# Patient Record
Sex: FEMALE | Race: WHITE | NOT HISPANIC OR LATINO | Employment: OTHER | URBAN - METROPOLITAN AREA
[De-identification: names, ages, dates, MRNs, and addresses within clinical notes are randomized per-mention and may not be internally consistent; named-entity substitution may affect disease eponyms.]

---

## 2017-01-17 DIAGNOSIS — G40.909 SEIZURE DISORDER (H): Primary | ICD-10-CM

## 2017-01-17 NOTE — TELEPHONE ENCOUNTER
Tegretal 200mg     Last Written Prescription Date: 01/28/2016  Last Fill Quantity: 150, # refills: 11  Last Office Visit with G, P or  Health prescribing provider: 05/26/2016   Next 5 appointments (look out 90 days)     Mar 23, 2017  1:00 PM   Return Visit with FERNANDA Kraft CNP   [facility]    [address]  [facility] [id number] [phone]                   ALT       38   8/23/2016  WBC      7.6   4/22/2015  RBC     4.51   4/22/2015  HGB     13.5   4/22/2015  HCT     41.3   4/22/2015  No components found with this name: mct  MCV       92   4/22/2015  MCH     29.9   4/22/2015  MCHC     32.7   4/22/2015  RDW     13.2   4/22/2015  PLT      205   4/22/2015  TSH   Date Value Ref Range Status   08/23/2016 3.95 0.40 - 4.00 mU/L Final     CREATININE   Date Value Ref Range Status   08/23/2016 0.78 0.52 - 1.04 mg/dL Final       Drug Levels  Depakote: No results found for this or any previous visit.  Dilantin: No results found for this or any previous visit.  Gabitril: No results found for this or any previous visit.  Tegretol: No results found for this or any previous visit.  Zonegran: No results found for this or any previous visit.    Tamia Yost CPhT  [facility]  [phone]

## 2017-01-18 NOTE — TELEPHONE ENCOUNTER
Tegretol 200 mg  Routing refill request to provider for review/approval because:  Patient needs to be seen because:  Last office visit on 5/26/16 past protocol to refill    Thank You   Donato Rasmussen RPh [facility]

## 2017-01-19 NOTE — TELEPHONE ENCOUNTER
LAST ADDRESSED IN [facility] 12/2015- OVERDUE FOR APPT AND RELATED LABS FOR ANTISEIZURE MEDICATION  Please assist in scheduling.

## 2017-01-25 RX ORDER — CARBAMAZEPINE 200 MG/1
TABLET ORAL
Qty: 30 TABLET | Refills: 0 | Status: SHIPPED | OUTPATIENT
Start: 2017-01-25 | End: 2017-01-30

## 2017-01-25 NOTE — TELEPHONE ENCOUNTER
Pt calls.  She says she has an appt scheduled on 1/30/17.      She is going to be out of her tegretol tomorrow.      Will discuss with Dr. Marks.      Call on home phone, ok to ashlyn.

## 2017-01-25 NOTE — TELEPHONE ENCOUNTER
Verbal order from Dr. Marks to dispense 30 with zero refills.  Pt can get further refills at her upcoming appt.      Called the pt to advise.

## 2017-01-30 ENCOUNTER — OFFICE VISIT (OUTPATIENT)
Dept: FAMILY MEDICINE | Facility: CLINIC | Age: 52
End: 2017-01-30
Payer: MEDICARE

## 2017-01-30 VITALS
WEIGHT: 293 LBS | SYSTOLIC BLOOD PRESSURE: 130 MMHG | BODY MASS INDEX: 52.97 KG/M2 | RESPIRATION RATE: 16 BRPM | OXYGEN SATURATION: 96 % | DIASTOLIC BLOOD PRESSURE: 80 MMHG | TEMPERATURE: 98 F | HEART RATE: 101 BPM

## 2017-01-30 DIAGNOSIS — R09.82 POST-NASAL DISCHARGE: ICD-10-CM

## 2017-01-30 DIAGNOSIS — E78.5 HYPERLIPIDEMIA LDL GOAL <130: ICD-10-CM

## 2017-01-30 DIAGNOSIS — J30.1 SEASONAL ALLERGIC RHINITIS DUE TO POLLEN: ICD-10-CM

## 2017-01-30 DIAGNOSIS — E66.01 MORBID OBESITY DUE TO EXCESS CALORIES (H): ICD-10-CM

## 2017-01-30 DIAGNOSIS — G40.909 SEIZURE DISORDER (H): Primary | ICD-10-CM

## 2017-01-30 DIAGNOSIS — H69.93 ETD (EUSTACHIAN TUBE DYSFUNCTION), BILATERAL: ICD-10-CM

## 2017-01-30 DIAGNOSIS — I10 ESSENTIAL HYPERTENSION, BENIGN: ICD-10-CM

## 2017-01-30 DIAGNOSIS — J34.89 SINUS PRESSURE: ICD-10-CM

## 2017-01-30 DIAGNOSIS — F43.21 ADJUSTMENT DISORDER WITH DEPRESSED MOOD: ICD-10-CM

## 2017-01-30 DIAGNOSIS — K21.9 GASTROESOPHAGEAL REFLUX DISEASE WITHOUT ESOPHAGITIS: ICD-10-CM

## 2017-01-30 PROCEDURE — 99215 OFFICE O/P EST HI 40 MIN: CPT | Performed by: INTERNAL MEDICINE

## 2017-01-30 RX ORDER — SERTRALINE HYDROCHLORIDE 100 MG/1
100 TABLET, FILM COATED ORAL DAILY
Qty: 90 TABLET | Refills: 3 | Status: SHIPPED | OUTPATIENT
Start: 2017-01-30 | End: 2018-03-14

## 2017-01-30 RX ORDER — FLUTICASONE PROPIONATE 110 UG/1
1-2 AEROSOL, METERED RESPIRATORY (INHALATION) 2 TIMES DAILY
Qty: 1 INHALER | Status: SHIPPED | OUTPATIENT
Start: 2017-01-30 | End: 2018-06-12

## 2017-01-30 RX ORDER — CETIRIZINE HYDROCHLORIDE 10 MG/1
10 TABLET ORAL EVERY EVENING
Qty: 90 TABLET | Refills: 4 | Status: SHIPPED | OUTPATIENT
Start: 2017-01-30 | End: 2019-01-15

## 2017-01-30 RX ORDER — LOVASTATIN 40 MG
40 TABLET ORAL AT BEDTIME
Qty: 90 TABLET | Refills: 3 | Status: SHIPPED | OUTPATIENT
Start: 2017-01-30 | End: 2018-03-14

## 2017-01-30 RX ORDER — FLUTICASONE PROPIONATE 50 MCG
1-2 SPRAY, SUSPENSION (ML) NASAL DAILY
Qty: 48 G | Refills: 3 | Status: SHIPPED | OUTPATIENT
Start: 2017-01-30 | End: 2018-06-20

## 2017-01-30 RX ORDER — LISINOPRIL 20 MG/1
20 TABLET ORAL DAILY
Qty: 90 TABLET | Refills: 3 | Status: SHIPPED | OUTPATIENT
Start: 2017-01-30 | End: 2018-03-14

## 2017-01-30 RX ORDER — CARBAMAZEPINE 200 MG/1
TABLET ORAL
Qty: 150 TABLET | Refills: 11 | Status: SHIPPED | OUTPATIENT
Start: 2017-01-30 | End: 2018-01-28

## 2017-01-30 RX ORDER — NICOTINE POLACRILEX 4 MG/1
40 GUM, CHEWING ORAL DAILY
Qty: 180 TABLET | Refills: 2 | Status: SHIPPED | OUTPATIENT
Start: 2017-01-30 | End: 2018-02-04

## 2017-01-30 ASSESSMENT — ANXIETY QUESTIONNAIRES
6. BECOMING EASILY ANNOYED OR IRRITABLE: NOT AT ALL
2. NOT BEING ABLE TO STOP OR CONTROL WORRYING: NOT AT ALL
IF YOU CHECKED OFF ANY PROBLEMS ON THIS QUESTIONNAIRE, HOW DIFFICULT HAVE THESE PROBLEMS MADE IT FOR YOU TO DO YOUR WORK, TAKE CARE OF THINGS AT HOME, OR GET ALONG WITH OTHER PEOPLE: SOMEWHAT DIFFICULT
7. FEELING AFRAID AS IF SOMETHING AWFUL MIGHT HAPPEN: NOT AT ALL
GAD7 TOTAL SCORE: 0
5. BEING SO RESTLESS THAT IT IS HARD TO SIT STILL: NOT AT ALL
1. FEELING NERVOUS, ANXIOUS, OR ON EDGE: NOT AT ALL
3. WORRYING TOO MUCH ABOUT DIFFERENT THINGS: NOT AT ALL

## 2017-01-30 ASSESSMENT — PATIENT HEALTH QUESTIONNAIRE - PHQ9: 5. POOR APPETITE OR OVEREATING: NOT AT ALL

## 2017-01-30 NOTE — NURSING NOTE
"Chief Complaint   Patient presents with     Recheck Medication       Initial /80 mmHg  Pulse 101  Temp(Src) 98  F (36.7  C) (Oral)  Resp 16  Wt 338 lb 4.8 oz (153.452 kg)  SpO2 96%  LMP 08/18/2008 Estimated body mass index is 52.97 kg/(m^2) as calculated from the following:    Height as of 5/26/16: 5' 7\" (1.702 m).    Weight as of this encounter: 338 lb 4.8 oz (153.452 kg).  BP completed using cuff size: large    "

## 2017-01-30 NOTE — PROGRESS NOTES
SUBJECTIVE:                                                    Brissa Barlow is a 51 year old female who presents to clinic today for the following health issues:    HPI    Hypertension Follow-up    Outpatient blood pressures are not being checked.    Low Salt Diet: No added salt    BP Readings from Last 3 Encounters:   01/30/17 130/80   12/29/16 122/70   08/23/16 118/76         Hyperlipidemia Follow-Up      Rate your low fat/cholesterol diet?: poor    Taking statin?  Yes, muscle aches after starting statin    Other lipid medications/supplements?:  None     Depression Followup    Status since last visit: Stable     See PHQ-9 for current symptoms.  Other associated symptoms: None    Complicating factors:   Significant life event:  No   Current substance abuse:  None  Anxiety or Panic symptoms:  No    PHQ-9 SCORE 12/18/2015 5/26/2016 1/30/2017   Total Score - - -   Total Score 3 0 6     SUBHASH-7 SCORE 12/18/2015 5/26/2016 1/30/2017   Total Score - - -   Total Score 0 0 0     PHQ-9  English PHQ-9   Any Language          Amount of exercise or physical activity: 2-3 days/week for an average of 15-30 minutes    Problems taking medications regularly: No    Medication side effects: None    Diet: Regular (no restrictions)     Problem list and histories reviewed & adjusted, as indicated.  Additional history: as documented    Labs reviewed in EPIC  Problem list, Medication list, Allergies, and Medical/Social/Surgical histories reviewed in [facility] and updated as appropriate.      REVIEW OF SYSTEMS:  C: POSITIVE for intentional weight loss - 63 pounds using Phentermine; NEGATIVE for fever or chills  E: NEGATIVE for vision changes or irritation  E/M: NEGATIVE for ear, mouth and throat problems  R: NEGATIVE for significant cough or SOB  CV:  Hypertension - use of Lisinopril; NEGATIVE for chest pain, palpitations or peripheral edema  GI: chronic bowel issues- past colon surgery in 2001Morbid Obesity - attempting to lose weight with  use of Phentermine; NEGATIVE for nausea, abdominal pain, heartburn, or change in bowel habits  M: NEGATIVE for significant arthralgias or myalgia  N: Seizure Disorder - ongoing use of Tegretol without seizure activity; she does not drive as a result;NEGATIVE for weakness, dizziness or paresthesias  E: Hyperlipidemia - use of Lovastatin; Impaired Fasting Glucose and Insulin Resistance; morbid obesity- followed by [facility] and use of Phentermine  P: Hx of Depression - use of Sertraline; NEGATIVE for changes in mood or affect    This document serves as a record of the services and decisions personally performed and made by Cara Marks MD. It was created on her behalf by Nhi Kumar, a trained medical scribe. The creation of this document is based the provider's statements to the medical scribe.  Nhi Kumar, January 30, 2017 5:10 PM     OBJECTIVE:                                                    /80 mmHg  Pulse 101  Temp(Src) 98  F (36.7  C) (Oral)  Resp 16  Wt 338 lb 4.8 oz (153.452 kg)  SpO2 96%  LMP 08/18/2008  Body mass index is 52.97 kg/(m^2).    EXAM:  GENERAL: Patient appears healthy, alert and not distressed. Morbid obesity  EYES: Eyes appear grossly normal to inspection, with normal conjunctivae and sclerae  HENT: Ear canals and TM's appear normal, mouth is without ulcers or lesions, oropharynx is clear and oral mucous membranes are moist  NECK: No adenopathy present, no asymmetry, masses, or scars noted, thyroid is normal to palpation  RESP: Lungs are clear to auscultation - no rales, rhonchi or wheezes present  CV: Regular rate and rhythm, normal S1 S2 heart sounds, no ectopy, no peripheral edema present, peripheral pulses normal, no carotid bruit.  ABDOMEN: Soft, nontender, no hepatosplenomegaly, no masses, normal bowel sounds   (female): No CVA tenderness.  MS: No gross musculoskeletal defects noted, no edema, gait is age appropriate without ataxia  NEURO: Patient exhibits  normal strength and tone, normal speech and mentation  PSYCH: Mentation appears normal, affect is normal/bright; development delay.      Diagnostic Test Results:  No results found for this or any previous visit (from the past 24 hour(s)).      ASSESSMENT/PLAN:                                                    (G40.909) Seizure disorder (H)  (primary encounter diagnosis)  Comment: No episodes of seizure since age 18; Doing well with Tegretol; 2013 labs reviewed; Due to recheck Tegretol levels; Will follow-up for a lab-only appointment next Thursday.  Plan: carBAMazepine (TEGRETOL) 200 MG tablet,         Comprehensive metabolic panel, Carbamazepine total          (I10) BENIGN HYPERTENSION  Comment: Blood pressure is stable and optimally controlled with Lisinopril; Well-tolerated; No changes in medication or dosing; Will continue to monitor.  Plan: lisinopril (PRINIVIL/ZESTRIL) 20 MG tablet          (E78.5) Hyperlipidemia LDL goal <130  Comment: Due for fasting labs to recheck lipids; Lab appointment made for next Thursday at 8:30 am.  Plan: lovastatin (MEVACOR) 40 MG tablet,         Comprehensive metabolic panel, Lipid panel reflex to direct LDL          (F43.21) Adjustment disorder with depressed mood  Comment: Mood is stable; Sertraline is effective and well-tolerated; No changes in medication or dosing; Will continue to monitor mood.  Plan: sertraline (ZOLOFT) 100 MG tablet          (H69.83) ETD (eustachian tube dysfunction), bilateral  Comment: Symptoms controlled and improved with regular Flonase use; Continue using Flonase nasal spray without change.  Plan: fluticasone (FLONASE) 50 MCG/ACT spray          (J30.1) Seasonal allergic rhinitis due to pollen  Comment: Has tried Claritin, but not as effective as Flonase or Zyrtec; Both were refilled.  Plan: fluticasone (FLONASE) 50 MCG/ACT spray,         cetirizine (ZYRTEC) 10 MG tablet          (K21.9) Gastroesophageal reflux disease without esophagitis  Comment:  Symptoms improved with the use of Omeprazole 20 MG; Continue taking current dose without change.  Plan: omeprazole 20 MG tablet         (J34.89) Sinus pressure  (R09.82) Post-nasal discharge  Comment: Improved symptoms with the use of Zyrtec and Flovent; Continue using both without change.  Plan: cetirizine (ZYRTEC) 10 MG tablet, fluticasone (FLOVENT HFA) 110 MCG/ACT Inhaler                   The information in this document, created by a medical scribe for me, accurately reflects the services I personally performed and the decisions made by me. I have reviewed and approved this document for accuracy.  Dr. Cara Marks, 5:22 PM, January 30, 2017    Cara Marks MD  Internal Medicine  [facility]

## 2017-01-30 NOTE — MR AVS SNAPSHOT
After Visit Summary   1/30/2017    Brissa Barlow    MRN: [id number]           Patient Information     Date Of Birth          1965        Visit Information        Provider Department      1/30/2017 4:00 PM Cara Marks MD [facility]        Today's Diagnoses     Seizure disorder (H)    -  1     BENIGN HYPERTENSION         Hyperlipidemia LDL goal <130         Adjustment disorder with depressed mood         ETD (eustachian tube dysfunction), bilateral         Seasonal allergic rhinitis due to pollen         Gastroesophageal reflux disease without esophagitis         Seasonal allergic rhinitis         Sinus pressure         Post-nasal discharge            Follow-ups after your visit        Your next 10 appointments already scheduled     Mar 23, 2017  1:00 PM   Return Visit with FERNANDA Kraft CNP   [facility] ([facility]    [address]  [facility] [address] [phone]              Future tests that were ordered for you today     Open Future Orders        Priority Expected Expires Ordered    Comprehensive metabolic panel Routine  4/30/2017 1/30/2017    Lipid panel reflex to direct LDL Routine  4/30/2017 1/30/2017    Carbamazepine total Routine  4/30/2017 1/30/2017            Who to contact     If you have questions or need follow up information about today's clinic visit or your schedule please contact [facility] directly at [phone].  Normal or non-critical lab and imaging results will be communicated to you by MyChart, letter or phone within 4 business days after the clinic has received the results. If you do not hear from us within 7 days, please contact the clinic through MyChart or phone. If you have a critical or abnormal lab result, we will notify you by phone as soon as possible.  Submit refill requests through [employer] or call your pharmacy and they will forward the refill request to  us. Please allow 3 business days for your refill to be completed.          Additional Information About Your Visit        MyChart Information     [employer] gives you secure access to your electronic health record. If you see a primary care provider, you can also send messages to your care team and make appointments. If you have questions, please call your primary care clinic.  If you do not have a primary care provider, please call [phone] and they will assist you.        Care EveryWhere ID     This is your Care EveryWhere ID. This could be used by other organizations to access your [facility] medical records  [id number]        Your Vitals Were     Last Period                   08/18/2008            Blood Pressure from Last 3 Encounters:   12/29/16 122/70   08/23/16 118/76   05/26/16 118/70    Weight from Last 3 Encounters:   12/29/16 340 lb (154.223 kg)   08/23/16 348 lb (157.852 kg)   05/26/16 356 lb 3.2 oz (161.571 kg)                 Today's Medication Changes          These changes are accurate as of: 1/30/17  5:17 PM.  If you have any questions, ask your nurse or doctor.               These medicines have changed or have updated prescriptions.        Dose/Directions    carBAMazepine 200 MG tablet   Commonly known as:  TEGRETOL   This may have changed:  additional instructions   Used for:  Seizure disorder (H)        2 every AM, 1 noon, 2 every PM;   Quantity:  150 tablet   Refills:  11            Where to get your medicines      These medications were sent to [facility] [place] - [address]  [address] [address] [address]     Phone:  [phone]    - carBAMazepine 200 MG tablet  - cetirizine 10 MG tablet  - fluticasone 110 MCG/ACT Inhaler  - fluticasone 50 MCG/ACT spray  - lisinopril 20 MG tablet  - lovastatin 40 MG tablet  - omeprazole 20 MG tablet  - sertraline 100 MG tablet             Primary Care Provider Office Phone # Fax #    Cara Marks MD  [phone]       [facility] [address]  [facility] [address]        Thank you!     Thank you for choosing [facility]  for your care. Our goal is always to provide you with excellent care. Hearing back from our patients is one way we can continue to improve our services. Please take a few minutes to complete the written survey that you may receive in the mail after your visit with us. Thank you!             Your Updated Medication List - Protect others around you: Learn how to safely use, store and throw away your medicines at www.disposemymeds.org.          This list is accurate as of: 1/30/17  5:17 PM.  Always use your most recent med list.                   Brand Name Dispense Instructions for use    carBAMazepine 200 MG tablet    TEGRETOL    150 tablet    2 every AM, 1 noon, 2 every PM;       cetirizine 10 MG tablet    zyrTEC    90 tablet    Take 1 tablet (10 mg) by mouth every evening has been on Claritin and seems to not be as effective.       fluconazole 150 MG tablet    DIFLUCAN    4 tablet    1 tablet every 3 days for 4 doses; while on abx       fluticasone 110 MCG/ACT Inhaler    FLOVENT HFA    1 Inhaler    Inhale 1-2 puffs into the lungs 2 times daily       fluticasone 50 MCG/ACT spray    FLONASE    48 g    Spray 1-2 sprays into both nostrils daily       hydrocortisone 1 % cream    CORTAID    30 g    Apply sparingly to affected area three times daily for 14 days.       lisinopril 20 MG tablet    PRINIVIL/ZESTRIL    90 tablet    Take 1 tablet (20 mg) by mouth daily       lovastatin 40 MG tablet    MEVACOR    90 tablet    Take 1 tablet (40 mg) by mouth At Bedtime       nystatin cream    MYCOSTATIN    60 g    Apply topically 2 times daily       omeprazole 20 MG tablet     180 tablet    Take 2 tablets (40 mg) by mouth daily Take 30-60 minutes before a meal.       * order for DME     1 Device    Equipment being ordered:  Ankle brace       * order for DME     1  Device    Equipment being ordered: New balance shoes (1 pair)       * order for DME     1 Device    Equipment being ordered: Custom knee brace, left knee       phentermine 37.5 MG tablet    ADIPEX-P    135 tablet    Take 1.5 tablets (56.25 mg) by mouth every morning (before breakfast)       sertraline 100 MG tablet    ZOLOFT    90 tablet    Take 1 tablet (100 mg) by mouth daily       * Notice:  This list has 3 medication(s) that are the same as other medications prescribed for you. Read the directions carefully, and ask your doctor or other care provider to review them with you.

## 2017-01-31 ASSESSMENT — ANXIETY QUESTIONNAIRES: GAD7 TOTAL SCORE: 0

## 2017-01-31 ASSESSMENT — PATIENT HEALTH QUESTIONNAIRE - PHQ9: SUM OF ALL RESPONSES TO PHQ QUESTIONS 1-9: 6

## 2017-02-02 DIAGNOSIS — G40.909 SEIZURE DISORDER (H): ICD-10-CM

## 2017-02-02 DIAGNOSIS — E78.5 HYPERLIPIDEMIA LDL GOAL <130: ICD-10-CM

## 2017-02-02 LAB
ALBUMIN SERPL-MCNC: 3.6 G/DL (ref 3.4–5)
ALP SERPL-CCNC: 119 U/L (ref 40–150)
ALT SERPL W P-5'-P-CCNC: 35 U/L (ref 0–50)
ANION GAP SERPL CALCULATED.3IONS-SCNC: 9 MMOL/L (ref 3–14)
AST SERPL W P-5'-P-CCNC: 19 U/L (ref 0–45)
BILIRUB SERPL-MCNC: 0.3 MG/DL (ref 0.2–1.3)
BUN SERPL-MCNC: 15 MG/DL (ref 7–30)
CALCIUM SERPL-MCNC: 8.7 MG/DL (ref 8.5–10.1)
CARBAMAZEPINE SERPL-MCNC: 11.3 MG/L (ref 4–12)
CHLORIDE SERPL-SCNC: 106 MMOL/L (ref 94–109)
CHOLEST SERPL-MCNC: 194 MG/DL
CO2 SERPL-SCNC: 27 MMOL/L (ref 20–32)
CREAT SERPL-MCNC: 0.82 MG/DL (ref 0.52–1.04)
ERYTHROCYTE [DISTWIDTH] IN BLOOD BY AUTOMATED COUNT: 12 % (ref 10–15)
GFR SERPL CREATININE-BSD FRML MDRD: 74 ML/MIN/1.7M2
GLUCOSE SERPL-MCNC: 106 MG/DL (ref 70–99)
HCT VFR BLD AUTO: 41.1 % (ref 35–47)
HDLC SERPL-MCNC: 48 MG/DL
HGB BLD-MCNC: 13.5 G/DL (ref 11.7–15.7)
LDLC SERPL CALC-MCNC: 116 MG/DL
MCH RBC QN AUTO: 30.6 PG (ref 26.5–33)
MCHC RBC AUTO-ENTMCNC: 32.8 G/DL (ref 31.5–36.5)
MCV RBC AUTO: 93 FL (ref 78–100)
NONHDLC SERPL-MCNC: 146 MG/DL
PLATELET # BLD AUTO: 208 10E9/L (ref 150–450)
POTASSIUM SERPL-SCNC: 4.2 MMOL/L (ref 3.4–5.3)
PROT SERPL-MCNC: 6.8 G/DL (ref 6.8–8.8)
RBC # BLD AUTO: 4.41 10E12/L (ref 3.8–5.2)
SODIUM SERPL-SCNC: 142 MMOL/L (ref 133–144)
TRIGL SERPL-MCNC: 151 MG/DL
WBC # BLD AUTO: 5.6 10E9/L (ref 4–11)

## 2017-02-02 PROCEDURE — 80156 ASSAY CARBAMAZEPINE TOTAL: CPT | Performed by: INTERNAL MEDICINE

## 2017-02-02 PROCEDURE — 36415 COLL VENOUS BLD VENIPUNCTURE: CPT | Performed by: INTERNAL MEDICINE

## 2017-02-02 PROCEDURE — 80053 COMPREHEN METABOLIC PANEL: CPT | Performed by: INTERNAL MEDICINE

## 2017-02-02 PROCEDURE — 85027 COMPLETE CBC AUTOMATED: CPT | Performed by: INTERNAL MEDICINE

## 2017-02-02 PROCEDURE — 80061 LIPID PANEL: CPT | Performed by: INTERNAL MEDICINE

## 2017-02-03 NOTE — PROGRESS NOTES
Quick Note:      Seizure disorder: therapeutic Tegretol level; no change in medication/dose  WBC and liver tests are in normal range.  A1C 5.3 8/23/2016    Glucose slightly elevated; continue to keep active and work on weight loss;  Slightly elevated cholesterol but cardiovascular risk assessment in low.   If risk greater than 7.5%, then statin therapy should be considered.    The 10-year ASCVD risk score (Cornelio COVARRUBIAS Jr., et al., 2013) is: 1.8%   Values used to calculate the score:   Age: 51 years   Sex: Female   Is an : No   Diabetic: No   Tobacco smoker: No   Systolic Blood Pressure: 130 mmHg   Prescribed Antihypertensives: Yes   HDL Cholesterol: 48 mg/dL   Total Cholesterol: 194 mg/dL    Pt advised via My Chart.    ______

## 2017-03-23 ENCOUNTER — OFFICE VISIT (OUTPATIENT)
Dept: ENDOCRINOLOGY | Facility: CLINIC | Age: 52
End: 2017-03-23
Payer: MEDICARE

## 2017-03-23 VITALS
BODY MASS INDEX: 45.99 KG/M2 | HEART RATE: 105 BPM | HEIGHT: 67 IN | DIASTOLIC BLOOD PRESSURE: 64 MMHG | WEIGHT: 293 LBS | SYSTOLIC BLOOD PRESSURE: 118 MMHG

## 2017-03-23 DIAGNOSIS — R79.89 ELEVATED CORTISOL LEVEL: ICD-10-CM

## 2017-03-23 DIAGNOSIS — E88.819 INSULIN RESISTANCE: ICD-10-CM

## 2017-03-23 DIAGNOSIS — R73.01 IFG (IMPAIRED FASTING GLUCOSE): ICD-10-CM

## 2017-03-23 PROCEDURE — 99214 OFFICE O/P EST MOD 30 MIN: CPT | Performed by: CLINICAL NURSE SPECIALIST

## 2017-03-23 RX ORDER — PHENTERMINE HYDROCHLORIDE 37.5 MG/1
56.25 TABLET ORAL
Qty: 135 TABLET | Refills: 0 | Status: SHIPPED | OUTPATIENT
Start: 2017-03-23 | End: 2017-07-27

## 2017-03-23 NOTE — MR AVS SNAPSHOT
After Visit Summary   3/23/2017    Brissa Barlow    MRN: [id number]           Patient Information     Date Of Birth          1965        Visit Information        Provider Department      3/23/2017 1:00 PM Catarina Govea APRN CNP [facility]        Today's Diagnoses     Elevated cortisol level (H)        IFG (impaired fasting glucose)        Insulin resistance           Follow-ups after your visit        Additional Services     ENDOCRINOLOGY ADULT REFERRAL       Your provider has referred you to: [facility]: Endocrinology and Diabetes Clinic - [place] [phone]   [email or web address]      Please be aware that coverage of these services is subject to the terms and limitations of your health insurance plan.  Call member services at your health plan with any benefit or coverage questions.      Please bring the following to your appointment:    >>   Any x-rays, CTs or MRIs which have been performed.  Contact the facility where they were done to arrange for  prior to your scheduled appointment.    >>   List of current medications   >>   This referral request   >>   Any documents/labs given to you for this referral                  Follow-up notes from your care team     Return in about 3 months (around 6/23/2017).      Your next 10 appointments already scheduled     Jun 21, 2017 10:00 AM CDT   (Arrive by 9:45 AM)   NEW ENDOCRINE with Mira Mayberry MD   [facility] ([facility])    [address] [place] [address] [phone]            Jun 23, 2017  1:00 PM CDT   Return Visit with FERNANDA Kraft CNP   [facility] ([facility])    [address]  [facility] [address] [phone]              Who to contact     If you have questions or need follow up information about today's clinic visit or your  "schedule please contact [facility] directly at [phone].  Normal or non-critical lab and imaging results will be communicated to you by MyChart, letter or phone within 4 business days after the clinic has received the results. If you do not hear from us within 7 days, please contact the clinic through [employer] or phone. If you have a critical or abnormal lab result, we will notify you by phone as soon as possible.  Submit refill requests through [employer] or call your pharmacy and they will forward the refill request to us. Please allow 3 business days for your refill to be completed.          Additional Information About Your Visit        [employer] Information     [employer] gives you secure access to your electronic health record. If you see a primary care provider, you can also send messages to your care team and make appointments. If you have questions, please call your primary care clinic.  If you do not have a primary care provider, please call [phone] and they will assist you.        Care EveryWhere ID     This is your Care EveryWhere ID. This could be used by other organizations to access your [facility] medical records  [id number]        Your Vitals Were     Pulse Height Last Period BMI (Body Mass Index)          105 1.702 m (5' 7\") 08/18/2008 52.64 kg/m2         Blood Pressure from Last 3 Encounters:   03/23/17 118/64   01/30/17 130/80   12/29/16 122/70    Weight from Last 3 Encounters:   03/23/17 (!) 152.5 kg (336 lb 1.6 oz)   01/30/17 (!) 153.5 kg (338 lb 4.8 oz)   12/29/16 (!) 154.2 kg (340 lb)              We Performed the Following     ENDOCRINOLOGY ADULT REFERRAL          Where to get your medicines      Some of these will need a paper prescription and others can be bought over the counter.  Ask your nurse if you have questions.     Bring a paper prescription for each of these medications     phentermine 37.5 MG tablet          Primary Care Provider Office Phone # Fax #    Cara " Joshua Marks -[phone]       [facility] [address]  [facility] [address]        Thank you!     Thank you for choosing [facility]  for your care. Our goal is always to provide you with excellent care. Hearing back from our patients is one way we can continue to improve our services. Please take a few minutes to complete the written survey that you may receive in the mail after your visit with us. Thank you!             Your Updated Medication List - Protect others around you: Learn how to safely use, store and throw away your medicines at www.disposemymeds.org.          This list is accurate as of: 3/23/17  5:03 PM.  Always use your most recent med list.                   Brand Name Dispense Instructions for use    carBAMazepine 200 MG tablet    TEGRETOL    150 tablet    2 every AM, 1 noon, 2 every PM;       cetirizine 10 MG tablet    zyrTEC    90 tablet    Take 1 tablet (10 mg) by mouth every evening has been on Claritin and seems to not be as effective.       fluconazole 150 MG tablet    DIFLUCAN    4 tablet    1 tablet every 3 days for 4 doses; while on abx       fluticasone 110 MCG/ACT Inhaler    FLOVENT HFA    1 Inhaler    Inhale 1-2 puffs into the lungs 2 times daily       fluticasone 50 MCG/ACT spray    FLONASE    48 g    Spray 1-2 sprays into both nostrils daily       hydrocortisone 1 % cream    CORTAID    30 g    Apply sparingly to affected area three times daily for 14 days.       lisinopril 20 MG tablet    PRINIVIL/ZESTRIL    90 tablet    Take 1 tablet (20 mg) by mouth daily       lovastatin 40 MG tablet    MEVACOR    90 tablet    Take 1 tablet (40 mg) by mouth At Bedtime       nystatin cream    MYCOSTATIN    60 g    Apply topically 2 times daily       omeprazole 20 MG tablet     180 tablet    Take 2 tablets (40 mg) by mouth daily Take 30-60 minutes before a meal.       * order for DME     1 Device    Equipment being ordered:  Ankle brace       *  order for DME     1 Device    Equipment being ordered: New balance shoes (1 pair)       * order for DME     1 Device    Equipment being ordered: Custom knee brace, left knee       phentermine 37.5 MG tablet    ADIPEX-P    135 tablet    Take 1.5 tablets (56.25 mg) by mouth every morning (before breakfast)       sertraline 100 MG tablet    ZOLOFT    90 tablet    Take 1 tablet (100 mg) by mouth daily       * Notice:  This list has 3 medication(s) that are the same as other medications prescribed for you. Read the directions carefully, and ask your doctor or other care provider to review them with you.
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
## 2017-03-23 NOTE — NURSING NOTE
"Chief Complaint   Patient presents with     Follow Up For     Cushings Syndrome       Initial /64 (BP Location: Left arm, Cuff Size: Adult Regular)  Pulse 105  Ht 1.702 m (5' 7\")  Wt (!) 152.5 kg (336 lb 1.6 oz)  LMP 08/18/2008  BMI 52.64 kg/m2 Estimated body mass index is 52.64 kg/(m^2) as calculated from the following:    Height as of this encounter: 1.702 m (5' 7\").    Weight as of this encounter: 152.5 kg (336 lb 1.6 oz).  Medication Reconciliation: complete     Deborah Fagan, ALBIN    3/23/2017  1:00 PM      "

## 2017-03-31 ENCOUNTER — OFFICE VISIT (OUTPATIENT)
Dept: FAMILY MEDICINE | Facility: CLINIC | Age: 52
End: 2017-03-31
Payer: MEDICARE

## 2017-03-31 VITALS
HEART RATE: 88 BPM | BODY MASS INDEX: 45.99 KG/M2 | SYSTOLIC BLOOD PRESSURE: 120 MMHG | WEIGHT: 293 LBS | HEIGHT: 67 IN | TEMPERATURE: 98.2 F | DIASTOLIC BLOOD PRESSURE: 80 MMHG

## 2017-03-31 DIAGNOSIS — R82.90 NONSPECIFIC FINDING ON EXAMINATION OF URINE: ICD-10-CM

## 2017-03-31 DIAGNOSIS — R31.9 URINARY TRACT INFECTION WITH HEMATURIA, SITE UNSPECIFIED: Primary | ICD-10-CM

## 2017-03-31 DIAGNOSIS — N39.0 URINARY TRACT INFECTION WITH HEMATURIA, SITE UNSPECIFIED: Primary | ICD-10-CM

## 2017-03-31 LAB
ALBUMIN UR-MCNC: NEGATIVE MG/DL
APPEARANCE UR: ABNORMAL
BACTERIA #/AREA URNS HPF: ABNORMAL /HPF
BILIRUB UR QL STRIP: NEGATIVE
COLOR UR AUTO: YELLOW
GLUCOSE UR STRIP-MCNC: NEGATIVE MG/DL
HGB UR QL STRIP: ABNORMAL
KETONES UR STRIP-MCNC: NEGATIVE MG/DL
LEUKOCYTE ESTERASE UR QL STRIP: ABNORMAL
NITRATE UR QL: NEGATIVE
NON-SQ EPI CELLS #/AREA URNS LPF: ABNORMAL /LPF
PH UR STRIP: 7 PH (ref 5–7)
RBC #/AREA URNS AUTO: ABNORMAL /HPF (ref 0–2)
SP GR UR STRIP: 1.02 (ref 1–1.03)
URN SPEC COLLECT METH UR: ABNORMAL
UROBILINOGEN UR STRIP-ACNC: 0.2 EU/DL (ref 0.2–1)
WBC #/AREA URNS AUTO: ABNORMAL /HPF (ref 0–2)
WBC CLUMPS #/AREA URNS HPF: PRESENT /HPF

## 2017-03-31 PROCEDURE — 99213 OFFICE O/P EST LOW 20 MIN: CPT | Performed by: FAMILY MEDICINE

## 2017-03-31 PROCEDURE — 81001 URINALYSIS AUTO W/SCOPE: CPT | Performed by: FAMILY MEDICINE

## 2017-03-31 PROCEDURE — 87086 URINE CULTURE/COLONY COUNT: CPT | Performed by: FAMILY MEDICINE

## 2017-03-31 RX ORDER — SULFAMETHOXAZOLE/TRIMETHOPRIM 800-160 MG
1 TABLET ORAL 2 TIMES DAILY
Qty: 10 TABLET | Refills: 0 | Status: SHIPPED | OUTPATIENT
Start: 2017-03-31 | End: 2017-04-05

## 2017-03-31 NOTE — PATIENT INSTRUCTIONS
Urinary Tract Infections in Women  Urinary tract infections (UTIs) are most often caused by bacteria (germs). These bacteria enter the urinary tract. The bacteria may come from outside the body. Or they may travel from the skin outside the rectum or vagina into the urethra. Female anatomy makes it easier for bacteria from the bowel to enter a woman s urinary tract, which is the most common source of UTI. This means women develop UTIs more often than men. Pain in or around the urinary tract is a common UTI symptom. But the only way to know for sure if you have a UTI for the health care provider to test your urine. The two tests that may be done are the urinalysis and urine culture.  Types of UTIs    Cystitis: A bladder infection (cystitis) is the most common UTI in women. You may have urgent or frequent urination. You may also have pain, burning when you urinate, and bloody urine.    Urethritis: This is an inflamed urethra, which is the tube that carries urine from the bladder to outside the body. You may have lower stomach or back pain. You may also have urgent or frequent urination.    Pyelonephritis: This is a kidney infection. If not treated, it can be serious and damage your kidneys. In severe cases, you may be hospitalized. You may have a fever and lower back pain.  Medications to treat a UTI  Most UTIs are treated with antibiotics. These kill the bacteria. The length of time you need to take them depends on the type of infection. It may be as short as 3 days. If you have repeated UTIs, a low-dose antibiotic may be needed for several months. Take antibiotics exactly as directed. Don t stop taking them until all of the medication is gone. If you stop taking the antibiotic too soon, the infection may not go away, and you may develop a resistance to the antibiotic. This can make it much harder to treat.  Lifestyle changes to treat and prevent UTIs  The lifestyle changes below will help get rid of your UTI. They  may also help prevent future UTIs.    Drink plenty of fluids. This includes water, juice, or other caffeine-free drinks. Fluids help flush bacteria out of your body.    Empty your bladder. Always empty your bladder when you feel the urge to urinate. And always urinate before going to sleep. Urine that stays in your bladder can lead to infection. Try to urinate before and after sex as well.    Practice good personal hygiene. Wipe yourself from front to back after using the toilet. This helps keep bacteria from getting into the urethra.    Use condoms during sex. These help prevent UTIs caused by sexually transmitted bacteria. Also, avoid using spermicides during sex. These can increase the risk of UTIs. Choose other forms of birth control instead. For women who tend to get UTIs after sex, a low-dose of a preventive antibiotic may be used. Be sure to discuss this option with your health care provider.    Follow up with your health care provider as directed. He or she may test to make sure the infection has cleared. If necessary, additional treatment may be started.    0196-8547 [employer]. [address] [place] [address], [place] [address]. All rights reserved. This information is not intended as a substitute for professional medical care. Always follow your healthcare professional's instructions.

## 2017-04-01 LAB
BACTERIA SPEC CULT: NORMAL
MICRO REPORT STATUS: NORMAL
SPECIMEN SOURCE: NORMAL

## 2017-04-21 DIAGNOSIS — G40.909 SEIZURE DISORDER (H): ICD-10-CM

## 2017-06-21 ENCOUNTER — OFFICE VISIT (OUTPATIENT)
Dept: ENDOCRINOLOGY | Facility: CLINIC | Age: 52
End: 2017-06-21

## 2017-06-21 VITALS
HEART RATE: 92 BPM | WEIGHT: 293 LBS | HEIGHT: 67 IN | DIASTOLIC BLOOD PRESSURE: 82 MMHG | BODY MASS INDEX: 45.99 KG/M2 | SYSTOLIC BLOOD PRESSURE: 132 MMHG

## 2017-06-21 DIAGNOSIS — D49.7 ADRENAL TUMOR: ICD-10-CM

## 2017-06-21 DIAGNOSIS — E24.8 OTHER CUSHING'S SYNDROME (H): ICD-10-CM

## 2017-06-21 DIAGNOSIS — E24.8 OTHER CUSHING'S SYNDROME (H): Primary | ICD-10-CM

## 2017-06-21 DIAGNOSIS — E66.01 MORBID OBESITY, UNSPECIFIED OBESITY TYPE (H): ICD-10-CM

## 2017-06-21 DIAGNOSIS — E27.0 HYPERCORTISOLEMIA (H): ICD-10-CM

## 2017-06-21 LAB — CORTIS SERPL-MCNC: 15.9 UG/DL (ref 4–22)

## 2017-06-21 PROCEDURE — 82533 TOTAL CORTISOL: CPT | Performed by: INTERNAL MEDICINE

## 2017-06-21 PROCEDURE — 82024 ASSAY OF ACTH: CPT | Performed by: INTERNAL MEDICINE

## 2017-06-21 ASSESSMENT — PAIN SCALES - GENERAL: PAINLEVEL: NO PAIN (0)

## 2017-06-21 NOTE — NURSING NOTE
Chief Complaint   Patient presents with     Consult     NEW PATIENT-CUSHING'S SYDROME     Evonne Arteaga, [facility]  Endocrinology & Diabetes 3G

## 2017-06-21 NOTE — MR AVS SNAPSHOT
After Visit Summary   6/21/2017    Brissa Barlow    MRN: [id number]           Patient Information     Date Of Birth          1965        Visit Information        Provider Department      6/21/2017 10:00 AM Mira Mayberry MD [facility]        Today's Diagnoses     Other Cushing's syndrome (H)    -  1      Care Instructions    To expedite your medication refill(s), please contact your pharmacy and have them fax a refill request to: [phone].  *Please allow 3 business days for routine medication refills.  *Please allow 5 business days for controlled substance medication refills.  --------------------  For scheduling appointments (including lab work), please request an appointment through [employer], or call: [phone].    For questions for your provider or the endocrine nurse, please send a [employer] message.  For after-hours urgent issues, please dial [phone], and ask to speak with the Endocrinologist On-Call.  --------------------  Please Note: If you are active on [employer], all future test results will be sent by [employer] message only and will no longer be sent by mail. You may also receive communication directly from your physician.            Follow-ups after your visit        Your next 10 appointments already scheduled     Jun 21, 2017 11:45 AM CDT   LAB with [facility] ([facility])    [address] [place] [id number] [phone]           Patient must bring picture ID.  Patient should be prepared to give a urine specimen  Please do not eat 10-12 hours before your appointment if you are coming in fasting for labs on lipids, cholesterol, or glucose (sugar).  Pregnant women should follow their Care Team instructions. Water with medications is okay. Do not drink coffee or other fluids.   If you have concerns about taking  your medications, please ask at office or if scheduling via [employer], send a message by  clicking on Secure Messaging, Message Your Care Team.            Jun 21, 2017 12:00 PM CDT   (Arrive by 11:45 AM)   CT ABDOMEN W/O & W CONTRAST with UCCT2   [facility] CT ([facility])    [address]  [address] [facility] [address] [phone]           Please bring any scans or X-rays taken at other hospitals, if similar tests were done. Also bring a list of your medicines, including vitamins, minerals and over-the-counter drugs. It is safest to leave personal items at home.  Be sure to tell your doctor:   If you have any allergies.   If there s any chance you are pregnant.   If you are breastfeeding.   If you have any special needs.  You may have contrast for this exam. To prepare:   Do not eat or drink for 2 hours before your exam. If you need to take medicine, you may take it with small sips of water. (We may ask you to take liquid medicine as well.)   The day before your exam, drink extra fluids at least six 8-ounce glasses (unless your doctor tells you to restrict your fluids).  Patients over 70 or patients with diabetes or kidney problems:   If you haven t had a blood test (creatinine test) within the last 30 days, go to your clinic or Diagnostic Imaging Department for this test.  If you have diabetes:   If your kidney function is normal, continue taking your metformin (Avandamet, Glucophage, Glucovance, Metaglip) on the day of your exam.   If your kidney function is abnormal, wait 48 hours before restarting this medicine.  You will have oral contrast for this exam:   You will drink the contrast at home. Get this from your clinic or Diagnostic Imaging Department. Please follow the directions given.  Please wear loose clothing, such as a sweat suit or jogging clothes. Avoid snaps, zippers and other metal. We may ask you to undress and put on a hospital gown.  If you have any questions, please call the Imaging Department where you will have your exam.        "       Future tests that were ordered for you today     Open Future Orders        Priority Expected Expires Ordered    CT Abdomen w & w/o contrast Routine  1/17/2018 6/21/2017    Cortisol Saliva Routine  6/21/2018 6/21/2017    Cortisol Saliva Routine  6/21/2018 6/21/2017    Adrenal corticotropin Routine  6/21/2018 6/21/2017    Cortisol Routine  6/21/2018 6/21/2017            Who to contact     Please call your clinic at [phone] to:    Ask questions about your health    Make or cancel appointments    Discuss your medicines    Learn about your test results    Speak to your doctor   If you have compliments or concerns about an experience at your clinic, or if you wish to file a complaint, please contact [facility] Patient Relations at [phone] or email us at [email or web address]         Additional Information About Your Visit        Q2ebankingharTailored Fit Information     exoro systemt gives you secure access to your electronic health record. If you see a primary care provider, you can also send messages to your care team and make appointments. If you have questions, please call your primary care clinic.  If you do not have a primary care provider, please call [phone] and they will assist you.      EasyProve is an electronic gateway that provides easy, online access to your medical records. With EasyProve, you can request a clinic appointment, read your test results, renew a prescription or communicate with your care team.     To access your existing account, please contact your [facility] or call [phone] for assistance.        Care EveryWhere ID     This is your Care EveryWhere ID. This could be used by other organizations to access your [facility] medical records  [id number]        Your Vitals Were     Pulse Height Last Period BMI (Body Mass Index)          92 1.702 m (5' 7\") 08/18/2008 53.6 kg/m2         Blood Pressure from Last 3 Encounters:   06/21/17 " 132/82   03/31/17 120/80   03/23/17 118/64    Weight from Last 3 Encounters:   06/21/17 (!) 155.2 kg (342 lb 3.2 oz)   03/31/17 (!) 152.9 kg (337 lb)   03/23/17 (!) 152.5 kg (336 lb 1.6 oz)               Primary Care Provider Office Phone # Fax #    Cara Joshua Marks [phone]       [facility] [address] [facility] [address]        Equal Access to Services     [facility]: Hadii aad ku hadasho Soomaali, waaxda luqadaha, qaybta kaalmada adeegyada, mir padgett . So [facility] [phone].    ATENCIÓN: Si habla español, tiene a ye disposición servicios gratuitos de asistencia lingüística. [facility] [phone].    We comply with applicable federal civil rights laws and [place] laws. We do not discriminate on the basis of race, color, national origin, age, disability sex, sexual orientation or gender identity.            Thank you!     Thank you for choosing [facility]  for your care. Our goal is always to provide you with excellent care. Hearing back from our patients is one way we can continue to improve our services. Please take a few minutes to complete the written survey that you may receive in the mail after your visit with us. Thank you!             Your Updated Medication List - Protect others around you: Learn how to safely use, store and throw away your medicines at www.disposemymeds.org.          This list is accurate as of: 6/21/17 11:19 AM.  Always use your most recent med list.                   Brand Name Dispense Instructions for use Diagnosis    carBAMazepine 200 MG tablet    TEGRETOL    150 tablet    2 every AM, 1 noon, 2 every PM;    Seizure disorder (H)       cetirizine 10 MG tablet    zyrTEC    90 tablet    Take 1 tablet (10 mg) by mouth every evening has been on Claritin and seems to not be as effective.    Sinus pressure, Seasonal allergic rhinitis due to pollen       fluticasone 110 MCG/ACT Inhaler    FLOVENT HFA     1 Inhaler    Inhale 1-2 puffs into the lungs 2 times daily    Post-nasal discharge       fluticasone 50 MCG/ACT spray    FLONASE    48 g    Spray 1-2 sprays into both nostrils daily    ETD (eustachian tube dysfunction), bilateral, Seasonal allergic rhinitis due to pollen       hydrocortisone 1 % cream    CORTAID    30 g    Apply sparingly to affected area three times daily for 14 days.    Itching with irritation       lisinopril 20 MG tablet    PRINIVIL/ZESTRIL    90 tablet    Take 1 tablet (20 mg) by mouth daily    Essential hypertension, benign       lovastatin 40 MG tablet    MEVACOR    90 tablet    Take 1 tablet (40 mg) by mouth At Bedtime    Hyperlipidemia LDL goal <130       nystatin cream    MYCOSTATIN    60 g    Apply topically 2 times daily    Vaginal discharge       omeprazole 20 MG tablet     180 tablet    Take 2 tablets (40 mg) by mouth daily Take 30-60 minutes before a meal.    Gastroesophageal reflux disease without esophagitis       * order for DME     1 Device    Equipment being ordered:  Ankle brace    Cavus foot, acquired, Metatarsus adductus, Left foot pain       * order for DME     1 Device    Equipment being ordered: New balance shoes (1 pair)    Cavus foot, acquired, Metatarsus adductus, Left foot pain       * order for DME     1 Device    Equipment being ordered: Custom knee brace, left knee    Injury, other and unspecified, knee, leg, ankle, and foot       phentermine 37.5 MG tablet    ADIPEX-P    135 tablet    Take 1.5 tablets (56.25 mg) by mouth every morning (before breakfast)    Elevated cortisol level (H), IFG (impaired fasting glucose), Insulin resistance       sertraline 100 MG tablet    ZOLOFT    90 tablet    Take 1 tablet (100 mg) by mouth daily    Adjustment disorder with depressed mood       * Notice:  This list has 3 medication(s) that are the same as other medications prescribed for you. Read the directions carefully, and ask your doctor or other care provider to review them  with you.
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
## 2017-06-21 NOTE — PATIENT INSTRUCTIONS
To expedite your medication refill(s), please contact your pharmacy and have them fax a refill request to: [phone].  *Please allow 3 business days for routine medication refills.  *Please allow 5 business days for controlled substance medication refills.  --------------------  For scheduling appointments (including lab work), please request an appointment through Pose, or call: [phone].    For questions for your provider or the endocrine nurse, please send a Pose message.  For after-hours urgent issues, please dial [phone], and ask to speak with the Endocrinologist On-Call.  --------------------  Please Note: If you are active on Pose, all future test results will be sent by Pose message only and will no longer be sent by mail. You may also receive communication directly from your physician.
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
## 2017-06-22 LAB — ACTH PLAS-MCNC: 43 PG/ML

## 2017-06-22 PROCEDURE — 82530 CORTISOL FREE: CPT | Mod: 90 | Performed by: FAMILY MEDICINE

## 2017-06-22 PROCEDURE — 99000 SPECIMEN HANDLING OFFICE-LAB: CPT | Performed by: FAMILY MEDICINE

## 2017-06-23 DIAGNOSIS — E24.8 OTHER CUSHING'S SYNDROME (H): ICD-10-CM

## 2017-06-23 NOTE — LETTER
Patient:  Brissa Barlow  :   1965  MRN:     [id number]        Ms.Virginia REDD Barlow  [id number] [id number]        July 3, 2017    Dear ,    We are writing to inform you of your test results. Result was normal range. I do not think you have Cushing disease.    Please take care    Mira Mayberry MD      Resulted Orders   Cortisol Saliva   Result Value Ref Range    Saliva Collection Time 2330     Cortisol Saliva 0.107 ug/dL      Comment:      (Note)  INTERPRETIVE INFORMATION: Cortisol, Saliva  Effective 2006  For collection at 2300 hr. the normal cortisol  concentration is less than 0.112 ug/dL.  Patients with  Cushings Syndrome have concentrations of 0.112 ug/dL or  greater.                  a.m. ([id number])      p.m. (noon-1800)  Males   2.5-7 years   0.034-0.645 ug/dL    0.053-0.607 ug/dL    8-11 years   0.084-0.839 ug/dL    less than 0.215 ug/dL   12-18 years   0.021-0.883 ug/dL    less than 0.259 ug/dL   19-30 years   0.112-0.743 ug/dL    less than 0.308 ug/dL   31-50 years   0.122-1.551 ug/dL    less than 0.359 ug/dL   51 and older  0.112-0.812 ug/dL    less than 0.228 ug/dL  Females   2.5-7 years   0.034-0.645 ug/dL    0.053-0.607 ug/dL    8-11 years   0.084-0.839 ug/dL    less than 0.215 ug/dL   12-18 years   0.021-0.883 ug/dL    less than 0.259 ug/dL   19-30 years   0.272-1.348 ug/dL    less than 0.359 ug/dL   31-50 years   0.094-1.515 ug/dL    less than 0.181 ug/dL   51 and older  0.149-0.739 ug/dL    0.022-0  .254 ug/dL  Performed by [facility],  [address] [place] [id number] [phone]  [email or web address], Zach Howard MD, Lab. Director         [facility]

## 2017-06-24 ENCOUNTER — HEALTH MAINTENANCE LETTER (OUTPATIENT)
Age: 52
End: 2017-06-24

## 2017-06-26 LAB
COLLECT TME SPEC: 2330
CORTIS SAL-MCNC: 0.11 UG/DL

## 2017-07-03 NOTE — PROGRESS NOTES
Resulted Orders   Cortisol Saliva   Result Value Ref Range    Saliva Collection Time 2330     Cortisol Saliva 0.107 ug/dL      Comment:      (Note)  INTERPRETIVE INFORMATION: Cortisol, Saliva  Effective 2006  For collection at 2300 hr. the normal cortisol  concentration is less than 0.112 ug/dL.  Patients with  Cushings Syndrome have concentrations of 0.112 ug/dL or  greater.                  a.m. ([id number])      p.m. (noon-1800)  Males   2.5-7 years   0.034-0.645 ug/dL    0.053-0.607 ug/dL    8-11 years   0.084-0.839 ug/dL    less than 0.215 ug/dL   12-18 years   0.021-0.883 ug/dL    less than 0.259 ug/dL   19-30 years   0.112-0.743 ug/dL    less than 0.308 ug/dL   31-50 years   0.122-1.551 ug/dL    less than 0.359 ug/dL   51 and older  0.112-0.812 ug/dL    less than 0.228 ug/dL  Females   2.5-7 years   0.034-0.645 ug/dL    0.053-0.607 ug/dL    8-11 years   0.084-0.839 ug/dL    less than 0.215 ug/dL   12-18 years   0.021-0.883 ug/dL    less than 0.259 ug/dL   19-30 years   0.272-1.348 ug/dL    less than 0.359 ug/dL   31-50 years   0.094-1.515 ug/dL    less than 0.181 ug/dL   51 and older  0.149-0.739 ug/dL    0.022-0  .254 ug/dL  Performed by [facility],  [address] [place] [id number] [phone]  [email or web address], Zach Howard MD, Lab. Director       Patient:  Brissa Barlow  :   1965  MRN:     [id number]        Ms.Virginia REDD Barlow  [address] [address]        July 3, 2017    Dear ,    We are writing to inform you of your test results. Result was normal range. I do not this you have Cushing.    Take care    Mira Mayberry MD

## 2017-07-06 PROCEDURE — 99000 SPECIMEN HANDLING OFFICE-LAB: CPT | Performed by: FAMILY MEDICINE

## 2017-07-06 PROCEDURE — 82530 CORTISOL FREE: CPT | Mod: 90 | Performed by: FAMILY MEDICINE

## 2017-07-07 DIAGNOSIS — E24.8 OTHER CUSHING'S SYNDROME (H): ICD-10-CM

## 2017-07-10 LAB
COLLECT TME SPEC: NORMAL
CORTIS SAL-MCNC: 0.15 UG/DL

## 2017-07-17 DIAGNOSIS — N89.8 VAGINAL DISCHARGE: ICD-10-CM

## 2017-07-17 NOTE — TELEPHONE ENCOUNTER
nystatin (MYCOSTATIN) cream      Last Written Prescription Date: 11/28/16  Last Fill Quantity: 60,  # refills: 0   Last Office Visit with G, P or [facility] prescribing provider: 3/31/17

## 2017-07-18 RX ORDER — NYSTATIN 100000 U/G
CREAM TOPICAL
Qty: 60 G | Refills: 0 | Status: SHIPPED | OUTPATIENT
Start: 2017-07-18 | End: 2017-09-14

## 2017-07-18 NOTE — TELEPHONE ENCOUNTER
Medication is being filled for 1 time refill only due to:  Patient needs to be seen because due for OV 7/29/2017 per LOV notes 1/30/2017.     Jaky Limon RN

## 2017-07-27 ENCOUNTER — OFFICE VISIT (OUTPATIENT)
Dept: ENDOCRINOLOGY | Facility: CLINIC | Age: 52
End: 2017-07-27
Payer: MEDICARE

## 2017-07-27 VITALS
DIASTOLIC BLOOD PRESSURE: 70 MMHG | TEMPERATURE: 98.1 F | OXYGEN SATURATION: 95 % | WEIGHT: 293 LBS | HEART RATE: 124 BPM | RESPIRATION RATE: 20 BRPM | SYSTOLIC BLOOD PRESSURE: 112 MMHG | BODY MASS INDEX: 53.46 KG/M2

## 2017-07-27 DIAGNOSIS — R79.89 ELEVATED CORTISOL LEVEL: ICD-10-CM

## 2017-07-27 DIAGNOSIS — R73.01 IFG (IMPAIRED FASTING GLUCOSE): ICD-10-CM

## 2017-07-27 DIAGNOSIS — E88.819 INSULIN RESISTANCE: ICD-10-CM

## 2017-07-27 DIAGNOSIS — R63.5 ABNORMAL WEIGHT GAIN: ICD-10-CM

## 2017-07-27 DIAGNOSIS — E66.01 MORBID OBESITY, UNSPECIFIED OBESITY TYPE (H): Primary | ICD-10-CM

## 2017-07-27 LAB
HBA1C MFR BLD: 5.3 % (ref 4.3–6)
TSH SERPL DL<=0.005 MIU/L-ACNC: 3.04 MU/L (ref 0.4–4)

## 2017-07-27 PROCEDURE — 99214 OFFICE O/P EST MOD 30 MIN: CPT | Performed by: CLINICAL NURSE SPECIALIST

## 2017-07-27 PROCEDURE — 83036 HEMOGLOBIN GLYCOSYLATED A1C: CPT | Performed by: PATHOLOGY

## 2017-07-27 PROCEDURE — 36415 COLL VENOUS BLD VENIPUNCTURE: CPT | Performed by: CLINICAL NURSE SPECIALIST

## 2017-07-27 PROCEDURE — 84443 ASSAY THYROID STIM HORMONE: CPT | Performed by: PATHOLOGY

## 2017-07-27 RX ORDER — PHENTERMINE HYDROCHLORIDE 37.5 MG/1
56.25 TABLET ORAL
Qty: 135 TABLET | Refills: 0 | Status: SHIPPED | OUTPATIENT
Start: 2017-07-27 | End: 2017-11-02
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
## 2017-07-27 NOTE — PATIENT INSTRUCTIONS
"    Today's weight has increased slightly since March, 336-->342 lbs,  but is decreased in the past month 342-->341 lbs so it is going back in the right direction.    Keep working hard on watching your diet intake carefully.  Try to do a little exercise if you can, take a walk outside or use some light hand weights indoors.  We'll continue the phentermine, 1 tablet in the morning, and  1/2 tablet at noon.  I am checking your A1c (a blood sugar measure) and your thyroid.  Follow up in 3 months.  Here's a copy of all your weights since we started with your weight loss plan:      Vital Signs 8/12/2015 8/18/2015 9/11/2015 10/13/2015   Weight (LB) 400 lb 1.6 oz 398 lb 11.2 oz 399 lb 6.4 oz 388 lb 6.4 oz   Height    5' 7\"   BMI    60.96     Vital Signs 12/17/2015 2/11/2016 4/28/2016 5/24/2016 5/26/2016   Weight (LB) 378 lb 367 lb 358 lb 355 lb 356 lb 3.2 oz   Height 5' 7\" 5' 7\"  5' 7\" 5' 7\"   BMI 59.33 57.6  55.72 55.91     Vital Signs 8/23/2016 12/29/2016 1/30/2017 3/23/2017   Weight (LB) 348 lb 340 lb 338 lb 4.8 oz 336 lb 1.6 oz   Height    5' 7\"   BMI    52.75     Vital Signs 3/31/2017 6/21/2017 6/21/2017 7/27/2017   Weight (LB) 337 lb  342 lb 3.2 oz 341 lb 4.8 oz   Height 5' 7\"  5' 7\"    BMI (Calculated) 52.89  53.71      Catarina Govea NP  Endocrinology    "

## 2017-07-27 NOTE — NURSING NOTE
"Chief Complaint   Patient presents with     Consult     Follow up       Initial /70 (BP Location: Left arm, Patient Position: Chair, Cuff Size: Adult Large)  Pulse 124  Temp 98.1  F (36.7  C) (Oral)  Resp 20  Wt (!) 341 lb 4.8 oz (154.8 kg)  LMP 08/18/2008  SpO2 95%  Breastfeeding? No  BMI 53.46 kg/m2 Estimated body mass index is 53.46 kg/(m^2) as calculated from the following:    Height as of 6/21/17: 5' 7\" (1.702 m).    Weight as of this encounter: 341 lb 4.8 oz (154.8 kg).  Medication Reconciliation: complete  Rosalina Givens M.A.  "

## 2017-07-31 NOTE — PROGRESS NOTES
[place],  Your thyroid function is normal.  Your A1c (three month average blood sugar test) is great! The A1c is in the normal range.  Here's a copy of the results for your records.  Catarina Govea NP  Endocrinology

## 2017-09-14 ENCOUNTER — OFFICE VISIT (OUTPATIENT)
Dept: FAMILY MEDICINE | Facility: CLINIC | Age: 52
End: 2017-09-14
Payer: MEDICARE

## 2017-09-14 VITALS
SYSTOLIC BLOOD PRESSURE: 128 MMHG | OXYGEN SATURATION: 98 % | DIASTOLIC BLOOD PRESSURE: 64 MMHG | WEIGHT: 293 LBS | TEMPERATURE: 98.9 F | HEART RATE: 88 BPM | RESPIRATION RATE: 18 BRPM | BODY MASS INDEX: 54.94 KG/M2

## 2017-09-14 DIAGNOSIS — R62.50 DELAY IN DEVELOPMENT: ICD-10-CM

## 2017-09-14 DIAGNOSIS — I10 ESSENTIAL HYPERTENSION, BENIGN: ICD-10-CM

## 2017-09-14 DIAGNOSIS — B37.2 YEAST DERMATITIS: Primary | ICD-10-CM

## 2017-09-14 DIAGNOSIS — J30.89 CHRONIC NON-SEASONAL ALLERGIC RHINITIS, UNSPECIFIED TRIGGER: ICD-10-CM

## 2017-09-14 PROCEDURE — 99214 OFFICE O/P EST MOD 30 MIN: CPT | Performed by: INTERNAL MEDICINE

## 2017-09-14 RX ORDER — AZELASTINE 1 MG/ML
1-2 SPRAY, METERED NASAL 2 TIMES DAILY
Qty: 1 BOTTLE | Refills: 3 | Status: SHIPPED | OUTPATIENT
Start: 2017-09-14 | End: 2018-06-12

## 2017-09-14 RX ORDER — NYSTATIN 100000 U/G
CREAM TOPICAL
Qty: 60 G | Refills: 1 | Status: SHIPPED | OUTPATIENT
Start: 2017-09-14 | End: 2019-01-15

## 2017-09-14 ASSESSMENT — ANXIETY QUESTIONNAIRES
3. WORRYING TOO MUCH ABOUT DIFFERENT THINGS: MORE THAN HALF THE DAYS
7. FEELING AFRAID AS IF SOMETHING AWFUL MIGHT HAPPEN: MORE THAN HALF THE DAYS
GAD7 TOTAL SCORE: 8
2. NOT BEING ABLE TO STOP OR CONTROL WORRYING: MORE THAN HALF THE DAYS
5. BEING SO RESTLESS THAT IT IS HARD TO SIT STILL: NOT AT ALL
6. BECOMING EASILY ANNOYED OR IRRITABLE: NOT AT ALL
IF YOU CHECKED OFF ANY PROBLEMS ON THIS QUESTIONNAIRE, HOW DIFFICULT HAVE THESE PROBLEMS MADE IT FOR YOU TO DO YOUR WORK, TAKE CARE OF THINGS AT HOME, OR GET ALONG WITH OTHER PEOPLE: SOMEWHAT DIFFICULT
1. FEELING NERVOUS, ANXIOUS, OR ON EDGE: MORE THAN HALF THE DAYS

## 2017-09-14 ASSESSMENT — PATIENT HEALTH QUESTIONNAIRE - PHQ9
5. POOR APPETITE OR OVEREATING: NOT AT ALL
SUM OF ALL RESPONSES TO PHQ QUESTIONS 1-9: 0

## 2017-09-14 NOTE — NURSING NOTE
"Chief Complaint   Patient presents with     Consult     follow up yeast  states that her yeast has improved with the use of the cream.      Sinus Problem       Initial /64  Pulse 88  Temp 98.9  F (37.2  C) (Oral)  Resp 18  Wt (!) 350 lb 12.8 oz (159.1 kg)  LMP 08/18/2008  SpO2 98%  BMI 54.94 kg/m2 Estimated body mass index is 54.94 kg/(m^2) as calculated from the following:    Height as of 6/21/17: 5' 7\" (1.702 m).    Weight as of this encounter: 350 lb 12.8 oz (159.1 kg).  Medication Reconciliation: complete    "

## 2017-09-14 NOTE — LETTER
My Depression Action Plan  Name: Brissa Barlow   Date of Birth 1965  Date: 9/14/2017    My doctor: Cara Marks   My clinic: [facility]  [id number] [place] [address] [phone]          GREEN    ZONE   Good Control    What it looks like:     Things are going generally well. You have normal up s and down s. You may even feel depressed from time to time, but bad moods usually last less than a day.   What you need to do:  1. Continue to care for yourself (see self care plan)  2. Check your depression survival kit and update it as needed  3. Follow your physician s recommendations including any medication.  4. Do not stop taking medication unless you consult with your physician first.           YELLOW         ZONE Getting Worse    What it looks like:     Depression is starting to interfere with your life.     It may be hard to get out of bed; you may be starting to isolate yourself from others.    Symptoms of depression are starting to last most all day and this has happened for several days.     You may have suicidal thoughts but they are not constant.   What you need to do:     1. Call your care team, your response to treatment will improve if you keep your care team informed of your progress. Yellow periods are signs an adjustment may need to be made.     2. Continue your self-care, even if you have to fake it!    3. Talk to someone in your support network    4. Open up your depression survival kit           RED    ZONE Medical Alert - Get Help    What it looks like:     Depression is seriously interfering with your life.     You may experience these or other symptoms: You can t get out of bed most days, can t work or engage in other necessary activities, you have trouble taking care of basic hygiene, or basic responsibilities, thoughts of suicide or death that will not go away, self-injurious behavior.     What you need to do:  1. Call your care team  and request a same-day appointment. If they are not available (weekends or after hours) call your local crisis line, emergency room or 911.      Electronically signed by: Nataliia Evans, September 14, 2017    Depression Self Care Plan / Survival Kit    Self-Care for Depression  Here s the deal. Your body and mind are really not as separate as most people think.  What you do and think affects how you feel and how you feel influences what you do and think. This means if you do things that people who feel good do, it will help you feel better.  Sometimes this is all it takes.  There is also a place for medication and therapy depending on how severe your depression is, so be sure to consult with your medical provider and/ or Behavioral Health Consultant if your symptoms are worsening or not improving.     In order to better manage my stress, I will:    Exercise  Get some form of exercise, every day. This will help reduce pain and release endorphins, the  feel good  chemicals in your brain. This is almost as good as taking antidepressants!  This is not the same as joining a gym and then never going! (they count on that by the way ) It can be as simple as just going for a walk or doing some gardening, anything that will get you moving.      Hygiene   Maintain good hygiene (Get out of bed in the morning, Make your bed, Brush your teeth, Take a shower, and Get dressed like you were going to work, even if you are unemployed).  If your clothes don't fit try to get ones that do.    Diet  I will strive to eat foods that are good for me, drink plenty of water, and avoid excessive sugar, caffeine, alcohol, and other mood-altering substances.  Some foods that are helpful in depression are: complex carbohydrates, B vitamins, flaxseed, fish or fish oil, fresh fruits and vegetables.    Psychotherapy  I agree to participate in Individual Therapy (if recommended).    Medication  If prescribed medications, I agree to take them.  Missing  doses can result in serious side effects.  I understand that drinking alcohol, or other illicit drug use, may cause potential side effects.  I will not stop my medication abruptly without first discussing it with my provider.    Staying Connected With Others  I will stay in touch with my friends, family members, and my primary care provider/team.    Use your imagination  Be creative.  We all have a creative side; it doesn t matter if it s oil painting, sand castles, or mud pies! This will also kick up the endorphins.    Witness Beauty  (AKA stop and smell the roses) Take a look outside, even in mid-winter. Notice colors, textures. Watch the squirrels and birds.     Service to others  Be of service to others.  There is always someone else in need.  By helping others we can  get out of ourselves  and remember the really important things.  This also provides opportunities for practicing all the other parts of the program.    Humor  Laugh and be silly!  Adjust your TV habits for less news and crime-drama and more comedy.    Control your stress  Try breathing deep, massage therapy, biofeedback, and meditation. Find time to relax each day.     My support system    Clinic Contact:  Phone number:    Contact 1:  Phone number:    Contact 2:  Phone number:    Taoist/:  Phone number:    Therapist:  Phone number:    Local crisis center:    Phone number:    Other community support:  Phone number:
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
## 2017-09-15 ASSESSMENT — ANXIETY QUESTIONNAIRES: GAD7 TOTAL SCORE: 8

## 2017-11-02 ENCOUNTER — OFFICE VISIT (OUTPATIENT)
Dept: ENDOCRINOLOGY | Facility: CLINIC | Age: 52
End: 2017-11-02
Payer: MEDICARE

## 2017-11-02 VITALS
BODY MASS INDEX: 55.44 KG/M2 | DIASTOLIC BLOOD PRESSURE: 76 MMHG | TEMPERATURE: 98 F | RESPIRATION RATE: 20 BRPM | WEIGHT: 293 LBS | HEART RATE: 100 BPM | SYSTOLIC BLOOD PRESSURE: 122 MMHG

## 2017-11-02 DIAGNOSIS — E88.819 INSULIN RESISTANCE: ICD-10-CM

## 2017-11-02 DIAGNOSIS — R73.01 IFG (IMPAIRED FASTING GLUCOSE): ICD-10-CM

## 2017-11-02 DIAGNOSIS — R79.89 ELEVATED CORTISOL LEVEL: ICD-10-CM

## 2017-11-02 DIAGNOSIS — E66.01 MORBID OBESITY (H): Primary | ICD-10-CM

## 2017-11-02 PROCEDURE — 99214 OFFICE O/P EST MOD 30 MIN: CPT | Performed by: CLINICAL NURSE SPECIALIST

## 2017-11-02 RX ORDER — PHENTERMINE HYDROCHLORIDE 37.5 MG/1
56.25 TABLET ORAL
Qty: 45 TABLET | Refills: 0 | Status: SHIPPED | OUTPATIENT
Start: 2017-11-02 | End: 2018-01-04
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
## 2017-11-02 NOTE — PATIENT INSTRUCTIONS
Return to clinic with me in 6 months.    You would be due for follow up December 2017.      Mira Mayberry MD  Staff Physician  Endocrinology and Metabolism  License: [id number]
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
## 2017-11-10 ENCOUNTER — TELEPHONE (OUTPATIENT)
Dept: FAMILY MEDICINE | Facility: CLINIC | Age: 52
End: 2017-11-10

## 2017-11-10 NOTE — TELEPHONE ENCOUNTER
"11/10/2017      Patient Communication Preferences indicate  Do not contact  and/or communication by \"Phone\" is not preferred. Call not required per Outreach team.      Outreach ,  Amrit Mckeon        "

## 2017-11-15 ENCOUNTER — TELEPHONE (OUTPATIENT)
Dept: ENDOCRINOLOGY | Facility: CLINIC | Age: 52
End: 2017-11-15

## 2017-11-15 NOTE — TELEPHONE ENCOUNTER
Writer called father back (no consent to communicate with father noted on file). Father requesting a call from Catarina Govea NP to discuss information about medication coverage and concerns about medication side effects.    Thank you,  PAULA Gay

## 2017-11-15 NOTE — TELEPHONE ENCOUNTER
Father called to ask questions on the lorcaserin (BELVIQ) 10 MG tablet.  Please call to discuss with him.

## 2017-11-17 NOTE — TELEPHONE ENCOUNTER
Call returned to mother (consent to communicate with the mother is on file).  Found a program that [place] qualifies for that reduces the cost of Belviq to $25.  Concerned about possible side effects.  Discussed possible side effects and she now feels comfortable taking the medication.  Will start Belviq as soon as she receives the prescription through the discount program.  Catarina Govea NP  Endocrinology

## 2017-12-10 ENCOUNTER — HOSPITAL ENCOUNTER (EMERGENCY)
Facility: CLINIC | Age: 52
Discharge: HOME | End: 2017-12-11
Attending: EMERGENCY MEDICINE | Admitting: EMERGENCY MEDICINE
Payer: MEDICARE

## 2017-12-10 ENCOUNTER — HOSPITAL ENCOUNTER (EMERGENCY)
Facility: CLINIC | Age: 52
Discharge: STILL ADMITTED OR ADMINISTRATIVE | End: 2017-12-11
Payer: MEDICARE

## 2017-12-10 VITALS
TEMPERATURE: 98.7 F | RESPIRATION RATE: 16 BRPM | OXYGEN SATURATION: 96 % | DIASTOLIC BLOOD PRESSURE: 61 MMHG | SYSTOLIC BLOOD PRESSURE: 147 MMHG

## 2017-12-10 VITALS
SYSTOLIC BLOOD PRESSURE: 147 MMHG | HEART RATE: 95 BPM | DIASTOLIC BLOOD PRESSURE: 61 MMHG | TEMPERATURE: 98.7 F | OXYGEN SATURATION: 96 %

## 2017-12-10 DIAGNOSIS — S91.115A LACERATION OF LESSER TOE OF LEFT FOOT WITHOUT FOREIGN BODY PRESENT OR DAMAGE TO NAIL, INITIAL ENCOUNTER: ICD-10-CM

## 2017-12-10 PROCEDURE — 12042 INTMD RPR N-HF/GENIT2.6-7.5: CPT

## 2017-12-10 PROCEDURE — 99283 EMERGENCY DEPT VISIT LOW MDM: CPT

## 2017-12-10 RX ORDER — SULFAMETHOXAZOLE/TRIMETHOPRIM 800-160 MG
1 TABLET ORAL 2 TIMES DAILY
Qty: 10 TABLET | Refills: 0 | Status: SHIPPED | OUTPATIENT
Start: 2017-12-10 | End: 2017-12-15

## 2017-12-10 RX ORDER — LIDOCAINE HYDROCHLORIDE 20 MG/ML
INJECTION, SOLUTION INFILTRATION; PERINEURAL
Status: DISCONTINUED
Start: 2017-12-10 | End: 2017-12-11 | Stop reason: HOSPADM

## 2017-12-10 RX ORDER — GINSENG 100 MG
CAPSULE ORAL
Status: DISCONTINUED
Start: 2017-12-10 | End: 2017-12-11 | Stop reason: HOSPADM

## 2017-12-10 ASSESSMENT — ENCOUNTER SYMPTOMS: WOUND: 1

## 2017-12-10 NOTE — ED AVS SNAPSHOT
[facility] Emergency Department    [address] [place] [address]    Phone:  [phone]    Fax:  [phone]                                       Brissa Barlow   MRN: [id number]    Department:  [facility] Emergency Department   Date of Visit:  12/10/2017           Patient Information     Date Of Birth          1965        Your diagnoses for this visit were:     Laceration of lesser toe of left foot without foreign body present or damage to nail, initial encounter        You were seen by Rajiv Del Rosario MD.      Follow-up Information     Follow up with Cara Marks MD In 1 week.    Specialty:  Internal Medicine    Contact information:    [address] [place] [id number] [phone]          Discharge Instructions       Discharge Instructions  Laceration (Cut)    You were seen today for a laceration (cut).  Your provider examined your laceration for any problems such a buried foreign body (like glass, a splinter, or gravel), or injury to blood vessels, tendons, and nerves.  Your provider may have also rinsed and/or scrubbed your laceration to help prevent an infection. It may not be possible to find all problems with your laceration on the first visit; occasionally foreign bodies or a tendon injury can go undetected.    Your laceration may have been closed in one of several ways:    No closure: many wounds will heal just fine without closure.    Stitches: regular stitches that require removal.    Staples: skin staples are often used in the scalp/head.    Wound adhesive (glue): skin glue can be used for certain lacerations and doesn t require removal.    Wound strips (aka Butterfly bandages or steri-strips): these are bandages that help to close a wound.    Absorbable stitches:  dissolving  stitches that go away on their own and usually don t require removal.    A small percentage of wounds will develop an infection regardless of how well the  wound is cared for. Antibiotics are generally not indicated to prevent an infection so are only given for a small number of high-risk wounds. Some lacerations are too high risk to close, and are left open to heal because closure can increase the likelihood that an infection will develop.    Remember that all lacerations, no matter how expertly repaired, will cause scarring. We consider many factors, techniques, and materials, in our efforts to provide the best possible cosmetic outcome.    Generally, every Emergency Department visit should have a follow-up clinic visit with either a primary or a specialty clinic/provider. Please follow-up as instructed by your emergency provider today.     Return to the Emergency Department right away if:    You have more redness, swelling, pain, drainage (pus), a bad smell, or red streaking from your laceration as these symptoms could indicate an infection.    You have a fever of 100.4 F or more.    You have bleeding that you cannot stop at home. If your cut starts to bleed, hold pressure on the bleeding area with a clean cloth or put pressure over the bandage.  If the bleeding does not stop after using constant pressure for 30 minutes, you should return to the Emergency Department for further treatment.    An area past the laceration is cool, pale, or blue compared with the other side, or has a slower return of color when squeezed.    Your dressing seems too tight or starts to get uncomfortable or painful. For children, signs of a problem might be irritability or restlessness.    You have loss of normal function or use of an area, such as being unable to straighten or bend a finger normally.    You have a numb area past the laceration.    Return to the Emergency Department or see your regular provider if:    The laceration starts to come open.     You have something coming out of the cut or a feeling that there is something in the laceration.    Your wound will not heal, or keeps  breaking open. There can always be glass, wood, dirt or other things in any wound.  They will not always show up, even on x-rays.  If a wound does not heal, this may be why, and it is important to follow-up with your regular provider.    Home Care:    Take your dressing off in 12-24 hours, or as instructed by your provider, to check your laceration. Remove the dressing sooner if it seems too tight or painful, or if it is getting numb, tingly, or pale past the dressing.    Gently wash your laceration 1-2 times daily with clean water and mild soap. It is okay to shower or run clean water over the laceration, but do not let the laceration soak in water (no swimming).    If your laceration was closed with wound adhesive or strips: pat it dry and leave it open to the air. For all other repairs: after you wash your laceration, or at least 2 times a day, apply antibiotic ointment (such as Neosporin  or Bacitracin ) to the laceration, then cover it with a Band-Aid  or gauze.    Keep the laceration clean. Wear gloves or other protective clothing if you are around dirt.    Follow-up for removal:    If your wound was closed with staples or regular stitches, they need to be removed according to the instructions and timeline specified by your provider today.    If your wound was closed with absorbable ( dissolving ) sutures, they should fall out, dissolve, or not be visible in about one week. If they are still visible, then they should be removed according to the instructions and timeline specified by your provider today.    Scars:  To help minimize scarring:    Wear sunscreen over the healed laceration when out in the sun.    Massage the area regularly once healed.    You may apply Vitamin E to the healed wound.    Wait. Scars improve in appearance over months and years.    If you were given a prescription for medicine here today, be sure to read all of the information (including the package insert) that comes with your  prescription.  This will include important information about the medicine, its side effects, and any warnings that you need to know about.  The pharmacist who fills the prescription can provide more information and answer questions you may have about the medicine.  If you have questions or concerns that the pharmacist cannot address, please call or return to the Emergency Department.       Remember that you can always come back to the Emergency Department if you are not able to see your regular provider in the amount of time listed above, if you get any new symptoms, or if there is anything that worries you.      24 Hour Appointment Hotline       To make an appointment at any [facility], call [phone] ([phone]). If you don't have a family doctor or clinic, we will help you find one. [facility] clinics are conveniently located to serve the needs of you and your family.             Review of your medicines      START taking        Dose / Directions Last dose taken    sulfamethoxazole-trimethoprim 800-160 MG per tablet   Commonly known as:  BACTRIM DS   Dose:  1 tablet   Quantity:  10 tablet        Take 1 tablet by mouth 2 times daily for 5 days   Refills:  0          Our records show that you are taking the medicines listed below. If these are incorrect, please call your family doctor or clinic.        Dose / Directions Last dose taken    azelastine 0.1 % spray   Commonly known as:  ASTELIN   Dose:  1-2 spray   Quantity:  1 Bottle        Spray 1-2 sprays into both nostrils 2 times daily   Refills:  3        carBAMazepine 200 MG tablet   Commonly known as:  TEGRETOL   Quantity:  150 tablet        2 every AM, 1 noon, 2 every PM;   Refills:  11        cetirizine 10 MG tablet   Commonly known as:  zyrTEC   Dose:  10 mg   Quantity:  90 tablet        Take 1 tablet (10 mg) by mouth every evening has been on Claritin and seems to not be as effective.   Refills:  4        fluticasone 110 MCG/ACT Inhaler    Commonly known as:  FLOVENT HFA   Dose:  1-2 puff   Quantity:  1 Inhaler        Inhale 1-2 puffs into the lungs 2 times daily   Refills:  prn        fluticasone 50 MCG/ACT spray   Commonly known as:  FLONASE   Dose:  1-2 spray   Quantity:  48 g        Spray 1-2 sprays into both nostrils daily   Refills:  3        lisinopril 20 MG tablet   Commonly known as:  PRINIVIL/ZESTRIL   Dose:  20 mg   Quantity:  90 tablet        Take 1 tablet (20 mg) by mouth daily   Refills:  3        lorcaserin 10 MG tablet   Commonly known as:  BELVIQ   Dose:  10 mg   Quantity:  60 tablet        Take 1 tablet (10 mg) by mouth 2 times daily   Refills:  2        lovastatin 40 MG tablet   Commonly known as:  MEVACOR   Dose:  40 mg   Quantity:  90 tablet        Take 1 tablet (40 mg) by mouth At Bedtime   Refills:  3        nystatin cream   Commonly known as:  MYCOSTATIN   Quantity:  60 g        APPLY TOPICALLY TWO TIMES A DAY   Refills:  1        omeprazole 20 MG tablet   Dose:  40 mg   Quantity:  180 tablet        Take 2 tablets (40 mg) by mouth daily Take 30-60 minutes before a meal.   Refills:  2        phentermine 37.5 MG tablet   Commonly known as:  ADIPEX-P   Dose:  56.25 mg   Quantity:  45 tablet        Take 1.5 tablets (56.25 mg) by mouth every morning (before breakfast)   Refills:  0        sertraline 100 MG tablet   Commonly known as:  ZOLOFT   Dose:  100 mg   Quantity:  90 tablet        Take 1 tablet (100 mg) by mouth daily   Refills:  3                Prescriptions were sent or printed at these locations (1 Prescription)                   Other Prescriptions                Printed at Department/Unit printer (1 of 1)         sulfamethoxazole-trimethoprim (BACTRIM DS) 800-160 MG per tablet                Orders Needing Specimen Collection     None      Pending Results     No orders found from 12/8/2017 to 12/11/2017.            Pending Culture Results     No orders found from 12/8/2017 to 12/11/2017.            Pending Results  Instructions     If you had any lab results that were not finalized at the time of your Discharge, you can call the ED Lab Result RN at [phone]. You will be contacted by this team for any positive Lab results or changes in treatment. The nurses are available 7 days a week from 10A to 6:30P.  You can leave a message 24 hours per day and they will return your call.        Test Results From Your Hospital Stay               Clinical Quality Measure: Blood Pressure Screening     Your blood pressure was checked while you were in the emergency department today. The last reading we obtained was  BP: 147/61 . Please read the guidelines below about what these numbers mean and what you should do about them.  If your systolic blood pressure (the top number) is less than 120 and your diastolic blood pressure (the bottom number) is less than 80, then your blood pressure is normal. There is nothing more that you need to do about it.  If your systolic blood pressure (the top number) is 120-139 or your diastolic blood pressure (the bottom number) is 80-89, your blood pressure may be higher than it should be. You should have your blood pressure rechecked within a year by a primary care provider.  If your systolic blood pressure (the top number) is 140 or greater or your diastolic blood pressure (the bottom number) is 90 or greater, you may have high blood pressure. High blood pressure is treatable, but if left untreated over time it can put you at risk for heart attack, stroke, or kidney failure. You should have your blood pressure rechecked by a primary care provider within the next 4 weeks.  If your provider in the emergency department today gave you specific instructions to follow-up with your doctor or provider even sooner than that, you should follow that instruction and not wait for up to 4 weeks for your follow-up visit.        Thank you for choosing [facility]       Thank you for choosing [facility] for your care. Our goal  is always to provide you with excellent care. Hearing back from our patients is one way we can continue to improve our services. Please take a few minutes to complete the written survey that you may receive in the mail after you visit with us. Thank you!        [facility] Information     [facility] gives you secure access to your electronic health record. If you see a primary care provider, you can also send messages to your care team and make appointments. If you have questions, please call your primary care clinic.  If you do not have a primary care provider, please call [phone] and they will assist you.        Care EveryWhere ID     This is your Care EveryWhere ID. This could be used by other organizations to access your [facility] medical records  [id number]        Equal Access to Services     ACE ISABEL : Kiel Crawford, judith granger, jannette anne, mir hinds. So [facility] [phone].    ATENCIÓN: Si habla español, tiene a ye disposición servicios gratuitos de asistencia lingüística. Llame al [phone].    We comply with applicable federal civil rights laws and [place] laws. We do not discriminate on the basis of race, color, national origin, age, disability, sex, sexual orientation, or gender identity.            After Visit Summary       This is your record. Keep this with you and show to your community pharmacist(s) and doctor(s) at your next visit.

## 2017-12-10 NOTE — ED AVS SNAPSHOT
[facility] Emergency Department    [address]    [facility] [address]    Phone:  [phone]    Fax:  [phone]                                       Brissa Barlow   MRN: [id number]    Department:  [facility] Emergency Department   Date of Visit:  12/10/2017           After Visit Summary Signature Page     I have received my discharge instructions, and my questions have been answered. I have discussed any challenges I see with this plan with the nurse or doctor.    ..........................................................................................................................................  Patient/Patient Representative Signature      ..........................................................................................................................................  Patient Representative Print Name and Relationship to Patient    ..................................................               ................................................  Date                                            Time    ..........................................................................................................................................  Reviewed by Signature/Title    ...................................................              ..............................................  Date                                                            Time

## 2017-12-11 NOTE — ED NOTES
52-year-old female presents to the ER by EMS for complaints of stubbing her left pinky toe and obtaining a laceration. Bleeding controlled at this time and EMS placed a bandage prior to arrival.
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
## 2018-01-04 ENCOUNTER — OFFICE VISIT (OUTPATIENT)
Dept: FAMILY MEDICINE | Facility: CLINIC | Age: 53
End: 2018-01-04
Payer: MEDICARE

## 2018-01-04 VITALS
DIASTOLIC BLOOD PRESSURE: 72 MMHG | SYSTOLIC BLOOD PRESSURE: 136 MMHG | RESPIRATION RATE: 19 BRPM | TEMPERATURE: 98.2 F | BODY MASS INDEX: 45.99 KG/M2 | HEIGHT: 67 IN | OXYGEN SATURATION: 98 % | WEIGHT: 293 LBS | HEART RATE: 98 BPM

## 2018-01-04 DIAGNOSIS — S99.912S: ICD-10-CM

## 2018-01-04 DIAGNOSIS — S89.92XS: ICD-10-CM

## 2018-01-04 DIAGNOSIS — S99.922S: ICD-10-CM

## 2018-01-04 DIAGNOSIS — S91.312A: Primary | ICD-10-CM

## 2018-01-04 DIAGNOSIS — E66.01 MORBID OBESITY (H): ICD-10-CM

## 2018-01-04 PROCEDURE — 99213 OFFICE O/P EST LOW 20 MIN: CPT | Performed by: INTERNAL MEDICINE

## 2018-01-04 NOTE — NURSING NOTE
"Chief Complaint   Patient presents with     ER F/U       Initial /72  Pulse 98  Temp 98.2  F (36.8  C) (Oral)  Resp 19  Ht 5' 7\" (1.702 m)  Wt (!) 367 lb 6.4 oz (166.7 kg)  LMP 08/18/2008  SpO2 98%  BMI 57.54 kg/m2 Estimated body mass index is 57.54 kg/(m^2) as calculated from the following:    Height as of this encounter: 5' 7\" (1.702 m).    Weight as of this encounter: 367 lb 6.4 oz (166.7 kg).  Medication Reconciliation: complete    "

## 2018-01-04 NOTE — MR AVS SNAPSHOT
"              After Visit Summary   1/4/2018    Brissa Barlow    MRN: [id number]           Patient Information     Date Of Birth          1965        Visit Information        Provider Department      1/4/2018 3:30 PM Cara Marks MD [facility]        Today's Diagnoses     Laceration of left foot with complication, initial encounter    -  1    Morbid obesity (H)   BMI 57.54        Injury of left knee, leg ankle and foot, sequela           Follow-ups after your visit        Who to contact     If you have questions or need follow up information about today's clinic visit or your schedule please contact [facility] directly at [phone].  Normal or non-critical lab and imaging results will be communicated to you by MyChart, letter or phone within 4 business days after the clinic has received the results. If you do not hear from us within 7 days, please contact the clinic through [employer] or phone. If you have a critical or abnormal lab result, we will notify you by phone as soon as possible.  Submit refill requests through [employer] or call your pharmacy and they will forward the refill request to us. Please allow 3 business days for your refill to be completed.          Additional Information About Your Visit        MyChart Information     [employer] gives you secure access to your electronic health record. If you see a primary care provider, you can also send messages to your care team and make appointments. If you have questions, please call your primary care clinic.  If you do not have a primary care provider, please call [phone] and they will assist you.        Care EveryWhere ID     This is your Care EveryWhere ID. This could be used by other organizations to access your [facility] medical records  [id number]        Your Vitals Were     Pulse Temperature Respirations Height Last Period Pulse Oximetry    98 98.2  F (36.8  C) (Oral) 19 5' 7\" (1.702 m) 08/18/2008 98% "    BMI (Body Mass Index)                   57.54 kg/m2            Blood Pressure from Last 3 Encounters:   01/04/18 136/72   12/10/17 147/61   12/10/17 147/61    Weight from Last 3 Encounters:   01/04/18 (!) 367 lb 6.4 oz (166.7 kg)   11/02/17 (!) 354 lb (160.6 kg)   09/14/17 (!) 350 lb 12.8 oz (159.1 kg)              Today, you had the following     No orders found for display       Primary Care Provider Office Phone # Fax #    Cara Joshua Marks [phone]       [id number] [facility] [address]        Equal Access to Services     SONA ISABEL : Hadii campbell Crawford, waaxda luqadaha, qaybta kaalmada adeegyada, mir hinds. So [facility] [phone].    ATENCIÓN: Si habla español, tiene a ye disposición servicios gratuitos de asistencia lingüística. Llame al [phone].    We comply with applicable federal civil rights laws and [place] laws. We do not discriminate on the basis of race, color, national origin, age, disability, sex, sexual orientation, or gender identity.            Thank you!     Thank you for choosing [facility]  for your care. Our goal is always to provide you with excellent care. Hearing back from our patients is one way we can continue to improve our services. Please take a few minutes to complete the written survey that you may receive in the mail after your visit with us. Thank you!             Your Updated Medication List - Protect others around you: Learn how to safely use, store and throw away your medicines at www.disposemymeds.org.          This list is accurate as of: 1/4/18 11:59 PM.  Always use your most recent med list.                   Brand Name Dispense Instructions for use Diagnosis    azelastine 0.1 % spray    ASTELIN    1 Bottle    Spray 1-2 sprays into both nostrils 2 times daily    Chronic non-seasonal allergic rhinitis, unspecified trigger       carBAMazepine 200 MG tablet    TEGRETOL    150  tablet    2 every AM, 1 noon, 2 every PM;    Seizure disorder (H)       cetirizine 10 MG tablet    zyrTEC    90 tablet    Take 1 tablet (10 mg) by mouth every evening has been on Claritin and seems to not be as effective.    Sinus pressure, Seasonal allergic rhinitis due to pollen       fluticasone 110 MCG/ACT Inhaler    FLOVENT HFA    1 Inhaler    Inhale 1-2 puffs into the lungs 2 times daily    Post-nasal discharge       fluticasone 50 MCG/ACT spray    FLONASE    48 g    Spray 1-2 sprays into both nostrils daily    ETD (Eustachian tube dysfunction), bilateral, Seasonal allergic rhinitis due to pollen       lisinopril 20 MG tablet    PRINIVIL/ZESTRIL    90 tablet    Take 1 tablet (20 mg) by mouth daily    Essential hypertension, benign       lorcaserin 10 MG tablet    BELVIQ    60 tablet    Take 1 tablet (10 mg) by mouth 2 times daily    Morbid obesity (H), BMI 50.0-59.9, adult (H)       lovastatin 40 MG tablet    MEVACOR    90 tablet    Take 1 tablet (40 mg) by mouth At Bedtime    Hyperlipidemia LDL goal <130       nystatin cream    MYCOSTATIN    60 g    APPLY TOPICALLY TWO TIMES A DAY    Yeast dermatitis       omeprazole 20 MG tablet     180 tablet    Take 2 tablets (40 mg) by mouth daily Take 30-60 minutes before a meal.    Gastroesophageal reflux disease without esophagitis       sertraline 100 MG tablet    ZOLOFT    90 tablet    Take 1 tablet (100 mg) by mouth daily    Adjustment disorder with depressed mood

## 2018-01-19 ENCOUNTER — TELEPHONE (OUTPATIENT)
Dept: FAMILY MEDICINE | Facility: CLINIC | Age: 53
End: 2018-01-19

## 2018-01-28 DIAGNOSIS — G40.909 SEIZURE DISORDER (H): ICD-10-CM

## 2018-01-28 NOTE — TELEPHONE ENCOUNTER
"Requested Prescriptions   Pending Prescriptions Disp Refills     carBAMazepine (TEGRETOL) 200 MG tablet [Pharmacy Med Name: CARBAMAZEPINE 200MG TABS]  Last Written Prescription Date:  01/30/2017  Last Fill Quantity: 150 tablets,  # refills: 11   Last Office Visit with [facility] provider:  01/04/2018   Future Office Visit:      150 tablet 11     Sig: TAKE TWO TABLETS BY MOUTH EVERY MORNING, TAKE ONE TABLET BY MOUTH AT NOON, AND TAKE TWO TABLETS BY MOUTH EVERY EVENING    Anticonvulsants Protocol  Failed    1/28/2018  9:15 AM       Failed - Review Authorizing provider's last note.     Refer to last progress notes: confirm request is for original authorizing provider (cannot be through other providers).         Failed - Normal CBC on file in past 6 months    Recent Labs   Lab Test  02/02/17   0756   WBC  5.6   RBC  4.41   HGB  13.5   HCT  41.1   PLT  208            Failed - Normal TSH on file in past 6 months    Recent Labs   Lab Test  07/27/17   1435   TSH  3.04             Failed - Carbamazepine level within therapeutic range in last 6 months    No lab results found.    Carbamazepine level must be checked 2-4 weeks after dosage change.           Passed - No active pregnancy on record       Passed - No positive pregnancy test in last 12 months       Passed - Recent or future visit with authorizing provider    Patient had office visit in the last 6 months or has a visit in the next 30 days with authorizing provider.  See \"Patient Info\" tab in inbasket, or \"Choose Columns\" in Meds & Orders section of the refill encounter.              "

## 2018-01-29 RX ORDER — CARBAMAZEPINE 200 MG/1
TABLET ORAL
Qty: 150 TABLET | Refills: 2 | Status: SHIPPED | OUTPATIENT
Start: 2018-01-29 | End: 2018-04-27

## 2018-01-29 NOTE — TELEPHONE ENCOUNTER
Refilled for 3 months per protocol. Pt due for follow up in 3 months  Nisha Melvin, PharmD  [facility]

## 2018-02-04 DIAGNOSIS — K21.9 GASTROESOPHAGEAL REFLUX DISEASE WITHOUT ESOPHAGITIS: ICD-10-CM

## 2018-02-05 NOTE — TELEPHONE ENCOUNTER
"Requested Prescriptions   Pending Prescriptions Disp Refills     omeprazole (PRILOSEC) 20 MG CR capsule [Pharmacy Med Name: OMEPRAZOLE 20MG CPDR]  Last Written Prescription Date:  1/30/17  Last Fill Quantity: 180,  # refills: 2   Last Office Visit with [facility] provider:  1/4/2018     Future Office Visit:      180 capsule 2     Sig: TAKE TWO CAPSULES BY MOUTH EVERY DAY *TAKE 30 TO 60 MINUTES BEFORE A MEAL    PPI Protocol Passed    2/4/2018  7:52 AM       Passed - Not on Clopidogrel (unless Pantoprazole ordered)       Passed - No diagnosis of osteoporosis on record       Passed - Recent or future visit with authorizing provider's specialty    Patient had office visit in the last year or has a visit in the next 30 days with authorizing provider.  See \"Patient Info\" tab in inbasket, or \"Choose Columns\" in Meds & Orders section of the refill encounter.            Passed - Patient is age 18 or older       Passed - No active pregnacy on record       Passed - No positive pregnancy test in past 12 months          "

## 2018-02-06 NOTE — TELEPHONE ENCOUNTER
Prescription approved per [facility] Refill Protocol.  Joselin Cohen, RN  Triage Nurse

## 2018-02-26 ENCOUNTER — TELEPHONE (OUTPATIENT)
Dept: ENDOCRINOLOGY | Facility: CLINIC | Age: 53
End: 2018-02-26

## 2018-02-26 NOTE — TELEPHONE ENCOUNTER
Father calls, no CTC on file, talked to mother since CTC on file, worried about ongoing behavorial changes with pt, multiple questions, agrees to schedule appointment with  and [facility] endocrinologist, due for appointments, wants to see both    Assessment and Plan  52 year old female with rule out Cushing's syndrome vs. morbid obesity and adrenal legion.     - Still not convinced data for cushing, will repeat salilvary cortisol 11 pm twice  - random ACTH and cortisol to see ACTH dependent or independent if there is hypercortisolemia  - Repeat adrenal imaging with contrast  - RTC with me in 3 month    Edyta Farmer RN, BSN  Message handled by Nurse Triage.

## 2018-02-27 ENCOUNTER — OFFICE VISIT (OUTPATIENT)
Dept: ENDOCRINOLOGY | Facility: CLINIC | Age: 53
End: 2018-02-27
Payer: MEDICARE

## 2018-02-27 VITALS
BODY MASS INDEX: 58.11 KG/M2 | WEIGHT: 293 LBS | SYSTOLIC BLOOD PRESSURE: 134 MMHG | TEMPERATURE: 98.3 F | DIASTOLIC BLOOD PRESSURE: 73 MMHG | HEART RATE: 110 BPM | RESPIRATION RATE: 16 BRPM

## 2018-02-27 DIAGNOSIS — E66.01 MORBID OBESITY (H): Primary | ICD-10-CM

## 2018-02-27 DIAGNOSIS — R73.01 IFG (IMPAIRED FASTING GLUCOSE): ICD-10-CM

## 2018-02-27 DIAGNOSIS — E88.819 INSULIN RESISTANCE: ICD-10-CM

## 2018-02-27 DIAGNOSIS — R79.89 LOW VITAMIN D LEVEL: ICD-10-CM

## 2018-02-27 PROCEDURE — 99214 OFFICE O/P EST MOD 30 MIN: CPT | Performed by: CLINICAL NURSE SPECIALIST

## 2018-02-27 RX ORDER — PHENTERMINE HYDROCHLORIDE 37.5 MG/1
37.5 TABLET ORAL
Qty: 90 TABLET | Refills: 0 | Status: SHIPPED | OUTPATIENT
Start: 2018-02-27 | End: 2019-01-15
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
## 2018-02-27 NOTE — NURSING NOTE
"Chief Complaint   Patient presents with     Weight Loss       Initial /73 (BP Location: Left arm, Patient Position: Chair, Cuff Size: Adult Large)  Pulse 110  Temp 98.3  F (36.8  C) (Oral)  Resp 16  Wt (!) 371 lb (168.3 kg)  LMP 08/18/2008  BMI 58.11 kg/m2 Estimated body mass index is 58.11 kg/(m^2) as calculated from the following:    Height as of 1/4/18: 5' 7\" (1.702 m).    Weight as of this encounter: 371 lb (168.3 kg).  Medication Reconciliation: complete    "

## 2018-02-27 NOTE — PATIENT INSTRUCTIONS
[employer]:  Meal planning Mommies: Restaurant and Fast Food with Weight Watcher [employer] SmartPoints    [email or web address]

## 2018-02-27 NOTE — LETTER
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 Follow-up left adrenal mass seen on CT dated 5/12/2016.  Clinically suspect Cushing's syndrome.     Comparison: CT dated 5/12/2016, 6/2/2010     Technique: Volumetric helical acquisition of CT images from the lung  bases to the level of the kidneys was performed without contrast.     Findings:   7 mm left adrenal nodule has an average of 6 Hounsfield units on this  noncontrast examination. This adrenal nodule has not significantly  changed in size or morphology when compared with exam dated 5/12/2016,  but was not seen on CT dated 6/2/2010. The right adrenal gland is  unremarkable.     Noncontrast evaluation of the liver, spleen (with small splenule) and  pancreas is unremarkable. Hyperdense sludge within the gallbladder. No  gallbladder wall thickening or pericholecystic fluid. Partially  visualized IVC filter. Visualized bowel is nondilated. No pneumatosis  or portal venous gas. No adenopathy.     Lung bases: The heart is not enlarged. There is no pericardial  effusion. No suspicious pulmonary nodules.     Bones and soft tissues: No acute osseous abnormalities or suspicious  osseous lesions. Soft tissues are within normal limits.       Impression:   1.  7 mm lipid rich adrenal adenoma adenoma is not significant changed  in size or morphology when compared with exam dated 5/12/2016. This  adrenal adenoma was not appreciated on CT dated 6/2/2010.  2. Gallbladder sludge. No gallbladder wall thickening or  pericholecystic fluid.    All pertinent notes, labs, and images personally reviewed by me.     A/P  Ms.Virginia REDD Barlow is a 52 year old here for f/u for elevated cortisol, obesity:     1. Elevated glucose.  Prediabetes/insulin resistance.    Now off metformin due to intolerance. She states Metformin caused nausea.. A1c done 8/2016 improved to 5.3%.  Recheck A1c today.    2.  Morbid obesity.   She had lost 64 pounds with Phentermine, 400-->355-->340-->336 lbs.    Phentermine had lost effectiveness.  Currently treated with Belviq 10 mg bid.  Weight gain since last seen, 354-->371 lbs.  Her financial resources are limited.  Discontinue Belviq.  Restart Phentermine 37.5 mg qd.  Continue diet efforts - additional written material provided on meal planning, nutrition, and behavior modification.  Exercise is fairly limited, she was encouraged to continue/increase exercise as tolerated.     3. Elevated cortisol, possible cushings disease.  Currently followed by [facility].  Due for follow up 12/2017 - did not schedule yet  Recommend she schedule a f/u with Dr. Sandoval in [place]       Labs ordered today:   Orders Placed This Encounter   Procedures     Comprehensive metabolic panel     Hemoglobin A1c     Vitamin D Deficiency     Radiology/Consults ordered today: None    More than 50% of the time spent on counseling / coordinating care.  Total face-to-face time was greater than or equal to 25 minutes.      All questions were answered.  The patient indicates understanding of the above issues and agrees with the plan set forth.      Follow-up:  3 months with me for weight management      Catarina Govea NP  Endocrinology  [facility]  CC: Cara Marks              ____________________________________________________________            Again, thank you for allowing me to participate in the care of your patient.        Sincerely,        FERNANDA Kraft CNP

## 2018-02-27 NOTE — MR AVS SNAPSHOT
After Visit Summary   2/27/2018    Brissa Barlow    MRN: [id number]           Patient Information     Date Of Birth          1965        Visit Information        Provider Department      2/27/2018 2:30 PM Catarina Govea APRN CNP [facility]        Today's Diagnoses     Morbid obesity (H)    -  1    BMI 50.0-59.9, adult (H)        Insulin resistance        IFG (impaired fasting glucose)        Low vitamin D level          Care Instructions        Google:  Meal planning Mommies: Restaurant and Fast Food with Weight Watcher FreeAppniqueyle SmartPoints    [email or web address]-points-[email or web address]-sp-[email or web address]          Follow-ups after your visit        Who to contact     If you have questions or need follow up information about today's clinic visit or your schedule please contact [facility] directly at [phone].  Normal or non-critical lab and imaging results will be communicated to you by Jamplifyhart, letter or phone within 4 business days after the clinic has received the results. If you do not hear from us within 7 days, please contact the clinic through Via Novust or phone. If you have a critical or abnormal lab result, we will notify you by phone as soon as possible.  Submit refill requests through Magnasense or call your pharmacy and they will forward the refill request to us. Please allow 3 business days for your refill to be completed.          Additional Information About Your Visit        MyChart Information     Magnasense gives you secure access to your electronic health record. If you see a primary care provider, you can also send messages to your care team and make appointments. If you have questions, please call your primary care clinic.  If you do not have a primary care provider, please call [phone] and they will assist you.        Care EveryWhere ID     This is your Care EveryWhere ID. This could be used by  other organizations to access your [facility] medical records  [id number]        Your Vitals Were     Pulse Temperature Respirations Last Period BMI (Body Mass Index)       110 98.3  F (36.8  C) (Oral) 16 08/18/2008 58.11 kg/m2        Blood Pressure from Last 3 Encounters:   02/27/18 134/73   01/04/18 136/72   12/10/17 147/61    Weight from Last 3 Encounters:   02/27/18 (!) 371 lb (168.3 kg)   01/04/18 (!) 367 lb 6.4 oz (166.7 kg)   11/02/17 (!) 354 lb (160.6 kg)              We Performed the Following     Comprehensive metabolic panel     Hemoglobin A1c     Vitamin D Deficiency          Today's Medication Changes          These changes are accurate as of 2/27/18  2:58 PM.  If you have any questions, ask your nurse or doctor.               Start taking these medicines.        Dose/Directions    phentermine 37.5 MG tablet   Commonly known as:  ADIPEX-P   Used for:  Morbid obesity (H)   Started by:  Catarina Govea APRN CNP        Dose:  37.5 mg   Take 1 tablet (37.5 mg) by mouth every morning (before breakfast)   Quantity:  90 tablet   Refills:  0         Stop taking these medicines if you haven't already. Please contact your care team if you have questions.     lorcaserin 10 MG tablet   Commonly known as:  BELVIQ   Stopped by:  Catarina Govea APRN CNP                Where to get your medicines      Some of these will need a paper prescription and others can be bought over the counter.  Ask your nurse if you have questions.     Bring a paper prescription for each of these medications     phentermine 37.5 MG tablet                Primary Care Provider Office Phone # Fax #    Cara Marks [phone]       [address] [facility] [address]        Equal Access to Services     ACE ISABEL AH: Kiel Crawford, wabrady granger, qaybta kaalmanas anne, mir hinds. So [facility] [phone].    ATENCIÓN: Si habla español, tiene a ye disposición  servicios gratuitos de asistencia lingüística. Arina espinoza [phone].    We comply with applicable federal civil rights laws and [place] laws. We do not discriminate on the basis of race, color, national origin, age, disability, sex, sexual orientation, or gender identity.            Thank you!     Thank you for choosing [facility]  for your care. Our goal is always to provide you with excellent care. Hearing back from our patients is one way we can continue to improve our services. Please take a few minutes to complete the written survey that you may receive in the mail after your visit with us. Thank you!             Your Updated Medication List - Protect others around you: Learn how to safely use, store and throw away your medicines at www.disposemymeds.org.          This list is accurate as of 2/27/18  2:58 PM.  Always use your most recent med list.                   Brand Name Dispense Instructions for use Diagnosis    azelastine 0.1 % spray    ASTELIN    1 Bottle    Spray 1-2 sprays into both nostrils 2 times daily    Chronic non-seasonal allergic rhinitis, unspecified trigger       carBAMazepine 200 MG tablet    TEGretol    150 tablet    TAKE TWO TABLETS BY MOUTH EVERY MORNING, TAKE ONE TABLET BY MOUTH AT NOON, AND TAKE TWO TABLETS BY MOUTH EVERY EVENING    Seizure disorder (H)       cetirizine 10 MG tablet    zyrTEC    90 tablet    Take 1 tablet (10 mg) by mouth every evening has been on Claritin and seems to not be as effective.    Sinus pressure, Seasonal allergic rhinitis due to pollen       fluticasone 110 MCG/ACT Inhaler    FLOVENT HFA    1 Inhaler    Inhale 1-2 puffs into the lungs 2 times daily    Post-nasal discharge       fluticasone 50 MCG/ACT spray    FLONASE    48 g    Spray 1-2 sprays into both nostrils daily    ETD (Eustachian tube dysfunction), bilateral, Seasonal allergic rhinitis due to pollen       lisinopril 20 MG tablet    PRINIVIL/ZESTRIL    90 tablet    Take 1  tablet (20 mg) by mouth daily    Essential hypertension, benign       lovastatin 40 MG tablet    MEVACOR    90 tablet    Take 1 tablet (40 mg) by mouth At Bedtime    Hyperlipidemia LDL goal <130       nystatin cream    MYCOSTATIN    60 g    APPLY TOPICALLY TWO TIMES A DAY    Yeast dermatitis       omeprazole 20 MG CR capsule    priLOSEC    180 capsule    TAKE TWO CAPSULES BY MOUTH EVERY DAY *TAKE 30 TO 60 MINUTES BEFORE A MEAL    Gastroesophageal reflux disease without esophagitis       phentermine 37.5 MG tablet    ADIPEX-P    90 tablet    Take 1 tablet (37.5 mg) by mouth every morning (before breakfast)    Morbid obesity (H)       sertraline 100 MG tablet    ZOLOFT    90 tablet    Take 1 tablet (100 mg) by mouth daily    Adjustment disorder with depressed mood
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
## 2018-03-14 DIAGNOSIS — E78.5 HYPERLIPIDEMIA LDL GOAL <130: ICD-10-CM

## 2018-03-14 DIAGNOSIS — I10 ESSENTIAL HYPERTENSION, BENIGN: ICD-10-CM

## 2018-03-14 DIAGNOSIS — F43.21 ADJUSTMENT DISORDER WITH DEPRESSED MOOD: ICD-10-CM

## 2018-03-14 NOTE — TELEPHONE ENCOUNTER
"Requested Prescriptions   Pending Prescriptions Disp Refills     lisinopril (PRINIVIL/ZESTRIL) 20 MG tablet [Pharmacy Med Name: LISINOPRIL 20MG TABS]    Last Written Prescription Date:  1/30/2017  Last Fill Quantity: 90,  # refills: 3   Last office visit: 1/4/2018 with prescribing provider:  Cara Marks     Future Office Visit:   Next 5 appointments (look out 90 days)     May 23, 2018  2:30 PM CDT   Return Visit with FERNANDA Kraft CNP   [facility] ([facility]    [address] [facility] [address]   [phone]                  90 tablet 3     Sig: TAKE ONE TABLET BY MOUTH EVERY DAY    ACE Inhibitors (Including Combos) Protocol Failed    3/14/2018  7:56 AM       Failed - Normal serum creatinine on file in past 12 months    Recent Labs   Lab Test  02/02/17   0756   CR  0.82            Failed - Normal serum potassium on file in past 12 months    Recent Labs   Lab Test  02/02/17   0756   POTASSIUM  4.2            Passed - Blood pressure under 140/90 in past 12 months    BP Readings from Last 3 Encounters:   02/27/18 134/73   01/04/18 136/72   12/10/17 147/61                Passed - Recent (12 mo) or future (30 days) visit within the authorizing provider's specialty    Patient had office visit in the last 12 months or has a visit in the next 30 days with authorizing provider or within the authorizing provider's specialty.  See \"Patient Info\" tab in inbasket, or \"Choose Columns\" in Meds & Orders section of the refill encounter.           Passed - Patient is age 18 or older       Passed - No active pregnancy on record       Passed - No positive pregnancy test in past 12 months        lovastatin (MEVACOR) 40 MG tablet [Pharmacy Med Name: LOVASTATIN 40MG TABS]  Last Written Prescription Date:  1/30/2017  Last Fill Quantity: 90,  # refills: 3   Last office visit: 1/4/2018 with prescribing provider:  Cara Marks     Future Office Visit:   Next 5 " "appointments (look out 90 days)     May 23, 2018  2:30 PM CDT   Return Visit with FERNANDA Kraft CNP   [facility] ([facility])    [address]  [facility] [address]   [phone]                  90 tablet 3     Sig: TAKE ONE TABLET BY MOUTH AT BEDTIME    Statins Protocol Failed    3/14/2018  7:56 AM       Failed - LDL on file in past 12 months    Recent Labs   Lab Test  02/02/17   0756   LDL  116*            Passed - No abnormal creatine kinase in past 12 months    No lab results found.         Passed - Recent (12 mo) or future (30 days) visit within the authorizing provider's specialty    Patient had office visit in the last 12 months or has a visit in the next 30 days with authorizing provider or within the authorizing provider's specialty.  See \"Patient Info\" tab in inbasket, or \"Choose Columns\" in Meds & Orders section of the refill encounter.           Passed - Patient is age 18 or older       Passed - No active pregnancy on record       Passed - No positive pregnancy test in past 12 months        sertraline (ZOLOFT) 100 MG tablet [Pharmacy Med Name: SERTRALINE HCL 100MG TABS]    Last Written Prescription Date: 1/30/2017  Last Fill Quantity: 90,  # refills: 3   Last office visit: 1/4/2018 with prescribing provider:  Cara Marks     Future Office Visit:   Next 5 appointments (look out 90 days)     May 23, 2018  2:30 PM CDT   Return Visit with FERNANDA Kraft CNP   [facility] ([facility])    [address] [facility] [address]   [phone]                90 tablet 3     Sig: TAKE ONE TABLET BY MOUTH EVERY DAY    SSRIs Protocol Passed    3/14/2018  7:56 AM       Passed - Recent (12 mo) or future (30 days) visit within the authorizing provider's specialty    Patient had office visit in the last 12 months or has a visit in the next 30 days with authorizing provider or within the " "authorizing provider's specialty.  See \"Patient Info\" tab in inbasket, or \"Choose Columns\" in Meds & Orders section of the refill encounter.           Passed - Patient is age 18 or older       Passed - No active pregnancy on record       Passed - No positive pregnancy test in last 12 months          "

## 2018-03-19 RX ORDER — SERTRALINE HYDROCHLORIDE 100 MG/1
TABLET, FILM COATED ORAL
Qty: 90 TABLET | Refills: 0 | Status: SHIPPED | OUTPATIENT
Start: 2018-03-19 | End: 2018-06-12

## 2018-03-19 RX ORDER — LISINOPRIL 20 MG/1
TABLET ORAL
Qty: 90 TABLET | Refills: 0 | Status: SHIPPED | OUTPATIENT
Start: 2018-03-19 | End: 2018-06-12

## 2018-03-19 RX ORDER — LOVASTATIN 40 MG
TABLET ORAL
Qty: 90 TABLET | Refills: 0 | Status: SHIPPED | OUTPATIENT
Start: 2018-03-19 | End: 2018-06-12

## 2018-03-19 NOTE — TELEPHONE ENCOUNTER
Due for annual fasting cholesterol check seeing Endocrinology in May.  Refilled today, lab order is added to list. Due for 6 month depression visit as well.     Joselin Cohen, RN  Triage Nurse

## 2018-04-27 DIAGNOSIS — G40.909 SEIZURE DISORDER (H): ICD-10-CM

## 2018-04-27 NOTE — TELEPHONE ENCOUNTER
"Requested Prescriptions   Pending Prescriptions Disp Refills     carBAMazepine (TEGRETOL) 200 MG tablet [Pharmacy Med Name: CARBAMAZEPINE 200MG TABS]  Last Written Prescription Date:  1/29/18  Last Fill Quantity: 150,  # refills: 2   Last office visit: 1/4/2018 with prescribing provider:  1/4/2018     Future Office Visit:   Next 5 appointments (look out 90 days)     May 23, 2018  2:30 PM CDT   [facility]    [address] [facility] [address]   [phone]                  150 tablet 2     Sig: TAKE TWO TABLETS BY MOUTH EVERY MORNING, TAKE ONE TABLET BY MOUTH AT NOON, AND TAKE TWO TABLETS BY MOUTH EVERY EVENING    Anti-Seizure Meds Protocol  Failed    4/27/2018  9:03 AM       Failed - Review Authorizing provider's last note.     Refer to last progress notes: confirm request is for original authorizing provider (cannot be through other providers).         Passed - Recent (12 mo) or future (30 days) visit within the authorizing provider's specialty    Patient had office visit in the last 12 months or has a visit in the next 30 days with authorizing provider or within the authorizing provider's specialty.  See \"Patient Info\" tab in inbasket, or \"Choose Columns\" in Meds & Orders section of the refill encounter.           Passed - Normal CBC on file in past 26 months    Recent Labs   Lab Test  02/02/17   0756   WBC  5.6   RBC  4.41   HGB  13.5   HCT  41.1   PLT  208            Passed - Normal ALT or AST on file in past 26 months    Recent Labs   Lab Test  02/02/17   0756   ALT  35     Recent Labs   Lab Test  02/02/17   0756   AST  19            Passed - Normal platelet count on file in past 26 months    Recent Labs   Lab Test  02/02/17   0756   PLT  208              Passed - Carbamazepine level within therapeutic range in last 26 months    No lab results found.    Carbamazepine level must be checked 2-4 weeks " after dosage change.           Passed - No active pregnancy on record       Passed - No positive pregnancy test in last 12 months

## 2018-05-03 RX ORDER — CARBAMAZEPINE 200 MG/1
TABLET ORAL
Qty: 150 TABLET | Refills: 0 | Status: SHIPPED | OUTPATIENT
Start: 2018-05-03 | End: 2018-06-04

## 2018-05-09 ENCOUNTER — MYC MEDICAL ADVICE (OUTPATIENT)
Dept: OTHER | Age: 53
End: 2018-05-09

## 2018-06-04 ENCOUNTER — TELEPHONE (OUTPATIENT)
Dept: FAMILY MEDICINE | Facility: CLINIC | Age: 53
End: 2018-06-04

## 2018-06-04 DIAGNOSIS — G40.909 SEIZURE DISORDER (H): ICD-10-CM

## 2018-06-04 NOTE — TELEPHONE ENCOUNTER
"Requested Prescriptions   Pending Prescriptions Disp Refills     carBAMazepine (TEGRETOL) 200 MG tablet [Pharmacy Med Name: CARBAMAZEPINE 200MG TABS]  Last Written Prescription Date:  5/3/18  Last Fill Quantity: 150,  # refills: 0   Last office visit: 1/4/2018 with prescribing provider:  1/4/2018     Future Office Visit:   Next 5 appointments (look out 90 days)     Jun 12, 2018  3:30 PM CDT   Office Visit with Cara Marks MD   [facility] ([facility]    [address] [facility] [address] [phone]            Jun 14, 2018  5:00 PM CDT   Return Visit with FERNANDA Kraft CNP   [facility] ([facility])    [address] [facility] [address]   [phone]                  150 tablet 0     Sig: TAKE TWO TABLETS BY MOUTH EVERY MORNING , TAKE ONE TABLET BY MOUTH AT NOON, AND TAKE TWO TABLETS BY MOUTH EVERY EVENING    Anti-Seizure Meds Protocol  Failed    6/4/2018  1:30 PM       Failed - Review Authorizing provider's last note.     Refer to last progress notes: confirm request is for original authorizing provider (cannot be through other providers).         Passed - Recent (12 mo) or future (30 days) visit within the authorizing provider's specialty    Patient had office visit in the last 12 months or has a visit in the next 30 days with authorizing provider or within the authorizing provider's specialty.  See \"Patient Info\" tab in inbasket, or \"Choose Columns\" in Meds & Orders section of the refill encounter.           Passed - Normal CBC on file in past 26 months    Recent Labs   Lab Test  02/02/17   0756   WBC  5.6   RBC  4.41   HGB  13.5   HCT  41.1   PLT  208       For [facility] ONLY: BPPF071 = WBC, [id number] = RBC         Passed - Normal ALT or AST on file in past 26 months    Recent Labs   Lab Test  02/02/17   0756   ALT  35     Recent Labs   Lab Test  02/02/17   0756   AST  19            Passed - Normal " platelet count on file in past 26 months    Recent Labs   Lab Test  02/02/17   0756   PLT  208              Passed - Carbamazepine level within therapeutic range in last 26 months    No lab results found.    Carbamazepine level must be checked 2-4 weeks after dosage change.           Passed - No active pregnancy on record       Passed - No positive pregnancy test in last 12 months

## 2018-06-08 RX ORDER — CARBAMAZEPINE 200 MG/1
TABLET ORAL
Qty: 150 TABLET | Refills: 0 | Status: SHIPPED | OUTPATIENT
Start: 2018-06-08 | End: 2018-06-12

## 2018-06-08 NOTE — TELEPHONE ENCOUNTER
Prescription approved per [facility] Refill Protocol.  Pt due for six month f/u prior to next refill.  Routing to nurse pool.  Please help pt schedule an office visit.    Lyric Higgins RN

## 2018-06-12 ENCOUNTER — OFFICE VISIT (OUTPATIENT)
Dept: FAMILY MEDICINE | Facility: CLINIC | Age: 53
End: 2018-06-12
Payer: MEDICARE

## 2018-06-12 VITALS
BODY MASS INDEX: 58.97 KG/M2 | TEMPERATURE: 98.6 F | HEART RATE: 98 BPM | DIASTOLIC BLOOD PRESSURE: 78 MMHG | OXYGEN SATURATION: 95 % | SYSTOLIC BLOOD PRESSURE: 136 MMHG | WEIGHT: 293 LBS | RESPIRATION RATE: 18 BRPM

## 2018-06-12 DIAGNOSIS — F43.21 ADJUSTMENT DISORDER WITH DEPRESSED MOOD: ICD-10-CM

## 2018-06-12 DIAGNOSIS — L30.8 OTHER ECZEMA: ICD-10-CM

## 2018-06-12 DIAGNOSIS — J30.89 CHRONIC NON-SEASONAL ALLERGIC RHINITIS, UNSPECIFIED TRIGGER: ICD-10-CM

## 2018-06-12 DIAGNOSIS — I10 ESSENTIAL HYPERTENSION, BENIGN: Primary | ICD-10-CM

## 2018-06-12 DIAGNOSIS — R09.82 POST-NASAL DISCHARGE: ICD-10-CM

## 2018-06-12 DIAGNOSIS — J30.1 SEASONAL ALLERGIC RHINITIS DUE TO POLLEN, UNSPECIFIED CHRONICITY: ICD-10-CM

## 2018-06-12 DIAGNOSIS — E78.5 HYPERLIPIDEMIA LDL GOAL <130: ICD-10-CM

## 2018-06-12 DIAGNOSIS — E66.01 MORBID OBESITY (H): ICD-10-CM

## 2018-06-12 DIAGNOSIS — G40.909 SEIZURE DISORDER (H): ICD-10-CM

## 2018-06-12 LAB
ERYTHROCYTE [DISTWIDTH] IN BLOOD BY AUTOMATED COUNT: 12.8 % (ref 10–15)
HCT VFR BLD AUTO: 42.6 % (ref 35–47)
HGB BLD-MCNC: 13.7 G/DL (ref 11.7–15.7)
MCH RBC QN AUTO: 30 PG (ref 26.5–33)
MCHC RBC AUTO-ENTMCNC: 32.2 G/DL (ref 31.5–36.5)
MCV RBC AUTO: 93 FL (ref 78–100)
PLATELET # BLD AUTO: 236 10E9/L (ref 150–450)
RBC # BLD AUTO: 4.57 10E12/L (ref 3.8–5.2)
WBC # BLD AUTO: 8.8 10E9/L (ref 4–11)

## 2018-06-12 PROCEDURE — 85027 COMPLETE CBC AUTOMATED: CPT | Performed by: INTERNAL MEDICINE

## 2018-06-12 PROCEDURE — 80053 COMPREHEN METABOLIC PANEL: CPT | Performed by: INTERNAL MEDICINE

## 2018-06-12 PROCEDURE — 80156 ASSAY CARBAMAZEPINE TOTAL: CPT | Performed by: INTERNAL MEDICINE

## 2018-06-12 PROCEDURE — 99214 OFFICE O/P EST MOD 30 MIN: CPT | Performed by: INTERNAL MEDICINE

## 2018-06-12 PROCEDURE — 36415 COLL VENOUS BLD VENIPUNCTURE: CPT | Performed by: INTERNAL MEDICINE

## 2018-06-12 PROCEDURE — 80061 LIPID PANEL: CPT | Performed by: INTERNAL MEDICINE

## 2018-06-12 RX ORDER — HYDROCORTISONE 2.5 %
CREAM (GRAM) TOPICAL 2 TIMES DAILY
Qty: 30 G | Refills: 1 | Status: SHIPPED | OUTPATIENT
Start: 2018-06-12 | End: 2020-04-14

## 2018-06-12 RX ORDER — FLUTICASONE PROPIONATE 110 UG/1
1-2 AEROSOL, METERED RESPIRATORY (INHALATION) 2 TIMES DAILY
Qty: 1 INHALER | Refills: 11 | Status: SHIPPED | OUTPATIENT
Start: 2018-06-12 | End: 2019-01-15

## 2018-06-12 RX ORDER — CARBAMAZEPINE 200 MG/1
TABLET ORAL
Qty: 450 TABLET | Refills: 3 | Status: SHIPPED | OUTPATIENT
Start: 2018-06-12 | End: 2019-07-06

## 2018-06-12 RX ORDER — AZELASTINE 1 MG/ML
1-2 SPRAY, METERED NASAL 2 TIMES DAILY
Qty: 1 BOTTLE | Refills: 11 | Status: SHIPPED | OUTPATIENT
Start: 2018-06-12 | End: 2019-01-15

## 2018-06-12 RX ORDER — LOVASTATIN 40 MG
TABLET ORAL
Qty: 90 TABLET | Refills: 3 | Status: SHIPPED | OUTPATIENT
Start: 2018-06-12 | End: 2019-05-30

## 2018-06-12 RX ORDER — FLUTICASONE PROPIONATE 50 MCG
1-2 SPRAY, SUSPENSION (ML) NASAL DAILY
Qty: 48 G | Refills: 3 | Status: CANCELLED | OUTPATIENT
Start: 2018-06-12

## 2018-06-12 RX ORDER — HYDROCORTISONE VALERATE 2 MG/G
OINTMENT TOPICAL
Qty: 45 G | Refills: 0 | Status: SHIPPED | OUTPATIENT
Start: 2018-06-12 | End: 2018-06-12

## 2018-06-12 RX ORDER — LISINOPRIL 20 MG/1
20 TABLET ORAL DAILY
Qty: 90 TABLET | Refills: 3 | Status: SHIPPED | OUTPATIENT
Start: 2018-06-12 | End: 2019-01-31

## 2018-06-12 RX ORDER — SERTRALINE HYDROCHLORIDE 100 MG/1
100 TABLET, FILM COATED ORAL DAILY
Qty: 90 TABLET | Refills: 3 | Status: SHIPPED | OUTPATIENT
Start: 2018-06-12 | End: 2019-01-31

## 2018-06-12 ASSESSMENT — ANXIETY QUESTIONNAIRES
3. WORRYING TOO MUCH ABOUT DIFFERENT THINGS: MORE THAN HALF THE DAYS
7. FEELING AFRAID AS IF SOMETHING AWFUL MIGHT HAPPEN: MORE THAN HALF THE DAYS
6. BECOMING EASILY ANNOYED OR IRRITABLE: MORE THAN HALF THE DAYS
GAD7 TOTAL SCORE: 14
1. FEELING NERVOUS, ANXIOUS, OR ON EDGE: MORE THAN HALF THE DAYS
2. NOT BEING ABLE TO STOP OR CONTROL WORRYING: MORE THAN HALF THE DAYS
IF YOU CHECKED OFF ANY PROBLEMS ON THIS QUESTIONNAIRE, HOW DIFFICULT HAVE THESE PROBLEMS MADE IT FOR YOU TO DO YOUR WORK, TAKE CARE OF THINGS AT HOME, OR GET ALONG WITH OTHER PEOPLE: SOMEWHAT DIFFICULT
5. BEING SO RESTLESS THAT IT IS HARD TO SIT STILL: MORE THAN HALF THE DAYS

## 2018-06-12 ASSESSMENT — PATIENT HEALTH QUESTIONNAIRE - PHQ9: 5. POOR APPETITE OR OVEREATING: MORE THAN HALF THE DAYS

## 2018-06-12 NOTE — MR AVS SNAPSHOT
After Visit Summary   6/12/2018    Brissa Barlow    MRN: [id number]           Patient Information     Date Of Birth          1965        Visit Information        Provider Department      6/12/2018 3:30 PM Cara Marks MD [facility]        Today's Diagnoses     BENIGN HYPERTENSION    -  1    Seizure disorder (H)        Hyperlipidemia LDL goal <130        Adjustment disorder with depressed mood        Morbid obesity (H)        Chronic non-seasonal allergic rhinitis, unspecified trigger        Seasonal allergic rhinitis due to pollen        Post-nasal discharge        Other eczema        BMI 50.0-59.9, adult (H)           Follow-ups after your visit        Your next 10 appointments already scheduled     Jun 14, 2018  5:00 PM CDT   Return Visit with FERNANDA Kraft CNP   [facility]    [address]  [facility] [address] [phone]            Jun 25, 2018  2:00 PM CDT   New Visit with Beau Sandoval MD   [facility]    [address] [place] [address] [phone]              Who to contact     If you have questions or need follow up information about today's clinic visit or your schedule please contact [facility] directly at [phone].  Normal or non-critical lab and imaging results will be communicated to you by MyChart, letter or phone within 4 business days after the clinic has received the results. If you do not hear from us within 7 days, please contact the clinic through MyChart or phone. If you have a critical or abnormal lab result, we will notify you by phone as soon as possible.  Submit refill requests through [facility] or call your pharmacy and they will forward the refill request to us. Please allow 3 business days for your refill to be completed.          Additional Information About Your Visit         [facility] Information     [facility] gives you secure access to your electronic health record. If you see a primary care provider, you can also send messages to your care team and make appointments. If you have questions, please call your primary care clinic.  If you do not have a primary care provider, please call [phone] and they will assist you.        Care EveryWhere ID     This is your Care EveryWhere ID. This could be used by other organizations to access your [place] medical records  [id number]        Your Vitals Were     Pulse Temperature Respirations Last Period Pulse Oximetry BMI (Body Mass Index)    98 98.6  F (37  C) (Oral) 18 08/18/2008 95% 58.97 kg/m2       Blood Pressure from Last 3 Encounters:   06/12/18 136/78   02/27/18 134/73   01/04/18 136/72    Weight from Last 3 Encounters:   06/12/18 (!) 376 lb 8 oz (170.8 kg)   02/27/18 (!) 371 lb (168.3 kg)   01/04/18 (!) 367 lb 6.4 oz (166.7 kg)              We Performed the Following     Carbamazepine total     CBC with platelets     Comprehensive metabolic panel     Lipid panel reflex to direct LDL Non-fasting          Today's Medication Changes          These changes are accurate as of 6/12/18  4:41 PM.  If you have any questions, ask your nurse or doctor.               Start taking these medicines.        Dose/Directions    hydrocortisone valerate 0.2 % ointment   Commonly known as:  WEST-CHELSEA   Used for:  Other eczema   Started by:  Cara Marks MD        Apply sparingly to affected area three times daily as needed.   Quantity:  45 g   Refills:  0         These medicines have changed or have updated prescriptions.        Dose/Directions    lisinopril 20 MG tablet   Commonly known as:  PRINIVIL/ZESTRIL   This may have changed:  See the new instructions.   Used for:  Essential hypertension, benign   Changed by:  Cara Marks MD        Dose:  20 mg   Take 1 tablet (20 mg) by mouth daily   Quantity:  90 tablet   Refills:  3        sertraline 100 MG tablet   Commonly known as:  ZOLOFT   This may have changed:  See the new instructions.   Used for:  Adjustment disorder with depressed mood   Changed by:  Cara Marks MD        Dose:  100 mg   Take 1 tablet (100 mg) by mouth daily   Quantity:  90 tablet   Refills:  3            Where to get your medicines      These medications were sent to [facility] [place], [place] - [address]  [address] [facility] [address]     Phone:  [phone]     azelastine 0.1 % spray    carBAMazepine 200 MG tablet    fluticasone 110 MCG/ACT Inhaler    hydrocortisone valerate 0.2 % ointment    lisinopril 20 MG tablet    lovastatin 40 MG tablet    sertraline 100 MG tablet                Primary Care Provider Office Phone # Fax #    Cara Marks [phone] [address]  [facility] [address]        Equal Access to Services     [facility] AH: Hadii aad ku hadasho Soomaali, waaxda luqadaha, qaybta kaalmada adeegyada, waxay idiin hayaan ernie kharagino padgett . So [facility] [place] [phone].    ATENCIÓN: Si mushtaqla español, tiene a ye disposición servicios gratuitos de asistencia lingüística. Llame al [phone].    We comply with applicable federal civil rights laws and [place] laws. We do not discriminate on the basis of race, color, national origin, age, disability, sex, sexual orientation, or gender identity.            Thank you!     Thank you for choosing [facility]  for your care. Our goal is always to provide you with excellent care. Hearing back from our patients is one way we can continue to improve our services. Please take a few minutes to complete the written survey that you may receive in the mail after your visit with us. Thank you!             Your Updated Medication List - Protect others around you: Learn how to safely use, store and throw away your medicines at www.disposemymeds.org.          This list is accurate as of  6/12/18  4:41 PM.  Always use your most recent med list.                   Brand Name Dispense Instructions for use Diagnosis    azelastine 0.1 % spray    ASTELIN    1 Bottle    Spray 1-2 sprays into both nostrils 2 times daily    Chronic non-seasonal allergic rhinitis, unspecified trigger       carBAMazepine 200 MG tablet    TEGretol    450 tablet    TAKE TWO TABLETS BY MOUTH EVERY MORNING , TAKE ONE TABLET BY MOUTH AT NOON, AND TAKE TWO TABLETS BY MOUTH EVERY EVENING    Seizure disorder (H)       cetirizine 10 MG tablet    zyrTEC    90 tablet    Take 1 tablet (10 mg) by mouth every evening has been on Claritin and seems to not be as effective.    Sinus pressure, Seasonal allergic rhinitis due to pollen       fluticasone 110 MCG/ACT Inhaler    FLOVENT HFA    1 Inhaler    Inhale 1-2 puffs into the lungs 2 times daily    Post-nasal discharge       fluticasone 50 MCG/ACT spray    FLONASE    48 g    Spray 1-2 sprays into both nostrils daily    ETD (Eustachian tube dysfunction), bilateral, Seasonal allergic rhinitis due to pollen       hydrocortisone valerate 0.2 % ointment    WEST-CHELSEA    45 g    Apply sparingly to affected area three times daily as needed.    Other eczema       lisinopril 20 MG tablet    PRINIVIL/ZESTRIL    90 tablet    Take 1 tablet (20 mg) by mouth daily    Essential hypertension, benign       lovastatin 40 MG tablet    MEVACOR    90 tablet    TAKE ONE TABLET BY MOUTH AT BEDTIME    Hyperlipidemia LDL goal <130       nystatin cream    MYCOSTATIN    60 g    APPLY TOPICALLY TWO TIMES A DAY    Yeast dermatitis       omeprazole 20 MG CR capsule    priLOSEC    180 capsule    TAKE TWO CAPSULES BY MOUTH EVERY DAY *TAKE 30 TO 60 MINUTES BEFORE A MEAL    Gastroesophageal reflux disease without esophagitis       phentermine 37.5 MG tablet    ADIPEX-P    90 tablet    Take 1 tablet (37.5 mg) by mouth every morning (before breakfast)    Morbid obesity (H)       sertraline 100 MG tablet    ZOLOFT    90 tablet     Take 1 tablet (100 mg) by mouth daily    Adjustment disorder with depressed mood

## 2018-06-12 NOTE — PROGRESS NOTES
SUBJECTIVE:   Brissa Barlow is a 53 year old female who presents to clinic today for the following health issues:      Hyperlipidemia Follow-Up  No side effects with current medication.    Patient is currently taking lovastatin 40 mg   LDL Cholesterol Calculated   Date Value Ref Range Status   06/12/2018 89 <100 mg/dL Final     Comment:     Desirable:       <100 mg/dl   ]    Rate your low fat/cholesterol diet?: poor    Taking statin?  Yes, no muscle aches from statin    Other lipid medications/supplements?:  none      Hypertension Follow-up  No side effects with current medication   Patient is currently taking lisinopril 20 mg   BP Readings from Last 3 Encounters:   06/12/18 136/78   02/27/18 134/73   01/04/18 136/72       Outpatient blood pressures are not being checked.    Low Salt Diet: not monitoring salt        Seizure Disorder  No side effects with current medication.  Normally takes medication at 7:00am and 10:00pm.       Amount of exercise or physical activity: None    Problems taking medications regularly: No    Medication side effects: none    Diet: regular (no restrictions)        Problem list and histories reviewed & adjusted, as indicated.  Additional history: as documented    Labs reviewed in EPIC    Reviewed and updated as needed this visit by clinical staff  Tobacco  Allergies  Meds  Problems  Med Hx  Surg Hx  Fam Hx  Soc Hx        Reviewed and updated as needed this visit by Provider  Allergies  Meds  Problems         ROS:  CONSTITUTIONAL: Morbid obesity BMI 50.0 - 59.9 - use of phentermine, NEGATIVE for fever, chills  INTEGUMENTARY/SKIN: POSITIVE for rash and bumps behind left earlobe  RESP:NEGATIVE for significant cough or SOB  CV: HTN - use of lisinopril, NEGATIVE for chest pain, palpitations or peripheral edema  GI: GERD - use of omeprazole, NEGATIVE for nausea, abdominal pain, heartburn,  NEURO: Seizure disorder - use of carbamazepine, NEGATIVE for weakness, dizziness or  paresthesias  ENDOCRINE: HLD - use of lovastatin, NEGATIVE for temperature intolerance, skin/hair changes  PSYCHIATRIC: Anxiety - use of sertraline, NEGATIVE for changes in mood or affect    This document serves as a record of the services and decisions personally performed and made by Cara Marks MD. It was created on her behalf by Reilly Evans, a trained medical scribe. The creation of this document is based on the provider's statements to the medical scribe.   Reilly Evans, 4:19 PM, June 12, 2018    OBJECTIVE:     /78  Pulse 98  Temp 98.6  F (37  C) (Oral)  Resp 18  Wt (!) 376 lb 8 oz (170.8 kg)  LMP 08/18/2008  SpO2 95%  BMI 58.97 kg/m2  Body mass index is 58.97 kg/(m^2).  GENERAL: Presented with sig other, Sudhir today, alert   NECK: no adenopathy, no asymmetry, masses, or scars and thyroid normal to palpation  RESP: lungs clear to auscultation - no rales, rhonchi or wheezes  CV: regular rate and rhythm, normal S1 S2, no murmur, no peripheral edema and peripheral pulses strong  ABDOMEN: soft, nontender, no hepatosplenomegaly, no masses and bowel sounds normal  MS: Wearing brace on left knee, no gross musculoskeletal defects noted, no edema; ambulates well  SKIN: Atopic dermatitis behind bottom of left earlobe  PSYCH: mentation appears normal, affect normal/bright  Non fasting labs today.    ASSESSMENT/PLAN:   (I10) BENIGN HYPERTENSION  (primary encounter diagnosis)  Comment: Controlled. Refill  Plan: lisinopril (PRINIVIL/ZESTRIL) 20 MG tablet,         Comprehensive metabolic panel            (G40.909) Seizure disorder (H)  Comment: Has not had a seizure since age 18; doing well on Tegretol; 2013 labs reviewed;  Plan: carBAMazepine (TEGRETOL) 200 MG tablet,         Carbamazepine total, CBC with platelets,         Comprehensive metabolic panel            (E78.5) Hyperlipidemia LDL goal <130  Comment: yearly cholesterol; fasting lab are due; she is not fasting but willing to set up fasting lab  appt.   Plan: lovastatin (MEVACOR) 40 MG tablet,         Comprehensive metabolic panel, Lipid panel         reflex to direct LDL Non-fasting            (F43.21) Adjustment disorder with depressed mood  Comment: Well controlled with current medication, refill   Plan: sertraline (ZOLOFT) 100 MG tablet            (E66.01) Morbid obesity (H)  Comment: Due to environmental stressors patient has been having difficulty loosing weight.    Plan: Continue use of phentermine 37.5 mg     (J30.89) Chronic non-seasonal allergic rhinitis, unspecified trigger  Comment: States that Azelastine works much better for clearing out her sinuses than Flonase does.   Plan: azelastine (ASTELIN) 0.1 % spray            (J30.1) Seasonal allergic rhinitis due to pollen  Comment: Controlled with use of azelastine.  Plan: Continue to monitor     (R09.82) Post-nasal discharge  Comment: Controlled with use of fluticasone   Plan: fluticasone (FLOVENT HFA) 110 MCG/ACT Inhaler            (L30.8) Other eczema  Comment: On face directly behind left ear lobe.  Plan: hydrocortisone valerate (WEST-CHELSEA) 0.2 %         ointment            (Z68.43) BMI 50.0-59.9, adult (H)  Comment: morbid obesity  Plan: Continue to use phentermine per Endocrine      Patient is interested in receiving handicap status due to her left leg, knee, ankle, and foot injuries. She has a brace on her leg and gets around well; no falls; Obesity does not qualify.       MEDICATIONS:   Orders Placed This Encounter   Medications     carBAMazepine (TEGRETOL) 200 MG tablet     Sig: TAKE TWO TABLETS BY MOUTH EVERY MORNING , TAKE ONE TABLET BY MOUTH AT NOON, AND TAKE TWO TABLETS BY MOUTH EVERY EVENING     Dispense:  450 tablet     Refill:  3     lisinopril (PRINIVIL/ZESTRIL) 20 MG tablet     Sig: Take 1 tablet (20 mg) by mouth daily     Dispense:  90 tablet     Refill:  3     lovastatin (MEVACOR) 40 MG tablet     Sig: TAKE ONE TABLET BY MOUTH AT BEDTIME     Dispense:  90 tablet     Refill:  3      sertraline (ZOLOFT) 100 MG tablet     Sig: Take 1 tablet (100 mg) by mouth daily     Dispense:  90 tablet     Refill:  3     azelastine (ASTELIN) 0.1 % spray     Sig: Spray 1-2 sprays into both nostrils 2 times daily     Dispense:  1 Bottle     Refill:  11     fluticasone (FLOVENT HFA) 110 MCG/ACT Inhaler     Sig: Inhale 1-2 puffs into the lungs 2 times daily     Dispense:  1 Inhaler     Refill:  11     DISCONTD: hydrocortisone valerate (WEST-CHELSEA) 0.2 % ointment     Sig: Apply sparingly to affected area three times daily as needed.     Dispense:  45 g     Refill:  0     hydrocortisone 2.5 % cream     Sig: Apply topically 2 times daily     Dispense:  30 g     Refill:  1     Medications Discontinued During This Encounter   Medication Reason     carBAMazepine (TEGRETOL) 200 MG tablet Reorder     lisinopril (PRINIVIL/ZESTRIL) 20 MG tablet Reorder     lovastatin (MEVACOR) 40 MG tablet Reorder     sertraline (ZOLOFT) 100 MG tablet Reorder     azelastine (ASTELIN) 0.1 % spray Reorder     fluticasone (FLOVENT HFA) 110 MCG/ACT Inhaler Reorder     hydrocortisone valerate (WEST-CHELSEA) 0.2 % ointment Cost/Formulary change     FUTURE LABS:       - Non fasting blood draw today.  FUTURE APPOINTMENTS:       - Follow-up visit for annual physical.    Cara Marks MD  Internal Medicine  [facility] [place]    The information in this document, created by a medical scribe for me, accurately reflects the services I personally performed and the decisions made by me. I have reviewed and approved this document for accuracy.  Dr. Cara Marks, 4:23 PM, June 12, 2018

## 2018-06-13 LAB — CARBAMAZEPINE SERPL-MCNC: 10.8 MG/L (ref 4–12)

## 2018-06-13 ASSESSMENT — ANXIETY QUESTIONNAIRES: GAD7 TOTAL SCORE: 14

## 2018-06-13 ASSESSMENT — PATIENT HEALTH QUESTIONNAIRE - PHQ9: SUM OF ALL RESPONSES TO PHQ QUESTIONS 1-9: 12

## 2018-06-14 LAB
ALBUMIN SERPL-MCNC: 3.7 G/DL (ref 3.4–5)
ALP SERPL-CCNC: 112 U/L (ref 40–150)
ALT SERPL W P-5'-P-CCNC: 35 U/L (ref 0–50)
ANION GAP SERPL CALCULATED.3IONS-SCNC: 9 MMOL/L (ref 3–14)
AST SERPL W P-5'-P-CCNC: 20 U/L (ref 0–45)
BILIRUB SERPL-MCNC: 0.2 MG/DL (ref 0.2–1.3)
BUN SERPL-MCNC: 19 MG/DL (ref 7–30)
CALCIUM SERPL-MCNC: 8.7 MG/DL (ref 8.5–10.1)
CHLORIDE SERPL-SCNC: 108 MMOL/L (ref 94–109)
CHOLEST SERPL-MCNC: 205 MG/DL
CO2 SERPL-SCNC: 27 MMOL/L (ref 20–32)
CREAT SERPL-MCNC: 0.68 MG/DL (ref 0.52–1.04)
GFR SERPL CREATININE-BSD FRML MDRD: >90 ML/MIN/1.7M2
GLUCOSE SERPL-MCNC: 106 MG/DL (ref 70–99)
HDLC SERPL-MCNC: 48 MG/DL
LDLC SERPL CALC-MCNC: 89 MG/DL
NONHDLC SERPL-MCNC: 157 MG/DL
POTASSIUM SERPL-SCNC: 4.2 MMOL/L (ref 3.4–5.3)
PROT SERPL-MCNC: 7.3 G/DL (ref 6.8–8.8)
SODIUM SERPL-SCNC: 144 MMOL/L (ref 133–144)
TRIGL SERPL-MCNC: 338 MG/DL

## 2018-06-20 DIAGNOSIS — H69.93 ETD (EUSTACHIAN TUBE DYSFUNCTION), BILATERAL: ICD-10-CM

## 2018-06-20 DIAGNOSIS — J30.1 SEASONAL ALLERGIC RHINITIS DUE TO POLLEN: ICD-10-CM

## 2018-06-21 RX ORDER — FLUTICASONE PROPIONATE 50 MCG
SPRAY, SUSPENSION (ML) NASAL
Qty: 48 G | Refills: 3 | Status: ON HOLD | OUTPATIENT
Start: 2018-06-21 | End: 2021-03-11

## 2018-06-21 NOTE — TELEPHONE ENCOUNTER
Prescription approved per [facility] Refill Protocol.    Keri DORANTES RN, BSN, PHN  [place] [facility] RN

## 2018-06-21 NOTE — TELEPHONE ENCOUNTER
"Requested Prescriptions   Pending Prescriptions Disp Refills     fluticasone (FLONASE) 50 MCG/ACT spray [Pharmacy Med Name: FLUTICASONE 50MCG NASAL SPRAY]  Last Written Prescription Date:  1/30/2017  Last Fill Quantity: 48g,  # refills: 3   Last office visit: 6/12/2018 with prescribing provider:  Cara Marks   Future Office Visit:   Next 5 appointments (look out 90 days)     Jul 19, 2018  4:00 PM CDT   Return Visit with FERNANDA Kraft CNP   [facility] ([facility])    [address]  [facility] [address]   [phone]                  48 g 3     Sig: USE ONE TO TWO SPRAYS IN EACH NOSTRIL EVERY DAY    Inhaled Steroids Protocol Failed    6/20/2018  5:04 PM       Failed - Asthma control assessment score within normal limits in last 6 months    Please review ACT score.          Passed - Patient is age 12 or older       Passed - Recent (6 mo) or future (30 days) visit within the authorizing provider's specialty    Patient had office visit in the last 6 months or has a visit in the next 30 days with authorizing provider or within the authorizing provider's specialty.  See \"Patient Info\" tab in inbasket, or \"Choose Columns\" in Meds & Orders section of the refill encounter.              "

## 2018-07-06 ENCOUNTER — TELEPHONE (OUTPATIENT)
Dept: FAMILY MEDICINE | Facility: CLINIC | Age: 53
End: 2018-07-06

## 2018-07-06 NOTE — TELEPHONE ENCOUNTER
Reason for call:  Other   Patient called regarding (reason for call): returning call  Additional comments:  Patient called she would like Dr Marks to write her a permission slip to have a therapy dog.       Phone number to reach patient:  Home number on file [phone] (home)    Best Time:  Any     Can we leave a detailed message on this number?  YES

## 2018-07-06 NOTE — TELEPHONE ENCOUNTER
"Routing request to Dr. Marks.    Called patient to gather more info: They have trained her parent's dog (both parents in detention) into a therapy dog (so it will be appropriate to live in her apartment complex). The place where they are living requires the letter for the dog to live with them, then they won't have to pay the $500 for the training.  When asked what the patient will be using the dog for (seizures, assistance with physical limitations), the patient said, \"Oh no, it's just a family dog.\"      Yolette YE, Triage RN      "

## 2018-07-12 NOTE — TELEPHONE ENCOUNTER
In order to say the dog is a therapy dog, we the certified information.   She made NO mention of this or the training process at recent appointment.   Unable to say family dog is a therapy dog without documentation.

## 2018-07-12 NOTE — TELEPHONE ENCOUNTER
Called patient to notify of below. Patient stated understanding - will obtain paperwork the dog is a certified therapy dog from the police department (they did the training program) and drop it off for Dr. Marks to review.    Yolette YE, Triage RN

## 2018-08-20 ENCOUNTER — TELEPHONE (OUTPATIENT)
Dept: ENDOCRINOLOGY | Facility: CLINIC | Age: 53
End: 2018-08-20

## 2018-08-20 NOTE — TELEPHONE ENCOUNTER
Reason for Call:  Other Other     Detailed comments: Patient wants to inform Catarina Govea [place] But wants her to know she can still contact her on her cell phone if need be.     Phone Number Patient can be reached at: Cell number on file:    Telephone Information:   Mobile [phone]       Best Time: any     Can we leave a detailed message on this number? YES    Call taken on 8/20/2018 at 12:23 PM by Deborah Ortiz

## 2018-08-23 NOTE — TELEPHONE ENCOUNTER
Sounds good.  Was she just calling to let us know that her phone number isn't changing even though she is moving?  I assume patient plans to continue following up with me in clinic, or no?  Depending on where she is moving, there may be other endocrinologists closer but I'm always more than happy to continue seeing [place] for follow up.  Please call and clarify with the patient if there was anything she needed or was it just an FYI.  Thanks,  Catarina Govea NP  Endocrinology

## 2018-08-28 ENCOUNTER — HOSPITAL ENCOUNTER (EMERGENCY)
Facility: CLINIC | Age: 53
Discharge: HOME | End: 2018-08-28
Attending: EMERGENCY MEDICINE | Admitting: EMERGENCY MEDICINE
Payer: MEDICARE

## 2018-08-28 VITALS
DIASTOLIC BLOOD PRESSURE: 73 MMHG | OXYGEN SATURATION: 99 % | SYSTOLIC BLOOD PRESSURE: 147 MMHG | HEART RATE: 95 BPM | RESPIRATION RATE: 18 BRPM | BODY MASS INDEX: 36.02 KG/M2 | WEIGHT: 230 LBS | TEMPERATURE: 98.7 F

## 2018-08-28 DIAGNOSIS — Z65.8 DOMESTIC CONCERNS: ICD-10-CM

## 2018-08-28 DIAGNOSIS — F43.25 ADJUSTMENT DISORDER WITH MIXED DISTURBANCE OF EMOTIONS AND CONDUCT: ICD-10-CM

## 2018-08-28 LAB
ALBUMIN SERPL-MCNC: 3.5 G/DL (ref 3.4–5)
ALBUMIN UR-MCNC: 30 MG/DL
ALP SERPL-CCNC: 114 U/L (ref 40–150)
ALT SERPL W P-5'-P-CCNC: 28 U/L (ref 0–50)
AMPHETAMINES UR QL SCN: NEGATIVE
ANION GAP SERPL CALCULATED.3IONS-SCNC: 8 MMOL/L (ref 3–14)
APPEARANCE UR: ABNORMAL
AST SERPL W P-5'-P-CCNC: 16 U/L (ref 0–45)
BACTERIA #/AREA URNS HPF: ABNORMAL /HPF
BARBITURATES UR QL: NEGATIVE
BASOPHILS # BLD AUTO: 0.1 10E9/L (ref 0–0.2)
BASOPHILS NFR BLD AUTO: 0.6 %
BENZODIAZ UR QL: NEGATIVE
BILIRUB SERPL-MCNC: 0.2 MG/DL (ref 0.2–1.3)
BILIRUB UR QL STRIP: NEGATIVE
BUN SERPL-MCNC: 18 MG/DL (ref 7–30)
CALCIUM SERPL-MCNC: 8.2 MG/DL (ref 8.5–10.1)
CANNABINOIDS UR QL SCN: NEGATIVE
CARBAMAZEPINE SERPL-MCNC: 10.9 MG/L (ref 4–12)
CHLORIDE SERPL-SCNC: 108 MMOL/L (ref 94–109)
CO2 SERPL-SCNC: 26 MMOL/L (ref 20–32)
COCAINE UR QL: NEGATIVE
COLOR UR AUTO: YELLOW
CREAT SERPL-MCNC: 0.8 MG/DL (ref 0.52–1.04)
DIFFERENTIAL METHOD BLD: NORMAL
EOSINOPHIL # BLD AUTO: 0.1 10E9/L (ref 0–0.7)
EOSINOPHIL NFR BLD AUTO: 1.2 %
ERYTHROCYTE [DISTWIDTH] IN BLOOD BY AUTOMATED COUNT: 12.3 % (ref 10–15)
ETHANOL SERPL-MCNC: <0.01 G/DL
GFR SERPL CREATININE-BSD FRML MDRD: 76 ML/MIN/1.7M2
GLUCOSE SERPL-MCNC: 106 MG/DL (ref 70–99)
GLUCOSE UR STRIP-MCNC: NEGATIVE MG/DL
HCT VFR BLD AUTO: 41.6 % (ref 35–47)
HGB BLD-MCNC: 13.6 G/DL (ref 11.7–15.7)
HGB UR QL STRIP: NEGATIVE
IMM GRANULOCYTES # BLD: 0 10E9/L (ref 0–0.4)
IMM GRANULOCYTES NFR BLD: 0.2 %
KETONES UR STRIP-MCNC: NEGATIVE MG/DL
LEUKOCYTE ESTERASE UR QL STRIP: ABNORMAL
LYMPHOCYTES # BLD AUTO: 2 10E9/L (ref 0.8–5.3)
LYMPHOCYTES NFR BLD AUTO: 23.6 %
MCH RBC QN AUTO: 30.4 PG (ref 26.5–33)
MCHC RBC AUTO-ENTMCNC: 32.7 G/DL (ref 31.5–36.5)
MCV RBC AUTO: 93 FL (ref 78–100)
MONOCYTES # BLD AUTO: 0.6 10E9/L (ref 0–1.3)
MONOCYTES NFR BLD AUTO: 6.8 %
MUCOUS THREADS #/AREA URNS LPF: PRESENT /LPF
NEUTROPHILS # BLD AUTO: 5.8 10E9/L (ref 1.6–8.3)
NEUTROPHILS NFR BLD AUTO: 67.6 %
NITRATE UR QL: NEGATIVE
NRBC # BLD AUTO: 0 10*3/UL
NRBC BLD AUTO-RTO: 0 /100
OPIATES UR QL SCN: NEGATIVE
PCP UR QL SCN: NEGATIVE
PH UR STRIP: 5 PH (ref 5–7)
PLATELET # BLD AUTO: 198 10E9/L (ref 150–450)
POTASSIUM SERPL-SCNC: 3.7 MMOL/L (ref 3.4–5.3)
PROT SERPL-MCNC: 6.9 G/DL (ref 6.8–8.8)
RBC # BLD AUTO: 4.48 10E12/L (ref 3.8–5.2)
RBC #/AREA URNS AUTO: 2 /HPF (ref 0–2)
SODIUM SERPL-SCNC: 142 MMOL/L (ref 133–144)
SOURCE: ABNORMAL
SP GR UR STRIP: 1.02 (ref 1–1.03)
SQUAMOUS #/AREA URNS AUTO: 12 /HPF (ref 0–1)
TROPONIN I SERPL-MCNC: <0.015 UG/L (ref 0–0.04)
TSH SERPL DL<=0.005 MIU/L-ACNC: 2 MU/L (ref 0.4–4)
UROBILINOGEN UR STRIP-MCNC: 0 MG/DL (ref 0–2)
WBC # BLD AUTO: 8.6 10E9/L (ref 4–11)
WBC #/AREA URNS AUTO: 6 /HPF (ref 0–5)

## 2018-08-28 PROCEDURE — 84443 ASSAY THYROID STIM HORMONE: CPT | Performed by: EMERGENCY MEDICINE

## 2018-08-28 PROCEDURE — 81001 URINALYSIS AUTO W/SCOPE: CPT | Mod: XU | Performed by: EMERGENCY MEDICINE

## 2018-08-28 PROCEDURE — 80053 COMPREHEN METABOLIC PANEL: CPT | Performed by: EMERGENCY MEDICINE

## 2018-08-28 PROCEDURE — 25000128 H RX IP 250 OP 636: Performed by: EMERGENCY MEDICINE

## 2018-08-28 PROCEDURE — 80156 ASSAY CARBAMAZEPINE TOTAL: CPT | Performed by: EMERGENCY MEDICINE

## 2018-08-28 PROCEDURE — 99285 EMERGENCY DEPT VISIT HI MDM: CPT | Mod: 25

## 2018-08-28 PROCEDURE — 84484 ASSAY OF TROPONIN QUANT: CPT | Performed by: EMERGENCY MEDICINE

## 2018-08-28 PROCEDURE — 85025 COMPLETE CBC W/AUTO DIFF WBC: CPT | Performed by: EMERGENCY MEDICINE

## 2018-08-28 PROCEDURE — 80320 DRUG SCREEN QUANTALCOHOLS: CPT | Performed by: EMERGENCY MEDICINE

## 2018-08-28 PROCEDURE — 80307 DRUG TEST PRSMV CHEM ANLYZR: CPT | Mod: 59 | Performed by: EMERGENCY MEDICINE

## 2018-08-28 PROCEDURE — 87086 URINE CULTURE/COLONY COUNT: CPT | Performed by: EMERGENCY MEDICINE

## 2018-08-28 PROCEDURE — 90791 PSYCH DIAGNOSTIC EVALUATION: CPT

## 2018-08-28 PROCEDURE — 80307 DRUG TEST PRSMV CHEM ANLYZR: CPT | Performed by: EMERGENCY MEDICINE

## 2018-08-28 RX ADMIN — SODIUM CHLORIDE 1000 ML: 9 INJECTION, SOLUTION INTRAVENOUS at 17:39

## 2018-08-28 ASSESSMENT — ENCOUNTER SYMPTOMS: DYSURIA: 1

## 2018-08-28 NOTE — ED AVS SNAPSHOT
[facility]    [address]    [facility] [address]    Phone:  [phone]    Fax:  [phone]                                       Brissa Barlow   MRN: [id number]    Department:  [facility]   Date of Visit:  8/28/2018           After Visit Summary Signature Page     I have received my discharge instructions, and my questions have been answered. I have discussed any challenges I see with this plan with the nurse or doctor.    ..........................................................................................................................................  Patient/Patient Representative Signature      ..........................................................................................................................................  Patient Representative Print Name and Relationship to Patient    ..................................................               ................................................  Date                                            Time    ..........................................................................................................................................  Reviewed by Signature/Title    ...................................................              ..............................................  Date                                                            Time          22EPIC Rev 08/18

## 2018-08-28 NOTE — ED PROVIDER NOTES
"  History     Chief Complaint:  Altered Mental Status    HPI   Brissa Barlow is a 53 year old female with a history of developmental delay and depression who presents to the emergency department today via EMS for evaluation of altered mental status. Per EMS, the police were called today for a domestic disturbance at the patient's home. At that time she was placed on an [place] which states that she has been \"delusional and paranoid lately and threatened to kill a relative today for reasons that do not exist.\" The patient reports that today her brother, who she does not see often, called her and began to harass her. She states that she then threatened to kill her brother, at which time he called the police out of concern for her mental health and for her safety when around her , Sudhir. Here the patient denies any homicidal or suicidal ideation. She states that she is taking her medications as prescribed.  Her main concern is for a UTI, as she has been experiencing dysuria and has a history of frequent UTIs, with her last occurring in March of this year.     Per Antonieta from the [place], the patient threatened to kill a relative because they \"illegally took her children,\" though she does not have any. She also accused people of stealing things. Antonieta expressed concern for a UTI, as the patient complained of \"pain in her private area\" and has a history of UTIs, however she has never acted this way before.     Later in the ED course, more information was obtained.  The brother and the patient were fighting verbally (over the phone), and the brother called 911.  Patient denies SI or HI.  Patient is here in the ED with her , who collaborates her story.  Patient wants to go home to be with her guinea pigs and .    Allergies:  Excedrin back  Flu Virus Vaccine  Hydrochlorothiazide  Penicillins  Tetracycline  Azithromycin  Erythromycin  Zithromax     Medications:  " "  Astelin  Tegretol  Zyrtec  Flonase  Flovent  Lisinopril  Mevacor  Mycostatin  Prilosec  Adipex  Zoloft    Past Medical History:    Asthma  Blunt trauma  Cushing syndrome  Depression  Development delay  Hypertension  Mixed hyperlipidemia  Obesity  Convulsion    Past Surgical History:    Spleen trauma  Adenoidectomy  Colostomy  Elective   Open pelvis fixation  ORIF  Right arthroscopic wrist surgery  Tonsillectomy  Tubal ligation NOS    Family History:    Mother: hypertension, ulcers  Father: diabetes, hypertension, lipids  Maternal Grandfather: Alzheimer, aneurysm, heart disease  Maternal Grandmother: Aneurysm, MS  Paternal Grandmother: breast cancer  Paternal Grandfather: cerebrovascular disease, heart disease  Sister: hypertension, allergies, asthma  Brother: hypertension, allergies, asthma    Social History:  Smoking Status: Never Smoker  Smokeless Tobacco: Never Used  Alcohol Use: Negative  Marital Status:        Review of Systems   Genitourinary: Positive for dysuria.   Psychiatric/Behavioral: Negative for suicidal ideas (or homocidal).   All other systems reviewed and are negative.  Pt presents to ED via EMS after police being called for a domestic disturbance at her home.  Was placed on a health officer hold order which states she has a diagnosis of mild developmental disabilities.  States she has been \"delusional and paranoid lately.  She threatened to kill a relative today for reasons that don't exist.\"  Pt is concerned she has  UTI.  Denies any SI or HI upon arrival.    Physical Exam     Patient Vitals for the past 24 hrs:   BP Temp Temp src Pulse Heart Rate Resp SpO2 Weight   18 1837 - - - - - - 99 % -   18 1739 - - - 95 - - - -   18 1720 147/73 98.7  F (37.1  C) Oral - 105 18 96 % 104.3 kg (230 lb)     Physical Exam  GEN: patient smiling, no distress  HEAD: atraumatic, normocephalic  EYES: pupils reactive, conjunctivae normal  ENT: TMs flat and white bilaterally, " oropharynx normal with no erythema or exudate, mucus membranes dry  NECK: no cervical LAD  RESPIRATORY: no tachypnea, breath sounds clear to auscultation (no rales, wheezes, rhonchi), chest wall nontender, normal phonation  CVS: normal S1/S2, no murmurs/rubs/gallops  ABDOMEN: soft, nontender, no masses or organomegaly, no rebound, positive bowel sounds, obese  BACK: no costovertebral angle tenderness, no spinal tenderness  EXTREMITIES: intact pulses x 4, full range of motion at joints, no edema  MUSCULOSKELETAL: no deformities  SKIN: warm and dry, no cuts or slash marks.  No evidence of IVDU  NEURO: GCS 15, cranial nerves intact.  Motor- moves all 4 extremities,  5/5..  Sensation- intact.  Coordination-ambulatory.  Overall symmetrical exam  HEME: no bruising or petechiae/contusions  LYMPH: no lymphadenopathy    Emergency Department Course     ECG:  ECG taken at 1741  Normal sinus rhythm  Normal ECG  Rate 96 bpm. NM interval 196 ms. QRS duration 72 ms. QT/QTc 360/454 ms. P-R-T axes 40 8 55.    Laboratory:  Laboratory findings were communicated with the patient who voiced understanding of the findings.    Urine Culture: pending  UA with Microscopic: Protein Albumin 30 (A), Leukocyte Esterase moderate (A), WBC 6 (H), Bacteria moderate (A), Squamous Epithelial 12 (H), Mucous present (A), o/w WNL  Drug Abuse Screen 77 Urine: WNL  CBC: WBC 8.6, HGB 13.6,   CMP: Glucose 106 (H), Calcium 8.2 (L) o/w WNL (Creatinine 0.8)  Troponin (Collected: 1732): <0.015   TSH with Free T4 Reflex: 2.00  Alcohol Ethyl: <0.01  Carbamazepine: 10.9    Interventions:  1739 NS 1000 ml IV  [place]  Cardiac/Sp02 monitoring    Emergency Department Course:    1716 Nursing notes and vitals reviewed.    1724 I performed an exam of the patient as documented above.     1732 IV was inserted and blood was drawn for laboratory testing, results above.    1751 The patient provided a urine sample here in the emergency department. This was sent for  laboratory testing, findings above.    1824 The patient provided a urine sample here in the emergency department. This was sent for laboratory testing, findings above.    1942 I spoke with DEC prior to their assessment of the patient.    2146 Awaiting vDEC plan.    2219 I spoke with DEC regarding patient's presentation, findings, and plan of care. vDEC and I agree that patient can contract for safety. /73  Pulse 95  Temp 98.7  F (37.1  C) (Oral)  Resp 18  Wt 104.3 kg (230 lb)  LMP 08/18/2008  SpO2 99%  BMI 36.02 kg/m2    2253  at bedside. Patient wishes to go home.     2302 I personally reviewed the laboratory results with the patient and answered all related questions prior to discharge.     at the bedside, reviewed results.    vDEC finished consultation and patient contracts for safety.  /73  Pulse 95  Temp 98.7  F (37.1  C) (Oral)  Resp 18  Wt 104.3 kg (230 lb)  LMP 08/18/2008  SpO2 99%  BMI 36.02 kg/m2  Discussed results with patient.  Gave patient copies of all results (applicable labs, CT scans and/or ultrasounds).  Answered questions.  Asked patient to followup with PCP.  Circled any abnormal lab values and asked patient to followup with PCP.    Impression & Plan      Medical Decision Making:  Brissa Barlow is a 53 year old female whose family may have called 911 secondary to unruly behavior. EMS was called secondary to domestic disturbance at a home, and she was placed on a health officer hold. She did threaten to hurt a relative today, but none of them are here in the ER to corroborate this story. The patient does have developmental delay, but no evidence of psychiatric history. She does have a seizure disorder and is on tegretol. An IV was placed and labs were sent. Virtual DEC is visiting with the patient, as live DEC is unable to see the patient. Her tegretol level is therapeutic. Her alcohol level is negative. TSH does not show any evidence of hyper or  hypothyroidism. troponin does not show any ischemia. CBC and CMP are normal. Drugs of abuse is negative. UA UPT is negative with no sign of a UTI. We are waiting on virtual DEC to give us a consultation.     DEC has spoken with the patient and the . Apparently it was the brother who called 911. There is a known family disturbance/disagreement between the two. The patient does not meet criteria to be admitted and denies SI or HI. She does have some fixation on the brother and does have a  who is involved with her care. She would like to go home with her , who is here in the ER and will help to observe her. In addition, she does not meet admit criteria and denies any suicidally or current homicidally. There is no sign of a UTI. Outpatient follow up.     Diagnosis:    ICD-10-CM    1. Domestic concerns Z65.8    2. 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    Discharge Medications:  No discharge medications.    Scribe Disclosure:  I, Lizet Roman, am serving as a scribe at 5:16 PM on 8/28/2018 to document services personally performed by Dr. Kristina Rodriguez MD based on my observations and the provider's statements to me.    [facility] EMERGENCY DEPARTMENT       Kristina Rodriguez MD  08/31/18 [id number]
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
## 2018-08-28 NOTE — ED NOTES
Bed: ED32  Expected date: 8/28/18  Expected time: 4:58 PM  Means of arrival: Ambulance  Comments:  [id number]

## 2018-08-29 LAB
BACTERIA SPEC CULT: NORMAL
BACTERIA SPEC CULT: NORMAL
INTERPRETATION ECG - MUSE: NORMAL
SPECIMEN SOURCE: NORMAL
SPECIMEN SOURCE: NORMAL

## 2018-08-29 NOTE — ED NOTES
Pt provided with discharge paperwork and educated on recommended follow-up with PCP. Pt voiced understanding and denied any questions at discharge. Pt brought to lobby in wheelchair with .

## 2018-09-06 ENCOUNTER — TELEPHONE (OUTPATIENT)
Dept: FAMILY MEDICINE | Facility: CLINIC | Age: 53
End: 2018-09-06

## 2018-09-06 ENCOUNTER — PATIENT OUTREACH (OUTPATIENT)
Dept: CARE COORDINATION | Facility: CLINIC | Age: 53
End: 2018-09-06

## 2018-09-06 DIAGNOSIS — Z71.89 COUNSELING AND COORDINATION OF CARE: Primary | ICD-10-CM

## 2018-09-06 NOTE — PROGRESS NOTES
Clinic Care Coordination Contact  Care Team Conversations   Call from Cortney Evans, nurse with Dr. Marks.  They are trying to find out if patient has a guardian, and if she does, who is it. Dr. Marks has letter from an  regarding changing her guardianship.  Patient denies that she has a guardian.      CC called [facility] and patient does have a CADI , Rosy Lozada at [facility], [phone].  CC called  and left a message asking for a call back to discuss.      Plan- respond to call back from Rosy or call again in 2-3 business days.    Social Jai Brownlee  [facility]  [email or web address]  [phone]    Clinic Care Coordination Contact  Care Team Conversations  VM from  Rosy- patient does not currently have a guardian, but her parents have a court date in October to see if the  would place a guardianship.     CC called her back and left voice mail asking for a call back.      Plan- respond to call back from Rosy. Route to PCP..  Social Jai Brownlee  [facility]  [email or web address]  [phone]    Clinic Care Coordination Contact  [facility]/Voicemail    Referral Source: Care Team  Clinical Data: Care Coordinator Outreach  Outreach attempted x 2.  Left message on Rosy WOLFE voicemail with call back information and requested return call. Asked her to call CC if any other information would be helpful to PCP.     Call back from Rosy. The court date is 10-11. She is unsure if patient's parents want to be the guardian or if someone else will be assigned if a guardianship is placed.  Rosy will call CC after court date to inform PCP.  Rosy thinks a guardianship would be helpful.    Plan: Care Coordinator will route to PCP to see if she has any further questions at this time.  Will wait to hear from Rosy after court date.     Social aJi Brownlee  [facility]  [email or web address]  [phone]

## 2018-09-06 NOTE — TELEPHONE ENCOUNTER
Received letter from Law office and Dr. Marks would like to know who [place]'s guardian is if there is one.  Have contacted  Corina Chen and she is going to contact [facility] to assist in checking on information.

## 2018-09-06 NOTE — PROGRESS NOTES
Thank you for your assistance; await guidance as to how to proceed.     Cara Marks MD  Internal Medicine  electronically signed

## 2018-09-11 NOTE — PROGRESS NOTES
I am uncertain as to how to proceed.  I am unclear what [facility] has had and what the working diagnosis has been;  Can you off any suggestions? Are there resources we could guide pt towards to assess best route of care? Do I defer to the courts to provide guidance?   ?competency evaluation?    Thanks you for any suggestions and assistance you can provide.   Cara Marks MD  Internal Medicine  electronically signed    The court will make a decision about placing the guardianship. Were you asked to complete some document by an ?  The parents are working with an  who is gathering evidence to support the placement of the guardianship.  If no one has asked you to do anything, I don't think you need to do anything at this time.     Her [facility]  Rosy has the best insight into her needs and what services she currently has.  I can ask Rosy for more information or have her call you if talking with her directly would be the best route.  Or I can get a list of her services and what diagnosis she is using if you want.    I am happy to assist as you want.  Corina [phone]

## 2018-09-27 NOTE — PROGRESS NOTES
9/26/2018  Call placed to [place]y   Await call back.    I am uncertain if pt needs  A guardian.  Many family/social concerns. If a guardian is needed, then recommend a guardian ad lightem  ( jennifer appointed, not family/ related).    Cara Marks MD  Internal Medicine  electronically signed

## 2018-10-21 NOTE — PROGRESS NOTES
Labs reviewed. Continue current meds and healthy lifestyle choices; diet and exercise.  Cholesterol  ( Triglycerides) and glucose slightly elevated.  These are best managed by regular exercise and working toward continued weight loss.   Pt advised via My Chart.

## 2018-10-26 ENCOUNTER — PATIENT OUTREACH (OUTPATIENT)
Dept: CARE COORDINATION | Facility: CLINIC | Age: 53
End: 2018-10-26

## 2018-10-26 NOTE — PROGRESS NOTES
Clinic Care Coordination Contact    Situation: Patient chart reviewed by care coordinator.    Background: CC was asked to help PCP with questions about guardianship.    Assessment: No further action has been sought by PCP or CADI CM.    Plan/Recommendations: No further outreach by CC at this time.     Corina Chen,   [facility]  [email or web address]  [phone]

## 2018-12-19 ENCOUNTER — TELEPHONE (OUTPATIENT)
Dept: FAMILY MEDICINE | Facility: CLINIC | Age: 53
End: 2018-12-19

## 2018-12-19 NOTE — TELEPHONE ENCOUNTER
Call back to Rosy- . Adv that pt has not seen Dr. Marks or it looks like anyone w/in [facility] for approx 6 months. Dr. Marks is out until after the holiday. Would probably need some kind of visit or something on books before orders okayed. They could try calling whichever doctors office she originally went to for the valles.   Liz TURNER RN
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
## 2019-01-15 ENCOUNTER — OFFICE VISIT (OUTPATIENT)
Dept: FAMILY MEDICINE | Facility: CLINIC | Age: 54
End: 2019-01-15
Payer: MEDICARE

## 2019-01-15 VITALS
BODY MASS INDEX: 43.4 KG/M2 | HEIGHT: 69 IN | SYSTOLIC BLOOD PRESSURE: 118 MMHG | OXYGEN SATURATION: 94 % | HEART RATE: 96 BPM | RESPIRATION RATE: 18 BRPM | WEIGHT: 293 LBS | TEMPERATURE: 98.6 F | DIASTOLIC BLOOD PRESSURE: 62 MMHG

## 2019-01-15 DIAGNOSIS — E66.01 MORBID OBESITY (H): ICD-10-CM

## 2019-01-15 DIAGNOSIS — Z53.9 DIAGNOSIS NOT YET DEFINED: Primary | ICD-10-CM

## 2019-01-15 DIAGNOSIS — I10 ESSENTIAL HYPERTENSION, BENIGN: ICD-10-CM

## 2019-01-15 DIAGNOSIS — J34.89 SINUS PRESSURE: ICD-10-CM

## 2019-01-15 DIAGNOSIS — B37.2 YEAST DERMATITIS: ICD-10-CM

## 2019-01-15 DIAGNOSIS — J30.1 SEASONAL ALLERGIC RHINITIS DUE TO POLLEN: ICD-10-CM

## 2019-01-15 DIAGNOSIS — R09.82 POST-NASAL DISCHARGE: ICD-10-CM

## 2019-01-15 DIAGNOSIS — T30.0 BURN, FIRST DEGREE: Primary | ICD-10-CM

## 2019-01-15 PROCEDURE — 99207 C MD CERTIFICATION HHA PATIENT: CPT | Performed by: INTERNAL MEDICINE

## 2019-01-15 PROCEDURE — 99215 OFFICE O/P EST HI 40 MIN: CPT | Performed by: INTERNAL MEDICINE

## 2019-01-15 RX ORDER — NYSTATIN 100000 U/G
CREAM TOPICAL
Qty: 60 G | Refills: 1 | Status: SHIPPED | OUTPATIENT
Start: 2019-01-15 | End: 2021-12-13

## 2019-01-15 RX ORDER — FLUTICASONE PROPIONATE 110 UG/1
1-2 AEROSOL, METERED RESPIRATORY (INHALATION) 2 TIMES DAILY
Qty: 1 INHALER | Refills: 11 | Status: SHIPPED | OUTPATIENT
Start: 2019-01-15 | End: 2020-04-14

## 2019-01-15 RX ORDER — CETIRIZINE HYDROCHLORIDE 10 MG/1
10 TABLET ORAL EVERY EVENING
Qty: 90 TABLET | Refills: 4 | Status: SHIPPED | OUTPATIENT
Start: 2019-01-15 | End: 2020-04-14

## 2019-01-15 ASSESSMENT — MIFFLIN-ST. JEOR: SCORE: 2394.2

## 2019-01-15 ASSESSMENT — PATIENT HEALTH QUESTIONNAIRE - PHQ9
SUM OF ALL RESPONSES TO PHQ QUESTIONS 1-9: 0
5. POOR APPETITE OR OVEREATING: NOT AT ALL

## 2019-01-15 ASSESSMENT — ANXIETY QUESTIONNAIRES
GAD7 TOTAL SCORE: 0
3. WORRYING TOO MUCH ABOUT DIFFERENT THINGS: NOT AT ALL
2. NOT BEING ABLE TO STOP OR CONTROL WORRYING: NOT AT ALL
7. FEELING AFRAID AS IF SOMETHING AWFUL MIGHT HAPPEN: NOT AT ALL
1. FEELING NERVOUS, ANXIOUS, OR ON EDGE: NOT AT ALL
IF YOU CHECKED OFF ANY PROBLEMS ON THIS QUESTIONNAIRE, HOW DIFFICULT HAVE THESE PROBLEMS MADE IT FOR YOU TO DO YOUR WORK, TAKE CARE OF THINGS AT HOME, OR GET ALONG WITH OTHER PEOPLE: NOT DIFFICULT AT ALL
6. BECOMING EASILY ANNOYED OR IRRITABLE: NOT AT ALL
5. BEING SO RESTLESS THAT IT IS HARD TO SIT STILL: NOT AT ALL
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
## 2019-01-16 ASSESSMENT — ANXIETY QUESTIONNAIRES: GAD7 TOTAL SCORE: 0

## 2019-01-31 DIAGNOSIS — F43.21 ADJUSTMENT DISORDER WITH DEPRESSED MOOD: ICD-10-CM

## 2019-01-31 DIAGNOSIS — I10 ESSENTIAL HYPERTENSION, BENIGN: ICD-10-CM

## 2019-01-31 NOTE — TELEPHONE ENCOUNTER
"Requested Prescriptions   Pending Prescriptions Disp Refills     lisinopril (PRINIVIL/ZESTRIL) 20 MG tablet [Pharmacy Med Name: LISINOPRIL 20MG TABS]  Last Written Prescription Date:  6/12/18  Last Fill Quantity: 90,  # refills: 3   Last office visit: 1/15/2019 with prescribing provider:  Cara Marks MD   Future Office Visit:     90 tablet 3     Sig: TAKE ONE TABLET BY MOUTH EVERY DAY    ACE Inhibitors (Including Combos) Protocol Passed - 1/31/2019 10:20 AM       Passed - Blood pressure under 140/90 in past 12 months    BP Readings from Last 3 Encounters:   01/15/19 118/62   08/28/18 147/73   06/12/18 136/78                Passed - Recent (12 mo) or future (30 days) visit within the authorizing provider's specialty    Patient had office visit in the last 12 months or has a visit in the next 30 days with authorizing provider or within the authorizing provider's specialty.  See \"Patient Info\" tab in inbasket, or \"Choose Columns\" in Meds & Orders section of the refill encounter.             Passed - Medication is active on med list       Passed - Patient is age 18 or older       Passed - No active pregnancy on record       Passed - Normal serum creatinine on file in past 12 months    Recent Labs   Lab Test 08/28/18  1732   CR 0.80            Passed - Normal serum potassium on file in past 12 months    Recent Labs   Lab Test 08/28/18  1732   POTASSIUM 3.7            Passed - No positive pregnancy test within past 12 months        sertraline (ZOLOFT) 100 MG tablet [Pharmacy Med Name: SERTRALINE HCL 100MG TABS]  Last Written Prescription Date:  6/12/18  Last Fill Quantity: 90,  # refills: 3   Last office visit: 1/15/2019   with prescribing provider:  Cara Marks MD   Future Office Visit:     90 tablet 3     Sig: TAKE ONE TABLET BY MOUTH EVERY DAY    SSRIs Protocol Passed - 1/31/2019 10:20 AM       Passed - PHQ-9 score less than 5 in past 6 months    Please review last PHQ-9 score.   PHQ-9 SCORE " "9/14/2017 6/12/2018 1/15/2019   PHQ-9 Total Score - - -   PHQ-9 Total Score 0 12 0     SUBHASH-7 SCORE 9/14/2017 6/12/2018 1/15/2019   Total Score - - -   Total Score 8 14 0              Passed - Medication is active on med list       Passed - Patient is age 18 or older       Passed - No active pregnancy on record       Passed - No positive pregnancy test in last 12 months       Passed - Recent (6 mo) or future (30 days) visit within the authorizing provider's specialty    Patient had office visit in the last 6 months or has a visit in the next 30 days with authorizing provider or within the authorizing provider's specialty.  See \"Patient Info\" tab in inbasket, or \"Choose Columns\" in Meds & Orders section of the refill encounter.              "

## 2019-02-01 NOTE — TELEPHONE ENCOUNTER
Routing refill request to provider for review/approval because:  Last office visit 1/15/19.  Note incomplete.    Lyric Higgins RN

## 2019-02-05 RX ORDER — LISINOPRIL 20 MG/1
TABLET ORAL
Qty: 90 TABLET | Refills: 1 | Status: SHIPPED | OUTPATIENT
Start: 2019-02-05 | End: 2019-07-24

## 2019-02-05 RX ORDER — SERTRALINE HYDROCHLORIDE 100 MG/1
TABLET, FILM COATED ORAL
Qty: 90 TABLET | Refills: 1 | Status: SHIPPED | OUTPATIENT
Start: 2019-02-05 | End: 2019-07-24

## 2019-02-27 ENCOUNTER — HOSPITAL ENCOUNTER (EMERGENCY)
Facility: CLINIC | Age: 54
Discharge: HOME | End: 2019-02-27
Attending: EMERGENCY MEDICINE | Admitting: EMERGENCY MEDICINE
Payer: MEDICARE

## 2019-02-27 ENCOUNTER — APPOINTMENT (OUTPATIENT)
Dept: GENERAL RADIOLOGY | Facility: CLINIC | Age: 54
End: 2019-02-27
Attending: EMERGENCY MEDICINE
Payer: MEDICARE

## 2019-02-27 VITALS
RESPIRATION RATE: 16 BRPM | OXYGEN SATURATION: 94 % | HEIGHT: 67 IN | DIASTOLIC BLOOD PRESSURE: 88 MMHG | HEART RATE: 87 BPM | SYSTOLIC BLOOD PRESSURE: 142 MMHG | TEMPERATURE: 98.1 F | BODY MASS INDEX: 59.49 KG/M2
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
## 2019-02-27 DIAGNOSIS — M53.3 PAIN IN THE COCCYX: ICD-10-CM

## 2019-02-27 PROCEDURE — 72220 X-RAY EXAM SACRUM TAILBONE: CPT

## 2019-02-27 PROCEDURE — 99284 EMERGENCY DEPT VISIT MOD MDM: CPT

## 2019-02-27 PROCEDURE — 72170 X-RAY EXAM OF PELVIS: CPT

## 2019-02-27 ASSESSMENT — ENCOUNTER SYMPTOMS
BACK PAIN: 1
NECK PAIN: 0

## 2019-02-27 NOTE — ED NOTES
Bed: ED09  Expected date: 2/27/19  Expected time: 3:33 PM  Means of arrival: Ambulance  Comments:  BV2

## 2019-02-27 NOTE — ED TRIAGE NOTES
Pt A & O x 4. ABCs intact. Pt fell on her hip while moving. Reports tailbone pain of 3/10, which has reduced without pain medication. Denies hitting her head with fall.

## 2019-02-27 NOTE — ED AVS SNAPSHOT
[facility] Emergency Department  [address]  [facility] [address]  Phone:  [phone]  Fax:  [phone]                                    Brissa Barlow   MRN: [id number]    Department:  [facility] Emergency Department   Date of Visit:  2/27/2019           After Visit Summary Signature Page    I have received my discharge instructions, and my questions have been answered. I have discussed any challenges I see with this plan with the nurse or doctor.    ..........................................................................................................................................  Patient/Patient Representative Signature      ..........................................................................................................................................  Patient Representative Print Name and Relationship to Patient    ..................................................               ................................................  Date                                   Time    ..........................................................................................................................................  Reviewed by Signature/Title    ...................................................              ..............................................  Date                                               Time          22EPIC Rev 08/18
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
## 2019-02-28 ASSESSMENT — ENCOUNTER SYMPTOMS
WEAKNESS: 0
NUMBNESS: 0

## 2019-05-30 DIAGNOSIS — E78.5 HYPERLIPIDEMIA LDL GOAL <130: ICD-10-CM

## 2019-05-30 RX ORDER — LOVASTATIN 40 MG
TABLET ORAL
Qty: 90 TABLET | Refills: 0 | Status: SHIPPED | OUTPATIENT
Start: 2019-05-30 | End: 2019-07-24

## 2019-05-30 NOTE — TELEPHONE ENCOUNTER
"Requested Prescriptions   Pending Prescriptions Disp Refills     lovastatin (MEVACOR) 40 MG tablet [Pharmacy Med Name: LOVASTATIN 40MG TABS]    Last Written Prescription Date:  6/12/2018  Last Fill Quantity: 90,  # refills: 3   Last office visit: 1/15/2019 with prescribing provider:  Cara Marks     Future Office Visit:     90 tablet 3     Sig: TAKE ONE TABLET BY MOUTH AT BEDTIME       Statins Protocol Passed - 5/30/2019  9:06 AM        Passed - LDL on file in past 12 months     Recent Labs   Lab Test 06/12/18  1633   LDL 89             Passed - No abnormal creatine kinase in past 12 months     No lab results found.             Passed - Recent (12 mo) or future (30 days) visit within the authorizing provider's specialty     Patient had office visit in the last 12 months or has a visit in the next 30 days with authorizing provider or within the authorizing provider's specialty.  See \"Patient Info\" tab in inbasket, or \"Choose Columns\" in Meds & Orders section of the refill encounter.              Passed - Medication is active on med list        Passed - Patient is age 18 or older        Passed - No active pregnancy on record        Passed - No positive pregnancy test in past 12 months      Prescription approved per [facility] Refill Protocol.  Renée Hanley RN      "

## 2019-06-26 ENCOUNTER — TELEPHONE (OUTPATIENT)
Dept: FAMILY MEDICINE | Facility: CLINIC | Age: 54
End: 2019-06-26

## 2019-06-26 NOTE — TELEPHONE ENCOUNTER
Patient's mother, Lola, called to leave a message for Dr. Marks. Lola was calling to say that the patient's  said they need written orders to request a psych evaluation at [facility]. Lola said that she is now the legal guardian of the patient. Please call back to discuss, [phone].

## 2019-06-27 NOTE — TELEPHONE ENCOUNTER
Spoke with Lola.  She will discuss with  and call back. She does not have jackie that [place] will come to an office visit.

## 2019-07-05 NOTE — TELEPHONE ENCOUNTER
2nd attempt, called and talked to patient's mother.  Has been unable to reach patient at all, has been completely blocked.  Did offer outreach from Care Coordination if PCP OKs, mother thought about it but would like to just leave her be for now.    Notes that someone from a psych department is visiting patient from time to time at home, but thinks patient is getting worse.    Routing to PCP as FYI.  Jose Luis Buckner CMA ([facility])

## 2019-07-06 DIAGNOSIS — G40.909 SEIZURE DISORDER (H): ICD-10-CM

## 2019-07-07 NOTE — TELEPHONE ENCOUNTER
"Requested Prescriptions   Pending Prescriptions Disp Refills     carBAMazepine (TEGRETOL) 200 MG tablet [Pharmacy Med Name: CARBAMAZEPINE 200MG TABS] 450 tablet 3     Sig: TAKE TWO TABLETS BY MOUTH EVERY MORNING, ONE TABLET AT NOON, AND TWO TABLETS EVERY EVENING  Last Written Prescription Date:  06/12/2018  Last Fill Quantity: 450 tablet,  # refills: 3   Last office visit: 1/15/2019 with prescribing provider:  Cara Marks MD   Future Office Visit:           Anti-Seizure Meds Protocol  Failed - 7/6/2019  7:02 AM        Failed - Review Authorizing provider's last note.      Refer to last progress notes: confirm request is for original authorizing provider (cannot be through other providers).          Passed - Recent (12 mo) or future (30 days) visit within the authorizing provider's specialty     Patient had office visit in the last 12 months or has a visit in the next 30 days with authorizing provider or within the authorizing provider's specialty.  See \"Patient Info\" tab in inbasket, or \"Choose Columns\" in Meds & Orders section of the refill encounter.              Passed - Normal CBC on file in past 26 months     Recent Labs   Lab Test 08/28/18  1732   WBC 8.6   RBC 4.48   HGB 13.6   HCT 41.6                    Passed - Normal ALT or AST on file in past 26 months     Recent Labs   Lab Test 08/28/18  1732   ALT 28     Recent Labs   Lab Test 08/28/18  1732   AST 16             Passed - Normal platelet count on file in past 26 months     Recent Labs   Lab Test 08/28/18  1732                  Passed - Carbamazepine level within therapeutic range in last 26 months     No lab results found.    Carbamazepine level must be checked 2-4 weeks after dosage change.            Passed - Medication is active on med list        Passed - No active pregnancy on record        Passed - No positive pregnancy test in last 12 months          "

## 2019-07-08 NOTE — TELEPHONE ENCOUNTER
Routing refill request to provider for review/approval because:  Last office visit with pt 6/12/18, and office visit plan included pt to follow-up in 6 months. Pt did not follow-up and no future visit scheduled.  Also see 6/26/19 telephone encounter.    Ramya Dickinson, ROXYN, RN

## 2019-07-10 NOTE — TELEPHONE ENCOUNTER
Patient is calling pharmacy. She is out or almost out. Hoping to get this. Please let patient know if she can get this today.   Tamia Yost CPhT  [facility]  [address] [place] [place] [address] [phone]

## 2019-07-10 NOTE — TELEPHONE ENCOUNTER
Pt is calling and she said that the DrLior Needs to call the pharmacy before they will fill it.     [facility] [place]  [place], [place] - [address]

## 2019-07-11 NOTE — TELEPHONE ENCOUNTER
Terridled with Dr. Marks and aware of circumstances and again advised mother that pt should be seen in clinic Pt has an appointment for 7/24/19.    Will discuss concerns at office visit

## 2019-07-12 NOTE — TELEPHONE ENCOUNTER
[place] is out of the Carbamazepine 200mg, we gave her 20 tabs/four days supply. She had made an appointment, wondering if we could get a script to get her to her appointment.     Thank you.    Please let pharmacy know if you are denying this request.    Tamia Yots, Carrie  [facility]  [address] [place] [address] [phone]

## 2019-07-17 RX ORDER — CARBAMAZEPINE 200 MG/1
TABLET ORAL
Qty: 60 TABLET | Refills: 0 | Status: SHIPPED | OUTPATIENT
Start: 2019-07-17 | End: 2019-07-24

## 2019-07-17 NOTE — TELEPHONE ENCOUNTER
Huddled with Dr. Marks and ok to fill for quantity 60 to get pt thru.      Pharmacy notified and rx sent per Dr. Marks.

## 2019-07-23 NOTE — PROGRESS NOTES
"Subjective     Brissa Barlow is a 54 year old female who presents to clinic today for the following health issues:    HPI   Hyperlipidemia Follow-Up      Are you having any of the following symptoms? (Select all that apply)  { :936815}    Are you regularly taking any medication or supplement to lower your cholesterol?   { :512216}    Are you having muscle aches or other side effects that you think could be caused by your cholesterol lowering medication?  { :831021}      Hypertension Follow-up      Do you check your blood pressure regularly outside of the clinic? { :043142}     Are you following a low salt diet? { :744108}    Are your blood pressures ever more than 140 on the top number (systolic) OR more   than 90 on the bottom number (diastolic), for example 140/90? { :865947}    Amount of exercise or physical activity: {Exercise frequency days per week:245904}    Problems taking medications regularly: {Med Problems:586003::\"No\"}    Medication side effects: {CHRONIC MED SIDE EFFECTS:872533::\"none\"}    Diet: { :880059}      {additonal problems for provider to add (Optional):475451}    {HIST REVIEW/ LINKS 2 (Optional):896397}    {Additional problems for the provider to add (optional):994997}  Reviewed and updated as needed this visit by Provider         Review of Systems   {ROS COMP (Optional):070780}      Objective    LMP 08/18/2008   There is no height or weight on file to calculate BMI.  Physical Exam   {Exam List (Optional):188947}    {Diagnostic Test Results (Optional):476309::\"Diagnostic Test Results:\",\"Labs reviewed in Epic\"}        {PROVIDER CHARTING PREFERENCE:956189}      "
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
## 2019-07-24 ENCOUNTER — OFFICE VISIT (OUTPATIENT)
Dept: FAMILY MEDICINE | Facility: CLINIC | Age: 54
End: 2019-07-24
Payer: MEDICARE

## 2019-07-24 VITALS
DIASTOLIC BLOOD PRESSURE: 87 MMHG | HEART RATE: 102 BPM | BODY MASS INDEX: 45.99 KG/M2 | RESPIRATION RATE: 18 BRPM | WEIGHT: 293 LBS | OXYGEN SATURATION: 96 % | TEMPERATURE: 99.8 F | HEIGHT: 67 IN | SYSTOLIC BLOOD PRESSURE: 120 MMHG

## 2019-07-24 DIAGNOSIS — F43.21 ADJUSTMENT DISORDER WITH DEPRESSED MOOD: ICD-10-CM

## 2019-07-24 DIAGNOSIS — E78.5 HYPERLIPIDEMIA LDL GOAL <130: ICD-10-CM

## 2019-07-24 DIAGNOSIS — G40.909 SEIZURE DISORDER (H): ICD-10-CM

## 2019-07-24 DIAGNOSIS — I10 ESSENTIAL HYPERTENSION, BENIGN: ICD-10-CM

## 2019-07-24 LAB
ERYTHROCYTE [DISTWIDTH] IN BLOOD BY AUTOMATED COUNT: 12.6 % (ref 10–15)
HCT VFR BLD AUTO: 45.2 % (ref 35–47)
HGB BLD-MCNC: 14.7 G/DL (ref 11.7–15.7)
MCH RBC QN AUTO: 30.3 PG (ref 26.5–33)
MCHC RBC AUTO-ENTMCNC: 32.5 G/DL (ref 31.5–36.5)
MCV RBC AUTO: 93 FL (ref 78–100)
PLATELET # BLD AUTO: 229 10E9/L (ref 150–450)
RBC # BLD AUTO: 4.85 10E12/L (ref 3.8–5.2)
WBC # BLD AUTO: 8.9 10E9/L (ref 4–11)

## 2019-07-24 PROCEDURE — 36415 COLL VENOUS BLD VENIPUNCTURE: CPT | Performed by: INTERNAL MEDICINE

## 2019-07-24 PROCEDURE — 80061 LIPID PANEL: CPT | Performed by: INTERNAL MEDICINE

## 2019-07-24 PROCEDURE — 85027 COMPLETE CBC AUTOMATED: CPT | Performed by: INTERNAL MEDICINE

## 2019-07-24 PROCEDURE — 80156 ASSAY CARBAMAZEPINE TOTAL: CPT | Performed by: INTERNAL MEDICINE

## 2019-07-24 PROCEDURE — 80053 COMPREHEN METABOLIC PANEL: CPT | Performed by: INTERNAL MEDICINE

## 2019-07-24 PROCEDURE — 99214 OFFICE O/P EST MOD 30 MIN: CPT | Performed by: INTERNAL MEDICINE

## 2019-07-24 RX ORDER — LISINOPRIL 20 MG/1
20 TABLET ORAL DAILY
Qty: 90 TABLET | Refills: 1 | Status: SHIPPED | OUTPATIENT
Start: 2019-07-24 | End: 2020-02-26

## 2019-07-24 RX ORDER — LOVASTATIN 40 MG
40 TABLET ORAL AT BEDTIME
Qty: 90 TABLET | Refills: 1 | Status: SHIPPED | OUTPATIENT
Start: 2019-07-24 | End: 2020-03-23

## 2019-07-24 RX ORDER — SERTRALINE HYDROCHLORIDE 100 MG/1
100 TABLET, FILM COATED ORAL DAILY
Qty: 90 TABLET | Refills: 1 | Status: SHIPPED | OUTPATIENT
Start: 2019-07-24 | End: 2020-02-26

## 2019-07-24 RX ORDER — CARBAMAZEPINE 200 MG/1
TABLET ORAL
Qty: 150 TABLET | Refills: 11 | Status: SHIPPED | OUTPATIENT
Start: 2019-07-24 | End: 2020-07-22

## 2019-07-24 ASSESSMENT — PATIENT HEALTH QUESTIONNAIRE - PHQ9
5. POOR APPETITE OR OVEREATING: NOT AT ALL
SUM OF ALL RESPONSES TO PHQ QUESTIONS 1-9: 0

## 2019-07-24 ASSESSMENT — ANXIETY QUESTIONNAIRES
IF YOU CHECKED OFF ANY PROBLEMS ON THIS QUESTIONNAIRE, HOW DIFFICULT HAVE THESE PROBLEMS MADE IT FOR YOU TO DO YOUR WORK, TAKE CARE OF THINGS AT HOME, OR GET ALONG WITH OTHER PEOPLE: NOT DIFFICULT AT ALL
7. FEELING AFRAID AS IF SOMETHING AWFUL MIGHT HAPPEN: NOT AT ALL
GAD7 TOTAL SCORE: 0
3. WORRYING TOO MUCH ABOUT DIFFERENT THINGS: NOT AT ALL
1. FEELING NERVOUS, ANXIOUS, OR ON EDGE: NOT AT ALL
5. BEING SO RESTLESS THAT IT IS HARD TO SIT STILL: NOT AT ALL
6. BECOMING EASILY ANNOYED OR IRRITABLE: NOT AT ALL
2. NOT BEING ABLE TO STOP OR CONTROL WORRYING: NOT AT ALL

## 2019-07-24 ASSESSMENT — MIFFLIN-ST. JEOR: SCORE: 2287.37

## 2019-07-25 LAB
ALBUMIN SERPL-MCNC: 3.8 G/DL (ref 3.4–5)
ALP SERPL-CCNC: 121 U/L (ref 40–150)
ALT SERPL W P-5'-P-CCNC: 32 U/L (ref 0–50)
ANION GAP SERPL CALCULATED.3IONS-SCNC: 7 MMOL/L (ref 3–14)
AST SERPL W P-5'-P-CCNC: 17 U/L (ref 0–45)
BILIRUB SERPL-MCNC: 0.2 MG/DL (ref 0.2–1.3)
BUN SERPL-MCNC: 19 MG/DL (ref 7–30)
CALCIUM SERPL-MCNC: 8.6 MG/DL (ref 8.5–10.1)
CARBAMAZEPINE SERPL-MCNC: 8.5 MG/L (ref 4–12)
CHLORIDE SERPL-SCNC: 109 MMOL/L (ref 94–109)
CHOLEST SERPL-MCNC: 220 MG/DL
CO2 SERPL-SCNC: 26 MMOL/L (ref 20–32)
CREAT SERPL-MCNC: 0.74 MG/DL (ref 0.52–1.04)
GFR SERPL CREATININE-BSD FRML MDRD: >90 ML/MIN/{1.73_M2}
GLUCOSE SERPL-MCNC: 102 MG/DL (ref 70–99)
HDLC SERPL-MCNC: 45 MG/DL
LDLC SERPL CALC-MCNC: 131 MG/DL
NONHDLC SERPL-MCNC: 175 MG/DL
POTASSIUM SERPL-SCNC: 4.3 MMOL/L (ref 3.4–5.3)
PROT SERPL-MCNC: 7.3 G/DL (ref 6.8–8.8)
SODIUM SERPL-SCNC: 142 MMOL/L (ref 133–144)
TRIGL SERPL-MCNC: 220 MG/DL

## 2019-07-25 ASSESSMENT — ANXIETY QUESTIONNAIRES: GAD7 TOTAL SCORE: 0

## 2019-07-29 ENCOUNTER — DOCUMENTATION ONLY (OUTPATIENT)
Dept: OTHER | Facility: CLINIC | Age: 54
End: 2019-07-29

## 2019-09-09 DIAGNOSIS — K21.9 GASTROESOPHAGEAL REFLUX DISEASE WITHOUT ESOPHAGITIS: ICD-10-CM

## 2019-09-09 NOTE — TELEPHONE ENCOUNTER
"Requested Prescriptions   Pending Prescriptions Disp Refills     omeprazole (PRILOSEC) 20 MG DR capsule [Pharmacy Med Name: OMEPRAZOLE 20MG CPDR] 180 capsule 3     Sig: TAKE TWO CAPSULES BY MOUTH EVERY DAY *TAKE 30 TO 60 MINUTES BEFORE A MEAL   Last Written Prescription Date:  2/6/18  Last Fill Quantity: 180,  # refills: 3   Last office visit: 7/24/2019 with prescribing provider:  Cara Marks MD    Future Office Visit:        PPI Protocol Passed - 9/9/2019  7:42 AM        Passed - Not on Clopidogrel (unless Pantoprazole ordered)        Passed - No diagnosis of osteoporosis on record        Passed - Recent (12 mo) or future (30 days) visit within the authorizing provider's specialty     Patient had office visit in the last 12 months or has a visit in the next 30 days with authorizing provider or within the authorizing provider's specialty.  See \"Patient Info\" tab in inbasket, or \"Choose Columns\" in Meds & Orders section of the refill encounter.              Passed - Medication is active on med list        Passed - Patient is age 18 or older        Passed - No active pregnacy on record        Passed - No positive pregnancy test in past 12 months          "

## 2019-09-09 NOTE — TELEPHONE ENCOUNTER
Prescription approved per [facility] Refill Protocol.  Mary Ann Drake RN

## 2019-09-16 DIAGNOSIS — J30.1 SEASONAL ALLERGIC RHINITIS DUE TO POLLEN: Primary | ICD-10-CM

## 2019-09-16 RX ORDER — AZELASTINE 1 MG/ML
1 SPRAY, METERED NASAL 2 TIMES DAILY
Qty: 30 ML | Refills: 11 | Status: SHIPPED | OUTPATIENT
Start: 2019-09-16 | End: 2020-10-01

## 2019-09-16 NOTE — TELEPHONE ENCOUNTER
Disp Refills Start End GORDON   azelastine (ASTELIN) 0.1 % spray (Discontinued) 1 Bottle 11 6/12/2018 1/15/2019 No   Sig - Route: Spray 1-2 sprays into both nostrils 2 times daily - Both Nostrils     LOV 7/24/2019  aCra Marks MD

## 2019-09-26 ENCOUNTER — TELEPHONE (OUTPATIENT)
Dept: FAMILY MEDICINE | Facility: CLINIC | Age: 54
End: 2019-09-26

## 2019-09-26 NOTE — TELEPHONE ENCOUNTER
Reason for Call:  Other from    Detailed comments: mother is calling and needs to know if the proxy form has shown up. She is also the pt guardian. She is still trying to look at her daughters chart.  Please call mom back. Form was signed at another clinic.    Phone Number Patient can be reached at: [phone] moms number    Best Time: any    Can we leave a detailed message on this number? YES    Call taken on 9/26/2019 at 1:08 PM by Corina Ayon

## 2019-09-29 ENCOUNTER — HEALTH MAINTENANCE LETTER (OUTPATIENT)
Age: 54
End: 2019-09-29

## 2019-09-30 ENCOUNTER — NURSE TRIAGE (OUTPATIENT)
Dept: NURSING | Facility: CLINIC | Age: 54
End: 2019-09-30

## 2019-09-30 NOTE — TELEPHONE ENCOUNTER
Charisjake  and Lola Barlow are calling about her daughter who they believe has mental health issues/and needs to get in somewhere for care.  The [facility] health worker has recommended same/ they are not with her so triage was not done.  Instructed that [facility] would be the place to take her/ they are not aware of an immediate need but definitely feel that something needs to be done.  Diomedes Kennedy RN [facility] [phone]
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
## 2019-10-07 ENCOUNTER — APPOINTMENT (OUTPATIENT)
Dept: GENERAL RADIOLOGY | Facility: CLINIC | Age: 54
End: 2019-10-07
Attending: EMERGENCY MEDICINE
Payer: MEDICARE

## 2019-10-07 ENCOUNTER — HOSPITAL ENCOUNTER (EMERGENCY)
Facility: CLINIC | Age: 54
Discharge: HOME | End: 2019-10-07
Attending: EMERGENCY MEDICINE | Admitting: EMERGENCY MEDICINE
Payer: MEDICARE

## 2019-10-07 ENCOUNTER — APPOINTMENT (OUTPATIENT)
Dept: CT IMAGING | Facility: CLINIC | Age: 54
End: 2019-10-07
Attending: EMERGENCY MEDICINE
Payer: MEDICARE

## 2019-10-07 VITALS
SYSTOLIC BLOOD PRESSURE: 148 MMHG | HEIGHT: 67 IN | TEMPERATURE: 98.3 F | OXYGEN SATURATION: 97 % | BODY MASS INDEX: 56.79 KG/M2 | RESPIRATION RATE: 20 BRPM | DIASTOLIC BLOOD PRESSURE: 76 MMHG | HEART RATE: 91 BPM

## 2019-10-07 DIAGNOSIS — K52.9 COLITIS: ICD-10-CM

## 2019-10-07 LAB
ALBUMIN UR-MCNC: NEGATIVE MG/DL
AMPHETAMINES UR QL SCN: NEGATIVE
ANION GAP SERPL CALCULATED.3IONS-SCNC: 5 MMOL/L (ref 3–14)
APPEARANCE UR: CLEAR
BARBITURATES UR QL: NEGATIVE
BASOPHILS # BLD AUTO: 0.1 10E9/L (ref 0–0.2)
BASOPHILS NFR BLD AUTO: 0.7 %
BENZODIAZ UR QL: NEGATIVE
BILIRUB UR QL STRIP: NEGATIVE
BUN SERPL-MCNC: 19 MG/DL (ref 7–30)
CALCIUM SERPL-MCNC: 8.5 MG/DL (ref 8.5–10.1)
CANNABINOIDS UR QL SCN: NEGATIVE
CHLORIDE SERPL-SCNC: 110 MMOL/L (ref 94–109)
CO2 SERPL-SCNC: 30 MMOL/L (ref 20–32)
COCAINE UR QL: NEGATIVE
COLOR UR AUTO: NORMAL
CREAT SERPL-MCNC: 0.68 MG/DL (ref 0.52–1.04)
CRP SERPL-MCNC: 12.5 MG/L (ref 0–8)
DIFFERENTIAL METHOD BLD: NORMAL
EOSINOPHIL # BLD AUTO: 0.2 10E9/L (ref 0–0.7)
EOSINOPHIL NFR BLD AUTO: 2 %
ERYTHROCYTE [DISTWIDTH] IN BLOOD BY AUTOMATED COUNT: 12.4 % (ref 10–15)
GFR SERPL CREATININE-BSD FRML MDRD: >90 ML/MIN/{1.73_M2}
GLUCOSE SERPL-MCNC: 114 MG/DL (ref 70–99)
GLUCOSE UR STRIP-MCNC: NEGATIVE MG/DL
HCT VFR BLD AUTO: 44.8 % (ref 35–47)
HGB BLD-MCNC: 14.5 G/DL (ref 11.7–15.7)
HGB UR QL STRIP: NEGATIVE
IMM GRANULOCYTES # BLD: 0 10E9/L (ref 0–0.4)
IMM GRANULOCYTES NFR BLD: 0.5 %
KETONES UR STRIP-MCNC: NEGATIVE MG/DL
LEUKOCYTE ESTERASE UR QL STRIP: NEGATIVE
LYMPHOCYTES # BLD AUTO: 1.2 10E9/L (ref 0.8–5.3)
LYMPHOCYTES NFR BLD AUTO: 15.1 %
MCH RBC QN AUTO: 30.3 PG (ref 26.5–33)
MCHC RBC AUTO-ENTMCNC: 32.4 G/DL (ref 31.5–36.5)
MCV RBC AUTO: 94 FL (ref 78–100)
MONOCYTES # BLD AUTO: 0.4 10E9/L (ref 0–1.3)
MONOCYTES NFR BLD AUTO: 5.3 %
NEUTROPHILS # BLD AUTO: 5.9 10E9/L (ref 1.6–8.3)
NEUTROPHILS NFR BLD AUTO: 76.4 %
NITRATE UR QL: NEGATIVE
NRBC # BLD AUTO: 0 10*3/UL
NRBC BLD AUTO-RTO: 0 /100
OPIATES UR QL SCN: NEGATIVE
PCP UR QL SCN: NEGATIVE
PH UR STRIP: 6.5 PH (ref 5–7)
PLATELET # BLD AUTO: 214 10E9/L (ref 150–450)
POTASSIUM SERPL-SCNC: 3.6 MMOL/L (ref 3.4–5.3)
RBC # BLD AUTO: 4.79 10E12/L (ref 3.8–5.2)
RBC #/AREA URNS AUTO: 1 /HPF (ref 0–2)
SODIUM SERPL-SCNC: 145 MMOL/L (ref 133–144)
SOURCE: NORMAL
SP GR UR STRIP: 1 (ref 1–1.03)
SQUAMOUS #/AREA URNS AUTO: 1 /HPF (ref 0–1)
UROBILINOGEN UR STRIP-MCNC: NORMAL MG/DL (ref 0–2)
WBC # BLD AUTO: 7.7 10E9/L (ref 4–11)
WBC #/AREA URNS AUTO: 1 /HPF (ref 0–5)

## 2019-10-07 PROCEDURE — 85025 COMPLETE CBC W/AUTO DIFF WBC: CPT | Performed by: EMERGENCY MEDICINE

## 2019-10-07 PROCEDURE — 96360 HYDRATION IV INFUSION INIT: CPT

## 2019-10-07 PROCEDURE — 81001 URINALYSIS AUTO W/SCOPE: CPT | Mod: XU | Performed by: EMERGENCY MEDICINE

## 2019-10-07 PROCEDURE — 80048 BASIC METABOLIC PNL TOTAL CA: CPT | Performed by: EMERGENCY MEDICINE

## 2019-10-07 PROCEDURE — 74177 CT ABD & PELVIS W/CONTRAST: CPT

## 2019-10-07 PROCEDURE — 74019 RADEX ABDOMEN 2 VIEWS: CPT

## 2019-10-07 PROCEDURE — 25000128 H RX IP 250 OP 636: Performed by: EMERGENCY MEDICINE

## 2019-10-07 PROCEDURE — 25000125 ZZHC RX 250: Performed by: EMERGENCY MEDICINE

## 2019-10-07 PROCEDURE — 86140 C-REACTIVE PROTEIN: CPT | Performed by: EMERGENCY MEDICINE

## 2019-10-07 PROCEDURE — 90791 PSYCH DIAGNOSTIC EVALUATION: CPT

## 2019-10-07 PROCEDURE — 80307 DRUG TEST PRSMV CHEM ANLYZR: CPT | Performed by: EMERGENCY MEDICINE

## 2019-10-07 PROCEDURE — 99285 EMERGENCY DEPT VISIT HI MDM: CPT | Mod: 25

## 2019-10-07 RX ORDER — CIPROFLOXACIN 500 MG/1
500 TABLET, FILM COATED ORAL 2 TIMES DAILY
Qty: 14 TABLET | Refills: 0 | Status: SHIPPED | OUTPATIENT
Start: 2019-10-07 | End: 2019-10-14

## 2019-10-07 RX ORDER — SODIUM CHLORIDE 9 MG/ML
1000 INJECTION, SOLUTION INTRAVENOUS CONTINUOUS
Status: DISCONTINUED | OUTPATIENT
Start: 2019-10-07 | End: 2019-10-07 | Stop reason: HOSPADM

## 2019-10-07 RX ORDER — IOPAMIDOL 755 MG/ML
500 INJECTION, SOLUTION INTRAVASCULAR ONCE
Status: COMPLETED | OUTPATIENT
Start: 2019-10-07 | End: 2019-10-07

## 2019-10-07 RX ADMIN — IOPAMIDOL 100 ML: 755 INJECTION, SOLUTION INTRAVENOUS at 13:48

## 2019-10-07 RX ADMIN — SODIUM CHLORIDE 65 ML: 9 INJECTION, SOLUTION INTRAVENOUS at 13:48

## 2019-10-07 RX ADMIN — SODIUM CHLORIDE 1000 ML: 9 INJECTION, SOLUTION INTRAVENOUS at 13:32

## 2019-10-07 ASSESSMENT — ENCOUNTER SYMPTOMS
CONSTIPATION: 1
BACK PAIN: 0
ABDOMINAL PAIN: 1
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
## 2019-10-07 NOTE — ED AVS SNAPSHOT
[facility] Emergency Department  [address]  [facility] [address]  Phone:  [phone]  Fax:  [phone]                                    Brissa Barlow   MRN: [id number]    Department:  [facility] Emergency Department   Date of Visit:  10/7/2019           After Visit Summary Signature Page    I have received my discharge instructions, and my questions have been answered. I have discussed any challenges I see with this plan with the nurse or doctor.    ..........................................................................................................................................  Patient/Patient Representative Signature      ..........................................................................................................................................  Patient Representative Print Name and Relationship to Patient    ..................................................               ................................................  Date                                   Time    ..........................................................................................................................................  Reviewed by Signature/Title    ...................................................              ..............................................  Date                                               Time          22EPIC Rev 08/18

## 2019-10-07 NOTE — ED NOTES
Patient alert and oriented. Respirations even and unlabored. All discharge education given. All questions answered. All medications explained in detail. Patient denies further needs and states that they are ready to leave. Patient ambulated out of the ER with steady gait.

## 2019-10-07 NOTE — DISCHARGE INSTRUCTIONS
Please take cipro for 7 days. Eat yogurt or foods that have live bacteria in them. Please follow up with Dr. Marks to discuss need for referral to Gastroenterology. Returnto ER with fever, or bloody stool.

## 2019-10-07 NOTE — ED TRIAGE NOTES
"Arrives via EMS from home with complaints of left lower abd pain.  Last BM 3 days ago.  Has not taken any laxatives. Hx of \"collapsed lower intestine\" a couple years ago.  Patient reports this feels similar.  Also reports \"a lot of stress going on\".  Recent passing of father-in-law. Rating pain 6/10 at this time.  Denies nausea.  Patient states, \"I'm sure it's just I'm constipated and I just need to go.\" ABCD's intact; A/O x 4.   "

## 2019-10-23 ENCOUNTER — HOSPITAL ENCOUNTER (OUTPATIENT)
Dept: MAMMOGRAPHY | Facility: CLINIC | Age: 54
Discharge: HOME | End: 2019-10-23
Attending: INTERNAL MEDICINE | Admitting: INTERNAL MEDICINE
Payer: MEDICARE

## 2019-10-23 DIAGNOSIS — Z00.00 PREVENTATIVE HEALTH CARE: ICD-10-CM

## 2019-10-23 PROCEDURE — 77067 SCR MAMMO BI INCL CAD: CPT

## 2019-10-28 ENCOUNTER — HEALTH MAINTENANCE LETTER (OUTPATIENT)
Age: 54
End: 2019-10-28

## 2019-11-05 ENCOUNTER — DOCUMENTATION ONLY (OUTPATIENT)
Dept: OTHER | Facility: CLINIC | Age: 54
End: 2019-11-05

## 2019-11-05 ENCOUNTER — HOSPITAL ENCOUNTER (OUTPATIENT)
Dept: ULTRASOUND IMAGING | Facility: CLINIC | Age: 54
Discharge: HOME | End: 2019-11-05
Attending: INTERNAL MEDICINE | Admitting: INTERNAL MEDICINE
Payer: MEDICARE

## 2019-11-05 DIAGNOSIS — R92.8 ABNORMAL MAMMOGRAM: ICD-10-CM

## 2019-11-05 PROCEDURE — 76642 ULTRASOUND BREAST LIMITED: CPT | Mod: LT

## 2019-11-13 ENCOUNTER — HOSPITAL ENCOUNTER (OUTPATIENT)
Dept: MAMMOGRAPHY | Facility: CLINIC | Age: 54
End: 2019-11-13
Attending: INTERNAL MEDICINE
Payer: MEDICARE

## 2019-11-13 ENCOUNTER — HOSPITAL ENCOUNTER (OUTPATIENT)
Dept: ULTRASOUND IMAGING | Facility: CLINIC | Age: 54
Discharge: HOME | End: 2019-11-13
Attending: INTERNAL MEDICINE | Admitting: INTERNAL MEDICINE
Payer: MEDICARE

## 2019-11-13 DIAGNOSIS — R92.8 ABNORMAL MAMMOGRAM: ICD-10-CM

## 2019-11-13 PROCEDURE — 88185 FLOWCYTOMETRY/TC ADD-ON: CPT | Performed by: INTERNAL MEDICINE

## 2019-11-13 PROCEDURE — 88184 FLOWCYTOMETRY/ TC 1 MARKER: CPT | Performed by: INTERNAL MEDICINE

## 2019-11-13 PROCEDURE — 40001004 ZZHCL STATISTIC FLOW INT 9-15 ABY TC 88188: Performed by: INTERNAL MEDICINE

## 2019-11-13 PROCEDURE — 25000125 ZZHC RX 250: Performed by: RADIOLOGY

## 2019-11-13 PROCEDURE — 40000986 MA POST PROCEDURE LEFT

## 2019-11-13 PROCEDURE — 88305 TISSUE EXAM BY PATHOLOGIST: CPT | Mod: 26 | Performed by: INTERNAL MEDICINE

## 2019-11-13 PROCEDURE — 76942 ECHO GUIDE FOR BIOPSY: CPT

## 2019-11-13 PROCEDURE — 88305 TISSUE EXAM BY PATHOLOGIST: CPT | Performed by: INTERNAL MEDICINE

## 2019-11-13 RX ADMIN — LIDOCAINE HYDROCHLORIDE 5 ML: 10 INJECTION, SOLUTION EPIDURAL; INFILTRATION; INTRACAUDAL; PERINEURAL at 14:17

## 2019-11-13 NOTE — LETTER
November 20, 2019      Brissa Barlow  [address]   [place] [facility] [address]        Dear ,    We are writing to inform you of your test results.    Dr. Selina brannon like you to see a dermatology  for recurrent yeast infections.   She would like you to contact us at [phone] to schedule a lab only appointment ( you do not have to be fasting for this appointment) and then she would like you to take those results with to the Dermatologist.    I have enclosed referral information with a few dermatologists that you can contact and schedule with the one that best suites your schedule.         If you have any questions or concerns, please call the clinic at the number listed above.       Sincerely,        Cara Marks MD/manuel

## 2019-11-13 NOTE — DISCHARGE INSTRUCTIONS
Page 1 of 1  For informational purposes only. Not to replace the advice of your health care provider. Copyright   2010 [facility]. All rights reserved. [employer] [id number] - REV 02/16.  After Your Breast Biopsy   Bleeding or bruising  Slight bruising is normal. If you bleed through the bandage, put direct pressure on the breast for 10 minutes.   If the breast begins to swell, or you have a lot of bleeding after 10 minutes of pressure, call the doctor who ordered your exam. Or, go to the emergency room.   Bandages  Keep your bandage in place until tomorrow morning. Do not get it wet.   If you have small pieces of tape on the skin, leave them in place. They will fall off on their own, or you can remove them after 5 days.   Activity  You may shower the morning after the exam. No heavy activity (lifting, vacuuming) on the day of your exam. You may go back to normal activity the next day, unless you had a lot of bleeding or pain.  Discomfort  You may take Tylenol (acetaminophen) today for pain. Tomorrow, you may take an anti-inflammatory medicine (aspirin, ibuprofen, Motrin, Aleve, Advil), unless your doctor tells you not to.  Wear your bra overnight to support the breast. You may also use an ice pack: Place it over the area for 15-20 minutes several times a day.  Infection  Infection is rare. Symptoms include fever, redness, increasing pain and fluid draining from the biopsy site. If you have any of these symptoms, please call the doctor who ordered your exam.  Results  Results may take up to 5 business days. A nurse or doctor from the [facility] will call with your results. We will also send the results to the doctor who ordered your biopsy.  If you have not heard your results in 5 days, please call the Breast Center.   Other instructions  ______________________________________________________________________________________________________________________________  Call your doctor if:    You have  bleeding that lasts more than 10 minutes.    You have pain that cannot be controlled.   You have signs of infection (fever, redness, drainage or other signs).   You have not received your results within 5 days.    Please call the [facility] nurse navigator at [phone] if you have questions or concerns about your biopsy.

## 2019-11-14 LAB
COPATH REPORT: NORMAL
COPATH REPORT: NORMAL

## 2019-11-15 ENCOUNTER — TELEPHONE (OUTPATIENT)
Dept: MAMMOGRAPHY | Facility: CLINIC | Age: 54
End: 2019-11-15

## 2019-11-15 NOTE — TELEPHONE ENCOUNTER
Pathology report reviewed with breast radiologist Chuck. I phoned Lola(guardian) and informed patient of results showing FINAL DIAGNOSIS:   Lymph node (1.8 cm), left intramammary, at 3:00, 7 cm from nipple,   ultrasound-guided needle biopsy -   - Consistent with dermatopathic lymphadenitis (see comment).   - Negative for malignancy.   - Flow cytometry: Polytypic B-cells; no definitive aberrant   immunophenotype on T-cells (see separate report   for details; [id number]). . Recommended follow up is routine screening. Clinical management of yeast infection.  Patient states no problems with biopsy site. Questions were answered and my phone number given if she has further questions or concerns.

## 2019-11-20 ENCOUNTER — TELEPHONE (OUTPATIENT)
Dept: FAMILY MEDICINE | Facility: CLINIC | Age: 54
End: 2019-11-20

## 2019-11-20 DIAGNOSIS — R73.01 IMPAIRED FASTING GLUCOSE: ICD-10-CM

## 2019-11-20 DIAGNOSIS — B37.2 YEAST INFECTION OF THE SKIN: Primary | ICD-10-CM

## 2019-11-21 NOTE — TELEPHONE ENCOUNTER
Ross with Dr. Marks and she would like [place] to have labs drawn and then refer to dermatology for chronic yeast infections.    Will do HgA1c to rule out diabetes as a factor and do a metabolic panel.     Referral placed and letter mailed .

## 2019-11-25 DIAGNOSIS — R73.01 IMPAIRED FASTING GLUCOSE: ICD-10-CM

## 2019-11-25 DIAGNOSIS — B37.2 YEAST INFECTION OF THE SKIN: ICD-10-CM

## 2019-11-25 LAB — HBA1C MFR BLD: 5.3 % (ref 0–5.6)

## 2019-11-25 PROCEDURE — 80053 COMPREHEN METABOLIC PANEL: CPT | Performed by: INTERNAL MEDICINE

## 2019-11-25 PROCEDURE — 36415 COLL VENOUS BLD VENIPUNCTURE: CPT | Performed by: INTERNAL MEDICINE

## 2019-11-25 PROCEDURE — 83036 HEMOGLOBIN GLYCOSYLATED A1C: CPT | Performed by: INTERNAL MEDICINE

## 2019-11-26 LAB
ALBUMIN SERPL-MCNC: 3.4 G/DL (ref 3.4–5)
ALP SERPL-CCNC: 117 U/L (ref 40–150)
ALT SERPL W P-5'-P-CCNC: 31 U/L (ref 0–50)
ANION GAP SERPL CALCULATED.3IONS-SCNC: 4 MMOL/L (ref 3–14)
AST SERPL W P-5'-P-CCNC: 17 U/L (ref 0–45)
BILIRUB SERPL-MCNC: 0.1 MG/DL (ref 0.2–1.3)
BUN SERPL-MCNC: 20 MG/DL (ref 7–30)
CALCIUM SERPL-MCNC: 8.8 MG/DL (ref 8.5–10.1)
CHLORIDE SERPL-SCNC: 111 MMOL/L (ref 94–109)
CO2 SERPL-SCNC: 27 MMOL/L (ref 20–32)
CREAT SERPL-MCNC: 0.76 MG/DL (ref 0.52–1.04)
GFR SERPL CREATININE-BSD FRML MDRD: 90 ML/MIN/{1.73_M2}
GLUCOSE SERPL-MCNC: 99 MG/DL (ref 70–99)
POTASSIUM SERPL-SCNC: 4.4 MMOL/L (ref 3.4–5.3)
PROT SERPL-MCNC: 6.6 G/DL (ref 6.8–8.8)
SODIUM SERPL-SCNC: 143 MMOL/L (ref 133–144)

## 2019-11-27 ENCOUNTER — TRANSFERRED RECORDS (OUTPATIENT)
Dept: HEALTH INFORMATION MANAGEMENT | Facility: CLINIC | Age: 54
End: 2019-11-27

## 2020-01-16 NOTE — NURSING NOTE
"Chief Complaint   Patient presents with     Weight Problem     management       Initial /76 (BP Location: Left arm, Patient Position: Chair, Cuff Size: Adult Large)  Pulse 100  Temp 98  F (36.7  C) (Oral)  Resp 20  Wt (!) 354 lb (160.6 kg)  LMP 08/18/2008  Breastfeeding? No  BMI 55.44 kg/m2 Estimated body mass index is 55.44 kg/(m^2) as calculated from the following:    Height as of 6/21/17: 5' 7\" (1.702 m).    Weight as of this encounter: 354 lb (160.6 kg).  Medication Reconciliation: complete  Rosalina Givens M.A.  "
None

## 2020-02-24 DIAGNOSIS — F43.21 ADJUSTMENT DISORDER WITH DEPRESSED MOOD: ICD-10-CM

## 2020-02-24 DIAGNOSIS — I10 ESSENTIAL HYPERTENSION, BENIGN: ICD-10-CM

## 2020-02-24 NOTE — TELEPHONE ENCOUNTER
"Requested Prescriptions   Pending Prescriptions Disp Refills     lisinopril (ZESTRIL) 20 MG tablet [Pharmacy Med Name: LISINOPRIL 20MG TABS] 90 tablet 1     Sig: TAKE ONE TABLET BY MOUTH ONCE DAILY   Last Written Prescription Date:  7/24/19  Last Fill Quantity: 90,  # refills: 1   Last office visit: 7/24/2019 with prescribing provider:  Cara Marks MD   Future Office Visit:        ACE Inhibitors (Including Combos) Protocol Failed - 2/24/2020  8:54 AM        Failed - Blood pressure under 140/90 in past 12 months     BP Readings from Last 3 Encounters:   10/07/19 (!) 148/76   07/24/19 120/87   02/27/19 142/88                 Passed - Recent (12 mo) or future (30 days) visit within the authorizing provider's specialty     Patient has had an office visit with the authorizing provider or a provider within the authorizing providers department within the previous 12 mos or has a future within next 30 days. See \"Patient Info\" tab in inbasket, or \"Choose Columns\" in Meds & Orders section of the refill encounter.              Passed - Medication is active on med list        Passed - Patient is age 18 or older        Passed - No active pregnancy on record        Passed - Normal serum creatinine on file in past 12 months     Recent Labs   Lab Test 11/25/19  1327   CR 0.76             Passed - Normal serum potassium on file in past 12 months     Recent Labs   Lab Test 11/25/19  1327   POTASSIUM 4.4             Passed - No positive pregnancy test within past 12 months        sertraline (ZOLOFT) 100 MG tablet [Pharmacy Med Name: SERTRALINE HCL 100MG TABS] 90 tablet 1     Sig: TAKE ONE TABLET BY MOUTH ONCE DAILY   Last Written Prescription Date:  7/24/19  Last Fill Quantity: 90,  # refills: 1   Last office visit: 7/24/2019 with prescribing provider:  Cara Marks MD   Future Office Visit:        SSRIs Protocol Failed - 2/24/2020  8:54 AM        Failed - PHQ-9 score less than 5 in past 6 months     Please review " "last PHQ-9 score.   PHQ-9 SCORE 6/12/2018 1/15/2019 7/24/2019   PHQ-9 Total Score - - -   PHQ-9 Total Score 12 0 0     SUBHASH-7 SCORE 6/12/2018 1/15/2019 7/24/2019   Total Score - - -   Total Score 14 0 0               Failed - Recent (6 mo) or future (30 days) visit within the authorizing provider's specialty     Patient had office visit in the last 6 months or has a visit in the next 30 days with authorizing provider or within the authorizing provider's specialty.  See \"Patient Info\" tab in inbasket, or \"Choose Columns\" in Meds & Orders section of the refill encounter.            Passed - Medication is active on med list        Passed - Patient is age 18 or older        Passed - No active pregnancy on record        Passed - No positive pregnancy test in last 12 months         "

## 2020-02-26 RX ORDER — LISINOPRIL 20 MG/1
TABLET ORAL
Qty: 90 TABLET | Refills: 0 | Status: SHIPPED | OUTPATIENT
Start: 2020-02-26 | End: 2020-04-14

## 2020-02-26 RX ORDER — SERTRALINE HYDROCHLORIDE 100 MG/1
TABLET, FILM COATED ORAL
Qty: 90 TABLET | Refills: 0 | Status: SHIPPED | OUTPATIENT
Start: 2020-02-26 | End: 2020-04-14

## 2020-02-26 NOTE — TELEPHONE ENCOUNTER
Routing refill request to provider for review/approval because:  Blood pressure out of range   Protocols FAILED.     Sending to TC's to assist with scheduling.     Sending to station to do questionnaires.     It looks like pt does NOT look at .     Liz TURNER RN

## 2020-03-15 ENCOUNTER — HEALTH MAINTENANCE LETTER (OUTPATIENT)
Age: 55
End: 2020-03-15

## 2020-04-14 ENCOUNTER — VIRTUAL VISIT (OUTPATIENT)
Dept: FAMILY MEDICINE | Facility: CLINIC | Age: 55
End: 2020-04-14
Payer: MEDICARE

## 2020-04-14 DIAGNOSIS — L30.8 OTHER ECZEMA: ICD-10-CM

## 2020-04-14 DIAGNOSIS — K21.9 GASTROESOPHAGEAL REFLUX DISEASE WITHOUT ESOPHAGITIS: ICD-10-CM

## 2020-04-14 DIAGNOSIS — R09.82 POST-NASAL DISCHARGE: ICD-10-CM

## 2020-04-14 DIAGNOSIS — G40.909 SEIZURE DISORDER (H): Primary | ICD-10-CM

## 2020-04-14 DIAGNOSIS — F43.21 ADJUSTMENT DISORDER WITH DEPRESSED MOOD: ICD-10-CM

## 2020-04-14 DIAGNOSIS — E78.5 HYPERLIPIDEMIA LDL GOAL <130: ICD-10-CM

## 2020-04-14 DIAGNOSIS — I10 ESSENTIAL HYPERTENSION, BENIGN: ICD-10-CM

## 2020-04-14 PROCEDURE — 99442 ZZC PHYSICIAN TELEPHONE EVALUATION 11-20 MIN: CPT | Performed by: INTERNAL MEDICINE

## 2020-04-14 RX ORDER — HYDROCORTISONE 2.5 %
CREAM (GRAM) TOPICAL 2 TIMES DAILY
Qty: 30 G | Refills: 1 | Status: SHIPPED | OUTPATIENT
Start: 2020-04-14 | End: 2022-10-27

## 2020-04-14 RX ORDER — LOVASTATIN 40 MG
TABLET ORAL
Qty: 90 TABLET | Refills: 1 | Status: SHIPPED | OUTPATIENT
Start: 2020-04-14 | End: 2020-10-01

## 2020-04-14 RX ORDER — SERTRALINE HYDROCHLORIDE 100 MG/1
100 TABLET, FILM COATED ORAL DAILY
Qty: 90 TABLET | Refills: 1 | Status: SHIPPED | OUTPATIENT
Start: 2020-04-14 | End: 2020-05-19

## 2020-04-14 RX ORDER — LISINOPRIL 20 MG/1
20 TABLET ORAL DAILY
Qty: 90 TABLET | Refills: 1 | Status: SHIPPED | OUTPATIENT
Start: 2020-04-14 | End: 2020-10-01

## 2020-04-14 RX ORDER — DEXAMETHASONE 4 MG/1
1-2 TABLET ORAL 2 TIMES DAILY
Qty: 1 INHALER | Refills: 11 | Status: ON HOLD | OUTPATIENT
Start: 2020-04-14 | End: 2021-04-09

## 2020-04-14 ASSESSMENT — ANXIETY QUESTIONNAIRES
2. NOT BEING ABLE TO STOP OR CONTROL WORRYING: NOT AT ALL
7. FEELING AFRAID AS IF SOMETHING AWFUL MIGHT HAPPEN: NOT AT ALL
6. BECOMING EASILY ANNOYED OR IRRITABLE: NOT AT ALL
GAD7 TOTAL SCORE: 0
1. FEELING NERVOUS, ANXIOUS, OR ON EDGE: NOT AT ALL
3. WORRYING TOO MUCH ABOUT DIFFERENT THINGS: NOT AT ALL
5. BEING SO RESTLESS THAT IT IS HARD TO SIT STILL: NOT AT ALL
IF YOU CHECKED OFF ANY PROBLEMS ON THIS QUESTIONNAIRE, HOW DIFFICULT HAVE THESE PROBLEMS MADE IT FOR YOU TO DO YOUR WORK, TAKE CARE OF THINGS AT HOME, OR GET ALONG WITH OTHER PEOPLE: NOT DIFFICULT AT ALL

## 2020-04-14 ASSESSMENT — PATIENT HEALTH QUESTIONNAIRE - PHQ9
SUM OF ALL RESPONSES TO PHQ QUESTIONS 1-9: 0
5. POOR APPETITE OR OVEREATING: NOT AT ALL

## 2020-04-14 NOTE — PROGRESS NOTES
"Last seen in clinic by PCP 7/24/2019  Due for wellness visit    Brissa Barlow is a 54 year old female who is being evaluated via a billable telephone visit.      The patient has been notified of following:     \"This telephone visit will be conducted via a call between you and your physician/provider. We have found that certain health care needs can be provided without the need for a physical exam.  This service lets us provide the care you need with a short phone conversation.  If a prescription is necessary we can send it directly to your pharmacy.  If lab work is needed we can place an order for that and you can then stop by our lab to have the test done at a later time.    Telephone visits are billed at different rates depending on your insurance coverage. During this emergency period, for some insurers they may be billed the same as an in-person visit.  Please reach out to your insurance provider with any questions.    If during the course of the call the physician/provider feels a telephone visit is not appropriate, you will not be charged for this service.\"    Patient has given verbal consent for Telephone visit?  Yes    How would you like to obtain your AVS? [facility]   Home Phone [phone]   Mobile [phone]       Subjective     Brissa Barlow is a 54 year old female who presents to clinic today for the following health issues:    Hyperlipidemia Follow-Up      Are you regularly taking any medication or supplement to lower your cholesterol?   Yes- Lovastatin    Are you having muscle aches or other side effects that you think could be caused by your cholesterol lowering medication?  No   Recent Labs   Lab Test 07/24/19  1654 06/12/18  1633  01/26/15  1008 04/29/14  0819   CHOL 220* 205*   < > 209* 160   HDL 45* 48*   < > 55 42*   * 89   < > 118 90   TRIG 220* 338*   < > 179* 138   CHOLHDLRATIO  --   --   --  3.8 3.8    < > = values in this interval not displayed.             Hypertension " Follow-up      Do you check your blood pressure regularly outside of the clinic? No     Are you following a low salt diet? No    Are your blood pressures ever more than 140 on the top number (systolic) OR more   than 90 on the bottom number (diastolic), for example 140/90? Not  As optimally controlled; she does not check blood pressure    BP Readings from Last 3 Encounters:   10/07/19 (!) 148/76   07/24/19 120/87   02/27/19 142/88      Depression and Anxiety Follow-Up    How are you doing with your depression since your last visit? No change    How are you doing with your anxiety since your last visit?  No change    Are you having other symptoms that might be associated with depression or anxiety? No       Have you had a significant life event? No     Do you have any concerns with your use of alcohol or other drugs? No    Social History     Tobacco Use     Smoking status: Never Smoker     Smokeless tobacco: Never Used   Substance Use Topics     Alcohol use: No     Alcohol/week: 0.0 standard drinks     Drug use: No     PHQ 6/12/2018 1/15/2019 7/24/2019   PHQ-9 Total Score 12 0 0   Q9: Thoughts of better off dead/self-harm past 2 weeks Not at all Not at all Not at all     SUBHASH-7 SCORE 1/15/2019 7/24/2019 4/14/2020   Total Score - - -   Total Score 0 0 0         Last PHQ-9 4/14/2020   1.  Little interest or pleasure in doing things 0   2.  Feeling down, depressed, or hopeless 0   3.  Trouble falling or staying asleep, or sleeping too much 0   4.  Feeling tired or having little energy 0   5.  Poor appetite or overeating 0   6.  Feeling bad about yourself 0   7.  Trouble concentrating 0   8.  Moving slowly or restless 0   Q9: Thoughts of better off dead/self-harm past 2 weeks 0   PHQ-9 Total Score 0   Difficulty at work, home, or with people Not difficult at all     SUBHASH-7  4/14/2020   1. Feeling nervous, anxious, or on edge 0   2. Not being able to stop or control worrying 0   3. Worrying too much about different things 0    4. Trouble relaxing 0   5. Being so restless that it is hard to sit still 0   6. Becoming easily annoyed or irritable 0   7. Feeling afraid, as if something awful might happen 0   SUBHASH-7 Total Score 0   If you checked any problems, how difficult have they made it for you to do your work, take care of things at home, or get along with other people? Not difficult at all         How many servings of fruits and vegetables do you eat daily?  2-3    On average, how many sweetened beverages do you drink each day (Examples: soda, juice, sweet tea, etc.  Do NOT count diet or artificially sweetened beverages)?   1    How many days per week do you exercise enough to make your heart beat faster? 3 or less    How many minutes a day do you exercise enough to make your heart beat faster? 10 - 19    How many days per week do you miss taking your medication? 0         Patient Active Problem List   Diagnosis     Essential hypertension, benign     Delay in development     Injury, other and unspecified, knee, leg, ankle, and foot     Perforation of tympanic membrane     Hyperlipidemia LDL goal <130     Seizure disorder (H)     Other peripheral enthesopathies     IFG (impaired fasting glucose)     Insulin resistance     Elevated cortisol level (H)     Morbid obesity (H)     Low vitamin D level     BMI 50.0-59.9, adult (H)     Past Surgical History:   Procedure Laterality Date     Abd. Injury (spleen--trauma)       ADENOIDECTOMY      Adenoidectomy     Colostomy (since reversed)  Joaquin filter placed prophylactically       Elective   Age 31     Open Pelvis Fx with Plate       ORIF for foot crushing injury  2002     Right Arthroscopic wrist surgery secondary to injury  Teens     TONSILLECTOMY      Tonsillectomy     TUBAL LIGATION NOS  2001       Social History     Tobacco Use     Smoking status: Never Smoker     Smokeless tobacco: Never Used   Substance Use Topics     Alcohol use: No     Alcohol/week: 0.0 standard  drinks     Family History   Problem Relation Age of Onset     Hypertension Mother      Gastrointestinal Disease Mother         ULCERS     Diabetes Father         DIET     Hypertension Father      Lipids Father      Alzheimer Disease Maternal Grandfather      Cardiovascular Maternal Grandfather         Aneurysm     Heart Disease Maternal Grandfather      Cardiovascular Maternal Grandmother         Aneurysm     Musculoskeletal Disorder Maternal Grandmother         MS     Breast Cancer Paternal Grandmother      Cerebrovascular Disease Paternal Grandfather      Heart Disease Paternal Grandfather      Hypertension Sister      Hypertension Brother      Allergies Brother      Allergies Sister      Respiratory Brother         ASTHMA     Respiratory Sister         ASTHMA         Current Outpatient Medications   Medication Sig Dispense Refill     azelastine (ASTELIN) 0.1 % nasal spray Spray 1 spray into both nostrils 2 times daily 30 mL 11     carBAMazepine (TEGRETOL) 200 MG tablet TAKE TWO TABLETS BY MOUTH EVERY MORNING, ONE TABLET AT NOON, AND TWO TABLETS EVERY EVENING 150 tablet 11     cholecalciferol 25 MCG (1000 UT) TABS Take 1,000 Units by mouth daily       fluticasone (FLONASE) 50 MCG/ACT spray USE ONE TO TWO SPRAYS IN EACH NOSTRIL EVERY DAY 48 g 3     hydrocortisone 2.5 % cream Apply topically 2 times daily 30 g 1     lisinopril (ZESTRIL) 20 MG tablet TAKE ONE TABLET BY MOUTH ONCE DAILY 90 tablet 0     lovastatin (MEVACOR) 40 MG tablet TAKE ONE TABLET BY MOUTH AT BEDTIME 90 tablet 1     Multiple Vitamin (MULTI-VITAMIN PO) Take 1 tablet by mouth daily       nystatin (MYCOSTATIN) 892434 UNIT/GM external cream APPLY TOPICALLY TWO TIMES A DAY AS NEEDED. Not intended to be a chronic, daily medication. 60 g 1     omeprazole (PRILOSEC) 20 MG DR capsule TAKE TWO CAPSULES BY MOUTH EVERY DAY *TAKE 30 TO 60 MINUTES BEFORE A MEAL 180 capsule 1     sertraline (ZOLOFT) 100 MG tablet TAKE ONE TABLET BY MOUTH ONCE DAILY 90 tablet 0      Allergies   Allergen Reactions     Excedrin Back & [Acetaminophen-Aspirin Buffered]      Flu Virus Vaccine      Patient states she is allergic to the vaccine.  Got a rash all over body many years ago after receiving injection.     Hydrochlorothiazide      dehydration     Penicillins      Tetracycline      any cyclines     Azithromycin Rash     Erythromycin Rash     Zithromax [Azithromycin Dihydrate] Rash     Recent Labs   Lab Test 11/25/19  1327 10/07/19  1327 07/24/19  1654 08/28/18  1732 06/12/18  1633 07/27/17  1435 02/02/17  0756 08/23/16  1343   A1C 5.3  --   --   --   --  5.3  --  5.3   LDL  --   --  131*  --  89  --  116*  --    HDL  --   --  45*  --  48*  --  48*  --    TRIG  --   --  220*  --  338*  --  151*  --    ALT 31  --  32 28 35  --  35 38   CR 0.76 0.68 0.74 0.80 0.68  --  0.82 0.78   GFRESTIMATED 90 >90 >90 76 >90  --  74 79   GFRESTBLACK >90 >90 >90 >90 >90  --  90 >90   GFR Calc     POTASSIUM 4.4 3.6 4.3 3.7 4.2  --  4.2 4.2   TSH  --   --   --  2.00  --  3.04  --  3.95      BP Readings from Last 3 Encounters:   10/07/19 (!) 148/76   07/24/19 120/87   02/27/19 142/88    Wt Readings from Last 3 Encounters:   07/24/19 (!) 164.5 kg (362 lb 9.6 oz)   01/15/19 (!) 172.3 kg (379 lb 12.8 oz)   08/28/18 104.3 kg (230 lb)           Reviewed and updated as needed this visit by Provider  Tobacco  Allergies  Meds  Problems  Med Hx  Surg Hx  Fam Hx         Review of Systems   ROS COMP: CONSTITUTIONAL: NEGATIVE for fever, chills, change in weight  INTEGUMENTARY/SKIN: area behind ears are irritated.   ENT/MOUTH: NEGATIVE for ear, mouth and throat problems  RESP:NEGATIVE for significant cough or SOB  CV: hypertension, medications reviewed; denies chest pain   GI: heart burn; using PPI; monitoring dietary triggers.   MUSCULOSKELETAL: wearing knee brace for decades since accident many years ago.  NEURO: seizure history; taking antiseizure medications   ENDOCRINE: obesity, taking  cholesterol medications.   PSYCHIATRIC: antidepressant use reviewed; stable dose of Sertraline reviewed       Objective   Reported vitals:  LMP 08/18/2008    alert and no distress  PSYCH: Alert and oriented times 3; coherent speech, normal   rate and volume, able to articulate logical thoughts, able   to abstract reason, no tangential thoughts, no hallucinations   or delusions  Her affect is normal  RESP: No cough, no audible wheezing, able to talk in full sentences  Remainder of exam unable to be completed due to telephone visits    Diagnostic Test Results:  Labs reviewed in Epic        Assessment/Plan:  (G40.909) Seizure disorder (H)  (primary encounter diagnosis)  Comment: due for labs; no seizure activityl future lab orders placed.   Plan: Comprehensive metabolic panel, Carbamazepine         total, CBC with platelets and differential          (R09.82) Post-nasal discharge  Comment: environmental triggers; recurrent cough; reviewed use of inhaler; pt reports inhaler is helpful  Plan: fluticasone (FLOVENT HFA) 110 MCG/ACT inhaler          (L30.8) Other eczema  Comment: left ear lobe; not used in the ear;  Plan: hydrocortisone 2.5 % cream          (I10) BENIGN HYPERTENSION  Comment: BLOOD PRESSURE has been running a bit high in clinic;  meds well tolerated; lab work ordered.  Plan: lisinopril (ZESTRIL) 20 MG tablet, Albumin         Random Urine Quantitative with Creat Ratio,         Comprehensive metabolic panel          (E78.5) Hyperlipidemia LDL goal <130  Comment: yearly cholesterol; fasting lab work needed; lab orders placed.  Plan: lovastatin (MEVACOR) 40 MG tablet,         Comprehensive metabolic panel, Lipid panel         reflex to direct LDL Fasting        If ldl running high, then consider alternate statin     (K21.9) Gastroesophageal reflux disease without esophagitis  Comment: triggers reviewed  Plan: omeprazole (PRILOSEC) 20 MG DR capsule        Pt reminded of benefits of maximizing calcium and vit D to  help with bone health (due to being on PPI)     (F43.21) Adjustment disorder with depressed mood  Comment: she has been on stable dose of Sertraline; well tolerated and effective  PHQ 1/15/2019 7/24/2019 4/14/2020   PHQ-9 Total Score 0 0 0   Q9: Thoughts of better off dead/self-harm past 2 weeks Not at all Not at all Not at all     SUBHASH-7 SCORE 1/15/2019 7/24/2019 4/14/2020   Total Score - - -   Total Score 0 0 0     Plan: sertraline (ZOLOFT) 100 MG tablet               Return in about 3 months (around 7/1/2020) for Wellness visit.      Phone call duration:  16 minutes    Cara Marks MD  Internal Medicine  electronically signed

## 2020-04-15 ASSESSMENT — ANXIETY QUESTIONNAIRES: GAD7 TOTAL SCORE: 0

## 2020-07-22 DIAGNOSIS — G40.909 SEIZURE DISORDER (H): ICD-10-CM

## 2020-07-22 RX ORDER — CARBAMAZEPINE 200 MG/1
TABLET ORAL
Qty: 150 TABLET | Refills: 0 | Status: SHIPPED | OUTPATIENT
Start: 2020-07-22 | End: 2020-08-29

## 2020-07-22 NOTE — LETTER
July 27, 2020      Brissa Barlow  [address]   [facility], [place] [address]        Dear MsLior Barlow,    We are contacting you today to notify you that you are due for a Physical and fasting lab work medication follow up for further refills.       Please call [phone] to schedule an appointment.         Sincerely,     Cara Marks MD

## 2020-07-22 NOTE — TELEPHONE ENCOUNTER
JasonDB message sent to patient to inform of wellness visit and fasting labs needed.    Carbamazepine level done 7/24/2019 8.5.    Medication is being filled for 1 time refill only due to:  Patient needs to be seen because due for Wellness visit per last visit note and fasting labs..     Jaky Limon RN

## 2020-08-21 DIAGNOSIS — G40.909 SEIZURE DISORDER (H): ICD-10-CM

## 2020-08-21 NOTE — TELEPHONE ENCOUNTER
Routing refill request to provider for review/approval because:  Miranda given x1 and patient did not follow up, please advise.    Due for Wellness per last visit note. Labs not done as order per last visit.    Jaky Limon RN

## 2020-08-27 NOTE — TELEPHONE ENCOUNTER
Pt is requesting a scott refill to make it until lab appointment on 09/08.       Ramya Villeda on 8/27/2020 at 9:10 AM

## 2020-08-29 RX ORDER — CARBAMAZEPINE 200 MG/1
TABLET ORAL
Qty: 150 TABLET | Refills: 0 | Status: SHIPPED | OUTPATIENT
Start: 2020-08-29 | End: 2020-09-24

## 2020-09-14 DIAGNOSIS — E78.5 HYPERLIPIDEMIA LDL GOAL <130: ICD-10-CM

## 2020-09-14 DIAGNOSIS — G40.909 SEIZURE DISORDER (H): ICD-10-CM

## 2020-09-14 DIAGNOSIS — I10 ESSENTIAL HYPERTENSION, BENIGN: ICD-10-CM

## 2020-09-14 LAB
BASOPHILS # BLD AUTO: 0 10E9/L (ref 0–0.2)
BASOPHILS NFR BLD AUTO: 0.7 %
CARBAMAZEPINE SERPL-MCNC: 12.9 MG/L (ref 4–12)
DIFFERENTIAL METHOD BLD: ABNORMAL
EOSINOPHIL # BLD AUTO: 0.3 10E9/L (ref 0–0.7)
EOSINOPHIL NFR BLD AUTO: 5.1 %
ERYTHROCYTE [DISTWIDTH] IN BLOOD BY AUTOMATED COUNT: 12.6 % (ref 10–15)
HCT VFR BLD AUTO: 43.6 % (ref 35–47)
HGB BLD-MCNC: 13.7 G/DL (ref 11.7–15.7)
LYMPHOCYTES # BLD AUTO: 1.9 10E9/L (ref 0.8–5.3)
LYMPHOCYTES NFR BLD AUTO: 32.8 %
MCH RBC QN AUTO: 29.3 PG (ref 26.5–33)
MCHC RBC AUTO-ENTMCNC: 31.4 G/DL (ref 31.5–36.5)
MCV RBC AUTO: 93 FL (ref 78–100)
MONOCYTES # BLD AUTO: 0.3 10E9/L (ref 0–1.3)
MONOCYTES NFR BLD AUTO: 5.5 %
NEUTROPHILS # BLD AUTO: 3.2 10E9/L (ref 1.6–8.3)
NEUTROPHILS NFR BLD AUTO: 55.9 %
PLATELET # BLD AUTO: 216 10E9/L (ref 150–450)
RBC # BLD AUTO: 4.67 10E12/L (ref 3.8–5.2)
WBC # BLD AUTO: 5.6 10E9/L (ref 4–11)

## 2020-09-14 PROCEDURE — 80156 ASSAY CARBAMAZEPINE TOTAL: CPT | Performed by: INTERNAL MEDICINE

## 2020-09-14 PROCEDURE — 36415 COLL VENOUS BLD VENIPUNCTURE: CPT | Performed by: INTERNAL MEDICINE

## 2020-09-14 PROCEDURE — 80061 LIPID PANEL: CPT | Performed by: INTERNAL MEDICINE

## 2020-09-14 PROCEDURE — 85025 COMPLETE CBC W/AUTO DIFF WBC: CPT | Performed by: INTERNAL MEDICINE

## 2020-09-14 PROCEDURE — 80053 COMPREHEN METABOLIC PANEL: CPT | Performed by: INTERNAL MEDICINE

## 2020-09-14 PROCEDURE — 82043 UR ALBUMIN QUANTITATIVE: CPT | Performed by: INTERNAL MEDICINE

## 2020-09-15 LAB
ALBUMIN SERPL-MCNC: 3.7 G/DL (ref 3.4–5)
ALP SERPL-CCNC: 127 U/L (ref 40–150)
ALT SERPL W P-5'-P-CCNC: 23 U/L (ref 0–50)
ANION GAP SERPL CALCULATED.3IONS-SCNC: 3 MMOL/L (ref 3–14)
AST SERPL W P-5'-P-CCNC: 14 U/L (ref 0–45)
BILIRUB SERPL-MCNC: 0.3 MG/DL (ref 0.2–1.3)
BUN SERPL-MCNC: 19 MG/DL (ref 7–30)
CALCIUM SERPL-MCNC: 9 MG/DL (ref 8.5–10.1)
CHLORIDE SERPL-SCNC: 108 MMOL/L (ref 94–109)
CHOLEST SERPL-MCNC: 217 MG/DL
CO2 SERPL-SCNC: 29 MMOL/L (ref 20–32)
CREAT SERPL-MCNC: 0.79 MG/DL (ref 0.52–1.04)
CREAT UR-MCNC: 268 MG/DL
GFR SERPL CREATININE-BSD FRML MDRD: 86 ML/MIN/{1.73_M2}
GLUCOSE SERPL-MCNC: 107 MG/DL (ref 70–99)
HDLC SERPL-MCNC: 54 MG/DL
LDLC SERPL CALC-MCNC: 130 MG/DL
MICROALBUMIN UR-MCNC: 24 MG/L
MICROALBUMIN/CREAT UR: 8.88 MG/G CR (ref 0–25)
NONHDLC SERPL-MCNC: 163 MG/DL
POTASSIUM SERPL-SCNC: 4.3 MMOL/L (ref 3.4–5.3)
PROT SERPL-MCNC: 7.1 G/DL (ref 6.8–8.8)
SODIUM SERPL-SCNC: 140 MMOL/L (ref 133–144)
TRIGL SERPL-MCNC: 164 MG/DL

## 2020-09-22 DIAGNOSIS — E78.5 HYPERLIPIDEMIA LDL GOAL <130: ICD-10-CM

## 2020-09-22 RX ORDER — LOVASTATIN 40 MG
TABLET ORAL
Qty: 90 TABLET | Refills: 1 | OUTPATIENT
Start: 2020-09-22

## 2020-09-22 NOTE — TELEPHONE ENCOUNTER
Provider next week with provider. Denied Lovastatin can get at office visit in case there are any changes.       Liz TURNER RN

## 2020-09-23 DIAGNOSIS — G40.909 SEIZURE DISORDER (H): ICD-10-CM

## 2020-09-24 RX ORDER — CARBAMAZEPINE 200 MG/1
TABLET ORAL
Qty: 150 TABLET | Refills: 0 | Status: SHIPPED | OUTPATIENT
Start: 2020-09-24 | End: 2020-10-01

## 2020-09-24 NOTE — TELEPHONE ENCOUNTER
Pt called and asked for med to be filled as soon as possible, she states she is completely out and needs it today.  Advised her I would notify the clinic.  Please call [place] with any questions / concerns.

## 2020-09-24 NOTE — TELEPHONE ENCOUNTER
Has appointment next week with Dr. Marks.  Did refill at this time. Not sure if will need an adjustment in dose or recheck level....

## 2020-09-24 NOTE — TELEPHONE ENCOUNTER
Dr. Marks out of office. Patient has appt 10/1. Out of med.    Routing refill request to provider for review/approval because:  Labs out of range:  Carbamazepine level 12.9 done 9/14/2020.    Jaky Limon RN

## 2020-10-01 ENCOUNTER — OFFICE VISIT (OUTPATIENT)
Dept: FAMILY MEDICINE | Facility: CLINIC | Age: 55
End: 2020-10-01
Payer: MEDICARE

## 2020-10-01 VITALS
WEIGHT: 293 LBS | HEART RATE: 100 BPM | DIASTOLIC BLOOD PRESSURE: 84 MMHG | HEIGHT: 67 IN | BODY MASS INDEX: 45.99 KG/M2 | TEMPERATURE: 98.3 F | RESPIRATION RATE: 19 BRPM | SYSTOLIC BLOOD PRESSURE: 138 MMHG | OXYGEN SATURATION: 96 %

## 2020-10-01 DIAGNOSIS — G40.909 SEIZURE DISORDER (H): ICD-10-CM

## 2020-10-01 DIAGNOSIS — I10 ESSENTIAL HYPERTENSION, BENIGN: ICD-10-CM

## 2020-10-01 DIAGNOSIS — J30.1 SEASONAL ALLERGIC RHINITIS DUE TO POLLEN: ICD-10-CM

## 2020-10-01 DIAGNOSIS — E78.5 HYPERLIPIDEMIA LDL GOAL <130: ICD-10-CM

## 2020-10-01 DIAGNOSIS — Z12.4 ENCOUNTER FOR SCREENING FOR MALIGNANT NEOPLASM OF CERVIX: ICD-10-CM

## 2020-10-01 DIAGNOSIS — B37.2 YEAST INFECTION OF THE SKIN: ICD-10-CM

## 2020-10-01 DIAGNOSIS — K21.9 GASTROESOPHAGEAL REFLUX DISEASE WITHOUT ESOPHAGITIS: ICD-10-CM

## 2020-10-01 DIAGNOSIS — Z00.00 ENCOUNTER FOR MEDICARE ANNUAL WELLNESS EXAM: Primary | ICD-10-CM

## 2020-10-01 DIAGNOSIS — E66.01 MORBID OBESITY (H): ICD-10-CM

## 2020-10-01 DIAGNOSIS — B37.2 YEAST DERMATITIS: ICD-10-CM

## 2020-10-01 DIAGNOSIS — F43.21 ADJUSTMENT DISORDER WITH DEPRESSED MOOD: ICD-10-CM

## 2020-10-01 DIAGNOSIS — Z11.51 SCREENING FOR HUMAN PAPILLOMAVIRUS: ICD-10-CM

## 2020-10-01 PROCEDURE — G0438 PPPS, INITIAL VISIT: HCPCS | Performed by: INTERNAL MEDICINE

## 2020-10-01 PROCEDURE — G0145 SCR C/V CYTO,THINLAYER,RESCR: HCPCS | Performed by: INTERNAL MEDICINE

## 2020-10-01 PROCEDURE — 87624 HPV HI-RISK TYP POOLED RSLT: CPT | Performed by: INTERNAL MEDICINE

## 2020-10-01 PROCEDURE — 99214 OFFICE O/P EST MOD 30 MIN: CPT | Mod: 25 | Performed by: INTERNAL MEDICINE

## 2020-10-01 RX ORDER — NYSTATIN 100000 U/G
CREAM TOPICAL 2 TIMES DAILY
Qty: 60 G | Refills: 1 | Status: SHIPPED | OUTPATIENT
Start: 2020-10-01 | End: 2021-03-11

## 2020-10-01 RX ORDER — SERTRALINE HYDROCHLORIDE 100 MG/1
TABLET, FILM COATED ORAL
Qty: 90 TABLET | Refills: 1 | Status: ON HOLD | OUTPATIENT
Start: 2020-10-01 | End: 2021-03-11

## 2020-10-01 RX ORDER — AZELASTINE 1 MG/ML
1 SPRAY, METERED NASAL 2 TIMES DAILY
Qty: 30 ML | Refills: 11 | Status: SHIPPED | OUTPATIENT
Start: 2020-10-01 | End: 2021-11-11

## 2020-10-01 RX ORDER — FLUCONAZOLE 150 MG/1
150 TABLET ORAL
Qty: 4 TABLET | Refills: 1 | Status: SHIPPED | OUTPATIENT
Start: 2020-10-01 | End: 2020-10-23

## 2020-10-01 RX ORDER — LISINOPRIL 20 MG/1
20 TABLET ORAL DAILY
Qty: 90 TABLET | Refills: 1 | Status: ON HOLD | OUTPATIENT
Start: 2020-10-01 | End: 2021-05-11

## 2020-10-01 RX ORDER — CARBAMAZEPINE 200 MG/1
TABLET ORAL
Qty: 150 TABLET | Refills: 2 | Status: SHIPPED | OUTPATIENT
Start: 2020-10-01 | End: 2021-02-02

## 2020-10-01 RX ORDER — LOVASTATIN 40 MG
TABLET ORAL
Qty: 90 TABLET | Refills: 1 | Status: ON HOLD | OUTPATIENT
Start: 2020-10-01 | End: 2021-05-11

## 2020-10-01 ASSESSMENT — ENCOUNTER SYMPTOMS
NERVOUS/ANXIOUS: 0
CHILLS: 0
FEVER: 0
EYE PAIN: 0
SHORTNESS OF BREATH: 1
HEMATURIA: 0
BREAST MASS: 0
COUGH: 0
HEADACHES: 0
WEAKNESS: 0
HEARTBURN: 0
DIARRHEA: 0
ARTHRALGIAS: 1
JOINT SWELLING: 0
HEMATOCHEZIA: 0
SORE THROAT: 0
DYSURIA: 0
CONSTIPATION: 0
FREQUENCY: 0
NAUSEA: 0
ABDOMINAL PAIN: 0
MYALGIAS: 0
DIZZINESS: 0
PARESTHESIAS: 0
PALPITATIONS: 0

## 2020-10-01 ASSESSMENT — ACTIVITIES OF DAILY LIVING (ADL)
CURRENT_FUNCTION: SHOPPING REQUIRES ASSISTANCE
CURRENT_FUNCTION: TRANSPORTATION REQUIRES ASSISTANCE
CURRENT_FUNCTION: LAUNDRY REQUIRES ASSISTANCE
CURRENT_FUNCTION: MONEY MANAGEMENT REQUIRES ASSISTANCE
CURRENT_FUNCTION: HOUSEWORK REQUIRES ASSISTANCE
CURRENT_FUNCTION: PREPARING MEALS REQUIRES ASSISTANCE

## 2020-10-01 ASSESSMENT — MIFFLIN-ST. JEOR: SCORE: 2322.74

## 2020-10-01 NOTE — PATIENT INSTRUCTIONS
Patient Education   Personalized Prevention Plan  You are due for the preventive services outlined below.  Your care team is available to assist you in scheduling these services.  If you have already completed any of these items, please share that information with your care team to update in your medical record.  Health Maintenance Due   Topic Date Due     HIV Screening  05/26/1982     Colorectal Cancer Screening  07/20/2014     Annual Wellness Visit  04/24/2015     PAP Smear  04/24/2017     Zoster (Shingles) Vaccine (1 of 2) 05/26/2017     Diptheria Tetanus Pertussis (DTAP/TDAP/TD) Vaccine (3 - Td) 09/17/2019     Flu Vaccine (1) 09/01/2020

## 2020-10-01 NOTE — PROGRESS NOTES
"Chief Complaint   Patient presents with     Wellness Visit     Hypertension     Lipids     Depression         SUBJECTIVE:   Brissa Barlow is a 55 year old female who presents for Preventive Visit.      Patient has been advised of split billing requirements and indicates understanding: Yes   Are you in the first 12 months of your Medicare coverage?  No    Healthy Habits:     In general, how would you rate your overall health?  Excellent    Frequency of exercise:  None    Do you usually eat at least 4 servings of fruit and vegetables a day, include whole grains    & fiber and avoid regularly eating high fat or \"junk\" foods?  Yes    Taking medications regularly:  Yes    Medication side effects:  None    Ability to successfully perform activities of daily living:  Transportation requires assistance, shopping requires assistance, preparing meals requires assistance, housework requires assistance, laundry requires assistance and money management requires assistance    Home Safety:  No safety concerns identified    Hearing Impairment:  No hearing concerns    In the past 6 months, have you been bothered by leaking of urine?  No    In general, how would you rate your overall mental or emotional health?  Fair      PHQ-2 Total Score: 2    Additional concerns today:  Yes    Do you feel safe in your environment? Yes    Have you ever done Advance Care Planning? (For example, a Health Directive, POLST, or a discussion with a medical provider or your loved ones about your wishes): No, advance care planning information given to patient to review.  Patient declined advance care planning discussion at this time.      Fall risk  Fallen 2 or more times in the past year?: Yes  Any fall with injury in the past year?: Yes  Timed Up and Go Test (>13.5 is fall risk; contact physician) : 18    Cognitive Screening   1) Repeat 3 items (Leader, Season, Table)    2) Clock draw: ABNORMAL   3) 3 item recall: \Recalls 1 object   Results: ABNORMAL " clock, 1-2 items recalled: PROBABLE COGNITIVE IMPAIRMENT, **INFORM PROVIDER**    Mini-CogTM Copyright SORAYA Mccarty. Licensed by the author for use in [facility]; reprinted with permission ([email or web address]). All rights reserved.      Do you have sleep apnea, excessive snoring or daytime drowsiness?: yes    Reviewed and updated as needed this visit by clinical staff  Tobacco  Allergies  Meds   Med Hx  Surg Hx  Fam Hx  Soc Hx        Reviewed and updated as needed this visit by Provider                Social History     Tobacco Use     Smoking status: Never Smoker     Smokeless tobacco: Never Used   Substance Use Topics     Alcohol use: No     Alcohol/week: 0.0 standard drinks         Alcohol Use 10/1/2020   Prescreen: >3 drinks/day or >7 drinks/week? No   Prescreen: >3 drinks/day or >7 drinks/week? -           Hyperlipidemia Follow-Up      Are you regularly taking any medication or supplement to lower your cholesterol?   Yes- lovastatin    Are you having muscle aches or other side effects that you think could be caused by your cholesterol lowering medication?  No    Hypertension Follow-up      Do you check your blood pressure regularly outside of the clinic? No     Are you following a low salt diet? No    Are your blood pressures ever more than 140 on the top number (systolic) OR more   than 90 on the bottom number (diastolic), for example 140/90?     Depression and Anxiety Follow-Up    How are you doing with your depression since your last visit? No change    How are you doing with your anxiety since your last visit?  No change    Are you having other symptoms that might be associated with depression or anxiety? No    Have you had a significant life event? No     Do you have any concerns with your use of alcohol or other drugs? No    Social History     Tobacco Use     Smoking status: Never Smoker     Smokeless tobacco: Never Used   Substance Use Topics     Alcohol use: No     Alcohol/week: 0.0 standard  drinks     Drug use: No     PHQ 1/15/2019 7/24/2019 4/14/2020   PHQ-9 Total Score 0 0 0   Q9: Thoughts of better off dead/self-harm past 2 weeks Not at all Not at all Not at all     SUBHASH-7 SCORE 1/15/2019 7/24/2019 4/14/2020   Total Score - - -   Total Score 0 0 0     Last PHQ-9 4/14/2020   1.  Little interest or pleasure in doing things 0   2.  Feeling down, depressed, or hopeless 0   3.  Trouble falling or staying asleep, or sleeping too much 0   4.  Feeling tired or having little energy 0   5.  Poor appetite or overeating 0   6.  Feeling bad about yourself 0   7.  Trouble concentrating 0   8.  Moving slowly or restless 0   Q9: Thoughts of better off dead/self-harm past 2 weeks 0   PHQ-9 Total Score 0   Difficulty at work, home, or with people Not difficult at all     SUBHASH-7  4/14/2020   1. Feeling nervous, anxious, or on edge 0   2. Not being able to stop or control worrying 0   3. Worrying too much about different things 0   4. Trouble relaxing 0   5. Being so restless that it is hard to sit still 0   6. Becoming easily annoyed or irritable 0   7. Feeling afraid, as if something awful might happen 0   SUBHASH-7 Total Score 0   If you checked any problems, how difficult have they made it for you to do your work, take care of things at home, or get along with other people? Not difficult at all           Current providers sharing in care for this patient include:   Patient Care Team:  Cara Marks MD as PCP - General  Cara Marks MD as Assigned PCP  Catarina Govea APRN CNP (Clinical Nurse Specialist)  Mira Mayberry MD as MD (INTERNAL MEDICINE - ENDOCRINOLOGY, DIABETES & METABOLISM)  Rosy MEADOWS CM    The following health maintenance items are reviewed in Epic and correct as of today:  Health Maintenance   Topic Date Due     HIV SCREENING  05/26/1982     COLORECTAL CANCER SCREENING  07/20/2014     MEDICARE ANNUAL WELLNESS VISIT  04/24/2015     PAP  04/24/2017     ZOSTER IMMUNIZATION (1 of  2) 2017     DTAP/TDAP/TD IMMUNIZATION (3 - Td) 2019     INFLUENZA VACCINE (1) 2020     MAMMO SCREENING  10/23/2021     ADVANCE CARE PLANNING  2024     LIPID  2025     Pneumococcal Vaccine: 65+ Years (1 of 1 - PPSV23) 2032     PHQ-2  Completed     Pneumococcal Vaccine: Pediatrics (0 to 5 Years) and At-Risk Patients (6 to 64 Years)  Aged Out     IPV IMMUNIZATION  Aged Out     MENINGITIS IMMUNIZATION  Aged Out     HEPATITIS B IMMUNIZATION  Aged Out     Labs reviewed in EPIC  BP Readings from Last 3 Encounters:   10/01/20 138/84   10/07/19 (!) 148/76   19 120/87    Wt Readings from Last 3 Encounters:   10/01/20 (!) 168.5 kg (371 lb 8 oz)   19 (!) 164.5 kg (362 lb 9.6 oz)   01/15/19 (!) 172.3 kg (379 lb 12.8 oz)                  Patient Active Problem List   Diagnosis     Essential hypertension, benign     Delay in development     Injury, other and unspecified, knee, leg, ankle, and foot     Perforation of tympanic membrane     Hyperlipidemia LDL goal <130     Seizure disorder (H)     Other peripheral enthesopathies     IFG (impaired fasting glucose)     Insulin resistance     Elevated cortisol level     Morbid obesity (H)     Low vitamin D level     BMI 50.0-59.9, adult (H)     Past Surgical History:   Procedure Laterality Date     Abd. Injury (spleen--trauma)       ADENOIDECTOMY      Adenoidectomy     Colostomy (since reversed)  Yaphank filter placed prophylactically       Elective   Age 31     Open Pelvis Fx with Plate       ORIF for foot crushing injury  2002     Right Arthroscopic wrist surgery secondary to injury  Teens     TONSILLECTOMY      Tonsillectomy     TUBAL LIGATION NOS  2001       Social History     Tobacco Use     Smoking status: Never Smoker     Smokeless tobacco: Never Used   Substance Use Topics     Alcohol use: No     Alcohol/week: 0.0 standard drinks     Family History   Problem Relation Age of Onset     Hypertension Mother       Gastrointestinal Disease Mother         ULCERS     Diabetes Father         DIET     Hypertension Father      Lipids Father      Alzheimer Disease Maternal Grandfather      Cardiovascular Maternal Grandfather         Aneurysm     Heart Disease Maternal Grandfather      Cardiovascular Maternal Grandmother         Aneurysm     Musculoskeletal Disorder Maternal Grandmother         MS     Breast Cancer Paternal Grandmother      Cerebrovascular Disease Paternal Grandfather      Heart Disease Paternal Grandfather      Hypertension Sister      Hypertension Brother      Allergies Brother      Allergies Sister      Respiratory Brother         ASTHMA     Respiratory Sister         ASTHMA         Current Outpatient Medications   Medication Sig Dispense Refill     azelastine (ASTELIN) 0.1 % nasal spray Spray 1 spray into both nostrils 2 times daily 30 mL 11     carBAMazepine (TEGRETOL) 200 MG tablet TAKE TWO TABLETS BY MOUTH EVERY MORNING TAKE ONE TABLET BY MOUTH AT NOON AND TAKE TWO TABLETS BY MOUTH EVERY EVENING 150 tablet 0     cholecalciferol 25 MCG (1000 UT) TABS Take 1,000 Units by mouth daily       fluticasone (FLONASE) 50 MCG/ACT spray USE ONE TO TWO SPRAYS IN EACH NOSTRIL EVERY DAY 48 g 3     fluticasone (FLOVENT HFA) 110 MCG/ACT inhaler Inhale 1-2 puffs into the lungs 2 times daily 1 Inhaler 11     hydrocortisone 2.5 % cream Apply topically 2 times daily 30 g 1     lisinopril (ZESTRIL) 20 MG tablet Take 1 tablet (20 mg) by mouth daily 90 tablet 1     lovastatin (MEVACOR) 40 MG tablet TAKE ONE TABLET BY MOUTH AT BEDTIME 90 tablet 1     Multiple Vitamin (MULTI-VITAMIN PO) Take 1 tablet by mouth daily       nystatin (MYCOSTATIN) 742159 UNIT/GM external cream APPLY TOPICALLY TWO TIMES A DAY AS NEEDED. Not intended to be a chronic, daily medication. 60 g 1     omeprazole (PRILOSEC) 20 MG DR capsule TAKE TWO CAPSULES BY MOUTH EVERY DAY *TAKE 30 TO 60 MINUTES BEFORE A MEAL 180 capsule 1     sertraline (ZOLOFT) 100 MG tablet  TAKE ONE TABLET BY MOUTH ONCE DAILY (DUE FOR FOLLOW UP IN CLINIC) 90 tablet 1     Allergies   Allergen Reactions     Excedrin Back & [Acetaminophen-Aspirin Buffered]      Flu Virus Vaccine      Patient states she is allergic to the vaccine.  Got a rash all over body many years ago after receiving injection.     Hydrochlorothiazide      dehydration     Penicillins      Tetracycline      any cyclines     Azithromycin Rash     Erythromycin Rash     Zithromax [Azithromycin Dihydrate] Rash     Recent Labs   Lab Test 09/14/20  1318 11/25/19  1327 07/24/19  1654 07/24/19  1654 08/28/18  1732 06/12/18  1633 07/27/17  1435 08/23/16  1343 08/23/16  1343   A1C  --  5.3  --   --   --   --  5.3  --  5.3   *  --   --  131*  --  89  --    < >  --    HDL 54  --   --  45*  --  48*  --    < >  --    TRIG 164*  --   --  220*  --  338*  --    < >  --    ALT 23 31  --  32 28 35  --    < > 38   CR 0.79 0.76   < > 0.74 0.80 0.68  --    < > 0.78   GFRESTIMATED 86 90   < > >90 76 >90  --    < > 79   GFRESTBLACK >90 >90   < > >90 >90 >90  --    < > >90   GFR Calc     POTASSIUM 4.3 4.4   < > 4.3 3.7 4.2  --    < > 4.2   TSH  --   --   --   --  2.00  --  3.04  --  3.95    < > = values in this interval not displayed.      Mammogram Screening: Mammogram Screening: Patient over age 50, mutual decision to screen reflected in health maintenance.    Review of Systems   Constitutional: Negative for chills and fever.   HENT: Positive for hearing loss (thinks it may be wax). Negative for congestion, ear pain and sore throat.    Eyes: Negative for pain and visual disturbance.   Respiratory: Negative for cough.         Smoke from neighbors can periodically cause SOB; OK to walk without SOB   Cardiovascular: Negative for chest pain, palpitations and peripheral edema.   Gastrointestinal: Negative for abdominal pain, constipation, diarrhea, heartburn, hematochezia and nausea.        Using PPI   Breasts:  Negative for tenderness,  "breast mass and discharge.   Genitourinary: Negative for dysuria, frequency, genital sores, hematuria, pelvic pain, urgency, vaginal bleeding and vaginal discharge.   Musculoskeletal: Positive for arthralgias. Negative for joint swelling and myalgias.   Skin: Negative for rash.   Neurological: Negative for dizziness, weakness, headaches and paresthesias.        Seizure disorder; none sice age 18, taking medications regularly.    Psychiatric/Behavioral: Negative for mood changes. The patient is not nervous/anxious.         Developmental delay; guardian; receives government resources.          OBJECTIVE:   /84   Pulse 100   Temp 98.3  F (36.8  C) (Oral)   Resp 19   Ht 1.702 m (5' 7\")   Wt (!) 168.5 kg (371 lb 8 oz)   LMP 2008   SpO2 96%   BMI 58.19 kg/m   Estimated body mass index is 58.19 kg/m  as calculated from the following:    Height as of this encounter: 1.702 m (5' 7\").    Weight as of this encounter: 168.5 kg (371 lb 8 oz).  Physical Exam  {Exam (Optional) :728139}      Skin: erythema, yeast; skin folds under breasts, pannus, inguinal areas- see pictures.            Diagnostic Test Results:  Labs reviewed in Epic    ASSESSMENT / PLAN:   {[facility] Picklist:341974}    Patient has been advised of split billing requirements and indicates understanding: {YES / NO:794409::\"Yes\"}  COUNSELING:  {Medicare Counselin}    Estimated body mass index is 58.19 kg/m  as calculated from the following:    Height as of this encounter: 1.702 m (5' 7\").    Weight as of this encounter: 168.5 kg (371 lb 8 oz).    {Weight Management Plan (ACO) Complete if BMI is abnormal-  Ages 18-64  BMI >24.9.  Age 65+ with BMI <23 or >30 (Optional):790466}    She reports that she has never smoked. She has never used smokeless tobacco.      Appropriate preventive services were discussed with this patient, including applicable screening as appropriate for cardiovascular disease, diabetes, osteopenia/osteoporosis, and glaucoma. "  As appropriate for age/gender, discussed screening for colorectal cancer, prostate cancer, breast cancer, and cervical cancer. Checklist reviewing preventive services available has been given to the patient.    Reviewed patients plan of care and provided an AVS. The {CarePlan:489623} for [place] meets the Care Plan requirement. This Care Plan has been established and reviewed with the {PATIENT, FAMILY MEMBER, CAREGIVER:335186}.    Counseling Resources:  ATP IV Guidelines  Pooled Cohorts Equation Calculator  Breast Cancer Risk Calculator  Breast Cancer: Medication to Reduce Risk  FRAX Risk Assessment  ICSI Preventive Guidelines  Dietary Guidelines for Americans, 2010  [email or web address]'s MyPlate  ASA Prophylaxis  Lung CA Screening    Cara Marks MD  Internal Medicine   [facility] [place]    11:01 AM chart review and see patient  11:54 AM exam completed  Counseling, review of medications, labs and plan of care.   LPN returned to irrigate ears      Identified Health Risks:

## 2020-10-05 NOTE — PROGRESS NOTES
"Chief Complaint   Patient presents with     Wellness Visit     Hypertension     Lipids     Depression         SUBJECTIVE:   Brissa Barlow is a 55 year old female who presents for Preventive Visit.      Patient has been advised of split billing requirements and indicates understanding: Yes   Are you in the first 12 months of your Medicare coverage?  No    Healthy Habits:     In general, how would you rate your overall health?  Excellent    Frequency of exercise:  None    Do you usually eat at least 4 servings of fruit and vegetables a day, include whole grains    & fiber and avoid regularly eating high fat or \"junk\" foods?  Yes    Taking medications regularly:  Yes    Medication side effects:  None    Ability to successfully perform activities of daily living:  Transportation requires assistance, shopping requires assistance, preparing meals requires assistance, housework requires assistance, laundry requires assistance and money management requires assistance    Home Safety:  No safety concerns identified    Hearing Impairment:  No hearing concerns    In the past 6 months, have you been bothered by leaking of urine?  No    In general, how would you rate your overall mental or emotional health?  Fair      PHQ-2 Total Score: 2    Additional concerns today:  Yes    Do you feel safe in your environment? Yes    Have you ever done Advance Care Planning? (For example, a Health Directive, POLST, or a discussion with a medical provider or your loved ones about your wishes): No, advance care planning information given to patient to review.  Patient declined advance care planning discussion at this time.      Fall risk  Fallen 2 or more times in the past year?: Yes  Any fall with injury in the past year?: Yes  Timed Up and Go Test (>13.5 is fall risk; contact physician) : 18    Cognitive Screening   1) Repeat 3 items (Leader, Season, Table)    2) Clock draw: ABNORMAL   3) 3 item recall: \Recalls 1 object   Results: ABNORMAL " clock, 1-2 items recalled: PROBABLE COGNITIVE IMPAIRMENT, **INFORM PROVIDER**    Mini-CogTM Copyright SORAYA Mccarty. Licensed by the author for use in [facility]; reprinted with permission ([email or web address]). All rights reserved.      Do you have sleep apnea, excessive snoring or daytime drowsiness?: yes    Reviewed and updated as needed this visit by clinical staff  Tobacco  Allergies  Meds  Problems  Med Hx  Surg Hx  Fam Hx  Soc Hx          Reviewed and updated as needed this visit by Provider  Tobacco  Allergies  Meds  Problems  Med Hx  Surg Hx  Fam Hx         Social History     Tobacco Use     Smoking status: Never Smoker     Smokeless tobacco: Never Used   Substance Use Topics     Alcohol use: No     Alcohol/week: 0.0 standard drinks         Alcohol Use 10/1/2020   Prescreen: >3 drinks/day or >7 drinks/week? No   Prescreen: >3 drinks/day or >7 drinks/week? -           Hyperlipidemia Follow-Up      Are you regularly taking any medication or supplement to lower your cholesterol?   Yes- lovastatin    Are you having muscle aches or other side effects that you think could be caused by your cholesterol lowering medication?  No    Hypertension Follow-up      Do you check your blood pressure regularly outside of the clinic? No     Are you following a low salt diet? No    Are your blood pressures ever more than 140 on the top number (systolic) OR more   than 90 on the bottom number (diastolic), for example 140/90?     Depression and Anxiety Follow-Up    How are you doing with your depression since your last visit? No change    How are you doing with your anxiety since your last visit?  No change    Are you having other symptoms that might be associated with depression or anxiety? No    Have you had a significant life event? No     Do you have any concerns with your use of alcohol or other drugs? No    Social History     Tobacco Use     Smoking status: Never Smoker     Smokeless tobacco: Never Used    Substance Use Topics     Alcohol use: No     Alcohol/week: 0.0 standard drinks     Drug use: No     PHQ 1/15/2019 7/24/2019 4/14/2020   PHQ-9 Total Score 0 0 0   Q9: Thoughts of better off dead/self-harm past 2 weeks Not at all Not at all Not at all     SUBHASH-7 SCORE 1/15/2019 7/24/2019 4/14/2020   Total Score - - -   Total Score 0 0 0     Last PHQ-9 4/14/2020   1.  Little interest or pleasure in doing things 0   2.  Feeling down, depressed, or hopeless 0   3.  Trouble falling or staying asleep, or sleeping too much 0   4.  Feeling tired or having little energy 0   5.  Poor appetite or overeating 0   6.  Feeling bad about yourself 0   7.  Trouble concentrating 0   8.  Moving slowly or restless 0   Q9: Thoughts of better off dead/self-harm past 2 weeks 0   PHQ-9 Total Score 0   Difficulty at work, home, or with people Not difficult at all     SUBHASH-7  4/14/2020   1. Feeling nervous, anxious, or on edge 0   2. Not being able to stop or control worrying 0   3. Worrying too much about different things 0   4. Trouble relaxing 0   5. Being so restless that it is hard to sit still 0   6. Becoming easily annoyed or irritable 0   7. Feeling afraid, as if something awful might happen 0   SUBHASH-7 Total Score 0   If you checked any problems, how difficult have they made it for you to do your work, take care of things at home, or get along with other people? Not difficult at all           Current providers sharing in care for this patient include:   Patient Care Team:  Cara Marks MD as PCP - General  Cara Marks MD as Assigned PCP  Catarina Govea APRN CNP (Clinical Nurse Specialist)  Mira Mayberry MD as MD (INTERNAL MEDICINE - ENDOCRINOLOGY, DIABETES & METABOLISM)  Rosy MEADOWS CM    The following health maintenance items are reviewed in Epic and correct as of today:  Health Maintenance   Topic Date Due     HIV SCREENING  05/26/1982     COLORECTAL CANCER SCREENING  07/20/2014     ZOSTER  IMMUNIZATION (1 of 2) 2017     DTAP/TDAP/TD IMMUNIZATION (3 - Td) 2019     INFLUENZA VACCINE (1) 2020     MEDICARE ANNUAL WELLNESS VISIT  10/01/2021     MAMMO SCREENING  10/23/2021     HPV TEST  10/01/2023     PAP  10/01/2023     LIPID  2025     ADVANCE CARE PLANNING  10/01/2025     PHQ-2  Completed     Pneumococcal Vaccine: Pediatrics (0 to 5 Years) and At-Risk Patients (6 to 64 Years)  Aged Out     IPV IMMUNIZATION  Aged Out     MENINGITIS IMMUNIZATION  Aged Out     HEPATITIS B IMMUNIZATION  Aged Out     Labs reviewed in EPIC  BP Readings from Last 3 Encounters:   10/01/20 138/84   10/07/19 (!) 148/76   19 120/87    Wt Readings from Last 3 Encounters:   10/01/20 (!) 168.5 kg (371 lb 8 oz)   19 (!) 164.5 kg (362 lb 9.6 oz)   01/15/19 (!) 172.3 kg (379 lb 12.8 oz)                  Patient Active Problem List   Diagnosis     Essential hypertension, benign     Delay in development     Injury, other and unspecified, knee, leg, ankle, and foot     Perforation of tympanic membrane     Hyperlipidemia LDL goal <130     Seizure disorder (H)     Other peripheral enthesopathies     IFG (impaired fasting glucose)     Insulin resistance     Elevated cortisol level     Morbid obesity (H)     Low vitamin D level     BMI 50.0-59.9, adult (H)     Past Surgical History:   Procedure Laterality Date     Abd. Injury (spleen--trauma)       ADENOIDECTOMY      Adenoidectomy     Colostomy (since reversed)  Joaquin filter placed prophylactically       Elective   Age 31     Open Pelvis Fx with Plate       ORIF for foot crushing injury  2002     Right Arthroscopic wrist surgery secondary to injury  Teens     TONSILLECTOMY      Tonsillectomy     TUBAL LIGATION NOS  2001       Social History     Tobacco Use     Smoking status: Never Smoker     Smokeless tobacco: Never Used   Substance Use Topics     Alcohol use: No     Alcohol/week: 0.0 standard drinks     Family History   Problem  Relation Age of Onset     Hypertension Mother      Gastrointestinal Disease Mother         ULCERS     Diabetes Father         DIET     Hypertension Father      Lipids Father      Alzheimer Disease Maternal Grandfather      Cardiovascular Maternal Grandfather         Aneurysm     Heart Disease Maternal Grandfather      Cardiovascular Maternal Grandmother         Aneurysm     Musculoskeletal Disorder Maternal Grandmother         MS     Breast Cancer Paternal Grandmother      Cerebrovascular Disease Paternal Grandfather      Heart Disease Paternal Grandfather      Hypertension Sister      Hypertension Brother      Allergies Brother      Allergies Sister      Respiratory Brother         ASTHMA     Respiratory Sister         ASTHMA         Current Outpatient Medications   Medication Sig Dispense Refill     azelastine (ASTELIN) 0.1 % nasal spray Spray 1 spray into both nostrils 2 times daily 30 mL 11     carBAMazepine (TEGRETOL) 200 MG tablet TAKE TWO TABLETS BY MOUTH EVERY MORNING TAKE ONE TABLET BY MOUTH AT NOON AND TAKE TWO TABLETS BY MOUTH EVERY EVENING 150 tablet 2     cholecalciferol 25 MCG (1000 UT) TABS Take 1,000 Units by mouth daily       fluconazole (DIFLUCAN) 150 MG tablet Take 1 tablet (150 mg) by mouth every 7 days for 4 doses 4 tablet 1     fluticasone (FLONASE) 50 MCG/ACT spray USE ONE TO TWO SPRAYS IN EACH NOSTRIL EVERY DAY 48 g 3     fluticasone (FLOVENT HFA) 110 MCG/ACT inhaler Inhale 1-2 puffs into the lungs 2 times daily 1 Inhaler 11     hydrocortisone 2.5 % cream Apply topically 2 times daily 30 g 1     lisinopril (ZESTRIL) 20 MG tablet Take 1 tablet (20 mg) by mouth daily 90 tablet 1     lovastatin (MEVACOR) 40 MG tablet TAKE ONE TABLET BY MOUTH AT BEDTIME 90 tablet 1     Multiple Vitamin (MULTI-VITAMIN PO) Take 1 tablet by mouth daily       nystatin (MYCOSTATIN) 088781 UNIT/GM external cream Apply topically 2 times daily 60 g 1     nystatin (MYCOSTATIN) 528175 UNIT/GM external cream APPLY TOPICALLY  TWO TIMES A DAY AS NEEDED. Not intended to be a chronic, daily medication. 60 g 1     omeprazole (PRILOSEC) 20 MG DR capsule TAKE TWO CAPSULES BY MOUTH EVERY DAY *TAKE 30 TO 60 MINUTES BEFORE A MEAL 180 capsule 1     sertraline (ZOLOFT) 100 MG tablet TAKE ONE TABLET BY MOUTH ONCE DAILY (DUE FOR FOLLOW UP IN CLINIC) 90 tablet 1     Allergies   Allergen Reactions     Excedrin Back & [Acetaminophen-Aspirin Buffered]      Flu Virus Vaccine      Patient states she is allergic to the vaccine.  Got a rash all over body many years ago after receiving injection.     Hydrochlorothiazide      dehydration     Penicillins      Tetracycline      any cyclines     Azithromycin Rash     Erythromycin Rash     Zithromax [Azithromycin Dihydrate] Rash     Recent Labs   Lab Test 09/14/20  1318 11/25/19  1327 07/24/19  1654 07/24/19  1654 08/28/18  1732 06/12/18  1633 07/27/17  1435 08/23/16  1343 08/23/16  1343   A1C  --  5.3  --   --   --   --  5.3  --  5.3   *  --   --  131*  --  89  --    < >  --    HDL 54  --   --  45*  --  48*  --    < >  --    TRIG 164*  --   --  220*  --  338*  --    < >  --    ALT 23 31  --  32 28 35  --    < > 38   CR 0.79 0.76   < > 0.74 0.80 0.68  --    < > 0.78   GFRESTIMATED 86 90   < > >90 76 >90  --    < > 79   GFRESTBLACK >90 >90   < > >90 >90 >90  --    < > >90   GFR Calc     POTASSIUM 4.3 4.4   < > 4.3 3.7 4.2  --    < > 4.2   TSH  --   --   --   --  2.00  --  3.04  --  3.95    < > = values in this interval not displayed.      Mammogram Screening: Mammogram Screening: Patient over age 50, mutual decision to screen reflected in health maintenance.    Review of Systems   Constitutional: Negative for chills and fever.   HENT: Positive for hearing loss (thinks it may be wax). Negative for congestion, ear pain and sore throat.    Eyes: Negative for pain and visual disturbance.   Respiratory: Negative for cough.         Smoke from neighbors can periodically cause SOB; OK to walk without  "SOB   Cardiovascular: Negative for chest pain, palpitations and peripheral edema.   Gastrointestinal: Negative for abdominal pain, constipation, diarrhea, heartburn, hematochezia and nausea.        Using PPI   Breasts:  Negative for tenderness, breast mass and discharge.   Genitourinary: Negative for dysuria, frequency, genital sores, hematuria, pelvic pain, urgency, vaginal bleeding and vaginal discharge.   Musculoskeletal: Positive for arthralgias. Negative for joint swelling and myalgias.   Skin: Negative for rash.   Neurological: Negative for dizziness, weakness, headaches and paresthesias.        Seizure disorder; none sice age 18, taking medications regularly.    Psychiatric/Behavioral: Negative for mood changes. The patient is not nervous/anxious.         Developmental delay; guardian; receives government resources.          OBJECTIVE:   /84   Pulse 100   Temp 98.3  F (36.8  C) (Oral)   Resp 19   Ht 1.702 m (5' 7\")   Wt (!) 168.5 kg (371 lb 8 oz)   LMP 08/18/2008   SpO2 96%   BMI 58.19 kg/m   Estimated body mass index is 58.19 kg/m  as calculated from the following:    Height as of this encounter: 1.702 m (5' 7\").    Weight as of this encounter: 168.5 kg (371 lb 8 oz).  Physical Exam  GENERAL: healthy, alert and no distress  EYES: Eyes grossly normal to inspection  HENT: normal cephalic/atraumatic, both ears: canal irritated, dry blood noted bilaterally ( pt notes recent Q-Tip use), scattered cerumen noted, may be down next to TM; TM in tact, bilaterally; cerumen involvement  With failed, nose and mouth without ulcers or lesions, oropharynx clear and oral mucous membranes moist  NECK: no adenopathy, no asymmetry, masses, or scars and thyroid normal to palpation  RESP: lungs clear to auscultation - no rales, rhonchi or wheezes  CV: regular rates and rhythm, normal S1 S2, no S3 or S4, peripheral pulses strong and no peripheral edema  ABDOMEN: soft, nontender, bowel sounds normal    (female): " normal female external genitalia, normal urethral meatus , vaginal mucosa pink, moist, well rugated, normal cervix, adnexae, and uterus without masses. and  Pap technically difficult  MS: right knee immobilizer ( 15 + years)  no edema  SKIN: skin against skin involving under breast, pannus, bilateral inguinal skin; very red, almost burn like with ulceration in skin fold. No blisters  NEURO: Normal strength and tone, sensory exam grossly normal and cognitive delay  PSYCH: concentration poor and affect normal/bright      Skin: erythema, yeast; skin folds under breasts, pannus, inguinal areas- see pictures.            Diagnostic Test Results:  Labs reviewed in Epic    ASSESSMENT / PLAN:   (Z00.00) Encounter for Medicare annual wellness exam  (primary encounter diagnosis)  Comment: HEALTH CARE MAINTENANCE reviewed  Plan:     (G40.909) Seizure disorder (H)  Comment: No seizures since age 18; doing well on Tegretol; 2013 labs reviewed  Tegretol level discussed with pt and apparently does NOT reflect a true trough level. [place] reports taking medication on the AM of recent labs.  Plan: carBAMazepine (TEGRETOL) 200 MG tablet          (I10) BENIGN HYPERTENSION  Comment:   BP Readings from Last 3 Encounters:   10/01/20 138/84   10/07/19 (!) 148/76   07/24/19 120/87    improved control; renal function and electrolytes are well controlled.   Plan: lisinopril (ZESTRIL) 20 MG tablet         (E78.5) Hyperlipidemia LDL goal <130  Comment: yearly cholesterol; fasting lab work reveiwed  Lab Results   Component Value Date     09/14/2020     07/24/2019     Plan: lovastatin (MEVACOR) 40 MG tablet          (F43.21) Adjustment disorder with depressed mood  Comment:   PHQ 1/15/2019 7/24/2019 4/14/2020   PHQ-9 Total Score 0 0 0   Q9: Thoughts of better off dead/self-harm past 2 weeks Not at all Not at all Not at all     Plan: sertraline (ZOLOFT) 100 MG tablet          (J30.1) Seasonal allergic rhinitis due to  pollen  Comment: allergies reviewed;  Reviewed nasal spray; spray well tolerated   Plan: azelastine (ASTELIN) 0.1 % nasal spray          (K21.9) Gastroesophageal reflux disease without esophagitis  Comment: Pt reminded of benefits of maximizing calcium and vit D to help with bone health (due to being on PPI)   Use of PPI reviewed  Plan: omeprazole (PRILOSEC) 20 MG DR capsule          (Z11.51) Screening for human papillomavirus  Comment: pap  Plan: Pap imaged thin layer screen with HPV -         recommended age 30 - 65 years (select HPV order        below), HPV High Risk Types DNA Cervical          (Z12.4) Encounter for screening for malignant neoplasm of cervix   Comment: pap reviewed; technically difficult to obtain due to weight   Body mass index is 58.19 kg/m .   Plan: Pap imaged thin layer screen with HPV -         recommended age 30 - 65 years (select HPV order        below)          (B37.2) Yeast dermatitis  Comment: significant skin involvement due to weight, skin against skin involving under breast, pannus, bilateral inguinal skin;  See photos;   Due to severity of skin involvement, best treat with topical Nystatin cream ( often prefer powder to keep area dry) previously recommended consideration for [employer]- financial limitations;  Also recommended weekly Diflucan for 6 weeks to aid in helping clear up significant yeast involvement.   Plan: nystatin (MYCOSTATIN) 183488 UNIT/GM external         cream, fluconazole (DIFLUCAN) 150 MG tablet          (Z68.43) BMI 50.0-59.9, adult (H)  Comment: BM>58 significant morbid obesity  Plan:     (E66.01) Morbid obesity (H)  Comment: BMI >58  Body mass index is 58.19 kg/m .   Plan: keep active; monitor dietary intake.     Patient has been advised of split billing requirements and indicates understanding: Yes  COUNSELING:       Regular exercise       Healthy diet/nutrition    Estimated body mass index is 58.19 kg/m  as calculated from the following:    Height as of this  "encounter: 1.702 m (5' 7\").    Weight as of this encounter: 168.5 kg (371 lb 8 oz).    Weight management plan: Discussed healthy diet and exercise guidelines    She reports that she has never smoked. She has never used smokeless tobacco.      Appropriate preventive services were discussed with this patient, including applicable screening as appropriate for cardiovascular disease, diabetes, osteopenia/osteoporosis, and glaucoma.  As appropriate for age/gender, discussed screening for colorectal cancer, prostate cancer, breast cancer, and cervical cancer. Checklist reviewing preventive services available has been given to the patient.    Reviewed patients plan of care and provided an AVS. The Plan of Care for [place] meets the Care Plan requirement. This Care Plan has been established and reviewed with the Patient.    Counseling Resources:  ATP IV Guidelines  Pooled Cohorts Equation Calculator  Breast Cancer Risk Calculator  Breast Cancer: Medication to Reduce Risk  FRAX Risk Assessment  ICSI Preventive Guidelines  Dietary Guidelines for Americans, 2010  [employer]'s MyPlate  ASA Prophylaxis  Lung CA Screening    Cara Marks MD  Internal Medicine   [facility]    25 minutes in addition to HEALTH CARE MAINTENANCE are spent with patient, over 50% of that time spent providing counselling, discussing and reviewing medical conditions/concerns, meds and potential side effects.      11:01 AM chart review and see patient  11:54 AM exam completed  Counseling, review of medications, labs and plan of care.   LPN returned to irrigate ears      Identified Health Risks:  Answers for HPI/ROS submitted by the patient on 10/1/2020   Annual Exam:  In general, how would you rate your overall physical health?: excellent  Frequency of exercise:: None  Do you usually eat at least 4 servings of fruit and vegetables a day, include whole grains & fiber, and avoid regularly eating high fat or \"junk\" foods? : " Yes  Taking medications regularly:: Yes  Medication side effects:: None  Activities of Daily Living: transportation requires assistance, shopping requires assistance, preparing meals requires assistance, housework requires assistance, laundry requires assistance, money management requires assistance  Home safety: no safety concerns identified  Hearing Impairment:: no hearing concerns  In the past 6 months, have you been bothered by leaking of urine?: No  abdominal pain: No  Blood in stool: No  Blood in urine: No  chest pain: No  chills: No  congestion: No  constipation: No  cough: No  diarrhea: No  dizziness: No  ear pain: No  eye pain: No  nervous/anxious: No  fever: No  frequency: No  genital sores: No  headaches: No  hearing loss: Yes  heartburn: No  arthralgias: Yes  joint swelling: No  peripheral edema: No  mood changes: No  myalgias: No  nausea: No  dysuria: No  palpitations: No  Skin sensation changes: No  sore throat: No  urgency: No  rash: No  shortness of breath: Yes  visual disturbance: No  weakness: No  pelvic pain: No  vaginal bleeding: No  vaginal discharge: No  tenderness: No  breast mass: No  breast discharge: No  In general, how would you rate your overall mental or emotional health?: fair  Additional concerns today:: Yes

## 2020-10-07 LAB
COPATH REPORT: NORMAL
PAP: NORMAL

## 2020-10-08 LAB
FINAL DIAGNOSIS: NORMAL
HPV HR 12 DNA CVX QL NAA+PROBE: NEGATIVE
HPV16 DNA SPEC QL NAA+PROBE: NEGATIVE
HPV18 DNA SPEC QL NAA+PROBE: NEGATIVE
SPECIMEN DESCRIPTION: NORMAL
SPECIMEN SOURCE CVX/VAG CYTO: NORMAL

## 2021-01-14 ENCOUNTER — HEALTH MAINTENANCE LETTER (OUTPATIENT)
Age: 56
End: 2021-01-14

## 2021-02-01 DIAGNOSIS — G40.909 SEIZURE DISORDER (H): ICD-10-CM

## 2021-02-02 RX ORDER — CARBAMAZEPINE 200 MG/1
TABLET ORAL
Qty: 150 TABLET | Refills: 2 | Status: SHIPPED | OUTPATIENT
Start: 2021-02-02 | End: 2021-02-26

## 2021-02-02 NOTE — TELEPHONE ENCOUNTER
carBAMazepine (TEGRETOL) 200 MG tablet  Last Written Prescription Date:  10/1/20  Last Fill Quantity: 150,   # refills: 2  Last Office Visit: 10/1/20  Future Office visit:       Routing refill request to provider for review/approval because:  Carbamazepine level within therapeutic range in last 26 months    Shirley Arechiga RN on 2/2/2021 at 2:18 PM

## 2021-02-26 DIAGNOSIS — G40.909 SEIZURE DISORDER (H): ICD-10-CM

## 2021-02-26 RX ORDER — CARBAMAZEPINE 200 MG/1
TABLET ORAL
Qty: 450 TABLET | Refills: 0 | Status: ON HOLD | OUTPATIENT
Start: 2021-02-26 | End: 2021-05-11

## 2021-02-26 NOTE — TELEPHONE ENCOUNTER
Patient needs 3 month supply due to insurance. Prescription written 2/2/2021 was for 1 month and 2 refills. Patient has already picked up 1 month.    Please sign prescription if approved for 3 month supply.    Jaky Limon RN

## 2021-03-11 ENCOUNTER — APPOINTMENT (OUTPATIENT)
Dept: CT IMAGING | Facility: CLINIC | Age: 56
DRG: 389 | End: 2021-03-11
Attending: EMERGENCY MEDICINE
Payer: MEDICARE

## 2021-03-11 ENCOUNTER — HOSPITAL ENCOUNTER (INPATIENT)
Facility: CLINIC | Age: 56
LOS: 4 days | Discharge: HOME HEALTH | DRG: 389 | End: 2021-03-15
Attending: EMERGENCY MEDICINE | Admitting: INTERNAL MEDICINE
Payer: MEDICARE

## 2021-03-11 ENCOUNTER — APPOINTMENT (OUTPATIENT)
Dept: GENERAL RADIOLOGY | Facility: CLINIC | Age: 56
DRG: 389 | End: 2021-03-11
Attending: SURGERY
Payer: MEDICARE

## 2021-03-11 DIAGNOSIS — R11.2 NON-INTRACTABLE VOMITING WITH NAUSEA, UNSPECIFIED VOMITING TYPE: ICD-10-CM

## 2021-03-11 DIAGNOSIS — K56.609 SBO (SMALL BOWEL OBSTRUCTION) (H): ICD-10-CM

## 2021-03-11 DIAGNOSIS — R53.81 PHYSICAL DECONDITIONING: Primary | ICD-10-CM

## 2021-03-11 LAB
ALBUMIN SERPL-MCNC: 3.3 G/DL (ref 3.4–5)
ALP SERPL-CCNC: 113 U/L (ref 40–150)
ALT SERPL W P-5'-P-CCNC: 33 U/L (ref 0–50)
ANION GAP SERPL CALCULATED.3IONS-SCNC: 7 MMOL/L (ref 3–14)
AST SERPL W P-5'-P-CCNC: 33 U/L (ref 0–45)
BASOPHILS # BLD AUTO: 0.1 10E9/L (ref 0–0.2)
BASOPHILS NFR BLD AUTO: 0.5 %
BILIRUB SERPL-MCNC: 0.3 MG/DL (ref 0.2–1.3)
BUN SERPL-MCNC: 13 MG/DL (ref 7–30)
CALCIUM SERPL-MCNC: 8.4 MG/DL (ref 8.5–10.1)
CHLORIDE SERPL-SCNC: 103 MMOL/L (ref 94–109)
CO2 SERPL-SCNC: 26 MMOL/L (ref 20–32)
CREAT SERPL-MCNC: 0.72 MG/DL (ref 0.52–1.04)
DIFFERENTIAL METHOD BLD: ABNORMAL
EOSINOPHIL # BLD AUTO: 0.1 10E9/L (ref 0–0.7)
EOSINOPHIL NFR BLD AUTO: 0.9 %
ERYTHROCYTE [DISTWIDTH] IN BLOOD BY AUTOMATED COUNT: 12.7 % (ref 10–15)
GFR SERPL CREATININE-BSD FRML MDRD: >90 ML/MIN/{1.73_M2}
GLUCOSE SERPL-MCNC: 134 MG/DL (ref 70–99)
HCT VFR BLD AUTO: 46.4 % (ref 35–47)
HGB BLD-MCNC: 14.3 G/DL (ref 11.7–15.7)
IMM GRANULOCYTES # BLD: 0 10E9/L (ref 0–0.4)
IMM GRANULOCYTES NFR BLD: 0.4 %
LABORATORY COMMENT REPORT: NORMAL
LACTATE BLD-SCNC: 1.3 MMOL/L (ref 0.7–2)
LIPASE SERPL-CCNC: 63 U/L (ref 73–393)
LYMPHOCYTES # BLD AUTO: 1.5 10E9/L (ref 0.8–5.3)
LYMPHOCYTES NFR BLD AUTO: 13.4 %
MCH RBC QN AUTO: 29.2 PG (ref 26.5–33)
MCHC RBC AUTO-ENTMCNC: 30.8 G/DL (ref 31.5–36.5)
MCV RBC AUTO: 95 FL (ref 78–100)
MONOCYTES # BLD AUTO: 0.8 10E9/L (ref 0–1.3)
MONOCYTES NFR BLD AUTO: 6.9 %
NEUTROPHILS # BLD AUTO: 8.5 10E9/L (ref 1.6–8.3)
NEUTROPHILS NFR BLD AUTO: 77.9 %
NRBC # BLD AUTO: 0 10*3/UL
NRBC BLD AUTO-RTO: 0 /100
PLATELET # BLD AUTO: 248 10E9/L (ref 150–450)
POTASSIUM SERPL-SCNC: 4.5 MMOL/L (ref 3.4–5.3)
PROT SERPL-MCNC: 7.2 G/DL (ref 6.8–8.8)
RBC # BLD AUTO: 4.9 10E12/L (ref 3.8–5.2)
SARS-COV-2 RNA RESP QL NAA+PROBE: NEGATIVE
SODIUM SERPL-SCNC: 136 MMOL/L (ref 133–144)
SPECIMEN SOURCE: NORMAL
WBC # BLD AUTO: 10.9 10E9/L (ref 4–11)

## 2021-03-11 PROCEDURE — 96375 TX/PRO/DX INJ NEW DRUG ADDON: CPT

## 2021-03-11 PROCEDURE — 120N000001 HC R&B MED SURG/OB

## 2021-03-11 PROCEDURE — 99253 IP/OBS CNSLTJ NEW/EST LOW 45: CPT | Performed by: SURGERY

## 2021-03-11 PROCEDURE — 85025 COMPLETE CBC W/AUTO DIFF WBC: CPT | Performed by: EMERGENCY MEDICINE

## 2021-03-11 PROCEDURE — 74176 CT ABD & PELVIS W/O CONTRAST: CPT

## 2021-03-11 PROCEDURE — 250N000011 HC RX IP 250 OP 636: Performed by: INTERNAL MEDICINE

## 2021-03-11 PROCEDURE — 99285 EMERGENCY DEPT VISIT HI MDM: CPT | Mod: 25

## 2021-03-11 PROCEDURE — 258N000003 HC RX IP 258 OP 636: Performed by: INTERNAL MEDICINE

## 2021-03-11 PROCEDURE — 258N000003 HC RX IP 258 OP 636: Performed by: EMERGENCY MEDICINE

## 2021-03-11 PROCEDURE — 96374 THER/PROPH/DIAG INJ IV PUSH: CPT

## 2021-03-11 PROCEDURE — 74018 RADEX ABDOMEN 1 VIEW: CPT

## 2021-03-11 PROCEDURE — C9803 HOPD COVID-19 SPEC COLLECT: HCPCS

## 2021-03-11 PROCEDURE — 83690 ASSAY OF LIPASE: CPT | Performed by: EMERGENCY MEDICINE

## 2021-03-11 PROCEDURE — 87635 SARS-COV-2 COVID-19 AMP PRB: CPT | Performed by: EMERGENCY MEDICINE

## 2021-03-11 PROCEDURE — 99223 1ST HOSP IP/OBS HIGH 75: CPT | Mod: AI | Performed by: INTERNAL MEDICINE

## 2021-03-11 PROCEDURE — 80053 COMPREHEN METABOLIC PANEL: CPT | Performed by: EMERGENCY MEDICINE

## 2021-03-11 PROCEDURE — 250N000013 HC RX MED GY IP 250 OP 250 PS 637: Performed by: HOSPITALIST

## 2021-03-11 PROCEDURE — 250N000011 HC RX IP 250 OP 636: Performed by: EMERGENCY MEDICINE

## 2021-03-11 PROCEDURE — 83605 ASSAY OF LACTIC ACID: CPT | Performed by: EMERGENCY MEDICINE

## 2021-03-11 RX ORDER — CARBAMAZEPINE 200 MG/1
400 TABLET ORAL 2 TIMES DAILY
Status: DISCONTINUED | OUTPATIENT
Start: 2021-03-11 | End: 2021-03-15 | Stop reason: HOSPADM

## 2021-03-11 RX ORDER — PROCHLORPERAZINE MALEATE 5 MG
10 TABLET ORAL EVERY 6 HOURS PRN
Status: DISCONTINUED | OUTPATIENT
Start: 2021-03-11 | End: 2021-03-15 | Stop reason: HOSPADM

## 2021-03-11 RX ORDER — NALOXONE HYDROCHLORIDE 0.4 MG/ML
0.2 INJECTION, SOLUTION INTRAMUSCULAR; INTRAVENOUS; SUBCUTANEOUS
Status: DISCONTINUED | OUTPATIENT
Start: 2021-03-11 | End: 2021-03-15 | Stop reason: HOSPADM

## 2021-03-11 RX ORDER — ONDANSETRON 2 MG/ML
4 INJECTION INTRAMUSCULAR; INTRAVENOUS EVERY 6 HOURS PRN
Status: DISCONTINUED | OUTPATIENT
Start: 2021-03-11 | End: 2021-03-15 | Stop reason: HOSPADM

## 2021-03-11 RX ORDER — ONDANSETRON 2 MG/ML
8 INJECTION INTRAMUSCULAR; INTRAVENOUS
Status: COMPLETED | OUTPATIENT
Start: 2021-03-11 | End: 2021-03-11

## 2021-03-11 RX ORDER — LIDOCAINE 4 G/G
1 PATCH TOPICAL
Status: DISCONTINUED | OUTPATIENT
Start: 2021-03-11 | End: 2021-03-15 | Stop reason: HOSPADM

## 2021-03-11 RX ORDER — SODIUM CHLORIDE 9 MG/ML
INJECTION, SOLUTION INTRAVENOUS CONTINUOUS
Status: DISCONTINUED | OUTPATIENT
Start: 2021-03-11 | End: 2021-03-14

## 2021-03-11 RX ORDER — FLUTICASONE PROPIONATE 50 MCG
1 SPRAY, SUSPENSION (ML) NASAL DAILY PRN
COMMUNITY
End: 2022-10-24

## 2021-03-11 RX ORDER — ONDANSETRON 4 MG/1
4 TABLET, ORALLY DISINTEGRATING ORAL EVERY 6 HOURS PRN
Status: DISCONTINUED | OUTPATIENT
Start: 2021-03-11 | End: 2021-03-15 | Stop reason: HOSPADM

## 2021-03-11 RX ORDER — NALOXONE HYDROCHLORIDE 0.4 MG/ML
0.4 INJECTION, SOLUTION INTRAMUSCULAR; INTRAVENOUS; SUBCUTANEOUS
Status: DISCONTINUED | OUTPATIENT
Start: 2021-03-11 | End: 2021-03-15 | Stop reason: HOSPADM

## 2021-03-11 RX ORDER — FLUTICASONE PROPIONATE 50 MCG
2 SPRAY, SUSPENSION (ML) NASAL DAILY PRN
Status: DISCONTINUED | OUTPATIENT
Start: 2021-03-11 | End: 2021-03-15 | Stop reason: HOSPADM

## 2021-03-11 RX ORDER — SERTRALINE HYDROCHLORIDE 100 MG/1
100 TABLET, FILM COATED ORAL DAILY
Status: DISCONTINUED | OUTPATIENT
Start: 2021-03-11 | End: 2021-03-15 | Stop reason: HOSPADM

## 2021-03-11 RX ORDER — CARBAMAZEPINE 200 MG/1
200 TABLET ORAL DAILY
Status: DISCONTINUED | OUTPATIENT
Start: 2021-03-11 | End: 2021-03-15 | Stop reason: HOSPADM

## 2021-03-11 RX ORDER — AZELASTINE 1 MG/ML
1 SPRAY, METERED NASAL 2 TIMES DAILY
Status: DISCONTINUED | OUTPATIENT
Start: 2021-03-11 | End: 2021-03-15 | Stop reason: HOSPADM

## 2021-03-11 RX ORDER — MORPHINE SULFATE 4 MG/ML
4 INJECTION, SOLUTION INTRAMUSCULAR; INTRAVENOUS
Status: DISCONTINUED | OUTPATIENT
Start: 2021-03-11 | End: 2021-03-11

## 2021-03-11 RX ORDER — ATORVASTATIN CALCIUM 10 MG/1
10 TABLET, FILM COATED ORAL DAILY
Refills: 1 | Status: DISCONTINUED | OUTPATIENT
Start: 2021-03-12 | End: 2021-03-15 | Stop reason: HOSPADM

## 2021-03-11 RX ORDER — MICONAZOLE NITRATE 20 MG/G
CREAM TOPICAL 2 TIMES DAILY
Status: DISCONTINUED | OUTPATIENT
Start: 2021-03-11 | End: 2021-03-15 | Stop reason: HOSPADM

## 2021-03-11 RX ORDER — LIDOCAINE 40 MG/G
CREAM TOPICAL
Status: DISCONTINUED | OUTPATIENT
Start: 2021-03-11 | End: 2021-03-15 | Stop reason: HOSPADM

## 2021-03-11 RX ORDER — PROCHLORPERAZINE 25 MG
25 SUPPOSITORY, RECTAL RECTAL EVERY 12 HOURS PRN
Status: DISCONTINUED | OUTPATIENT
Start: 2021-03-11 | End: 2021-03-15 | Stop reason: HOSPADM

## 2021-03-11 RX ORDER — SERTRALINE HYDROCHLORIDE 100 MG/1
100 TABLET, FILM COATED ORAL DAILY
Status: ON HOLD | COMMUNITY
End: 2021-05-11

## 2021-03-11 RX ORDER — HYDROMORPHONE HCL IN WATER/PF 6 MG/30 ML
0.2 PATIENT CONTROLLED ANALGESIA SYRINGE INTRAVENOUS
Status: DISCONTINUED | OUTPATIENT
Start: 2021-03-11 | End: 2021-03-15 | Stop reason: HOSPADM

## 2021-03-11 RX ADMIN — SODIUM CHLORIDE: 9 INJECTION, SOLUTION INTRAVENOUS at 04:45

## 2021-03-11 RX ADMIN — SODIUM CHLORIDE 1000 ML: 9 INJECTION, SOLUTION INTRAVENOUS at 01:26

## 2021-03-11 RX ADMIN — HYDROMORPHONE HYDROCHLORIDE 0.2 MG: 0.2 INJECTION, SOLUTION INTRAMUSCULAR; INTRAVENOUS; SUBCUTANEOUS at 13:27

## 2021-03-11 RX ADMIN — CARBAMAZEPINE 400 MG: 200 TABLET ORAL at 10:32

## 2021-03-11 RX ADMIN — SODIUM CHLORIDE: 9 INJECTION, SOLUTION INTRAVENOUS at 13:34

## 2021-03-11 RX ADMIN — PROCHLORPERAZINE EDISYLATE 10 MG: 5 INJECTION INTRAMUSCULAR; INTRAVENOUS at 13:27

## 2021-03-11 RX ADMIN — HYDROMORPHONE HYDROCHLORIDE 0.2 MG: 0.2 INJECTION, SOLUTION INTRAMUSCULAR; INTRAVENOUS; SUBCUTANEOUS at 09:38

## 2021-03-11 RX ADMIN — ONDANSETRON 8 MG: 2 INJECTION INTRAMUSCULAR; INTRAVENOUS at 01:25

## 2021-03-11 RX ADMIN — HYDROMORPHONE HYDROCHLORIDE 0.2 MG: 0.2 INJECTION, SOLUTION INTRAMUSCULAR; INTRAVENOUS; SUBCUTANEOUS at 05:58

## 2021-03-11 RX ADMIN — MORPHINE SULFATE 4 MG: 4 INJECTION INTRAVENOUS at 01:26

## 2021-03-11 RX ADMIN — CARBAMAZEPINE 400 MG: 200 TABLET ORAL at 22:02

## 2021-03-11 RX ADMIN — ONDANSETRON 4 MG: 2 INJECTION INTRAMUSCULAR; INTRAVENOUS at 09:32

## 2021-03-11 RX ADMIN — DIATRIZOATE MEGLUMINE AND DIATRIZOATE SODIUM 120 ML: 660; 100 SOLUTION ORAL; RECTAL at 12:12

## 2021-03-11 RX ADMIN — PROCHLORPERAZINE EDISYLATE 10 MG: 5 INJECTION INTRAMUSCULAR; INTRAVENOUS at 17:58

## 2021-03-11 ASSESSMENT — ACTIVITIES OF DAILY LIVING (ADL)
DEPENDENT_IADLS:: INDEPENDENT
ADLS_ACUITY_SCORE: 15
ADLS_ACUITY_SCORE: 19

## 2021-03-11 ASSESSMENT — ENCOUNTER SYMPTOMS
NAUSEA: 1
VOMITING: 1
FEVER: 0
FREQUENCY: 1
FLANK PAIN: 1
ABDOMINAL PAIN: 1

## 2021-03-11 ASSESSMENT — MIFFLIN-ST. JEOR: SCORE: 2307.77

## 2021-03-11 NOTE — ED TRIAGE NOTES
Arrives via medic c/o L flank pain that has been worsening x3 days w/ polyuria w/ instance of vomiting. Adds hx L kidney cyst. AO, NAD, VSS, malodorous
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
## 2021-03-11 NOTE — PHARMACY-ADMISSION MEDICATION HISTORY
Admission medication history interview status for this patient is complete. See [facility] admission navigator for allergy information, prior to admission medications and immunization status.     Medication history interview done, indicate source(s): Patient and Patient's Caregiver (Lola Barlow)  Medication history resources (including written lists, pill bottles, clinic record): MyChart, SureScript, and Care Everywhere  Pharmacy: [facility]    Changes made to PTA medication list:  Added: None  Deleted: None  Changed: Flonase spray daily --> as needed    Actions taken by pharmacist (provider contacted, etc):None     Additional medication history information:None    Medication reconciliation/reorder completed by provider prior to medication history?  Yes   (Y/N)       Prior to Admission medications    Medication Sig Last Dose Taking? Auth Provider   azelastine (ASTELIN) 0.1 % nasal spray Spray 1 spray into both nostrils 2 times daily 3/10/2021 at Unknown time Yes Cara Marks MD   carBAMazepine (TEGRETOL) 200 MG tablet TAKE TWO TABLETS BY MOUTH EVERY MORNING , TAKE ONE TABLET BY MOUTH ONCE DAILY AT NOON, AND TAKE TWO TABLETS BY MOUTH EVERY EVENING 3/10/2021 at PM Yes Cara Marks MD   cholecalciferol 25 MCG (1000 UT) TABS Take 1,000 Units by mouth daily 3/10/2021 at PM Yes Reported, Patient   fluticasone (FLONASE) 50 MCG/ACT spray USE ONE TO TWO SPRAYS IN EACH NOSTRIL EVERY DAY  Patient taking differently: Spray 1-2 sprays into both nostrils daily as needed   at PRN Yes Cara Marks MD   fluticasone (FLOVENT HFA) 110 MCG/ACT inhaler Inhale 1-2 puffs into the lungs 2 times daily Past Week at Unknown time Yes Cara Marks MD   hydrocortisone 2.5 % cream Apply topically 2 times daily 3/10/2021 at Unknown time Yes Cara Marks MD   lisinopril (ZESTRIL) 20 MG tablet Take 1 tablet (20 mg) by mouth daily 3/10/2021 at PM Yes Cara Marks MD   lovastatin  (MEVACOR) 40 MG tablet TAKE ONE TABLET BY MOUTH AT BEDTIME 3/10/2021 at PM Yes Cara Marks MD   Multiple Vitamin (MULTI-VITAMIN PO) Take 1 tablet by mouth daily 3/10/2021 at PM Yes Reported, Patient   nystatin (MYCOSTATIN) 382937 UNIT/GM external cream APPLY TOPICALLY TWO TIMES A DAY AS NEEDED. Not intended to be a chronic, daily medication.  at PRN Yes Cara Marks MD   omeprazole (PRILOSEC) 20 MG DR capsule TAKE TWO CAPSULES BY MOUTH EVERY DAY *TAKE 30 TO 60 MINUTES BEFORE A MEAL 3/10/2021 at Unknown time Yes Cara Marks MD   sertraline (ZOLOFT) 100 MG tablet Take 100 mg by mouth daily 3/10/2021 at PM Yes Unknown, Entered By History

## 2021-03-11 NOTE — ED PROVIDER NOTES
History     Chief Complaint:  Abdominal Pain and Flank Pain     HPI   Brissa Barlow is a 55 year old female with a history of hypertension and hyperlipidemia who presents via EMS for evaluation of abdominal pain and flank pain. Approximately two days ago the patient started to develop urinary frequency. Since then she has developed left-sided flank and abdominal pain, nausea, and vomiting, and her pain has started to spread throughout her abdomen. Due to her persistent pain tonight EMS was called to bring her into the ED for evaluation. She has not had any measured fever.     Review of Systems   Constitutional: Negative for fever.   Gastrointestinal: Positive for abdominal pain, nausea and vomiting.   Genitourinary: Positive for flank pain and frequency.   All other systems reviewed and are negative.    Allergies:  Excedrin Back & [Acetaminophen-Aspirin Buffered]  Flu Virus Vaccine  Hydrochlorothiazide  Penicillins  Tetracycline  Azithromycin  Erythromycin    Medications:  Tegretol   Flonase  Flovent HFA   Lisinopril  Lovastatin   Nystatin   Omeprazole   Zoloft     Past Medical History:    Asthma   Hypertension   Cushing syndrome   Developmental delay, borderline   Hyperlipemia    Obesity     Seizures     Past Surgical History:    Abdominal injury, spleen, trauma  Adenoidectomy  Colostomy    Elective   Pelvis fracture surgery  ORIF foot   Right arthroscopic wrist surgery   Tonsillectomy  Tubal ligation      Family History:    Hypertension - Mother   Ulcers - Mother   Diabetes - Father   Hypertension - Father, sister, and brother   Allergies - Brother and sister   Asthma - Brother and sister   Lipids - Father      Social History:  The patient presents to the ED via EMS.   Marital status:      Physical Exam     Patient Vitals for the past 24 hrs:   BP Temp Temp src Pulse Resp SpO2   21 0040 113/80 98.7  F (37.1  C) Oral 93 21 95 %       Physical Exam  Nursing note and vitals  reviewed.  Constitutional: Cooperative.   HENT:   Mouth/Throat: Mucous membranes are normal.    Cardiovascular: Normal rate, regular rhythm and normal heart sounds.  No murmur.  Pulmonary/Chest: Effort normal and breath sounds normal. No respiratory distress. No wheezes.  Abdominal: Limited by increased BMI. Patient yells out wherever I push.   Neurological: Alert. Oriented x4  Skin: Skin is warm and dry.  Psychiatric: Normal mood and affect.      Emergency Department Course   Imaging:  CT Abdomen Pelvis without Contrast ([facility] Protocol):  IMPRESSION:   1.  Small bowel obstruction favored secondary to adhesion. Additional findings as above.   Per radiology.      Laboratory:   CBC: WBC 10.9, HGB 14.3,    CMP: Glucose 134 high, Calcium 8.4 low, Albumin 3.3 low, o/w WNL (Creatinine 0.72)    Lipase: 63 low     Lactic acid whole blood: pending    Asymptomatic COVID19 Virus PCR by nasopharyngeal swab: pending      Reviewed:  I reviewed nursing notes, vitals and care everywhere    Assessments:  0110: I obtained history and examined the patient as noted above.     0236:  I updated and reassessed the patient.     0239: I updated the patient's  over the phone.     0340:   NG tube placed    Consults:   0256: I spoke with Dr. Vizcaino of the hospitalist service regarding patient's presentation, findings, and plan of care.     Interventions:  0125 Zofran 8 mg IV   0126 NS 1,000 mL IV   0126 Morphine 4 mg IV     Disposition:  The patient was admitted to the hospital under the care of Dr. Vizcaino.      Impression & Plan     Medical Decision Making:  Brissa Barlow is a 55 year old female who presents for evaluation of vomiting.  Clinically this is consistent with bowel obstruction, CT confirms this.  Patient has been resuscitated with IVF and NG placed.  Will admit to medicine for further cares and bowel rest.  No indication at this point for emergency surgical consult.  There are no signs of surgical emergencies or  intraabdominal catastrophes such as volvulus, gut ischemia, perforation, etc.        Covid-19  Brissa Barlow was evaluated during a global COVID-19 pandemic, which necessitated consideration that the patient might be at risk for infection with the SARS-CoV-2 virus that causes COVID-19.   Applicable protocols for evaluation were followed during the patient's care.   COVID-19 was considered as part of the patient's evaluation. The plan for testing is:  a test was obtained during this visit.      Diagnosis:    ICD-10-CM    1. SBO (small bowel obstruction)   K56.609    2. Non-intractable vomiting with nausea R11.2       Scribe Disclosure:  I, Jeffry Duong, am serving as a scribe at 1:10 AM on 3/11/2021 to document services personally performed by Rajiv Del Rosario MD based on my observations and the provider's statements to me.           Rajiv Del Rosario MD  03/11/21 0338

## 2021-03-11 NOTE — PLAN OF CARE
Pt up with 1-2 assist, walker, and gait belt. Pain partially controlled with IV Dilaudid for back pain. LS clear but diminished in bases. BS hypo, passing flatus. LBM 3/7/21. Nausea at times with emesis X1. NG to LIS. CMS intact. Wound care provided to folds underneath abdomen and breasts. Voiding via purewick. NPO. Abdominal x-ray at 1130.

## 2021-03-11 NOTE — CONSULTS
SW was asked to see patient regarding some confusion around if this patient had a guardian or not. Patient is telling staff that she doesn't have one but our chart has paperwork declaring co-guardians. Patient's mother and father are the legal guardians on guardianship paperwork but patient is saying that parents are in senior care.    [facility] civil commitment court ([phone]) was called and asked who the official guardians are for this patient. Courts clarified that co-guardians are parents:    Lola Barlow  [phone] Co-guardian  Ray Barlow  [phone] Co-guardian    Co-guardians make all decisions for patient.    Met with patient to discuss Guardianship. I explained to patient that I did call [facility] courts to confirm who is guardian. I confirmed that patient's parents are co-guardians. I reported to her that will would be speaking with co-guardians for direction of her care. I explained to her that if she can provide legal paperwork that stated her parents were not guardians we would look at them and talk to courts. I also explained that her parents were on their way to see her and she said that she would see and speak with them.    SW will continue to follow.    Edyta Gavin, [facility] LUZ MARIA   Inpatient Care Coordination   Supervisor  [facility]  [phone]

## 2021-03-11 NOTE — ED NOTES
Pt called RN to room and had 2 episodes of bilious emesis as well as incontinent of urine. Pt and bedding changed, clean gown and linen provided. Pt reports feeling significantly better after vomiting
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
## 2021-03-11 NOTE — ED NOTES
[facility]  ED Nurse Handoff Report    Brissa Barlow is a 55 year old female   ED Chief complaint: Flank Pain  . ED Diagnosis:   Final diagnoses:   SBO (small bowel obstruction) (H)   Non-intractable vomiting with nausea, unspecified vomiting type     Allergies:   Allergies   Allergen Reactions     Excedrin Back & [Acetaminophen-Aspirin Buffered]      Flu Virus Vaccine      Patient states she is allergic to the vaccine.  Got a rash all over body many years ago after receiving injection.     Hydrochlorothiazide      dehydration     Penicillins      Tetracycline      any cyclines     Azithromycin Rash     Erythromycin Rash       Code Status: Full Code  Activity level - Baseline/Home:  Independent. Activity Level - Current:   Stand by Assist. Lift room needed: No. Bariatric: No   Needed: No   Isolation: No. Infection: Not Applicable.     Vital Signs:   Vitals:    03/11/21 0040 03/11/21 0115 03/11/21 0130   BP: 113/80 (!) 143/76 (!) 140/69   Pulse: 93  87   Resp: 21     Temp: 98.7  F (37.1  C)     TempSrc: Oral     SpO2: 95%  94%       Cardiac Rhythm:  ,      Pain level:    Patient confused: No. Patient Falls Risk: Yes.   Elimination Status: Has voided   Patient Report - Initial Complaint: flank pain. Focused Assessment:   03:30 Gastrointestinal AM       Details:   Gastrointestinal - Gastrointestinal WDL: .WDL except (n/v x3 days. LLQ pain)         00:58 Genitourinary NS      Details:   Genitourinary - Genitourinary WDL: urine  Genitourinary Comment: pt reports polyurea w/ L sided flank pain x 3 days, worsening. AO, NAD, VSS         Tests Performed: blood work, CT. Abnormal Results:   Labs Ordered and Resulted from Time of ED Arrival Up to the Time of Departure from the ED   CBC WITH PLATELETS DIFFERENTIAL - Abnormal; Notable for the following components:       Result Value    MCHC 30.8 (*)     Absolute Neutrophil 8.5 (*)     All other components within normal limits   COMPREHENSIVE METABOLIC  PANEL - Abnormal; Notable for the following components:    Glucose 134 (*)     Calcium 8.4 (*)     Albumin 3.3 (*)     All other components within normal limits   LIPASE - Abnormal; Notable for the following components:    Lipase 63 (*)     All other components within normal limits   SARS-COV-2 (COVID-19) VIRUS RT-PCR   LACTIC ACID WHOLE BLOOD   PULSE OXIMETRY NURSING   PERIPHERAL IV CATHETER   NASOGASTRIC TUBE DECOMPRESSION     CT Abdomen Pelvis without Contrast (stone protocol)   Final Result   IMPRESSION:    1.  Small bowel obstruction favored secondary to adhesion. Additional findings as above.           .   Treatments provided: fluids, NG  Family Comments: no family present  OBS brochure/video discussed/provided to patient:  N/A  ED Medications:   Medications   morphine (PF) injection 4 mg (4 mg Intravenous Given 3/11/21 0126)   0.9% sodium chloride BOLUS (1,000 mLs Intravenous New Bag 3/11/21 0126)   ondansetron (ZOFRAN) injection 8 mg (8 mg Intravenous Given 3/11/21 0125)     Drips infusing:  No  For the majority of the shift, the patient's behavior Green. Interventions performed were frequent rounding.    Sepsis treatment initiated: No     Patient tested for COVID 19 prior to admission: YES    ED Nurse Name/Phone Number: Ling Alejandre RN,   3:52 AM  RECEIVING UNIT ED HANDOFF REVIEW    Above ED Nurse Handoff Report was reviewed: Yes  Reviewed by: Edna Hillman RN on March 11, 2021 at 4:01 AM
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
## 2021-03-12 ENCOUNTER — APPOINTMENT (OUTPATIENT)
Dept: GENERAL RADIOLOGY | Facility: CLINIC | Age: 56
DRG: 389 | End: 2021-03-12
Attending: SURGERY
Payer: MEDICARE

## 2021-03-12 LAB
ANION GAP SERPL CALCULATED.3IONS-SCNC: 7 MMOL/L (ref 3–14)
BASOPHILS # BLD AUTO: 0 10E9/L (ref 0–0.2)
BASOPHILS NFR BLD AUTO: 0.3 %
BUN SERPL-MCNC: 15 MG/DL (ref 7–30)
CALCIUM SERPL-MCNC: 7.9 MG/DL (ref 8.5–10.1)
CHLORIDE SERPL-SCNC: 107 MMOL/L (ref 94–109)
CO2 SERPL-SCNC: 26 MMOL/L (ref 20–32)
CREAT SERPL-MCNC: 0.69 MG/DL (ref 0.52–1.04)
DIFFERENTIAL METHOD BLD: ABNORMAL
EOSINOPHIL # BLD AUTO: 0 10E9/L (ref 0–0.7)
EOSINOPHIL NFR BLD AUTO: 0.6 %
ERYTHROCYTE [DISTWIDTH] IN BLOOD BY AUTOMATED COUNT: 12.8 % (ref 10–15)
GFR SERPL CREATININE-BSD FRML MDRD: >90 ML/MIN/{1.73_M2}
GLUCOSE SERPL-MCNC: 103 MG/DL (ref 70–99)
HCT VFR BLD AUTO: 42.4 % (ref 35–47)
HGB BLD-MCNC: 13.3 G/DL (ref 11.7–15.7)
IMM GRANULOCYTES # BLD: 0 10E9/L (ref 0–0.4)
IMM GRANULOCYTES NFR BLD: 0.3 %
LYMPHOCYTES # BLD AUTO: 1.8 10E9/L (ref 0.8–5.3)
LYMPHOCYTES NFR BLD AUTO: 26 %
MCH RBC QN AUTO: 29.8 PG (ref 26.5–33)
MCHC RBC AUTO-ENTMCNC: 31.4 G/DL (ref 31.5–36.5)
MCV RBC AUTO: 95 FL (ref 78–100)
MONOCYTES # BLD AUTO: 0.7 10E9/L (ref 0–1.3)
MONOCYTES NFR BLD AUTO: 10.9 %
NEUTROPHILS # BLD AUTO: 4.2 10E9/L (ref 1.6–8.3)
NEUTROPHILS NFR BLD AUTO: 61.9 %
NRBC # BLD AUTO: 0 10*3/UL
NRBC BLD AUTO-RTO: 0 /100
PLATELET # BLD AUTO: 189 10E9/L (ref 150–450)
POTASSIUM SERPL-SCNC: 3.4 MMOL/L (ref 3.4–5.3)
RBC # BLD AUTO: 4.46 10E12/L (ref 3.8–5.2)
SODIUM SERPL-SCNC: 140 MMOL/L (ref 133–144)
WBC # BLD AUTO: 6.8 10E9/L (ref 4–11)

## 2021-03-12 PROCEDURE — 250N000013 HC RX MED GY IP 250 OP 250 PS 637: Performed by: HOSPITALIST

## 2021-03-12 PROCEDURE — 36415 COLL VENOUS BLD VENIPUNCTURE: CPT | Performed by: INTERNAL MEDICINE

## 2021-03-12 PROCEDURE — 250N000011 HC RX IP 250 OP 636: Performed by: INTERNAL MEDICINE

## 2021-03-12 PROCEDURE — 99231 SBSQ HOSP IP/OBS SF/LOW 25: CPT | Performed by: SURGERY

## 2021-03-12 PROCEDURE — 999N000065 XR ABDOMEN 1 VW

## 2021-03-12 PROCEDURE — 258N000003 HC RX IP 258 OP 636: Performed by: INTERNAL MEDICINE

## 2021-03-12 PROCEDURE — 80048 BASIC METABOLIC PNL TOTAL CA: CPT | Performed by: INTERNAL MEDICINE

## 2021-03-12 PROCEDURE — 99233 SBSQ HOSP IP/OBS HIGH 50: CPT | Performed by: HOSPITALIST

## 2021-03-12 PROCEDURE — 85025 COMPLETE CBC W/AUTO DIFF WBC: CPT | Performed by: INTERNAL MEDICINE

## 2021-03-12 PROCEDURE — 120N000001 HC R&B MED SURG/OB

## 2021-03-12 RX ORDER — HYDRALAZINE HYDROCHLORIDE 20 MG/ML
10 INJECTION INTRAMUSCULAR; INTRAVENOUS EVERY 4 HOURS PRN
Status: DISCONTINUED | OUTPATIENT
Start: 2021-03-12 | End: 2021-03-15 | Stop reason: HOSPADM

## 2021-03-12 RX ADMIN — SODIUM CHLORIDE: 9 INJECTION, SOLUTION INTRAVENOUS at 10:34

## 2021-03-12 RX ADMIN — MICONAZOLE NITRATE: 20 POWDER TOPICAL at 20:28

## 2021-03-12 RX ADMIN — AZELASTINE 1 SPRAY: 1 SPRAY, METERED NASAL at 09:10

## 2021-03-12 RX ADMIN — ATORVASTATIN CALCIUM 10 MG: 10 TABLET, FILM COATED ORAL at 09:07

## 2021-03-12 RX ADMIN — CARBAMAZEPINE 400 MG: 200 TABLET ORAL at 09:07

## 2021-03-12 RX ADMIN — ONDANSETRON 4 MG: 2 INJECTION INTRAMUSCULAR; INTRAVENOUS at 19:07

## 2021-03-12 RX ADMIN — MICONAZOLE NITRATE: 20 POWDER TOPICAL at 09:09

## 2021-03-12 RX ADMIN — HYDROMORPHONE HYDROCHLORIDE 0.2 MG: 0.2 INJECTION, SOLUTION INTRAMUSCULAR; INTRAVENOUS; SUBCUTANEOUS at 19:07

## 2021-03-12 RX ADMIN — CARBAMAZEPINE 200 MG: 200 TABLET ORAL at 13:00

## 2021-03-12 RX ADMIN — CARBAMAZEPINE 400 MG: 200 TABLET ORAL at 20:27

## 2021-03-12 RX ADMIN — SODIUM CHLORIDE: 9 INJECTION, SOLUTION INTRAVENOUS at 00:27

## 2021-03-12 RX ADMIN — SERTRALINE HYDROCHLORIDE 100 MG: 100 TABLET ORAL at 09:06

## 2021-03-12 RX ADMIN — AZELASTINE 1 SPRAY: 1 SPRAY, METERED NASAL at 20:28

## 2021-03-12 ASSESSMENT — ACTIVITIES OF DAILY LIVING (ADL)
ADLS_ACUITY_SCORE: 23
ADLS_ACUITY_SCORE: 15
ADLS_ACUITY_SCORE: 16
ADLS_ACUITY_SCORE: 14
ADLS_ACUITY_SCORE: 19
ADLS_ACUITY_SCORE: 14

## 2021-03-12 NOTE — PLAN OF CARE
VS-afebrile, stable, able to take meds around the NG, sleeping in between cares  Lung Sounds-clear, diminished bases, taking deep breathes,   O2-on room air,   GI-+Bs, pt states +flatus, unknown last bm, charting states 3/7. NPO, taking meds with sips of water. Started to clamp NG at 0915 for 4hours. After one hour pt requested to be hooked up for suction. Clamped again at 1115 am, pt sleeping in between cares. Pt requested NG be opened at 1230, clamped again at 1300 for medication. So far, pt is requesting NG to be open to suctioned after about an hour.   -purwick. Encouaraged to use BSC. Pt refused.   IVF-at 100.   Dressings-interdry placed under bilateral breasts, groin, and abd folds. All areas are red/pink. Nontender to touching. Pt did allow miconazole powder  to all folds.   CMS-denies numbness and tingling, +pp,    Drain-no drain. 25 out of NG this shift.   Activity-bedrest, went down to xray. Encouraged ambulation. Pt refusing at this time for walking. Encouraged one walk for this shift.   Pain-denies. Nontender abdomen.  C/o sore throat--ordered lozenges and spray for pt.   D/C Plan-waiting with surgery. Updated mom via phone, who is her legal guardian. Mom visited as well this shift     Encouraged walk. Walked to the end of the hallway and back to room. dc'd purwick. Encouraged walking to the bathroom instead. Wearing brief. Up with 1 sba and walker. Pt tolerated well.

## 2021-03-12 NOTE — PROGRESS NOTES
[facility]    General Surgery Progress Note          Assessment and Plan:   Assessment:    Brissa Barlow is a 55 year old female admitted with 3 day history of abdominal pain, nausea and vomiting.  Found to have SBO.  Felt better after NG placement and is still passing flatus. Complex surgical history of colostomy with reversal.  History of small bowel obstructions, last one about 10 years ago.    - feeling better   - Gastrografin challenge shows contrast in colon   - Continue conservative managment      Plan:   Pain mgmt: IV Dilaudid available, has not needed since yesterday afternoon  Bowel: passing gas, -BM  Diet: NPO except ice chips and meds  IVFs: NS @ 100 mL/hr  NG: continue - start clamping trial  Activity: encourage ambulation 3-4x daily  Dispo: several day depending on return of bowel function and diet tolerance           Interval History:   Pt resting in bed.  Had nausea with emesis yesterday afternoon during gastrografin challenge.  Today, Reports no abdominal pain, no nausea.  NG has been clamped since 0915 for meds with no nausea.  Passing gas. Burping some.          Physical Exam:   Temp: 97.7  F (36.5  C) Temp src: Temporal BP: 130/58 Pulse: 90   Resp: 16   SpO2: 92 % O2 Device: None (Room air)        I/O last 3 completed shifts:  In: 2382 [I.V.:2352; NG/GT:30]  Out: 2550 [Urine:300; Emesis/NG output:2250]    Constitutional: alert and no distress   Cardiovascular: negative  Respiratory: negative  Abdomen: Abdomen soft, obese, non-tender.            Data:   Data   Recent Labs   Lab 03/12/21  0804 03/11/21  0110   WBC 6.8 10.9   HGB 13.3 14.3   MCV 95 95    248    136   POTASSIUM 3.4 4.5   CHLORIDE 107 103   CO2 26 26   BUN 15 13   CR 0.69 0.72   ANIONGAP 7 7   CESAR 7.9* 8.4*   * 134*   ALBUMIN  --  3.3*   PROTTOTAL  --  7.2   BILITOTAL  --  0.3   ALKPHOS  --  113   ALT  --  33   AST  --  33        Quynh Murphy PA-C     Seen and agree,    Jaya  MARION Higgins MD  [facility]

## 2021-03-12 NOTE — PROGRESS NOTES
SW received a call from [facility]  with [employer]. Patient receives [employer] and [employer] services. Patients parents are legal guardians and should be consulted on discharge plans. If any.       JOSE A Cain, [facility]  [phone]  [address]   [facility]. [id number]  [facility]

## 2021-03-12 NOTE — PLAN OF CARE
A&Ox4, VSS ex tachycardic at times, refused OOB for the shift. Pt refused skin care. Endorses mild nausea, declined intervention. NG to low intermittent suction, BS hypoactive, pt denies abd pain.
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
## 2021-03-12 NOTE — PLAN OF CARE
Pt A&O, vitals monitored on RA.  Up with Ax1, gb to pivot onto cart for xray.  Emesis x 3 on nathaniel shift while ng clamped for xray.  When reapplied to low intermittent suction, had 600cc out almost immediately.  Refuses skin care, bed baths.  Interdry in place in folds in abd/groin/breasts.  IV infusing.  Will continue to monitor.

## 2021-03-13 LAB
ANION GAP SERPL CALCULATED.3IONS-SCNC: 7 MMOL/L (ref 3–14)
BUN SERPL-MCNC: 14 MG/DL (ref 7–30)
CALCIUM SERPL-MCNC: 7.7 MG/DL (ref 8.5–10.1)
CHLORIDE SERPL-SCNC: 111 MMOL/L (ref 94–109)
CO2 SERPL-SCNC: 26 MMOL/L (ref 20–32)
CREAT SERPL-MCNC: 0.71 MG/DL (ref 0.52–1.04)
ERYTHROCYTE [DISTWIDTH] IN BLOOD BY AUTOMATED COUNT: 12.7 % (ref 10–15)
GFR SERPL CREATININE-BSD FRML MDRD: >90 ML/MIN/{1.73_M2}
GLUCOSE SERPL-MCNC: 98 MG/DL (ref 70–99)
HCT VFR BLD AUTO: 42.2 % (ref 35–47)
HGB BLD-MCNC: 12.9 G/DL (ref 11.7–15.7)
MAGNESIUM SERPL-MCNC: 2.2 MG/DL (ref 1.6–2.3)
MCH RBC QN AUTO: 29.4 PG (ref 26.5–33)
MCHC RBC AUTO-ENTMCNC: 30.6 G/DL (ref 31.5–36.5)
MCV RBC AUTO: 96 FL (ref 78–100)
PLATELET # BLD AUTO: 161 10E9/L (ref 150–450)
POTASSIUM SERPL-SCNC: 3.5 MMOL/L (ref 3.4–5.3)
RBC # BLD AUTO: 4.39 10E12/L (ref 3.8–5.2)
SODIUM SERPL-SCNC: 144 MMOL/L (ref 133–144)
WBC # BLD AUTO: 7 10E9/L (ref 4–11)

## 2021-03-13 PROCEDURE — 85027 COMPLETE CBC AUTOMATED: CPT | Performed by: HOSPITALIST

## 2021-03-13 PROCEDURE — 99233 SBSQ HOSP IP/OBS HIGH 50: CPT | Performed by: INTERNAL MEDICINE

## 2021-03-13 PROCEDURE — 250N000013 HC RX MED GY IP 250 OP 250 PS 637: Performed by: HOSPITALIST

## 2021-03-13 PROCEDURE — 258N000003 HC RX IP 258 OP 636: Performed by: INTERNAL MEDICINE

## 2021-03-13 PROCEDURE — 80048 BASIC METABOLIC PNL TOTAL CA: CPT | Performed by: HOSPITALIST

## 2021-03-13 PROCEDURE — 120N000001 HC R&B MED SURG/OB

## 2021-03-13 PROCEDURE — 250N000011 HC RX IP 250 OP 636: Performed by: INTERNAL MEDICINE

## 2021-03-13 PROCEDURE — 83735 ASSAY OF MAGNESIUM: CPT | Performed by: HOSPITALIST

## 2021-03-13 PROCEDURE — 36415 COLL VENOUS BLD VENIPUNCTURE: CPT | Performed by: HOSPITALIST

## 2021-03-13 RX ADMIN — AZELASTINE 1 SPRAY: 1 SPRAY, METERED NASAL at 08:21

## 2021-03-13 RX ADMIN — SERTRALINE HYDROCHLORIDE 100 MG: 100 TABLET ORAL at 08:21

## 2021-03-13 RX ADMIN — SODIUM CHLORIDE: 9 INJECTION, SOLUTION INTRAVENOUS at 05:37

## 2021-03-13 RX ADMIN — HYDROMORPHONE HYDROCHLORIDE 0.2 MG: 0.2 INJECTION, SOLUTION INTRAMUSCULAR; INTRAVENOUS; SUBCUTANEOUS at 01:12

## 2021-03-13 RX ADMIN — ATORVASTATIN CALCIUM 10 MG: 10 TABLET, FILM COATED ORAL at 08:22

## 2021-03-13 RX ADMIN — LIDOCAINE 1 PATCH: 560 PATCH PERCUTANEOUS; TOPICAL; TRANSDERMAL at 08:25

## 2021-03-13 RX ADMIN — MICONAZOLE NITRATE: 20 CREAM TOPICAL at 21:58

## 2021-03-13 RX ADMIN — CARBAMAZEPINE 200 MG: 200 TABLET ORAL at 12:12

## 2021-03-13 RX ADMIN — SODIUM CHLORIDE: 9 INJECTION, SOLUTION INTRAVENOUS at 17:24

## 2021-03-13 RX ADMIN — CARBAMAZEPINE 400 MG: 200 TABLET ORAL at 21:54

## 2021-03-13 RX ADMIN — CARBAMAZEPINE 400 MG: 200 TABLET ORAL at 08:21

## 2021-03-13 RX ADMIN — MICONAZOLE NITRATE: 20 CREAM TOPICAL at 08:23

## 2021-03-13 ASSESSMENT — ACTIVITIES OF DAILY LIVING (ADL)
ADLS_ACUITY_SCORE: 19
ADLS_ACUITY_SCORE: 17
ADLS_ACUITY_SCORE: 19
ADLS_ACUITY_SCORE: 19

## 2021-03-13 NOTE — PLAN OF CARE
"NPO. NG to LIS. Tolerated only 2 hours of the clamping trail this shift. Up with Ax1,gbelt and walker. Voiding, urine incontinence. Absorbant pads in place. Pain managed with PRN IV Dilaudid. Redness underneath abdomen and breast, area cleansed, Miconazole applied. InterDry between folds.   /71 (BP Location: Left arm)   Pulse 92   Temp 96.7  F (35.9  C) (Temporal)   Resp 16   Ht 1.702 m (5' 7\")   Wt (!) 167 kg (368 lb 3.2 oz)   LMP 08/18/2008   SpO2 91%   BMI 57.67 kg/m      "
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
## 2021-03-13 NOTE — PROGRESS NOTES
[facility]    General Surgery Progress Note          Assessment and Plan:   Assessment:    Brissa Barlow is a 55 year old female admitted with 3 day history of abdominal pain, nausea and vomiting.  Found to have SBO.  Felt better after NG placement and is still passing flatus. Complex surgical history of colostomy with reversal.  History of small bowel obstructions, last one about 10 years ago.    - Gastrografin challenge started evening 3/11 shows contrast in colon morning of 3/12  - now passing gas and stool      Plan:   Pain mgmt: IV Dilaudid available,  Bowel: passing gas, +BM  Diet: remove NG and start clear liquids. Full liquids later today if tolerating. Regular diet tomorrow  IVFs: NS @ 100 mL/hr  Activity: encourage ambulation 3-4x daily  Dispo: several day depending on return of bowel function and diet tolerance           Interval History:   Pt resting in bed.   Feeling well. Denies pain. Gagging on NG and had nausea, thinks it was due to the NG tube. Minimal NG output, tolerated about 1.5-2hrs of clamping yesterday. Passing gas and had a BM         Physical Exam:   Temp: 98.9  F (37.2  C) Temp src: Temporal BP: (!) 146/73 Pulse: 93   Resp: 16   SpO2: 92 % O2 Device: None (Room air)        I/O last 3 completed shifts:  In: 2530 [P.O.:100; I.V.:2310; NG/GT:120]  Out: 325 [Urine:100; Emesis/NG output:225]    Constitutional: alert and no distress   Abdomen: Abdomen soft, obese, non-tender.            Data:   Data   Recent Labs   Lab 03/13/21  0641 03/12/21  0804 03/11/21  0110   WBC 7.0 6.8 10.9   HGB 12.9 13.3 14.3   MCV 96 95 95    189 248    140 136   POTASSIUM 3.5 3.4 4.5   CHLORIDE 111* 107 103   CO2 26 26 26   BUN 14 15 13   CR 0.71 0.69 0.72   ANIONGAP 7 7 7   CESAR 7.7* 7.9* 8.4*   GLC 98 103* 134*   ALBUMIN  --   --  3.3*   PROTTOTAL  --   --  7.2   BILITOTAL  --   --  0.3   ALKPHOS  --   --  113   ALT  --   --  33   AST  --   --  33        Roshni Cortez MD

## 2021-03-13 NOTE — PLAN OF CARE
VS-afebrile, stable, HR in the 90's  Lung Sounds-clear, diminished bases, no cough noted, encouaraged pt to use the IS, pt needs reminders to use.   O2-on room air,   GI-+Bs, +flatus, 2 stools this shift, tolerating broth, jello and apple juice for bkst, denies nausea. No vomiting. NG out at 0900.   -voiding, no difficultities,   IVF- iv replace, pt took out, restarted iv.   Dressings-none. Interdry under breast, abd folds and groin,   CMS-denies numbness and tingling, +pp, strong dorsi/planter flexion.   Drain-none.   Activity-up with sba, walker belt, walking into the bathroom and back.   Pain-c/o sore throat when NG was in place, denies pain afterwards. Slight back pain. Lidocaine patch applied this am.   D/C Plan-home when able.

## 2021-03-14 ENCOUNTER — HEALTH MAINTENANCE LETTER (OUTPATIENT)
Age: 56
End: 2021-03-14

## 2021-03-14 LAB
ANION GAP SERPL CALCULATED.3IONS-SCNC: 7 MMOL/L (ref 3–14)
BUN SERPL-MCNC: 12 MG/DL (ref 7–30)
CALCIUM SERPL-MCNC: 7.7 MG/DL (ref 8.5–10.1)
CHLORIDE SERPL-SCNC: 110 MMOL/L (ref 94–109)
CO2 SERPL-SCNC: 26 MMOL/L (ref 20–32)
CREAT SERPL-MCNC: 0.7 MG/DL (ref 0.52–1.04)
ERYTHROCYTE [DISTWIDTH] IN BLOOD BY AUTOMATED COUNT: 12.6 % (ref 10–15)
GFR SERPL CREATININE-BSD FRML MDRD: >90 ML/MIN/{1.73_M2}
GLUCOSE SERPL-MCNC: 93 MG/DL (ref 70–99)
HCT VFR BLD AUTO: 38.2 % (ref 35–47)
HGB BLD-MCNC: 11.7 G/DL (ref 11.7–15.7)
MCH RBC QN AUTO: 29.2 PG (ref 26.5–33)
MCHC RBC AUTO-ENTMCNC: 30.6 G/DL (ref 31.5–36.5)
MCV RBC AUTO: 95 FL (ref 78–100)
PLATELET # BLD AUTO: 168 10E9/L (ref 150–450)
POTASSIUM SERPL-SCNC: 3.2 MMOL/L (ref 3.4–5.3)
POTASSIUM SERPL-SCNC: 3.2 MMOL/L (ref 3.4–5.3)
POTASSIUM SERPL-SCNC: 3.4 MMOL/L (ref 3.4–5.3)
POTASSIUM SERPL-SCNC: NORMAL MMOL/L (ref 3.4–5.3)
RBC # BLD AUTO: 4.01 10E12/L (ref 3.8–5.2)
SODIUM SERPL-SCNC: 143 MMOL/L (ref 133–144)
WBC # BLD AUTO: 5.9 10E9/L (ref 4–11)

## 2021-03-14 PROCEDURE — 99232 SBSQ HOSP IP/OBS MODERATE 35: CPT | Performed by: INTERNAL MEDICINE

## 2021-03-14 PROCEDURE — 250N000013 HC RX MED GY IP 250 OP 250 PS 637: Performed by: INTERNAL MEDICINE

## 2021-03-14 PROCEDURE — 85027 COMPLETE CBC AUTOMATED: CPT | Performed by: INTERNAL MEDICINE

## 2021-03-14 PROCEDURE — 80048 BASIC METABOLIC PNL TOTAL CA: CPT | Performed by: INTERNAL MEDICINE

## 2021-03-14 PROCEDURE — 36415 COLL VENOUS BLD VENIPUNCTURE: CPT | Performed by: SURGERY

## 2021-03-14 PROCEDURE — 258N000003 HC RX IP 258 OP 636: Performed by: INTERNAL MEDICINE

## 2021-03-14 PROCEDURE — 120N000001 HC R&B MED SURG/OB

## 2021-03-14 PROCEDURE — 250N000013 HC RX MED GY IP 250 OP 250 PS 637: Performed by: SURGERY

## 2021-03-14 PROCEDURE — 36415 COLL VENOUS BLD VENIPUNCTURE: CPT | Performed by: INTERNAL MEDICINE

## 2021-03-14 PROCEDURE — 84132 ASSAY OF SERUM POTASSIUM: CPT | Performed by: INTERNAL MEDICINE

## 2021-03-14 PROCEDURE — 84132 ASSAY OF SERUM POTASSIUM: CPT | Performed by: SURGERY

## 2021-03-14 PROCEDURE — 250N000013 HC RX MED GY IP 250 OP 250 PS 637: Performed by: HOSPITALIST

## 2021-03-14 RX ORDER — POTASSIUM CHLORIDE 1500 MG/1
40 TABLET, EXTENDED RELEASE ORAL ONCE
Status: COMPLETED | OUTPATIENT
Start: 2021-03-14 | End: 2021-03-14

## 2021-03-14 RX ADMIN — CARBAMAZEPINE 200 MG: 200 TABLET ORAL at 11:27

## 2021-03-14 RX ADMIN — SERTRALINE HYDROCHLORIDE 100 MG: 100 TABLET ORAL at 08:00

## 2021-03-14 RX ADMIN — POTASSIUM CHLORIDE 40 MEQ: 1500 TABLET, EXTENDED RELEASE ORAL at 16:51

## 2021-03-14 RX ADMIN — CARBAMAZEPINE 400 MG: 200 TABLET ORAL at 20:09

## 2021-03-14 RX ADMIN — ATORVASTATIN CALCIUM 10 MG: 10 TABLET, FILM COATED ORAL at 08:00

## 2021-03-14 RX ADMIN — POTASSIUM CHLORIDE 40 MEQ: 1500 TABLET, EXTENDED RELEASE ORAL at 23:16

## 2021-03-14 RX ADMIN — MICONAZOLE NITRATE: 20 CREAM TOPICAL at 08:01

## 2021-03-14 RX ADMIN — AZELASTINE 1 SPRAY: 1 SPRAY, METERED NASAL at 20:10

## 2021-03-14 RX ADMIN — MICONAZOLE NITRATE: 20 CREAM TOPICAL at 21:31

## 2021-03-14 RX ADMIN — SODIUM CHLORIDE: 9 INJECTION, SOLUTION INTRAVENOUS at 06:34

## 2021-03-14 RX ADMIN — MICONAZOLE NITRATE: 20 POWDER TOPICAL at 10:36

## 2021-03-14 RX ADMIN — AZELASTINE 1 SPRAY: 1 SPRAY, METERED NASAL at 08:00

## 2021-03-14 RX ADMIN — CARBAMAZEPINE 400 MG: 200 TABLET ORAL at 08:00

## 2021-03-14 ASSESSMENT — ACTIVITIES OF DAILY LIVING (ADL)
ADLS_ACUITY_SCORE: 19
ADLS_ACUITY_SCORE: 22
ADLS_ACUITY_SCORE: 24
ADLS_ACUITY_SCORE: 19
ADLS_ACUITY_SCORE: 22
ADLS_ACUITY_SCORE: 22

## 2021-03-14 NOTE — PLAN OF CARE
"Sleeping comfortably throughout the night. Denies pain. No nausea and vomiting.Up with SBA/Gbelt and walker. Voiding good amounts,urine incontinesnce at times. Use of the call light and ambulating to bathroom encouraged. IVF infusing. Plans to discharge home when  medically stable.   Vitals: /61 (BP Location: Left arm)   Pulse 85   Temp 97.6  F (36.4  C) (Temporal)   Resp 16   Ht 1.702 m (5' 7\")   Wt (!) 167 kg (368 lb 3.2 oz)   LMP 08/18/2008   SpO2 94%   BMI 57.67 kg/m    BMI= Body mass index is 57.67 kg/m .  "
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
## 2021-03-14 NOTE — PLAN OF CARE
"VS-afebrile, stable, \" feels better after shower last evening\"  Lung Sounds-clear, no cough, using IS upto 500-1000. Taking deep breaths, denies SOB,   O2-on room air,   GI-+Bs, +flatus, lbm was during the night. Tolerating clear liq diet, denies nausea. Advance diet to fulls for lunch had tomato soup and yogurt, denies nausea.  and reg for supper.   -voiding, up to the bathroom.   IVF-at 100.   Sl iv this am.   Dressings-none. Has interdry in abd/groin folds bilaterally, miconazole cream under breasts, abd/groin folds.   CMS-denies numbness and tingling, +pp, dorsi/planter flexion.   Drain-none.   Activity-up in the chair for bkst, walked to the bathroom,   Pain-denies, no abd tenderness when palpation.   D/C Plan-home when able--need diet advanced. Possible tomorrow per surgery, need hospitalist to round.   K was 3.2 this am. Do you want protocol started? Paged MD at [phone]  "

## 2021-03-14 NOTE — PROGRESS NOTES
[facility]    General Surgery Progress Note          Assessment and Plan:   Assessment:    Birssa Barlow is a 55 year old female admitted with 3 day history of abdominal pain, nausea and vomiting.  Found to have SBO.  Felt better after NG placement and is still passing flatus. Complex surgical history of colostomy with reversal.  History of small bowel obstructions, last one about 10 years ago.    - Gastrografin challenge started evening 3/11 shows contrast in colon morning of 3/12  - passing gas and stool, NG out 3/13      Plan:   Pain mgmt: IV Dilaudid available  Bowel: passing gas, +BM  Diet: advance diet as tolerated today  IVFs: NS @ 100 mL/hr  Activity: encourage ambulation 3-4x daily  Dispo: possibly tomorrow           Interval History:   Pt resting in bed.   Feeling well. Denies pain. Tolerating clear liquids, no nausea. Having lots of gas and some stool         Physical Exam:   Temp: 97.3  F (36.3  C) Temp src: Temporal BP: 138/57 Pulse: 83   Resp: 16   SpO2: 95 % O2 Device: None (Room air)        I/O last 3 completed shifts:  In: 1110 [P.O.:1110]  Out: -     Constitutional: alert and no distress   Abdomen: Abdomen soft, obese, non-tender.            Data:   Data   Recent Labs   Lab 03/14/21  0639 03/13/21  0641 03/12/21  0804 03/11/21  0110   WBC 5.9 7.0 6.8 10.9   HGB 11.7 12.9 13.3 14.3   MCV 95 96 95 95    161 189 248    144 140 136   POTASSIUM 3.2* 3.5 3.4 4.5   CHLORIDE 110* 111* 107 103   CO2 26 26 26 26   BUN 12 14 15 13   CR 0.70 0.71 0.69 0.72   ANIONGAP 7 7 7 7   CESAR 7.7* 7.7* 7.9* 8.4*   GLC 93 98 103* 134*   ALBUMIN  --   --   --  3.3*   PROTTOTAL  --   --   --  7.2   BILITOTAL  --   --   --  0.3   ALKPHOS  --   --   --  113   ALT  --   --   --  33   AST  --   --   --  33        Roshni Cortez MD

## 2021-03-15 VITALS
WEIGHT: 293 LBS | TEMPERATURE: 97.1 F | SYSTOLIC BLOOD PRESSURE: 144 MMHG | DIASTOLIC BLOOD PRESSURE: 63 MMHG | RESPIRATION RATE: 16 BRPM | OXYGEN SATURATION: 94 % | HEART RATE: 81 BPM | HEIGHT: 67 IN | BODY MASS INDEX: 45.99 KG/M2

## 2021-03-15 LAB — POTASSIUM SERPL-SCNC: 3.8 MMOL/L (ref 3.4–5.3)

## 2021-03-15 PROCEDURE — 99232 SBSQ HOSP IP/OBS MODERATE 35: CPT | Performed by: PHYSICIAN ASSISTANT

## 2021-03-15 PROCEDURE — 250N000013 HC RX MED GY IP 250 OP 250 PS 637: Performed by: HOSPITALIST

## 2021-03-15 PROCEDURE — 99239 HOSP IP/OBS DSCHRG MGMT >30: CPT | Performed by: INTERNAL MEDICINE

## 2021-03-15 RX ADMIN — SERTRALINE HYDROCHLORIDE 100 MG: 100 TABLET ORAL at 07:54

## 2021-03-15 RX ADMIN — ATORVASTATIN CALCIUM 10 MG: 10 TABLET, FILM COATED ORAL at 07:54

## 2021-03-15 RX ADMIN — CARBAMAZEPINE 400 MG: 200 TABLET ORAL at 07:54

## 2021-03-15 RX ADMIN — CARBAMAZEPINE 200 MG: 200 TABLET ORAL at 12:47

## 2021-03-15 ASSESSMENT — ACTIVITIES OF DAILY LIVING (ADL)
ADLS_ACUITY_SCORE: 21

## 2021-03-15 NOTE — PROGRESS NOTES
[facility]    General Surgery Progress Note          Assessment and Plan:   Assessment:    Brissa Barlow is a 55 year old female admitted with 3 day history of abdominal pain, nausea and vomiting.  Found to have SBO.  Felt better after NG placement and is still passing flatus. Complex surgical history of colostomy with reversal.  History of small bowel obstructions, last one about 10 years ago.    - Gastrografin challenge started evening 3/11 shows contrast in colon morning of 3/12  - passing gas and stool, NG out 3/13  - SBO resolved      Plan:   Pain mgmt: has not required any for last 2 days  Bowel: passing gas, +BM  Diet: regular  Activity: encourage ambulation 3-4x daily  Dispo: home today from surgical perspective, no surgical follow up needed           Interval History:   Pt resting in bed. Feels good.  Denies abdominal pain. Tolerating regular diet with no nausea.  Passing lots of gas. Has had bowel movement         Physical Exam:   Temp: 97.1  F (36.2  C) Temp src: Temporal BP: (!) 144/63 Pulse: 81   Resp: 16   SpO2: 94 % O2 Device: None (Room air)        I/O last 3 completed shifts:  In: 3611 [P.O.:1840; I.V.:1771]  Out: -     Constitutional: alert and no distress   Abdomen: Abdomen soft, obese, non-tender.            Data:   Data   Recent Labs   Lab 03/14/21  2352 03/14/21  2235 03/14/21  2142 03/14/21  0639 03/14/21  0639 03/13/21  0641 03/12/21  0804 03/11/21  0110   WBC  --   --   --   --  5.9 7.0 6.8 10.9   HGB  --   --   --   --  11.7 12.9 13.3 14.3   MCV  --   --   --   --  95 96 95 95   PLT  --   --   --   --  168 161 189 248   NA  --   --   --   --  143 144 140 136   POTASSIUM 3.8 3.2* Canceled, Test credited   < > 3.2* 3.5 3.4 4.5   CHLORIDE  --   --   --   --  110* 111* 107 103   CO2  --   --   --   --  26 26 26 26   BUN  --   --   --   --  12 14 15 13   CR  --   --   --   --  0.70 0.71 0.69 0.72   ANIONGAP  --   --   --   --  7 7 7 7   CESAR  --   --   --   --  7.7* 7.7*  7.9* 8.4*   GLC  --   --   --   --  93 98 103* 134*   ALBUMIN  --   --   --   --   --   --   --  3.3*   PROTTOTAL  --   --   --   --   --   --   --  7.2   BILITOTAL  --   --   --   --   --   --   --  0.3   ALKPHOS  --   --   --   --   --   --   --  113   ALT  --   --   --   --   --   --   --  33   AST  --   --   --   --   --   --   --  33    < > = values in this interval not displayed.        Quynh Murphy PA-C

## 2021-03-15 NOTE — DISCHARGE INSTRUCTIONS
You have a new order for [facility] for PT & RN services. They will be contacting you within 24-48 hours to set up initial visit. If you have not heard from them after this time, please contact them at ([phone]).
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
## 2021-03-15 NOTE — PROGRESS NOTES
CM resource spoke with patient's guardian, Lola, re: IMM. Lola mentioned they (both guardians) would like home care to make sure patient is following discharge recommendations. Per surgery progress note, recommend ambulation 3-4 times/day.     CM contacted Ray (co-guardian) [phone]. Lola is in an appointment at this time. Discussed home care w/Ray. Updated him on recommendations from Surgery. He is in agreement with RN & PT. He would like [facility]. Provided him with CM contact information and stated Lola does not need to return call unless she disagrees with our plan.     Paged hospitalist to request home care orders for RN & PT. Sent referral via email to [facility]. Updated AVS.     CM will continue to follow patient until discharge for any additional needs.     Rocio Govea, RN, BSN, CPHN, CM  Inpatient Care Coordination  [facility]  [phone]

## 2021-03-15 NOTE — PLAN OF CARE
Pt A&Ov4. VSS afebrile RA. Saline lock discontinued. Pt up with SBA gait belt and walker. LS clear. +BS/flatus. Denies nausea. Tolerated regular diet. Denies any abdominal pain. Voiding Adequate amounts. Groin, abdominal folds and and under breast are red/pink. Pt declined intredry, barrier cream or micatin powerder. Mom here to help discharge. Pt  or significant other is actually the one that is driving her home. All belongings taken with patient. Discharge instructions discussed and questions answered. Form signed and on chart. No new discharge medications. Pt left floor at 1340

## 2021-03-15 NOTE — PLAN OF CARE
"A&Ox4, VSS: stable. Denies pain. Up with SBA/gbelt and walker to bathroom. Voiding large amounts. B&B incontinence at times, brief in place. Potassium replaced. Possible discharge to home today.  Blood pressure 131/76, pulse 91, temperature 97.7  F (36.5  C), temperature source Temporal, resp. rate 16, height 1.702 m (5' 7\"), weight (!) 167 kg (368 lb 3.2 oz), last menstrual period 08/18/2008, SpO2 93 %, not currently breastfeeding.    "

## 2021-03-15 NOTE — PLAN OF CARE
Pt A&O x4. VSS. A1 walker and gb. 2 BM's this shift. Voiding. Had a shower this evening. Tolerating a regular diet. Denies nausea. +bs/gas. Denies pain. Had a 1x dose of PO K+, recheck in progress. Planning to discharge home tomorrow.

## 2021-03-17 ENCOUNTER — TELEPHONE (OUTPATIENT)
Dept: CARE COORDINATION | Facility: CLINIC | Age: 56
End: 2021-03-17

## 2021-03-17 ENCOUNTER — TELEPHONE (OUTPATIENT)
Dept: FAMILY MEDICINE | Facility: CLINIC | Age: 56
End: 2021-03-17

## 2021-03-17 NOTE — TELEPHONE ENCOUNTER
"Pt has an appt scheduled for 3/22/2021.  Called and spoke to mom.    ED/Discharge Protocol    \"Hi, my name is Leslee Guillen RN, a registered nurse, and I am calling on behalf of Dr. Marks's office at [place].  I am calling to follow up and see how things are going for you after your recent visit.\"    \"I see that you were in the (ER/UC/IP) on 3/11/21.    How are you doing now that you are home?\" doing better, a little diarrhea    Is patient experiencing symptoms that may require a hospital visit?  no    Discharge Instructions    \"Let's review your discharge instructions.  What is/are the follow-up recommendations?  Pt. Response: Pt is to f/u with Dr. Marks    \"Were you instructed to make a follow-up appointment?\"  Pt. Response: Yes.  Has appointment been made?   Yes      \"When you see the provider, I would recommend that you bring your discharge instructions with you.    Medications    \"How many new medications are you on since your hospitalization/ED visit?\"    0-1  \"How many of your current medicines changed (dose, timing, name, etc.) while you were in the hospital/ED visit?\"   0-1  \"Do you have questions about your medications?\"   No  \"Were you newly diagnosed with heart failure, COPD, diabetes or did you have a heart attack?\"   No  For patients on insulin: \"Did you start on insulin in the hospital or did you have your insulin dose changed?\"   No  Post Discharge Medication Reconciliation Status: discharge medications reconciled, continue medications without change.    Was MTM referral placed (*Make sure to put transitions as reason for referral)?   No    Call Summary    \"Do you have any questions or concerns about your condition or care plan at the moment?\"    No  Triage nurse advice given: none    Patient was in ER once in the past year (assess appropriateness of ER visits.)      \"If you have questions or things don't continue to improve, we encourage you contact us through the main clinic number,  " "[phone].  Even if the clinic is not open, triage nurses are available 24/7 to help you.     We would like you to know that our clinic has extended hours (provide information).  We also have urgent care (provide details on closest location and hours/contact info)\"      \"Thank you for your time and take care!\"                "

## 2021-03-17 NOTE — TELEPHONE ENCOUNTER
Patient was recently in the hospital with a SBO. [facility] We were not able to reach the patient's caregiver until today and they are agreeable to our services. Since it's been more than 48 hours since discharge please review and advise if ok for this delay. Your positive response will be considered an order. We will see patient as soon as possible, likely Fri or Sat. If unable to see patient one of these days we will notify you.   Patient and caregiver have no concerns or acute needs. They have the [facility] 24 hour number and agree to call with any questions or concerns.

## 2021-03-17 NOTE — TELEPHONE ENCOUNTER
Please contact patient for In-patient follow up.  [phone] (home) [phone] (work)    Visit date: 3/15/2021  Diagnosis listed:Small Bowel Obstruction (H), Physical Deconditioning  Number of visits in past 12 months:0/1

## 2021-03-18 NOTE — TELEPHONE ENCOUNTER
Pt due for post hospital visit  Will need that visit to continue home care evaluation.  Please assist with hospital follow up.

## 2021-03-19 NOTE — TELEPHONE ENCOUNTER
Please review and advise if ok for SN visit this weekend for admission to home care since patient has hospital follow up scheduled 3/22.

## 2021-03-22 NOTE — TELEPHONE ENCOUNTER
Pt was scheduled for hospital follow up 3/22/2021 but cancelled later in the day. This is a time sensitive appt. Which was scheduled one week post discharge.   She has rescheduled ap but it is outside the window of time for hospital follow ups.  Will have team contact pt and assist with rescheduling.

## 2021-04-02 ENCOUNTER — TELEPHONE (OUTPATIENT)
Dept: CARE COORDINATION | Facility: CLINIC | Age: 56
End: 2021-04-02

## 2021-04-02 NOTE — TELEPHONE ENCOUNTER
Paris Marks,  We did received a homecare referral from [facility] at the time patient was discharged on 3\15.  We have had many issues trying to set up a visit in the home to assess the situation.  We spoke with her legal guardian (parents), and they have indicated that Ankur has resumed her relationship with significant other, and that ever since she has been reluctant to do anything.    We have not made any progress on this, and therefore have closed out this referral.  Just wanted you to be in the loop.  Thank you,  Leslie Rausch RN, BSN  [facility]  [phone]

## 2021-04-03 NOTE — TELEPHONE ENCOUNTER
Thanks for the update.  We have also had challenges getting pt in for hospital follow up. It was to be done within 1-2 weeks of discharge. appt was made at discharge but then cancelled within 24 hours of appt.

## 2021-04-08 ENCOUNTER — APPOINTMENT (OUTPATIENT)
Dept: GENERAL RADIOLOGY | Facility: CLINIC | Age: 56
DRG: 207 | End: 2021-04-08
Attending: EMERGENCY MEDICINE
Payer: MEDICARE

## 2021-04-08 ENCOUNTER — APPOINTMENT (OUTPATIENT)
Dept: CT IMAGING | Facility: CLINIC | Age: 56
DRG: 207 | End: 2021-04-08
Attending: EMERGENCY MEDICINE
Payer: MEDICARE

## 2021-04-08 ENCOUNTER — HOSPITAL ENCOUNTER (INPATIENT)
Facility: CLINIC | Age: 56
LOS: 7 days | Discharge: ANOTHER HOSPITAL | DRG: 207 | End: 2021-04-15
Attending: EMERGENCY MEDICINE | Admitting: INTERNAL MEDICINE
Payer: MEDICARE

## 2021-04-08 DIAGNOSIS — J12.82 PNEUMONIA DUE TO 2019 NOVEL CORONAVIRUS: ICD-10-CM

## 2021-04-08 DIAGNOSIS — J96.01 ACUTE RESPIRATORY FAILURE WITH HYPOXIA (H): ICD-10-CM

## 2021-04-08 DIAGNOSIS — U07.1 PNEUMONIA DUE TO 2019 NOVEL CORONAVIRUS: ICD-10-CM

## 2021-04-08 DIAGNOSIS — N39.0 ACUTE UTI: ICD-10-CM

## 2021-04-08 LAB
ALBUMIN UR-MCNC: 10 MG/DL
ANION GAP SERPL CALCULATED.3IONS-SCNC: 2 MMOL/L (ref 3–14)
APPEARANCE UR: CLEAR
BACTERIA #/AREA URNS HPF: ABNORMAL /HPF
BASOPHILS # BLD AUTO: 0 10E9/L (ref 0–0.2)
BASOPHILS NFR BLD AUTO: 0.3 %
BILIRUB UR QL STRIP: NEGATIVE
BUN SERPL-MCNC: 11 MG/DL (ref 7–30)
CALCIUM SERPL-MCNC: 8 MG/DL (ref 8.5–10.1)
CHLORIDE SERPL-SCNC: 103 MMOL/L (ref 94–109)
CO2 SERPL-SCNC: 31 MMOL/L (ref 20–32)
COLOR UR AUTO: YELLOW
CREAT SERPL-MCNC: 0.86 MG/DL (ref 0.52–1.04)
D DIMER PPP FEU-MCNC: 1.3 UG/ML FEU (ref 0–0.5)
DIFFERENTIAL METHOD BLD: ABNORMAL
EOSINOPHIL # BLD AUTO: 0 10E9/L (ref 0–0.7)
EOSINOPHIL NFR BLD AUTO: 0.3 %
ERYTHROCYTE [DISTWIDTH] IN BLOOD BY AUTOMATED COUNT: 13.7 % (ref 10–15)
FLUAV RNA RESP QL NAA+PROBE: NEGATIVE
FLUBV RNA RESP QL NAA+PROBE: NEGATIVE
GFR SERPL CREATININE-BSD FRML MDRD: 76 ML/MIN/{1.73_M2}
GLUCOSE SERPL-MCNC: 119 MG/DL (ref 70–99)
GLUCOSE UR STRIP-MCNC: NEGATIVE MG/DL
HCT VFR BLD AUTO: 41 % (ref 35–47)
HGB BLD-MCNC: 13.2 G/DL (ref 11.7–15.7)
HGB UR QL STRIP: NEGATIVE
IMM GRANULOCYTES # BLD: 0 10E9/L (ref 0–0.4)
IMM GRANULOCYTES NFR BLD: 0.3 %
KETONES UR STRIP-MCNC: NEGATIVE MG/DL
LABORATORY COMMENT REPORT: ABNORMAL
LACTATE BLD-SCNC: 0.9 MMOL/L (ref 0.7–2)
LEUKOCYTE ESTERASE UR QL STRIP: ABNORMAL
LYMPHOCYTES # BLD AUTO: 1 10E9/L (ref 0.8–5.3)
LYMPHOCYTES NFR BLD AUTO: 26.1 %
MCH RBC QN AUTO: 29 PG (ref 26.5–33)
MCHC RBC AUTO-ENTMCNC: 32.2 G/DL (ref 31.5–36.5)
MCV RBC AUTO: 90 FL (ref 78–100)
MONOCYTES # BLD AUTO: 0.3 10E9/L (ref 0–1.3)
MONOCYTES NFR BLD AUTO: 8.3 %
MUCOUS THREADS #/AREA URNS LPF: PRESENT /LPF
NEUTROPHILS # BLD AUTO: 2.6 10E9/L (ref 1.6–8.3)
NEUTROPHILS NFR BLD AUTO: 64.7 %
NITRATE UR QL: POSITIVE
NRBC # BLD AUTO: 0 10*3/UL
NRBC BLD AUTO-RTO: 0 /100
NT-PROBNP SERPL-MCNC: 73 PG/ML (ref 0–900)
PH UR STRIP: 7 PH (ref 5–7)
PLATELET # BLD AUTO: 115 10E9/L (ref 150–450)
POTASSIUM SERPL-SCNC: 4 MMOL/L (ref 3.4–5.3)
RBC # BLD AUTO: 4.55 10E12/L (ref 3.8–5.2)
RBC #/AREA URNS AUTO: <1 /HPF (ref 0–2)
RSV RNA SPEC QL NAA+PROBE: ABNORMAL
SARS-COV-2 RNA RESP QL NAA+PROBE: POSITIVE
SODIUM SERPL-SCNC: 136 MMOL/L (ref 133–144)
SOURCE: ABNORMAL
SP GR UR STRIP: 1.02 (ref 1–1.03)
SPECIMEN SOURCE: ABNORMAL
TROPONIN I SERPL-MCNC: <0.015 UG/L (ref 0–0.04)
UROBILINOGEN UR STRIP-MCNC: NORMAL MG/DL (ref 0–2)
WBC # BLD AUTO: 4 10E9/L (ref 4–11)
WBC #/AREA URNS AUTO: 6 /HPF (ref 0–5)

## 2021-04-08 PROCEDURE — 87088 URINE BACTERIA CULTURE: CPT | Performed by: EMERGENCY MEDICINE

## 2021-04-08 PROCEDURE — 120N000001 HC R&B MED SURG/OB

## 2021-04-08 PROCEDURE — 87636 SARSCOV2 & INF A&B AMP PRB: CPT | Performed by: EMERGENCY MEDICINE

## 2021-04-08 PROCEDURE — 250N000013 HC RX MED GY IP 250 OP 250 PS 637: Performed by: EMERGENCY MEDICINE

## 2021-04-08 PROCEDURE — 80048 BASIC METABOLIC PNL TOTAL CA: CPT | Performed by: EMERGENCY MEDICINE

## 2021-04-08 PROCEDURE — 96374 THER/PROPH/DIAG INJ IV PUSH: CPT | Mod: 59

## 2021-04-08 PROCEDURE — 85025 COMPLETE CBC W/AUTO DIFF WBC: CPT | Performed by: EMERGENCY MEDICINE

## 2021-04-08 PROCEDURE — 83880 ASSAY OF NATRIURETIC PEPTIDE: CPT | Performed by: EMERGENCY MEDICINE

## 2021-04-08 PROCEDURE — 87086 URINE CULTURE/COLONY COUNT: CPT | Performed by: EMERGENCY MEDICINE

## 2021-04-08 PROCEDURE — 81001 URINALYSIS AUTO W/SCOPE: CPT | Performed by: EMERGENCY MEDICINE

## 2021-04-08 PROCEDURE — 96375 TX/PRO/DX INJ NEW DRUG ADDON: CPT

## 2021-04-08 PROCEDURE — 96361 HYDRATE IV INFUSION ADD-ON: CPT

## 2021-04-08 PROCEDURE — 71275 CT ANGIOGRAPHY CHEST: CPT | Mod: ME

## 2021-04-08 PROCEDURE — 36415 COLL VENOUS BLD VENIPUNCTURE: CPT | Performed by: EMERGENCY MEDICINE

## 2021-04-08 PROCEDURE — 250N000011 HC RX IP 250 OP 636: Performed by: EMERGENCY MEDICINE

## 2021-04-08 PROCEDURE — 84484 ASSAY OF TROPONIN QUANT: CPT | Performed by: EMERGENCY MEDICINE

## 2021-04-08 PROCEDURE — 83605 ASSAY OF LACTIC ACID: CPT | Performed by: EMERGENCY MEDICINE

## 2021-04-08 PROCEDURE — 85379 FIBRIN DEGRADATION QUANT: CPT | Performed by: EMERGENCY MEDICINE

## 2021-04-08 PROCEDURE — 99285 EMERGENCY DEPT VISIT HI MDM: CPT | Mod: 25

## 2021-04-08 PROCEDURE — 258N000003 HC RX IP 258 OP 636: Performed by: EMERGENCY MEDICINE

## 2021-04-08 PROCEDURE — 71045 X-RAY EXAM CHEST 1 VIEW: CPT

## 2021-04-08 PROCEDURE — C9803 HOPD COVID-19 SPEC COLLECT: HCPCS

## 2021-04-08 PROCEDURE — 87186 SC STD MICRODIL/AGAR DIL: CPT | Performed by: EMERGENCY MEDICINE

## 2021-04-08 PROCEDURE — 87040 BLOOD CULTURE FOR BACTERIA: CPT | Performed by: EMERGENCY MEDICINE

## 2021-04-08 RX ORDER — DEXAMETHASONE SODIUM PHOSPHATE 10 MG/ML
6 INJECTION, SOLUTION INTRAMUSCULAR; INTRAVENOUS ONCE
Status: COMPLETED | OUTPATIENT
Start: 2021-04-08 | End: 2021-04-08

## 2021-04-08 RX ORDER — IOPAMIDOL 755 MG/ML
500 INJECTION, SOLUTION INTRAVASCULAR ONCE
Status: COMPLETED | OUTPATIENT
Start: 2021-04-08 | End: 2021-04-08

## 2021-04-08 RX ORDER — NITROFURANTOIN 25; 75 MG/1; MG/1
100 CAPSULE ORAL ONCE
Status: COMPLETED | OUTPATIENT
Start: 2021-04-08 | End: 2021-04-08

## 2021-04-08 RX ORDER — DIPHENHYDRAMINE HYDROCHLORIDE 50 MG/ML
25 INJECTION INTRAMUSCULAR; INTRAVENOUS ONCE
Status: COMPLETED | OUTPATIENT
Start: 2021-04-08 | End: 2021-04-08

## 2021-04-08 RX ORDER — SODIUM CHLORIDE 9 MG/ML
INJECTION, SOLUTION INTRAVENOUS CONTINUOUS
Status: DISCONTINUED | OUTPATIENT
Start: 2021-04-08 | End: 2021-04-10

## 2021-04-08 RX ORDER — ACETAMINOPHEN 500 MG
1000 TABLET ORAL ONCE
Status: COMPLETED | OUTPATIENT
Start: 2021-04-08 | End: 2021-04-08

## 2021-04-08 RX ADMIN — SODIUM CHLORIDE 1000 ML: 9 INJECTION, SOLUTION INTRAVENOUS at 20:37

## 2021-04-08 RX ADMIN — DEXAMETHASONE SODIUM PHOSPHATE 6 MG: 10 INJECTION, SOLUTION INTRAMUSCULAR; INTRAVENOUS at 22:30

## 2021-04-08 RX ADMIN — DIPHENHYDRAMINE HYDROCHLORIDE 25 MG: 50 INJECTION, SOLUTION INTRAMUSCULAR; INTRAVENOUS at 22:45

## 2021-04-08 RX ADMIN — SODIUM CHLORIDE: 9 INJECTION, SOLUTION INTRAVENOUS at 22:30

## 2021-04-08 RX ADMIN — IOPAMIDOL 77 ML: 755 INJECTION, SOLUTION INTRAVENOUS at 22:52

## 2021-04-08 RX ADMIN — SODIUM CHLORIDE 90 ML: 9 INJECTION, SOLUTION INTRAVENOUS at 22:52

## 2021-04-08 RX ADMIN — ACETAMINOPHEN 1000 MG: 500 TABLET, FILM COATED ORAL at 22:31

## 2021-04-08 RX ADMIN — NITROFURANTOIN (MONOHYDRATE/MACROCRYSTALS) 100 MG: 75; 25 CAPSULE ORAL at 23:13

## 2021-04-08 ASSESSMENT — ENCOUNTER SYMPTOMS
FEVER: 0
VOMITING: 1
COUGH: 1
NAUSEA: 0
ABDOMINAL PAIN: 0
FLANK PAIN: 0
CHILLS: 0
SHORTNESS OF BREATH: 0
DYSURIA: 1

## 2021-04-09 LAB — PROCALCITONIN SERPL-MCNC: <0.05 NG/ML

## 2021-04-09 PROCEDURE — 250N000009 HC RX 250: Performed by: INTERNAL MEDICINE

## 2021-04-09 PROCEDURE — 258N000003 HC RX IP 258 OP 636: Performed by: EMERGENCY MEDICINE

## 2021-04-09 PROCEDURE — 99233 SBSQ HOSP IP/OBS HIGH 50: CPT | Mod: AI | Performed by: HOSPITALIST

## 2021-04-09 PROCEDURE — 250N000013 HC RX MED GY IP 250 OP 250 PS 637: Performed by: HOSPITALIST

## 2021-04-09 PROCEDURE — 99223 1ST HOSP IP/OBS HIGH 75: CPT | Mod: AI | Performed by: INTERNAL MEDICINE

## 2021-04-09 PROCEDURE — 250N000013 HC RX MED GY IP 250 OP 250 PS 637: Performed by: INTERNAL MEDICINE

## 2021-04-09 PROCEDURE — 250N000012 HC RX MED GY IP 250 OP 636 PS 637: Performed by: INTERNAL MEDICINE

## 2021-04-09 PROCEDURE — 36415 COLL VENOUS BLD VENIPUNCTURE: CPT | Performed by: HOSPITALIST

## 2021-04-09 PROCEDURE — 84145 PROCALCITONIN (PCT): CPT | Performed by: HOSPITALIST

## 2021-04-09 PROCEDURE — 258N000003 HC RX IP 258 OP 636: Performed by: INTERNAL MEDICINE

## 2021-04-09 PROCEDURE — 120N000001 HC R&B MED SURG/OB

## 2021-04-09 PROCEDURE — 250N000011 HC RX IP 250 OP 636: Performed by: INTERNAL MEDICINE

## 2021-04-09 RX ORDER — LOVASTATIN 20 MG
40 TABLET ORAL AT BEDTIME
Status: DISCONTINUED | OUTPATIENT
Start: 2021-04-09 | End: 2021-04-11

## 2021-04-09 RX ORDER — FLUTICASONE PROPIONATE 50 MCG
1 SPRAY, SUSPENSION (ML) NASAL DAILY PRN
Status: DISCONTINUED | OUTPATIENT
Start: 2021-04-09 | End: 2021-04-15 | Stop reason: HOSPADM

## 2021-04-09 RX ORDER — GUAIFENESIN/DEXTROMETHORPHAN 100-10MG/5
10 SYRUP ORAL EVERY 4 HOURS PRN
Status: DISCONTINUED | OUTPATIENT
Start: 2021-04-09 | End: 2021-04-15 | Stop reason: HOSPADM

## 2021-04-09 RX ORDER — ONDANSETRON 2 MG/ML
4 INJECTION INTRAMUSCULAR; INTRAVENOUS EVERY 6 HOURS PRN
Status: DISCONTINUED | OUTPATIENT
Start: 2021-04-09 | End: 2021-04-15 | Stop reason: HOSPADM

## 2021-04-09 RX ORDER — SERTRALINE HYDROCHLORIDE 100 MG/1
100 TABLET, FILM COATED ORAL DAILY
Status: DISCONTINUED | OUTPATIENT
Start: 2021-04-09 | End: 2021-04-12

## 2021-04-09 RX ORDER — ALBUTEROL SULFATE 90 UG/1
2 AEROSOL, METERED RESPIRATORY (INHALATION) EVERY 4 HOURS PRN
Status: DISCONTINUED | OUTPATIENT
Start: 2021-04-09 | End: 2021-04-15 | Stop reason: HOSPADM

## 2021-04-09 RX ORDER — ONDANSETRON 4 MG/1
4 TABLET, ORALLY DISINTEGRATING ORAL EVERY 6 HOURS PRN
Status: DISCONTINUED | OUTPATIENT
Start: 2021-04-09 | End: 2021-04-15 | Stop reason: HOSPADM

## 2021-04-09 RX ORDER — CARBAMAZEPINE 200 MG/1
200 TABLET ORAL EVERY 24 HOURS
Status: DISCONTINUED | OUTPATIENT
Start: 2021-04-09 | End: 2021-04-12

## 2021-04-09 RX ORDER — IBUPROFEN 200 MG
800 TABLET ORAL EVERY 8 HOURS PRN
Status: ON HOLD | COMMUNITY
End: 2021-05-11

## 2021-04-09 RX ORDER — NITROFURANTOIN 25; 75 MG/1; MG/1
100 CAPSULE ORAL EVERY 12 HOURS SCHEDULED
Status: DISCONTINUED | OUTPATIENT
Start: 2021-04-09 | End: 2021-04-11

## 2021-04-09 RX ORDER — LISINOPRIL 20 MG/1
20 TABLET ORAL DAILY
Status: DISCONTINUED | OUTPATIENT
Start: 2021-04-09 | End: 2021-04-12

## 2021-04-09 RX ORDER — FLUTICASONE PROPIONATE 110 UG/1
1-2 AEROSOL, METERED RESPIRATORY (INHALATION) 2 TIMES DAILY
Status: DISCONTINUED | OUTPATIENT
Start: 2021-04-09 | End: 2021-04-09

## 2021-04-09 RX ORDER — LIDOCAINE 40 MG/G
CREAM TOPICAL
Status: DISCONTINUED | OUTPATIENT
Start: 2021-04-09 | End: 2021-04-12

## 2021-04-09 RX ORDER — ACETAMINOPHEN 325 MG/1
325 TABLET ORAL EVERY 4 HOURS PRN
Status: DISCONTINUED | OUTPATIENT
Start: 2021-04-09 | End: 2021-04-15 | Stop reason: HOSPADM

## 2021-04-09 RX ORDER — CARBAMAZEPINE 200 MG/1
400 TABLET ORAL 2 TIMES DAILY
Status: DISCONTINUED | OUTPATIENT
Start: 2021-04-09 | End: 2021-04-12

## 2021-04-09 RX ORDER — AZELASTINE 1 MG/ML
1 SPRAY, METERED NASAL 2 TIMES DAILY
Status: DISCONTINUED | OUTPATIENT
Start: 2021-04-09 | End: 2021-04-15 | Stop reason: HOSPADM

## 2021-04-09 RX ADMIN — ENOXAPARIN SODIUM 40 MG: 40 INJECTION SUBCUTANEOUS at 20:31

## 2021-04-09 RX ADMIN — CARBAMAZEPINE 200 MG: 200 TABLET ORAL at 13:55

## 2021-04-09 RX ADMIN — AZELASTINE HYDROCHLORIDE 1 SPRAY: 137 SPRAY, METERED NASAL at 20:33

## 2021-04-09 RX ADMIN — ACETAMINOPHEN 325 MG: 325 TABLET, FILM COATED ORAL at 09:32

## 2021-04-09 RX ADMIN — NITROFURANTOIN (MONOHYDRATE/MACROCRYSTALS) 100 MG: 75; 25 CAPSULE ORAL at 20:31

## 2021-04-09 RX ADMIN — GUAIFENESIN AND DEXTROMETHORPHAN 10 ML: 100; 10 SYRUP ORAL at 09:32

## 2021-04-09 RX ADMIN — DEXAMETHASONE 6 MG: 2 TABLET ORAL at 08:22

## 2021-04-09 RX ADMIN — AZELASTINE HYDROCHLORIDE 1 SPRAY: 137 SPRAY, METERED NASAL at 11:51

## 2021-04-09 RX ADMIN — ALBUTEROL SULFATE 2 PUFF: 90 AEROSOL, METERED RESPIRATORY (INHALATION) at 22:49

## 2021-04-09 RX ADMIN — LISINOPRIL 20 MG: 20 TABLET ORAL at 11:51

## 2021-04-09 RX ADMIN — SODIUM CHLORIDE: 9 INJECTION, SOLUTION INTRAVENOUS at 11:53

## 2021-04-09 RX ADMIN — NITROFURANTOIN (MONOHYDRATE/MACROCRYSTALS) 100 MG: 75; 25 CAPSULE ORAL at 08:21

## 2021-04-09 RX ADMIN — SODIUM CHLORIDE: 9 INJECTION, SOLUTION INTRAVENOUS at 20:29

## 2021-04-09 RX ADMIN — SODIUM CHLORIDE 50 ML: 9 INJECTION, SOLUTION INTRAVENOUS at 01:47

## 2021-04-09 RX ADMIN — ENOXAPARIN SODIUM 40 MG: 40 INJECTION SUBCUTANEOUS at 08:22

## 2021-04-09 RX ADMIN — LOVASTATIN 40 MG: 20 TABLET ORAL at 20:31

## 2021-04-09 RX ADMIN — SERTRALINE HYDROCHLORIDE 100 MG: 100 TABLET ORAL at 11:51

## 2021-04-09 RX ADMIN — ACETAMINOPHEN 325 MG: 325 TABLET, FILM COATED ORAL at 22:48

## 2021-04-09 RX ADMIN — SODIUM CHLORIDE: 9 INJECTION, SOLUTION INTRAVENOUS at 03:52

## 2021-04-09 RX ADMIN — OMEPRAZOLE 20 MG: 20 CAPSULE, DELAYED RELEASE ORAL at 11:51

## 2021-04-09 RX ADMIN — CARBAMAZEPINE 400 MG: 200 TABLET ORAL at 20:31

## 2021-04-09 RX ADMIN — CARBAMAZEPINE 400 MG: 200 TABLET ORAL at 11:52

## 2021-04-09 RX ADMIN — REMDESIVIR 200 MG: 100 INJECTION, POWDER, LYOPHILIZED, FOR SOLUTION INTRAVENOUS at 01:47

## 2021-04-09 RX ADMIN — ACETAMINOPHEN 325 MG: 325 TABLET, FILM COATED ORAL at 18:43

## 2021-04-09 RX ADMIN — GUAIFENESIN AND DEXTROMETHORPHAN 10 ML: 100; 10 SYRUP ORAL at 20:38

## 2021-04-09 ASSESSMENT — ACTIVITIES OF DAILY LIVING (ADL)
ADLS_ACUITY_SCORE: 24
ADLS_ACUITY_SCORE: 21

## 2021-04-09 ASSESSMENT — MIFFLIN-ST. JEOR: SCORE: 2271.03

## 2021-04-09 NOTE — ED NOTES
[facility]  ED Nurse Handoff Report    Brissa Barlow is a 55 year old female   ED Chief complaint: multiple complaints  . ED Diagnosis:   Final diagnoses:   Acute respiratory failure with hypoxia (H)   Pneumonia due to 2019 novel coronavirus   Acute UTI     Allergies:   Allergies   Allergen Reactions     Excedrin Back & [Acetaminophen-Aspirin Buffered]      Flu Virus Vaccine      Patient states she is allergic to the vaccine.  Got a rash all over body many years ago after receiving injection.     Hydrochlorothiazide      dehydration     Penicillins      Tetracycline      any cyclines     Azithromycin Rash     Contrast Dye Hives     Patient states that they get hives. Patient given isovue 370 on 4/8/21 and premedicated with benadryl 25mg.      Erythromycin Rash       Code Status: Full Code  Activity level - Baseline/Home:  Independent. Activity Level - Current:   Assist X 2. Lift room needed: No. Bariatric: No   Needed: No   Isolation:Covid +  Vital Signs:   Vitals:    04/08/21 2145 04/08/21 2148 04/08/21 2150 04/08/21 2200   BP: (!) 142/74  120/67 122/57   Pulse: 98  93 95   Resp:       Temp:  100.6  F (38.1  C)     TempSrc:       SpO2: 96%  95% 96%       Cardiac Rhythm:  ,   Cardiac  Cardiac Rhythm: Normal sinus rhythm  Pain level:    Patient confused: No. Patient Falls Risk: Yes.   Elimination Status: Has voided   Patient Report - Initial Complaint: shortness of breath, urinary frequency. Focused Assessment:    Patient presents to ED via EMS from home d/t multiple complaints. Reports burning sensation, foul urine and incontinence .     Per EMS report patient  COVID positive. Patient oxygen saturation 85 % RA. Denies SOB placed on 3 L      Cardiac Cardiac Monitoring - EKG Monitoring: Yes  Cardiac Regularity: Regular  Cardiac Rhythm: NSR  LW       20:00 Vitals Vital Signs - Oximeter Heart Rate: 96 bpm (Device Time: 20:00:09) SpO2: 94 % (Device Time: 20:00:11) HS     20:00  Respiratory Respiratory - Respiratory WDL:  (pt requiring 4L oxygen via nasal cannula to keep oxygen >92%)       22:58 Pt currently on 4L of oxygen.   Tests Performed:   XR Chest Port 1 View   Final Result   IMPRESSION: Shallow inspiration. There are mild interstitial infiltrates in the central lungs. No pleural effusions.      CT Chest Pulmonary Embolism w Contrast    (Results Pending)     Labs Ordered and Resulted from Time of ED Arrival Up to the Time of Departure from the ED   CBC WITH PLATELETS DIFFERENTIAL - Abnormal; Notable for the following components:       Result Value    Platelet Count 115 (*)     All other components within normal limits   BASIC METABOLIC PANEL - Abnormal; Notable for the following components:    Anion Gap 2 (*)     Glucose 119 (*)     Calcium 8.0 (*)     All other components within normal limits   ROUTINE UA WITH MICROSCOPIC - Abnormal; Notable for the following components:    Protein Albumin Urine 10 (*)     Nitrite Urine Positive (*)     Leukocyte Esterase Urine Trace (*)     WBC Urine 6 (*)     Bacteria Urine Few (*)     Mucous Urine Present (*)     All other components within normal limits   INFLUENZA A/B & SARS-COV2 PCR MULTIPLEX - Abnormal; Notable for the following components:    SARS-CoV-2 PCR Result POSITIVE (*)     All other components within normal limits   D DIMER QUANTITATIVE - Abnormal; Notable for the following components:    D Dimer 1.3 (*)     All other components within normal limits   LACTIC ACID WHOLE BLOOD   NT PROBNP INPATIENT   TROPONIN I   PERIPHERAL IV CATHETER   URINE CULTURE AEROBIC BACTERIAL   BLOOD CULTURE   BLOOD CULTURE     Treatments provided: fluids, abx, steroids, monitoring  Family Comments:  aware of admission  OBS brochure/video discussed/provided to patient:  Yes  ED Medications:   Medications   0.9% sodium chloride BOLUS (0 mLs Intravenous Stopped 4/8/21 8376)     Followed by   sodium chloride 0.9% infusion ( Intravenous New Bag 4/8/21  2230)   nitroFURantoin macrocrystal-monohydrate (MACROBID) capsule 100 mg (has no administration in time range)   0.9% sodium chloride BOLUS (has no administration in time range)   iopamidol (ISOVUE-370) solution 500 mL (has no administration in time range)   acetaminophen (TYLENOL) tablet 1,000 mg (1,000 mg Oral Given 4/8/21 2231)   dexamethasone PF (DECADRON) injection 6 mg (6 mg Intravenous Given 4/8/21 2230)   diphenhydrAMINE (BENADRYL) injection 25 mg (25 mg Intravenous Given 4/8/21 2245)     Drips infusing: Yes  For the majority of the shift, the patient's behavior Green. Interventions performed were rounding.    Sepsis treatment initiated: No     Patient tested for COVID 19 prior to admission: YES    ED Nurse Name/Phone Number: Dulce Vargas RN,   10:57 PM  RECEIVING UNIT ED HANDOFF REVIEW    Above ED Nurse Handoff Report was reviewed: yes  Reviewed by: Leilani Braxton RN on April 8, 2021 at 11:44 PM

## 2021-04-09 NOTE — ED TRIAGE NOTES
Patient presents to ED via EMS from home d/t multiple complaints. Reports burning sensation, foul urine and incontinence .    Per EMS report patient  COVID positive. Patient oxygen saturation 85 % RA. Denies SOB placed on 3 L

## 2021-04-09 NOTE — CONSULTS
Care Management Initial Consult    General Information  Assessment completed with: Family, Lola/Ray- parents also Court appt Guardians for place of abode and consents   Type of CM/SW Visit: Initial Assessment    Primary Care Provider verified and updated as needed:     Readmission within the last 30 days:        Reason for Consult: discharge planning  Advance Care Planning: Advance Care Planning Reviewed: present on chart          Communication Assessment  Patient's communication style: spoken language (English or Bilingual)    Hearing Difficulty or Deaf: no   Wear Glasses or Blind: yes    Cognitive  Cognitive/Neuro/Behavioral: WDL                      Living Environment:   People in home: significant other  [place]  Current living Arrangements: apartment      Able to return to prior arrangements: yes       Family/Social Support:  Care provided by: self  Provides care for: no one  Marital Status: Lives with Significant Other  Parent(s), Significant Other       [place]  Description of Support System: Involved         Current Resources:   Patient receiving home care services: No     Community Resources: [facility] Worker ABDIEL Roberts [phone]  Equipment currently used at home: walker, rolling, shower chair  Supplies currently used at home: None    Employment/Financial:  Employment Status: disabled        Financial Concerns: No concerns identified           Lifestyle & Psychosocial Needs:        Socioeconomic History     Marital status: Single     Spouse name: Not on file     Number of children: Not on file     Years of education: Not on file     Highest education level: Not on file     Tobacco Use     Smoking status: Never Smoker     Smokeless tobacco: Never Used   Substance and Sexual Activity     Alcohol use: No     Alcohol/week: 0.0 standard drinks     Drug use: No     Sexual activity: Yes     Partners: Male       Functional Status:  Prior to admission patient needed assistance:   Dependent ADLs:: Independent,  Ambulation-walker  Dependent IADLs:: Independent, Shopping, Meal Preparation       Mental Health Status:  Mental Health Status: No Current Concerns       Chemical Dependency Status:  Chemical Dependency Status: No Current Concerns             Values/Beliefs:  Spiritual, Cultural Beliefs, Quaker Practices, Values that affect care:                 Additional Information:  LUZ MARIA spoke with pt's parents Ray and Lola.. They would like to be kept updated by MD and Rn staff on pt and her progress.. They are concerned about pt./ After her last hospital stay. She has her phone # changed and family has not been able to reach her.. Pt missed her last MD appt therefore had orders for home care support but services were not started.  Pt live with SO who has been blocking the parents from being able to reach her or support pt.. Pt's Parents are suspicious that her exposure to COVID was through [employer] as he works outside the home setting. PT does not work. She does have a [employer] worker and received meals and life line support. Per conversation pt has walker for home use    PT's mother had a clinic appt set up for pt to see Dr. Marks for 4/14 and Lola was planning to take her to ensure she make it to her apt. Both parents have been vaccinated. They hope to be able to see pt either here or once if she needs to transition into TCU. LUZ MARIA provided ph # to pt room and let them know she can not have visitors here with her status    LUZ MARIA will follow will ask Team to order PT/OT when appropriate     Corinne C. White, LSW

## 2021-04-09 NOTE — PHARMACY-ADMISSION MEDICATION HISTORY
Admission medication history interview status for this patient is complete. See [facility] admission navigator for allergy information, prior to admission medications and immunization status.     Medication history interview source(s):Patient via phone due to COVID  Medication history resources (including written lists, pill bottles, clinic record):EPIC list, [facility]s  [facility]    Changes made to PTA medication list:  Added: as needed ibuprofen  Deleted: vitamin d 25 mcg daily, flovent 110 mcg 1-2 puffs bid  Changed: -----    Actions taken by pharmacist (provider contacted, etc):None     Additional medication history information:None    Medication reconciliation/reorder completed by provider prior to medication history? Yes, sticky note left for MD          Prior to Admission medications    Medication Sig Last Dose Taking? Auth Provider   azelastine (ASTELIN) 0.1 % nasal spray Spray 1 spray into both nostrils 2 times daily 4/8/2021 at Unknown time Yes Cara Marks MD   carBAMazepine (TEGRETOL) 200 MG tablet TAKE TWO TABLETS BY MOUTH EVERY MORNING , TAKE ONE TABLET BY MOUTH ONCE DAILY AT NOON, AND TAKE TWO TABLETS BY MOUTH EVERY EVENING 4/8/2021 at noon Yes Cara Marks MD   fluticasone (FLONASE) 50 MCG/ACT nasal spray Spray 1 spray into both nostrils daily as needed for rhinitis or allergies 4/8/2021 at Unknown time Yes Unknown, Entered By History   hydrocortisone 2.5 % cream Apply topically 2 times daily 4/8/2021 at Unknown time Yes Cara Marks MD   ibuprofen (ADVIL/MOTRIN) 200 MG tablet Take 800 mg by mouth every 8 hours as needed for mild pain Past Month at Unknown time Yes Unknown, Entered By History   lisinopril (ZESTRIL) 20 MG tablet Take 1 tablet (20 mg) by mouth daily 4/8/2021 at Unknown time Yes Cara Marks MD   lovastatin (MEVACOR) 40 MG tablet TAKE ONE TABLET BY MOUTH AT BEDTIME 4/7/2021 Yes Cara Marks MD   Multiple Vitamin  (MULTI-VITAMIN PO) Take 1 tablet by mouth daily 4/8/2021 at Unknown time Yes Reported, Patient   nystatin (MYCOSTATIN) 668736 UNIT/GM external cream APPLY TOPICALLY TWO TIMES A DAY AS NEEDED. Not intended to be a chronic, daily medication. 4/8/2021 at Unknown time Yes Cara Marks MD   omeprazole (PRILOSEC) 20 MG DR capsule TAKE TWO CAPSULES BY MOUTH EVERY DAY *TAKE 30 TO 60 MINUTES BEFORE A MEAL 4/8/2021 at Unknown time Yes Cara Marks MD   sertraline (ZOLOFT) 100 MG tablet Take 100 mg by mouth daily 4/8/2021 at Unknown time Yes Unknown, Entered By History

## 2021-04-09 NOTE — PLAN OF CARE
Pt in bed all shift. Cog delay. On 6L NC, sats low 90s, desats with activity. LAZAR. LS dim. Infreq cough. ENc IS. Incont of urine, purewick in place. On Macrobid for possible UTI. Incont BM. Raji reg diet, denies nausea. On Remdesivir, decadron, Lovenox for Covid.
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
## 2021-04-09 NOTE — PLAN OF CARE
Pertinent assessments: Pt admitted this shift, denies SOB at rest, sats low 90's on 5lpm NC, assist of 5 staff to slide from Er cart to bed, pt states walks at home with walker and help from , abdominal folds and area under breasts very red and excoriated, areas cleansed with wound cleanse and intradry applied. Lucy area red, barrier cream applied, pt incontinent of urine, purwik in place.   Major Shift Events admission  Treatment Plan: Remdesivir, decadron, lovenox and macrobid.   Bedside Nurse: Leilani Braxton RN

## 2021-04-09 NOTE — PROGRESS NOTES
[facility]    Hospitalist Progress Note    Date of Service (when I saw the patient): 04/09/2021  Provider:  Telly Baum MD   Text Page  7am - 6PM       Assessment & Plan   Brissa Barlow is a 55 year old female patient with past medical history of developmental delay, seizure disorder, Cushing syndrome, asthma, hypertension, hyperlipidemia, obesity, history of small bowel obstruction, came to emergency room for evaluation for dysuria and cough.  She has been having dry cough for the last 5 days.  She denies associated shortness of breath.  She also denies fever. She had nausea or vomiting and dysuria with foul-smelling urine. She states that her  was positive for Covid.  On arrival to emergency room, her vital signs showed temperature 100.1, pulse 95, blood pressure 120/70, oxygen saturation 85% on room air.  Laboratory work-up showed normal CBC and BMP. Troponin less than 0.015.  Lactic acid 0.9.  Urinalysis showed 6 WBCs/hpf.  COVID-19 PCR positive   She was started on oxygen supplement and dexamethasone. She was also given oral Macrobid.  She was admitted to the hospital for further management.     1.  Acute hypoxemic respiratory failure secondary to Covid pneumonia   CT of the chest showed evidence of bilateral multifocal infiltrates     Started on oxygen supplement, remdesivir and dexamethasone.        2.  Possible UTI.  Started on Macrobid in the ER.  We will continue Macrobid.  Will await urine culture result.     3.  History of seizure disorder:  Tegretol     4.  History of asthma: No evidence of asthma exacerbation at this time.  Inhalers as needed     5. Severe obesity with BMI 56, complicates care.    DVT Prophylaxis: Enoxaparin (Lovenox) SQ  Code Status: Full Code    Disposition: Expected discharge TBD.    Father and mother are the legal guardians and should be updated daily.      Interval History   Patient feels better, denies pain, nausea or vomiting. She is on 4LNC  to keep sat    -Data reviewed today: I reviewed all new labs and imaging results over the last 24 hours.     Physical Exam   Temp: 98.3  F (36.8  C) Temp src: Oral BP: 133/67 Pulse: 86   Resp: 28 SpO2: 91 % O2 Device: Nasal cannula Oxygen Delivery: 6 LPM  Vitals:    04/09/21 0033   Weight: (Abnormal) 163.3 kg (360 lb 1.6 oz)     Vital Signs with Ranges  Temp:  [98.3  F (36.8  C)-100.6  F (38.1  C)] 98.3  F (36.8  C)  Pulse:  [83-98] 86  Resp:  [18-28] 28  BP: (107-142)/(36-74) 133/67  SpO2:  [85 %-99 %] 91 %  No intake/output data recorded.    GEN:  Alert, cooperative, appears comfortable, NAD.  HEENT:  Normocephalic/atraumatic, no scleral icterus, no nasal discharge, mouth moist.  CV:  Regular rate and rhythm, no murmur or JVD.  S1 + S2 noted, no S3 or S4.  LUNGS:  Clear to auscultation bilaterally without rales/rhonchi/wheezing/retractions.  Symmetric chest rise on inhalation noted.  ABD:  Active bowel sounds, soft, non-tender/non-distended.  No rebound/guarding/rigidity.  EXT:  No edema or cyanosis.  No joint synovitis noted.  SKIN:  Dry to touch, no exanthems noted in the visualized areas.       Medications     - MEDICATION INSTRUCTIONS -       sodium chloride 125 mL/hr at 04/09/21 0352       [START ON 4/10/2021] remdesivir  100 mg Intravenous Q24H    And     [START ON 4/10/2021] sodium chloride 0.9%  50 mL Intravenous Q24H     azelastine  1 spray Both Nostrils BID     carBAMazepine  200 mg Oral Q24H     carBAMazepine  400 mg Oral BID     dexamethasone  6 mg Oral Daily     enoxaparin ANTICOAGULANT  40 mg Subcutaneous Q12H     lisinopril  20 mg Oral Daily     lovastatin  40 mg Oral At Bedtime     nitroFURantoin macrocrystal-monohydrate  100 mg Oral Q12H NIC     omeprazole  20 mg Oral QAM AC     sertraline  100 mg Oral Daily     sodium chloride (PF)  3 mL Intracatheter Q8H       Data   Recent Labs   Lab 04/08/21 2034   WBC 4.0   HGB 13.2   MCV 90   *      POTASSIUM 4.0   CHLORIDE 103   CO2 31   BUN  11   CR 0.86   ANIONGAP 2*   CESAR 8.0*   *   TROPI <0.015       Recent Results (from the past 24 hour(s))   XR Chest Port 1 View    Narrative    EXAM: XR CHEST PORT 1 VW  LOCATION: [facility]  DATE/TIME: 4/8/2021 8:25 PM    INDICATION: Hypoxia.  COMPARISON: None.      Impression    IMPRESSION: Shallow inspiration. There are mild interstitial infiltrates in the central lungs. No pleural effusions.   CT Chest Pulmonary Embolism w Contrast    Narrative    EXAM: CT CHEST PULMONARY EMBOLISM W CONTRAST  LOCATION: [facility]  DATE/TIME: 4/8/2021 10:49 PM    INDICATION: PE suspected, low/intermediate prob, positive d-dimer  COMPARISON: None.  TECHNIQUE: CT chest pulmonary angiogram during arterial phase injection of IV contrast. Multiplanar reformats and MIP reconstructions were performed. Dose reduction techniques were used.   CONTRAST: 77mL Isovue-370    FINDINGS:  ANGIOGRAM CHEST: Pulmonary arteries are normal caliber and negative for pulmonary emboli. Thoracic aorta is negative for dissection. The heart size is normal.    LUNGS AND PLEURA: There are bilateral multifocal groundglass infiltrates and consolidations. They are worst in the upper lobes. No pneumothorax or pleural effusion.    MEDIASTINUM/AXILLAE: Few borderline mildly enlarged hilar or axillary lymph nodes.    CORONARY ARTERY CALCIFICATION: None.    UPPER ABDOMEN: Mild splenomegaly.    MUSCULOSKELETAL: Mild degenerative disease in the spine. Old mild upper thoracic vertebral body compression fracture.      Impression    IMPRESSION:  1.  There is no pulmonary embolus, aortic aneurysm or dissection.  2.  Bilateral multifocal pulmonary infiltrates consistent with pneumonia. COVID-19 pneumonia could have this appearance.  3.  Mild splenomegaly.  4.  Few borderline and mildly enlarged hilar and axillary lymph nodes.         Disclaimer: This note consists of symbols derived from keyboarding, dictation and/or voice recognition  software. As a result, there may be errors in the script that have gone undetected. Please consider this when interpreting information found in this chart.

## 2021-04-10 LAB
ALBUMIN SERPL-MCNC: 2.6 G/DL (ref 3.4–5)
ALP SERPL-CCNC: 110 U/L (ref 40–150)
ALT SERPL W P-5'-P-CCNC: 98 U/L (ref 0–50)
ANION GAP SERPL CALCULATED.3IONS-SCNC: 4 MMOL/L (ref 3–14)
AST SERPL W P-5'-P-CCNC: 83 U/L (ref 0–45)
BACTERIA SPEC CULT: ABNORMAL
BILIRUB DIRECT SERPL-MCNC: 0.2 MG/DL (ref 0–0.2)
BILIRUB SERPL-MCNC: 0.3 MG/DL (ref 0.2–1.3)
BUN SERPL-MCNC: 6 MG/DL (ref 7–30)
CALCIUM SERPL-MCNC: 7.6 MG/DL (ref 8.5–10.1)
CHLORIDE SERPL-SCNC: 109 MMOL/L (ref 94–109)
CO2 SERPL-SCNC: 28 MMOL/L (ref 20–32)
CREAT SERPL-MCNC: 0.65 MG/DL (ref 0.52–1.04)
CRP SERPL-MCNC: 112 MG/L (ref 0–8)
D DIMER PPP FEU-MCNC: 1.4 UG/ML FEU (ref 0–0.5)
ERYTHROCYTE [DISTWIDTH] IN BLOOD BY AUTOMATED COUNT: 13.6 % (ref 10–15)
FIBRINOGEN PPP-MCNC: 479 MG/DL (ref 200–420)
GFR SERPL CREATININE-BSD FRML MDRD: >90 ML/MIN/{1.73_M2}
GLUCOSE SERPL-MCNC: 102 MG/DL (ref 70–99)
HCT VFR BLD AUTO: 41.1 % (ref 35–47)
HGB BLD-MCNC: 12.3 G/DL (ref 11.7–15.7)
Lab: ABNORMAL
MCH RBC QN AUTO: 28.8 PG (ref 26.5–33)
MCHC RBC AUTO-ENTMCNC: 29.9 G/DL (ref 31.5–36.5)
MCV RBC AUTO: 96 FL (ref 78–100)
PLATELET # BLD AUTO: 93 10E9/L (ref 150–450)
POTASSIUM SERPL-SCNC: 4 MMOL/L (ref 3.4–5.3)
PROT SERPL-MCNC: 6.1 G/DL (ref 6.8–8.8)
RBC # BLD AUTO: 4.27 10E12/L (ref 3.8–5.2)
SODIUM SERPL-SCNC: 141 MMOL/L (ref 133–144)
SPECIMEN SOURCE: ABNORMAL
WBC # BLD AUTO: 4.1 10E9/L (ref 4–11)

## 2021-04-10 PROCEDURE — 250N000012 HC RX MED GY IP 250 OP 636 PS 637: Performed by: INTERNAL MEDICINE

## 2021-04-10 PROCEDURE — 258N000003 HC RX IP 258 OP 636: Performed by: EMERGENCY MEDICINE

## 2021-04-10 PROCEDURE — 250N000009 HC RX 250: Performed by: INTERNAL MEDICINE

## 2021-04-10 PROCEDURE — XW043E5 INTRODUCTION OF REMDESIVIR ANTI-INFECTIVE INTO CENTRAL VEIN, PERCUTANEOUS APPROACH, NEW TECHNOLOGY GROUP 5: ICD-10-PCS | Performed by: INTERNAL MEDICINE

## 2021-04-10 PROCEDURE — 85027 COMPLETE CBC AUTOMATED: CPT | Performed by: INTERNAL MEDICINE

## 2021-04-10 PROCEDURE — 85384 FIBRINOGEN ACTIVITY: CPT | Performed by: INTERNAL MEDICINE

## 2021-04-10 PROCEDURE — 258N000003 HC RX IP 258 OP 636: Performed by: INTERNAL MEDICINE

## 2021-04-10 PROCEDURE — 250N000013 HC RX MED GY IP 250 OP 250 PS 637: Performed by: HOSPITALIST

## 2021-04-10 PROCEDURE — 272N000269 HC CIRCUIT VAPOTHERM

## 2021-04-10 PROCEDURE — 120N000001 HC R&B MED SURG/OB

## 2021-04-10 PROCEDURE — 36415 COLL VENOUS BLD VENIPUNCTURE: CPT | Performed by: INTERNAL MEDICINE

## 2021-04-10 PROCEDURE — 85379 FIBRIN DEGRADATION QUANT: CPT | Performed by: INTERNAL MEDICINE

## 2021-04-10 PROCEDURE — 999N000215 HC STATISTIC HFNC ADULT NON-CPAP

## 2021-04-10 PROCEDURE — 80076 HEPATIC FUNCTION PANEL: CPT | Performed by: INTERNAL MEDICINE

## 2021-04-10 PROCEDURE — 80048 BASIC METABOLIC PNL TOTAL CA: CPT | Performed by: INTERNAL MEDICINE

## 2021-04-10 PROCEDURE — 250N000013 HC RX MED GY IP 250 OP 250 PS 637: Performed by: INTERNAL MEDICINE

## 2021-04-10 PROCEDURE — 250N000011 HC RX IP 250 OP 636: Performed by: INTERNAL MEDICINE

## 2021-04-10 PROCEDURE — 99233 SBSQ HOSP IP/OBS HIGH 50: CPT | Performed by: INTERNAL MEDICINE

## 2021-04-10 PROCEDURE — 86140 C-REACTIVE PROTEIN: CPT | Performed by: INTERNAL MEDICINE

## 2021-04-10 PROCEDURE — 999N000157 HC STATISTIC RCP TIME EA 10 MIN

## 2021-04-10 RX ADMIN — ACETAMINOPHEN 325 MG: 325 TABLET, FILM COATED ORAL at 21:19

## 2021-04-10 RX ADMIN — DEXAMETHASONE 6 MG: 2 TABLET ORAL at 07:53

## 2021-04-10 RX ADMIN — CARBAMAZEPINE 400 MG: 200 TABLET ORAL at 21:19

## 2021-04-10 RX ADMIN — ENOXAPARIN SODIUM 40 MG: 40 INJECTION SUBCUTANEOUS at 10:40

## 2021-04-10 RX ADMIN — ACETAMINOPHEN 325 MG: 325 TABLET, FILM COATED ORAL at 17:01

## 2021-04-10 RX ADMIN — CARBAMAZEPINE 400 MG: 200 TABLET ORAL at 10:40

## 2021-04-10 RX ADMIN — GUAIFENESIN AND DEXTROMETHORPHAN 10 ML: 100; 10 SYRUP ORAL at 04:09

## 2021-04-10 RX ADMIN — SERTRALINE HYDROCHLORIDE 100 MG: 100 TABLET ORAL at 10:40

## 2021-04-10 RX ADMIN — LOVASTATIN 40 MG: 20 TABLET ORAL at 21:19

## 2021-04-10 RX ADMIN — ALBUTEROL SULFATE 2 PUFF: 90 AEROSOL, METERED RESPIRATORY (INHALATION) at 11:22

## 2021-04-10 RX ADMIN — ACETAMINOPHEN 325 MG: 325 TABLET, FILM COATED ORAL at 02:55

## 2021-04-10 RX ADMIN — LISINOPRIL 20 MG: 20 TABLET ORAL at 10:40

## 2021-04-10 RX ADMIN — NITROFURANTOIN (MONOHYDRATE/MACROCRYSTALS) 100 MG: 75; 25 CAPSULE ORAL at 07:53

## 2021-04-10 RX ADMIN — REMDESIVIR 100 MG: 100 INJECTION, POWDER, LYOPHILIZED, FOR SOLUTION INTRAVENOUS at 17:25

## 2021-04-10 RX ADMIN — MICONAZOLE NITRATE: 20 POWDER TOPICAL at 11:04

## 2021-04-10 RX ADMIN — GUAIFENESIN AND DEXTROMETHORPHAN 10 ML: 100; 10 SYRUP ORAL at 21:19

## 2021-04-10 RX ADMIN — ALBUTEROL SULFATE 2 PUFF: 90 AEROSOL, METERED RESPIRATORY (INHALATION) at 11:35

## 2021-04-10 RX ADMIN — SODIUM CHLORIDE: 9 INJECTION, SOLUTION INTRAVENOUS at 04:09

## 2021-04-10 RX ADMIN — OMEPRAZOLE 20 MG: 20 CAPSULE, DELAYED RELEASE ORAL at 06:47

## 2021-04-10 RX ADMIN — GUAIFENESIN AND DEXTROMETHORPHAN 10 ML: 100; 10 SYRUP ORAL at 10:41

## 2021-04-10 RX ADMIN — SODIUM CHLORIDE 50 ML: 9 INJECTION, SOLUTION INTRAVENOUS at 18:30

## 2021-04-10 RX ADMIN — NITROFURANTOIN (MONOHYDRATE/MACROCRYSTALS) 100 MG: 75; 25 CAPSULE ORAL at 21:19

## 2021-04-10 RX ADMIN — GUAIFENESIN AND DEXTROMETHORPHAN 10 ML: 100; 10 SYRUP ORAL at 17:02

## 2021-04-10 RX ADMIN — ACETAMINOPHEN 325 MG: 325 TABLET, FILM COATED ORAL at 07:53

## 2021-04-10 RX ADMIN — CARBAMAZEPINE 200 MG: 200 TABLET ORAL at 14:25

## 2021-04-10 ASSESSMENT — ACTIVITIES OF DAILY LIVING (ADL)
ADLS_ACUITY_SCORE: 24
ADLS_ACUITY_SCORE: 25
ADLS_ACUITY_SCORE: 25
ADLS_ACUITY_SCORE: 24
ADLS_ACUITY_SCORE: 25
ADLS_ACUITY_SCORE: 25

## 2021-04-10 NOTE — PROVIDER NOTIFICATION
Would you like PT/OT orders to work with her in the near future? FYI, platelet was 93 this am. Would you like to put parameters on her lovenox? Please advise, thanks!

## 2021-04-10 NOTE — PLAN OF CARE
[id number].  End of Shift Summary  For vital signs and complete assessments, please see documentation flowsheets.     Pertinent assessments: Afebrile. Conitnues to require 6 L oxygen in low 90s. LS diminished; pt denies feeling SOB; received Inhaler x1 with improvement. Intermittent cough; received Robitussin. PIV - NS @ 125 ml/hr. Ax2 with LIFT to move. Pt has poor skin hygiene as evident in panis and under breasts. A&O, cog delay; alarm on for safety. Slept well.   Major Shift Events. UC + e. Coli   Treatment Plan: Macrobid, Remdisivir, Lovenox, Decadron
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
## 2021-04-10 NOTE — PROGRESS NOTES
[facility]    Hospitalist Progress Note      Assessment & Plan   Brissa Barlow is a 55 year old female who was admitted on 4/8/2021.    Summary of Stay:   Brissa Barlow is a 55 year old female patient with past medical history of developmental delay, seizure disorder, Cushing syndrome, asthma, hypertension, hyperlipidemia, obesity, history of small bowel obstruction, came to emergency room for evaluation for dysuria and cough.  She has been having dry cough for the last 5 days.  She denies associated shortness of breath.  She also denies fever. She had nausea or vomiting and dysuria with foul-smelling urine. She stated that her  was positive for Covid.    On arrival to emergency room, her vital signs showed temperature 100.1, pulse 95, blood pressure 120/70, oxygen saturation 85% on room air.  Laboratory work-up showed normal CBC and BMP. Troponin less than 0.015.  Lactic acid 0.9.  Urinalysis showed 6 WBCs/hpf.  COVID-19 PCR positive   She was started on oxygen supplement and dexamethasone. She was also given oral Macrobid.  She was admitted to the hospital for further management.    Plan:    Acute hypoxemic respiratory failure.  Covid pneumonia  -Dexamethasone.  Remdesivir.  Lovenox for DVT prophylaxis, 40 mg twice daily.  -Continues to have significantly high oxygen requirement.  -This morning, noted to have hypoxic episode when she became hypoxic without any activity while turning in the bed .  Remained hypoxic despite increasing the oxygen flow to 15 L.  Started on high flow nasal cannula emergently.     Possible UTI  -On Macrobid.  Culture is growing E. coli, pansensitive including Macrobid     History of seizure disorder:  Tegretol     History of asthma:   -Inhalers as needed.  On exam does not appear to have any wheezing.    DVT Prophylaxis: Enoxaparin (Lovenox) subcutaneous, BID  Code Status: Full Code  Expected discharge: 3-4 days    Neno Ross MD  Text Page (7am -  6pm, M-F)    Interval History   Patient was evaluated with nursing staff. Overnight issues discussed.    Review of systems:  No nausea or vomiting.  No abdominal pain.  No diarrhea.  No chest pain/palpitations.  No headache/visual disturbance/new weakness.    Increasing oxygen requirement.  She did appear to be tachypneic and somewhat anxious.  But not in respiratory distress.  Not using accessory muscles.    -Data reviewed today: Labs and medications.    Physical Exam   Temp: 98.4  F (36.9  C) Temp src: Oral BP: 105/56 Pulse: 86   Resp: 20 SpO2: 93 % O2 Device: Oxymask Oxygen Delivery: 8 LPM  Vitals:    04/09/21 0033   Weight: (!) 163.3 kg (360 lb 1.6 oz)     Vital Signs with Ranges  Temp:  [97.6  F (36.4  C)-98.6  F (37  C)] 98.4  F (36.9  C)  Pulse:  [82-86] 86  Resp:  [20-28] 20  BP: (105-138)/(44-60) 105/56  SpO2:  [91 %-94 %] 93 %  I/O last 3 completed shifts:  In: 4592 [P.O.:360; I.V.:4232]  Out: 2400 [Urine:2400]    Constitutional: Awake, alert, cooperative, no apparent distress  HEENT: Trachea midline, sclera is clear   Respiratory: No crackles. No wheezing. Equal breath sounds bilaterally.  Cardiovascular: Regular rate and rhythm, normal S1 and S2, and no murmur noted  GI: Normal bowel sounds, soft, non-distended, non-tender    Medications     - MEDICATION INSTRUCTIONS -         remdesivir  100 mg Intravenous Q24H    And     sodium chloride 0.9%  50 mL Intravenous Q24H     azelastine  1 spray Both Nostrils BID     carBAMazepine  200 mg Oral Q24H     carBAMazepine  400 mg Oral BID     dexamethasone  6 mg Oral Daily     enoxaparin ANTICOAGULANT  40 mg Subcutaneous Q12H     lisinopril  20 mg Oral Daily     lovastatin  40 mg Oral At Bedtime     nitroFURantoin macrocrystal-monohydrate  100 mg Oral Q12H NIC     omeprazole  20 mg Oral QAM AC     sertraline  100 mg Oral Daily     sodium chloride (PF)  3 mL Intracatheter Q8H       Data   Recent Labs   Lab 04/10/21  0715 04/08/21 2034   WBC 4.1 4.0   HGB 12.3 13.2    MCV 96 90   PLT 93* 115*    136   POTASSIUM 4.0 4.0   CHLORIDE 109 103   CO2 28 31   BUN 6* 11   CR 0.65 0.86   ANIONGAP 4 2*   CESAR 7.6* 8.0*   * 119*   ALBUMIN 2.6*  --    PROTTOTAL 6.1*  --    BILITOTAL 0.3  --    ALKPHOS 110  --    ALT 98*  --    AST 83*  --    TROPI  --  <0.015       No results found for this or any previous visit (from the past 24 hour(s)).

## 2021-04-10 NOTE — PLAN OF CARE
To Do:  End of Shift Summary  For vital signs and complete assessments, please see documentation flowsheets.     Pertinent assessments: Afebrile. AxOx4. O2 dips when turning in bed from side to side. Purewick in place, voiding adequately. Passing flatus, no BM this shift and hyperactive BS. Frequent nonproductive cough, robitussin given with no relief.    Major Shift Events. Placed on High flow O2.    Treatment Plan: Macrobid, Remdisivir, Lovenox, Decadron     Bedside Nurse: Dimitris Gray RN

## 2021-04-11 ENCOUNTER — ANESTHESIA EVENT (OUTPATIENT)
Dept: INTENSIVE CARE | Facility: CLINIC | Age: 56
DRG: 207 | End: 2021-04-11
Payer: MEDICARE

## 2021-04-11 ENCOUNTER — APPOINTMENT (OUTPATIENT)
Dept: GENERAL RADIOLOGY | Facility: CLINIC | Age: 56
DRG: 207 | End: 2021-04-11
Attending: SURGERY
Payer: MEDICARE

## 2021-04-11 ENCOUNTER — ANESTHESIA (OUTPATIENT)
Dept: INTENSIVE CARE | Facility: CLINIC | Age: 56
DRG: 207 | End: 2021-04-11
Payer: MEDICARE

## 2021-04-11 LAB
ANION GAP SERPL CALCULATED.3IONS-SCNC: 2 MMOL/L (ref 3–14)
BASE DEFICIT BLDA-SCNC: 1.1 MMOL/L
BASE EXCESS BLDA CALC-SCNC: 1.7 MMOL/L
BASE EXCESS BLDA CALC-SCNC: 3.6 MMOL/L
BUN SERPL-MCNC: 5 MG/DL (ref 7–30)
CALCIUM SERPL-MCNC: 7.9 MG/DL (ref 8.5–10.1)
CARBAMAZEPINE SERPL-MCNC: 14.9 MG/L (ref 4–12)
CHLORIDE SERPL-SCNC: 106 MMOL/L (ref 94–109)
CO2 SERPL-SCNC: 30 MMOL/L (ref 20–32)
CREAT SERPL-MCNC: 0.6 MG/DL (ref 0.52–1.04)
CRP SERPL-MCNC: 137 MG/L (ref 0–8)
D DIMER PPP FEU-MCNC: 1.2 UG/ML FEU (ref 0–0.5)
ERYTHROCYTE [DISTWIDTH] IN BLOOD BY AUTOMATED COUNT: 13.5 % (ref 10–15)
GFR SERPL CREATININE-BSD FRML MDRD: >90 ML/MIN/{1.73_M2}
GLUCOSE BLDC GLUCOMTR-MCNC: 107 MG/DL (ref 70–99)
GLUCOSE BLDC GLUCOMTR-MCNC: 127 MG/DL (ref 70–99)
GLUCOSE SERPL-MCNC: 102 MG/DL (ref 70–99)
HCO3 BLD-SCNC: 25 MMOL/L (ref 21–28)
HCO3 BLD-SCNC: 28 MMOL/L (ref 21–28)
HCO3 BLD-SCNC: 29 MMOL/L (ref 21–28)
HCT VFR BLD AUTO: 39.3 % (ref 35–47)
HGB BLD-MCNC: 12 G/DL (ref 11.7–15.7)
MAGNESIUM SERPL-MCNC: 2.1 MG/DL (ref 1.6–2.3)
MCH RBC QN AUTO: 28.6 PG (ref 26.5–33)
MCHC RBC AUTO-ENTMCNC: 30.5 G/DL (ref 31.5–36.5)
MCV RBC AUTO: 94 FL (ref 78–100)
O2/TOTAL GAS SETTING VFR VENT: ABNORMAL %
OXYHGB MFR BLD: 58 % (ref 92–100)
OXYHGB MFR BLD: 91 % (ref 92–100)
OXYHGB MFR BLD: 97 % (ref 92–100)
PCO2 BLD: 47 MM HG (ref 35–45)
PCO2 BLD: 49 MM HG (ref 35–45)
PCO2 BLD: 53 MM HG (ref 35–45)
PH BLD: 7.33 PH (ref 7.35–7.45)
PH BLD: 7.34 PH (ref 7.35–7.45)
PH BLD: 7.4 PH (ref 7.35–7.45)
PLATELET # BLD AUTO: 108 10E9/L (ref 150–450)
PO2 BLD: 104 MM HG (ref 80–105)
PO2 BLD: 34 MM HG (ref 80–105)
PO2 BLD: 62 MM HG (ref 80–105)
POTASSIUM SERPL-SCNC: 3.3 MMOL/L (ref 3.4–5.3)
POTASSIUM SERPL-SCNC: 3.4 MMOL/L (ref 3.4–5.3)
RBC # BLD AUTO: 4.19 10E12/L (ref 3.8–5.2)
SODIUM SERPL-SCNC: 138 MMOL/L (ref 133–144)
WBC # BLD AUTO: 5 10E9/L (ref 4–11)

## 2021-04-11 PROCEDURE — 84132 ASSAY OF SERUM POTASSIUM: CPT | Performed by: INTERNAL MEDICINE

## 2021-04-11 PROCEDURE — 250N000013 HC RX MED GY IP 250 OP 250 PS 637: Performed by: HOSPITALIST

## 2021-04-11 PROCEDURE — 85379 FIBRIN DEGRADATION QUANT: CPT | Performed by: INTERNAL MEDICINE

## 2021-04-11 PROCEDURE — 36415 COLL VENOUS BLD VENIPUNCTURE: CPT | Performed by: INTERNAL MEDICINE

## 2021-04-11 PROCEDURE — 250N000013 HC RX MED GY IP 250 OP 250 PS 637: Performed by: INTERNAL MEDICINE

## 2021-04-11 PROCEDURE — 71045 X-RAY EXAM CHEST 1 VIEW: CPT

## 2021-04-11 PROCEDURE — 250N000012 HC RX MED GY IP 250 OP 636 PS 637: Performed by: INTERNAL MEDICINE

## 2021-04-11 PROCEDURE — 272N000026 HC KIT POWER PICC TRIPLE LUMEN

## 2021-04-11 PROCEDURE — 999N000105 HC STATISTIC NO DOCUMENTATION TO SUPPORT CHARGE

## 2021-04-11 PROCEDURE — 250N000011 HC RX IP 250 OP 636: Performed by: NURSE ANESTHETIST, CERTIFIED REGISTERED

## 2021-04-11 PROCEDURE — 250N000009 HC RX 250: Performed by: SURGERY

## 2021-04-11 PROCEDURE — 94002 VENT MGMT INPAT INIT DAY: CPT

## 2021-04-11 PROCEDURE — 258N000003 HC RX IP 258 OP 636: Performed by: INTERNAL MEDICINE

## 2021-04-11 PROCEDURE — 250N000011 HC RX IP 250 OP 636

## 2021-04-11 PROCEDURE — 250N000011 HC RX IP 250 OP 636: Performed by: INTERNAL MEDICINE

## 2021-04-11 PROCEDURE — 31500 INSERT EMERGENCY AIRWAY: CPT

## 2021-04-11 PROCEDURE — 370N000003 HC ANESTHESIA WARD SERVICE

## 2021-04-11 PROCEDURE — 82805 BLOOD GASES W/O2 SATURATION: CPT | Performed by: INTERNAL MEDICINE

## 2021-04-11 PROCEDURE — 36600 WITHDRAWAL OF ARTERIAL BLOOD: CPT

## 2021-04-11 PROCEDURE — 82805 BLOOD GASES W/O2 SATURATION: CPT | Performed by: SURGERY

## 2021-04-11 PROCEDURE — 5A09357 ASSISTANCE WITH RESPIRATORY VENTILATION, LESS THAN 24 CONSECUTIVE HOURS, CONTINUOUS POSITIVE AIRWAY PRESSURE: ICD-10-PCS | Performed by: INTERNAL MEDICINE

## 2021-04-11 PROCEDURE — 999N000215 HC STATISTIC HFNC ADULT NON-CPAP

## 2021-04-11 PROCEDURE — 200N000001 HC R&B ICU

## 2021-04-11 PROCEDURE — 999N000065 XR ABDOMEN PORT 1 VW

## 2021-04-11 PROCEDURE — 36569 INSJ PICC 5 YR+ W/O IMAGING: CPT

## 2021-04-11 PROCEDURE — 999N000011 HC STATISTIC ANESTHESIA CASE

## 2021-04-11 PROCEDURE — 5A1955Z RESPIRATORY VENTILATION, GREATER THAN 96 CONSECUTIVE HOURS: ICD-10-PCS | Performed by: INTERNAL MEDICINE

## 2021-04-11 PROCEDURE — 250N000009 HC RX 250: Performed by: ANESTHESIOLOGY

## 2021-04-11 PROCEDURE — 80048 BASIC METABOLIC PNL TOTAL CA: CPT | Performed by: INTERNAL MEDICINE

## 2021-04-11 PROCEDURE — 999N000065 XR CHEST PORT 1 VW

## 2021-04-11 PROCEDURE — 250N000011 HC RX IP 250 OP 636: Performed by: SURGERY

## 2021-04-11 PROCEDURE — 999N000157 HC STATISTIC RCP TIME EA 10 MIN

## 2021-04-11 PROCEDURE — 999N000185 HC STATISTIC TRANSPORT TIME EA 15 MIN

## 2021-04-11 PROCEDURE — 74018 RADEX ABDOMEN 1 VIEW: CPT | Mod: 26 | Performed by: NEUROLOGICAL SURGERY

## 2021-04-11 PROCEDURE — 83735 ASSAY OF MAGNESIUM: CPT | Performed by: INTERNAL MEDICINE

## 2021-04-11 PROCEDURE — 80156 ASSAY CARBAMAZEPINE TOTAL: CPT | Performed by: INTERNAL MEDICINE

## 2021-04-11 PROCEDURE — 94660 CPAP INITIATION&MGMT: CPT

## 2021-04-11 PROCEDURE — 86140 C-REACTIVE PROTEIN: CPT | Performed by: INTERNAL MEDICINE

## 2021-04-11 PROCEDURE — 85027 COMPLETE CBC AUTOMATED: CPT | Performed by: INTERNAL MEDICINE

## 2021-04-11 PROCEDURE — 250N000009 HC RX 250: Performed by: INTERNAL MEDICINE

## 2021-04-11 PROCEDURE — 99291 CRITICAL CARE FIRST HOUR: CPT | Performed by: INTERNAL MEDICINE

## 2021-04-11 PROCEDURE — 999N001017 HC STATISTIC GLUCOSE BY METER IP

## 2021-04-11 RX ORDER — NALOXONE HYDROCHLORIDE 0.4 MG/ML
0.4 INJECTION, SOLUTION INTRAMUSCULAR; INTRAVENOUS; SUBCUTANEOUS
Status: DISCONTINUED | OUTPATIENT
Start: 2021-04-11 | End: 2021-04-15 | Stop reason: HOSPADM

## 2021-04-11 RX ORDER — HYDROXYZINE HYDROCHLORIDE 25 MG/1
25 TABLET, FILM COATED ORAL EVERY 6 HOURS PRN
Status: DISCONTINUED | OUTPATIENT
Start: 2021-04-11 | End: 2021-04-15 | Stop reason: HOSPADM

## 2021-04-11 RX ORDER — POTASSIUM CHLORIDE 29.8 MG/ML
20 INJECTION INTRAVENOUS
Status: COMPLETED | OUTPATIENT
Start: 2021-04-11 | End: 2021-04-12

## 2021-04-11 RX ORDER — FENTANYL CITRATE 50 UG/ML
25-75 INJECTION, SOLUTION INTRAMUSCULAR; INTRAVENOUS
Status: DISCONTINUED | OUTPATIENT
Start: 2021-04-11 | End: 2021-04-15 | Stop reason: HOSPADM

## 2021-04-11 RX ORDER — CEFAZOLIN SODIUM 1 G/50ML
1 INJECTION, SOLUTION INTRAVENOUS EVERY 8 HOURS
Status: COMPLETED | OUTPATIENT
Start: 2021-04-11 | End: 2021-04-15

## 2021-04-11 RX ORDER — NALOXONE HYDROCHLORIDE 0.4 MG/ML
0.2 INJECTION, SOLUTION INTRAMUSCULAR; INTRAVENOUS; SUBCUTANEOUS
Status: DISCONTINUED | OUTPATIENT
Start: 2021-04-11 | End: 2021-04-15 | Stop reason: HOSPADM

## 2021-04-11 RX ORDER — LORAZEPAM 2 MG/ML
2 INJECTION INTRAMUSCULAR EVERY 4 HOURS PRN
Status: DISCONTINUED | OUTPATIENT
Start: 2021-04-11 | End: 2021-04-15 | Stop reason: HOSPADM

## 2021-04-11 RX ORDER — NOREPINEPHRINE BITARTRATE 0.06 MG/ML
0.03-0.4 INJECTION, SOLUTION INTRAVENOUS CONTINUOUS
Status: DISCONTINUED | OUTPATIENT
Start: 2021-04-11 | End: 2021-04-15 | Stop reason: HOSPADM

## 2021-04-11 RX ORDER — PROPOFOL 10 MG/ML
INJECTION, EMULSION INTRAVENOUS PRN
Status: DISCONTINUED | OUTPATIENT
Start: 2021-04-11 | End: 2021-04-11

## 2021-04-11 RX ORDER — DEXAMETHASONE SODIUM PHOSPHATE 4 MG/ML
6 INJECTION, SOLUTION INTRA-ARTICULAR; INTRALESIONAL; INTRAMUSCULAR; INTRAVENOUS; SOFT TISSUE DAILY
Status: DISCONTINUED | OUTPATIENT
Start: 2021-04-12 | End: 2021-04-15 | Stop reason: HOSPADM

## 2021-04-11 RX ORDER — VECURONIUM BROMIDE 1 MG/ML
10 INJECTION, POWDER, LYOPHILIZED, FOR SOLUTION INTRAVENOUS ONCE
Status: COMPLETED | OUTPATIENT
Start: 2021-04-11 | End: 2021-04-11

## 2021-04-11 RX ORDER — LIDOCAINE HYDROCHLORIDE 10 MG/ML
INJECTION, SOLUTION INFILTRATION; PERINEURAL
Status: COMPLETED | OUTPATIENT
Start: 2021-04-11 | End: 2021-04-11

## 2021-04-11 RX ORDER — PROPOFOL 10 MG/ML
5-75 INJECTION, EMULSION INTRAVENOUS CONTINUOUS
Status: DISCONTINUED | OUTPATIENT
Start: 2021-04-11 | End: 2021-04-15 | Stop reason: HOSPADM

## 2021-04-11 RX ORDER — DEXTROSE MONOHYDRATE 25 G/50ML
25-50 INJECTION, SOLUTION INTRAVENOUS
Status: DISCONTINUED | OUTPATIENT
Start: 2021-04-11 | End: 2021-04-15 | Stop reason: HOSPADM

## 2021-04-11 RX ORDER — NICOTINE POLACRILEX 4 MG
15-30 LOZENGE BUCCAL
Status: DISCONTINUED | OUTPATIENT
Start: 2021-04-11 | End: 2021-04-15 | Stop reason: HOSPADM

## 2021-04-11 RX ORDER — NOREPINEPHRINE BITARTRATE 0.02 MG/ML
.03-.125 INJECTION, SOLUTION INTRAVENOUS CONTINUOUS
Status: DISCONTINUED | OUTPATIENT
Start: 2021-04-11 | End: 2021-04-11

## 2021-04-11 RX ORDER — VECURONIUM BROMIDE 1 MG/ML
INJECTION, POWDER, LYOPHILIZED, FOR SOLUTION INTRAVENOUS
Status: DISPENSED
Start: 2021-04-11 | End: 2021-04-12

## 2021-04-11 RX ORDER — PROPOFOL 10 MG/ML
5-75 INJECTION, EMULSION INTRAVENOUS CONTINUOUS
Status: DISCONTINUED | OUTPATIENT
Start: 2021-04-11 | End: 2021-04-11

## 2021-04-11 RX ORDER — LIDOCAINE 40 MG/G
CREAM TOPICAL
Status: ACTIVE | OUTPATIENT
Start: 2021-04-11 | End: 2021-04-14

## 2021-04-11 RX ORDER — HEPARIN SODIUM,PORCINE 10 UNIT/ML
2-5 VIAL (ML) INTRAVENOUS
Status: ACTIVE | OUTPATIENT
Start: 2021-04-11 | End: 2021-04-14

## 2021-04-11 RX ADMIN — AZELASTINE HYDROCHLORIDE 1 SPRAY: 137 SPRAY, METERED NASAL at 09:28

## 2021-04-11 RX ADMIN — Medication 0.03 MCG/KG/MIN: at 21:41

## 2021-04-11 RX ADMIN — GUAIFENESIN AND DEXTROMETHORPHAN 10 ML: 100; 10 SYRUP ORAL at 05:34

## 2021-04-11 RX ADMIN — ACETAMINOPHEN 325 MG: 325 TABLET, FILM COATED ORAL at 01:12

## 2021-04-11 RX ADMIN — GUAIFENESIN AND DEXTROMETHORPHAN 10 ML: 100; 10 SYRUP ORAL at 09:24

## 2021-04-11 RX ADMIN — MIDAZOLAM 2 MG: 1 INJECTION INTRAMUSCULAR; INTRAVENOUS at 18:56

## 2021-04-11 RX ADMIN — MICONAZOLE NITRATE: 20 POWDER TOPICAL at 09:35

## 2021-04-11 RX ADMIN — PROPOFOL 5 MCG/KG/MIN: 10 INJECTION, EMULSION INTRAVENOUS at 18:50

## 2021-04-11 RX ADMIN — CARBAMAZEPINE 200 MG: 200 TABLET ORAL at 14:13

## 2021-04-11 RX ADMIN — PROPOFOL 75 MCG/KG/MIN: 10 INJECTION, EMULSION INTRAVENOUS at 22:26

## 2021-04-11 RX ADMIN — SERTRALINE HYDROCHLORIDE 100 MG: 100 TABLET ORAL at 09:21

## 2021-04-11 RX ADMIN — ENOXAPARIN SODIUM 40 MG: 40 INJECTION SUBCUTANEOUS at 23:20

## 2021-04-11 RX ADMIN — MIDAZOLAM 2 MG: 1 INJECTION INTRAMUSCULAR; INTRAVENOUS at 19:00

## 2021-04-11 RX ADMIN — ACETAMINOPHEN 325 MG: 325 TABLET, FILM COATED ORAL at 05:35

## 2021-04-11 RX ADMIN — Medication 100 MG: at 18:37

## 2021-04-11 RX ADMIN — OMEPRAZOLE 20 MG: 20 CAPSULE, DELAYED RELEASE ORAL at 05:35

## 2021-04-11 RX ADMIN — HYDROXYZINE HYDROCHLORIDE 25 MG: 25 TABLET, FILM COATED ORAL at 01:12

## 2021-04-11 RX ADMIN — POTASSIUM CHLORIDE 20 MEQ: 29.8 INJECTION, SOLUTION INTRAVENOUS at 23:00

## 2021-04-11 RX ADMIN — REMDESIVIR 100 MG: 100 INJECTION, POWDER, LYOPHILIZED, FOR SOLUTION INTRAVENOUS at 16:50

## 2021-04-11 RX ADMIN — LISINOPRIL 20 MG: 20 TABLET ORAL at 09:21

## 2021-04-11 RX ADMIN — PROPOFOL 75 MCG/KG/MIN: 10 INJECTION, EMULSION INTRAVENOUS at 21:12

## 2021-04-11 RX ADMIN — ALBUTEROL SULFATE 2 PUFF: 90 AEROSOL, METERED RESPIRATORY (INHALATION) at 10:33

## 2021-04-11 RX ADMIN — FENTANYL CITRATE 50 MCG: 50 INJECTION, SOLUTION INTRAMUSCULAR; INTRAVENOUS at 22:04

## 2021-04-11 RX ADMIN — LIDOCAINE HYDROCHLORIDE 1 ML: 10 INJECTION, SOLUTION EPIDURAL; INFILTRATION; INTRACAUDAL; PERINEURAL at 20:20

## 2021-04-11 RX ADMIN — PROPOFOL 150 MG: 10 INJECTION, EMULSION INTRAVENOUS at 18:37

## 2021-04-11 RX ADMIN — VECURONIUM BROMIDE 10 MG: 1 INJECTION, POWDER, LYOPHILIZED, FOR SOLUTION INTRAVENOUS at 19:07

## 2021-04-11 RX ADMIN — SODIUM CHLORIDE 50 ML: 9 INJECTION, SOLUTION INTRAVENOUS at 16:50

## 2021-04-11 RX ADMIN — NITROFURANTOIN (MONOHYDRATE/MACROCRYSTALS) 100 MG: 75; 25 CAPSULE ORAL at 09:21

## 2021-04-11 RX ADMIN — ENOXAPARIN SODIUM 40 MG: 40 INJECTION SUBCUTANEOUS at 09:21

## 2021-04-11 RX ADMIN — CARBAMAZEPINE 400 MG: 200 TABLET ORAL at 09:21

## 2021-04-11 RX ADMIN — Medication 0.03 MCG/KG/MIN: at 19:20

## 2021-04-11 RX ADMIN — GUAIFENESIN AND DEXTROMETHORPHAN 10 ML: 100; 10 SYRUP ORAL at 01:12

## 2021-04-11 RX ADMIN — ONDANSETRON HYDROCHLORIDE 4 MG: 2 INJECTION, SOLUTION INTRAMUSCULAR; INTRAVENOUS at 10:14

## 2021-04-11 RX ADMIN — HYDROXYZINE HYDROCHLORIDE 25 MG: 25 TABLET, FILM COATED ORAL at 09:21

## 2021-04-11 RX ADMIN — DEXAMETHASONE 6 MG: 2 TABLET ORAL at 09:21

## 2021-04-11 RX ADMIN — LIDOCAINE HYDROCHLORIDE 0.5 ML: 10 INJECTION, SOLUTION INFILTRATION; PERINEURAL at 22:04

## 2021-04-11 RX ADMIN — PROPOFOL 75 MCG/KG/MIN: 10 INJECTION, EMULSION INTRAVENOUS at 23:28

## 2021-04-11 ASSESSMENT — ACTIVITIES OF DAILY LIVING (ADL)
ADLS_ACUITY_SCORE: 21
ADLS_ACUITY_SCORE: 21
ADLS_ACUITY_SCORE: 25
ADLS_ACUITY_SCORE: 21
ADLS_ACUITY_SCORE: 19
ADLS_ACUITY_SCORE: 21

## 2021-04-11 NOTE — PROGRESS NOTES
"Patient remains on HFNC  @ 50 LPM and 95% for SOB and  oxygen/PEEP therapy. Skin integrity looks good and intact. Will continue to monitor and assess the pt's current respiratory status and needs.     /60   Pulse 81   Temp 98.2  F (36.8  C) (Oral)   Resp 24   Ht 1.702 m (5' 7\")   Wt (!) 163.3 kg (360 lb 1.6 oz)   LMP 08/18/2008   SpO2 95%   BMI 56.40 kg/m        Christian Perez, RT        "

## 2021-04-11 NOTE — PROGRESS NOTES
[facility]    Hospitalist Progress Note      Assessment & Plan   Brissa Barlow is a 55 year old female who was admitted on 4/8/2021.    Summary of Stay:   rBissa Barlow is a 55 year old female patient with past medical history of developmental delay, seizure disorder, Cushing syndrome, asthma, hypertension, hyperlipidemia, obesity, history of small bowel obstruction, came to emergency room for evaluation for dysuria and cough.  She has been having dry cough for the last 5 days.  She denies associated shortness of breath.  She also denies fever. She had nausea or vomiting and dysuria with foul-smelling urine. She stated that her  was positive for Covid.     On arrival to emergency room, her vital signs showed temperature 100.1, pulse 95, blood pressure 120/70, oxygen saturation 85% on room air.  Laboratory work-up showed normal CBC and BMP. Troponin less than 0.015.  Lactic acid 0.9.  Urinalysis showed 6 WBCs/hpf.  COVID-19 PCR positive   She was started on oxygen supplement and dexamethasone. She was also given oral Macrobid.  She was admitted to the hospital for further management.    Plan:    Acute hypoxemic respiratory failure.  Covid pneumonia  -Patient continues to worsen.  Today, oxygen requirement went up to 100% on high flow with maximum flow.  -On maximum high flow support.  -Decided to transfer the patient to intensive care unit.  Spoke to patient's mother and updated on the phone.  Spoke to the ICU team as well (Dr. Conner and Dr. Murray)  -Change dexamethasone to IV (start date 4/8/2021).  Continue IV remdesivir.  -Was on Lovenox 40 mg twice daily but it was changed to once daily due to drop in platelets.  If the platelet count continues to improve then potentially change it back to twice daily for DVT prophylaxis.     Possible UTI  -She has been getting oral Macrobid.  But I will change it to IV cefazolin as she may not be able to take oral meds once started on  BiPAP.     History of seizure disorder:    -On oral Tegretol at baseline which appears to work well for her.  Per PCP note, no seizures for a long time.  -Currently the biggest challenge is how to give her the oral Tegretol if she requires continuous BiPAP and there is a high likelihood of that.  -For that reason I will request a neurology consultation to give us guidance regarding alternative parenteral antiepileptic therapy, in case we are not able to give her oral Tegretol.  The nursing staff was able to give her the afternoon dose of Tegretol just before transferring her to ICU on BiPAP.     History of asthma:   -Inhalers as needed.  On exam does not appear to have any wheezing.    Updated patient's mother over the phone who is the guardian.    Critical care time spent 50 minutes.     DVT Prophylaxis: Enoxaparin (Lovenox) subcutaneous  Code Status: Full Code  Expected discharge: Uncertain.    Neno Ross MD  Text Page (7am - 6pm, M-F)    Interval History   Patient was evaluated with nursing staff. Overnight issues discussed.    Review of systems:   Patient continues to be critically ill.  Gets short of breath with any minimal activity.  Even moving in the bed causes her to have hypoxia.    -Data reviewed today: Labs and medications.    Physical Exam   Temp: 98.2  F (36.8  C) Temp src: Oral BP: 132/70 Pulse: 86   Resp: 30 SpO2: 91 % O2 Device: BiPAP/CPAP Oxygen Delivery: 65 LPM  Vitals:    04/09/21 0033   Weight: (!) 163.3 kg (360 lb 1.6 oz)     Vital Signs with Ranges  Temp:  [98.2  F (36.8  C)-98.6  F (37  C)] 98.2  F (36.8  C)  Pulse:  [79-90] 86  Resp:  [20-36] 30  BP: (121-143)/(53-73) 132/70  FiO2 (%):  [85 %-100 %] 100 %  SpO2:  [85 %-95 %] 91 %  I/O last 3 completed shifts:  In: 425 [P.O.:425]  Out: 2500 [Urine:2500]    Constitutional: Awake, alert, slightly anxious.  HEENT: Trachea midline, sclera is clear, nasal cannula in place for high flow.  Respiratory: No crackles. No wheezing. Equal breath sounds  bilaterally.  Distant breath sounds.  Cardiovascular: Regular rate and rhythm, normal S1 and S2, and no murmur noted  GI: Normal bowel sounds, soft, non-distended, non-tender  Skin/Integumen: No rashes, no cyanosis  Psych: appropriate affect, no agitation   Extremities: No pitting edema     Medications     - MEDICATION INSTRUCTIONS -         remdesivir  100 mg Intravenous Q24H    And     sodium chloride 0.9%  50 mL Intravenous Q24H     azelastine  1 spray Both Nostrils BID     carBAMazepine  200 mg Oral Q24H     carBAMazepine  400 mg Oral BID     ceFAZolin  1 g Intravenous Q8H     [START ON 4/12/2021] dexamethasone  6 mg Intravenous Daily     enoxaparin ANTICOAGULANT  40 mg Subcutaneous Daily     lisinopril  20 mg Oral Daily     [START ON 4/12/2021] pantoprazole (PROTONIX) IV  40 mg Intravenous Daily with breakfast     sertraline  100 mg Oral Daily     sodium chloride (PF)  3 mL Intracatheter Q8H       Data   Recent Labs   Lab 04/11/21  0721 04/10/21  0715 04/08/21 2034   WBC 5.0 4.1 4.0   HGB 12.0 12.3 13.2   MCV 94 96 90   * 93* 115*    141 136   POTASSIUM 3.4 4.0 4.0   CHLORIDE 106 109 103   CO2 30 28 31   BUN 5* 6* 11   CR 0.60 0.65 0.86   ANIONGAP 2* 4 2*   CESAR 7.9* 7.6* 8.0*   * 102* 119*   ALBUMIN  --  2.6*  --    PROTTOTAL  --  6.1*  --    BILITOTAL  --  0.3  --    ALKPHOS  --  110  --    ALT  --  98*  --    AST  --  83*  --    TROPI  --   --  <0.015       No results found for this or any previous visit (from the past 24 hour(s)).

## 2021-04-11 NOTE — PROGRESS NOTES
RT note: placed pt on bipap 20/10 100% for transport to ICU from 5th. Pt wanted to remain on bipap, tolerating well. HFNC on standby in room. BS diminished.    Tiffany Matthew, RRT

## 2021-04-11 NOTE — PROGRESS NOTES
End of Shift Summary  For vital signs and complete assessments, please see documentation flowsheets.     Pertinent assessments: Pt A&O, currently on high flow FiO2 @ 90% on 60L, continues to have nonproductive dry cough, Robitussin x 3 given. C/O back pan and HA Tylenol x 3 given. LS diminished. Pure wick in place, adequate output, incont x 1.     Major Shift Events: pt anxious about being on high flow, MD notified and PRN Atarax ordered and given x 1 this shift.   Treatment Plan: Macrobid, Remdisivir, Lovenox, oral Decadron, monitor O2 needs   Bedside Nurse: Sandra Montiel RN

## 2021-04-11 NOTE — CONSULTS
"Please note: Neurocritical Care consultation is only available at [facility] (this is noted in the order itself). Please consult the General Neurology service at [facility] for ICU neuro consultation. Dr. Miles NCC/Stroke fellow has also communicated this to the [facility] at [facility] ICU. I will discontinue the Neurocritical Care consultation order placed for our service in [facility]. Thank you.    Priscilla Fritz MD  Vascular Neurology  To page me or covering stroke neurology team member, click here: AMCOM   Choose \"On Call\" tab at top, then search dropdown box for \"Neurology Adult\", select location, press Enter, then look for stroke/neuro ICU/telestroke.    "

## 2021-04-11 NOTE — PROGRESS NOTES
Asked to assess patient for potential intubation    Admitted with COVID    On BIPAP 100% FIO2 with sats low 80s.  tachypneic with RR 30-50s    Plan:  Intubate  PICC line placement  intensivist to order vent settings

## 2021-04-11 NOTE — ANESTHESIA CARE TRANSFER NOTE
Patient: Brissa Barlow    * No procedures listed *    Diagnosis: * No pre-op diagnosis entered *  Diagnosis Additional Information: No value filed.    Anesthesia Type:   No value filed.     Note:    Oropharynx: endotracheal tube in place  Level of Consciousness: iatrogenic sedation  Oxygen Supplementation: blow-by O2    Independent Airway: airway patency not satisfactory and stable  Dentition: dentition unchanged  Vital Signs Stable: post-procedure vital signs reviewed and stable  Report to RN Given: handoff report given  Patient transferred to: ICU    ICU Handoff: Call for PAUSE to initiate/utilize ICU HANDOFF, Identified Patient, Identified Responsible Provider, Reviewed the Pertinent Medical History, Discussed Surgical Course, Reviewed Intra-OP Anesthesia Management and Issues during Anesthesia, Set Expectations for Post Procedure Period and Allowed Opportunity for Questions and Acknowledgement of Understanding      Vitals: (Last set prior to Anesthesia Care Transfer)    Electronically Signed By: FERNANDA Bautista CRNA  April 11, 2021  6:57 PM  
negative

## 2021-04-11 NOTE — PLAN OF CARE
1755 - MD Conner paged r/t decreased sats and increased work of breathing. Requests hospitalist to come assess pt.     1810 - MD Alvarado here to assess patient. Orders for intubation, sedaton initiated.     1815 - MD Conner paged to get vent settings per previous instruction. New orders received to intubate and initiate central line placement.       1837 - anesthesia  here to intubate. Meds given for sedation - see MAR     1838 - Intubated.     Meds given for initial sedation and to paralyzed patient. See MAR. additional IV placed.

## 2021-04-11 NOTE — PLAN OF CARE
To Do:  End of Shift Summary  For vital signs and complete assessments, please see documentation flowsheets.     Pertinent assessments: Pt A&O, LS diminished in lower lobes anteriorly. Had an episode of nausea and spit up at 1010, zofran administered with relief. Pt denies inhaling water. Desat to 85% on high flow at 90% FiO2 and 50 LPM. At 1035, High flow was increased to 100% FiO2 at 65 LPM and held steady.    Major Shift Events: On highflow, will be on BiPAP to transfer to ICU, 365.    Treatment Plan: Macrobid, Remdisivir, Lovenox, oral Decadron, monitor O2 needs     Bedside Nurse: Dimitris Gray RN

## 2021-04-11 NOTE — PROVIDER NOTIFICATION
Has Tegretol ordered for 1200, desats with minimal activity. Would you like me to administer? Please advise, thanks!

## 2021-04-11 NOTE — PROGRESS NOTES
VSS, pt on high flow oxygen keeping saturation above 92%. Did not get out of bed during shift. PIV SL, received first dose of remdesivir without issue. Received PRN tylenol and cough syrup which were both effective. Tolerating diet, fair appetite. Dischrage pending.

## 2021-04-12 ENCOUNTER — MYC MEDICAL ADVICE (OUTPATIENT)
Dept: FAMILY MEDICINE | Facility: CLINIC | Age: 56
End: 2021-04-12

## 2021-04-12 ENCOUNTER — APPOINTMENT (OUTPATIENT)
Dept: GENERAL RADIOLOGY | Facility: CLINIC | Age: 56
DRG: 207 | End: 2021-04-12
Attending: INTERNAL MEDICINE
Payer: MEDICARE

## 2021-04-12 LAB
ALT SERPL W P-5'-P-CCNC: 76 U/L (ref 0–50)
ANION GAP SERPL CALCULATED.3IONS-SCNC: 4 MMOL/L (ref 3–14)
AST SERPL W P-5'-P-CCNC: 61 U/L (ref 0–45)
BASE DEFICIT BLDA-SCNC: 0.9 MMOL/L
BASE DEFICIT BLDA-SCNC: 1.1 MMOL/L
BASE EXCESS BLDA CALC-SCNC: 0.1 MMOL/L
BASE EXCESS BLDA CALC-SCNC: 0.6 MMOL/L
BUN SERPL-MCNC: 6 MG/DL (ref 7–30)
CALCIUM SERPL-MCNC: 8.2 MG/DL (ref 8.5–10.1)
CHLORIDE SERPL-SCNC: 110 MMOL/L (ref 94–109)
CO2 SERPL-SCNC: 26 MMOL/L (ref 20–32)
CREAT SERPL-MCNC: 0.45 MG/DL (ref 0.52–1.04)
CRP SERPL-MCNC: 163 MG/L (ref 0–8)
D DIMER PPP FEU-MCNC: 1.3 UG/ML FEU (ref 0–0.5)
ERYTHROCYTE [DISTWIDTH] IN BLOOD BY AUTOMATED COUNT: 13.5 % (ref 10–15)
GFR SERPL CREATININE-BSD FRML MDRD: >90 ML/MIN/{1.73_M2}
GLUCOSE BLDC GLUCOMTR-MCNC: 104 MG/DL (ref 70–99)
GLUCOSE BLDC GLUCOMTR-MCNC: 108 MG/DL (ref 70–99)
GLUCOSE BLDC GLUCOMTR-MCNC: 113 MG/DL (ref 70–99)
GLUCOSE BLDC GLUCOMTR-MCNC: 117 MG/DL (ref 70–99)
GLUCOSE BLDC GLUCOMTR-MCNC: 132 MG/DL (ref 70–99)
GLUCOSE BLDC GLUCOMTR-MCNC: 93 MG/DL (ref 70–99)
GLUCOSE SERPL-MCNC: 112 MG/DL (ref 70–99)
HCO3 BLD-SCNC: 25 MMOL/L (ref 21–28)
HCO3 BLD-SCNC: 26 MMOL/L (ref 21–28)
HCT VFR BLD AUTO: 39.4 % (ref 35–47)
HGB BLD-MCNC: 12.8 G/DL (ref 11.7–15.7)
MCH RBC QN AUTO: 29.5 PG (ref 26.5–33)
MCHC RBC AUTO-ENTMCNC: 32.5 G/DL (ref 31.5–36.5)
MCV RBC AUTO: 91 FL (ref 78–100)
O2/TOTAL GAS SETTING VFR VENT: ABNORMAL %
OXYHGB MFR BLD: 92 % (ref 92–100)
OXYHGB MFR BLD: 93 % (ref 92–100)
OXYHGB MFR BLD: 98 % (ref 92–100)
OXYHGB MFR BLD: 98 % (ref 92–100)
PCO2 BLD: 39 MM HG (ref 35–45)
PCO2 BLD: 42 MM HG (ref 35–45)
PCO2 BLD: 46 MM HG (ref 35–45)
PCO2 BLD: 46 MM HG (ref 35–45)
PH BLD: 7.35 PH (ref 7.35–7.45)
PH BLD: 7.35 PH (ref 7.35–7.45)
PH BLD: 7.4 PH (ref 7.35–7.45)
PH BLD: 7.41 PH (ref 7.35–7.45)
PHOSPHATE SERPL-MCNC: 3 MG/DL (ref 2.5–4.5)
PLATELET # BLD AUTO: 133 10E9/L (ref 150–450)
PO2 BLD: 111 MM HG (ref 80–105)
PO2 BLD: 134 MM HG (ref 80–105)
PO2 BLD: 65 MM HG (ref 80–105)
PO2 BLD: 69 MM HG (ref 80–105)
POTASSIUM SERPL-SCNC: 3.3 MMOL/L (ref 3.4–5.3)
POTASSIUM SERPL-SCNC: 3.9 MMOL/L (ref 3.4–5.3)
POTASSIUM SERPL-SCNC: 4.2 MMOL/L (ref 3.4–5.3)
PROCALCITONIN SERPL-MCNC: 0.05 NG/ML
RBC # BLD AUTO: 4.34 10E12/L (ref 3.8–5.2)
SODIUM SERPL-SCNC: 140 MMOL/L (ref 133–144)
WBC # BLD AUTO: 5.4 10E9/L (ref 4–11)

## 2021-04-12 PROCEDURE — 84460 ALANINE AMINO (ALT) (SGPT): CPT | Performed by: INTERNAL MEDICINE

## 2021-04-12 PROCEDURE — G0463 HOSPITAL OUTPT CLINIC VISIT: HCPCS

## 2021-04-12 PROCEDURE — 99207 PR NO BILLABLE SERVICE THIS VISIT: CPT | Performed by: INTERNAL MEDICINE

## 2021-04-12 PROCEDURE — 250N000013 HC RX MED GY IP 250 OP 250 PS 637: Performed by: INTERNAL MEDICINE

## 2021-04-12 PROCEDURE — 94003 VENT MGMT INPAT SUBQ DAY: CPT

## 2021-04-12 PROCEDURE — 85027 COMPLETE CBC AUTOMATED: CPT | Performed by: INTERNAL MEDICINE

## 2021-04-12 PROCEDURE — 258N000003 HC RX IP 258 OP 636: Performed by: SURGERY

## 2021-04-12 PROCEDURE — 82805 BLOOD GASES W/O2 SATURATION: CPT | Performed by: INTERNAL MEDICINE

## 2021-04-12 PROCEDURE — 80048 BASIC METABOLIC PNL TOTAL CA: CPT | Performed by: INTERNAL MEDICINE

## 2021-04-12 PROCEDURE — 250N000011 HC RX IP 250 OP 636: Performed by: SURGERY

## 2021-04-12 PROCEDURE — 84132 ASSAY OF SERUM POTASSIUM: CPT | Performed by: INTERNAL MEDICINE

## 2021-04-12 PROCEDURE — 74018 RADEX ABDOMEN 1 VIEW: CPT

## 2021-04-12 PROCEDURE — 250N000009 HC RX 250: Performed by: INTERNAL MEDICINE

## 2021-04-12 PROCEDURE — 250N000011 HC RX IP 250 OP 636: Performed by: INTERNAL MEDICINE

## 2021-04-12 PROCEDURE — 99291 CRITICAL CARE FIRST HOUR: CPT | Performed by: INTERNAL MEDICINE

## 2021-04-12 PROCEDURE — 82805 BLOOD GASES W/O2 SATURATION: CPT | Performed by: SURGERY

## 2021-04-12 PROCEDURE — 84100 ASSAY OF PHOSPHORUS: CPT | Performed by: INTERNAL MEDICINE

## 2021-04-12 PROCEDURE — 999N000157 HC STATISTIC RCP TIME EA 10 MIN

## 2021-04-12 PROCEDURE — 258N000003 HC RX IP 258 OP 636: Performed by: INTERNAL MEDICINE

## 2021-04-12 PROCEDURE — 84145 PROCALCITONIN (PCT): CPT | Performed by: INTERNAL MEDICINE

## 2021-04-12 PROCEDURE — 94644 CONT INHLJ TX 1ST HOUR: CPT

## 2021-04-12 PROCEDURE — 84450 TRANSFERASE (AST) (SGOT): CPT | Performed by: INTERNAL MEDICINE

## 2021-04-12 PROCEDURE — 85379 FIBRIN DEGRADATION QUANT: CPT | Performed by: INTERNAL MEDICINE

## 2021-04-12 PROCEDURE — C9113 INJ PANTOPRAZOLE SODIUM, VIA: HCPCS | Performed by: SURGERY

## 2021-04-12 PROCEDURE — 86140 C-REACTIVE PROTEIN: CPT | Performed by: INTERNAL MEDICINE

## 2021-04-12 PROCEDURE — 94645 CONT INHLJ TX EACH ADDL HOUR: CPT

## 2021-04-12 PROCEDURE — 999N001017 HC STATISTIC GLUCOSE BY METER IP

## 2021-04-12 PROCEDURE — 200N000001 HC R&B ICU

## 2021-04-12 PROCEDURE — 250N000009 HC RX 250: Performed by: SURGERY

## 2021-04-12 RX ORDER — ASCORBIC ACID 500 MG
500 TABLET ORAL DAILY
Status: DISCONTINUED | OUTPATIENT
Start: 2021-04-13 | End: 2021-04-15 | Stop reason: HOSPADM

## 2021-04-12 RX ORDER — POTASSIUM CHLORIDE 29.8 MG/ML
20 INJECTION INTRAVENOUS
Status: COMPLETED | OUTPATIENT
Start: 2021-04-12 | End: 2021-04-12

## 2021-04-12 RX ORDER — DEXTROSE MONOHYDRATE 100 MG/ML
INJECTION, SOLUTION INTRAVENOUS CONTINUOUS PRN
Status: DISCONTINUED | OUTPATIENT
Start: 2021-04-12 | End: 2021-04-15 | Stop reason: HOSPADM

## 2021-04-12 RX ORDER — SODIUM CHLORIDE 9 MG/ML
INJECTION, SOLUTION INTRAVENOUS CONTINUOUS
Status: DISCONTINUED | OUTPATIENT
Start: 2021-04-12 | End: 2021-04-15 | Stop reason: HOSPADM

## 2021-04-12 RX ORDER — ASCORBIC ACID 500 MG
500 TABLET ORAL DAILY
Status: DISCONTINUED | OUTPATIENT
Start: 2021-04-12 | End: 2021-04-12

## 2021-04-12 RX ORDER — CARBAMAZEPINE 100 MG/5ML
400 SUSPENSION ORAL 2 TIMES DAILY
Status: DISCONTINUED | OUTPATIENT
Start: 2021-04-12 | End: 2021-04-15 | Stop reason: HOSPADM

## 2021-04-12 RX ORDER — SERTRALINE HYDROCHLORIDE 100 MG/1
100 TABLET, FILM COATED ORAL DAILY
Status: DISCONTINUED | OUTPATIENT
Start: 2021-04-13 | End: 2021-04-15 | Stop reason: HOSPADM

## 2021-04-12 RX ORDER — CARBAMAZEPINE 100 MG/5ML
200 SUSPENSION ORAL DAILY
Status: DISCONTINUED | OUTPATIENT
Start: 2021-04-12 | End: 2021-04-15 | Stop reason: HOSPADM

## 2021-04-12 RX ORDER — ACETAMINOPHEN 325 MG/10.15ML
325 LIQUID ORAL EVERY 4 HOURS PRN
Status: DISCONTINUED | OUTPATIENT
Start: 2021-04-12 | End: 2021-04-15 | Stop reason: HOSPADM

## 2021-04-12 RX ADMIN — CEFAZOLIN SODIUM 1 G: 1 INJECTION, SOLUTION INTRAVENOUS at 05:18

## 2021-04-12 RX ADMIN — PROPOFOL 35 MCG/KG/MIN: 10 INJECTION, EMULSION INTRAVENOUS at 06:28

## 2021-04-12 RX ADMIN — PROPOFOL 35 MCG/KG/MIN: 10 INJECTION, EMULSION INTRAVENOUS at 09:17

## 2021-04-12 RX ADMIN — AZELASTINE HYDROCHLORIDE 1 SPRAY: 137 SPRAY, METERED NASAL at 08:39

## 2021-04-12 RX ADMIN — PROPOFOL 35 MCG/KG/MIN: 10 INJECTION, EMULSION INTRAVENOUS at 22:12

## 2021-04-12 RX ADMIN — MICONAZOLE NITRATE: 20 POWDER TOPICAL at 16:02

## 2021-04-12 RX ADMIN — POTASSIUM CHLORIDE 20 MEQ: 29.8 INJECTION, SOLUTION INTRAVENOUS at 04:43

## 2021-04-12 RX ADMIN — PROPOFOL 35 MCG/KG/MIN: 10 INJECTION, EMULSION INTRAVENOUS at 16:47

## 2021-04-12 RX ADMIN — REMDESIVIR 100 MG: 100 INJECTION, POWDER, LYOPHILIZED, FOR SOLUTION INTRAVENOUS at 16:47

## 2021-04-12 RX ADMIN — MINERAL OIL, PETROLATUM: 425; 573 OINTMENT OPHTHALMIC at 01:00

## 2021-04-12 RX ADMIN — Medication 50 MCG/HR: at 00:00

## 2021-04-12 RX ADMIN — PROPOFOL 65 MCG/KG/MIN: 10 INJECTION, EMULSION INTRAVENOUS at 01:22

## 2021-04-12 RX ADMIN — PROPOFOL 35 MCG/KG/MIN: 10 INJECTION, EMULSION INTRAVENOUS at 13:52

## 2021-04-12 RX ADMIN — PROPOFOL 35 MCG/KG/MIN: 10 INJECTION, EMULSION INTRAVENOUS at 19:33

## 2021-04-12 RX ADMIN — CEFAZOLIN SODIUM 1 G: 1 INJECTION, SOLUTION INTRAVENOUS at 00:11

## 2021-04-12 RX ADMIN — CEFAZOLIN SODIUM 1 G: 1 INJECTION, SOLUTION INTRAVENOUS at 13:52

## 2021-04-12 RX ADMIN — CISATRACURIUM BESYLATE 3 MCG/KG/MIN: 10 INJECTION, SOLUTION INTRAVENOUS at 06:59

## 2021-04-12 RX ADMIN — EPOPROSTENOL 20 NG/KG/MIN: 1.5 INJECTION, POWDER, LYOPHILIZED, FOR SOLUTION INTRAVENOUS at 15:35

## 2021-04-12 RX ADMIN — AZELASTINE HYDROCHLORIDE 1 SPRAY: 137 SPRAY, METERED NASAL at 21:05

## 2021-04-12 RX ADMIN — CISATRACURIUM BESYLATE 3 MCG/KG/MIN: 10 INJECTION, SOLUTION INTRAVENOUS at 01:00

## 2021-04-12 RX ADMIN — ENOXAPARIN SODIUM 80 MG: 80 INJECTION SUBCUTANEOUS at 22:13

## 2021-04-12 RX ADMIN — POTASSIUM CHLORIDE 20 MEQ: 29.8 INJECTION, SOLUTION INTRAVENOUS at 00:00

## 2021-04-12 RX ADMIN — MINERAL OIL, PETROLATUM: 425; 573 OINTMENT OPHTHALMIC at 12:30

## 2021-04-12 RX ADMIN — EPOPROSTENOL 20 NG/KG/MIN: 1.5 INJECTION, POWDER, LYOPHILIZED, FOR SOLUTION INTRAVENOUS at 20:56

## 2021-04-12 RX ADMIN — ENOXAPARIN SODIUM 80 MG: 80 INJECTION SUBCUTANEOUS at 10:26

## 2021-04-12 RX ADMIN — OXYCODONE HYDROCHLORIDE AND ACETAMINOPHEN 500 MG: 500 TABLET ORAL at 12:26

## 2021-04-12 RX ADMIN — PROPOFOL 35 MCG/KG/MIN: 10 INJECTION, EMULSION INTRAVENOUS at 11:44

## 2021-04-12 RX ADMIN — DEXAMETHASONE SODIUM PHOSPHATE 6 MG: 4 INJECTION, SOLUTION INTRAMUSCULAR; INTRAVENOUS at 08:28

## 2021-04-12 RX ADMIN — POTASSIUM CHLORIDE 20 MEQ: 29.8 INJECTION, SOLUTION INTRAVENOUS at 03:34

## 2021-04-12 RX ADMIN — CARBAMAZEPINE 200 MG: 100 SUSPENSION ORAL at 12:22

## 2021-04-12 RX ADMIN — CISATRACURIUM BESYLATE 3 MCG/KG/MIN: 10 INJECTION, SOLUTION INTRAVENOUS at 13:13

## 2021-04-12 RX ADMIN — SODIUM CHLORIDE, POTASSIUM CHLORIDE, SODIUM LACTATE AND CALCIUM CHLORIDE 500 ML: 600; 310; 30; 20 INJECTION, SOLUTION INTRAVENOUS at 20:10

## 2021-04-12 RX ADMIN — SERTRALINE HYDROCHLORIDE 100 MG: 100 TABLET ORAL at 10:26

## 2021-04-12 RX ADMIN — CISATRACURIUM BESYLATE 3 MCG/KG/MIN: 10 INJECTION, SOLUTION INTRAVENOUS at 19:46

## 2021-04-12 RX ADMIN — CEFAZOLIN SODIUM 1 G: 1 INJECTION, SOLUTION INTRAVENOUS at 21:06

## 2021-04-12 RX ADMIN — SODIUM CHLORIDE 50 ML: 9 INJECTION, SOLUTION INTRAVENOUS at 17:23

## 2021-04-12 RX ADMIN — PROPOFOL 35 MCG/KG/MIN: 10 INJECTION, EMULSION INTRAVENOUS at 03:28

## 2021-04-12 RX ADMIN — PROPOFOL 75 MCG/KG/MIN: 10 INJECTION, EMULSION INTRAVENOUS at 00:39

## 2021-04-12 RX ADMIN — CARBAMAZEPINE 400 MG: 100 SUSPENSION ORAL at 12:21

## 2021-04-12 RX ADMIN — PANTOPRAZOLE SODIUM 40 MG: 40 INJECTION, POWDER, FOR SOLUTION INTRAVENOUS at 08:23

## 2021-04-12 RX ADMIN — CARBAMAZEPINE 400 MG: 100 SUSPENSION ORAL at 21:05

## 2021-04-12 ASSESSMENT — ACTIVITIES OF DAILY LIVING (ADL)
ADLS_ACUITY_SCORE: 19
ADLS_ACUITY_SCORE: 22

## 2021-04-12 NOTE — PROCEDURES
[facility]    Triple Lumen PICC Placement    Date/Time: 4/11/2021 8:26 PM  Performed by: Yolanda Raza RN  Authorized by: Juarez Conner MD   Indications: vascular access    UNIVERSAL PROTOCOL   Site Marked: Yes  Prior Images Obtained and Reviewed:  Yes  Required items: Required blood products, implants, devices and special equipment available    Patient identity confirmed:  Verbally with patient, hospital-assigned identification number and arm band  NA - No sedation, light sedation, or local anesthesia  Confirmation Checklist:  Patient's identity using two indicators, relevant allergies, procedure was appropriate and matched the consent or emergent situation and correct equipment/implants were available  Time out: Immediately prior to the procedure a time out was called    Universal Protocol: the Joint Commission Universal Protocol was followed    Preparation: Patient was prepped and draped in usual sterile fashion           ANESTHESIA    Local Anesthetic: Lidocaine 1% without epinephrine  Anesthetic Total (mL):  1      SEDATION    Patient Sedated: No        Skin prep agent: skin prep agent completely dried prior to procedure  Sterile barriers: maximum sterile barriers were used: cap, mask, sterile gown, sterile gloves, and large sterile sheet  Hand hygiene: hand hygiene performed prior to central venous catheter insertion  Type of line used: Power PICC  Catheter type: triple lumen  Lumen type: valved  Catheter size: 5 Fr  Brand: Bard  Lot number: [id number]  Placement method: venipuncture and ultrasound  Number of attempts: 1  Successful placement: yes  Orientation: right  Location: basilic vein  Arm circumference: adults 10 cm  Extremity circumference: 46  Visible catheter length: 4  Internal length: 51 cm  Total catheter length: 55  Dressing and securement: chlorhexidine patch applied, dressing applied, sterile dressing applied, statlock and securement device  Post  "procedure assessment: blood return through all ports and placement verified by x-ray (3CG)  PROCEDURE   Patient Tolerance:  Patient tolerated the procedure well with no immediate complications  Describe Procedure: NURSING NOTE  Pt was intubated. The writer was here to place a PICC line in room 369 and was asked by the charge RN to place a PICC first in 365 for Mrs. Barlow\"this is emergency situation\". The writer double checked with the pt nurse Arcelia if they got consent signed and Arcelia said no, but this is emergency case, that they will call pt guardian later. She stated \" we want PICC to be placed ASAP. Found 2 mm right Basilic vein and was successful on1 attempt w/good blood return. CXR confirmed tip of the catheter is in the right atrium, but did not say to pull any cm back. The writer checked back with pt nurse Arcelia after the writer was done placing a PICC in another room and found out she was already using the line, when asked she said someone told her that MD said the PICC is ok to use. The writer asked Arcelia lets double checked with the MD(Rona) since she does not remember who told her that the picc is ok to use. Arcelia did called and MD confirmed again that the PICC is ok on the right atrium.         "

## 2021-04-12 NOTE — PLAN OF CARE
Right wrist and Left wrist restraints discontinued at 0930 AM on 4/12/2021.    Restraint discontinue criteria met, patient is calm, cooperative and safe. Restraints removed.     Patient's Response: No evidence of learning  Family Notification: Other(family not present)  Attending Physician Notified: Yes,      Rosalia Covarrubias RN

## 2021-04-12 NOTE — PROGRESS NOTES
Nimbex IV infusing at minimum dose as ordered with RN not appreciating any twitches (0/4)  at 9 mA at 0800 this a.m. Dr. Gomez notified of order parameters on medication and lowest dose infusing. Plan to keep drip infusing per order at minimum titration regardless of TOF measurements and not titrate to off as pt has adequate ventilator synchrony. See flowsheet and MAR for additional assessment information.

## 2021-04-12 NOTE — PROGRESS NOTES
"CM\": spoke with Family . They are looking to the future and hope that pt can be in a facility near them [place] [address]    Called [facility] and spoke Admissions.. they are NOT taking any COVID or Cpap or Bi pap support    SW will keep following but given current medical needs anticipate lengthy stay   "

## 2021-04-12 NOTE — PROGRESS NOTES
Spoke with [facility] Sonya MITCHELL and Dr. Rea authorized use of the Right PICC after looking at the xray.

## 2021-04-12 NOTE — PROVIDER NOTIFICATION
DATE:  4/11/2021   TIME OF RECEIPT FROM LAB:2126  LAB TEST: ABG's  LAB VALUE:  Results for RONY TAN (MRN [id number]) as of 4/11/2021 21:24   Ref. Range 4/11/2021 21:08   pH Arterial Latest Ref Range: 7.35 - 7.45 pH 7.34 (L)   pCO2 Arterial Latest Ref Range: 35 - 45 mm Hg 53 (H)   PO2 Arterial Latest Ref Range: 80 - 105 mm Hg 34 (LL)   Bicarbonate Arterial Latest Ref Range: 21 - 28 mmol/L 28   Base Excess Art Latest Units: mmol/L 1.7   FIO2 Unknown 85%   Oxyhemoglobin Arterial Latest Ref Range: 92 - 100 % 58 (L)     RESULTS GIVEN WITH READ-BACK TO (PROVIDER):  Tele Zoie Hassan RN  TIME LAB VALUE REPORTED TO PROVIDER:  2127

## 2021-04-12 NOTE — PROGRESS NOTES
Right wrist, Left wrist and soft restraints restraints initiated on patient on 4/11/2021 at 2000           ,     Order received: Yes         Criteria explained to mother and father     Restraint care Plan initiated: Yes    Arcelia Wood RN

## 2021-04-12 NOTE — CONSULTS
[facility] Nurse Inpatient Wound Assessment   Reason for consultation: Evaluate and treat  Gluteal cleft wounds    Assessment  Gluteal cleft wounds due to Intertrigo  Status: initial assessment  Small agranular area    Lateral breasts with erythema and satellite lesions    Unable to assess any areas on front as patient is currently prone  Treatment Plan  Gluteal cleft/coccyx wounds: BID   1. Cleanse with Aubree Cleanse and Protect spray and soft dry wipe     Continue Miconazole to folds     Orders Written  Recommended provider order: None, at this time  WO Nurse follow-up plan:weekly  Nursing to notify the Provider(s) and re-consult the WO Nurse if wound(s) deteriorates or new skin concern.    Patient History  According to provider note(s):  Ms. Barlow is a 55 year old woman with a history of developmental delay, seizure disorder, cushing syndrome, Asthma, HTN, HLD, obesity, and prior small bowel obstruction who presented on 4/8 due to cough and dyspuria. She was found to be hypoxia, and COVID-19 PCR was positive. She was started on dexamethasone and oxygen and admitted. Unfortunately, she required transfer to the ICU on 4/11 due to worsening oxygenation, and ultimately required intubation that afternoon. Subsequently, she was started on paralytics and proned on 4/12 due to worsening hypoxia.     Objective Data  Containment of urine/stool: Incontinence Protocol and Indwelling catheter    Active Diet Order  Orders Placed This Encounter      NPO for Medical/Clinical Reasons Except for: No Exceptions      Output:   I/O last 3 completed shifts:  In: 1131.38 [I.V.:1131.38]  Out: 2425 [Urine:2200; Emesis/NG output:225]    Risk Assessment:   Sensory Perception: 1-->completely limited  Moisture: 3-->occasionally moist  Activity: 1-->bedfast  Mobility: 1-->completely immobile  Nutrition: 1-->very poor  Friction and Shear: 2-->potential problem  Randy Score: 9                          Labs:   Recent Labs   Lab 04/12/21  [id number]  04/10/21  0715 04/10/21  0715   ALBUMIN  --   --  2.6*   HGB 12.8   < > 12.3   WBC 5.4   < > 4.1   .0*   < > 112.0*    < > = values in this interval not displayed.       Physical Exam  Areas of skin assessed: focused Gluteal cleft    Wound Location:  Gluteal cleft  Date of last photo none  Wound History: Patient with morbid obesity    Wound Base: 100 % agranular     Palpation of the wound bed: normal      Drainage: none     Description of drainage: none     Measurements (length x width x depth, in cm) 1.2  x 0.2 cm      Tunneling N/A     Undermining N/A  Periwound skin: intact      Color: normal and consistent with surrounding tissue      Temperature: normal   Odor: none  Pain: no grimacing or signs of discomfort     Lateral breasts with erythema and satellite lesions, unable to fully assess as patient currently prone    Interventions  Visual inspection and assessment completed   Wound Care Rationale Protect periwound skin and Promote moist wound healing without tissue dehydration   Wound Care: done per plan of care  Supplies: at bedside  Current off-loading measures: Pillows under calves and Pillows  Current support surface: Standard  Low air loss mattress with pulsation   Education provided to: plan of care  Discussed plan of care with Patient and Nurse    Bharat Cooper RN CWOCN

## 2021-04-12 NOTE — PROGRESS NOTES
[facility] Critical Care Progress Note  04/12/2021    Admission day: 4/8/2021  Hospital Day# 4       Problem List:   Active Problems:    Acute UTI    Acute respiratory failure with hypoxia (H)    Pneumonia due to 2019 novel coronavirus           Summary/Hospital Course:   Ms. Barlow is a 55 year old woman with a history of developmental delay, seizure disorder, cushing syndrome, Asthma, HTN, HLD, obesity, and prior small bowel obstruction who presented on 4/8 due to cough and dyspuria. She was found to be hypoxia, and COVID-19 PCR was positive. She was started on dexamethasone and oxygen and admitted. Unfortunately, she required transfer to the ICU on 4/11 due to worsening oxygenation, and ultimately required intubation that afternoon. Subsequently, she was started on paralytics and proned on 4/12 due to worsening hypoxia.     Assessment and plan :     Brissa Barlow is a 55 year old female admitted on 4/8/2021 for acute hypoxic respiratory failure and ARDS secondary to COVID-19 pneumonia, in the setting of a history of developmental delay, seizure disorder, cushing syndrome, Asthma, HTN, HLD, obesity, and prior small bowel obstruction.     I have personally reviewed the daily labs, imaging studies, cultures and discussed the case with referring physician and consulting physicians.     My assessment and plan by system for this patient is as follows:    Neurology/Psychiatry:   1. History of developmental delay  2. Seizure disorder  3. ICU sedation   4. Paralysis for refractory hypoxemia   Plan  - Continue cisatracurium (started 4/12)   - Fentanyl and propofol for sedation  - if BIS out of goal range, could change propofol to versed drip (no need currently)   - at home, is on Tegretol 200 mg daily at noon, and 400 mg qAM and QPM, change to solution   - home sertraline 100 mg daily     Cardiovascular:   1. History of Hypertension  2. History of Hyperlipidemia   3. Shock state: mirna-intubation, currently off  levophed  Plan  - currently off pressors  - holding home lisinopril 20 mg daily     Pulmonary/Ventilator Management:   1. Acute hypoxic respiratory failure  2. ARDS secondary to COVID-19 pneumonia: P/F 72 this AM.   3. Asthma at baseline   Plan  - lung protective ventilation  - continue prone positioning, plan to supine around 1750 this evening, check ABG prior to supine, and 2-4 hours after supine. Plan for likely 16 hrs prone, 8 hrs supine.   - continue paralysis, likely for 48 hrs  - start veletri     GI/Nutrition :   1. Morbid obesity, BMI 56  2. Nutrition status  3. Stress ulcer PPx  4. Elevated LFTs, improving   Plan  - Try to place NJ when supine later  - One NJ in place, will start tube feeds, if unable to be placed, can start trickle feeds in stomach tomorrow  - Pantoprazole 40 mg daily      Renal/Fluids/Electrolytes:   1. Hyperchloremia   2. Hypokalemia: improved   Plan  - monitor function and electrolytes as needed with replacement per ICU protocols.  - generally avoid nephrotoxic agents such as NSAID, IV contrast unless specifically required  - adjust medications as needed for renal clearance  - follow I/O's as appropriate.    Infectious Disease:   1. COVID-19 pneumonia  2. Pan-sensitive E coli UTI: received nitrofurantoin from 4/8 to 4/11.   Plan  - remdesivir 100 mg daily, started 4/9, day 3  - dexamethasone 6 mg daily started 4/8, day 5.   - with worsening CRP, will dose Tocilizumab x1.   - cefazolin 1 g q8h from 4/11     Endocrine:   1. History of Cushing's syndrome   2. Stress induced hyperglycemia  Plan  - ICU insulin protocol, goal sugar <180  - monitor     Hematology/Oncology:   1. Thrombocytopenia, improving   2. Hypercoagulable state of COVID-19  Plan  - monitor CBC  - enoxaparin 0.5 mg/kg BID    MSK/Rheum:  1. No acute issues  Plan  - PT/OT when appropriate.     IV/Access:   1. Venous access - PICC  2. Arterial access - right radial arterial line  Plan  - central access required and  necessary    ICU Prophylaxis:   1. DVT: LMWH  2. VAP: HOB 30 degrees, chlorhexidine rinse  3. Stress Ulcer: PPI  4. Restraints: Nonviolent soft two point restraints required and necessary for patient safety and continued cares and good effect as patient continues to pull at necessary lines, tubes despite education and distraction. Will readdress daily.   5. Wound care: per unit routine   6. Feeding: NPO  7. Family Update: will be completed by hospitalist  8. Disposition: ICU    Key goals for next 24 hours:   1. Prone until 550 PM, then trail supine  2.  Follow ABGs  3.  Try to place NJ        Interim History/Overnight Events:   Intubated overnight. PICC placed. Arterial line placed. Proned at 150 AM this AM, paralyzed. Sedated and not responsive this AM.        Key Medications:       remdesivir  100 mg Intravenous Q24H    And     sodium chloride 0.9%  50 mL Intravenous Q24H     artificial tears   Both Eyes Q8H     azelastine  1 spray Both Nostrils BID     carBAMazepine  200 mg Oral Q24H     carBAMazepine  400 mg Oral BID     ceFAZolin  1 g Intravenous Q8H     dexamethasone  6 mg Intravenous Daily     enoxaparin ANTICOAGULANT  0.5 mg/kg Subcutaneous Q12H     lisinopril  20 mg Oral Daily     pantoprazole (PROTONIX) IV  40 mg Intravenous Daily with breakfast     sertraline  100 mg Oral Daily     sodium chloride (PF)  3 mL Intracatheter Q8H       cisatracurium (NIMBEX) infusion ADULT 3 mcg/kg/min (04/12/21 0659)     fentaNYL 50 mcg/hr (04/12/21 0600)     - MEDICATION INSTRUCTIONS -       norepinephrine Stopped (04/12/21 0237)     propofol (DIPRIVAN) infusion 35 mcg/kg/min (04/12/21 0628)               Physical Examination:   Temp:  [80.8  F (27.1  C)-99.3  F (37.4  C)] 97.3  F (36.3  C)  Pulse:  [] 72  Resp:  [14-50] 22  BP: ()/() 139/82  MAP:  [58 mmHg-275 mmHg] 237 mmHg  Arterial Line BP: ()/() 237/237  FiO2 (%):  [90 %-100 %] 100 %  SpO2:  [73 %-100 %] 100 %    Intake/Output Summary  (Last 24 hours) at 4/12/2021 0845  Last data filed at 4/12/2021 0800  Gross per 24 hour   Intake 1131.38 ml   Output 2575 ml   Net -1443.62 ml     Wt Readings from Last 4 Encounters:   04/09/21 (!) 163.3 kg (360 lb 1.6 oz)   03/11/21 (!) 167 kg (368 lb 3.2 oz)   10/01/20 (!) 168.5 kg (371 lb 8 oz)   07/24/19 (!) 164.5 kg (362 lb 9.6 oz)     Arterial Line BP: ()/() 237/237  MAP:  [58 mmHg-275 mmHg] 237 mmHg  BP - Mean:  [] 102  Ventilation Mode: CMV/AC  (Continuous Mandatory Ventilation/ Assist Control)  FiO2 (%): 100 %  Rate Set (breaths/minute): 22 breaths/min  Tidal Volume Set (mL): 400 mL  PEEP (cm H2O): 12 cmH2O  Oxygen Concentration (%): 90 %  Resp: 22    GEN: no acute distress, sedated.   HEENT: head ncat, sclera anicteric, trachea midline   PULM: unlabored synchronous with vent, crackles posteriorly   CV/COR: RRR, normal S1 and S2. No gallop, rub, nor murmur  ABD: soft, non-tender, non-distended. Hypoactive bowel sounds, no mass. Exam limited by prone positioning   MSK/EXT:  No LE edema. WWP.  NEURO: sedated, paralyzed.   SKIN: no obvious rash  LINES: clean, dry intact         Data:   All data and imaging reviewed in EMR     ROUTINE ICU LABS (Last four results)  CMP  Recent Labs   Lab 04/12/21  0435 04/12/21  0251 04/11/21  2120 04/11/21  0721 04/10/21  0715 04/08/21  2034     --   --  138 141 136   POTASSIUM 4.2 3.3* 3.3* 3.4 4.0 4.0   CHLORIDE 110*  --   --  106 109 103   CO2 26  --   --  30 28 31   ANIONGAP 4  --   --  2* 4 2*   *  --   --  102* 102* 119*   BUN 6*  --   --  5* 6* 11   CR 0.45*  --   --  0.60 0.65 0.86   GFRESTIMATED >90  --   --  >90 >90 76   GFRESTBLACK >90  --   --  >90 >90 88   CESAR 8.2*  --   --  7.9* 7.6* 8.0*   MAG  --   --  2.1  --   --   --    PROTTOTAL  --   --   --   --  6.1*  --    ALBUMIN  --   --   --   --  2.6*  --    BILITOTAL  --   --   --   --  0.3  --    ALKPHOS  --   --   --   --  110  --    AST 61*  --   --   --  83*  --    ALT 76*  --   --    --  98*  --      CBC  Recent Labs   Lab 04/12/21  0435 04/11/21  0721 04/10/21  0715 04/08/21 2034   WBC 5.4 5.0 4.1 4.0   RBC 4.34 4.19 4.27 4.55   HGB 12.8 12.0 12.3 13.2   HCT 39.4 39.3 41.1 41.0   MCV 91 94 96 90   MCH 29.5 28.6 28.8 29.0   MCHC 32.5 30.5* 29.9* 32.2   RDW 13.5 13.5 13.6 13.7   * 108* 93* 115*     INRNo lab results found in last 7 days.  Arterial Blood Gas  Recent Labs   Lab 04/12/21  0435 04/12/21  0120 04/11/21 2231 04/11/21 2108   PH 7.40 7.35 7.33* 7.34*   PCO2 42 46* 49* 53*   PO2 65* 134* 104 34*   HCO3 26 25 25 28   O2PER 90% 85% VENT 85% 85%       All cultures:  Recent Labs   Lab 04/08/21  2336 04/08/21  2331 04/08/21 2034   CULT No growth after 3 days No growth after 3 days >100,000 colonies/mL  Escherichia coli  *     Recent Results (from the past 24 hour(s))   XR Chest Port 1 View    Narrative    EXAM: XR CHEST PORT 1 VW  LOCATION: [facility]  DATE/TIME: 4/11/2021 7:59 PM    INDICATION: Tube placement.  COMPARISON: None.      Impression    IMPRESSION: Nasogastric tube makes a rather abrupt kink in the proximal body with the tube tip in the region of the gastroduodenal junction. Bilateral pulmonary infiltrates.    XR Abdomen Port 1 View    Narrative    EXAM: XR ABDOMEN PORT 1 VW  LOCATION: [facility]  DATE/TIME: 4/11/2021 7:58 PM    INDICATION: Check line placement.  COMPARISON: Abdomen film 03/12/2021, chest CTA 04/08/2020      Impression    IMPRESSION: Film includes the mid chest and upper abdomen.     Endotracheal tube is in 3 cm above the nina. There is a right upper extremity PICC line catheter with its tip in the right atrium. There is an NG tube in good position with its tip near the antrum.    There are moderate, scattered patchy airspace opacities consistent with a COVID pneumonia.    Visualized bowel gas pattern is unremarkable.   XR Chest Port 1 View    Narrative    EXAM: XR CHEST PORT 1 VW  LOCATION: [facility]  DATE/TIME: 4/11/2021 11:37 PM    INDICATION: Confirm endotracheal tube placement  COMPARISON: 04/11/2021      Impression    IMPRESSION: Endotracheal tube approximately 5 cm above the nina. Nasogastric tube in the stomach distally. Right upper PICC in the SVC. Normal extensive opacity throughout both lungs progressed compared to 04/08/2021. Normal heart size. No pleural   effusion or pneumothorax.                 Billing: This patient is critically ill: Yes. Total critical care time today 45 min, excluding procedures and teaching.      Carlos Gomez MD    Department of Medicine, Division of Pulmonary, Allergy, Critical Care, and Sleep Medicine

## 2021-04-12 NOTE — PROGRESS NOTES
Patient proned at 0150 on  04/12/21. Nimbex infusing at 3 mcg/kg, propofol at 55 mcg, Fentanyl at 50 mcg/hr and Levophed stopped.   BIS at 42. TOF baseline 4 of 4 twitches before paralytic. TOF after paralytic 2 twitches on 10.

## 2021-04-12 NOTE — PROGRESS NOTES
Pt UOP on low side approximately 35 cc over 2 hours. Pt urine is dark tea colored. Catheter assessed for patency without any resistance. Dr. Gomez notified of low UOP, no new orders received, see flowsheet and MAR for additional assessment information.

## 2021-04-12 NOTE — CONSULTS
TeleICU Note    I was asked to guide care on Brissa Barlow, a morbidly obese woman (BMI 56) with acute respiratory failure due to CoViD19.  [place] was just intubated.  A PICC was placed.  Vent settings are 400/22/12/100% with ABGs pending.  She has cis-atra ordered in case she needs to be paralyzed.  Epoprostenol not ordered yet.  Remdesivir/Dexamethasone are already ordered.  Lovenox increased to Q12 hours (CrCl > 30).  Considering Tocilizumab as her CRP is 137 and procalcitonin is < 0.05.  She is supine and has P:F of 122 to 72.  She should be proned based upon the PEEP>10 and P:F < 150.  Early proning has shown to be of benefit.  This was conveyed to the ICU nursing and RT staff by the TeleHub.  I did not reorder her carbamazepine as her level is supra-therapeutic.    Barrera Rea MD, FACS  [facility], Surgical Critical Care  [phone] pager

## 2021-04-12 NOTE — PROGRESS NOTES
[facility]    Hospitalist Progress Note  Name: Brissa Barlow    MRN: [id number]  Provider:  Michael Murray DO  Date of Service: 04/12/2021    Summary of Stay: Brissa Barlow is a 55 year old female patient with past medical history of developmental delay, seizure disorder, Cushing syndrome, asthma, hypertension, hyperlipidemia, obesity, history of small bowel obstruction, was admitted on 4/8/2021 for dysuria and cough.  On arrival to emergency room, her vital signs showed temperature 100.1, pulse 95, blood pressure 120/70, oxygen saturation 85% on room air.  Laboratory work-up showed normal CBC and BMP. Troponin less than 0.015.  Lactic acid 0.9.  Urinalysis showed 6 WBCs/hpf.  COVID-19 PCR positive   She was started on oxygen supplement and dexamethasone. She was also given oral Macrobid.  She was admitted to the hospital for further management.  The patient tested positive for Covid on 4/8/2021 with symptoms starting on 4/3/2021.  The patient did have a CT chest which showed bilateral multifocal infiltrates consistent with Covid pneumonia.  The patient was started on IV dexamethasone as well as remdesivir.  The patient had increasing oxygen requirements and on 4/11/2021 the patient was placed on BiPAP for hypoxia.  The patient remained hypoxic and so was transferred to the ICU on 4/11/2021 and intubated.    Problem List:   1.  Acute Ventilator Dependent Respiratory Failure - Hypoxia  - Likely secondary to covid pneumonia  - Wean O2 as able  - Ventilator per Intensivist    2.  Covid Pneumonia  - Continue IV Dexamethasone  - Continue IV Remdesivir - Day 4/5  - Added famotidine  - Continue Lovenox 0.5 mg/kg BID  - Continue proning per intensivist  - Combivent QID  - Trend inflammatory markers    3.  Urinary Tract Infection  - Pt on macrobid initially, then transitioned to cefazolin for 4 additional days  - Urine culture on 4/8/2021 positive for pan-sensitive E. Coli  - Blood culture negative  to date    4.  Seizure Disorder  - Pt with supratherapeutic carbamazepine level - will recheck tomorrow.  If drops below therapeutic range, could add versed drip, however currently on propofol and low concern for seizure at this time.  Carbamazepine that is crushable is not available  - Pt paralyzed so difficult to identify seizure  - Continue propofol     5.  Asthma  - Combivent QID    Chronic Medical Problems:  Developmental Delay  Morbid Obesity BMI 56.4  Hx of Cushings Syndrome  Hypertension  Hyperlipidemia    TODAY'S PLAN: Continue current Covid treatments including dexamethasone and remdesivir.  Ventilator management per intensivist.  Updated patient's guardian Lola via phone.  All questions answered.  She expressed concern for the long-term survival of the patient as the family recently had a loved one pass from Covid and so they are aware of the difficulty of treating this disease.  I have expressed that this is still early in her treatment and we are unable to predict how things will go.  I spent 40 minutes of critical care time in the evaluation and treatment of this patient on ventilator support.    DVT Prophylaxis: Enoxaparin (Lovenox) SQ  Code Status: Full Code  Diet: NPO for Medical/Clinical Reasons Except for: No Exceptions  Adult Formula Drip Feeding: Continuous Peptamen Intense VHP; Other - Specify in Comment; Goal Rate: 70; mL/hr; Medication - Feeding Tube Flush Frequency: At least 15-30 mL water before and after medication administration and with tube clogging; In...    Grajeda Catheter: in place, indication: Deep Sedation/Paralysis  Disposition: Expected discharge .   Family updated today: Yes      Interval History   Pt seen and examined.  Pt intubated, sedated, and paralyzed.  Patient was intubated overnight.    -Data reviewed today: I personally reviewed all new labs and imaging results over the last 24 hours.     Physical Exam   Temp: 98.1  F (36.7  C) Temp src: Bladder BP: 139/82 Pulse: 80    Resp: 21 SpO2: 98 % O2 Device: Mechanical Ventilator    Vitals:    04/09/21 0033   Weight: (!) 163.3 kg (360 lb 1.6 oz)     Vital Signs with Ranges  Temp:  [80.8  F (27.1  C)-99.3  F (37.4  C)] 98.1  F (36.7  C)  Pulse:  [] 80  Resp:  [14-50] 21  BP: ()/() 139/82  MAP:  [58 mmHg-275 mmHg] 97 mmHg  Arterial Line BP: ()/() 133/79  FiO2 (%):  [90 %-100 %] 90 %  SpO2:  [73 %-100 %] 98 %  I/O last 3 completed shifts:  In: 1131.38 [I.V.:1131.38]  Out: 2425 [Urine:2200; Emesis/NG output:225]    GENERAL: Intubated, sedated, paralyzed.  HEENT: Normocephalic, atraumatic.   CARDIOVASCULAR: Regular rate and rhythm without murmurs or rubs. No S3.  PULMONARY: Decreased BS bilaterally.  GASTROINTESTINAL: Soft, non-distended. Bowel sounds normoactive.   EXTREMITIES: No cyanosis or clubbing. No edema.  NEUROLOGICAL: Paralyzed.  DERMATOLOGICAL: No rash, ulcer, bruising, nor jaundice.    Medications     cisatracurium (NIMBEX) infusion ADULT 3 mcg/kg/min (04/12/21 1400)     dextrose       epoprostenol (VELETRI) 20 mcg/mL in sterile water inhalation solution       fentaNYL 50 mcg/hr (04/12/21 1400)     - MEDICATION INSTRUCTIONS -       norepinephrine Stopped (04/12/21 0237)     propofol (DIPRIVAN) infusion 35 mcg/kg/min (04/12/21 1400)     sodium chloride 10 mL/hr at 04/12/21 1058       remdesivir  100 mg Intravenous Q24H    And     sodium chloride 0.9%  50 mL Intravenous Q24H     artificial tears   Both Eyes Q8H     azelastine  1 spray Both Nostrils BID     carBAMazepine  200 mg Per Feeding Tube Daily     carBAMazepine  400 mg Per Feeding Tube BID     ceFAZolin  1 g Intravenous Q8H     dexamethasone  6 mg Intravenous Daily     enoxaparin ANTICOAGULANT  0.5 mg/kg Subcutaneous Q12H     pantoprazole (PROTONIX) IV  40 mg Intravenous Daily with breakfast     sertraline  100 mg Oral Daily     sodium chloride (PF)  3 mL Intracatheter Q8H     vitamin C  500 mg Oral Daily     Data     Laboratory:  Recent Labs   Lab  04/12/21  0435 04/11/21  0721 04/10/21  0715   WBC 5.4 5.0 4.1   HGB 12.8 12.0 12.3   HCT 39.4 39.3 41.1   MCV 91 94 96   * 108* 93*     Recent Labs   Lab 04/12/21  0800 04/12/21 0435 04/12/21  0251 04/11/21  0721 04/11/21  0721 04/10/21  0715   NA  --  140  --   --  138 141   POTASSIUM 3.9 4.2 3.3*   < > 3.4 4.0   CHLORIDE  --  110*  --   --  106 109   CO2  --  26  --   --  30 28   ANIONGAP  --  4  --   --  2* 4   GLC  --  112*  --   --  102* 102*   BUN  --  6*  --   --  5* 6*   CR  --  0.45*  --   --  0.60 0.65   GFRESTIMATED  --  >90  --   --  >90 >90   GFRESTBLACK  --  >90  --   --  >90 >90   CESAR  --  8.2*  --   --  7.9* 7.6*    < > = values in this interval not displayed.     Recent Labs   Lab 04/12/21  0435 04/11/21  0721 04/10/21  0715   .0* 137.0* 112.0*     Recent Labs   Lab 04/12/21  0435 04/10/21  0715   AST 61* 83*   ALT 76* 98*   ALKPHOS  --  110   BILITOTAL  --  0.3     Recent Labs   Lab 04/08/21  2336 04/08/21  2331 04/08/21 2034   CULT No growth after 3 days No growth after 3 days >100,000 colonies/mL  Escherichia coli  *       Imaging:  Recent Results (from the past 24 hour(s))   XR Chest Port 1 View    Narrative    EXAM: XR CHEST PORT 1 VW  LOCATION: [facility]  DATE/TIME: 4/11/2021 7:59 PM    INDICATION: Tube placement.  COMPARISON: None.      Impression    IMPRESSION: Nasogastric tube makes a rather abrupt kink in the proximal body with the tube tip in the region of the gastroduodenal junction. Bilateral pulmonary infiltrates.    XR Abdomen Port 1 View    Narrative    EXAM: XR ABDOMEN PORT 1 VW  LOCATION: [facility]  DATE/TIME: 4/11/2021 7:58 PM    INDICATION: Check line placement.  COMPARISON: Abdomen film 03/12/2021, chest CTA 04/08/2020      Impression    IMPRESSION: Film includes the mid chest and upper abdomen.     Endotracheal tube is in 3 cm above the nina. There is a right upper extremity PICC line catheter with its tip in the right atrium.  There is an NG tube in good position with its tip near the antrum.    There are moderate, scattered patchy airspace opacities consistent with a COVID pneumonia.    Visualized bowel gas pattern is unremarkable.   XR Chest Port 1 View    Narrative    EXAM: XR CHEST PORT 1 VW  LOCATION: [facility]  DATE/TIME: 4/11/2021 11:37 PM    INDICATION: Confirm endotracheal tube placement  COMPARISON: 04/11/2021      Impression    IMPRESSION: Endotracheal tube approximately 5 cm above the nina. Nasogastric tube in the stomach distally. Right upper PICC in the SVC. Normal extensive opacity throughout both lungs progressed compared to 04/08/2021. Normal heart size. No pleural   effusion or pneumothorax.       Michael Murray DO  [facility] Hospitalist  [address] [place] [address]  Pager: [phone]  04/12/2021

## 2021-04-12 NOTE — PROGRESS NOTES
RT Note:    Patient unable to maintain SpO2 on BiPAP 20/8, 100%. Increased WOB with RR in 30s-50s. Patient intubated by CRNA with 7.0 ETT. Placed on ventilator with settings of:    Ventilation Mode: CMV/AC  (Continuous Mandatory Ventilation/ Assist Control)  FiO2 (%): 100 %  Rate Set (breaths/minute): 20 breaths/min  Tidal Volume Set (mL): 400 mL  PEEP (cm H2O): 12 cmH2O  Oxygen Concentration (%): 100 %  Resp: 19

## 2021-04-12 NOTE — PLAN OF CARE
ICU End of Shift Summary.  For vital signs and complete assessments, please see documentation flowsheets.     Pertinent assessments: Patient is intubated/sedated/paralyzed. TOF baseline 4 of 4- on paralytic 2 of four twitches  To 0 of 0 on 100 amps. LS coarse and diminished. Vent settings FIO2 100%, RR 22, PEEP 12, . Patient proned @ 0150. Grajeda catheter in place with adequate UOP. NG/OG LIS with brown/green output. R triple Lumen PICC infusing Propofol at 35 mcg, Nimbex at 3 mcg, Fentanyl at 50 mcg and Levophed off. Skin ecchymotic and excoriated, WOC consult placed. Patient on Bariatric, pulsate mattress. Art line to right radial wrist. Soft restraints in place to bilateral wrists.Turn head q2hr on the even hours. Bedrest, NPO. Potassium replaced x2 for total of 80 mEq's r/c @ 0730.    Major Shift Events: Intubated, PICC placement, Art Line placement, proned, Titration of vasopressor and initiation of paralytic. Potassium replaced x 2.  Plan (Upcoming Events): TBD  Discharge/Transfer Needs: TBD    Bedside Shift Report Completed : Y  Bedside Safety Check Completed: Y

## 2021-04-12 NOTE — PROGRESS NOTES
[facility] ICU VENTILATOR RESPIRATORY NOTE  Date of Admission: 4/11/21  Date of Intubation (most recent): 4/11/21  Reason for Mechanical Ventilation: Respiratory failure  Number of Days on Mechanical Ventilation: 2  Met Criteria for Pressure Support Trial: No  Reason for No Pressure Support Trial: pt is proned and on high FiO2 and PEEP  Significant Events Today: pt was proned overnight and remains in prone position with Q2 head turns  ABG Results:   Recent Labs   Lab 04/12/21  0435 04/12/21  0120 04/11/21  2231 04/11/21  2108   PH 7.40 7.35 7.33* 7.34*   PCO2 42 46* 49* 53*   PO2 65* 134* 104 34*   HCO3 26 25 25 28   O2PER 90% 85% VENT 85% 85%     ETT appearance on chest x-ray: 5 cm above nina    Plan:  RT to continue to monitor and assess the pt's current respiratory status and needs.          Christian Perez, RT

## 2021-04-12 NOTE — PROGRESS NOTES
Called with a question regarding changing her PO Tegretol to IV Tegretol.  I am not her primary prescriber, but there is an IV formulation available if needed.    Please note that I did not see the patient and I am otherwise not involved with her care at all at this time.  I was only asked the question of whether it was possible for her PO Tegretol to be switched to IV, which it can be.  IV use should be limited to 7 days or less if possible.

## 2021-04-12 NOTE — PLAN OF CARE
ICU End of Shift Summary.  For vital signs and complete assessments, please see documentation flowsheets.      Pertinent assessments:  Pt A & O x 4 prior to intubation and sedation. Skin intact. Pure wick in place. Initially on bipap 100% - respiratory status decompensated and was intubated at 1838 per anesthesia. Intensivist and Hospitalist notified.    Major Shift Events:     Transferred to ICU.     Respiratory status decompensated at 1755 - eventually intubated at 1838.    Started on propofol, levophed    PICC ordered and placed      Plan (Upcoming Events):  Continue respiratory support, current treatments.   Discharge/Transfer Needs: TBD     Bedside Shift Report Completed : Yes  Bedside Safety Check Completed: Yes        Right wrist and Left wrist restraints initiated on patient on 4/11/2021 at 07:44 PM           ,     Order received: Yes         Criteria explained to Patient     Restraint care Plan initiated: Yes    Ally Baumann RN    .

## 2021-04-12 NOTE — TELEPHONE ENCOUNTER
Routing to PCP,    Please see [employer] message for update on patients health status    Raj Parra RN

## 2021-04-12 NOTE — CONSULTS
CLINICAL NUTRITION SERVICES  -  ASSESSMENT NOTE    Recommendations Ordered by Registered Dietitian (RD):     If FT tip is post pyloric (or gastric while supine only) Peptamen Intense VHP at 70 mL/hr --> start at 10 mL/hr and increase by 15 mL q8 hours    Fluid flush of 30 mL q4 hours    Certavite    Phos add on   Future/Additional Recommendations:    TF goal rate pending kcal from propofol, tolerance   Malnutrition:   % Weight Loss: None noted - full extent may be masked by edema  % Intake:</= 50% for >/= 5 days (severe malnutrition)  Subcutaneous Fat Loss: mild in one area, as outlined below  Muscle Loss: mild (or greater, difficult assessment with body habitus), as outlined below  Fluid Retention: trace to mild edema    Malnutrition Diagnosis: Non-Severe malnutrition  In Context of:  Acute illness or injury, with high risk for further decline     REASON FOR ASSESSMENT  Brissa Barlow is a 55 year old female seen by Registered Dietitian for Provider Order - Registered Dietitian to Assess and Order TF per Medical Nutrition Therapy Protocol     History of developmental delay, seizure disorder, Cushing syndrome, asthma, hypertension, hyperlipidemia, obesity, history of small bowel obstruction  Admitted for Acute tach toxic respiratory failure secondary to Covid pneumonia    NUTRITION HISTORY    Information obtained from chart review, intubated and sedated    Food allergies/intolerances: NKFA     CURRENT NUTRITION ORDERS    Diet: NPO x 1 day, intubated 4/11    Ordered a total of 3 meals since admit, overall meeting </=50% % of estimated needs since admit    NUTRITION FOCUSED PHYSICAL ASSESSMENT FOR DIAGNOSING MALNUTRITION + PHYSICAL FINDINGS  Completed and Observed:  Yes. Difficult assessment of baseline lean body mass stores due to body habitus. Moderate muscle loss in hands; mild (or greater) loss of muscle in scapular, clavicle (to palpitation), mild depletion in temporal region. Trace edema in lower extremities.  "Bilateral rash under breasts. At least mild fat loss in lower arms. Nails unkempt, toenails overgrown. Unable to assess dentition or fully assess orbital region while proned.  Obtained from Chart/Interdisciplinary Team    Randy nutrition score: 1; total score: 9    Edema: +1-2 dependent, generalized edema    Inflammation: yes, related to COVID19 infection    Last BM:     Skin: dry, flaky and rashes under bilateral breasts    OG to LIS    I/O last 3 completed shifts:    In: 1131.38 [I.V.:1131.38]    Out: 2425 [Urine:2200; Emesis/NG output:225]  Ventilation Mode: CMV/AC  (Continuous Mandatory Ventilation/ Assist Control)  FiO2 (%): 100 %  Rate Set (breaths/minute): 22 breaths/min  Tidal Volume Set (mL): 400 mL  PEEP (cm H2O): 12 cmH2O  Oxygen Concentration (%): 100 %  Resp: 22   Temp (24hrs), Av.6  F (35.9  C), Min:80.8  F (27.1  C), Max:99.3  F (37.4  C)    ANTHROPOMETRICS  Height: 5' 7\"  Weight: 163 kg   Body mass index is 56.4 kg/m .  Weight Status:  Obesity Grade III BMI >40  Ideal body weight: 61.4 kg +/- 10%  Weight History: difficult to assess accuracy of true weight trends with edema masking dry weight  Wt Readings from Last 10 Encounters:   21 (!) 163.3 kg (360 lb 1.6 oz)   21 (!) 167 kg (368 lb 3.2 oz)   10/01/20 (!) 168.5 kg (371 lb 8 oz)   19 (!) 164.5 kg (362 lb 9.6 oz)   01/15/19 (!) 172.3 kg (379 lb 12.8 oz)   18 104.3 kg (230 lb)   18 (!) 170.8 kg (376 lb 8 oz)   18 (!) 168.3 kg (371 lb)   18 (!) 166.7 kg (367 lb 6.4 oz)   17 (!) 160.6 kg (354 lb)       ASSESSED NUTRITION NEEDS (PER APPROVED PRACTICE GUIDELINES, Dosing weight: 61.4 kg IBW for energy and protein):  Estimated Energy Needs: 8092-9833 kcals (22-25 kcal/kg) vs 2484 (PSU 2003B using actual weight)   Justification: vented, obese  Estimated Protein Needs: >/=154 grams protein (>/= 2.5 g per kg)  Justification: hypercatabolism with critical illness   Estimated Fluid Needs: per " MD    LABS  Labs reviewed  Electrolytes  Potassium (mmol/L)   Date Value   04/12/2021 3.9   04/12/2021 4.2   04/12/2021 3.3 (L)     Phosphorus (mg/dL)   Date Value   04/12/2021 3.0    Blood Glucose  Glucose (mg/dL)   Date Value   04/12/2021 112 (H)   04/11/2021 102 (H)   04/10/2021 102 (H)   04/08/2021 119 (H)   03/14/2021 93     Hemoglobin A1C (%)   Date Value   11/25/2019 5.3   07/27/2017 5.3   08/23/2016 5.3    Inflammatory Markers  CRP Inflammation (mg/L)   Date Value   04/12/2021 163.0 (H)   04/11/2021 137.0 (H)     WBC (10e9/L)   Date Value   04/12/2021 5.4   04/11/2021 5.0     Albumin (g/dL)   Date Value   04/10/2021 2.6 (L)   03/11/2021 3.3 (L)      Magnesium (mg/dL)   Date Value   04/11/2021 2.1   03/13/2021 2.2     Sodium (mmol/L)   Date Value   04/12/2021 140   04/11/2021 138   04/10/2021 141    Renal  Urea Nitrogen (mg/dL)   Date Value   04/12/2021 6 (L)   04/11/2021 5 (L)   04/10/2021 6 (L)     Creatinine (mg/dL)   Date Value   04/12/2021 0.45 (L)   04/11/2021 0.60   04/10/2021 0.65     Additional  Triglycerides (mg/dL)   Date Value   09/14/2020 164 (H)   07/24/2019 220 (H)   06/12/2018 338 (H)     Ketones Urine (mg/dL)   Date Value   04/08/2021 Negative        MEDICATIONS    Medications reviewed    remdesivir  100 mg Intravenous Q24H    And     sodium chloride 0.9%  50 mL Intravenous Q24H     artificial tears   Both Eyes Q8H     azelastine  1 spray Both Nostrils BID     carBAMazepine  200 mg Oral Daily     carBAMazepine  400 mg Oral BID     ceFAZolin  1 g Intravenous Q8H     dexamethasone  6 mg Intravenous Daily     enoxaparin ANTICOAGULANT  0.5 mg/kg Subcutaneous Q12H     pantoprazole (PROTONIX) IV  40 mg Intravenous Daily with breakfast     sertraline  100 mg Oral Daily     sodium chloride (PF)  3 mL Intracatheter Q8H     tocilizumab  800 mg Intravenous Once     vitamin C  500 mg Oral Daily        cisatracurium (NIMBEX) infusion ADULT 3 mcg/kg/min (04/12/21 1000)     fentaNYL 50 mcg/hr (04/12/21  1000)     - MEDICATION INSTRUCTIONS -       norepinephrine Stopped (04/12/21 0237)     propofol (DIPRIVAN) infusion 35 mcg/kg/min (04/12/21 1144)     sodium chloride 10 mL/hr at 04/12/21 1058      Propofol gtt at 34.3* mL/hr to provide 906 kcal per day    PROCEDURES WITH NUTRITIONAL IMPLICATIONS  4/11 intubated, OG placed  4/12: nasoenteric FT placed    NUTRITION DIAGNOSIS:  Inadequate oral intake related to tenuous respiratory status with BiPAP reliance and now intubated as evidenced by meeting <50% of estimated needs for 5 days    INTERVENTIONS  Recommendations / Nutrition Prescription  Initiate TF if tube is confirmed post pyloric OR if tip terminates in gastric body and remains supine. Per MD request, no trophic feeds if tube tip is in gastric body while proned.     Type of Feeding Tube: pending    Enteral Frequency:  Continuous    Enteral Regimen: Peptamen Intense VHP at 70 mL/hr    Total Enteral Provisions: 1680 mL provides 1680 kcal, 156 g protein, 1411 ml free H2O, 131 g CHO and 7 g Fiber daily.    Meets > 100% of DRI's -- MVI until goal rate reached    Free Water Flush: 60 mL q4 hours    Daily electrolyte check, Phos add on    Start at 10 mL/hr, increase by 15 mL q8 hours until goal rate reached    Total goal rate pending tolerance, kcal from propofol     Implementation  Nutrition education: not applicable  Enteral access: RN to place post pyloric tube once supinated  EN Composition, EN Schedule, Multivitamin/Minerals and Feeding Tube Flush: Entered orders to reflect regimen outlined above  Biochemical data: phos add on  Collaboration and Referral of care: Discussed patient during interdisciplinary care rounds this morning    Goals  TF to reach goal rate within 48 hours  TF + kcal from propofol to meet >/=80% of estimated needs during first week of critical illness    MONITORING AND EVALUATION:  Progress towards goals will be monitored and evaluated per protocol and Practice Guidelines      Judie Figueredo,  MS, RDN-AP, LD, CNSC  Pager - 3rd floor/ICU: [phone]  Pager - All other floors: [phone]  Pager - Weekend/holiday: [phone]  Office: [phone]

## 2021-04-12 NOTE — PROGRESS NOTES
Jose Alejandro ABG from pt's left radial artery. Pt was on 85% on the vent.    Lisa Holt, RT on 4/11/2021 at 9:22 PM

## 2021-04-12 NOTE — PHARMACY
Pharmacy Tube Feeding Consult    Medication reviewed for administration by feeding tube and for potential food/drug interactions.    Recommendation: Recommend changing the following medications to a liquid dosage form: tylenol, protonix     Pharmacy will continue to follow as new medications are ordered.

## 2021-04-12 NOTE — ANESTHESIA PROCEDURE NOTES
Arterial Line Procedure Note  Pre-Procedure   Staff -        Anesthesiologist:  Juarez Ludwig MD       Performed By: anesthesiologist       Location: ICU       Procedure Start/Stop Times: 4/11/2021 10:04 PM and 4/11/2021 10:29 PM       Pre-Anesthestic Checklist: patient identified and at physician/surgeon's request  Timeout:       Correct Patient: Yes        Correct Procedure: Yes        Correct Site: Yes        Correct Position: Yes   Procedure   Procedure: arterial line, new line and emergent       Laterality: right       Insertion Site: radial.  Sterile Prep        Standard elements of sterile barrier followed       Skin prep: Chloraprep  Insertion/Injection        Technique: ultrasound guided and Seldinger Technique        - Artery evaluated via U/S for patency/adequacy of catheter insertion is adequate, and using realtime U/S imaging the artery was punctured, and needle was observed entering artery on U/S       Catheter Type/Size: 20 G, 1.75 in/4.5 cm quick cath (integral wire)  Narrative         Secured by: other       Tegaderm dressing used.       Complications: None apparent,        Arterial waveform: Yes        IBP within 10% of NIBP: Yes

## 2021-04-13 LAB
ANION GAP SERPL CALCULATED.3IONS-SCNC: 6 MMOL/L (ref 3–14)
BASE EXCESS BLDA CALC-SCNC: 0.5 MMOL/L
BASE EXCESS BLDA CALC-SCNC: 1.4 MMOL/L
BASE EXCESS BLDA CALC-SCNC: 3.4 MMOL/L
BUN SERPL-MCNC: 8 MG/DL (ref 7–30)
CALCIUM SERPL-MCNC: 8 MG/DL (ref 8.5–10.1)
CARBAMAZEPINE SERPL-MCNC: 10.1 MG/L (ref 4–12)
CHLORIDE SERPL-SCNC: 105 MMOL/L (ref 94–109)
CO2 SERPL-SCNC: 27 MMOL/L (ref 20–32)
CREAT SERPL-MCNC: 0.43 MG/DL (ref 0.52–1.04)
CRP SERPL-MCNC: 125 MG/L (ref 0–8)
D DIMER PPP FEU-MCNC: 0.9 UG/ML FEU (ref 0–0.5)
ERYTHROCYTE [DISTWIDTH] IN BLOOD BY AUTOMATED COUNT: 13.6 % (ref 10–15)
GFR SERPL CREATININE-BSD FRML MDRD: >90 ML/MIN/{1.73_M2}
GLUCOSE BLDC GLUCOMTR-MCNC: 106 MG/DL (ref 70–99)
GLUCOSE BLDC GLUCOMTR-MCNC: 115 MG/DL (ref 70–99)
GLUCOSE BLDC GLUCOMTR-MCNC: 119 MG/DL (ref 70–99)
GLUCOSE BLDC GLUCOMTR-MCNC: 142 MG/DL (ref 70–99)
GLUCOSE BLDC GLUCOMTR-MCNC: 147 MG/DL (ref 70–99)
GLUCOSE BLDC GLUCOMTR-MCNC: 179 MG/DL (ref 70–99)
GLUCOSE BLDC GLUCOMTR-MCNC: 94 MG/DL (ref 70–99)
GLUCOSE SERPL-MCNC: 122 MG/DL (ref 70–99)
HCO3 BLD-SCNC: 26 MMOL/L (ref 21–28)
HCO3 BLD-SCNC: 28 MMOL/L (ref 21–28)
HCO3 BLD-SCNC: 29 MMOL/L (ref 21–28)
HCT VFR BLD AUTO: 38.7 % (ref 35–47)
HGB BLD-MCNC: 12.4 G/DL (ref 11.7–15.7)
MCH RBC QN AUTO: 28.8 PG (ref 26.5–33)
MCHC RBC AUTO-ENTMCNC: 32 G/DL (ref 31.5–36.5)
MCV RBC AUTO: 90 FL (ref 78–100)
O2/TOTAL GAS SETTING VFR VENT: ABNORMAL %
OXYHGB MFR BLD: 94 % (ref 92–100)
OXYHGB MFR BLD: 95 % (ref 92–100)
OXYHGB MFR BLD: 98 % (ref 92–100)
PCO2 BLD: 43 MM HG (ref 35–45)
PCO2 BLD: 46 MM HG (ref 35–45)
PCO2 BLD: 51 MM HG (ref 35–45)
PH BLD: 7.35 PH (ref 7.35–7.45)
PH BLD: 7.38 PH (ref 7.35–7.45)
PH BLD: 7.41 PH (ref 7.35–7.45)
PHOSPHATE SERPL-MCNC: 2.5 MG/DL (ref 2.5–4.5)
PLATELET # BLD AUTO: 141 10E9/L (ref 150–450)
PO2 BLD: 122 MM HG (ref 80–105)
PO2 BLD: 71 MM HG (ref 80–105)
PO2 BLD: 81 MM HG (ref 80–105)
POTASSIUM SERPL-SCNC: 3.5 MMOL/L (ref 3.4–5.3)
RBC # BLD AUTO: 4.3 10E12/L (ref 3.8–5.2)
SODIUM SERPL-SCNC: 138 MMOL/L (ref 133–144)
WBC # BLD AUTO: 6.4 10E9/L (ref 4–11)

## 2021-04-13 PROCEDURE — 258N000003 HC RX IP 258 OP 636: Performed by: INTERNAL MEDICINE

## 2021-04-13 PROCEDURE — 85379 FIBRIN DEGRADATION QUANT: CPT | Performed by: INTERNAL MEDICINE

## 2021-04-13 PROCEDURE — 94645 CONT INHLJ TX EACH ADDL HOUR: CPT

## 2021-04-13 PROCEDURE — 250N000013 HC RX MED GY IP 250 OP 250 PS 637: Performed by: INTERNAL MEDICINE

## 2021-04-13 PROCEDURE — 250N000009 HC RX 250: Performed by: INTERNAL MEDICINE

## 2021-04-13 PROCEDURE — 250N000011 HC RX IP 250 OP 636: Performed by: INTERNAL MEDICINE

## 2021-04-13 PROCEDURE — 82805 BLOOD GASES W/O2 SATURATION: CPT | Performed by: INTERNAL MEDICINE

## 2021-04-13 PROCEDURE — 85027 COMPLETE CBC AUTOMATED: CPT | Performed by: INTERNAL MEDICINE

## 2021-04-13 PROCEDURE — 200N000001 HC R&B ICU

## 2021-04-13 PROCEDURE — 86140 C-REACTIVE PROTEIN: CPT | Performed by: INTERNAL MEDICINE

## 2021-04-13 PROCEDURE — 999N000157 HC STATISTIC RCP TIME EA 10 MIN

## 2021-04-13 PROCEDURE — 99291 CRITICAL CARE FIRST HOUR: CPT | Performed by: INTERNAL MEDICINE

## 2021-04-13 PROCEDURE — 80156 ASSAY CARBAMAZEPINE TOTAL: CPT | Performed by: INTERNAL MEDICINE

## 2021-04-13 PROCEDURE — 84100 ASSAY OF PHOSPHORUS: CPT | Performed by: INTERNAL MEDICINE

## 2021-04-13 PROCEDURE — 999N000105 HC STATISTIC NO DOCUMENTATION TO SUPPORT CHARGE

## 2021-04-13 PROCEDURE — 80048 BASIC METABOLIC PNL TOTAL CA: CPT | Performed by: INTERNAL MEDICINE

## 2021-04-13 PROCEDURE — 250N000011 HC RX IP 250 OP 636: Performed by: SURGERY

## 2021-04-13 PROCEDURE — 94003 VENT MGMT INPAT SUBQ DAY: CPT

## 2021-04-13 PROCEDURE — 999N001017 HC STATISTIC GLUCOSE BY METER IP

## 2021-04-13 RX ORDER — BISACODYL 10 MG
10 SUPPOSITORY, RECTAL RECTAL DAILY PRN
Status: DISCONTINUED | OUTPATIENT
Start: 2021-04-13 | End: 2021-04-15 | Stop reason: HOSPADM

## 2021-04-13 RX ORDER — POLYETHYLENE GLYCOL 3350 17 G/17G
17 POWDER, FOR SOLUTION ORAL DAILY
Status: DISCONTINUED | OUTPATIENT
Start: 2021-04-13 | End: 2021-04-15 | Stop reason: HOSPADM

## 2021-04-13 RX ORDER — AMOXICILLIN 250 MG
2 CAPSULE ORAL 2 TIMES DAILY
Status: DISCONTINUED | OUTPATIENT
Start: 2021-04-13 | End: 2021-04-13

## 2021-04-13 RX ADMIN — PROPOFOL 40 MCG/KG/MIN: 10 INJECTION, EMULSION INTRAVENOUS at 12:58

## 2021-04-13 RX ADMIN — CISATRACURIUM BESYLATE 3 MCG/KG/MIN: 10 INJECTION, SOLUTION INTRAVENOUS at 15:29

## 2021-04-13 RX ADMIN — PROPOFOL 40 MCG/KG/MIN: 10 INJECTION, EMULSION INTRAVENOUS at 15:29

## 2021-04-13 RX ADMIN — REMDESIVIR 100 MG: 100 INJECTION, POWDER, LYOPHILIZED, FOR SOLUTION INTRAVENOUS at 16:40

## 2021-04-13 RX ADMIN — Medication 75 MCG/HR: at 11:52

## 2021-04-13 RX ADMIN — PROPOFOL 40 MCG/KG/MIN: 10 INJECTION, EMULSION INTRAVENOUS at 06:26

## 2021-04-13 RX ADMIN — EPOPROSTENOL 20 NG/KG/MIN: 1.5 INJECTION, POWDER, LYOPHILIZED, FOR SOLUTION INTRAVENOUS at 10:22

## 2021-04-13 RX ADMIN — POLYETHYLENE GLYCOL 3350 17 G: 17 POWDER, FOR SOLUTION ORAL at 11:46

## 2021-04-13 RX ADMIN — CISATRACURIUM BESYLATE 3 MCG/KG/MIN: 10 INJECTION, SOLUTION INTRAVENOUS at 08:33

## 2021-04-13 RX ADMIN — MICONAZOLE NITRATE: 20 POWDER TOPICAL at 12:14

## 2021-04-13 RX ADMIN — PROPOFOL 35 MCG/KG/MIN: 10 INJECTION, EMULSION INTRAVENOUS at 03:26

## 2021-04-13 RX ADMIN — MICONAZOLE NITRATE: 20 POWDER TOPICAL at 17:18

## 2021-04-13 RX ADMIN — PROPOFOL 40 MCG/KG/MIN: 10 INJECTION, EMULSION INTRAVENOUS at 20:20

## 2021-04-13 RX ADMIN — PROPOFOL 35 MCG/KG/MIN: 10 INJECTION, EMULSION INTRAVENOUS at 00:52

## 2021-04-13 RX ADMIN — SERTRALINE HYDROCHLORIDE 100 MG: 100 TABLET ORAL at 08:39

## 2021-04-13 RX ADMIN — DEXAMETHASONE SODIUM PHOSPHATE 6 MG: 4 INJECTION, SOLUTION INTRAMUSCULAR; INTRAVENOUS at 08:36

## 2021-04-13 RX ADMIN — PANTOPRAZOLE SODIUM 40 MG: 40 TABLET, DELAYED RELEASE ORAL at 08:39

## 2021-04-13 RX ADMIN — PROPOFOL 40 MCG/KG/MIN: 10 INJECTION, EMULSION INTRAVENOUS at 10:46

## 2021-04-13 RX ADMIN — SODIUM CHLORIDE 50 ML: 9 INJECTION, SOLUTION INTRAVENOUS at 17:17

## 2021-04-13 RX ADMIN — CARBAMAZEPINE 400 MG: 100 SUSPENSION ORAL at 08:39

## 2021-04-13 RX ADMIN — EPOPROSTENOL 20 NG/KG/MIN: 1.5 INJECTION, POWDER, LYOPHILIZED, FOR SOLUTION INTRAVENOUS at 23:33

## 2021-04-13 RX ADMIN — AZELASTINE HYDROCHLORIDE 1 SPRAY: 137 SPRAY, METERED NASAL at 20:29

## 2021-04-13 RX ADMIN — SENNOSIDES 10 ML: 8.8 LIQUID ORAL at 14:28

## 2021-04-13 RX ADMIN — PROPOFOL 40 MCG/KG/MIN: 10 INJECTION, EMULSION INTRAVENOUS at 17:37

## 2021-04-13 RX ADMIN — PROPOFOL 40 MCG/KG/MIN: 10 INJECTION, EMULSION INTRAVENOUS at 22:22

## 2021-04-13 RX ADMIN — CARBAMAZEPINE 200 MG: 100 SUSPENSION ORAL at 11:46

## 2021-04-13 RX ADMIN — DOCUSATE SODIUM 100 MG: 50 LIQUID ORAL at 20:30

## 2021-04-13 RX ADMIN — CISATRACURIUM BESYLATE 3 MCG/KG/MIN: 10 INJECTION, SOLUTION INTRAVENOUS at 02:05

## 2021-04-13 RX ADMIN — CARBAMAZEPINE 400 MG: 100 SUSPENSION ORAL at 20:33

## 2021-04-13 RX ADMIN — ENOXAPARIN SODIUM 80 MG: 80 INJECTION SUBCUTANEOUS at 08:34

## 2021-04-13 RX ADMIN — CEFAZOLIN SODIUM 1 G: 1 INJECTION, SOLUTION INTRAVENOUS at 05:32

## 2021-04-13 RX ADMIN — CISATRACURIUM BESYLATE 3 MCG/KG/MIN: 10 INJECTION, SOLUTION INTRAVENOUS at 22:37

## 2021-04-13 RX ADMIN — EPOPROSTENOL 20 NG/KG/MIN: 1.5 INJECTION, POWDER, LYOPHILIZED, FOR SOLUTION INTRAVENOUS at 16:19

## 2021-04-13 RX ADMIN — DOCUSATE SODIUM 100 MG: 50 LIQUID ORAL at 14:28

## 2021-04-13 RX ADMIN — CEFAZOLIN SODIUM 1 G: 1 INJECTION, SOLUTION INTRAVENOUS at 14:29

## 2021-04-13 RX ADMIN — EPOPROSTENOL 20 NG/KG/MIN: 1.5 INJECTION, POWDER, LYOPHILIZED, FOR SOLUTION INTRAVENOUS at 03:19

## 2021-04-13 RX ADMIN — ENOXAPARIN SODIUM 80 MG: 80 INJECTION SUBCUTANEOUS at 20:33

## 2021-04-13 RX ADMIN — PROPOFOL 40 MCG/KG/MIN: 10 INJECTION, EMULSION INTRAVENOUS at 08:32

## 2021-04-13 RX ADMIN — OXYCODONE HYDROCHLORIDE AND ACETAMINOPHEN 500 MG: 500 TABLET ORAL at 08:39

## 2021-04-13 RX ADMIN — CEFAZOLIN SODIUM 1 G: 1 INJECTION, SOLUTION INTRAVENOUS at 22:09

## 2021-04-13 RX ADMIN — SENNOSIDES 10 ML: 8.8 LIQUID ORAL at 20:29

## 2021-04-13 ASSESSMENT — ACTIVITIES OF DAILY LIVING (ADL)
ADLS_ACUITY_SCORE: 22

## 2021-04-13 ASSESSMENT — MIFFLIN-ST. JEOR: SCORE: 2193.47

## 2021-04-13 NOTE — PROGRESS NOTES
[facility]    Hospitalist Progress Note  Name: Brissa Barlow    MRN: [id number]  Provider:  Michael Murray DO  Date of Service: 04/13/2021    Summary of Stay:  Brissa Barlow is a 55 year old female patient with past medical history of developmental delay, seizure disorder, Cushing syndrome, asthma, hypertension, hyperlipidemia, obesity, history of small bowel obstruction, was admitted on 4/8/2021 for dysuria and cough.  On arrival to emergency room, her vital signs showed temperature 100.1, pulse 95, blood pressure 120/70, oxygen saturation 85% on room air.  Laboratory work-up showed normal CBC and BMP. Troponin less than 0.015.  Lactic acid 0.9.  Urinalysis showed 6 WBCs/hpf.  COVID-19 PCR positive   She was started on oxygen supplement and dexamethasone. She was also given oral Macrobid.  She was admitted to the hospital for further management.  The patient tested positive for Covid on 4/8/2021 with symptoms starting on 4/3/2021.  The patient did have a CT chest which showed bilateral multifocal infiltrates consistent with Covid pneumonia.  The patient was started on IV dexamethasone as well as remdesivir.  The patient had increasing oxygen requirements and on 4/11/2021 the patient was placed on BiPAP for hypoxia.  The patient remained hypoxic and so was transferred to the ICU on 4/11/2021 and intubated.    Problem List:   1.  Acute Ventilator Dependent Respiratory Failure - Hypoxia  - Likely secondary to covid pneumonia  - Wean O2 as able  - Ventilator per Intensivist     2.  Covid Pneumonia  - Continue IV Dexamethasone - Day #5  - Continue IV Remdesivir - Day 5/5  - Added famotidine  - Continue Lovenox 0.5 mg/kg BID  - Continue proning per intensivist  - Combivent QID  - Trend inflammatory markers     3.  Urinary Tract Infection  - Pt on macrobid initially, then transitioned to cefazolin for 4 additional days  - Urine culture on 4/8/2021 positive for pan-sensitive E. Coli  - Blood  culture negative to date     4.  Seizure Disorder  - Pt with therapeutic level of carbamazepine today - propofol should cover seizure as carbamazepine is not available for PEG tube.  If concern for seizure, would favor adding versed drip  - Pt paralyzed so difficult to identify seizure  - Continue propofol      5.  Asthma  - Combivent QID     Chronic Medical Problems:  Developmental Delay  Morbid Obesity BMI 56.4  Hx of Cushings Syndrome  Hypertension  Hyperlipidemia    TODAY'S PLAN: Patient with slight decrease in oxygen requirements from FiO2 of 100% to 80% this morning.  PEEP remains at 12.  Appreciate intensivist management of ventilator.  Discussed with guardian and mother Lola via phone.  Discussed that we will watch [place]'s oxygen every day and try to decrease it as much as possible.  Continue proning the patient per intensivist recommendations.  I explained that [place] is facing a difficult time and that ultimately time will tell how [place] will do.  They expressed understanding.  All questions answered.  I spent 38 minutes of critical care time in the evaluation and treatment of this patient requiring ventilator support for hypoxia due to Covid pneumonia.    DVT Prophylaxis: Enoxaparin (Lovenox) SQ  Code Status: Full Code  Diet: NPO for Medical/Clinical Reasons Except for: No Exceptions  Adult Formula Drip Feeding: Continuous Peptamen Intense VHP; Other - Specify in Comment; Goal Rate: 70; mL/hr; Medication - Feeding Tube Flush Frequency: At least 15-30 mL water before and after medication administration and with tube clogging; In...    Grajeda Catheter: in place, indication: Deep Sedation/Paralysis  Disposition: Expected discharge pending clinical course.   Family updated today: Yes      Interval History   Patient seen and examined.  Patient intubated and sedated and paralyzed.  Patient with copious amounts of nasal discharge.    -Data reviewed today: I personally reviewed all new labs and imaging  results over the last 24 hours.     Physical Exam   Temp: 99.4  F (37.4  C) Temp src: Oral BP: (!) 147/74 Pulse: 80   Resp: 21 SpO2: 99 % O2 Device: Mechanical Ventilator    Vitals:    04/09/21 0033 04/13/21 0600   Weight: (!) 163.3 kg (360 lb 1.6 oz) (!) 155.6 kg (343 lb)     Vital Signs with Ranges  Temp:  [94.6  F (34.8  C)-99.4  F (37.4  C)] 99.4  F (37.4  C)  Pulse:  [61-92] 80  Resp:  [9-44] 21  BP: (147-165)/(74-85) 147/74  MAP:  [71 mmHg-141 mmHg] 99 mmHg  Arterial Line BP: ()/() 143/75  FiO2 (%):  [70 %-100 %] 70 %  SpO2:  [89 %-100 %] 99 %  I/O last 3 completed shifts:  In: 2475.38 [I.V.:2110.38; NG/GT:260]  Out: 1120 [Urine:770; Emesis/NG output:350]    GENERAL: No apparent distress. Awake, alert, and fully oriented.  HEENT: Normocephalic, atraumatic. Extraocular movements intact.  CARDIOVASCULAR: Regular rate and rhythm without murmurs or rubs. No S3.  PULMONARY: Coarse BS bilaterally.  GASTROINTESTINAL: Soft, non-tender, non-distended. Bowel sounds normoactive.   EXTREMITIES: No cyanosis or clubbing. No edema.  NEUROLOGICAL: CN 2-12 grossly intact, no focal neurological deficits.  DERMATOLOGICAL: No rash, ulcer, bruising, nor jaundice.    Medications     cisatracurium (NIMBEX) infusion ADULT 3 mcg/kg/min (04/13/21 1200)     dextrose       epoprostenol (VELETRI) 20 mcg/mL in sterile water inhalation solution 20 ng/kg/min (04/13/21 1022)     fentaNYL 75 mcg/hr (04/13/21 1200)     - MEDICATION INSTRUCTIONS -       norepinephrine Stopped (04/12/21 0237)     propofol (DIPRIVAN) infusion 40 mcg/kg/min (04/13/21 1200)     sodium chloride 10 mL/hr at 04/12/21 1058       remdesivir  100 mg Intravenous Q24H    And     sodium chloride 0.9%  50 mL Intravenous Q24H     artificial tears   Both Eyes Q8H     azelastine  1 spray Both Nostrils BID     carBAMazepine  200 mg Per Feeding Tube Daily     carBAMazepine  400 mg Per Feeding Tube BID     ceFAZolin  1 g Intravenous Q8H     dexamethasone  6 mg  Intravenous Daily     enoxaparin ANTICOAGULANT  0.5 mg/kg Subcutaneous Q12H     pantoprazole  40 mg Per Feeding Tube Daily     polyethylene glycol  17 g Oral Daily     senna-docusate  2 tablet Oral BID     sertraline  100 mg Per Feeding Tube Daily     sodium chloride (PF)  3 mL Intracatheter Q8H     vitamin C  500 mg Per Feeding Tube Daily     Data     Laboratory:  Recent Labs   Lab 04/13/21  0548 04/12/21 0435 04/11/21 0721   WBC 6.4 5.4 5.0   HGB 12.4 12.8 12.0   HCT 38.7 39.4 39.3   MCV 90 91 94   * 133* 108*     Recent Labs   Lab 04/13/21  0548 04/12/21  0800 04/12/21 0435 04/11/21 0721 04/11/21 0721     --  140  --  138   POTASSIUM 3.5 3.9 4.2   < > 3.4   CHLORIDE 105  --  110*  --  106   CO2 27  --  26  --  30   ANIONGAP 6  --  4  --  2*   *  --  112*  --  102*   BUN 8  --  6*  --  5*   CR 0.43*  --  0.45*  --  0.60   GFRESTIMATED >90  --  >90  --  >90   GFRESTBLACK >90  --  >90  --  >90   CESAR 8.0*  --  8.2*  --  7.9*    < > = values in this interval not displayed.     Recent Labs   Lab 04/13/21  0548 04/12/21 0435 04/11/21 0721   .0* 163.0* 137.0*     Recent Labs   Lab 04/12/21  0435 04/10/21  0715   AST 61* 83*   ALT 76* 98*   ALKPHOS  --  110   BILITOTAL  --  0.3     Recent Labs   Lab 04/08/21  2336 04/08/21  2331 04/08/21 2034   CULT No growth after 4 days No growth after 4 days >100,000 colonies/mL  Escherichia coli  *       Imaging:  Recent Results (from the past 24 hour(s))   XR Abdomen Port 1 View    Narrative    EXAM: XR ABDOMEN PORT 1 VW  LOCATION: [facility]  DATE/TIME: 4/12/2021 6:24 PM    INDICATION: Feeding tube placement  COMPARISON: 03/12/2021      Impression    IMPRESSION: Feeding tube with tip projecting over proximal jejunum. No free air. Nonobstructive bowel gas pattern.          Michael Murray DO  Formerly [facility] Hospitalist  [address] [place] [address]  Pager: [phone]  04/13/2021

## 2021-04-13 NOTE — PLAN OF CARE
ICU End of Shift Summary.  For vital signs and complete assessments, please see documentation flowsheets.     Pertinent assessments: Pt. Intubated/sedated and paralyzed. LS diminished- Mechanical carol- FIO2 80%, RR 22,  with PEEP 12. BS audible in all quadrants. TF @ 25 mL/hr with 30 mL FWF q4. Proned at 0145, head turn q2hr. Skin please see epic. Bariatric pulsate mattress for skin integrity. Grajeda patent and draining- borderline UOP per hr. Hypertensive when proned increased Propofol and fentanyl with results an head turn. Paralytic on board. TOF 0 of 4. BIS high 30's to 40's. Telemetry SR.    Major Shift Events: Proned, hypertension that resolved, decrease in O2 needs. Borderline UOP.   Plan (Upcoming Events): Wean oxygen and sedation continue to monitor ABG's.  Discharge/Transfer Needs: TBD    Bedside Shift Report Completed : Y  Bedside Safety Check Completed: Y

## 2021-04-13 NOTE — PLAN OF CARE
ICU End of Shift Summary.  For vital signs and complete assessments, please see documentation flowsheets.     Pertinent assessments: Sedated/paralyzed. Vent. Lung dim, Grajeda patent. No BM. PICC line/art line/vent. BIS 40s. Vitals stable.   Major Shift Events: Supine at 1630, Keofeed placed post-pyloric. Trending ABGs  Plan (Upcoming Events): prone around 0100 after ABG. Start tube feeding. Sedation, abx, steroids and COVID management.  Discharge/Transfer Needs: unknown.    Bedside Shift Report Completed   Bedside Safety Check Completed

## 2021-04-13 NOTE — PROVIDER NOTIFICATION
04/13/21 1452   Ventilator  - Settings    Ventilation Mode CMV/AC  (Continuous Mandatory Ventilation/ Assist Control)   Rate Set (breaths/minute) 22 breaths/min   Tidal Volume Set (mL) 400 mL   PEEP (cm H2O) 12 cmH2O   Oxygen Concentration (%) 60 %   Inspiratory Time (seconds) 0.9 sec   Ventilator - Patient    Inspiratory Pressure (cm H2O) 26 cmH2O   Mean Airway Pressure (cm H2O) 17 cmH2O   Expiratory Vt  mL 396   Minute Volume L/min 8.85 L/min   Patient in prone position with head turns q2 hours.  Keisha well, will continue to monitor and wean O2 as keisha.

## 2021-04-13 NOTE — PROGRESS NOTES
[facility] Critical Care Progress Note  04/13/2021    Admission day: 4/8/2021  Hospital Day# 5       Problem List:   Active Problems:    Acute UTI    Acute respiratory failure with hypoxia (H)    Pneumonia due to 2019 novel coronavirus           Summary/Hospital Course:   Ms. Barlow is a 55 year old woman with a history of developmental delay, seizure disorder, cushing syndrome, Asthma, HTN, HLD, obesity, and prior small bowel obstruction who presented on 4/8 due to cough and dyspuria. She was found to be hypoxia, and COVID-19 PCR was positive. She was started on dexamethasone and oxygen and admitted. Unfortunately, she required transfer to the ICU on 4/11 due to worsening oxygenation, and ultimately required intubation that afternoon. Subsequently, she was started on paralytics and proned on 4/12 due to worsening hypoxia.     Assessment and plan :     Brissa Barlow is a 55 year old female admitted on 4/8/2021 for acute hypoxic respiratory failure and ARDS secondary to COVID-19 pneumonia, in the setting of a history of developmental delay, seizure disorder, cushing syndrome, Asthma, HTN, HLD, obesity, and prior small bowel obstruction.     I have personally reviewed the daily labs, imaging studies, cultures and discussed the case with referring physician and consulting physicians.     My assessment and plan by system for this patient is as follows:    Neurology/Psychiatry:   1. History of developmental delay  2. Seizure disorder  3. ICU sedation   4. Paralysis for refractory hypoxemia   Plan  - Continue cisatracurium (started 4/12)   - Fentanyl and propofol for sedation  - if BIS out of goal range, could change propofol to versed drip (no need currently)   - Tegretol 200 mg daily at noon, and 400 mg qAM and QPM  - home sertraline 100 mg daily     Cardiovascular:   1. History of Hypertension  2. History of Hyperlipidemia   3. Shock state: mirna-intubation, currently off levophed  Plan  - currently off  pressors  - holding home lisinopril 20 mg daily     Pulmonary/Ventilator Management:   1. Acute hypoxic respiratory failure  2. ARDS secondary to COVID-19 pneumonia: P/F 152 this AM while prone, was 123 while supine last evening. Plat pressure 25.   3. Asthma at baseline   Plan  - lung protective ventilation  - continue prone positioning, plan to supine around 1745 this evening, check ABG prior to supine, and 2-4 hours after supine. Plan for likely 16 hrs prone, 8 hrs supine.   - continue paralysis, likely at least through tomorrow AM.   - Continue full strength veletri     GI/Nutrition :   1. Morbid obesity, BMI 56  2. Nutrition status  3. Stress ulcer PPx  4. Elevated LFTs, improving   5. Constipation   Plan  - tube feeds via NJ  - Pantoprazole 40 mg daily    - follow LFTs intermittently  - adding scheduled and PRN bowel meds.     Renal/Fluids/Electrolytes:   1. Hyperchloremia, resolved.    2. Hypokalemia: improved   3. Low urine output   Plan  - monitor function and electrolytes as needed with replacement per ICU protocols.  - monitor low UOP for now, if continues to be low, can consider additional bolus.   - generally avoid nephrotoxic agents such as NSAID, IV contrast unless specifically required  - adjust medications as needed for renal clearance  - follow I/O's as appropriate.    Infectious Disease:   1. COVID-19 pneumonia, CRP improving today   2. Pan-sensitive E coli UTI: received nitrofurantoin from 4/8 to 4/11.   Plan  - remdesivir 100 mg daily, started 4/9, day 4  - dexamethasone 6 mg daily started 4/8, day 6  - Tocilizumab x1 given on 4/12   - cefazolin 1 g q8h from 4/11   - trend CRP    Endocrine:   1. History of Cushing's syndrome   2. Stress induced hyperglycemia  Plan  - ICU insulin protocol, goal sugar <180  - monitor     Hematology/Oncology:   1. Thrombocytopenia, improving   2. Hypercoagulable state of COVID-19  Plan  - monitor CBC  - enoxaparin 0.5 mg/kg BID    MSK/Rheum:  1. No acute  issues  Plan  - PT/OT when appropriate.     IV/Access:   1. Venous access - PICC  2. Arterial access - right radial arterial line  Plan  - central access required and necessary    ICU Prophylaxis:   1. DVT: LMWH  2. VAP: HOB 30 degrees, chlorhexidine rinse  3. Stress Ulcer: PPI  4. Restraints: Nonviolent soft two point restraints required and necessary for patient safety and continued cares and good effect as patient continues to pull at necessary lines, tubes despite education and distraction. Will readdress daily.   5. Wound care: per unit routine   6. Feeding: NPO  7. Family Update: will be completed by hospitalist  8. Disposition: ICU    Key goals for next 24 hours:   1. Prone until 550 PM, then supine again  2.  Follow ABGs        Interim History/Overnight Events:   Supine last evening, then proned at 145 AM again. NJ placed, tube feeds started. Sedated on exam, not responsive.         Key Medications:       remdesivir  100 mg Intravenous Q24H    And     sodium chloride 0.9%  50 mL Intravenous Q24H     artificial tears   Both Eyes Q8H     azelastine  1 spray Both Nostrils BID     carBAMazepine  200 mg Per Feeding Tube Daily     carBAMazepine  400 mg Per Feeding Tube BID     ceFAZolin  1 g Intravenous Q8H     dexamethasone  6 mg Intravenous Daily     enoxaparin ANTICOAGULANT  0.5 mg/kg Subcutaneous Q12H     pantoprazole  40 mg Per Feeding Tube Daily     sertraline  100 mg Per Feeding Tube Daily     sodium chloride (PF)  3 mL Intracatheter Q8H     vitamin C  500 mg Per Feeding Tube Daily       cisatracurium (NIMBEX) infusion ADULT 3 mcg/kg/min (04/13/21 0833)     dextrose       epoprostenol (VELETRI) 20 mcg/mL in sterile water inhalation solution 20 ng/kg/min (04/13/21 0319)     fentaNYL 75 mcg/hr (04/13/21 0600)     - MEDICATION INSTRUCTIONS -       norepinephrine Stopped (04/12/21 0237)     propofol (DIPRIVAN) infusion 40 mcg/kg/min (04/13/21 0832)     sodium chloride 10 mL/hr at 04/12/21 1058                Physical Examination:   Temp:  [94.6  F (34.8  C)-98.2  F (36.8  C)] 98.2  F (36.8  C)  Pulse:  [61-92] 85  Resp:  [9-44] 15  BP: (165)/(85) 165/85  MAP:  [71 mmHg-141 mmHg] 105 mmHg  Arterial Line BP: ()/() 172/74  FiO2 (%):  [70 %-100 %] 80 %  SpO2:  [89 %-100 %] 100 %    Intake/Output Summary (Last 24 hours) at 4/12/2021 0845  Last data filed at 4/12/2021 0800  Gross per 24 hour   Intake 1131.38 ml   Output 2575 ml   Net -1443.62 ml     Wt Readings from Last 4 Encounters:   04/13/21 (!) 155.6 kg (343 lb)   03/11/21 (!) 167 kg (368 lb 3.2 oz)   10/01/20 (!) 168.5 kg (371 lb 8 oz)   07/24/19 (!) 164.5 kg (362 lb 9.6 oz)     Arterial Line BP: ()/() 172/74  MAP:  [71 mmHg-141 mmHg] 105 mmHg  BP - Mean:  [112] 112  Ventilation Mode: CMV/AC  (Continuous Mandatory Ventilation/ Assist Control)  FiO2 (%): 80 %  Rate Set (breaths/minute): 22 breaths/min  Tidal Volume Set (mL): 400 mL  PEEP (cm H2O): 12 cmH2O  Oxygen Concentration (%): 80 %  Resp: 15     GEN: no acute distress, sedated.   HEENT: head ncat, sclera anicteric. Yellow nasal discharge.   PULM: unlabored synchronous with vent, crackles posteriorly, somewhat decreased breath sounds throughout   CV/COR: RRR, normal S1 and S2. No gallop, rub, nor murmur  ABD: soft, non-tender, non-distended. Hypoactive bowel sounds, no mass. Exam limited by prone positioning   MSK/EXT:  No LE edema. WWP.  NEURO: sedated, paralyzed.   SKIN: no obvious rash  LINES: clean, dry intact         Data:   All data and imaging reviewed in EMR     ROUTINE ICU LABS (Last four results)  CMP  Recent Labs   Lab 04/13/21  0548 04/12/21  0800 04/12/21  0435 04/12/21  0251 04/11/21  2120 04/11/21  0721 04/10/21  0715     --  140  --   --  138 141   POTASSIUM 3.5 3.9 4.2 3.3* 3.3* 3.4 4.0   CHLORIDE 105  --  110*  --   --  106 109   CO2 27  --  26  --   --  30 28   ANIONGAP 6  --  4  --   --  2* 4   *  --  112*  --   --  102* 102*   BUN 8  --  6*  --   --  5* 6*    CR 0.43*  --  0.45*  --   --  0.60 0.65   GFRESTIMATED >90  --  >90  --   --  >90 >90   GFRESTBLACK >90  --  >90  --   --  >90 >90   CESAR 8.0*  --  8.2*  --   --  7.9* 7.6*   MAG  --   --   --   --  2.1  --   --    PHOS 2.5 3.0  --   --   --   --   --    PROTTOTAL  --   --   --   --   --   --  6.1*   ALBUMIN  --   --   --   --   --   --  2.6*   BILITOTAL  --   --   --   --   --   --  0.3   ALKPHOS  --   --   --   --   --   --  110   AST  --   --  61*  --   --   --  83*   ALT  --   --  76*  --   --   --  98*     CBC  Recent Labs   Lab 04/13/21  0548 04/12/21  0435 04/11/21  0721 04/10/21  0715   WBC 6.4 5.4 5.0 4.1   RBC 4.30 4.34 4.19 4.27   HGB 12.4 12.8 12.0 12.3   HCT 38.7 39.4 39.3 41.1   MCV 90 91 94 96   MCH 28.8 29.5 28.6 28.8   MCHC 32.0 32.5 30.5* 29.9*   RDW 13.6 13.5 13.5 13.6   * 133* 108* 93*     INRNo lab results found in last 7 days.  Arterial Blood Gas  Recent Labs   Lab 04/13/21  0355 04/12/21  2215 04/12/21  1550 04/12/21  0435   PH 7.38 7.41 7.35 7.40   PCO2 43 39 46* 42   PO2 122* 111* 69* 65*   HCO3 26 25 25 26   O2PER 80% 90% 90% 90%       All cultures:  Recent Labs   Lab 04/08/21  2336 04/08/21  2331 04/08/21 2034   CULT No growth after 4 days No growth after 4 days >100,000 colonies/mL  Escherichia coli  *     Recent Results (from the past 24 hour(s))   XR Abdomen Port 1 View    Narrative    EXAM: XR ABDOMEN PORT 1 VW  LOCATION: [facility]  DATE/TIME: 4/12/2021 6:24 PM    INDICATION: Feeding tube placement  COMPARISON: 03/12/2021      Impression    IMPRESSION: Feeding tube with tip projecting over proximal jejunum. No free air. Nonobstructive bowel gas pattern.                  Billing: This patient is critically ill: Yes. Total critical care time today 40 min, excluding procedures and teaching.      Carlos Gomez MD    Department of Medicine, Division of Pulmonary, Allergy, Critical Care, and Sleep Medicine
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
## 2021-04-13 NOTE — PLAN OF CARE
ICU End of Shift Summary.  For vital signs and complete assessments, please see documentation flowsheets.     Pertinent assessments: Sedated/paralyzed.  Vent veletri.  Lungs coarse, lots of secretions. SR. Obese, No BM, gary intact, dark brown/latisha urine. UOP slightly improved. CVC, ART, Keofeed increasing to goal. Vitals stable.   Major Shift Events: supine at 1645. Weaned FiO2 to 60%.   Plan (Upcoming Events): Covid management. Wean vent. Respiratory support. ABGs as ordered. May prone again at 0100 after supine for 8 hours. Ancef, decadron, remdesivir. Increase tube feeding to goal.  Discharge/Transfer Needs: unknown. Lives with significant other.    Bedside Shift Report Completed   Bedside Safety Check Completed

## 2021-04-14 ENCOUNTER — APPOINTMENT (OUTPATIENT)
Dept: GENERAL RADIOLOGY | Facility: CLINIC | Age: 56
DRG: 207 | End: 2021-04-14
Attending: INTERNAL MEDICINE
Payer: MEDICARE

## 2021-04-14 LAB
ALBUMIN SERPL-MCNC: 2.3 G/DL (ref 3.4–5)
ALP SERPL-CCNC: 117 U/L (ref 40–150)
ALT SERPL W P-5'-P-CCNC: 53 U/L (ref 0–50)
ANION GAP SERPL CALCULATED.3IONS-SCNC: <1 MMOL/L (ref 3–14)
AST SERPL W P-5'-P-CCNC: 50 U/L (ref 0–45)
BASE EXCESS BLDA CALC-SCNC: 1.8 MMOL/L
BASE EXCESS BLDA CALC-SCNC: 2.8 MMOL/L
BASE EXCESS BLDA CALC-SCNC: 3.9 MMOL/L
BASE EXCESS BLDA CALC-SCNC: 6.4 MMOL/L
BASOPHILS # BLD AUTO: 0 10E9/L (ref 0–0.2)
BASOPHILS NFR BLD AUTO: 0.3 %
BILIRUB DIRECT SERPL-MCNC: 0.4 MG/DL (ref 0–0.2)
BILIRUB SERPL-MCNC: 0.6 MG/DL (ref 0.2–1.3)
BUN SERPL-MCNC: 10 MG/DL (ref 7–30)
CALCIUM SERPL-MCNC: 8.2 MG/DL (ref 8.5–10.1)
CHLORIDE SERPL-SCNC: 104 MMOL/L (ref 94–109)
CK SERPL-CCNC: 124 U/L (ref 30–225)
CO2 SERPL-SCNC: 32 MMOL/L (ref 20–32)
CREAT SERPL-MCNC: 0.45 MG/DL (ref 0.52–1.04)
CRP SERPL-MCNC: 116 MG/L (ref 0–8)
DIFFERENTIAL METHOD BLD: NORMAL
EOSINOPHIL # BLD AUTO: 0.2 10E9/L (ref 0–0.7)
EOSINOPHIL NFR BLD AUTO: 1.7 %
ERYTHROCYTE [DISTWIDTH] IN BLOOD BY AUTOMATED COUNT: 13.5 % (ref 10–15)
GFR SERPL CREATININE-BSD FRML MDRD: >90 ML/MIN/{1.73_M2}
GLUCOSE BLDC GLUCOMTR-MCNC: 135 MG/DL (ref 70–99)
GLUCOSE BLDC GLUCOMTR-MCNC: 144 MG/DL (ref 70–99)
GLUCOSE BLDC GLUCOMTR-MCNC: 181 MG/DL (ref 70–99)
GLUCOSE BLDC GLUCOMTR-MCNC: 199 MG/DL (ref 70–99)
GLUCOSE BLDC GLUCOMTR-MCNC: 206 MG/DL (ref 70–99)
GLUCOSE SERPL-MCNC: 186 MG/DL (ref 70–99)
HCO3 BLD-SCNC: 29 MMOL/L (ref 21–28)
HCO3 BLD-SCNC: 30 MMOL/L (ref 21–28)
HCO3 BLD-SCNC: 31 MMOL/L (ref 21–28)
HCO3 BLD-SCNC: 33 MMOL/L (ref 21–28)
HCT VFR BLD AUTO: 36.8 % (ref 35–47)
HGB BLD-MCNC: 11.9 G/DL (ref 11.7–15.7)
IMM GRANULOCYTES # BLD: 0.2 10E9/L (ref 0–0.4)
IMM GRANULOCYTES NFR BLD: 2.7 %
LYMPHOCYTES # BLD AUTO: 1.1 10E9/L (ref 0.8–5.3)
LYMPHOCYTES NFR BLD AUTO: 12.9 %
MCH RBC QN AUTO: 29 PG (ref 26.5–33)
MCHC RBC AUTO-ENTMCNC: 32.3 G/DL (ref 31.5–36.5)
MCV RBC AUTO: 90 FL (ref 78–100)
MONOCYTES # BLD AUTO: 0.7 10E9/L (ref 0–1.3)
MONOCYTES NFR BLD AUTO: 7.5 %
NEUTROPHILS # BLD AUTO: 6.5 10E9/L (ref 1.6–8.3)
NEUTROPHILS NFR BLD AUTO: 74.9 %
NRBC # BLD AUTO: 0 10*3/UL
NRBC BLD AUTO-RTO: 0 /100
O2/TOTAL GAS SETTING VFR VENT: ABNORMAL %
OXYHGB MFR BLD: 90 % (ref 92–100)
OXYHGB MFR BLD: 91 % (ref 92–100)
OXYHGB MFR BLD: 92 % (ref 92–100)
OXYHGB MFR BLD: 96 % (ref 92–100)
PCO2 BLD: 53 MM HG (ref 35–45)
PCO2 BLD: 56 MM HG (ref 35–45)
PCO2 BLD: 58 MM HG (ref 35–45)
PCO2 BLD: 58 MM HG (ref 35–45)
PH BLD: 7.31 PH (ref 7.35–7.45)
PH BLD: 7.32 PH (ref 7.35–7.45)
PH BLD: 7.35 PH (ref 7.35–7.45)
PH BLD: 7.39 PH (ref 7.35–7.45)
PHOSPHATE SERPL-MCNC: 3.2 MG/DL (ref 2.5–4.5)
PLATELET # BLD AUTO: 183 10E9/L (ref 150–450)
PO2 BLD: 66 MM HG (ref 80–105)
PO2 BLD: 66 MM HG (ref 80–105)
PO2 BLD: 67 MM HG (ref 80–105)
PO2 BLD: 93 MM HG (ref 80–105)
POTASSIUM SERPL-SCNC: 3.9 MMOL/L (ref 3.4–5.3)
PROCALCITONIN SERPL-MCNC: 0.11 NG/ML
PROT SERPL-MCNC: 6.1 G/DL (ref 6.8–8.8)
RBC # BLD AUTO: 4.1 10E12/L (ref 3.8–5.2)
SODIUM SERPL-SCNC: 136 MMOL/L (ref 133–144)
TRIGL SERPL-MCNC: 236 MG/DL
WBC # BLD AUTO: 8.6 10E9/L (ref 4–11)

## 2021-04-14 PROCEDURE — 250N000011 HC RX IP 250 OP 636: Performed by: SURGERY

## 2021-04-14 PROCEDURE — 82550 ASSAY OF CK (CPK): CPT | Performed by: INTERNAL MEDICINE

## 2021-04-14 PROCEDURE — 86140 C-REACTIVE PROTEIN: CPT | Performed by: INTERNAL MEDICINE

## 2021-04-14 PROCEDURE — 999N000105 HC STATISTIC NO DOCUMENTATION TO SUPPORT CHARGE

## 2021-04-14 PROCEDURE — 200N000001 HC R&B ICU

## 2021-04-14 PROCEDURE — 250N000011 HC RX IP 250 OP 636: Performed by: INTERNAL MEDICINE

## 2021-04-14 PROCEDURE — 84478 ASSAY OF TRIGLYCERIDES: CPT | Performed by: INTERNAL MEDICINE

## 2021-04-14 PROCEDURE — 80076 HEPATIC FUNCTION PANEL: CPT | Performed by: INTERNAL MEDICINE

## 2021-04-14 PROCEDURE — 999N000040 HC STATISTIC CONSULT NO CHARGE VASC ACCESS

## 2021-04-14 PROCEDURE — 99233 SBSQ HOSP IP/OBS HIGH 50: CPT | Performed by: INTERNAL MEDICINE

## 2021-04-14 PROCEDURE — 80048 BASIC METABOLIC PNL TOTAL CA: CPT | Performed by: INTERNAL MEDICINE

## 2021-04-14 PROCEDURE — 250N000013 HC RX MED GY IP 250 OP 250 PS 637: Performed by: INTERNAL MEDICINE

## 2021-04-14 PROCEDURE — 999N001017 HC STATISTIC GLUCOSE BY METER IP

## 2021-04-14 PROCEDURE — 250N000009 HC RX 250: Performed by: INTERNAL MEDICINE

## 2021-04-14 PROCEDURE — 94003 VENT MGMT INPAT SUBQ DAY: CPT

## 2021-04-14 PROCEDURE — 999N000157 HC STATISTIC RCP TIME EA 10 MIN

## 2021-04-14 PROCEDURE — 94645 CONT INHLJ TX EACH ADDL HOUR: CPT

## 2021-04-14 PROCEDURE — 272N000083 HC NUTRITION PRODUCT SEMIELEM INTERMED LITER

## 2021-04-14 PROCEDURE — 85025 COMPLETE CBC W/AUTO DIFF WBC: CPT | Performed by: INTERNAL MEDICINE

## 2021-04-14 PROCEDURE — 99291 CRITICAL CARE FIRST HOUR: CPT | Performed by: INTERNAL MEDICINE

## 2021-04-14 PROCEDURE — 82805 BLOOD GASES W/O2 SATURATION: CPT | Performed by: INTERNAL MEDICINE

## 2021-04-14 PROCEDURE — 94644 CONT INHLJ TX 1ST HOUR: CPT

## 2021-04-14 PROCEDURE — 84100 ASSAY OF PHOSPHORUS: CPT | Performed by: INTERNAL MEDICINE

## 2021-04-14 PROCEDURE — 84145 PROCALCITONIN (PCT): CPT | Performed by: INTERNAL MEDICINE

## 2021-04-14 PROCEDURE — 71045 X-RAY EXAM CHEST 1 VIEW: CPT

## 2021-04-14 PROCEDURE — 258N000003 HC RX IP 258 OP 636: Performed by: INTERNAL MEDICINE

## 2021-04-14 RX ADMIN — DOCUSATE SODIUM 100 MG: 50 LIQUID ORAL at 08:56

## 2021-04-14 RX ADMIN — CISATRACURIUM BESYLATE 3 MCG/KG/MIN: 10 INJECTION, SOLUTION INTRAVENOUS at 20:40

## 2021-04-14 RX ADMIN — PROPOFOL 35 MCG/KG/MIN: 10 INJECTION, EMULSION INTRAVENOUS at 18:29

## 2021-04-14 RX ADMIN — EPOPROSTENOL 20 NG/KG/MIN: 1.5 INJECTION, POWDER, LYOPHILIZED, FOR SOLUTION INTRAVENOUS at 20:12

## 2021-04-14 RX ADMIN — ENOXAPARIN SODIUM 80 MG: 80 INJECTION SUBCUTANEOUS at 08:57

## 2021-04-14 RX ADMIN — CEFAZOLIN SODIUM 1 G: 1 INJECTION, SOLUTION INTRAVENOUS at 21:25

## 2021-04-14 RX ADMIN — PROPOFOL 35 MCG/KG/MIN: 10 INJECTION, EMULSION INTRAVENOUS at 23:48

## 2021-04-14 RX ADMIN — DEXAMETHASONE SODIUM PHOSPHATE 6 MG: 4 INJECTION, SOLUTION INTRAMUSCULAR; INTRAVENOUS at 08:57

## 2021-04-14 RX ADMIN — EPOPROSTENOL 20 NG/KG/MIN: 1.5 INJECTION, POWDER, LYOPHILIZED, FOR SOLUTION INTRAVENOUS at 13:24

## 2021-04-14 RX ADMIN — CEFAZOLIN SODIUM 1 G: 1 INJECTION, SOLUTION INTRAVENOUS at 05:34

## 2021-04-14 RX ADMIN — PROPOFOL 40 MCG/KG/MIN: 10 INJECTION, EMULSION INTRAVENOUS at 11:00

## 2021-04-14 RX ADMIN — ENOXAPARIN SODIUM 80 MG: 80 INJECTION SUBCUTANEOUS at 20:43

## 2021-04-14 RX ADMIN — SENNOSIDES 10 ML: 8.8 LIQUID ORAL at 08:56

## 2021-04-14 RX ADMIN — CARBAMAZEPINE 200 MG: 100 SUSPENSION ORAL at 11:41

## 2021-04-14 RX ADMIN — AZELASTINE HYDROCHLORIDE 1 SPRAY: 137 SPRAY, METERED NASAL at 20:06

## 2021-04-14 RX ADMIN — POLYETHYLENE GLYCOL 3350 17 G: 17 POWDER, FOR SOLUTION ORAL at 08:57

## 2021-04-14 RX ADMIN — OXYCODONE HYDROCHLORIDE AND ACETAMINOPHEN 500 MG: 500 TABLET ORAL at 08:57

## 2021-04-14 RX ADMIN — PROPOFOL 40 MCG/KG/MIN: 10 INJECTION, EMULSION INTRAVENOUS at 03:26

## 2021-04-14 RX ADMIN — PROPOFOL 40 MCG/KG/MIN: 10 INJECTION, EMULSION INTRAVENOUS at 05:57

## 2021-04-14 RX ADMIN — PROPOFOL 40 MCG/KG/MIN: 10 INJECTION, EMULSION INTRAVENOUS at 08:36

## 2021-04-14 RX ADMIN — CISATRACURIUM BESYLATE 3 MCG/KG/MIN: 10 INJECTION, SOLUTION INTRAVENOUS at 13:36

## 2021-04-14 RX ADMIN — Medication 75 MCG/HR: at 15:02

## 2021-04-14 RX ADMIN — AZELASTINE HYDROCHLORIDE 1 SPRAY: 137 SPRAY, METERED NASAL at 08:56

## 2021-04-14 RX ADMIN — CISATRACURIUM BESYLATE 3 MCG/KG/MIN: 10 INJECTION, SOLUTION INTRAVENOUS at 05:57

## 2021-04-14 RX ADMIN — PROPOFOL 30 MCG/KG/MIN: 10 INJECTION, EMULSION INTRAVENOUS at 12:49

## 2021-04-14 RX ADMIN — SERTRALINE HYDROCHLORIDE 100 MG: 100 TABLET ORAL at 08:57

## 2021-04-14 RX ADMIN — PROPOFOL 40 MCG/KG/MIN: 10 INJECTION, EMULSION INTRAVENOUS at 01:00

## 2021-04-14 RX ADMIN — PROPOFOL 35 MCG/KG/MIN: 10 INJECTION, EMULSION INTRAVENOUS at 21:25

## 2021-04-14 RX ADMIN — PANTOPRAZOLE SODIUM 40 MG: 40 TABLET, DELAYED RELEASE ORAL at 08:56

## 2021-04-14 RX ADMIN — CARBAMAZEPINE 400 MG: 100 SUSPENSION ORAL at 08:56

## 2021-04-14 RX ADMIN — EPOPROSTENOL 20 NG/KG/MIN: 1.5 INJECTION, POWDER, LYOPHILIZED, FOR SOLUTION INTRAVENOUS at 06:26

## 2021-04-14 RX ADMIN — CARBAMAZEPINE 400 MG: 100 SUSPENSION ORAL at 20:06

## 2021-04-14 RX ADMIN — CEFAZOLIN SODIUM 1 G: 1 INJECTION, SOLUTION INTRAVENOUS at 13:37

## 2021-04-14 ASSESSMENT — ACTIVITIES OF DAILY LIVING (ADL)
ADLS_ACUITY_SCORE: 22

## 2021-04-14 NOTE — PROGRESS NOTES
Respiratory Therapy Note    [facility] ICU VENTILATOR RESPIRATORY NOTE  Date of Admission: 04/08/21  Date of Intubation (most recent): 04/11/21  Reason for Mechanical Ventilation: Respiratory failure  Number of Days on Mechanical Ventilation: 4  Reason for No Pressure Support Trial: Pt currently on PEEP +12 cmH20 and FiO2 80%  Significant Events Today: Pt supined at 1130  ABG Results: 7.35/56/93/31    Plan:  Will continue to monitor respiratory status.    Ventilation Mode: CMV/AC  (Continuous Mandatory Ventilation/ Assist Control)  FiO2 (%): 80 %  Rate Set (breaths/minute): 22 breaths/min  Tidal Volume Set (mL): 400 mL  PEEP (cm H2O): 12 cmH2O  Oxygen Concentration (%): 80 %  Resp: 22      RT Airam  5:26 PM April 14, 2021

## 2021-04-14 NOTE — PROGRESS NOTES
[facility] Critical Care Progress Note  04/14/2021    Admission day: 4/8/2021  Hospital Day# 6       Problem List:   Active Problems:    Acute UTI    Acute respiratory failure with hypoxia (H)    Pneumonia due to 2019 novel coronavirus           Summary/Hospital Course:   Ms. Barlow is a 55 year old woman with a history of developmental delay, seizure disorder, cushing syndrome, Asthma, HTN, HLD, obesity, and prior small bowel obstruction who presented on 4/8 due to cough and dyspuria. She was found to be hypoxia, and COVID-19 PCR was positive. She was started on dexamethasone and oxygen and admitted. Unfortunately, she required transfer to the ICU on 4/11 due to worsening oxygenation, and ultimately required intubation that afternoon. Subsequently, she was started on paralytics and proned on 4/12 due to worsening hypoxia.     Assessment and plan :     Brissa Barlow is a 55 year old female admitted on 4/8/2021 for acute hypoxic respiratory failure and ARDS secondary to COVID-19 pneumonia, in the setting of a history of developmental delay, seizure disorder, cushing syndrome, Asthma, HTN, HLD, obesity, and prior small bowel obstruction.     I have personally reviewed the daily labs, imaging studies, cultures and discussed the case with referring physician and consulting physicians.     My assessment and plan by system for this patient is as follows:    Neurology/Psychiatry:   1. History of developmental delay  2. Seizure disorder  3. ICU sedation   4. Paralysis for refractory hypoxemia   Plan  - Continue cisatracurium (started 4/12) given worsening P/F  - Fentanyl and propofol for sedation  - Tegretol 200 mg daily at noon, and 400 mg qAM and QPM  - home sertraline 100 mg daily     Cardiovascular:   1. History of Hypertension  2. History of Hyperlipidemia   3. Shock state: sepsis vs sedation effect  Plan  - currently off pressors  - holding home lisinopril 20 mg daily     Pulmonary/Ventilator Management:   1.  Acute hypoxic respiratory failure  2. ARDS secondary to COVID-19 pneumonia: P/F only 66 this AM while prone, was 118 while supine last evening. Plat pressure 28 .   3. Asthma at baseline   Plan  - lung protective ventilation  - change back to supine position this AM, and recheck gas after 1-2 hours, given worse this AM while prone. May be able to keep supine. Will schedule ABG for 6 PM as well.   - continue paralysis for now given worsening P/F ratio.   - Continue full strength veletri     GI/Nutrition :   1. Morbid obesity, BMI 56  2. Nutrition status  3. Stress ulcer PPx  4. Elevated LFTs, improving   5. Constipation   Plan  - tube feeds via NJ  - Pantoprazole 40 mg daily    - follow LFTs intermittently  - Scheduled and PRN bowel meds.     Renal/Fluids/Electrolytes:   1. Hyperchloremia, resolved.    2. Hypokalemia: improved   3. Low urine output   Plan  - monitor function and electrolytes as needed with replacement per ICU protocols.  - monitor low UOP for now, if continues to be low, can consider additional bolus.   - generally avoid nephrotoxic agents such as NSAID, IV contrast unless specifically required  - adjust medications as needed for renal clearance  - follow I/O's as appropriate.    Infectious Disease:   1. COVID-19 pneumonia, CRP improving today   2. Pan-sensitive E coli UTI: received nitrofurantoin from 4/8 to 4/11.   Plan  - remdesivir 100 mg daily, completed course on 4/13  - dexamethasone 6 mg daily started 4/8, day 7  - Tocilizumab x1 given on 4/12   - cefazolin 1 g q8h from 4/11 to 4/15.   - trend CRP    Endocrine:   1. History of Cushing's syndrome   2. Stress induced hyperglycemia  Plan  - ICU insulin protocol, goal sugar <180  - monitor     Hematology/Oncology:   1. Thrombocytopenia, resolved  2. Hypercoagulable state of COVID-19  Plan  - monitor CBC  - enoxaparin 0.5 mg/kg BID    MSK/Rheum:  1. No acute issues  Plan  - PT/OT when appropriate.     IV/Access:   1. Venous access - PICC  2.  Arterial access - right radial arterial line  Plan  - central access required and necessary    ICU Prophylaxis:   1. DVT: LMWH  2. VAP: HOB 30 degrees, chlorhexidine rinse  3. Stress Ulcer: PPI  4. Restraints: Nonviolent soft two point restraints required and necessary for patient safety and continued cares and good effect as patient continues to pull at necessary lines, tubes despite education and distraction. Will readdress daily.   5. Wound care: per unit routine   6. Feeding: NPO  7. Family Update: spoke with father and mother (coguardians) on the phone for updates. They are okay with transfer to [facility] if needed.   8. Disposition: ICU, will discuss if there are beds available at [facility] as well.     Key goals for next 24 hours:   1. Prone until 550 PM, then supine again  2.  Follow ABGs        Interim History/Overnight Events:   Continues to cycle prone and supine positioning. Sedated this morning, not following commands for me.        Key Medications:       artificial tears   Both Eyes Q8H     azelastine  1 spray Both Nostrils BID     carBAMazepine  200 mg Per Feeding Tube Daily     carBAMazepine  400 mg Per Feeding Tube BID     ceFAZolin  1 g Intravenous Q8H     dexamethasone  6 mg Intravenous Daily     sennosides  10 mL Per Feeding Tube BID    And     docusate  100 mg Per Feeding Tube BID     enoxaparin ANTICOAGULANT  0.5 mg/kg Subcutaneous Q12H     pantoprazole  40 mg Per Feeding Tube Daily     polyethylene glycol  17 g Oral Daily     sertraline  100 mg Per Feeding Tube Daily     sodium chloride (PF)  3 mL Intracatheter Q8H     vitamin C  500 mg Per Feeding Tube Daily       cisatracurium (NIMBEX) infusion ADULT 3 mcg/kg/min (04/14/21 0600)     dextrose       epoprostenol (VELETRI) 20 mcg/mL in sterile water inhalation solution 20 ng/kg/min (04/14/21 0626)     fentaNYL 75 mcg/hr (04/14/21 0600)     - MEDICATION INSTRUCTIONS -       norepinephrine Stopped (04/12/21 0237)     propofol (DIPRIVAN) infusion 40  mcg/kg/min (04/14/21 0600)     sodium chloride 10 mL/hr at 04/14/21 0600               Physical Examination:   Temp:  [97.9  F (36.6  C)-99.4  F (37.4  C)] 97.9  F (36.6  C)  Pulse:  [] 94  Resp:  [14-26] 22  BP: (135)/(67) 135/67  MAP:  [65 mmHg-114 mmHg] 83 mmHg  Arterial Line BP: (102-185)/(50-78) 127/63  FiO2 (%):  [60 %-100 %] 85 %  SpO2:  [78 %-100 %] 94 %    Intake/Output Summary (Last 24 hours) at 4/12/2021 0845  Last data filed at 4/12/2021 0800  Gross per 24 hour   Intake 1131.38 ml   Output 2575 ml   Net -1443.62 ml     Wt Readings from Last 4 Encounters:   04/13/21 (!) 155.6 kg (343 lb)   03/11/21 (!) 167 kg (368 lb 3.2 oz)   10/01/20 (!) 168.5 kg (371 lb 8 oz)   07/24/19 (!) 164.5 kg (362 lb 9.6 oz)     Arterial Line BP: (102-185)/(50-78) 127/63  MAP:  [65 mmHg-114 mmHg] 83 mmHg  BP - Mean:  [88] 88  Ventilation Mode: CMV/AC  (Continuous Mandatory Ventilation/ Assist Control)  FiO2 (%): 85 %  Rate Set (breaths/minute): 22 breaths/min  Tidal Volume Set (mL): 400 mL  PEEP (cm H2O): 12 cmH2O  Oxygen Concentration (%): 100 %  Resp: 22     GEN: no acute distress, sedated.   HEENT: head ncat, sclera anicteric. Yellow nasal discharge.   PULM: unlabored synchronous with vent, crackles posteriorly, somewhat decreased breath sounds throughout, stable from yesterday   CV/COR: RRR, normal S1 and S2. No gallop, rub, nor murmur  ABD: soft, non-tender, non-distended. Hypoactive bowel sounds, no mass. Exam limited by prone positioning   MSK/EXT:  No LE edema. WWP.  NEURO: sedated, paralyzed.   SKIN: no obvious rash  LINES: clean, dry intact         Data:   All data and imaging reviewed in EMR     ROUTINE ICU LABS (Last four results)  CMP  Recent Labs   Lab 04/14/21  0543 04/13/21  0548 04/12/21  0800 04/12/21  0435 04/11/21 2120 04/11/21 2120 04/11/21  0721 04/10/21  0715    138  --  140  --   --  138 141   POTASSIUM 3.9 3.5 3.9 4.2   < > 3.3* 3.4 4.0   CHLORIDE 104 105  --  110*  --   --  106 109   CO2  32 27  --  26  --   --  30 28   ANIONGAP <1* 6  --  4  --   --  2* 4   * 122*  --  112*  --   --  102* 102*   BUN 10 8  --  6*  --   --  5* 6*   CR 0.45* 0.43*  --  0.45*  --   --  0.60 0.65   GFRESTIMATED >90 >90  --  >90  --   --  >90 >90   GFRESTBLACK >90 >90  --  >90  --   --  >90 >90   CESAR 8.2* 8.0*  --  8.2*  --   --  7.9* 7.6*   MAG  --   --   --   --   --  2.1  --   --    PHOS 3.2 2.5 3.0  --   --   --   --   --    PROTTOTAL 6.1*  --   --   --   --   --   --  6.1*   ALBUMIN 2.3*  --   --   --   --   --   --  2.6*   BILITOTAL 0.6  --   --   --   --   --   --  0.3   ALKPHOS 117  --   --   --   --   --   --  110   AST 50*  --   --  61*  --   --   --  83*   ALT 53*  --   --  76*  --   --   --  98*    < > = values in this interval not displayed.     CBC  Recent Labs   Lab 04/14/21  0543 04/13/21  0548 04/12/21  0435 04/11/21  0721   WBC 8.6 6.4 5.4 5.0   RBC 4.10 4.30 4.34 4.19   HGB 11.9 12.4 12.8 12.0   HCT 36.8 38.7 39.4 39.3   MCV 90 90 91 94   MCH 29.0 28.8 29.5 28.6   MCHC 32.3 32.0 32.5 30.5*   RDW 13.5 13.6 13.5 13.5    141* 133* 108*     INRNo lab results found in last 7 days.  Arterial Blood Gas  Recent Labs   Lab 04/14/21  0543 04/13/21 2026 04/13/21  1640 04/13/21  0355   PH 7.31* 7.41 7.35 7.38   PCO2 58* 46* 51* 43   PO2 66* 71* 81 122*   HCO3 29* 29* 28 26   O2PER 90%VENT 60% 60% 80%       All cultures:  Recent Labs   Lab 04/08/21  2336 04/08/21 2331 04/08/21 2034   CULT No growth after 5 days No growth after 5 days >100,000 colonies/mL  Escherichia coli  *     No results found for this or any previous visit (from the past 24 hour(s)).              Billing: This patient is critically ill: Yes. Total critical care time today 40 min, excluding procedures and teaching.      Carlos Gomez MD    Department of Medicine, Division of Pulmonary, Allergy, Critical Care, and Sleep Medicine

## 2021-04-14 NOTE — PROGRESS NOTES
[facility]  Hospitalist Progress Note    Name: Brissa Barlow    MRN: [id number]  Provider: Jerrod Smyth MD  Date of Service: 04/14/2021    Summary of Stay:  Brissa Barlow is a 55 year old female patient with past medical history of developmental delay, seizure disorder, Cushing syndrome, asthma, hypertension, hyperlipidemia, obesity, history of small bowel obstruction, was admitted on 4/8/2021 for dysuria and cough.  On arrival to emergency room, her vital signs showed temperature 100.1, pulse 95, blood pressure 120/70, oxygen saturation 85% on room air.  Laboratory work-up showed normal CBC and BMP. Troponin less than 0.015.  Lactic acid 0.9.  Urinalysis showed 6 WBCs/hpf.  COVID-19 PCR positive   She was started on oxygen supplement and dexamethasone. She was also given oral Macrobid.  She was admitted to the hospital for further management.  The patient tested positive for Covid on 4/8/2021 with symptoms starting on 4/3/2021.  The patient did have a CT chest which showed bilateral multifocal infiltrates consistent with Covid pneumonia.  The patient was started on IV dexamethasone as well as remdesivir.  The patient had increasing oxygen requirements and on 4/11/2021 the patient was placed on BiPAP for hypoxia.  The patient remained hypoxic and so was transferred to the ICU on 4/11/2021 and intubated.    Problem List:   1.  Acute Ventilator Dependent Respiratory Failure - Hypoxia  - Secondary to covid pneumonia  - Ventilator per Intensivist     2.  Covid-19 Pneumonia  - Continue IV Dexamethasone - Day #6  - Completed course of IV Remdesivir   - Added famotidine  - Continue Lovenox 0.5 mg/kg BID  - Continue proning per intensivist  - Combivent QID  - Trend inflammatory markers     3.  Urinary Tract Infection. Was on macrobid and then switched to cefazolin. Urine culture on 4/8/2021 positive for pan-sensitive E. Coli     4.  Seizure Disorder  - On carbamazepine     5.  Asthma  -  Combivent QID     Chronic Medical Problems:  Developmental Delay  Morbid Obesity BMI 56.4  Hx of Cushings Syndrome  Hypertension  Hyperlipidemia    DVT Prophylaxis: Enoxaparin (Lovenox) SQ  Code Status: Full Code  Diet: NPO for Medical/Clinical Reasons Except for: No Exceptions  Adult Formula Drip Feeding: Continuous Vital High Protein; Nasojejunal; Goal Rate: 70; mL/hr; Medication - Feeding Tube Flush Frequency: At least 15-30 mL water before and after medication administration and with tube clogging; Continue at goal ra...    Grajeda Catheter: in place, indication: Strict 1-2 Hour I&O;Deep Sedation/Paralysis  Disposition: Expected discharge pending clinical course.   Family updated today: Per intensivist      Interval History   Patient seen and examined.  Patient intubated and sedated      -Data reviewed today: I personally reviewed all new labs and imaging results over the last 24 hours.     Physical Exam   Temp: 98.3  F (36.8  C) Temp src: Axillary BP: 135/67 Pulse: 78   Resp: 22 SpO2: 96 % O2 Device: Mechanical Ventilator    Vitals:    04/09/21 0033 04/13/21 0600   Weight: (!) 163.3 kg (360 lb 1.6 oz) (!) 155.6 kg (343 lb)     Vital Signs with Ranges  Temp:  [97.9  F (36.6  C)-98.3  F (36.8  C)] 98.3  F (36.8  C)  Pulse:  [] 78  Resp:  [14-26] 22  BP: (135)/(67) 135/67  MAP:  [65 mmHg-114 mmHg] 77 mmHg  Arterial Line BP: (102-185)/(50-83) 121/57  FiO2 (%):  [60 %-100 %] 80 %  SpO2:  [78 %-100 %] 96 %  I/O last 3 completed shifts:  In: 3136.4 [I.V.:1456.4; NG/GT:400]  Out: 1795 [Urine:1795]    GENERAL: Intubated and sedated   CARDIOVASCULAR: Regular rate and rhythm without loud murmurs   PULMONARY: Coarse sounds bilaterally   GASTROINTESTINAL: Soft, non-tender, non-distended. Bowel sounds normoactive.   EXTREMITIES: No cyanosis or clubbing. No pitting edema.  NEUROLOGICAL: sedated   DERMATOLOGICAL: No obvious rashes or cyanosis     Medications     cisatracurium (NIMBEX) infusion ADULT 3 mcg/kg/min (04/14/21  5466)     dextrose       epoprostenol (VELETRI) 20 mcg/mL in sterile water inhalation solution 20 ng/kg/min (04/14/21 1324)     fentaNYL 75 mcg/hr (04/14/21 1502)     - MEDICATION INSTRUCTIONS -       norepinephrine Stopped (04/12/21 0237)     propofol (DIPRIVAN) infusion 30 mcg/kg/min (04/14/21 1249)     sodium chloride 10 mL/hr at 04/14/21 0600       artificial tears   Both Eyes Q8H     azelastine  1 spray Both Nostrils BID     carBAMazepine  200 mg Per Feeding Tube Daily     carBAMazepine  400 mg Per Feeding Tube BID     ceFAZolin  1 g Intravenous Q8H     dexamethasone  6 mg Intravenous Daily     sennosides  10 mL Per Feeding Tube BID    And     docusate  100 mg Per Feeding Tube BID     enoxaparin ANTICOAGULANT  0.5 mg/kg Subcutaneous Q12H     pantoprazole  40 mg Per Feeding Tube Daily     polyethylene glycol  17 g Oral Daily     sertraline  100 mg Per Feeding Tube Daily     sodium chloride (PF)  3 mL Intracatheter Q8H     vitamin C  500 mg Per Feeding Tube Daily     Data     Laboratory:  Recent Labs   Lab 04/14/21 0543 04/13/21 0548 04/12/21  0435   WBC 8.6 6.4 5.4   HGB 11.9 12.4 12.8   HCT 36.8 38.7 39.4   MCV 90 90 91    141* 133*     Recent Labs   Lab 04/14/21 0543 04/13/21 0548 04/12/21  0800 04/12/21  0435    138  --  140   POTASSIUM 3.9 3.5 3.9 4.2   CHLORIDE 104 105  --  110*   CO2 32 27  --  26   ANIONGAP <1* 6  --  4   * 122*  --  112*   BUN 10 8  --  6*   CR 0.45* 0.43*  --  0.45*   GFRESTIMATED >90 >90  --  >90   GFRESTBLACK >90 >90  --  >90   CESAR 8.2* 8.0*  --  8.2*     Recent Labs   Lab 04/14/21 0543 04/13/21 0548 04/12/21  0435   .0* 125.0* 163.0*     Recent Labs   Lab 04/14/21 0543 04/12/21 0435 04/10/21  0715   AST 50* 61* 83*   ALT 53* 76* 98*   ALKPHOS 117  --  110   BILITOTAL 0.6  --  0.3     Recent Labs   Lab 04/08/21  2336 04/08/21  2331 04/08/21 2034   CULT No growth after 5 days No growth after 5 days >100,000 colonies/mL  Escherichia coli  *        Imaging:  Recent Results (from the past 24 hour(s))   XR Chest Port 1 View    Narrative    CHEST ONE VIEW PORTABLE  April 14, 2021 12:43 PM     HISTORY: Worsening hypoxia.    COMPARISON: 4/11/2021.      Impression    IMPRESSION: Tip of the endotracheal tube is 3.6 cm above the nina.  Feeding tube extends below the left hemidiaphragm. There are worsening  airspace opacities in both upper lobes with relative sparing of the  lower lobes concerning for progressive infectious process. No  significant pleural effusion or pneumothorax.    MECHE HALEY MD         [facility] Carlito Smyth MD  [facility] Hospitalist  [address] [facility] [place] [address]  04/14/2021

## 2021-04-14 NOTE — PROGRESS NOTES
[facility] ICU VENTILATOR RESPIRATORY NOTE  Date of Admission: 4/11/21  Date of Intubation (most recent): 4/11/21  Reason for Mechanical Ventilation: Respiratory failure  Number of Days on Mechanical Ventilation: 4  Met Criteria for Pressure Support Trial: No  Reason for No Pressure Support Trial: pt is proned and on high FiO2 and PEEP  Significant Events Today: pt was proned overnight and remains in prone position with Q2 head turns  ABG Results:          Recent Labs   Lab 04/12/21  0435 04/12/21  0120 04/11/21  2231 04/11/21  2108   PH 7.40 7.35 7.33* 7.34*   PCO2 42 46* 49* 53*   PO2 65* 134* 104 34*   HCO3 26 25 25 28   O2PER 90% 85% VENT 85% 85%      ETT appearance on chest x-ray: 5 cm above nina     Plan:  RT to continue to monitor and assess the pt's current respiratory status and needs.      Barbara Oneill, RT

## 2021-04-14 NOTE — PLAN OF CARE
ICU End of Shift Summary.  For vital signs and complete assessments, please see documentation flowsheets.     Pertinent assessments: Patient remains intubated, sedated, and paralyzed. Veletri per vent. Tele SR. Afebrile. BP WNL. LS dim on FiO2 60%% initially, now 85%. Good UOP with Grajeda in place; brown/green in color; see flowsheet. PICC, arterial line. Keofeed in place; rate increased per orders; tolerated well.    Major Shift Events: Turned to prone position at 0145; head turns Q2H.    Plan (Upcoming Events): Wean O2 as able.   Discharge/Transfer Needs: TBD    Bedside Shift Report Completed :   Bedside Safety Check Completed:

## 2021-04-15 ENCOUNTER — TRANSFERRED RECORDS (OUTPATIENT)
Dept: HEALTH INFORMATION MANAGEMENT | Facility: CLINIC | Age: 56
End: 2021-04-15

## 2021-04-15 VITALS
SYSTOLIC BLOOD PRESSURE: 135 MMHG | WEIGHT: 293 LBS | RESPIRATION RATE: 21 BRPM | BODY MASS INDEX: 45.99 KG/M2 | OXYGEN SATURATION: 95 % | TEMPERATURE: 98.6 F | HEART RATE: 87 BPM | HEIGHT: 67 IN | DIASTOLIC BLOOD PRESSURE: 67 MMHG

## 2021-04-15 LAB
ALBUMIN SERPL-MCNC: 2.1 G/DL (ref 3.4–5)
ALP SERPL-CCNC: 123 U/L (ref 40–150)
ALT SERPL W P-5'-P-CCNC: 65 U/L (ref 0–50)
ANION GAP SERPL CALCULATED.3IONS-SCNC: 1 MMOL/L (ref 3–14)
AST SERPL W P-5'-P-CCNC: 89 U/L (ref 0–45)
BACTERIA SPEC CULT: NO GROWTH
BACTERIA SPEC CULT: NO GROWTH
BASE EXCESS BLDA CALC-SCNC: 7.7 MMOL/L
BASE EXCESS BLDA CALC-SCNC: 8.5 MMOL/L
BASE EXCESS BLDV CALC-SCNC: 8.6 MMOL/L
BILIRUB SERPL-MCNC: 0.6 MG/DL (ref 0.2–1.3)
BUN SERPL-MCNC: 11 MG/DL (ref 7–30)
CALCIUM SERPL-MCNC: 7.8 MG/DL (ref 8.5–10.1)
CHLORIDE SERPL-SCNC: 104 MMOL/L (ref 94–109)
CO2 SERPL-SCNC: 34 MMOL/L (ref 20–32)
CREAT SERPL-MCNC: 0.41 MG/DL (ref 0.52–1.04)
CRP SERPL-MCNC: 111 MG/L (ref 0–8)
ERYTHROCYTE [DISTWIDTH] IN BLOOD BY AUTOMATED COUNT: 13.7 % (ref 10–15)
GFR SERPL CREATININE-BSD FRML MDRD: >90 ML/MIN/{1.73_M2}
GLUCOSE BLDC GLUCOMTR-MCNC: 149 MG/DL (ref 70–99)
GLUCOSE BLDC GLUCOMTR-MCNC: 161 MG/DL (ref 70–99)
GLUCOSE BLDC GLUCOMTR-MCNC: 185 MG/DL (ref 70–99)
GLUCOSE BLDC GLUCOMTR-MCNC: 210 MG/DL (ref 70–99)
GLUCOSE SERPL-MCNC: 158 MG/DL (ref 70–99)
HCO3 BLD-SCNC: 34 MMOL/L (ref 21–28)
HCO3 BLD-SCNC: 35 MMOL/L (ref 21–28)
HCO3 BLDV-SCNC: 34 MMOL/L (ref 21–28)
HCT VFR BLD AUTO: 32.3 % (ref 35–47)
HGB BLD-MCNC: 10.6 G/DL (ref 11.7–15.7)
INR PPP: 1.02 (ref 0.9–1.1)
MCH RBC QN AUTO: 29.4 PG (ref 26.5–33)
MCHC RBC AUTO-ENTMCNC: 32.8 G/DL (ref 31.5–36.5)
MCV RBC AUTO: 90 FL (ref 78–100)
O2/TOTAL GAS SETTING VFR VENT: 60 %
O2/TOTAL GAS SETTING VFR VENT: ABNORMAL %
O2/TOTAL GAS SETTING VFR VENT: ABNORMAL %
OXYHGB MFR BLD: 91 % (ref 92–100)
OXYHGB MFR BLD: 92 % (ref 92–100)
OXYHGB MFR BLDV: 95 %
PCO2 BLD: 55 MM HG (ref 35–45)
PCO2 BLD: 56 MM HG (ref 35–45)
PCO2 BLDV: 51 MM HG (ref 40–50)
PH BLD: 7.4 PH (ref 7.35–7.45)
PH BLD: 7.41 PH (ref 7.35–7.45)
PH BLDV: 7.44 PH (ref 7.32–7.43)
PLATELET # BLD AUTO: 173 10E9/L (ref 150–450)
PO2 BLD: 63 MM HG (ref 80–105)
PO2 BLD: 65 MM HG (ref 80–105)
PO2 BLDV: 76 MM HG (ref 25–47)
POTASSIUM SERPL-SCNC: 3.4 MMOL/L (ref 3.4–5.3)
PROT SERPL-MCNC: 5.7 G/DL (ref 6.8–8.8)
RBC # BLD AUTO: 3.61 10E12/L (ref 3.8–5.2)
SODIUM SERPL-SCNC: 139 MMOL/L (ref 133–144)
SPECIMEN SOURCE: NORMAL
SPECIMEN SOURCE: NORMAL
WBC # BLD AUTO: 7.3 10E9/L (ref 4–11)

## 2021-04-15 PROCEDURE — 250N000013 HC RX MED GY IP 250 OP 250 PS 637: Performed by: INTERNAL MEDICINE

## 2021-04-15 PROCEDURE — 94645 CONT INHLJ TX EACH ADDL HOUR: CPT

## 2021-04-15 PROCEDURE — 250N000011 HC RX IP 250 OP 636: Performed by: INTERNAL MEDICINE

## 2021-04-15 PROCEDURE — 82805 BLOOD GASES W/O2 SATURATION: CPT | Performed by: INTERNAL MEDICINE

## 2021-04-15 PROCEDURE — 250N000011 HC RX IP 250 OP 636: Performed by: SURGERY

## 2021-04-15 PROCEDURE — 272N000083 HC NUTRITION PRODUCT SEMIELEM INTERMED LITER

## 2021-04-15 PROCEDURE — 94644 CONT INHLJ TX 1ST HOUR: CPT

## 2021-04-15 PROCEDURE — 99291 CRITICAL CARE FIRST HOUR: CPT | Performed by: INTERNAL MEDICINE

## 2021-04-15 PROCEDURE — 99239 HOSP IP/OBS DSCHRG MGMT >30: CPT | Performed by: INTERNAL MEDICINE

## 2021-04-15 PROCEDURE — 999N001017 HC STATISTIC GLUCOSE BY METER IP

## 2021-04-15 PROCEDURE — 86140 C-REACTIVE PROTEIN: CPT | Performed by: INTERNAL MEDICINE

## 2021-04-15 PROCEDURE — 94003 VENT MGMT INPAT SUBQ DAY: CPT

## 2021-04-15 PROCEDURE — 258N000003 HC RX IP 258 OP 636: Performed by: INTERNAL MEDICINE

## 2021-04-15 PROCEDURE — 999N000157 HC STATISTIC RCP TIME EA 10 MIN

## 2021-04-15 PROCEDURE — 250N000012 HC RX MED GY IP 250 OP 636 PS 637: Performed by: INTERNAL MEDICINE

## 2021-04-15 PROCEDURE — 80053 COMPREHEN METABOLIC PANEL: CPT | Performed by: INTERNAL MEDICINE

## 2021-04-15 PROCEDURE — 250N000009 HC RX 250: Performed by: INTERNAL MEDICINE

## 2021-04-15 PROCEDURE — 85027 COMPLETE CBC AUTOMATED: CPT | Performed by: INTERNAL MEDICINE

## 2021-04-15 RX ORDER — AMINO ACIDS/PROTEIN HYDROLYS 11G-40/45
2 LIQUID IN PACKET (ML) ORAL 3 TIMES DAILY
Status: DISCONTINUED | OUTPATIENT
Start: 2021-04-15 | End: 2021-04-15 | Stop reason: HOSPADM

## 2021-04-15 RX ADMIN — MICONAZOLE NITRATE: 20 POWDER TOPICAL at 08:26

## 2021-04-15 RX ADMIN — PROPOFOL 40 MCG/KG/MIN: 10 INJECTION, EMULSION INTRAVENOUS at 10:52

## 2021-04-15 RX ADMIN — SODIUM CHLORIDE: 9 INJECTION, SOLUTION INTRAVENOUS at 04:33

## 2021-04-15 RX ADMIN — CARBAMAZEPINE 400 MG: 100 SUSPENSION ORAL at 08:33

## 2021-04-15 RX ADMIN — PROPOFOL 45 MCG/KG/MIN: 10 INJECTION, EMULSION INTRAVENOUS at 13:18

## 2021-04-15 RX ADMIN — OXYCODONE HYDROCHLORIDE AND ACETAMINOPHEN 500 MG: 500 TABLET ORAL at 08:19

## 2021-04-15 RX ADMIN — EPOPROSTENOL 20 NG/KG/MIN: 1.5 INJECTION, POWDER, LYOPHILIZED, FOR SOLUTION INTRAVENOUS at 17:10

## 2021-04-15 RX ADMIN — INSULIN GLARGINE 5 UNITS: 100 INJECTION, SOLUTION SUBCUTANEOUS at 14:11

## 2021-04-15 RX ADMIN — POLYETHYLENE GLYCOL 3350 17 G: 17 POWDER, FOR SOLUTION ORAL at 08:19

## 2021-04-15 RX ADMIN — EPOPROSTENOL 20 NG/KG/MIN: 1.5 INJECTION, POWDER, LYOPHILIZED, FOR SOLUTION INTRAVENOUS at 10:42

## 2021-04-15 RX ADMIN — CISATRACURIUM BESYLATE 3 MCG/KG/MIN: 10 INJECTION, SOLUTION INTRAVENOUS at 04:33

## 2021-04-15 RX ADMIN — Medication 100 MCG/HR: at 17:42

## 2021-04-15 RX ADMIN — SERTRALINE HYDROCHLORIDE 100 MG: 100 TABLET ORAL at 08:19

## 2021-04-15 RX ADMIN — PROPOFOL 55 MCG/KG/MIN: 10 INJECTION, EMULSION INTRAVENOUS at 18:50

## 2021-04-15 RX ADMIN — Medication 2 PACKET: at 17:29

## 2021-04-15 RX ADMIN — CEFAZOLIN SODIUM 1 G: 1 INJECTION, SOLUTION INTRAVENOUS at 06:44

## 2021-04-15 RX ADMIN — EPOPROSTENOL 20 NG/KG/MIN: 1.5 INJECTION, POWDER, LYOPHILIZED, FOR SOLUTION INTRAVENOUS at 03:29

## 2021-04-15 RX ADMIN — PROPOFOL 35 MCG/KG/MIN: 10 INJECTION, EMULSION INTRAVENOUS at 08:06

## 2021-04-15 RX ADMIN — DOCUSATE SODIUM 100 MG: 50 LIQUID ORAL at 08:35

## 2021-04-15 RX ADMIN — CARBAMAZEPINE 200 MG: 100 SUSPENSION ORAL at 12:04

## 2021-04-15 RX ADMIN — SENNOSIDES 10 ML: 8.8 LIQUID ORAL at 08:19

## 2021-04-15 RX ADMIN — PROPOFOL 55 MCG/KG/MIN: 10 INJECTION, EMULSION INTRAVENOUS at 17:22

## 2021-04-15 RX ADMIN — PROPOFOL 35 MCG/KG/MIN: 10 INJECTION, EMULSION INTRAVENOUS at 02:27

## 2021-04-15 RX ADMIN — CEFAZOLIN SODIUM 1 G: 1 INJECTION, SOLUTION INTRAVENOUS at 14:11

## 2021-04-15 RX ADMIN — PROPOFOL 55 MCG/KG/MIN: 10 INJECTION, EMULSION INTRAVENOUS at 14:47

## 2021-04-15 RX ADMIN — PANTOPRAZOLE SODIUM 40 MG: 40 TABLET, DELAYED RELEASE ORAL at 08:33

## 2021-04-15 RX ADMIN — ENOXAPARIN SODIUM 80 MG: 80 INJECTION SUBCUTANEOUS at 08:19

## 2021-04-15 RX ADMIN — AZELASTINE HYDROCHLORIDE 1 SPRAY: 137 SPRAY, METERED NASAL at 08:27

## 2021-04-15 RX ADMIN — DEXAMETHASONE SODIUM PHOSPHATE 6 MG: 4 INJECTION, SOLUTION INTRAMUSCULAR; INTRAVENOUS at 08:18

## 2021-04-15 RX ADMIN — PROPOFOL 35 MCG/KG/MIN: 10 INJECTION, EMULSION INTRAVENOUS at 05:18

## 2021-04-15 ASSESSMENT — ACTIVITIES OF DAILY LIVING (ADL)
ADLS_ACUITY_SCORE: 22

## 2021-04-15 NOTE — DISCHARGE SUMMARY
[facility]  Hospitalist Discharge Summary       Date of Admission:  4/8/2021  Date of Discharge:  4/15/2021     Discharging Provider: Jerrod Smyth MD    Discharge Diagnoses   Acute hypoxic Respiratory Failure and ARDS 2/2 COVID-19 Pneumonia   E.Coli UTI   Seizure Disorder  Asthma  Morbid Obesity. Body mass index is 53.72 kg/m .  Hx of cushing syndrome  Developmental delay   Non-Severe malnutrition     Patient is being transferred to [facility] ICU for ongoing cares    Hospital Course     Brissa Barlow is a 55 year old female patient with past medical history of developmental delay, seizure disorder, Cushing syndrome, asthma, hypertension, hyperlipidemia, obesity, history of small bowel obstruction, was admitted on 4/8/2021 for dysuria and cough. On arrival to emergency room, her vital signs showed temperature 100.1, pulse 95, blood pressure 120/70, oxygen saturation 85% on room air.  Laboratory work-up showed normal CBC and BMP. Troponin less than 0.015.  Lactic acid 0.9.  Urinalysis showed 6 WBCs/hpf.  COVID-19 PCR positive. She was started on oxygen supplement and dexamethasone. She was also given oral Macrobid.  She was admitted to the hospital for further management.  The patient tested positive for Covid on 4/8/2021 with symptoms starting on 4/3/2021.  The patient did have a CT chest which showed bilateral multifocal infiltrates consistent with Covid pneumonia.      Patient was admitted to the hospital. She received IV dexamethasone as well as remdesivir.  The patient had increasing oxygen requirements and on 4/11/2021 the patient was placed on BiPAP for hypoxia.  The patient remained hypoxic and so was transferred to the ICU on 4/11/2021 and intubated. At this time, patient has completed the course of Remdesivir and continues on Decadron. She received Lovenox for VTE prophylaxis. She also received course of cefazolin for the E.Coli treatment.  She appeared to have signs of  ARDS and required paralytics and proning, although currently back to supine position and paralytics have been stopped.  She will be transferred to [facility], which [facility], for ongoing cares of her COVID-19 infection.  This has been discussed with the patient's family including her parents and sister who are in agreement with the transfer.  Case was signed out to Dr. Trevino at [facility] who accepted the patient in transfer.    Consultations This Hospital Stay   CARE MANAGEMENT / SOCIAL WORK IP CONSULT  NEUROLOGY IP CONSULT  NEUROLOGY IP CONSULT  VASCULAR ACCESS ADULT IP CONSULT  WOUND OSTOMY CONTINENCE NURSE  IP CONSULT  VASCULAR ACCESS ADULT IP CONSULT  NUTRITION SERVICES ADULT IP CONSULT  PHARMACY IP CONSULT  NUTRITION SERVICES ADULT IP CONSULT  SPIRITUAL HEALTH SERVICES IP CONSULT    Code Status   Full Code    Time Spent on this Encounter   I, Jerrod Smyth MD, personally saw the patient today and spent greater than 30 minutes discharging this patient.       Jerrod Smyth MD  [facility]    Malnutrition:  - Level of malnutrition: Non-Severe   - Based on: reduced intake, mild (or greater) subcutaneous fat loss    ______________________________________________________________________    Physical Exam   Vital Signs: Temp: 98.6  F (37  C) Temp src: Axillary   Pulse: 99   Resp: 22 SpO2: 92 % O2 Device: Mechanical Ventilator    Weight: 343 lbs 0 oz  Patient is intubated and sedated     Primary Care Physician   Cara Marks    Discharge Disposition   Transferred to [facility] ICU   Condition at discharge: Stable    Discharge Orders   No discharge procedures on file.  Discharge Medications     Allergies   Allergies   Allergen Reactions     Excedrin Back & [Acetaminophen-Aspirin Buffered]      Flu Virus Vaccine      Patient states she is allergic to the vaccine.  Got a rash all over body many years ago after receiving  injection.     Hydrochlorothiazide      dehydration     Penicillins      Tetracycline      any cyclines     Azithromycin Rash     Contrast Dye Hives     Patient states that they get hives. Patient given isovue 370 on 4/8/21 and premedicated with benadryl 25mg and tolerated it.      Erythromycin Rash       Significant Results and Procedures   Results for orders placed or performed during the hospital encounter of 04/08/21   XR Chest Port 1 View    Narrative    EXAM: XR CHEST PORT 1 VW  LOCATION: [facility]  DATE/TIME: 4/8/2021 8:25 PM    INDICATION: Hypoxia.  COMPARISON: None.      Impression    IMPRESSION: Shallow inspiration. There are mild interstitial infiltrates in the central lungs. No pleural effusions.   CT Chest Pulmonary Embolism w Contrast    Narrative    EXAM: CT CHEST PULMONARY EMBOLISM W CONTRAST  LOCATION: [facility]  DATE/TIME: 4/8/2021 10:49 PM    INDICATION: PE suspected, low/intermediate prob, positive d-dimer  COMPARISON: None.  TECHNIQUE: CT chest pulmonary angiogram during arterial phase injection of IV contrast. Multiplanar reformats and MIP reconstructions were performed. Dose reduction techniques were used.   CONTRAST: 77mL Isovue-370    FINDINGS:  ANGIOGRAM CHEST: Pulmonary arteries are normal caliber and negative for pulmonary emboli. Thoracic aorta is negative for dissection. The heart size is normal.    LUNGS AND PLEURA: There are bilateral multifocal groundglass infiltrates and consolidations. They are worst in the upper lobes. No pneumothorax or pleural effusion.    MEDIASTINUM/AXILLAE: Few borderline mildly enlarged hilar or axillary lymph nodes.    CORONARY ARTERY CALCIFICATION: None.    UPPER ABDOMEN: Mild splenomegaly.    MUSCULOSKELETAL: Mild degenerative disease in the spine. Old mild upper thoracic vertebral body compression fracture.      Impression    IMPRESSION:  1.  There is no pulmonary embolus, aortic aneurysm or dissection.  2.  Bilateral  multifocal pulmonary infiltrates consistent with pneumonia. COVID-19 pneumonia could have this appearance.  3.  Mild splenomegaly.  4.  Few borderline and mildly enlarged hilar and axillary lymph nodes.   XR Chest Port 1 View    Narrative    EXAM: XR CHEST PORT 1 VW  LOCATION: [facility]  DATE/TIME: 4/11/2021 7:59 PM    INDICATION: Tube placement.  COMPARISON: None.      Impression    IMPRESSION: Nasogastric tube makes a rather abrupt kink in the proximal body with the tube tip in the region of the gastroduodenal junction. Bilateral pulmonary infiltrates.    XR Abdomen Port 1 View    Narrative    EXAM: XR ABDOMEN PORT 1 VW  LOCATION: [facility]  DATE/TIME: 4/11/2021 7:58 PM    INDICATION: Check line placement.  COMPARISON: Abdomen film 03/12/2021, chest CTA 04/08/2020      Impression    IMPRESSION: Film includes the mid chest and upper abdomen.     Endotracheal tube is in 3 cm above the nina. There is a right upper extremity PICC line catheter with its tip in the right atrium. There is an NG tube in good position with its tip near the antrum.    There are moderate, scattered patchy airspace opacities consistent with a COVID pneumonia.    Visualized bowel gas pattern is unremarkable.   XR Chest Port 1 View    Narrative    EXAM: XR CHEST PORT 1 VW  LOCATION: [facility]  DATE/TIME: 4/11/2021 11:37 PM    INDICATION: Confirm endotracheal tube placement  COMPARISON: 04/11/2021      Impression    IMPRESSION: Endotracheal tube approximately 5 cm above the nina. Nasogastric tube in the stomach distally. Right upper PICC in the SVC. Normal extensive opacity throughout both lungs progressed compared to 04/08/2021. Normal heart size. No pleural   effusion or pneumothorax.   XR Abdomen Port 1 View    Narrative    EXAM: XR ABDOMEN PORT 1 VW  LOCATION: [facility]  DATE/TIME: 4/12/2021 6:24 PM    INDICATION: Feeding tube placement  COMPARISON: 03/12/2021      Impression     IMPRESSION: Feeding tube with tip projecting over proximal jejunum. No free air. Nonobstructive bowel gas pattern.    XR Chest Port 1 View    Narrative    CHEST ONE VIEW PORTABLE  April 14, 2021 12:43 PM     HISTORY: Worsening hypoxia.    COMPARISON: 4/11/2021.      Impression    IMPRESSION: Tip of the endotracheal tube is 3.6 cm above the nina.  Feeding tube extends below the left hemidiaphragm. There are worsening  airspace opacities in both upper lobes with relative sparing of the  lower lobes concerning for progressive infectious process. No  significant pleural effusion or pneumothorax.    MECHE HALEY MD

## 2021-04-15 NOTE — PROGRESS NOTES
[facility]  Hospitalist Progress Note    Name: Brissa Barlow    MRN: [id number]  Provider: Jerrod Smyth MD  Date of Service: 04/15/2021    Summary of Stay:  Brissa Barlow is a 55 year old female patient with past medical history of developmental delay, seizure disorder, Cushing syndrome, asthma, hypertension, hyperlipidemia, obesity, history of small bowel obstruction, was admitted on 4/8/2021 for dysuria and cough.  On arrival to emergency room, her vital signs showed temperature 100.1, pulse 95, blood pressure 120/70, oxygen saturation 85% on room air.  Laboratory work-up showed normal CBC and BMP. Troponin less than 0.015.  Lactic acid 0.9.  Urinalysis showed 6 WBCs/hpf.  COVID-19 PCR positive   She was started on oxygen supplement and dexamethasone. She was also given oral Macrobid.  She was admitted to the hospital for further management.  The patient tested positive for Covid on 4/8/2021 with symptoms starting on 4/3/2021.  The patient did have a CT chest which showed bilateral multifocal infiltrates consistent with Covid pneumonia.  The patient was started on IV dexamethasone as well as remdesivir.  The patient had increasing oxygen requirements and on 4/11/2021 the patient was placed on BiPAP for hypoxia.  The patient remained hypoxic and so was transferred to the ICU on 4/11/2021 and intubated.    Problem List:   1.  Acute hypoxic Respiratory Failure and ARDS 2/2 COVID-19 Pneumonia   - Ventilator management per Intensivist     2.  Covid-19 Pneumonia  - Continue IV Dexamethasone - Day #7  - Completed course of IV Remdesivir   - Added famotidine  - Continue Lovenox 0.5 mg/kg BID  - Combivent QID  - Trend inflammatory markers     3.  Urinary Tract Infection. Was on macrobid and then switched to cefazolin. Urine culture on 4/8/2021 positive for pan-sensitive E. Coli     4.  Seizure Disorder  - On carbamazepine     5.  Asthma  - Combivent QID     Chronic Medical  Problems:  Developmental Delay  Morbid Obesity BMI 56.4  Hx of Cushings Syndrome  Hypertension  Hyperlipidemia    DVT Prophylaxis: Enoxaparin (Lovenox) SQ  Code Status: Full Code  Diet: NPO for Medical/Clinical Reasons Except for: No Exceptions  Adult Formula Drip Feeding: Continuous Vital High Protein; Nasojejunal; Goal Rate: 30; mL/hr; Medication - Feeding Tube Flush Frequency: At least 15-30 mL water before and after medication administration and with tube clogging; Continue at goal ra...    Grajeda Catheter: in place, indication: Strict 1-2 Hour I&O  Disposition: Expected discharge pending clinical course.   Family updated today: Per intensivist      Interval History   Patient seen and examined.  Patient intubated and sedated      -Data reviewed today: I personally reviewed all new labs and imaging results over the last 24 hours.     Physical Exam   Temp: 98.2  F (36.8  C) Temp src: Axillary   Pulse: 79   Resp: 21 SpO2: 95 % O2 Device: Mechanical Ventilator    Vitals:    04/09/21 0033 04/13/21 0600   Weight: (!) 163.3 kg (360 lb 1.6 oz) (!) 155.6 kg (343 lb)     Vital Signs with Ranges  Temp:  [87.1  F (30.6  C)-98.7  F (37.1  C)] 98.2  F (36.8  C)  Pulse:  [75-98] 79  Resp:  [21-35] 21  MAP:  [61 mmHg-116 mmHg] 66 mmHg  Arterial Line BP: ()/(42-81) 91/53  FiO2 (%):  [60 %-90 %] 80 %  SpO2:  [90 %-99 %] 95 %  I/O last 3 completed shifts:  In: 3497.64 [I.V.:1507.64; NG/GT:310]  Out: 3225 [Urine:3225]    GENERAL: Intubated and sedated   CARDIOVASCULAR: Regular rate and rhythm without loud murmurs   PULMONARY: Coarse sounds bilaterally   GASTROINTESTINAL: Soft, non-tender, non-distended. Bowel sounds normoactive.   EXTREMITIES: No cyanosis or clubbing. No pitting edema.  NEUROLOGICAL: sedated   DERMATOLOGICAL: No obvious rashes or cyanosis     Medications     cisatracurium (NIMBEX) infusion ADULT 3 mcg/kg/min (04/15/21 1318)     dextrose       epoprostenol (VELETRI) 20 mcg/mL in sterile water inhalation  solution 20 ng/kg/min (04/15/21 1042)     fentaNYL 75 mcg/hr (04/15/21 0924)     - MEDICATION INSTRUCTIONS -       norepinephrine Stopped (04/12/21 0237)     propofol (DIPRIVAN) infusion 45 mcg/kg/min (04/15/21 1318)     sodium chloride 10 mL/hr at 04/15/21 0800       artificial tears   Both Eyes Q8H     azelastine  1 spray Both Nostrils BID     carBAMazepine  200 mg Per Feeding Tube Daily     carBAMazepine  400 mg Per Feeding Tube BID     ceFAZolin  1 g Intravenous Q8H     dexamethasone  6 mg Intravenous Daily     sennosides  10 mL Per Feeding Tube BID    And     docusate  100 mg Per Feeding Tube BID     enoxaparin ANTICOAGULANT  0.5 mg/kg Subcutaneous Q12H     insulin glargine  5 Units Subcutaneous Q24H     pantoprazole  40 mg Per Feeding Tube Daily     polyethylene glycol  17 g Oral Daily     protein modular  2 packet Per Feeding Tube TID     sertraline  100 mg Per Feeding Tube Daily     sodium chloride (PF)  3 mL Intracatheter Q8H     vitamin C  500 mg Per Feeding Tube Daily     Data     Laboratory:  Recent Labs   Lab 04/15/21  0521 04/14/21  0543 04/13/21  0548   WBC 7.3 8.6 6.4   HGB 10.6* 11.9 12.4   HCT 32.3* 36.8 38.7   MCV 90 90 90    183 141*     Recent Labs   Lab 04/15/21  0521 04/14/21  0543 04/13/21  0548    136 138   POTASSIUM 3.4 3.9 3.5   CHLORIDE 104 104 105   CO2 34* 32 27   ANIONGAP 1* <1* 6   * 186* 122*   BUN 11 10 8   CR 0.41* 0.45* 0.43*   GFRESTIMATED >90 >90 >90   GFRESTBLACK >90 >90 >90   CESAR 7.8* 8.2* 8.0*     Recent Labs   Lab 04/15/21  0521 04/14/21  0543 04/13/21  0548   .0* 116.0* 125.0*     Recent Labs   Lab 04/15/21  0521 04/14/21  0543 04/12/21  0435 04/10/21  0715   AST 89* 50* 61* 83*   ALT 65* 53* 76* 98*   ALKPHOS 123 117  --  110   BILITOTAL 0.6 0.6  --  0.3     Recent Labs   Lab 04/08/21  2336 04/08/21  2331 04/08/21 2034   CULT No growth after 5 days No growth after 5 days >100,000 colonies/mL  Escherichia coli  *       Imaging:  No results found  for this or any previous visit (from the past 24 hour(s)).      Jerrod Smyth MD  [facility] Hospitalist  [address] [place] [address]  04/15/2021

## 2021-04-15 NOTE — PLAN OF CARE
ICU End of Shift Summary.  For vital signs and complete assessments, please see documentation flowsheets.      Pertinent assessments:  VSS. Tolerating vent and sedation. Vent settings unchanged.  LS course. TF running at 30 ml/hr with 30 ml q 4 FWF.  Grajeda catheter in place draining dark tea colored urine.  Redness and excoriation to abd and breast folds.     Major Shift Events:   Prone @ 1315 , supine at 1530. Blood gas drawn x 2 - see labs.   Nimbex shut off - pt tolerating  well.   TF decreased to 30 ml/hr per dietician.   Transferred to [facility].   Plan (Upcoming Events):  Transfer to [facility]. Continue POC.   Discharge/Transfer Needs:   TBD.

## 2021-04-15 NOTE — PROGRESS NOTES
[facility] Critical Care Progress Note  04/15/2021    Admission day: 4/8/2021  Hospital Day# 7       Problem List:   Active Problems:    Acute UTI    Acute respiratory failure with hypoxia (H)    Pneumonia due to 2019 novel coronavirus           Summary/Hospital Course:   Ms. Barlow is a 55 year old woman with a history of developmental delay, seizure disorder, cushing syndrome, Asthma, HTN, HLD, obesity, and prior small bowel obstruction who presented on 4/8 due to cough and dyspuria. She was found to be hypoxia, and COVID-19 PCR was positive. She was started on dexamethasone and oxygen and admitted. Unfortunately, she required transfer to the ICU on 4/11 due to worsening oxygenation, and ultimately required intubation that afternoon. Subsequently, she was started on paralytics and proned on 4/12 due to worsening hypoxia.     Assessment and plan :     Brissa Barlow is a 55 year old female admitted on 4/8/2021 for acute hypoxic respiratory failure and ARDS secondary to COVID-19 pneumonia, in the setting of a history of developmental delay, seizure disorder, cushing syndrome, Asthma, HTN, HLD, obesity, and prior small bowel obstruction.     I have personally reviewed the daily labs, imaging studies, cultures and discussed the case with referring physician and consulting physicians.     My assessment and plan by system for this patient is as follows:    Neurology/Psychiatry:   1. History of developmental delay  2. Seizure disorder  3. ICU sedation   4. Paralysis for refractory hypoxemia   Plan  - Will trial stopping cisatracurium (started 4/12) today, and follow ABG. P/F is stable  - Fentanyl and propofol for sedation  - Tegretol 200 mg daily at noon, and 400 mg qAM and QPM  - home sertraline 100 mg daily     Cardiovascular:   1. History of Hypertension  2. History of Hyperlipidemia   3. Shock state: sepsis vs sedation effect  Plan  - currently off pressors  - holding home lisinopril 20 mg daily      Pulmonary/Ventilator Management:   1. Acute hypoxic respiratory failure  2. ARDS secondary to COVID-19 pneumonia: P/F 92 this AM while supine, improved from yesterday (73). Plat pressure 27, peaks 30.   3. Asthma at baseline   Plan  - lung protective ventilation  - Stop paralysis today (stopped at 930) and follow ABG at 1130. If not worsening, will not restart.   - If follow up P/F still <150, will re-prone later today, however.   - Continue full strength veletri     GI/Nutrition :   1. Morbid obesity, BMI 56  2. Nutrition status  3. Stress ulcer PPx  4. Elevated LFTs, improving   5. Constipation   Plan  - tube feeds via NJ  - Pantoprazole 40 mg daily    - follow LFTs intermittently  - Scheduled and PRN bowel meds.     Renal/Fluids/Electrolytes:   1. Hyperchloremia, resolved.    2. Hypokalemia: resolved  Plan  - monitor function and electrolytes as needed with replacement per ICU protocols.  - monitor low UOP for now, if continues to be low, can consider additional bolus.   - generally avoid nephrotoxic agents such as NSAID, IV contrast unless specifically required  - adjust medications as needed for renal clearance  - follow I/O's as appropriate.    Infectious Disease:   1. COVID-19 pneumonia, CRP stable today   2. Pan-sensitive E coli UTI: received nitrofurantoin from 4/8 to 4/11.   Plan  - remdesivir 100 mg daily, completed course on 4/13  - dexamethasone 6 mg daily started 4/8, day 8  - Tocilizumab x1 given on 4/12   - cefazolin 1 g q8h from 4/11 to 4/15.   - trend CRP    Endocrine:   1. History of Cushing's syndrome   2. Stress induced hyperglycemia  Plan  - ICU insulin protocol, goal sugar <180  - monitor     Hematology/Oncology:   1. Thrombocytopenia, resolved  2. Hypercoagulable state of COVID-19  3. Normocytic anemia, no signs / symptoms of active blood loss  Plan  - monitor CBC  - enoxaparin 0.5 mg/kg BID    MSK/Rheum:  1. No acute issues  Plan  - PT/OT when appropriate.     IV/Access:   1. Venous  access - PICC  2. Arterial access - right radial arterial line  Plan  - central access required and necessary    ICU Prophylaxis:   1. DVT: LMWH  2. VAP: HOB 30 degrees, chlorhexidine rinse  3. Stress Ulcer: PPI  4. Restraints: Nonviolent soft two point restraints required and necessary for patient safety and continued cares and good effect as patient continues to pull at necessary lines, tubes despite education and distraction. Will readdress daily.   5. Wound care: per unit routine   6. Feeding: NPO  7. Family Update: spoke with father and mother (coguardians) on the phone for updates, as well as sister (RN). They remain okay with transfer to [facility] if possible.   8. Disposition: ICU    Key goals for next 24 hours:   1. Stop paralytic, follow ABG  2. May need to be re-proned        Interim History/Overnight Events:   No acute events. Supine overnight, paralyzed. FiO2 weaned to 70%. This AM, sedated and paralyzed, not responding to commands.        Key Medications:       artificial tears   Both Eyes Q8H     azelastine  1 spray Both Nostrils BID     carBAMazepine  200 mg Per Feeding Tube Daily     carBAMazepine  400 mg Per Feeding Tube BID     ceFAZolin  1 g Intravenous Q8H     dexamethasone  6 mg Intravenous Daily     sennosides  10 mL Per Feeding Tube BID    And     docusate  100 mg Per Feeding Tube BID     enoxaparin ANTICOAGULANT  0.5 mg/kg Subcutaneous Q12H     pantoprazole  40 mg Per Feeding Tube Daily     polyethylene glycol  17 g Oral Daily     sertraline  100 mg Per Feeding Tube Daily     sodium chloride (PF)  3 mL Intracatheter Q8H     vitamin C  500 mg Per Feeding Tube Daily       cisatracurium (NIMBEX) infusion ADULT 3 mcg/kg/min (04/15/21 0600)     dextrose       epoprostenol (VELETRI) 20 mcg/mL in sterile water inhalation solution 20 ng/kg/min (04/15/21 0329)     fentaNYL 75 mcg/hr (04/15/21 0600)     - MEDICATION INSTRUCTIONS -       norepinephrine Stopped (04/12/21 0237)     propofol (DIPRIVAN)  infusion 35 mcg/kg/min (04/15/21 0806)     sodium chloride 10 mL/hr at 04/15/21 0600               Physical Examination:   Temp:  [98.3  F (36.8  C)-98.7  F (37.1  C)] 98.3  F (36.8  C)  Pulse:  [] 90  Resp:  [17-22] 22  MAP:  [67 mmHg-116 mmHg] 69 mmHg  Arterial Line BP: (104-183)/(44-83) 109/53  FiO2 (%):  [70 %-100 %] 70 %  SpO2:  [90 %-100 %] 98 %    Intake/Output Summary (Last 24 hours) at 4/12/2021 0845  Last data filed at 4/12/2021 0800  Gross per 24 hour   Intake 1131.38 ml   Output 2575 ml   Net -1443.62 ml     Wt Readings from Last 4 Encounters:   04/13/21 (!) 155.6 kg (343 lb)   03/11/21 (!) 167 kg (368 lb 3.2 oz)   10/01/20 (!) 168.5 kg (371 lb 8 oz)   07/24/19 (!) 164.5 kg (362 lb 9.6 oz)     Arterial Line BP: (104-183)/(44-83) 109/53  MAP:  [67 mmHg-116 mmHg] 69 mmHg  Ventilation Mode: CMV/AC  (Continuous Mandatory Ventilation/ Assist Control)  FiO2 (%): 70 %  Rate Set (breaths/minute): 22 breaths/min  Tidal Volume Set (mL): 400 mL  PEEP (cm H2O): 12 cmH2O  Oxygen Concentration (%): 70 %  Resp: 22     GEN: no acute distress, sedated.   HEENT: head ncat, sclera anicteric.   PULM: unlabored synchronous with vent, crackles bilaterally with some inspiratory squeak   CV/COR: RRR, normal S1 and S2. No gallop, rub, nor murmur  ABD: soft, non-tender, non-distended. Obese. Hypoactive bowel sounds, no mass.   MSK/EXT:  No LE edema. WWP.  NEURO: sedated, paralyzed.   SKIN: no obvious rash  LINES: clean, dry intact         Data:   All data and imaging reviewed in EMR     ROUTINE ICU LABS (Last four results)  CMP  Recent Labs   Lab 04/15/21  0521 04/14/21  0543 04/13/21  0548 04/12/21  0800 04/12/21  0435 04/11/21  2120 04/11/21  2120 04/10/21  0715 04/10/21  0715    136 138  --  140  --   --    < > 141   POTASSIUM 3.4 3.9 3.5 3.9 4.2   < > 3.3*   < > 4.0   CHLORIDE 104 104 105  --  110*  --   --    < > 109   CO2 34* 32 27  --  26  --   --    < > 28   ANIONGAP 1* <1* 6  --  4  --   --    < > 4   GLC  158* 186* 122*  --  112*  --   --    < > 102*   BUN 11 10 8  --  6*  --   --    < > 6*   CR 0.41* 0.45* 0.43*  --  0.45*  --   --    < > 0.65   GFRESTIMATED >90 >90 >90  --  >90  --   --    < > >90   GFRESTBLACK >90 >90 >90  --  >90  --   --    < > >90   CESAR 7.8* 8.2* 8.0*  --  8.2*  --   --    < > 7.6*   MAG  --   --   --   --   --   --  2.1  --   --    PHOS  --  3.2 2.5 3.0  --   --   --   --   --    PROTTOTAL 5.7* 6.1*  --   --   --   --   --   --  6.1*   ALBUMIN 2.1* 2.3*  --   --   --   --   --   --  2.6*   BILITOTAL 0.6 0.6  --   --   --   --   --   --  0.3   ALKPHOS 123 117  --   --   --   --   --   --  110   AST 89* 50*  --   --  61*  --   --   --  83*   ALT 65* 53*  --   --  76*  --   --   --  98*    < > = values in this interval not displayed.     CBC  Recent Labs   Lab 04/15/21  0521 04/14/21  0543 04/13/21  0548 04/12/21  0435   WBC 7.3 8.6 6.4 5.4   RBC 3.61* 4.10 4.30 4.34   HGB 10.6* 11.9 12.4 12.8   HCT 32.3* 36.8 38.7 39.4   MCV 90 90 90 91   MCH 29.4 29.0 28.8 29.5   MCHC 32.8 32.3 32.0 32.5   RDW 13.7 13.5 13.6 13.5    183 141* 133*     INRNo lab results found in last 7 days.  Arterial Blood Gas  Recent Labs   Lab 04/15/21  0521 04/14/21  1732 04/14/21  1208 04/14/21  0908   PH 7.40 7.39 7.35 7.32*   PCO2 56* 53* 56* 58*   PO2 65* 67* 93 66*   HCO3 34* 33* 31* 30*   O2PER 70% 80% 100% VENT 100%       All cultures:  Recent Labs   Lab 04/08/21 2336 04/08/21 2331 04/08/21 2034   CULT No growth after 5 days No growth after 5 days >100,000 colonies/mL  Escherichia coli  *     Recent Results (from the past 24 hour(s))   XR Chest Port 1 View    Narrative    CHEST ONE VIEW PORTABLE  April 14, 2021 12:43 PM     HISTORY: Worsening hypoxia.    COMPARISON: 4/11/2021.      Impression    IMPRESSION: Tip of the endotracheal tube is 3.6 cm above the nina.  Feeding tube extends below the left hemidiaphragm. There are worsening  airspace opacities in both upper lobes with relative sparing of the  lower  lobes concerning for progressive infectious process. No  significant pleural effusion or pneumothorax.    MECHE HALEY MD                 Billing: This patient is critically ill: Yes. Total critical care time today 44 min, excluding procedures and teaching.      Carlos Gomez MD    Department of Medicine, Division of Pulmonary, Allergy, Critical Care, and Sleep Medicine

## 2021-04-15 NOTE — PLAN OF CARE
Right wrist and Left wrist restraints initiated on patient on 4/15/2021 at 11:13 AM    Clinical Justification: Pulling lines, pulling tubes, and pulling equipment  Less Restrictive Alternative: Repositioning, Re-evaluate equipment, Pain management  Attending Physician Notified: MD ordered restraint(Dr. Rea),     Order received: Yes     Family Notification: Parent(Guardian)   Criteria explained to Patient  Patient's Response: No evidence of learning  Restraint care Plan initiated: Yes    Ally Baumann RN

## 2021-04-15 NOTE — PROGRESS NOTES
CLINICAL NUTRITION SERVICES - REASSESSMENT NOTE    RECOMMENDATIONS FOR MD/PROVIDER TO ORDER: recommend decrease/ discontinue propofol as able d/t TG   Recommendations Ordered by Registered Dietitian (RD):   Decreased  TF regimen as ordered to adjust for current propofol   Future/Additional Recommendations:         Malnutrition:previously noted:  % Weight Loss: None noted - full extent may be masked by edema  % Intake:</= 50% for >/= 5 days (severe malnutrition)  Subcutaneous Fat Loss: mild in one area, as outlined below  Muscle Loss: mild (or greater, difficult assessment with body habitus), as outlined below  Fluid Retention: trace to mild edema     Malnutrition Diagnosis: Non-Severe malnutrition  In Context of:  Acute illness or injury, with high risk for further decline     EVALUATION OF PROGRESS TOWARD GOALS/NEW FINDINGS   Progress towards goals will be monitored and evaluated per protocol and Practice Guidelines    Diet order: Orders Placed This Encounter      NPO for Medical/Clinical Reasons Except for: No Exceptions       PROCEDURES WITH NUTRITIONAL IMPLICATIONS  4/11 intubated, OG placed  4/12: nasoenteric FT placed  CONTINUOUS, Starting Wed 4/14/21 at 1600, Until Specified  Adult Formula: Vital High Protein  Route: Nasojejunal  Goal Rate: 70  Goal Units: mL/hr  Medication - Feeding Tube Flush Frequency: At least 15-30 mL water before and after medication administration and with tube clogging  Advancement comment: Continue at goal rate. updated formula to reflect [facility] formulary transition from Pept VHP to Vital HP    Patient remains NPO and on TF with good tolerance, goal rate reached      Tolerance/GRV:no issues noted  Total EN volume received:1380 ml p 24 h  Total Enteral Provisions at goal: 1680 mL provides 1680 kcal, 156 g protein, 1411 ml free H2O, 131 g CHO and 7 g Fiber daily.  Propofol providing 1035 kcal from fat per current rate 39.2 ml last noted @1200  Pt exceeding kcal  needs      BM:4/14/21    Fluid: 1+ edema generalized  per nursing 4/15/21    Weight: 155 kg     I/O last 3 completed shifts:    In: 3497.64 [I.V.:1507.64; NG/GT:310]    Out: 3225 [Urine:3225]  04/09/21 (!) 163.3 kg (360 lb 1.6 oz)   03/11/21 (!) 167 kg (368 lb 3.2 oz)         ASSESSED NUTRITION NEEDS (PER APPROVED PRACTICE GUIDELINES, Dosing weight: 61.4 kg IBW for energy and protein):  Estimated Energy Needs: 1106-3479 kcals (22-25 kcal/kg) vs 2484 (PSU 2003B using actual weight)   Justification: vented, obese  Estimated Protein Needs: >/=154 grams protein (>/= 2.5 g per kg)  Justification: hypercatabolism with critical illness   Estimated Fluid Needs: per MD    Labs:    Electrolytes  Potassium (mmol/L)   Date Value   04/15/2021 3.4   04/14/2021 3.9   04/13/2021 3.5     Phosphorus (mg/dL)   Date Value   04/14/2021 3.2   04/13/2021 2.5   04/12/2021 3.0        Blood Glucose  Glucose (mg/dL)   Date Value   04/15/2021 158 (H)   04/14/2021 186 (H)   04/13/2021 122 (H)   04/12/2021 112 (H)   04/11/2021 102 (H)     Hemoglobin A1C (%)   Date Value   11/25/2019 5.3   07/27/2017 5.3   08/23/2016 5.3   01/29/2016 6.0   07/27/2015 5.8        Inflammatory Markers  CRP Inflammation (mg/L)   Date Value   04/15/2021 111.0 (H)   04/14/2021 116.0 (H)   04/13/2021 125.0 (H)     WBC (10e9/L)   Date Value   04/15/2021 7.3   04/14/2021 8.6   04/13/2021 6.4     Albumin (g/dL)   Date Value   04/15/2021 2.1 (L)   04/14/2021 2.3 (L)   04/10/2021 2.6 (L)        Magnesium (mg/dL)   Date Value   04/11/2021 2.1   03/13/2021 2.2     Sodium (mmol/L)   Date Value   04/15/2021 139   04/14/2021 136   04/13/2021 138        Renal  Urea Nitrogen (mg/dL)   Date Value   04/15/2021 11   04/14/2021 10   04/13/2021 8     Creatinine (mg/dL)   Date Value   04/15/2021 0.41 (L)   04/14/2021 0.45 (L)   04/13/2021 0.43 (L)         Additional  Triglycerides (mg/dL)   Date Value   04/14/2021 236 (H)   09/14/2020 164 (H)   07/24/2019 220 (H)     Ketones Urine  (mg/dL)   Date Value   04/08/2021 Negative        Meds:    artificial tears   Both Eyes Q8H     azelastine  1 spray Both Nostrils BID     carBAMazepine  200 mg Per Feeding Tube Daily     carBAMazepine  400 mg Per Feeding Tube BID     ceFAZolin  1 g Intravenous Q8H     dexamethasone  6 mg Intravenous Daily     sennosides  10 mL Per Feeding Tube BID    And     docusate  100 mg Per Feeding Tube BID     enoxaparin ANTICOAGULANT  0.5 mg/kg Subcutaneous Q12H     insulin glargine  5 Units Subcutaneous Q24H     pantoprazole  40 mg Per Feeding Tube Daily     polyethylene glycol  17 g Oral Daily     sertraline  100 mg Per Feeding Tube Daily     sodium chloride (PF)  3 mL Intracatheter Q8H     vitamin C  500 mg Per Feeding Tube Daily        cisatracurium (NIMBEX) infusion ADULT Stopped (04/15/21 0924)     dextrose       epoprostenol (VELETRI) 20 mcg/mL in sterile water inhalation solution 20 ng/kg/min (04/15/21 1042)     fentaNYL 75 mcg/hr (04/15/21 0924)     - MEDICATION INSTRUCTIONS -       norepinephrine Stopped (04/12/21 0237)     propofol (DIPRIVAN) infusion 40 mcg/kg/min (04/15/21 1200)     sodium chloride 10 mL/hr at 04/15/21 0800      acetaminophen, acetaminophen, albuterol, bisacodyl, dextrose, glucose **OR** dextrose **OR** glucagon, fentaNYL, fluticasone, guaiFENesin-dextromethorphan, hydrOXYzine, lidocaine (buffered or not buffered), LORazepam, - MEDICATION INSTRUCTIONS -, miconazole, naloxone **OR** naloxone **OR** naloxone **OR** naloxone, ondansetron **OR** ondansetron, sodium chloride (PF), sodium chloride (PF)         NUTRITION DIAGNOSIS:  Inadequate oral intake related to tenuous respiratory status with BiPAP reliance and now intubated as evidenced by meeting <50% of estimated needs for 5 days     INTERVENTIONS  Recommendations / Nutrition Prescription    IEnteral Frequency:  Continuous    Enteral Regimen: Vital HP, decreased rate for current propofol to 30 mL/hr    Ordered NC prosource 2 pkts 3 x daily      Total Enteral Provisions: 720 mL formula + modulars provides 1080  Kcal (2114 kcal w/ propfol, 153 g protein, 602 ml free H2O, 80 g CHO and 0 g Fiber daily.    Meets > 100% of DRI's -- MVI until goal rate reached    Free Water Flush: 60 mL q4 hours    Total goal rate pending tolerance, kcal from propofol      Implementation  EN Composition, EN Schedule, Multivitamin/Minerals and Feeding Tube Flush: Entered orders to reflect regimen outlined above  Collaboration and Referral of care: Discussed patient during interdisciplinary care rounds this morning     Goals  TF to reach goal rate within 48 hours- Met  TF + kcal from propofol to meet >/=80% of estimated needs during first week of critical illness- kamar Will RDN, [facility],   Pager - 3rd floor/ICU: [phone]  Pager - All other floors: [phone]  Pager - Weekend/holiday: [phone]  Office: [phone]

## 2021-04-15 NOTE — PLAN OF CARE
ICU End of Shift Summary.  For vital signs and complete assessments, please see documentation flowsheets.     Pertinent assessments: Intubated, sedated, and paralyzed; supine overnight. Tele SR. Afebrile. BP stable; hypertensive with repositioning and cares. LS coarse/dim on 70% BiPAP. Good UOP with Grajeda in place. No BM overnight. Propofol, Nimbex, and Fentanyl gtts infusing. RASS -4. Veletri per vent. PICC, arterial lines. Keofeed in place; tolerating TF at goal rate of 70mL/hr.    Major Shift Events: Supine overnight.   Plan (Upcoming Events): Wean O2 as able.   Discharge/Transfer Needs: TB    Bedside Shift Report Completed :   Bedside Safety Check Completed:

## 2021-04-15 NOTE — PROGRESS NOTES
[facility] ICU VENTILATOR RESPIRATORY NOTE  Date of Admission: 4/8/2021  Date of Intubation (most recent): 4/11/2021  Reason for Mechanical Ventilation: Respiratory failure  Number of Days on Mechanical Ventilation: 5  Met Criteria for Pressure Support Trial: no  Reason for No Pressure Support Trial: PEEP 12, FiO2 60%  Significant Events Today: Prone at 13:00.  She continues on Veletri at 20 ng/kg/min.  Breath sounds are coarse bilaterally.  Copious oral secretions, suctiong thick cloudy white secretions from ETT.

## 2021-04-15 NOTE — PROVIDER NOTIFICATION
[facility] ICU VENTILATOR RESPIRATORY NOTE  Date of Admission: 04/08/21  Date of Intubation (most recent): 04/11/21  Reason for Mechanical Ventilation: Respiratory failure  Number of Days on Mechanical Ventilation: 5  Met Criteria for Pressure Support Trial: No  Reason for No Pressure Support Trial: Pt is currently on higher PEEP of +12 and FiO2 70%  Significant Events Today: FiO2 weaned down to 70%  ABG Results:   Recent Labs   Lab 04/15/21  0521 04/14/21  1732 04/14/21  1208 04/14/21  0908   PH 7.40 7.39 7.35 7.32*   PCO2 56* 53* 56* 58*   PO2 65* 67* 93 66*   HCO3 34* 33* 31* 30*   O2PER 70% 80% 100% VENT 100%     ETT appearance on chest x-ray: 3.6 above nina     Plan:  RT to continue to monitor and assess the pt's current respiratory status and needs.    Ventilation Mode: CMV/AC  (Continuous Mandatory Ventilation/ Assist Control)  FiO2 (%): (S) 70 %  Rate Set (breaths/minute): 22 breaths/min  Tidal Volume Set (mL): 400 mL  PEEP (cm H2O): 12 cmH2O  Oxygen Concentration (%): (S) 70 %  Resp: 21       04/15/21 0210   Ventilator - Patient    Patient Resp Rate  22 breaths/min   Inspiratory Pressure (cm H2O) 35 cmH2O   Mean Airway Pressure (cm H2O) 19 cmH2O   Expiratory Vt  mL 428   Minute Volume L/min 9.83 L/min   Additional Vent Settings   Plateau Pressure (cm H2O) 27 cmH2O   PEEPi (cm H2O) 0.8 cm         Christian Perez, RT

## 2021-04-15 NOTE — PROGRESS NOTES
Brief ICU update:    Noon ABG (listed incorrectly as VBG) returned with P/F ratio of 126, actually improved after paralytic was stopped. Patient is supine.     Will not plan to resume paralytic at this point.     Although P/F improved, still is <150, so will plan to re-prone, and follow ABG (2-4 hours after proning) to look for improvement. If no improvement, will stop proning as well.     Carlos Gomez MD     Addendum:    Follow up gas after proning (and still off paralytic) has P/F ratio of 105. This is worse than earlier while supine and off paralytic.     Will supine the patient again, does not seem to be having ongoing benefit from prone positioning nor paralytic at this point.     Carlos Gomez MD

## 2021-04-16 DIAGNOSIS — Z71.89 OTHER SPECIFIED COUNSELING: ICD-10-CM

## 2021-04-16 LAB — HBA1C MFR BLD: 5.7 % (ref 0–5.7)

## 2021-04-17 LAB
ALT SERPL-CCNC: 62 U/L (ref 0–45)
AST SERPL-CCNC: 63 U/L (ref 0–40)
TRIGL SERPL-MCNC: 234 MG/DL

## 2021-04-19 ENCOUNTER — PATIENT OUTREACH (OUTPATIENT)
Dept: CARE COORDINATION | Facility: CLINIC | Age: 56
End: 2021-04-19

## 2021-04-19 NOTE — PROGRESS NOTES
"Clinic Care Coordination Contact    Situation: Patient chart reviewed by care coordinator.    Background:   Past medical history of developmental delay, seizure disorder, Cushing syndrome, asthma, hypertension, hyperlipidemia, obesity, history of small bowel obstruction, was admitted on 4/8/2021 for dysuria and cough.    Problem List:   1.  Acute hypoxic Respiratory Failure and ARDS 2/2 COVID-19 Pneumonia   - Ventilator management per Intensivist     2.  Covid-19 Pneumonia  - Continue IV Dexamethasone - Day #7  - Completed course of IV Remdesivir   - Added famotidine  - Continue Lovenox 0.5 mg/kg BID  - Combivent QID  - Trend inflammatory markers     3.  Urinary Tract Infection. Was on macrobid and then switched to cefazolin. Urine culture on 4/8/2021 positive for pan-sensitive E. Coli     4.  Seizure Disorder  - On carbamazepine      5.  Asthma  - Combivent QID      Assessment: Per chart review Discharged from [facility] 4/15/21 and  \"transferred to an alternate institution\"    Plan/Recommendations:  CC will monitor for when [place] is discharged from hospital.      JOSE A Warren Care Coordination Team  [phone]        "

## 2021-04-25 ENCOUNTER — TRANSFERRED RECORDS (OUTPATIENT)
Dept: HEALTH INFORMATION MANAGEMENT | Facility: CLINIC | Age: 56
End: 2021-04-25

## 2021-04-26 ENCOUNTER — TRANSFERRED RECORDS (OUTPATIENT)
Dept: HEALTH INFORMATION MANAGEMENT | Facility: CLINIC | Age: 56
End: 2021-04-26

## 2021-04-27 ENCOUNTER — HOSPITAL ENCOUNTER (INPATIENT)
Facility: CLINIC | Age: 56
LOS: 15 days | Discharge: HOME HEALTH | DRG: 092 | End: 2021-05-12
Attending: PHYSICAL MEDICINE & REHABILITATION | Admitting: PHYSICAL MEDICINE & REHABILITATION
Payer: MEDICARE

## 2021-04-27 DIAGNOSIS — E78.5 HYPERLIPIDEMIA LDL GOAL <130: ICD-10-CM

## 2021-04-27 DIAGNOSIS — K21.9 GASTROESOPHAGEAL REFLUX DISEASE, UNSPECIFIED WHETHER ESOPHAGITIS PRESENT: ICD-10-CM

## 2021-04-27 DIAGNOSIS — J12.82 PNEUMONIA DUE TO 2019 NOVEL CORONAVIRUS: ICD-10-CM

## 2021-04-27 DIAGNOSIS — F32.A DEPRESSION, UNSPECIFIED DEPRESSION TYPE: Primary | ICD-10-CM

## 2021-04-27 DIAGNOSIS — G72.81 CRITICAL ILLNESS MYOPATHY: ICD-10-CM

## 2021-04-27 DIAGNOSIS — U07.1 PNEUMONIA DUE TO 2019 NOVEL CORONAVIRUS: ICD-10-CM

## 2021-04-27 DIAGNOSIS — G40.909 SEIZURE DISORDER (H): ICD-10-CM

## 2021-04-27 DIAGNOSIS — I10 ESSENTIAL HYPERTENSION, BENIGN: ICD-10-CM

## 2021-04-27 LAB
CREAT SERPL-MCNC: 0.63 MG/DL (ref 0.6–1.1)
CREAT SERPL-MCNC: 0.63 MG/DL (ref 0.6–1.1)
GFR SERPL CREATININE-BSD FRML MDRD: >60 ML/MIN/1.73M2
GFR SERPL CREATININE-BSD FRML MDRD: >60 ML/MIN/1.73M2
GLUCOSE BLDC GLUCOMTR-MCNC: 102 MG/DL (ref 70–99)
GLUCOSE BLDC GLUCOMTR-MCNC: 137 MG/DL (ref 70–99)
GLUCOSE SERPL-MCNC: 108 MG/DL (ref 70–125)
GLUCOSE SERPL-MCNC: 108 MG/DL (ref 70–125)
POTASSIUM SERPL-SCNC: 3.6 MMOL/L (ref 3.5–5)
POTASSIUM SERPL-SCNC: 3.6 MMOL/L (ref 3.5–5)

## 2021-04-27 PROCEDURE — 250N000011 HC RX IP 250 OP 636

## 2021-04-27 PROCEDURE — 250N000013 HC RX MED GY IP 250 OP 250 PS 637: Performed by: PHYSICAL MEDICINE & REHABILITATION

## 2021-04-27 PROCEDURE — 99223 1ST HOSP IP/OBS HIGH 75: CPT | Mod: GC | Performed by: PHYSICAL MEDICINE & REHABILITATION

## 2021-04-27 PROCEDURE — 999N001017 HC STATISTIC GLUCOSE BY METER IP

## 2021-04-27 PROCEDURE — 128N000003 HC R&B REHAB

## 2021-04-27 PROCEDURE — 250N000013 HC RX MED GY IP 250 OP 250 PS 637

## 2021-04-27 RX ORDER — LISINOPRIL 20 MG/1
20 TABLET ORAL DAILY
Status: DISCONTINUED | OUTPATIENT
Start: 2021-04-28 | End: 2021-04-30

## 2021-04-27 RX ORDER — FUROSEMIDE 40 MG
40 TABLET ORAL
Status: DISCONTINUED | OUTPATIENT
Start: 2021-04-27 | End: 2021-04-28

## 2021-04-27 RX ORDER — CARBAMAZEPINE 200 MG/1
400 TABLET ORAL 2 TIMES DAILY
Status: DISCONTINUED | OUTPATIENT
Start: 2021-04-27 | End: 2021-05-12 | Stop reason: HOSPADM

## 2021-04-27 RX ORDER — CARBAMAZEPINE 200 MG/1
200 TABLET ORAL
Status: DISCONTINUED | OUTPATIENT
Start: 2021-04-28 | End: 2021-05-12 | Stop reason: HOSPADM

## 2021-04-27 RX ORDER — AMOXICILLIN 250 MG
1-4 CAPSULE ORAL 2 TIMES DAILY PRN
Status: DISCONTINUED | OUTPATIENT
Start: 2021-04-27 | End: 2021-05-02

## 2021-04-27 RX ORDER — ACETAMINOPHEN 325 MG/1
325 TABLET ORAL EVERY 4 HOURS PRN
Status: DISCONTINUED | OUTPATIENT
Start: 2021-04-27 | End: 2021-04-27

## 2021-04-27 RX ORDER — SERTRALINE HYDROCHLORIDE 100 MG/1
100 TABLET, FILM COATED ORAL DAILY
Status: DISCONTINUED | OUTPATIENT
Start: 2021-04-28 | End: 2021-05-12 | Stop reason: HOSPADM

## 2021-04-27 RX ORDER — ALBUTEROL SULFATE 0.83 MG/ML
2.5 SOLUTION RESPIRATORY (INHALATION) EVERY 4 HOURS PRN
Status: DISCONTINUED | OUTPATIENT
Start: 2021-04-27 | End: 2021-05-12 | Stop reason: HOSPADM

## 2021-04-27 RX ORDER — ACETAMINOPHEN 325 MG/1
650 TABLET ORAL EVERY 8 HOURS PRN
Status: DISCONTINUED | OUTPATIENT
Start: 2021-04-27 | End: 2021-05-12 | Stop reason: HOSPADM

## 2021-04-27 RX ADMIN — FUROSEMIDE 40 MG: 40 TABLET ORAL at 16:41

## 2021-04-27 RX ADMIN — ACETAMINOPHEN 325 MG: 325 TABLET, FILM COATED ORAL at 18:50

## 2021-04-27 RX ADMIN — ACETAMINOPHEN 325 MG: 325 TABLET, FILM COATED ORAL at 21:40

## 2021-04-27 RX ADMIN — CARBAMAZEPINE 400 MG: 200 TABLET ORAL at 21:40

## 2021-04-27 RX ADMIN — ENOXAPARIN SODIUM 40 MG: 40 INJECTION SUBCUTANEOUS at 21:39

## 2021-04-27 ASSESSMENT — MIFFLIN-ST. JEOR: SCORE: 2222.95

## 2021-04-27 NOTE — LETTER
Health Information Management Services               Recipient: [employer]ake           Sender: Rand PH:   Discharge today. Thanks!           Date: May 12, 2021  Patient Name:  Brissa Barlow  Routing Message:            The documents accompanying this notice contain confidential information belonging to the sender.  This information is intended only for the use of the individual or entity named above.  The authorized recipient of this information is prohibited from disclosing this information to any other party and is required to destroy the information after its stated need has been fulfilled, unless otherwise required by state law.      If you are not the intended recipient, you are hereby notified that any disclosure, copy, distribution or action taken in reliance on the contents of these documents is strictly prohibited.  If you have received this document in error, please return it by fax to [phone] with a note on the cover sheet explaining why you are returning it (e.g. not your patient, not your provider, etc.).  If you need further assistance, please call [facility] Centralized Transcription at [phone].  Documents may also be returned by mail to [employer], , [address], [place], [place] [address].

## 2021-04-27 NOTE — PLAN OF CARE
Admitted in the unit at 1400, assisted from the [facility] to bed by EMT's and staff. Alert and oriented x 4, following directions appropriately. Room orientation completed .. Will continue poc

## 2021-04-27 NOTE — LETTER
Health Information Management Services               Recipient: ROSSY  INTAKE          Sender: Loan REYEZ LG (PH: [phone])          Date: May 7, 2021  Patient Name:  Brissa Barlow  Routing Message:  Brissa Barlow Facesheet          The documents accompanying this notice contain confidential information belonging to the sender.  This information is intended only for the use of the individual or entity named above.  The authorized recipient of this information is prohibited from disclosing this information to any other party and is required to destroy the information after its stated need has been fulfilled, unless otherwise required by state law.      If you are not the intended recipient, you are hereby notified that any disclosure, copy, distribution or action taken in reliance on the contents of these documents is strictly prohibited.  If you have received this document in error, please return it by fax to [phone] with a note on the cover sheet explaining why you are returning it (e.g. not your patient, not your provider, etc.).  If you need further assistance, please call [facility] Centralized Transcription at [phone].  Documents may also be returned by mail to [employer], , [address], [place], [place] [address].
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
## 2021-04-28 ENCOUNTER — APPOINTMENT (OUTPATIENT)
Dept: PHYSICAL THERAPY | Facility: CLINIC | Age: 56
End: 2021-04-28
Payer: MEDICARE

## 2021-04-28 ENCOUNTER — APPOINTMENT (OUTPATIENT)
Dept: OCCUPATIONAL THERAPY | Facility: CLINIC | Age: 56
End: 2021-04-28
Payer: MEDICARE

## 2021-04-28 ENCOUNTER — APPOINTMENT (OUTPATIENT)
Dept: EDUCATION SERVICES | Facility: CLINIC | Age: 56
End: 2021-04-28
Attending: PHYSICAL MEDICINE & REHABILITATION
Payer: MEDICARE

## 2021-04-28 LAB
ANION GAP SERPL CALCULATED.3IONS-SCNC: 9 MMOL/L (ref 3–14)
BASOPHILS # BLD AUTO: 0.1 10E9/L (ref 0–0.2)
BASOPHILS NFR BLD AUTO: 0.5 %
BUN SERPL-MCNC: 27 MG/DL (ref 7–30)
CALCIUM SERPL-MCNC: 8.5 MG/DL (ref 8.5–10.1)
CHLORIDE SERPL-SCNC: 100 MMOL/L (ref 94–109)
CO2 SERPL-SCNC: 26 MMOL/L (ref 20–32)
CREAT SERPL-MCNC: 0.66 MG/DL (ref 0.52–1.04)
DIFFERENTIAL METHOD BLD: ABNORMAL
EOSINOPHIL # BLD AUTO: 0.3 10E9/L (ref 0–0.7)
EOSINOPHIL NFR BLD AUTO: 3.3 %
ERYTHROCYTE [DISTWIDTH] IN BLOOD BY AUTOMATED COUNT: 21.4 % (ref 10–15)
FERRITIN SERPL-MCNC: 532 NG/ML (ref 8–252)
GFR SERPL CREATININE-BSD FRML MDRD: >90 ML/MIN/{1.73_M2}
GLUCOSE BLDC GLUCOMTR-MCNC: 101 MG/DL (ref 70–99)
GLUCOSE BLDC GLUCOMTR-MCNC: 116 MG/DL (ref 70–99)
GLUCOSE BLDC GLUCOMTR-MCNC: 99 MG/DL (ref 70–99)
GLUCOSE SERPL-MCNC: 99 MG/DL (ref 70–99)
HCT VFR BLD AUTO: 25.7 % (ref 35–47)
HGB BLD-MCNC: 8.1 G/DL (ref 11.7–15.7)
IMM GRANULOCYTES # BLD: 0.2 10E9/L (ref 0–0.4)
IMM GRANULOCYTES NFR BLD: 2.4 %
IRON SATN MFR SERPL: 24 % (ref 15–46)
IRON SERPL-MCNC: 82 UG/DL (ref 35–180)
LYMPHOCYTES # BLD AUTO: 2.5 10E9/L (ref 0.8–5.3)
LYMPHOCYTES NFR BLD AUTO: 24.1 %
MCH RBC QN AUTO: 30.5 PG (ref 26.5–33)
MCHC RBC AUTO-ENTMCNC: 31.5 G/DL (ref 31.5–36.5)
MCV RBC AUTO: 97 FL (ref 78–100)
MONOCYTES # BLD AUTO: 0.6 10E9/L (ref 0–1.3)
MONOCYTES NFR BLD AUTO: 5.4 %
NEUTROPHILS # BLD AUTO: 6.6 10E9/L (ref 1.6–8.3)
NEUTROPHILS NFR BLD AUTO: 64.3 %
NRBC # BLD AUTO: 0.2 10*3/UL
NRBC BLD AUTO-RTO: 2 /100
PLATELET # BLD AUTO: 264 10E9/L (ref 150–450)
POTASSIUM SERPL-SCNC: 3.8 MMOL/L (ref 3.4–5.3)
RBC # BLD AUTO: 2.66 10E12/L (ref 3.8–5.2)
RETICS # AUTO: 359.1 10E9/L (ref 25–95)
RETICS/RBC NFR AUTO: 13.6 % (ref 0.5–2)
SODIUM SERPL-SCNC: 135 MMOL/L (ref 133–144)
TIBC SERPL-MCNC: 338 UG/DL (ref 240–430)
WBC # BLD AUTO: 10.2 10E9/L (ref 4–11)

## 2021-04-28 PROCEDURE — 999N001017 HC STATISTIC GLUCOSE BY METER IP

## 2021-04-28 PROCEDURE — 99232 SBSQ HOSP IP/OBS MODERATE 35: CPT | Mod: GC | Performed by: PHYSICAL MEDICINE & REHABILITATION

## 2021-04-28 PROCEDURE — 83550 IRON BINDING TEST: CPT | Performed by: PHYSICAL MEDICINE & REHABILITATION

## 2021-04-28 PROCEDURE — 97162 PT EVAL MOD COMPLEX 30 MIN: CPT | Mod: GP | Performed by: PHYSICAL THERAPIST

## 2021-04-28 PROCEDURE — 82728 ASSAY OF FERRITIN: CPT | Performed by: PHYSICAL MEDICINE & REHABILITATION

## 2021-04-28 PROCEDURE — 128N000003 HC R&B REHAB

## 2021-04-28 PROCEDURE — 83540 ASSAY OF IRON: CPT | Performed by: PHYSICAL MEDICINE & REHABILITATION

## 2021-04-28 PROCEDURE — 83540 ASSAY OF IRON: CPT

## 2021-04-28 PROCEDURE — 85025 COMPLETE CBC W/AUTO DIFF WBC: CPT | Performed by: PHYSICAL MEDICINE & REHABILITATION

## 2021-04-28 PROCEDURE — 36415 COLL VENOUS BLD VENIPUNCTURE: CPT | Performed by: PHYSICAL MEDICINE & REHABILITATION

## 2021-04-28 PROCEDURE — 250N000013 HC RX MED GY IP 250 OP 250 PS 637

## 2021-04-28 PROCEDURE — 97110 THERAPEUTIC EXERCISES: CPT | Mod: GO | Performed by: OCCUPATIONAL THERAPIST

## 2021-04-28 PROCEDURE — 97110 THERAPEUTIC EXERCISES: CPT | Mod: GP | Performed by: PHYSICAL THERAPIST

## 2021-04-28 PROCEDURE — 97167 OT EVAL HIGH COMPLEX 60 MIN: CPT | Mod: GO | Performed by: OCCUPATIONAL THERAPIST

## 2021-04-28 PROCEDURE — 80048 BASIC METABOLIC PNL TOTAL CA: CPT | Performed by: PHYSICAL MEDICINE & REHABILITATION

## 2021-04-28 PROCEDURE — 85045 AUTOMATED RETICULOCYTE COUNT: CPT | Performed by: PHYSICAL MEDICINE & REHABILITATION

## 2021-04-28 RX ORDER — FUROSEMIDE 20 MG
20 TABLET ORAL
Status: DISCONTINUED | OUTPATIENT
Start: 2021-04-28 | End: 2021-04-29

## 2021-04-28 RX ADMIN — MICONAZOLE NITRATE: 20 POWDER TOPICAL at 07:51

## 2021-04-28 RX ADMIN — APIXABAN 2.5 MG: 2.5 TABLET, FILM COATED ORAL at 07:50

## 2021-04-28 RX ADMIN — CARBAMAZEPINE 400 MG: 200 TABLET ORAL at 20:08

## 2021-04-28 RX ADMIN — CARBAMAZEPINE 400 MG: 200 TABLET ORAL at 07:49

## 2021-04-28 RX ADMIN — MICONAZOLE NITRATE: 20 POWDER TOPICAL at 20:10

## 2021-04-28 RX ADMIN — SERTRALINE HYDROCHLORIDE 100 MG: 100 TABLET ORAL at 07:49

## 2021-04-28 RX ADMIN — OMEPRAZOLE 20 MG: 20 CAPSULE, DELAYED RELEASE ORAL at 06:32

## 2021-04-28 RX ADMIN — APIXABAN 2.5 MG: 2.5 TABLET, FILM COATED ORAL at 20:08

## 2021-04-28 RX ADMIN — CARBAMAZEPINE 200 MG: 200 TABLET ORAL at 12:30

## 2021-04-28 RX ADMIN — FUROSEMIDE 40 MG: 40 TABLET ORAL at 07:51

## 2021-04-28 RX ADMIN — FUROSEMIDE 20 MG: 20 TABLET ORAL at 16:29

## 2021-04-28 ASSESSMENT — ACTIVITIES OF DAILY LIVING (ADL): PREVIOUS_RESPONSIBILITIES: MEAL PREP;HOUSEKEEPING

## 2021-04-28 NOTE — PLAN OF CARE
FOCUS/GOAL  Bowel management, Bladder management, Pain management, Medical management, Skin integrity, and Safety management    ASSESSMENT, INTERVENTIONS AND CONTINUING PLAN FOR GOAL:  Patient is alert and oriented x 4, denies pain, dizziness or shortness of breathing. A-2 with liko lift for transfer.On regular/thin diet, patient ordered only carrot and nutritional drink for dinner.  before supper and 116 @ HS. VSS, on 2L oxygen O2 sats 95 %. Patient uses purewick for bladder management had incontinent episode due to leakage, required bed linen change x 2 incontinent pad provided and patient is OK,cooperative, and accepted. Incontinent of bowel, had medium loose BM x 2 in this shift. Will continue to monitor and continue with the POC.
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
## 2021-04-28 NOTE — CONSULTS
Completed Eliquis education with patient's mother Lola earlier in the day and with Virginia later. Both were able to teach back why she is taking Eliquis, how it works, when to take the medication, problems that may occur, drugs interactions, and notifying all provider that she is on this medication.       Literature given: Guide to the Newer Oral Anticoagulants: Medicines to Treat and Prevent Blood Clots

## 2021-04-28 NOTE — PROGRESS NOTES
"    [facility]   Acute Rehabilitation Unit  Inpatient Progress Note    CHIEF COMPLAINT   Critical illness myopathy    SUBJECTIVE  - Patient was seen at bedside. No acute events overnight.   - She started working with physical therapy performing bedside exercise, no episodes of dyspnea, she remains comfotably on 1 L O2   - Using PureWick for urination and event once did stand -> pivot transfer to [facility].   - No recorded bowel movement since admission, LBM day before admission per patient. Discussed using PRN bowel meds tomorrow if no BM tonight.     10 point review of systems was otherwise negative other    OBJECTIVE    Vital Signs   BP (!) 108/36 (BP Location: Right arm)   Pulse 89   Temp 98.1  F (36.7  C) (Oral)   Resp 16   Ht 1.702 m (5' 7\")   Wt (!) 158.5 kg (349 lb 8 oz)   LMP 08/18/2008   SpO2 98%   BMI 54.74 kg/m    BMI:  Estimated body mass index is 54.74 kg/m  as calculated from the following:    Height as of this encounter: 1.702 m (5' 7\").    Weight as of this encounter: 158.5 kg (349 lb 8 oz).     Physical Examination  General: Awake, alert, NAD  Cardiovascular: RRR, no extra heart sounds, no murmur  Pulmonary: Non-labored breathing, CTAB, no wheeze, no rhonci  Abdominal: obese, soft, non-tender, non-distended  Extremities: warm, well perfused  NEURO: Moving all extremities against gravity, no changes to deficits.    LABS  Recent Results (from the past 24 hour(s))   Glucose by meter    Collection Time: 04/27/21  6:24 PM   Result Value Ref Range    Glucose 137 (H) 70 - 99 mg/dL   Glucose by meter    Collection Time: 04/27/21  9:48 PM   Result Value Ref Range    Glucose 102 (H) 70 - 99 mg/dL   Glucose by meter    Collection Time: 04/28/21  7:42 AM   Result Value Ref Range    Glucose 99 70 - 99 mg/dL   Basic metabolic panel    Collection Time: 04/28/21 11:48 AM   Result Value Ref Range    Sodium 135 133 - 144 mmol/L    Potassium 3.8 3.4 - 5.3 mmol/L    Chloride " 100 94 - 109 mmol/L    Carbon Dioxide 26 20 - 32 mmol/L    Anion Gap 9 3 - 14 mmol/L    Glucose 99 70 - 99 mg/dL    Urea Nitrogen 27 7 - 30 mg/dL    Creatinine 0.66 0.52 - 1.04 mg/dL    GFR Estimate >90 >60 mL/min/[1.73_m2]    GFR Estimate If Black >90 >60 mL/min/[1.73_m2]    Calcium 8.5 8.5 - 10.1 mg/dL   CBC with platelets differential    Collection Time: 04/28/21 11:48 AM   Result Value Ref Range    WBC 10.2 4.0 - 11.0 10e9/L    RBC Count 2.66 (L) 3.8 - 5.2 10e12/L    Hemoglobin 8.1 (L) 11.7 - 15.7 g/dL    Hematocrit 25.7 (L) 35.0 - 47.0 %    MCV 97 78 - 100 fl    MCH 30.5 26.5 - 33.0 pg    MCHC 31.5 31.5 - 36.5 g/dL    RDW 21.4 (H) 10.0 - 15.0 %    Platelet Count 264 150 - 450 10e9/L    Diff Method Automated Method     % Neutrophils 64.3 %    % Lymphocytes 24.1 %    % Monocytes 5.4 %    % Eosinophils 3.3 %    % Basophils 0.5 %    % Immature Granulocytes 2.4 %    Nucleated RBCs 2 (H) 0 /100    Absolute Neutrophil 6.6 1.6 - 8.3 10e9/L    Absolute Lymphocytes 2.5 0.8 - 5.3 10e9/L    Absolute Monocytes 0.6 0.0 - 1.3 10e9/L    Absolute Eosinophils 0.3 0.0 - 0.7 10e9/L    Absolute Basophils 0.1 0.0 - 0.2 10e9/L    Abs Immature Granulocytes 0.2 0 - 0.4 10e9/L    Absolute Nucleated RBC 0.2      IMAGING  No recent images to review.     ASSESSMENT/PLAN:  Brissa Barlow is a 55 year old female, with developmental delay, HTN, asthma and morbid obesity who was admitted to [facility] on 04/08/2021 with acute hypoxic respiratory failure secondary to COVID19 pneumonia. She required intubation 04/11 that was complicated by prolonged paralysis and circulatory shock. She was weaned from the ventilator on 04/21 and deemed appropriate for acute rehabilitation for critical illness myopathy on 04/27/2021. Current deficits include impaired strength and mobility.     Admission to acute inpatient rehab.    Impairment group code: Critical illness myopathy    1. Impairment of ADL's: OT daily to work on ADL re-training such  as grooming, self cares and bathing.    2. Impairment of mobility: PT daily to work on neuromuscular re-education focusing on strength, balance, coordination, and endurance.    3. Rehab RN for med administration, bowel regimen, glucose monitoring.    4. Medical Conditions:  # Acute Hypoxic Respiratory Failure secondary to COVID-19 pneumonia  Initial chest CT with contrast showed multifocal airspace disease. S/P remdesivir and 10 day course steroids. Required intubation 04/11-04/21.  - Vitals QSHIFT, continue supplement O2, currently on 1 LPM, maintain saturation > 90%  - Encourage breathing exercises and incentive spirometery  - Albuterol neb Q4H PRN     - DVT prophylaxis (see below), start apixaban tomorrow for 30 days    # Anemia of undetermined etiology  Hgb 8.2 on 04/28, this is increasing from 7.6 on 04/27 and 7.9 on 04/26. Overall upward trend, will repeat labs on Friday morning to prevent excessive blood draws.  - CBC FRI AM  - Iron panel    # Seizure disorder  - Carbamazepine 400 mg PO QAM, 200 mg QNOON, 400 mg QHS    # Transaminitis  - Stable, unclear significance at this point. Repeat LFTs on Monday.  - LFT MON    # Delay in development  - Parents concerned for underlying psychiatric disorder during prior admission. Patient was seen by psychiatry who did not make any further recommendations  - Zoloft 100 mg DAILY    # Benign essential hypertension  - Blood pressure have been running soft. Decreased Lasix to 20 mg BID  - continue PTA lisinopril    Chronic medical conditions:  # Mild intermittent asthma - nebs as needed.  # Gastroesophageal reflux disease without esophagitis - continue omeprazole  # Hyperlipidemia LDL goal <130 - restart statin in a few days.  # H/o splenectomy     5. Adjustment to disability:  Clinical psychology to eval and treat  6. FEN: Regular Diet, Thin Liquids  7. Bowel: PRN senna/docusate. Last bowel movement on 04/26 per patient  8. Bladder: Had indwelling gary during previous  admission. This was discontinued on day of ARU admission. Attempt purwick with straight cath protocol  9. Pain management: Tylenol PRN  10. DVT Prophylaxis: Apixaban 2.5 mg BID   11. Code: Full code per previous admssion  12. Disposition: Plan to discharge to home pending further evaluation  13. ELOS:  2-3 weeks.  14. Rehab prognosis:  Good  15. Follow up Appointments on Discharge:   1. Physical medicine and rehabilitation  2. Pulmonary for obstructive sleep apnea  * Patient was seen and discussed with my attending Dr. Siddiqui who agrees with the above assessment and plan.   Fred Farr MD  Physical Medicine and Rehabilitation PGY-2  Pager: [phone]

## 2021-04-28 NOTE — PLAN OF CARE
FOCUS/GOAL  Bowel management, Bladder management, Nutrition/Feeding/Swallowing precautions, Mobility, and Safety management    ASSESSMENT, INTERVENTIONS AND CONTINUING PLAN FOR GOAL:  Pt is alert and oriented x4. Denies nausea, SOB at rest, or numbness/tingling. Is incont of bladder this shift. Also able to void very small amount on BSC. PVR was 97. Pt now set up with "[employer] states she was using this prior. Urine noted to be very dark/tea colored and odorous. Pt assisted to transfer with assist of 2 SPT using walker and gait belt. Transfer deemed unsafe, golvo lift then utilized thereafter. Regular bed swapped out for bariatric bed. Pt able to turn ind in bed from side to side. Smear BM incont in bed. Given tylenol for pain in left shoulder, pt states not new pain. Ate 75% of dinner.

## 2021-04-28 NOTE — PLAN OF CARE
Denies pain, lisinopril held, did not meet BP  parameters, BP was 100/38, denies any symptoms at the time. Incontinent medium loose BM in bed, need assist with mirna care. Currently in bed and using the pure wick to manage bladder incontinence. Patient encouraged to consider using an incontinence brief and get out of bed and sit in her chair rather than staying in bed with no brief on. Patient stated that she will consider  that tomorrow. Writer will order bariatric briefs.Patient had good appetite today, will continue POC

## 2021-04-28 NOTE — PHARMACY-MEDICATION REGIMEN REVIEW
Pharmacy Medication Regimen Review  Brissa Barlow is a 55 year old female who is currently in the Acute Rehab Unit.    Assessment: All medications have an appropriate indications, durations and no unnecessary use was found    Plan:   Continue current medications regimen.  1. PTAM lovastatin (MEVACOR) 40 MG tablet not ordered-restart as able.     Attending provider will be sent this note for review.  If there are any emergent issues noted above, pharmacist will contact provider directly by phone.      Pharmacy will periodically review the resident's medication regimen for any PRN medications not administered in > 72 hours and discontinue them. The pharmacist will discuss gradual dose reductions of psychopharmacologic medications with interdisciplinary team on a regular basis.    Please contact pharmacy if the above does not answer specific medication questions/concerns.    Background:  A pharmacist has reviewed all medications and pertinent medical history today.  Medications were reviewed for appropriate use and any irregularities found are listed with recommendations.      Current Facility-Administered Medications:      acetaminophen (TYLENOL) tablet 650 mg, 650 mg, Oral, Q8H PRN, Zuri Junior MD, 325 mg at 04/27/21 2140     albuterol (PROVENTIL) neb solution 2.5 mg, 2.5 mg, Nebulization, Q4H PRN, Fred Farr MD     apixaban ANTICOAGULANT (ELIQUIS) tablet 2.5 mg, 2.5 mg, Oral, BID, Fred Farr MD, 2.5 mg at 04/28/21 0750     carBAMazepine (TEGretol) tablet 200 mg, 200 mg, Oral, Daily with lunch, Fred Farr MD     carBAMazepine (TEGretol) tablet 400 mg, 400 mg, Oral, BID, Fred Farr MD, 400 mg at 04/28/21 0749     furosemide (LASIX) tablet 40 mg, 40 mg, Oral, BID, Fred Farr MD, 40 mg at 04/28/21 0751     lisinopril (ZESTRIL) tablet 20 mg, 20 mg, Oral, Daily, David Siddiqui DO     miconazole (MICATIN) 2 % powder, , Topical, BID, Fred Farr MD,  Given at 04/28/21 0751     omeprazole (priLOSEC) CR capsule 20 mg, 20 mg, Oral, QAM AC, Fred Farr MD, 20 mg at 04/28/21 0632     senna-docusate (SENOKOT-S/PERICOLACE) 8.6-50 MG per tablet 1-4 tablet, 1-4 tablet, Oral, BID PRN, Fred Farr MD     sertraline (ZOLOFT) tablet 100 mg, 100 mg, Oral, Daily, Fred Farr MD, 100 mg at 04/28/21 0749  No current outpatient prescriptions on file.   Kandi Rhodes, Pharm.D

## 2021-04-28 NOTE — PROGRESS NOTES
"CLINICAL NUTRITION SERVICES - ASSESSMENT NOTE     Nutrition Prescription    RECOMMENDATIONS FOR MDs/PROVIDERS TO ORDER:  None at this time    Malnutrition Status:    Unable to determine due to lack of assessing all parameters for diagnosis.    Recommendations already ordered by Registered Dietitian (RD):  Ensure Enlive Vanilla @ Dinner     Future/Additional Recommendations:  1. Continue to monitor PO intake, tolerance to supplements, and weight trends      REASON FOR ASSESSMENT  Brissa Barlow is a/an 55 year old female assessed by the dietitian for Admission Nutrition Risk Screen for positive    NUTRITION HISTORY  Pt with history of developmental delay, HTN, asthma and morbid obesity. Presented to [facility] ED on 04/08/2021 for COVID 19 related symptoms later transferred to [facility] on 4/15. Intubated 4/11-4/21. Pt had ND feeding tube placed 4/12. Removed 4/25. Per pt recall, she states her eating was continuing to improve and was at or close to baseline before coming to ARU.      CURRENT NUTRITION ORDERS  Diet: Regular  Intake/Tolerance: Per phone conversation with patient, she states her appetite is good and back to normal. Pt ordering 2-3 meals per day, consuming %. . Pt had no concerns with eating or appetite at this time.     LABS  Labs reviewed    MEDICATIONS  Lasix     ANTHROPOMETRICS  Height: 170.2 cm (5' 7\")  Most Recent Weight: (!) 158.5 kg (349 lb 8 oz)    IBW: 61.4 kg  BMI: Obesity Grade III BMI >40  Weight History: 11 lb weight loss in ~3 weeks resulting in a 2.9% weight change. Weight fluctuations may be related to fluid shifts, however, suspect some true weight loss.    04/27/21 (!) 158.5 kg (349 lb 8 oz)  04/16/21 163.2 kg (357 lb 11.2 oz)  04/13/21 (!) 155.6 kg (343 lb)  04/09/21  163.3 kg (360 lb 1.6 oz)   03/11/21 (!) 167 kg (368 lb 3.2 oz)  10/01/20 (!) 168.5 kg (371 lb 8 oz)  07/24/19 (!) 164.5 kg (362 lb 9.6 oz)  01/15/19 (!) 172.3 kg (379 lb 12.8 oz)  08/28/18 104.3 kg " (230 lb)  06/12/18 (!) 170.8 kg (376 lb 8 oz)  02/27/18 (!) 168.3 kg (371 lb)  01/04/18 (!) 166.7 kg (367 lb 6.4 oz)    Dosing Weight: 91.6 kg (adjsuted)    ASSESSED NUTRITION NEEDS  Estimated Energy Needs: 1117-9222 kcals/day (20 - 25 kcals/kg)  Justification: Obese  Estimated Protein Needs:  grams protein/day (1 - 1.2 grams of pro/kg)  Justification: Maintenance  Estimated Fluid Needs: 0644-1714 mL/day (1 mL/kcal)   Justification: Per provider pending fluid status    PHYSICAL FINDINGS  Currently requiring 1.5 L O2    See malnutrition section below.    MALNUTRITION  % Intake: Decreased intake does not meet criteria  % Weight Loss: Up to 5% in 1 month (non-severe)  Subcutaneous Fat Loss: Unable to assess due to COVID precautions   Muscle Loss: Unable to assess due to COVID precautions   Fluid Accumulation/Edema: None noted  Malnutrition Diagnosis: Unable to determine due to lack of assessing all parameters for diagnosis.     NUTRITION DIAGNOSIS  Predicted inadequate nutrient intake (protein/energy) related to possible changes in appetite, menu fatigue as evidenced by reliance on oral intake to meet 100% of needs       INTERVENTIONS  Implementation  Nutrition Education: Encouraged pt to continue good oral intake. Offered protein supplements to help ensure nutrition needs are met, specifically protein to improve healing and strength.   Medical food supplement therapy - Ensure Enlive Vanilla @ Dinner     Goals  Patient to consume % of nutritionally adequate meal trays TID, or the equivalent with supplements/snacks.     Monitoring/Evaluation  Progress toward goals will be monitored and evaluated per protocol.    Mahesh Bowden   Dietetic Intern

## 2021-04-28 NOTE — PLAN OF CARE
FOCUS/GOAL  Bowel management, Bladder management, Pain management and Cognition/Memory/Judgment/Problem solving    ASSESSMENT, INTERVENTIONS AND CONTINUING PLAN FOR GOAL:  Pt is alert and oriented x4, denies fever, chills, CP, SOB, N/V, abdominal pain, or new weakness/numbness/tingling, reciving O2 at 1 LPM via nasal canula, using purwick external catheter overnight which at one point failed resulted in urinary incontinence, urine is dark and highly odorus, continent of bowel, transferring with assist of 2 and lift, sleeping well throughout the night, vs stable, no further care concerns at this time continue with POC.

## 2021-04-28 NOTE — PLAN OF CARE
Discharge Planner Post-Acute Rehab OT:     Discharge Plan: Home with HC    Precautions: COVID-19 airborne precautions, 1-2L O2 NC, falls    Current Status:  ADLs: Refused  IADLs: To be assessed  Vision/Cognition: Borderline developmental delay. Wears glasses, reports these were lost at the hospital.    Assessment: Initial evaluation started but not completed. Pt declining to participate in ADLs, bed mobility, or functional transfers due to stomach not feeling well. Therapist offered several intervention options throughout session. Pt was only agreeable to light exercises in supine. Plan to complete evaluation tomorrow as appropriate. -30 min. Pt will benefit from a set schedule and consistent staff.     Other Barriers to Discharge (DME, Family Training, etc):   DME needs and caregiver training needs to be determined.

## 2021-04-29 ENCOUNTER — APPOINTMENT (OUTPATIENT)
Dept: PHYSICAL THERAPY | Facility: CLINIC | Age: 56
End: 2021-04-29
Payer: MEDICARE

## 2021-04-29 ENCOUNTER — APPOINTMENT (OUTPATIENT)
Dept: OCCUPATIONAL THERAPY | Facility: CLINIC | Age: 56
End: 2021-04-29
Payer: MEDICARE

## 2021-04-29 ENCOUNTER — PATIENT OUTREACH (OUTPATIENT)
Dept: CARE COORDINATION | Facility: CLINIC | Age: 56
End: 2021-04-29

## 2021-04-29 PROCEDURE — 250N000013 HC RX MED GY IP 250 OP 250 PS 637: Performed by: PHYSICAL MEDICINE & REHABILITATION

## 2021-04-29 PROCEDURE — 250N000013 HC RX MED GY IP 250 OP 250 PS 637

## 2021-04-29 PROCEDURE — 97535 SELF CARE MNGMENT TRAINING: CPT | Mod: GO | Performed by: OCCUPATIONAL THERAPIST

## 2021-04-29 PROCEDURE — 97530 THERAPEUTIC ACTIVITIES: CPT | Mod: GO | Performed by: OCCUPATIONAL THERAPIST

## 2021-04-29 PROCEDURE — 97530 THERAPEUTIC ACTIVITIES: CPT | Mod: GP | Performed by: PHYSICAL THERAPIST

## 2021-04-29 PROCEDURE — 999N000125 HC STATISTIC PATIENT MED CONFERENCE < 30 MIN: Performed by: OCCUPATIONAL THERAPIST

## 2021-04-29 PROCEDURE — 128N000003 HC R&B REHAB

## 2021-04-29 PROCEDURE — 999N000150 HC STATISTIC PT MED CONFERENCE < 30 MIN: Performed by: PHYSICAL THERAPIST

## 2021-04-29 PROCEDURE — 99233 SBSQ HOSP IP/OBS HIGH 50: CPT | Mod: GC | Performed by: PHYSICAL MEDICINE & REHABILITATION

## 2021-04-29 PROCEDURE — 97110 THERAPEUTIC EXERCISES: CPT | Mod: GP | Performed by: PHYSICAL THERAPIST

## 2021-04-29 RX ORDER — LOVASTATIN 20 MG
40 TABLET ORAL AT BEDTIME
Status: DISCONTINUED | OUTPATIENT
Start: 2021-04-29 | End: 2021-04-29

## 2021-04-29 RX ADMIN — APIXABAN 2.5 MG: 2.5 TABLET, FILM COATED ORAL at 20:24

## 2021-04-29 RX ADMIN — CARBAMAZEPINE 400 MG: 200 TABLET ORAL at 20:24

## 2021-04-29 RX ADMIN — SERTRALINE HYDROCHLORIDE 100 MG: 100 TABLET ORAL at 08:32

## 2021-04-29 RX ADMIN — MICONAZOLE NITRATE: 20 POWDER TOPICAL at 08:33

## 2021-04-29 RX ADMIN — MICONAZOLE NITRATE: 20 POWDER TOPICAL at 20:25

## 2021-04-29 RX ADMIN — LISINOPRIL 20 MG: 20 TABLET ORAL at 08:32

## 2021-04-29 RX ADMIN — ACETAMINOPHEN 650 MG: 325 TABLET, FILM COATED ORAL at 15:51

## 2021-04-29 RX ADMIN — OMEPRAZOLE 20 MG: 20 CAPSULE, DELAYED RELEASE ORAL at 06:41

## 2021-04-29 RX ADMIN — FUROSEMIDE 20 MG: 20 TABLET ORAL at 08:32

## 2021-04-29 RX ADMIN — CARBAMAZEPINE 200 MG: 200 TABLET ORAL at 13:20

## 2021-04-29 RX ADMIN — FUROSEMIDE 20 MG: 20 TABLET ORAL at 15:48

## 2021-04-29 RX ADMIN — CARBAMAZEPINE 400 MG: 200 TABLET ORAL at 08:33

## 2021-04-29 RX ADMIN — APIXABAN 2.5 MG: 2.5 TABLET, FILM COATED ORAL at 08:32

## 2021-04-29 NOTE — PROGRESS NOTES
"    [facility]   Acute Rehabilitation Unit  Inpatient Progress Note  Chief Complaint  Critical Illness Myopathy    Subjective     24 Hours Summary  - Patient was seen at bedside this morning. No acute events overnight.   - Reports sleeping well last night, no issues.  - Physical therapy going well, currently working on bed mobility and starting transfers. No issues with therapy, no pain.  - Low blood pressures recorded yesterday, patient denies and dizziness or light headedness.  - Using PureWick for urination, no issues.   - Last bowel movement on 04/28 x 3.     Review of Systems  10 point review of systems was otherwise negative other     Objective     Vital Signs   /48 (BP Location: Right arm)   Pulse 85   Temp 97.9  F (36.6  C) (Axillary)   Resp 20   Ht 1.702 m (5' 7\")   Wt (!) 158.5 kg (349 lb 8 oz)   LMP 08/18/2008   SpO2 96%   BMI 54.74 kg/m    BMI:  Estimated body mass index is 54.74 kg/m  as calculated from the following:    Height as of this encounter: 1.702 m (5' 7\").    Weight as of this encounter: 158.5 kg (349 lb 8 oz).     Physical Examination  General: Awake, alert, NAD  Cardiovascular: RRR, no extra heart sounds, no murmur  Pulmonary: Non-labored breathing, CTAB, no wheeze, no rhonci  Abdominal: obese, soft, non-tender, non-distended  Extremities: warm, well perfused, mild edema b/l  NEURO: Moving all extremities against gravity, no changes to deficits.    LABS  No recent labs to review.     IMAGING  No recent images to review.      Assessment     Brissa Barlow is a 55 year old female, with developmental delay, HTN, asthma and morbid obesity who was admitted to [facility] on 04/08/2021 with acute hypoxic respiratory failure secondary to COVID19 pneumonia. She required intubation on 04/11 that was complicated by prolonged paralysis and circulatory shock. She was weaned from the ventilator on 04/21 and deemed appropriate for acute " rehabilitation due to critical illness myopathy on 04/27/2021. Current deficits include impaired strength and mobility, baseline the patient has deficits of neuro cognition.      Plan     Impairment group code: Critical illness myopathy     Rehabilitation     1. Impairment of ADL's: OT daily to work on ADL re-training such as grooming, self cares and bathing.    2. Impairment of mobility: PT daily to work on neuromuscular re-education focusing on strength, balance, coordination, and endurance.    3. Rehab RN for med administration, bowel regimen.  4. Bladder management: Continent but unable to transfer independently, PureWick available.  5. Bowel management: Continent. Last bowel movement on 04/28, PRNs available  6. Pain management: PRN tylenol  7. Adjustment to disability:  Clinical psychology to eval and treat  8. FEN: Regular Diet, Thin Liquids  9. DVT Prophylaxis: Apixaban 2.5 mg BID      Medical Management     # Acute Hypoxic Respiratory Failure secondary to COVID-19 pneumonia  Chest CT (04/08) showing multifocal airspace disease. S/P remdesivir and 10 day course of steroids. Required intubation 04/11-04/21, now on 1 LPM O2.  - Vitals + pO2 QSHIFT  - Continue supplement O2 to maintain saturation > 90%, wean as tolerated.  - Encourage breathing exercises and incentive spirometery  - Albuterol neb Q4H PRN     - DVT prophylaxis with apixaban for 30 days (04/28-05/28)    # Anemia of chronic disease  Hgb 8.2 on 04/28, stable and showing slow but gradual uptrend. Iron labs consistent with anemia of chronic disease. No concerns for blood loss at this time.  If anemia does not continue to improve, will consider work up for other etiologies such as peripheral smear and hemolysis labs.   - CBC MON    # Seizure disorder  - No seizure during recent or current admission. Will continue PTA medications.  - Carbamazepine 400 mg PO QAM, 200 mg QNOON, 400 mg QHS    # Transaminitis  - Stable, unclear significance at this point.  Repeat LFTs on Monday.  - LFT MON    # Delay in development  - Parents concerned for underlying psychiatric disorder during prior admission. Patient was seen by psychiatry who did not make any further recommendations. No further intervention at this time.  - Zoloft 100 mg DAILY    # Benign essential hypertension  - Blood pressure have been running soft. Decreased Lasix.  - Lisinopril 20 mg PO Daily  - Furosemide 20 mg PO BID    Chronic medical conditions:  # Mild intermittent asthma - nebs as needed.  # Gastroesophageal reflux disease without esophagitis - continue omeprazole  # Hyperlipidemia LDL goal <130: Lovastatin 40 mg QHS  # H/o splenectomy     CODE STATUS: Full code per previous admssion  DISPOSITION: Plan to discharge to home pending further evaluation  ELOS:  2-3 weeks  REHAB PROGNOSIS:  Good  FOLLOW UP:   1. Physical medicine and rehabilitation  2. Pulmonary for obstructive sleep apnea       ** Patient was seen and discussed with my attending Dr. Siddiqui who agrees with the above assessment and plan.   Fred Farr MD  Physical Medicine and Rehabilitation PGY-2  Pager: [phone]

## 2021-04-29 NOTE — PLAN OF CARE
Discharge Planner Post-Acute Rehab PT:     Discharge Plan: Pt lives with her spouse in an apartment.  Pt can use an elevator to get to 4th floor apartment.  Pt owns a 4ww.  Pt states her  pushes her while she sits on the 4ww from the elevator to their apartment.       Precautions: Special covid precautions, on O2, falls.  Per chart, pt with developmental delay.     Current Status:  Bed Mobility: rolling with a rail, sba.  Scooting in bed, head flat, with sba.   Transfer: Pt declined, she had stomach ache on 4/28.   Gait: NT   Stairs: NT   Balance: not assessed yet.     Assessment:  On 4/28 pt missed about 30 minutes of her PT session due to declining mobility due to a stomach ache.  Her nurse was notified.  Pt did participate with bed exercise and pt reeducated in the use of the incentive spirometer.  Pt will be seen 4/29 for PT.     Other Barriers to Discharge (DME, Family Training, etc): Pt may need family training with her spouse. Pt owns a 4ww and pt stated her mom may bring it to the ARU.

## 2021-04-29 NOTE — PLAN OF CARE
Post Rounds Family Phone Call  Length of call: 10 min   Family involved: Pt father and mother, Ray and Diomedes.   Projected discharge date: 5/14/21  Projected discharge location: Home with homecare therapies  Equipment needs: 4ww, shower bench, bedrailing, toilet grab bars.   Other questions and misc: Provided update on initial therapy evaluations, goals, and discharge disposition. Discussed plans for homecare therapy follow up. Family supportive and in agreement with plan.

## 2021-04-29 NOTE — PROGRESS NOTES
Team rounds this morning. Anticipate discharge ~2 weeks pending pt progress. Jennifer called and left VM with CADI workerAlejandra PH: [phone]. Need to verify CADI services. Anticipate with pt being in the hospital for 30+ days that she will need a re-assessment. SW will assist with coordinating.     ADDENDUM: Received call from Alejandra. May 8th will be pt 30 days out of the home and will require a re-assessment. Alejandra will contact [facility] to schedule in the case pt is here past that time. Per Alejandra, pt service agreement for her CADI waiver includes: emergency response, home delivery meals, hx PCA but fired them. Per Alejandra, pt may not be able to avoid PCA as she may require more help. Alejandra will speak with pt, pt s/o Sudhir and pt parents about this as well. Per Alejandra, pt also has a [employer] CCL[employer] PH: [phone] ext [phone]. Jennifer called Ling to touch base and provide SW direct contact information. Will stay in contact with Bandar to coordinate pt needs.     Rand Schmidt, LICLUZ MARIA, [facility]  [place] [facility]   Phone: [phone]  I   Pager: [phone]

## 2021-04-29 NOTE — PLAN OF CARE
FOCUS/GOAL  Bladder management, Pain management, Medical management, and Safety management    ASSESSMENT, INTERVENTIONS AND CONTINUING PLAN FOR GOAL:  Patient is alert and oriented x 4, complains of left shoulder pain, tylenol given @ 1550. VS checked B/P was 87/30 @ 1607 fluid given and encouraged. Patient asymptomatic. Patient drink about 500 ml of water and ate only small bag of goldfish and one cheese stick states that she is full. B/P rechecked and was 92/28 HR 91. Provider was notified regarding low B/P, Lasix discontinued. Seizure precaution in place seizure pad placed in bed. BP continues to be low rechecked BP was 89/37 HR 90 on call provider was paged around 8:30 pm, no call from provider yet. No incontinence episode in this shift, no out put noted, bladder scanned and was 122. Purewick placed early this evening start draining after 9pm  with the out put of 150 ml and continued draining. Patient encouraged to drink more. BP was re checked after an hour was 97/39 HR 87. Patient drink 237 ml of ensure. Last BP was 95/39 currently patient denies dizziness, lightheadedness or nausea and sleeping. Will continue to monitor. Provider just responded phone call answered by charge nurse states to continue to monitor.

## 2021-04-29 NOTE — PROGRESS NOTES
04/28/21 1100   Quick Adds   Type of Visit Initial Occupational Therapy Evaluation       Present no   Language English   Living Environment   People in home spouse   Current Living Arrangements apartment   Home Accessibility no concerns   Transportation Anticipated family or friend will provide   Living Environment Comments OT: Pt states 10 stairs in fron to apt building.  But can use elevator in back , no stairs that way. Bathroom: tubshower with chair and grab bar, higher toilet with grab bars. Bedroom: Gautam size bed, no rails.    Self-Care   Usual Activity Tolerance good   Current Activity Tolerance poor   Regular Exercise No   Equipment Currently Used at Home walker, rolling;grab bar, toilet;grab bar, tub/shower;shower chair   Activity/Exercise/Self-Care Comment OT: pt 's mom is bringing a rollator for pt;   pt has a lift chair at home.    Pt states she likes to color on her cell phone and play with her guinea pigs ( has 7 guinea pigs ).    Pt states she would walk in back of apt building, take elevator to 4th floor, then hiusband would push her to their room with pt sitting on 4ww.  Pt 's spouse helps with cooking, cleaning.  Pt has been  7 and a half years.    Disability/Function   Hearing Difficulty or Deaf no   Wear Glasses or Blind yes   Vision Management Wears glasses   Concentrating, Remembering or Making Decisions Difficulty yes   Concentration Management Borderline developmental delay   Difficulty Communicating no   Difficulty Eating/Swallowing no   Walking or Climbing Stairs Difficulty yes   Walking or Climbing Stairs stair climbing difficulty, requires equipment   Mobility Management Used a 4ww at home   Dressing/Bathing Difficulty no   Toileting issues no   Doing Errands Independently Difficulty (such as shopping) yes   Errands Management Spouse assisting with community transportation, meal preparation, and household management.   Fall history within last six months no    Change in Functional Status Since Onset of Current Illness/Injury yes   General Information   Onset of Illness/Injury or Date of Surgery 04/08/21   Referring Physician David Siddiqui, DO   Patient/Family Therapy Goal Statement (OT) Pt goal to return home in 2-3 weeks   Additional Occupational Profile Info/Pertinent History of Current Problem Brissa Barlow is a 55 year old female, with developmental delay, HTN, asthma and morbid obesity who was admitted to [facility] 04/08/2021 with acute hypoxic respiratory failure secondary to COVID19 pneumonia. She required intubation 04/11 that was complicated by prolonged paralysis and circulatory shock requiring vasopressors. She eventually improved and weaned from the ventilator on 04/21 for a total of 10 day intubation. She was admitted to ARU on 04/27/2021 for ongoing rehabilitation ongoing therapies in the acute inpatient rehabilitation setting.    Performance Patterns (Routines, Roles, Habits) Pt lives with spouse in an apartment. Pt Mod IND ADLs and short distance mobility. Pt participate in IADLs but spouse assisting in all areas. Pt completes limited community mobility. Pt is a pet owner and is close with family members (mom, dad, and brother).   Existing Precautions/Restrictions fall;oxygen therapy device and L/min;other (see comments)  (COVID special precautions)   Limitations/Impairments safety/cognitive   Left Upper Extremity (Weight-bearing Status) full weight-bearing (FWB)   Right Upper Extremity (Weight-bearing Status) full weight-bearing (FWB)   Left Lower Extremity (Weight-bearing Status) full weight-bearing (FWB)   Right Lower Extremity (Weight-bearing Status) full weight-bearing (FWB)   Heart Disease Risk Factors Medical history;Overweight   Cognitive Status Examination   Orientation Status orientation to person, place and time   Follows Commands WNL   Executive Function Deficit  planning/decision-making;problem-solving/reasoning;minimal deficit;information processing;organization/sequencing   Visual Perception   Impact of Vision Impairment on Function (Vision) Wears glasses, reports they went missing at the hospital   Sensory   Sensory Quick Adds No deficits were identified   Pain Assessment   Patient Currently in Pain Yes, see Vital Sign flowsheet   Integumentary/Edema   Integumentary/Edema Comments Bruising along L shoulder   Posture   Posture forward head position;protracted shoulders   Range of Motion Comprehensive   Comment, General Range of Motion Slightly limited proximal BUE ROM due to prolonged hospital stay.    Strength Comprehensive (MMT)   Comment, General Manual Muscle Testing (MMT) Assessment Proximal sh strength flex/abd 4/5, remaining UE strength 4+/5. Pt fatiguing quickly with exercise   Muscle Tone Assessment   Muscle Tone Quick Adds No deficits were identified   Coordination   Upper Extremity Coordination No deficits were identified   ARC Assessment Only   Acute Rehab Functional Assessment See IP Rehab Daily Documentation Flowsheet for Functional Mobility/ADL Assessment   Bed Mobility   Comment (Bed Mobility) Min A supine <> EOB with railing.   Transfers   Transfer Comments Min A STS from EOB with walker, A x 2 pivot transfer.   Balance   Balance Assessment sitting balance: static;sitting balance: dynamic;sit to stand balance;standing balance: static;standing balance: dynamic   Sitting Balance: Static WNL   Sitting Balance: Dynamic WNL   Sit-to-Stand Balance fair balance   Standing Balance: Static fair balance   Standing Balance: Dynamic unable to balance   Instrumental Activities of Daily Living (IADL)   Previous Responsibilities meal prep;housekeeping   IADL Comments SO provided assistance with IADLs. Parents are legal guardians.    Clinical Impression   Criteria for Skilled Therapeutic Interventions Met (OT) yes   OT Diagnosis Decreased IND ADLs/IADLs   OT Problem  List-Impairments impacting ADL problems related to;activity tolerance impaired;balance;mobility;fear & anxiety;range of motion (ROM);strength   Assessment of Occupational Performance 5 or more Performance Deficits   Identified Performance Deficits Performance deficits include mobility, transfer, dressing, grooming, toileting, bathing, meal prep, household management, social skills.    Planned Therapy Interventions (OT) ADL retraining;IADL retraining;balance training;bed mobility training;cognition;fine motor coordination training;ROM;motor coordination training;stretching;strengthening;transfer training;home program guidelines;progressive activity/exercise   Clinical Decision Making Complexity (OT) high complexity   Therapy Frequency (OT) Daily   Predicted Duration of Therapy 2-3 weeks   Anticipated Equipment Needs Upon Discharge (OT) shower chair;walker, rolling   Risk & Benefits of therapy have been explained evaluation/treatment results reviewed;care plan/treatment goals reviewed;risks/benefits reviewed;current/potential barriers reviewed;participants voiced agreement with care plan;participants included;patient   Comment-Clinical Impression The pt is a 55 yr old female admitted to IP ARU following hospital stay for severe COVID 19 infection. Pt performance limited by deconditioning, weakness, impaired balance, O2 needs, and cognitive impairments. The pt will benefit from skilled OT intervention to address goals in POC and maximize functional IND and safety in discharge environment.    Total Evaluation Time (Minutes)   Total Evaluation Time (Minutes) 25

## 2021-04-29 NOTE — PROGRESS NOTES
Individualized Overall Plan Of Care (IOPOC)      Rehab diagnosis/Impairment Group Code: 16.0 debility 2/2 prolonged hospitalization from covid-19; ards  Critical illness myopathy       Expected functional outcome: to reach a level of mod I     Clinical Impression Comments: patient debilitated post prolonged medical course from Covid    Mobility: PT: Pt is s/p covid, respiratory failure , pneumonia , decondioning , decreased activity tolerance and needing supplemental O2.  Pt hopes to get back to walking with 4ww Reece in her apartment.     ADL: The pt is a 55 yr old female admitted to IP ARU following hospital stay for severe COVID 19 infection. Pt performance limited by deconditioning, weakness, impaired balance, O2 needs, and cognitive impairments. The pt will benefit from skilled OT intervention to address goals in POC and maximize functional IND and safety in discharge environment.     Communication/Cognition/Swallow:   cognitive impairment, premorbid     Intensity of therapy:   PT 90 minutes, Daily, for 17 days   OT 90 minutes, Daily, for 2-3 weeks        Education anticoagulation  Neuropsychology Testing: No      Medical Prognosis: good       Physician summary statement: patient debilitated post prolonged medical course from Covid, goal is to reach a level of mod I     Discharge destination: prior home  Discharge rehabilitation needs: home care, RN, PT and OT      Estimated length of stay: 18 days       Rehabilitation Physician David Siddiqui DO

## 2021-04-29 NOTE — PROGRESS NOTES
SPIRITUAL HEALTH SERVICES  SPIRITUAL ASSESSMENT Progress Note  [facility] ([facility] [place], [place]) ARU R546 4/29     REFERRAL SOURCE: Initial Visit from [employer]    Pt desired prayer for a friend who is experiencing challenges. Pt desired prayer for her healing and comfort. Pt received prayer.    PLAN: Will continue to follow up with pt while in unit.    Alyssa Monzon  Associate    Pager: [phone]

## 2021-04-29 NOTE — CARE CONFERENCE
Acute Rehab Care Conference/Team Rounds      Type: Team Rounds    Present: David Siddiqui MD, Leoncio Fisher PT DPT, Latonia Hankins OT, Rand Schmidt SW, Radha Vazquez RD, Sara Gonzalez RN, Patient Brissa Barlow      Discharge Barriers/Treatment/Education    Rehab Diagnosis: debility post Covid    Active Medical Co-morbidities/Prognosis:     Patient Active Problem List   Diagnosis Code     Essential hypertension, benign I10     Delay in development R62.50     Injury, other and unspecified, knee, leg, ankle, and foot S89.90XA, S99.929A, S99.919A     Perforation of tympanic membrane H72.90     Hyperlipidemia LDL goal <130 E78.5     Seizure disorder (H) G40.909     Other peripheral enthesopathies M77.8     IFG (impaired fasting glucose) R73.01     Insulin resistance E88.81     Elevated cortisol level R79.89     Morbid obesity (H) E66.01     Low vitamin D level R79.89     BMI 50.0-59.9, adult (H) Z68.43     SBO (small bowel obstruction) (H) K56.609     Non-intractable vomiting with nausea, unspecified vomiting type R11.2     Acute UTI N39.0     Acute respiratory failure with hypoxia (H) J96.01     Pneumonia due to 2019 novel coronavirus U07.1, J12.82     Critical illness myopathy G72.81         Safety:  Patient does not display impulsivity, is alert and oriented and able to make her needs known. She currently transfers with assist of 2 staff and a Liko lift.     Pain:  Patient denies any difficulty with pain or discomfort.    Medications, Skin, Tubes/Lines:  Patient does not have any pressure injury concerns. Staff monitoring erythema to abdominal folds and treating with Miconazole powder. She is able to reposition herself in bed without difficulty. On 2L of 02 via nasal cannula. Patient to participate in MAP prior to discharge if applicable.      Swallowing/Nutrition: reg with thins     Bowel/Bladder:  Patient is incontinent of bowel and bladder. Last bowel movement on 4/29/21. Purewick in place to prevent skin  breakdown. Patient should be encouraged to participate in timed toileting. Unsure if this incontinence is new since hospitalization or if she will need to plan to manage this after discharge.     Psychosocial: Parent's are legal guardians. Pt lives in an apt with her s/o Sudhir (pt stated that they are  but are not). Pt has a CADI waiver. Reports depression, mood good with medication. No substance use or concerns. No financial concerns. Good support. On disability.     ADLs/IADLs: Initial evaluation limited by stomach pain. Able to continue mobility evaluation this am, plan to complete more ADLs in pm.  Pt completing Min A bed mobility and Min A STS with 4ww. Pt on 1L O2 NC with activity but O2 sats at 98%. Pt requiring total A toileting and dressing, plan to advance to consistently using BSC as mobility improves. Performance limited by deconditioning, weakness, O2 needs, and impaired balance. Goals to advance to Mod IND functional mobility and ADLs. Anticipate pt will require increased assistance for IADLs at time of discharge. Pt parents (appointed legal guardians) are very supportive and report they would like to collaborate with pt  to increase in home services following discharge. ELOS 2 weeks with follow up HC OT/HHA.      Mobility: Evaluation limited by stomach pain. Will plan to further assess mobility today as pain allows.    Cognition/Language: Baseline developmental delay, did receive assistance from significant other for IADLs prior to hospital admission. Parents are legal guardians. Plan to assess from functional standpoint and provide discharge recommendations as needed. Will complete SLP referral as necessary.    Community Re-Entry: Anticipate transition to home care post discharge.    Transportation: Anticipate  to provide. Will need to assess/practice car transfer prior to discharge.    Decision maker: self    Plan of Care and goals reviewed and updated.    Discharge  Plan/Recommendations    Fall Precautions: continue    Patient/Family input to goals: yes     Estimated length of stay: 14 days     Overall plan for the patient: reach a level of mod I       Utilization Review and Continued Stay Justification    Medical Necessity Criteria:    For any criteria that is not met, please document reason and plan for discharge, transfer, or modification of plan of care to address.    Requires intensive rehabilitation program to treat functional deficits?: Yes    Requires 3x per week or greater involvement of rehabilitation physician to oversee rehabilitation program?: Yes    Requires rehabilitation nursing interventions?: Yes    Patient is making functional progress?: Yes    There is a potential for additional functional progress? Yes    Patient is participating in therapy 3 hours per day a minimum of 5 days per week or 15 hours per week in 7 day period?:Yes    Has discharge needs that require coordinated discharge planning approach?:Yes      Barriers/Concerns related to meeting medical necessity criteria:  none    Team Plan to Address Concern:  As needed       Final Physician Sign off    Statement of Approval:  David Siddiqui, DO      Patient Goals  Social Work Goals: Confirm discharge recommendations with therapy, coordinate safe discharge plan and remain available to support and assist as needed.    OT Frequency: 60-90 min daily  OT goal: hygiene/grooming: independent  OT goal: upper body dressing: Independent  OT goal: lower body dressing: Modified independent  OT goal: upper body bathing: Supervision/stand-by assist  OT goal: lower body bathing: Supervision/stand-by assist  OT goal: bed mobility: Modified independent  OT Goal: transfer: Modified independent   OT goal: toilet transfer/toileting: Modified independent  OT goal: meal preparation: Supervision/stand-by assist, with simple meal preparation  OT goal: perform aerobic activity with stable cardiovascular response: 10  minutes, continuous activity  OT goal 1: Pt will demo SBA with shower transfer using DME/AE prn  OT goal 2: Pt will demo IND with BUE HEP with focus on strengthening and endurance    PT Frequency: Daily PT x 16 days, 60 min per day.  PT goal: bed mobility: Independent, Supine to/from sit, Rolling, Bridging  PT goal: transfers: Modified independent, Sit to/from stand, Bed to/from chair, Assistive device(4ww)  PT goal: gait: Modified independent, Rolling walker, 150 feet  PT goal: perform aerobic activity with stable cardiovascular response: intermittent activity, 5 minutes, ambulation  PT goal 1: Pt able to perform a car transfer with 4ww and sba  PT Goal 2: Pt and family I with HEP for improved LE and core  strength, endurance for improved mobility.                         Nursing Goals  Bowel and Bladder care  Fall prevention   Medication Education  Skin Care protection

## 2021-04-29 NOTE — PROGRESS NOTES
Clinic Care Coordination Contact    Situation: Patient chart reviewed by care coordinator.    Background: Past medical history of developmental delay, HTN, asthma and morbid obesity who presented to [facility] ED on 04/08/2021 with cough and shortness of breath. She was admitted for COVID-19 pneumonia and acute hypoxic respiratory failure.    Assessment: She was admitted to ARU on 04/27/2021 for ongoing rehabilitation ongoing therapies in the acute inpatient rehabilitation setting.     Plan/Recommendations: [facility] will monitor for discharge and will outreach at that time.    JOSE A Warren Care Coordination Team  [phone]

## 2021-04-29 NOTE — PLAN OF CARE
FOCUS/GOAL  Bowel management, Bladder management and Medical management    ASSESSMENT, INTERVENTIONS AND CONTINUING PLAN FOR GOAL:  Patient is alert and oriented and able to make her needs known. She did not transfer out of bed this shift and is able to reposition in bed independently. Patient has Purewick in place overnight; exchanged x2. She was incontinent of a small amount of stool and was dependent for mirna-cares in bed. Skin intact. Barrier cream applied to bilateral buttocks. Patient denied difficulty with pain or discomfort. She remains on 2L of 02 via nasal cannula. Decreased to 1.5L this morning. She denied difficulty with shortness of breath or cough. Lung sounds diminished in bilateral bases upon auscultation. Special precautions maintained this shift. Staff to continue with plan of care.

## 2021-04-29 NOTE — PROGRESS NOTES
04/28/21 0757   Quick Adds   Type of Visit Initial PT Evaluation   Living Environment   People in home spouse   Current Living Arrangements apartment   Home Accessibility no concerns   Transportation Anticipated family or friend will provide   Living Environment Comments PT: Pt states 10 stairs in fron to apt building.  But can use elevator in back , no stairs that way.    Self-Care   Usual Activity Tolerance good   Current Activity Tolerance poor   Regular Exercise No   Equipment Currently Used at Home grab bar, toilet;grab bar, tub/shower;shower chair;walker, rolling   Activity/Exercise/Self-Care Comment PT: pt 's mom is bringing a rollator for pt;   pt has a lift chair at home.    Pt states she likes to color on her cell phone and play with her guinea pigs ( has 7 guinea pigs ).    Pt states she would walk in back of apt building, take elevator to 4th floor, then hiusband would push her to their room with pt sitting on 4ww.  Pt 's spouse helps with cooking, cleaning.  Pt has been  7 and a half years.    Disability/Function   Hearing Difficulty or Deaf no   Wear Glasses or Blind yes   Vision Management wears glasses   Concentrating, Remembering or Making Decisions Difficulty no   Concentration Management borderline developmental delay.    Difficulty Communicating no   Difficulty Eating/Swallowing no   Walking or Climbing Stairs Difficulty yes   Walking or Climbing Stairs ambulation difficulty, requires equipment;stair climbing difficulty, requires equipment   Mobility Management used 4ww at home    Dressing/Bathing Difficulty no   Toileting issues no   Doing Errands Independently Difficulty (such as shopping) yes   Errands Management spouse assists.    Fall history within last six months no   Change in Functional Status Since Onset of Current Illness/Injury yes   General Information   Onset of Illness/Injury or Date of Surgery 04/08/21   Referring Physician Dr. Siddiqui,  Dr Farr    Patient/Family  Therapy Goals Statement (PT) PT: Pt wants to get home.  Pt states her  misses her.  Pt states she feels weak.   Pt with stomach ache since eating breakfast this morning.   Pt declined mobility training, inclusing sitting eob.    Pertinent History of Current Problem (include personal factors and/or comorbidities that impact the POC) Brissa Barlow is a 55 year old female, with developmental delay, HTN, asthma and morbid obesity who was admitted to [facility] 04/08/2021 with acute hypoxic respiratory failure secondary to COVID19 pneumonia. She required intubation 04/11 that was complicated by prolonged paralysis and circulatory shock requiring vasopressors. She eventually improved and weaned from the ventilator on 04/21 for a total of 10 day intubation.    Existing Precautions/Restrictions fall;oxygen therapy device and L/min   Heart Disease Risk Factors Overweight   General Observations PT: pt on special precautions.  On 2 L O2/min.    Cognition   Orientation Status (Cognition) other (see comments)   Cognitive Status Comments PT: pt states rehab center, can state APril 2021, but not date.    Pain Assessment   Patient Currently in Pain Yes, see Vital Sign flowsheet  (L shoulder phone.  )   Integumentary/Edema   Integumentary/Edema no deficits were identifed   Posture    Posture Forward head position   Range of Motion (ROM)   ROM Comment B WFL B Les.    Strength   Strength Comments PT: hip flex 3 +/5, knee ext 4/5, flex 4/5 B LEs, ankle DF/ PF 4/5.  hip abd about 3/5 B LEs.    ARC Assessment Only   Acute Rehab Functional Assessment See IP Rehab Daily Documentation Flowsheet for Functional Mobility/ADL Assessment   Balance   Balance other (describe)   Balance Comments PT: pt able to sit EOB without UE support.  Pt needs B UEs on 4ww for support in standing, limited standing tolerance due to fatigue, Lightheaded.     Sensory Examination   Sensory Perception patient reports no sensory changes    Coordination   Coordination no deficits were identified   Muscle Tone   Muscle Tone no deficits were identified   Clinical Impression   Criteria for Skilled Therapeutic Intervention yes, treatment indicated   PT Diagnosis (PT) PT: Impaired mobility, ADLs due to debility , repisratory failure.    Influenced by the following impairments PT: Pt with decreased pulmonary status, needing supplemental O2, UE, LE and core weakness, decreased activity tolerance.   Pt with stomach upset 4/28, and some lightheadedness and lower BP sitting on 4/29.    Functional limitations due to impairments PT: Pt with difficulty with bed mobility, transfers and unable to ambulate at this time.  Pt with stomach issues on 4/28 and some lightheadedness in sitting on 4/29.  Pt not able to amb in the community at this time.    Clinical Presentation Evolving/Changing   Clinical Presentation Rationale Pt unable to ambulate, was Reece.    Clinical Decision Making (Complexity) moderate complexity   Therapy Frequency (PT) Daily   Predicted Duration of Therapy Intervention (days/wks) 17 days    Planned Therapy Interventions (PT) balance training;bed mobility training;gait training;home exercise program;neuromuscular re-education;patient/family education;strengthening;stretching;transfer training   Risk & Benefits of therapy have been explained evaluation/treatment results reviewed;care plan/treatment goals reviewed;risks/benefits reviewed;current/potential barriers reviewed;participants voiced agreement with care plan;participants included;patient   Clinical Impression Comments PT: Pt is s/p covid, respiratory failure , pneumonia , decondioning , decreased activity tolerance and needing supplemental O2.  Pt hopes to get back to walking with 4ww Reece in her apartment.    Total Evaluation Time   Total Evaluation Time (Minutes) 15   Skin WDL   Skin WDL   (Nose is intact )   Skin Integrity/Characteristics bruised

## 2021-04-29 NOTE — PLAN OF CARE
Discharge Planner Post-Acute Rehab PT:     Discharge Plan: Pt lives with her spouse in an apartment.  Pt can use an elevator to get to 4th floor apartment.  Pt owns a 4ww.  Pt states her  pushes her while she sits on the 4ww from the elevator to their apartment.       Precautions: falls.  Per chart, pt with developmental delay.     Current Status:  Bed Mobility: rolling with a rail, sba.  Scooting in bed, head flat, with sba.   Transfer: sup to sit, rail and modA, needs A at trunk.  STS with 4ww David, limited standing tolerance due to lightheaded, fatigue.    Gait: NT   Stairs: NT   Balance: able to sit EOB sba.  Stand, needs B UE support on 4ww.     Assessment:  Pt able to participate more with mobility training , but missed minutes due to fatigue, lightheaded in sitting/standing.  Pt given encouragement to fully participate with therapy sessions.  Added spandigrips to B LEs to help with compression and encouraged to drink more water to help with BP. Continue toward goals. Pt tolerated activity without O2 , sitting EOB.   Other Barriers to Discharge (DME, Family Training, etc): Pt may need family training with her spouse. Pt owns a 4ww and pt stated her mom may bring it to the ARU.

## 2021-04-29 NOTE — PLAN OF CARE
Patient is alert and oriented x4, denies pain this shift. Is golvo lift for transfers, WC based. Ax1-2 for cares as patient has good bed mobility. Patient is incontinent of bladder. Discussed with patient timed toileting to promote independence during the day, with Katharine for NOC. Patient agreeable at this time. Was able to go to the [facility] with therapy and was continent of BB. Patient refused to order breakfast or lunch. When discussed with patient at a later time, patient then reported that she would order lunch. Fluids encouraged throughout shift. Patient is tolerating room air with sats remaining above 90%, continue to spot check. Tubigrips in place on BLE to prevent orthostatic BP. Bed alarms on for safety, call light is within reach. Continue with POC.

## 2021-04-30 ENCOUNTER — APPOINTMENT (OUTPATIENT)
Dept: OCCUPATIONAL THERAPY | Facility: CLINIC | Age: 56
End: 2021-04-30
Payer: MEDICARE

## 2021-04-30 ENCOUNTER — APPOINTMENT (OUTPATIENT)
Dept: PHYSICAL THERAPY | Facility: CLINIC | Age: 56
End: 2021-04-30
Payer: MEDICARE

## 2021-04-30 PROCEDURE — 99232 SBSQ HOSP IP/OBS MODERATE 35: CPT | Performed by: PHYSICAL MEDICINE & REHABILITATION

## 2021-04-30 PROCEDURE — 250N000013 HC RX MED GY IP 250 OP 250 PS 637: Performed by: PHYSICAL MEDICINE & REHABILITATION

## 2021-04-30 PROCEDURE — 97110 THERAPEUTIC EXERCISES: CPT | Mod: GO | Performed by: OCCUPATIONAL THERAPIST

## 2021-04-30 PROCEDURE — 97530 THERAPEUTIC ACTIVITIES: CPT | Mod: GP | Performed by: PHYSICAL THERAPIST

## 2021-04-30 PROCEDURE — 97110 THERAPEUTIC EXERCISES: CPT | Mod: GP | Performed by: PHYSICAL THERAPIST

## 2021-04-30 PROCEDURE — 128N000003 HC R&B REHAB

## 2021-04-30 PROCEDURE — 97535 SELF CARE MNGMENT TRAINING: CPT | Mod: GO | Performed by: OCCUPATIONAL THERAPIST

## 2021-04-30 PROCEDURE — 250N000013 HC RX MED GY IP 250 OP 250 PS 637

## 2021-04-30 RX ORDER — POLYETHYLENE GLYCOL 3350 17 G/17G
17 POWDER, FOR SOLUTION ORAL DAILY PRN
Status: DISCONTINUED | OUTPATIENT
Start: 2021-04-30 | End: 2021-05-12 | Stop reason: HOSPADM

## 2021-04-30 RX ORDER — LISINOPRIL 10 MG/1
10 TABLET ORAL DAILY
Status: DISCONTINUED | OUTPATIENT
Start: 2021-05-01 | End: 2021-05-12 | Stop reason: HOSPADM

## 2021-04-30 RX ADMIN — SERTRALINE HYDROCHLORIDE 100 MG: 100 TABLET ORAL at 08:10

## 2021-04-30 RX ADMIN — CARBAMAZEPINE 400 MG: 200 TABLET ORAL at 08:10

## 2021-04-30 RX ADMIN — APIXABAN 2.5 MG: 2.5 TABLET, FILM COATED ORAL at 21:48

## 2021-04-30 RX ADMIN — APIXABAN 2.5 MG: 2.5 TABLET, FILM COATED ORAL at 08:10

## 2021-04-30 RX ADMIN — OMEPRAZOLE 20 MG: 20 CAPSULE, DELAYED RELEASE ORAL at 06:23

## 2021-04-30 RX ADMIN — CARBAMAZEPINE 200 MG: 200 TABLET ORAL at 12:57

## 2021-04-30 RX ADMIN — MICONAZOLE NITRATE: 20 POWDER TOPICAL at 08:11

## 2021-04-30 RX ADMIN — CARBAMAZEPINE 400 MG: 200 TABLET ORAL at 21:48

## 2021-04-30 RX ADMIN — ACETAMINOPHEN 650 MG: 325 TABLET, FILM COATED ORAL at 13:14

## 2021-04-30 RX ADMIN — MICONAZOLE NITRATE: 20 POWDER TOPICAL at 21:53

## 2021-04-30 NOTE — PLAN OF CARE
FOCUS/GOAL  Medical management, Cognition/Memory/Judgment/Problem solving, and Safety management    ASSESSMENT, INTERVENTIONS AND CONTINUING PLAN FOR GOAL:    Pt is alert and oriented. Some noticeable developmental delays. Uses the call light appropriately. With oxygen at 1LPM via nasal cannula. Sats at 96%. Denied pain,sob, new weakness, numbness or tingling. Some mild edema noted on rubio ankles. Bp checked at the start of the shift at 89/42, Asyptomatic. Higher than earlier Bp's. PMR was informed in the evenings. Fluids encouraged. Is on a purewick overnights for trial voids. With adequate output of clear concentrated yellow urine. No Bm this shift. Bed alarm on. Padded side rails for seizure precautions. BP early in the morning at 140/44. Will continue POC.

## 2021-04-30 NOTE — PROGRESS NOTES
Patient A&O and able to make her needs known. Denied SOB/LAZAR and pain. Denied CP, lightheadedness, dizziness, numbness or tingling. Pt is drinking well and voiding spontaneously without difficulties. Uses bed side commode with assist of 2. Pt repositioned and turned in bed, heels elevated off bed, demonstrates the ability to use call light appropriately. Will continue with POC

## 2021-04-30 NOTE — PLAN OF CARE
Discharge Planner Post-Acute Rehab OT:     Discharge Plan: Home with HC    Precautions: Falls    Current Status:  ADLs: Min A SPT with 4ww, w/c based mobility. Min A UB dressing, Total A LB dressing, Total A toileting, and Min A grooming. ELOS 2 weeks with follow up HC OT/HHA.   IADLs: To be assessed  Vision/Cognition: Borderline developmental delay. Wears glasses, reports these were lost at the hospital.    Assessment: Pt progressing with ADLs and functional mobility, improved participation. Performance limited by deconditioning, weakness, and impaired balance. Goals to advance to Mod IND functional mobility and ADLs. Recommend ongoing IP OT.     Other Barriers to Discharge (DME, Family Training, etc):   DME needs and caregiver training needs to be determined pending progress.

## 2021-04-30 NOTE — PLAN OF CARE
Discharge Planner Post-Acute Rehab PT:      Discharge Plan: Pt lives with her spouse in an apartment.  Pt can use an elevator to get to 4th floor apartment.  Pt owns a 4ww.  Pt states her  pushes her while she sits on the 4ww from the elevator to their apartment.      Precautions: falls     Current Status:  Bed Mobility: rolling with a rail, sba.  Scooting in bed, head flat, with sba.   Transfer: Variable CGA<>min A pending fatigue with 4WW  Gait: up to 5ft with 4WW, CGA EOB<>recliner chair, but declines further assessment d/t fatigue  Stairs: NT   Balance: able to sit EOB sba.  Stand, needs B UE support on 4ww.      Assessment: Pt continues to require encouragement for active participation t/o PT sessions. Able to progress to more consistent stand pivot transfer with use of her personal 4WW, variable CGA<>min Ax2. VSS with activity, with SpO2 92% on RA, HR 92 bpm.    Other Barriers to Discharge (DME, Family Training, etc): Pt may need family training with her spouse. Pt has her personal 4WW here on the unit.

## 2021-04-30 NOTE — PROGRESS NOTES
"    [facility]   Acute Rehabilitation Unit  Inpatient Progress Note    CHIEF COMPLAINT   Critical illness myopathy    SUBJECTIVE  - Patient was seen at bedside. No acute events overnight.   - Denies chest pain, shortness of breath, no fever or chills.  - Goal for BM today.  - Weaned down BP medications given soft BP, encourage patient up out of bed.    10 point review of systems was otherwise negative other    OBJECTIVE    Vital Signs   BP (!) 91/38 (BP Location: Left arm)   Pulse 87   Temp 96.7  F (35.9  C) (Oral)   Resp 18   Ht 1.702 m (5' 7\")   Wt (!) 158.5 kg (349 lb 8 oz)   LMP 08/18/2008   SpO2 90%   BMI 54.74 kg/m    BMI:  Estimated body mass index is 54.74 kg/m  as calculated from the following:    Height as of this encounter: 1.702 m (5' 7\").    Weight as of this encounter: 158.5 kg (349 lb 8 oz).     Physical Examination  General: Awake, alert, NAD  Cardiovascular: RRR, no extra heart sounds, no murmur  Pulmonary: Non-labored breathing, CTAB, no wheeze, no rhonci  Abdominal: obese, soft, non-tender, non-distended  Extremities: warm, well perfused  NEURO: Moving all extremities against gravity, no changes to deficits.    LABS    Lab Results   Component Value Date    WBC 10.2 04/28/2021     Lab Results   Component Value Date    RBC 2.66 04/28/2021     Lab Results   Component Value Date    HGB 8.1 04/28/2021     Lab Results   Component Value Date    HCT 25.7 04/28/2021     Lab Results   Component Value Date    MCV 97 04/28/2021     Lab Results   Component Value Date    MCH 30.5 04/28/2021     Lab Results   Component Value Date    MCHC 31.5 04/28/2021     Lab Results   Component Value Date    RDW 21.4 04/28/2021     Lab Results   Component Value Date     04/28/2021         IMAGING  No recent images to review.     ASSESSMENT/PLAN:  Brissa Barlow is a 55 year old female, with developmental delay, HTN, asthma and morbid obesity who was admitted to [place] [facility] on 04/08/2021 with acute hypoxic respiratory failure secondary to COVID19 pneumonia. She required intubation 04/11 that was complicated by prolonged paralysis and circulatory shock. She was weaned from the ventilator on 04/21 and deemed appropriate for acute rehabilitation for critical illness myopathy on 04/27/2021. Current deficits include impaired strength and mobility.     Admission to acute inpatient rehab.    Impairment group code: Critical illness myopathy    1. Impairment of ADL's: OT daily to work on ADL re-training such as grooming, self cares and bathing.    2. Impairment of mobility: PT daily to work on neuromuscular re-education focusing on strength, balance, coordination, and endurance.    3. Rehab RN for med administration, bowel regimen, glucose monitoring.    4. Medical Conditions:  # Acute Hypoxic Respiratory Failure secondary to COVID-19 pneumonia  Initial chest CT with contrast showed multifocal airspace disease. S/P remdesivir and 10 day course steroids. Required intubation 04/11-04/21.  - Vitals QSHIFT, continue supplement O2, currently on 1 LPM, maintain saturation > 90%  - Encourage breathing exercises and incentive spirometery  - Albuterol neb Q4H PRN     - DVT prophylaxis (see below), start apixaban tomorrow for 30 days    # Anemia of undetermined etiology  Hgb 8.2 on 04/28, this is increasing from 7.6 on 04/27 and 7.9 on 04/26. Overall upward trend, will repeat labs on Friday morning to prevent excessive blood draws.  - CBC FRI AM  - Iron panel    # Seizure disorder  - Carbamazepine 400 mg PO QAM, 200 mg QNOON, 400 mg QHS    # Transaminitis  - Stable, unclear significance at this point. Repeat LFTs on Monday.  - LFT MON    # Delay in development  - Parents concerned for underlying psychiatric disorder during prior admission. Patient was seen by psychiatry who did not make any further recommendations  - Zoloft 100 mg DAILY    # Benign essential hypertension  - Blood pressure  have been running soft. Decreased Lasix to 20 mg BID --- no discontinued  - continue PTA lisinopril, dose adjusted to 10 mg     Chronic medical conditions:  # Mild intermittent asthma - nebs as needed.  # Gastroesophageal reflux disease without esophagitis - continue omeprazole  # Hyperlipidemia LDL goal <130 - restart statin in a few days.  # H/o splenectomy     5. Adjustment to disability:  Clinical psychology to eval and treat  6. FEN: Regular Diet, Thin Liquids  7. Bowel: PRN senna/docusate. Last bowel movement on 04/26 per patient  8. Bladder: Had indwelling gary during previous admission. This was discontinued on day of ARU admission. Attempt purwick with straight cath protocol  9. Pain management: Tylenol PRN  10. DVT Prophylaxis: Apixaban 2.5 mg BID   11. Code: Full code per previous admssion  12. Disposition: Plan to discharge to home pending further evaluation  13. ELOS:  2-3 weeks.  14. Rehab prognosis:  Good  15. Follow up Appointments on Discharge:   1. Physical medicine and rehabilitation  2. Pulmonary for obstructive sleep apnea          David Siddiqui DO          I spent a total of 25 minutes face to face and coordinating care of Brissa Barlow. Over 50% of my time on the unit was spent counseling the patient and /or coordinating care regarding debility post Covid.

## 2021-04-30 NOTE — PLAN OF CARE
Patient is alert and oriented x4, complains of pain in tailbone this shift. PRN Tylenol given x1 with some relief per patient report. Is Ax2 SPT for transfers, WC based. Ax1-2 for cares as patient has good bed mobility. Patient has been continent of bladder using BSC, no BM this shift. Patient is tolerating diet well but has poor appetite. Needs encouragement to order meals and eat. Fluids encouraged throughout shift. Patient is tolerating room air with sats remaining above 90%, continue to spot check. Bps remain soft, MD aware. Tubigrips in place on BLE to prevent orthostatic BP. Bed alarms on for safety, call light is within reach. Continue with POC.

## 2021-05-01 ENCOUNTER — APPOINTMENT (OUTPATIENT)
Dept: OCCUPATIONAL THERAPY | Facility: CLINIC | Age: 56
End: 2021-05-01
Payer: MEDICARE

## 2021-05-01 ENCOUNTER — APPOINTMENT (OUTPATIENT)
Dept: PHYSICAL THERAPY | Facility: CLINIC | Age: 56
End: 2021-05-01
Payer: MEDICARE

## 2021-05-01 PROCEDURE — 97530 THERAPEUTIC ACTIVITIES: CPT | Mod: GP | Performed by: PHYSICAL THERAPIST

## 2021-05-01 PROCEDURE — 250N000013 HC RX MED GY IP 250 OP 250 PS 637

## 2021-05-01 PROCEDURE — 97110 THERAPEUTIC EXERCISES: CPT | Mod: GO | Performed by: OCCUPATIONAL THERAPIST

## 2021-05-01 PROCEDURE — 128N000003 HC R&B REHAB

## 2021-05-01 PROCEDURE — 99233 SBSQ HOSP IP/OBS HIGH 50: CPT | Performed by: PHYSICAL MEDICINE & REHABILITATION

## 2021-05-01 PROCEDURE — 97110 THERAPEUTIC EXERCISES: CPT | Mod: GP | Performed by: PHYSICAL THERAPIST

## 2021-05-01 PROCEDURE — 250N000013 HC RX MED GY IP 250 OP 250 PS 637: Performed by: PHYSICAL MEDICINE & REHABILITATION

## 2021-05-01 PROCEDURE — 97535 SELF CARE MNGMENT TRAINING: CPT | Mod: GO | Performed by: OCCUPATIONAL THERAPIST

## 2021-05-01 RX ADMIN — SERTRALINE HYDROCHLORIDE 100 MG: 100 TABLET ORAL at 08:25

## 2021-05-01 RX ADMIN — APIXABAN 2.5 MG: 2.5 TABLET, FILM COATED ORAL at 21:06

## 2021-05-01 RX ADMIN — MICONAZOLE NITRATE: 20 POWDER TOPICAL at 08:29

## 2021-05-01 RX ADMIN — LISINOPRIL 10 MG: 10 TABLET ORAL at 08:25

## 2021-05-01 RX ADMIN — CARBAMAZEPINE 400 MG: 200 TABLET ORAL at 21:06

## 2021-05-01 RX ADMIN — MICONAZOLE NITRATE: 20 POWDER TOPICAL at 21:06

## 2021-05-01 RX ADMIN — APIXABAN 2.5 MG: 2.5 TABLET, FILM COATED ORAL at 08:25

## 2021-05-01 RX ADMIN — OMEPRAZOLE 20 MG: 20 CAPSULE, DELAYED RELEASE ORAL at 08:25

## 2021-05-01 RX ADMIN — CARBAMAZEPINE 400 MG: 200 TABLET ORAL at 08:25

## 2021-05-01 RX ADMIN — CARBAMAZEPINE 200 MG: 200 TABLET ORAL at 12:17

## 2021-05-01 NOTE — PLAN OF CARE
Pt A+O, voices her needs appropriately, incontinent of bladder, purewick at night and no BM this shift.  Pt is independent with positioning. Pt's BP @ 02:00 114/54, measurement taken with blue adult cuff to lower R forearm, supine position. Pt had no c/o SOB.  Pt slept well this shift.

## 2021-05-01 NOTE — PLAN OF CARE
Discharge Planner Post-Acute Rehab PT:      Discharge Plan: Pt lives with her spouse in an apartment.  Pt can use an elevator to get to 4th floor apartment.  Pt owns a 4ww.  Pt states her  pushes her while she sits on the 4ww from the elevator to their apartment.      Precautions: falls     Current Status:  Bed Mobility: SBA with use of rail  Transfer: Variable CGA<>min A pending fatigue with 4WW  Gait: up to 5ft with 4WW, CGA EOB<>recliner chair, but declines further assessment d/t fatigue  Stairs: NT   Balance: Seated SBA EOB. CGA/SBA once standing with UE support through 4WW     Assessment: Pt continues to require encouragement for active participation t/o PT sessions. Able to progress to more consistent stand pivot transfer with use of her personal 4WW and CGA today. Attempted further progression of ambulation tolerance, considering her performance with LE ex and floor bike, but pt declines d/t fatigue. VSS with activity, with SpO2 >=92% on RA, HR <=98 bpm.     Other Barriers to Discharge (DME, Family Training, etc): Pt may need family training with her spouse. Pt has her personal 4WW here on the unit.  Handout provided for bedrail, floor bike via [employer], which mom can purchase.

## 2021-05-01 NOTE — PROGRESS NOTES
PM&R PROGRESS NOTE     Patient Active Problem List   Diagnosis     Essential hypertension, benign     Delay in development     Injury, other and unspecified, knee, leg, ankle, and foot     Perforation of tympanic membrane     Hyperlipidemia LDL goal <130     Seizure disorder (H)     Other peripheral enthesopathies     IFG (impaired fasting glucose)     Insulin resistance     Elevated cortisol level     Morbid obesity (H)     Low vitamin D level     BMI 50.0-59.9, adult (H)     SBO (small bowel obstruction) (H)     Non-intractable vomiting with nausea, unspecified vomiting type     Acute UTI     Acute respiratory failure with hypoxia (H)     Pneumonia due to 2019 novel coronavirus     Critical illness myopathy       HPI   Brissa Barolw is a 55 year old female admitted to the ARU on 4/27/2021    She has a history of   developmental delay, HTN, asthma and morbid obesity who was admitted to [facility] on 04/08/2021 with acute hypoxic respiratory failure secondary to COVID19 pneumonia. She required intubation 04/11 that was complicated by prolonged paralysis and circulatory shock. She was weaned from the ventilator on 04/21 and deemed appropriate for acute rehabilitation for critical illness myopathy on 04/27/2021. Current deficits include impaired strength and mobility.        From the functional perspective, she is participating in PT/OT and is motivated. She was seen in therapies, she is  able to amb 4-5ft with 4ww but mainly w/c based during ADLs. Min A UB dressing, Total A LB dressing, Total A toileting, and Min A grooming.   Her performance is noted to be variable.     Medically,   Acute Hypoxic Respiratory Failure secondary to COVID-19 pneumonia  Initial chest CT with contrast showed multifocal airspace disease. S/P remdesivir and 10 day course steroids. Required intubation 04/11-04/21.  - Vitals QSHIFT, continue supplement O2, currently on 1 LPM, maintain saturation > 90%  - Encourage breathing  "exercises and incentive spirometery  - Albuterol neb Q4H PRN     - DVT prophylaxis, start apixaban tomorrow for 30 days     # Anemia of undetermined etiology  Hgb 8.2 on 04/28, this is increasing from 7.6 on 04/27 and 7.9 on 04/26. Overall upward trend, will repeat labs on Friday morning to prevent excessive blood draws.  - CBC FRI AM  - Iron panel    # Seizure disorder  - Carbamazepine 400 mg PO QAM, 200 mg QNOON, 400 mg QHS  - continue sz precautions      BS are wnl.     Orders Placed This Encounter      Regular Diet Adult        Review Of Systems  Total of ten systems reviewed, pertinent positives and negatives as follows  Denies chest pain fever chills rigors  Denies any shortness of breath   Denies any nausea or vomiting   Denies any lightheadedness or dizziness   Reports right/left sided weakness   Orders Placed This Encounter      Regular Diet Adult  Remainder of the review of the systems was negative        /71 (BP Location: Right arm)   Pulse 87   Temp 95.9  F (35.5  C) (Oral)   Resp 20   Ht 1.702 m (5' 7\")   Wt (!) 158.5 kg (349 lb 8 oz)   LMP 08/18/2008   SpO2 95%   BMI 54.74 kg/m    Physical exam    Patient is sitting in bed comfortable in no acute distress   HEENT NC AT PRTL EOM good   Neck supple  Heart S1S2  Lungs CTA  Abdomen  benign BS positive NT NR   LE no edema         Current Facility-Administered Medications   Medication     acetaminophen (TYLENOL) tablet 650 mg     albuterol (PROVENTIL) neb solution 2.5 mg     apixaban ANTICOAGULANT (ELIQUIS) tablet 2.5 mg     carBAMazepine (TEGretol) tablet 200 mg     carBAMazepine (TEGretol) tablet 400 mg     lisinopril (ZESTRIL) tablet 10 mg     miconazole (MICATIN) 2 % powder     omeprazole (priLOSEC) CR capsule 20 mg     polyethylene glycol (MIRALAX) Packet 17 g     senna-docusate (SENOKOT-S/PERICOLACE) 8.6-50 MG per tablet 1-4 tablet     sertraline (ZOLOFT) tablet 100 mg        Labs:  Lab Results   Component Value Date    WBC 10.2 " 04/28/2021    HGB 8.1 (L) 04/28/2021    HCT 25.7 (L) 04/28/2021     04/28/2021     04/28/2021    POTASSIUM 3.8 04/28/2021    CHLORIDE 100 04/28/2021    CO2 26 04/28/2021    BUN 27 04/28/2021    CR 0.66 04/28/2021    GLC 99 04/28/2021    SED 9 04/22/2015    DD 0.9 (H) 04/13/2021    NTBNPI 73 04/08/2021    TROPI <0.015 04/08/2021    AST 89 (H) 04/15/2021    ALT 65 (H) 04/15/2021    ALKPHOS 123 04/15/2021    BILITOTAL 0.6 04/15/2021       Attestation:  This patient has been seen and evaluated by me, Zuri Junior MD.    Total time: 35 Minutes more than 50% in Care coordination on the floor/ counseling the patient face to face   Zuri Junior MD, [facility] [place]   Department of Rehabilitation

## 2021-05-01 NOTE — PLAN OF CARE
Pt is alert and oriented x 4, she used the call light appropriately for assistance. Abdominal fold redness is clearing, she has antifungal powder to treat that. Pt had shower with OT this morning. We will continue to monitor for safety while assisting with activity of daily livings.

## 2021-05-01 NOTE — PLAN OF CARE
Discharge Planner Post-Acute Rehab OT:     Discharge Plan: Home with HC    Precautions: Falls    Current Status:  ADLs: CGA SPT with 4ww, able to amb 4-5ft with 4ww but mainly w/c based during ADLs. Min A UB dressing, Total A LB dressing, Total A toileting, and Min A grooming.   IADLs: To be assessed  Vision/Cognition: Borderline developmental delay. Wears glasses. No acute changes.    Assessment: Requiring encouragement to progress goals in therapy, self limiting. Pt gradually building consistency with functional transfers and improving upright activity tolerance. Pt continues to require physical assistance for ADLs. Encouraged pt to wean off purewick overnight as she is able to safely pivot transfer to the commode with the 4ww. Pt will benefit from ongoing OT to progress ADLs/IADLs.     Other Barriers to Discharge (DME, Family Training, etc):   DME needs and caregiver training needs to be determined pending progress.

## 2021-05-02 ENCOUNTER — APPOINTMENT (OUTPATIENT)
Dept: OCCUPATIONAL THERAPY | Facility: CLINIC | Age: 56
End: 2021-05-02
Payer: MEDICARE

## 2021-05-02 ENCOUNTER — APPOINTMENT (OUTPATIENT)
Dept: PHYSICAL THERAPY | Facility: CLINIC | Age: 56
End: 2021-05-02
Payer: MEDICARE

## 2021-05-02 PROCEDURE — 97530 THERAPEUTIC ACTIVITIES: CPT | Mod: GP | Performed by: PHYSICAL THERAPIST

## 2021-05-02 PROCEDURE — 97110 THERAPEUTIC EXERCISES: CPT | Mod: GO | Performed by: OCCUPATIONAL THERAPIST

## 2021-05-02 PROCEDURE — 97535 SELF CARE MNGMENT TRAINING: CPT | Mod: GO | Performed by: OCCUPATIONAL THERAPIST

## 2021-05-02 PROCEDURE — 250N000013 HC RX MED GY IP 250 OP 250 PS 637

## 2021-05-02 PROCEDURE — 250N000013 HC RX MED GY IP 250 OP 250 PS 637: Performed by: STUDENT IN AN ORGANIZED HEALTH CARE EDUCATION/TRAINING PROGRAM

## 2021-05-02 PROCEDURE — 97530 THERAPEUTIC ACTIVITIES: CPT | Mod: GO | Performed by: OCCUPATIONAL THERAPIST

## 2021-05-02 PROCEDURE — 97110 THERAPEUTIC EXERCISES: CPT | Mod: GP | Performed by: PHYSICAL THERAPIST

## 2021-05-02 PROCEDURE — 128N000003 HC R&B REHAB

## 2021-05-02 PROCEDURE — 250N000013 HC RX MED GY IP 250 OP 250 PS 637: Performed by: PHYSICAL MEDICINE & REHABILITATION

## 2021-05-02 RX ORDER — AMOXICILLIN 250 MG
1-4 CAPSULE ORAL 2 TIMES DAILY
Status: DISCONTINUED | OUTPATIENT
Start: 2021-05-02 | End: 2021-05-06

## 2021-05-02 RX ADMIN — OMEPRAZOLE 20 MG: 20 CAPSULE, DELAYED RELEASE ORAL at 07:24

## 2021-05-02 RX ADMIN — ACETAMINOPHEN 650 MG: 325 TABLET, FILM COATED ORAL at 20:00

## 2021-05-02 RX ADMIN — ACETAMINOPHEN 650 MG: 325 TABLET, FILM COATED ORAL at 09:13

## 2021-05-02 RX ADMIN — DOCUSATE SODIUM AND SENNOSIDES 2 TABLET: 8.6; 5 TABLET ORAL at 09:13

## 2021-05-02 RX ADMIN — CARBAMAZEPINE 400 MG: 200 TABLET ORAL at 20:00

## 2021-05-02 RX ADMIN — CARBAMAZEPINE 400 MG: 200 TABLET ORAL at 07:25

## 2021-05-02 RX ADMIN — CARBAMAZEPINE 200 MG: 200 TABLET ORAL at 11:58

## 2021-05-02 RX ADMIN — APIXABAN 2.5 MG: 2.5 TABLET, FILM COATED ORAL at 20:00

## 2021-05-02 RX ADMIN — MICONAZOLE NITRATE: 20 POWDER TOPICAL at 07:25

## 2021-05-02 RX ADMIN — SERTRALINE HYDROCHLORIDE 100 MG: 100 TABLET ORAL at 07:25

## 2021-05-02 RX ADMIN — MICONAZOLE NITRATE: 20 POWDER TOPICAL at 20:05

## 2021-05-02 RX ADMIN — APIXABAN 2.5 MG: 2.5 TABLET, FILM COATED ORAL at 07:24

## 2021-05-02 RX ADMIN — DOCUSATE SODIUM AND SENNOSIDES 2 TABLET: 8.6; 5 TABLET ORAL at 20:00

## 2021-05-02 NOTE — PLAN OF CARE
Discharge Planner Post-Acute Rehab OT:     Discharge Plan: Home with HC    Precautions: Falls    Current Status:  ADLs: CGA SPT with 4ww. Min A UB dressing, Mod A LB dressing, Total A toileting, and set up grooming.   IADLs: To be assessed  Vision/Cognition: Borderline developmental delay. Wears glasses. No acute changes.    Assessment: Requiring encouragement to progress goals in therapy, self limiting. Pt gradually building consistency with functional transfers and improving upright activity tolerance. Hope to progress mobility with ADLs this week to wean away from purewick and BSC. Pt will benefit from ongoing OT to progress ADLs/IADLs.     Other Barriers to Discharge (DME, Family Training, etc):   DME needs and caregiver training needs to be determined pending progress.

## 2021-05-02 NOTE — PLAN OF CARE
FOCUS/GOAL  Bowel management, Bladder management, Pain management, Medical management, Mobility, and Cognition/Memory/Judgment/Problem solving    ASSESSMENT, INTERVENTIONS AND CONTINUING PLAN FOR GOAL:    A&Ox4, Ax2 SPT. LBM 4/29, incontinent of bladder, pure wick on NOC. Powder applied to abdominal pannus. Denies, pain, numbness and tingling, SOB. 2LPM O2 via nasal cannula at bedtime. Will continue with POC    NOC   Appeared sleeping on rounds. 2LPM o2 via nasal cannula. Purewick in place, 350cc yellow clear urine output.

## 2021-05-02 NOTE — PLAN OF CARE
Pt is alert and oriented x 4, she used the call light appropriately for assistance. Abdominal fold redness is clearing, antifungal powder was applied per order. We will continue to monitor for safety while assisting with activity of daily livings.

## 2021-05-02 NOTE — PLAN OF CARE
Discharge Planner Post-Acute Rehab PT:      Discharge Plan: Pt lives with her spouse in an apartment.  Pt can use an elevator to get to 4th floor apartment.  Pt owns a 4ww.  Pt states her  pushes her while she sits on the 4ww from the elevator to their apartment.      Precautions: falls     Current Status:  Bed Mobility: mod I with use of rail  Transfer: Variable CGA<>min A pending fatigue with 4WW  Gait: up to 50 ft with 4WW, CGA/SBA  Stairs: NT   Balance: Seated SBA EOB. CGA/SBA once standing with UE support through 4WW     Assessment: Pt continues to require encouragement for active participation t/o PT sessions. Able to progress to more consistent stand pivot transfer with use of her personal 4WW and CGA/SBA, and able to progress ambulation up to 50 ft with 4WW and CBA/SBA. VSS with activity: SpO2 >= 94% on RA and HR <= 99 bpm.     Other Barriers to Discharge (DME, Family Training, etc): Pt may need family training with her spouse. Pt has her personal 4WW here on the unit.  Handout provided for bedrail, floor bike via [employer], which mom can purchase.

## 2021-05-03 ENCOUNTER — APPOINTMENT (OUTPATIENT)
Dept: PHYSICAL THERAPY | Facility: CLINIC | Age: 56
End: 2021-05-03
Payer: MEDICARE

## 2021-05-03 ENCOUNTER — APPOINTMENT (OUTPATIENT)
Dept: OCCUPATIONAL THERAPY | Facility: CLINIC | Age: 56
End: 2021-05-03
Payer: MEDICARE

## 2021-05-03 LAB
ALBUMIN SERPL-MCNC: 3.3 G/DL (ref 3.4–5)
ALP SERPL-CCNC: 111 U/L (ref 40–150)
ALT SERPL W P-5'-P-CCNC: 48 U/L (ref 0–50)
ANION GAP SERPL CALCULATED.3IONS-SCNC: 9 MMOL/L (ref 3–14)
AST SERPL W P-5'-P-CCNC: 35 U/L (ref 0–45)
BASOPHILS # BLD AUTO: 0 10E9/L (ref 0–0.2)
BASOPHILS NFR BLD AUTO: 0.6 %
BILIRUB DIRECT SERPL-MCNC: 0.3 MG/DL (ref 0–0.2)
BILIRUB SERPL-MCNC: 0.7 MG/DL (ref 0.2–1.3)
BUN SERPL-MCNC: 17 MG/DL (ref 7–30)
CALCIUM SERPL-MCNC: 8.7 MG/DL (ref 8.5–10.1)
CHLORIDE SERPL-SCNC: 107 MMOL/L (ref 94–109)
CO2 SERPL-SCNC: 23 MMOL/L (ref 20–32)
CREAT SERPL-MCNC: 0.56 MG/DL (ref 0.52–1.04)
DIFFERENTIAL METHOD BLD: ABNORMAL
EOSINOPHIL # BLD AUTO: 0.2 10E9/L (ref 0–0.7)
EOSINOPHIL NFR BLD AUTO: 3.2 %
ERYTHROCYTE [DISTWIDTH] IN BLOOD BY AUTOMATED COUNT: 22 % (ref 10–15)
GFR SERPL CREATININE-BSD FRML MDRD: >90 ML/MIN/{1.73_M2}
GLUCOSE SERPL-MCNC: 108 MG/DL (ref 70–99)
HCT VFR BLD AUTO: 30.3 % (ref 35–47)
HGB BLD-MCNC: 9.1 G/DL (ref 11.7–15.7)
IMM GRANULOCYTES # BLD: 0 10E9/L (ref 0–0.4)
IMM GRANULOCYTES NFR BLD: 0.8 %
LYMPHOCYTES # BLD AUTO: 1.8 10E9/L (ref 0.8–5.3)
LYMPHOCYTES NFR BLD AUTO: 33.8 %
MCH RBC QN AUTO: 31.4 PG (ref 26.5–33)
MCHC RBC AUTO-ENTMCNC: 30 G/DL (ref 31.5–36.5)
MCV RBC AUTO: 105 FL (ref 78–100)
MONOCYTES # BLD AUTO: 0.4 10E9/L (ref 0–1.3)
MONOCYTES NFR BLD AUTO: 6.8 %
NEUTROPHILS # BLD AUTO: 2.9 10E9/L (ref 1.6–8.3)
NEUTROPHILS NFR BLD AUTO: 54.8 %
NRBC # BLD AUTO: 0 10*3/UL
NRBC BLD AUTO-RTO: 1 /100
PLATELET # BLD AUTO: 223 10E9/L (ref 150–450)
POTASSIUM SERPL-SCNC: 3.7 MMOL/L (ref 3.4–5.3)
PROT SERPL-MCNC: 6.8 G/DL (ref 6.8–8.8)
RBC # BLD AUTO: 2.9 10E12/L (ref 3.8–5.2)
SODIUM SERPL-SCNC: 139 MMOL/L (ref 133–144)
WBC # BLD AUTO: 5.3 10E9/L (ref 4–11)

## 2021-05-03 PROCEDURE — 250N000013 HC RX MED GY IP 250 OP 250 PS 637: Performed by: PHYSICAL MEDICINE & REHABILITATION

## 2021-05-03 PROCEDURE — 97530 THERAPEUTIC ACTIVITIES: CPT | Mod: GO

## 2021-05-03 PROCEDURE — 85025 COMPLETE CBC W/AUTO DIFF WBC: CPT

## 2021-05-03 PROCEDURE — 97110 THERAPEUTIC EXERCISES: CPT | Mod: GO

## 2021-05-03 PROCEDURE — 128N000003 HC R&B REHAB

## 2021-05-03 PROCEDURE — 97110 THERAPEUTIC EXERCISES: CPT | Mod: GP | Performed by: PHYSICAL THERAPIST

## 2021-05-03 PROCEDURE — 36415 COLL VENOUS BLD VENIPUNCTURE: CPT

## 2021-05-03 PROCEDURE — 97530 THERAPEUTIC ACTIVITIES: CPT | Mod: GP | Performed by: PHYSICAL THERAPIST

## 2021-05-03 PROCEDURE — 250N000013 HC RX MED GY IP 250 OP 250 PS 637

## 2021-05-03 PROCEDURE — 80076 HEPATIC FUNCTION PANEL: CPT

## 2021-05-03 PROCEDURE — 80048 BASIC METABOLIC PNL TOTAL CA: CPT

## 2021-05-03 PROCEDURE — 99232 SBSQ HOSP IP/OBS MODERATE 35: CPT | Mod: GC | Performed by: PHYSICAL MEDICINE & REHABILITATION

## 2021-05-03 PROCEDURE — 250N000013 HC RX MED GY IP 250 OP 250 PS 637: Performed by: STUDENT IN AN ORGANIZED HEALTH CARE EDUCATION/TRAINING PROGRAM

## 2021-05-03 RX ORDER — LOVASTATIN 20 MG
40 TABLET ORAL AT BEDTIME
Status: DISCONTINUED | OUTPATIENT
Start: 2021-05-03 | End: 2021-05-12 | Stop reason: HOSPADM

## 2021-05-03 RX ADMIN — MICONAZOLE NITRATE: 20 POWDER TOPICAL at 09:11

## 2021-05-03 RX ADMIN — CARBAMAZEPINE 400 MG: 200 TABLET ORAL at 20:09

## 2021-05-03 RX ADMIN — CARBAMAZEPINE 400 MG: 200 TABLET ORAL at 09:09

## 2021-05-03 RX ADMIN — CARBAMAZEPINE 200 MG: 200 TABLET ORAL at 13:19

## 2021-05-03 RX ADMIN — ACETAMINOPHEN 650 MG: 325 TABLET, FILM COATED ORAL at 09:14

## 2021-05-03 RX ADMIN — DOCUSATE SODIUM AND SENNOSIDES 1 TABLET: 8.6; 5 TABLET ORAL at 20:09

## 2021-05-03 RX ADMIN — LOVASTATIN 40 MG: 20 TABLET ORAL at 22:51

## 2021-05-03 RX ADMIN — LISINOPRIL 10 MG: 10 TABLET ORAL at 09:11

## 2021-05-03 RX ADMIN — MICONAZOLE NITRATE: 20 POWDER TOPICAL at 20:11

## 2021-05-03 RX ADMIN — SERTRALINE HYDROCHLORIDE 100 MG: 100 TABLET ORAL at 09:10

## 2021-05-03 RX ADMIN — APIXABAN 2.5 MG: 2.5 TABLET, FILM COATED ORAL at 20:09

## 2021-05-03 RX ADMIN — OMEPRAZOLE 20 MG: 20 CAPSULE, DELAYED RELEASE ORAL at 09:14

## 2021-05-03 RX ADMIN — ACETAMINOPHEN 650 MG: 325 TABLET, FILM COATED ORAL at 19:25

## 2021-05-03 RX ADMIN — APIXABAN 2.5 MG: 2.5 TABLET, FILM COATED ORAL at 09:10

## 2021-05-03 NOTE — PROGRESS NOTES
Pt A+O x4, no c/o pain, continent of bowel and purewick used overnight.  Pt had no complaint of pain. Pt slept all night.

## 2021-05-03 NOTE — PROGRESS NOTES
"    [facility]   Acute Rehabilitation Unit  Inpatient Progress Note  Chief Complaint  Critical Illness Myopathy     Subjective     24 Hours Summary  - No acute events overnight, patient reports sleeping well.   - Patient continues to require consistent encouragement with therapies but improving over the weekend with increase transfers and some ambulation.   - No complaints of pain.  - Continues to use PureWick over the weekend for urination.   - Last bowel movement recorded on 04/29. Yesterday per patient.     Review of Systems  10 point review of systems was otherwise negative other     Objective     Vital Signs  /55 (BP Location: Right arm)   Pulse 89   Temp 98.2  F (36.8  C) (Oral)   Resp 18   Ht 1.702 m (5' 7\")   Wt (!) 158.5 kg (349 lb 8 oz)   LMP 08/18/2008   SpO2 96%   BMI 54.74 kg/m      Physical Examination  General: Awake, alert, NAD  Cardiovascular: RRR, no extra heart sounds, no murmur  Pulmonary: Non-labored breathing, CTAB, no wheeze, no rhonci. No crackles.  Abdominal: obese, soft, non-tender, non-distended  Extremities: warm, well perfused, mild edema b/l  NEURO: Moving all extremities against gravity, no changes to deficits.    LABS  Recent Results (from the past 24 hour(s))   CBC with platelets differential    Collection Time: 05/03/21  7:44 AM   Result Value Ref Range    WBC 5.3 4.0 - 11.0 10e9/L    RBC Count 2.90 (L) 3.8 - 5.2 10e12/L    Hemoglobin 9.1 (L) 11.7 - 15.7 g/dL    Hematocrit 30.3 (L) 35.0 - 47.0 %     (H) 78 - 100 fl    MCH 31.4 26.5 - 33.0 pg    MCHC 30.0 (L) 31.5 - 36.5 g/dL    RDW 22.0 (H) 10.0 - 15.0 %    Platelet Count 223 150 - 450 10e9/L    Diff Method Automated Method     % Neutrophils 54.8 %    % Lymphocytes 33.8 %    % Monocytes 6.8 %    % Eosinophils 3.2 %    % Basophils 0.6 %    % Immature Granulocytes 0.8 %    Nucleated RBCs 1 (H) 0 /100    Absolute Neutrophil 2.9 1.6 - 8.3 10e9/L    Absolute Lymphocytes 1.8 0.8 - " 5.3 10e9/L    Absolute Monocytes 0.4 0.0 - 1.3 10e9/L    Absolute Eosinophils 0.2 0.0 - 0.7 10e9/L    Absolute Basophils 0.0 0.0 - 0.2 10e9/L    Abs Immature Granulocytes 0.0 0 - 0.4 10e9/L    Absolute Nucleated RBC 0.0    Basic metabolic panel    Collection Time: 05/03/21  7:44 AM   Result Value Ref Range    Sodium 139 133 - 144 mmol/L    Potassium 3.7 3.4 - 5.3 mmol/L    Chloride 107 94 - 109 mmol/L    Carbon Dioxide 23 20 - 32 mmol/L    Anion Gap 9 3 - 14 mmol/L    Glucose 108 (H) 70 - 99 mg/dL    Urea Nitrogen 17 7 - 30 mg/dL    Creatinine 0.56 0.52 - 1.04 mg/dL    GFR Estimate >90 >60 mL/min/[1.73_m2]    GFR Estimate If Black >90 >60 mL/min/[1.73_m2]    Calcium 8.7 8.5 - 10.1 mg/dL   Hepatic panel    Collection Time: 05/03/21  7:44 AM   Result Value Ref Range    Bilirubin Direct 0.3 (H) 0.0 - 0.2 mg/dL    Bilirubin Total 0.7 0.2 - 1.3 mg/dL    Albumin 3.3 (L) 3.4 - 5.0 g/dL    Protein Total 6.8 6.8 - 8.8 g/dL    Alkaline Phosphatase 111 40 - 150 U/L    ALT 48 0 - 50 U/L    AST 35 0 - 45 U/L       IMAGING  No recent images to review.      Assessment     Brissa Barlow is a 55 year old female, with developmental delay, HTN, asthma and morbid obesity who was admitted to [facility] on 04/08/2021 with acute hypoxic respiratory failure secondary to COVID19 pneumonia. She required intubation 04/11 that was complicated by prolonged paralysis and circulatory shock. She was weaned from the ventilator on 04/21 and deemed appropriate for acute rehabilitation for critical illness myopathy on 04/27/2021. Current deficits include impaired strength and mobility.      Plan     Impairment group code: Critical Illness Myopathy     Rehabilitation     Admission to acute inpatient rehab.    Impairment group code: Critical illness myopathy     1. Impairment of ADL's: OT daily to work on ADL re-training such as grooming, self cares and bathing.    2. Impairment of mobility: PT daily to work on neuromuscular  re-education focusing on strength, balance, coordination, and endurance.    3. Rehab RN for med administration, bowel regimen.  4. Bladder management: Continent. Using PureWick for decrease mobility  5. Bowel management: Continent. Last bowel movement on 05/02  6. Pain management: Tylenol, PRN  7. Adjustment to disability:  Clinical psychology to eval and treat  8. FEN: Regular Diet, Thin Liquids  9. DVT Prophylaxis: Apixaban 2.5 mg BID     Medical Management     # Acute Hypoxic Respiratory Failure secondary to COVID-19 pneumonia  S/P remdesivir and 10 day course steroids. Required intubation 04/11-04/21.  - Vitals QSHIFT, continue supplement O2, maintain saturation > 90%  - Encourage breathing exercises and incentive spirometery  - Albuterol neb Q4H PRN     - DVT prophylaxis (see above)     # Anemia of undetermined etiology  Iron labs consistent with anemia of chronic disease. Hgb 9.1 (05/03), continues to improve. No concerns for blood loss. Will continue to monitor with weekly labs.  - CBC MON    # Seizure disorder  - Carbamazepine 400 mg PO QAM, 200 mg QNOON, 400 mg at bedtime  - Continue seizure precautions    # Transaminitis  - Continue to improve. WNL 05/03. Will continue PTA statin.  - CMP QMON     # Delay in development  - Parents concerned for underlying psychiatric disorder during prior admission. Patient was seen by psychiatry who did not make any further recommendations  - Zoloft 100 mg DAILY     # Benign essential hypertension  Lasix discontinued on 04/29, BP wnl.  - Vitals QSHIFT  - Lisinopril 10 mg DAILY     Chronic medical conditions:  # Mild intermittent asthma - nebs as needed.  # Gastroesophageal reflux disease without esophagitis - continue omeprazole  # Hyperlipidemia LDL goal <130: Restart PTA statin  # H/o splenectomy      Code: Full code per previous admission  Disposition: Plan to discharge to home pending further evaluation  ELOS:  2-3 weeks.  Rehab prognosis:  Good  Follow up Appointments  on Discharge:   1. Physical medicine and rehabilitation  2. Primary care physician  3. Pulmonary for obstructive sleep apnea         ** Patient was seen and discussed with my attending Dr. Siddiqui who agrees with the above assessment and plan.     Fred Farr MD  Physical Medicine and Rehabilitation PGY-2  Pager: [phone]

## 2021-05-03 NOTE — PLAN OF CARE
"Patient has participated in therapies, but also voices that it is \"too far\"  to walk from her doorway (after returning from therapy via wheelchair) to her bed.  Pivot transfer return to bed from wheelchair.  Demonstrates fair to good appetite at meals. She has been incontinent of urine (uses a PureWick device during the night).          Mother is here currently.  LUZ MARIA has been working with patient and Mother.  See LUZ MARIA note.    "

## 2021-05-03 NOTE — PLAN OF CARE
Discharge Planner Post-Acute Rehab OT:      Discharge Plan: Home with HC     Precautions: Falls     Current Status:  ADLs: CGA SPT with 4ww. Min A UB dressing, Mod A LB dressing, Total A toileting, and set up grooming.   IADLs: To be assessed  Vision/Cognition: Borderline developmental delay. Wears glasses. No acute changes.     Assessment: Pt completed exercises to increase core and UB strength to increase participation in ADLs and IADLs, pt continues to be self limiting and requires encouragement to maximize participation. Working on using 4WW more consistently in room to decrease use of wc     Other Barriers to Discharge (DME, Family Training, etc):   DME needs and caregiver training needs to be determined pending progress.

## 2021-05-03 NOTE — PROGRESS NOTES
ABDIEL Roberts called this morning to get update on discharge plans. Per Epic, target date 05/14. Pt CADI waiver will end May 8th due to long hospital stay. Alejandra has contacted [facility] already and requested re-assessment. Alejandra will give [facility] SW information to schedule re-assessment prior to discharge home. Re-assessment pending at this time. Will plan to update Alejandra if/as discharge needs change. Will plan to update Ulises Dubon (contact info below) in addition. IMM needed at discharge.     Pt open to [facility] recently. SW emailed [facility] HC Intake and liaison to inquire if pt was open PTA of if HC needs a new referral. Per Riya  Liaison, a referral was sent 03/15/21 but pt did not meet criteria and HC did not open pt for services. [facility] HC may not be able to accept based on pt non-compliance.  will confirm discharge recommendations, assist with setting up and will work with [facility] or another HC agency if needed.      PAIGEI Alejandra chapman PH: [phone].   [facility] PTA: emergency response, home delivery meals, hx PCA but fired them.  Ling Duncan PH: [phone] ext [phone].     Rand Schmidt, JERMAINE, CAD-[place]  [facility] [facility]   Phone: [phone]  I   Pager: [phone]

## 2021-05-03 NOTE — PLAN OF CARE
Discharge Planner Post-Acute Rehab PT:      Discharge Plan: Pt lives with her spouse in an apartment.  Pt can use an elevator to get to 4th floor apartment.  Pt owns a 4ww.  Pt states her  pushes her while she sits on the 4ww from the elevator to their apartment.      Precautions: falls     Current Status:  Bed Mobility: mod I with use of rail  Transfer: CGA/SBA with 4WW  Gait: up to 50 ft with 4WW, CGA/SBA  Stairs: NT   Balance: Seated SBA EOB. CGA/SBA once standing with UE support through 4WW     Assessment: Pt continues to be self-limiting, requiring encouragement for active participation t/o PT sessions. Able to progress to more consistent stand pivot transfer with use of her personal 4WW and CGA/SBA, and able to progress ambulation up to 50 ft with 4WW and CBA/SBA. VSS with activity: SpO2 >= 92%.     Other Barriers to Discharge (DME, Family Training, etc): Pt may need family training with her spouse. Pt has her personal 4WW here on the unit.  Handout provided for bedrail, floor bike via [employer], which mom can purchase.

## 2021-05-03 NOTE — PROGRESS NOTES
See SW note from earlier today. Pt mom at bedside while pt at therapy. Requested to speak with SW. SW met with mom, dad on speaker and in the parking lot. Pt mom and dad expressed concerns about pt apt not being handicap and not having a lot of room for pt. Concerns about discharge. Pt mom stated that pt is on the list for section 8 housing and Alejandra (ABDIEL WOLFE) notified of this concern this morning and assisting the family. Pt mom aware that moving would most likely take additional time and coordinating. Pt mom also brought up that pt dad when to pt's apt to check on things and meet with pt s/o Sudhir. Per pt mom, the hallway is lined with items, empty tv box from 1+ year ago in the middle of the floor and concerns about clutter. Pt dad expressed that it's not as much a hoarding situation but just not enough room and clutter. SW discussed and emailed pt mom the following resources to look into: [place] [employer] [employer] [place], [place] [employer], [facility]  and a volunteer [facility] home safety eval pamphlet. Pt mom expressed appreciation and will look into the information.     Pt mom hoping to get more information about where discharge is at during team rounds Thursday. Will continue to follow and remain available. Will update team as well.     JERMAINE Meza, CAD-[place]  [place]   Phone: [phone]  I   Pager: [phone]

## 2021-05-03 NOTE — PLAN OF CARE
Pt is alert and oriented x4. Able to make needs known. Denies cp or SOB. Pain managed w/ PRN tylenol. Incontinent of bladder this shift. Pure wick placed at bedtime. VSS. Sats range between 91-93 RA.Supplemental O2 2L applied at hs via NC, sats at 96. Miconazole powder applied to abdominal pannus. Call light in reach, continue w/ POC.

## 2021-05-04 ENCOUNTER — APPOINTMENT (OUTPATIENT)
Dept: OCCUPATIONAL THERAPY | Facility: CLINIC | Age: 56
End: 2021-05-04
Payer: MEDICARE

## 2021-05-04 ENCOUNTER — APPOINTMENT (OUTPATIENT)
Dept: PHYSICAL THERAPY | Facility: CLINIC | Age: 56
End: 2021-05-04
Payer: MEDICARE

## 2021-05-04 PROCEDURE — 250N000013 HC RX MED GY IP 250 OP 250 PS 637: Performed by: PHYSICAL MEDICINE & REHABILITATION

## 2021-05-04 PROCEDURE — 90791 PSYCH DIAGNOSTIC EVALUATION: CPT | Performed by: PSYCHOLOGIST

## 2021-05-04 PROCEDURE — 97530 THERAPEUTIC ACTIVITIES: CPT | Mod: GP

## 2021-05-04 PROCEDURE — 128N000003 HC R&B REHAB

## 2021-05-04 PROCEDURE — 97530 THERAPEUTIC ACTIVITIES: CPT | Mod: GO | Performed by: OCCUPATIONAL THERAPIST

## 2021-05-04 PROCEDURE — 97110 THERAPEUTIC EXERCISES: CPT | Mod: GP

## 2021-05-04 PROCEDURE — 99232 SBSQ HOSP IP/OBS MODERATE 35: CPT | Mod: GC | Performed by: PHYSICAL MEDICINE & REHABILITATION

## 2021-05-04 PROCEDURE — 250N000013 HC RX MED GY IP 250 OP 250 PS 637: Performed by: STUDENT IN AN ORGANIZED HEALTH CARE EDUCATION/TRAINING PROGRAM

## 2021-05-04 PROCEDURE — 97110 THERAPEUTIC EXERCISES: CPT | Mod: GO | Performed by: OCCUPATIONAL THERAPIST

## 2021-05-04 PROCEDURE — 97116 GAIT TRAINING THERAPY: CPT | Mod: GP

## 2021-05-04 PROCEDURE — 97535 SELF CARE MNGMENT TRAINING: CPT | Mod: GO | Performed by: OCCUPATIONAL THERAPIST

## 2021-05-04 PROCEDURE — 250N000013 HC RX MED GY IP 250 OP 250 PS 637

## 2021-05-04 RX ADMIN — CARBAMAZEPINE 400 MG: 200 TABLET ORAL at 08:26

## 2021-05-04 RX ADMIN — CARBAMAZEPINE 400 MG: 200 TABLET ORAL at 20:45

## 2021-05-04 RX ADMIN — APIXABAN 2.5 MG: 2.5 TABLET, FILM COATED ORAL at 08:26

## 2021-05-04 RX ADMIN — DOCUSATE SODIUM AND SENNOSIDES 1 TABLET: 8.6; 5 TABLET ORAL at 20:45

## 2021-05-04 RX ADMIN — APIXABAN 2.5 MG: 2.5 TABLET, FILM COATED ORAL at 20:45

## 2021-05-04 RX ADMIN — MICONAZOLE NITRATE: 20 POWDER TOPICAL at 20:47

## 2021-05-04 RX ADMIN — LISINOPRIL 10 MG: 10 TABLET ORAL at 08:26

## 2021-05-04 RX ADMIN — SERTRALINE HYDROCHLORIDE 100 MG: 100 TABLET ORAL at 08:26

## 2021-05-04 RX ADMIN — LOVASTATIN 40 MG: 20 TABLET ORAL at 22:16

## 2021-05-04 RX ADMIN — CARBAMAZEPINE 200 MG: 200 TABLET ORAL at 13:29

## 2021-05-04 RX ADMIN — OMEPRAZOLE 20 MG: 20 CAPSULE, DELAYED RELEASE ORAL at 06:32

## 2021-05-04 RX ADMIN — DOCUSATE SODIUM AND SENNOSIDES 1 TABLET: 8.6; 5 TABLET ORAL at 08:26

## 2021-05-04 RX ADMIN — MICONAZOLE NITRATE: 20 POWDER TOPICAL at 08:13

## 2021-05-04 ASSESSMENT — MIFFLIN-ST. JEOR: SCORE: 2194.83

## 2021-05-04 NOTE — CONSULTS
Health Psychology                  Clinic    Department of Medicine  Rocio Moreno, Ph.D., L.P. [phone]                          [facility]  [facility] Bryant Pearson, Ph.D.,  L.P. [phone]                 3rd Floor  [place] Mail [address]   Edyta James, Ph.D., L.P. [phone]    [address] Rocio Hoang, Ph.D., L.P. [phone]            [place] [address]          Carlos Feliciano, Ph.D., A.B.P.P., L.P. [phone]      Lola Yanez, Ph.D., L.P. [phone]      Inpatient Health Psychology Consultation*    DOS: 5/4/2021    Met with Ms Barlow to complete requested psychology consultation.  Documentation indicates a history of developmental delay, depression and psychosis.  Her parents are listed as her legal guardians.  Mental health assessment from 2019 indicates report from mother that Ms Barlow has a history of paranoia and delusional thinking, giving example that she believed that the FBI were in her apartment to protect her.   Parents at that time expressed concerns that Ms Barlow did not follow through on recommendations for mental health and other outpatient services, and were concerned that her  may be encouraging her to not follow through.    Today Ms Barlow was very welcoming of this contact.  She reports long history of depression that she states is very well controlled, feels that her mood currently is very positive, reporting no current symptoms of depression.     Speech appears to contain some unclear elements, contradictions within her own reports currently as compare to the past. Tells me initially that developmental delays were found only when she was in her 20s, and then tells me that she had special education services throughout her school years, with IEP. Became confused with dates and first told me that her  was born in 1980 (he was apparently born in 1968). 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    Affect variable. Primarily euthymic. However, when some topics arose, including parents as guardians and other comments about her parents, her affect changed and she exhibited some distress and a negative affect. She stated adamantly that her parents are guardians on a temporary basis.    Most concerning, she reported beliefs that appear consistent with reports from mother in 2019 regarding feelings of paranoia and some delusional thinking. She talked about a neighbor whom she believes her [facility]  or  found to live nearby \"to protect us\" Talked about being in danger because of neighbors who climb on the outside of the building and look into apartments shining flashlights and then have threatened to kill her guinea pigs. Unable to answer more concrete questions about that report.    Given that [facility] Bar[facility] and [facility] , this information will be available to them. Unclear if additional resources would be helpful currently.    Reports odd dreams while on ICU but these are not recurring. No distress expressed about ICU stay or experiences during that time.    Reporting that she sees progress in her rehab therapies and function. Sleeping well. Reports that appetite is coming back. No concerns about her care.     Given Ms Barlow's vulnerabilities and some concern about her psychosocial situation and mental health, it may be prudent to have Ms Barlow scheduled in the COVID Follow up clinic prior to discharge.    Diagnostic impression:    Developmental delay  Depression, NOS  History of paranoia  Rule out schizoaffective disorder      Rocio Moreno, PhD, LP        *In accordance with the Rules of the [place] Board of [employer], it is noted that " psychological descriptions and scientific procedures underlying psychological evaluations have limitations.  Absolute predictions cannot be made based on information in this report.

## 2021-05-04 NOTE — PLAN OF CARE
Discharge Planner Post-Acute Rehab PT:      Discharge Plan: Pt lives with her spouse in an apartment.  Pt can use an elevator to get to 4th floor apartment.  Pt owns a 4ww.  Pt states her  pushes her while she sits on the 4ww from the elevator to their apartment.      Precautions: falls     Current Status:  Bed Mobility: mod I with use of rail  Transfer: CGA/SBA with 4WW  Gait: up to 50 ft with 4WW, CGA/SBA. 5/4 multiple shorter (20-25 ft) bouts  Stairs: NT   Balance: Seated SBA EOB. CGA/SBA once standing with UE support through 4WW     Assessment: Pt very pleasant, participated well today. Does fatigue easily. Multiple ambulation bouts of 20-25 ft with 4WW. Floor bike for activity tolerance, does require breaks. PM session focused on LE and core strength.     Other Barriers to Discharge (DME, Family Training, etc): Pt may need family training with her spouse. Pt has her personal 4WW here on the unit.  Handout provided for bedrail, floor bike via [employer], which mom can purchase.

## 2021-05-04 NOTE — PLAN OF CARE
Problem: Goal/Outcome  Goal: Goal Outcome Summary  Outcome: Improving   FOCUS/GOAL  Bowel management, Bladder management, Medical management, and Mobility    ASSESSMENT, INTERVENTIONS AND CONTINUING PLAN FOR GOAL:  Pt's vitals stable. Denied any pain. Using call light for assist. Able to transfer with assist of 1 and walker. Used pure wick at night but she had been continent of bladder this shift so far and had used the BSC for voiding. Also had a bm using the BSC. Antifungal powder to rash under pannus and on rubio groin after cleaning and drying areas. Appetite fair.

## 2021-05-04 NOTE — PLAN OF CARE
Discharge Planner Post-Acute Rehab OT:      Discharge Plan: Home with HC     Precautions: Falls     Current Status:  ADLs: SBA SPT with 4ww, amb bedside <> bathroom SBA/CGA. Min A UB dressing, Mod A LB dressing, Max A toileting, and set up grooming.   IADLs: To be assessed  Vision/Cognition: Borderline developmental delay. Wears glasses. No acute changes.     Assessment: Progressing functional transfers and mobility, now consistently ambulating to bathroom with 4ww. Pt requiring physical assistance with LB dressing/bathing and toilet hygiene, will need to progress these areas of ADLs in order to safely return home. Pt also practiced kitchen mobility with 4ww today, self limiting at times, needing significant rest breaks between short bouts of mobility. Pt will benefit from ongoing skilled OT intervention.      Other Barriers to Discharge (DME, Family Training, etc):   DME needs: tub bench, 4ww, bedrail, shower grab bar, toilet grab bar, recliner.   Caregiver training: Recommend caregiver training with parents and significant other prior to discharge.  Home set up: Per  notes, pt apartment is small and cluttered. There are concerns for handicap accessibility.

## 2021-05-04 NOTE — PLAN OF CARE
FOCUS/GOAL  Medical management    ASSESSMENT, INTERVENTIONS AND CONTINUING PLAN FOR GOAL:    Pt is alert and oriented. With some developmental delays noted. Denied pain, sob, N/V, or any new weakness. Said that headache is gone after tylenol was given in the evening. On oxygen at night at 2LPM via NC. O2 Sat at 94%. Continent of bowel with LBM 5/2/21. Has a purewick at night, suctioning well to wall, 220 ml output of dark yellow urine. Encouraged fluids. Appears sleeping during rounds.     Addendum: Prilosec fell on the floor. This writer had to take another dose out from pyxis.

## 2021-05-04 NOTE — PROGRESS NOTES
"    [facility]   Acute Rehabilitation Unit  Inpatient Progress Note  Chief Complaint  Critical Illness Myopathy     Subjective     24 Hours Summary  - No acute events overnight. Patient reports sleeping well. She continues to wear her O2 nasal cannula at night because she states it helps her sleep.  - No episode of dyspnea during physical therapy. She is happy to start walking  - No complaints of pain.   - Last bowel movement recorded on 05/02    Review of Systems  10 point review of systems was otherwise negative other     Objective     Vital Signs  /52 (BP Location: Right arm)   Pulse 91   Temp 96.9  F (36.1  C) (Oral)   Resp 18   Ht 1.702 m (5' 7\")   Wt (!) 155.7 kg (343 lb 4.8 oz)   LMP 08/18/2008   SpO2 94%   BMI 53.77 kg/m      Physical Examination  General: Awake, alert, NAD  Cardiovascular: RRR, no extra heart sounds, no murmur  Pulmonary: Non-labored breathing, CTAB, no wheeze, no rhonci. No crackles.  Abdominal: obese, soft, non-tender, non-distended  Extremities: warm, well perfused, mild edema b/l  NEURO: Moving all extremities against gravity, no changes to deficits.    LABS  No recent labs to review.    IMAGING  No recent images to review.      Assessment     Brissa Barlow is a 55 year old female, with developmental delay, HTN, asthma and morbid obesity who was admitted to [facility] on 04/08/2021 with acute hypoxic respiratory failure secondary to COVID19 pneumonia. She required intubation 04/11 that was complicated by prolonged paralysis and circulatory shock. She was weaned from the ventilator on 04/21 and deemed appropriate for acute rehabilitation for critical illness myopathy on 04/27/2021. Current deficits include impaired strength and mobility.      Plan     Impairment group code: Critical Illness Myopathy     Rehabilitation      1. Impairment of ADL's: OT 90 minutes daily to work on ADL re-training such as grooming, self cares " and bathing.    2. Impairment of mobility: PT 90 minutes daily to work on neuromuscular re-education focusing on strength, balance, coordination, and endurance.    3. Rehab RN for med administration, bowel regimen.  4. Bladder management: Continent. Using PureWick for decrease mobility  5. Bowel management: Continent. Last recorded bowel movement 05/02  6. Pain management: Tylenol PRN  7. Adjustment to disability:  Clinical psychology following  8. FEN: Regular Diet, Thin Liquids  9. DVT Prophylaxis: Apixaban 2.5 mg BID for 30 days (04/28 - 05/27)     Medical Management     # Hypoxic Respiratory Failure secondary to COVID-19 pneumonia  Presented to [facility] ED on 04/08 with cough and SOB and COVID19 postivite. Admitted for hypoxic respiratory failure. S/P 10 day course steroids, remdesivir and intubation 04/11-04/21. Requiring 1.5 LPM O2 on admission to ARU.  - Vitals QSHIFT  - Encourage breathing exercises and incentive spirometery  - Supplement O2 per nasal cannula, maintain saturation > 90%  - Albuterol neb Q4H PRN     - DVT/PE prophylaxis (see above)  - Will perform overnight oximetry study without O2     # Anemia of chronic disease  Hgb 8.1 on ARU admission. Iron labs consistent with anemia of chronic disease. Hgb 9.1 (05/03), continues to improve. No concerns for blood loss. Will continue to monitor with weekly labs.  - CBC MON    # Seizure disorder  Last seizure during teenage years. No need for seizure precautions at this time.   - Carbamazepine 400 mg PO QAM, 200 mg QNOON, 400 mg at bedtime    # Transaminitis  Enzymes elevated on admission. Now wnl (05/03) and resumed PTA statin. Will continue to monitor with weekly labs.  - LFT QMON     # Delay in development  Parents concerned for underlying psychiatric disorder during prior admission. Patient was seen by psychiatry who did not make any further recommendations  - Zoloft 100 mg DAILY     # Hypertension, essential  Lasix discontinued on 04/29, 24 BPs  wnl (05/04).  - Vitals QSHIFT  - Lisinopril 10 mg DAILY     Chronic medical conditions:  # Mild intermittent asthma - nebs as needed.  # GERD: continue PTA omeprazole  # Hyperlipidemia: Continue PTA statin  # H/o splenectomy     Code: Full code  Capacity: Parents are legal guardians  Disposition: Previously living independently in apartment with roommate / partner. Parents (gaurdians) are looking into section 8 housing options.   ELOS:  14 days (05/18)  Rehab prognosis:  Good  Follow up Appointments on Discharge:   1. Primary care physician  2. Physical medicine and rehabilitation  3. Pulmonary for obstructive sleep apnea         ** Patient was seen and discussed with my attending Dr. Siddiqui who agrees with the above assessment and plan.     Fred Farr MD  Physical Medicine and Rehabilitation PGY-2  Pager: [phone]

## 2021-05-04 NOTE — PLAN OF CARE
FOCUS/GOAL  Bowel management, Bladder management, Pain management, Medical management, Mobility, and Safety management    ASSESSMENT, INTERVENTIONS AND CONTINUING PLAN FOR GOAL:  Patient is alert and oriented, denies pain, dizziness or SOB. Around 1930 patient complains of mild headache and tylenol was given and was effective. VSS. Patient had good appetite for dinner ate 75% fluid was given and encourage with good acceptance. Seizure precaution was discontinued this afternoon, pad reoved from bed. Incontinent of bladder uses purewick at night. Last BM was 05/02. No complaint or concern at this time.We will continue with the POC

## 2021-05-05 ENCOUNTER — PATIENT OUTREACH (OUTPATIENT)
Dept: NURSING | Facility: CLINIC | Age: 56
End: 2021-05-05
Payer: MEDICARE

## 2021-05-05 PROCEDURE — 97530 THERAPEUTIC ACTIVITIES: CPT | Mod: GO | Performed by: OCCUPATIONAL THERAPIST

## 2021-05-05 PROCEDURE — 97116 GAIT TRAINING THERAPY: CPT | Mod: GP

## 2021-05-05 PROCEDURE — 250N000013 HC RX MED GY IP 250 OP 250 PS 637: Performed by: PHYSICAL MEDICINE & REHABILITATION

## 2021-05-05 PROCEDURE — 250N000013 HC RX MED GY IP 250 OP 250 PS 637: Performed by: STUDENT IN AN ORGANIZED HEALTH CARE EDUCATION/TRAINING PROGRAM

## 2021-05-05 PROCEDURE — 128N000003 HC R&B REHAB

## 2021-05-05 PROCEDURE — 999N000157 HC STATISTIC RCP TIME EA 10 MIN

## 2021-05-05 PROCEDURE — 250N000013 HC RX MED GY IP 250 OP 250 PS 637

## 2021-05-05 PROCEDURE — 99232 SBSQ HOSP IP/OBS MODERATE 35: CPT | Mod: GC | Performed by: PHYSICAL MEDICINE & REHABILITATION

## 2021-05-05 PROCEDURE — 97110 THERAPEUTIC EXERCISES: CPT | Mod: GP

## 2021-05-05 PROCEDURE — 97530 THERAPEUTIC ACTIVITIES: CPT | Mod: GP

## 2021-05-05 PROCEDURE — 94762 N-INVAS EAR/PLS OXIMTRY CONT: CPT

## 2021-05-05 PROCEDURE — 97110 THERAPEUTIC EXERCISES: CPT | Mod: GO | Performed by: OCCUPATIONAL THERAPIST

## 2021-05-05 RX ADMIN — MICONAZOLE NITRATE: 20 POWDER TOPICAL at 08:01

## 2021-05-05 RX ADMIN — LISINOPRIL 10 MG: 10 TABLET ORAL at 08:01

## 2021-05-05 RX ADMIN — MICONAZOLE NITRATE: 20 POWDER TOPICAL at 20:20

## 2021-05-05 RX ADMIN — APIXABAN 2.5 MG: 2.5 TABLET, FILM COATED ORAL at 08:01

## 2021-05-05 RX ADMIN — CARBAMAZEPINE 400 MG: 200 TABLET ORAL at 20:17

## 2021-05-05 RX ADMIN — ACETAMINOPHEN 650 MG: 325 TABLET, FILM COATED ORAL at 16:18

## 2021-05-05 RX ADMIN — DOCUSATE SODIUM AND SENNOSIDES 2 TABLET: 8.6; 5 TABLET ORAL at 20:17

## 2021-05-05 RX ADMIN — DOCUSATE SODIUM AND SENNOSIDES 1 TABLET: 8.6; 5 TABLET ORAL at 08:01

## 2021-05-05 RX ADMIN — CARBAMAZEPINE 200 MG: 200 TABLET ORAL at 12:40

## 2021-05-05 RX ADMIN — CARBAMAZEPINE 400 MG: 200 TABLET ORAL at 08:01

## 2021-05-05 RX ADMIN — LOVASTATIN 40 MG: 20 TABLET ORAL at 21:14

## 2021-05-05 RX ADMIN — SERTRALINE HYDROCHLORIDE 100 MG: 100 TABLET ORAL at 08:01

## 2021-05-05 RX ADMIN — OMEPRAZOLE 20 MG: 20 CAPSULE, DELAYED RELEASE ORAL at 06:25

## 2021-05-05 RX ADMIN — APIXABAN 2.5 MG: 2.5 TABLET, FILM COATED ORAL at 20:17

## 2021-05-05 NOTE — PLAN OF CARE
Discharge Planner Post-Acute Rehab PT:      Discharge Plan: Pt lives with her spouse in an apartment.  Pt can use an elevator to get to 4th floor apartment.  Pt owns a 4ww.  Pt states her  pushes her while she sits on the 4ww from the elevator to their apartment.      Precautions: falls     Current Status:  Bed Mobility: mod I with use of rail  Transfer: SBA with 4WW  Gait: up to 50 ft with 4WW, SBA. Several shorter bouts (20-40 ft) in addition to this. Self-limits. Writer follows with wheelchair.  Stairs: NT   Balance: Seated SBA EOB. CGA/SBA once standing with UE support through 4WW     Assessment: Pt highly motivated, participated well. Still tends to self-limit, citing fatigue and tailbone pain (history of traumatic injury requiring placement of screw per pt). Sessions focused on gait bouts, activity tolerance, standing tolerance, and LE/core strength. Pt tolerated well but requires very frequent rest breaks. O2 sats 95%+ on RA.     Other Barriers to Discharge (DME, Family Training, etc): Pt may need family training with her spouse. Pt has her personal 4WW here on the unit.  Handout provided for bedrail, floor bike via [employer], which mom can purchase.

## 2021-05-05 NOTE — PLAN OF CARE
FOCUS/GOAL  Medical management    ASSESSMENT, INTERVENTIONS AND CONTINUING PLAN FOR GOAL:    Pt is sleeping good this shift. Oxygen on at night at 2LPM via nasal cannula. O2 Sat at 95%. Denies sob, pain, numbness and tingling. On a pure wick at night due to incontinence. Suction to wall with adequate output of dark yellow urine. Fluids encouraged. No complaints overnight.

## 2021-05-05 NOTE — PLAN OF CARE
"FOCUS/GOAL  Bladder management, Medical management, Mobility, Skin integrity, and Safety management    ASSESSMENT, INTERVENTIONS AND CONTINUING PLAN FOR GOAL:  Patient is alert and oriented, denies pain, dizziness or SOB. VSS. Patient had good appetite for dinner ate 75% fluid was given and encourage with good acceptance.Incontinent of bladder in this shift Purewick was placed for overnight and draining well. O2 sats 97 % on room air early this evening.Patient refused oxygen at HS states \" I do not need it\" O2 sats checked @ 1050 and was @ 93 %RA, no SOB or difficulty of breathing noted. Plan to place overnight pulse OX monitoring per MD note. Patient refused shower but received bed bath instead. Last BM was 05/04. No complaint or concern at this time.We will continue with the POC         "

## 2021-05-05 NOTE — PLAN OF CARE
Problem: Goal/Outcome  Goal: Goal Outcome Summary  Outcome: No Change   FOCUS/GOAL  Bladder management, Medical management, and Mobility    ASSESSMENT, INTERVENTIONS AND CONTINUING PLAN FOR GOAL:  Pt's vitals stable. She is able to use call light for assist. Purewick from overnight removed this morning and pericares done. She was incontinent of bladder this shift. No bm today. LBM yesterday. Pt independent with rolling/turing to sides in bed and needs CGA with transfers using a walker. Denied any pain.

## 2021-05-05 NOTE — PROGRESS NOTES
"    [facility]   Acute Rehabilitation Unit  Inpatient Progress Note  Chief Complaint  Critical Illness Myopathy     Subjective     24 Hours Summary  - No acute events overnight. Patient reports sleeping well. She continues to wear her O2 nasal cannula at night because she states it helps her sleep.  - No episode of dyspnea during physical therapy. She is now walking in parallel bars  - No complaints of pain.   - Last bowel movement recorded on 05/04    Review of Systems  10 point review of systems was otherwise negative other     Objective     Vital Signs  /58 (BP Location: Right arm)   Pulse 90   Temp 98.1  F (36.7  C) (Oral)   Resp 16   Ht 1.702 m (5' 7\")   Wt (!) 155.7 kg (343 lb 4.8 oz)   LMP 08/18/2008   SpO2 95%   BMI 53.77 kg/m      Physical Examination  General: Awake, alert, NAD  Cardiovascular: RRR, no extra heart sounds, no murmur  Pulmonary: Non-labored breathing, CTAB, no wheeze, no rhonci. No crackles.  Abdominal: obese, soft, non-tender, non-distended  Extremities: warm, well perfused, mild edema b/l  NEURO: Moving all extremities against gravity, no changes to deficits.    LABS  No recent labs to review.    IMAGING  No recent images to review.      Assessment     Brissa Barlow is a 55 year old female, with developmental delay, HTN, asthma and morbid obesity who was admitted to [facility] on 04/08/2021 with acute hypoxic respiratory failure secondary to COVID19 pneumonia. She required intubation 04/11 that was complicated by prolonged paralysis and circulatory shock. She was weaned from the ventilator on 04/21 and deemed appropriate for acute rehabilitation for critical illness myopathy on 04/27/2021. Current deficits include impaired strength and mobility.      Plan     Impairment group code: Critical Illness Myopathy     Rehabilitation      1. Impairment of ADL's: OT 90 minutes daily to work on ADL re-training such as grooming, self " cares and bathing.    2. Impairment of mobility: PT 90 minutes daily to work on neuromuscular re-education focusing on strength, balance, coordination, and endurance.    3. Rehab RN for med administration, bowel regimen.  4. Bladder management: Continent. Using PureWick for decrease mobility  5. Bowel management: Continent. Last recorded bowel movement 05/02  6. Pain management: Tylenol PRN  7. Adjustment to disability:  Clinical psychology following  8. FEN: Regular Diet, Thin Liquids  9. DVT Prophylaxis: Apixaban 2.5 mg BID for 30 days (04/28 - 05/27)     Medical Management     ** CHANGES TODAY **  - Overnight oximetry study  - ABG AM    # Hypoxic Respiratory Failure secondary to COVID-19 pneumonia  Presented to [facility] ED on 04/08 with cough and SOB and COVID19 postivite. Admitted for hypoxic respiratory failure. S/P 10 day course steroids, remdesivir and intubation 04/11-04/21. Requiring 1.5 LPM O2 on admission to ARU.  - Vitals QSHIFT  - Encourage breathing exercises and incentive spirometery  - Supplement O2 per nasal cannula, maintain saturation > 90%  - Albuterol neb Q4H PRN     - DVT/PE prophylaxis (see above)  - Will perform overnight oximetry study without O2     # Anemia of chronic disease  Hgb 8.1 on ARU admission. Iron labs consistent with anemia of chronic disease. Hgb 9.1 (05/03), continues to improve. No concerns for blood loss. Will continue to monitor with weekly labs.  - CBC MON    # Seizure disorder  Last seizure during teenage years. No need for seizure precautions at this time.   - Carbamazepine 400 mg PO QAM, 200 mg QNOON, 400 mg at bedtime    # Transaminitis  Enzymes elevated on admission. Now wnl (05/03) and resumed PTA statin. Will continue to monitor with weekly labs.  - LFT QMON     # Delay in development  Parents concerned for underlying psychiatric disorder during prior admission. Patient was seen by psychiatry who did not make any further recommendations  - Zoloft 100 mg  DAILY     # Hypertension, essential  Lasix discontinued on 04/29, 24 BPs wnl (05/04).  - Vitals QSHIFT  - Lisinopril 10 mg DAILY     Chronic medical conditions:  # Mild intermittent asthma - nebs as needed.  # GERD: continue PTA omeprazole  # Hyperlipidemia: Continue PTA statin  # H/o splenectomy     Code: Full code  Capacity: Parents are legal guardians  Disposition: Previously living independently in apartment with roommate / partner. Parents (gaurdians) are looking into section 8 housing options.   ELOS:  14 days (05/18)  Rehab prognosis:  Good  Follow up Appointments on Discharge:   1. Primary care physician  2. Physical medicine and rehabilitation  3. Pulmonary for obstructive sleep apnea         ** Patient was seen and discussed with my attending Dr. Siddiqui who agrees with the above assessment and plan.     Fred Farr MD  Physical Medicine and Rehabilitation PGY-2  Pager: [phone]

## 2021-05-05 NOTE — PLAN OF CARE
FOCUS/GOAL  Bladder management, Pain management, Medical management, Skin integrity, and Safety management    ASSESSMENT, INTERVENTIONS AND CONTINUING PLAN FOR GOAL:  Patient is alert and oriented x 4 complained of lower back pain. Tylenol was given and was effective. VSS, denied dizziness, or shortness of breathing at this time. Patient is in room air O2 sats stable. RT set up over night oxymetry monitor, O2 sats @ 95-94 %. Incontinent of bladder this shift, Pure wick in place for NOC use draining well noted blanchable redness on bilateral upper thigh from tight incontinent pad. Writer called [employer] to order XXXL incontinent pad and they states they do not have it but nursing staff to call in the morning to place special order. Incontinent pad changed and placed under the patient untied. We will continue to monitor.

## 2021-05-05 NOTE — PROGRESS NOTES
Clinic Care Coordination Contact  Care Team Conversations    LUZ MARIA KUO received voicemail from pt's father Adam. He is wondering about the community paramedic program to evaluate for pt's home safety.     LUZ MARIA CC returned call. Pt's father (legal guardian) noted the hospital LUZ MARIA gave him two phone #s to call the community paramedic, last called this morning around 11 am. No answer. Noted they are busy with home visits all day but also advised that hospital staff enter community paramedic order. He will ask.     Noted that pt has a wheelchair that cannot get into her apartment. She does have a walker. Noted her apartment is very messy, they offered to help and pt/roommate declined (Sudhir).    Gave contact information for future use - [facility] LOERA Zainab [phone].     [facility] a [facility]. Parents are guardians.     Plan to return home in about two weeks. Discussed that they 'almost lost her but she is doing so well now.'    Katrina Rogers, PERCY   Social Work Clinic Care Coordinator   [facility]  [phone]  [email or web address]

## 2021-05-06 ENCOUNTER — APPOINTMENT (OUTPATIENT)
Dept: OCCUPATIONAL THERAPY | Facility: CLINIC | Age: 56
End: 2021-05-06
Payer: MEDICARE

## 2021-05-06 ENCOUNTER — APPOINTMENT (OUTPATIENT)
Dept: PHYSICAL THERAPY | Facility: CLINIC | Age: 56
End: 2021-05-06
Payer: MEDICARE

## 2021-05-06 LAB
BASE EXCESS BLDV CALC-SCNC: 0.4 MMOL/L
HCO3 BLDV-SCNC: 25 MMOL/L (ref 21–28)
O2/TOTAL GAS SETTING VFR VENT: ABNORMAL %
PCO2 BLDV: 39 MM HG (ref 40–50)
PH BLDV: 7.41 PH (ref 7.32–7.43)
PO2 BLDV: 59 MM HG (ref 25–47)

## 2021-05-06 PROCEDURE — 97535 SELF CARE MNGMENT TRAINING: CPT | Mod: GO | Performed by: OCCUPATIONAL THERAPIST

## 2021-05-06 PROCEDURE — 999N000157 HC STATISTIC RCP TIME EA 10 MIN

## 2021-05-06 PROCEDURE — 97110 THERAPEUTIC EXERCISES: CPT | Mod: GP | Performed by: PHYSICAL THERAPIST

## 2021-05-06 PROCEDURE — 250N000013 HC RX MED GY IP 250 OP 250 PS 637: Performed by: STUDENT IN AN ORGANIZED HEALTH CARE EDUCATION/TRAINING PROGRAM

## 2021-05-06 PROCEDURE — 250N000013 HC RX MED GY IP 250 OP 250 PS 637: Performed by: PHYSICAL MEDICINE & REHABILITATION

## 2021-05-06 PROCEDURE — 36415 COLL VENOUS BLD VENIPUNCTURE: CPT | Performed by: STUDENT IN AN ORGANIZED HEALTH CARE EDUCATION/TRAINING PROGRAM

## 2021-05-06 PROCEDURE — 82803 BLOOD GASES ANY COMBINATION: CPT | Performed by: STUDENT IN AN ORGANIZED HEALTH CARE EDUCATION/TRAINING PROGRAM

## 2021-05-06 PROCEDURE — 97535 SELF CARE MNGMENT TRAINING: CPT | Mod: GO

## 2021-05-06 PROCEDURE — 97530 THERAPEUTIC ACTIVITIES: CPT | Mod: GP | Performed by: REHABILITATION PRACTITIONER

## 2021-05-06 PROCEDURE — 999N000125 HC STATISTIC PATIENT MED CONFERENCE < 30 MIN: Performed by: OCCUPATIONAL THERAPIST

## 2021-05-06 PROCEDURE — 999N000150 HC STATISTIC PT MED CONFERENCE < 30 MIN: Performed by: PHYSICAL THERAPIST

## 2021-05-06 PROCEDURE — 128N000003 HC R&B REHAB

## 2021-05-06 PROCEDURE — 250N000013 HC RX MED GY IP 250 OP 250 PS 637

## 2021-05-06 PROCEDURE — 99233 SBSQ HOSP IP/OBS HIGH 50: CPT | Mod: GC | Performed by: PHYSICAL MEDICINE & REHABILITATION

## 2021-05-06 PROCEDURE — 97116 GAIT TRAINING THERAPY: CPT | Mod: GP | Performed by: REHABILITATION PRACTITIONER

## 2021-05-06 RX ORDER — AMOXICILLIN 250 MG
1-4 CAPSULE ORAL 2 TIMES DAILY PRN
Status: DISCONTINUED | OUTPATIENT
Start: 2021-05-06 | End: 2021-05-12 | Stop reason: HOSPADM

## 2021-05-06 RX ADMIN — LISINOPRIL 10 MG: 10 TABLET ORAL at 08:00

## 2021-05-06 RX ADMIN — LOVASTATIN 40 MG: 20 TABLET ORAL at 19:57

## 2021-05-06 RX ADMIN — APIXABAN 2.5 MG: 2.5 TABLET, FILM COATED ORAL at 08:00

## 2021-05-06 RX ADMIN — CARBAMAZEPINE 400 MG: 200 TABLET ORAL at 19:56

## 2021-05-06 RX ADMIN — CARBAMAZEPINE 200 MG: 200 TABLET ORAL at 12:52

## 2021-05-06 RX ADMIN — APIXABAN 2.5 MG: 2.5 TABLET, FILM COATED ORAL at 19:56

## 2021-05-06 RX ADMIN — ACETAMINOPHEN 650 MG: 325 TABLET, FILM COATED ORAL at 07:02

## 2021-05-06 RX ADMIN — DOCUSATE SODIUM AND SENNOSIDES 2 TABLET: 8.6; 5 TABLET ORAL at 08:00

## 2021-05-06 RX ADMIN — SERTRALINE HYDROCHLORIDE 100 MG: 100 TABLET ORAL at 08:00

## 2021-05-06 RX ADMIN — MICONAZOLE NITRATE: 20 POWDER TOPICAL at 19:58

## 2021-05-06 RX ADMIN — OMEPRAZOLE 20 MG: 20 CAPSULE, DELAYED RELEASE ORAL at 07:00

## 2021-05-06 RX ADMIN — CARBAMAZEPINE 400 MG: 200 TABLET ORAL at 08:00

## 2021-05-06 NOTE — PLAN OF CARE
FOCUS/GOAL  Bowel management, Bladder management, Medication management, and Medical management    ASSESSMENT, INTERVENTIONS AND CONTINUING PLAN FOR GOAL:  Pt has been continent and incontinent of bowel and bladder with toileting offered q2-3h. LBM was this morning. Pt had Rounds this morning. Per Rounds-pt should be encouraged to demonstrate as much independence with managing her bowel and bladder as possible. She was made Mod I by therapy this afternoon. Pt should not be using PureWick overnight, but she would like her BSC by the bed over NOC. Alerted on-coming RN for follow-up. Pt will also be starting on MAP. Educated pt on the program and placed order. Box set up and a medication list are still needed. Alerted on-coming RN and CN. Pt denied pain throughout shift. Will continue with POC.     Pt noted to have bloody streaks in her BM this morning. Pt reports history of hemorrhoids. Notified Resident MD who stated no intervention at this time. Will continue to monitor.

## 2021-05-06 NOTE — PLAN OF CARE
Discharge Planner Post-Acute Rehab OT:      Discharge Plan: Home with HC     Precautions: Falls     Current Status:  ADLs: SBA SPT with 4ww, amb bedside <> bathroom SBA. Min A UB dressing, Min-Mod A LB dressing, Mod A toileting, and set up grooming.   IADLs: To be assessed  Vision/Cognition: Borderline developmental delay. Wears glasses. No acute changes.     Assessment: Continues to progress functional transfers/mobility. Pt transfers consistently SBA, able to ambulate short distances with 4ww. Pt tolerating 30 sec bouts of standing activity, heavy reliance on back support for seated rest breaks. Pt working on LB dressing IND, improving ability to bend forwards to reach feet to don socks and shoes IND. Pt will benefit from ongoing skilled OT.     Other Barriers to Discharge (DME, Family Training, etc):   DME needs: tub bench, 4ww, bedrail, shower grab bar, toilet grab bar, recliner.   Caregiver training: Recommend caregiver training with parents and significant other prior to discharge.  Home set up: Per  notes, pt apartment is small and cluttered. There are concerns for handicap accessibility.

## 2021-05-06 NOTE — CARE CONFERENCE
Acute Rehab Care Conference/Team Rounds      Type: Team Rounds    Present:Dr David Siddiqui, Fred Farr Resident, Norah Evans RN, Leoncio Fisher PT, Latonia Hankins OT, Loan Burdick SW, Sydnie Fagan Mgr, Radha Vazquez Dietician, Aretha Garcia , Patient Brissa Barlow      Discharge Barriers/Treatment/Education    Rehab Diagnosis: debility post Covid     Active Medical Co-morbidities/Prognosis:     Patient Active Problem List   Diagnosis Code     Essential hypertension, benign I10     Delay in development R62.50     Injury, other and unspecified, knee, leg, ankle, and foot S89.90XA, S99.929A, S99.919A     Perforation of tympanic membrane H72.90     Hyperlipidemia LDL goal <130 E78.5     Seizure disorder (H) G40.909     Other peripheral enthesopathies M77.8     IFG (impaired fasting glucose) R73.01     Insulin resistance E88.81     Elevated cortisol level R79.89     Morbid obesity (H) E66.01     Low vitamin D level R79.89     BMI 50.0-59.9, adult (H) Z68.43     SBO (small bowel obstruction) (H) K56.609     Non-intractable vomiting with nausea, unspecified vomiting type R11.2     Acute UTI N39.0     Acute respiratory failure with hypoxia (H) J96.01     Pneumonia due to 2019 novel coronavirus U07.1, J12.82     Critical illness myopathy G72.81         Safety:Alert and oriented.  Able to make needs known. Call light in each. Bed alarm on for safety    Pain: C/O iIntermittent back and headache pain, PRN Tylenol utilized with some relief    Medications, Skin, Tubes/Lines: Takes pills whole with water, no tubes or lines present. Skin WNLs    Swallowing/Nutrition: reg with thins     Bowel/Bladder: Continent and incontinent of bowel and bladder. Pure wick in place overnight. LBM 5/6    Psychosocial: Parents are pt's legal guardians. CADI waiver. Concerns about living situation from parents. Lives w/ S/O.     ADLs/IADLs: Pt progressing with ADLs/IADLs. Pt SBA transfers/mobility with 4ww. Pt demo set  up UB dressing, SBA LB dressing, Min A toileting, and Min A bathing. Pt has been frequently incontinent bowel and bladder, requiring assistance to complete hygiene/clothing management unless cued to initiate IND. Pt tolerates short duration standing activity, completes very short distance amb with simple IADLs. Performance limited by deconditioning, weakness, impaired balance, incontinence. Pt goals to advance to Mod IND ADLs and functional mobility. Pt spouse and parents very supportive and able to provide assistance for IADLs. ELOS 5/12 pending progress with toileting. Recommend follow up HC OT/HHA. DME/AE: toilet riser with handles, 4ww, shower bench, sock aid, reacher.     Mobility:   Bed Mobility: mod I with use of rail  Transfer: SBA with 4WW  Gait: up to 50 ft with 4WW, SBA  Stairs: NT   Balance: Seated SBA EOB. CGA/SBA once standing with UE support through 4WW  Despite self-limiting behavior, pt does continue to be progressing in terms of LE strength, CV endurance, and standing balance. Pt does have her personal 4WW available on the unit. No other anticipated equipment needs from PT perspective.    Cognition/Language:Borderline developmental delay, mild deficits with executive functioning. Recommend oversight with IADLs. Pt parents are legal guardians.     Community Re-Entry: Not a barrier at this time. Plan to transition to home care PT post ARU.    Transportation: Anticipate family to provide.    Decision maker: self    Plan of Care and goals reviewed and updated.    Discharge Plan/Recommendations    Fall Precautions: continue    Patient/Family input to goals: yes     Estimated length of stay: 14 days     Overall plan for the patient: reach a level of mod I       Utilization Review and Continued Stay Justification    Medical Necessity Criteria:    For any criteria that is not met, please document reason and plan for discharge, transfer, or modification of plan of care to address.    Requires intensive  rehabilitation program to treat functional deficits?: Yes    Requires 3x per week or greater involvement of rehabilitation physician to oversee rehabilitation program?: Yes    Requires rehabilitation nursing interventions?: Yes    Patient is making functional progress?: Yes    There is a potential for additional functional progress? Yes    Patient is participating in therapy 3 hours per day a minimum of 5 days per week or 15 hours per week in 7 day period?:Yes    Has discharge needs that require coordinated discharge planning approach?:Yes      Barriers/Concerns related to meeting medical necessity criteria:  none    Team Plan to Address Concern:  As needed       Final Physician Sign off    Statement of Approval:  David Siddiqui, DO      Patient Goals  SW: Confirm discharge recommendations with therapy, coordinate safe discharge plan and remain available to support and assist as needed.    OT Frequency: 60-90 min daily  OT goal: hygiene/grooming: independent  OT goal: upper body dressing: Independent  OT goal: lower body dressing: Modified independent  OT goal: upper body bathing: Supervision/stand-by assist  OT goal: lower body bathing: Supervision/stand-by assist  OT goal: bed mobility: Modified independent  OT Goal: transfer: Modified independent  OT goal: toilet transfer/toileting: Modified independent  OT goal: meal preparation: Supervision/stand-by assist, with simple meal preparation  OT goal: perform aerobic activity with stable cardiovascular response: 10 minutes, continuous activity  OT goal 1: Pt will demo SBA with shower transfer using DME/AE prn  OT goal 2: Pt will demo IND with BUE HEP with focus on strengthening and endurance    PT Frequency: Daily PT x 16 days, 60 min per day.  PT goal: bed mobility: Independent, Supine to/from sit, Rolling, Bridging  PT goal: transfers: Modified independent, Sit to/from stand, Bed to/from chair, Assistive device(4ww)  PT goal: gait: Modified independent,  Rolling walker, 150 feet  PT goal: perform aerobic activity with stable cardiovascular response: intermittent activity, 5 minutes, ambulation  PT goal 1: Pt able to perform a car transfer with 4ww and sba  PT Goal 2: Pt and family I with HEP for improved LE and core  strength, endurance for improved mobility.        Nursing Goal: Bowel: Pt will advocate for timed toileting to regain bowel continence by 5/13/2021.  Nursing Goal: Bladder: Pt will advocate for timed toileting to regain bladder continence by 5/13/2021.  Nursing Goal: Medication Management: Pt and caregiver will verbalize understanding of medications regimen including indications of use and side effects by 5/13/2021.  Nursing Goal: Skin Integrity: Pt and caregiver will verbalize 3 methods of pressure ulcer prevention and remain free of skin breakdown by 5/13/2021.

## 2021-05-06 NOTE — PROGRESS NOTES
SW spoke w/ pt's mother, Lola, via ph. Lola shared that her  called the [facility] for a home safety eval and they requested a SW make the referral. LUZ MARIA contacted one of the paramedics, Shannan (PH: [phone]), today who stated that pt's primary care doctor has to put in a community paramedic order for them to do a home safety eval (order #: 9050.712). LUZ MARIA asked Shannan if a  ARU physician can order this. According to Shannan they cannot, it has to be pt's PCP. LUZ MARIA will reach out to pt's primary care clinic tomorrow to see if they can can do this.     LUZ MARIA sent referral to [facility] for SN/PT/OT/SLP needs, but they declined. LUZ MARIA will discuss other options w/ pt's mom tomorrow. New anticipated discharge date 5/12. Lola will provide transportation home for discharge.     Loan Burdick MSW, LGSW  [facility]

## 2021-05-06 NOTE — PLAN OF CARE
Alert and oriented X 4. Able to make needs known. Call light in reach. C/O headache this morning,  received Tylenol.  Denies SOB. On O2 study during night. Maintaining O2 sats in mid 90s on room air. Incontinent bowel and bladder. Last BM on 5/4/ Pure wick in place.  Appears to be sleeping during rounds. Bed alarm on for safety. Continue with plan of care.

## 2021-05-06 NOTE — PROGRESS NOTES
"    [facility]   Acute Rehabilitation Unit  Inpatient Progress Note  Chief Complaint  Critical Illness Myopathy     Subjective     24 Hours Summary  - No acute events overnight, patient reports sleeping well.   - Oximetry study performed last night. She slept well, tolerated 0 LPM O2 all night.   - Physical therapy going well, no complaints.  - Last bowel movement on 05/06    Review of Systems  10 point review of systems was otherwise negative other     Objective     Vital Signs  /65 (BP Location: Right arm)   Pulse 86   Temp 96.9  F (36.1  C) (Oral)   Resp 18   Ht 1.702 m (5' 7\")   Wt (!) 155.7 kg (343 lb 4.8 oz)   LMP 08/18/2008   SpO2 96%   BMI 53.77 kg/m      Physical Examination  General: Awake, alert, NAD  Cardiovascular: RRR, no extra heart sounds, no murmur  Pulmonary: Non-labored breathing, CTAB, no wheeze, no rhonci. No crackles.  Abdominal: obese, soft, non-tender, non-distended  Extremities: warm, well perfused, mild edema b/l  NEURO: Moving all extremities against gravity, no changes to deficits.    LABS  Recent Results (from the past 24 hour(s))   Blood gas venous    Collection Time: 05/06/21  6:34 AM   Result Value Ref Range    Ph Venous 7.41 7.32 - 7.43 pH    PCO2 Venous 39 (L) 40 - 50 mm Hg    PO2 Venous 59 (H) 25 - 47 mm Hg    Bicarbonate Venous 25 21 - 28 mmol/L    Base Excess Venous 0.4 mmol/L    FIO2 RA      IMAGING  No recent images to review.      Assessment     Brissa Barlow is a 55 year old female, with developmental delay, HTN, asthma and morbid obesity who was admitted to [facility] on 04/08/2021 with acute hypoxic respiratory failure secondary to COVID19 pneumonia. She required intubation 04/11 that was complicated by prolonged paralysis and circulatory shock. She was weaned from the ventilator on 04/21 and deemed appropriate for acute rehabilitation for critical illness myopathy on 04/27/2021. Current deficits include " impaired strength and mobility.      Plan     Impairment group code: Critical Illness Myopathy     Rehabilitation      1. Impairment of ADL's: OT 90 minutes daily to work on ADL re-training such as grooming, self cares and bathing.    2. Impairment of mobility: PT 90 minutes daily to work on neuromuscular re-education focusing on strength, balance, coordination, and endurance.    3. Rehab RN for med administration, bowel regimen.  4. Bladder management: Continent. Using PureWick for decrease mobility  5. Bowel management: Continent. Last recorded bowel movement 05/02  6. Pain management: Tylenol PRN  7. Adjustment to disability:  Clinical psychology following  8. FEN: Regular Diet, Thin Liquids  9. DVT Prophylaxis: Apixaban 2.5 mg BID for 30 days (04/28 - 05/27)     Medical Management     ** CHANGES TODAY **  - Discontinued O2    # Hypoxic Respiratory Failure secondary to COVID-19 pneumonia  Presented to [facility] ED on 04/08 with cough and SOB and COVID19 postivite. Admitted for hypoxic respiratory failure. S/P 10 day course steroids, remdesivir and intubation 04/11-04/21. Requiring 1.5 LPM O2 on admission to ARU.  - Vitals QSHIFT  - Encourage breathing exercises and incentive spirometery  - Supplement O2 per nasal cannula, maintain saturation > 90%  - Albuterol neb Q4H PRN     - DVT/PE prophylaxis (see above)     # Anemia of chronic disease  Hgb 8.1 on ARU admission. Iron labs consistent with anemia of chronic disease. Hgb 9.1 (05/03), continues to improve. No concerns for blood loss. Will continue to monitor with weekly labs.  - CBC MON    # Seizure disorder  Last seizure during teenage years. No need for seizure precautions at this time.   - Carbamazepine 400 mg PO QAM, 200 mg QNOON, 400 mg at bedtime    # Transaminitis  Enzymes elevated on admission. Now wnl (05/03) and resumed PTA statin. Will continue to monitor with weekly labs.  - LFT QMON     # Delay in development  Parents concerned for underlying  psychiatric disorder during prior admission. Patient was seen by psychiatry who did not make any further recommendations  - Zoloft 100 mg DAILY     # Hypertension, essential  Lasix discontinued on 04/29, 24 BPs wnl (05/04).  - Vitals QSHIFT  - Lisinopril 10 mg DAILY     Chronic medical conditions:  # Mild intermittent asthma - nebs as needed.  # GERD: continue PTA omeprazole  # Hyperlipidemia: Continue PTA statin  # H/o splenectomy     Code: Full code  Capacity: Parents are legal guardians  Disposition: Previously living independently in apartment with roommate / partner. Parents (sharron) are looking into section 8 housing options.   ELOS:  14 days (05/18)  Rehab prognosis:  Good  Follow up Appointments on Discharge:   1. Primary care physician  2. Physical medicine and rehabilitation  3. Pulmonary for obstructive sleep apnea         ** Patient was seen and discussed with my attending Dr. Siddiqui who agrees with the above assessment and plan.     Fred Farr MD  Physical Medicine and Rehabilitation PGY-2  Pager: [phone]

## 2021-05-06 NOTE — PLAN OF CARE
Discharge Planner Post-Acute Rehab OT:      Discharge Plan: Home with HC     Precautions: Falls     Current Status:  ADLs: Mod IND transfers/mobility with 4ww. Pt set up UB dressing, Min A LB dressing, Mod IND toileting when cued to initiate and not fatigued - needing support at times for incontinence, Min A bathing.  IADLs: SBA with simple dynamic mobility, fatigues quickly, tolerating very short duration standing activity. Recommend assist complex/dynamic IADLs.  Vision/Cognition: Borderline developmental delay. Wears glasses. No acute changes.     Assessment:  Progressing with ADLs/functional mobility. Continues to be limited by self initiation, deconditioning, weakness, and fatigue. Advanced to Mod IND in room with 4ww. Discussed initially using bedside commode overnight due to urgency. Completed caregiver training with parents today reviewing functional progress, goals, and discharge planning. Pt on track to discharge home 5/12 with support, possibly sooner pending progress.    Other Barriers to Discharge (DME, Family Training, etc):   DME needs: bariatric tub bench, 4ww, bedrail, shower grab bar, toilet grab bar, recliner. Working on issuing equipment through CADI waiver.  Caregiver training: Completed with mother and father 5/6. Spouse and parents to provide assistance for IADLs  Home set up: Per  notes, pt apartment is small and cluttered. There are concerns for handicap accessibility.

## 2021-05-06 NOTE — PLAN OF CARE
Discharge Planner Post-Acute Rehab PT:     Discharge Plan: Pt lives with her spouse in an apartment.  Pt can use an elevator to get to 4th floor apartment.  Pt owns a 4ww.  Pt states her  pushes her while she sits on the 4ww from the elevator to their apartment.      Precautions: falls    Current Status:  Bed Mobility: IND   Transfer: mod I with 4WW   Gait: SBA with 4WW up to 60'x 2 limited by SOB and fatigue.   Stairs: NT   Balance: IDN sitting, SBA standing with 4WW     Assessment:  pt able to demo functional mob, with use  of 4WW for all OOB father present for PT session today for family training. Ed on on walking program for home and equipment, which pt stating mother has paperwork     Other Barriers to Discharge (DME, Family Training, etc): Pt may need family training with her spouse. Pt has her personal 4WW here on the unit.  Handout provided for bedrail, floor bike via [email or web address], which mom can purchase

## 2021-05-06 NOTE — PROGRESS NOTES
CLINICAL NUTRITION SERVICES - REASSESSMENT NOTE     Nutrition Prescription    RECOMMENDATIONS FOR MDs/PROVIDERS TO ORDER:  None today     Malnutrition Status:    Patient does not meet two of the established criteria necessary for diagnosing malnutrition    Recommendations already ordered by Registered Dietitian (RD):  Continue ensure q day  Diet education    Future/Additional Recommendations:  Monitor intakes and wt  Consider increasing supplement vs adding snacks if wt loss continues      EVALUATION OF THE PROGRESS TOWARD GOALS   Diet: Regular  Supplements: ensure with dinner  Intake: % of most meals per flowsheet        NEW FINDINGS   Pt states her appetite has been good. Intakes vary based on what is served. She disliked the entree she ordered last night so she did not eat it but did eat everything else on her tray. Has also been drinking ensure q day which she enjoys. Denies N/V/C/D or difficulty chewing/swallowing. LBM this morning per pt report.   Pt reports significant wt los from her reported UBW of ~500# Per  Documented wt history UBW appears to be closer to 360-380#. Has lost 17# (6.5%) in 1 month, 6# (2.4%) wt loss in 1 week  05/04/21    155.7 kg (343 lb 4.8 oz)  04/27/21            (!) 158.5 kg (349 lb 8 oz)-admit wt  04/16/21            163.2 kg (357 lb 11.2 oz)  04/13/21            (!) 155.6 kg (343 lb)  04/09/21            163.3 kg (360 lb 1.6 oz)    03/11/21            (!) 167 kg (368 lb 3.2 oz)  10/01/20            (!) 168.5 kg (371 lb 8 oz)  07/24/19            (!) 164.5 kg (362 lb 9.6 oz)  01/15/19            (!) 172.3 kg (379 lb 12.8 oz)  08/28/18            104.3 kg (230 lb)  06/12/18            (!) 170.8 kg (376 lb 8 oz)  02/27/18            (!) 168.3 kg (371 lb)  01/04/18            (!) 166.7 kg (367 lb 6.4 oz)     MALNUTRITION  % Intake: Decreased intake does not meet criteria  % Weight Loss: > 5% in 1 month (severe)  Subcutaneous Fat Loss: None observed  Muscle Loss: None  observed  Fluid Accumulation/Edema: 1+ ankles  Malnutrition Diagnosis: Patient does not meet two of the established criteria necessary for diagnosing malnutrition    Previous Goals   Patient to consume % of nutritionally adequate meal trays TID, or the equivalent with supplements/snacks.  Evaluation: Not met    Previous Nutrition Diagnosis  Predicted inadequate nutrient intake (protein/energy) related to possible changes in appetite, menu fatigue as evidenced by reliance on oral intake to meet 100% of needs     Evaluation: No change    CURRENT NUTRITION DIAGNOSIS  Predicted inadequate nutrient intake (protein/energy) related to possible changes in appetite, menu fatigue as evidenced by reliance on oral intake to meet 100% of needs      INTERVENTIONS  Implementation  Diet education: Encouraged good oral intakes. Discussed the importance of adequate protein   Continue ensure q day    Goals  Patient to consume % of nutritionally adequate meal trays TID, or the equivalent with supplements/snacks.    Monitoring/Evaluation  Progress toward goals will be monitored and evaluated per protocol.    Rocio Shell MS, RD, LDN  Unit Pager [phone]

## 2021-05-07 ENCOUNTER — APPOINTMENT (OUTPATIENT)
Dept: PHYSICAL THERAPY | Facility: CLINIC | Age: 56
End: 2021-05-07
Payer: MEDICARE

## 2021-05-07 ENCOUNTER — APPOINTMENT (OUTPATIENT)
Dept: OCCUPATIONAL THERAPY | Facility: CLINIC | Age: 56
End: 2021-05-07
Payer: MEDICARE

## 2021-05-07 PROCEDURE — 97110 THERAPEUTIC EXERCISES: CPT | Mod: GO | Performed by: OCCUPATIONAL THERAPIST

## 2021-05-07 PROCEDURE — 250N000013 HC RX MED GY IP 250 OP 250 PS 637

## 2021-05-07 PROCEDURE — 97535 SELF CARE MNGMENT TRAINING: CPT | Mod: GO | Performed by: OCCUPATIONAL THERAPIST

## 2021-05-07 PROCEDURE — 97110 THERAPEUTIC EXERCISES: CPT | Mod: GP | Performed by: PHYSICAL THERAPIST

## 2021-05-07 PROCEDURE — 250N000013 HC RX MED GY IP 250 OP 250 PS 637: Performed by: PHYSICAL MEDICINE & REHABILITATION

## 2021-05-07 PROCEDURE — 128N000003 HC R&B REHAB

## 2021-05-07 PROCEDURE — 97535 SELF CARE MNGMENT TRAINING: CPT | Mod: GO

## 2021-05-07 PROCEDURE — 99232 SBSQ HOSP IP/OBS MODERATE 35: CPT | Mod: GC | Performed by: PHYSICAL MEDICINE & REHABILITATION

## 2021-05-07 RX ADMIN — MICONAZOLE NITRATE: 20 POWDER TOPICAL at 20:00

## 2021-05-07 RX ADMIN — OMEPRAZOLE 20 MG: 20 CAPSULE, DELAYED RELEASE ORAL at 06:15

## 2021-05-07 RX ADMIN — SERTRALINE HYDROCHLORIDE 100 MG: 100 TABLET ORAL at 08:50

## 2021-05-07 RX ADMIN — CARBAMAZEPINE 200 MG: 200 TABLET ORAL at 13:00

## 2021-05-07 RX ADMIN — CARBAMAZEPINE 400 MG: 200 TABLET ORAL at 08:50

## 2021-05-07 RX ADMIN — APIXABAN 2.5 MG: 2.5 TABLET, FILM COATED ORAL at 08:50

## 2021-05-07 RX ADMIN — ACETAMINOPHEN 650 MG: 325 TABLET, FILM COATED ORAL at 08:49

## 2021-05-07 RX ADMIN — LOVASTATIN 40 MG: 20 TABLET ORAL at 22:04

## 2021-05-07 RX ADMIN — CARBAMAZEPINE 400 MG: 200 TABLET ORAL at 18:54

## 2021-05-07 RX ADMIN — APIXABAN 2.5 MG: 2.5 TABLET, FILM COATED ORAL at 18:54

## 2021-05-07 RX ADMIN — LISINOPRIL 10 MG: 10 TABLET ORAL at 08:55

## 2021-05-07 NOTE — PLAN OF CARE
Patient was advanced to mod I in the room and bathroom with her walker during the day and BSC at night.  Goal is for patient to manage bowel and bladder as much as safely possible.  She did need assist to wipe after voiding on the bedside commode.  She managed changing her slightly wet underwear after set up.    Patient is to start MAP tomorrow am.  She will need to review the process tomorrow am.

## 2021-05-07 NOTE — PLAN OF CARE
FOCUS/GOAL  Medical management    ASSESSMENT, INTERVENTIONS AND CONTINUING PLAN FOR GOAL:  Pt is alert and oriented,w/ dev delayed. Seen sleeping during safety round checks. EL in room. Staff wake her up d/t noticeable incontinent urine. Pt said that she has been sleeping too hard that she voided in bed. Bed change done and incontinent pad is on. Pt said that she will try to use the  Bedside commode. Pt is independent w/ bed mobility. No complain of pain and no sob noted. Wake her up later on the shift to use the commode. Assisted by DANNY. Berta w/ POC.

## 2021-05-07 NOTE — PROGRESS NOTES
SW discussed HC w/ pt and mom, Lola, this morning and was given permission to send a referral to [facility] for SN/PT/OT needs. Pt accepted and SOC is the afternoon of 5/12. SW updated pt and Lola, who will be home w/ the pt for the SOC assessment. Information copied in AVS.     Pt has a CADI waiver assessment scheduled Monday 5/10 at 10 AM via ph w/ [facility] assessor, Norberto Daigle (PH: [phone]; F: [phone]). Lola will come to the unit on Monday to be part of the assessment. Norberto requested SW fax pt's facesheet, H&P and medication list. Documents faxed.     Anticipated discharge date 5/12/2021.     [facility]   PH: [phone]  F: [phone]    Loan REYEZ, LUZ MARIA  [facility]

## 2021-05-07 NOTE — PROGRESS NOTES
PT Mod I with walker in room. BSC at bedside at . Urine continence and incontinence noted this shift; pt requesting max assist with changing brief this shift x2. LBM 5/7. Pt on MAP; called for meds and able to open bottles. Patient shakes bottles to verify what med to take. Education to compare order sheet to med, dose, time prior to admin. Pt gave writer morning dose of med versus evening dose. Uses call light approp. Denies pain, chest pain, SOB, n/v, numbness/tingling, call light within reach. Continue with current POC.

## 2021-05-07 NOTE — PLAN OF CARE
FOCUS/GOAL  Bowel management, Bladder management, Medication management, Pain management, and Medical management    ASSESSMENT, INTERVENTIONS AND CONTINUING PLAN FOR GOAL:  Pt reports continence of bowel and bladder. LBM was this morning per pt report. Per CNA, pt did have an episode of bladder incontinence x1 this morning which she requested assistance to clean up. Continuing to encourage pt to demonstrate independence with managing B&B. Today was pt's first day on MAP. She did not call for medications, and forgot two medications while following her medication list to pull out correct meds. She also struggled physically with opening the medication bottles. Per pt, she was pulling her medications out of the prescription bottles at home. Will continue to assess pt's ability to manage medication regimen. Pt c/o headache pain and was given PRN acetaminophen x1 with some relief. Will continue with POC.

## 2021-05-07 NOTE — PLAN OF CARE
Discharge Planner Post-Acute Rehab PT:      Discharge Plan: Pt lives with her spouse in an apartment.  Pt can use an elevator to get to 4th floor apartment.  Pt owns a 4ww.  Pt states her  pushes her while she sits on the 4ww from the elevator to their apartment.      Precautions: falls     Current Status:  Bed Mobility: IND   Transfer: mod I with 4WW   Gait: Mod I with 4WW in room. Supervision for increased distances in hallway, up to 80 ft  Stairs: NT   Balance: Fair to good dynamic standing balance with UE support on 4WW     Assessment:  Pt continues to be progressing well, mod I with 4WW in room. Able to further progress ambulation tolerance, 3x80 ft today with 4WW. Pending ability to manage bowel and bladder, especially at nightime, pt on track for discharge next week, possibly as soon as Monday, 5/10.     Other Barriers to Discharge (DME, Family Training, etc):   Family training completed. HEP provided for LE strength and CV endurance.  Pt has her personal 4WW here on the unit.  Handout provided for bedrail, floor bike via [employer], which mom has purchased.

## 2021-05-07 NOTE — PLAN OF CARE
Discharge Planner Post-Acute Rehab OT:      Discharge Plan: Home with HC  ELOS 5/12 but could discharge home sooner pending progress with toileting and HC set up     Precautions: Falls     Current Status:  ADLs: Mod IND transfers/mobility with 4ww. Pt IND UB dressing, Mod IND LB dressing (AE and increased time), Mod IND toileting when cued to initiate and not fatigued - needing support at times for incontinence, Min A bathing.  IADLs: SBA with simple dynamic mobility, fatigues quickly, tolerating very short duration standing activity. Recommend assist complex/dynamic IADLs.  Vision/Cognition: Borderline developmental delay. Wears glasses. No acute changes.     Assessment:  Progressing with ADLs/functional mobility. Pt now Mod IND in room but continuing to work on self initiation of toileting throughout the day. Contacted pt caregivers regarding DME/AE. Caregivers requesting bariatric tub bench, sock aid, and reacher through [facility] medical. Order completed this am.     Other Barriers to Discharge (DME, Family Training, etc):   DME: bariatric tub bench, 4ww, bedrail, shower grab bar, toilet grab bar, recliner.  Caregiver training: Completed with mother and father 5/6. Spouse and parents to provide assistance for IADLs  Home set up: Per  notes, pt apartment is small and cluttered. There are concerns for handicap accessibility.  assisting to set up home safety evaluation through a community program.

## 2021-05-07 NOTE — PROGRESS NOTES
"    [facility]   Acute Rehabilitation Unit  Inpatient Progress Note  Chief Complaint  Critical Illness Myopathy     Subjective     24 Hours Summary  - No acute events overnight, patient reports sleeping well.   - Urinary incontinence last night. This was further discussed and at the patients baseline, she was having frequent episodes of incontinence at home.   - Physical therapy going well, no complaints, she is now Dayron in room  - Last bowel movement on 05/06    Review of Systems  10 point review of systems was otherwise negative other     Objective     Vital Signs  /41 (BP Location: Right arm)   Pulse 93   Temp 96.1  F (35.6  C) (Oral)   Resp 16   Ht 1.702 m (5' 7\")   Wt (!) 155.7 kg (343 lb 4.8 oz)   LMP 08/18/2008   SpO2 97%   BMI 53.77 kg/m      Physical Examination  General: Awake, alert, NAD  Cardiovascular: RRR, no extra heart sounds, no murmur  Pulmonary: Non-labored breathing, CTAB, no wheeze, no rhonci. No crackles.  Abdominal: obese, soft, non-tender, non-distended  Extremities: warm, well perfused, mild edema b/l  NEURO: Moving all extremities against gravity, no changes to deficits.    LABS  No recent labs to review.    IMAGING  No recent images to review.      Assessment     Brissa Barlow is a 55 year old female, with developmental delay, HTN, asthma and morbid obesity who was admitted to [facility] on 04/08/2021 with acute hypoxic respiratory failure secondary to COVID19 pneumonia. She required intubation 04/11 that was complicated by prolonged paralysis and circulatory shock. She was weaned from the ventilator on 04/21 and deemed appropriate for acute rehabilitation for critical illness myopathy on 04/27/2021. Current deficits include impaired strength and mobility.      Plan     Impairment group code: Critical Illness Myopathy     Rehabilitation      1. Impairment of ADL's: OT 90 minutes daily to work on ADL re-training such as " grooming, self cares and bathing.    2. Impairment of mobility: PT 90 minutes daily to work on neuromuscular re-education focusing on strength, balance, coordination, and endurance.    3. Rehab RN for med administration, bowel regimen.  4. Bladder management: Continent. Using PureWick for decrease mobility  5. Bowel management: Continent. Last recorded bowel movement 05/02  6. Pain management: Tylenol PRN  7. Adjustment to disability:  Clinical psychology following  8. FEN: Regular Diet, Thin Liquids  9. DVT Prophylaxis: Apixaban 2.5 mg BID for 30 days (04/28 - 05/27)     Medical Management     ** CHANGES TODAY **  - No changes today, continue rehab care.    # Hypoxic Respiratory Failure secondary to COVID-19 pneumonia  Presented to [facility] ED on 04/08 with cough and SOB and COVID19 postivite. Admitted for hypoxic respiratory failure. S/P 10 day course steroids, remdesivir and intubation 04/11-04/21. Requiring 1.5 LPM O2 on admission to ARU.  - Vitals QSHIFT  - Encourage breathing exercises and incentive spirometery  - Supplement O2 per nasal cannula, maintain saturation > 90%  - Albuterol neb Q4H PRN     - DVT/PE prophylaxis (see above)     # Anemia of chronic disease  Hgb 8.1 on ARU admission. Iron labs consistent with anemia of chronic disease. Hgb 9.1 (05/03), continues to improve. No concerns for blood loss. Will continue to monitor with weekly labs.  - CBC MON    # Seizure disorder  Last seizure during teenage years. No need for seizure precautions at this time.   - Carbamazepine 400 mg PO QAM, 200 mg QNOON, 400 mg at bedtime    # Transaminitis  Enzymes elevated on admission. Now wnl (05/03) and resumed PTA statin. Will continue to monitor with weekly labs.  - LFT QMON     # Delay in development  Parents concerned for underlying psychiatric disorder during prior admission. Patient was seen by psychiatry who did not make any further recommendations  - Zoloft 100 mg DAILY     # Hypertension,  essential  Lasix discontinued on 04/29, 24 BPs wnl (05/04).  - Vitals QSHIFT  - Lisinopril 10 mg DAILY     Chronic medical conditions:  # Mild intermittent asthma - nebs as needed.  # GERD: continue PTA omeprazole  # Hyperlipidemia: Continue PTA statin  # H/o splenectomy     Code: Full code  Capacity: Parents are legal guardians  Disposition: Previously living independently in apartment with roommate / partner. Parents (gaurdians) are looking into section 8 housing options.   ELOS:  14 days (05/18), likely sooner  Rehab prognosis:  Good  Follow up Appointments on Discharge:   1. Primary care physician  2. Physical medicine and rehabilitation  3. Pulmonary for obstructive sleep apnea         ** Patient was seen and discussed with my attending Dr. Siddiqui who agrees with the above assessment and plan.     Fred Farr MD  Physical Medicine and Rehabilitation PGY-2  Pager: [phone]

## 2021-05-07 NOTE — PROGRESS NOTES
SPIRITUAL HEALTH SERVICES  SPIRITUAL ASSESSMENT Progress Note  [facility] ARU R546 5/7     REFERRAL SOURCE: Follow Up    Pt mentioned she was feeling strong and will  Be discharging soon. Pt received a prayer for life at home again.    PLAN: Will follow up with pt if she returns to unit.    Alyssa Monzon  Associate    Pager: [phone]

## 2021-05-08 ENCOUNTER — APPOINTMENT (OUTPATIENT)
Dept: OCCUPATIONAL THERAPY | Facility: CLINIC | Age: 56
End: 2021-05-08
Payer: MEDICARE

## 2021-05-08 ENCOUNTER — APPOINTMENT (OUTPATIENT)
Dept: PHYSICAL THERAPY | Facility: CLINIC | Age: 56
End: 2021-05-08
Payer: MEDICARE

## 2021-05-08 PROCEDURE — 97110 THERAPEUTIC EXERCISES: CPT | Mod: GP

## 2021-05-08 PROCEDURE — 97110 THERAPEUTIC EXERCISES: CPT | Mod: GO

## 2021-05-08 PROCEDURE — 97530 THERAPEUTIC ACTIVITIES: CPT | Mod: GO

## 2021-05-08 PROCEDURE — 250N000013 HC RX MED GY IP 250 OP 250 PS 637

## 2021-05-08 PROCEDURE — 128N000003 HC R&B REHAB

## 2021-05-08 PROCEDURE — 250N000013 HC RX MED GY IP 250 OP 250 PS 637: Performed by: PHYSICAL MEDICINE & REHABILITATION

## 2021-05-08 PROCEDURE — 99231 SBSQ HOSP IP/OBS SF/LOW 25: CPT | Mod: GC | Performed by: PHYSICAL MEDICINE & REHABILITATION

## 2021-05-08 RX ADMIN — LOVASTATIN 40 MG: 20 TABLET ORAL at 20:54

## 2021-05-08 RX ADMIN — ACETAMINOPHEN 650 MG: 325 TABLET, FILM COATED ORAL at 19:49

## 2021-05-08 RX ADMIN — CARBAMAZEPINE 200 MG: 200 TABLET ORAL at 12:56

## 2021-05-08 RX ADMIN — LISINOPRIL 10 MG: 10 TABLET ORAL at 08:43

## 2021-05-08 RX ADMIN — SERTRALINE HYDROCHLORIDE 100 MG: 100 TABLET ORAL at 08:42

## 2021-05-08 RX ADMIN — CARBAMAZEPINE 400 MG: 200 TABLET ORAL at 20:53

## 2021-05-08 RX ADMIN — ACETAMINOPHEN 650 MG: 325 TABLET, FILM COATED ORAL at 06:33

## 2021-05-08 RX ADMIN — APIXABAN 2.5 MG: 2.5 TABLET, FILM COATED ORAL at 20:53

## 2021-05-08 RX ADMIN — APIXABAN 2.5 MG: 2.5 TABLET, FILM COATED ORAL at 08:42

## 2021-05-08 RX ADMIN — OMEPRAZOLE 20 MG: 20 CAPSULE, DELAYED RELEASE ORAL at 06:31

## 2021-05-08 RX ADMIN — CARBAMAZEPINE 400 MG: 200 TABLET ORAL at 08:47

## 2021-05-08 NOTE — PROGRESS NOTES
"    [facility]   Acute Rehabilitation Unit  Weekend / [place] Cover Note  Chief Complaint  Critical Illness Myopathy     Subjective     24 Hours Summary  - No acute events overnight, patient reports sleeping well.   - No urinary incontinence last night, she moving well from the bed to the bathroom with her rolator.  - Physical therapy going well, no complaints, she is now Dayron in room  - Last bowel movement on 05/08    Review of Systems  10 point review of systems was otherwise negative other     Objective     Vital Signs  /64 (BP Location: Right arm)   Pulse 98   Temp 96.6  F (35.9  C) (Oral)   Resp 16   Ht 1.702 m (5' 7\")   Wt (!) 155.7 kg (343 lb 4.8 oz)   LMP 08/18/2008   SpO2 95%   BMI 53.77 kg/m      Physical Examination  General: Awake, alert, NAD  Cardiovascular: RRR, no extra heart sounds, no murmur  Pulmonary: Non-labored breathing, CTAB, no wheeze, no rhonci. No crackles.  Abdominal: obese, soft, non-tender, non-distended  Extremities: warm, well perfused, mild edema b/l  NEURO: Moving all extremities against gravity, no changes to deficits.    LABS  No results found for this or any previous visit (from the past 24 hour(s)).    IMAGING  No recent images to review.      Assessment / Plan     Brissa Barlow is a 55 year old female, with developmental delay, HTN, asthma and morbid obesity who was admitted to [facility] on 04/08/2021 with acute hypoxic respiratory failure secondary to COVID19 pneumonia. She required intubation 04/11 that was complicated by prolonged paralysis and circulatory shock. She was weaned from the ventilator on 04/21 and deemed appropriate for acute rehabilitation for critical illness myopathy on 04/27/2021. Current deficits include impaired strength and mobility.     ** CHANGES TODAY **  - No changes today, continue rehab cares.         ** Patient was seen and discussed with my attending Dr. Bass who agrees " with the above assessment and plan.     Fred Farr MD  Physical Medicine and Rehabilitation PGY-2  Pager: [phone]

## 2021-05-08 NOTE — PLAN OF CARE
FOCUS/GOAL  Bowel management, Bladder management, Pain management and Medical management    ASSESSMENT, INTERVENTIONS AND CONTINUING PLAN FOR GOAL:  Pt has been continent and incontinent of bladder this shift. She need some encouragement to change her brief independently. She was also continent of BM x1 today. Pt was found by another RN voiding in BR with room door open to the [place]ornton. Educated pt on importance of privacy and closing the door when using the BR. Pt verbalized understanding. She c/o L shoulder pain this afternoon but declined intervention. Upon reassessment, pt reported shoulder discomfort had resolved. This is patient's second day on MAP. She called appropriately this morning and was able to pick out the correct medications. She did not remember to call for noon med pass. Reinforced education on use of cell phone alarms to help manage schedule. Will continue with POC.

## 2021-05-08 NOTE — PLAN OF CARE
Discharge Planner Post-Acute Rehab OT:      Discharge Plan: Home with HC  ELOS 5/12 but could discharge home sooner pending progress with toileting and HC set up     Precautions: Falls     Current Status:  ADLs: Mod IND transfers/mobility with 4ww. Pt IND UB dressing, Mod IND LB dressing (AE and increased time), Mod IND toileting when cued to initiate and not fatigued - needing support at times for incontinence, Min A bathing.  IADLs: SBA with simple dynamic mobility, fatigues after 1-2 mins of dynamic standing tasks then requires sitting break on 4WW. Recommend assist complex/dynamic IADLs. Reports her  will A with IADLs at home.   Vision/Cognition: Borderline developmental delay. Wears glasses. No acute changes.     Assessment:  Progressing with ADLs/functional mobility. continues to need frequent sitting break during IADLs. Pt now Mod IND in room but continuing to work on self initiation of toileting throughout the day. Contacted pt caregivers regarding DME/AE. Caregivers requesting bariatric tub bench, sock aid, and reacher through [facility] medical. Order completed .      Other Barriers to Discharge (DME, Family Training, etc):   DME: bariatric tub bench, 4ww, bedrail, shower grab bar, toilet grab bar, recliner.  Caregiver training: Completed with mother and father 5/6. Spouse and parents to provide assistance for IADLs  Home set up: Per  notes, pt apartment is small and cluttered. There are concerns for handicap accessibility.  assisting to set up home safety evaluation through a community program

## 2021-05-08 NOTE — PLAN OF CARE
Discharge Planner Post-Acute Rehab PT:      Discharge Plan: Pt lives with her spouse in an apartment.  Pt can use an elevator to get to 4th floor apartment.  Pt owns a 4ww.  Pt states her  pushes her while she sits on the 4ww from the elevator to their apartment.      Precautions: falls     Current Status:  Bed Mobility: IND   Transfer: mod I with 4WW   Gait: Mod I with 4WW in room. Supervision for increased distances in hallway, up to 80 ft  Stairs: NT   Balance: Fair to good dynamic standing balance with UE support on 4WW     Assessment:  Pt continues to be progressing well, mod I with 4WW in room. Pending ability to manage bowel and bladder, especially at nightime, pt on track for discharge next week, possibly as soon as Monday, 5/10.     Other Barriers to Discharge (DME, Family Training, etc):   Family training completed. HEP provided for LE strength and CV endurance.  Pt has her personal 4WW here on the unit.  Handout provided for bedrail, floor bike via [email or web address], which mom has purchased.

## 2021-05-08 NOTE — PLAN OF CARE
FOCUS/GOAL  Medical management    ASSESSMENT, INTERVENTIONS AND CONTINUING PLAN FOR GOAL:    Pt has been sleeping well this shift. No complaints of pain, sob, N/V or any new weakness. Complained of a headache early in the morning. givne PRN tylenol at 0633. Incontinent of bladder, contained on the pad and some on the chucks. Needing assistance in changing pad in the bed. Ind with bed mobility. Encouraged pt to toilet regularly. Agreed for staff to wake patient up at around 4am next time to offer bsc. No other concerns.

## 2021-05-09 ENCOUNTER — APPOINTMENT (OUTPATIENT)
Dept: OCCUPATIONAL THERAPY | Facility: CLINIC | Age: 56
End: 2021-05-09
Payer: MEDICARE

## 2021-05-09 ENCOUNTER — APPOINTMENT (OUTPATIENT)
Dept: PHYSICAL THERAPY | Facility: CLINIC | Age: 56
End: 2021-05-09
Payer: MEDICARE

## 2021-05-09 PROCEDURE — 97110 THERAPEUTIC EXERCISES: CPT | Mod: GP

## 2021-05-09 PROCEDURE — 250N000013 HC RX MED GY IP 250 OP 250 PS 637: Performed by: PHYSICAL MEDICINE & REHABILITATION

## 2021-05-09 PROCEDURE — 128N000003 HC R&B REHAB

## 2021-05-09 PROCEDURE — 250N000013 HC RX MED GY IP 250 OP 250 PS 637

## 2021-05-09 PROCEDURE — 97530 THERAPEUTIC ACTIVITIES: CPT | Mod: GO

## 2021-05-09 PROCEDURE — 97535 SELF CARE MNGMENT TRAINING: CPT | Mod: GO

## 2021-05-09 RX ADMIN — CARBAMAZEPINE 400 MG: 200 TABLET ORAL at 20:45

## 2021-05-09 RX ADMIN — APIXABAN 2.5 MG: 2.5 TABLET, FILM COATED ORAL at 20:46

## 2021-05-09 RX ADMIN — ACETAMINOPHEN 650 MG: 325 TABLET, FILM COATED ORAL at 08:36

## 2021-05-09 RX ADMIN — CARBAMAZEPINE 200 MG: 200 TABLET ORAL at 12:35

## 2021-05-09 RX ADMIN — CARBAMAZEPINE 400 MG: 200 TABLET ORAL at 08:36

## 2021-05-09 RX ADMIN — LOVASTATIN 40 MG: 20 TABLET ORAL at 20:45

## 2021-05-09 RX ADMIN — OMEPRAZOLE 20 MG: 20 CAPSULE, DELAYED RELEASE ORAL at 06:49

## 2021-05-09 RX ADMIN — APIXABAN 2.5 MG: 2.5 TABLET, FILM COATED ORAL at 08:37

## 2021-05-09 RX ADMIN — LISINOPRIL 10 MG: 10 TABLET ORAL at 08:37

## 2021-05-09 RX ADMIN — SERTRALINE HYDROCHLORIDE 100 MG: 100 TABLET ORAL at 08:36

## 2021-05-09 NOTE — PLAN OF CARE
Discharge Planner Post-Acute Rehab PT:      Discharge Plan: Pt lives with her spouse in an apartment.  Pt can use an elevator to get to 4th floor apartment.  Pt owns a 4ww.  Pt states her  pushes her while she sits on the 4ww from the elevator to their apartment.      Precautions: falls     Current Status:  Bed Mobility: IND   Transfer: mod I with 4WW   Gait: Mod I with 4WW in room. Supervision for increased distances in hallway, up to 80 ft  Stairs: NT   Balance: Fair to good dynamic standing balance with UE support on 4WW     Assessment:  Pt continues to be progressing well, mod I with 4WW in room. Pending ability to manage bowel and bladder, especially at nightime, pt on track for discharge next week, possibly as soon as Monday, 5/10. Continues to be limited by fatigue and able to participate in short bouts of CV activity before taking extended rest break.      Other Barriers to Discharge (DME, Family Training, etc):   Family training completed. HEP provided for LE strength and CV endurance.  Pt has her personal 4WW here on the unit.  Handout provided for bedrail, floor bike via [employer], which mom has purchased.

## 2021-05-09 NOTE — PLAN OF CARE
"FOCUS/GOAL  Bowel management, Bladder management, Medication management, Pain management, and Medical management    ASSESSMENT, INTERVENTIONS AND CONTINUING PLAN FOR GOAL:  Pt has been continent and incontinent of bladder this shift with some staff encouragement to manage her bowel and bladder cares. This morning, pt refused to dress herself or change her brief after an incontinent episode. Education provided on infection prevention, skin protection, and independence plan for discharge. Pt requested some time to \"cool down.\" Upon re-assessment, pt had changed her clothing and brief. Will continue to encourage independence with cares. LBM 5/8 per pt report. Pt is on her third day of MAP. She did not remember to call for medications in the morning, but she did call at lunchtime. She demonstrated some trouble with following her medication list and forgot to pull out two meds in the morning. Pt c/o L shoulder pain and was given PRN acetaminophen x1 with some relief. Will continue with POC.     "

## 2021-05-09 NOTE — PLAN OF CARE
Patient is A&Ox4, calm, and cooperative. Mod-I in her room with walker. Can be incontinent of bowel and bladder at times, continent during shift, last BM today.  Regular/thin diet, takes pills whole. Patient on MAP called correctly for bedtime medications, but reminded to call, patient stated medication names wrong, did not have full understanding of reason for medications, did not give correct doses. Complained of some shoulder pain during shift, Tylenol given, provided relief. VS stable. Continue POC.

## 2021-05-09 NOTE — PLAN OF CARE
Discharge Planner Post-Acute Rehab OT:      Discharge Plan: Home with HC  ELOS 5/12 but could discharge home sooner pending progress with toileting and HC set up     Precautions: Falls     Current Status:  ADLs: Mod IND transfers/mobility with 4ww. Pt IND UB dressing, Mod IND LB dressing (AE and increased time), Mod IND toileting when cued to initiate and not fatigued - needing support at times for incontinence,   Min A bathing. SBA tub transfer using tub bench    IADLs: SBA with simple dynamic mobility, fatigues after 1-2 mins of dynamic standing tasks then requires sitting break on 4WW. Recommend assist complex/dynamic IADLs. Reports her  will A with IADLs at home.   Vision/Cognition: Borderline developmental delay. Wears glasses. No acute changes.     Assessment:  Progressing with ADLs/functional mobility. continues to need frequent sitting break during IADLs. Pt now Mod IND in room but continuing to work on self initiation of toileting throughout the day. Contacted pt caregivers regarding DME/AE. Caregivers requesting bariatric tub bench, sock aid, and reacher through [facility] medical. Order completed .      Other Barriers to Discharge (DME, Family Training, etc):   DME: bariatric tub bench, 4ww, bedrail, shower grab bar, toilet grab bar, recliner.  Caregiver training: Completed with mother and father 5/6. Spouse and parents to provide assistance for IADLs  Home set up: Per  notes, pt apartment is small and cluttered. There are concerns for handicap accessibility.  assisting to set up home safety evaluation through a community program

## 2021-05-09 NOTE — PLAN OF CARE
FOCUS/GOAL  Medical management    ASSESSMENT, INTERVENTIONS AND CONTINUING PLAN FOR GOAL:    Pt has been sleeping well this shift. No complaints of pain, headache, sob, N/V or any new weakness. MOD I in the room with the walker. BSC at bedside. Woke pt up at 4am to offer toileting but was already incontinent. Needing assistance with changing the pad. Pt said she was sleeping so good that she just couldn't wake to void. Encouraged to toilet before bed. No further concerns tonight.

## 2021-05-10 ENCOUNTER — APPOINTMENT (OUTPATIENT)
Dept: PHYSICAL THERAPY | Facility: CLINIC | Age: 56
End: 2021-05-10
Payer: MEDICARE

## 2021-05-10 ENCOUNTER — APPOINTMENT (OUTPATIENT)
Dept: OCCUPATIONAL THERAPY | Facility: CLINIC | Age: 56
End: 2021-05-10
Payer: MEDICARE

## 2021-05-10 LAB
ANION GAP SERPL CALCULATED.3IONS-SCNC: 5 MMOL/L (ref 3–14)
BASOPHILS # BLD AUTO: 0 10E9/L (ref 0–0.2)
BASOPHILS NFR BLD AUTO: 0.7 %
BUN SERPL-MCNC: 13 MG/DL (ref 7–30)
CALCIUM SERPL-MCNC: 8.4 MG/DL (ref 8.5–10.1)
CHLORIDE SERPL-SCNC: 108 MMOL/L (ref 94–109)
CO2 SERPL-SCNC: 28 MMOL/L (ref 20–32)
CREAT SERPL-MCNC: 0.55 MG/DL (ref 0.52–1.04)
DIFFERENTIAL METHOD BLD: ABNORMAL
EOSINOPHIL # BLD AUTO: 0.1 10E9/L (ref 0–0.7)
EOSINOPHIL NFR BLD AUTO: 3.2 %
ERYTHROCYTE [DISTWIDTH] IN BLOOD BY AUTOMATED COUNT: 20 % (ref 10–15)
GFR SERPL CREATININE-BSD FRML MDRD: >90 ML/MIN/{1.73_M2}
GLUCOSE SERPL-MCNC: 104 MG/DL (ref 70–99)
HCT VFR BLD AUTO: 30.8 % (ref 35–47)
HGB BLD-MCNC: 9.4 G/DL (ref 11.7–15.7)
IMM GRANULOCYTES # BLD: 0 10E9/L (ref 0–0.4)
IMM GRANULOCYTES NFR BLD: 0.2 %
LYMPHOCYTES # BLD AUTO: 1.4 10E9/L (ref 0.8–5.3)
LYMPHOCYTES NFR BLD AUTO: 33.6 %
MCH RBC QN AUTO: 31.5 PG (ref 26.5–33)
MCHC RBC AUTO-ENTMCNC: 30.5 G/DL (ref 31.5–36.5)
MCV RBC AUTO: 103 FL (ref 78–100)
MONOCYTES # BLD AUTO: 0.4 10E9/L (ref 0–1.3)
MONOCYTES NFR BLD AUTO: 8.8 %
NEUTROPHILS # BLD AUTO: 2.2 10E9/L (ref 1.6–8.3)
NEUTROPHILS NFR BLD AUTO: 53.5 %
NRBC # BLD AUTO: 0 10*3/UL
NRBC BLD AUTO-RTO: 0 /100
PLATELET # BLD AUTO: 250 10E9/L (ref 150–450)
POTASSIUM SERPL-SCNC: 3.6 MMOL/L (ref 3.4–5.3)
RBC # BLD AUTO: 2.98 10E12/L (ref 3.8–5.2)
SODIUM SERPL-SCNC: 141 MMOL/L (ref 133–144)
WBC # BLD AUTO: 4.1 10E9/L (ref 4–11)

## 2021-05-10 PROCEDURE — 36415 COLL VENOUS BLD VENIPUNCTURE: CPT

## 2021-05-10 PROCEDURE — 97110 THERAPEUTIC EXERCISES: CPT | Mod: GO | Performed by: STUDENT IN AN ORGANIZED HEALTH CARE EDUCATION/TRAINING PROGRAM

## 2021-05-10 PROCEDURE — 250N000013 HC RX MED GY IP 250 OP 250 PS 637: Performed by: PHYSICAL MEDICINE & REHABILITATION

## 2021-05-10 PROCEDURE — 80048 BASIC METABOLIC PNL TOTAL CA: CPT

## 2021-05-10 PROCEDURE — 85025 COMPLETE CBC W/AUTO DIFF WBC: CPT

## 2021-05-10 PROCEDURE — 128N000003 HC R&B REHAB

## 2021-05-10 PROCEDURE — 97530 THERAPEUTIC ACTIVITIES: CPT | Mod: GO | Performed by: STUDENT IN AN ORGANIZED HEALTH CARE EDUCATION/TRAINING PROGRAM

## 2021-05-10 PROCEDURE — 97530 THERAPEUTIC ACTIVITIES: CPT | Mod: GO

## 2021-05-10 PROCEDURE — 99232 SBSQ HOSP IP/OBS MODERATE 35: CPT | Mod: GC | Performed by: PHYSICAL MEDICINE & REHABILITATION

## 2021-05-10 PROCEDURE — 250N000013 HC RX MED GY IP 250 OP 250 PS 637

## 2021-05-10 PROCEDURE — 97110 THERAPEUTIC EXERCISES: CPT | Mod: GP | Performed by: PHYSICAL THERAPIST

## 2021-05-10 RX ADMIN — CARBAMAZEPINE 200 MG: 200 TABLET ORAL at 12:35

## 2021-05-10 RX ADMIN — APIXABAN 2.5 MG: 2.5 TABLET, FILM COATED ORAL at 20:42

## 2021-05-10 RX ADMIN — CARBAMAZEPINE 400 MG: 200 TABLET ORAL at 08:46

## 2021-05-10 RX ADMIN — APIXABAN 2.5 MG: 2.5 TABLET, FILM COATED ORAL at 08:46

## 2021-05-10 RX ADMIN — LISINOPRIL 10 MG: 10 TABLET ORAL at 08:48

## 2021-05-10 RX ADMIN — LOVASTATIN 40 MG: 20 TABLET ORAL at 20:42

## 2021-05-10 RX ADMIN — ACETAMINOPHEN 650 MG: 325 TABLET, FILM COATED ORAL at 17:42

## 2021-05-10 RX ADMIN — OMEPRAZOLE 20 MG: 20 CAPSULE, DELAYED RELEASE ORAL at 06:02

## 2021-05-10 RX ADMIN — SERTRALINE HYDROCHLORIDE 100 MG: 100 TABLET ORAL at 08:46

## 2021-05-10 RX ADMIN — CARBAMAZEPINE 400 MG: 200 TABLET ORAL at 20:42

## 2021-05-10 RX ADMIN — ACETAMINOPHEN 650 MG: 325 TABLET, FILM COATED ORAL at 06:04

## 2021-05-10 NOTE — PLAN OF CARE
Discharge Planner Post-Acute Rehab OT:      Discharge Plan: Home with HC  ELOS 5/12 but could discharge home sooner pending progress with toileting and HC set up     Precautions: Falls     Current Status:  ADLs: Mod IND transfers/mobility with 4ww. Pt IND UB dressing, Mod IND LB dressing (AE and increased time), Mod IND toileting when cued to initiate and not fatigued - needing support at times for incontinence,   Min A bathing. SBA tub transfer using tub bench     IADLs: SBA with simple dynamic mobility, fatigues after 1-2 mins of dynamic standing tasks then requires sitting break on 4WW. Recommend assist complex/dynamic IADLs. Reports her  will A with IADLs at home.   Vision/Cognition: Borderline developmental delay. Wears glasses. No acute changes.     Assessment:  Focusing on standing tolerance. Pt does require many extended seated rest breaks with activity. Seems to be on track for discharge     Other Barriers to Discharge (DME, Family Training, etc):   DME: bariatric tub bench, 4ww, bedrail, shower grab bar, toilet grab bar, recliner.  Caregiver training: Completed with mother and father 5/6. Spouse and parents to provide assistance for IADLs  Home set up: Per  notes, pt apartment is small and cluttered. There are concerns for handicap accessibility.  assisting to set up home safety evaluation through a community program

## 2021-05-10 NOTE — PROGRESS NOTES
"    [facility]   Acute Rehabilitation Unit  Inpatient Progress Note  Chief Complaint  Critical Illness Myopathy     Subjective     24 Hours Summary  - No acute events overnight, patient reports sleeping well.   - No urinary incontinence last night.  - Physical therapy going well, no complaints, she is now Dayron in room  - Last bowel movement on 05/09    Review of Systems  10 point review of systems was otherwise negative other     Objective     Vital Signs  BP (!) 147/50 (BP Location: Right arm)   Pulse 89   Temp 96.8  F (36  C) (Oral)   Resp 16   Ht 1.702 m (5' 7\")   Wt (!) 155.7 kg (343 lb 4.8 oz)   LMP 08/18/2008   SpO2 95%   BMI 53.77 kg/m      Physical Examination  General: Awake, alert, NAD  Cardiovascular: RRR, no extra heart sounds, no murmur  Pulmonary: Non-labored breathing, CTAB, no wheeze, no rhonci. No crackles.  Abdominal: obese, soft, non-tender, non-distended  Extremities: warm, well perfused, mild edema b/l  NEURO: Moving all extremities against gravity, no changes to deficits.    LABS  Recent Results (from the past 24 hour(s))   CBC with platelets differential    Collection Time: 05/10/21  6:45 AM   Result Value Ref Range    WBC 4.1 4.0 - 11.0 10e9/L    RBC Count 2.98 (L) 3.8 - 5.2 10e12/L    Hemoglobin 9.4 (L) 11.7 - 15.7 g/dL    Hematocrit 30.8 (L) 35.0 - 47.0 %     (H) 78 - 100 fl    MCH 31.5 26.5 - 33.0 pg    MCHC 30.5 (L) 31.5 - 36.5 g/dL    RDW 20.0 (H) 10.0 - 15.0 %    Platelet Count 250 150 - 450 10e9/L    Diff Method Automated Method     % Neutrophils 53.5 %    % Lymphocytes 33.6 %    % Monocytes 8.8 %    % Eosinophils 3.2 %    % Basophils 0.7 %    % Immature Granulocytes 0.2 %    Nucleated RBCs 0 0 /100    Absolute Neutrophil 2.2 1.6 - 8.3 10e9/L    Absolute Lymphocytes 1.4 0.8 - 5.3 10e9/L    Absolute Monocytes 0.4 0.0 - 1.3 10e9/L    Absolute Eosinophils 0.1 0.0 - 0.7 10e9/L    Absolute Basophils 0.0 0.0 - 0.2 10e9/L    Abs Immature " Granulocytes 0.0 0 - 0.4 10e9/L    Absolute Nucleated RBC 0.0    Basic metabolic panel    Collection Time: 05/10/21  6:45 AM   Result Value Ref Range    Sodium 141 133 - 144 mmol/L    Potassium 3.6 3.4 - 5.3 mmol/L    Chloride 108 94 - 109 mmol/L    Carbon Dioxide 28 20 - 32 mmol/L    Anion Gap 5 3 - 14 mmol/L    Glucose 104 (H) 70 - 99 mg/dL    Urea Nitrogen 13 7 - 30 mg/dL    Creatinine 0.55 0.52 - 1.04 mg/dL    GFR Estimate >90 >60 mL/min/[1.73_m2]    GFR Estimate If Black >90 >60 mL/min/[1.73_m2]    Calcium 8.4 (L) 8.5 - 10.1 mg/dL     IMAGING  No recent images to review.      Assessment     Brissa Barlow is a 55 year old female, with developmental delay, HTN, asthma and morbid obesity who was admitted to [facility] on 04/08/2021 with acute hypoxic respiratory failure secondary to COVID19 pneumonia. She required intubation 04/11 that was complicated by prolonged paralysis and circulatory shock. She was weaned from the ventilator on 04/21 and deemed appropriate for acute rehabilitation for critical illness myopathy on 04/27/2021. Current deficits include impaired strength and mobility.      Plan     Impairment group code: Critical Illness Myopathy     Rehabilitation      1. Impairment of ADL's: OT 90 minutes daily to work on ADL re-training such as grooming, self cares and bathing.    2. Impairment of mobility: PT 90 minutes daily to work on neuromuscular re-education focusing on strength, balance, coordination, and endurance.    3. Rehab RN for med administration, bowel regimen.  4. Bladder management: Continent. Using PureWick for decrease mobility  5. Bowel management: Continent. Last recorded bowel movement 05/02  6. Pain management: Tylenol PRN  7. Adjustment to disability:  Clinical psychology following  8. FEN: Regular Diet, Thin Liquids  9. DVT Prophylaxis: Apixaban 2.5 mg BID for 30 days (04/28 - 05/27)     Medical Management     ** CHANGES TODAY **  - No changes today, continue rehab  care.    # Hypoxic Respiratory Failure secondary to COVID-19 pneumonia  Presented to [facility] ED on 04/08 with cough and SOB and COVID19 postivite. Admitted for hypoxic respiratory failure. S/P 10 day course steroids, remdesivir and intubation 04/11-04/21. Requiring 1.5 LPM O2 on admission to ARU.  - Vitals QSHIFT  - Encourage breathing exercises and incentive spirometery  - Supplement O2 per nasal cannula, maintain saturation > 90%  - Albuterol neb Q4H PRN     - DVT/PE prophylaxis (see above)     # Anemia of chronic disease  Hgb 8.1 on ARU admission. Iron labs consistent with anemia of chronic disease. Hgb 9.1 (05/03), continues to improve. No concerns for blood loss. Will continue to monitor with weekly labs.    # Seizure disorder  Last seizure during teenage years. No need for seizure precautions at this time.   - Carbamazepine 400 mg PO QAM, 200 mg QNOON, 400 mg at bedtime    # Transaminitis  Enzymes elevated on admission. Now wnl (05/03) and resumed PTA statin. Will continue to monitor with weekly labs.  - LFT QMON     # Delay in development  Parents concerned for underlying psychiatric disorder during prior admission. Patient was seen by psychiatry who did not make any further recommendations  - Zoloft 100 mg DAILY     # Hypertension, essential  Lasix discontinued on 04/29, 24 BPs wnl (05/04).  - Vitals QSHIFT  - Lisinopril 10 mg DAILY     Chronic medical conditions:  # Mild intermittent asthma - nebs as needed.  # GERD: continue PTA omeprazole  # Hyperlipidemia: Continue PTA statin  # H/o splenectomy     Code: Full code  Capacity: Parents are legal guardians  Disposition: Previously living independently in apartment with roommate / partner. Parents (gaurdians) are looking into section 8 housing options.   ELOS:  14 days (05/18), likely sooner  Rehab prognosis:  Good  Follow up Appointments on Discharge:   1. Primary care physician  2. Physical medicine and rehabilitation  3. Pulmonary for obstructive  sleep apnea         ** Patient was seen and discussed with my attending Dr. Siddiqui who agrees with the above assessment and plan.     Fred Farr MD  Physical Medicine and Rehabilitation PGY-2  Pager: [phone]

## 2021-05-10 NOTE — PLAN OF CARE
Discharge Planner Post-Acute Rehab PT:      Discharge Plan: Pt lives with her spouse in an apartment.  Pt can use an elevator to get to 4th floor apartment.  Pt owns a 4ww.  Pt states her  pushes her while she sits on the 4ww from the elevator to their apartment.      Precautions: falls     Current Status:  Bed Mobility: IND   Transfer: mod I with 4WW   Gait: Mod I with 4WW in room. Supervision for increased distances in hallway, up to 105 ft  Stairs: NT   Balance: Fair to good dynamic standing balance with UE support on 4WW     Assessment:  Pt continues to be progressing well, mod I with 4WW in room. On track for discharge home early this week. Plan to continue to address core, LE strength and CV endurance impairments for greater ease with mobility to ensure eventual successful community reintegration.     Other Barriers to Discharge (DME, Family Training, etc):   Family training completed. HEP provided for LE strength and CV endurance.  Pt has her personal 4WW here on the unit.  Handout provided for bedrail, floor bike via [employer], which mom has purchased.

## 2021-05-10 NOTE — PLAN OF CARE
Patient is A&Ox4, calm, and cooperative. Mod-I in her room with walker. Can be incontinent of bowel and bladder at times, last BM today.  Regular/thin diet, takes pills whole. Patient on MAP called correctly for bedtime medications, but reminded to call, gave correct doses, but stated medications names wrong. Denied pain during shift. VS stable. Continue POC.

## 2021-05-10 NOTE — PLAN OF CARE
FOCUS/GOAL  Bowel management, Bladder management, Medical management, Skin integrity, and Safety management    ASSESSMENT, INTERVENTIONS AND CONTINUING PLAN FOR GOAL:  Patient slept well denies pain, headache, dizziness, Chest pain or SOB. Incontinent of bladder. Encouraged to get up to use BSC patient declined. At 0600 patient complains of headache and received PRN tylenol. VS checked and was WDL. Will continue to monitor and will continue with the POC.

## 2021-05-10 NOTE — PLAN OF CARE
FOCUS/GOAL  Bowel management, Bladder management, and Medical management    ASSESSMENT, INTERVENTIONS AND CONTINUING PLAN FOR GOAL:  Pt has been continent of bowel and bladder this shift with staff reminders to manage her B&B cares ind. She is moving Mod I in her room using a walker. Pt failed MAP. Writer spoke with pt's father who stated that he and his wife are planning to encourage pt to use a medication box, follow-up regularly with pt about medications, and he stated they are trying to get services from the [facility] to assist with medications as well. He stated they will continue to follow-up with medications after discharge. Pt denied pain throughout shift. She is planned for discharge on Wednesday 5/12. Will continue with POC.

## 2021-05-11 ENCOUNTER — APPOINTMENT (OUTPATIENT)
Dept: OCCUPATIONAL THERAPY | Facility: CLINIC | Age: 56
End: 2021-05-11
Payer: MEDICARE

## 2021-05-11 ENCOUNTER — APPOINTMENT (OUTPATIENT)
Dept: PHYSICAL THERAPY | Facility: CLINIC | Age: 56
End: 2021-05-11
Payer: MEDICARE

## 2021-05-11 PROBLEM — F32.A DEPRESSION: Status: ACTIVE | Noted: 2021-05-11

## 2021-05-11 PROBLEM — K21.9 ESOPHAGEAL REFLUX: Status: ACTIVE | Noted: 2021-05-11

## 2021-05-11 PROCEDURE — 128N000003 HC R&B REHAB

## 2021-05-11 PROCEDURE — 99232 SBSQ HOSP IP/OBS MODERATE 35: CPT | Mod: GC | Performed by: PHYSICAL MEDICINE & REHABILITATION

## 2021-05-11 PROCEDURE — 250N000013 HC RX MED GY IP 250 OP 250 PS 637: Performed by: PHYSICAL MEDICINE & REHABILITATION

## 2021-05-11 PROCEDURE — 97535 SELF CARE MNGMENT TRAINING: CPT | Mod: GO | Performed by: OCCUPATIONAL THERAPIST

## 2021-05-11 PROCEDURE — 97110 THERAPEUTIC EXERCISES: CPT | Mod: GO | Performed by: OCCUPATIONAL THERAPIST

## 2021-05-11 PROCEDURE — 97530 THERAPEUTIC ACTIVITIES: CPT | Mod: GP | Performed by: PHYSICAL THERAPIST

## 2021-05-11 PROCEDURE — 97110 THERAPEUTIC EXERCISES: CPT | Mod: GP | Performed by: PHYSICAL THERAPIST

## 2021-05-11 PROCEDURE — 250N000013 HC RX MED GY IP 250 OP 250 PS 637

## 2021-05-11 RX ORDER — LOVASTATIN 40 MG
40 TABLET ORAL AT BEDTIME
Qty: 30 TABLET | Refills: 1 | Status: SHIPPED | OUTPATIENT
Start: 2021-05-11 | End: 2021-06-09

## 2021-05-11 RX ORDER — SERTRALINE HYDROCHLORIDE 100 MG/1
100 TABLET, FILM COATED ORAL DAILY
Qty: 30 TABLET | Refills: 1 | Status: SHIPPED | OUTPATIENT
Start: 2021-05-11 | End: 2021-06-09

## 2021-05-11 RX ORDER — CARBAMAZEPINE 200 MG/1
200 TABLET ORAL
Qty: 150 TABLET | Refills: 1 | Status: SHIPPED | OUTPATIENT
Start: 2021-05-11 | End: 2021-06-09

## 2021-05-11 RX ORDER — LISINOPRIL 10 MG/1
10 TABLET ORAL DAILY
Qty: 30 TABLET | Refills: 1 | Status: SHIPPED | OUTPATIENT
Start: 2021-05-12 | End: 2021-06-09

## 2021-05-11 RX ADMIN — CARBAMAZEPINE 200 MG: 200 TABLET ORAL at 13:41

## 2021-05-11 RX ADMIN — OMEPRAZOLE 20 MG: 20 CAPSULE, DELAYED RELEASE ORAL at 06:19

## 2021-05-11 RX ADMIN — APIXABAN 2.5 MG: 2.5 TABLET, FILM COATED ORAL at 20:18

## 2021-05-11 RX ADMIN — CARBAMAZEPINE 400 MG: 200 TABLET ORAL at 08:14

## 2021-05-11 RX ADMIN — SERTRALINE HYDROCHLORIDE 100 MG: 100 TABLET ORAL at 08:14

## 2021-05-11 RX ADMIN — CARBAMAZEPINE 400 MG: 200 TABLET ORAL at 20:18

## 2021-05-11 RX ADMIN — LOVASTATIN 40 MG: 20 TABLET ORAL at 20:18

## 2021-05-11 RX ADMIN — MICONAZOLE NITRATE: 20 POWDER TOPICAL at 09:43

## 2021-05-11 RX ADMIN — MICONAZOLE NITRATE: 20 POWDER TOPICAL at 20:19

## 2021-05-11 RX ADMIN — LISINOPRIL 10 MG: 10 TABLET ORAL at 08:15

## 2021-05-11 RX ADMIN — APIXABAN 2.5 MG: 2.5 TABLET, FILM COATED ORAL at 08:14

## 2021-05-11 ASSESSMENT — MIFFLIN-ST. JEOR: SCORE: 2211.61
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
## 2021-05-11 NOTE — PLAN OF CARE
Pt is alert and oriented x 4, no complain of pain. She has been modified independent in the room using a four wheeled walker.There is no concern e this. No incontinence episode this shift. She uses the toilet independently. She has some moisture and redness in abdominal folds, antifungal powder was applied per order. We will continue to encourage independence while assisting with activity of daily livings.

## 2021-05-11 NOTE — PROGRESS NOTES
"    [facility]  Inpatient Progress Note  Chief Complaint  Critical Illness Myopathy     Subjective     24 Hours Summary  - No acute events overnight, patient reports sleeping well.   - No urinary incontinence last night.  - Physical therapy going well, no complaints, she is now Dayron in room  - Last bowel movement on 05/09    Review of Systems  10 point review of systems was otherwise negative other     Objective     Vital Signs  /68 (BP Location: Right arm)   Pulse 86   Temp 97.5  F (36.4  C) (Oral)   Resp 16   Ht 1.702 m (5' 7\")   Wt (!) 157.4 kg (347 lb)   LMP 08/18/2008   SpO2 95%   BMI 54.35 kg/m      Physical Examination  General: Awake, alert, NAD  Cardiovascular: RRR, no extra heart sounds, no murmur  Pulmonary: Non-labored breathing, CTAB, no wheeze, no rhonci. No crackles.  Abdominal: obese, soft, non-tender, non-distended  Extremities: warm, well perfused, mild edema b/l  NEURO: Moving all extremities against gravity, no changes to deficits.    LABS  No results found for this or any previous visit (from the past 24 hour(s)).    IMAGING  No recent images to review.      Assessment     Brissa Barlow is a 55 year old female, with developmental delay, HTN, asthma and morbid obesity who was admitted to [facility] on 04/08/2021 with acute hypoxic respiratory failure secondary to COVID19 pneumonia. She required intubation 04/11 that was complicated by prolonged paralysis and circulatory shock. She was weaned from the ventilator on 04/21 and deemed appropriate for acute rehabilitation for critical illness myopathy on 04/27/2021. Current deficits include impaired strength and mobility.      Plan     Impairment group code: Critical Illness Myopathy     Rehabilitation      1. Impairment of ADL's: OT 90 minutes daily to work on ADL re-training such as grooming, self cares and bathing.    2. Impairment of mobility: PT 90 " minutes daily to work on neuromuscular re-education focusing on strength, balance, coordination, and endurance.    3. Rehab RN for med administration, bowel regimen.  4. Bladder management: Continent. Using PureWick for decrease mobility  5. Bowel management: Continent. Last recorded bowel movement 05/02  6. Pain management: Tylenol PRN  7. Adjustment to disability:  Clinical psychology following  8. FEN: Regular Diet, Thin Liquids  9. DVT Prophylaxis: Apixaban 2.5 mg BID for 30 days (04/28 - 05/27)     Medical Management     ** CHANGES TODAY **  - No changes today, continue rehab care.    # Hypoxic Respiratory Failure secondary to COVID-19 pneumonia  Presented to [facility] ED on 04/08 with cough and SOB and COVID19 postivite. Admitted for hypoxic respiratory failure. S/P 10 day course steroids, remdesivir and intubation 04/11-04/21. Requiring 1.5 LPM O2 on admission to ARU.  - Vitals QSHIFT  - Encourage breathing exercises and incentive spirometery  - Supplement O2 per nasal cannula, maintain saturation > 90%  - Albuterol neb Q4H PRN     - DVT/PE prophylaxis (see above)     # Anemia of chronic disease  Hgb 8.1 on ARU admission. Iron labs consistent with anemia of chronic disease. Hgb 9.1 (05/03), continues to improve. No concerns for blood loss. Will continue to monitor with weekly labs.    # Seizure disorder  Last seizure during teenage years. No need for seizure precautions at this time.   - Carbamazepine 400 mg PO QAM, 200 mg QNOON, 400 mg at bedtime    # Transaminitis  Enzymes elevated on admission. Now wnl (05/03) and resumed PTA statin. Will continue to monitor with weekly labs.  - LFT QMON     # Delay in development  Parents concerned for underlying psychiatric disorder during prior admission. Patient was seen by psychiatry who did not make any further recommendations  - Zoloft 100 mg DAILY     # Hypertension, essential  Lasix discontinued on 04/29, 24 BPs wnl (05/04).  - Vitals QSHIFT  - Lisinopril 10  mg DAILY     Chronic medical conditions:  # Mild intermittent asthma - nebs as needed.  # GERD: continue PTA omeprazole  # Hyperlipidemia: Continue PTA statin  # H/o splenectomy     Code: Full code  Capacity: Parents are legal guardians  Disposition: Previously living independently in apartment with roommate / partner. Parents (gaurdians) are looking into section 8 housing options.   ELOS:  05/12  Rehab prognosis:  Good  Follow up Appointments on Discharge:   1. Primary care physician  2. Physical medicine and rehabilitation  3. Pulmonary for obstructive sleep apnea         ** Patient was seen and discussed with my attending Dr. Siddiqui who agrees with the above assessment and plan.     Fred Farr MD  Physical Medicine and Rehabilitation PGY-2  Pager: [phone]

## 2021-05-11 NOTE — PROGRESS NOTES
Spoke with Margaret from [facility]. Confirmed pt discharge tomorrow. [facility] plans to do SOC Thurs 05/13. Will update pt/pt family. LUZ MARIA requested that [facility] add a HC SWer to the referral. LUZ MARIA plans to send discharge orders and ppwk tomorrow prior to discharge. Will meet with pt and provide IMM. No other SW needs identified at this time.     SW called Alejandra Hunt PH: [phone] and [facility]ell PH: [phone] ext [phone] and provided discharge updates.     [facility]  Home Health PH: [phone]  Fax: [phone]   Nurse, physical therapy, occupational therapy and      Rand Schmidt, [facility], [facility] Acute Rehab Unit   Phone: [phone]  I   Pager: [phone]

## 2021-05-11 NOTE — PLAN OF CARE
Occupational Therapy Discharge Summary    Reason for therapy discharge:    Discharged to home with home therapy.    Progress towards therapy goal(s). See goals on Care Plan in [facility] electronic health record for goal details.  Goals met    Therapy recommendation(s):    Continued therapy is recommended.  Rationale/Recommendations:  ADL/IADL retraining, functional strengthening/endurance, home safety evaluation, DME/AE retraining, community reintegration.     Summary: Brissa Barlow is a 55 year old female, with developmental delay, HTN, asthma and morbid obesity admitted to [facility] following hospital stay for acute hypoxic respiratory failure secondary to COVID 19 pneumonia.     ADLs/IADLs: Pt is Mod IND transfers and short distance mobility using 4ww. Pt is IND UB dressing, Mod IND LB dressing, IND grooming and Mod IND toileting. Pt does have incontinence overnight, plans to utilize adult briefs, mattress cover, and bedside commode. Recommend initial supervision shower transfer and bathing. Pt able to complete simple meal prep/household management tasks Mod IND using 4ww. Pt requiring assistance with heavier/more dynamic IADL due to physical deficits. Continues to fatigue quickly, limited standing tolerance, requiring assistance for all community activity.     DME/AE: Pt owns 4ww, commode, bathroom grab bars, reacher, and recliner. Family purchasing bedrail. Issuing bariatric shower bench, reacher, and sock aid through [facility].     HEP: Pt issued BUE strengthening HEP. Discussed progression to OP pulmonary rehab following discharge from .     Caregiver support: Pt parents are legal guardians. Pt significant other and parents able to provide assistance for IADLs and community appointments. Parents working with [facility]  regarding additional household services.

## 2021-05-11 NOTE — PLAN OF CARE
VSS on RA. A&O x4. Denies SOB/chest pain/dizziness/headache. Continent x1 but can be incontinent at times. LBM 5/10. Mod I with walker in room, repositioning independently in bed. Skin intact ex blanchable redness in abdominal folds and groin. Denies pain. Denies numbness/tingling. Regular diet, thin liquids, pills whole. Denies N/V. Sleeping well throughout night. Continue to monitor, plan for discharge 5/12.

## 2021-05-11 NOTE — PLAN OF CARE
FOCUS/GOAL  Bowel management, Bladder management, Pain management, and Medical management    ASSESSMENT, INTERVENTIONS AND CONTINUING PLAN FOR GOAL:  Pt was continent and incontinent of bladder this shift. She called once to have brief changed after an incontinent episode stating the brief was not one she could manage ind. Will continue to promote ind with B&B cares for discharge Wednesday 5/12. LBM was today. Pt c/o low back and L shoulder pain and was given PRN acetaminophen x1 with some relief. Will continue with POC.

## 2021-05-12 ENCOUNTER — PATIENT OUTREACH (OUTPATIENT)
Dept: CARE COORDINATION | Facility: CLINIC | Age: 56
End: 2021-05-12

## 2021-05-12 VITALS
SYSTOLIC BLOOD PRESSURE: 127 MMHG | RESPIRATION RATE: 16 BRPM | HEIGHT: 67 IN | DIASTOLIC BLOOD PRESSURE: 52 MMHG | WEIGHT: 293 LBS | OXYGEN SATURATION: 95 % | HEART RATE: 91 BPM | TEMPERATURE: 98.3 F | BODY MASS INDEX: 45.99 KG/M2

## 2021-05-12 PROCEDURE — 250N000013 HC RX MED GY IP 250 OP 250 PS 637

## 2021-05-12 PROCEDURE — 99239 HOSP IP/OBS DSCHRG MGMT >30: CPT | Performed by: PHYSICAL MEDICINE & REHABILITATION

## 2021-05-12 PROCEDURE — 250N000013 HC RX MED GY IP 250 OP 250 PS 637: Performed by: PHYSICAL MEDICINE & REHABILITATION

## 2021-05-12 RX ADMIN — CARBAMAZEPINE 400 MG: 200 TABLET ORAL at 09:06

## 2021-05-12 RX ADMIN — OMEPRAZOLE 20 MG: 20 CAPSULE, DELAYED RELEASE ORAL at 05:50

## 2021-05-12 RX ADMIN — APIXABAN 2.5 MG: 2.5 TABLET, FILM COATED ORAL at 09:06

## 2021-05-12 RX ADMIN — SERTRALINE HYDROCHLORIDE 100 MG: 100 TABLET ORAL at 09:06

## 2021-05-12 RX ADMIN — LISINOPRIL 10 MG: 10 TABLET ORAL at 09:06

## 2021-05-12 NOTE — PLAN OF CARE
VS: VSS.    O2: 95% on room air. Denied SOB, denied difficulty breathing. Lung sounds clear.    Output: Voiding spontaneously.    Last BM: LBM 5/10/2021.    Activity: Mod I with walker.    Up for meals? Encouraged.    Skin: Intact.    Pain: Denied pain.    CMS: Intact.    Dressing: None.    Diet: Regular, thin. Took pills whole without difficulty.    LDA: Reddened pannus area, powder per MAR.    Equipment: Shower bench being delivered to pt's home per therapy.    Plan: Pt discharged from ARU today 5/12/2021 at 1015.    Additional Info: AVS reviewed with pt and pt's mother. No further questions after review. Discharge medications reviewed and sent home with pt and pt's mother.

## 2021-05-12 NOTE — DISCHARGE INSTRUCTIONS
Follow up Appointments:    - Primary Care  You are scheduled to see Dr. Cara Marks on Tuesday, May 25th at 9:00 AM.    Address  [facility]                          [address]                          [place] [address]  Phone   [phone]    - Pulmonary for obstructive sleep apnea  You are scheduled to see Dr. Acuna on Tuesday, June 8th at 9:30 AM. The clinic will send a confirmation letter in the mail.    Address  [facility] [address]                          [address]                          [place] [address]  Phone   [phone]    - PM&R  You are scheduled for a video visit with Dr. Siddiqui on Wednesday, July 21st at 2:00 PM.    Address [facility] - [facility]  Phone  [place]:  [phone]      -----------------------------      Home Health Care:   [facility] has arranged home PT, OT and nursing.   They will contact you for the first visit. If you have any questions, you can contact that agency at [phone].

## 2021-05-12 NOTE — DISCHARGE SUMMARY
[facility]   Acute Rehabilitation Unit  Discharge summary     Date of Admission: 4/27/2021  Date of Discharge: 5/12/21  Disposition: home   Primary Care Physician: Cara Marks  Attending physician: David Siddiqui,         discharge diagnosis      Critical illness myopathy post Covid  1. Impaired functional mobility  2. Impaired ADLs  3. Impaired cognition     Patient Active Problem List   Diagnosis Code     Essential hypertension, benign I10     Delay in development R62.50     Injury, other and unspecified, knee, leg, ankle, and foot S89.90XA, S99.929A, S99.919A     Perforation of tympanic membrane H72.90     Hyperlipidemia LDL goal <130 E78.5     Seizure disorder (H) G40.909     Other peripheral enthesopathies M77.8     IFG (impaired fasting glucose) R73.01     Insulin resistance E88.81     Elevated cortisol level R79.89     Morbid obesity (H) E66.01     Low vitamin D level R79.89     BMI 50.0-59.9, adult (H) Z68.43     SBO (small bowel obstruction) (H) K56.609     Non-intractable vomiting with nausea, unspecified vomiting type R11.2     Acute UTI N39.0     Acute respiratory failure with hypoxia (H) J96.01     Pneumonia due to 2019 novel coronavirus U07.1, J12.82     Critical illness myopathy G72.81     Depression F32.9     Esophageal reflux K21.9               brief summary    Brissa Barlow is a 55 year old female, pertinent past medical history of developmental delay, HTN, asthma and morbid obesity who presented to [facility] ED on 04/08/2021 with cough and shortness of breath. She was admitted for COVID-19 pneumonia and acute hypoxic respiratory failure. She continued to decompensate requiring intubation 04/11 remdesevir and steroid therapy. Intubation complicated by prolonged paralysis and circulatory shock requiring vasopressors. She eventually improved and weaned from the ventilator on 04/21 for a total of 10 day intubation and steroid  therapy. She was currently requiring 1.5 L O2  on admission to ARU on 4/27.          rehabilitaiton course    Continued therapy is recommended.  Rationale/Recommendations:  ADL/IADL retraining, functional strengthening/endurance, home safety evaluation, DME/AE retraining, community reintegration.      Summary: Brissa Barlow is a 55 year old female, with developmental delay, HTN, asthma and morbid obesity admitted to [facility] following hospital stay for acute hypoxic respiratory failure secondary to COVID 19 pneumonia.      ADLs/IADLs: Pt is Mod IND transfers and short distance mobility using 4ww. Pt is IND UB dressing, Mod IND LB dressing, IND grooming and Mod IND toileting. Pt does have incontinence overnight, plans to utilize adult briefs, mattress cover, and bedside commode. Recommend initial supervision shower transfer and bathing. Pt able to complete simple meal prep/household management tasks Mod IND using 4ww. Pt requiring assistance with heavier/more dynamic IADL due to physical deficits. Continues to fatigue quickly, limited standing tolerance, requiring assistance for all community activity.      DME/AE: Pt owns 4ww, commode, bathroom grab bars, reacher, and recliner. Family purchasing bedrail. Issuing bariatric shower bench, reacher, and sock aid through [facility].      HEP: Pt issued BUE strengthening HEP. Discussed progression to OP pulmonary rehab following discharge from .      Caregiver support: Pt parents are legal guardians. Pt significant other and parents able to provide assistance for IADLs and community appointments. Parents working with [facility]  regarding additional household services.               mEDICAL COURSE    # Hypoxic Respiratory Failure secondary to COVID-19 pneumonia  Presented to [facility] ED on 04/08 with cough and SOB and COVID19 postivite. Admitted for hypoxic respiratory failure. S/P 10 day course steroids, remdesivir and intubation 04/11-04/21. Requiring  1.5 LPM O2 on admission to ARU.  - Vitals QSHIFT  - Encourage breathing exercises and incentive spirometery  - Supplement O2 per nasal cannula, maintain saturation > 90%  - Albuterol neb Q4H PRN     - DVT/PE prophylaxis (see above)     # Anemia of chronic disease  Hgb 8.1 on ARU admission. Iron labs consistent with anemia of chronic disease. Hgb 9.1 (05/03), continues to improve. No concerns for blood loss. Will continue to monitor with weekly labs.    # Seizure disorder  Last seizure during teenage years. No need for seizure precautions at this time.   - Carbamazepine 400 mg PO QAM, 200 mg QNOON, 400 mg at bedtime    # Transaminitis  Enzymes elevated on admission. Now wnl (05/03) and resumed PTA statin. Will continue to monitor with weekly labs.  - LFT QMON     # Delay in development  Parents concerned for underlying psychiatric disorder during prior admission. Patient was seen by psychiatry who did not make any further recommendations  - Zoloft 100 mg DAILY     # Hypertension, essential  Lasix discontinued on 04/29, 24 BPs wnl (05/04).  - Vitals QSHIFT  - Lisinopril 10 mg DAILY     Chronic medical conditions:  # Mild intermittent asthma - nebs as needed.  # GERD: continue PTA omeprazole  # Hyperlipidemia: Continue PTA statin  # H/o splenectomy        dISCHARGE MEDICATIONS  Discharge Medication List as of 5/12/2021 10:04 AM      START taking these medications    Details   apixaban ANTICOAGULANT (ELIQUIS) 2.5 MG tablet Take 1 tablet (2.5 mg) by mouth 2 times daily for 15 days, Disp-31 tablet, R-0, Local Print         CONTINUE these medications which have CHANGED    Details   carBAMazepine (TEGRETOL) 200 MG tablet Take 1 tablet (200 mg) by mouth daily (with lunch), Disp-150 tablet, R-1, Local PrintTake 400 mg (2 tablets) in the morning. Take 200 mg (1 tablet) at noon. Take 400 mg (2 tablets) at night.      lisinopril (ZESTRIL) 10 MG tablet Take 1 tablet (10 mg) by mouth daily, Disp-30 tablet, R-1, Local Print       lovastatin (MEVACOR) 40 MG tablet Take 1 tablet (40 mg) by mouth At Bedtime, Disp-30 tablet, R-1, Local Print      omeprazole (PRILOSEC) 20 MG DR capsule Take 1 capsule (20 mg) by mouth every morning (before breakfast), Disp-30 capsule, R-1, Local Print      sertraline (ZOLOFT) 100 MG tablet Take 1 tablet (100 mg) by mouth daily, Disp-30 tablet, R-1, Local Print         CONTINUE these medications which have NOT CHANGED    Details   azelastine (ASTELIN) 0.1 % nasal spray Spray 1 spray into both nostrils 2 times daily, Disp-30 mL, R-11, E-Prescribe      fluticasone (FLONASE) 50 MCG/ACT nasal spray Spray 1 spray into both nostrils daily as needed for rhinitis or allergies, Historical      hydrocortisone 2.5 % cream Apply topically 2 times dailyDisp-30 g,[id number]      Multiple Vitamin (MULTI-VITAMIN PO) Take 1 tablet by mouth daily, Historical      nystatin (MYCOSTATIN) 810527 UNIT/GM external cream APPLY TOPICALLY TWO TIMES A DAY AS NEEDED. Not intended to be a chronic, daily medication.Disp-60 g, [id number]         STOP taking these medications       ibuprofen (ADVIL/MOTRIN) 200 MG tablet Comments:   Reason for Stopping:                 DISCHARGE INSTRUCTIONS AND FOLLOW UP  Discharge Procedure Orders   Home care nursing referral   Referral Priority: Routine Referral Type: Home Health Therapies & Aides   Number of Visits Requested: 1     Home Care PT Referral for Hospital Discharge   Referral Priority: Routine Referral Type: Home Health Therapies & Aides   Number of Visits Requested: 1     Home Care OT Referral for Hospital Discharge   Referral Priority: Routine   Number of Visits Requested: 1     Home Care SLP Referral for Hospital Discharge   Referral Priority: Routine   Number of Visits Requested: 1     Home Care Social Service Referral for Hospital Discharge   Referral Priority: Routine   Number of Visits Requested: 1     Reason for your hospital stay   Order Comments: Critical Illness Myopathy      Adult [facility] Follow-up and recommended labs and tests   Order Comments: 1. Primary care provider, Cara Marks, within 7 days for hospital follow- up.    2. Physical medicine and rehabilitation  3. Pulmonary / Sleep medicine for evaluation of obstructive sleep apnea    Appointments on [place] and/or [facility] (with [facility] or [facility] provider or service). Call [phone] if you haven't heard regarding these appointments within 7 days of discharge.     Activity   Order Comments: Your activity upon discharge: activity as tolerated with assist of Rolator.     Order Specific Question Answer Comments   Is discharge order? Yes      MD face to face encounter   Order Comments: Documentation of Face to Face and Certification for Home Health Services    I certify that patient: Brissa Barlow is under my care and that I, or a nurse practitioner or physician's assistant working with me, had a face-to-face encounter that meets the physician face-to-face encounter requirements with this patient on: 5/12/2021.    This encounter with the patient was in whole, or in part, for the following medical condition, which is the primary reason for home health care: post Covid.    I certify that, based on my findings, the following services are medically necessary home health services: Nursing, Occupational Therapy, Physical Therapy, Speech Language Therapy and Social Work.    My clinical findings support the need for the above services because: Nurse is needed: To provide caregiver training to assist with: ADLS and iaDLs.., Occupational Therapy Services are needed to assess and treat cognitive ability and address ADL safety due to impairment in cognition., Physical Therapy Services are needed to assess and treat the following functional impairments: endurance. and Speech Therapy Services are needed to assess and treat impairments in language and/or swallow functions due to memory.    Further, I certify that my clinical  findings support that this patient is homebound (i.e. absences from home require considerable and taxing effort and are for medical reasons or [employer] services or infrequently or of short duration when for other reasons) because: Requires assistance of another person or specialized equipment to access medical services because patient: Is unable to exit home safely on own due to: poor endurance...    Based on the above findings. I certify that this patient is confined to the home and needs intermittent skilled nursing care, physical therapy and/or speech therapy.  The patient is under my care, and I have initiated the establishment of the plan of care.  This patient will be followed by a physician who will periodically review the plan of care.  Physician/Provider to provide follow up care: Cara Marks    Attending hospital physician (the Medicare certified [place] provider): David Siddiqui DO  Physician Signature: See electronic signature associated with these discharge orders.  Date: 5/12/2021     Full Code     Order Specific Question Answer Comments   Code status determined by: Discussion with patient/ legal decision maker      Diet   Order Comments: Follow this diet upon discharge: Orders Placed This Encounter      Room Service      Snacks/Supplements Adult: Ensure Enlive; With Meals      Regular Diet Adult     Order Specific Question Answer Comments   Is discharge order? Yes           physical examination    Most recent Vital Signs:   Vitals:    05/10/21 1552 05/11/21 0611 05/11/21 1811 05/12/21 0810   BP: 123/45 134/68 123/47 127/52   BP Location: Right arm Right arm Right arm Right arm   Pulse: 93 86 87 91   Resp: 18 16 16 16   Temp: 97.6  F (36.4  C) 97.5  F (36.4  C) 98.7  F (37.1  C) 98.3  F (36.8  C)   TempSrc: Oral Oral Oral Oral   SpO2: 95% 95% 96% 95%   Weight:  (!) 157.4 kg (347 lb)     Height:           General: Awake, alert, NAD  Cardiovascular: RRR, no extra heart sounds, no  murmur  Pulmonary: Non-labored breathing, CTAB, no wheeze, no rhonci. No crackles.  Abdominal: obese, soft, non-tender, non-distended  Extremities: warm, well perfused, mild edema b/l  NEURO: Moving all extremities against gravity          Discharge summary was forwarded to Cara Marks (PCP) at the time of discharge, so as to bridge from hospital to outpatient care.     It was our pleasure to care for Brissa Barlow during this hospitalization. Please do not hesitate to contact me should there be questions regarding the hospital course or discharge plan.        David Siddiqui DO  Physical Medicine & Rehabilitation      I, David Siddiqui DO, saw and evaluated this patient prior to discharge. 35 minutes spent in discharge, including >50% in counseling and coordination of care, medication review and plan of care recommended on follow up.

## 2021-05-12 NOTE — PLAN OF CARE
Alert and oriented but developmentally delayed at baseline. Continent and incontinent of bladder and required doffing of brief by staff x2 and changing of incontinent brief by staff x1. Declined use of toilet or commode, but voiced that she would rather continue to be incontinent in brief overnight. Also states this is her plan when going home. Educated on importance of mirna-cares. LBM 5/10. Denies pain. Mod I with walker. Regular thin diet. Reddened skin under pannus. Miconazole powder applied. Call light within reach, Ok to continue with care plan.

## 2021-05-12 NOTE — PROGRESS NOTES
Discharge ppwk sent to [facility]. Mom and pt updated at bedside RE: SOC. Pt and mom signed IMM. No other SW needs.     [facility]  Home Health PH: [phone]  Fax: [phone]   Nurse, physical therapy, occupational therapy, home health aide (2x/week) and      Rand Schmidt, JERMAINE, [facility] Acute Rehab Unit   Phone: [phone]  I   Pager: [phone]

## 2021-05-12 NOTE — PLAN OF CARE
FOCUS/GOAL  Medical management    ASSESSMENT, INTERVENTIONS AND CONTINUING PLAN FOR GOAL:  Pt is alert and oriented. EL in room. Still needs A1 w/ incontinent cares at night.No complain of pain and no sob noted. Slept intermittently.

## 2021-05-13 ENCOUNTER — PATIENT OUTREACH (OUTPATIENT)
Dept: NURSING | Facility: CLINIC | Age: 56
End: 2021-05-13
Payer: MEDICARE

## 2021-05-13 ENCOUNTER — TELEPHONE (OUTPATIENT)
Dept: FAMILY MEDICINE | Facility: CLINIC | Age: 56
End: 2021-05-13

## 2021-05-13 SDOH — ECONOMIC STABILITY: FOOD INSECURITY: WITHIN THE PAST 12 MONTHS, THE FOOD YOU BOUGHT JUST DIDN'T LAST AND YOU DIDN'T HAVE MONEY TO GET MORE.: NEVER TRUE

## 2021-05-13 SDOH — ECONOMIC STABILITY: TRANSPORTATION INSECURITY
IN THE PAST 12 MONTHS, HAS THE LACK OF TRANSPORTATION KEPT YOU FROM MEDICAL APPOINTMENTS OR FROM GETTING MEDICATIONS?: NO

## 2021-05-13 SDOH — ECONOMIC STABILITY: TRANSPORTATION INSECURITY
IN THE PAST 12 MONTHS, HAS LACK OF TRANSPORTATION KEPT YOU FROM MEETINGS, WORK, OR FROM GETTING THINGS NEEDED FOR DAILY LIVING?: NO

## 2021-05-13 SDOH — ECONOMIC STABILITY: FOOD INSECURITY: WITHIN THE PAST 12 MONTHS, YOU WORRIED THAT YOUR FOOD WOULD RUN OUT BEFORE YOU GOT MONEY TO BUY MORE.: NEVER TRUE

## 2021-05-13 SDOH — ECONOMIC STABILITY: INCOME INSECURITY: HOW HARD IS IT FOR YOU TO PAY FOR THE VERY BASICS LIKE FOOD, HOUSING, MEDICAL CARE, AND HEATING?: NOT HARD AT ALL

## 2021-05-13 ASSESSMENT — ACTIVITIES OF DAILY LIVING (ADL): DEPENDENT_IADLS:: CLEANING;COOKING;LAUNDRY;SHOPPING

## 2021-05-13 NOTE — LETTER
[facility] CARE COORDINATION  [facility]  May 14, 2021    Brissa Barlow  [address]   [place] [id number]      Dear Virginia,    I am a clinic care coordinator who works with Cara Marks MD at [facility]. I wanted to thank your mom for spending the time to talk with me.  Below is a description of clinic care coordination and how I can further assist you.      The clinic care coordination team is made up of a registered nurse,  and community health worker who understand the health care system. The goal of clinic care coordination is to help you manage your health and improve access to the health care system in the most efficient manner. The team can assist you in meeting your health care goals by providing education, coordinating services, strengthening the communication among your providers and supporting you with any resource needs.    Please feel free to contact me at [phone] with any questions or concerns. We are focused on providing you with the highest-quality healthcare experience possible and that all starts with you.     Sincerely,     JOSE A Warren   Care Coordination Team  [phone]

## 2021-05-13 NOTE — PROGRESS NOTES
[facility]: Post-Discharge Note  SITUATION                                                      Admission:    Admission Date: 04/27/21   Reason for Admission: Critical Illness Myopathy  Discharge:   Discharge Date: 05/12/21  Discharge Diagnosis: Critical Illness Myopathy    BACKGROUND                                                      Brissa Barlow is a 55 year old female, pertinent past medical history of developmental delay, HTN, asthma and morbid obesity who presented to [facility] ED on 04/08/2021 with cough and shortness of breath. She was admitted for COVID-19 pneumonia and acute hypoxic respiratory failure. She continued to decompensate requiring intubation 04/11 remdesevir and steroid therapy. Intubation complicated by prolonged paralysis and circulatory shock requiring vasopressors. She eventually improved and weaned from the ventilator on 04/21 for a total of 10 day intubation and steroid therapy. She was currently requiring 1.5 L O2  on admission to ARU on 4/27.      ASSESSMENT      Discharge Assessment  Patient reports symptoms are: Improved  Does the patient have all of their medications?: Yes  Does patient know what their new medications are for?: Yes  Does patient have a follow-up appointment scheduled?: Yes  Does patient have any other questions or concerns?: No    Post-op  Did the patient have surgery or a procedure: No  Fever: No  Eating & Drinking: eating and drinking without complaints/concerns  PO Intake: regular diet  Bowel Function: normal  Urinary Status: voiding without complaint/concerns        PLAN                                                      Outpatient Plan:  1. Primary care provider, Cara Marks, within 7 days for hospital follow- up.    2. Physical medicine and rehabilitation  3. Pulmonary / Sleep medicine for evaluation of obstructive sleep apnea    Future Appointments   Date Time Provider Department Center   5/25/2021  9:00 AM Cara Marks  MD [facility]   7/21/2021  2:15 PM David Siddiqui DO [facility]           Rosa Maria Cervantes, [facility]

## 2021-05-13 NOTE — TELEPHONE ENCOUNTER
Received a call from [facility] requesting orders for:    Skilled Nursing Visits:  2 times a week x 2 weeks starting next week  1 time a week x 7 weeks    HHA:  2 times a week x 9 weeks starting next week    Verbal approval given per RN protocol.  _____________________________________________________________________________    Patient has stopped taking:    Hydrocortisone Cream 2.5%  Flonase Nasal Spray  _____________________________________________________________________________    Patient has started taking:    Calcium 600mg daily  Vitamin C 1,000mg daily    Requesting orders to continue taking?    Please call PAULA Rea back at [phone].    Vera Callejas RN

## 2021-05-14 NOTE — PROGRESS NOTES
Clinic Care Coordination Contact    Clinic Care Coordination Contact  OUTREACH    Referral Information:  Referral Source: IP Handoff    Primary Diagnosis: Other (include Comment box)    Chief Complaint   Patient presents with     Clinic Care Coordination - Post Hospital     Critical illness myopathy post Covid        Universal Utilization: 83% Admission or ED Risk  Clinic Utilization  Difficulty keeping appointments:: No  Compliance Concerns: No  No-Show Concerns: No  No PCP office visit in Past Year: No  Utilization    Last refreshed: 5/13/2021  3:42 PM: Hospital Admissions 4           Last refreshed: 5/13/2021  3:42 PM: ED Visits 2           Last refreshed: 5/13/2021  3:42 PM: No Show Count (past year) 0              Current as of: 5/13/2021  3:42 PM              Clinical Concerns:  Current Medical Concerns:    Per chart review from hospitalization:  Pertinent past medical history of developmental delay, HTN, asthma and morbid obesity who presented to [facility] ED on 04/08/2021 with cough and shortness of breath. She was admitted for COVID-19 pneumonia and acute hypoxic respiratory failure. She continued to decompensate requiring intubation 04/11 remdesevir and steroid therapy. Intubation complicated by prolonged paralysis and circulatory shock requiring vasopressors. She eventually improved and weaned from the ventilator on 04/21 for a total of 10 day intubation and steroid therapy. She was admitted  to ARU on 4/27 and discharged from there on 5/12/21.    Patient Active Problem List   Diagnosis     Essential hypertension, benign     Delay in development     Injury, other and unspecified, knee, leg, ankle, and foot     Perforation of tympanic membrane     Hyperlipidemia LDL goal <130     Seizure disorder (H)     Other peripheral enthesopathies     IFG (impaired fasting glucose)     Insulin resistance     Elevated cortisol level     Morbid obesity (H)     Low vitamin D level     BMI 50.0-59.9, adult  (H)     SBO (small bowel obstruction) (H)     Non-intractable vomiting with nausea, unspecified vomiting type     Acute UTI     Acute respiratory failure with hypoxia (H)     Pneumonia due to 2019 novel coronavirus     Critical illness myopathy     Depression     Esophageal reflux        SW CC spoke with Virginia's mom Lola who is also her co-guardian.  Lola reports that Virginia is doing well so far post discharge.  Home care has been scheduled and she is to receive SNV, physical therapy, occupational therapy and HHA.  She is on a CADI waiver and her CM is Alejandra ([phone].) She has a lifeline, receives moms meals and has homemaking.   Lola reports that Virginia has what she needs as far as equipment and occupational therapist  will further assess this.  She will be getting a medication dispenser.  Virginia's partner has a car and provider her transportation to medical appointments, etc.         Current Behavioral Concerns: NA    Education Provided to patient: CC role and reviewed her follow up appointments.     Pain  Pain (GOAL):: No  Health Maintenance Reviewed: Due/Overdue   Health Maintenance Due   Topic Date Due     COVID-19 Vaccine (1) Never done     HIV SCREENING  Never done     HEPATITIS C SCREENING  Never done     COLORECTAL CANCER SCREENING  07/20/2014     ZOSTER IMMUNIZATION (1 of 2) Never done     DTAP/TDAP/TD IMMUNIZATION (3 - Td) 09/17/2019     PHQ-9  10/14/2020     MAMMO SCREENING  10/23/2020     Clinical Pathway: None    Medication Management:  Currently receiving SNV to help with medication set up.  No concerns with medication  noted during this encounter.      Functional Status:  Dependent IADLs:: Cleaning, Cooking, Laundry, Shopping  Mobility Status: Independent w/Device    Living Situation:  Current living arrangement:: I live in a private home  Type of residence:: Apartment - handicap accessible    Lifestyle & Psychosocial Needs:     Social Needs     Financial resource strain: Not hard at  all     Food insecurity     Worry: Never true     Inability: Never true     Transportation needs     Medical: No     Non-medical: No     Inadequate nutrition (GOAL):: No  Tube Feeding: No  Inadequate activity/exercise (GOAL):: No  Significant changes in sleep pattern (GOAL): No  Transportation means:: Other     Informal Support system:: Family, Significant other   Socioeconomic History     Marital status: Single     Spouse name: Not on file     Number of children: Not on file     Years of education: Not on file     Highest education level: Not on file     Tobacco Use     Smoking status: Never Smoker     Smokeless tobacco: Never Used   Substance and Sexual Activity     Alcohol use: No     Alcohol/week: 0.0 standard drinks     Drug use: No     Sexual activity: Yes     Partners: Male               Resources and Interventions:  Current Resources:   Skilled Home Care Services: Skilled Nursing, Home Health Aid, Physicial Therapy, Speech Therapy, Occupational Therapy  Community Resources: [place] [facility], [place] Worker, Lifeline, Housekeeping/Chore Agency, Meals on Wheels     Equipment Currently Used at Home: grab bar, toilet, grab bar, tub/shower   )   )    Advance Care Plan/Directive  Advanced Care Plans/Directives on file:: Yes          Goals:       Patient/Caregiver understanding: Yes       Future Appointments              In 1 week Cara Marks MD [facility]    In 2 months David Siddiqui DO [facility]          Plan: Virginia will attend her PCP visit scheduled on 5/25/21.  She will work with home care.  She will work with CADI waiver worker if she needs additonal services put in place.  Virginia appears to be well supported with no CC needs at this time.  No further outreaches will be made at this time.    JOSE A Warren   Care Coordination Team  [phone]

## 2021-05-17 ENCOUNTER — TELEPHONE (OUTPATIENT)
Dept: FAMILY MEDICINE | Facility: CLINIC | Age: 56
End: 2021-05-17

## 2021-05-17 NOTE — TELEPHONE ENCOUNTER
Ad PT w/ [facility]     PT orders requestin x/week for 1 week  2 x/week for 3 weeks    For strength training bilateral legs, gait training, balance, and transfers.     Verbal ok given  Liz TURNER RN     Next 5 appointments (look out 90 days)    May 25, 2021  9:00 AM  SHORT with Cara Marks MD  [facility] ([facility] ) [id number] [place] [id number] [phone]

## 2021-05-18 NOTE — TELEPHONE ENCOUNTER
Pt has NOT followed up as previously recommended.  Several appts have been made , canceled an rescheduled.  Unable to provide more information. Pt must keep next appt or MD will NOT be able to sign future orders      Many challenges

## 2021-05-21 DIAGNOSIS — U07.1 PNEUMONIA DUE TO 2019 NOVEL CORONAVIRUS: ICD-10-CM

## 2021-05-21 DIAGNOSIS — J12.82 PNEUMONIA DUE TO 2019 NOVEL CORONAVIRUS: ICD-10-CM

## 2021-05-21 RX ORDER — APIXABAN 2.5 MG/1
TABLET, FILM COATED ORAL
Qty: 31 TABLET | Refills: 0 | Status: SHIPPED | OUTPATIENT
Start: 2021-05-21 | End: 2021-06-09

## 2021-05-21 NOTE — TELEPHONE ENCOUNTER
Patient has an upcoming appointment.  Will give refill so she does not have to go without.    Prescription approved per [facility] Refill Protocol.  Vera Callejas RN

## 2021-05-26 ENCOUNTER — TELEPHONE (OUTPATIENT)
Dept: FAMILY MEDICINE | Facility: CLINIC | Age: 56
End: 2021-05-26

## 2021-06-02 ENCOUNTER — TELEPHONE (OUTPATIENT)
Dept: FAMILY MEDICINE | Facility: CLINIC | Age: 56
End: 2021-06-02

## 2021-06-02 NOTE — TELEPHONE ENCOUNTER
Received orders, placed in Dr. Marks in basket.    Please review, sign and fax back to [phone].

## 2021-06-04 NOTE — TELEPHONE ENCOUNTER
Unable to sign orders until pt makes [facility]EE[facility] follow up appt.   Pt was to be seen in April after discharge from the hospital.

## 2021-06-08 NOTE — TELEPHONE ENCOUNTER
Pt is scheduled to be seen tomorrow   Called and spoke with her father and he states they will be at the appointment tomorrow

## 2021-06-08 NOTE — PROGRESS NOTES
Assessment & Plan     (Z91.19) Lack of follow-up after hospitalization  (primary encounter diagnosis)  Comment: multiple attempts for hospital follow up and pt then no show or same day cancel  Plan: similar challenges with home care;     (I10) Essential hypertension, benign  Comment: BLOOD PRESSURE well controlled; Lisinopril has been well tolerated.  Plan: lisinopril (ZESTRIL) 10 MG tablet,         Comprehensive metabolic panel (BMP + Alb, Alk         Phos, ALT, AST, Total. Bili, TP), CBC with         platelets and differential          (F32.9) Depression, unspecified depression type  Comment:   PHQ 7/24/2019 4/14/2020 6/9/2021   PHQ-9 Total Score 0 0 0   Q9: Thoughts of better off dead/self-harm past 2 weeks Not at all Not at all Not at all     SUBHASH-7 SCORE 7/24/2019 4/14/2020 6/9/2021   Total Score - - -   Total Score 0 0 0     She has been on medications for many decades; she prefers to remain on current medication and dose.  Plan: sertraline (ZOLOFT) 100 MG tablet        Sleeping well    (K21.9) Gastroesophageal reflux disease, unspecified whether esophagitis present  Comment: reviewed triggers; not modifying diet; recommend gradual dose reduction but she prefers to continue with current meds and dosages.   Plan: omeprazole (PRILOSEC) 20 MG DR capsule        Pt reminded of benefits of maximizing calcium and vit D to help with bone health (due to being on chronic PPI)     (E78.5) Hyperlipidemia LDL goal <130  Comment:   Lab Results   Component Value Date     09/14/2020     07/24/2019   Due for labs this Fall  Plan: lovastatin (MEVACOR) 40 MG tablet          (Z86.16) History of COVID-19 4/72021-5/12/2021  was intubated for 2 weeks  Comment: prolonged hospitalization including intubation; using walker and therapies to help improve strength and stamina.  Plan: slow process    (G40.909) Seizure disorder (H)  Comment: longstanding health issue; previously went to neurology; no seizures have occurred  "when taking meds as instructed;  Due for labs;   Plan: Carbamazepine total, carBAMazepine (TEGRETOL) 200 MG tablet          (F43.21) Adjustment disorder with depressed mood  Comment: reviewed use of sertraline; she has been on it for many decades but declines gradual dose reduction at this time; many health challenges this past year.   Plan: continue at current Sertraline dose.    (E66.01) Morbid obesity (H)  Comment: Body mass index is 54.27 kg/m .   Plan: weight loss encouraged.  not able to be as active due to longstanding left knee issue and deconditioning following prolonged COVID illness requiring hospitalization and 2 week intubation.     (Z68.43) BMI 50.0-59.9, adult (H)  Comment: Body mass index is 54.27 kg/m .   Plan: weight loss encouraged.  not able to be as active due to longstanding left knee issue and deconditioning following prolonged COVID illness requiring hospitalization and 2 week intubation.     (R73.09) Elevated hemoglobin A1c  Comment:   Lab Results   Component Value Date    A1C 4.7 06/09/2021    A1C 5.7 04/16/2021     Plan: Hemoglobin A1c        Healthy eating and regular exercise as able    (Z02.9) Handicapped Parking through 6/2025  Comment: Morbid obisity,  BMI 54, left knee brace for decades; using walker; seizure disorder  Plan: form completed and sent with pt and her mother/guardian    Review of the result(s) of each unique test - reviewed records. labs and medications.   Prescription drug management  74 minutes spent on the date of the encounter doing chart review, history and exam, documentation and further activities per the note       BMI:   Estimated body mass index is 54.27 kg/m  as calculated from the following:    Height as of 4/27/21: 1.702 m (5' 7\").    Weight as of this encounter: 157.2 kg (346 lb 8 oz).   Weight management plan: Discussed healthy diet and exercise guidelines    MEDICATIONS:   Orders Placed This Encounter   Medications     sertraline (ZOLOFT) 100 MG tablet    "  Sig: Take 1 tablet (100 mg) by mouth daily     Dispense:  30 tablet     Refill:  1     omeprazole (PRILOSEC) 20 MG DR capsule     Sig: Take 1 capsule (20 mg) by mouth every morning (before breakfast)     Dispense:  30 capsule     Refill:  1     lovastatin (MEVACOR) 40 MG tablet     Sig: Take 1 tablet (40 mg) by mouth At Bedtime     Dispense:  30 tablet     Refill:  1     lisinopril (ZESTRIL) 10 MG tablet     Sig: Take 1 tablet (10 mg) by mouth daily     Dispense:  30 tablet     Refill:  1     carBAMazepine (TEGRETOL) 200 MG tablet     Sig: Take 1 tablet (200 mg) by mouth daily (with lunch) Take 2 tablets in the morning and 1 tablet at noon and 2 tablets at night     Dispense:  150 tablet     Refill:  6     Medications Discontinued During This Encounter   Medication Reason     ELIQUIS ANTICOAGULANT 2.5 MG tablet Medication Reconciliation Clean Up     sertraline (ZOLOFT) 100 MG tablet Reorder     lisinopril (ZESTRIL) 10 MG tablet Reorder     lovastatin (MEVACOR) 40 MG tablet Reorder     omeprazole (PRILOSEC) 20 MG DR capsule Reorder     carBAMazepine (TEGRETOL) 200 MG tablet Reorder          - Continue other medications without change  FUTURE APPOINTMENTS:       - Follow-up visit in 4 months to address HCM and check in on strength and stamina improvements.        - Follow-up for annual visit or as needed  Work on weight loss    Return in about 4 months (around 10/1/2021) for Physical Exam, Hypertension, cholesterol, Prediabetes/Insulin resistance, in Clinic.    Cara Marks MD  Internal Medicine   [facility] is a 56 year old who presents for the following health issues  accompanied by her mother:    HPI     Hyperlipidemia Follow-Up      Are you regularly taking any medication or supplement to lower your cholesterol?   Yes- lovastatin     Are you having muscle aches or other side effects that you think could be caused by your cholesterol lowering  medication?  No    Hypertension Follow-up      Do you check your blood pressure regularly outside of the clinic? Yes   Has been checked by home care staff when they come out.     Are you following a low salt diet? No    Are your blood pressures ever more than 140 on the top number (systolic) OR more   than 90 on the bottom number (diastolic), for example 140/90? No    Depression Followup    How are you doing with your depression since your last visit? States is stable     Are you having other symptoms that might be associated with depression? No    Have you had a significant life event?  OTHER: recent hospitalization for COVID     Are you feeling anxious or having panic attacks?   No    Do you have any concerns with your use of alcohol or other drugs? No    Social History     Tobacco Use     Smoking status: Never Smoker     Smokeless tobacco: Never Used   Substance Use Topics     Alcohol use: No     Alcohol/week: 0.0 standard drinks     Drug use: No     PHQ 7/24/2019 4/14/2020 6/9/2021   PHQ-9 Total Score 0 0 0   Q9: Thoughts of better off dead/self-harm past 2 weeks Not at all Not at all Not at all     SUBHASH-7 SCORE 7/24/2019 4/14/2020 6/9/2021   Total Score - - -   Total Score 0 0 0     Last PHQ-9 6/9/2021   1.  Little interest or pleasure in doing things 0   2.  Feeling down, depressed, or hopeless 0   3.  Trouble falling or staying asleep, or sleeping too much 0   4.  Feeling tired or having little energy 0   5.  Poor appetite or overeating 0   6.  Feeling bad about yourself 0   7.  Trouble concentrating 0   8.  Moving slowly or restless 0   Q9: Thoughts of better off dead/self-harm past 2 weeks 0   PHQ-9 Total Score 0   Difficulty at work, home, or with people Not difficult at all     SUBHASH-7  6/9/2021   1. Feeling nervous, anxious, or on edge 0   2. Not being able to stop or control worrying 0   3. Worrying too much about different things 0   4. Trouble relaxing 0   5. Being so restless that it is hard to sit  still 0   6. Becoming easily annoyed or irritable 0   7. Feeling afraid, as if something awful might happen 0   SUBHASH-7 Total Score 0   If you checked any problems, how difficult have they made it for you to do your work, take care of things at home, or get along with other people? Not difficult at all         How many servings of fruits and vegetables do you eat daily?  2-3    On average, how many sweetened beverages do you drink each day (Examples: soda, juice, sweet tea, etc.  Do NOT count diet or artificially sweetened beverages)?   0    How many days per week do you exercise enough to make your heart beat faster? 0    How many minutes a day do you exercise enough to make your heart beat faster? 0    How many days per week do you miss taking your medication? First 2 weeks a nurse was filling her medications and now they have ordered a pill dispensing system that is on order and will be set up when it arrives         Hospital Follow-up Visit:    Hospital/Nursing Home/ Rehab Facility: [facility] 4/8/2021 till 4/15/2021 then transferred to ICU at [facility] from 4/15/2021 till 4/27/2021 at which time she went to [facility] acute rehabilitation until 5/12/2021  Date of Admission: 4/8/2021  Date of Discharge: 2/12/2021  Reason(s) for Admission: COVID      Was your hospitalization related to COVID-19? YES   How are you feeling today? Better  In the past 24 hours have you had shortness of breath when speaking, walking, or climbing stairs? I don't have breathing problems  Do you have a cough? I don't have a cough  When is the last time you had a fever greater than 100? Not since leaving the hospital  Are you having any other symptoms? None   Do you have any other stressors you would like to discuss with your provider? No         PHQ Assesment Total Score(s) 6/9/2021   PHQ-9 Score 0   Some recent data might be hidden       SUBHASH-7 Results 6/9/2021   SUBHASH-7 Total Score 0   Some recent data might  be hidden       Was the patient in the ICU or did the patient experience delirium during hospitalization?  Yes was in ICU              Problems taking medications regularly:  None  Medication changes since discharge: None  Problems adhering to non-medication therapy:  None    Summary of hospitalization:  [facility] hospital discharge summary reviewed  Diagnostic Tests/Treatments reviewed.  Follow up needed: failed to see PCP within 1-2 weeks of discharge; it has been >10 weeks or more; many attempts to assist pt with scheduling.   Other Healthcare Providers Involved in Patient s Care:         Homecare, Care Coordination, Physical Therapy and requesting handicapped parking  Update since discharge: improved.       Post Discharge Medication Reconciliation: discharge medications reconciled, continue medications without change.  Plan of care communicated with patient and guardian- mother          Review of Systems   CONSTITUTIONAL: NEGATIVE for fever, chills, change in weight  ENT/MOUTH: NEGATIVE for ear, mouth and throat problems  RESP:recent COVID pneumonia requiring prolonged hospitalization and intubation for 2 weeks.n  CV: hypertension; reviewed meds; not checking at home; home care trying  GI: chronic PPI use.   MUSCULOSKELETAL: longstanding left knee immobilizer; prolonged deconditioning, currently using a walker.  NEURO: hx of seizures; OK on meds; no breakthrough seizures.   ENDOCRINE: morbid obesity; BMI >50; insulin resistance, no diabetes  HEME/ALLERGY/IMMUNE: NEGATIVE for bleeding problems  PSYCHIATRIC: mood disorder; developmental delay; parents are her guardian      Objective    /76   Pulse 95   Temp 98.7  F (37.1  C) (Oral)   Resp 21   Wt (!) 157.2 kg (346 lb 8 oz)   LMP 08/18/2008   SpO2 95%   BMI 54.27 kg/m    Body mass index is 54.27 kg/m .  Physical Exam   GENERAL: alert and no distress; accompanied by her mother  EYES: Eyes grossly normal to inspection and conjunctivae and sclerae  normal  NECK: no adenopathy, no asymmetry, masses, or scars and thyroid normal to palpation  RESP: lungs clear to auscultation - no rales, rhonchi or wheezes  CV: regular rates and rhythm and normal S1 S2, no S3 or S4  ABDOMEN: soft, nontender and bowel sounds normal  MS: left knee with immobilizer in place ( for over 20 years); ambulatory with walker; able to get up from chair on her own.  NEURO: Normal strength and tone and sensory exam grossly normal  PSYCH: mentation appears normal, affect normal/bright

## 2021-06-09 ENCOUNTER — OFFICE VISIT (OUTPATIENT)
Dept: FAMILY MEDICINE | Facility: CLINIC | Age: 56
End: 2021-06-09
Payer: MEDICARE

## 2021-06-09 VITALS
WEIGHT: 293 LBS | BODY MASS INDEX: 54.27 KG/M2 | OXYGEN SATURATION: 95 % | TEMPERATURE: 98.7 F | SYSTOLIC BLOOD PRESSURE: 128 MMHG | HEART RATE: 95 BPM | RESPIRATION RATE: 21 BRPM | DIASTOLIC BLOOD PRESSURE: 76 MMHG

## 2021-06-09 DIAGNOSIS — E78.5 HYPERLIPIDEMIA LDL GOAL <130: ICD-10-CM

## 2021-06-09 DIAGNOSIS — Z91.199 LACK OF FOLLOW-UP AFTER HOSPITALIZATION: Primary | ICD-10-CM

## 2021-06-09 DIAGNOSIS — Z02.9 ADMINISTRATIVE ENCOUNTER: ICD-10-CM

## 2021-06-09 DIAGNOSIS — K21.9 GASTROESOPHAGEAL REFLUX DISEASE, UNSPECIFIED WHETHER ESOPHAGITIS PRESENT: ICD-10-CM

## 2021-06-09 DIAGNOSIS — Z86.16 HISTORY OF COVID-19: ICD-10-CM

## 2021-06-09 DIAGNOSIS — I10 ESSENTIAL HYPERTENSION, BENIGN: ICD-10-CM

## 2021-06-09 DIAGNOSIS — E66.01 MORBID OBESITY (H): ICD-10-CM

## 2021-06-09 DIAGNOSIS — G40.909 SEIZURE DISORDER (H): ICD-10-CM

## 2021-06-09 DIAGNOSIS — R73.09 ELEVATED HEMOGLOBIN A1C: ICD-10-CM

## 2021-06-09 DIAGNOSIS — F32.A DEPRESSION, UNSPECIFIED DEPRESSION TYPE: ICD-10-CM

## 2021-06-09 DIAGNOSIS — F43.21 ADJUSTMENT DISORDER WITH DEPRESSED MOOD: ICD-10-CM

## 2021-06-09 LAB
ALBUMIN SERPL-MCNC: 3.7 G/DL (ref 3.4–5)
ALP SERPL-CCNC: 114 U/L (ref 40–150)
ALT SERPL W P-5'-P-CCNC: 40 U/L (ref 0–50)
ANION GAP SERPL CALCULATED.3IONS-SCNC: 5 MMOL/L (ref 3–14)
AST SERPL W P-5'-P-CCNC: 24 U/L (ref 0–45)
BASOPHILS # BLD AUTO: 0 10E9/L (ref 0–0.2)
BASOPHILS NFR BLD AUTO: 0.4 %
BILIRUB SERPL-MCNC: 0.3 MG/DL (ref 0.2–1.3)
BUN SERPL-MCNC: 15 MG/DL (ref 7–30)
CALCIUM SERPL-MCNC: 9.6 MG/DL (ref 8.5–10.1)
CARBAMAZEPINE SERPL-MCNC: 11.7 MG/L (ref 4–12)
CHLORIDE SERPL-SCNC: 107 MMOL/L (ref 94–109)
CO2 SERPL-SCNC: 28 MMOL/L (ref 20–32)
CREAT SERPL-MCNC: 0.69 MG/DL (ref 0.52–1.04)
DIFFERENTIAL METHOD BLD: ABNORMAL
EOSINOPHIL # BLD AUTO: 0.2 10E9/L (ref 0–0.7)
EOSINOPHIL NFR BLD AUTO: 2.6 %
ERYTHROCYTE [DISTWIDTH] IN BLOOD BY AUTOMATED COUNT: 14.3 % (ref 10–15)
GFR SERPL CREATININE-BSD FRML MDRD: >90 ML/MIN/{1.73_M2}
GLUCOSE SERPL-MCNC: 101 MG/DL (ref 70–99)
HBA1C MFR BLD: 4.7 % (ref 0–5.6)
HCT VFR BLD AUTO: 43 % (ref 35–47)
HGB BLD-MCNC: 13.4 G/DL (ref 11.7–15.7)
LYMPHOCYTES # BLD AUTO: 2 10E9/L (ref 0.8–5.3)
LYMPHOCYTES NFR BLD AUTO: 24.7 %
MCH RBC QN AUTO: 29.9 PG (ref 26.5–33)
MCHC RBC AUTO-ENTMCNC: 31.2 G/DL (ref 31.5–36.5)
MCV RBC AUTO: 96 FL (ref 78–100)
MONOCYTES # BLD AUTO: 0.7 10E9/L (ref 0–1.3)
MONOCYTES NFR BLD AUTO: 8.1 %
NEUTROPHILS # BLD AUTO: 5.2 10E9/L (ref 1.6–8.3)
NEUTROPHILS NFR BLD AUTO: 64.2 %
PLATELET # BLD AUTO: 323 10E9/L (ref 150–450)
POTASSIUM SERPL-SCNC: 4.2 MMOL/L (ref 3.4–5.3)
PROT SERPL-MCNC: 7.9 G/DL (ref 6.8–8.8)
RBC # BLD AUTO: 4.48 10E12/L (ref 3.8–5.2)
SODIUM SERPL-SCNC: 140 MMOL/L (ref 133–144)
WBC # BLD AUTO: 8.1 10E9/L (ref 4–11)

## 2021-06-09 PROCEDURE — 80156 ASSAY CARBAMAZEPINE TOTAL: CPT | Performed by: INTERNAL MEDICINE

## 2021-06-09 PROCEDURE — 85025 COMPLETE CBC W/AUTO DIFF WBC: CPT | Performed by: INTERNAL MEDICINE

## 2021-06-09 PROCEDURE — 83036 HEMOGLOBIN GLYCOSYLATED A1C: CPT | Performed by: INTERNAL MEDICINE

## 2021-06-09 PROCEDURE — 99417 PROLNG OP E/M EACH 15 MIN: CPT | Performed by: INTERNAL MEDICINE

## 2021-06-09 PROCEDURE — 96127 BRIEF EMOTIONAL/BEHAV ASSMT: CPT | Performed by: INTERNAL MEDICINE

## 2021-06-09 PROCEDURE — 36415 COLL VENOUS BLD VENIPUNCTURE: CPT | Performed by: INTERNAL MEDICINE

## 2021-06-09 PROCEDURE — 99215 OFFICE O/P EST HI 40 MIN: CPT | Performed by: INTERNAL MEDICINE

## 2021-06-09 PROCEDURE — 80053 COMPREHEN METABOLIC PANEL: CPT | Performed by: INTERNAL MEDICINE

## 2021-06-09 RX ORDER — CARBAMAZEPINE 200 MG/1
200 TABLET ORAL
Qty: 150 TABLET | Refills: 6 | Status: SHIPPED | OUTPATIENT
Start: 2021-06-09 | End: 2022-03-03

## 2021-06-09 RX ORDER — LOVASTATIN 40 MG
40 TABLET ORAL AT BEDTIME
Qty: 30 TABLET | Refills: 1 | Status: SHIPPED | OUTPATIENT
Start: 2021-06-09 | End: 2021-08-09

## 2021-06-09 RX ORDER — LISINOPRIL 10 MG/1
10 TABLET ORAL DAILY
Qty: 30 TABLET | Refills: 1 | Status: SHIPPED | OUTPATIENT
Start: 2021-06-09 | End: 2022-05-06

## 2021-06-09 RX ORDER — SERTRALINE HYDROCHLORIDE 100 MG/1
100 TABLET, FILM COATED ORAL DAILY
Qty: 30 TABLET | Refills: 1 | Status: SHIPPED | OUTPATIENT
Start: 2021-06-09 | End: 2021-09-13

## 2021-06-09 ASSESSMENT — ANXIETY QUESTIONNAIRES
IF YOU CHECKED OFF ANY PROBLEMS ON THIS QUESTIONNAIRE, HOW DIFFICULT HAVE THESE PROBLEMS MADE IT FOR YOU TO DO YOUR WORK, TAKE CARE OF THINGS AT HOME, OR GET ALONG WITH OTHER PEOPLE: NOT DIFFICULT AT ALL
2. NOT BEING ABLE TO STOP OR CONTROL WORRYING: NOT AT ALL
7. FEELING AFRAID AS IF SOMETHING AWFUL MIGHT HAPPEN: NOT AT ALL
5. BEING SO RESTLESS THAT IT IS HARD TO SIT STILL: NOT AT ALL
3. WORRYING TOO MUCH ABOUT DIFFERENT THINGS: NOT AT ALL
6. BECOMING EASILY ANNOYED OR IRRITABLE: NOT AT ALL
1. FEELING NERVOUS, ANXIOUS, OR ON EDGE: NOT AT ALL
GAD7 TOTAL SCORE: 0

## 2021-06-09 ASSESSMENT — PATIENT HEALTH QUESTIONNAIRE - PHQ9
SUM OF ALL RESPONSES TO PHQ QUESTIONS 1-9: 0
5. POOR APPETITE OR OVEREATING: NOT AT ALL

## 2021-06-10 DIAGNOSIS — Z53.9 DIAGNOSIS NOT YET DEFINED: Primary | ICD-10-CM

## 2021-06-10 ASSESSMENT — ANXIETY QUESTIONNAIRES: GAD7 TOTAL SCORE: 0

## 2021-07-01 ENCOUNTER — TELEPHONE (OUTPATIENT)
Dept: FAMILY MEDICINE | Facility: CLINIC | Age: 56
End: 2021-07-01

## 2021-07-01 NOTE — TELEPHONE ENCOUNTER
Reason for Call:  Other prescription    Detailed comments: Pauline has questions on the Rx for Apixiban patient says it was discontinued?  Also Lisinopril 20 mg the order says it's for 10 mg. Please call to clarify. Thank you.    Phone Number Patient can be reached at: Other phone number: [phone]    Best Time: any    Can we leave a detailed message on this number? YES    Call taken on 7/1/2021 at 4:04 PM by Keke Barrera

## 2021-07-01 NOTE — TELEPHONE ENCOUNTER
[facility]     Advised apixaban not on current med list had been prescribed by another rprovider.    Patient has been taking lisinopril 20 mg not 10 mg. Confirmed current dosage 10 mg. Had been changed 5/1 by another provider.    Current BP in normal range. Pauline will instruct patient to take half of 20 mg pill.     Homecare will end next week. Patient still is considered homebound.     Should service be continued to monitor BP?    Jaky Limon RN

## 2021-07-02 NOTE — TELEPHONE ENCOUNTER
Challenging to continue services as she often refuses services.  Not clear why she is homebound? She was hospitalized in the Spring related to Covid infection. She is using a walker to help with strength and stamina but not Homebound.  Recommend she have a nurse BLOOD PRESSURE check in next 1-2 weeks.  Thanks.   [address] Internal Medicine  Progress Note - Millie Mccarthy 1958, 63 y o  male MRN: [id number]    Unit/Bed#: -01 Encounter: [id number]    Primary Care Provider: Vazquez Bey DO   Date and time admitted to hospital: 10/14/2019 12:36 AM    SIRS (systemic inflammatory response syndrome)  Assessment & Plan  presents with tachycardia/tachypnea on admission with associated lactic acidosis - serial lactic acid level normalized with IV fluid hydration   blood cultures drawn in the ER - in the absence of fever/leukocytosis or suspicion for obvious clinical infection, observe off antibiotics pending  Blood cultures negative for 24 hours  Procalcitonin is negative  monitor vitals and maintain hemodynamics    Hyperlipidemia  Assessment & Plan  Resume statin once patient progresses to regular diet    Essential hypertension  Assessment & Plan  Will resume oral antihypertensives once patient progresses to a regular diet  Blood pressures appear controlled on review  PRN IV Lopressor on board  optimize pain control - low-sodium restriction once back on oral diet    * Small bowel obstruction   Assessment & Plan  management per primary service (general surgery)  NG tube removed this morning, clear liquid diet  initially noted on CT imaging confirmed on subsequent XR obstruction series       VTE Pharmacologic Prophylaxis:   Pharmacologic: Heparin  Mechanical VTE Prophylaxis in Place: Yes    Patient Centered Rounds: I have performed bedside rounds with nursing staff today  Discussions with Specialists or Other Care Team Provider:  Reviewed surgeries notes reviewed previous provider's notes discussed with case management and primary RN    Education and Discussions with Family / Patient:  Educated patient and wife on current plan of care denies any additional questions or concerns at this time    Time Spent for Care: 20 minutes    More than 50% of total time spent on counseling and coordination of care as described above     Current Length of Stay: 1 day(s)    Current Patient Status: Inpatient   Certification Statement: The patient will continue to require additional inpatient hospital stay due to Patient will continue to require monitoring of diet advancement and bowel function    Discharge Plan / Estimated Discharge Date:  24-48 hours      Code Status: Level 1 - Full Code      Subjective:   Patient denies any abdominal pain or nausea  He reports that he is hungry  He reports positive flatus  Encouraged patient to ambulate in the hallway as well as be out of bed for all meals and he is agreeable to this plan  He denies any chest pain chest tightness shortness of breath or difficulty breathing    Objective:     Vitals:   Temp (24hrs), Av 6 °F (37 °C), Min:98 3 °F (36 8 °C), Max:98 9 °F (37 2 °C)    Temp:  [98 3 °F (36 8 °C)-98 9 °F (37 2 °C)] 98 5 °F (36 9 °C)  HR:  [82-95] 82  Resp:  [18-20] 18  BP: (129-142)/(77-85) 131/85  SpO2:  [96 %-97 %] 96 %  Body mass index is 23 11 kg/m²  Input and Output Summary (last 24 hours): Intake/Output Summary (Last 24 hours) at 10/15/2019 1257  Last data filed at 10/15/2019 1014  Gross per 24 hour   Intake 2125 ml   Output 900 ml   Net 1225 ml       Physical Exam:     Physical Exam   Constitutional: He appears well-developed and well-nourished  No distress  HENT:   Head: Normocephalic  Eyes: Pupils are equal, round, and reactive to light  Neck: Normal range of motion  Cardiovascular: Normal rate  Pulmonary/Chest: Effort normal    Abdominal: Soft  He exhibits no distension  There is no tenderness  Neurological: He is alert  Skin: Skin is warm and dry  Psychiatric: He has a normal mood and affect  His behavior is normal    Nursing note and vitals reviewed      Additional Data:     Labs:    Results from last 7 days   Lab Units 10/15/19  0645   WBC Thousand/uL 5 42   HEMOGLOBIN g/dL 14 6   HEMATOCRIT % 44 4   PLATELETS Thousands/uL 181   NEUTROS PCT % 60   LYMPHS PCT % 13*   MONOS PCT % 22*   EOS PCT % 4     Results from last 7 days   Lab Units 10/15/19  0645 10/14/19  0442   POTASSIUM mmol/L 4 0 4 0   CHLORIDE mmol/L 109* 104   CO2 mmol/L 20* 24   BUN mg/dL 23 19   CREATININE mg/dL 0 97 0 94   CALCIUM mg/dL 8 1* 8 2*   ALK PHOS U/L  --  109   ALT U/L  --  30   AST U/L  --  20           * I Have Reviewed All Lab Data Listed Above  * Additional Pertinent Lab Tests Reviewed: [facility]    Recent Cultures (last 7 days):     Results from last 7 days   Lab Units 10/14/19  0255 10/14/19  0253   BLOOD CULTURE  No Growth at 24 hrs  No Growth at 24 hrs  Last 24 Hours Medication List:     Current Facility-Administered Medications:  cefazolin 2,000 mg Intravenous Q8H Jl Marcial MD Last Rate: 2,000 mg (10/15/19 0501)   famotidine 20 mg Intravenous BID Jl Marcial MD    heparin (porcine) 5,000 Units Subcutaneous [facility] Jl Marcial MD    metoprolol 5 mg Intravenous Q6H PRN Mady Yanez MD    morphine injection 2 mg Intravenous Q1H PRN Jl Marcial MD    ondansetron 4 mg Intravenous Q6H PRN Jl Marcial MD    phenol 1 spray Mouth/Throat Q2H PRN Jl Marcial MD    sodium chloride 125 mL/hr Intravenous Continuous Jl Marcial MD Last Rate: 125 mL/hr (10/15/19 1015)        Today, Patient Was Seen By: OSCAR Herron    ** Please Note: Dragon 360 Dictation voice to text software may have been used in the creation of this document   **

## 2021-07-05 ENCOUNTER — TELEPHONE (OUTPATIENT)
Dept: FAMILY MEDICINE | Facility: CLINIC | Age: 56
End: 2021-07-05

## 2021-07-05 NOTE — TELEPHONE ENCOUNTER
Rec'd Medication discontinue form from [facility]. Placed in PCP basket for review and signature. Fax: [phone]    Jessica Mares-

## 2021-07-07 NOTE — TELEPHONE ENCOUNTER
Called and spoke to mom.  She advised calling the pt at [phone] and they will get her here for a nurse only bp appt.      Called the pt.  Nurse only bp check was scheduled.

## 2021-07-08 ENCOUNTER — MEDICAL CORRESPONDENCE (OUTPATIENT)
Dept: HEALTH INFORMATION MANAGEMENT | Facility: CLINIC | Age: 56
End: 2021-07-08

## 2021-07-12 ENCOUNTER — ALLIED HEALTH/NURSE VISIT (OUTPATIENT)
Dept: NURSING | Facility: CLINIC | Age: 56
End: 2021-07-12
Payer: MEDICARE

## 2021-07-12 ENCOUNTER — TELEPHONE (OUTPATIENT)
Dept: FAMILY MEDICINE | Facility: CLINIC | Age: 56
End: 2021-07-12

## 2021-07-12 VITALS — DIASTOLIC BLOOD PRESSURE: 70 MMHG | SYSTOLIC BLOOD PRESSURE: 116 MMHG | HEART RATE: 96 BPM

## 2021-07-12 DIAGNOSIS — I10 ESSENTIAL HYPERTENSION, BENIGN: Primary | ICD-10-CM

## 2021-07-12 DIAGNOSIS — Z23 HIGH PRIORITY FOR COVID-19 VIRUS VACCINATION: Primary | ICD-10-CM

## 2021-07-12 PROCEDURE — 99207 PR NO CHARGE NURSE ONLY: CPT

## 2021-07-12 PROCEDURE — 0011A PR COVID VAC MODERNA 100 MCG/0.5 ML IM: CPT

## 2021-07-12 PROCEDURE — 91301 PR COVID VAC MODERNA 100 MCG/0.5 ML IM: CPT

## 2021-07-12 NOTE — NURSING NOTE
"Chief Complaint   Patient presents with     Allied Health Visit     B/P check.     Initial /70 (BP Location: Right arm, Patient Position: Sitting, Cuff Size: Adult Regular)   Pulse 96   LMP 08/18/2008  Estimated body mass index is 54.27 kg/m  as calculated from the following:    Height as of 4/27/21: 1.702 m (5' 7\").    Weight as of 6/9/21: 157.2 kg (346 lb 8 oz).  BP completed using cuff size regular right lower arm    Lisa Magill, CMA    "

## 2021-07-12 NOTE — TELEPHONE ENCOUNTER
Patient came in today for a nurse only BLOOD PRESSURE check and first COVID injection.    Vital Signs 5/12/2021 6/9/2021 7/12/2021   Systolic 127 128 116   Diastolic 52 76 70   Pulse 91 95 96   Temperature 98.3 98.7    Respirations 16 21    Weight (LB)  346 lb 8 oz    Height      BMI (Calculated)      Pain      O2 95 95        Lisa Magill, [facility]

## 2021-07-12 NOTE — PROGRESS NOTES
Brissa Barlow is a 56 year old patient who comes in today for a Blood Pressure check.  Initial BP:  /70 (BP Location: Right arm, Patient Position: Sitting, Cuff Size: Adult Regular)   Pulse 96   LMP 08/18/2008        Disposition: results routed to provider.    Lisa Magill, CMA

## 2021-07-20 DIAGNOSIS — K21.9 GASTROESOPHAGEAL REFLUX DISEASE, UNSPECIFIED WHETHER ESOPHAGITIS PRESENT: ICD-10-CM

## 2021-07-21 ENCOUNTER — VIRTUAL VISIT (OUTPATIENT)
Dept: PHYSICAL MEDICINE AND REHAB | Facility: CLINIC | Age: 56
End: 2021-07-21
Payer: MEDICARE

## 2021-07-21 VITALS — WEIGHT: 293 LBS | HEIGHT: 67 IN | BODY MASS INDEX: 45.99 KG/M2

## 2021-07-21 DIAGNOSIS — Z86.16 HISTORY OF 2019 NOVEL CORONAVIRUS DISEASE (COVID-19): ICD-10-CM

## 2021-07-21 DIAGNOSIS — R53.81 POST VIRAL DEBILITY: Primary | ICD-10-CM

## 2021-07-21 DIAGNOSIS — B94.8 POST VIRAL DEBILITY: Primary | ICD-10-CM

## 2021-07-21 PROCEDURE — 99214 OFFICE O/P EST MOD 30 MIN: CPT | Mod: 95 | Performed by: PHYSICAL MEDICINE & REHABILITATION

## 2021-07-21 SDOH — SOCIAL STABILITY: SOCIAL NETWORK: I HAVE TROUBLE DOING ALL OF THE FAMILY ACTIVITIES THAT I WANT TO DO: NEVER

## 2021-07-21 SDOH — SOCIAL STABILITY: SOCIAL NETWORK: I HAVE TROUBLE DOING ALL OF MY REGULAR LEISURE ACTIVITIES WITH OTHERS: NEVER

## 2021-07-21 SDOH — SOCIAL STABILITY: SOCIAL NETWORK: PROMIS ABILITY TO PARTICIPATE IN SOCIAL ROLES & ACTIVITIES T-SCORE: 64.1

## 2021-07-21 SDOH — SOCIAL STABILITY: SOCIAL NETWORK: I HAVE TROUBLE DOING ALL OF THE ACTIVITIES WITH FRIENDS THAT I WANT TO DO: NEVER

## 2021-07-21 SDOH — SOCIAL STABILITY: SOCIAL NETWORK: I HAVE TROUBLE DOING ALL OF MY USUAL WORK (INCLUDE WORK AT HOME): NEVER

## 2021-07-21 ASSESSMENT — PATIENT HEALTH QUESTIONNAIRE - PHQ9
SUM OF ALL RESPONSES TO PHQ QUESTIONS 1-9: 0
SUM OF ALL RESPONSES TO PHQ QUESTIONS 1-9: 0
10. IF YOU CHECKED OFF ANY PROBLEMS, HOW DIFFICULT HAVE THESE PROBLEMS MADE IT FOR YOU TO DO YOUR WORK, TAKE CARE OF THINGS AT HOME, OR GET ALONG WITH OTHER PEOPLE: NOT DIFFICULT AT ALL

## 2021-07-21 ASSESSMENT — ANXIETY QUESTIONNAIRES
4. TROUBLE RELAXING: NOT AT ALL
3. WORRYING TOO MUCH ABOUT DIFFERENT THINGS: NOT AT ALL
7. FEELING AFRAID AS IF SOMETHING AWFUL MIGHT HAPPEN: NOT AT ALL
GAD7 TOTAL SCORE: 0
2. NOT BEING ABLE TO STOP OR CONTROL WORRYING: NOT AT ALL
6. BECOMING EASILY ANNOYED OR IRRITABLE: NOT AT ALL
8. IF YOU CHECKED OFF ANY PROBLEMS, HOW DIFFICULT HAVE THESE MADE IT FOR YOU TO DO YOUR WORK, TAKE CARE OF THINGS AT HOME, OR GET ALONG WITH OTHER PEOPLE?: NOT DIFFICULT AT ALL
5. BEING SO RESTLESS THAT IT IS HARD TO SIT STILL: NOT AT ALL
1. FEELING NERVOUS, ANXIOUS, OR ON EDGE: NOT AT ALL
7. FEELING AFRAID AS IF SOMETHING AWFUL MIGHT HAPPEN: NOT AT ALL

## 2021-07-21 ASSESSMENT — PAIN SCALES - GENERAL: PAINLEVEL: NO PAIN (0)

## 2021-07-21 ASSESSMENT — MIFFLIN-ST. JEOR: SCORE: 2202.08
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
## 2021-07-21 NOTE — NURSING NOTE
"Chief Complaint   Patient presents with     Covid Concern     COVID Positive 3/11/2021        Vitals:    07/21/21 1408   Weight: (!) 156.9 kg (346 lb)   Height: 1.702 m (5' 7\")       Body mass index is 54.19 kg/m .                                             "

## 2021-07-22 ASSESSMENT — PATIENT HEALTH QUESTIONNAIRE - PHQ9: SUM OF ALL RESPONSES TO PHQ QUESTIONS 1-9: 0

## 2021-07-22 ASSESSMENT — ANXIETY QUESTIONNAIRES: GAD7 TOTAL SCORE: 0

## 2021-08-08 DIAGNOSIS — E78.5 HYPERLIPIDEMIA LDL GOAL <130: ICD-10-CM

## 2021-08-08 DIAGNOSIS — I10 ESSENTIAL HYPERTENSION, BENIGN: ICD-10-CM

## 2021-08-09 ENCOUNTER — ALLIED HEALTH/NURSE VISIT (OUTPATIENT)
Dept: NURSING | Facility: CLINIC | Age: 56
End: 2021-08-09
Attending: FAMILY MEDICINE
Payer: MEDICARE

## 2021-08-09 DIAGNOSIS — Z23 ENCOUNTER FOR IMMUNIZATION: Primary | ICD-10-CM

## 2021-08-09 PROCEDURE — 91301 PR COVID VAC MODERNA 100 MCG/0.5 ML IM: CPT

## 2021-08-09 PROCEDURE — 0012A PR COVID VAC MODERNA 100 MCG/0.5 ML IM: CPT

## 2021-08-09 PROCEDURE — 99207 PR NO CHARGE NURSE ONLY: CPT

## 2021-08-09 RX ORDER — LISINOPRIL 20 MG/1
TABLET ORAL
Qty: 90 TABLET | Refills: 0 | Status: SHIPPED | OUTPATIENT
Start: 2021-08-09 | End: 2021-11-18

## 2021-08-09 RX ORDER — LOVASTATIN 40 MG
40 TABLET ORAL AT BEDTIME
Qty: 90 TABLET | Refills: 0 | Status: SHIPPED | OUTPATIENT
Start: 2021-08-09 | End: 2021-11-18

## 2021-08-09 NOTE — TELEPHONE ENCOUNTER
Prescription approved per [facility] Refill Protocol.  Lisinopril and lovastatin  Liz TURNER RN

## 2021-08-18 ENCOUNTER — HOSPITAL ENCOUNTER (OUTPATIENT)
Dept: PHYSICAL THERAPY | Facility: CLINIC | Age: 56
Setting detail: THERAPIES SERIES
End: 2021-08-18
Attending: PHYSICAL MEDICINE & REHABILITATION
Payer: MEDICARE

## 2021-08-18 DIAGNOSIS — R53.81 POST VIRAL DEBILITY: ICD-10-CM

## 2021-08-18 DIAGNOSIS — Z86.16 HISTORY OF 2019 NOVEL CORONAVIRUS DISEASE (COVID-19): ICD-10-CM

## 2021-08-18 DIAGNOSIS — B94.8 POST VIRAL DEBILITY: ICD-10-CM

## 2021-08-18 PROCEDURE — 97161 PT EVAL LOW COMPLEX 20 MIN: CPT | Mod: GP | Performed by: PHYSICAL THERAPIST
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
## 2021-10-24 ENCOUNTER — HEALTH MAINTENANCE LETTER (OUTPATIENT)
Age: 56
End: 2021-10-24

## 2021-10-29 ENCOUNTER — TRANSFERRED RECORDS (OUTPATIENT)
Dept: HEALTH INFORMATION MANAGEMENT | Facility: CLINIC | Age: 56
End: 2021-10-29
Payer: MEDICARE

## 2021-11-10 DIAGNOSIS — J30.1 SEASONAL ALLERGIC RHINITIS DUE TO POLLEN: ICD-10-CM

## 2021-11-11 RX ORDER — AZELASTINE 1 MG/ML
SPRAY, METERED NASAL
Qty: 30 ML | Refills: 2 | Status: SHIPPED | OUTPATIENT
Start: 2021-11-11 | End: 2022-04-12

## 2021-11-17 DIAGNOSIS — E78.5 HYPERLIPIDEMIA LDL GOAL <130: ICD-10-CM

## 2021-11-17 DIAGNOSIS — I10 ESSENTIAL HYPERTENSION, BENIGN: ICD-10-CM

## 2021-11-18 RX ORDER — LISINOPRIL 20 MG/1
TABLET ORAL
Qty: 90 TABLET | Refills: 0 | Status: SHIPPED | OUTPATIENT
Start: 2021-11-18 | End: 2022-02-16

## 2021-11-18 RX ORDER — LOVASTATIN 40 MG
40 TABLET ORAL AT BEDTIME
Qty: 90 TABLET | Refills: 0 | Status: SHIPPED | OUTPATIENT
Start: 2021-11-18 | End: 2022-02-18

## 2021-11-18 NOTE — TELEPHONE ENCOUNTER
Routing refill request to provider for review/approval because:  Labs out of range:  CKT done 4/17/2021 528  Labs not current:    LDL Cholesterol Calculated   Date Value Ref Range Status   09/14/2020 130 (H) <100 mg/dL Final     Comment:     Above desirable:  100-129 mg/dl  Borderline High:  130-159 mg/dL  High:             160-189 mg/dL  Very high:       >189 mg/dl       Next 5 appointments (look out 90 days)      Jan 20, 2022  2:40 PM  PHYSICAL with Cara Marks MD  [facility] ([facility] ) [id number] [facility] [address] [phone]           Jaky Limon RN

## 2021-11-18 NOTE — TELEPHONE ENCOUNTER
Prescription lisinopril approved per [facility] Refill Protocol.    Jaky Limon RN

## 2021-12-09 DIAGNOSIS — B37.2 YEAST DERMATITIS: ICD-10-CM

## 2021-12-13 RX ORDER — NYSTATIN 100000 U/G
CREAM TOPICAL
Qty: 60 G | Refills: 1 | Status: SHIPPED | OUTPATIENT
Start: 2021-12-13 | End: 2024-01-03

## 2021-12-19 ENCOUNTER — HEALTH MAINTENANCE LETTER (OUTPATIENT)
Age: 56
End: 2021-12-19

## 2021-12-27 DIAGNOSIS — K21.9 GASTROESOPHAGEAL REFLUX DISEASE, UNSPECIFIED WHETHER ESOPHAGITIS PRESENT: ICD-10-CM

## 2022-01-13 DIAGNOSIS — F32.A DEPRESSION, UNSPECIFIED DEPRESSION TYPE: ICD-10-CM

## 2022-01-14 RX ORDER — SERTRALINE HYDROCHLORIDE 100 MG/1
TABLET, FILM COATED ORAL
Qty: 90 TABLET | Refills: 0 | Status: SHIPPED | OUTPATIENT
Start: 2022-01-14 | End: 2022-04-15

## 2022-01-14 NOTE — TELEPHONE ENCOUNTER
Prescription approved per [facility]ill [facility].  Vera Callejas RN

## 2022-02-16 DIAGNOSIS — I10 ESSENTIAL HYPERTENSION, BENIGN: ICD-10-CM

## 2022-02-16 DIAGNOSIS — E78.5 HYPERLIPIDEMIA LDL GOAL <130: ICD-10-CM

## 2022-02-16 RX ORDER — LISINOPRIL 20 MG/1
TABLET ORAL
Qty: 90 TABLET | Refills: 0 | Status: SHIPPED | OUTPATIENT
Start: 2022-02-16 | End: 2022-05-04

## 2022-02-16 NOTE — TELEPHONE ENCOUNTER
Prescription approved per [facility] Refill Protocol - Lisinopril  Vera Callejas RN

## 2022-02-16 NOTE — TELEPHONE ENCOUNTER
Routing refill request to provider for review/approval because:  Labs not current:  LDL needs to be done yearly.    LDL Cholesterol Calculated   Date Value Ref Range Status   09/14/2020 130 (H) <100 mg/dL Final     Comment:     Above desirable:  100-129 mg/dl  Borderline High:  130-159 mg/dL  High:             160-189 mg/dL  Very high:       >189 mg/dl       Patient does have an upcoming appointment but it is not for 2 more months.    Vera Callejas RN

## 2022-02-18 RX ORDER — LOVASTATIN 40 MG
TABLET ORAL
Qty: 90 TABLET | Refills: 0 | Status: SHIPPED | OUTPATIENT
Start: 2022-02-18 | End: 2022-06-30

## 2022-02-18 NOTE — TELEPHONE ENCOUNTER
Pt has appt 4/13/2022  The result of lipid panel:   Lab Results   Component Value Date    CHOL 217 09/14/2020     Lab Results   Component Value Date    TRIG 234 04/17/2021    TRIG 234 04/17/2021     Lab Results   Component Value Date    HDL 54 09/14/2020     Lab Results   Component Value Date     09/14/2020      unclear why only  TG are up to date. Needs labs year; limited refill provided until labs done.

## 2022-02-24 ENCOUNTER — TELEPHONE (OUTPATIENT)
Dept: FAMILY MEDICINE | Facility: CLINIC | Age: 57
End: 2022-02-24
Payer: MEDICARE
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
## 2022-03-03 DIAGNOSIS — G40.909 SEIZURE DISORDER (H): ICD-10-CM

## 2022-03-03 DIAGNOSIS — K21.9 GASTROESOPHAGEAL REFLUX DISEASE, UNSPECIFIED WHETHER ESOPHAGITIS PRESENT: ICD-10-CM

## 2022-03-03 RX ORDER — CARBAMAZEPINE 200 MG/1
TABLET ORAL
Qty: 150 TABLET | Refills: 6 | Status: SHIPPED | OUTPATIENT
Start: 2022-03-03 | End: 2022-12-26

## 2022-04-10 ENCOUNTER — HEALTH MAINTENANCE LETTER (OUTPATIENT)
Age: 57
End: 2022-04-10

## 2022-04-11 DIAGNOSIS — J30.1 SEASONAL ALLERGIC RHINITIS DUE TO POLLEN: ICD-10-CM

## 2022-04-12 RX ORDER — AZELASTINE 1 MG/ML
SPRAY, METERED NASAL
Qty: 30 ML | Refills: 2 | Status: SHIPPED | OUTPATIENT
Start: 2022-04-12 | End: 2022-07-28

## 2022-04-15 DIAGNOSIS — F32.A DEPRESSION, UNSPECIFIED DEPRESSION TYPE: ICD-10-CM

## 2022-04-15 RX ORDER — SERTRALINE HYDROCHLORIDE 100 MG/1
TABLET, FILM COATED ORAL
Qty: 90 TABLET | Refills: 0 | Status: SHIPPED | OUTPATIENT
Start: 2022-04-15 | End: 2022-07-21

## 2022-04-15 NOTE — TELEPHONE ENCOUNTER
Patient has upcoming appointment in July. Prescription approved per [facility] Refill Protocol.    Augustine MALLORY RN

## 2022-05-03 DIAGNOSIS — K21.9 GASTROESOPHAGEAL REFLUX DISEASE, UNSPECIFIED WHETHER ESOPHAGITIS PRESENT: ICD-10-CM

## 2022-05-03 DIAGNOSIS — I10 ESSENTIAL HYPERTENSION, BENIGN: ICD-10-CM

## 2022-05-04 RX ORDER — LISINOPRIL 20 MG/1
TABLET ORAL
Qty: 90 TABLET | Refills: 0 | Status: SHIPPED | OUTPATIENT
Start: 2022-05-04 | End: 2022-06-30

## 2022-05-04 NOTE — TELEPHONE ENCOUNTER
Prescription approved per [facility] Refill Protocol.    Riya Yarbrough RN

## 2022-05-05 ENCOUNTER — HOSPITAL ENCOUNTER (INPATIENT)
Facility: CLINIC | Age: 57
LOS: 5 days | Discharge: HOME | DRG: 389 | End: 2022-05-10
Attending: EMERGENCY MEDICINE | Admitting: HOSPITALIST
Payer: MEDICARE

## 2022-05-05 ENCOUNTER — APPOINTMENT (OUTPATIENT)
Dept: CT IMAGING | Facility: CLINIC | Age: 57
DRG: 389 | End: 2022-05-05
Attending: EMERGENCY MEDICINE
Payer: MEDICARE

## 2022-05-05 DIAGNOSIS — L98.9 SKIN LESION: ICD-10-CM

## 2022-05-05 DIAGNOSIS — B37.2 CANDIDIASIS OF SKIN: ICD-10-CM

## 2022-05-05 DIAGNOSIS — K56.609 SMALL BOWEL OBSTRUCTION (H): ICD-10-CM

## 2022-05-05 LAB
ALBUMIN SERPL-MCNC: 3.3 G/DL (ref 3.4–5)
ALBUMIN UR-MCNC: NEGATIVE MG/DL
ALP SERPL-CCNC: 107 U/L (ref 40–150)
ALT SERPL W P-5'-P-CCNC: 27 U/L (ref 0–50)
ANION GAP SERPL CALCULATED.3IONS-SCNC: 3 MMOL/L (ref 3–14)
APPEARANCE UR: CLEAR
AST SERPL W P-5'-P-CCNC: 18 U/L (ref 0–45)
BASOPHILS # BLD AUTO: 0 10E3/UL (ref 0–0.2)
BASOPHILS NFR BLD AUTO: 1 %
BILIRUB SERPL-MCNC: 0.2 MG/DL (ref 0.2–1.3)
BILIRUB UR QL STRIP: NEGATIVE
BUN SERPL-MCNC: 18 MG/DL (ref 7–30)
CALCIUM SERPL-MCNC: 8.6 MG/DL (ref 8.5–10.1)
CHLORIDE BLD-SCNC: 106 MMOL/L (ref 94–109)
CO2 SERPL-SCNC: 29 MMOL/L (ref 20–32)
COLOR UR AUTO: YELLOW
CREAT SERPL-MCNC: 0.57 MG/DL (ref 0.52–1.04)
EOSINOPHIL # BLD AUTO: 0.3 10E3/UL (ref 0–0.7)
EOSINOPHIL NFR BLD AUTO: 3 %
ERYTHROCYTE [DISTWIDTH] IN BLOOD BY AUTOMATED COUNT: 12.9 % (ref 10–15)
GFR SERPL CREATININE-BSD FRML MDRD: >90 ML/MIN/1.73M2
GLUCOSE BLD-MCNC: 122 MG/DL (ref 70–99)
GLUCOSE UR STRIP-MCNC: NEGATIVE MG/DL
HCG SERPL QL: NEGATIVE
HCT VFR BLD AUTO: 45.5 % (ref 35–47)
HGB BLD-MCNC: 14 G/DL (ref 11.7–15.7)
HGB UR QL STRIP: NEGATIVE
IMM GRANULOCYTES # BLD: 0 10E3/UL
IMM GRANULOCYTES NFR BLD: 0 %
KETONES UR STRIP-MCNC: NEGATIVE MG/DL
LEUKOCYTE ESTERASE UR QL STRIP: NEGATIVE
LIPASE SERPL-CCNC: 94 U/L (ref 73–393)
LYMPHOCYTES # BLD AUTO: 1.7 10E3/UL (ref 0.8–5.3)
LYMPHOCYTES NFR BLD AUTO: 19 %
MCH RBC QN AUTO: 29.4 PG (ref 26.5–33)
MCHC RBC AUTO-ENTMCNC: 30.8 G/DL (ref 31.5–36.5)
MCV RBC AUTO: 95 FL (ref 78–100)
MONOCYTES # BLD AUTO: 0.7 10E3/UL (ref 0–1.3)
MONOCYTES NFR BLD AUTO: 8 %
MUCOUS THREADS #/AREA URNS LPF: PRESENT /LPF
NEUTROPHILS # BLD AUTO: 5.9 10E3/UL (ref 1.6–8.3)
NEUTROPHILS NFR BLD AUTO: 69 %
NITRATE UR QL: NEGATIVE
NRBC # BLD AUTO: 0 10E3/UL
NRBC BLD AUTO-RTO: 0 /100
PH UR STRIP: 6 [PH] (ref 5–7)
PLATELET # BLD AUTO: 228 10E3/UL (ref 150–450)
POTASSIUM BLD-SCNC: 4.2 MMOL/L (ref 3.4–5.3)
PROT SERPL-MCNC: 7.1 G/DL (ref 6.8–8.8)
RBC # BLD AUTO: 4.77 10E6/UL (ref 3.8–5.2)
RBC URINE: 2 /HPF
SODIUM SERPL-SCNC: 138 MMOL/L (ref 133–144)
SP GR UR STRIP: 1.03 (ref 1–1.03)
SQUAMOUS EPITHELIAL: <1 /HPF
UROBILINOGEN UR STRIP-MCNC: NORMAL MG/DL
WBC # BLD AUTO: 8.6 10E3/UL (ref 4–11)
WBC URINE: 1 /HPF

## 2022-05-05 PROCEDURE — 99223 1ST HOSP IP/OBS HIGH 75: CPT | Mod: AI | Performed by: HOSPITALIST

## 2022-05-05 PROCEDURE — 250N000013 HC RX MED GY IP 250 OP 250 PS 637: Performed by: EMERGENCY MEDICINE

## 2022-05-05 PROCEDURE — G1004 CDSM NDSC: HCPCS

## 2022-05-05 PROCEDURE — C9803 HOPD COVID-19 SPEC COLLECT: HCPCS

## 2022-05-05 PROCEDURE — 96361 HYDRATE IV INFUSION ADD-ON: CPT

## 2022-05-05 PROCEDURE — 96374 THER/PROPH/DIAG INJ IV PUSH: CPT

## 2022-05-05 PROCEDURE — 83690 ASSAY OF LIPASE: CPT | Performed by: EMERGENCY MEDICINE

## 2022-05-05 PROCEDURE — 84703 CHORIONIC GONADOTROPIN ASSAY: CPT | Performed by: EMERGENCY MEDICINE

## 2022-05-05 PROCEDURE — U0005 INFEC AGEN DETEC AMPLI PROBE: HCPCS | Performed by: EMERGENCY MEDICINE

## 2022-05-05 PROCEDURE — 250N000011 HC RX IP 250 OP 636: Performed by: EMERGENCY MEDICINE

## 2022-05-05 PROCEDURE — 258N000003 HC RX IP 258 OP 636: Performed by: EMERGENCY MEDICINE

## 2022-05-05 PROCEDURE — 120N000001 HC R&B MED SURG/OB

## 2022-05-05 PROCEDURE — 85025 COMPLETE CBC W/AUTO DIFF WBC: CPT | Performed by: EMERGENCY MEDICINE

## 2022-05-05 PROCEDURE — 36415 COLL VENOUS BLD VENIPUNCTURE: CPT | Performed by: EMERGENCY MEDICINE

## 2022-05-05 PROCEDURE — 99285 EMERGENCY DEPT VISIT HI MDM: CPT | Mod: 25

## 2022-05-05 PROCEDURE — 51701 INSERT BLADDER CATHETER: CPT

## 2022-05-05 PROCEDURE — 81001 URINALYSIS AUTO W/SCOPE: CPT | Performed by: EMERGENCY MEDICINE

## 2022-05-05 PROCEDURE — 80053 COMPREHEN METABOLIC PANEL: CPT | Performed by: EMERGENCY MEDICINE

## 2022-05-05 RX ORDER — NYSTATIN 100000 U/G
OINTMENT TOPICAL ONCE
Status: COMPLETED | OUTPATIENT
Start: 2022-05-05 | End: 2022-05-05

## 2022-05-05 RX ORDER — HYDROMORPHONE HYDROCHLORIDE 1 MG/ML
0.5 INJECTION, SOLUTION INTRAMUSCULAR; INTRAVENOUS; SUBCUTANEOUS
Status: COMPLETED | OUTPATIENT
Start: 2022-05-05 | End: 2022-05-06

## 2022-05-05 RX ADMIN — HYDROMORPHONE HYDROCHLORIDE 0.5 MG: 1 INJECTION, SOLUTION INTRAMUSCULAR; INTRAVENOUS; SUBCUTANEOUS at 23:16

## 2022-05-05 RX ADMIN — SODIUM CHLORIDE 1000 ML: 9 INJECTION, SOLUTION INTRAVENOUS at 22:47

## 2022-05-05 RX ADMIN — NYSTATIN: 100000 OINTMENT TOPICAL at 18:35

## 2022-05-05 ASSESSMENT — ENCOUNTER SYMPTOMS
DYSURIA: 0
NAUSEA: 1
SHORTNESS OF BREATH: 0
VOMITING: 0
DIARRHEA: 1
FEVER: 0

## 2022-05-05 ASSESSMENT — ACTIVITIES OF DAILY LIVING (ADL): ADLS_ACUITY_SCORE: 14

## 2022-05-05 NOTE — ED NOTES
Bed: ED11  Expected date: 5/5/22  Expected time: 5:34 PM  Means of arrival: Ambulance  Comments:  BV 2 AB pain

## 2022-05-05 NOTE — ED TRIAGE NOTES
Pt presents via EMS from home with c/o abdominal pain since this morning. Pt reports 3 episodes of diarrhea, nausea, no vomiting.  Noticed bleeding over previous colostomy site. Denies urinary symptoms.  ABCs intact A&Ox4     Triage Assessment     Row Name 05/05/22 1755       Triage Assessment (Adult)    Airway WDL WDL       Respiratory WDL    Respiratory WDL WDL       Niurka Coma Scale    Best Eye Response 4-->(E4) spontaneous    Best Motor Response 6-->(M6) obeys commands    Best Verbal Response 5-->(V5) oriented    Niurka Coma Scale Score 15
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
## 2022-05-06 ENCOUNTER — APPOINTMENT (OUTPATIENT)
Dept: GENERAL RADIOLOGY | Facility: CLINIC | Age: 57
DRG: 389 | End: 2022-05-06
Attending: HOSPITALIST
Payer: MEDICARE

## 2022-05-06 ENCOUNTER — APPOINTMENT (OUTPATIENT)
Dept: GENERAL RADIOLOGY | Facility: CLINIC | Age: 57
DRG: 389 | End: 2022-05-06
Attending: SURGERY
Payer: MEDICARE

## 2022-05-06 LAB — SARS-COV-2 RNA RESP QL NAA+PROBE: NEGATIVE

## 2022-05-06 PROCEDURE — 258N000003 HC RX IP 258 OP 636: Performed by: HOSPITALIST

## 2022-05-06 PROCEDURE — 120N000001 HC R&B MED SURG/OB

## 2022-05-06 PROCEDURE — 96375 TX/PRO/DX INJ NEW DRUG ADDON: CPT

## 2022-05-06 PROCEDURE — 99222 1ST HOSP IP/OBS MODERATE 55: CPT | Performed by: SURGERY

## 2022-05-06 PROCEDURE — 74019 RADEX ABDOMEN 2 VIEWS: CPT

## 2022-05-06 PROCEDURE — 99232 SBSQ HOSP IP/OBS MODERATE 35: CPT | Performed by: INTERNAL MEDICINE

## 2022-05-06 PROCEDURE — 250N000011 HC RX IP 250 OP 636: Performed by: EMERGENCY MEDICINE

## 2022-05-06 PROCEDURE — 250N000009 HC RX 250: Performed by: HOSPITALIST

## 2022-05-06 PROCEDURE — C9113 INJ PANTOPRAZOLE SODIUM, VIA: HCPCS | Performed by: HOSPITALIST

## 2022-05-06 PROCEDURE — 96376 TX/PRO/DX INJ SAME DRUG ADON: CPT

## 2022-05-06 PROCEDURE — 74018 RADEX ABDOMEN 1 VIEW: CPT

## 2022-05-06 PROCEDURE — 96361 HYDRATE IV INFUSION ADD-ON: CPT

## 2022-05-06 PROCEDURE — 250N000013 HC RX MED GY IP 250 OP 250 PS 637: Performed by: HOSPITALIST

## 2022-05-06 PROCEDURE — 250N000011 HC RX IP 250 OP 636: Performed by: HOSPITALIST

## 2022-05-06 RX ORDER — ENALAPRILAT 1.25 MG/ML
1.25 INJECTION INTRAVENOUS EVERY 6 HOURS
Status: DISCONTINUED | OUTPATIENT
Start: 2022-05-06 | End: 2022-05-10 | Stop reason: HOSPADM

## 2022-05-06 RX ORDER — NYSTATIN 100000 U/G
OINTMENT TOPICAL 2 TIMES DAILY
Status: DISCONTINUED | OUTPATIENT
Start: 2022-05-06 | End: 2022-05-10 | Stop reason: HOSPADM

## 2022-05-06 RX ORDER — CARBAMAZEPINE 200 MG/1
200 TABLET ORAL
Status: DISCONTINUED | OUTPATIENT
Start: 2022-05-07 | End: 2022-05-10 | Stop reason: HOSPADM

## 2022-05-06 RX ORDER — DIPHENHYDRAMINE HYDROCHLORIDE 50 MG/ML
50 INJECTION INTRAMUSCULAR; INTRAVENOUS
Status: DISCONTINUED | OUTPATIENT
Start: 2022-05-06 | End: 2022-05-10 | Stop reason: HOSPADM

## 2022-05-06 RX ORDER — AZELASTINE 1 MG/ML
1 SPRAY, METERED NASAL 2 TIMES DAILY
Status: DISCONTINUED | OUTPATIENT
Start: 2022-05-06 | End: 2022-05-10 | Stop reason: HOSPADM

## 2022-05-06 RX ORDER — SODIUM CHLORIDE, SODIUM LACTATE, POTASSIUM CHLORIDE, CALCIUM CHLORIDE 600; 310; 30; 20 MG/100ML; MG/100ML; MG/100ML; MG/100ML
INJECTION, SOLUTION INTRAVENOUS CONTINUOUS
Status: DISCONTINUED | OUTPATIENT
Start: 2022-05-06 | End: 2022-05-07

## 2022-05-06 RX ORDER — SERTRALINE HYDROCHLORIDE 100 MG/1
100 TABLET, FILM COATED ORAL DAILY
Status: DISCONTINUED | OUTPATIENT
Start: 2022-05-06 | End: 2022-05-10 | Stop reason: HOSPADM

## 2022-05-06 RX ORDER — LIDOCAINE 40 MG/G
CREAM TOPICAL
Status: DISCONTINUED | OUTPATIENT
Start: 2022-05-06 | End: 2022-05-10 | Stop reason: HOSPADM

## 2022-05-06 RX ORDER — HYDROMORPHONE HCL IN WATER/PF 6 MG/30 ML
0.2 PATIENT CONTROLLED ANALGESIA SYRINGE INTRAVENOUS
Status: DISCONTINUED | OUTPATIENT
Start: 2022-05-06 | End: 2022-05-07

## 2022-05-06 RX ORDER — ONDANSETRON 4 MG/1
4 TABLET, ORALLY DISINTEGRATING ORAL EVERY 6 HOURS PRN
Status: DISCONTINUED | OUTPATIENT
Start: 2022-05-06 | End: 2022-05-10 | Stop reason: HOSPADM

## 2022-05-06 RX ORDER — ACETAMINOPHEN 650 MG/1
650 SUPPOSITORY RECTAL EVERY 4 HOURS PRN
Status: DISCONTINUED | OUTPATIENT
Start: 2022-05-06 | End: 2022-05-10 | Stop reason: HOSPADM

## 2022-05-06 RX ORDER — FLUTICASONE PROPIONATE 50 MCG
1 SPRAY, SUSPENSION (ML) NASAL DAILY PRN
Status: DISCONTINUED | OUTPATIENT
Start: 2022-05-06 | End: 2022-05-10 | Stop reason: HOSPADM

## 2022-05-06 RX ORDER — ONDANSETRON 2 MG/ML
4 INJECTION INTRAMUSCULAR; INTRAVENOUS EVERY 6 HOURS PRN
Status: DISCONTINUED | OUTPATIENT
Start: 2022-05-06 | End: 2022-05-10 | Stop reason: HOSPADM

## 2022-05-06 RX ORDER — DIPHENHYDRAMINE HCL 25 MG
50 CAPSULE ORAL
Status: DISCONTINUED | OUTPATIENT
Start: 2022-05-06 | End: 2022-05-10 | Stop reason: HOSPADM

## 2022-05-06 RX ORDER — ACETAMINOPHEN 325 MG/1
650 TABLET ORAL EVERY 4 HOURS PRN
Status: DISCONTINUED | OUTPATIENT
Start: 2022-05-06 | End: 2022-05-10 | Stop reason: HOSPADM

## 2022-05-06 RX ORDER — CARBAMAZEPINE 200 MG/1
400 TABLET ORAL 2 TIMES DAILY
Status: DISCONTINUED | OUTPATIENT
Start: 2022-05-06 | End: 2022-05-10 | Stop reason: HOSPADM

## 2022-05-06 RX ORDER — LANOLIN ALCOHOL/MO/W.PET/CERES
3 CREAM (GRAM) TOPICAL
Status: DISCONTINUED | OUTPATIENT
Start: 2022-05-06 | End: 2022-05-10 | Stop reason: HOSPADM

## 2022-05-06 RX ADMIN — AZELASTINE 1 SPRAY: 1 SPRAY, METERED NASAL at 20:55

## 2022-05-06 RX ADMIN — HYDROMORPHONE HYDROCHLORIDE 0.5 MG: 1 INJECTION, SOLUTION INTRAMUSCULAR; INTRAVENOUS; SUBCUTANEOUS at 01:29

## 2022-05-06 RX ADMIN — FLUTICASONE PROPIONATE 1 SPRAY: 50 SPRAY, METERED NASAL at 11:54

## 2022-05-06 RX ADMIN — ENALAPRILAT 1.25 MG: 1.25 INJECTION INTRAVENOUS at 09:35

## 2022-05-06 RX ADMIN — HYDROMORPHONE HYDROCHLORIDE 0.2 MG: 0.2 INJECTION, SOLUTION INTRAMUSCULAR; INTRAVENOUS; SUBCUTANEOUS at 22:53

## 2022-05-06 RX ADMIN — CARBAMAZEPINE 400 MG: 200 TABLET ORAL at 21:01

## 2022-05-06 RX ADMIN — HYDROMORPHONE HYDROCHLORIDE 0.2 MG: 0.2 INJECTION, SOLUTION INTRAMUSCULAR; INTRAVENOUS; SUBCUTANEOUS at 18:02

## 2022-05-06 RX ADMIN — AZELASTINE 1 SPRAY: 1 SPRAY, METERED NASAL at 11:53

## 2022-05-06 RX ADMIN — PANTOPRAZOLE SODIUM 40 MG: 40 INJECTION, POWDER, FOR SOLUTION INTRAVENOUS at 09:35

## 2022-05-06 RX ADMIN — CARBAMAZEPINE 400 MG: 200 TABLET ORAL at 13:47

## 2022-05-06 RX ADMIN — SERTRALINE 100 MG: 100 TABLET, FILM COATED ORAL at 13:48

## 2022-05-06 RX ADMIN — NYSTATIN: 100000 OINTMENT TOPICAL at 20:55

## 2022-05-06 RX ADMIN — SODIUM CHLORIDE, POTASSIUM CHLORIDE, SODIUM LACTATE AND CALCIUM CHLORIDE: 600; 310; 30; 20 INJECTION, SOLUTION INTRAVENOUS at 08:38

## 2022-05-06 RX ADMIN — HYDROMORPHONE HYDROCHLORIDE 0.2 MG: 0.2 INJECTION, SOLUTION INTRAMUSCULAR; INTRAVENOUS; SUBCUTANEOUS at 14:49

## 2022-05-06 RX ADMIN — HYDROMORPHONE HYDROCHLORIDE 0.5 MG: 1 INJECTION, SOLUTION INTRAMUSCULAR; INTRAVENOUS; SUBCUTANEOUS at 06:33

## 2022-05-06 RX ADMIN — ENALAPRILAT 1.25 MG: 1.25 INJECTION INTRAVENOUS at 21:54

## 2022-05-06 RX ADMIN — ENALAPRILAT 1.25 MG: 1.25 INJECTION INTRAVENOUS at 16:37

## 2022-05-06 RX ADMIN — DIATRIZOATE MEGLUMINE AND DIATRIZOATE SODIUM 1 ML: 660; 100 SOLUTION ORAL; RECTAL at 11:56

## 2022-05-06 ASSESSMENT — ACTIVITIES OF DAILY LIVING (ADL)
ADLS_ACUITY_SCORE: 17
TRANSFERRING: 1-->ASSISTANCE (EQUIPMENT/PERSON) NEEDED
ADLS_ACUITY_SCORE: 15
TRANSFERRING: 0-->ASSISTANCE NEEDED (DEVELOPMETNALLY APPROPRIATE)
DEPENDENT_IADLS:: TRANSPORTATION
ADLS_ACUITY_SCORE: 14
ADLS_ACUITY_SCORE: 14
DOING_ERRANDS_INDEPENDENTLY_DIFFICULTY: YES
ADLS_ACUITY_SCORE: 16
ADLS_ACUITY_SCORE: 15
ADLS_ACUITY_SCORE: 16
DRESSING/BATHING_DIFFICULTY: NO
ADLS_ACUITY_SCORE: 17
ADLS_ACUITY_SCORE: 14
WALKING_OR_CLIMBING_STAIRS: STAIR CLIMBING DIFFICULTY, REQUIRES EQUIPMENT
ADLS_ACUITY_SCORE: 15
ADLS_ACUITY_SCORE: 14
CONCENTRATING,_REMEMBERING_OR_MAKING_DECISIONS_DIFFICULTY: YES
ADLS_ACUITY_SCORE: 14
ADLS_ACUITY_SCORE: 14
WEAR_GLASSES_OR_BLIND: YES
ADLS_ACUITY_SCORE: 11
ADLS_ACUITY_SCORE: 14
ADLS_ACUITY_SCORE: 14
DIFFICULTY_EATING/SWALLOWING: NO
ADLS_ACUITY_SCORE: 16
ADLS_ACUITY_SCORE: 16
WALKING_OR_CLIMBING_STAIRS_DIFFICULTY: YES
ADLS_ACUITY_SCORE: 16
CHANGE_IN_FUNCTIONAL_STATUS_SINCE_ONSET_OF_CURRENT_ILLNESS/INJURY: YES
EQUIPMENT_CURRENTLY_USED_AT_HOME: WALKER, STANDARD
ADLS_ACUITY_SCORE: 14
ADLS_ACUITY_SCORE: 16
ADLS_ACUITY_SCORE: 15
TOILETING_ISSUES: NO
ADLS_ACUITY_SCORE: 14
FALL_HISTORY_WITHIN_LAST_SIX_MONTHS: NO
VISION_MANAGEMENT: GLASSES
ADLS_ACUITY_SCORE: 14

## 2022-05-06 NOTE — PLAN OF CARE
Patient had gastrografin in ED at 1200. Xray scheduled for 2000.   NPO  IV dilaudid given x1 for abdominal pain/back pain.

## 2022-05-06 NOTE — ED NOTES
[facility]  ED Nurse Handoff Report    Brissa Barlow is a 56 year old female   ED Chief complaint: Abdominal Pain  . ED Diagnosis:   Final diagnoses:   Small bowel obstruction (H)   Candidiasis of skin   Skin lesion     Allergies:   Allergies   Allergen Reactions     Aspirin Unknown     Iodine      Iodinated Contrast Media  --- Hives       Penicillins Unknown     Tetracyclines Unknown     Hydrochlorothiazide Palpitations     dehydration     Influenza Vac Split [Flu Virus Vaccine] Rash     Patient states she is allergic to the vaccine.  Got a rash all over body many years ago after receiving injection.       Code Status: Full Code  Activity level - Baseline/Home:  Ind with walker. Activity Level - Current:   Unable to Assess. Lift room needed: Yes. Bariatric: No   Needed: No   Isolation: No. Infection: Not Applicable.     Vital Signs:   Vitals:    05/05/22 2300 05/05/22 2315 05/05/22 2330 05/05/22 2345   BP: 127/69 136/67 116/65 (!) 140/83   Pulse: 98  96 97   Resp:       Temp:       TempSrc:       SpO2: 96% 94% 91% 94%       Cardiac Rhythm:  ,      Pain level:    Patient confused: No. Patient Falls Risk: Yes.   Elimination Status: Has voided   Patient Report - Initial Complaint: Pt presents via EMS from home with c/o abdominal pain since this morning. Pt reports 3 episodes of diarrhea, nausea, no vomiting.  Noticed bleeding over previous colostomy site. Denies urinary symptoms.  ABCs intact A&Ox4     Triage Assessment     Row Name 05/05/22 175       Triage Assessment (Adult)    Airway WDL WDL       Respiratory WDL    Respiratory WDL WDL       Niurka Coma Scale    Best Eye Response 4-->(E4) spontaneous    Best Motor Response 6-->(M6) obeys commands    Best Verbal Response 5-->(V5) oriented    Niurka Coma Scale Score 15            . Focused Assessment:   18:14 Skin Skin WDL - Skin WDL: .WDL except  Skin Comments: LLQ skin fold is bright red and warm to touch, pt notes this was a previous  colostomy and had some bleeding today  JN     18:13 Gastrointestinal Gastrointestinal - Gastrointestinal WDL: .WDL except; GI symptoms  GI Signs/Symptoms: abdominal discomfort; diarrhea; nausea (pt reports LLQ pain since this morning)        Tests Performed: Labs, CT. Abnormal Results:   Labs Ordered and Resulted from Time of ED Arrival to Time of ED Departure   COMPREHENSIVE METABOLIC PANEL - Abnormal       Result Value    Sodium 138      Potassium 4.2      Chloride 106      Carbon Dioxide (CO2) 29      Anion Gap 3      Urea Nitrogen 18      Creatinine 0.57      Calcium 8.6      Glucose 122 (*)     Alkaline Phosphatase 107      AST 18      ALT 27      Protein Total 7.1      Albumin 3.3 (*)     Bilirubin Total 0.2      GFR Estimate >90     ROUTINE UA WITH MICROSCOPIC REFLEX TO CULTURE - Abnormal    Color Urine Yellow      Appearance Urine Clear      Glucose Urine Negative      Bilirubin Urine Negative      Ketones Urine Negative      Specific Gravity Urine 1.031      Blood Urine Negative      pH Urine 6.0      Protein Albumin Urine Negative      Urobilinogen Urine Normal      Nitrite Urine Negative      Leukocyte Esterase Urine Negative      Mucus Urine Present (*)     RBC Urine 2      WBC Urine 1      Squamous Epithelials Urine <1     CBC WITH PLATELETS AND DIFFERENTIAL - Abnormal    WBC Count 8.6      RBC Count 4.77      Hemoglobin 14.0      Hematocrit 45.5      MCV 95      MCH 29.4      MCHC 30.8 (*)     RDW 12.9      Platelet Count 228      % Neutrophils 69      % Lymphocytes 19      % Monocytes 8      % Eosinophils 3      % Basophils 1      % Immature Granulocytes 0      NRBCs per 100 WBC 0      Absolute Neutrophils 5.9      Absolute Lymphocytes 1.7      Absolute Monocytes 0.7      Absolute Eosinophils 0.3      Absolute Basophils 0.0      Absolute Immature Granulocytes 0.0      Absolute NRBCs 0.0     LIPASE - Normal    Lipase 94     HCG QUALITATIVE PREGNANCY - Normal    hCG Serum Qualitative Negative      COVID-19 VIRUS (CORONAVIRUS) BY PCR     CT Abdomen Pelvis w/o Contrast   Final Result   IMPRESSION:    1.  Mid small bowel obstruction.           .   Treatments provided: See MAR  Family Comments: MomLola, Guardian updated by MD  OBS brochure/video discussed/provided to patient:  N/A  ED Medications:   Medications   HYDROmorphone (PF) (DILAUDID) injection 0.5 mg (0.5 mg Intravenous Given 5/5/22 7925)   nystatin (MYCOSTATIN) ointment ( Topical Given 5/5/22 7424)   0.9% sodium chloride BOLUS (1,000 mLs Intravenous New Bag 5/5/22 4619)     Drips infusing:  No  For the majority of the shift, the patient's behavior Green.     Sepsis treatment initiated: No     Patient tested for COVID 19 prior to admission: YES    ED Nurse Name/Phone Number: Ally Polk RN,   11:59 PM    RECEIVING UNIT ED HANDOFF REVIEW    Above ED Nurse Handoff Report was reviewed: Yes  Reviewed by: Dulce Martin RN on May 6, 2022 at 12:54 PM

## 2022-05-06 NOTE — PHARMACY-ADMISSION MEDICATION HISTORY
Admission medication history interview status for this patient is complete. See [facility] admission navigator for allergy information, prior to admission medications and immunization status.     Medication history interview done, indicate source(s): Patient  Medication history resources (including written lists, pill bottles, clinic record): Reggie  Pharmacy: [facility] for discharge meds    Changes made to PTA medication list:  Added:   Changed: Hydrocortisone cream to prn  Reported as Not Taking:   Removed: Lisinopril 10 mg    Actions taken by pharmacist (provider contacted, etc):None     Additional medication history information:None    Medication reconciliation/reorder completed by provider prior to medication history?  yes    Prior to Admission medications    Medication Sig Last Dose Taking? Auth Provider   azelastine (ASTELIN) 0.1 % nasal spray INHALE ONE SPRAY IN EACH NOSTRIL TWICE A DAY 5/5/2022 at Unknown time Yes Cara Marks MD   carBAMazepine (TEGRETOL) 200 MG tablet TAKE TWO TABLETS IN THE MORNING AND ONE TABLET AT NOON (WITH LUNCH) AND TWO TABLETS AT NIGHT 5/5/2022 at am Yes Cara Marks MD   fluticasone (FLONASE) 50 MCG/ACT nasal spray Spray 1 spray into both nostrils daily as needed for rhinitis or allergies Past Month at Unknown time Yes Unknown, Entered By History   hydrocortisone 2.5 % cream Apply topically 2 times daily  Patient taking differently: Apply topically 2 times daily as needed Unknown at Unknown time Yes Cara Marks MD   lisinopril (ZESTRIL) 20 MG tablet TAKE ONE TABLET BY MOUTH ONCE DAILY 5/5/2022 at Unknown time Yes Cara Marks MD   Multiple Vitamin (MULTI-VITAMIN PO) Take 1 tablet by mouth daily 5/5/2022 at Unknown time Yes Reported, Patient   nystatin (MYCOSTATIN) 367237 UNIT/GM external cream APPLY TOPICALLY 2 TIMES DAILY 5/5/2022 at Unknown time Yes Cara Marks MD   omeprazole (PRILOSEC) 20 MG DR capsule TAKE ONE CAPSULE  BY MOUTH EVERY MORNING BEFORE BREAKFAST 5/5/2022 at Unknown time Yes Cara Marks MD   sertraline (ZOLOFT) 100 MG tablet TAKE ONE TABLET BY MOUTH ONCE DAILY 5/5/2022 at Unknown time Yes Cara Marks MD   lovastatin (MEVACOR) 40 MG tablet TAKE ONE TABLET BY MOUTH EVERY NIGHT AT BEDTIME 5/4/2022  Cara Marks MD
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
## 2022-05-06 NOTE — ED NOTES
Pt vomited undigested food and had an episode of urinary incontinence. Pt cleaned and linen changed assist x1.

## 2022-05-06 NOTE — PROGRESS NOTES
[facility]  Hospitalist Progress Note    Assessment & Plan   Brissa Barlow is a 56 year old female with a PMHx HLD, obesity, recurrent SBO, delayed development, hypertension, and cushing syndrome who was admitted on 5/5/2022 with abdominal pain    Recurrent small bowel obstruction: Recurrent episodes. Suspect secondary to adhesins. 5/6 Abx xray normal. Gastrograffin study ordered.   -General surgery consulted given recurrent episodes  -Continue supportive cares with IV fluids, pain medication, and antiemetics.  -No indication for NG tube at this time.  Continue to monitor.  -Gastrografin study ordered    Hypertension: PTA lisinopril held. Enalaprilat q 6 h. Monitor BP    GERD: PTA omeprazole held. IV pantoprazole     Hyperlipidemia: PTA lovastatin held    Obesity BMI: Complicates care    Developmental delay: Patient does have a guardian.  Updated on hospital admission plan.  PTA sertraline and carbamazepine ordered.     COVID-19: Guardian requested COVID-19 booster be administered.  Ordered.    FEN: LR 75 ml/hr (rate reduced); monitor; NPO  Activity: As tolerated  DVT Prophylaxis: Pneumatic Compression Devices  Family update: Yes, updated DadRay. - Guardian     Code Status: Full Code    Disposition: 1-2 days pending resolution of nasuea/bowel obstruction.     Text Page (7am - 6pm, M-F)    Interval History      Patient admitted with abdominal pain and nausea. Symptoms have resolved. No vomiting. No respiratory. No cardiac concerns. Good urine output. No bowel movements yet. She has started passing gas. No other concerns. Discussed with nursing.     -Data reviewed today: I reviewed all new labs and imaging results over the last 24 hours. I personally reviewed:     Physical Exam   Temp: 97.3  F (36.3  C)  BP: (!) 140/67 Pulse: 87   Resp: 18 SpO2: 95 % O2 Device: None (Room air)    There were no vitals filed for this visit.  Vital Signs with Ranges  Temp:  [97.3  F (36.3  C)] 97.3  F  (36.3  C)  Pulse:  [] 87  Resp:  [18] 18  BP: (107-150)/(38-83) 150/74  SpO2:  [91 %-98 %] 95 %  I/O last 3 completed shifts:  In: -   Out: 200 [Emesis/NG output:200]    Constitutional: Awake, alert, cooperative, no apparent distress. Non-toxic. Appears stated age. Obese.   HEENT: Atraumatic. Normocephalic. Conjunctiva non-injected. Sclera anicteric. MMM.   Respiratory: Moves air bilaterally. Clear to auscultation bilaterally, no crackles or wheezing  Cardiovascular: Regular rate and rhythm, normal S1 and S2, and no murmur noted. +JVD  GI: Obese, round, soft, NT, ND. Hyperactive BS+   Skin/Integumen: No rashes, no cyanosis, generalized non-pitting edema    Medications     lactated ringers 75 mL/hr at 05/06/22 1637       azelastine  1 spray Both Nostrils BID     [START ON 5/7/2022] carBAMazepine  200 mg Oral Daily with lunch     carBAMazepine  400 mg Oral BID     [START ON 5/7/2022] COVID-19 mRNA vacc (Moderna)  0.25 mL Intramuscular Prior to discharge     enalaprilat  1.25 mg Intravenous Q6H     nystatin   Topical BID     pantoprazole (PROTONIX) IV  40 mg Intravenous Daily with breakfast     sertraline  100 mg Oral Daily     sodium chloride (PF)  3 mL Intracatheter Q8H     Data   Recent Labs   Lab 05/05/22  1754   WBC 8.6   HGB 14.0   MCV 95         POTASSIUM 4.2   CHLORIDE 106   CO2 29   BUN 18   CR 0.57   ANIONGAP 3   CESAR 8.6   *   ALBUMIN 3.3*   PROTTOTAL 7.1   BILITOTAL 0.2   ALKPHOS 107   ALT 27   AST 18   LIPASE 94     Recent Results (from the past 24 hour(s))   CT Abdomen Pelvis w/o Contrast    Narrative    EXAM: CT ABDOMEN PELVIS W/O CONTRAST  LOCATION: [facility]  DATE/TIME: 5/5/2022 9:46 PM    INDICATION: Lower abdominal pain.  COMPARISON: 3/11/2021.  TECHNIQUE: CT scan of the abdomen and pelvis was performed without IV contrast. Multiplanar reformats were obtained. Dose reduction techniques were used.  CONTRAST: None.    FINDINGS:   LOWER CHEST:  Normal.    HEPATOBILIARY: The gallbladder is distended. No calcified gallstone.    PANCREAS: Normal.    SPLEEN: Normal.    ADRENAL GLANDS: Normal.    KIDNEYS/BLADDER: Normal.    BOWEL: There are several dilated loops of small bowel in the mid and upper abdomen. Distal small bowel and colon are decompressed. No transition point is identified. The dilated loops are similar to the previous exam. There is no free intraperitoneal gas   or fluid.    LYMPH NODES: Normal.    VASCULATURE: IVC filter.    PELVIC ORGANS: Normal.    MUSCULOSKELETAL: Postoperative changes in the symphysis pubis and left SI joint.      Impression    IMPRESSION:   1.  Mid small bowel obstruction.     XR Abdomen 2 Views    Narrative    ABDOMEN TWO VIEWS May 6, 2022 8:26 AM     HISTORY: Small bowel obstruction.    COMPARISON: 5/5/2022 persistent dilation of multiple loops of small  bowel measuring up to 4.4 cm. Some gas and stool is noted in  nondilated colon suggestive of incomplete obstruction. Findings  grossly similar to the prior CT. No gross free air.      Impression    IMPRESSION: Normal.    MECHE HALEY MD         SYSTEM ID:  [id number]
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
## 2022-05-06 NOTE — PLAN OF CARE
RN - MARTY. Pt complaining of abdominal pain - declining need for intervention. A&Ox3. Pt baseline mobility noted to be independent - pt refusing to get out of bed in ED. Remains NPO with IVF infusing. Voiding adequately via purewick. Active bowel sounds and reports passing flatus. Starting gastrograffin challenge - anticipating imaging follow up this evening. Noted groin/breast redness - awaiting arrival of nystatin application. Of note - pt mother (legal guardian) visited and pt insisted she did not visit. Mother left hospital site, however voicing concerns for pt relationship with significant other. Pt significant other not present today. Will require ongoing monitoring.

## 2022-05-07 ENCOUNTER — APPOINTMENT (OUTPATIENT)
Dept: PHYSICAL THERAPY | Facility: CLINIC | Age: 57
DRG: 389 | End: 2022-05-07
Attending: INTERNAL MEDICINE
Payer: MEDICARE

## 2022-05-07 LAB
ANION GAP SERPL CALCULATED.3IONS-SCNC: 6 MMOL/L (ref 3–14)
BUN SERPL-MCNC: 13 MG/DL (ref 7–30)
CALCIUM SERPL-MCNC: 7.9 MG/DL (ref 8.5–10.1)
CHLORIDE BLD-SCNC: 108 MMOL/L (ref 94–109)
CO2 SERPL-SCNC: 27 MMOL/L (ref 20–32)
CREAT SERPL-MCNC: 0.57 MG/DL (ref 0.52–1.04)
ERYTHROCYTE [DISTWIDTH] IN BLOOD BY AUTOMATED COUNT: 13.2 % (ref 10–15)
GFR SERPL CREATININE-BSD FRML MDRD: >90 ML/MIN/1.73M2
GLUCOSE BLD-MCNC: 102 MG/DL (ref 70–99)
HCT VFR BLD AUTO: 39.6 % (ref 35–47)
HGB BLD-MCNC: 11.9 G/DL (ref 11.7–15.7)
MCH RBC QN AUTO: 28.6 PG (ref 26.5–33)
MCHC RBC AUTO-ENTMCNC: 30.1 G/DL (ref 31.5–36.5)
MCV RBC AUTO: 95 FL (ref 78–100)
PLATELET # BLD AUTO: 177 10E3/UL (ref 150–450)
POTASSIUM BLD-SCNC: 3.7 MMOL/L (ref 3.4–5.3)
RBC # BLD AUTO: 4.16 10E6/UL (ref 3.8–5.2)
SODIUM SERPL-SCNC: 141 MMOL/L (ref 133–144)
WBC # BLD AUTO: 5.6 10E3/UL (ref 4–11)

## 2022-05-07 PROCEDURE — 250N000009 HC RX 250: Performed by: HOSPITALIST

## 2022-05-07 PROCEDURE — 250N000013 HC RX MED GY IP 250 OP 250 PS 637: Performed by: HOSPITALIST

## 2022-05-07 PROCEDURE — 85027 COMPLETE CBC AUTOMATED: CPT | Performed by: HOSPITALIST

## 2022-05-07 PROCEDURE — 36415 COLL VENOUS BLD VENIPUNCTURE: CPT | Performed by: HOSPITALIST

## 2022-05-07 PROCEDURE — 120N000001 HC R&B MED SURG/OB

## 2022-05-07 PROCEDURE — 97161 PT EVAL LOW COMPLEX 20 MIN: CPT | Mod: GP

## 2022-05-07 PROCEDURE — C9113 INJ PANTOPRAZOLE SODIUM, VIA: HCPCS | Performed by: HOSPITALIST

## 2022-05-07 PROCEDURE — 999N000127 HC STATISTIC PERIPHERAL IV START W US GUIDANCE

## 2022-05-07 PROCEDURE — 258N000003 HC RX IP 258 OP 636: Performed by: INTERNAL MEDICINE

## 2022-05-07 PROCEDURE — 99231 SBSQ HOSP IP/OBS SF/LOW 25: CPT | Performed by: PHYSICIAN ASSISTANT

## 2022-05-07 PROCEDURE — 97530 THERAPEUTIC ACTIVITIES: CPT | Mod: GP

## 2022-05-07 PROCEDURE — 99232 SBSQ HOSP IP/OBS MODERATE 35: CPT | Performed by: INTERNAL MEDICINE

## 2022-05-07 PROCEDURE — 80048 BASIC METABOLIC PNL TOTAL CA: CPT | Performed by: HOSPITALIST

## 2022-05-07 PROCEDURE — 250N000013 HC RX MED GY IP 250 OP 250 PS 637: Performed by: INTERNAL MEDICINE

## 2022-05-07 PROCEDURE — 250N000011 HC RX IP 250 OP 636: Performed by: HOSPITALIST

## 2022-05-07 RX ORDER — NALOXONE HYDROCHLORIDE 0.4 MG/ML
0.4 INJECTION, SOLUTION INTRAMUSCULAR; INTRAVENOUS; SUBCUTANEOUS
Status: DISCONTINUED | OUTPATIENT
Start: 2022-05-07 | End: 2022-05-10 | Stop reason: HOSPADM

## 2022-05-07 RX ORDER — SODIUM CHLORIDE 9 MG/ML
INJECTION, SOLUTION INTRAVENOUS CONTINUOUS
Status: DISCONTINUED | OUTPATIENT
Start: 2022-05-07 | End: 2022-05-08

## 2022-05-07 RX ORDER — NALOXONE HYDROCHLORIDE 0.4 MG/ML
0.2 INJECTION, SOLUTION INTRAMUSCULAR; INTRAVENOUS; SUBCUTANEOUS
Status: DISCONTINUED | OUTPATIENT
Start: 2022-05-07 | End: 2022-05-10 | Stop reason: HOSPADM

## 2022-05-07 RX ORDER — OXYCODONE HYDROCHLORIDE 5 MG/1
5 TABLET ORAL EVERY 4 HOURS PRN
Status: DISCONTINUED | OUTPATIENT
Start: 2022-05-07 | End: 2022-05-10 | Stop reason: HOSPADM

## 2022-05-07 RX ADMIN — ENALAPRILAT 1.25 MG: 1.25 INJECTION INTRAVENOUS at 09:17

## 2022-05-07 RX ADMIN — PANTOPRAZOLE SODIUM 40 MG: 40 INJECTION, POWDER, FOR SOLUTION INTRAVENOUS at 09:18

## 2022-05-07 RX ADMIN — ENALAPRILAT 1.25 MG: 1.25 INJECTION INTRAVENOUS at 03:44

## 2022-05-07 RX ADMIN — CARBAMAZEPINE 400 MG: 200 TABLET ORAL at 08:30

## 2022-05-07 RX ADMIN — HYDROMORPHONE HYDROCHLORIDE 0.2 MG: 0.2 INJECTION, SOLUTION INTRAMUSCULAR; INTRAVENOUS; SUBCUTANEOUS at 01:40

## 2022-05-07 RX ADMIN — ACETAMINOPHEN 650 MG: 325 TABLET ORAL at 09:17

## 2022-05-07 RX ADMIN — SERTRALINE 100 MG: 100 TABLET, FILM COATED ORAL at 08:30

## 2022-05-07 RX ADMIN — AZELASTINE 1 SPRAY: 1 SPRAY, METERED NASAL at 08:31

## 2022-05-07 RX ADMIN — ENALAPRILAT 1.25 MG: 1.25 INJECTION INTRAVENOUS at 21:02

## 2022-05-07 RX ADMIN — SODIUM CHLORIDE: 9 INJECTION, SOLUTION INTRAVENOUS at 10:38

## 2022-05-07 RX ADMIN — NYSTATIN: 100000 OINTMENT TOPICAL at 08:33

## 2022-05-07 RX ADMIN — CARBAMAZEPINE 400 MG: 200 TABLET ORAL at 19:21

## 2022-05-07 RX ADMIN — CARBAMAZEPINE 200 MG: 200 TABLET ORAL at 12:52

## 2022-05-07 RX ADMIN — NYSTATIN: 100000 OINTMENT TOPICAL at 19:19

## 2022-05-07 RX ADMIN — SODIUM CHLORIDE, POTASSIUM CHLORIDE, SODIUM LACTATE AND CALCIUM CHLORIDE: 600; 310; 30; 20 INJECTION, SOLUTION INTRAVENOUS at 06:43

## 2022-05-07 RX ADMIN — ENALAPRILAT 1.25 MG: 1.25 INJECTION INTRAVENOUS at 15:07

## 2022-05-07 RX ADMIN — AZELASTINE 1 SPRAY: 1 SPRAY, METERED NASAL at 19:19

## 2022-05-07 ASSESSMENT — ACTIVITIES OF DAILY LIVING (ADL)
ADLS_ACUITY_SCORE: 19
ADLS_ACUITY_SCORE: 23
ADLS_ACUITY_SCORE: 19
ADLS_ACUITY_SCORE: 23
ADLS_ACUITY_SCORE: 19
ADLS_ACUITY_SCORE: 19
ADLS_ACUITY_SCORE: 21
ADLS_ACUITY_SCORE: 19
ADLS_ACUITY_SCORE: 23
ADLS_ACUITY_SCORE: 23
ADLS_ACUITY_SCORE: 21
ADLS_ACUITY_SCORE: 23
ADLS_ACUITY_SCORE: 23
ADLS_ACUITY_SCORE: 19
ADLS_ACUITY_SCORE: 19
ADLS_ACUITY_SCORE: 23
ADLS_ACUITY_SCORE: 21
ADLS_ACUITY_SCORE: 23
ADLS_ACUITY_SCORE: 23
ADLS_ACUITY_SCORE: 21
ADLS_ACUITY_SCORE: 15
ADLS_ACUITY_SCORE: 15

## 2022-05-07 NOTE — PROGRESS NOTES
[facility]  Hospitalist Progress Note  Neeraj Alvarado MD 05/07/2022    Reason for Stay (Diagnosis): SBO         Assessment and Plan:      Summary of Stay: Brissa Barlow is a 56 year old female with a PMHx HLD, obesity, recurrent SBO, delayed development, hypertension, and cushing syndrome who was admitted on 5/5/2022 with abdominal pain.    The patient was found to have SBO on CT imaging.  She has been treated conservatively with bowel rest and is clinically improving.  She reports multiple stools after gastrograffin study on 5/6    Plans today:  - continue bowel rest as per surgery recommendations     Recurrent small bowel obstruction: Recurrent episodes. Suspect secondary to adhesins. 5/6 Abx xray normal. Gastrograffin study ordered.   -General surgery consulted given recurrent episodes  -Continue supportive cares with IV fluids, pain medication, and antiemetics.  -No indication for NG tube at this time.  Continue to monitor.  -Gastrografin study done on 5/6 and showed that contrast passed into colon but multiple central dilated SB loops present  - pt had multiple BM after gastrograffin study     Hypertension: PTA lisinopril held. Enalaprilat q 6 h. Monitor BP     GERD: PTA omeprazole held. IV pantoprazole      Hyperlipidemia: PTA lovastatin held     Obesity BMI: Complicates care     Developmental delay: Patient does have a guardian.  Updated on hospital admission plan.  PTA sertraline and carbamazepine ordered.      COVID-19: Guardian requested COVID-19 booster be administered.  Ordered.     FEN: LR 75 ml/hr; monitor; NPO  Activity: As tolerated  DVT Prophylaxis: Pneumatic Compression Devices     Code Status: Full Code     Disposition: 1-2 days pending resumption and tolerance of PO intake    Addendum:  I called and updated guardians (parents) Negro today          Interval History (Subjective):      abd pain improved.  Passing gas and had multiple BM overnight after  gastrograffin study yesterday                  Physical Exam:      Last Vital Signs:  /47 (BP Location: Left arm)   Pulse 82   Temp 96.9  F (36.1  C) (Temporal)   Resp 24   LMP 08/18/2008   SpO2 93%       Intake/Output Summary (Last 24 hours) at 5/7/2022 1256  Last data filed at 5/7/2022 0750  Gross per 24 hour   Intake 1829 ml   Output 1200 ml   Net 629 ml       Constitutional: Awake, alert, cooperative, no apparent distress   Respiratory: Clear to auscultation bilaterally, no crackles or wheezing   Cardiovascular: Regular rate and rhythm, normal S1 and S2, and no murmur noted   Abdomen: Normal bowel sounds, soft, non-distended, non-tender   Skin: No rashes, no cyanosis, dry to touch   Neuro: Alert and oriented x3, no weakness, numbness, memory loss   Extremities: No edema, normal range of motion   Other(s):        All other systems: Negative          Medications:      All current medications were reviewed with changes reflected in problem list.         Data:      All new lab and imaging data was reviewed.   Labs:  Recent Labs   Lab 05/07/22  0628 05/05/22  1754    138   POTASSIUM 3.7 4.2   CHLORIDE 108 106   CO2 27 29   ANIONGAP 6 3   * 122*   BUN 13 18   CR 0.57 0.57   GFRESTIMATED >90 >90   CESAR 7.9* 8.6     Recent Labs   Lab 05/07/22  0628   WBC 5.6   HGB 11.9   HCT 39.6   MCV 95         Imaging:   Recent Results (from the past 24 hour(s))   XR Gastrografin Challenge    Narrative    XR GASTROGRAFIN CHALLENGE   5/6/2022 8:32 PM     HISTORY: Gastrografin UGI Challenge - Small Bowel Obstruction. Pain.    COMPARISON: [facility] 5/6/2022.      Impression    IMPRESSION: Oral contrast material identified in the stomach and is  also suggested within the proximal colon. There are multiple central  dilated small bowel loops. As such, findings suggest a partial small  bowel obstruction.       CAYETANO SQUIRES MD         SYSTEM ID:  [id number]

## 2022-05-07 NOTE — PLAN OF CARE
Goal Outcome Evaluation:  Vital signs stable.  Npo except meds.  Bowel sounds hyperactive, had 2 liquid stools upon returning from X ray tonight.  Redness present beneath breasts , under abdominal folds and mirna areas.Complete bed bath and linen change done.Nystatin cream applied .  Denies nausea. Medicated with iv dilaudid for pain with relief.  Plan of Care Reviewed With: patient

## 2022-05-07 NOTE — PROGRESS NOTES
"   05/07/22 1339   Quick Adds   Type of Visit Initial PT Evaluation   Living Environment   People in Home significant other   Current Living Arrangements apartment   Home Accessibility no concerns   Living Environment Comments Pt lived alone with her SO in level entry APT with elevator access to fourth floor where they lived. Pt was mod I with 4ww at baseline. Pt was mod i with ADLs but assist with bathing from SO, req assist from SO for all IADLs.   Self-Care   Usual Activity Tolerance fair   Current Activity Tolerance fair   Equipment Currently Used at Home walker, rolling  (pt has both a FWW and 4ww)   Fall history within last six months no   General Information   Onset of Illness/Injury or Date of Surgery 05/05/22   Referring Physician Neeraj Alvarado MD   Patient/Family Therapy Goals Statement (PT) Return home with SO   Pertinent History of Current Problem (include personal factors and/or comorbidities that impact the POC) From H&P: Pt \"is a 56 year old female with a PMHx HLD, obesity, recurrent SBO, delayed development, hypertension, and cushing syndrome who was admitted on 5/5/2022 with abdominal pain.     The patient was found to have SBO on CT imaging.  She has been treated conservatively with bowel rest and is clinically improving.  She reports multiple stools after gastrograffin study on 5/6\"   Existing Precautions/Restrictions fall   Cognition   Cognitive Status Comments hx of developmental delay   Pain Assessment   Patient Currently in Pain No   Strength (Manual Muscle Testing)   Strength Comments Fair+ global strength, heavy BUE use with STSs, noted forward lean on charlie FWW during within room ambulation.   Bed Mobility   Comment, (Bed Mobility) Sup > sit initially with David, then able to with CGA; sit > sup with SBA   Transfers   Comment, (Transfers) STSs with CGA   Gait/Stairs (Locomotion)   Distance in Feet (Required for LE Total Joints) 30 feet   Comment, (Gait/Stairs) Pt amb ~30' with charlie " FWW and CGA.   Balance   Balance Comments fair+, heavy BUE use on FWW   Clinical Impression   Criteria for Skilled Therapeutic Intervention Yes, treatment indicated   PT Diagnosis (PT) impaired functional mobility   Influenced by the following impairments reduced strength, endurance, and standing balance   Functional limitations due to impairments impaired gait, transfers, bed mobility   Clinical Presentation (PT Evaluation Complexity) Stable/Uncomplicated   Clinical Presentation Rationale Accessible home environment (no stairs), adequate social support, stable medical condition   Clinical Decision Making (Complexity) low complexity   Planned Therapy Interventions (PT) balance training;bed mobility training;gait training;stair training;strengthening;transfer training   Risk & Benefits of therapy have been explained evaluation/treatment results reviewed;care plan/treatment goals reviewed;risks/benefits reviewed;current/potential barriers reviewed;participants voiced agreement with care plan;participants included;patient   PT Discharge Planning   PT Discharge Recommendation (DC Rec) home with assist;home;home with home care physical therapy;Leaving home requires significant assistance;Leaving home requires significant taxing effort;Leaving home is limited by medical status   PT Rationale for DC Rec Recommend home with Ax1 with  PT to address deficits in strength, transfers, gait, standing balance, and endurance.   PT Brief overview of current status ax1 with charlie FWW   Plan of Care Review   Plan of Care Reviewed With patient   Total Evaluation Time   Total Evaluation Time (Minutes) 7   Physical Therapy Goals   PT Frequency Daily   PT Goals Bed Mobility;Transfers;Gait   PT: Bed Mobility Modified independent;Supine to/from sit;Rolling;Within precautions   PT: Transfers Modified independent;Bed to/from chair;Assistive device;Sit to/from stand;Within precautions   PT: Gait Modified independent;Rolling walker;Within  precautions;25 feet

## 2022-05-07 NOTE — PLAN OF CARE
Pt is A&O x4. VS stable. Pain controled with PRN Dilaudid. CMS intact. Ambulates air mattress and lift. Obese. Rolls with assist x1 in bed. IV LR infusing. Voiding incontinently for urine and bowel movement, loose stools. On NPO except for medications. Pr on RA. Skin folds under breast and mirna area. Waiting for leg compression devices. Mother is a legal guardian. Possible discharge on 5/8 or 5/9 to home.

## 2022-05-07 NOTE — PLAN OF CARE
Pt a/o x4. VSS. C/o pain in back, PRN tylenol given with relief. Denied nausea. Incontinent of bowel and bladder. X2 incontinent loose BM today. Purwick in place. NPO except medications. At start of shift IV was infiltrated, started an new IV and that one went bad as well. New IV now in Rt forearm. Plan to continue bowel rest.

## 2022-05-07 NOTE — PROGRESS NOTES
[facility]   General Surgery Progress Note           Assessment and Plan:   Assessment:   Partial SBO  GG challenge 5/6, f/u films showing contrast in the stomach and within the proximal colon, still with central dilated small bowel loops      Plan:   -Continue conservative management with bowel rest and IVF    As above, abdomen still somewhat firm on exam  Will obtain AXR tomorrow   Since this is the 3 rd obstructive episode, I discussed more complete SB evaluation after resolution (SBFT vs erterography)  Miguel Angel Saldivar MD  5/7/2022 12:09 PM           Interval History:   Feels much improvement in abd pain this am, not requiring narcotics so far today. Still feeling bloated. Denies nausea and starting to feel hungry. + passing flatus and liquid stools.          Physical Exam:   Blood pressure 135/72, pulse 81, temperature 96.8  F (36  C), temperature source Temporal, resp. rate 16, last menstrual period 08/18/2008, SpO2 95 %, not currently breastfeeding.    I/O last 3 completed shifts:  In: 1111 [I.V.:1111]  Out: 1200 [Urine:1200]    Abdomen:   soft, obese, tenderness noted in the LUQ, + active bowel sounds.          Data:   Blood culture:  Results for orders placed or performed during the hospital encounter of 04/08/21   Blood culture ONE site    Specimen: Blood    Left Arm   Result Value Ref Range    Specimen Description Blood Left Arm     Culture Micro No growth    Blood culture ONE site    Specimen: Blood    Left Hand   Result Value Ref Range    Specimen Description Blood Left Hand     Culture Micro No growth       Urine culture:  Results for orders placed or performed during the hospital encounter of 04/08/21   Urine Culture Aerobic Bacterial    Specimen: Catheterized Urine   Result Value Ref Range    Specimen Description Catheterized Urine     Special Requests Specimen received in preservative     Culture Micro >100,000 colonies/mL  Escherichia coli   (A)        Susceptibility    Escherichia coli -  ERICA     Ampicillin <=2 Susceptible ug/mL     Cefazolin* <=4 Susceptible ug/mL      * Cefazolin ERICA breakpoints are for the treatment of uncomplicated urinary tract infections.  For the treatment of systemic infections, please contact the laboratory for additional testing.     Cefoxitin <=4 Susceptible ug/mL     Ceftazidime <=1 Susceptible ug/mL     Ceftriaxone <=1 Susceptible ug/mL     Ciprofloxacin <=0.25 Susceptible ug/mL     Gentamicin <=1 Susceptible ug/mL     Levofloxacin <=0.12 Susceptible ug/mL     Nitrofurantoin <=16 Susceptible ug/mL     Tobramycin <=1 Susceptible ug/mL     Trimethoprim/Sulfamethoxazole <=1/19 Susceptible ug/mL     Ampicillin/Sulbactam <=2 Susceptible ug/mL     Piperacillin/Tazo <=4 Susceptible ug/mL     Cefepime <=1 Susceptible ug/mL   Results for orders placed or performed during the hospital encounter of 08/28/18   Urine Culture Aerobic Bacterial    Specimen: Unspecified Urine   Result Value Ref Range    Specimen Description Unspecified Urine     Culture Micro Canceled, Test credited  Duplicate request      Urine Culture Aerobic Bacterial    Specimen: Midstream Urine   Result Value Ref Range    Specimen Description Midstream Urine     Culture Micro       50,000 to 100,000 colonies/mL  mixed urogenital jonathan  Susceptibility testing not routinely done     Results for orders placed or performed in visit on 03/31/17   Urine Culture Aerobic Bacterial    Specimen: Urine   Result Value Ref Range    Specimen Description Midstream Urine     Culture Micro       10,000 to 50,000 colonies/mL mixed urogenital jonathan  Susceptibility testing not routinely done      Micro Report Status FINAL 04/01/2017      Recent Labs   Lab 05/07/22  0628 05/05/22  1754   WBC 5.6 8.6   HGB 11.9 14.0   HCT 39.6 45.5   MCV 95 95    228       Nallely Martinez PA-C

## 2022-05-08 ENCOUNTER — APPOINTMENT (OUTPATIENT)
Dept: GENERAL RADIOLOGY | Facility: CLINIC | Age: 57
DRG: 389 | End: 2022-05-08
Attending: SURGERY
Payer: MEDICARE

## 2022-05-08 ENCOUNTER — APPOINTMENT (OUTPATIENT)
Dept: PHYSICAL THERAPY | Facility: CLINIC | Age: 57
DRG: 389 | End: 2022-05-08
Payer: MEDICARE

## 2022-05-08 PROCEDURE — 250N000013 HC RX MED GY IP 250 OP 250 PS 637: Performed by: HOSPITALIST

## 2022-05-08 PROCEDURE — 99232 SBSQ HOSP IP/OBS MODERATE 35: CPT | Performed by: INTERNAL MEDICINE

## 2022-05-08 PROCEDURE — 97530 THERAPEUTIC ACTIVITIES: CPT | Mod: GP | Performed by: PHYSICAL THERAPIST

## 2022-05-08 PROCEDURE — 250N000011 HC RX IP 250 OP 636: Performed by: HOSPITALIST

## 2022-05-08 PROCEDURE — 250N000013 HC RX MED GY IP 250 OP 250 PS 637: Performed by: INTERNAL MEDICINE

## 2022-05-08 PROCEDURE — 120N000001 HC R&B MED SURG/OB

## 2022-05-08 PROCEDURE — 250N000009 HC RX 250: Performed by: HOSPITALIST

## 2022-05-08 PROCEDURE — C9113 INJ PANTOPRAZOLE SODIUM, VIA: HCPCS | Performed by: HOSPITALIST

## 2022-05-08 PROCEDURE — 99231 SBSQ HOSP IP/OBS SF/LOW 25: CPT | Performed by: PHYSICIAN ASSISTANT

## 2022-05-08 PROCEDURE — 74019 RADEX ABDOMEN 2 VIEWS: CPT

## 2022-05-08 RX ADMIN — PANTOPRAZOLE SODIUM 40 MG: 40 INJECTION, POWDER, FOR SOLUTION INTRAVENOUS at 08:36

## 2022-05-08 RX ADMIN — SERTRALINE 100 MG: 100 TABLET, FILM COATED ORAL at 08:37

## 2022-05-08 RX ADMIN — CARBAMAZEPINE 400 MG: 200 TABLET ORAL at 21:05

## 2022-05-08 RX ADMIN — ENALAPRILAT 1.25 MG: 1.25 INJECTION INTRAVENOUS at 03:23

## 2022-05-08 RX ADMIN — NYSTATIN: 100000 OINTMENT TOPICAL at 08:39

## 2022-05-08 RX ADMIN — NYSTATIN: 100000 OINTMENT TOPICAL at 21:04

## 2022-05-08 RX ADMIN — ENALAPRILAT 1.25 MG: 1.25 INJECTION INTRAVENOUS at 14:48

## 2022-05-08 RX ADMIN — ENALAPRILAT 1.25 MG: 1.25 INJECTION INTRAVENOUS at 21:05

## 2022-05-08 RX ADMIN — CARBAMAZEPINE 200 MG: 200 TABLET ORAL at 12:29

## 2022-05-08 RX ADMIN — ENALAPRILAT 1.25 MG: 1.25 INJECTION INTRAVENOUS at 08:39

## 2022-05-08 RX ADMIN — CARBAMAZEPINE 400 MG: 200 TABLET ORAL at 08:36

## 2022-05-08 RX ADMIN — ACETAMINOPHEN 650 MG: 325 TABLET ORAL at 01:27

## 2022-05-08 RX ADMIN — AZELASTINE 1 SPRAY: 1 SPRAY, METERED NASAL at 21:04

## 2022-05-08 ASSESSMENT — ACTIVITIES OF DAILY LIVING (ADL)
ADLS_ACUITY_SCORE: 22
ADLS_ACUITY_SCORE: 20
ADLS_ACUITY_SCORE: 22
ADLS_ACUITY_SCORE: 21
ADLS_ACUITY_SCORE: 20
ADLS_ACUITY_SCORE: 22
ADLS_ACUITY_SCORE: 20
ADLS_ACUITY_SCORE: 22
ADLS_ACUITY_SCORE: 20
ADLS_ACUITY_SCORE: 22
ADLS_ACUITY_SCORE: 20
ADLS_ACUITY_SCORE: 20
ADLS_ACUITY_SCORE: 22
ADLS_ACUITY_SCORE: 22
ADLS_ACUITY_SCORE: 20
ADLS_ACUITY_SCORE: 20

## 2022-05-08 NOTE — PLAN OF CARE
A&Ox4. VSS. IV SL. Diet advanced to clears; tolerating clears. No nausea or vomiting. Tranfers with Ax1 using the walker and gait belt. Lungs are diminished and clear. Active bowel sounds. No Bms today; last BM on 5/7/22. Passing gas. Purewick in place. Redness under breasts and in perineal area; niastatin cream applied. Xray of abd done today; see results.   Plan: discharge pending pt progress and diet advancement.

## 2022-05-08 NOTE — PROGRESS NOTES
[facility]  Hospitalist Progress Note  Neeraj Alvarado MD 05/08/2022    Reason for Stay (Diagnosis): SBO         Assessment and Plan:      Summary of Stay: Brissa Barlow is a 56 year old female with a PMHx HLD, obesity, recurrent SBO, delayed development, hypertension, and cushing syndrome who was admitted on 5/5/2022 with abdominal pain.     The patient was found to have SBO on CT imaging.  She has been treated conservatively with bowel rest and is clinically improving.  She reports multiple stools after gastrograffin study on 5/6    abd pain has improved.  Passing flatus overnight     Plans today:  - start clear liquids today     Recurrent small bowel obstruction: Recurrent episodes. Suspect secondary to adhesins. 5/6 Abx xray normal. Gastrograffin study ordered.   -General surgery consulted given recurrent episodes  -Continue supportive cares with IV fluids, pain medication, and antiemetics.  -No indication for NG tube at this time.  Continue to monitor.  -Gastrografin study done on 5/6 and showed that contrast passed into colon but multiple central dilated SB loops present  - pt had multiple BM after gastrograffin study     Hypertension: PTA lisinopril held. Enalaprilat q 6 h. Monitor BP     GERD: PTA omeprazole held. IV pantoprazole      Hyperlipidemia: PTA lovastatin held     Obesity BMI: Complicates care     Developmental delay: Patient does have a guardian.  Updated on hospital admission plan.  PTA sertraline and carbamazepine ordered.      COVID-19: Guardian requested COVID-19 booster be administered.  Ordered.     Activity: As tolerated  DVT Prophylaxis: Pneumatic Compression Devices     Code Status: Full Code     Disposition: possibly tomorrow pending tolerance of PO intake        Interval History (Subjective):      Continues to feel better.  Using Tylenol prn for pain.  Passing flatus overnight.  Decreased abd pain.                    Physical Exam:      Last Vital Signs:  BP  131/67   Pulse 80   Temp 97  F (36.1  C) (Temporal)   Resp 18   LMP 08/18/2008   SpO2 93%       Intake/Output Summary (Last 24 hours) at 5/8/2022 1401  Last data filed at 5/8/2022 0709  Gross per 24 hour   Intake 1058 ml   Output 1000 ml   Net 58 ml       Constitutional: Awake, alert, cooperative, no apparent distress   Respiratory: Clear to auscultation bilaterally, no crackles or wheezing   Cardiovascular: Regular rate and rhythm, normal S1 and S2, and no murmur noted   Abdomen: Normal bowel sounds, soft, non-distended, non-tender   Skin: No rashes, no cyanosis, dry to touch   Neuro: Alert and oriented x3, no weakness, numbness, memory loss   Extremities: No edema, normal range of motion   Other(s):        All other systems: Negative          Medications:      All current medications were reviewed with changes reflected in problem list.         Data:      All new lab and imaging data was reviewed.   Labs:  Recent Labs   Lab 05/07/22 0628 05/05/22  1754    138   POTASSIUM 3.7 4.2   CHLORIDE 108 106   CO2 27 29   ANIONGAP 6 3   * 122*   BUN 13 18   CR 0.57 0.57   GFRESTIMATED >90 >90   CESAR 7.9* 8.6     Recent Labs   Lab 05/07/22  0628   WBC 5.6   HGB 11.9   HCT 39.6   MCV 95         Imaging:   Recent Results (from the past 24 hour(s))   XR Abdomen 2 Views    Narrative    EXAM: XR ABDOMEN 2 VIEWS  LOCATION: [facility]  DATE/TIME: 05/08/2022, 10:20 AM    INDICATION: Small bowel obstruction.  COMPARISON: KUB 05/06/2022.      Impression    IMPRESSION: Contrast material identified in the colon excluding complete small bowel obstruction. A few mildly dilated central small bowel loops noted, but appear smaller in the interval consistent with a degree of improvement. This could be improved   ileus and/or incomplete bowel obstruction.

## 2022-05-08 NOTE — PROGRESS NOTES
[facility]   General Surgery Progress Note           Assessment and Plan:   Assessment:   Partial SBO  GG challenge 5/6, f/u films showing contrast in the stomach and within the proximal colon, still with central dilated small bowel loops  AXR today showing improvement from yesterday      Plan:   -start clear liquid diet  -continue non-operative management         Interval History:   Feels improvement in bloating today and is hungry. + passing flatus and liquid stools.  Denies abd pain today.          Physical Exam:   Blood pressure 113/62, pulse 80, temperature 97  F (36.1  C), temperature source Temporal, resp. rate 18, last menstrual period 08/18/2008, SpO2 93 %, not currently breastfeeding.    I/O last 3 completed shifts:  In: 1776 [P.O.:50; I.V.:1726]  Out: 500 [Urine:500]    Abdomen:   soft, obese, non-tender, + active bowel sounds.          Data:   Blood culture:  Results for orders placed or performed during the hospital encounter of 04/08/21   Blood culture ONE site    Specimen: Blood    Left Arm   Result Value Ref Range    Specimen Description Blood Left Arm     Culture Micro No growth    Blood culture ONE site    Specimen: Blood    Left Hand   Result Value Ref Range    Specimen Description Blood Left Hand     Culture Micro No growth       Urine culture:  Results for orders placed or performed during the hospital encounter of 04/08/21   Urine Culture Aerobic Bacterial    Specimen: Catheterized Urine   Result Value Ref Range    Specimen Description Catheterized Urine     Special Requests Specimen received in preservative     Culture Micro >100,000 colonies/mL  Escherichia coli   (A)        Susceptibility    Escherichia coli - ERICA     Ampicillin <=2 Susceptible ug/mL     Cefazolin* <=4 Susceptible ug/mL      * Cefazolin ERICA breakpoints are for the treatment of uncomplicated urinary tract infections.  For the treatment of systemic infections, please contact the laboratory for additional testing.      Cefoxitin <=4 Susceptible ug/mL     Ceftazidime <=1 Susceptible ug/mL     Ceftriaxone <=1 Susceptible ug/mL     Ciprofloxacin <=0.25 Susceptible ug/mL     Gentamicin <=1 Susceptible ug/mL     Levofloxacin <=0.12 Susceptible ug/mL     Nitrofurantoin <=16 Susceptible ug/mL     Tobramycin <=1 Susceptible ug/mL     Trimethoprim/Sulfamethoxazole <=1/19 Susceptible ug/mL     Ampicillin/Sulbactam <=2 Susceptible ug/mL     Piperacillin/Tazo <=4 Susceptible ug/mL     Cefepime <=1 Susceptible ug/mL   Results for orders placed or performed during the hospital encounter of 08/28/18   Urine Culture Aerobic Bacterial    Specimen: Unspecified Urine   Result Value Ref Range    Specimen Description Unspecified Urine     Culture Micro Canceled, Test credited  Duplicate request      Urine Culture Aerobic Bacterial    Specimen: Midstream Urine   Result Value Ref Range    Specimen Description Midstream Urine     Culture Micro       50,000 to 100,000 colonies/mL  mixed urogenital jonathan  Susceptibility testing not routinely done     Results for orders placed or performed in visit on 03/31/17   Urine Culture Aerobic Bacterial    Specimen: Urine   Result Value Ref Range    Specimen Description Midstream Urine     Culture Micro       10,000 to 50,000 colonies/mL mixed urogenital jonathan  Susceptibility testing not routinely done      Micro Report Status FINAL 04/01/2017      Recent Labs   Lab 05/07/22  0628 05/05/22  1754   WBC 5.6 8.6   HGB 11.9 14.0   HCT 39.6 45.5   MCV 95 95    228       Nallely Martinez PA-C     Seen and agree.  Billie Holguin MD

## 2022-05-08 NOTE — PROGRESS NOTES
Physical Therapy Discharge Summary    Reason for therapy discharge:    All goals and outcomes met, no further needs identified.    Progress towards therapy goal(s). See goals on Care Plan in [facility] electronic health record for goal details.  Goals met    Therapy recommendation(s):    No further therapy is recommended.

## 2022-05-08 NOTE — PLAN OF CARE
Goal Outcome Evaluation:  Vital signs stable.  Pt npo, denies nausea.  C/O slight abdominal discomfort, passing flatus, some liquid stool.  Voiding, incontinent of urine.Lucy and skin cares done.Redness to abdominal folds, beneath breast and groin areas.  Nystatin cream applied.  Plan of Care Reviewed With: patient

## 2022-05-08 NOTE — PLAN OF CARE
Pt is A& O x4. VS stable. Hypertensive. Pt on RA. On NPO except medications diet. Activity assist of x2 with lift. CMS intact. IV SL. Voids using pure wick. Incontinent for BM. Pain controled with PRN Tylenol, per pt's request. Skin redness in mirna and breast folds, which is improving. Discharge plan to home on 5/8.

## 2022-05-08 NOTE — PROGRESS NOTES
Occupational Therapy: Orders received. Chart reviewed and discussed with care team.? Occupational Therapy not indicated due to patient is at baseline in terms of her ADLs per PT, her SO at baseline provides A with IADLs.? Defer discharge recommendations to PT.? Will complete orders.

## 2022-05-09 ENCOUNTER — ANESTHESIA EVENT (OUTPATIENT)
Dept: ORTHOPEDICS | Facility: CLINIC | Age: 57
DRG: 389 | End: 2022-05-09
Payer: MEDICARE

## 2022-05-09 ENCOUNTER — ANESTHESIA (OUTPATIENT)
Dept: ORTHOPEDICS | Facility: CLINIC | Age: 57
DRG: 389 | End: 2022-05-09
Payer: MEDICARE

## 2022-05-09 PROCEDURE — 250N000013 HC RX MED GY IP 250 OP 250 PS 637: Performed by: HOSPITALIST

## 2022-05-09 PROCEDURE — C9113 INJ PANTOPRAZOLE SODIUM, VIA: HCPCS | Performed by: HOSPITALIST

## 2022-05-09 PROCEDURE — 99232 SBSQ HOSP IP/OBS MODERATE 35: CPT | Performed by: INTERNAL MEDICINE

## 2022-05-09 PROCEDURE — 250N000011 HC RX IP 250 OP 636: Performed by: HOSPITALIST

## 2022-05-09 PROCEDURE — 250N000009 HC RX 250: Performed by: HOSPITALIST

## 2022-05-09 PROCEDURE — 120N000001 HC R&B MED SURG/OB

## 2022-05-09 PROCEDURE — 370N000003 HC ANESTHESIA WARD SERVICE

## 2022-05-09 PROCEDURE — 99231 SBSQ HOSP IP/OBS SF/LOW 25: CPT | Performed by: PHYSICIAN ASSISTANT

## 2022-05-09 RX ADMIN — ENALAPRILAT 1.25 MG: 1.25 INJECTION INTRAVENOUS at 15:16

## 2022-05-09 RX ADMIN — NYSTATIN: 100000 OINTMENT TOPICAL at 09:05

## 2022-05-09 RX ADMIN — CARBAMAZEPINE 400 MG: 200 TABLET ORAL at 19:48

## 2022-05-09 RX ADMIN — CARBAMAZEPINE 400 MG: 200 TABLET ORAL at 08:59

## 2022-05-09 RX ADMIN — NYSTATIN: 100000 OINTMENT TOPICAL at 19:51

## 2022-05-09 RX ADMIN — SERTRALINE 100 MG: 100 TABLET, FILM COATED ORAL at 08:59

## 2022-05-09 RX ADMIN — ENALAPRILAT 1.25 MG: 1.25 INJECTION INTRAVENOUS at 08:59

## 2022-05-09 RX ADMIN — ENALAPRILAT 1.25 MG: 1.25 INJECTION INTRAVENOUS at 04:53

## 2022-05-09 RX ADMIN — AZELASTINE 1 SPRAY: 1 SPRAY, METERED NASAL at 19:50

## 2022-05-09 RX ADMIN — PANTOPRAZOLE SODIUM 40 MG: 40 INJECTION, POWDER, FOR SOLUTION INTRAVENOUS at 08:59

## 2022-05-09 RX ADMIN — ENALAPRILAT 1.25 MG: 1.25 INJECTION INTRAVENOUS at 21:06

## 2022-05-09 RX ADMIN — CARBAMAZEPINE 200 MG: 200 TABLET ORAL at 11:35

## 2022-05-09 ASSESSMENT — ACTIVITIES OF DAILY LIVING (ADL)
ADLS_ACUITY_SCORE: 20
ADLS_ACUITY_SCORE: 40
ADLS_ACUITY_SCORE: 40
ADLS_ACUITY_SCORE: 20
ADLS_ACUITY_SCORE: 40
ADLS_ACUITY_SCORE: 20
ADLS_ACUITY_SCORE: 40
ADLS_ACUITY_SCORE: 20
ADLS_ACUITY_SCORE: 40
ADLS_ACUITY_SCORE: 40
ADLS_ACUITY_SCORE: 20
ADLS_ACUITY_SCORE: 40
ADLS_ACUITY_SCORE: 20
ADLS_ACUITY_SCORE: 40
ADLS_ACUITY_SCORE: 20
ADLS_ACUITY_SCORE: 20

## 2022-05-09 NOTE — PROGRESS NOTES
[facility]  Hospitalist Progress Note  Neeraj Alvarado MD 05/09/2022    Reason for Stay (Diagnosis): SBO         Assessment and Plan:      Summary of Stay: Brissa Barlow is a 56 year old female with a PMHx HLD, obesity, recurrent SBO, delayed development, hypertension, and cushing syndrome who was admitted on 5/5/2022 with abdominal pain.     The patient was found to have SBO on CT imaging.  She has been treated conservatively with bowel rest and is clinically improving.  She reports multiple stools after gastrograffin study on 5/6     abd pain has improved.  Passing flatus overnight     Plans today:  - advance to full liquids today  - I offered pt discharge on full liquids today; she prefers to remain in the hospital today to make sure she tolerates diet advancement prior to discharge.  Anticipate discharge tomorrow     Addendum:  I called and updated guardians/parents Ray and Diomedes today    Recurrent small bowel obstruction: Recurrent episodes. Suspect secondary to adhesins. 5/6 Abx xray normal. Gastrograffin study ordered.   -General surgery consulted given recurrent episodes  -Continue supportive cares with IV fluids, pain medication, and antiemetics.  -No indication for NG tube at this time.  Continue to monitor.  -Gastrografin study done on 5/6 and showed that contrast passed into colon but multiple central dilated SB loops present  - pt had multiple BM after gastrograffin study  - diet being slowly advanced     Hypertension: PTA lisinopril held. Enalaprilat q 6 h. Monitor BP     GERD: PTA omeprazole held. IV pantoprazole      Hyperlipidemia: PTA lovastatin held     Obesity BMI: Complicates care     Developmental delay: Patient does have a guardian.  Updated on hospital admission plan.  PTA sertraline and carbamazepine ordered.      COVID-19: Guardian requested COVID-19 booster be administered.  Ordered.     Activity: As tolerated  DVT Prophylaxis: Pneumatic Compression  Devices     Code Status: Full Code     Disposition: possibly tomorrow pending tolerance of PO intake           Interval History (Subjective):      abd pain continues to improve.  Passing flatus.  No pain meds needed past 24 hours.  Diet advanced to full liquids today.  Pt not comfortable with discharge today; anticipate tomorrow                  Physical Exam:      Last Vital Signs:  /65 (BP Location: Left arm)   Pulse 73   Temp (!) 96.5  F (35.8  C) (Temporal)   Resp 16   LMP 08/18/2008   SpO2 93%       Intake/Output Summary (Last 24 hours) at 5/9/2022 1347  Last data filed at 5/9/2022 1307  Gross per 24 hour   Intake 740 ml   Output 4650 ml   Net -3910 ml       Constitutional: Awake, alert, cooperative, no apparent distress   Respiratory: Clear to auscultation bilaterally, no crackles or wheezing   Cardiovascular: Regular rate and rhythm, normal S1 and S2, and no murmur noted   Abdomen: Normal bowel sounds, soft, non-distended, non-tender   Skin: No rashes, no cyanosis, dry to touch   Neuro: Alert and oriented x3, no weakness, numbness, memory loss   Extremities: No edema, normal range of motion   Other(s):        All other systems: Negative          Medications:      All current medications were reviewed with changes reflected in problem list.         Data:      All new lab and imaging data was reviewed.   Labs:  Recent Labs   Lab 05/07/22  0628 05/05/22  1754    138   POTASSIUM 3.7 4.2   CHLORIDE 108 106   CO2 27 29   ANIONGAP 6 3   * 122*   BUN 13 18   CR 0.57 0.57   GFRESTIMATED >90 >90   CESAR 7.9* 8.6     Recent Labs   Lab 05/07/22  0628   WBC 5.6   HGB 11.9   HCT 39.6   MCV 95         Imaging:   No results found for this or any previous visit (from the past 24 hour(s)).

## 2022-05-09 NOTE — PLAN OF CARE
A&Ox4. VSS. IV SL. Diet advanced to full liquids; tolerating full liquids. No nausea or vomiting. Tranfers with Ax1 using the walker and gait belt. Lungs clear. Active bowel sounds. One large and one small BM today. Passing gas. Redness under breasts and in perineal area; niastatin cream applied.   Plan: discharge pending pt progress and diet advancement; possible discharge home tomorrow after diet advancement to low residue and pending pt tolerance to this.

## 2022-05-09 NOTE — PROGRESS NOTES
[facility]   General Surgery Progress Note         Assessment and Plan:   Assessment:   Partial SBO  GG challenge 5/6, f/u films showing contrast in the stomach and within the proximal colon.  AXR 5/8 showing contrast in colon but still with central dilated small bowel loops      Plan:   -Diet: can try full liquids  -No surgical indications, continue non-operative management  -Could consider sending home on a full liquid diet for 1-2 weeks and have her slowly advance her diet at home.  Seen and examined, AXRs reviewed, agree with above. She just tolerated full liquid tray without nausea or pain, continues to have flatus. As noted, could potentially discharge home on full liquids for an interval.         Interval History:   She feels improved today. The LLQ pain she had upon admission is gone. She continues to have loose BMs and is passing flatus. She has tolerated clear liquids and would like to advance.          Physical Exam:   Blood pressure 134/65, pulse 73, temperature (!) 96.5  F (35.8  C), temperature source Temporal, resp. rate 16, last menstrual period 08/18/2008, SpO2 93 %, not currently breastfeeding.    I/O last 3 completed shifts:  In: 740 [P.O.:740]  Out: 4250 [Urine:4250]    Abdomen:   soft, obese, non-tender, + active bowel sounds.          Data:     Recent Labs   Lab 05/07/22  0628 05/05/22  1754   WBC 5.6 8.6   HGB 11.9 14.0   HCT 39.6 45.5   MCV 95 95    228     AXR 5/8/22 IMPRESSION: Contrast material identified in the colon excluding complete small bowel obstruction. A few mildly dilated central small bowel loops noted, but appear smaller in the interval consistent with a degree of improvement. This could be improved   ileus and/or incomplete bowel obstruction.    Gustabo Diggs PA-C

## 2022-05-09 NOTE — PLAN OF CARE
Pt is A&O x4. Pt on RA. New IV access by RN flyer. Pt on clear liquid diet. VS stable. Hypertensive. Pain denies pain. CMS intact. Voids using pure wick. Skin mirna and breast areas moist, applied baby powder. Expected discharge on 5/9.

## 2022-05-09 NOTE — PLAN OF CARE
Goal Outcome Evaluation:  Pt alert and oriented x 4.  Denies pain or discomfort.  Tolerated clear liquids, no nausea.  Redness to abdominal folds, beneath breast and pannis areas treated with nystatin cream.  Pt encouraged to mobilize.  Plan of Care Reviewed With: patient

## 2022-05-10 VITALS
OXYGEN SATURATION: 98 % | TEMPERATURE: 97.7 F | RESPIRATION RATE: 18 BRPM | SYSTOLIC BLOOD PRESSURE: 166 MMHG | DIASTOLIC BLOOD PRESSURE: 88 MMHG | HEART RATE: 88 BPM

## 2022-05-10 PROCEDURE — 250N000013 HC RX MED GY IP 250 OP 250 PS 637: Performed by: HOSPITALIST

## 2022-05-10 PROCEDURE — 250N000011 HC RX IP 250 OP 636: Performed by: HOSPITALIST

## 2022-05-10 PROCEDURE — 250N000009 HC RX 250: Performed by: HOSPITALIST

## 2022-05-10 PROCEDURE — XW013W7 INTRODUCTION OF COVID-19 VACCINE BOOSTER INTO SUBCUTANEOUS TISSUE, PERCUTANEOUS APPROACH, NEW TECHNOLOGY GROUP 7: ICD-10-PCS | Performed by: INTERNAL MEDICINE

## 2022-05-10 PROCEDURE — 99238 HOSP IP/OBS DSCHRG MGMT 30/<: CPT | Performed by: INTERNAL MEDICINE

## 2022-05-10 PROCEDURE — 91301 HC RX IP 250 OP 636: CPT | Performed by: INTERNAL MEDICINE

## 2022-05-10 PROCEDURE — 0013A HC ADMIN COVID VAC MODERNA, 3RD DOSE IMM COMP PT: CPT | Performed by: INTERNAL MEDICINE

## 2022-05-10 PROCEDURE — C9113 INJ PANTOPRAZOLE SODIUM, VIA: HCPCS | Performed by: HOSPITALIST

## 2022-05-10 PROCEDURE — 250N000011 HC RX IP 250 OP 636: Performed by: INTERNAL MEDICINE

## 2022-05-10 RX ADMIN — NYSTATIN: 100000 OINTMENT TOPICAL at 08:50

## 2022-05-10 RX ADMIN — CARBAMAZEPINE 200 MG: 200 TABLET ORAL at 12:27

## 2022-05-10 RX ADMIN — SERTRALINE 100 MG: 100 TABLET, FILM COATED ORAL at 08:49

## 2022-05-10 RX ADMIN — CARBAMAZEPINE 400 MG: 200 TABLET ORAL at 08:49

## 2022-05-10 RX ADMIN — ENALAPRILAT 1.25 MG: 1.25 INJECTION INTRAVENOUS at 08:49

## 2022-05-10 RX ADMIN — PANTOPRAZOLE SODIUM 40 MG: 40 INJECTION, POWDER, FOR SOLUTION INTRAVENOUS at 08:49

## 2022-05-10 RX ADMIN — CX-024414 0.25 ML: 0.2 INJECTION, SUSPENSION INTRAMUSCULAR at 12:05

## 2022-05-10 RX ADMIN — ENALAPRILAT 1.25 MG: 1.25 INJECTION INTRAVENOUS at 04:27

## 2022-05-10 RX ADMIN — FLUTICASONE PROPIONATE 1 SPRAY: 50 SPRAY, METERED NASAL at 08:50

## 2022-05-10 RX ADMIN — AZELASTINE 1 SPRAY: 1 SPRAY, METERED NASAL at 08:49

## 2022-05-10 ASSESSMENT — ACTIVITIES OF DAILY LIVING (ADL)
ADLS_ACUITY_SCORE: 40

## 2022-05-10 NOTE — PLAN OF CARE
Reviewed discharge instructions and medications with patient and spouse. Questions answered. Patient discharged to home with spouse, discharge instructions, and belongings. No medication changes.     Tolerating full liquid diet, +bowel sounds, last bowel movement 5/10, no c/o N/V, voiding spontaneously, PIV removed at discharge, COVID-19 booster given around 1220 today- pt tolerated well.    BP (!) 166/88 (BP Location: Left arm)   Pulse 88   Temp 97.7  F (36.5  C) (Temporal)   Resp 18   LMP 08/18/2008   SpO2 98%

## 2022-05-10 NOTE — PROGRESS NOTES
Pt seen and examined today    Feels well.  Tolerating liquid diet    Wants to go home today    Appears stable for discharge    I recommended full liquid diet for the next week with gradual transition into low roughage diet thereafter

## 2022-05-10 NOTE — PROGRESS NOTES
Alert and oriented x4, VSS, on RA, denies pain, A x 1 with a walker and gait belt., IV saline locked, on full liquid diet, voiding well, passing flatus, pt slept most of the night. Will continue monitoring.

## 2022-05-10 NOTE — PROGRESS NOTES
Pt is alert and oriented x4, VSS, on RA, A x 1 with walker and gait belt, on full liquid diet, CMS intact, denied pain, lung sounds clear, bowel sounds present on all quadrants, had a medium bowel movement on this shift, was incontinent of bowel and bladder, the pure wick was put in place at HS. The redness under the breasts and abdomen area was cleaned and Nystatin cream was applied. Pt walked to the bathroom twice on this shift, she stated that she doesn't feel when she gets BM. The plan to discharge is pending, will continue to monitor.

## 2022-05-10 NOTE — DISCHARGE SUMMARY
Service Date: 05/10/2022  Discharge Date: 05/10/2022    PRINCIPAL FINAL DIAGNOSES:   1.  Small-bowel obstruction, suspect mechanical related to adhesions from previous surgery.  Resolved with conservative measures this admission.  2.  History of small-bowel obstruction.  3.  Hypertension.  4.  Reflux disease.  5.  Hyperlipidemia.  6.  History of developmental delay.  The patient's parents are her guardians.  7.  History of Cushing syndrome.    PRINCIPAL PROCEDURES THIS ADMISSION:    1.  General Surgery consultation.  2.  Abdominal x-ray.  3.  Gastrografin challenge.  4.  Abdominopelvic CT scan.    REASON FOR ADMISSION:  Please see dictated History and Physical.  In brief, Ms. Tan is a 56-year-old female who presented to the hospital with abdominal pain.  She was found to have small-bowel obstruction.    HOSPITAL COURSE:  Small-bowel obstruction.  The patient improved with conservative management.  General Surgery was consulted and assisted with management while hospitalized.  By day of discharge, she was tolerating a full liquid diet.  On discharge, she was recommended to continue liquids and soft foods for the next 1 week, then progress into more of a low-residue diet.  She was given instructions on proper things to eat on a low-residue diet to prevent recurrence of a bowel obstruction.    The patient discharged home today.    DISCHARGE MEDICATIONS:    1.  Astelin nasal spray.  2.  Tegretol.  3.  Flonase.  4.  Hydrocortisone cream.  5.  Lisinopril 20 mg daily.  6.  Lovastatin 40 mg every night.  7.  Multivitamin daily.  8.  Nystatin cream.  9.  Omeprazole 20 mg every morning.  10.  Zoloft 100 mg daily.    FOLLOWUP INSTRUCTIONS:  Follow up with your primary MD in 1-2 weeks after your hospitalization for routine evaluation.    I examined this patient on the day of discharge.    Neeraj Alvarado MD        D: 05/10/2022   T: 05/10/2022   MT: [facility]    Name:     RONY TAN  MRN:      [id number]         Account:      [id number]   :      1965           Service Date: 05/10/2022                                  Discharge Date: 05/10/2022     Document: [id number]

## 2022-05-10 NOTE — PROGRESS NOTES
[facility]   General Surgery Progress Note         Assessment and Plan:   Assessment:   Partial SBO - resolved  GG challenge 5/6, f/u films showing contrast in the stomach and within the proximal colon.  AXR 5/8 showing contrast in colon but still with central dilated small bowel loops      Plan:   - Diet: try low residue, advised to stick to foods closer to full liquid  - no surgical indications  - ok to discharge home, no surgical follow up needed.            Interval History:   Resting in bed. No complaints.  Denies abdominal pain.  No nausea.  Tolerating full liquid diet.  Passing gas and had a BM yesterday.  Pt wanted to try solid foods before going home. Advised to stay closer to full liquid diet for the next week.          Physical Exam:   Blood pressure 137/56, pulse 73, temperature (!) 96.6  F (35.9  C), temperature source Temporal, resp. rate 16, last menstrual period 08/18/2008, SpO2 93 %, not currently breastfeeding.    I/O last 3 completed shifts:  In: 200 [P.O.:200]  Out: 1300 [Urine:1300]    Abdomen:   soft, obese, non-tender          Data:     Recent Labs   Lab 05/07/22  0628 05/05/22  1754   WBC 5.6 8.6   HGB 11.9 14.0   HCT 39.6 45.5   MCV 95 95    228     AXR 5/8/22 IMPRESSION: Contrast material identified in the colon excluding complete small bowel obstruction. A few mildly dilated central small bowel loops noted, but appear smaller in the interval consistent with a degree of improvement. This could be improved   ileus and/or incomplete bowel obstruction.    Quynh Murphy PA-C

## 2022-06-22 DIAGNOSIS — E78.5 HYPERLIPIDEMIA LDL GOAL <130: ICD-10-CM

## 2022-06-22 DIAGNOSIS — I10 ESSENTIAL HYPERTENSION, BENIGN: ICD-10-CM

## 2022-06-23 NOTE — TELEPHONE ENCOUNTER
Routing refill request to provider for review/approval because:  Miranda given x1 and patient did not follow up, please advise  Labs out of range:  LDL  Elevated BP    BP Readings from Last 3 Encounters:   05/10/22 (!) 166/88   07/12/21 116/70   06/09/21 128/76     Shefali Shanks RN on 6/23/2022 at 10:26 AM

## 2022-06-29 NOTE — TELEPHONE ENCOUNTER
Virginia will be out of medication next week. Wanting to fill this asap.    Willa STOREY  [facility]   [facility]

## 2022-06-30 RX ORDER — LOVASTATIN 40 MG
TABLET ORAL
Qty: 90 TABLET | Refills: 0 | Status: SHIPPED | OUTPATIENT
Start: 2022-06-30 | End: 2022-10-05

## 2022-06-30 RX ORDER — LISINOPRIL 20 MG/1
TABLET ORAL
Qty: 90 TABLET | Refills: 0 | Status: SHIPPED | OUTPATIENT
Start: 2022-06-30 | End: 2022-11-25

## 2022-07-18 DIAGNOSIS — F32.A DEPRESSION, UNSPECIFIED DEPRESSION TYPE: ICD-10-CM

## 2022-07-21 RX ORDER — SERTRALINE HYDROCHLORIDE 100 MG/1
TABLET, FILM COATED ORAL
Qty: 90 TABLET | Refills: 0 | Status: SHIPPED | OUTPATIENT
Start: 2022-07-21 | End: 2022-10-05

## 2022-07-21 NOTE — TELEPHONE ENCOUNTER
Patient has upcoming appointment 10/27/22. Prescription approved per [facility] Refill Protocol.  Augustine MALLORY RN

## 2022-07-27 DIAGNOSIS — J30.1 SEASONAL ALLERGIC RHINITIS DUE TO POLLEN: ICD-10-CM

## 2022-07-28 RX ORDER — AZELASTINE 1 MG/ML
SPRAY, METERED NASAL
Qty: 30 ML | Refills: 2 | Status: ON HOLD | OUTPATIENT
Start: 2022-07-28 | End: 2024-07-02

## 2022-07-28 NOTE — TELEPHONE ENCOUNTER
Medication is being filled for 1 time refill only due to:  Patient needs to be seen because due for visit.     Patient has upcoming appt in 3 months.    Jaky Limon RN

## 2022-09-06 ENCOUNTER — TELEPHONE (OUTPATIENT)
Dept: FAMILY MEDICINE | Facility: CLINIC | Age: 57
End: 2022-09-06

## 2022-09-06 NOTE — TELEPHONE ENCOUNTER
Received Diagnostic Assessment from [facility], Name of : Hanna Sanchez  Fax [phone]  Sent to PCP basket for review and signature    Thank you.

## 2022-10-04 DIAGNOSIS — E78.5 HYPERLIPIDEMIA LDL GOAL <130: ICD-10-CM

## 2022-10-04 DIAGNOSIS — F32.A DEPRESSION, UNSPECIFIED DEPRESSION TYPE: ICD-10-CM

## 2022-10-04 DIAGNOSIS — K21.9 GASTROESOPHAGEAL REFLUX DISEASE, UNSPECIFIED WHETHER ESOPHAGITIS PRESENT: ICD-10-CM

## 2022-10-04 NOTE — TELEPHONE ENCOUNTER
Routing refill request to provider for review/approval because:  Miranda given x1 and patient did not follow up, please advise  Labs not current:  LDL  Outdated PHQ9    Shefali Shanks RN on 10/4/2022 at 11:29 AM

## 2022-10-05 RX ORDER — LOVASTATIN 40 MG
TABLET ORAL
Qty: 90 TABLET | Refills: 0 | Status: SHIPPED | OUTPATIENT
Start: 2022-10-05 | End: 2023-01-21

## 2022-10-05 RX ORDER — SERTRALINE HYDROCHLORIDE 100 MG/1
TABLET, FILM COATED ORAL
Qty: 90 TABLET | Refills: 0 | Status: SHIPPED | OUTPATIENT
Start: 2022-10-05 | End: 2023-01-21

## 2022-10-06 ENCOUNTER — MYC MEDICAL ADVICE (OUTPATIENT)
Dept: FAMILY MEDICINE | Facility: CLINIC | Age: 57
End: 2022-10-06

## 2022-10-06 DIAGNOSIS — E78.5 HYPERLIPIDEMIA LDL GOAL <130: Primary | ICD-10-CM

## 2022-10-14 NOTE — TELEPHONE ENCOUNTER
Routing back to Dr. Vaughan    There are no lab orders on file, what labs does the patient need?

## 2022-10-15 ENCOUNTER — HEALTH MAINTENANCE LETTER (OUTPATIENT)
Age: 57
End: 2022-10-15

## 2022-10-24 ENCOUNTER — VIRTUAL VISIT (OUTPATIENT)
Dept: FAMILY MEDICINE | Facility: CLINIC | Age: 57
End: 2022-10-24
Payer: MEDICARE

## 2022-10-24 DIAGNOSIS — I10 ESSENTIAL HYPERTENSION, BENIGN: ICD-10-CM

## 2022-10-24 DIAGNOSIS — J01.90 ACUTE SINUSITIS, RECURRENCE NOT SPECIFIED, UNSPECIFIED LOCATION: Primary | ICD-10-CM

## 2022-10-24 DIAGNOSIS — J30.1 SEASONAL ALLERGIC RHINITIS DUE TO POLLEN: ICD-10-CM

## 2022-10-24 PROBLEM — N39.0 ACUTE UTI: Status: RESOLVED | Noted: 2021-04-08 | Resolved: 2022-10-24

## 2022-10-24 PROBLEM — J96.01 ACUTE RESPIRATORY FAILURE WITH HYPOXIA (H): Status: RESOLVED | Noted: 2021-04-08 | Resolved: 2022-10-24

## 2022-10-24 PROBLEM — J12.82 PNEUMONIA DUE TO 2019 NOVEL CORONAVIRUS: Status: RESOLVED | Noted: 2021-04-08 | Resolved: 2022-10-24

## 2022-10-24 PROBLEM — U07.1 PNEUMONIA DUE TO 2019 NOVEL CORONAVIRUS: Status: RESOLVED | Noted: 2021-04-08 | Resolved: 2022-10-24

## 2022-10-24 PROCEDURE — 99441 PR PHYSICIAN TELEPHONE EVALUATION 5-10 MIN: CPT | Mod: 95 | Performed by: FAMILY MEDICINE

## 2022-10-24 RX ORDER — FLUTICASONE PROPIONATE 50 MCG
1 SPRAY, SUSPENSION (ML) NASAL DAILY PRN
Qty: 18.2 ML | Refills: 0 | Status: SHIPPED | OUTPATIENT
Start: 2022-10-24 | End: 2022-12-09

## 2022-10-24 NOTE — PROGRESS NOTES
Virginia is a 57 year old who is being evaluated via a billable telephone visit.      What phone number would you like to be contacted at? [phone]  How would you like to obtain your AVS? MyChart    Assessment & Plan     Acute sinusitis, recurrence not specified, unspecified location  Her symptoms are most likely viral.  I recommended that she take a home COVID test which she is planning to do since she has not done this yet.  If it comes back positive she will let me know and we will consider starting an antiviral.  In the meantime, I encouraged rest and hydration.  She may stop the azelastine and switch to Flonase.  She may take Tylenol as needed for body aches.    Seasonal allergic rhinitis due to pollen  She is going to start Flonase and stop the azelastine to see if this helps.  - fluticasone (FLONASE) 50 MCG/ACT nasal spray; Spray 1 spray into both nostrils daily as needed for rhinitis or allergies    BMI 50.0-59.9, adult (H)    Essential hypertension, benign  She reports her blood pressures been stable and under control on her current medications.                   Return in about 2 weeks (around 11/7/2022) for Follow up if symptoms not improving..    Miguel Angel Li, [facility]   Virginia is a 57 year old, presenting for the following health issues:  URI      HPI     Acute Illness  Acute illness concerns: sinus   Onset/Duration: past couple days  Symptoms:  Fever: No  Chills/Sweats: No  Headache (location?): YES  Sinus Pressure: YES  Conjunctivitis:  No  Ear Pain: YES: both  Rhinorrhea: YES  Congestion: YES  Sore Throat: YES  Cough: YES-non-productive  Wheeze: No  Decreased Appetite: No  Nausea: No  Vomiting: No  Diarrhea: No  Dysuria/Freq.: No  Dysuria or Hematuria: No  Fatigue/Achiness: YES- ache  Sick/Strep Exposure: No  Therapies tried and outcome: alkezelsier       Of morbid obesity, hypertension and seasonal allergies.  She has been using the azelastine  nasal spray which she feels like makes symptoms feel little worse.    Review of Systems   Constitutional, HEENT, cardiovascular, pulmonary, gi and gu systems are negative, except as otherwise noted.      Objective           Vitals:  No vitals were obtained today due to virtual visit.    Physical Exam   healthy, alert and no distress  PSYCH: Alert and oriented times 3; coherent speech, normal   rate and volume, able to articulate logical thoughts, able   to abstract reason, no tangential thoughts, no hallucinations   or delusions  Her affect is normal  RESP: No cough, no audible wheezing, able to talk in full sentences  Remainder of exam unable to be completed due to telephone visits                Phone call duration: 7 minutes

## 2022-10-26 ENCOUNTER — HOSPITAL ENCOUNTER (EMERGENCY)
Facility: CLINIC | Age: 57
Discharge: LEFT BEFORE DISCHARGE | End: 2022-10-26
Payer: MEDICARE

## 2022-10-26 VITALS
OXYGEN SATURATION: 97 % | DIASTOLIC BLOOD PRESSURE: 75 MMHG | RESPIRATION RATE: 22 BRPM | HEART RATE: 90 BPM | SYSTOLIC BLOOD PRESSURE: 138 MMHG | TEMPERATURE: 98.4 F

## 2022-10-26 NOTE — ED TRIAGE NOTES
Pt diagnosed with sinus infection 4 days ago, started on nasal spray. Pt states she has not been improving and wants to be reevaluated. No Tylenol today. ABCs intact.

## 2022-10-27 ENCOUNTER — TELEPHONE (OUTPATIENT)
Dept: FAMILY MEDICINE | Facility: CLINIC | Age: 57
End: 2022-10-27

## 2022-10-27 ENCOUNTER — VIRTUAL VISIT (OUTPATIENT)
Dept: FAMILY MEDICINE | Facility: CLINIC | Age: 57
End: 2022-10-27
Payer: MEDICARE

## 2022-10-27 DIAGNOSIS — J01.00 SUBACUTE MAXILLARY SINUSITIS: Primary | ICD-10-CM

## 2022-10-27 PROCEDURE — 99443 PR PHYSICIAN TELEPHONE EVALUATION 21-30 MIN: CPT | Mod: 95 | Performed by: FAMILY MEDICINE

## 2022-10-27 RX ORDER — GUAIFENESIN/DEXTROMETHORPHAN 100-10MG/5
5-10 SYRUP ORAL EVERY 4 HOURS PRN
Qty: 236 ML | Refills: 0 | Status: SHIPPED | OUTPATIENT
Start: 2022-10-27 | End: 2024-01-03

## 2022-10-27 RX ORDER — BENZONATATE 200 MG/1
200 CAPSULE ORAL 3 TIMES DAILY PRN
Qty: 42 CAPSULE | Refills: 0 | Status: SHIPPED | OUTPATIENT
Start: 2022-10-27 | End: 2024-05-02

## 2022-10-27 RX ORDER — LEVOFLOXACIN 500 MG/1
500 TABLET, FILM COATED ORAL DAILY
Qty: 7 TABLET | Refills: 0 | Status: SHIPPED | OUTPATIENT
Start: 2022-10-27 | End: 2022-11-03

## 2022-10-27 ASSESSMENT — ANXIETY QUESTIONNAIRES
IF YOU CHECKED OFF ANY PROBLEMS ON THIS QUESTIONNAIRE, HOW DIFFICULT HAVE THESE PROBLEMS MADE IT FOR YOU TO DO YOUR WORK, TAKE CARE OF THINGS AT HOME, OR GET ALONG WITH OTHER PEOPLE: NOT DIFFICULT AT ALL
6. BECOMING EASILY ANNOYED OR IRRITABLE: NOT AT ALL
1. FEELING NERVOUS, ANXIOUS, OR ON EDGE: NOT AT ALL
5. BEING SO RESTLESS THAT IT IS HARD TO SIT STILL: NOT AT ALL
3. WORRYING TOO MUCH ABOUT DIFFERENT THINGS: SEVERAL DAYS
2. NOT BEING ABLE TO STOP OR CONTROL WORRYING: NOT AT ALL
GAD7 TOTAL SCORE: 1
GAD7 TOTAL SCORE: 1
7. FEELING AFRAID AS IF SOMETHING AWFUL MIGHT HAPPEN: NOT AT ALL

## 2022-10-27 ASSESSMENT — PATIENT HEALTH QUESTIONNAIRE - PHQ9
5. POOR APPETITE OR OVEREATING: NOT AT ALL
SUM OF ALL RESPONSES TO PHQ QUESTIONS 1-9: 0

## 2022-10-27 NOTE — PROGRESS NOTES
"Virginia is a 57 year old who is being evaluated via a billable telephone visit.      What phone number would you like to be contacted at? [phone]    How would you like to obtain your AVS? Mail a copy    Assessment & Plan     Subacute maxillary sinusitis  Symptomatic therapy suggested: push fluids, use acetaminophen, ibuprofen, decongestant nasal spray up to 3 days as needed, apply heat to sinuses as needed and Return office visit if symptoms persist or worsen.   - benzonatate (TESSALON) 200 MG capsule; Take 1 capsule (200 mg) by mouth 3 times daily as needed for cough  - levofloxacin (LEVAQUIN) 500 MG tablet; Take 1 tablet (500 mg) by mouth daily for 7 days      No follow-ups on file.    Ewa Bronson MD  [facility] WES Brumfield is a 57 year old, presenting for the following health issues:  Sinus Problem      HPI     Acute Illness  Acute illness concerns: sinus  Onset/Duration: \"couple days\"  Symptoms:  Fever: No  Chills/Sweats: YES  Headache (location?): YES  Sinus Pressure: YES  Conjunctivitis:  No  Ear Pain: YES: bilateral  Rhinorrhea: YES  Congestion: YES  Sore Throat: YES  Cough: YES-non-productive  Wheeze: YES  Decreased Appetite: No  Nausea: YES  Vomiting: No  Diarrhea: No  Dysuria/Freq.: No  Dysuria or Hematuria: No  Fatigue/Achiness: No  Sick/Strep Exposure: No  Therapies tried and outcome: salt water gargle        Review of Systems   Constitutional, HEENT, cardiovascular, pulmonary, GI, , musculoskeletal, neuro, skin, endocrine and psych systems are negative, except as otherwise noted.      Objective           Vitals:  No vitals were obtained today due to virtual visit.    Physical Exam   healthy, alert and no distress  PSYCH: Alert and oriented times 3; coherent speech, normal   rate and volume, able to articulate logical thoughts, able   to abstract reason, no tangential thoughts, no hallucinations   or delusions  Her affect is normal  RESP: No cough, no " audible wheezing, able to talk in full sentences  Remainder of exam unable to be completed due to telephone visits            Phone call duration: 21  minutes

## 2022-10-27 NOTE — TELEPHONE ENCOUNTER
Tessalon is not covered. Patient cannot afford cost. Wondering if there would be an alternative for this patient.     Tamia Yost CPhT  [facility]  [address] [place] [address] [phone]

## 2022-10-28 ENCOUNTER — NURSE TRIAGE (OUTPATIENT)
Dept: NURSING | Facility: CLINIC | Age: 57
End: 2022-10-28

## 2022-10-28 NOTE — TELEPHONE ENCOUNTER
Contacted pt and read provider's response verbatim. Pt verbalized understanding and denies further questions.     Joan Demarco, ROXYN, RN, PHN  Registered Nurse-Clinic Triage  [facility]  10/28/2022 at 10:08 AM

## 2022-10-28 NOTE — TELEPHONE ENCOUNTER
The patient may take a benadryl 25-50 mg for allergic reaction and needs to be seen at her local urgent care or ED if symptoms worsen or persist. I will hold on antibiotics at this point.    Ewa Bronson MD on 10/28/2022 at 9:43 AM

## 2022-12-08 DIAGNOSIS — J30.1 SEASONAL ALLERGIC RHINITIS DUE TO POLLEN: ICD-10-CM

## 2022-12-12 RX ORDER — AZELASTINE HYDROCHLORIDE 137 UG/1
SPRAY, METERED NASAL
Qty: 30 ML | Refills: 2 | OUTPATIENT
Start: 2022-12-12

## 2022-12-12 NOTE — TELEPHONE ENCOUNTER
Denied azelastine  Last saw Dr. Marks 6/2021. Office visit recently.   She may stop the azelastine and switch to Flonase.  She may take Tylenol as needed for body aches.    Liz TURNER RN

## 2023-03-16 NOTE — NURSING NOTE
"Chief Complaint   Patient presents with     Urinary Problem     urinary symptoms x1 week, c/o urinary frequency, burning, and incontinence       Initial /80 (BP Location: Right arm, Patient Position: Chair, Cuff Size: Adult Large)  Pulse 88  Temp 98.2  F (36.8  C) (Oral)  Ht 5' 7\" (1.702 m)  Wt (!) 337 lb (152.9 kg)  LMP 08/18/2008  Breastfeeding? No  BMI 52.78 kg/m2 Estimated body mass index is 52.78 kg/(m^2) as calculated from the following:    Height as of this encounter: 5' 7\" (1.702 m).    Weight as of this encounter: 337 lb (152.9 kg).  Medication Reconciliation: complete     Susy Coughlin/ALBIN  [place]      "
blood glucose testing

## 2023-03-26 ENCOUNTER — HEALTH MAINTENANCE LETTER (OUTPATIENT)
Age: 58
End: 2023-03-26

## 2023-04-14 DIAGNOSIS — F32.A DEPRESSION, UNSPECIFIED DEPRESSION TYPE: ICD-10-CM

## 2023-04-14 RX ORDER — SERTRALINE HYDROCHLORIDE 100 MG/1
TABLET, FILM COATED ORAL
Qty: 90 TABLET | Refills: 0 | Status: SHIPPED | OUTPATIENT
Start: 2023-04-14 | End: 2023-07-26

## 2023-05-02 DIAGNOSIS — E78.5 HYPERLIPIDEMIA LDL GOAL <130: ICD-10-CM

## 2023-05-03 RX ORDER — LOVASTATIN 40 MG
TABLET ORAL
Qty: 90 TABLET | Refills: 0 | Status: SHIPPED | OUTPATIENT
Start: 2023-05-03 | End: 2023-09-06

## 2023-05-03 NOTE — TELEPHONE ENCOUNTER
Patient has upcoming appointment. Prescription approved per [facility] Refill Protocol.  Augustine MALLORY RN 5/3/2023 at 4:19 PM

## 2023-05-08 DIAGNOSIS — I10 ESSENTIAL HYPERTENSION, BENIGN: ICD-10-CM

## 2023-05-09 NOTE — TELEPHONE ENCOUNTER
Routing refill request to provider for review/approval because:    Recent (12 mo) or future (30 days) visit within the authorizing provider's specialty 6/9/2021 last visit    Normal serum creatinine on file in past 12 months    Normal serum potassium on file in past 12 months     Creatinine   Date Value Ref Range Status   05/07/2022 0.57 0.52 - 1.04 mg/dL Final   06/09/2021 0.69 0.52 - 1.04 mg/dL Final     Potassium   Date Value Ref Range Status   05/07/2022 3.7 3.4 - 5.3 mmol/L Final   06/09/2021 4.2 3.4 - 5.3 mmol/L Final     Ines Garcia Registered Nurse  [facility]

## 2023-05-12 RX ORDER — LISINOPRIL 20 MG/1
TABLET ORAL
Qty: 30 TABLET | Refills: 0 | OUTPATIENT
Start: 2023-05-12

## 2023-05-12 NOTE — TELEPHONE ENCOUNTER
Overdue for appt. Needs to be seen to address blood pressure;  Historically, meds filled priro to appt and pt cancelled appt.

## 2023-05-28 DIAGNOSIS — I10 ESSENTIAL HYPERTENSION, BENIGN: ICD-10-CM

## 2023-05-31 RX ORDER — LISINOPRIL 20 MG/1
TABLET ORAL
Qty: 30 TABLET | Refills: 0 | Status: SHIPPED | OUTPATIENT
Start: 2023-05-31 | End: 2023-07-05

## 2023-06-22 DIAGNOSIS — J30.1 SEASONAL ALLERGIC RHINITIS DUE TO POLLEN: ICD-10-CM

## 2023-06-27 RX ORDER — FLUTICASONE PROPIONATE 50 MCG
SPRAY, SUSPENSION (ML) NASAL
Qty: 16 ML | Refills: 0 | Status: SHIPPED | OUTPATIENT
Start: 2023-06-27 | End: 2023-07-24

## 2023-06-27 NOTE — TELEPHONE ENCOUNTER
Prescription approved per [facility] Refill Protocol.  Rosy ZHOU RN, BSN

## 2023-07-04 DIAGNOSIS — I10 ESSENTIAL HYPERTENSION, BENIGN: ICD-10-CM

## 2023-07-05 RX ORDER — LISINOPRIL 20 MG/1
20 TABLET ORAL DAILY
Qty: 30 TABLET | Refills: 0 | Status: SHIPPED | OUTPATIENT
Start: 2023-07-05 | End: 2023-07-26

## 2023-07-20 DIAGNOSIS — J30.1 SEASONAL ALLERGIC RHINITIS DUE TO POLLEN: ICD-10-CM

## 2023-07-23 DIAGNOSIS — F32.A DEPRESSION, UNSPECIFIED DEPRESSION TYPE: ICD-10-CM

## 2023-07-23 DIAGNOSIS — I10 ESSENTIAL HYPERTENSION, BENIGN: ICD-10-CM

## 2023-07-24 RX ORDER — FLUTICASONE PROPIONATE 50 MCG
SPRAY, SUSPENSION (ML) NASAL
Qty: 16 ML | Refills: 0 | Status: SHIPPED | OUTPATIENT
Start: 2023-07-24 | End: 2024-01-03

## 2023-07-24 NOTE — TELEPHONE ENCOUNTER
Prescription approved per [facility] Refill Protocol.    Billie Velásquez RN on 7/24/2023 at 1:59 PM

## 2023-07-26 RX ORDER — LISINOPRIL 20 MG/1
TABLET ORAL
Qty: 90 TABLET | Refills: 0 | Status: SHIPPED | OUTPATIENT
Start: 2023-07-26 | End: 2023-10-17

## 2023-07-26 RX ORDER — SERTRALINE HYDROCHLORIDE 100 MG/1
TABLET, FILM COATED ORAL
Qty: 90 TABLET | Refills: 0 | Status: SHIPPED | OUTPATIENT
Start: 2023-07-26 | End: 2024-01-03

## 2023-07-26 NOTE — TELEPHONE ENCOUNTER
Medication is being filled for 1 time refill only due to:  Patient needs labs PHQ-9, creatinine, potassium. Patient needs to be seen because due for follow up.  Routing to MA/TC pool. The Pt is due for a visit with PCP. Please call and help them schedule.    RN will issue a 90 day supply. No further refills until the Pt is seen.     Augustine MALLORY RN 7/26/2023 at 2:37 PM

## 2023-08-08 ENCOUNTER — TRANSFERRED RECORDS (OUTPATIENT)
Dept: HEALTH INFORMATION MANAGEMENT | Facility: CLINIC | Age: 58
End: 2023-08-08

## 2023-08-31 DIAGNOSIS — E78.5 HYPERLIPIDEMIA LDL GOAL <130: ICD-10-CM

## 2023-09-05 ENCOUNTER — TRANSFERRED RECORDS (OUTPATIENT)
Dept: HEALTH INFORMATION MANAGEMENT | Facility: CLINIC | Age: 58
End: 2023-09-05

## 2023-09-05 NOTE — TELEPHONE ENCOUNTER
Routing refill request to provider for review/approval because:  Miranda given x1 and patient did not follow up, please advise.  Has cancelled multiple appts.  Labs not current:  LDL  Patient needs to be seen because it has been more than 1 year since last office visit.    Riya Yarbrough RN

## 2023-09-06 RX ORDER — LOVASTATIN 40 MG
TABLET ORAL
Qty: 30 TABLET | Refills: 1 | Status: SHIPPED | OUTPATIENT
Start: 2023-09-06 | End: 2023-12-26

## 2023-09-14 DIAGNOSIS — G40.909 SEIZURE DISORDER (H): ICD-10-CM

## 2023-09-15 NOTE — TELEPHONE ENCOUNTER
Routing refill request to provider for review/approval because:  Anti-Seizure Meds Protocol  Failed    Recent (12 mo) or future (30 days) visit within the authorizing provider's specialty    Review Authorizing provider's last note.    Carbamazepine level within therapeutic range in last 26 months     Future Appointments 9/15/2023 - 3/13/2024        Date Visit Type Length Department Provider     10/16/2023 11:40 AM ANNUAL WELLNESS 40 min RM Cara Reddy MD    Location Instructions:     [facility] is located at [address] [address], near [address]. This is a half mile west of [address]. If traveling west on [address], turn south onto [address], then west onto [address] to reach the parking lot. If traveling east on [address], turn south directly onto [address].                     Donna Tolliver RN, BSN  [facility]

## 2023-09-18 RX ORDER — CARBAMAZEPINE 200 MG/1
TABLET ORAL
Qty: 150 TABLET | Refills: 6 | OUTPATIENT
Start: 2023-09-18

## 2023-09-18 NOTE — TELEPHONE ENCOUNTER
Pt not following with primary care; multiple no shows/cancels.   Perhaps would prefer to return to neurology to reassess ongoing need for meds.

## 2023-09-20 NOTE — TELEPHONE ENCOUNTER
Patient calling regarding this and message relayed to her and she said she would prefer to stay with her primary care provider.  She has an upcoming appointment in November.  Advised her she needs to keep this appointment.  Patient stated she would.      Can she get a refill to get her to her upcoming appointment    Next 5 appointments (look out 90 days)      Nov 07, 2023  2:30 PM  (Arrive by 2:10 PM)  Provider Visit with Aury Vizcaino MD  [facility] ([facility] ) [address]  [address]  [facility] [id number] [phone]

## 2023-09-24 RX ORDER — CARBAMAZEPINE 200 MG/1
TABLET ORAL
Qty: 150 TABLET | Refills: 0 | OUTPATIENT
Start: 2023-09-24

## 2023-09-25 NOTE — TELEPHONE ENCOUNTER
Last addressed over 2 years ago; needs to return to neurology since not following with primary clinic

## 2023-10-16 DIAGNOSIS — I10 ESSENTIAL HYPERTENSION, BENIGN: ICD-10-CM

## 2023-10-17 RX ORDER — LISINOPRIL 20 MG/1
TABLET ORAL
Qty: 30 TABLET | Refills: 0 | Status: SHIPPED | OUTPATIENT
Start: 2023-10-17 | End: 2023-12-26

## 2023-10-18 NOTE — TELEPHONE ENCOUNTER
No longer seeing Dr. Selina Brumfield has appt in [place].  She needs to follow up as recommended, will only extend Rx until appt.  No additional med refills provided.

## 2023-11-03 ENCOUNTER — TELEPHONE (OUTPATIENT)
Dept: INTERNAL MEDICINE | Facility: CLINIC | Age: 58
End: 2023-11-03
Payer: MEDICARE

## 2023-11-03 NOTE — TELEPHONE ENCOUNTER
Patient's parents call to request concern that patient may not be showing up to her upcoming appointment due to her mental health and express concerns that her mental health is declining, but they have not seen patient in two years due to patient refusing to see them. Parent's express concern that patient's significant other, Sudhir is not properly caring for patient.      Patient's are working with patient's , GANESH with [employer] to see if they can assist patient with transportation to her appointments. Parents state that GANESH,  from [place] - [phone].    Parents state they have legal guardianship over patient since 2019, but this writer is not able to see guardianship papers and advised to parents that this writer is not able to provider further information.    Attempted to contact patient to remind her of upcoming appointment on 11/7/2023 (per parents request), but phone number is not in service.     Attempted to contact EL from [place] and left voicemail that this writer was unable to get a hold of patient to remind her of upcoming appointment with Dr. Vizcaino.     Thank you,  Marek Richard, Triage RN [facility]  2:52 PM 11/3/2023

## 2023-11-07 DIAGNOSIS — F32.A DEPRESSION, UNSPECIFIED DEPRESSION TYPE: ICD-10-CM

## 2023-11-07 DIAGNOSIS — I10 ESSENTIAL HYPERTENSION, BENIGN: ICD-10-CM

## 2023-11-07 DIAGNOSIS — G40.909 SEIZURE DISORDER (H): ICD-10-CM

## 2023-11-08 RX ORDER — SERTRALINE HYDROCHLORIDE 100 MG/1
100 TABLET, FILM COATED ORAL DAILY
Qty: 90 TABLET | Refills: 0 | OUTPATIENT
Start: 2023-11-08

## 2023-11-08 RX ORDER — CARBAMAZEPINE 200 MG/1
TABLET ORAL
Qty: 150 TABLET | Refills: 1 | OUTPATIENT
Start: 2023-11-08

## 2023-11-08 RX ORDER — LISINOPRIL 20 MG/1
20 TABLET ORAL DAILY
Qty: 30 TABLET | Refills: 0 | OUTPATIENT
Start: 2023-11-08

## 2023-12-26 DIAGNOSIS — I10 ESSENTIAL HYPERTENSION, BENIGN: ICD-10-CM

## 2023-12-26 DIAGNOSIS — G40.909 SEIZURE DISORDER (H): ICD-10-CM

## 2023-12-26 DIAGNOSIS — E78.5 HYPERLIPIDEMIA LDL GOAL <130: ICD-10-CM

## 2023-12-26 NOTE — TELEPHONE ENCOUNTER
Patient is calling for refill. She said she is all out of these medications. She has a future appointment.

## 2023-12-27 ENCOUNTER — TELEPHONE (OUTPATIENT)
Dept: FAMILY MEDICINE | Facility: CLINIC | Age: 58
End: 2023-12-27
Payer: MEDICARE

## 2023-12-27 RX ORDER — LISINOPRIL 20 MG/1
20 TABLET ORAL DAILY
Qty: 8 TABLET | Refills: 0 | Status: SHIPPED | OUTPATIENT
Start: 2023-12-27 | End: 2024-01-03

## 2023-12-27 RX ORDER — LOVASTATIN 40 MG
40 TABLET ORAL AT BEDTIME
Qty: 8 TABLET | Refills: 0 | Status: SHIPPED | OUTPATIENT
Start: 2023-12-27 | End: 2024-01-03

## 2023-12-27 RX ORDER — CARBAMAZEPINE 200 MG/1
TABLET ORAL
Qty: 40 TABLET | Refills: 0 | Status: SHIPPED | OUTPATIENT
Start: 2023-12-27 | End: 2024-01-03

## 2023-12-27 NOTE — TELEPHONE ENCOUNTER
Spoke to Dr. Marks.  She gave a verbal order to give 8 days worth to get her to her appt.  Then the pt needs to get any further refills from her new provider.   Pt needs to keep appt with new provider.        Refills were sent in for tegretol, lisinopril and lovastatin for 8 days.      Appointments in Next Year      Jan 03, 2024  2:30 PM  (Arrive by 2:10 PM)  Provider Visit with Aury Vizcaino MD  [facility] ([facility] ) [phone]          Called the pt and left a message to call us back.

## 2023-12-27 NOTE — TELEPHONE ENCOUNTER
Pt calls.    She wants meds filled.  She has not seen Dr. Marks since 2021.  See 11/8/23 refill.  Advised the pt of note in 11/8/23 refill.      See refill of 12/26/23.      Pt has an appt scheduled on 1/3/24.  Advised to make sure to go to that appt.      Appointments in Next Year      Jan 03, 2024  2:30 PM  (Arrive by 2:10 PM)  Provider Visit with Aury Vizcaino MD  [facility] ([facility] ) [phone]

## 2024-01-03 ENCOUNTER — OFFICE VISIT (OUTPATIENT)
Dept: INTERNAL MEDICINE | Facility: CLINIC | Age: 59
End: 2024-01-03
Payer: MEDICARE

## 2024-01-03 VITALS
OXYGEN SATURATION: 96 % | SYSTOLIC BLOOD PRESSURE: 132 MMHG | BODY MASS INDEX: 54.19 KG/M2 | TEMPERATURE: 98.4 F | HEIGHT: 67 IN | HEART RATE: 86 BPM | DIASTOLIC BLOOD PRESSURE: 82 MMHG | RESPIRATION RATE: 20 BRPM

## 2024-01-03 DIAGNOSIS — B37.2 YEAST DERMATITIS: ICD-10-CM

## 2024-01-03 DIAGNOSIS — E78.5 HYPERLIPIDEMIA LDL GOAL <130: ICD-10-CM

## 2024-01-03 DIAGNOSIS — G40.909 SEIZURE DISORDER (H): ICD-10-CM

## 2024-01-03 DIAGNOSIS — I10 ESSENTIAL HYPERTENSION, BENIGN: ICD-10-CM

## 2024-01-03 DIAGNOSIS — F32.A DEPRESSION, UNSPECIFIED DEPRESSION TYPE: Primary | ICD-10-CM

## 2024-01-03 DIAGNOSIS — J30.1 SEASONAL ALLERGIC RHINITIS DUE TO POLLEN: ICD-10-CM

## 2024-01-03 DIAGNOSIS — K21.9 GASTROESOPHAGEAL REFLUX DISEASE, UNSPECIFIED WHETHER ESOPHAGITIS PRESENT: ICD-10-CM

## 2024-01-03 PROCEDURE — 99214 OFFICE O/P EST MOD 30 MIN: CPT | Performed by: INTERNAL MEDICINE

## 2024-01-03 RX ORDER — NYSTATIN 100000 U/G
CREAM TOPICAL
Qty: 60 G | Refills: 1 | Status: ON HOLD | OUTPATIENT
Start: 2024-01-03 | End: 2024-07-02

## 2024-01-03 RX ORDER — FLUTICASONE PROPIONATE 50 MCG
SPRAY, SUSPENSION (ML) NASAL
Qty: 16 ML | Refills: 1 | Status: SHIPPED | OUTPATIENT
Start: 2024-01-03 | End: 2024-05-17

## 2024-01-03 RX ORDER — LISINOPRIL 20 MG/1
20 TABLET ORAL DAILY
Qty: 90 TABLET | Refills: 1 | Status: SHIPPED | OUTPATIENT
Start: 2024-01-03 | End: 2024-06-14

## 2024-01-03 RX ORDER — LOVASTATIN 40 MG
40 TABLET ORAL AT BEDTIME
Qty: 90 TABLET | Refills: 1 | Status: ON HOLD | OUTPATIENT
Start: 2024-01-03 | End: 2024-07-02

## 2024-01-03 RX ORDER — CARBAMAZEPINE 200 MG/1
TABLET ORAL
Qty: 450 TABLET | Refills: 3 | Status: SHIPPED | OUTPATIENT
Start: 2024-01-03

## 2024-01-03 RX ORDER — SERTRALINE HYDROCHLORIDE 100 MG/1
100 TABLET, FILM COATED ORAL DAILY
Qty: 90 TABLET | Refills: 1 | Status: SHIPPED | OUTPATIENT
Start: 2024-01-03 | End: 2024-08-07

## 2024-01-03 ASSESSMENT — PATIENT HEALTH QUESTIONNAIRE - PHQ9
SUM OF ALL RESPONSES TO PHQ QUESTIONS 1-9: 0
10. IF YOU CHECKED OFF ANY PROBLEMS, HOW DIFFICULT HAVE THESE PROBLEMS MADE IT FOR YOU TO DO YOUR WORK, TAKE CARE OF THINGS AT HOME, OR GET ALONG WITH OTHER PEOPLE: NOT DIFFICULT AT ALL
SUM OF ALL RESPONSES TO PHQ QUESTIONS 1-9: 0

## 2024-01-03 NOTE — COMMUNITY RESOURCES LIST (ENGLISH)
01/03/2024   [facility]  N/A  For questions about this resource list or additional care needs, please contact your primary care clinic or care manager.  Phone: [phone]   Email: N/A   Address: [address] [place] [id number]   Hours: N/A        Hotlines and Helplines       Hotline - Housing crisis  1  [facility] (Main Office) Distance: 5.82 miles      Phone/Virtual   [address] [place] [address]  Language: English  Hours: Mon - Sun Open 24 Hours   Phone: (304) 775-1809 Email: info@[employer] Website: http://[employer]Fetch     2  [facility] Distance: 12.85 miles      Phone/Virtual   [address] [place] [address]  Language: English  Hours: Mon - Sun Open 24 Hours   Phone: (995) 533-1003 Email: info@[employer] Website: http://www.[employer].org          Housing       Coordinated Entry access point  3  [facility] [place] ([facility] Distance: 12.75 miles      In-Person   [address] [place] [address]  Language: English  Hours: Mon - Fri 9:00 AM - 5:00 PM  Fees: Free   Phone: (657) 164-1070 Email: housing@[facility].org Website: http://www.[facility].org/housing     4  [facility] Distance: 14.14 miles      In-Person, Phone/Virtual   [address] [place] [address]  Language: English  Hours: Mon - Fri 8:30 AM - 4:30 PM  Fees: Free   Phone: (641) 827-8949 Email: info@[facility].org Website: https://www.[facility].org/locations/[facility]-clinic/     Drop-in center or day shelter  5  [facility] Distance: 13.16 miles      In-Person   [address] [place] [address]  Language: English  Hours: Mon - Fri 12:00 PM - 3:00 PM  Fees: Free   Phone: (330) 891-5561 Email: LiveRe@[employer] Website: http://[employer].org/     6  [facility] Distance: 13.29 miles       In-Person   [address] [place] [address]  Language: English, Tajik, Bruneian  Hours: Mon - Sat 7:00 AM - 3:00 PM  Fees: Free, Self Pay   Phone: (982) 496-8413 Email: info@[employer] Website: https://www.[facility]/locations/opportunity-center/     Housing search assistance  7  [facility] - Rental [employer] for Future Homebuyers Program Distance: 4.2 miles      Phone/Virtual   [address] [place] [address]  Language: English  Hours: Mon - Fri 8:00 AM - 4:30 PM  Fees: Free   Phone: (556) 993-1471 Email: hra@[facility] Website: https://www.[facility]/hra/[place]-housing-and-xpdsazczzawnp-vzczatuty-ktf     8  [facility] - Online Housing Search Assistance Distance: 10.95 miles      Phone/Virtual   [address] [place] [address]  Language: English  Hours: Mon - Sun Open 24 Hours  Fees: Free   Phone: (521) 377-1037 Email: findhouoctavio@[employer].com Website: https://[employer]/     Shelter for families  9  [facility] Distance: 7.11 miles      In-Person   [address] [place] [address]  Language: English  Hours: Mon - Sun Open 24 Hours  Fees: Free, Sliding Fee   Phone: (976) 659-1396 Ext.1 Email: info@[facility] Website: http://www.[facility].org     Shelter for individuals  10  [facility] [employer] [facility] Distance: 7.36 miles      In-Person   [address] [place] [address]  Language: English, Bruneian  Hours: Mon - Fri 8:00 AM - 4:30 PM  Fees: Free   Phone: (566) 208-6225 Email: info@[employer].org Website: http://www.capagency.org     11  [facility] Distance: 12.86 miles      In-Person   [address] [place] [address]  Language: English  Hours: Mon - Sun Open 24 Hours  Fees: Free   Phone: (915) 612-6347 Email: communications@[facility].org Website:  https://[facility].org/oursaviourshousing/          Important Numbers & Websites       Emergency Services   911  [facility] Services   311  Poison Control   (886) 368-3462  Suicide Prevention Lifeline   (425) 583-6849 (TALK)  Child Abuse Hotline   (886) 697-6390 (4-A-Child)  Sexual Assault Hotline   (725) 486-6816 (HOPE)  National Runaway Safeline   (967) 772-5250 (RUNAWAY)  All-Options Talkline   (490) 578-4763  Substance Abuse Referral   (211) 596-5017 (HELP)

## 2024-01-03 NOTE — PROGRESS NOTES
Assessment & Plan     Depression, unspecified depression type  Clinically stable.  Continue Zoloft at the current dose.  - sertraline (ZOLOFT) 100 MG tablet; Take 1 tablet (100 mg) by mouth daily    BENIGN HYPERTENSION  Blood pressure reviewed.  Within target.  Target of less than 140/90.  Patient recommended to monitor blood pressure at home.  Continue lisinopril at the current dose.  Medication refill completed.  Update electrolytes/creatinine check.  Patient recommended to complete lab work.  - lisinopril (ZESTRIL) 20 MG tablet; Take 1 tablet (20 mg) by mouth daily  - Basic metabolic panel; Future    Hyperlipidemia LDL goal <130  Tolerating medication well.  Continue with medication at current dose.  Update lipid panel.  Fasting lab work has been ordered.  - lovastatin (MEVACOR) 40 MG tablet; Take 1 tablet (40 mg) by mouth at bedtime  - Lipid panel reflex to direct LDL Fasting; Future    Yeast dermatitis  - nystatin (MYCOSTATIN) 402678 UNIT/GM external cream; APPLY TOPICALLY 2 TIMES DAILY    Gastroesophageal reflux disease, unspecified whether esophagitis present  - omeprazole (PRILOSEC) 20 MG DR capsule; Take 1 capsule (20 mg) by mouth daily before breakfast    Seasonal allergic rhinitis due to pollen  - fluticasone (FLONASE) 50 MCG/ACT nasal spray; USE ONE SPRAY INTO BOTH NOSTRILS DAILY AS NEEDED FOR RHINITIS OR ALLERGIES    Seizure disorder (H)   recent follow-up with [facility]y team in [place].  Continue with carbamazepine at the current dose.  Clinically stable.  - carBAMazepine (TEGRETOL) 200 MG tablet; TAKE TWO TABLETS BY MOUTH IN THE MORNING, TAKE ONE TABLET BY MOUTH AT NOON (WITH LUNCH) AND TAKE TWO TABLETS BY MOUTH AT NIGHT      Aury Vizcaino MD  [facility] is a 58 year old, presenting for the following health issues:  Establish Care        1/3/2024     2:11 PM   Additional Questions   Roomed by Itzi   Accompanied by Self       History of  "Present Illness     Asthma:  She presents for follow up of asthma.  She has no cough, no wheezing, and no shortness of breath.  She is using a relief medication a few times a month. She does not miss any doses of her controller medication throughout the week. Patient is aware of the following triggers: none. The patient has not had a visit to the Emergency Room, Urgent Care or Hospital due to asthma since the last clinic visit.     Headaches:   Since the patient's last clinic visit, headaches are: improved  The patient is getting headaches:  Never  She is able to do normal daily activities when she has a migraine.  The patient is taking the following rescue/relief medications:  Ibuprofen (Advil, Motrin)   Patient states \"I get total relief\" from the rescue/relief medications.   The patient is taking the following medications to prevent migraines:  No medications to prevent migraines  In the past 4 weeks, the patient has gone to an Urgent Care or Emergency Room 0 times times due to headaches.    She eats 4 or more servings of fruits and vegetables daily.She consumes 3 sweetened beverage(s) daily.She exercises with enough effort to increase her heart rate 9 or less minutes per day.  She exercises with enough effort to increase her heart rate 3 or less days per week.   She is taking medications regularly.         Hyperlipidemia Follow-Up    Are you regularly taking any medication or supplement to lower your cholesterol?   Yes- Lovastatin   Are you having muscle aches or other side effects that you think could be caused by your cholesterol lowering medication?  No    Hypertension Follow-up    Do you check your blood pressure regularly outside of the clinic? No   Are you following a low salt diet? No  Are your blood pressures ever more than 140 on the top number (systolic) OR more   than 90 on the bottom number (diastolic), for example 140/90? No        Review of Systems   Constitutional, HEENT, cardiovascular, " "pulmonary, gi and gu systems are negative, except as otherwise noted.      Objective    /82 (BP Location: Right arm, Patient Position: Sitting, Cuff Size: Adult Large)   Pulse 86   Temp 98.4  F (36.9  C) (Tympanic)   Resp 20   Ht 1.702 m (5' 7\")   LMP 08/18/2008   SpO2 96%   BMI 54.19 kg/m    Body mass index is 54.19 kg/m .  Physical Exam   GENERAL: healthy, alert and no distress  RESP: lungs clear to auscultation - no rales, rhonchi or wheezes  CV: regular rate and rhythm, normal S1 S2  MS: no gross musculoskeletal defects noted, no edema  NEURO: Normal strength and tone, mentation intact and speech normal  PSYCH: mentation appears normal, affect normal/bright                      "

## 2024-04-18 NOTE — PLAN OF CARE
Pt A&O x4. VSS. IVF infusing. Tolerating clear liquids. Denies nausea or stomach discomfort. Active bowel sounds. Passing flatus. 2 loose BM's this evening. Voiding adequately. SBA with walker and gb. Encouraging ambulation, ambulated in [place] this evening. Had a full head to toe shower tonight. Headache at the beginning of the shift but has denied any pain since. Plan to advance diet tomorrow and see how pt tolerates. Pt planning to go home at discharge.    18-Apr-2024 14:06

## 2024-04-30 ENCOUNTER — HOSPITAL ENCOUNTER (EMERGENCY)
Facility: CLINIC | Age: 59
Discharge: HOME | End: 2024-04-30
Attending: EMERGENCY MEDICINE | Admitting: EMERGENCY MEDICINE
Payer: MEDICARE

## 2024-04-30 VITALS
RESPIRATION RATE: 20 BRPM | TEMPERATURE: 98 F | HEIGHT: 67 IN | OXYGEN SATURATION: 91 % | BODY MASS INDEX: 45.99 KG/M2 | WEIGHT: 293 LBS | SYSTOLIC BLOOD PRESSURE: 145 MMHG | DIASTOLIC BLOOD PRESSURE: 72 MMHG | HEART RATE: 80 BPM

## 2024-04-30 DIAGNOSIS — J45.21 MILD INTERMITTENT ASTHMA WITH EXACERBATION: ICD-10-CM

## 2024-04-30 DIAGNOSIS — B37.2 YEAST DERMATITIS: ICD-10-CM

## 2024-04-30 LAB
FLUAV RNA SPEC QL NAA+PROBE: NEGATIVE
FLUBV RNA RESP QL NAA+PROBE: NEGATIVE
RSV RNA SPEC NAA+PROBE: NEGATIVE
SARS-COV-2 RNA RESP QL NAA+PROBE: NEGATIVE

## 2024-04-30 PROCEDURE — 87637 SARSCOV2&INF A&B&RSV AMP PRB: CPT | Performed by: EMERGENCY MEDICINE

## 2024-04-30 PROCEDURE — 99284 EMERGENCY DEPT VISIT MOD MDM: CPT | Mod: 25

## 2024-04-30 PROCEDURE — 94640 AIRWAY INHALATION TREATMENT: CPT

## 2024-04-30 PROCEDURE — 250N000009 HC RX 250: Performed by: EMERGENCY MEDICINE

## 2024-04-30 PROCEDURE — 93005 ELECTROCARDIOGRAM TRACING: CPT | Mod: RTG

## 2024-04-30 PROCEDURE — 250N000013 HC RX MED GY IP 250 OP 250 PS 637: Performed by: EMERGENCY MEDICINE

## 2024-04-30 RX ORDER — IPRATROPIUM BROMIDE AND ALBUTEROL SULFATE 2.5; .5 MG/3ML; MG/3ML
3 SOLUTION RESPIRATORY (INHALATION) ONCE
Status: COMPLETED | OUTPATIENT
Start: 2024-04-30 | End: 2024-04-30

## 2024-04-30 RX ORDER — ALBUTEROL SULFATE 0.83 MG/ML
2.5 SOLUTION RESPIRATORY (INHALATION) EVERY 6 HOURS PRN
Qty: 75 ML | Refills: 0 | Status: SHIPPED | OUTPATIENT
Start: 2024-04-30 | End: 2024-06-05

## 2024-04-30 RX ADMIN — MICONAZOLE NITRATE: 20 POWDER TOPICAL at 17:09

## 2024-04-30 RX ADMIN — IPRATROPIUM BROMIDE AND ALBUTEROL SULFATE 3 ML: .5; 3 SOLUTION RESPIRATORY (INHALATION) at 15:48

## 2024-04-30 RX ADMIN — MICONAZOLE NITRATE: 20 POWDER TOPICAL at 19:02

## 2024-04-30 RX ADMIN — IPRATROPIUM BROMIDE AND ALBUTEROL SULFATE 3 ML: .5; 3 SOLUTION RESPIRATORY (INHALATION) at 16:48

## 2024-04-30 RX ADMIN — Medication 10 MG: at 15:48

## 2024-04-30 ASSESSMENT — ACTIVITIES OF DAILY LIVING (ADL)
ADLS_ACUITY_SCORE: 40

## 2024-04-30 ASSESSMENT — COLUMBIA-SUICIDE SEVERITY RATING SCALE - C-SSRS
2. HAVE YOU ACTUALLY HAD ANY THOUGHTS OF KILLING YOURSELF IN THE PAST MONTH?: NO
1. IN THE PAST MONTH, HAVE YOU WISHED YOU WERE DEAD OR WISHED YOU COULD GO TO SLEEP AND NOT WAKE UP?: NO
6. HAVE YOU EVER DONE ANYTHING, STARTED TO DO ANYTHING, OR PREPARED TO DO ANYTHING TO END YOUR LIFE?: NO

## 2024-04-30 NOTE — DISCHARGE INSTRUCTIONS
Discharge Instructions  Asthma    Asthma is a condition causing narrowing and inflammation of the airways that can make it hard to breathe.  Asthma can also cause cough, wheezing, noisy breathing and tightness in the chest.  Asthma can be brought on or  triggered  by many things, including dust, mold, pollen, cigarette smoke, exercise, stress and infections (like the common cold).     Generally, every Emergency Department visit should have a follow-up clinic visit with either a primary or a specialty clinic/provider. Please follow-up as instructed by your emergency provider today.    Return to the Emergency Department if:  Your breathing gets worse.  You need to use your inhaler more often than every 4 hours, or cannot get relief from your inhaler.  You are very weak, or feel much more ill.  You develop new symptoms, such as chest pain.  You cough up blood.  You are vomiting (throwing up) enough that you cannot keep fluids or your medicine down.    What can I do to help myself?  Fill any prescriptions the provider gave you and take them right away--especially antibiotics. Be sure to finish the whole antibiotic prescription.  You may be given a prescription for an inhaler, which can help loosen tight air passages.  Use this as needed, but not more often than directed. Inhalers work much better when used with a spacer.   You may be given a prescription for a steroid to reduce inflammation. Used long-term, these can have some side effects, but for short-term use they are safe. You may notice restlessness or increased appetite (eating more).      You may use non-prescription cough or cold medicines. Cough medicines may help, but do not make the cough go away completely.   Avoid smoke, because this can make you feel worse. If you smoke, this may be a good time to quit! Consider using nicotine lozenges, gum, or patches to reduce cravings.   If you have a fever, Tylenol  (acetaminophen), Motrin  (ibuprofen), or Advil   (ibuprofen), may help bring fever down and may help you feel more comfortable. Be sure to read and follow the package directions, and ask your provider if you have questions.  Be sure to get your flu shot each year.  For certain age groups, the pneumonia shot can help prevent pneumonia.  It is important that you follow up with your regular provider, to be sure that you are improving from this spell (an acute asthma exacerbation), and also to do what you can to keep from having trouble again. Sometimes you need long-term medicines to keep your asthma under control.   If you were given a prescription for medicine here today, be sure to read all of the information (including the package insert) that comes with your prescription.  This will include important information about the medicine, its side effects, and any warnings that you need to know about.  The pharmacist who fills the prescription can provide more information and answer questions you may have about the medicine.  If you have questions or concerns that the pharmacist cannot address, please call or return to the Emergency Department.   Remember that you can always come back to the Emergency Department if you are not able to see your regular provider in the amount of time listed above, if you get any new symptoms, or if there is anything that worries you.
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
## 2024-04-30 NOTE — ED TRIAGE NOTES
Pt presents via EMS for evaluation of an asthma exacerbation. Pt has been using her husbands nebulizer. Found to have oxygen saturations in the low 90s. Pt thought she was wheezing and has been c/o congestion. Lung sounds clear per EMS.

## 2024-05-01 LAB
ATRIAL RATE - MUSE: 81 BPM
DIASTOLIC BLOOD PRESSURE - MUSE: NORMAL MMHG
INTERPRETATION ECG - MUSE: NORMAL
P AXIS - MUSE: 40 DEGREES
PR INTERVAL - MUSE: 186 MS
QRS DURATION - MUSE: 66 MS
QT - MUSE: 370 MS
QTC - MUSE: 429 MS
R AXIS - MUSE: -10 DEGREES
SYSTOLIC BLOOD PRESSURE - MUSE: NORMAL MMHG
T AXIS - MUSE: 47 DEGREES
VENTRICULAR RATE- MUSE: 81 BPM

## 2024-05-02 ENCOUNTER — APPOINTMENT (OUTPATIENT)
Dept: GENERAL RADIOLOGY | Facility: CLINIC | Age: 59
DRG: 193 | End: 2024-05-02
Attending: EMERGENCY MEDICINE
Payer: MEDICARE

## 2024-05-02 ENCOUNTER — HOSPITAL ENCOUNTER (INPATIENT)
Facility: CLINIC | Age: 59
LOS: 5 days | Discharge: HOME | DRG: 193 | End: 2024-05-09
Attending: EMERGENCY MEDICINE | Admitting: HOSPITALIST
Payer: MEDICARE

## 2024-05-02 DIAGNOSIS — J45.41 MODERATE PERSISTENT ASTHMA WITH ACUTE EXACERBATION: ICD-10-CM

## 2024-05-02 DIAGNOSIS — R05.9 COUGH, UNSPECIFIED TYPE: ICD-10-CM

## 2024-05-02 DIAGNOSIS — J18.9 ATYPICAL PNEUMONIA: ICD-10-CM

## 2024-05-02 DIAGNOSIS — R60.0 LEG EDEMA: Primary | ICD-10-CM

## 2024-05-02 DIAGNOSIS — J45.21 EXACERBATION OF INTERMITTENT ASTHMA, UNSPECIFIED ASTHMA SEVERITY: ICD-10-CM

## 2024-05-02 LAB
ANION GAP SERPL CALCULATED.3IONS-SCNC: 11 MMOL/L (ref 7–15)
BASOPHILS # BLD AUTO: 0 10E3/UL (ref 0–0.2)
BASOPHILS NFR BLD AUTO: 1 %
BUN SERPL-MCNC: 13.8 MG/DL (ref 6–20)
CALCIUM SERPL-MCNC: 8.5 MG/DL (ref 8.6–10)
CHLORIDE SERPL-SCNC: 100 MMOL/L (ref 98–107)
CREAT SERPL-MCNC: 0.64 MG/DL (ref 0.51–0.95)
DEPRECATED HCO3 PLAS-SCNC: 30 MMOL/L (ref 22–29)
EGFRCR SERPLBLD CKD-EPI 2021: >90 ML/MIN/1.73M2
EOSINOPHIL # BLD AUTO: 0.3 10E3/UL (ref 0–0.7)
EOSINOPHIL NFR BLD AUTO: 5 %
ERYTHROCYTE [DISTWIDTH] IN BLOOD BY AUTOMATED COUNT: 14.1 % (ref 10–15)
FLUAV RNA SPEC QL NAA+PROBE: NEGATIVE
FLUBV RNA RESP QL NAA+PROBE: NEGATIVE
GLUCOSE SERPL-MCNC: 131 MG/DL (ref 70–99)
HCO3 BLDV-SCNC: 35 MMOL/L (ref 21–28)
HCT VFR BLD AUTO: 40.9 % (ref 35–47)
HGB BLD-MCNC: 12.7 G/DL (ref 11.7–15.7)
HOLD SPECIMEN: NORMAL
IMM GRANULOCYTES # BLD: 0 10E3/UL
IMM GRANULOCYTES NFR BLD: 0 %
LACTATE BLD-SCNC: 0.8 MMOL/L
LYMPHOCYTES # BLD AUTO: 1.3 10E3/UL (ref 0.8–5.3)
LYMPHOCYTES NFR BLD AUTO: 24 %
MAGNESIUM SERPL-MCNC: 2 MG/DL (ref 1.7–2.3)
MCH RBC QN AUTO: 29.1 PG (ref 26.5–33)
MCHC RBC AUTO-ENTMCNC: 31.1 G/DL (ref 31.5–36.5)
MCV RBC AUTO: 94 FL (ref 78–100)
MONOCYTES # BLD AUTO: 0.6 10E3/UL (ref 0–1.3)
MONOCYTES NFR BLD AUTO: 12 %
NEUTROPHILS # BLD AUTO: 3.2 10E3/UL (ref 1.6–8.3)
NEUTROPHILS NFR BLD AUTO: 58 %
NRBC # BLD AUTO: 0 10E3/UL
NRBC BLD AUTO-RTO: 0 /100
NT-PROBNP SERPL-MCNC: <36 PG/ML (ref 0–900)
PCO2 BLDV: 55 MM HG (ref 40–50)
PH BLDV: 7.41 [PH] (ref 7.32–7.43)
PLATELET # BLD AUTO: 150 10E3/UL (ref 150–450)
PO2 BLDV: 52 MM HG (ref 25–47)
POTASSIUM SERPL-SCNC: 3.8 MMOL/L (ref 3.4–5.3)
RBC # BLD AUTO: 4.37 10E6/UL (ref 3.8–5.2)
RSV RNA SPEC NAA+PROBE: NEGATIVE
SAO2 % BLDV: 86 % (ref 70–75)
SARS-COV-2 RNA RESP QL NAA+PROBE: NEGATIVE
SODIUM SERPL-SCNC: 141 MMOL/L (ref 135–145)
TROPONIN T SERPL HS-MCNC: 8 NG/L
WBC # BLD AUTO: 5.3 10E3/UL (ref 4–11)

## 2024-05-02 PROCEDURE — 250N000012 HC RX MED GY IP 250 OP 636 PS 637: Performed by: STUDENT IN AN ORGANIZED HEALTH CARE EDUCATION/TRAINING PROGRAM

## 2024-05-02 PROCEDURE — 96366 THER/PROPH/DIAG IV INF ADDON: CPT

## 2024-05-02 PROCEDURE — 250N000011 HC RX IP 250 OP 636: Performed by: EMERGENCY MEDICINE

## 2024-05-02 PROCEDURE — G0378 HOSPITAL OBSERVATION PER HR: HCPCS

## 2024-05-02 PROCEDURE — 83880 ASSAY OF NATRIURETIC PEPTIDE: CPT | Performed by: EMERGENCY MEDICINE

## 2024-05-02 PROCEDURE — 96368 THER/DIAG CONCURRENT INF: CPT

## 2024-05-02 PROCEDURE — 87637 SARSCOV2&INF A&B&RSV AMP PRB: CPT | Performed by: EMERGENCY MEDICINE

## 2024-05-02 PROCEDURE — 258N000003 HC RX IP 258 OP 636: Performed by: EMERGENCY MEDICINE

## 2024-05-02 PROCEDURE — 36415 COLL VENOUS BLD VENIPUNCTURE: CPT | Performed by: EMERGENCY MEDICINE

## 2024-05-02 PROCEDURE — 99285 EMERGENCY DEPT VISIT HI MDM: CPT | Mod: 25

## 2024-05-02 PROCEDURE — 250N000013 HC RX MED GY IP 250 OP 250 PS 637: Performed by: PHYSICIAN ASSISTANT

## 2024-05-02 PROCEDURE — 94640 AIRWAY INHALATION TREATMENT: CPT

## 2024-05-02 PROCEDURE — 96367 TX/PROPH/DG ADDL SEQ IV INF: CPT

## 2024-05-02 PROCEDURE — 82803 BLOOD GASES ANY COMBINATION: CPT

## 2024-05-02 PROCEDURE — 250N000009 HC RX 250: Performed by: PHYSICIAN ASSISTANT

## 2024-05-02 PROCEDURE — 71046 X-RAY EXAM CHEST 2 VIEWS: CPT

## 2024-05-02 PROCEDURE — 82374 ASSAY BLOOD CARBON DIOXIDE: CPT | Performed by: EMERGENCY MEDICINE

## 2024-05-02 PROCEDURE — 96365 THER/PROPH/DIAG IV INF INIT: CPT

## 2024-05-02 PROCEDURE — 85025 COMPLETE CBC W/AUTO DIFF WBC: CPT | Performed by: EMERGENCY MEDICINE

## 2024-05-02 PROCEDURE — 99223 1ST HOSP IP/OBS HIGH 75: CPT | Mod: AI | Performed by: PHYSICIAN ASSISTANT

## 2024-05-02 PROCEDURE — 250N000012 HC RX MED GY IP 250 OP 636 PS 637: Performed by: PHYSICIAN ASSISTANT

## 2024-05-02 PROCEDURE — 250N000009 HC RX 250: Performed by: STUDENT IN AN ORGANIZED HEALTH CARE EDUCATION/TRAINING PROGRAM

## 2024-05-02 PROCEDURE — 84484 ASSAY OF TROPONIN QUANT: CPT | Performed by: EMERGENCY MEDICINE

## 2024-05-02 PROCEDURE — 83735 ASSAY OF MAGNESIUM: CPT | Performed by: EMERGENCY MEDICINE

## 2024-05-02 RX ORDER — AMOXICILLIN 250 MG
1 CAPSULE ORAL 2 TIMES DAILY PRN
Status: DISCONTINUED | OUTPATIENT
Start: 2024-05-02 | End: 2024-05-09 | Stop reason: HOSPADM

## 2024-05-02 RX ORDER — BENZONATATE 100 MG/1
100 CAPSULE ORAL 3 TIMES DAILY PRN
Status: DISCONTINUED | OUTPATIENT
Start: 2024-05-02 | End: 2024-05-09 | Stop reason: HOSPADM

## 2024-05-02 RX ORDER — CEFDINIR 300 MG/1
300 CAPSULE ORAL EVERY 12 HOURS SCHEDULED
Status: COMPLETED | OUTPATIENT
Start: 2024-05-03 | End: 2024-05-08

## 2024-05-02 RX ORDER — AMOXICILLIN 250 MG
2 CAPSULE ORAL 2 TIMES DAILY PRN
Status: DISCONTINUED | OUTPATIENT
Start: 2024-05-02 | End: 2024-05-09 | Stop reason: HOSPADM

## 2024-05-02 RX ORDER — ACETAMINOPHEN 325 MG/1
650 TABLET ORAL EVERY 4 HOURS PRN
Status: DISCONTINUED | OUTPATIENT
Start: 2024-05-02 | End: 2024-05-09 | Stop reason: HOSPADM

## 2024-05-02 RX ORDER — AZITHROMYCIN 250 MG/1
250 TABLET, FILM COATED ORAL DAILY
Status: COMPLETED | OUTPATIENT
Start: 2024-05-03 | End: 2024-05-06

## 2024-05-02 RX ORDER — IPRATROPIUM BROMIDE AND ALBUTEROL SULFATE 2.5; .5 MG/3ML; MG/3ML
3 SOLUTION RESPIRATORY (INHALATION)
Status: DISCONTINUED | OUTPATIENT
Start: 2024-05-02 | End: 2024-05-02

## 2024-05-02 RX ORDER — ALBUTEROL SULFATE 0.83 MG/ML
3 SOLUTION RESPIRATORY (INHALATION)
Status: DISCONTINUED | OUTPATIENT
Start: 2024-05-02 | End: 2024-05-09 | Stop reason: HOSPADM

## 2024-05-02 RX ORDER — PREDNISONE 20 MG/1
40 TABLET ORAL DAILY
Status: COMPLETED | OUTPATIENT
Start: 2024-05-02 | End: 2024-05-06

## 2024-05-02 RX ORDER — IPRATROPIUM BROMIDE AND ALBUTEROL SULFATE 2.5; .5 MG/3ML; MG/3ML
3 SOLUTION RESPIRATORY (INHALATION) 4 TIMES DAILY
Status: DISCONTINUED | OUTPATIENT
Start: 2024-05-02 | End: 2024-05-09 | Stop reason: HOSPADM

## 2024-05-02 RX ORDER — CEFTRIAXONE 2 G/1
2 INJECTION, POWDER, FOR SOLUTION INTRAMUSCULAR; INTRAVENOUS ONCE
Status: COMPLETED | OUTPATIENT
Start: 2024-05-02 | End: 2024-05-02

## 2024-05-02 RX ORDER — AZITHROMYCIN 250 MG/1
500 TABLET, FILM COATED ORAL ONCE
Status: DISCONTINUED | OUTPATIENT
Start: 2024-05-02 | End: 2024-05-02

## 2024-05-02 RX ORDER — GUAIFENESIN 600 MG/1
600 TABLET, EXTENDED RELEASE ORAL 2 TIMES DAILY
Status: DISCONTINUED | OUTPATIENT
Start: 2024-05-02 | End: 2024-05-09 | Stop reason: HOSPADM

## 2024-05-02 RX ORDER — ONDANSETRON 2 MG/ML
4 INJECTION INTRAMUSCULAR; INTRAVENOUS EVERY 6 HOURS PRN
Status: DISCONTINUED | OUTPATIENT
Start: 2024-05-02 | End: 2024-05-09 | Stop reason: HOSPADM

## 2024-05-02 RX ORDER — MAGNESIUM SULFATE HEPTAHYDRATE 40 MG/ML
2 INJECTION, SOLUTION INTRAVENOUS ONCE
Status: COMPLETED | OUTPATIENT
Start: 2024-05-02 | End: 2024-05-02

## 2024-05-02 RX ORDER — AZITHROMYCIN 500 MG/5ML
500 INJECTION, POWDER, LYOPHILIZED, FOR SOLUTION INTRAVENOUS ONCE
Status: COMPLETED | OUTPATIENT
Start: 2024-05-02 | End: 2024-05-02

## 2024-05-02 RX ORDER — ONDANSETRON 4 MG/1
4 TABLET, ORALLY DISINTEGRATING ORAL EVERY 6 HOURS PRN
Status: DISCONTINUED | OUTPATIENT
Start: 2024-05-02 | End: 2024-05-09 | Stop reason: HOSPADM

## 2024-05-02 RX ORDER — ACETAMINOPHEN 650 MG/1
650 SUPPOSITORY RECTAL EVERY 4 HOURS PRN
Status: DISCONTINUED | OUTPATIENT
Start: 2024-05-02 | End: 2024-05-09 | Stop reason: HOSPADM

## 2024-05-02 RX ADMIN — ACETAMINOPHEN 650 MG: 325 TABLET, FILM COATED ORAL at 20:11

## 2024-05-02 RX ADMIN — MAGNESIUM SULFATE HEPTAHYDRATE 2 G: 2 INJECTION, SOLUTION INTRAVENOUS at 06:56

## 2024-05-02 RX ADMIN — PREDNISONE 40 MG: 20 TABLET ORAL at 20:11

## 2024-05-02 RX ADMIN — IPRATROPIUM BROMIDE AND ALBUTEROL SULFATE 3 ML: .5; 3 SOLUTION RESPIRATORY (INHALATION) at 05:52

## 2024-05-02 RX ADMIN — GUAIFENESIN 600 MG: 600 TABLET, EXTENDED RELEASE ORAL at 20:11

## 2024-05-02 RX ADMIN — CEFTRIAXONE 2 G: 2 INJECTION, POWDER, FOR SOLUTION INTRAMUSCULAR; INTRAVENOUS at 08:16

## 2024-05-02 RX ADMIN — BENZONATATE 100 MG: 100 CAPSULE ORAL at 20:23

## 2024-05-02 RX ADMIN — IPRATROPIUM BROMIDE AND ALBUTEROL SULFATE 3 ML: .5; 3 SOLUTION RESPIRATORY (INHALATION) at 20:11

## 2024-05-02 RX ADMIN — AZITHROMYCIN MONOHYDRATE 500 MG: 500 INJECTION, POWDER, LYOPHILIZED, FOR SOLUTION INTRAVENOUS at 08:16

## 2024-05-02 RX ADMIN — DEXAMETHASONE 10 MG: 2 TABLET ORAL at 05:55

## 2024-05-02 RX ADMIN — IPRATROPIUM BROMIDE AND ALBUTEROL SULFATE 3 ML: .5; 3 SOLUTION RESPIRATORY (INHALATION) at 12:59

## 2024-05-02 ASSESSMENT — ACTIVITIES OF DAILY LIVING (ADL)
ADLS_ACUITY_SCORE: 41
ADLS_ACUITY_SCORE: 41
ADLS_ACUITY_SCORE: 40
DIFFICULTY_COMMUNICATING: NO
CHANGE_IN_FUNCTIONAL_STATUS_SINCE_ONSET_OF_CURRENT_ILLNESS/INJURY: YES
ADLS_ACUITY_SCORE: 49
ADLS_ACUITY_SCORE: 40
WEAR_GLASSES_OR_BLIND: YES
CONCENTRATING,_REMEMBERING_OR_MAKING_DECISIONS_DIFFICULTY: NO
FALL_HISTORY_WITHIN_LAST_SIX_MONTHS: NO
TOILETING_ISSUES: NO
ADLS_ACUITY_SCORE: 28
ADLS_ACUITY_SCORE: 49
ADLS_ACUITY_SCORE: 40
DIFFICULTY_EATING/SWALLOWING: NO
DRESSING/BATHING_DIFFICULTY: NO
ADLS_ACUITY_SCORE: 40
EQUIPMENT_CURRENTLY_USED_AT_HOME: WALKER, ROLLING
ADLS_ACUITY_SCORE: 41
DOING_ERRANDS_INDEPENDENTLY_DIFFICULTY: YES
WALKING_OR_CLIMBING_STAIRS_DIFFICULTY: NO
ADLS_ACUITY_SCORE: 49
ADLS_ACUITY_SCORE: 41
ADLS_ACUITY_SCORE: 40
ADLS_ACUITY_SCORE: 49
HEARING_DIFFICULTY_OR_DEAF: NO

## 2024-05-02 NOTE — ED NOTES
[facility]  ED Nurse Handoff Report    ED Chief complaint: Asthma Exacerbation  . ED Diagnosis:   Final diagnoses:   Moderate persistent asthma with acute exacerbation   Atypical pneumonia       Allergies:   Allergies   Allergen Reactions    Aspirin Unknown    Iodine      Iodinated Contrast Media  --- Hives      Penicillins Unknown    Tetracyclines & Related Unknown    Hydrochlorothiazide Palpitations     dehydration    Influenza Vac Split [Influenza Virus Vaccine] Rash     Patient states she is allergic to the vaccine.  Got a rash all over body many years ago after receiving injection.       Code Status: Full Code    Activity level - Baseline/Home:  independent.  Activity Level - Current:   standby.   Lift room needed: No.   Bariatric: No   Needed: No   Isolation: Yes.   Infection: potential bed bugs    Respiratory status: Room air    Vital Signs (within 30 minutes):   Vitals:    05/02/24 0525 05/02/24 0540 05/02/24 0600 05/02/24 0615   BP: (!) 158/68      Pulse: 80 78     Resp: 16 16     Temp: 97.7  F (36.5  C)      TempSrc: Oral      SpO2: (!) 88% 93% 97% 96%       Cardiac Rhythm:  ,      Pain level:    Patient confused: No.   Patient Falls Risk: patient and family education.   Elimination Status: Has voided     Patient Report - Initial Complaint: asthma exacerbation.   Focused Assessment: dyspnea on exertion, non productive cough present.     Abnormal Results:   Labs Ordered and Resulted from Time of ED Arrival to Time of ED Departure   BASIC METABOLIC PANEL - Abnormal       Result Value    Sodium 141      Potassium 3.8      Chloride 100      Carbon Dioxide (CO2) 30 (*)     Anion Gap 11      Urea Nitrogen 13.8      Creatinine 0.64      GFR Estimate >90      Calcium 8.5 (*)     Glucose 131 (*)    CBC WITH PLATELETS AND DIFFERENTIAL - Abnormal    WBC Count 5.3      RBC Count 4.37      Hemoglobin 12.7      Hematocrit 40.9      MCV 94      MCH 29.1      MCHC 31.1 (*)     RDW 14.1       Platelet Count 150      % Neutrophils 58      % Lymphocytes 24      % Monocytes 12      % Eosinophils 5      % Basophils 1      % Immature Granulocytes 0      NRBCs per 100 WBC 0      Absolute Neutrophils 3.2      Absolute Lymphocytes 1.3      Absolute Monocytes 0.6      Absolute Eosinophils 0.3      Absolute Basophils 0.0      Absolute Immature Granulocytes 0.0      Absolute NRBCs 0.0     ISTAT GASES LACTATE VENOUS POCT - Abnormal    Lactic Acid POCT 0.8      Bicarbonate Venous POCT 35 (*)     O2 Sat, Venous POCT 86 (*)     pCO2 Venous POCT 55 (*)     pH Venous POCT 7.41      pO2 Venous POCT 52 (*)    NT PROBNP INPATIENT - Normal    N terminal Pro BNP Inpatient <36     TROPONIN T, HIGH SENSITIVITY - Normal    Troponin T, High Sensitivity 8     MAGNESIUM - Normal    Magnesium 2.0     INFLUENZA A/B, RSV, & SARS-COV2 PCR        XR Chest 2 Views   Final Result   IMPRESSION: Diffuse bilateral interstitial opacities are seen   throughout the lungs suggestive of pulmonary edema or atypical   pneumonia. Enlarged cardiomediastinal silhouette. No acute bony   abnormalities present.      CLEMENTE BAUER MD            SYSTEM ID:  [id number]          Treatments provided: IV ABX  Family Comments: see chart/legal guardian  OBS brochure/video discussed/provided to patient:  N/A  ED Medications:   Medications   ipratropium - albuterol 0.5 mg/2.5 mg/3 mL (DUONEB) neb solution 3 mL (3 mLs Nebulization $Given 5/2/24 0564)   cefTRIAXone (ROCEPHIN) 2 g vial to attach to  ml bag for ADULTS or NS 50 ml bag for PEDS (2 g Intravenous $New Bag 5/2/24 0816)   azithromycin 500 mg (ZITHROMAX) in 0.9% NaCl 250 mL intermittent infusion 500 mg (500 mg Intravenous $New Bag 5/2/24 0816)   dexAMETHasone (DECADRON) tablet 10 mg (10 mg Oral $Given 5/2/24 0555)   magnesium sulfate 2 g in 50 mL sterile water intermittent infusion (0 g Intravenous Stopped 5/2/24 0809)       Drips infusing:  No  For the majority of the shift this patient was Green.    Interventions performed were n/a    Sepsis treatment initiated: No    Cares/treatment/interventions/medications to be completed following ED care: IV ABX, hygiene     ED Nurse Name: Roshni Hankins RN  8:31 AM   RECEIVING UNIT ED HANDOFF REVIEW    Above ED Nurse Handoff Report was reviewed: Yes  Reviewed by: Estella Monet, PAULA on May 2, 2024 at 9:44 PM   I Qiana called the ED to inform them the note was read: Yes
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
## 2024-05-02 NOTE — PLAN OF CARE
"[facility]    ED Boarding Nurse Handoff Addendum Report:    Date/time: 5/2/2024, 6:59 PM    Activity Level: assist of 1 and walker    Fall Risk: Yes:  bed/chair alarm on, nonskid shoes/slippers when out of bed, arm band in place, patient and family education, assistive device/personal items within reach, and activity supervised    Active Infusions: none    Current Meds Due: none    Current care needs: IV ABX, Duo nebs, Steroids, Eliminations.     Oxygen requirements (liters/min and/or FiO2): none    Respiratory status: Room air    Vital signs (within last 30 minutes):    Vitals:    05/02/24 0830 05/02/24 0900 05/02/24 1724 05/02/24 1729   BP: (!) 140/65 (!) 145/70  (!) 158/56   BP Location:    Left arm   Patient Position:    Semi-South's   Pulse: 85 81  82   Resp:    18   Temp:    98  F (36.7  C)   TempSrc:    Oral   SpO2:    92%   Weight:   (!) 187.3 kg (413 lb)    Height:   1.702 m (5' 7\")        Focused assessment within last 30 minutes:    A&)X4. Assist of 1, GB and walker. On RA.     ED Boarding Nurse name: Kash Oshea RN   Problem: Adult Inpatient Plan of Care  Goal: Plan of Care Review  Description: The Plan of Care Review/Shift note should be completed every shift.  The Outcome Evaluation is a brief statement about your assessment that the patient is improving, declining, or no change.  This information will be displayed automatically on your shift  note.  Outcome: Progressing  Goal: Patient-Specific Goal (Individualized)  Description: You can add care plan individualizations to a care plan. Examples of Individualization might be:  \"Parent requests to be called daily at 9am for status\", \"I have a hard time hearing out of my right ear\", or \"Do not touch me to wake me up as it startles  me\".  Outcome: Progressing  Goal: Absence of Hospital-Acquired Illness or Injury  Outcome: Progressing  Intervention: Identify and Manage Fall Risk  Recent Flowsheet Documentation  Taken " 5/2/2024 1729 by Kash Oshea RN  Safety Promotion/Fall Prevention:   activity supervised   bedside attendant   clutter free environment maintained   lighting adjusted   nonskid shoes/slippers when out of bed   mobility aid in reach   safety round/check completed  Intervention: Prevent Skin Injury  Recent Flowsheet Documentation  Taken 5/2/2024 1729 by Kash Oshea RN  Body Position: position changed independently  Goal: Optimal Comfort and Wellbeing  Outcome: Progressing  Goal: Readiness for Transition of Care  Outcome: Progressing     Problem: Comorbidity Management  Goal: Maintenance of Asthma Control  Outcome: Progressing  Intervention: Maintain Asthma Symptom Control  Recent Flowsheet Documentation  Taken 5/2/2024 1729 by Kash Oshea RN  Medication Review/Management: medications reviewed  Goal: Maintenance of Behavioral Health Symptom Control  Outcome: Progressing  Intervention: Maintain Behavioral Health Symptom Control  Recent Flowsheet Documentation  Taken 5/2/2024 1729 by Kash Oshea RN  Medication Review/Management: medications reviewed  Goal: Maintenance of COPD Symptom Control  Outcome: Progressing  Intervention: Maintain COPD Symptom Control  Recent Flowsheet Documentation  Taken 5/2/2024 1729 by Kash Oshea RN  Medication Review/Management: medications reviewed  Goal: Blood Glucose Levels Within Targeted Range  Outcome: Progressing  Intervention: Monitor and Manage Glycemia  Recent Flowsheet Documentation  Taken 5/2/2024 1729 by Kash Oshea RN  Medication Review/Management: medications reviewed  Goal: Maintenance of Heart Failure Symptom Control  Outcome: Progressing  Intervention: Maintain Heart Failure Management  Recent Flowsheet Documentation  Taken 5/2/2024 1729 by Kash Oshea RN  Medication Review/Management: medications reviewed  Goal: Blood Pressure in Desired Range  Outcome: Progressing  Intervention: Maintain  Blood Pressure Management  Recent Flowsheet Documentation  Taken 5/2/2024 1729 by Kash Oshea RN  Medication Review/Management: medications reviewed  Goal: Maintenance of Osteoarthritis Symptom Control  Outcome: Progressing  Intervention: Maintain Osteoarthritis Symptom Control  Recent Flowsheet Documentation  Taken 5/2/2024 1729 by Kash Oshea RN  Assistive Device Utilized:   walker   gait belt  Activity Management: activity adjusted per tolerance  Medication Review/Management: medications reviewed  Goal: Bariatric Home Regimen Maintained  Outcome: Progressing  Intervention: Maintain and Manage Postbariatric Surgery Care  Recent Flowsheet Documentation  Taken 5/2/2024 1729 by Kash Oshea RN  Medication Review/Management: medications reviewed  Goal: Maintenance of Seizure Control  Outcome: Progressing  Intervention: Maintain Seizure Symptom Control  Recent Flowsheet Documentation  Taken 5/2/2024 1729 by Kash Oshea RN  Medication Review/Management: medications reviewed   Goal Outcome Evaluation:

## 2024-05-02 NOTE — CONFIDENTIAL NOTE
Care Management Follow Up    Length of Stay (days): 0    Expected Discharge Date: 05/03/2024    Additional Information:  Filed [facility] for patient's self neglect.     Web: [phone]    Patient does not need to stay in the hospital due to self-neglect and/or placement. Patient has a  at [place] and county is aware of concerns via [facility] that was filed.     AISSATOU Knowles, [facility]  Emergency Room   Please contact the SW on the floor in which the patient is staying for any questions or concerns

## 2024-05-02 NOTE — PHARMACY-ADMISSION MEDICATION HISTORY
Pharmacy Intern Admission Medication History    Admission medication history is complete. The information provided in this note is only as accurate as the sources available at the time of the update.    Information Source(s): Patient and CareEverywhere/SureScripts via in-person    Pertinent Information: Patient still takes most of here meds.    Changes made to PTA medication list:  Added: None  Deleted: None  Changed: None    Allergies reviewed with patient and updates made in EHR: yes    Medication History Completed By: Shola Hawkins 5/2/2024 4:46 PM    PTA Med List   Medication Sig Last Dose    albuterol (PROVENTIL) (2.5 MG/3ML) 0.083% neb solution Take 1 vial (2.5 mg) by nebulization every 6 hours as needed for shortness of breath or wheezing 5/2/2024 at am    azelastine (ASTELIN) 0.1 % nasal spray SPRAY ONE PUFF IN EACH NOSTRIL TWICE A DAY Past Week at unknown    carBAMazepine (TEGRETOL) 200 MG tablet TAKE TWO TABLETS BY MOUTH IN THE MORNING, TAKE ONE TABLET BY MOUTH AT NOON (WITH LUNCH) AND TAKE TWO TABLETS BY MOUTH AT NIGHT 5/2/2024 at am    fluticasone (FLONASE) 50 MCG/ACT nasal spray USE ONE SPRAY INTO BOTH NOSTRILS DAILY AS NEEDED FOR RHINITIS OR ALLERGIES Unknown at -    lisinopril (ZESTRIL) 20 MG tablet Take 1 tablet (20 mg) by mouth daily 5/2/2024 at am    lovastatin (MEVACOR) 40 MG tablet Take 1 tablet (40 mg) by mouth at bedtime 5/1/2024 at pm    Multiple Vitamin (MULTI-VITAMIN PO) Take 1 tablet by mouth daily 5/2/2024 at am    nystatin (MYCOSTATIN) [id number] UNIT/GM external cream APPLY TOPICALLY 2 TIMES DAILY 5/1/2024 at pm    omeprazole (PRILOSEC) 20 MG DR capsule Take 1 capsule (20 mg) by mouth daily before breakfast Past Week at -    sertraline (ZOLOFT) 100 MG tablet Take 1 tablet (100 mg) by mouth daily 5/2/2024 at am

## 2024-05-02 NOTE — ED NOTES
Spoke with pt's mom, who is listed and pt's guardian per chart. Would like to come pt  pt when discharge. Writer informed pt regarding writer's conversation with mom, pt was upset and stated that she would not allow mom to come. Writer called mom regarding the situation, recommend to given mom a call for update. Mom is agreeable with the plan

## 2024-05-02 NOTE — ED TRIAGE NOTES
Pt BIBA from home with c/o asthma exacerbation. Rec'd 2 duonebs en route from medics with relief of symptoms.      Triage Assessment (Adult)       Row Name 05/02/24 3202          Triage Assessment    Airway WDL WDL        Respiratory WDL    Respiratory WDL X;rhythm/pattern;cough     Rhythm/Pattern, Respiratory shortness of breath;dyspnea upon exertion     Cough Frequency frequent        Skin Circulation/Temperature WDL    Skin Circulation/Temperature WDL WDL        Cardiac WDL    Cardiac WDL WDL        Peripheral/Neurovascular WDL    Peripheral Neurovascular WDL WDL        Cognitive/Neuro/Behavioral WDL    Cognitive/Neuro/Behavioral WDL WDL
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
## 2024-05-03 LAB
ANION GAP SERPL CALCULATED.3IONS-SCNC: 10 MMOL/L (ref 7–15)
BUN SERPL-MCNC: 14.1 MG/DL (ref 6–20)
CALCIUM SERPL-MCNC: 8.4 MG/DL (ref 8.6–10)
CHLORIDE SERPL-SCNC: 104 MMOL/L (ref 98–107)
CREAT SERPL-MCNC: 0.67 MG/DL (ref 0.51–0.95)
DEPRECATED HCO3 PLAS-SCNC: 28 MMOL/L (ref 22–29)
EGFRCR SERPLBLD CKD-EPI 2021: >90 ML/MIN/1.73M2
GLUCOSE SERPL-MCNC: 104 MG/DL (ref 70–99)
POTASSIUM SERPL-SCNC: 4.4 MMOL/L (ref 3.4–5.3)
SODIUM SERPL-SCNC: 142 MMOL/L (ref 135–145)

## 2024-05-03 PROCEDURE — G0378 HOSPITAL OBSERVATION PER HR: HCPCS

## 2024-05-03 PROCEDURE — 999N000156 HC STATISTIC RCP CONSULT EA 30 MIN

## 2024-05-03 PROCEDURE — 250N000013 HC RX MED GY IP 250 OP 250 PS 637: Performed by: PHYSICIAN ASSISTANT

## 2024-05-03 PROCEDURE — 80048 BASIC METABOLIC PNL TOTAL CA: CPT | Performed by: PHYSICIAN ASSISTANT

## 2024-05-03 PROCEDURE — 94640 AIRWAY INHALATION TREATMENT: CPT

## 2024-05-03 PROCEDURE — 250N000009 HC RX 250: Performed by: PHYSICIAN ASSISTANT

## 2024-05-03 PROCEDURE — 36415 COLL VENOUS BLD VENIPUNCTURE: CPT | Performed by: PHYSICIAN ASSISTANT

## 2024-05-03 PROCEDURE — 250N000012 HC RX MED GY IP 250 OP 636 PS 637: Performed by: PHYSICIAN ASSISTANT

## 2024-05-03 PROCEDURE — 250N000013 HC RX MED GY IP 250 OP 250 PS 637: Performed by: STUDENT IN AN ORGANIZED HEALTH CARE EDUCATION/TRAINING PROGRAM

## 2024-05-03 PROCEDURE — 999N000157 HC STATISTIC RCP TIME EA 10 MIN

## 2024-05-03 PROCEDURE — 99232 SBSQ HOSP IP/OBS MODERATE 35: CPT | Performed by: STUDENT IN AN ORGANIZED HEALTH CARE EDUCATION/TRAINING PROGRAM

## 2024-05-03 RX ORDER — LISINOPRIL 20 MG/1
20 TABLET ORAL DAILY
Status: DISCONTINUED | OUTPATIENT
Start: 2024-05-03 | End: 2024-05-09 | Stop reason: HOSPADM

## 2024-05-03 RX ORDER — SERTRALINE HYDROCHLORIDE 100 MG/1
100 TABLET, FILM COATED ORAL DAILY
Status: DISCONTINUED | OUTPATIENT
Start: 2024-05-03 | End: 2024-05-09 | Stop reason: HOSPADM

## 2024-05-03 RX ORDER — CARBAMAZEPINE 200 MG/1
200 TABLET ORAL
Status: DISCONTINUED | OUTPATIENT
Start: 2024-05-04 | End: 2024-05-09 | Stop reason: HOSPADM

## 2024-05-03 RX ORDER — CARBAMAZEPINE 200 MG/1
400 TABLET ORAL EVERY MORNING
Status: DISCONTINUED | OUTPATIENT
Start: 2024-05-04 | End: 2024-05-09 | Stop reason: HOSPADM

## 2024-05-03 RX ORDER — CARBAMAZEPINE 200 MG/1
400 TABLET ORAL AT BEDTIME
Status: DISCONTINUED | OUTPATIENT
Start: 2024-05-03 | End: 2024-05-09 | Stop reason: HOSPADM

## 2024-05-03 RX ORDER — NYSTATIN 100000 U/G
CREAM TOPICAL 2 TIMES DAILY
Status: DISCONTINUED | OUTPATIENT
Start: 2024-05-03 | End: 2024-05-09 | Stop reason: HOSPADM

## 2024-05-03 RX ORDER — PANTOPRAZOLE SODIUM 40 MG/1
40 TABLET, DELAYED RELEASE ORAL
Status: DISCONTINUED | OUTPATIENT
Start: 2024-05-04 | End: 2024-05-09 | Stop reason: HOSPADM

## 2024-05-03 RX ADMIN — AZITHROMYCIN DIHYDRATE 250 MG: 250 TABLET ORAL at 09:24

## 2024-05-03 RX ADMIN — IPRATROPIUM BROMIDE AND ALBUTEROL SULFATE 3 ML: .5; 3 SOLUTION RESPIRATORY (INHALATION) at 16:17

## 2024-05-03 RX ADMIN — ACETAMINOPHEN 650 MG: 325 TABLET, FILM COATED ORAL at 20:17

## 2024-05-03 RX ADMIN — ACETAMINOPHEN 650 MG: 325 TABLET, FILM COATED ORAL at 15:13

## 2024-05-03 RX ADMIN — CEFDINIR 300 MG: 300 CAPSULE ORAL at 20:17

## 2024-05-03 RX ADMIN — LISINOPRIL 20 MG: 20 TABLET ORAL at 16:58

## 2024-05-03 RX ADMIN — IPRATROPIUM BROMIDE AND ALBUTEROL SULFATE 3 ML: .5; 3 SOLUTION RESPIRATORY (INHALATION) at 07:55

## 2024-05-03 RX ADMIN — CEFDINIR 300 MG: 300 CAPSULE ORAL at 09:24

## 2024-05-03 RX ADMIN — BENZONATATE 100 MG: 100 CAPSULE ORAL at 15:13

## 2024-05-03 RX ADMIN — NYSTATIN: 100000 CREAM TOPICAL at 20:17

## 2024-05-03 RX ADMIN — ACETAMINOPHEN 650 MG: 325 TABLET, FILM COATED ORAL at 04:08

## 2024-05-03 RX ADMIN — CARBAMAZEPINE 400 MG: 200 TABLET ORAL at 22:26

## 2024-05-03 RX ADMIN — IPRATROPIUM BROMIDE AND ALBUTEROL SULFATE 3 ML: .5; 3 SOLUTION RESPIRATORY (INHALATION) at 11:39

## 2024-05-03 RX ADMIN — GUAIFENESIN 600 MG: 600 TABLET, EXTENDED RELEASE ORAL at 09:24

## 2024-05-03 RX ADMIN — SERTRALINE HYDROCHLORIDE 100 MG: 100 TABLET ORAL at 16:58

## 2024-05-03 RX ADMIN — GUAIFENESIN 600 MG: 600 TABLET, EXTENDED RELEASE ORAL at 20:17

## 2024-05-03 RX ADMIN — PREDNISONE 40 MG: 20 TABLET ORAL at 09:24

## 2024-05-03 ASSESSMENT — ACTIVITIES OF DAILY LIVING (ADL)
ADLS_ACUITY_SCORE: 38
ADLS_ACUITY_SCORE: 39
ADLS_ACUITY_SCORE: 38
ADLS_ACUITY_SCORE: 39
ADLS_ACUITY_SCORE: 29
ADLS_ACUITY_SCORE: 39
ADLS_ACUITY_SCORE: 29
ADLS_ACUITY_SCORE: 39
ADLS_ACUITY_SCORE: 38
ADLS_ACUITY_SCORE: 39
ADLS_ACUITY_SCORE: 39
ADLS_ACUITY_SCORE: 38
ADLS_ACUITY_SCORE: 29
ADLS_ACUITY_SCORE: 39
ADLS_ACUITY_SCORE: 29
ADLS_ACUITY_SCORE: 37
ADLS_ACUITY_SCORE: 39
ADLS_ACUITY_SCORE: 29
ADLS_ACUITY_SCORE: 39
ADLS_ACUITY_SCORE: 39

## 2024-05-03 NOTE — PROGRESS NOTES
PRIMARY DIAGNOSIS: ASTHMA  OUTPATIENT/OBSERVATION GOALS TO BE MET BEFORE DISCHARGE:  1. Vital signs stable: Yes    2. Improvement of peak flow to greater than 70% sustained off nebulizer for 4 hours:  UNK    3. Dyspnea improved and O2 sats >88% at RA or at prior home O2 therapy level: Yes      SpO2: 91 %, O2 Device: None (Room air)    4. Short term supplemental O2 needed for use with activity at home: No    5. Tolerating adequate PO diet and medications: Yes    6. Return to near baseline physical activity: Yes    Discharge Planner Nurse   Safe discharge environment identified: Yes  Barriers to discharge: No       Entered by: Katie Carlton RN 05/03/2024 3:21 PM     Please review provider order for any additional goals.   Nurse to notify provider when observation goals have been met and patient is ready for discharge.

## 2024-05-03 NOTE — PLAN OF CARE
"[facility]    ED Boarding Nurse Handoff Addendum Report:    Date/time: 5/2/2024, 9:45 PM    Activity Level: assist of 1 and walker    Fall Risk: Yes:  bed/chair alarm on, nonskid shoes/slippers when out of bed, arm band in place, patient and family education, assistive device/personal items within reach, and activity supervised    Active Infusions: none    Current Meds Due: see MAR    Current care needs: pulse oximetry, oxygen therapy, IV antibiotics, asthma management    Oxygen requirements (liters/min and/or FiO2): 2 LPM    Respiratory status: Nasal cannula    Vital signs (within last 30 minutes):    Vitals:    05/02/24 0900 05/02/24 1724 05/02/24 1729 05/02/24 2113   BP: (!) 145/70  (!) 158/56 137/61   BP Location:   Left arm Right arm   Patient Position:   Semi-South's    Pulse: 81  82 83   Resp:   18 20   Temp:   98  F (36.7  C) 98.5  F (36.9  C)   TempSrc:   Oral Oral   SpO2:   92% (!) 88%   Weight:  (!) 187.3 kg (413 lb)     Height:  1.702 m (5' 7\")         Focused assessment within last 30 minutes:    A/O x4. VSS on 2 L O2 via NC. C/o headache, improved with tylenol. Dyspnea on exertion noted. LS diminished with expiratory wheezes. Ax2. Able to make needs known. Call light within reach.     ED Boarding Nurse name: Shannan Jones RN    "

## 2024-05-03 NOTE — PLAN OF CARE
"Goal Outcome Evaluation:      Plan of Care Reviewed With: patient    Overall Patient Progress: no change Overall Patient Progress: no change    Outcome Evaluation: on 6L NC overnight due to desats in 80's. exp wheezing. frequent coughing    To Do:  End of Shift Summary  For vital signs and complete assessments, please see documentation flowsheets.     [id number]:  Pertinent assessments: VSS. Was on 3L, desated overnight to 85%, bumped up to 4L and continued to desat below 89%. Currently on 6L NS, sating around 92%. Frequent cough, HOB at 30 degrees. Denies pain, nausea. Endorses dyspnea on exertion. Prn tylenol given for pain. Redness under breast/panus/sacral areas. External catheter in place. PIV SL. Tolerating regular diet. Up Ax1 w/ walker. Isolation  present. Slept well.     Major Shift Events: Increased O2 needs     Treatment Plan: O2 management, symptom management    Bedside Nurse: Joey Man RN           Problem: Adult Inpatient Plan of Care  Goal: Plan of Care Review  Description: The Plan of Care Review/Shift note should be completed every shift.  The Outcome Evaluation is a brief statement about your assessment that the patient is improving, declining, or no change.  This information will be displayed automatically on your shift  note.  Outcome: Progressing  Flowsheets (Taken 5/3/2024 0646)  Outcome Evaluation: on 6L NC overnight due to desats in 80's. exp wheezing. frequent coughing  Plan of Care Reviewed With: patient  Overall Patient Progress: no change  Goal: Patient-Specific Goal (Individualized)  Description: You can add care plan individualizations to a care plan. Examples of Individualization might be:  \"Parent requests to be called daily at 9am for status\", \"I have a hard time hearing out of my right ear\", or \"Do not touch me to wake me up as it startles  me\".  Outcome: Progressing  Goal: Absence of Hospital-Acquired Illness or Injury  Outcome: Progressing  Intervention: Prevent Skin " Injury  Recent Flowsheet Documentation  Taken 5/3/2024 0200 by Joey Man, RN  Body Position: position changed independently  Taken 5/3/2024 0013 by Joey Man RN  Body Position: position changed independently  Intervention: Prevent and Manage VTE (Venous Thromboembolism) Risk  Recent Flowsheet Documentation  Taken 5/3/2024 0013 by Joey Man RN  VTE Prevention/Management: SCDs (sequential compression devices) off  Intervention: Prevent Infection  Recent Flowsheet Documentation  Taken 5/3/2024 0013 by Joey Man RN  Infection Prevention: single patient room provided  Goal: Optimal Comfort and Wellbeing  Outcome: Progressing  Intervention: Monitor Pain and Promote Comfort  Recent Flowsheet Documentation  Taken 5/3/2024 0408 by Joey Man RN  Pain Management Interventions: medication (see MAR)  Goal: Readiness for Transition of Care  Outcome: Progressing     Problem: Comorbidity Management  Goal: Maintenance of Asthma Control  Outcome: Progressing  Goal: Maintenance of Behavioral Health Symptom Control  Outcome: Progressing  Goal: Maintenance of COPD Symptom Control  Outcome: Progressing  Goal: Blood Glucose Levels Within Targeted Range  Outcome: Progressing  Goal: Maintenance of Heart Failure Symptom Control  Outcome: Progressing  Goal: Blood Pressure in Desired Range  Outcome: Progressing  Goal: Maintenance of Osteoarthritis Symptom Control  Outcome: Progressing  Intervention: Maintain Osteoarthritis Symptom Control  Recent Flowsheet Documentation  Taken 5/3/2024 0200 by Joey Man RN  Activity Management: activity adjusted per tolerance  Taken 5/3/2024 0013 by Joey Man RN  Assistive Device Utilized: walker  Activity Management: activity adjusted per tolerance  Goal: Bariatric Home Regimen Maintained  Outcome: Progressing  Goal: Maintenance of Seizure Control  Outcome: Progressing

## 2024-05-03 NOTE — PROGRESS NOTES
"[facility]     Date:  05/03/24   Admission Dx:  Asthma Exacerbation  Pulmonary History  Asthma  Home Nebulizer/MDI Use: Alb MDI prn and Alb neb prn  Acuity Level (RCAT flow sheet):  3  Tobacco history:   Tobacco Use      Smoking status: Never      Smokeless tobacco: Never       Aerosol Therapy initiated:  Continue Duoneb QID as ordered  Pulmonary Hygiene indicated: Yes  Pulmonary Hygiene initiated:  cough and deep breathe  Volume expansion indicated: yes  Volume Expansion initiated:  IS    Home O2 use: No   Current Oxygen Requirements:  2LPM  Current SpO2:  95%    Re-evaluation date:  5/6/24     Patient Education:  Education was performed with the patient in regards to indications/benefits and possible side effects of bronchodilators.      See \"RT Assessments\" flow sheet for patient assessment scoring and Acuity Level Details.    Ramya Mills, RT       3  "

## 2024-05-03 NOTE — PLAN OF CARE
"Pertinent assessments: A&O. RA. Infrequent, pro cough. C/o back pain, relieved with tylenol. Redness under breast/panus/sacral areas.     Major Shift Events: Weaned to RA.      Treatment Plan: O2 monitoring, symptom management    Goal Outcome Evaluation:      Plan of Care Reviewed With: patient    Overall Patient Progress: improvingOverall Patient Progress: improving    Outcome Evaluation: weaned to RA      Problem: Adult Inpatient Plan of Care  Goal: Plan of Care Review  Description: The Plan of Care Review/Shift note should be completed every shift.  The Outcome Evaluation is a brief statement about your assessment that the patient is improving, declining, or no change.  This information will be displayed automatically on your shift  note.  Outcome: Progressing  Flowsheets (Taken 5/3/2024 1815)  Outcome Evaluation: weaned to RA  Plan of Care Reviewed With: patient  Overall Patient Progress: improving  Goal: Patient-Specific Goal (Individualized)  Description: You can add care plan individualizations to a care plan. Examples of Individualization might be:  \"Parent requests to be called daily at 9am for status\", \"I have a hard time hearing out of my right ear\", or \"Do not touch me to wake me up as it startles  me\".  Outcome: Progressing  Goal: Absence of Hospital-Acquired Illness or Injury  Outcome: Progressing  Intervention: Identify and Manage Fall Risk  Recent Flowsheet Documentation  Taken 5/3/2024 0925 by Katie Carlton RN  Safety Promotion/Fall Prevention: activity supervised  Intervention: Prevent Skin Injury  Recent Flowsheet Documentation  Taken 5/3/2024 0925 by Katie Carlton RN  Body Position: log-rolled  Intervention: Prevent and Manage VTE (Venous Thromboembolism) Risk  Recent Flowsheet Documentation  Taken 5/3/2024 0925 by Katie Carlton RN  VTE Prevention/Management: SCDs (sequential compression devices) off  Intervention: Prevent Infection  Recent Flowsheet Documentation  Taken 5/3/2024 0925 by " Katie Carlton RN  Infection Prevention:   single patient room provided   rest/sleep promoted   personal protective equipment utilized   hand hygiene promoted  Goal: Optimal Comfort and Wellbeing  Outcome: Progressing  Intervention: Monitor Pain and Promote Comfort  Recent Flowsheet Documentation  Taken 5/3/2024 1558 by Katie Carlton RN  Pain Management Interventions: declines  Taken 5/3/2024 1510 by Katie Carlton RN  Pain Management Interventions: medication (see MAR)  Goal: Readiness for Transition of Care  Outcome: Progressing     Problem: Comorbidity Management  Goal: Maintenance of Asthma Control  Outcome: Progressing  Intervention: Maintain Asthma Symptom Control  Recent Flowsheet Documentation  Taken 5/3/2024 0925 by Katie Carlton RN  Medication Review/Management: medications reviewed  Goal: Maintenance of Behavioral Health Symptom Control  Outcome: Progressing  Intervention: Maintain Behavioral Health Symptom Control  Recent Flowsheet Documentation  Taken 5/3/2024 0925 by Katie Carlton RN  Medication Review/Management: medications reviewed  Goal: Maintenance of COPD Symptom Control  Outcome: Progressing  Intervention: Maintain COPD Symptom Control  Recent Flowsheet Documentation  Taken 5/3/2024 0925 by Katie Carlton RN  Medication Review/Management: medications reviewed  Goal: Blood Glucose Levels Within Targeted Range  Outcome: Progressing  Intervention: Monitor and Manage Glycemia  Recent Flowsheet Documentation  Taken 5/3/2024 0925 by Katie Carlton RN  Medication Review/Management: medications reviewed  Goal: Maintenance of Heart Failure Symptom Control  Outcome: Progressing  Intervention: Maintain Heart Failure Management  Recent Flowsheet Documentation  Taken 5/3/2024 0925 by Katie Carlton RN  Medication Review/Management: medications reviewed  Goal: Blood Pressure in Desired Range  Outcome: Progressing  Intervention: Maintain Blood Pressure Management  Recent Flowsheet  Documentation  Taken 5/3/2024 0925 by Katie Carlton RN  Medication Review/Management: medications reviewed  Goal: Maintenance of Osteoarthritis Symptom Control  Outcome: Progressing  Intervention: Maintain Osteoarthritis Symptom Control  Recent Flowsheet Documentation  Taken 5/3/2024 1710 by Katie Carlton RN  Assistive Device Utilized: walker  Activity Management: ambulated to bathroom  Taken 5/3/2024 1516 by Katie Carlton RN  Activity Management: ambulated to bathroom  Taken 5/3/2024 1410 by Katie Carlton RN  Activity Management: ambulated to bathroom  Taken 5/3/2024 1301 by Katie Carlton RN  Activity Management: ambulated to bathroom  Taken 5/3/2024 0925 by Katie Carlton RN  Activity Management: activity adjusted per tolerance  Medication Review/Management: medications reviewed  Goal: Bariatric Home Regimen Maintained  Outcome: Progressing  Intervention: Maintain and Manage Postbariatric Surgery Care  Recent Flowsheet Documentation  Taken 5/3/2024 0925 by Katie Carlton RN  Medication Review/Management: medications reviewed  Goal: Maintenance of Seizure Control  Outcome: Progressing  Intervention: Maintain Seizure Symptom Control  Recent Flowsheet Documentation  Taken 5/3/2024 0925 by Katie Carlton RN  Medication Review/Management: medications reviewed     PRIMARY DIAGNOSIS: PNEUMONIA  OUTPATIENT/OBSERVATION GOALS TO BE MET BEFORE DISCHARGE:  Dyspnea improved and O2 sats >88% on RA or back to baseline O2 levels: Yes   SpO2: 92 %, O2 Device: None (Room air)    Tolerating oral abx or appropriate plans made outpatient infusion: Yes    Vitals signs normal or return to baseline: Yes    Short term supplemental O2 needed with activity at home: No    Tolerate oral intake to maintain hydration: Yes    Return to near baseline physical activity: Yes    Discharge Planner Nurse   Safe discharge environment identified: Yes  Barriers to discharge: No       Entered by: Katie Carlton RN 05/03/2024 6:16  PM     Please review provider order for any additional goals.   Nurse to notify provider when observation goals have been met and patient is ready for discharge.

## 2024-05-03 NOTE — PROGRESS NOTES
[facility]    Medicine Progress Note - Hospitalist Service    Date of Admission:  5/2/2024    Assessment & Plan     Brissa oCrado is a 58 year old female with PMH significant for mild intermittent asthma, HTN, cushing syndrome, developmental delay with parents as guardians, HLD, seizure disorder, RICK, GERD, MDD, morbid obesity, and previous SBO who presents to the ED for the second time in 3 days due to concern for shortness of breath related to an asthma exacerbation.      ED workup reveals: initially hypoxic down to 88% on RA with improvement into 90s after nebulizer treatment, CBC unremarkable, CO2 of 30, calcium of 8.5, glucose of 131, proBNP <36, troponin of 8, magnesium of 2.0, normal lactic acid, VBG shows pH of 7.41, bicarbonate of 35, pCO2 of 55, and pO2 of 52, EKG shows rate of 81 bpm in SR, and CXR shows diffuse bilateral interstitial opacities seen throughout the lungs suggestive of pulmonary edema or atypical pneumonia.      #Acute exacerbation of mild intermittent asthma   #?Atypical community acquired pneumonia  Patient initially seen in the ED on 4/30 for shortness of breath and wheezing.  At that time she was diagnosed with a mild intermittent asthma exacerbation and improved with breathing treatments and received a dose of Decadron but no additional steroids at discharge.  The patient returns to the ED via EMS on 5/2 for ongoing shortness of breath and now with a cough that is productive of clear sputum.  She notes associated generalized chest pain with coughing but denies any recent fevers or chills.  Initially had wheezing in the emergency room which improved with breathing treatments.  Mildly hypoxic down to 88% on room air with improvement into the 90s after nebulizer treatment.  No leukocytosis and afebrile. CXR shows diffuse bilateral interstitial opacities suggestive of pulmonary edema or atypical pneumonia.  No significant wheezing or crackles on examination but  "difficult to auscultate based on body habitus.      -scheduled duo nebs QID with PRN albuterol nebulizers   -received IV Rocephin and IV AZA in ED, continue PO Cefdinir and AZA  -PRN supplemental oxygen, wean as tolerated  -Mucinex BID and PRN Tessalon pearls for cough  -PO Prednisone 40 mg x 5 days      #HTN  BP slightly elevated  -continue PTA Lisinopril and monitor     #HLD  -resume Lovastatin at discharge      #Seizure disorder  Reports no seizure activity since she was 18.   -continue PTA Carbamazepine      #GERD  -resume PTA Omeprazole      #MDD  -continue PTA Sertraline     #Developmental delay with guardians   #Complex social situation, concern for living arrangements and possible vulnerable adult   Patient lives with her , whom she reports does the cleaning in the home as well works. The patient herself reports not working. Per EMS the patient's home was extremely unkept and per ED staff there was concern the patient has not bathed in several days with dried urine and/or stool on her skin. Patient reports regular hygiene during interview. Patient's parents are listed as guardians, ED provider contacted and updated patient's mother. Per patient her mother is not to visit her in the hospital and she is working with the court to not have her parents be her guardians any longer but planning for her adopted father to be her guardian. Reached out to the patient's mother, Lola, she states the patient has not been allowed to see her parents because of her significant other not letting anyone in their apartment due to being \"horrible people\" and there is some concern she may be \"delusional\".   -monitor psychiatric symptoms, if patient truly delusional, appearing paranoid, or hallucinating then she may warrant psychiatric consultation this stay  -SW consulted and following       Observation Goals: -diagnostic tests and consults completed and resulted, -vital signs normal or at patient baseline, -tolerating " "oral intake to maintain hydration, -dyspnea improved and O2 sats greater than 88% on room air or prior home oxygen levels, -returns to baseline functional status, -safe disposition plan has been identified, Nurse to notify provider when observation goals have been met and patient is ready for discharge.  Diet: Regular Diet Adult    DVT Prophylaxis: Pneumatic Compression Devices  Grajeda Catheter: Not present  Lines: None     Cardiac Monitoring: None  Code Status: Full Code      Clinically Significant Risk Factors Present on Admission                  # Hypertension: Noted on problem list      # Severe Obesity: Estimated body mass index is 63.45 kg/m  as calculated from the following:    Height as of this encounter: 1.702 m (5' 7\").    Weight as of this encounter: 183.8 kg (405 lb 1.9 oz).       # Asthma: noted on problem list        Disposition Plan     Medically Ready for Discharge: Anticipated Tomorrow             Andreea Mcdonald MD  Hospitalist Service  [facility]  Securely message with [employer] (more info)  Text page via [employer] Paging/Directory   ______________________________________________________________________    Interval History   No acute events overnight.  Patient reported feeling better however does not feel ready to go home yet.  Improved cough and difficulty breathing.  Parents present at bedside updated about the plan.  4 point review of system is otherwise negative    Physical Exam   Vital Signs: Temp: 98  F (36.7  C) Temp src: Oral BP: (!) 168/63 Pulse: 83   Resp: 16 SpO2: 91 % O2 Device: None (Room air) Oxygen Delivery: 2 LPM  Weight: 405 lbs 1.88 oz    Constitutional: awake, alert, cooperative, no apparent distress, and appears stated age  Eyes: Anicteric sclera  ENT: normocephalic, without obvious abnormality  Respiratory: On room air, no increased work of breathing, equal air entry bilaterally, no wheezing.  Cardiovascular: Normal rate, regular rhythm, no murmur  GI: Soft, " nontender, nondistended, obese  Musculoskeletal: no lower extremity pitting edema present  Neurologic: Awake, alert, oriented to name, place and time.    Neuropsychiatric: Appropriate mood and affect    Medical Decision Making       45 MINUTES SPENT BY ME on the date of service doing chart review, history, exam, documentation & further activities per the note.      Data   ------------------------- PAST 24 HR DATA REVIEWED -----------------------------------------------

## 2024-05-03 NOTE — PROGRESS NOTES
PRIMARY DIAGNOSIS: ASTHMA  OUTPATIENT/OBSERVATION GOALS TO BE MET BEFORE DISCHARGE:  1. Vital signs stable: Yes    2. Improvement of peak flow to greater than 70% sustained off nebulizer for 4 hours:  UNK    3. Dyspnea improved and O2 sats >88% at RA or at prior home O2 therapy level: Yes      SpO2: 95 %, O2 Device: None (Room air)    4. Short term supplemental O2 needed for use with activity at home: No    5. Tolerating adequate PO diet and medications: Yes    6. Return to near baseline physical activity: Yes    Discharge Planner Nurse   Safe discharge environment identified: Yes  Barriers to discharge: No       Entered by: Katie Carlton RN 05/03/2024 10:25 AM     Please review provider order for any additional goals.   Nurse to notify provider when observation goals have been met and patient is ready for discharge.

## 2024-05-03 NOTE — PLAN OF CARE
"Pertinent assessments:Assumed cares at 2130. Denies pain. Endorses dyspnea on exertion, On O2 at 3L NC. Redness under breast/panus/sacral areas. External catheter in place. LPIV SL. Tolerating regular diet. BS active, denies C/D/N/V.     Major Shift Events Admit to [facility] at 2130. Per transport from ED, Pt might have bedbugs, put on contact and incoming staff informed, shower offered for Pt but declined    Treatment Plan: O2 management, symptom management    Problem: Adult Inpatient Plan of Care  Goal: Plan of Care Review  Description: The Plan of Care Review/Shift note should be completed every shift.  The Outcome Evaluation is a brief statement about your assessment that the patient is improving, declining, or no change.  This information will be displayed automatically on your shift  note.  Outcome: Progressing  Flowsheets (Taken 5/2/2024 2327)  Outcome Evaluation: O2 at 3L NC, expiratory wheezing and cough  Plan of Care Reviewed With: patient  Overall Patient Progress: no change  Goal: Patient-Specific Goal (Individualized)  Description: You can add care plan individualizations to a care plan. Examples of Individualization might be:  \"Parent requests to be called daily at 9am for status\", \"I have a hard time hearing out of my right ear\", or \"Do not touch me to wake me up as it startles  me\".  Outcome: Progressing  Goal: Absence of Hospital-Acquired Illness or Injury  Outcome: Progressing  Intervention: Identify and Manage Fall Risk  Recent Flowsheet Documentation  Taken 5/2/2024 2217 by Estella Monet, RN  Safety Promotion/Fall Prevention:   activity supervised   bedside attendant   clutter free environment maintained   lighting adjusted   nonskid shoes/slippers when out of bed   mobility aid in reach   safety round/check completed  Intervention: Prevent Skin Injury  Recent Flowsheet Documentation  Taken 5/2/2024 2217 by Estella Monet, RN  Body Position: supine, head elevated  Intervention: Prevent " Infection  Recent Flowsheet Documentation  Taken 5/2/2024 2217 by Estella Monet, RN  Infection Prevention: single patient room provided  Goal: Optimal Comfort and Wellbeing  Outcome: Progressing  Goal: Readiness for Transition of Care  Outcome: Progressing  Intervention: Mutually Develop Transition Plan  Recent Flowsheet Documentation  Taken 5/2/2024 2224 by Estella Monet, RN  Equipment Currently Used at Home: walker, rolling   Goal Outcome Evaluation:      Plan of Care Reviewed With: patient    Overall Patient Progress: no changeOverall Patient Progress: no change    Outcome Evaluation: O2 at 3L NC, expiratory wheezing and cough

## 2024-05-04 PROCEDURE — 94640 AIRWAY INHALATION TREATMENT: CPT

## 2024-05-04 PROCEDURE — 99232 SBSQ HOSP IP/OBS MODERATE 35: CPT | Performed by: STUDENT IN AN ORGANIZED HEALTH CARE EDUCATION/TRAINING PROGRAM

## 2024-05-04 PROCEDURE — 250N000013 HC RX MED GY IP 250 OP 250 PS 637: Performed by: STUDENT IN AN ORGANIZED HEALTH CARE EDUCATION/TRAINING PROGRAM

## 2024-05-04 PROCEDURE — G0378 HOSPITAL OBSERVATION PER HR: HCPCS

## 2024-05-04 PROCEDURE — 250N000009 HC RX 250: Performed by: PHYSICIAN ASSISTANT

## 2024-05-04 PROCEDURE — 250N000012 HC RX MED GY IP 250 OP 636 PS 637: Performed by: PHYSICIAN ASSISTANT

## 2024-05-04 PROCEDURE — 999N000157 HC STATISTIC RCP TIME EA 10 MIN

## 2024-05-04 PROCEDURE — 120N000001 HC R&B MED SURG/OB

## 2024-05-04 PROCEDURE — 250N000013 HC RX MED GY IP 250 OP 250 PS 637: Performed by: PHYSICIAN ASSISTANT

## 2024-05-04 PROCEDURE — 94640 AIRWAY INHALATION TREATMENT: CPT | Mod: 76

## 2024-05-04 RX ADMIN — CEFDINIR 300 MG: 300 CAPSULE ORAL at 08:20

## 2024-05-04 RX ADMIN — PANTOPRAZOLE SODIUM 40 MG: 40 TABLET, DELAYED RELEASE ORAL at 08:20

## 2024-05-04 RX ADMIN — GUAIFENESIN 600 MG: 600 TABLET, EXTENDED RELEASE ORAL at 21:03

## 2024-05-04 RX ADMIN — NYSTATIN: 100000 CREAM TOPICAL at 21:03

## 2024-05-04 RX ADMIN — IPRATROPIUM BROMIDE AND ALBUTEROL SULFATE 3 ML: .5; 3 SOLUTION RESPIRATORY (INHALATION) at 15:16

## 2024-05-04 RX ADMIN — CEFDINIR 300 MG: 300 CAPSULE ORAL at 21:03

## 2024-05-04 RX ADMIN — IPRATROPIUM BROMIDE AND ALBUTEROL SULFATE 3 ML: .5; 3 SOLUTION RESPIRATORY (INHALATION) at 19:39

## 2024-05-04 RX ADMIN — ACETAMINOPHEN 650 MG: 325 TABLET, FILM COATED ORAL at 13:14

## 2024-05-04 RX ADMIN — SERTRALINE HYDROCHLORIDE 100 MG: 100 TABLET ORAL at 08:20

## 2024-05-04 RX ADMIN — AZITHROMYCIN DIHYDRATE 250 MG: 250 TABLET ORAL at 08:20

## 2024-05-04 RX ADMIN — CARBAMAZEPINE 400 MG: 200 TABLET ORAL at 21:03

## 2024-05-04 RX ADMIN — IPRATROPIUM BROMIDE AND ALBUTEROL SULFATE 3 ML: .5; 3 SOLUTION RESPIRATORY (INHALATION) at 07:33

## 2024-05-04 RX ADMIN — ACETAMINOPHEN 650 MG: 325 TABLET, FILM COATED ORAL at 08:20

## 2024-05-04 RX ADMIN — PREDNISONE 40 MG: 20 TABLET ORAL at 08:20

## 2024-05-04 RX ADMIN — CARBAMAZEPINE 400 MG: 200 TABLET ORAL at 08:20

## 2024-05-04 RX ADMIN — NYSTATIN: 100000 CREAM TOPICAL at 08:23

## 2024-05-04 RX ADMIN — LISINOPRIL 20 MG: 20 TABLET ORAL at 08:20

## 2024-05-04 RX ADMIN — CARBAMAZEPINE 200 MG: 200 TABLET ORAL at 12:07

## 2024-05-04 RX ADMIN — GUAIFENESIN 600 MG: 600 TABLET, EXTENDED RELEASE ORAL at 08:20

## 2024-05-04 RX ADMIN — IPRATROPIUM BROMIDE AND ALBUTEROL SULFATE 3 ML: .5; 3 SOLUTION RESPIRATORY (INHALATION) at 11:35

## 2024-05-04 ASSESSMENT — ACTIVITIES OF DAILY LIVING (ADL)
ADLS_ACUITY_SCORE: 29
ADLS_ACUITY_SCORE: 29
ADLS_ACUITY_SCORE: 39
ADLS_ACUITY_SCORE: 33
ADLS_ACUITY_SCORE: 39
ADLS_ACUITY_SCORE: 33
ADLS_ACUITY_SCORE: 39
ADLS_ACUITY_SCORE: 33
ADLS_ACUITY_SCORE: 33
ADLS_ACUITY_SCORE: 39
ADLS_ACUITY_SCORE: 33
ADLS_ACUITY_SCORE: 33
ADLS_ACUITY_SCORE: 29
ADLS_ACUITY_SCORE: 33
ADLS_ACUITY_SCORE: 33

## 2024-05-04 NOTE — PLAN OF CARE
"Pertinent assessments: A&O. Infreq cough, denies SOB. Required 1L O2 most of the day, encouraged IS and ambulating, able to wean to RA at 1630. Tylenol given. Incontinent at times, cleaned in fold and applied nystatin.     Major Shift Events: Uneventful     Treatment Plan: O2 monitoring, symptom management, omnicef, zithro, prednisone, mucinex     Goal Outcome Evaluation:      Plan of Care Reviewed With: patient    Overall Patient Progress: improvingOverall Patient Progress: improving    Outcome Evaluation: weaned to RA        Problem: Adult Inpatient Plan of Care  Goal: Plan of Care Review  Description: The Plan of Care Review/Shift note should be completed every shift.  The Outcome Evaluation is a brief statement about your assessment that the patient is improving, declining, or no change.  This information will be displayed automatically on your shift  note.  Outcome: Progressing  Flowsheets (Taken 5/4/2024 1842)  Outcome Evaluation: weaned to RA  Plan of Care Reviewed With: patient  Overall Patient Progress: improving  Goal: Patient-Specific Goal (Individualized)  Description: You can add care plan individualizations to a care plan. Examples of Individualization might be:  \"Parent requests to be called daily at 9am for status\", \"I have a hard time hearing out of my right ear\", or \"Do not touch me to wake me up as it startles  me\".  Outcome: Progressing  Goal: Absence of Hospital-Acquired Illness or Injury  Outcome: Progressing  Intervention: Identify and Manage Fall Risk  Recent Flowsheet Documentation  Taken 5/4/2024 0820 by Katie Carlton, RN  Safety Promotion/Fall Prevention:   activity supervised   assistive device/personal items within reach   clutter free environment maintained   increased rounding and observation   increase visualization of patient   nonskid shoes/slippers when out of bed   patient and family education   safety round/check completed  Intervention: Prevent and Manage VTE (Venous " Thromboembolism) Risk  Recent Flowsheet Documentation  Taken 5/4/2024 0820 by Katie Carlton RN  VTE Prevention/Management: SCDs (sequential compression devices) off  Intervention: Prevent Infection  Recent Flowsheet Documentation  Taken 5/4/2024 0820 by Katie Carlton RN  Infection Prevention:   rest/sleep promoted   single patient room provided   hand hygiene promoted  Goal: Optimal Comfort and Wellbeing  Outcome: Progressing  Goal: Readiness for Transition of Care  Outcome: Progressing     Problem: Comorbidity Management  Goal: Maintenance of Asthma Control  Outcome: Progressing  Intervention: Maintain Asthma Symptom Control  Recent Flowsheet Documentation  Taken 5/4/2024 0820 by Katie Carlton RN  Medication Review/Management: medications reviewed  Goal: Maintenance of Behavioral Health Symptom Control  Outcome: Progressing  Intervention: Maintain Behavioral Health Symptom Control  Recent Flowsheet Documentation  Taken 5/4/2024 0820 by Katie Carlton RN  Medication Review/Management: medications reviewed  Goal: Maintenance of COPD Symptom Control  Outcome: Progressing  Intervention: Maintain COPD Symptom Control  Recent Flowsheet Documentation  Taken 5/4/2024 0820 by Katie Carlton RN  Medication Review/Management: medications reviewed  Goal: Blood Glucose Levels Within Targeted Range  Outcome: Progressing  Intervention: Monitor and Manage Glycemia  Recent Flowsheet Documentation  Taken 5/4/2024 0820 by Katie Carlton RN  Medication Review/Management: medications reviewed  Goal: Maintenance of Heart Failure Symptom Control  Outcome: Progressing  Intervention: Maintain Heart Failure Management  Recent Flowsheet Documentation  Taken 5/4/2024 0820 by Katie Carlton RN  Medication Review/Management: medications reviewed  Goal: Blood Pressure in Desired Range  Outcome: Progressing  Intervention: Maintain Blood Pressure Management  Recent Flowsheet Documentation  Taken 5/4/2024 0820 by Katie Carlton  RN  Medication Review/Management: medications reviewed  Goal: Maintenance of Osteoarthritis Symptom Control  Outcome: Progressing  Intervention: Maintain Osteoarthritis Symptom Control  Recent Flowsheet Documentation  Taken 5/4/2024 1751 by Katie Carlton RN  Assistive Device Utilized:   walker   gait belt  Activity Management:   ambulated outside room   ambulated to bathroom  Taken 5/4/2024 1622 by Katie Carlton RN  Assistive Device Utilized:   gait belt   walker  Activity Management:   ambulated to bathroom   ambulated outside room  Taken 5/4/2024 1435 by Katie Carlton RN  Assistive Device Utilized:   gait belt   walker  Activity Management: ambulated to bathroom  Taken 5/4/2024 1339 by Katie Carlton, RN  Assistive Device Utilized:   gait belt   walker  Activity Management:   ambulated to bathroom   ambulated outside room  Taken 5/4/2024 1135 by Katie Carlton RN  Activity Management: patient refuses activity  Taken 5/4/2024 0905 by Katie Carlton RN  Activity Management: patient refuses activity  Taken 5/4/2024 0857 by Katie Carlton RN  Assistive Device Utilized: walker  Activity Management:   ambulated to bathroom   back to bed  Taken 5/4/2024 0820 by Katie Carlton RN  Assistive Device Utilized: walker  Activity Management:   ambulated to bathroom   back to bed  Medication Review/Management: medications reviewed  Goal: Bariatric Home Regimen Maintained  Outcome: Progressing  Intervention: Maintain and Manage Postbariatric Surgery Care  Recent Flowsheet Documentation  Taken 5/4/2024 0820 by Katie Carlton RN  Medication Review/Management: medications reviewed  Goal: Maintenance of Seizure Control  Outcome: Progressing  Intervention: Maintain Seizure Symptom Control  Recent Flowsheet Documentation  Taken 5/4/2024 0820 by Katie Carlton RN  Medication Review/Management: medications reviewed     Problem: Pneumonia  Goal: Fluid Balance  Outcome: Progressing  Goal: Resolution of Infection Signs  and Symptoms  Outcome: Progressing  Goal: Effective Oxygenation and Ventilation  Outcome: Progressing  Intervention: Promote Airway Secretion Clearance  Recent Flowsheet Documentation  Taken 5/4/2024 1207 by Katie Carlton, RN  Cough And Deep Breathing: done independently per patient  Taken 5/4/2024 0820 by Katie Carlton, RN  Cough And Deep Breathing: done independently per patient

## 2024-05-04 NOTE — PROGRESS NOTES
PRIMARY DIAGNOSIS: PNEUMONIA  OUTPATIENT/OBSERVATION GOALS TO BE MET BEFORE DISCHARGE:  Dyspnea improved and O2 sats >88% on RA or back to baseline O2 levels: Yes   SpO2: 90 %, O2 Device: Nasal cannula    Tolerating oral abx or appropriate plans made outpatient infusion: Yes    Vitals signs normal or return to baseline: No    Short term supplemental O2 needed with activity at home: No    Tolerate oral intake to maintain hydration: Yes    Return to near baseline physical activity: Yes    Discharge Planner Nurse   Safe discharge environment identified: Yes  Barriers to discharge: Yes, requiring O2       Entered by: Katie Carlton RN 05/04/2024 11:52 AM     Please review provider order for any additional goals.   Nurse to notify provider when observation goals have been met and patient is ready for discharge.

## 2024-05-04 NOTE — PLAN OF CARE
"Pertinent assessments: A&Ox4. VSS on room air, afebrile. Infreq cough, denies SOB. Tylenol given x1 for back pain. Incontinent at times. Up SBA     Major Shift Events: None  Treatment Plan: O2 monitoring, symptom management  Bedside Nurse: Nhi Grullon RN     Goal Outcome Evaluation:      Plan of Care Reviewed With: patient    Overall Patient Progress: improvingOverall Patient Progress: improving    Outcome Evaluation: Room air this shift      Problem: Adult Inpatient Plan of Care  Goal: Plan of Care Review  Description: The Plan of Care Review/Shift note should be completed every shift.  The Outcome Evaluation is a brief statement about your assessment that the patient is improving, declining, or no change.  This information will be displayed automatically on your shift  note.  Outcome: Progressing  Flowsheets (Taken 5/4/2024 0714)  Outcome Evaluation: Room air this shift  Plan of Care Reviewed With: patient  Overall Patient Progress: improving  Goal: Patient-Specific Goal (Individualized)  Description: You can add care plan individualizations to a care plan. Examples of Individualization might be:  \"Parent requests to be called daily at 9am for status\", \"I have a hard time hearing out of my right ear\", or \"Do not touch me to wake me up as it startles  me\".  Outcome: Progressing  Goal: Absence of Hospital-Acquired Illness or Injury  Outcome: Progressing  Intervention: Identify and Manage Fall Risk  Recent Flowsheet Documentation  Taken 5/3/2024 2000 by Nhi Grullon, RN  Safety Promotion/Fall Prevention:   activity supervised   assistive device/personal items within reach   clutter free environment maintained   increased rounding and observation   safety round/check completed   supervised activity  Intervention: Prevent Skin Injury  Recent Flowsheet Documentation  Taken 5/3/2024 2000 by Nhi Grullon, RN  Body Position: position changed independently  Intervention: Prevent and Manage VTE (Venous " Thromboembolism) Risk  Recent Flowsheet Documentation  Taken 5/3/2024 2000 by Nhi Grullon RN  VTE Prevention/Management: SCDs (sequential compression devices) off  Intervention: Prevent Infection  Recent Flowsheet Documentation  Taken 5/3/2024 2000 by Nhi Grullon RN  Infection Prevention: hand hygiene promoted  Goal: Optimal Comfort and Wellbeing  Outcome: Progressing  Intervention: Monitor Pain and Promote Comfort  Recent Flowsheet Documentation  Taken 5/3/2024 2000 by Nhi Grullon RN  Pain Management Interventions: medication (see MAR)  Goal: Readiness for Transition of Care  Outcome: Progressing     Problem: Comorbidity Management  Goal: Maintenance of Asthma Control  Outcome: Progressing  Intervention: Maintain Asthma Symptom Control  Recent Flowsheet Documentation  Taken 5/3/2024 2000 by Nhi Grullon RN  Medication Review/Management: medications reviewed  Goal: Maintenance of Behavioral Health Symptom Control  Outcome: Progressing  Intervention: Maintain Behavioral Health Symptom Control  Recent Flowsheet Documentation  Taken 5/3/2024 2000 by Nhi Grullon RN  Medication Review/Management: medications reviewed  Goal: Maintenance of COPD Symptom Control  Outcome: Progressing  Intervention: Maintain COPD Symptom Control  Recent Flowsheet Documentation  Taken 5/3/2024 2000 by Nhi Grullon, RN  Medication Review/Management: medications reviewed  Goal: Blood Glucose Levels Within Targeted Range  Outcome: Progressing  Intervention: Monitor and Manage Glycemia  Recent Flowsheet Documentation  Taken 5/3/2024 2000 by Nhi Grullon, RN  Medication Review/Management: medications reviewed  Goal: Maintenance of Heart Failure Symptom Control  Outcome: Progressing  Intervention: Maintain Heart Failure Management  Recent Flowsheet Documentation  Taken 5/3/2024 2000 by Nhi Grullon, RN  Medication Review/Management: medications reviewed  Goal: Blood Pressure in Desired Range  Outcome:  Progressing  Intervention: Maintain Blood Pressure Management  Recent Flowsheet Documentation  Taken 5/3/2024 2000 by Nhi Grullon, RN  Medication Review/Management: medications reviewed  Goal: Maintenance of Osteoarthritis Symptom Control  Outcome: Progressing  Intervention: Maintain Osteoarthritis Symptom Control  Recent Flowsheet Documentation  Taken 5/3/2024 2352 by Nhi Grullon, RN  Assistive Device Utilized: walker  Activity Management: ambulated to bathroom  Taken 5/3/2024 2000 by Nhi Grullon, RN  Assistive Device Utilized: walker  Activity Management:   activity adjusted per tolerance   ambulated to bathroom   back to bed  Medication Review/Management: medications reviewed  Goal: Bariatric Home Regimen Maintained  Outcome: Progressing  Intervention: Maintain and Manage Postbariatric Surgery Care  Recent Flowsheet Documentation  Taken 5/3/2024 2000 by Nhi Grullon, RN  Medication Review/Management: medications reviewed  Goal: Maintenance of Seizure Control  Outcome: Progressing  Intervention: Maintain Seizure Symptom Control  Recent Flowsheet Documentation  Taken 5/3/2024 2000 by Nhi Grullon, RN  Medication Review/Management: medications reviewed

## 2024-05-04 NOTE — PROGRESS NOTES
[facility]    Medicine Progress Note - Hospitalist Service    Date of Admission:  5/2/2024    Assessment & Plan     Brissa Corado is a 58 year old female with PMH significant for mild intermittent asthma, HTN, cushing syndrome, developmental delay with parents as guardians, HLD, seizure disorder, RICK, GERD, MDD, morbid obesity, and previous SBO who presents to the ED for the second time in 3 days due to concern for shortness of breath related to an asthma exacerbation.      ED workup reveals: initially hypoxic down to 88% on RA with improvement into 90s after nebulizer treatment, CBC unremarkable, CO2 of 30, calcium of 8.5, glucose of 131, proBNP <36, troponin of 8, magnesium of 2.0, normal lactic acid, VBG shows pH of 7.41, bicarbonate of 35, pCO2 of 55, and pO2 of 52, EKG shows rate of 81 bpm in SR, and CXR shows diffuse bilateral interstitial opacities seen throughout the lungs suggestive of pulmonary edema or atypical pneumonia.      #Acute exacerbation of mild intermittent asthma   #?Atypical community acquired pneumonia  Patient initially seen in the ED on 4/30 for shortness of breath and wheezing.  At that time she was diagnosed with a mild intermittent asthma exacerbation and improved with breathing treatments and received a dose of Decadron but no additional steroids at discharge.  The patient returns to the ED via EMS on 5/2 for ongoing shortness of breath and now with a cough that is productive of clear sputum.  She notes associated generalized chest pain with coughing but denies any recent fevers or chills.  Initially had wheezing in the emergency room which improved with breathing treatments.  Mildly hypoxic down to 88% on room air with improvement into the 90s after nebulizer treatment.  No leukocytosis and afebrile. CXR shows diffuse bilateral interstitial opacities suggestive of pulmonary edema or atypical pneumonia.  No significant wheezing or crackles on examination but  difficult to auscultate based on body habitus.      -scheduled duo nebs QID with PRN albuterol nebulizers  -Encourage incentive spirometry every 2 hours  -received IV Rocephin and IV AZA in ED, continue PO Cefdinir and AZA  -PRN supplemental oxygen, wean as tolerated  -Mucinex BID and PRN Tessalon pearls for cough  -PO Prednisone 40 mg x 5 days   -Was able to wean off of oxygen yesterday and remained on room air for several hours.  She required oxygen again this morning and unable to wean off due to desaturations.  Patient does not feel significantly different in her symptoms and overall feels well.  Suspecting developing atelectasis as patient has been laying in bed since admission without much movement.  Encourage incentive spirometry.  Will consult physical therapy as well to assist with discharge planning.      #HTN  -continue PTA Lisinopril and monitor     #HLD  -resume Lovastatin at discharge      #Seizure disorder  Reports no seizure activity since she was 18.   -continue PTA Carbamazepine      #GERD  -resume PTA Omeprazole      #MDD  -continue PTA Sertraline     #Developmental delay with guardians   #Complex social situation, concern for living arrangements and possible vulnerable adult   Patient lives with her , whom she reports does the cleaning in the home as well works. The patient herself reports not working. Per EMS the patient's home was extremely unkept and per ED staff there was concern the patient has not bathed in several days with dried urine and/or stool on her skin. Patient reports regular hygiene during interview. Patient's parents are listed as guardians, ED provider contacted and updated patient's mother. Per patient her mother is not to visit her in the hospital and she is working with the court to not have her parents be her guardians any longer but planning for her adopted father to be her guardian. Reached out to the patient's mother, Lola, she states the patient has not been  "allowed to see her parents because of her significant other not letting anyone in their apartment due to being \"horrible people\" and there is some concern she may be \"delusional\".   -monitor psychiatric symptoms, if patient truly delusional, appearing paranoid, or hallucinating then she may warrant psychiatric consultation this stay  -SW consulted and following       Observation Goals: -diagnostic tests and consults completed and resulted, -vital signs normal or at patient baseline, -tolerating oral intake to maintain hydration, -dyspnea improved and O2 sats greater than 88% on room air or prior home oxygen levels, -returns to baseline functional status, -safe disposition plan has been identified, Nurse to notify provider when observation goals have been met and patient is ready for discharge.  Diet: Regular Diet Adult    DVT Prophylaxis: Pneumatic Compression Devices  Grajeda Catheter: Not present  Lines: None     Cardiac Monitoring: None  Code Status: Full Code      Clinically Significant Risk Factors Present on Admission                  # Hypertension: Noted on problem list   # Acute Respiratory Failure: Documented O2 saturation < 91%.  Continue supplemental oxygen as needed     # Severe Obesity: Estimated body mass index is 63.45 kg/m  as calculated from the following:    Height as of this encounter: 1.702 m (5' 7\").    Weight as of this encounter: 183.8 kg (405 lb 1.9 oz).       # Asthma: noted on problem list        Disposition Plan     Medically Ready for Discharge: Anticipated Tomorrow             Andreea Mcdonald MD  Hospitalist Service  [facility]  Securely message with [employer] (more info)  Text page via [employer] Paging/Directory   ______________________________________________________________________    Interval History   No acute events overnight.  Was able to wean off of oxygen yesterday and remained on room air for several hours.  She required oxygen again this morning and unable to wean " off due to desaturations.  Patient does not feel significantly different in her symptoms and overall feels well.    4 point review of system is otherwise negative    Physical Exam   Vital Signs: Temp: 97.4  F (36.3  C) Temp src: Oral BP: 136/49 Pulse: 79   Resp: 18 SpO2: 90 % O2 Device: Nasal cannula Oxygen Delivery: 1 LPM  Weight: 405 lbs 1.88 oz    Constitutional: awake, alert, cooperative, no apparent distress, and appears stated age  Eyes: Anicteric sclera  ENT: normocephalic, without obvious abnormality  Respiratory: On room air, no increased work of breathing, equal air entry bilaterally, no wheezing.  Cardiovascular: Normal rate, regular rhythm, no murmur  GI: Soft, nontender, nondistended, obese  Musculoskeletal: no lower extremity pitting edema present  Neurologic: Awake, alert, oriented to name, place and time.    Neuropsychiatric: Appropriate mood and affect    Medical Decision Making       45 MINUTES SPENT BY ME on the date of service doing chart review, history, exam, documentation & further activities per the note.      Data   ------------------------- PAST 24 HR DATA REVIEWED -----------------------------------------------

## 2024-05-04 NOTE — UTILIZATION REVIEW
Admission Status; Secondary Review Determination       Under the authority of the Utilization Management Committee, the utilization review process indicated a secondary review on the above patient. The review outcome is based on review of the medical records, discussions with staff, and applying clinical experience noted on the date of the review.     (x) Inpatient Status Appropriate - This patient's medical care is consistent with medical management for inpatient care and reasonable inpatient medical practice.     RATIONALE FOR DETERMINATION      Patient requires inpatient admission versus short stay observation or outpatient treatment for the following reasons:       The Patient is 57 y/o  woman,  with PMHx significant for  mild intermittent asthma, HTN, cushing syndrome, developmental delay with parents as guardians, HLD, seizure disorder, RICK, GERD, MDD, morbid obesity, and previous SBO who presents to the ED for the second time in 3 days due to concern for shortness of breath related to an asthma exacerbation.     In emergency room she was found hypoxemic at 88% on RA and the chest x-ray shows diffuse bilateral interstitial opacities suggestive of pulmonary edema or atypical pneumonia.  The patient received 1 dose IV Rocephin and then she was continued on oral antibiotics.    This morning she had a trial of 1 in the O2 supplements, but her SpO2 dropped to 87% and she is back to 1 L of oxygen    58-year-old woman with history of asthma presents with asthma exacerbation most likely secondary bilateral atypical pneumonia and acute respiratory failure with hypoxemia at 88% in room air which persist today in spite of treatment with frequent nebulizer treatment and antibiotics    The expected length of stay at the time of admission was more than 2 nights because of the severity of illness, intensity of service provided, and risk for adverse outcome. Inpatient admission is appropriate.     Dr Andreea Mcdonald notified      This document was produced using voice recognition software       The information on this document is developed by the utilization review team in order for the business office to ensure compliance. This only denotes the appropriateness of proper admission status and does not reflect the quality of care rendered.   The definitions of Inpatient Status and Observation Status used in making the determination above are those provided in the CMS Coverage Manual, Chapter 1 and Chapter 6, section 70.4.   Sincerely,   COOPER CALLEJAS MD   Utilization Review  Physician Advisor  [facility]

## 2024-05-05 ENCOUNTER — APPOINTMENT (OUTPATIENT)
Dept: CARDIOLOGY | Facility: CLINIC | Age: 59
DRG: 193 | End: 2024-05-05
Attending: STUDENT IN AN ORGANIZED HEALTH CARE EDUCATION/TRAINING PROGRAM
Payer: MEDICARE

## 2024-05-05 ENCOUNTER — APPOINTMENT (OUTPATIENT)
Dept: PHYSICAL THERAPY | Facility: CLINIC | Age: 59
DRG: 193 | End: 2024-05-05
Attending: STUDENT IN AN ORGANIZED HEALTH CARE EDUCATION/TRAINING PROGRAM
Payer: MEDICARE

## 2024-05-05 LAB — LVEF ECHO: NORMAL

## 2024-05-05 PROCEDURE — 94640 AIRWAY INHALATION TREATMENT: CPT

## 2024-05-05 PROCEDURE — 94640 AIRWAY INHALATION TREATMENT: CPT | Mod: 76

## 2024-05-05 PROCEDURE — 97161 PT EVAL LOW COMPLEX 20 MIN: CPT | Mod: GP | Performed by: PHYSICAL THERAPIST

## 2024-05-05 PROCEDURE — 93306 TTE W/DOPPLER COMPLETE: CPT | Mod: 26 | Performed by: INTERNAL MEDICINE

## 2024-05-05 PROCEDURE — 250N000013 HC RX MED GY IP 250 OP 250 PS 637: Performed by: PHYSICIAN ASSISTANT

## 2024-05-05 PROCEDURE — 250N000012 HC RX MED GY IP 250 OP 636 PS 637: Performed by: PHYSICIAN ASSISTANT

## 2024-05-05 PROCEDURE — 999N000157 HC STATISTIC RCP TIME EA 10 MIN

## 2024-05-05 PROCEDURE — 255N000002 HC RX 255 OP 636: Performed by: HOSPITALIST

## 2024-05-05 PROCEDURE — 97116 GAIT TRAINING THERAPY: CPT | Mod: GP | Performed by: PHYSICAL THERAPIST

## 2024-05-05 PROCEDURE — 99233 SBSQ HOSP IP/OBS HIGH 50: CPT | Performed by: STUDENT IN AN ORGANIZED HEALTH CARE EDUCATION/TRAINING PROGRAM

## 2024-05-05 PROCEDURE — 250N000011 HC RX IP 250 OP 636: Performed by: STUDENT IN AN ORGANIZED HEALTH CARE EDUCATION/TRAINING PROGRAM

## 2024-05-05 PROCEDURE — 999N000208 ECHOCARDIOGRAM COMPLETE

## 2024-05-05 PROCEDURE — 120N000001 HC R&B MED SURG/OB

## 2024-05-05 PROCEDURE — 250N000009 HC RX 250: Performed by: PHYSICIAN ASSISTANT

## 2024-05-05 PROCEDURE — 97530 THERAPEUTIC ACTIVITIES: CPT | Mod: GP | Performed by: PHYSICAL THERAPIST

## 2024-05-05 PROCEDURE — 250N000013 HC RX MED GY IP 250 OP 250 PS 637: Performed by: STUDENT IN AN ORGANIZED HEALTH CARE EDUCATION/TRAINING PROGRAM

## 2024-05-05 PROCEDURE — C8929 TTE W OR WO FOL WCON,DOPPLER: HCPCS

## 2024-05-05 RX ORDER — FUROSEMIDE 10 MG/ML
40 INJECTION INTRAMUSCULAR; INTRAVENOUS ONCE
Status: COMPLETED | OUTPATIENT
Start: 2024-05-05 | End: 2024-05-05

## 2024-05-05 RX ADMIN — CEFDINIR 300 MG: 300 CAPSULE ORAL at 08:41

## 2024-05-05 RX ADMIN — GUAIFENESIN 600 MG: 600 TABLET, EXTENDED RELEASE ORAL at 20:02

## 2024-05-05 RX ADMIN — ACETAMINOPHEN 650 MG: 325 TABLET, FILM COATED ORAL at 20:08

## 2024-05-05 RX ADMIN — FUROSEMIDE 40 MG: 10 INJECTION, SOLUTION INTRAMUSCULAR; INTRAVENOUS at 17:36

## 2024-05-05 RX ADMIN — NYSTATIN: 100000 CREAM TOPICAL at 20:03

## 2024-05-05 RX ADMIN — IPRATROPIUM BROMIDE AND ALBUTEROL SULFATE 3 ML: .5; 3 SOLUTION RESPIRATORY (INHALATION) at 08:15

## 2024-05-05 RX ADMIN — CEFDINIR 300 MG: 300 CAPSULE ORAL at 20:02

## 2024-05-05 RX ADMIN — IPRATROPIUM BROMIDE AND ALBUTEROL SULFATE 3 ML: .5; 3 SOLUTION RESPIRATORY (INHALATION) at 12:03

## 2024-05-05 RX ADMIN — PANTOPRAZOLE SODIUM 40 MG: 40 TABLET, DELAYED RELEASE ORAL at 08:41

## 2024-05-05 RX ADMIN — NYSTATIN: 100000 CREAM TOPICAL at 08:44

## 2024-05-05 RX ADMIN — CARBAMAZEPINE 400 MG: 200 TABLET ORAL at 08:41

## 2024-05-05 RX ADMIN — CARBAMAZEPINE 400 MG: 200 TABLET ORAL at 22:07

## 2024-05-05 RX ADMIN — GUAIFENESIN 600 MG: 600 TABLET, EXTENDED RELEASE ORAL at 08:41

## 2024-05-05 RX ADMIN — SERTRALINE HYDROCHLORIDE 100 MG: 100 TABLET ORAL at 08:41

## 2024-05-05 RX ADMIN — AZITHROMYCIN DIHYDRATE 250 MG: 250 TABLET ORAL at 08:41

## 2024-05-05 RX ADMIN — LISINOPRIL 20 MG: 20 TABLET ORAL at 08:41

## 2024-05-05 RX ADMIN — FUROSEMIDE 40 MG: 10 INJECTION, SOLUTION INTRAMUSCULAR; INTRAVENOUS at 10:32

## 2024-05-05 RX ADMIN — IPRATROPIUM BROMIDE AND ALBUTEROL SULFATE 3 ML: .5; 3 SOLUTION RESPIRATORY (INHALATION) at 19:33

## 2024-05-05 RX ADMIN — HUMAN ALBUMIN MICROSPHERES AND PERFLUTREN 3 ML: 10; .22 INJECTION, SOLUTION INTRAVENOUS at 10:09

## 2024-05-05 RX ADMIN — CARBAMAZEPINE 200 MG: 200 TABLET ORAL at 11:59

## 2024-05-05 RX ADMIN — IPRATROPIUM BROMIDE AND ALBUTEROL SULFATE 3 ML: .5; 3 SOLUTION RESPIRATORY (INHALATION) at 16:39

## 2024-05-05 RX ADMIN — PREDNISONE 40 MG: 20 TABLET ORAL at 08:41

## 2024-05-05 ASSESSMENT — ACTIVITIES OF DAILY LIVING (ADL)
ADLS_ACUITY_SCORE: 33
ADLS_ACUITY_SCORE: 35
ADLS_ACUITY_SCORE: 29
ADLS_ACUITY_SCORE: 35
ADLS_ACUITY_SCORE: 33
ADLS_ACUITY_SCORE: 33
ADLS_ACUITY_SCORE: 29
ADLS_ACUITY_SCORE: 37
ADLS_ACUITY_SCORE: 29
ADLS_ACUITY_SCORE: 29
ADLS_ACUITY_SCORE: 35
ADLS_ACUITY_SCORE: 29
ADLS_ACUITY_SCORE: 35
ADLS_ACUITY_SCORE: 29
ADLS_ACUITY_SCORE: 35
ADLS_ACUITY_SCORE: 29
ADLS_ACUITY_SCORE: 37
ADLS_ACUITY_SCORE: 35
ADLS_ACUITY_SCORE: 35
ADLS_ACUITY_SCORE: 29

## 2024-05-05 NOTE — PLAN OF CARE
"Pt alert and oriented, C/o back pain, voiding frequently. Dyspnea on exertion, on 1L O2. Up with SBA and walker. No iv access.     Problem: Adult Inpatient Plan of Care  Goal: Plan of Care Review  Description: The Plan of Care Review/Shift note should be completed every shift.  The Outcome Evaluation is a brief statement about your assessment that the patient is improving, declining, or no change.  This information will be displayed automatically on your shift  note.  Outcome: Adequate for Care Transition  Flowsheets (Taken 5/5/2024 0754)  Outcome Evaluation: Pt on 1L O2, sating at 92%  Plan of Care Reviewed With: patient  Goal: Patient-Specific Goal (Individualized)  Description: You can add care plan individualizations to a care plan. Examples of Individualization might be:  \"Parent requests to be called daily at 9am for status\", \"I have a hard time hearing out of my right ear\", or \"Do not touch me to wake me up as it startles  me\".  Outcome: Adequate for Care Transition  Goal: Absence of Hospital-Acquired Illness or Injury  Outcome: Adequate for Care Transition  Intervention: Identify and Manage Fall Risk  Recent Flowsheet Documentation  Taken 5/4/2024 2011 by Johana Carreon, RN  Safety Promotion/Fall Prevention:   activity supervised   assistive device/personal items within reach   clutter free environment maintained   increased rounding and observation   increase visualization of patient   nonskid shoes/slippers when out of bed   patient and family education   safety round/check completed  Intervention: Prevent Skin Injury  Recent Flowsheet Documentation  Taken 5/4/2024 2011 by Johana Carreon, RN  Body Position: position changed independently  Intervention: Prevent and Manage VTE (Venous Thromboembolism) Risk  Recent Flowsheet Documentation  Taken 5/4/2024 2011 by Johana Carreon, RN  VTE Prevention/Management: SCDs (sequential compression devices) off  Goal: Optimal Comfort and " Wellbeing  Outcome: Adequate for Care Transition  Goal: Readiness for Transition of Care  Outcome: Adequate for Care Transition     Problem: Comorbidity Management  Goal: Maintenance of Asthma Control  Outcome: Adequate for Care Transition  Intervention: Maintain Asthma Symptom Control  Recent Flowsheet Documentation  Taken 5/4/2024 2011 by Johana Carreon RN  Medication Review/Management: medications reviewed  Goal: Maintenance of Behavioral Health Symptom Control  Outcome: Adequate for Care Transition  Intervention: Maintain Behavioral Health Symptom Control  Recent Flowsheet Documentation  Taken 5/4/2024 2011 by Johana Carreon, RN  Medication Review/Management: medications reviewed  Goal: Maintenance of COPD Symptom Control  Outcome: Adequate for Care Transition  Intervention: Maintain COPD Symptom Control  Recent Flowsheet Documentation  Taken 5/4/2024 2011 by Johana Carreon RN  Medication Review/Management: medications reviewed  Goal: Blood Glucose Levels Within Targeted Range  Outcome: Adequate for Care Transition  Intervention: Monitor and Manage Glycemia  Recent Flowsheet Documentation  Taken 5/4/2024 2011 by Johana Carreon, RN  Medication Review/Management: medications reviewed  Goal: Maintenance of Heart Failure Symptom Control  Outcome: Adequate for Care Transition  Intervention: Maintain Heart Failure Management  Recent Flowsheet Documentation  Taken 5/4/2024 2011 by Johana Carreon RN  Medication Review/Management: medications reviewed  Goal: Blood Pressure in Desired Range  Outcome: Adequate for Care Transition  Intervention: Maintain Blood Pressure Management  Recent Flowsheet Documentation  Taken 5/4/2024 2011 by Johana Carreon RN  Medication Review/Management: medications reviewed  Goal: Maintenance of Osteoarthritis Symptom Control  Outcome: Adequate for Care Transition  Intervention: Maintain Osteoarthritis Symptom Control  Recent Flowsheet  Documentation  Taken 5/4/2024 2011 by Johana Carreon, RN  Assistive Device Utilized:   walker   gait belt  Activity Management: ambulated to bathroom  Medication Review/Management: medications reviewed  Goal: Bariatric Home Regimen Maintained  Outcome: Adequate for Care Transition  Intervention: Maintain and Manage Postbariatric Surgery Care  Recent Flowsheet Documentation  Taken 5/4/2024 2011 by Johana Carreon, RN  Medication Review/Management: medications reviewed  Goal: Maintenance of Seizure Control  Outcome: Adequate for Care Transition  Intervention: Maintain Seizure Symptom Control  Recent Flowsheet Documentation  Taken 5/4/2024 2011 by Johana Carreon, RN  Medication Review/Management: medications reviewed     Problem: Pneumonia  Goal: Fluid Balance  Outcome: Adequate for Care Transition  Goal: Resolution of Infection Signs and Symptoms  Outcome: Adequate for Care Transition  Goal: Effective Oxygenation and Ventilation  Outcome: Adequate for Care Transition  Intervention: Promote Airway Secretion Clearance  Recent Flowsheet Documentation  Taken 5/4/2024 2011 by Johana Carreon, RN  Cough And Deep Breathing: done independently per patient  Intervention: Optimize Oxygenation and Ventilation  Recent Flowsheet Documentation  Taken 5/4/2024 2011 by Johana Carreon, RN  Head of Bed (HOB) Positioning: HOB at 20-30 degrees     Problem: Fall Injury Risk  Goal: Absence of Fall and Fall-Related Injury  Outcome: Adequate for Care Transition  Intervention: Identify and Manage Contributors  Recent Flowsheet Documentation  Taken 5/4/2024 2011 by Johana Carreon, RN  Medication Review/Management: medications reviewed  Intervention: Promote Injury-Free Environment  Recent Flowsheet Documentation  Taken 5/4/2024 2011 by Johana Carreon, RN  Safety Promotion/Fall Prevention:   activity supervised   assistive device/personal items within reach   clutter free environment maintained    increased rounding and observation   increase visualization of patient   nonskid shoes/slippers when out of bed   patient and family education   safety round/check completed   Goal Outcome Evaluation:      Plan of Care Reviewed With: patient          Outcome Evaluation: Pt on 1L O2, sating at 92%

## 2024-05-05 NOTE — PROGRESS NOTES
05/05/24 1550   Appointment Info   Signing Clinician's Name / Credentials (PT) Hollie Bergeron PT   Living Environment   People in Home spouse   Current Living Arrangements apartment   Home Accessibility no concerns   Transportation Anticipated family or friend will provide   Self-Care   Usual Activity Tolerance moderate   Current Activity Tolerance moderate   Regular Exercise No   Equipment Currently Used at Home walker, rolling  (has own 4WW)   Fall history within last six months no   Activity/Exercise/Self-Care Comment reports having bedrails; does own bathing and dressing; spouse does cooking   General Information   Onset of Illness/Injury or Date of Surgery 05/02/24   Referring Physician Andreea Mcdonald MD   Patient/Family Therapy Goals Statement (PT) return home with spouse   Pertinent History of Current Problem (include personal factors and/or comorbidities that impact the POC) Brissa Corado is a 58 year old female with PMH significant for mild intermittent asthma, HTN, cushing syndrome, developmental delay with parents as guardians, HLD, seizure disorder, RICK, GERD, MDD, morbid obesity, and previous SBO who presents to the ED for the second time in 3 days due to concern for shortness of breath related to an asthma exacerbation; ? atypical CAP; see medical record for further information   Existing Precautions/Restrictions fall   General Observations patient in bed ;agreeable to PT   Cognition   Cognitive Status Comments developmental delay per chart; appears oriented during session   Pain Assessment   Patient Currently in Pain No   Posture    Posture Comments forward flexed posture   Range of Motion (ROM)   ROM Comment LE's limited by body habitus   Strength (Manual Muscle Testing)   Strength Comments mild functional weakness/decreased activity tolerance from current illness; just slightly below her normal per patient report   Bed Mobility   Comment, (Bed Mobility) SBA to mod I with heavy use of bedrail; has  bedrails at home   Transfers   Comment, (Transfers) sit>stand with SBA and momentum; use of walker for support in standing   Gait/Stairs (Locomotion)   Comment, (Gait/Stairs) able to ambulate with rolling walker and SBA   Balance   Balance Comments baseline need for 4WW; no gross LOB noted   Clinical Impression   Criteria for Skilled Therapeutic Intervention Yes, treatment indicated   PT Diagnosis (PT) impaired functional mobility   Influenced by the following impairments decreased activity tolerance; mild functional weakness; impaired balance   Functional limitations due to impairments impaired independence with mobility and cares secondary to above deficits   Clinical Presentation (PT Evaluation Complexity) stable   Clinical Presentation Rationale clinical judgement; level of assist   Clinical Decision Making (Complexity) low complexity   Planned Therapy Interventions (PT) gait training;transfer training;progressive activity/exercise   Risk & Benefits of therapy have been explained evaluation/treatment results reviewed;care plan/treatment goals reviewed;risks/benefits reviewed;current/potential barriers reviewed;participants voiced agreement with care plan;participants included;patient   Clinical Impression Comments just below recent baseline   PT Total Evaluation Time   PT Eval, Low Complexity Minutes (08822) 12   Physical Therapy Goals   PT Frequency One time eval and treatment only   PT Predicted Duration/Target Date for Goal Attainment 05/05/24   PT Goals Transfers;Gait   PT: Transfers Modified independent;Sit to/from stand;Bed to/from chair;Assistive device   PT: Gait Modified independent;Rolling walker;100 feet   PT Discharge Planning   PT Discharge Recommendation (DC Rec) home;home with assist   PT Rationale for DC Rec Patient just slightly below reported baseline level of function as she hasn't been moving much since hospitalization; has been getting up to bathroom with use of walker; Anticipate with  [LE] : Sensory: Intact in bilateral lower extremities ongoing walking in hallway with nursing staff and getting up to chair for all meals, patient should be safe from a mobility perspective to return home with use of 4WW; recommend walking program with nursing staff until discharge to prevent further decline of function   PT Brief overview of current status SBA to mod I with FWW   PT Equipment Needed at Discharge walker, rolling  (4WW)        [Normal RLE] : Right Lower Extremity: No scars, rashes, lesions, ulcers, skin intact [Normal LLE] : Left Lower Extremity: No scars, rashes, lesions, ulcers, skin intact [Normal Touch] : sensation intact for touch [Normal] : Gait: normal [de-identified] : Knees\par Nonantalgic gait in the office.  She cannot squat fully on the left\par No edema, ecchymoses, erythema.\par No effusion.\par Left knee is with tenderness on the medial patella facet and medial joint line much greater than the medial joint line tenderness on the right knee.\par Negative Perry.  Ia Lachman.  Negative anterior and posterior drawer.\par Intact extensor mechanism.\par Range of motion left knee is 0 to 125-130 degrees with pain on further flexion versus 130 to 135 degrees on the right knee without pain.  No crepitus.\par Intact extensor mechanism.\par Normal neurovascular exam distally [de-identified] : \par X-rays of the knees weightbearing 4 views taken October 18, 2021 showed mild joint space narrowing in the knee but no significant osteophytes.  On the flexed view the joint space does look to be slightly narrowed.  Patella [place] bilaterally\par \par MRI of the left knee performed on November 3, 2021 showed small peripheral longitudinal oblique tear at the junction of the body and posterior horn medial meniscus with degeneration of the posterior horn with fraying compatible with a degenerative tear, small low-grade partial-thickness cartilage defect posterior aspect medial femoral condyle, diffuse high-grade partial-thickness chondral wear medial patella facet, small ovoid intra-articular lesion in the patellofemoral compartment possibly consistent with a loose chondral body and less likely PVNS, small joint effusion and intact ligaments

## 2024-05-05 NOTE — PLAN OF CARE
Pertinent assessments: A&O. Infreq cough, denies SOB. Multiple attempts to wean to RA, satting 85-92 on RA, applied 1L O2, encouraged IS and ambulating, being OOB. Incontinent at times, cleaned folds and applied nystatin.     Major Shift Events: Uneventful     Treatment Plan: O2 monitoring, symptom management, omnicef, zithro, prednisone, mucinex, nebs    Problem: Adult Inpatient Plan of Care  Goal: Plan of Care Review  Description: The Plan of Care Review/Shift note should be completed every shift.  The Outcome Evaluation is a brief statement about your assessment that the patient is improving, declining, or no change.  This information will be displayed automatically on your shift  note.  Recent Flowsheet Documentation  Taken 5/5/2024 1817 by Katie Carlton RN  Outcome Evaluation: requiring O2  Plan of Care Reviewed With: patient  Overall Patient Progress: no change  Goal: Absence of Hospital-Acquired Illness or Injury  Intervention: Identify and Manage Fall Risk  Recent Flowsheet Documentation  Taken 5/5/2024 0835 by Katie Carlton RN  Safety Promotion/Fall Prevention:   activity supervised   assistive device/personal items within reach   clutter free environment maintained   increased rounding and observation   increase visualization of patient   nonskid shoes/slippers when out of bed   patient and family education  Intervention: Prevent Skin Injury  Recent Flowsheet Documentation  Taken 5/5/2024 1155 by Katie Carlton RN  Body Position: position changed independently  Intervention: Prevent and Manage VTE (Venous Thromboembolism) Risk  Recent Flowsheet Documentation  Taken 5/5/2024 0835 by Katie Carlton, RN  VTE Prevention/Management: SCDs (sequential compression devices) off  Intervention: Prevent Infection  Recent Flowsheet Documentation  Taken 5/5/2024 0835 by Katie Carlton RN  Infection Prevention:   rest/sleep promoted   single patient room provided   hand hygiene promoted     Problem: Adult  "Inpatient Plan of Care  Goal: Plan of Care Review  Description: The Plan of Care Review/Shift note should be completed every shift.  The Outcome Evaluation is a brief statement about your assessment that the patient is improving, declining, or no change.  This information will be displayed automatically on your shift  note.  Outcome: Not Progressing  Flowsheets (Taken 5/5/2024 1817)  Outcome Evaluation: requiring O2  Plan of Care Reviewed With: patient  Overall Patient Progress: no change  Goal: Patient-Specific Goal (Individualized)  Description: You can add care plan individualizations to a care plan. Examples of Individualization might be:  \"Parent requests to be called daily at 9am for status\", \"I have a hard time hearing out of my right ear\", or \"Do not touch me to wake me up as it startles  me\".  Outcome: Not Progressing  Goal: Absence of Hospital-Acquired Illness or Injury  Outcome: Not Progressing  Intervention: Identify and Manage Fall Risk  Recent Flowsheet Documentation  Taken 5/5/2024 0835 by Katie Carlton RN  Safety Promotion/Fall Prevention:   activity supervised   assistive device/personal items within reach   clutter free environment maintained   increased rounding and observation   increase visualization of patient   nonskid shoes/slippers when out of bed   patient and family education  Intervention: Prevent Skin Injury  Recent Flowsheet Documentation  Taken 5/5/2024 1155 by Katie Carlton RN  Body Position: position changed independently  Intervention: Prevent and Manage VTE (Venous Thromboembolism) Risk  Recent Flowsheet Documentation  Taken 5/5/2024 0835 by Katie Carlton RN  VTE Prevention/Management: SCDs (sequential compression devices) off  Intervention: Prevent Infection  Recent Flowsheet Documentation  Taken 5/5/2024 0835 by Katie Carlton RN  Infection Prevention:   rest/sleep promoted   single patient room provided   hand hygiene promoted  Goal: Optimal Comfort and " Wellbeing  Outcome: Not Progressing  Goal: Readiness for Transition of Care  Outcome: Not Progressing     Problem: Comorbidity Management  Goal: Maintenance of Asthma Control  Intervention: Maintain Asthma Symptom Control  Recent Flowsheet Documentation  Taken 5/5/2024 0835 by Katie Carlton RN  Medication Review/Management: medications reviewed  Goal: Maintenance of Behavioral Health Symptom Control  Intervention: Maintain Behavioral Health Symptom Control  Recent Flowsheet Documentation  Taken 5/5/2024 0835 by Katie Carlton RN  Medication Review/Management: medications reviewed  Goal: Maintenance of COPD Symptom Control  Intervention: Maintain COPD Symptom Control  Recent Flowsheet Documentation  Taken 5/5/2024 0835 by Katie Carlton RN  Medication Review/Management: medications reviewed  Goal: Blood Glucose Levels Within Targeted Range  Intervention: Monitor and Manage Glycemia  Recent Flowsheet Documentation  Taken 5/5/2024 0835 by Katie Carlton RN  Medication Review/Management: medications reviewed  Goal: Maintenance of Heart Failure Symptom Control  Intervention: Maintain Heart Failure Management  Recent Flowsheet Documentation  Taken 5/5/2024 0835 by Katie Carlton RN  Medication Review/Management: medications reviewed  Goal: Blood Pressure in Desired Range  Intervention: Maintain Blood Pressure Management  Recent Flowsheet Documentation  Taken 5/5/2024 0835 by Katie Carlton RN  Medication Review/Management: medications reviewed  Goal: Maintenance of Osteoarthritis Symptom Control  Intervention: Maintain Osteoarthritis Symptom Control  Recent Flowsheet Documentation  Taken 5/5/2024 1600 by Katie Carlton RN  Assistive Device Utilized: walker  Activity Management: ambulated to bathroom  Taken 5/5/2024 1333 by Katie Carlton RN  Assistive Device Utilized: walker  Activity Management: ambulated to bathroom  Taken 5/5/2024 1155 by Katie Carlton RN  Assistive Device Utilized: walker  Activity  Management: ambulated to bathroom  Taken 5/5/2024 0840 by Katie Carlton, RN  Assistive Device Utilized:   walker   gait belt  Activity Management:   ambulated to bathroom   ambulated in room  Taken 5/5/2024 0835 by Katie Carlton, RN  Medication Review/Management: medications reviewed  Goal: Bariatric Home Regimen Maintained  Intervention: Maintain and Manage Postbariatric Surgery Care  Recent Flowsheet Documentation  Taken 5/5/2024 0835 by Katie Carlton, RN  Medication Review/Management: medications reviewed  Goal: Maintenance of Seizure Control  Intervention: Maintain Seizure Symptom Control  Recent Flowsheet Documentation  Taken 5/5/2024 0835 by Katie Carlton, RN  Medication Review/Management: medications reviewed   Goal Outcome Evaluation:      Plan of Care Reviewed With: patient    Overall Patient Progress: no changeOverall Patient Progress: no change    Outcome Evaluation: requiring O2

## 2024-05-05 NOTE — PROGRESS NOTES
[facility]    Medicine Progress Note - Hospitalist Service    Date of Admission:  5/2/2024    Assessment & Plan     Brissa Corado is a 58 year old female with PMH significant for mild intermittent asthma, HTN, cushing syndrome, developmental delay with parents as guardians, HLD, seizure disorder, RICK, GERD, MDD, morbid obesity, and previous SBO who presents to the ED for the second time in 3 days due to concern for shortness of breath related to an asthma exacerbation.      ED workup reveals: initially hypoxic down to 88% on RA with improvement into 90s after nebulizer treatment, CBC unremarkable, CO2 of 30, calcium of 8.5, glucose of 131, proBNP <36, troponin of 8, magnesium of 2.0, normal lactic acid, VBG shows pH of 7.41, bicarbonate of 35, pCO2 of 55, and pO2 of 52, EKG shows rate of 81 bpm in SR, and CXR shows diffuse bilateral interstitial opacities seen throughout the lungs suggestive of pulmonary edema or atypical pneumonia.      #Acute exacerbation of mild intermittent asthma   #?Atypical community acquired pneumonia  Patient initially seen in the ED on 4/30 for shortness of breath and wheezing.  At that time she was diagnosed with a mild intermittent asthma exacerbation and improved with breathing treatments and received a dose of Decadron but no additional steroids at discharge.  The patient returns to the ED via EMS on 5/2 for ongoing shortness of breath and now with a cough that is productive of clear sputum.  She notes associated generalized chest pain with coughing but denies any recent fevers or chills.  Initially had wheezing in the emergency room which improved with breathing treatments.  Mildly hypoxic down to 88% on room air with improvement into the 90s after nebulizer treatment.  No leukocytosis and afebrile. CXR shows diffuse bilateral interstitial opacities suggestive of pulmonary edema or atypical pneumonia.  Feels improved however intermittently wheezy and  "require 1L oxygen. Difficult to assess volume status. Will diurese patient and see if it helps. Will aslo get Echo. Low suspicion for PE.       -scheduled duo nebs QID with PRN albuterol nebulizers  -Encourage incentive spirometry every 2 hours  -received IV Rocephin and IV AZA in ED, continue PO Cefdinir and AZA  -PRN supplemental oxygen, wean as tolerated  -Mucinex BID and PRN Tessalon pearls for cough  -PO Prednisone 40 mg x 5 days   -IV lasix 40 mg q6h X2  -Echo  -at home patient is on albuterol as needed and Flonase pray for her allergies. Upon discharge pt would benefit from low dose ICS-formoterol per VANDANA guidelines. Will consult pharamcy liaison for coverage.      #HTN  -continue PTA Lisinopril and monitor     #HLD  -resume Lovastatin at discharge      #Seizure disorder  Reports no seizure activity since she was 18.   -continue PTA Carbamazepine      #GERD  -resume PTA Omeprazole      #MDD  -continue PTA Sertraline     #Developmental delay with guardians   #Complex social situation, concern for living arrangements and possible vulnerable adult   Patient lives with her , whom she reports does the cleaning in the home as well works. The patient herself reports not working. Per EMS the patient's home was extremely unkept and per ED staff there was concern the patient has not bathed in several days with dried urine and/or stool on her skin. Patient reports regular hygiene during interview. Patient's parents are listed as guardians, ED provider contacted and updated patient's mother. Per patient her mother is not to visit her in the hospital and she is working with the court to not have her parents be her guardians any longer but planning for her adopted father to be her guardian. Reached out to the patient's mother, Lola, she states the patient has not been allowed to see her parents because of her significant other not letting anyone in their apartment due to being \"horrible people\" and there is some " "concern she may be \"delusional\".   -monitor psychiatric symptoms, if patient truly delusional, appearing paranoid, or hallucinating then she may warrant psychiatric consultation this stay. So far no concerns.   -SW consulted and following          Diet: Regular Diet Adult    DVT Prophylaxis: Pneumatic Compression Devices  Grajeda Catheter: Not present  Lines: None     Cardiac Monitoring: None  Code Status: Full Code      Clinically Significant Risk Factors Present on Admission                  # Hypertension: Noted on problem list      # Severe Obesity: Estimated body mass index is 62.52 kg/m  as calculated from the following:    Height as of this encounter: 1.702 m (5' 7\").    Weight as of this encounter: 181.1 kg (399 lb 3.2 oz).       # Asthma: noted on problem list        Disposition Plan     Medically Ready for Discharge: Anticipated Tomorrow             Andreea Mcdonald MD  Hospitalist Service  [facility]  Securely message with [email or web address] (more info)  Text page via [employer] Paging/Directory   ______________________________________________________________________    Interval History   No acute events overnight.  Feels well. LAZAR reported. No orthopnea or PND. No cough     4 point review of system is otherwise negative    Physical Exam   Vital Signs: Temp: 98  F (36.7  C) Temp src: Oral BP: 137/63 Pulse: 86   Resp: 18 SpO2: 92 % O2 Device: None (Room air) Oxygen Delivery: 1 LPM  Weight: 399 lbs 3.2 oz    Constitutional: awake, alert, cooperative, no apparent distress, and appears stated age  Eyes: Anicteric sclera  ENT: normocephalic, without obvious abnormality  Respiratory: On room air, no increased work of breathing, equal air entry bilaterally, wheezing anteriorly  Cardiovascular: Normal rate, regular rhythm, no murmur  GI: Soft, nontender, nondistended, obese  Musculoskeletal: no lower extremity pitting edema present  Neurologic: Awake, alert, oriented to name, place and time.    Neuropsychiatric: " Appropriate mood and affect    Medical Decision Making       44 MINUTES SPENT BY ME on the date of service doing chart review, history, exam, documentation & further activities per the note.      Data   ------------------------- PAST 24 HR DATA REVIEWED -----------------------------------------------

## 2024-05-06 ENCOUNTER — APPOINTMENT (OUTPATIENT)
Dept: CT IMAGING | Facility: CLINIC | Age: 59
DRG: 193 | End: 2024-05-06
Attending: STUDENT IN AN ORGANIZED HEALTH CARE EDUCATION/TRAINING PROGRAM
Payer: MEDICARE

## 2024-05-06 ENCOUNTER — APPOINTMENT (OUTPATIENT)
Dept: OCCUPATIONAL THERAPY | Facility: CLINIC | Age: 59
DRG: 193 | End: 2024-05-06
Attending: STUDENT IN AN ORGANIZED HEALTH CARE EDUCATION/TRAINING PROGRAM
Payer: MEDICARE

## 2024-05-06 LAB
ANION GAP SERPL CALCULATED.3IONS-SCNC: 15 MMOL/L (ref 7–15)
BUN SERPL-MCNC: 24.7 MG/DL (ref 6–20)
CALCIUM SERPL-MCNC: 8.3 MG/DL (ref 8.6–10)
CHLORIDE SERPL-SCNC: 99 MMOL/L (ref 98–107)
CREAT SERPL-MCNC: 0.88 MG/DL (ref 0.51–0.95)
D DIMER PPP FEU-MCNC: 0.65 UG/ML FEU (ref 0–0.5)
DEPRECATED HCO3 PLAS-SCNC: 26 MMOL/L (ref 22–29)
EGFRCR SERPLBLD CKD-EPI 2021: 77 ML/MIN/1.73M2
ERYTHROCYTE [DISTWIDTH] IN BLOOD BY AUTOMATED COUNT: 14.6 % (ref 10–15)
GLUCOSE SERPL-MCNC: 138 MG/DL (ref 70–99)
HCT VFR BLD AUTO: 43.5 % (ref 35–47)
HGB BLD-MCNC: 13.7 G/DL (ref 11.7–15.7)
MAGNESIUM SERPL-MCNC: 2 MG/DL (ref 1.7–2.3)
MCH RBC QN AUTO: 29 PG (ref 26.5–33)
MCHC RBC AUTO-ENTMCNC: 31.5 G/DL (ref 31.5–36.5)
MCV RBC AUTO: 92 FL (ref 78–100)
PLATELET # BLD AUTO: 174 10E3/UL (ref 150–450)
POTASSIUM SERPL-SCNC: 3.2 MMOL/L (ref 3.4–5.3)
POTASSIUM SERPL-SCNC: 5.1 MMOL/L (ref 3.4–5.3)
RBC # BLD AUTO: 4.73 10E6/UL (ref 3.8–5.2)
SODIUM SERPL-SCNC: 140 MMOL/L (ref 135–145)
WBC # BLD AUTO: 8.1 10E3/UL (ref 4–11)

## 2024-05-06 PROCEDURE — G0463 HOSPITAL OUTPT CLINIC VISIT: HCPCS

## 2024-05-06 PROCEDURE — 250N000011 HC RX IP 250 OP 636: Performed by: HOSPITALIST

## 2024-05-06 PROCEDURE — 84132 ASSAY OF SERUM POTASSIUM: CPT | Performed by: STUDENT IN AN ORGANIZED HEALTH CARE EDUCATION/TRAINING PROGRAM

## 2024-05-06 PROCEDURE — 83735 ASSAY OF MAGNESIUM: CPT | Performed by: STUDENT IN AN ORGANIZED HEALTH CARE EDUCATION/TRAINING PROGRAM

## 2024-05-06 PROCEDURE — 85041 AUTOMATED RBC COUNT: CPT | Performed by: STUDENT IN AN ORGANIZED HEALTH CARE EDUCATION/TRAINING PROGRAM

## 2024-05-06 PROCEDURE — 97535 SELF CARE MNGMENT TRAINING: CPT | Mod: GO

## 2024-05-06 PROCEDURE — 94640 AIRWAY INHALATION TREATMENT: CPT

## 2024-05-06 PROCEDURE — 250N000012 HC RX MED GY IP 250 OP 636 PS 637: Performed by: PHYSICIAN ASSISTANT

## 2024-05-06 PROCEDURE — 36415 COLL VENOUS BLD VENIPUNCTURE: CPT | Performed by: STUDENT IN AN ORGANIZED HEALTH CARE EDUCATION/TRAINING PROGRAM

## 2024-05-06 PROCEDURE — 71275 CT ANGIOGRAPHY CHEST: CPT | Mod: MG

## 2024-05-06 PROCEDURE — 250N000013 HC RX MED GY IP 250 OP 250 PS 637: Performed by: PHYSICIAN ASSISTANT

## 2024-05-06 PROCEDURE — 99232 SBSQ HOSP IP/OBS MODERATE 35: CPT | Performed by: STUDENT IN AN ORGANIZED HEALTH CARE EDUCATION/TRAINING PROGRAM

## 2024-05-06 PROCEDURE — 999N000156 HC STATISTIC RCP CONSULT EA 30 MIN

## 2024-05-06 PROCEDURE — 250N000009 HC RX 250: Performed by: PHYSICIAN ASSISTANT

## 2024-05-06 PROCEDURE — 36416 COLLJ CAPILLARY BLOOD SPEC: CPT | Performed by: STUDENT IN AN ORGANIZED HEALTH CARE EDUCATION/TRAINING PROGRAM

## 2024-05-06 PROCEDURE — 94640 AIRWAY INHALATION TREATMENT: CPT | Mod: 76

## 2024-05-06 PROCEDURE — 85379 FIBRIN DEGRADATION QUANT: CPT | Performed by: STUDENT IN AN ORGANIZED HEALTH CARE EDUCATION/TRAINING PROGRAM

## 2024-05-06 PROCEDURE — 999N000157 HC STATISTIC RCP TIME EA 10 MIN

## 2024-05-06 PROCEDURE — 250N000011 HC RX IP 250 OP 636: Performed by: STUDENT IN AN ORGANIZED HEALTH CARE EDUCATION/TRAINING PROGRAM

## 2024-05-06 PROCEDURE — 97165 OT EVAL LOW COMPLEX 30 MIN: CPT | Mod: GO

## 2024-05-06 PROCEDURE — 82374 ASSAY BLOOD CARBON DIOXIDE: CPT | Performed by: STUDENT IN AN ORGANIZED HEALTH CARE EDUCATION/TRAINING PROGRAM

## 2024-05-06 PROCEDURE — 120N000001 HC R&B MED SURG/OB

## 2024-05-06 PROCEDURE — 250N000013 HC RX MED GY IP 250 OP 250 PS 637: Performed by: STUDENT IN AN ORGANIZED HEALTH CARE EDUCATION/TRAINING PROGRAM

## 2024-05-06 RX ORDER — DIPHENHYDRAMINE HYDROCHLORIDE 50 MG/ML
50 INJECTION INTRAMUSCULAR; INTRAVENOUS ONCE
Status: COMPLETED | OUTPATIENT
Start: 2024-05-06 | End: 2024-05-06

## 2024-05-06 RX ORDER — POTASSIUM CHLORIDE 1500 MG/1
40 TABLET, EXTENDED RELEASE ORAL ONCE
Status: COMPLETED | OUTPATIENT
Start: 2024-05-06 | End: 2024-05-06

## 2024-05-06 RX ORDER — IOPAMIDOL 755 MG/ML
500 INJECTION, SOLUTION INTRAVASCULAR ONCE
Status: COMPLETED | OUTPATIENT
Start: 2024-05-06 | End: 2024-05-06

## 2024-05-06 RX ORDER — METHYLPREDNISOLONE SODIUM SUCCINATE 125 MG/2ML
125 INJECTION, POWDER, LYOPHILIZED, FOR SOLUTION INTRAMUSCULAR; INTRAVENOUS ONCE
Status: COMPLETED | OUTPATIENT
Start: 2024-05-06 | End: 2024-05-06

## 2024-05-06 RX ORDER — MAGNESIUM OXIDE 400 MG/1
400 TABLET ORAL EVERY 4 HOURS
Status: COMPLETED | OUTPATIENT
Start: 2024-05-06 | End: 2024-05-06

## 2024-05-06 RX ORDER — FUROSEMIDE 10 MG/ML
40 INJECTION INTRAMUSCULAR; INTRAVENOUS EVERY 6 HOURS
Status: COMPLETED | OUTPATIENT
Start: 2024-05-06 | End: 2024-05-06

## 2024-05-06 RX ADMIN — GUAIFENESIN 600 MG: 600 TABLET, EXTENDED RELEASE ORAL at 09:12

## 2024-05-06 RX ADMIN — IOPAMIDOL 100 ML: 755 INJECTION, SOLUTION INTRAVENOUS at 17:42

## 2024-05-06 RX ADMIN — IPRATROPIUM BROMIDE AND ALBUTEROL SULFATE 3 ML: .5; 3 SOLUTION RESPIRATORY (INHALATION) at 19:45

## 2024-05-06 RX ADMIN — IPRATROPIUM BROMIDE AND ALBUTEROL SULFATE 3 ML: .5; 3 SOLUTION RESPIRATORY (INHALATION) at 15:12

## 2024-05-06 RX ADMIN — CEFDINIR 300 MG: 300 CAPSULE ORAL at 09:12

## 2024-05-06 RX ADMIN — DIPHENHYDRAMINE HYDROCHLORIDE 50 MG: 50 INJECTION, SOLUTION INTRAMUSCULAR; INTRAVENOUS at 15:45

## 2024-05-06 RX ADMIN — CARBAMAZEPINE 400 MG: 200 TABLET ORAL at 09:13

## 2024-05-06 RX ADMIN — PREDNISONE 40 MG: 20 TABLET ORAL at 09:13

## 2024-05-06 RX ADMIN — FUROSEMIDE 40 MG: 10 INJECTION, SOLUTION INTRAMUSCULAR; INTRAVENOUS at 13:05

## 2024-05-06 RX ADMIN — PANTOPRAZOLE SODIUM 40 MG: 40 TABLET, DELAYED RELEASE ORAL at 09:12

## 2024-05-06 RX ADMIN — LISINOPRIL 20 MG: 20 TABLET ORAL at 09:13

## 2024-05-06 RX ADMIN — POTASSIUM CHLORIDE 40 MEQ: 1500 TABLET, EXTENDED RELEASE ORAL at 13:03

## 2024-05-06 RX ADMIN — CEFDINIR 300 MG: 300 CAPSULE ORAL at 20:13

## 2024-05-06 RX ADMIN — MAGNESIUM OXIDE TAB 400 MG (241.3 MG ELEMENTAL MG) 400 MG: 400 (241.3 MG) TAB at 13:45

## 2024-05-06 RX ADMIN — CARBAMAZEPINE 400 MG: 200 TABLET ORAL at 22:39

## 2024-05-06 RX ADMIN — NYSTATIN: 100000 CREAM TOPICAL at 20:14

## 2024-05-06 RX ADMIN — CARBAMAZEPINE 200 MG: 200 TABLET ORAL at 13:02

## 2024-05-06 RX ADMIN — GUAIFENESIN 600 MG: 600 TABLET, EXTENDED RELEASE ORAL at 20:13

## 2024-05-06 RX ADMIN — SERTRALINE HYDROCHLORIDE 100 MG: 100 TABLET ORAL at 09:12

## 2024-05-06 RX ADMIN — NYSTATIN: 100000 CREAM TOPICAL at 09:17

## 2024-05-06 RX ADMIN — FUROSEMIDE 40 MG: 10 INJECTION, SOLUTION INTRAMUSCULAR; INTRAVENOUS at 17:58

## 2024-05-06 RX ADMIN — IPRATROPIUM BROMIDE AND ALBUTEROL SULFATE 3 ML: .5; 3 SOLUTION RESPIRATORY (INHALATION) at 07:18

## 2024-05-06 RX ADMIN — AZITHROMYCIN DIHYDRATE 250 MG: 250 TABLET ORAL at 09:12

## 2024-05-06 RX ADMIN — MAGNESIUM OXIDE TAB 400 MG (241.3 MG ELEMENTAL MG) 400 MG: 400 (241.3 MG) TAB at 17:58

## 2024-05-06 RX ADMIN — IPRATROPIUM BROMIDE AND ALBUTEROL SULFATE 3 ML: .5; 3 SOLUTION RESPIRATORY (INHALATION) at 11:23

## 2024-05-06 RX ADMIN — METHYLPREDNISOLONE SODIUM SUCCINATE 125 MG: 125 INJECTION, POWDER, FOR SOLUTION INTRAMUSCULAR; INTRAVENOUS at 13:54

## 2024-05-06 ASSESSMENT — ACTIVITIES OF DAILY LIVING (ADL)
ADLS_ACUITY_SCORE: 42
ADLS_ACUITY_SCORE: 38
ADLS_ACUITY_SCORE: 36
ADLS_ACUITY_SCORE: 42
ADLS_ACUITY_SCORE: 42
ADLS_ACUITY_SCORE: 36
ADLS_ACUITY_SCORE: 42
ADLS_ACUITY_SCORE: 42
ADLS_ACUITY_SCORE: 36
ADLS_ACUITY_SCORE: 42
ADLS_ACUITY_SCORE: 36
ADLS_ACUITY_SCORE: 36
ADLS_ACUITY_SCORE: 42
ADLS_ACUITY_SCORE: 36
ADLS_ACUITY_SCORE: 42
ADLS_ACUITY_SCORE: 42
ADLS_ACUITY_SCORE: 36
ADLS_ACUITY_SCORE: 42
ADLS_ACUITY_SCORE: 38

## 2024-05-06 NOTE — PROGRESS NOTES
05/06/24 0800   Appointment Info   Signing Clinician's Name / Credentials (OT) Sera Romero OTR/L   Living Environment   People in Home spouse   Current Living Arrangements apartment   Home Accessibility no concerns   Transportation Anticipated family or friend will provide   Living Environment Comments tubshower with grabs with bench; SH toilet with grab bars   Self-Care   Equipment Currently Used at Home walker, rolling   Fall history within last six months no   Activity/Exercise/Self-Care Comment Pt reports indep with ADLs at home. Reports assist from  with IADLs. Pt reports manages own meds.  Pt's  works few days a week outside of home.   General Information   Onset of Illness/Injury or Date of Surgery 05/02/24   Referring Physician Andreea Mcdonald MD   Patient/Family Therapy Goal Statement (OT) return home with spouse's assistance   Additional Occupational Profile Info/Pertinent History of Current Problem per chart: Brissa Corado is a 58 year old female with PMH significant for mild intermittent asthma, HTN, cushing syndrome, developmental delay with parents as guardians, HLD, seizure disorder, RICK, GERD, MDD, morbid obesity, and previous SBO who presents to the ED for the second time in 3 days due to concern for shortness of breath related to an asthma exacerbation; ? atypical CAP; see medical record for further information   Cognitive Status Examination   Orientation Status orientation to person, place and time   Cognitive Status Comments per chart developmental delay   Pain Assessment   Patient Currently in Pain No   Transfers   Transfers toilet transfer;sit-stand transfer   Sit-Stand Transfer   Sit-Stand [place] (Transfers) supervision   Toilet Transfer   [place] Level (Toilet Transfer) supervision   Balance   Balance Comments no LOB   Activities of Daily Living   BADL Assessment/Intervention lower body dressing;toileting   Lower Body Dressing Assessment/Training   [place]  Level (Lower Body Dressing) supervision   Toileting   [place] Level (Toileting) supervision   Clinical Impression   Criteria for Skilled Therapeutic Interventions Met (OT) Yes, treatment indicated   OT Diagnosis weakness   OT Problem List-Impairments impacting ADL problems related to;activity tolerance impaired;strength   Assessment of Occupational Performance 3-5 Performance Deficits   Identified Performance Deficits ADLs; transfers   Planned Therapy Interventions (OT) ADL retraining;transfer training;progressive activity/exercise;risk factor education   Clinical Decision Making Complexity (OT) problem focused assessment/low complexity   Risk & Benefits of therapy have been explained evaluation/treatment results reviewed;care plan/treatment goals reviewed;risks/benefits reviewed;current/potential barriers reviewed;participants voiced agreement with care plan;participants included;patient   Clinical Impression Comments Pt is near/at functional baseline. No further OT warranted   OT Total Evaluation Time   OT Eval, Low Complexity Minutes (13655) 10   OT Goals   Therapy Frequency (OT) One time eval and treatment   OT Predicted Duration/Target Date for Goal Attainment 05/06/24   OT Goals Hygiene/Grooming;Lower Body Dressing;Toilet Transfer/Toileting;OT Goal 1   OT: Hygiene/Grooming supervision/stand-by assist;Completed  (pt declines)   OT: Lower Body Dressing Supervision/stand-by assist;Goal Met   OT: Toilet Transfer/Toileting Supervision/stand-by assist;Goal Met   OT: Goal 1 Pt will be SBA with tubshower transfer. - pt declines   Interventions   Interventions Quick Adds Self-Care/Home Management   Self-Care/Home Management   Self-Care/Home Mgmt/ADL, Compensatory, Meal Prep Minutes (51471) 12   Treatment Detail/Skilled Intervention Upon arrival, pt is supine in bed and agreeable to OT. Pt is SBA with bed mobility from supine<>sit. Pt is SBA with STS from bed with 2ww. Pt ambulates to bathroom with SBA with 2ww. Pt  is SBA with toileting, including hygiene aspects and managing clothing. Pt changes soiled brief and dons new brief with reacher which is what pt utilizes at home. Pt is SBA with toilet transfer. Pt reports her toilet is lower at home. Education on BSC over toilet to increase height; pt reports plans of installing higher toilet but likes idea of BSC if needed. Pt ambulates back to bed and transfers into bed. Pt has no concerns with d/c home. Pt's O2 drops to 86% on room air after activity but able to recover to 90% on room air with seated rest break. Pt is educated on EC technqiues for home; pt verbalizes understanding.   OT Discharge Planning   OT Plan d/c OT   OT Discharge Recommendation (DC Rec) home with assist   OT Rationale for DC Rec Pt appears at/near functional baseline. Recommend continued assist for IADLs as needed.   OT Brief overview of current status SBA toileting, LB dressing, mobility in room   Total Session Time   Timed Code Treatment Minutes 12   Total Session Time (sum of timed and untimed services) 22

## 2024-05-06 NOTE — TELEPHONE ENCOUNTER
Nurse Triage SBAR    Is this a 2nd Level Triage? NO    Situation: Patient reports the Levofloxacin is not helping with her symptoms. Reports thinking she may have had an allergic reaction with her throat swelling.     Background: Patient had a virtual visit 10/27 for a sinus infection. Prescribed Levofloxacin with patient taking 2 doses of this medication. Reports thinking she is allergic to the medication, noting her throat feeling like it was swelling shut. Patient denies symptoms currently but notes the medication is not helping with her sinus symptoms. Requesting alternative sent to pharmacy.    Assessment: Medication not helping with sinus infection    Protocol Recommended Disposition:   No disposition on file.    Recommendation:    Provider input, new medication sent to pharmacy     Routed to provider    Does the patient meet one of the following criteria for ADS visit consideration? 16+ years old, with an [facility] PCP     TIP  Providers, please consider if this condition is appropriate for management at one of our Acute and Diagnostic Services sites.     If patient is a good candidate, please use dotphrase <dot>triageresponse and select Refer to ADS to document.      Reason for Disposition    Nursing judgment    Additional Information    Negative: Nursing judgment    Negative: Nursing judgment    Protocols used: INFORMATION ONLY CALL - NO TRIAGE-A-OH       Patient called requesting refill on Thyroid medication to be sent to [facility].

## 2024-05-06 NOTE — PLAN OF CARE
Occupational Therapy Discharge Summary    Reason for therapy discharge:    All goals and outcomes met, no further needs identified.    Progress towards therapy goal(s). See goals on Care Plan in [facility] electronic health record for goal details.  Goals met    Therapy recommendation(s):    No further therapy is recommended.

## 2024-05-06 NOTE — PLAN OF CARE
End of Shift Summary  For vital signs and complete assessments, please see documentation flowsheets.     Pertinent assessments: Alert and oriented x4, alarms on for safety, calling appropriately, Infrequent cough, denies SOB. Unable to wean to room air, 1L O2, encouraged IS, refusing to walk outside of room this shift, Incontinent at times, cleaned folds and applied nystatin cream as ordered    Major Shift Events: Uneventful     Treatment Plan: O2 monitoring, symptom management, omnicef, zithro, prednisone, mucinex, nebs    Bedside Nurse: Yvonne Pacheco RN                Problem: Adult Inpatient Plan of Care  Goal: Plan of Care Review  Description: The Plan of Care Review/Shift note should be completed every shift.  The Outcome Evaluation is a brief statement about your assessment that the patient is improving, declining, or no change.  This information will be displayed automatically on your shift  note.  Recent Flowsheet Documentation  Taken 5/6/2024 0612 by Yvonne Pacheco RN  Outcome Evaluation: still needing oxygen, need encouragement to walk outside of room  Plan of Care Reviewed With: patient  Overall Patient Progress: improving  Goal: Absence of Hospital-Acquired Illness or Injury  Intervention: Identify and Manage Fall Risk  Recent Flowsheet Documentation  Taken 5/5/2024 2009 by Yvonne Pacheco RN  Safety Promotion/Fall Prevention:   activity supervised   assistive device/personal items within reach   clutter free environment maintained   increased rounding and observation   increase visualization of patient   nonskid shoes/slippers when out of bed   patient and family education  Intervention: Prevent Skin Injury  Recent Flowsheet Documentation  Taken 5/5/2024 2009 by Yvonne Pacheco RN  Body Position: position changed independently  Intervention: Prevent and Manage VTE (Venous Thromboembolism) Risk  Recent Flowsheet Documentation  Taken 5/5/2024 2009 by Yvonne Pacheco RN  VTE  Prevention/Management: SCDs (sequential compression devices) off  Intervention: Prevent Infection  Recent Flowsheet Documentation  Taken 5/5/2024 2009 by Yvonne Pacheco, RN  Infection Prevention:   rest/sleep promoted   single patient room provided   hand hygiene promoted   Goal Outcome Evaluation:      Plan of Care Reviewed With: patient    Overall Patient Progress: improvingOverall Patient Progress: improving    Outcome Evaluation: still needing oxygen, need encouragement to walk outside of room

## 2024-05-06 NOTE — PROGRESS NOTES
"[facility] RCAT     Date:5/03/2024  Admission Dx:Asthma exacerbation  Pulmonary History Asthma  Home Nebulizer/MDI Use:Alb MDI prn and Alb neb prn  Home Oxygen:  Acuity Level (RCAT flow sheet):3  Aerosol Therapy initiated: on continues QID Duoneb       Pulmonary Hygiene initiated:yes      Volume Expansion initiated:yes      Current Oxygen Requirements: 1L NC  Current SpO2: 94%    Re-evaluation date: 05/09/2024    Patient Education:Re- eval Education was performed with the patient in regards to indications/benefits and possible side effects of bronchodilators. Will continue to do education with patient.         See \"RT Assessments\" flow sheet for patient assessment scoring and Acuity Level Details.               "

## 2024-05-06 NOTE — PROGRESS NOTES
[facility]    Medicine Progress Note - Hospitalist Service    Date of Admission:  5/2/2024    Assessment & Plan     Brissa Corado is a 58 year old female with PMH significant for mild intermittent asthma, HTN, cushing syndrome, developmental delay with parents as guardians, HLD, seizure disorder, RICK, GERD, MDD, morbid obesity, and previous SBO who presents to the ED for the second time in 3 days due to concern for shortness of breath related to an asthma exacerbation.      ED workup reveals: initially hypoxic down to 88% on RA with improvement into 90s after nebulizer treatment, CBC unremarkable, CO2 of 30, calcium of 8.5, glucose of 131, proBNP <36, troponin of 8, magnesium of 2.0, normal lactic acid, VBG shows pH of 7.41, bicarbonate of 35, pCO2 of 55, and pO2 of 52, EKG shows rate of 81 bpm in SR, and CXR shows diffuse bilateral interstitial opacities seen throughout the lungs suggestive of pulmonary edema or atypical pneumonia.      #Acute exacerbation of mild intermittent asthma   #?Atypical community acquired pneumonia  Patient initially seen in the ED on 4/30 for shortness of breath and wheezing.  At that time she was diagnosed with a mild intermittent asthma exacerbation and improved with breathing treatments and received a dose of Decadron but no additional steroids at discharge.  The patient returns to the ED via EMS on 5/2 for ongoing shortness of breath and now with a cough that is productive of clear sputum.  She notes associated generalized chest pain with coughing but denies any recent fevers or chills.  Initially had wheezing in the emergency room which improved with breathing treatments.  Mildly hypoxic down to 88% on room air with improvement into the 90s after nebulizer treatment.  No leukocytosis and afebrile. CXR shows diffuse bilateral interstitial opacities suggestive of pulmonary edema or atypical pneumonia.  Feels improved however intermittently wheezy and  require 1-3L oxygen. Difficult to assess volume status.  Received IV diuretics.  echocardiogram reassuring.  Elevated D-dimer.  Would get CT angiogram.    -Completed 5-day course of prednisone burst  -CT angiogram to rule out PE contributing and hypoxia (patient has history of hives from contrast, ordered IV methylprednisolone and Benadryl)  - Low suspicion for heart failure.  However patient reported dyspnea on exertion with desaturations.  Will continue diuresing.  If CT angiogram negative for PE could consider restarting patient on steroid (with tapered dose).   -scheduled duo nebs QID with PRN albuterol nebulizers  -Encourage incentive spirometry every 2 hours  -Completed a course of azithromycin.  Continue cefdinir 300 mg twice daily.   -PRN supplemental oxygen, wean as tolerated  -Mucinex BID and PRN Tessalon pearls for cough  -IV lasix 40 mg q6h X2.  Reassess tomorrow.  Strict I's and O's  -at home patient is on albuterol as needed and Flonase pray for her allergies. Upon discharge pt would benefit from low dose ICS-formoterol per VANDANA guidelines. pharamcy liaison salted for coverage.  Patient has 0 co-pay.     #HTN  -continue PTA Lisinopril and monitor     #HLD  -resume Lovastatin at discharge      #Seizure disorder  Reports no seizure activity since she was 18.   -continue PTA Carbamazepine      #GERD  -resume PTA Omeprazole      #MDD  -continue PTA Sertraline     #Developmental delay with guardians   #Complex social situation, concern for living arrangements and possible vulnerable adult   Patient lives with her , whom she reports does the cleaning in the home as well works. The patient herself reports not working. Per EMS the patient's home was extremely unkept and per ED staff there was concern the patient has not bathed in several days with dried urine and/or stool on her skin. Patient reports regular hygiene during interview. Patient's parents are listed as guardians, ED provider contacted and  "updated patient's mother. Per patient her mother is not to visit her in the hospital and she is working with the court to not have her parents be her guardians any longer but planning for her adopted father to be her guardian. Reached out to the patient's mother, Lola, she states the patient has not been allowed to see her parents because of her significant other not letting anyone in their apartment due to being \"horrible people\" and there is some concern she may be \"delusional\".   -monitor psychiatric symptoms, if patient truly delusional, appearing paranoid, or hallucinating then she may warrant psychiatric consultation this stay. So far no concerns.   -SW consulted and following          Diet: Regular Diet Adult    DVT Prophylaxis: Pneumatic Compression Devices  Grajeda Catheter: Not present  Lines: None     Cardiac Monitoring: None  Code Status: Full Code      Clinically Significant Risk Factors        # Hypokalemia: Lowest K = 3.2 mmol/L in last 2 days, will replace as needed           # Hypertension: Noted on problem list        # Severe Obesity: Estimated body mass index is 62.52 kg/m  as calculated from the following:    Height as of this encounter: 1.702 m (5' 7\").    Weight as of this encounter: 181.1 kg (399 lb 3.2 oz)., PRESENT ON ADMISSION     # Asthma: noted on problem list        Disposition Plan     Medically Ready for Discharge: Anticipated Tomorrow             Andreea Mcdonald MD  Hospitalist Service  [facility]  Securely message with [email or web address] (more info)  Text page via [employer] Paging/Directory   ______________________________________________________________________    Interval History   No acute events overnight.  Feels well. LAZAR reported. No orthopnea or PND. No cough.     4 point review of system is otherwise negative    Physical Exam   Vital Signs: Temp: 98.2  F (36.8  C) Temp src: Oral BP: 135/66 Pulse: 87   Resp: 16 SpO2: 93 % O2 Device: Nasal cannula Oxygen Delivery: 3 " LPM  Weight: 399 lbs 3.2 oz    Constitutional: awake, alert, cooperative, no apparent distress, and appears stated age  Eyes: Anicteric sclera  ENT: normocephalic, without obvious abnormality  Respiratory: On room air, no increased work of breathing, equal air entry bilaterally, faint wheezing anteriorly  Cardiovascular: Normal rate, regular rhythm, no murmur  GI: Soft, nontender, nondistended, obese  Musculoskeletal: no lower extremity pitting edema present  Neurologic: Awake, alert, oriented to name, place and time.    Neuropsychiatric: Appropriate mood and affect    Medical Decision Making       45 MINUTES SPENT BY ME on the date of service doing chart review, history, exam, documentation & further activities per the note.      Data   ------------------------- PAST 24 HR DATA REVIEWED -----------------------------------------------

## 2024-05-06 NOTE — CONSULTS
Patient has Medicare D through [employer], and Medical Assistance through [employer].    Dulera: $0/mo.   Symbicort: $0/mo.   Breo: $0/mo.   Advair: $0/mo.     Leyda Croft  Pharmacy Technician/Liaison, Discharge Pharmacy   [phone]  [email or web address]

## 2024-05-06 NOTE — PLAN OF CARE
To Do:  End of Shift Summary  For vital signs and complete assessments, please see documentation flowsheets.     Pertinent assessments: Pt A&Ox4, VSS on 3L NC. Afebrile. Denies pain, nausea, SOB. Infrequent cough. SBA in the room with walker. Regular diet, tolerating well. Incontinent of bowel and bladder. Started on K and mag protocols this shift. New IV placed in left AC for chest CT this afternoon. Pre medicated pt due to contrast allergy. IV lasix given. Cleaned skin folds and applied nystatin cream. 1 BM this shift. Omnicef given. Bed alarm on for safety.       Major Shift Events: New IV placed in left AC for chest CT this evening.      Treatment Plan: O2 monitoring, symptom management, omnicef, mucinex, nebs.     Bedside Nurse: Keila Olguin RN  Problem: Comorbidity Management  Goal: Maintenance of Asthma Control  Outcome: Progressing  Intervention: Maintain Asthma Symptom Control  Recent Flowsheet Documentation  Taken 5/6/2024 0946 by Keila Olguin RN  Medication Review/Management: medications reviewed  Goal: Blood Pressure in Desired Range  Outcome: Progressing  Intervention: Maintain Blood Pressure Management  Recent Flowsheet Documentation  Taken 5/6/2024 0946 by Keila Olguin RN  Medication Review/Management: medications reviewed     Problem: Pneumonia  Goal: Fluid Balance  Outcome: Progressing  Goal: Resolution of Infection Signs and Symptoms  Outcome: Progressing  Goal: Effective Oxygenation and Ventilation  Outcome: Progressing  Intervention: Promote Airway Secretion Clearance  Recent Flowsheet Documentation  Taken 5/6/2024 0946 by Keila Olguin RN  Cough And Deep Breathing: done with encouragement     Problem: Adult Inpatient Plan of Care  Goal: Plan of Care Review  Description: The Plan of Care Review/Shift note should be completed every shift.  The Outcome Evaluation is a brief statement about your assessment that the patient is improving, declining, or no change.  This information will be  "displayed automatically on your shift  note.  Outcome: Progressing  Flowsheets (Taken 5/6/2024 1606)  Outcome Evaluation: Requiring 3L NC. Chest CT this evening.  Plan of Care Reviewed With: patient  Overall Patient Progress: improving  Goal: Patient-Specific Goal (Individualized)  Description: You can add care plan individualizations to a care plan. Examples of Individualization might be:  \"Parent requests to be called daily at 9am for status\", \"I have a hard time hearing out of my right ear\", or \"Do not touch me to wake me up as it startles  me\".  Outcome: Progressing  Goal: Absence of Hospital-Acquired Illness or Injury  Outcome: Progressing  Intervention: Identify and Manage Fall Risk  Recent Flowsheet Documentation  Taken 5/6/2024 0946 by Keila Olguin RN  Safety Promotion/Fall Prevention: safety round/check completed  Intervention: Prevent and Manage VTE (Venous Thromboembolism) Risk  Recent Flowsheet Documentation  Taken 5/6/2024 0946 by Keila Olguin RN  VTE Prevention/Management: SCDs (sequential compression devices) off  Intervention: Prevent Infection  Recent Flowsheet Documentation  Taken 5/6/2024 0946 by Keila Olguin RN  Infection Prevention:   rest/sleep promoted   single patient room provided   hand hygiene promoted  Goal: Optimal Comfort and Wellbeing  Outcome: Progressing  Intervention: Monitor Pain and Promote Comfort  Recent Flowsheet Documentation  Taken 5/6/2024 0946 by Keila Olguin RN  Pain Management Interventions: declines  Goal: Readiness for Transition of Care  Outcome: Progressing   Goal Outcome Evaluation:      Plan of Care Reviewed With: patient    Overall Patient Progress: improvingOverall Patient Progress: improving    Outcome Evaluation: Requiring 3L NC. Chest CT this evening.      "

## 2024-05-06 NOTE — PROGRESS NOTES
Home Oxygen Assessment Tools  Patient's 02 sat on RA at rest 90%. Patient is on 1LPM/%  (02 device) Sp02 93 % at rest. Patient 02 86% sat on RA with activity (walked up and down the [place]). Patient's Sp02 94% on 3 LPM/02% with activity (walking in the [place]).     Bedside nurse: Keila Olguin RN.

## 2024-05-07 LAB
ANION GAP SERPL CALCULATED.3IONS-SCNC: 12 MMOL/L (ref 7–15)
BUN SERPL-MCNC: 28.4 MG/DL (ref 6–20)
CALCIUM SERPL-MCNC: 8.5 MG/DL (ref 8.6–10)
CHLORIDE SERPL-SCNC: 101 MMOL/L (ref 98–107)
CREAT SERPL-MCNC: 0.83 MG/DL (ref 0.51–0.95)
DEPRECATED HCO3 PLAS-SCNC: 29 MMOL/L (ref 22–29)
EGFRCR SERPLBLD CKD-EPI 2021: 82 ML/MIN/1.73M2
ERYTHROCYTE [DISTWIDTH] IN BLOOD BY AUTOMATED COUNT: 14.5 % (ref 10–15)
GLUCOSE SERPL-MCNC: 108 MG/DL (ref 70–99)
HCT VFR BLD AUTO: 46.4 % (ref 35–47)
HGB BLD-MCNC: 14.5 G/DL (ref 11.7–15.7)
MAGNESIUM SERPL-MCNC: 2.3 MG/DL (ref 1.7–2.3)
MCH RBC QN AUTO: 29.2 PG (ref 26.5–33)
MCHC RBC AUTO-ENTMCNC: 31.3 G/DL (ref 31.5–36.5)
MCV RBC AUTO: 93 FL (ref 78–100)
PLATELET # BLD AUTO: 196 10E3/UL (ref 150–450)
POTASSIUM SERPL-SCNC: 3.1 MMOL/L (ref 3.4–5.3)
POTASSIUM SERPL-SCNC: 3.9 MMOL/L (ref 3.4–5.3)
RBC # BLD AUTO: 4.97 10E6/UL (ref 3.8–5.2)
SODIUM SERPL-SCNC: 142 MMOL/L (ref 135–145)
WBC # BLD AUTO: 9.8 10E3/UL (ref 4–11)

## 2024-05-07 PROCEDURE — 36415 COLL VENOUS BLD VENIPUNCTURE: CPT | Performed by: STUDENT IN AN ORGANIZED HEALTH CARE EDUCATION/TRAINING PROGRAM

## 2024-05-07 PROCEDURE — 80048 BASIC METABOLIC PNL TOTAL CA: CPT | Performed by: STUDENT IN AN ORGANIZED HEALTH CARE EDUCATION/TRAINING PROGRAM

## 2024-05-07 PROCEDURE — 83735 ASSAY OF MAGNESIUM: CPT | Performed by: STUDENT IN AN ORGANIZED HEALTH CARE EDUCATION/TRAINING PROGRAM

## 2024-05-07 PROCEDURE — 250N000013 HC RX MED GY IP 250 OP 250 PS 637: Performed by: PHYSICIAN ASSISTANT

## 2024-05-07 PROCEDURE — 250N000009 HC RX 250: Performed by: PHYSICIAN ASSISTANT

## 2024-05-07 PROCEDURE — 94640 AIRWAY INHALATION TREATMENT: CPT | Mod: 76

## 2024-05-07 PROCEDURE — 85027 COMPLETE CBC AUTOMATED: CPT | Performed by: STUDENT IN AN ORGANIZED HEALTH CARE EDUCATION/TRAINING PROGRAM

## 2024-05-07 PROCEDURE — 84132 ASSAY OF SERUM POTASSIUM: CPT | Performed by: STUDENT IN AN ORGANIZED HEALTH CARE EDUCATION/TRAINING PROGRAM

## 2024-05-07 PROCEDURE — 99232 SBSQ HOSP IP/OBS MODERATE 35: CPT | Performed by: STUDENT IN AN ORGANIZED HEALTH CARE EDUCATION/TRAINING PROGRAM

## 2024-05-07 PROCEDURE — 94640 AIRWAY INHALATION TREATMENT: CPT

## 2024-05-07 PROCEDURE — 999N000157 HC STATISTIC RCP TIME EA 10 MIN

## 2024-05-07 PROCEDURE — 250N000013 HC RX MED GY IP 250 OP 250 PS 637: Performed by: STUDENT IN AN ORGANIZED HEALTH CARE EDUCATION/TRAINING PROGRAM

## 2024-05-07 PROCEDURE — 120N000001 HC R&B MED SURG/OB

## 2024-05-07 RX ORDER — POTASSIUM CHLORIDE 1500 MG/1
40 TABLET, EXTENDED RELEASE ORAL ONCE
Status: COMPLETED | OUTPATIENT
Start: 2024-05-07 | End: 2024-05-07

## 2024-05-07 RX ORDER — FUROSEMIDE 40 MG
40 TABLET ORAL DAILY
Status: DISCONTINUED | OUTPATIENT
Start: 2024-05-07 | End: 2024-05-09 | Stop reason: HOSPADM

## 2024-05-07 RX ADMIN — LISINOPRIL 20 MG: 20 TABLET ORAL at 08:28

## 2024-05-07 RX ADMIN — IPRATROPIUM BROMIDE AND ALBUTEROL SULFATE 3 ML: .5; 3 SOLUTION RESPIRATORY (INHALATION) at 15:23

## 2024-05-07 RX ADMIN — GUAIFENESIN 600 MG: 600 TABLET, EXTENDED RELEASE ORAL at 08:29

## 2024-05-07 RX ADMIN — IPRATROPIUM BROMIDE AND ALBUTEROL SULFATE 3 ML: .5; 3 SOLUTION RESPIRATORY (INHALATION) at 19:40

## 2024-05-07 RX ADMIN — NYSTATIN: 100000 CREAM TOPICAL at 20:17

## 2024-05-07 RX ADMIN — NYSTATIN: 100000 CREAM TOPICAL at 08:31

## 2024-05-07 RX ADMIN — IPRATROPIUM BROMIDE AND ALBUTEROL SULFATE 3 ML: .5; 3 SOLUTION RESPIRATORY (INHALATION) at 12:12

## 2024-05-07 RX ADMIN — FUROSEMIDE 40 MG: 40 TABLET ORAL at 16:04

## 2024-05-07 RX ADMIN — CARBAMAZEPINE 200 MG: 200 TABLET ORAL at 13:43

## 2024-05-07 RX ADMIN — PANTOPRAZOLE SODIUM 40 MG: 40 TABLET, DELAYED RELEASE ORAL at 06:19

## 2024-05-07 RX ADMIN — CEFDINIR 300 MG: 300 CAPSULE ORAL at 20:16

## 2024-05-07 RX ADMIN — POTASSIUM CHLORIDE 40 MEQ: 1500 TABLET, EXTENDED RELEASE ORAL at 08:29

## 2024-05-07 RX ADMIN — CEFDINIR 300 MG: 300 CAPSULE ORAL at 08:28

## 2024-05-07 RX ADMIN — ACETAMINOPHEN 650 MG: 325 TABLET, FILM COATED ORAL at 20:19

## 2024-05-07 RX ADMIN — SERTRALINE HYDROCHLORIDE 100 MG: 100 TABLET ORAL at 08:29

## 2024-05-07 RX ADMIN — CARBAMAZEPINE 400 MG: 200 TABLET ORAL at 21:15

## 2024-05-07 RX ADMIN — CARBAMAZEPINE 400 MG: 200 TABLET ORAL at 08:29

## 2024-05-07 RX ADMIN — GUAIFENESIN 600 MG: 600 TABLET, EXTENDED RELEASE ORAL at 20:16

## 2024-05-07 RX ADMIN — IPRATROPIUM BROMIDE AND ALBUTEROL SULFATE 3 ML: .5; 3 SOLUTION RESPIRATORY (INHALATION) at 08:01

## 2024-05-07 ASSESSMENT — ACTIVITIES OF DAILY LIVING (ADL)
ADLS_ACUITY_SCORE: 39
ADLS_ACUITY_SCORE: 39
ADLS_ACUITY_SCORE: 41
ADLS_ACUITY_SCORE: 39
ADLS_ACUITY_SCORE: 41
ADLS_ACUITY_SCORE: 39
ADLS_ACUITY_SCORE: 39
ADLS_ACUITY_SCORE: 41
ADLS_ACUITY_SCORE: 41
ADLS_ACUITY_SCORE: 39
ADLS_ACUITY_SCORE: 41
ADLS_ACUITY_SCORE: 41
ADLS_ACUITY_SCORE: 39
ADLS_ACUITY_SCORE: 39
ADLS_ACUITY_SCORE: 41
ADLS_ACUITY_SCORE: 39
ADLS_ACUITY_SCORE: 39

## 2024-05-07 NOTE — PROGRESS NOTES
[facility]    Medicine Progress Note - Hospitalist Service    Date of Admission:  5/2/2024    Assessment & Plan     Brissa Corado is a 58 year old female with PMH significant for mild intermittent asthma, HTN, cushing syndrome, developmental delay with parents as guardians, HLD, seizure disorder, RICK, GERD, MDD, morbid obesity, and previous SBO who presents to the ED for the second time in 3 days due to concern for shortness of breath related to an asthma exacerbation.      ED workup reveals: initially hypoxic down to 88% on RA with improvement into 90s after nebulizer treatment, CBC unremarkable, CO2 of 30, calcium of 8.5, glucose of 131, proBNP <36, troponin of 8, magnesium of 2.0, normal lactic acid, VBG shows pH of 7.41, bicarbonate of 35, pCO2 of 55, and pO2 of 52, EKG shows rate of 81 bpm in SR, and CXR shows diffuse bilateral interstitial opacities seen throughout the lungs suggestive of pulmonary edema or atypical pneumonia.      #Acute exacerbation of mild intermittent asthma   #?Atypical community acquired pneumonia  Patient initially seen in the ED on 4/30 for shortness of breath and wheezing.  At that time she was diagnosed with a mild intermittent asthma exacerbation and improved with breathing treatments and received a dose of Decadron but no additional steroids at discharge.  The patient returns to the ED via EMS on 5/2 for ongoing shortness of breath and now with a cough that is productive of clear sputum.  She notes associated generalized chest pain with coughing but denies any recent fevers or chills.  Initially had wheezing in the emergency room which improved with breathing treatments.  Mildly hypoxic down to 88% on room air with improvement into the 90s after nebulizer treatment.  No leukocytosis and afebrile. CXR shows diffuse bilateral interstitial opacities suggestive of pulmonary edema or atypical pneumonia.  Feels improved however intermittently wheezy and  require 1-3L oxygen. Difficult to assess volume status.  Received IV diuretics.  echocardiogram reassuring.  Elevated D-dimer.  Would get CT angiogram.    -Completed 5-day course of prednisone burst  -CT angiogram negative for PE showed some nonspecific opacities which could be due to edema, inflammatory changes or atypical infectious process.  Also had air in the gallbladder but patient has no abdominal pain.  - proBNP was negative at less than 36 but it could be normal in CHF and morbidly obese patient.  Patient was diuresed with Lasix, will change to Lasix 40 mg p.o. daily.  -scheduled duo nebs QID with PRN albuterol nebulizers.  She continues to wheeze.  -Encourage incentive spirometry every 2 hours  -Completed a course of azithromycin.  Continue cefdinir 300 mg twice daily.   -PRN supplemental oxygen, wean as tolerated  -Mucinex BID and PRN Tessalon pearls for cough  -IV lasix 40 mg q6h X2.  Reassess tomorrow.  Strict I's and O's  -at home patient is on albuterol as needed and Flonase spray for her allergies. Upon discharge pt would benefit from low dose ICS-formoterol per VANDANA guidelines. pharamcy liaison salted for coverage.  Patient has 0 co-pay.     #HTN  -continue PTA Lisinopril and monitor     #HLD  -resume Lovastatin at discharge      #Seizure disorder  Reports no seizure activity since she was 18.   -continue PTA Carbamazepine      #GERD  -resume PTA Omeprazole      #MDD  -continue PTA Sertraline     #Developmental delay with guardians   #Complex social situation, concern for living arrangements and possible vulnerable adult   Patient lives with her , whom she reports does the cleaning in the home as well works. The patient herself reports not working. Per EMS the patient's home was extremely unkept and per ED staff there was concern the patient has not bathed in several days with dried urine and/or stool on her skin. Patient reports regular hygiene during interview. Patient's parents are listed  "as guardians, ED provider contacted and updated patient's mother. Per patient her mother is not to visit her in the hospital and she is working with the court to not have her parents be her guardians any longer but planning for her adopted father to be her guardian. Reached out to the patient's mother, Lola, she states the patient has not been allowed to see her parents because of her significant other not letting anyone in their apartment due to being \"horrible people\" and there is some concern she may be \"delusional\".   -monitor psychiatric symptoms, if patient truly delusional, appearing paranoid, or hallucinating then she may warrant psychiatric consultation this stay. So far no concerns.   -SW consulted and following          Diet: Regular Diet Adult    DVT Prophylaxis: Pneumatic Compression Devices  Grajeda Catheter: Not present  Lines: None     Cardiac Monitoring: None  Code Status: Full Code      Clinically Significant Risk Factors        # Hypokalemia: Lowest K = 3.1 mmol/L in last 2 days, will replace as needed           # Hypertension: Noted on problem list        # Severe Obesity: Estimated body mass index is 61.53 kg/m  as calculated from the following:    Height as of this encounter: 1.702 m (5' 7\").    Weight as of this encounter: 178.2 kg (392 lb 13.8 oz)., PRESENT ON ADMISSION     # Asthma: noted on problem list        Disposition Plan     Medically Ready for Discharge: Anticipated Tomorrow             Jay Hawkins MD  Hospitalist Service  [facility]  Securely message with [employer] (more info)  Text page via [employer] Paging/Directory   ______________________________________________________________________    Interval History   Continues to wheeze.  Still needing supplemental oxygen.    4 point review of system is otherwise negative    Physical Exam   Vital Signs: Temp: 97.7  F (36.5  C) Temp src: Oral BP: 110/64 Pulse: 77   Resp: 20 SpO2: 93 % O2 Device: Nasal cannula Oxygen " Delivery: 2 LPM  Weight: 392 lbs 13.76 oz    Constitutional: awake, alert, cooperative, no apparent distress, and appears stated age  Eyes: Anicteric sclera  ENT: normocephalic, without obvious abnormality  Respiratory: On room air, no increased work of breathing, bilateral wheezing.  Cardiovascular: Normal rate, regular rhythm, no murmur  GI: Soft, nontender, nondistended, obese  Musculoskeletal: no lower extremity pitting edema present  Neurologic: Awake, alert, oriented to name, place and time.    Neuropsychiatric: Appropriate mood and affect    Medical Decision Making       40 MINUTES SPENT BY ME on the date of service doing chart review, history, exam, documentation & further activities per the note.      Data   ------------------------- PAST 24 HR DATA REVIEWED -----------------------------------------------

## 2024-05-07 NOTE — PROGRESS NOTES
Care Management Follow Up    Length of Stay (days): 3    Expected Discharge Date: 05/08/2024     Concerns to be Addressed:  discharge planning     Patient plan of care discussed at interdisciplinary rounds: Yes    Anticipated Discharge Disposition:  home     Anticipated Discharge Services: none  Anticipated Discharge DME:  walker, possible new home O2    Patient/family educated on Medicare website which has current facility and service quality ratings:  N/A  Education Provided on the Discharge Plan:  yes  Patient/Family in Agreement with the Plan:  yes    Referrals Placed by CM/SW:  None  Private pay costs discussed: Not applicable    Additional Information:  Juliane received a call from the pt's [employer] P: [phone] ext. [phone] F: [phone].  She was looking for an update so she can continue to follow post discharge.  Juliane provided her with a brief update.  She has also been in contact with the pt's guardians.    Juliane will continue with discharge planning and will be available as needed until discharge.    AISSATOU Macdonald, [facility]  Inpatient Care Coordination  [facility]  [phone]

## 2024-05-07 NOTE — PLAN OF CARE
End of Shift Summary  For vital signs and complete assessments, please see documentation flowsheets.     Pertinent assessments: Afebrile. Pt continues to require 3L oxygen, sating in mid 90s. LS diminished. BS x4. 1+ BLE edema noted. Pt denies pain and nausea. Regular diet. Ax1 with belt and walker to ambulate. PIV - SL. A&O, alarm on bed for safety. Slept well.     Major Shift Events: none    Treatment Plan: Wean oxygen as able, Encourage activity, Omnicef, Discharge TBD.          Goal Outcome Evaluation:      Plan of Care Reviewed With: patient    Overall Patient Progress: no change    Outcome Evaluation: Pt continues to require 3L oxygen, Omnicef      Problem: Adult Inpatient Plan of Care  Goal: Plan of Care Review  Description: The Plan of Care Review/Shift note should be completed every shift.  The Outcome Evaluation is a brief statement about your assessment that the patient is improving, declining, or no change.  This information will be displayed automatically on your shift  note.  Recent Flowsheet Documentation  Taken 5/7/2024 0629 by Destini Velasco RN  Outcome Evaluation: Pt continues to require 3L oxygen, Omnicef  Plan of Care Reviewed With: patient  Overall Patient Progress: no change  Goal: Absence of Hospital-Acquired Illness or Injury  Intervention: Identify and Manage Fall Risk  Recent Flowsheet Documentation  Taken 5/7/2024 0015 by Destini Velasco RN  Safety Promotion/Fall Prevention:   activity supervised   assistive device/personal items within reach   clutter free environment maintained   increased rounding and observation   increase visualization of patient   lighting adjusted   mobility aid in reach   nonskid shoes/slippers when out of bed   patient and family education   room near nurse's station   room organization consistent   safety round/check completed   supervised activity   treat reversible contributory factors   treat underlying cause  Intervention: Prevent Skin  Injury  Recent Flowsheet Documentation  Taken 5/7/2024 0015 by Destini Velasco RN  Body Position:   position changed independently   supine, head elevated  Intervention: Prevent and Manage VTE (Venous Thromboembolism) Risk  Recent Flowsheet Documentation  Taken 5/7/2024 0015 by Destini Velasco RN  VTE Prevention/Management:   SCDs (sequential compression devices) off   patient refused intervention  Intervention: Prevent Infection  Recent Flowsheet Documentation  Taken 5/7/2024 0015 by Destini Vealsco RN  Infection Prevention:   cohorting utilized   rest/sleep promoted   single patient room provided     Problem: Comorbidity Management  Goal: Maintenance of Asthma Control  Intervention: Maintain Asthma Symptom Control  Recent Flowsheet Documentation  Taken 5/7/2024 0015 by Destini Velasco RN  Medication Review/Management: medications reviewed  Goal: Maintenance of Behavioral Health Symptom Control  Intervention: Maintain Behavioral Health Symptom Control  Recent Flowsheet Documentation  Taken 5/7/2024 0015 by Destini Velasco RN  Medication Review/Management: medications reviewed  Goal: Maintenance of COPD Symptom Control  Intervention: Maintain COPD Symptom Control  Recent Flowsheet Documentation  Taken 5/7/2024 0015 by Destini Velasco RN  Medication Review/Management: medications reviewed  Goal: Blood Glucose Levels Within Targeted Range  Intervention: Monitor and Manage Glycemia  Recent Flowsheet Documentation  Taken 5/7/2024 0015 by Destini Velasco RN  Medication Review/Management: medications reviewed  Goal: Maintenance of Heart Failure Symptom Control  Intervention: Maintain Heart Failure Management  Recent Flowsheet Documentation  Taken 5/7/2024 0015 by Destini Velasco RN  Medication Review/Management: medications reviewed  Goal: Blood Pressure in Desired Range  Intervention: Maintain Blood Pressure Management  Recent Flowsheet Documentation  Taken 5/7/2024  0015 by Destini Velasco RN  Medication Review/Management: medications reviewed  Goal: Maintenance of Osteoarthritis Symptom Control  Intervention: Maintain Osteoarthritis Symptom Control  Recent Flowsheet Documentation  Taken 5/7/2024 0015 by Destini Velasco RN  Assistive Device Utilized: walker  Activity Management:   activity adjusted per tolerance   up ad alex  Medication Review/Management: medications reviewed  Goal: Bariatric Home Regimen Maintained  Intervention: Maintain and Manage Postbariatric Surgery Care  Recent Flowsheet Documentation  Taken 5/7/2024 0015 by Destini Velasco RN  Medication Review/Management: medications reviewed  Goal: Maintenance of Seizure Control  Intervention: Maintain Seizure Symptom Control  Recent Flowsheet Documentation  Taken 5/7/2024 0015 by Destini Velasco RN  Medication Review/Management: medications reviewed     Problem: Pneumonia  Goal: Effective Oxygenation and Ventilation  Intervention: Promote Airway Secretion Clearance  Recent Flowsheet Documentation  Taken 5/7/2024 0015 by Destini Velasco RN  Cough And Deep Breathing: done with encouragement  Intervention: Optimize Oxygenation and Ventilation  Recent Flowsheet Documentation  Taken 5/7/2024 0015 by Destini Velasco RN  Head of Bed (HOB) Positioning: HOB at 20-30 degrees     Problem: Fall Injury Risk  Goal: Absence of Fall and Fall-Related Injury  Intervention: Identify and Manage Contributors  Recent Flowsheet Documentation  Taken 5/7/2024 0015 by Destini Velasco RN  Medication Review/Management: medications reviewed  Intervention: Promote Injury-Free Environment  Recent Flowsheet Documentation  Taken 5/7/2024 0015 by Destini Velasco RN  Safety Promotion/Fall Prevention:   activity supervised   assistive device/personal items within reach   clutter free environment maintained   increased rounding and observation   increase visualization of patient   lighting  adjusted   mobility aid in reach   nonskid shoes/slippers when out of bed   patient and family education   room near nurse's station   room organization consistent   safety round/check completed   supervised activity   treat reversible contributory factors   treat underlying cause

## 2024-05-07 NOTE — PLAN OF CARE
To Do:  End of Shift Summary  For vital signs and complete assessments, please see documentation flowsheets.     Pertinent assessments: Pt is A/Ox4. VSS on 3L NC. Denies pain, Sob, and N/V. SBA in the room with walker. Regular diet. Incontinent of bowel and bladder. Nystatin cream applied to skin folds. Omnicef given. Bed alarm on for safety.    Major Shift Events: Strict I/O.    Treatment Plan: O2 monitoring. Symptom management. PT/OT. Omnicef. Mucinex. Nebs.    Bedside Nurse: Kathi Ortiz RN     Goal Outcome Evaluation:      Plan of Care Reviewed With: patient    Overall Patient Progress: improvingOverall Patient Progress: improving    Outcome Evaluation: 3L NC. Omnicef.      Problem: Comorbidity Management  Goal: Maintenance of Asthma Control  Outcome: Progressing  Intervention: Maintain Asthma Symptom Control  Recent Flowsheet Documentation  Taken 5/6/2024 1807 by Kathi Ortiz RN  Medication Review/Management: medications reviewed  Goal: Blood Pressure in Desired Range  Outcome: Progressing  Intervention: Maintain Blood Pressure Management  Recent Flowsheet Documentation  Taken 5/6/2024 1807 by Kathi Ortiz RN  Medication Review/Management: medications reviewed     Problem: Pneumonia  Goal: Fluid Balance  Outcome: Progressing  Goal: Resolution of Infection Signs and Symptoms  Outcome: Progressing  Goal: Effective Oxygenation and Ventilation  Outcome: Progressing  Intervention: Promote Airway Secretion Clearance  Recent Flowsheet Documentation  Taken 5/6/2024 1807 by Kathi Ortiz RN  Cough And Deep Breathing: done with encouragement  Intervention: Optimize Oxygenation and Ventilation  Recent Flowsheet Documentation  Taken 5/6/2024 1807 by Kathi Ortiz RN  Head of Bed (HOB) Positioning: HOB at 30-45 degrees     Problem: Adult Inpatient Plan of Care  Goal: Plan of Care Review  Description: The Plan of Care Review/Shift note should be completed every shift.  The Outcome Evaluation is a brief statement about your  "assessment that the patient is improving, declining, or no change.  This information will be displayed automatically on your shift  note.  Outcome: Progressing  Flowsheets (Taken 5/6/2024 2306)  Outcome Evaluation: 3L NC. Omnicef.  Plan of Care Reviewed With: patient  Overall Patient Progress: improving  Goal: Patient-Specific Goal (Individualized)  Description: You can add care plan individualizations to a care plan. Examples of Individualization might be:  \"Parent requests to be called daily at 9am for status\", \"I have a hard time hearing out of my right ear\", or \"Do not touch me to wake me up as it startles  me\".  Outcome: Progressing  Goal: Absence of Hospital-Acquired Illness or Injury  Outcome: Progressing  Intervention: Identify and Manage Fall Risk  Recent Flowsheet Documentation  Taken 5/6/2024 1807 by Kathi Ortiz RN  Safety Promotion/Fall Prevention:   safety round/check completed   nonskid shoes/slippers when out of bed   room near nurse's station   activity supervised  Intervention: Prevent Skin Injury  Recent Flowsheet Documentation  Taken 5/6/2024 1807 by Kathi Ortiz RN  Body Position: position changed independently  Intervention: Prevent and Manage VTE (Venous Thromboembolism) Risk  Recent Flowsheet Documentation  Taken 5/6/2024 1807 by Kathi Ortiz RN  VTE Prevention/Management: SCDs (sequential compression devices) off  Intervention: Prevent Infection  Recent Flowsheet Documentation  Taken 5/6/2024 1807 by Kathi Ortiz RN  Infection Prevention:   rest/sleep promoted   single patient room provided   hand hygiene promoted  Goal: Optimal Comfort and Wellbeing  Outcome: Progressing  Intervention: Monitor Pain and Promote Comfort  Recent Flowsheet Documentation  Taken 5/6/2024 1807 by Kathi Ortiz RN  Pain Management Interventions: declines  Goal: Readiness for Transition of Care  Outcome: Progressing         "

## 2024-05-07 NOTE — PLAN OF CARE
To Do:  End of Shift Summary  For vital signs and complete assessments, please see documentation flowsheets.     Pertinent assessments: Pt A&Ox4, VSS on 1L NC. Sating in the mid 90's. Afebrile. Denies pain/nausea, SOB. Infrequent non-productive cough. SBA in the room with walker. Incontinent of bowel and bladder. Regular diet, tolerating well. PIV SL. Cleaned skin folds and applied nystatin cream this shift. Omnicef given. Encouraged with ambulate halls today. Bed alarm on.      Major Shift Events: none    Treatment Plan: Wean oxygen as able, Encourage activity, Omnicef. Hopeful discharge tomorrow.     Bedside Nurse: Keila Olguin RN   Problem: Comorbidity Management  Goal: Maintenance of Asthma Control  Outcome: Progressing  Intervention: Maintain Asthma Symptom Control  Recent Flowsheet Documentation  Taken 5/7/2024 1100 by Keila Olguin RN  Medication Review/Management: medications reviewed  Goal: Blood Pressure in Desired Range  Outcome: Progressing  Intervention: Maintain Blood Pressure Management  Recent Flowsheet Documentation  Taken 5/7/2024 1100 by Keila Olguin RN  Medication Review/Management: medications reviewed     Problem: Pneumonia  Goal: Fluid Balance  Outcome: Progressing  Goal: Resolution of Infection Signs and Symptoms  Outcome: Progressing  Goal: Effective Oxygenation and Ventilation  Outcome: Progressing  Intervention: Promote Airway Secretion Clearance  Recent Flowsheet Documentation  Taken 5/7/2024 1100 by Keila Olguin RN  Cough And Deep Breathing: done independently per patient     Problem: Adult Inpatient Plan of Care  Goal: Plan of Care Review  Description: The Plan of Care Review/Shift note should be completed every shift.  The Outcome Evaluation is a brief statement about your assessment that the patient is improving, declining, or no change.  This information will be displayed automatically on your shift  note.  Outcome: Progressing  Flowsheets (Taken 5/7/2024 1543)  Outcome  "Evaluation: Pt weaned to 1L. VSS. Oral Omnicef.  Goal: Patient-Specific Goal (Individualized)  Description: You can add care plan individualizations to a care plan. Examples of Individualization might be:  \"Parent requests to be called daily at 9am for status\", \"I have a hard time hearing out of my right ear\", or \"Do not touch me to wake me up as it startles  me\".  Outcome: Progressing  Goal: Absence of Hospital-Acquired Illness or Injury  Outcome: Progressing  Intervention: Identify and Manage Fall Risk  Recent Flowsheet Documentation  Taken 5/7/2024 1100 by Keila Olguin RN  Safety Promotion/Fall Prevention:   activity supervised   assistive device/personal items within reach   clutter free environment maintained   increased rounding and observation   increase visualization of patient   lighting adjusted   mobility aid in reach   nonskid shoes/slippers when out of bed   patient and family education   room near nurse's station   room organization consistent   safety round/check completed   supervised activity   treat reversible contributory factors   treat underlying cause  Intervention: Prevent and Manage VTE (Venous Thromboembolism) Risk  Recent Flowsheet Documentation  Taken 5/7/2024 1100 by Keila Olguin RN  VTE Prevention/Management:   SCDs (sequential compression devices) off   patient refused intervention  Intervention: Prevent Infection  Recent Flowsheet Documentation  Taken 5/7/2024 1100 by Keila Olguin RN  Infection Prevention:   cohorting utilized   rest/sleep promoted   single patient room provided  Goal: Optimal Comfort and Wellbeing  Outcome: Progressing  Intervention: Monitor Pain and Promote Comfort  Recent Flowsheet Documentation  Taken 5/7/2024 0801 by Keila Olguin RN  Pain Management Interventions: declines  Goal: Readiness for Transition of Care  Outcome: Progressing   Goal Outcome Evaluation:      Plan of Care Reviewed With: patient    Overall Patient Progress: improvingOverall " Patient Progress: improving    Outcome Evaluation: Pt weaned to 1L. VSS. Oral Omnicef.

## 2024-05-08 LAB
HOLD SPECIMEN: NORMAL
MAGNESIUM SERPL-MCNC: 2.4 MG/DL (ref 1.7–2.3)
POTASSIUM SERPL-SCNC: 3.8 MMOL/L (ref 3.4–5.3)

## 2024-05-08 PROCEDURE — 36415 COLL VENOUS BLD VENIPUNCTURE: CPT | Performed by: STUDENT IN AN ORGANIZED HEALTH CARE EDUCATION/TRAINING PROGRAM

## 2024-05-08 PROCEDURE — 94640 AIRWAY INHALATION TREATMENT: CPT | Mod: 76

## 2024-05-08 PROCEDURE — 250N000013 HC RX MED GY IP 250 OP 250 PS 637: Performed by: INTERNAL MEDICINE

## 2024-05-08 PROCEDURE — 250N000013 HC RX MED GY IP 250 OP 250 PS 637: Performed by: STUDENT IN AN ORGANIZED HEALTH CARE EDUCATION/TRAINING PROGRAM

## 2024-05-08 PROCEDURE — 120N000001 HC R&B MED SURG/OB

## 2024-05-08 PROCEDURE — 83735 ASSAY OF MAGNESIUM: CPT | Performed by: STUDENT IN AN ORGANIZED HEALTH CARE EDUCATION/TRAINING PROGRAM

## 2024-05-08 PROCEDURE — 999N000157 HC STATISTIC RCP TIME EA 10 MIN

## 2024-05-08 PROCEDURE — 99232 SBSQ HOSP IP/OBS MODERATE 35: CPT | Performed by: INTERNAL MEDICINE

## 2024-05-08 PROCEDURE — 84132 ASSAY OF SERUM POTASSIUM: CPT | Performed by: STUDENT IN AN ORGANIZED HEALTH CARE EDUCATION/TRAINING PROGRAM

## 2024-05-08 PROCEDURE — 250N000013 HC RX MED GY IP 250 OP 250 PS 637: Performed by: PHYSICIAN ASSISTANT

## 2024-05-08 PROCEDURE — 250N000009 HC RX 250: Performed by: PHYSICIAN ASSISTANT

## 2024-05-08 RX ORDER — POTASSIUM CHLORIDE 1500 MG/1
20 TABLET, EXTENDED RELEASE ORAL ONCE
Status: COMPLETED | OUTPATIENT
Start: 2024-05-08 | End: 2024-05-08

## 2024-05-08 RX ADMIN — GUAIFENESIN 600 MG: 600 TABLET, EXTENDED RELEASE ORAL at 09:39

## 2024-05-08 RX ADMIN — CARBAMAZEPINE 200 MG: 200 TABLET ORAL at 12:27

## 2024-05-08 RX ADMIN — CEFDINIR 300 MG: 300 CAPSULE ORAL at 20:11

## 2024-05-08 RX ADMIN — PANTOPRAZOLE SODIUM 40 MG: 40 TABLET, DELAYED RELEASE ORAL at 06:12

## 2024-05-08 RX ADMIN — IPRATROPIUM BROMIDE AND ALBUTEROL SULFATE 3 ML: .5; 3 SOLUTION RESPIRATORY (INHALATION) at 08:43

## 2024-05-08 RX ADMIN — NYSTATIN: 100000 CREAM TOPICAL at 09:39

## 2024-05-08 RX ADMIN — IPRATROPIUM BROMIDE AND ALBUTEROL SULFATE 3 ML: .5; 3 SOLUTION RESPIRATORY (INHALATION) at 16:05

## 2024-05-08 RX ADMIN — LISINOPRIL 20 MG: 20 TABLET ORAL at 09:39

## 2024-05-08 RX ADMIN — NYSTATIN: 100000 CREAM TOPICAL at 20:12

## 2024-05-08 RX ADMIN — SERTRALINE HYDROCHLORIDE 100 MG: 100 TABLET ORAL at 09:39

## 2024-05-08 RX ADMIN — CARBAMAZEPINE 400 MG: 200 TABLET ORAL at 21:00

## 2024-05-08 RX ADMIN — IPRATROPIUM BROMIDE AND ALBUTEROL SULFATE 3 ML: .5; 3 SOLUTION RESPIRATORY (INHALATION) at 19:42

## 2024-05-08 RX ADMIN — FUROSEMIDE 40 MG: 40 TABLET ORAL at 09:39

## 2024-05-08 RX ADMIN — ACETAMINOPHEN 650 MG: 325 TABLET, FILM COATED ORAL at 15:05

## 2024-05-08 RX ADMIN — CARBAMAZEPINE 400 MG: 200 TABLET ORAL at 09:39

## 2024-05-08 RX ADMIN — CEFDINIR 300 MG: 300 CAPSULE ORAL at 09:39

## 2024-05-08 RX ADMIN — IPRATROPIUM BROMIDE AND ALBUTEROL SULFATE 3 ML: .5; 3 SOLUTION RESPIRATORY (INHALATION) at 13:08

## 2024-05-08 RX ADMIN — GUAIFENESIN 600 MG: 600 TABLET, EXTENDED RELEASE ORAL at 20:11

## 2024-05-08 RX ADMIN — POTASSIUM CHLORIDE 20 MEQ: 1500 TABLET, EXTENDED RELEASE ORAL at 09:39

## 2024-05-08 ASSESSMENT — ACTIVITIES OF DAILY LIVING (ADL)
ADLS_ACUITY_SCORE: 38
ADLS_ACUITY_SCORE: 40
ADLS_ACUITY_SCORE: 38
ADLS_ACUITY_SCORE: 38
ADLS_ACUITY_SCORE: 40
ADLS_ACUITY_SCORE: 38
ADLS_ACUITY_SCORE: 38
ADLS_ACUITY_SCORE: 40
ADLS_ACUITY_SCORE: 41
ADLS_ACUITY_SCORE: 40
ADLS_ACUITY_SCORE: 38
ADLS_ACUITY_SCORE: 40
ADLS_ACUITY_SCORE: 40
ADLS_ACUITY_SCORE: 41
ADLS_ACUITY_SCORE: 40
ADLS_ACUITY_SCORE: 41
ADLS_ACUITY_SCORE: 38
ADLS_ACUITY_SCORE: 40

## 2024-05-08 NOTE — PLAN OF CARE
To Do:  End of Shift Summary  For vital signs and complete assessments, please see documentation flowsheets.     Pertinent assessments: Pt is A/Ox4. VSS on 1L NC. Denies SOB and N/V. Infrequent non-productive cough. SBA with walker. Episodes of incontinence of bowel and bladder. Regular diet. PIV SL. Applied nystatin cream this shift to skin folds. Omnicef given. K and Mg protocol, AM draw. Encouraged ambulation. Bed alarm on for safety.    Major Shift Events: Tylenol given x1 for headache.    Treatment Plan: Wean oxygen as able, encourage activity. Omnicef. Potential discharge tomorrow home.    Bedside Nurse: Kathi Ortiz RN     Goal Outcome Evaluation:      Plan of Care Reviewed With: patient    Overall Patient Progress: improvingOverall Patient Progress: improving    Outcome Evaluation: Pt is weaned to 1L NC. Omnicef PO.    Problem: Comorbidity Management  Goal: Maintenance of Asthma Control  Outcome: Progressing  Intervention: Maintain Asthma Symptom Control  Recent Flowsheet Documentation  Taken 5/7/2024 1604 by Kathi Ortiz RN  Medication Review/Management: medications reviewed  Goal: Blood Pressure in Desired Range  Outcome: Progressing  Intervention: Maintain Blood Pressure Management  Recent Flowsheet Documentation  Taken 5/7/2024 1604 by Kathi Ortiz RN  Medication Review/Management: medications reviewed     Problem: Pneumonia  Goal: Fluid Balance  Outcome: Progressing  Goal: Resolution of Infection Signs and Symptoms  Outcome: Progressing  Goal: Effective Oxygenation and Ventilation  Outcome: Progressing  Intervention: Promote Airway Secretion Clearance  Recent Flowsheet Documentation  Taken 5/7/2024 1604 by Kathi Ortiz RN  Cough And Deep Breathing: done independently per patient  Intervention: Optimize Oxygenation and Ventilation  Recent Flowsheet Documentation  Taken 5/7/2024 1604 by Kathi Ortiz RN  Head of Bed (HOB) Positioning: HOB at 20-30 degrees     Problem: Adult Inpatient Plan of  "Care  Goal: Plan of Care Review  Description: The Plan of Care Review/Shift note should be completed every shift.  The Outcome Evaluation is a brief statement about your assessment that the patient is improving, declining, or no change.  This information will be displayed automatically on your shift  note.  Outcome: Progressing  Flowsheets (Taken 5/7/2024 2116)  Outcome Evaluation: Pt is weaned to 1L NC. Omnicef PO.  Plan of Care Reviewed With: patient  Overall Patient Progress: improving  Goal: Patient-Specific Goal (Individualized)  Description: You can add care plan individualizations to a care plan. Examples of Individualization might be:  \"Parent requests to be called daily at 9am for status\", \"I have a hard time hearing out of my right ear\", or \"Do not touch me to wake me up as it startles  me\".  Outcome: Progressing  Goal: Absence of Hospital-Acquired Illness or Injury  Outcome: Progressing  Intervention: Identify and Manage Fall Risk  Recent Flowsheet Documentation  Taken 5/7/2024 1604 by Kathi Ortiz RN  Safety Promotion/Fall Prevention:   clutter free environment maintained   safety round/check completed   room near nurse's station   nonskid shoes/slippers when out of bed  Intervention: Prevent Skin Injury  Recent Flowsheet Documentation  Taken 5/7/2024 1604 by Kathi Ortiz RN  Body Position: position changed independently  Intervention: Prevent and Manage VTE (Venous Thromboembolism) Risk  Recent Flowsheet Documentation  Taken 5/7/2024 1604 by Kathi Ortiz RN  VTE Prevention/Management:   SCDs (sequential compression devices) off   patient refused intervention  Intervention: Prevent Infection  Recent Flowsheet Documentation  Taken 5/7/2024 1604 by Kathi Ortiz RN  Infection Prevention:   cohorting utilized   single patient room provided  Goal: Optimal Comfort and Wellbeing  Outcome: Progressing  Intervention: Monitor Pain and Promote Comfort  Recent Flowsheet Documentation  Taken 5/7/2024 2019 by " Kathi Ortiz, RN  Pain Management Interventions: medication (see MAR)  Taken 5/7/2024 1604 by Kathi Ortzi, RN  Pain Management Interventions: declines  Goal: Readiness for Transition of Care  Outcome: Progressing

## 2024-05-08 NOTE — PROGRESS NOTES
[facility]    Medicine Progress Note - Hospitalist Service    Date of Admission:  5/2/2024    Assessment & Plan     Brissa Corado is a 58 year old female with PMH significant for mild intermittent asthma, HTN, cushing syndrome, developmental delay with parents as guardians, HLD, seizure disorder, RICK, GERD, MDD, morbid obesity, and previous SBO who presents to the ED for the second time in 3 days due to concern for shortness of breath related to an asthma exacerbation.      ED workup reveals: initially hypoxic down to 88% on RA with improvement into 90s after nebulizer treatment, CBC unremarkable, CO2 of 30, calcium of 8.5, glucose of 131, proBNP <36, troponin of 8, magnesium of 2.0, normal lactic acid, VBG shows pH of 7.41, bicarbonate of 35, pCO2 of 55, and pO2 of 52, EKG shows rate of 81 bpm in SR, and CXR shows diffuse bilateral interstitial opacities seen throughout the lungs suggestive of pulmonary edema or atypical pneumonia.      #Acute exacerbation of mild intermittent asthma   #?Atypical community acquired pneumonia  Patient initially seen in the ED on 4/30 for shortness of breath and wheezing.  At that time she was diagnosed with a mild intermittent asthma exacerbation and improved with breathing treatments and received a dose of Decadron but no additional steroids at discharge.  The patient returns to the ED via EMS on 5/2 for ongoing shortness of breath and now with a cough that is productive of clear sputum.  She notes associated generalized chest pain with coughing but denies any recent fevers or chills.  Initially had wheezing in the emergency room which improved with breathing treatments.  Mildly hypoxic down to 88% on room air with improvement into the 90s after nebulizer treatment.  No leukocytosis and afebrile. CXR shows diffuse bilateral interstitial opacities suggestive of pulmonary edema or atypical pneumonia.  Feels improved however intermittently wheezy and  require 1-3L oxygen. Difficult to assess volume status.  Received IV diuretics.  echocardiogram reassuring.  Elevated D-dimer.  Would get CT angiogram.    -Completed 5-day course of prednisone burst  -CT angiogram negative for PE showed some nonspecific opacities which could be due to edema, inflammatory changes or atypical infectious process.  Also had air in the gallbladder but patient has no abdominal pain.  - proBNP was negative at less than 36 but it could be normal in CHF and morbidly obese patient.  Patient was diuresed with Lasix, will change to Lasix 40 mg p.o. daily.  -scheduled duo nebs QID with PRN albuterol nebulizers.  She continues to wheeze.  -Encourage incentive spirometry every 2 hours  -Completed a course of azithromycin.  Continue cefdinir 300 mg twice daily.   -PRN supplemental oxygen, wean as tolerated  -Mucinex BID and PRN Tessalon pearls for cough  -IV lasix 40 mg q6h X2.  Reassess tomorrow.  Strict I's and O's  -at home patient is on albuterol as needed and Flonase spray for her allergies. Upon discharge pt would benefit from low dose ICS-formoterol per VANDANA guidelines. pharamcy liaison salted for coverage.  Patient has 0 co-pay.     #HTN  -continue PTA Lisinopril and monitor     #HLD  -resume Lovastatin at discharge      #Seizure disorder  Reports no seizure activity since she was 18.   -continue PTA Carbamazepine      #GERD  -resume PTA Omeprazole      #MDD  -continue PTA Sertraline     #Developmental delay with guardians   #Complex social situation, concern for living arrangements and possible vulnerable adult   Patient lives with her , whom she reports does the cleaning in the home as well works. The patient herself reports not working. Per EMS the patient's home was extremely unkept and per ED staff there was concern the patient has not bathed in several days with dried urine and/or stool on her skin. Patient reports regular hygiene during interview. Patient's parents are listed  "as guardians, ED provider contacted and updated patient's mother. Per patient her mother is not to visit her in the hospital and she is working with the court to not have her parents be her guardians any longer but planning for her adopted father to be her guardian. Reached out to the patient's mother, Lola, she states the patient has not been allowed to see her parents because of her significant other not letting anyone in their apartment due to being \"horrible people\" and there is some concern she may be \"delusional\".   -monitor psychiatric symptoms, if patient truly delusional, appearing paranoid, or hallucinating then she may warrant psychiatric consultation this stay. So far no concerns.   -SW consulted and following          Diet: Regular Diet Adult    DVT Prophylaxis: Pneumatic Compression Devices  Grajeda Catheter: Not present  Lines: None     Cardiac Monitoring: None  Code Status: Full Code      Clinically Significant Risk Factors        # Hypokalemia: Lowest K = 3.1 mmol/L in last 2 days, will replace as needed           # Hypertension: Noted on problem list        # Severe Obesity: Estimated body mass index is 62.26 kg/m  as calculated from the following:    Height as of this encounter: 1.702 m (5' 7\").    Weight as of this encounter: 180.3 kg (397 lb 8 oz)., PRESENT ON ADMISSION     # Asthma: noted on problem list        Disposition Plan     Medically Ready for Discharge: Likely tomorrow             Candy Dominguez MD  Hospitalist Service  [facility]  Securely message with [email or web address] (more info)  Text page via [employer]   ______________________________________________________________________    Interval History   Assumed care reviewed chart continues to wheeze.  However is amenable to manage off supplemental O2 while at rest will check ambulatory O2 sat.  Denies any chest pain.  More than 10 point review of system was carried out was otherwise negative.  Total time " spent direct patient care coordination of care is 40 minutes    Physical Exam   Vital Signs: Temp: 97.8  F (36.6  C) Temp src: Oral BP: (!) 120/101 Pulse: 78   Resp: 16 SpO2: 92 % O2 Device: Nasal cannula Oxygen Delivery: 1 LPM  Weight: 397 lbs 8 oz    Constitutional: awake, alert, cooperative, no apparent distress, and appears stated age  Eyes: Anicteric sclera  ENT: normocephalic, without obvious abnormality  Respiratory: On room air, no increased work of breathing, bilateral wheezing.  Cardiovascular: Normal rate, regular rhythm, no murmur  GI: Soft, nontender, nondistended, obese  Musculoskeletal: no lower extremity pitting edema present  Neurologic: Awake, alert, oriented to name, place and time.    Neuropsychiatric: Appropriate mood and affect    Medical Decision Making       45 MINUTES SPENT BY ME on the date of service doing chart review, history, exam, documentation & further activities per the note.      Data   ------------------------- PAST 24 HR DATA REVIEWED -----------------------------------------------

## 2024-05-08 NOTE — PLAN OF CARE
End of Shift Summary  For vital signs and complete assessments, please see documentation flowsheets.     Pertinent assessments: A&Ox4. VSS on RA. Attempted ambulatory oxymetry test but patient did not tolerate walking long enough to get an accurate read on pusle oxymetry.LAZAR. Incontinent at times.Tolerating a regular diet. Up SBA with walker.     Major Shift Events: None    Treatment Plan: Encourage activity. Potential discharge tomorrow home      Problem: Adult Inpatient Plan of Care  Goal: Plan of Care Review  Description: The Plan of Care Review/Shift note should be completed every shift.  The Outcome Evaluation is a brief statement about your assessment that the patient is improving, declining, or no change.  This information will be displayed automatically on your shift  note.  Recent Flowsheet Documentation  Taken 5/8/2024 1502 by Willa Olivares RN  Outcome Evaluation: VSS on RA  Plan of Care Reviewed With: patient  Overall Patient Progress: improving  Goal: Absence of Hospital-Acquired Illness or Injury  Intervention: Identify and Manage Fall Risk  Recent Flowsheet Documentation  Taken 5/8/2024 0940 by Willa Olivares RN  Safety Promotion/Fall Prevention: safety round/check completed  Intervention: Prevent and Manage VTE (Venous Thromboembolism) Risk  Recent Flowsheet Documentation  Taken 5/8/2024 0940 by Willa Olivares RN  VTE Prevention/Management:   SCDs (sequential compression devices) off   patient refused intervention  Intervention: Prevent Infection  Recent Flowsheet Documentation  Taken 5/8/2024 0940 by Willa Olivares RN  Infection Prevention:   cohorting utilized   rest/sleep promoted   single patient room provided  Goal: Optimal Comfort and Wellbeing  Intervention: Monitor Pain and Promote Comfort  Recent Flowsheet Documentation  Taken 5/8/2024 0940 by Willa Olivares RN  Pain Management Interventions: declines     Problem: Adult Inpatient Plan of Care  Goal: Plan of Care  "Review  Description: The Plan of Care Review/Shift note should be completed every shift.  The Outcome Evaluation is a brief statement about your assessment that the patient is improving, declining, or no change.  This information will be displayed automatically on your shift  note.  Outcome: Progressing  Flowsheets (Taken 5/8/2024 1502)  Outcome Evaluation: VSS on RA  Plan of Care Reviewed With: patient  Overall Patient Progress: improving  Goal: Patient-Specific Goal (Individualized)  Description: You can add care plan individualizations to a care plan. Examples of Individualization might be:  \"Parent requests to be called daily at 9am for status\", \"I have a hard time hearing out of my right ear\", or \"Do not touch me to wake me up as it startles  me\".  Outcome: Progressing  Goal: Absence of Hospital-Acquired Illness or Injury  Outcome: Progressing  Intervention: Identify and Manage Fall Risk  Recent Flowsheet Documentation  Taken 5/8/2024 0940 by Willa Olivares RN  Safety Promotion/Fall Prevention: safety round/check completed  Intervention: Prevent and Manage VTE (Venous Thromboembolism) Risk  Recent Flowsheet Documentation  Taken 5/8/2024 0940 by Willa Olivares RN  VTE Prevention/Management:   SCDs (sequential compression devices) off   patient refused intervention  Intervention: Prevent Infection  Recent Flowsheet Documentation  Taken 5/8/2024 0940 by Willa Olivares RN  Infection Prevention:   cohorting utilized   rest/sleep promoted   single patient room provided  Goal: Optimal Comfort and Wellbeing  Outcome: Progressing  Intervention: Monitor Pain and Promote Comfort  Recent Flowsheet Documentation  Taken 5/8/2024 0940 by Willa Olivares RN  Pain Management Interventions: declines  Goal: Readiness for Transition of Care  Outcome: Progressing     Problem: Comorbidity Management  Goal: Maintenance of Asthma Control  Intervention: Maintain Asthma Symptom Control  Recent Flowsheet " Documentation  Taken 5/8/2024 0940 by Willa Olivares RN  Medication Review/Management: medications reviewed  Goal: Maintenance of Behavioral Health Symptom Control  Intervention: Maintain Behavioral Health Symptom Control  Recent Flowsheet Documentation  Taken 5/8/2024 0940 by Willa Olivares RN  Medication Review/Management: medications reviewed  Goal: Maintenance of COPD Symptom Control  Intervention: Maintain COPD Symptom Control  Recent Flowsheet Documentation  Taken 5/8/2024 0940 by Willa Olivares RN  Medication Review/Management: medications reviewed  Goal: Blood Glucose Levels Within Targeted Range  Intervention: Monitor and Manage Glycemia  Recent Flowsheet Documentation  Taken 5/8/2024 0940 by Willa Olivares RN  Medication Review/Management: medications reviewed  Goal: Maintenance of Heart Failure Symptom Control  Intervention: Maintain Heart Failure Management  Recent Flowsheet Documentation  Taken 5/8/2024 0940 by Willa Olivares RN  Medication Review/Management: medications reviewed  Goal: Blood Pressure in Desired Range  Intervention: Maintain Blood Pressure Management  Recent Flowsheet Documentation  Taken 5/8/2024 0940 by Willa Olivares RN  Medication Review/Management: medications reviewed  Goal: Maintenance of Osteoarthritis Symptom Control  Intervention: Maintain Osteoarthritis Symptom Control  Recent Flowsheet Documentation  Taken 5/8/2024 0940 by Willa Olivares RN  Medication Review/Management: medications reviewed  Taken 5/8/2024 0800 by Willa Olivares RN  Assistive Device Utilized: walker  Activity Management:   ambulated to bathroom   ambulated in room   activity adjusted per tolerance  Goal: Bariatric Home Regimen Maintained  Intervention: Maintain and Manage Postbariatric Surgery Care  Recent Flowsheet Documentation  Taken 5/8/2024 0940 by Willa Olivares RN  Medication Review/Management: medications reviewed  Goal: Maintenance of Seizure  Control  Intervention: Maintain Seizure Symptom Control  Recent Flowsheet Documentation  Taken 5/8/2024 0940 by Willa Olivares RN  Medication Review/Management: medications reviewed     Problem: Comorbidity Management  Goal: Maintenance of Asthma Control  Outcome: Progressing  Intervention: Maintain Asthma Symptom Control  Recent Flowsheet Documentation  Taken 5/8/2024 0940 by Willa Olivares RN  Medication Review/Management: medications reviewed  Goal: Blood Pressure in Desired Range  Outcome: Progressing  Intervention: Maintain Blood Pressure Management  Recent Flowsheet Documentation  Taken 5/8/2024 0940 by Willa Olivares RN  Medication Review/Management: medications reviewed     Problem: Pneumonia  Goal: Fluid Balance  Outcome: Progressing  Goal: Resolution of Infection Signs and Symptoms  Outcome: Progressing  Goal: Effective Oxygenation and Ventilation  Outcome: Progressing  Intervention: Promote Airway Secretion Clearance  Recent Flowsheet Documentation  Taken 5/8/2024 0940 by Willa Olivares RN  Cough And Deep Breathing: done independently per patient     Problem: Fall Injury Risk  Goal: Absence of Fall and Fall-Related Injury  Intervention: Identify and Manage Contributors  Recent Flowsheet Documentation  Taken 5/8/2024 0940 by Willa Olivares RN  Medication Review/Management: medications reviewed  Intervention: Promote Injury-Free Environment  Recent Flowsheet Documentation  Taken 5/8/2024 0940 by Willa Olivares RN  Safety Promotion/Fall Prevention: safety round/check completed   Goal Outcome Evaluation:      Plan of Care Reviewed With: patient    Overall Patient Progress: improvingOverall Patient Progress: improving    Outcome Evaluation: VSS on RA

## 2024-05-08 NOTE — PLAN OF CARE
Pertinent assessments: A&Ox4. VSS on 1L NC, afebrile. Denies pain. Some LAZAR noted but denies SOB at rest. Incontinent at times.Tolerating a regular diet. Up SBA with walker.     Major Shift Events: None  Treatment Plan: Wean oxygen as able, encourage activity. Omnicef. Potential discharge tomorrow home  Bedside Nurse: Nhi Grullon RN     Goal Outcome Evaluation:      Plan of Care Reviewed With: patient    Overall Patient Progress: improvingOverall Patient Progress: improving    Outcome Evaluation: On 1L, denies SOB.      Problem: Comorbidity Management  Goal: Maintenance of Asthma Control  Outcome: Progressing  Intervention: Maintain Asthma Symptom Control  Recent Flowsheet Documentation  Taken 5/8/2024 0000 by Nhi Grullon, RN  Medication Review/Management: medications reviewed  Goal: Blood Pressure in Desired Range  Outcome: Progressing  Intervention: Maintain Blood Pressure Management  Recent Flowsheet Documentation  Taken 5/8/2024 0000 by Nhi Grullon, RN  Medication Review/Management: medications reviewed     Problem: Pneumonia  Goal: Fluid Balance  Outcome: Progressing  Goal: Resolution of Infection Signs and Symptoms  Outcome: Progressing  Goal: Effective Oxygenation and Ventilation  Outcome: Progressing  Intervention: Promote Airway Secretion Clearance  Recent Flowsheet Documentation  Taken 5/8/2024 0000 by Nhi Grullon, RN  Cough And Deep Breathing: done independently per patient  Intervention: Optimize Oxygenation and Ventilation  Recent Flowsheet Documentation  Taken 5/8/2024 0000 by Nhi Grullon, RN  Head of Bed (HOB) Positioning: HOB at 20-30 degrees     Problem: Adult Inpatient Plan of Care  Goal: Plan of Care Review  Description: The Plan of Care Review/Shift note should be completed every shift.  The Outcome Evaluation is a brief statement about your assessment that the patient is improving, declining, or no change.  This information will be displayed automatically on your  "shift  note.  Outcome: Progressing  Flowsheets (Taken 5/8/2024 0634)  Outcome Evaluation: On 1L, denies SOB.  Plan of Care Reviewed With: patient  Overall Patient Progress: improving  Goal: Patient-Specific Goal (Individualized)  Description: You can add care plan individualizations to a care plan. Examples of Individualization might be:  \"Parent requests to be called daily at 9am for status\", \"I have a hard time hearing out of my right ear\", or \"Do not touch me to wake me up as it startles  me\".  Outcome: Progressing  Goal: Absence of Hospital-Acquired Illness or Injury  Outcome: Progressing  Intervention: Identify and Manage Fall Risk  Recent Flowsheet Documentation  Taken 5/8/2024 0000 by Nhi Grullon, RN  Safety Promotion/Fall Prevention:   activity supervised   assistive device/personal items within reach   clutter free environment maintained   increased rounding and observation   safety round/check completed   supervised activity  Intervention: Prevent Skin Injury  Recent Flowsheet Documentation  Taken 5/8/2024 0000 by Nhi Grullon, RN  Body Position: position changed independently  Intervention: Prevent and Manage VTE (Venous Thromboembolism) Risk  Recent Flowsheet Documentation  Taken 5/8/2024 0000 by Nhi Grullon, RN  VTE Prevention/Management:   SCDs (sequential compression devices) off   patient refused intervention  Intervention: Prevent Infection  Recent Flowsheet Documentation  Taken 5/8/2024 0000 by Nhi Grullon, RN  Infection Prevention:   cohorting utilized   rest/sleep promoted   single patient room provided  Goal: Optimal Comfort and Wellbeing  Outcome: Progressing  Goal: Readiness for Transition of Care  Outcome: Progressing     "

## 2024-05-08 NOTE — PLAN OF CARE
Assessments:   A&Ox4. VSS on RA. Attempted ambulatory oxymetry test again but patient refused walking long enough to get an accurate read on pusle oxymetry. Up SBA with walker. Denies pain, nausea, SoB.    Treatment Plan: Encourage activity. Possible discharge tomorrow    Bedside Nurse: Noe Trejo RN       Problem: Comorbidity Management  Goal: Maintenance of Asthma Control  Outcome: Progressing  Intervention: Maintain Asthma Symptom Control  Recent Flowsheet Documentation  Taken 5/8/2024 1700 by Noe Trejo RN  Medication Review/Management: medications reviewed  Goal: Blood Pressure in Desired Range  Outcome: Progressing  Intervention: Maintain Blood Pressure Management  Recent Flowsheet Documentation  Taken 5/8/2024 1700 by Noe Trejo RN  Medication Review/Management: medications reviewed     Problem: Pneumonia  Goal: Fluid Balance  Outcome: Progressing  Goal: Resolution of Infection Signs and Symptoms  Outcome: Progressing  Intervention: Prevent Infection Progression  Recent Flowsheet Documentation  Taken 5/8/2024 1700 by Noe Trejo RN  Isolation Precautions: contact precautions maintained  Goal: Effective Oxygenation and Ventilation  Outcome: Progressing  Intervention: Promote Airway Secretion Clearance  Recent Flowsheet Documentation  Taken 5/8/2024 1700 by Noe Trejo RN  Cough And Deep Breathing: done independently per patient  Intervention: Optimize Oxygenation and Ventilation  Recent Flowsheet Documentation  Taken 5/8/2024 1700 by Noe Trejo RN  Head of Bed (HOB) Positioning: HOB at 30-45 degrees     Problem: Adult Inpatient Plan of Care  Goal: Plan of Care Review  Description: The Plan of Care Review/Shift note should be completed every shift.  The Outcome Evaluation is a brief statement about your assessment that the patient is improving, declining, or no change.  This information will be displayed automatically on your  "shift  note.  Outcome: Progressing  Flowsheets (Taken 5/8/2024 1852)  Outcome Evaluation: vitally stable. Denies pain, n/v, SoB  Plan of Care Reviewed With: patient  Overall Patient Progress: improving  Goal: Patient-Specific Goal (Individualized)  Description: You can add care plan individualizations to a care plan. Examples of Individualization might be:  \"Parent requests to be called daily at 9am for status\", \"I have a hard time hearing out of my right ear\", or \"Do not touch me to wake me up as it startles  me\".  Outcome: Progressing  Goal: Absence of Hospital-Acquired Illness or Injury  Outcome: Progressing  Intervention: Identify and Manage Fall Risk  Recent Flowsheet Documentation  Taken 5/8/2024 1700 by Noe Trejo RN  Safety Promotion/Fall Prevention:   safety round/check completed   nonskid shoes/slippers when out of bed   patient and family education   treat reversible contributory factors  Intervention: Prevent Skin Injury  Recent Flowsheet Documentation  Taken 5/8/2024 1700 by Noe Trejo RN  Body Position: position changed independently  Intervention: Prevent and Manage VTE (Venous Thromboembolism) Risk  Recent Flowsheet Documentation  Taken 5/8/2024 1700 by Noe Trejo RN  VTE Prevention/Management:   SCDs (sequential compression devices) off   patient refused intervention  Goal: Optimal Comfort and Wellbeing  Outcome: Progressing  Goal: Readiness for Transition of Care  Outcome: Progressing       Goal Outcome Evaluation:      Plan of Care Reviewed With: patient    Overall Patient Progress: improving    Outcome Evaluation: vitally stable. Denies pain, n/v, SoB      "

## 2024-05-09 VITALS
RESPIRATION RATE: 18 BRPM | SYSTOLIC BLOOD PRESSURE: 139 MMHG | HEIGHT: 67 IN | TEMPERATURE: 98.2 F | DIASTOLIC BLOOD PRESSURE: 65 MMHG | WEIGHT: 293 LBS | HEART RATE: 79 BPM | BODY MASS INDEX: 45.99 KG/M2 | OXYGEN SATURATION: 92 %

## 2024-05-09 LAB
ANION GAP SERPL CALCULATED.3IONS-SCNC: 11 MMOL/L (ref 7–15)
BUN SERPL-MCNC: 28.1 MG/DL (ref 6–20)
CALCIUM SERPL-MCNC: 9.3 MG/DL (ref 8.6–10)
CHLORIDE SERPL-SCNC: 104 MMOL/L (ref 98–107)
CREAT SERPL-MCNC: 0.7 MG/DL (ref 0.51–0.95)
DEPRECATED HCO3 PLAS-SCNC: 27 MMOL/L (ref 22–29)
EGFRCR SERPLBLD CKD-EPI 2021: >90 ML/MIN/1.73M2
GLUCOSE SERPL-MCNC: 120 MG/DL (ref 70–99)
HOLD SPECIMEN: NORMAL
MAGNESIUM SERPL-MCNC: 2.5 MG/DL (ref 1.7–2.3)
POTASSIUM SERPL-SCNC: 5 MMOL/L (ref 3.4–5.3)
SODIUM SERPL-SCNC: 142 MMOL/L (ref 135–145)

## 2024-05-09 PROCEDURE — 250N000009 HC RX 250: Performed by: PHYSICIAN ASSISTANT

## 2024-05-09 PROCEDURE — 36415 COLL VENOUS BLD VENIPUNCTURE: CPT | Performed by: INTERNAL MEDICINE

## 2024-05-09 PROCEDURE — 250N000013 HC RX MED GY IP 250 OP 250 PS 637: Performed by: INTERNAL MEDICINE

## 2024-05-09 PROCEDURE — 83735 ASSAY OF MAGNESIUM: CPT | Performed by: INTERNAL MEDICINE

## 2024-05-09 PROCEDURE — 999N000157 HC STATISTIC RCP TIME EA 10 MIN

## 2024-05-09 PROCEDURE — 80048 BASIC METABOLIC PNL TOTAL CA: CPT | Performed by: INTERNAL MEDICINE

## 2024-05-09 PROCEDURE — 250N000013 HC RX MED GY IP 250 OP 250 PS 637: Performed by: STUDENT IN AN ORGANIZED HEALTH CARE EDUCATION/TRAINING PROGRAM

## 2024-05-09 PROCEDURE — 94640 AIRWAY INHALATION TREATMENT: CPT | Mod: 76

## 2024-05-09 PROCEDURE — 99239 HOSP IP/OBS DSCHRG MGMT >30: CPT | Performed by: INTERNAL MEDICINE

## 2024-05-09 PROCEDURE — 94640 AIRWAY INHALATION TREATMENT: CPT

## 2024-05-09 PROCEDURE — 250N000013 HC RX MED GY IP 250 OP 250 PS 637: Performed by: PHYSICIAN ASSISTANT

## 2024-05-09 RX ORDER — ATORVASTATIN CALCIUM 10 MG/1
10 TABLET, FILM COATED ORAL DAILY
Status: DISCONTINUED | OUTPATIENT
Start: 2024-05-09 | End: 2024-05-09 | Stop reason: HOSPADM

## 2024-05-09 RX ORDER — GUAIFENESIN 600 MG/1
600 TABLET, EXTENDED RELEASE ORAL 2 TIMES DAILY
Qty: 20 TABLET | Refills: 0 | Status: SHIPPED | OUTPATIENT
Start: 2024-05-09 | End: 2024-05-19

## 2024-05-09 RX ORDER — MULTIPLE VITAMINS W/ MINERALS TAB 9MG-400MCG
1 TAB ORAL DAILY
Status: DISCONTINUED | OUTPATIENT
Start: 2024-05-09 | End: 2024-05-09 | Stop reason: HOSPADM

## 2024-05-09 RX ORDER — FUROSEMIDE 40 MG
40 TABLET ORAL DAILY
Qty: 30 TABLET | Refills: 0 | Status: ON HOLD | OUTPATIENT
Start: 2024-05-10 | End: 2024-07-02

## 2024-05-09 RX ORDER — AZELASTINE 1 MG/ML
1 SPRAY, METERED NASAL 2 TIMES DAILY
Status: DISCONTINUED | OUTPATIENT
Start: 2024-05-09 | End: 2024-05-09 | Stop reason: HOSPADM

## 2024-05-09 RX ORDER — FLUTICASONE PROPIONATE 50 MCG
1 SPRAY, SUSPENSION (ML) NASAL DAILY
Status: DISCONTINUED | OUTPATIENT
Start: 2024-05-09 | End: 2024-05-09 | Stop reason: HOSPADM

## 2024-05-09 RX ADMIN — ATORVASTATIN CALCIUM 10 MG: 10 TABLET, FILM COATED ORAL at 11:18

## 2024-05-09 RX ADMIN — IPRATROPIUM BROMIDE AND ALBUTEROL SULFATE 3 ML: .5; 3 SOLUTION RESPIRATORY (INHALATION) at 12:05

## 2024-05-09 RX ADMIN — LISINOPRIL 20 MG: 20 TABLET ORAL at 09:03

## 2024-05-09 RX ADMIN — CARBAMAZEPINE 400 MG: 200 TABLET ORAL at 09:03

## 2024-05-09 RX ADMIN — CARBAMAZEPINE 200 MG: 200 TABLET ORAL at 11:18

## 2024-05-09 RX ADMIN — Medication 1 TABLET: at 11:18

## 2024-05-09 RX ADMIN — IPRATROPIUM BROMIDE AND ALBUTEROL SULFATE 3 ML: .5; 3 SOLUTION RESPIRATORY (INHALATION) at 08:06

## 2024-05-09 RX ADMIN — GUAIFENESIN 600 MG: 600 TABLET, EXTENDED RELEASE ORAL at 09:03

## 2024-05-09 RX ADMIN — FUROSEMIDE 40 MG: 40 TABLET ORAL at 09:03

## 2024-05-09 RX ADMIN — SERTRALINE HYDROCHLORIDE 100 MG: 100 TABLET ORAL at 09:03

## 2024-05-09 RX ADMIN — NYSTATIN: 100000 CREAM TOPICAL at 09:04

## 2024-05-09 RX ADMIN — ACETAMINOPHEN 650 MG: 325 TABLET, FILM COATED ORAL at 09:03

## 2024-05-09 RX ADMIN — PANTOPRAZOLE SODIUM 40 MG: 40 TABLET, DELAYED RELEASE ORAL at 09:03

## 2024-05-09 ASSESSMENT — ACTIVITIES OF DAILY LIVING (ADL)
ADLS_ACUITY_SCORE: 40
ADLS_ACUITY_SCORE: 40
ADLS_ACUITY_SCORE: 39
ADLS_ACUITY_SCORE: 40
ADLS_ACUITY_SCORE: 39
ADLS_ACUITY_SCORE: 39
ADLS_ACUITY_SCORE: 40
ADLS_ACUITY_SCORE: 40
ADLS_ACUITY_SCORE: 35
ADLS_ACUITY_SCORE: 40
ADLS_ACUITY_SCORE: 39
ADLS_ACUITY_SCORE: 35

## 2024-05-09 NOTE — PLAN OF CARE
Discharge Note    Patient discharged to home via EMS/BLS accompanied by other :self .  IV: Discontinued  Prescriptions filled and given to patient/family.   Belongings reviewed and sent with patient.   Home medications returned to patient: NA  Equipment sent with: patient.   patient verbalizes understanding of discharge instructions. AVS given to patient.  Additional education completed?  Primary care physician follow up and lab appointment       Problem: Adult Inpatient Plan of Care  Goal: Plan of Care Review  Description: The Plan of Care Review/Shift note should be completed every shift.  The Outcome Evaluation is a brief statement about your assessment that the patient is improving, declining, or no change.  This information will be displayed automatically on your shift  note.  Recent Flowsheet Documentation  Taken 5/9/2024 1405 by Willa Olivares RN  Outcome Evaluation: Discharging home  Plan of Care Reviewed With: patient  Overall Patient Progress: improving  Goal: Absence of Hospital-Acquired Illness or Injury  Intervention: Identify and Manage Fall Risk  Recent Flowsheet Documentation  Taken 5/9/2024 0900 by Willa Olivares RN  Safety Promotion/Fall Prevention: safety round/check completed  Intervention: Prevent and Manage VTE (Venous Thromboembolism) Risk  Recent Flowsheet Documentation  Taken 5/9/2024 0900 by Willa Olivares RN  VTE Prevention/Management:   SCDs (sequential compression devices) off   patient refused intervention  Intervention: Prevent Infection  Recent Flowsheet Documentation  Taken 5/9/2024 0900 by Willa Olivares RN  Infection Prevention:   cohorting utilized   rest/sleep promoted   single patient room provided  Goal: Optimal Comfort and Wellbeing  Intervention: Monitor Pain and Promote Comfort  Recent Flowsheet Documentation  Taken 5/9/2024 0903 by Willa Olivares RN  Pain Management Interventions: medication (see MAR)     Problem: Adult Inpatient Plan of Care  Goal:  "Plan of Care Review  Description: The Plan of Care Review/Shift note should be completed every shift.  The Outcome Evaluation is a brief statement about your assessment that the patient is improving, declining, or no change.  This information will be displayed automatically on your shift  note.  Outcome: Met  Flowsheets (Taken 5/9/2024 1405)  Outcome Evaluation: Discharging home  Plan of Care Reviewed With: patient  Overall Patient Progress: improving  Goal: Patient-Specific Goal (Individualized)  Description: You can add care plan individualizations to a care plan. Examples of Individualization might be:  \"Parent requests to be called daily at 9am for status\", \"I have a hard time hearing out of my right ear\", or \"Do not touch me to wake me up as it startles  me\".  Outcome: Met  Goal: Absence of Hospital-Acquired Illness or Injury  Outcome: Met  Intervention: Identify and Manage Fall Risk  Recent Flowsheet Documentation  Taken 5/9/2024 0900 by Willa Olivares RN  Safety Promotion/Fall Prevention: safety round/check completed  Intervention: Prevent and Manage VTE (Venous Thromboembolism) Risk  Recent Flowsheet Documentation  Taken 5/9/2024 0900 by Willa Olivares RN  VTE Prevention/Management:   SCDs (sequential compression devices) off   patient refused intervention  Intervention: Prevent Infection  Recent Flowsheet Documentation  Taken 5/9/2024 0900 by Willa Olivares RN  Infection Prevention:   cohorting utilized   rest/sleep promoted   single patient room provided  Goal: Optimal Comfort and Wellbeing  Outcome: Met  Intervention: Monitor Pain and Promote Comfort  Recent Flowsheet Documentation  Taken 5/9/2024 0903 by Willa Olivares RN  Pain Management Interventions: medication (see MAR)  Goal: Readiness for Transition of Care  Outcome: Met     Problem: Comorbidity Management  Goal: Maintenance of Asthma Control  Intervention: Maintain Asthma Symptom Control  Recent Flowsheet Documentation  Taken " 5/9/2024 0900 by Willa Olivares RN  Medication Review/Management: medications reviewed  Goal: Maintenance of Behavioral Health Symptom Control  Intervention: Maintain Behavioral Health Symptom Control  Recent Flowsheet Documentation  Taken 5/9/2024 0900 by Willa Olivares RN  Medication Review/Management: medications reviewed  Goal: Maintenance of COPD Symptom Control  Intervention: Maintain COPD Symptom Control  Recent Flowsheet Documentation  Taken 5/9/2024 0900 by Willa Olivares RN  Medication Review/Management: medications reviewed  Goal: Blood Glucose Levels Within Targeted Range  Intervention: Monitor and Manage Glycemia  Recent Flowsheet Documentation  Taken 5/9/2024 0900 by Willa Olivares RN  Medication Review/Management: medications reviewed  Goal: Maintenance of Heart Failure Symptom Control  Intervention: Maintain Heart Failure Management  Recent Flowsheet Documentation  Taken 5/9/2024 0900 by Willa Olivares RN  Medication Review/Management: medications reviewed  Goal: Blood Pressure in Desired Range  Intervention: Maintain Blood Pressure Management  Recent Flowsheet Documentation  Taken 5/9/2024 0900 by Willa Olivares RN  Medication Review/Management: medications reviewed  Goal: Maintenance of Osteoarthritis Symptom Control  Intervention: Maintain Osteoarthritis Symptom Control  Recent Flowsheet Documentation  Taken 5/9/2024 0957 by Willa Olivares RN  Assistive Device Utilized: walker  Activity Management: ambulated to bathroom  Taken 5/9/2024 0900 by Willa Olivares RN  Medication Review/Management: medications reviewed  Taken 5/9/2024 0858 by Willa Olivares RN  Assistive Device Utilized: walker  Activity Management:   ambulated to bathroom   ambulated outside room  Goal: Bariatric Home Regimen Maintained  Intervention: Maintain and Manage Postbariatric Surgery Care  Recent Flowsheet Documentation  Taken 5/9/2024 0900 by Willa Olivares RN  Medication  Review/Management: medications reviewed  Goal: Maintenance of Seizure Control  Intervention: Maintain Seizure Symptom Control  Recent Flowsheet Documentation  Taken 5/9/2024 0900 by Willa Olivares RN  Medication Review/Management: medications reviewed     Problem: Pneumonia  Goal: Effective Oxygenation and Ventilation  Intervention: Promote Airway Secretion Clearance  Recent Flowsheet Documentation  Taken 5/9/2024 0900 by Willa Olivares RN  Cough And Deep Breathing: done independently per patient     Problem: Pneumonia  Goal: Fluid Balance  Outcome: Met  Goal: Resolution of Infection Signs and Symptoms  Outcome: Met  Goal: Effective Oxygenation and Ventilation  Outcome: Met  Intervention: Promote Airway Secretion Clearance  Recent Flowsheet Documentation  Taken 5/9/2024 0900 by Willa Olivares RN  Cough And Deep Breathing: done independently per patient     Problem: Fall Injury Risk  Goal: Absence of Fall and Fall-Related Injury  Intervention: Identify and Manage Contributors  Recent Flowsheet Documentation  Taken 5/9/2024 0900 by Willa Olivares RN  Medication Review/Management: medications reviewed  Intervention: Promote Injury-Free Environment  Recent Flowsheet Documentation  Taken 5/9/2024 0900 by Willa Olivares RN  Safety Promotion/Fall Prevention: safety round/check completed   Goal Outcome Evaluation:      Plan of Care Reviewed With: patient    Overall Patient Progress: improvingOverall Patient Progress: improving    Outcome Evaluation: Discharging home
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
## 2024-05-09 NOTE — PROGRESS NOTES
Care Management Discharge Note    Discharge Date: 05/09/2024       Discharge Disposition:  Home    Discharge Services:  None    Discharge Transportation: stretcher  Reviewed potential out of pocket cost for [facility] stretcher transport. Current base rate is $1228.70 and $28.67 per mile to the destination. Pt/family expressed understanding and are agreeable to this.      Patient requires stretcher transportation due to the pt's weight (392 pounds).  Max w/c weight is 350 pounds.    Stretcher transportation has been arranged for today between 2200-1455. Patient and bedside nurse notified of transportation time.    Ambulance PCS form completed and placed in patient chart. Ambulance PCS form should be given to transportation team.    Private pay costs discussed: transportation costs    Does the patient's insurance plan have a 3 day qualifying hospital stay waiver?  Yes     Which insurance plan 3 day waiver is available? [employer]CO REACH    Will the waiver be used for post-acute placement? No    PAS Confirmation Code: N/A  Patient/family educated on Medicare website which has current facility and service quality ratings:  N/A    Education Provided on the Discharge Plan:  yes  Persons Notified of Discharge Plans: Pt's parents/guardians, Pt's CM  Patient/Family in Agreement with the Plan:  yes    Handoff Referral Completed: No    Additional Information:  The pt will discharge home today.  Her parent's/guardians are aware and agreeable to this plan.  They requested transportation be arranged due to no other transportation options available or appropriate.  Stretcher transport will pick the pt up between 9115-7932.  Per pt's father's request, Sw called and updated the pt's Claribel Matias CM P: [phone].    Sw will continue to be available as needed until discharge.    AISSATOU Macdonald, [facility]  Inpatient Care Coordination  [facility]  [phone]

## 2024-05-09 NOTE — PLAN OF CARE
To Do:  End of Shift Summary  For vital signs and complete assessments, please see documentation flowsheets.     Pertinent assessments: Pt is A/Ox4. VSS on RA.   Denies pain, SOB, N/V. SBA with walker. PIV SL. Regular diet. Applied nystatin cream this shift to skin folds. Bed alarm on for safety.     Major Shift Events: uneventful    Treatment Plan: Possible discharge tomorrow. Encourage activity.     Bedside Nurse: Kathi Ortiz RN     Goal Outcome Evaluation:      Plan of Care Reviewed With: patient    Overall Patient Progress: improvingOverall Patient Progress: improving    Outcome Evaluation: Room air      Problem: Comorbidity Management  Goal: Maintenance of Asthma Control  Outcome: Progressing  Intervention: Maintain Asthma Symptom Control  Recent Flowsheet Documentation  Taken 5/8/2024 2012 by Kathi Ortiz RN  Medication Review/Management: medications reviewed  Goal: Blood Pressure in Desired Range  Outcome: Progressing  Intervention: Maintain Blood Pressure Management  Recent Flowsheet Documentation  Taken 5/8/2024 2012 by Kathi Ortiz RN  Medication Review/Management: medications reviewed     Problem: Pneumonia  Goal: Fluid Balance  Outcome: Progressing  Goal: Resolution of Infection Signs and Symptoms  Outcome: Progressing  Intervention: Prevent Infection Progression  Recent Flowsheet Documentation  Taken 5/8/2024 2012 by Kathi Ortiz RN  Isolation Precautions: contact precautions maintained  Goal: Effective Oxygenation and Ventilation  Outcome: Progressing  Intervention: Promote Airway Secretion Clearance  Recent Flowsheet Documentation  Taken 5/8/2024 2012 by Kathi Ortiz RN  Cough And Deep Breathing: done independently per patient  Intervention: Optimize Oxygenation and Ventilation  Recent Flowsheet Documentation  Taken 5/8/2024 2012 by Kathi Ortiz, RN  Head of Bed (HOB) Positioning: HOB at 30-45 degrees     Problem: Adult Inpatient Plan of Care  Goal: Plan of Care Review  Description: The Plan of  "Care Review/Shift note should be completed every shift.  The Outcome Evaluation is a brief statement about your assessment that the patient is improving, declining, or no change.  This information will be displayed automatically on your shift  note.  Outcome: Progressing  Flowsheets (Taken 5/8/2024 2306)  Outcome Evaluation: Room air  Plan of Care Reviewed With: patient  Overall Patient Progress: improving  Goal: Patient-Specific Goal (Individualized)  Description: You can add care plan individualizations to a care plan. Examples of Individualization might be:  \"Parent requests to be called daily at 9am for status\", \"I have a hard time hearing out of my right ear\", or \"Do not touch me to wake me up as it startles  me\".  Outcome: Progressing  Goal: Absence of Hospital-Acquired Illness or Injury  Outcome: Progressing  Intervention: Identify and Manage Fall Risk  Recent Flowsheet Documentation  Taken 5/8/2024 2012 by Kathi Ortiz RN  Safety Promotion/Fall Prevention:   safety round/check completed   nonskid shoes/slippers when out of bed   patient and family education  Intervention: Prevent Skin Injury  Recent Flowsheet Documentation  Taken 5/8/2024 2012 by Kathi Ortiz RN  Body Position: position changed independently  Intervention: Prevent and Manage VTE (Venous Thromboembolism) Risk  Recent Flowsheet Documentation  Taken 5/8/2024 2012 by Kathi Ortiz RN  VTE Prevention/Management:   SCDs (sequential compression devices) off   patient refused intervention  Intervention: Prevent Infection  Recent Flowsheet Documentation  Taken 5/8/2024 2012 by Kathi Ortiz RN  Infection Prevention:   cohorting utilized   rest/sleep promoted   single patient room provided  Goal: Optimal Comfort and Wellbeing  Outcome: Progressing  Goal: Readiness for Transition of Care  Outcome: Progressing         "

## 2024-05-09 NOTE — PLAN OF CARE
Pertinent assessments: A&Ox4. Up Ax1. VSS. RA. Denies pain. Incontinent of urine.     Major Shift Events: uneventful    Treatment Plan: Possible discharge tomorrow. Encourage activity.     Bedside Nurse: Dione Clement RN             Problem: Adult Inpatient Plan of Care  Goal: Plan of Care Review  Description: The Plan of Care Review/Shift note should be completed every shift.  The Outcome Evaluation is a brief statement about your assessment that the patient is improving, declining, or no change.  This information will be displayed automatically on your shift  note.  Recent Flowsheet Documentation  Taken 5/9/2024 0644 by Dione Clement RN  Outcome Evaluation: incontinent of urine  Plan of Care Reviewed With: patient  Overall Patient Progress: improving  Goal: Absence of Hospital-Acquired Illness or Injury  Intervention: Identify and Manage Fall Risk  Recent Flowsheet Documentation  Taken 5/9/2024 0030 by Dione Clement RN  Safety Promotion/Fall Prevention:   safety round/check completed   nonskid shoes/slippers when out of bed   patient and family education  Intervention: Prevent Skin Injury  Recent Flowsheet Documentation  Taken 5/9/2024 0030 by Dione Clement RN  Body Position: position changed independently  Intervention: Prevent and Manage VTE (Venous Thromboembolism) Risk  Recent Flowsheet Documentation  Taken 5/9/2024 0030 by Dione Clement RN  VTE Prevention/Management:   SCDs (sequential compression devices) off   patient refused intervention  Intervention: Prevent Infection  Recent Flowsheet Documentation  Taken 5/9/2024 0030 by Dione Clement RN  Infection Prevention:   cohorting utilized   rest/sleep promoted   single patient room provided   Goal Outcome Evaluation:      Plan of Care Reviewed With: patient    Overall Patient Progress: improvingOverall Patient Progress: improving    Outcome Evaluation: incontinent of urine

## 2024-05-11 ENCOUNTER — PATIENT OUTREACH (OUTPATIENT)
Dept: CARE COORDINATION | Facility: CLINIC | Age: 59
End: 2024-05-11
Payer: MEDICARE

## 2024-05-11 NOTE — PROGRESS NOTES
"  [facility]: [facility]: Post-Discharge Note  SITUATION                                                      Admission:    Admission Date: 05/02/24   Reason for Admission: 1. Moderate persistent asthma with acute exacerbation  J45.41       2. Atypical pneumonia  J18.9  Discharge:   Discharge Date: 05/09/24  Discharge Diagnosis: Acute exacerbation of mild intermittent asthma   Atypical community acquired pneumonia    BACKGROUND                                                      Per hospital discharge summary and inpatient provider notes:    Brissa Corado is a 58 year old female with a complex history including asthma, hypertension, obesity, and seizure disorder who presents for an asthma exacerbation. She has had a cough and shortness of breath for the past 4 days, and eventually called EMS today to bring her to the ED. While en route, she was given 2 Duonebs with some improvement of symptoms. At bedside, she endorses having some chest pain. She notes that her current symptoms are similar to prior asthma exacerbations, but the cough is new. She was previously seen for these symptoms 2 days ago, and was not started on steroids at that time. She does not have a history DVT's/PE's. She does not currently smoke tobacco products or drink alcohol. She denies abdominal pain, nausea, vomiting, diarrhea, or new leg swelling.       ASSESSMENT           Discharge Assessment  How are you doing now that you are home?: Mom states she is still doing a lot of coughing.  Has a little wheezing, doing the spray twice daily. Much better than whens he went in. She is incontinent from the diuretic as she can't make it to the bathroom. Mom feels like this is ongoing does not feel like this is new. Does not have or use any type of depends, she will discuss this with [place].  She is also having diarrhea.  She does have someone with her in the apartment but per mom \"he is useless\".  Patient's guardian and " mother states the home she went back to is disasterous.  States the apartment doesn't have much room and she sits on her walker and pushes herself around with her feet. Patients mom states they would like to get her into an [place] but they are having trouble with the county.  In the last year she has had about 5 case workers and states they don't do anything. No one has seen her in her apartment for over 2 years. She has been in this apartment for 10 years.  Mom does not feel she is safe there.  States she can't come there to live as they only have a 1 bedroom townhouse. Has a CADI waiver, states everything goes through the county. Offered care coordination, patient's mother/guardian accepted. Patient's mom/guardian states a vulnerable adult report has been filed in the past and nothing is ever done.  and cleaning crew are aware of the mold as this information was sent to them yesterday.  She will be calling the  as well.  How are your symptoms? (Red Flag symptoms escalate to triage hotline per guidelines): Improved  Do you feel your condition is stable enough to be safe at home until your provider visit?: No (see comment) (Housing situation.)  Does the patient have their discharge instructions? : Yes  Does the patient have questions regarding their discharge instructions? : No  Were you started on any new medications or were there changes to any of your previous medications? : Yes  Does the patient have all of their medications?: Yes  Do you have questions regarding any of your medications? : No  Do you have all of your needed medical supplies or equipment (DME)?  (i.e. oxygen tank, CPAP, cane, etc.): No - What equipment or supplies are needed? (Depends and wheelchair.)  Discharge follow-up appointment scheduled within 14 calendar days? : Yes  Discharge Follow Up Appointment Date: 05/17/24  Discharge Follow Up Appointment Scheduled with?: Primary Care Provider         Post-op (Clinicians  Only)  Did the patient have surgery or a procedure: No    Care Management   Community Health Worker Initial Outreach    CHW Initial Information Gathering:  Referral Source: IP Report  Preferred Hospital: [facility] [place] [facility]  [phone]  Preferred Urgent Care:  (States she does not use Urgent Care.)  Current living arrangement:: Other (Lives in an apartment with significant other)  Type of residence:: Apartment  Community Resources: ARM, County Worker, Other (see comment),  (ABDIEL maurer, may hernandez)  Supplies Currently Used at Home: None  Equipment Currently Used at Home: tub bench, grab bar, tub/shower, other (see comments) (Bariatric walker with a seat)  Informal Support system:: Parent, Significant other  No PCP office visit in Past Year: No  Transportation means:: Medical transport, Family       Patient accepts CC: Yes. Patient scheduled for assessment with Rocio Govea RN on 5/14/2024 at 11am. Patient noted desire to discuss Patient's guardian is looking to get her placed in a new apartment/housing. Having difficulty with getting anything through the county.  Also needs depends or similar item as patient is incontinent.  Per AVS patient to resume home care services but she doesn't have any home care at this time. States her significant other would need to open the door for home care but he doesn't want anyone in the apartment.  Does not have a wheelchair or commode but mom states she could benefit from this.     Also see  note dated 5/2/2024.    PLAN                                                      Outpatient Plan:  Follow up with primary care provider, Aury Vizcaino, within 7 days for hospital follow- up.  The following labs/tests are recommended: Started on diuretics will need to monitor electrolytes.  Patient would need repeat basic metabolic panel.    Future Appointments   Date Time Provider Department Center   5/13/2024  3:45 PM [facility]   5/17/2024   1:30 PM Aury Vizcaino MD [facility] [place]         For any urgent concerns, please contact our 24 hour nurse triage line: [phone] ([phone])         Verna Collins RN

## 2024-05-14 ENCOUNTER — APPOINTMENT (OUTPATIENT)
Dept: NURSING | Facility: CLINIC | Age: 59
End: 2024-05-14
Payer: MEDICARE

## 2024-05-14 ENCOUNTER — PATIENT OUTREACH (OUTPATIENT)
Dept: CARE COORDINATION | Facility: CLINIC | Age: 59
End: 2024-05-14

## 2024-05-14 ASSESSMENT — ACTIVITIES OF DAILY LIVING (ADL): DEPENDENT_IADLS:: CLEANING;COOKING;LAUNDRY;SHOPPING;MEAL PREPARATION;TRANSPORTATION;INCONTINENCE

## 2024-05-14 NOTE — LETTER
[facility] CARE COORDINATION  [address]  [place] [address]    May 14, 2024    Brissa Corado  [address]  [place] [address]      Dear [place] (Lola Bell),    I am a clinic care coordinator who works with Aury Vizcaino MD with the [facility]. I wanted to introduce myself and provide you with my contact information for you to be able to call me with any questions or concerns. I wanted to thank you for spending the time to talk with me.  Below is a description of clinic care coordination and how I can further assist you.       The clinic care coordination team is made up of a registered nurse, , financial resource worker and community health worker who understand the health care system. The goal of clinic care coordination is to help you manage your health and improve access to the health care system. Our team works alongside your provider to assist you in determining your health and social needs. We can help you obtain health care and community resources, providing you with necessary information and education. We can work with you through any barriers and develop a care plan that helps coordinate and strengthen the communication between you and your care team.  Our services are voluntary and are offered without charge to you personally.    Please feel free to contact me with any questions or concerns regarding care coordination and what we can offer.      We are focused on providing you with the highest-quality healthcare experience possible.    Sincerely,     Rocio Govea RN, BSN, CPHN, [facility] Ambulatory Care Management  [facility]  Phone: [phone]  Email: [email or web address]    Kathi LOERA [facility] [place]  Phone: [phone]   Email: [email or web address]      Enclosed: I  have enclosed a copy of the Patient Centered Plan of Care. This has helpful information and goals that we have talked about. Please keep this in an easy to access place to use as needed. Also, included is the resource for completing the paperwork for a bariatric electric wheelchair.     [facility]  [address],   [place] [address]  Phone: [phone]

## 2024-05-14 NOTE — LETTER
[facility]  Patient Centered Plan of Care  About Me:        Patient Name:  Brissa Corado    YOB: 1965  Age:         58 year old   [place] MRN:    [id number] Telephone Information:  Home Phone Not on file.   Mobile [phone]       Address:  [address]  [place] [address] Email address:  [email or web address]      Emergency Contact(s)    Name Relationship Lgl Grd Work Phone Home Phone Mobile Phone   1. KARAN TAN * Father Yes   [phone]   2. WAN TAN (C* Mother Yes   [phone]   3. TIANA CORADO Other No [phone]           Primary language:  English     needed? No   [place] [facility] Services:  [phone] op. 1  Other communication barriers:Cognitive impairment; Physical impairment    Preferred Method of Communication:  Abhishek  Current living arrangement: Other (Lives in an apartment with significant other)    Mobility Status/ Medical Equipment: Dependent/Assisted by Another    Health Maintenance  Health Maintenance Reviewed: Due/Overdue (Discussed with parents)    My Access Plan  Medical Emergency 911   Primary Clinic Line [facility] - [phone]   24 Hour Appointment Line [phone] or  [phone]) (toll-free)   24 Hour Nurse Line [phone] (toll-free)   Preferred Urgent Care -- (States she does not use Urgent Care.)     Preferred Hospital [facility] [place] [facility]  [phone]     Preferred Pharmacy [facility]  [place] [facility] [address]     Behavioral Health Crisis Line The National Suicide Prevention Lifeline at 1-605.454.8612 or Text/Call 988     My Care Team Members  Patient Care Team         Relationship Specialty Notifications Start End    Aury Vizcaino MD PCP - General Internal Medicine  2/13/24     Phone: [phone] Fax: [phone]         [address] [facility] [address]    Catarina Govea  FERNANDA Chan CNP  Clinical Nurse Specialist  3/23/17     Phone: [phone] Fax: [phone]         [address] [facility] [address]    Mira Mayberry MD MD INTERNAL MEDICINE - ENDOCRINOLOGY, DIABETES & METABOLISM  3/23/17     Phone: [phone] Fax: [phone]         [address] [facility] [address]    ABDIEL WOLFE Rosy Michaelvedo    9/6/18     Phone: [phone]         [facility] [place] [facility]  3/15/21     Phone: [phone]         Aury Vizcaino MD Assigned PCP   1/6/24     Phone: [phone] Fax: [phone]         [address] [facility] [address]    Rocio Govea RN Lead Care Coordinator Primary Care - CC Admissions 5/11/24     Phone: [phone]                   My Care Plans  Self Management and Treatment Plan    Care Plan  Care Plan: Health Maintenance       Problem: Health Maintenance Due or Overdue       Goal: Become up-to-date with health maintenance visit(s)       Start Date: 5/14/2024 Expected End Date: 11/12/2024    This Visit's Progress: 0%    Note:     Barriers: Cognitive disability; Physical disability; Poor support from Significant other; Provider availability - wait time to complete appointments.   Strengths: Parents/guardians Motivated; Agreeable to Care Coordination.   Patient expressed understanding of goal: Yes  Action steps to achieve this goal:  1. I will take my medications as prescribed.   2. I will discuss, review, schedule and complete recommended overdue health maintenance with my Primary Care Provider.   3. I will contact my care team with questions, concerns or support needs. I will use the clinic as a resource and I understand I can contact my clinic with 24/7 after hours services available. Care Coordinator will remain available as needed.                               Care Plan: Resources       Problem: Resources       Goal: Resources for equipment       Start Date: 5/14/2024 Expected End Date: 11/12/2024    This Visit's  Progress: 10%    Note:     Barriers: Cognitive disability; Physical disability; Poor support from Significant other; Provider availability - wait time to complete appointments.   Strengths: Parents/guardians Motivated; Agreeable to Care Coordination.   Patient expressed understanding of goal: Yes  Action steps to achieve this goal:  1. I will review and follow up with resource for medical equipment ([employer] ([phone]) for required forms for Bariatric Electric Wheelchair and bedside commode.   2. I will discuss with CADI  Jagjit Barrientos how to obtain incontinent supplies through [place]'s waiver.   3. I will contact my care team with questions, concerns or support needs. I will use the clinic as a resource and I understand I can contact my clinic with 24/7 after hours services available. Care Coordinator will remain available as needed.                             Action Plans on File:     Depression    Advance Care Plans/Directives:   Advanced Care Plan/Directives on file:   Yes    Status of Document(s):   On File and Validated    Advanced Care Plan/Directives Type:   Advanced Directive - On File         My Medical and Care Information  Problem List   Patient Active Problem List   Diagnosis    Essential hypertension, benign    Delay in development    Injury, other and unspecified, knee, leg, ankle, and foot    Perforation of tympanic membrane    Hyperlipidemia LDL goal <130    Seizure disorder (H)    Other peripheral enthesopathies    IFG (impaired fasting glucose)    Insulin resistance    Elevated cortisol level    Morbid obesity (H)    Low vitamin D level    BMI 50.0-59.9, adult (H)    SBO (small bowel obstruction) (H)    Non-intractable vomiting with nausea, unspecified vomiting type    Critical illness myopathy    Depression    Esophageal reflux    Candidiasis of skin    Skin lesion    Small bowel obstruction (H)    Atypical pneumonia    Moderate persistent asthma with acute  exacerbation      Current Medications and Allergies:    Current Outpatient Medications   Medication Sig Dispense Refill    albuterol (PROVENTIL) (2.5 MG/3ML) 0.083% neb solution Take 1 vial (2.5 mg) by nebulization every 6 hours as needed for shortness of breath or wheezing 75 mL 0    azelastine (ASTELIN) 0.1 % nasal spray SPRAY ONE PUFF IN EACH NOSTRIL TWICE A DAY 30 mL 2    carBAMazepine (TEGRETOL) 200 MG tablet TAKE TWO TABLETS BY MOUTH IN THE MORNING, TAKE ONE TABLET BY MOUTH AT NOON (WITH LUNCH) AND TAKE TWO TABLETS BY MOUTH AT NIGHT 450 tablet 3    fluticasone (FLONASE) 50 MCG/ACT nasal spray USE ONE SPRAY INTO BOTH NOSTRILS DAILY AS NEEDED FOR RHINITIS OR ALLERGIES 16 mL 1    furosemide (LASIX) 40 MG tablet Take 1 tablet (40 mg) by mouth daily for 30 days 30 tablet 0    guaiFENesin (MUCINEX) 600 MG 12 hr tablet Take 1 tablet (600 mg) by mouth 2 times daily for 10 days 20 tablet 0    lisinopril (ZESTRIL) 20 MG tablet Take 1 tablet (20 mg) by mouth daily 90 tablet 1    lovastatin (MEVACOR) 40 MG tablet Take 1 tablet (40 mg) by mouth at bedtime 90 tablet 1    mometasone-formoterol (DULERA) 200-5 MCG/ACT inhaler Inhale 2 puffs into the lungs 2 times daily for 30 days 8.8 g 0    Multiple Vitamin (MULTI-VITAMIN PO) Take 1 tablet by mouth daily      nystatin (MYCOSTATIN) 399358 UNIT/GM external cream APPLY TOPICALLY 2 TIMES DAILY 60 g 1    omeprazole (PRILOSEC) 20 MG DR capsule Take 1 capsule (20 mg) by mouth daily before breakfast 90 capsule 1    sertraline (ZOLOFT) 100 MG tablet Take 1 tablet (100 mg) by mouth daily 90 tablet 1     No current facility-administered medications for this visit.        Allergies   Allergen Reactions    Aspirin Unknown    Iodine      Iodinated Contrast Media  --- Hives      Penicillins Unknown    Tetracyclines & Related Unknown    Hydrochlorothiazide Palpitations     dehydration    Influenza Vac Split [Influenza Virus Vaccine] Rash     Patient states she is allergic to the vaccine.   Got a rash all over body many years ago after receiving injection.     Care Coordination Start Date: 5/11/2024   Frequency of Care Coordination: monthly, more frequently as needed     Form Last Updated: 05/14/2024        No 160.02

## 2024-05-14 NOTE — COMMUNITY RESOURCES LIST (ENGLISH)
May 14, 2024           YOUR PERSONALIZED LIST OF SERVICES & PROGRAMS               Medical Transportation, (NEMT)      [facility] Nurse Program - Transportation to medical appointments  [address] [place] [address] (Distance: 12.8 miles)  Phone: [phone]  Website: http://7 Elements StudiosladyMetabarpeFresenius [facility].[email or web address]/  Language: English, Rwandan, Greek  Fee: Sliding scale  Accessibility: Translation services      - [place] - Non-Emergency Medical Transportation  [address] [place] [address] (Distance: 9.2 miles)  Phone: [phone]  Website: http://[email or web address].net/[place]/  Language: English, Rwandan, Greek  Fee: Insurance  Accessibility: Ada accessible      Social Service [facility] - Neighbor to Neighbor Program  Phone: [phone]  Email: [email or web address]  Website: https://www.[facility].org/services/older-adults/-services/neighbor-to-neighbor  Language: English  Hours: Mon 8:00 AM - 5:00 PM Tue 8:00 AM - 5:00 PM Wed 8:00 AM - 5:00 PM Thu 8:00 AM - 5:00 PM Fri 8:00 AM - 5:00 PM  Fee: Insurance, Self pay  Accessibility: Deaf or hard of hearing, Blind accommodation, Translation services    Expense Assistance      Transit - [place] - Transit Assistance Program (TAP) - Transportation expense assistance  [address] [place] [address] (Distance: 14.4 miles)  Language: English, Rwandan  Fee: Free, Sliding scale, Self pay  Accessibility: Translation services      [place] - Transit Link  [address] [place] [address] (Distance: 14.5 miles)  Phone: [phone]) [phone]  Website: https://Micro[employer]s.[employer]/Transportation/Services/Transit-Link.aspx  Language: English  Fee: Self pay      - Dislocated Worker/Adult [employer] Employment Program  Phone: (723) 437-6301  Email: lilliam@[employer]  Website: https://Crowder[employer]/services/employment-services/dislocated-worker-program/  Language: English, Greek  Hours: Mon 8:00 AM - 4:30 PM Tue 8:00  AM - 4:30 PM Wed 8:00 AM - 4:30 PM Thu 8:00 AM - 4:30 PM Fri 8:00 AM - 4:30 PM  Fee: Free  Accessibility: Ada accessible    Coordination      Mobility - Paratransit or Dial-A-Ride service  [address] [place] [facility] [place] [place] [address] (Distance: 14.5 miles)  Phone: [phone]) [phone]  Website: http://metSpotivateobility.[employer]  Language: English  Fee: Self pay  Accessibility: Translation services, [place] accessible      Transit - [place] - Transit Link  [address] [facility] [address] (Distance: 14.4 miles)  Language: English  Fee: Self pay  Accessibility: Translation services      - [place] - [facility] - [place]  Phone: (195) [phone]  Website: http://www.[employer]ica               IMPORTANT NUMBERS & WEBSITES        Emergency Services  911  .   [employer]  211 http://211unTUKZ Undergarmentsway.org  .   Poison Control  (853) 210-6022 http://[place]poison.org http://wisconsinpoison.org  .     Suicide and Crisis Lifeline  988 http://988lifeline.org  .   Childhelp [place] Child Abuse Hotline  785.932.7886 http://Childhelphotline.org   .   National Sexual Assault Hotline  (165) 993-6731 (HOPE) http://Allmyappsn.org   .     National Runaway Safeline  (424) 220-4140 (RUNAWAY) http://OnPath TechnologiesruVox Media.org  .   Pregnancy & Postpartum Support  Call/text 118-637-5282  [place]: http://ppsupportmn.org  [place]: http://Optinuity.com/wi  .   Substance Abuse National Helpline ([facility])  794-855-HELP (2004) http://Findtreatment.gov   .                DISCLAIMER: These resources have been generated via the Firm58 Platform. [employer]58 does not endorse any service providers mentioned in this resource list. [employer] does not guarantee that the services mentioned in this resource list will be available to you or will improve your health or wellness.    [facility]

## 2024-05-14 NOTE — PROGRESS NOTES
Clinic Care Coordination Contact  Clinic Care Coordination Contact  OUTREACH    Referral Information: RN AYAAN contacted patient's guardian, Lola (also her mom) to complete assessment for enrollment with Care Coordination. Her parents (Ray and Lola) are co-guardians for patient. [place] has people come to help with housekeeping or home care services. They come a couple of times then patient's SO tells them not to come back.   Per mom, Lola, [place]'s home is so full of junk, that a wheelchair can't get through it. She qualifies for a bariatric electric wheelchair but they have to clean the home to be able to have one.   She's signed up for [employer] but she needs help getting to the door from the 4th floor, at the end of the thornton. Her SO does not want to assist with this.  Patient has a CADI AIDEN Danielson Floyd ([phone]). Left message to discuss equipment and supply needs.   Referral Source: Care Team     Chief Complaint   Patient presents with    Clinic Care Coordination - Initial      Universal Utilization: Risk of admission or ED visit 70%  Clinic Utilization  Difficulty keeping appointments:: No  Compliance Concerns: No  No-Show Concerns: No  No PCP office visit in Past Year: No  Utilization      No Show Count (past year)  0             ED Visits  2             Hospital Admissions  1                    Current as of: 5/13/2024  2:48 PM              Clinical Concerns:  Current Medical Concerns:  Physical deconditioning.     Current Behavioral Concerns: Not allowing help to come into home.     Education Provided to patient: Educated Lola (guardian) on Care Coordination: outreach schedule; resources available; shared responsibility of goals & goals to achieve.       Health Maintenance Reviewed: Due/Overdue (Discussed with parents)  Clinical Pathway: None    Medication Management:  Medication review status: Medications reviewed on 5/2/24. RN CC did not re-review during assessment.   Current Outpatient  Medications   Medication Sig Dispense Refill    albuterol (PROVENTIL) (2.5 MG/3ML) 0.083% neb solution Take 1 vial (2.5 mg) by nebulization every 6 hours as needed for shortness of breath or wheezing 75 mL 0    azelastine (ASTELIN) 0.1 % nasal spray SPRAY ONE PUFF IN EACH NOSTRIL TWICE A DAY 30 mL 2    carBAMazepine (TEGRETOL) 200 MG tablet TAKE TWO TABLETS BY MOUTH IN THE MORNING, TAKE ONE TABLET BY MOUTH AT NOON (WITH LUNCH) AND TAKE TWO TABLETS BY MOUTH AT NIGHT 450 tablet 3    fluticasone (FLONASE) 50 MCG/ACT nasal spray USE ONE SPRAY INTO BOTH NOSTRILS DAILY AS NEEDED FOR RHINITIS OR ALLERGIES 16 mL 1    furosemide (LASIX) 40 MG tablet Take 1 tablet (40 mg) by mouth daily for 30 days 30 tablet 0    guaiFENesin (MUCINEX) 600 MG 12 hr tablet Take 1 tablet (600 mg) by mouth 2 times daily for 10 days 20 tablet 0    lisinopril (ZESTRIL) 20 MG tablet Take 1 tablet (20 mg) by mouth daily 90 tablet 1    lovastatin (MEVACOR) 40 MG tablet Take 1 tablet (40 mg) by mouth at bedtime 90 tablet 1    mometasone-formoterol (DULERA) 200-5 MCG/ACT inhaler Inhale 2 puffs into the lungs 2 times daily for 30 days 8.8 g 0    Multiple Vitamin (MULTI-VITAMIN PO) Take 1 tablet by mouth daily      nystatin (MYCOSTATIN) 801067 UNIT/GM external cream APPLY TOPICALLY 2 TIMES DAILY 60 g 1    omeprazole (PRILOSEC) 20 MG DR capsule Take 1 capsule (20 mg) by mouth daily before breakfast 90 capsule 1    sertraline (ZOLOFT) 100 MG tablet Take 1 tablet (100 mg) by mouth daily 90 tablet 1     No current facility-administered medications for this visit.        Allergies   Allergen Reactions    Aspirin Unknown    Iodine      Iodinated Contrast Media  --- Hives      Penicillins Unknown    Tetracyclines & Related Unknown    Hydrochlorothiazide Palpitations     dehydration    Influenza Vac Split [Influenza Virus Vaccine] Rash     Patient states she is allergic to the vaccine.  Got a rash all over body many years ago after receiving injection.      Functional Status:  Dependent ADLs:: Ambulation-walker, Incontinence  Dependent IADLs:: Cleaning, Cooking, Laundry, Shopping, Meal Preparation, Transportation, Incontinence  Bed or wheelchair confined:: No  Mobility Status: Dependent/Assisted by Another  Fallen 2 or more times in the past year?: (!) Yes  Any fall with injury in the past year?: No    Living Situation:  Current living arrangement:: Other (Lives in an apartment with significant other)  Type of residence:: Apartment    Lifestyle & Psychosocial Needs:    Social Determinants of Health     Food Insecurity: Low Risk  (5/14/2024)    Food Insecurity     Within the past 12 months, did you worry that your food would run out before you got money to buy more?: No     Within the past 12 months, did the food you bought just not last and you didn t have money to get more?: No   Depression: Not at risk (1/3/2024)    PHQ-2     PHQ-2 Score: 0   Housing Stability: Low Risk  (5/14/2024)    Housing Stability     Do you have housing? : Yes     Are you worried about losing your housing?: No   Tobacco Use: Low Risk  (1/3/2024)    Patient History     Smoking Tobacco Use: Never     Smokeless Tobacco Use: Never     Passive Exposure: Not on file   Financial Resource Strain: Low Risk  (5/14/2024)    Financial Resource Strain     Within the past 12 months, have you or your family members you live with been unable to get utilities (heat, electricity) when it was really needed?: No   Alcohol Use: Not on file   Transportation Needs: High Risk (5/14/2024)    Transportation Needs     Within the past 12 months, has lack of transportation kept you from medical appointments, getting your medicines, non-medical meetings or appointments, work, or from getting things that you need?: Yes   Physical Activity: Not on file   Interpersonal Safety: Low Risk  (1/3/2024)    Interpersonal Safety     Do you feel physically and emotionally safe where you currently live?: Yes     Within the past 12  months, have you been hit, slapped, kicked or otherwise physically hurt by someone?: No     Within the past 12 months, have you been humiliated or emotionally abused in other ways by your partner or ex-partner?: No   Stress: Not on file   Social Connections: Not on file   Health Literacy: Not on file     Diet:: Regular  Inadequate nutrition (GOAL):: No  Tube Feeding: No  Inadequate activity/exercise (GOAL):: No  Significant changes in sleep pattern (GOAL): No  Transportation means:: Medical transport, Family     Restorationist or spiritual beliefs that impact treatment:: No  Mental health DX:: No  Mental health management concern (GOAL):: No  Chemical Dependency Status: No Current Concerns  Chemical Dependency Management:  (N/A)  Informal Support system:: Parent, Significant other      Resources and Interventions:  Current Resources:      Community Resources: [facility], [facility] Work[facility], Other (see comment),  (ABDIEL maurer, momaaron hernandez)  Supplies Currently Used at Home: None  Equipment Currently Used at Home: tub bench, grab bar, tub/shower, other (see comments) (Bariatric walker with a seat)  Employment Status: disabled     Advance Care Plan/Directive  Advanced Care Plans/Directives on file:: Yes  Status of record:: On File and Validated  Type Advanced Care Plans/Directives: Advanced Directive - On File    Referrals Placed: None    The patient consented via Verbal consent to have contact information and resources sent via email in an unencrypted manner. Final decision to receive introduction letter and Care Plan via patient's micecloud account.      Care Plan:  Care Plan: Health Maintenance       Problem: Health Maintenance Due or Overdue       Goal: Become up-to-date with health maintenance visit(s)       Start Date: 5/14/2024 Expected End Date: 11/12/2024    This Visit's Progress: 0%    Note:     Barriers: Cognitive disability; Physical disability; Poor support from Significant other; Provider availability - wait  time to complete appointments.   Strengths: Parents/guardians Motivated; Agreeable to Care Coordination.   Patient expressed understanding of goal: Yes  Action steps to achieve this goal:  1. I will take my medications as prescribed.   2. I will discuss, review, schedule and complete recommended overdue health maintenance with my Primary Care Provider.   3. I will contact my care team with questions, concerns or support needs. I will use the clinic as a resource and I understand I can contact my clinic with 24/7 after hours services available. Care Coordinator will remain available as needed.                               Care Plan: Resources       Problem: Resources       Goal: Resources for equipment       Start Date: 5/14/2024 Expected End Date: 11/12/2024    This Visit's Progress: 10%    Note:     Barriers: Cognitive disability; Physical disability; Poor support from Significant other; Provider availability - wait time to complete appointments.   Strengths: Parents/guardians Motivated; Agreeable to Care Coordination.   Patient expressed understanding of goal: Yes  Action steps to achieve this goal:  1. I will review and follow up with resource for medical equipment ([employer] ([phone]) for required forms for Bariatric Electric Wheelchair and bedside commode.   2. I will discuss with CADI  Jagjit Barrientos how to obtain incontinent supplies through [place]'s waiver.   3. I will contact my care team with questions, concerns or support needs. I will use the clinic as a resource and I understand I can contact my clinic with 24/7 after hours services available. Care Coordinator will remain available as needed.                             Patient/Caregiver understanding: Patient/caregiver verbalized understanding and denies any additional questions or concerns at this time. RNCC engaged in AIDET communications during encounter.     Outreach Frequency: monthly, more frequently as needed    Future  Appointments                In 3 days Aury Vizcaino MD [place], [place]    In 6 days [place] [facility], [place]          Plan: RN CC will send patient's guardians an introduction letter with resources for medical equipment and Patient-centered Plan of Care via patient's Flowbox[employer] account. Verified guardians have access to patient's Flowboxhart account.     Rocio Govea RN, BSN, CPHN, CCM  [facility] [place] [facility]  [facility]  Phone: [phone]  Email: [email or web address]

## 2024-05-17 ENCOUNTER — OFFICE VISIT (OUTPATIENT)
Dept: INTERNAL MEDICINE | Facility: CLINIC | Age: 59
End: 2024-05-17
Payer: MEDICARE

## 2024-05-17 VITALS
RESPIRATION RATE: 18 BRPM | SYSTOLIC BLOOD PRESSURE: 128 MMHG | BODY MASS INDEX: 62.26 KG/M2 | TEMPERATURE: 99.1 F | HEIGHT: 67 IN | DIASTOLIC BLOOD PRESSURE: 66 MMHG | OXYGEN SATURATION: 91 % | HEART RATE: 88 BPM

## 2024-05-17 DIAGNOSIS — M79.89 SWELLING OF LOWER LEG: ICD-10-CM

## 2024-05-17 DIAGNOSIS — R26.81 UNSTEADINESS ON FEET: ICD-10-CM

## 2024-05-17 DIAGNOSIS — R05.1 ACUTE COUGH: ICD-10-CM

## 2024-05-17 DIAGNOSIS — J18.9 COMMUNITY ACQUIRED PNEUMONIA, UNSPECIFIED LATERALITY: ICD-10-CM

## 2024-05-17 DIAGNOSIS — Z09 HOSPITAL DISCHARGE FOLLOW-UP: Primary | ICD-10-CM

## 2024-05-17 DIAGNOSIS — I10 ESSENTIAL HYPERTENSION, BENIGN: ICD-10-CM

## 2024-05-17 DIAGNOSIS — E66.01 MORBID OBESITY WITH BMI OF 50.0-59.9, ADULT (H): ICD-10-CM

## 2024-05-17 DIAGNOSIS — J45.20 MILD INTERMITTENT ASTHMA WITHOUT COMPLICATION: ICD-10-CM

## 2024-05-17 DIAGNOSIS — J30.1 SEASONAL ALLERGIC RHINITIS DUE TO POLLEN: ICD-10-CM

## 2024-05-17 PROCEDURE — 99495 TRANSJ CARE MGMT MOD F2F 14D: CPT | Performed by: INTERNAL MEDICINE

## 2024-05-17 PROCEDURE — 80048 BASIC METABOLIC PNL TOTAL CA: CPT | Performed by: INTERNAL MEDICINE

## 2024-05-17 PROCEDURE — 36415 COLL VENOUS BLD VENIPUNCTURE: CPT | Performed by: INTERNAL MEDICINE

## 2024-05-17 RX ORDER — BENZONATATE 100 MG/1
100 CAPSULE ORAL 3 TIMES DAILY PRN
Qty: 42 CAPSULE | Refills: 0 | Status: ON HOLD | OUTPATIENT
Start: 2024-05-17 | End: 2024-07-02

## 2024-05-17 RX ORDER — FLUTICASONE PROPIONATE 50 MCG
SPRAY, SUSPENSION (ML) NASAL
Qty: 16 ML | Refills: 1 | Status: SHIPPED | OUTPATIENT
Start: 2024-05-17

## 2024-05-17 ASSESSMENT — ASTHMA QUESTIONNAIRES
QUESTION_1 LAST FOUR WEEKS HOW MUCH OF THE TIME DID YOUR ASTHMA KEEP YOU FROM GETTING AS MUCH DONE AT WORK, SCHOOL OR AT HOME: ALL OF THE TIME
QUESTION_4 LAST FOUR WEEKS HOW OFTEN HAVE YOU USED YOUR RESCUE INHALER OR NEBULIZER MEDICATION (SUCH AS ALBUTEROL): ONE OR TWO TIMES PER DAY
QUESTION_1 LAST FOUR WEEKS HOW MUCH OF THE TIME DID YOUR ASTHMA KEEP YOU FROM GETTING AS MUCH DONE AT WORK, SCHOOL OR AT HOME: ALL OF THE TIME
EMERGENCY_ROOM_LAST_YEAR_TOTAL: ONE
ACT_TOTALSCORE: 7
QUESTION_2 LAST FOUR WEEKS HOW OFTEN HAVE YOU HAD SHORTNESS OF BREATH: MORE THAN ONCE A DAY
ACT_TOTALSCORE: 5
QUESTION_5 LAST FOUR WEEKS HOW WOULD YOU RATE YOUR ASTHMA CONTROL: NOT CONTROLLED AT ALL
QUESTION_2 LAST FOUR WEEKS HOW OFTEN HAVE YOU HAD SHORTNESS OF BREATH: MORE THAN ONCE A DAY
QUESTION_5 LAST FOUR WEEKS HOW WOULD YOU RATE YOUR ASTHMA CONTROL: POORLY CONTROLLED
QUESTION_4 LAST FOUR WEEKS HOW OFTEN HAVE YOU USED YOUR RESCUE INHALER OR NEBULIZER MEDICATION (SUCH AS ALBUTEROL): THREE OR MORE TIMES PER DAY
QUESTION_3 LAST FOUR WEEKS HOW OFTEN DID YOUR ASTHMA SYMPTOMS (WHEEZING, COUGHING, SHORTNESS OF BREATH, CHEST TIGHTNESS OR PAIN) WAKE YOU UP AT NIGHT OR EARLIER THAN USUAL IN THE MORNING: FOUR OR MORE NIGHTS A WEEK
QUESTION_3 LAST FOUR WEEKS HOW OFTEN DID YOUR ASTHMA SYMPTOMS (WHEEZING, COUGHING, SHORTNESS OF BREATH, CHEST TIGHTNESS OR PAIN) WAKE YOU UP AT NIGHT OR EARLIER THAN USUAL IN THE MORNING: FOUR OR MORE NIGHTS A WEEK
ACT_TOTALSCORE: 7
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
## 2024-05-18 LAB
ANION GAP SERPL CALCULATED.3IONS-SCNC: 12 MMOL/L (ref 7–15)
BUN SERPL-MCNC: 17.9 MG/DL (ref 6–20)
CALCIUM SERPL-MCNC: 10 MG/DL (ref 8.6–10)
CHLORIDE SERPL-SCNC: 105 MMOL/L (ref 98–107)
CREAT SERPL-MCNC: 0.77 MG/DL (ref 0.51–0.95)
DEPRECATED HCO3 PLAS-SCNC: 28 MMOL/L (ref 22–29)
EGFRCR SERPLBLD CKD-EPI 2021: 90 ML/MIN/1.73M2
GLUCOSE SERPL-MCNC: 109 MG/DL (ref 70–99)
POTASSIUM SERPL-SCNC: 4.1 MMOL/L (ref 3.4–5.3)
SODIUM SERPL-SCNC: 145 MMOL/L (ref 135–145)

## 2024-05-26 ENCOUNTER — HEALTH MAINTENANCE LETTER (OUTPATIENT)
Age: 59
End: 2024-05-26

## 2024-06-05 ENCOUNTER — TELEPHONE (OUTPATIENT)
Dept: INTERNAL MEDICINE | Facility: CLINIC | Age: 59
End: 2024-06-05
Payer: MEDICARE

## 2024-06-05 DIAGNOSIS — J45.20 MILD INTERMITTENT ASTHMA WITHOUT COMPLICATION: Primary | ICD-10-CM

## 2024-06-05 NOTE — TELEPHONE ENCOUNTER
Routing refill request to provider for review/approval because:  This has not yet been prescribed by PCP ok for RF?    Shannan Newman, RN

## 2024-06-05 NOTE — TELEPHONE ENCOUNTER
Pt is completely out of this medication.     Gunjan Holly     [facility]

## 2024-06-05 NOTE — TELEPHONE ENCOUNTER
Fax received from [facility] for review and signature.  Put in Dr. Singh's folder.

## 2024-06-07 ENCOUNTER — MEDICAL CORRESPONDENCE (OUTPATIENT)
Dept: HEALTH INFORMATION MANAGEMENT | Facility: CLINIC | Age: 59
End: 2024-06-07

## 2024-06-07 RX ORDER — ALBUTEROL SULFATE 0.83 MG/ML
2.5 SOLUTION RESPIRATORY (INHALATION) EVERY 6 HOURS PRN
Qty: 75 ML | Refills: 0 | Status: ON HOLD | OUTPATIENT
Start: 2024-06-07 | End: 2024-07-02

## 2024-06-13 DIAGNOSIS — I10 ESSENTIAL HYPERTENSION, BENIGN: ICD-10-CM

## 2024-06-13 DIAGNOSIS — E78.5 HYPERLIPIDEMIA LDL GOAL <130: ICD-10-CM

## 2024-06-14 RX ORDER — LISINOPRIL 20 MG/1
20 TABLET ORAL DAILY
Qty: 90 TABLET | Refills: 2 | Status: SHIPPED | OUTPATIENT
Start: 2024-06-14

## 2024-06-14 NOTE — TELEPHONE ENCOUNTER
LDL Cholesterol Calculated   Date Value Ref Range Status   09/14/2020 130 (H) <100 mg/dL Final     Comment:     Above desirable:  100-129 mg/dl  Borderline High:  130-159 mg/dL  High:             160-189 mg/dL  Very high:       >189 mg/dl

## 2024-06-18 ENCOUNTER — TELEPHONE (OUTPATIENT)
Dept: INTERNAL MEDICINE | Facility: CLINIC | Age: 59
End: 2024-06-18
Payer: MEDICARE

## 2024-06-18 NOTE — LETTER
June 18, 2024    To  Brissa Pascualman  [address]  [facility] [address]    Your team at [facility] cares about your health. We have reviewed your chart and based on our findings; we are making the following recommendations to better manage your health.     You are in particular need of attention regarding the following:     Asthma Control Test     This screening tool helps us to assess how well your asthma is controlled.Good asthma control leads to fewer asthma symptoms and greater health. If your asthma is not in good control (score is 19 or less) or you have been to the ER or urgent care for your asthma, it is recommended you be seen by your provider for medication and lifestyle adjustments.      Please complete and return the attached Asthma Control Test respond below with you answers for each question: Adult ACT     This is valuable information that is requested by your Care Team.    Call or MyChart message your clinic to schedule a colonoscopy, schedule/ a FIT Test, or order a Cologuard test. If you are unsure what type of test you need, please call your clinic and speak to clinic staff.   Colon cancer is now the second leading cause of cancer-related deaths in the [place] for both men and women and there are over 130,000 new cases and 50,000 deaths per year from colon cancer. Colonoscopies can prevent 90-95% of these deaths. Problem lesions can be removed before they ever become cancer. This test is not only looking for cancer, but also getting rid of precancerous lesions.   If you are under/uninsured, we recommend you contact the [employer] is a free colorectal cancer screening program that provides colonoscopies for eligible under/uninsured [place] men and women. If you are interested in receiving a free colonoscopy, please call [employer] at t [phone] (mention code [employer]Web) to see if you're eligible. Please have them send us the  results.   Please call [phone] to schedule a colonoscopy.  Schedule Annual [facility] scheduling number is [phone] or schedule in [employer] (self referral).  1 in 8 women will develop invasive breast cancer during her lifetime and it is the most common non-skin cancer in [place] Women. EARLY detection, new treatments, and a better understanding of the disease have increased survival rates- the 5 year survival rate in the 1960's was 63% and today it is close to 90%.  Schedule a primary care office visit with your provider for a Pap Smear to screen for Cervical Cancer.  Schedule an office visit with your PRIMARY CARE PHYSICIAN for mental health follow-up.  Complete the attached questionnaires to ensure your mental health needs are being properly met.  Please fill them out and send back to us via [employer], and feel free to call us with any questions or concerns at [phone].    PREVENTATIVE VISIT: Physical    If you have already completed these items, please contact the clinic via phone or   [employer] so your care team can review and update your records. Thank you for   choosing [facility] for your healthcare needs. For any questions,   concerns, or to schedule an appointment please contact our clinic.    Healthy Regards,      Your [facility] Care Team

## 2024-06-18 NOTE — TELEPHONE ENCOUNTER
Patient Quality Outreach    Patient is due for the following:   Asthma  -  ACT needed and Asthma follow-up visit  Colon Cancer Screening  Breast Cancer Screening - Mammogram  Cervical Cancer Screening - PAP Needed  Depression  -  PHQ-9 needed and Depression follow-up visit      Topic Date Due    Pneumococcal Vaccine (1 of 2 - PCV) Never done    Hepatitis B Vaccine (1 of 3 - 19+ 3-dose series) Never done    Zoster (Shingles) Vaccine (1 of 2) Never done    COVID-19 Vaccine (4 - 2023-24 season) 09/01/2023       Next Steps:   Schedule a Adult Preventative    Type of outreach:    Sent letter.      Questions for provider review:    None           Victor M Degroot MA  Chart routed to none.

## 2024-06-24 ENCOUNTER — HOSPITAL ENCOUNTER (EMERGENCY)
Facility: CLINIC | Age: 59
Discharge: HOME | End: 2024-06-24
Attending: EMERGENCY MEDICINE | Admitting: EMERGENCY MEDICINE
Payer: MEDICARE

## 2024-06-24 ENCOUNTER — APPOINTMENT (OUTPATIENT)
Dept: GENERAL RADIOLOGY | Facility: CLINIC | Age: 59
End: 2024-06-24
Attending: EMERGENCY MEDICINE
Payer: MEDICARE

## 2024-06-24 ENCOUNTER — PATIENT OUTREACH (OUTPATIENT)
Dept: CARE COORDINATION | Facility: CLINIC | Age: 59
End: 2024-06-24

## 2024-06-24 VITALS
RESPIRATION RATE: 22 BRPM | DIASTOLIC BLOOD PRESSURE: 76 MMHG | HEIGHT: 67 IN | WEIGHT: 293 LBS | BODY MASS INDEX: 45.99 KG/M2 | HEART RATE: 95 BPM | TEMPERATURE: 97.8 F | SYSTOLIC BLOOD PRESSURE: 161 MMHG | OXYGEN SATURATION: 90 %

## 2024-06-24 DIAGNOSIS — J45.41 MODERATE PERSISTENT ASTHMA WITH EXACERBATION: ICD-10-CM

## 2024-06-24 LAB
ANION GAP SERPL CALCULATED.3IONS-SCNC: 15 MMOL/L (ref 7–15)
BASE EXCESS BLDV CALC-SCNC: 2.1 MMOL/L (ref -3–3)
BASOPHILS # BLD AUTO: 0.1 10E3/UL (ref 0–0.2)
BASOPHILS NFR BLD AUTO: 1 %
BUN SERPL-MCNC: 14.6 MG/DL (ref 6–20)
CALCIUM SERPL-MCNC: 8.9 MG/DL (ref 8.6–10)
CHLORIDE SERPL-SCNC: 100 MMOL/L (ref 98–107)
CREAT SERPL-MCNC: 0.72 MG/DL (ref 0.51–0.95)
DEPRECATED HCO3 PLAS-SCNC: 25 MMOL/L (ref 22–29)
EGFRCR SERPLBLD CKD-EPI 2021: >90 ML/MIN/1.73M2
EOSINOPHIL # BLD AUTO: 0.9 10E3/UL (ref 0–0.7)
EOSINOPHIL NFR BLD AUTO: 10 %
ERYTHROCYTE [DISTWIDTH] IN BLOOD BY AUTOMATED COUNT: 13.6 % (ref 10–15)
FLUAV RNA SPEC QL NAA+PROBE: NEGATIVE
FLUBV RNA RESP QL NAA+PROBE: NEGATIVE
GLUCOSE SERPL-MCNC: 129 MG/DL (ref 70–99)
HCO3 BLDV-SCNC: 29 MMOL/L (ref 21–28)
HCT VFR BLD AUTO: 41.2 % (ref 35–47)
HGB BLD-MCNC: 12.8 G/DL (ref 11.7–15.7)
HOLD SPECIMEN: NORMAL
HOLD SPECIMEN: NORMAL
IMM GRANULOCYTES # BLD: 0 10E3/UL
IMM GRANULOCYTES NFR BLD: 0 %
LYMPHOCYTES # BLD AUTO: 2.5 10E3/UL (ref 0.8–5.3)
LYMPHOCYTES NFR BLD AUTO: 27 %
MCH RBC QN AUTO: 29 PG (ref 26.5–33)
MCHC RBC AUTO-ENTMCNC: 31.1 G/DL (ref 31.5–36.5)
MCV RBC AUTO: 93 FL (ref 78–100)
MONOCYTES # BLD AUTO: 0.7 10E3/UL (ref 0–1.3)
MONOCYTES NFR BLD AUTO: 8 %
NEUTROPHILS # BLD AUTO: 5.2 10E3/UL (ref 1.6–8.3)
NEUTROPHILS NFR BLD AUTO: 55 %
NRBC # BLD AUTO: 0 10E3/UL
NRBC BLD AUTO-RTO: 0 /100
NT-PROBNP SERPL-MCNC: <36 PG/ML (ref 0–900)
O2/TOTAL GAS SETTING VFR VENT: 93 %
OXYHGB MFR BLDV: 67 % (ref 70–75)
PCO2 BLDV: 52 MM HG (ref 40–50)
PH BLDV: 7.36 [PH] (ref 7.32–7.43)
PLATELET # BLD AUTO: 269 10E3/UL (ref 150–450)
PO2 BLDV: 37 MM HG (ref 25–47)
POTASSIUM SERPL-SCNC: 4.1 MMOL/L (ref 3.4–5.3)
RBC # BLD AUTO: 4.42 10E6/UL (ref 3.8–5.2)
RSV RNA SPEC NAA+PROBE: NEGATIVE
SAO2 % BLDV: 68 % (ref 70–75)
SARS-COV-2 RNA RESP QL NAA+PROBE: NEGATIVE
SODIUM SERPL-SCNC: 140 MMOL/L (ref 135–145)
WBC # BLD AUTO: 9.5 10E3/UL (ref 4–11)

## 2024-06-24 PROCEDURE — 96374 THER/PROPH/DIAG INJ IV PUSH: CPT

## 2024-06-24 PROCEDURE — 71045 X-RAY EXAM CHEST 1 VIEW: CPT

## 2024-06-24 PROCEDURE — 83880 ASSAY OF NATRIURETIC PEPTIDE: CPT | Performed by: EMERGENCY MEDICINE

## 2024-06-24 PROCEDURE — 82805 BLOOD GASES W/O2 SATURATION: CPT | Performed by: EMERGENCY MEDICINE

## 2024-06-24 PROCEDURE — 94640 AIRWAY INHALATION TREATMENT: CPT

## 2024-06-24 PROCEDURE — 36415 COLL VENOUS BLD VENIPUNCTURE: CPT | Performed by: EMERGENCY MEDICINE

## 2024-06-24 PROCEDURE — 85025 COMPLETE CBC W/AUTO DIFF WBC: CPT | Performed by: EMERGENCY MEDICINE

## 2024-06-24 PROCEDURE — 80061 LIPID PANEL: CPT | Performed by: INTERNAL MEDICINE

## 2024-06-24 PROCEDURE — 80048 BASIC METABOLIC PNL TOTAL CA: CPT | Performed by: EMERGENCY MEDICINE

## 2024-06-24 PROCEDURE — 99284 EMERGENCY DEPT VISIT MOD MDM: CPT | Mod: 25

## 2024-06-24 PROCEDURE — 87637 SARSCOV2&INF A&B&RSV AMP PRB: CPT | Performed by: EMERGENCY MEDICINE

## 2024-06-24 PROCEDURE — 250N000009 HC RX 250: Performed by: EMERGENCY MEDICINE

## 2024-06-24 PROCEDURE — 250N000011 HC RX IP 250 OP 636: Mod: JZ | Performed by: EMERGENCY MEDICINE

## 2024-06-24 RX ORDER — METHYLPREDNISOLONE SODIUM SUCCINATE 125 MG/2ML
125 INJECTION, POWDER, LYOPHILIZED, FOR SOLUTION INTRAMUSCULAR; INTRAVENOUS ONCE
Status: COMPLETED | OUTPATIENT
Start: 2024-06-24 | End: 2024-06-24

## 2024-06-24 RX ORDER — IPRATROPIUM BROMIDE AND ALBUTEROL SULFATE 2.5; .5 MG/3ML; MG/3ML
SOLUTION RESPIRATORY (INHALATION)
Status: DISPENSED
Start: 2024-06-24 | End: 2024-06-24

## 2024-06-24 RX ORDER — IPRATROPIUM BROMIDE AND ALBUTEROL SULFATE 2.5; .5 MG/3ML; MG/3ML
3 SOLUTION RESPIRATORY (INHALATION) ONCE
Status: COMPLETED | OUTPATIENT
Start: 2024-06-24 | End: 2024-06-24

## 2024-06-24 RX ORDER — ALBUTEROL SULFATE 0.83 MG/ML
2.5 SOLUTION RESPIRATORY (INHALATION) EVERY 4 HOURS PRN
Qty: 75 ML | Refills: 0 | Status: SHIPPED | OUTPATIENT
Start: 2024-06-24 | End: 2024-08-07

## 2024-06-24 RX ORDER — METHYLPREDNISOLONE SODIUM SUCCINATE 125 MG/2ML
INJECTION, POWDER, LYOPHILIZED, FOR SOLUTION INTRAMUSCULAR; INTRAVENOUS
Status: DISPENSED
Start: 2024-06-24 | End: 2024-06-24

## 2024-06-24 RX ORDER — PREDNISONE 20 MG/1
TABLET ORAL
Qty: 10 TABLET | Refills: 0 | Status: ON HOLD | OUTPATIENT
Start: 2024-06-24 | End: 2024-07-02

## 2024-06-24 RX ADMIN — METHYLPREDNISOLONE SODIUM SUCCINATE 125 MG: 125 INJECTION, POWDER, FOR SOLUTION INTRAMUSCULAR; INTRAVENOUS at 04:24

## 2024-06-24 RX ADMIN — IPRATROPIUM BROMIDE AND ALBUTEROL SULFATE 3 ML: .5; 3 SOLUTION RESPIRATORY (INHALATION) at 04:23

## 2024-06-24 ASSESSMENT — ACTIVITIES OF DAILY LIVING (ADL)
ADLS_ACUITY_SCORE: 40

## 2024-06-24 NOTE — PROGRESS NOTES
Clinic Care Coordination Contact  Follow Up Progress Note      Assessment: Lola said patient is doing well. She was seen in the ED for asthma. She has a new CADI CM Svitlana. Lola said she is doing more for the patient. Patient is staying in contact with parents (guardians). Her significant other has been in contact with patient's parents more consistently. [place] is on Prednisone for 5 days due to her recent asthma flare up otherwise no changes to her medications.      Care Gaps:    Health Maintenance Due   Topic Date Due    ASTHMA ACTION PLAN  Never done    Pneumococcal Vaccine: Pediatrics (0 to 5 Years) and At-Risk Patients (6 to 64 Years) (1 of 2 - PCV) Never done    HIV SCREENING  Never done    HEPATITIS C SCREENING  Never done    HEPATITIS B IMMUNIZATION (1 of 3 - 19+ 3-dose series) Never done    COLORECTAL CANCER SCREENING  04/19/2016    ZOSTER IMMUNIZATION (1 of 2) Never done    LIPID  09/14/2021    MEDICARE ANNUAL WELLNESS VISIT  10/01/2021    MAMMO SCREENING  10/23/2021    COVID-19 Vaccine (4 - 2023-24 season) 09/01/2023    HPV TEST  10/01/2023    PAP  10/01/2023    PHQ-9  07/03/2024     Discussed with Lola (guardian) who will schedule the appointment for [facility] and to discuss other care gaps.     Care Plans  Care Plan: Health Maintenance       Problem: Health Maintenance Due or Overdue       Goal: Become up-to-date with health maintenance visit(s)       Start Date: 5/14/2024 Expected End Date: 11/12/2024    This Visit's Progress: 0%    Note:     Barriers: Cognitive disability; Physical disability; Poor support from Significant other; Provider availability - wait time to complete appointments.   Strengths: Parents/guardians Motivated; Agreeable to Care Coordination.   Patient expressed understanding of goal: Yes  Action steps to achieve this goal:  1. I will take my medications as prescribed.   2. I will discuss, review, schedule and complete recommended overdue health maintenance with my Primary Care  Provider.   3. I will contact my care team with questions, concerns or support needs. I will use the clinic as a resource and I understand I can contact my clinic with 24/7 after hours services available. Care Coordinator will remain available as needed.                               Care Plan: Resources       Problem: Resources       Goal: Resources for equipment       Start Date: 5/14/2024 Expected End Date: 11/12/2024    This Visit's Progress: 10%    Note:     Barriers: Cognitive disability; Physical disability; Poor support from Significant other; Provider availability - wait time to complete appointments.   Strengths: Parents/guardians Motivated; Agreeable to Care Coordination.   Patient expressed understanding of goal: Yes  Action steps to achieve this goal:  1. I will review and follow up with resource for medical equipment ([employer] ([phone]) for required forms for Bariatric Electric Wheelchair and bedside commode.   2. I will discuss with CADI  Jagjit Barrientos how to obtain incontinent supplies through [place]'s waiver.   3. I will contact my care team with questions, concerns or support needs. I will use the clinic as a resource and I understand I can contact my clinic with 24/7 after hours services available. Care Coordinator will remain available as needed.                             Intervention/Education provided during outreach: Discussed patients plan of care. CC RN asked open ended questions, provided support, resources, and encouragement as needed. Patient will reach out to care team sooner than planned with new questions or concerns.     Plan: RN CC will continue to follow patient for any additional needs.     Care Coordinator will follow up in 1 month.     Rocio Govea RN, BSN, CPHN, [facility]  [facility]  Phone: [phone]  Email: [email or web address]    Clinic Care Coordination  Contact  [facility]/Voicemail    Clinical Data: Care Coordinator Outreach    Outreach Documentation Number of Outreach Attempt   6/24/2024   9:22 AM 1       Left message on patient's co-guardian's voicemail (her mom, Lola) with call back information and requested return call.    Plan: Care Coordinator will try to reach patient again in 10 business days.    Rocio Govea RN, BSN, CPHN, CCM  [facility]  [facility] [place]  Phone: [phone]  Email: [email or web address]        Clinic Care Coordination Contact    Situation: Patient chart reviewed by care coordinator.    Background: Patient is enrolled in Care Coordination and followed by RN CC.     Assessment: Patient was seen at [facility] ED on 6/24/24 for shortness of breath. This is a 59-year-old female with morbid obesity and asthma who presents with wheezing. She is not an extremis on exam nor is she hypoxic. No fever. Labs are overall reassuring. She has mild chronic CO2 retention but is compensated. No evidence of pneumonia or bacterial infection. Viral swabs are negative. At this time she would benefit from oral steroids after receiving IV steroids here. She also is out of her albuterol at home so I will refill this. Patient is comfortable with this discussion. Doubt pulmonary embolism based on clinical presentation consistent more so with reactive airway pathology. Patient has had her questions answered     Plan/Recommendations: No further action needed by Care Coordination.     Rocio Govea RN, BSN, CPHN, CCM  [facility]  [facility] [place]  Phone: [phone]  Email: [email or web address]

## 2024-06-24 NOTE — ED TRIAGE NOTES
Pt BIBA, was having a home asthma attack for the last 24 hours, called EMS because it seemed like it was getting worse. Had been in for similar symptoms last month with pneumonia. Got duo-neb from EMS. Still reports SOB     Triage Assessment (Adult)       Row Name 06/24/24 0354          Triage Assessment    Airway WDL WDL        Respiratory WDL    Respiratory WDL X;rhythm/pattern     Rhythm/Pattern, Respiratory shortness of breath        Skin Circulation/Temperature WDL    Skin Circulation/Temperature WDL WDL        Cardiac WDL    Cardiac WDL WDL        Peripheral/Neurovascular WDL    Peripheral Neurovascular WDL WDL        Cognitive/Neuro/Behavioral WDL    Cognitive/Neuro/Behavioral WDL WDL
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
## 2024-06-25 NOTE — TELEPHONE ENCOUNTER
LONG overdue to have lipids rechecked.   Ask patient if it's okay for me to try to add a lipid panel on to blood in the lab from yesterday (6/24/2024).

## 2024-06-27 LAB
CHOLEST SERPL-MCNC: 195 MG/DL
HDLC SERPL-MCNC: 52 MG/DL
LDLC SERPL CALC-MCNC: 114 MG/DL
NONHDLC SERPL-MCNC: 143 MG/DL
TRIGL SERPL-MCNC: 147 MG/DL

## 2024-06-30 PROCEDURE — 96374 THER/PROPH/DIAG INJ IV PUSH: CPT | Mod: 59

## 2024-06-30 PROCEDURE — 99285 EMERGENCY DEPT VISIT HI MDM: CPT | Mod: 25

## 2024-06-30 ASSESSMENT — COLUMBIA-SUICIDE SEVERITY RATING SCALE - C-SSRS
2. HAVE YOU ACTUALLY HAD ANY THOUGHTS OF KILLING YOURSELF IN THE PAST MONTH?: NO
6. HAVE YOU EVER DONE ANYTHING, STARTED TO DO ANYTHING, OR PREPARED TO DO ANYTHING TO END YOUR LIFE?: NO
1. IN THE PAST MONTH, HAVE YOU WISHED YOU WERE DEAD OR WISHED YOU COULD GO TO SLEEP AND NOT WAKE UP?: NO
1. IN THE PAST MONTH, HAVE YOU WISHED YOU WERE DEAD OR WISHED YOU COULD GO TO SLEEP AND NOT WAKE UP?: NO
6. HAVE YOU EVER DONE ANYTHING, STARTED TO DO ANYTHING, OR PREPARED TO DO ANYTHING TO END YOUR LIFE?: NO
2. HAVE YOU ACTUALLY HAD ANY THOUGHTS OF KILLING YOURSELF IN THE PAST MONTH?: NO

## 2024-07-01 ENCOUNTER — APPOINTMENT (OUTPATIENT)
Dept: SURGERY | Facility: PHYSICIAN GROUP | Age: 59
End: 2024-07-01
Payer: MEDICARE

## 2024-07-01 ENCOUNTER — APPOINTMENT (OUTPATIENT)
Dept: ULTRASOUND IMAGING | Facility: CLINIC | Age: 59
DRG: 417 | End: 2024-07-01
Attending: EMERGENCY MEDICINE
Payer: MEDICARE

## 2024-07-01 ENCOUNTER — ANESTHESIA EVENT (OUTPATIENT)
Dept: SURGERY | Facility: CLINIC | Age: 59
DRG: 417 | End: 2024-07-01
Payer: MEDICARE

## 2024-07-01 ENCOUNTER — APPOINTMENT (OUTPATIENT)
Dept: CT IMAGING | Facility: CLINIC | Age: 59
DRG: 417 | End: 2024-07-01
Attending: EMERGENCY MEDICINE
Payer: MEDICARE

## 2024-07-01 ENCOUNTER — ANESTHESIA (OUTPATIENT)
Dept: SURGERY | Facility: CLINIC | Age: 59
DRG: 417 | End: 2024-07-01
Payer: MEDICARE

## 2024-07-01 ENCOUNTER — HOSPITAL ENCOUNTER (INPATIENT)
Facility: CLINIC | Age: 59
LOS: 4 days | Discharge: NURSING, REHAB OR LONG-TERM CARE | DRG: 417 | End: 2024-07-06
Attending: EMERGENCY MEDICINE | Admitting: INTERNAL MEDICINE
Payer: MEDICARE

## 2024-07-01 ENCOUNTER — TELEPHONE (OUTPATIENT)
Dept: INTERNAL MEDICINE | Facility: CLINIC | Age: 59
End: 2024-07-01

## 2024-07-01 DIAGNOSIS — J45.41 MODERATE PERSISTENT ASTHMA WITH ACUTE EXACERBATION: ICD-10-CM

## 2024-07-01 DIAGNOSIS — L30.4 INTERTRIGO: ICD-10-CM

## 2024-07-01 DIAGNOSIS — R31.9 HEMATURIA, UNSPECIFIED TYPE: ICD-10-CM

## 2024-07-01 DIAGNOSIS — K81.0 ACUTE CHOLECYSTITIS: Primary | ICD-10-CM

## 2024-07-01 DIAGNOSIS — R10.9 ABDOMINAL PAIN, UNSPECIFIED ABDOMINAL LOCATION: ICD-10-CM

## 2024-07-01 DIAGNOSIS — K80.20 SYMPTOMATIC CHOLELITHIASIS: ICD-10-CM

## 2024-07-01 DIAGNOSIS — K76.0 HEPATIC STEATOSIS: ICD-10-CM

## 2024-07-01 LAB
ALBUMIN SERPL BCG-MCNC: 3.9 G/DL (ref 3.5–5.2)
ALBUMIN UR-MCNC: 100 MG/DL
ALP SERPL-CCNC: 106 U/L (ref 40–150)
ALT SERPL W P-5'-P-CCNC: 29 U/L (ref 0–50)
ANION GAP SERPL CALCULATED.3IONS-SCNC: 14 MMOL/L (ref 7–15)
APPEARANCE UR: ABNORMAL
AST SERPL W P-5'-P-CCNC: 24 U/L (ref 0–45)
BACTERIA SPEC CULT: ABNORMAL
BASOPHILS # BLD AUTO: 0 10E3/UL (ref 0–0.2)
BASOPHILS NFR BLD AUTO: 1 %
BILIRUB DIRECT SERPL-MCNC: <0.2 MG/DL (ref 0–0.3)
BILIRUB SERPL-MCNC: <0.2 MG/DL
BILIRUB UR QL STRIP: NEGATIVE
BUN SERPL-MCNC: 18.1 MG/DL (ref 6–20)
CALCIUM SERPL-MCNC: 8.9 MG/DL (ref 8.6–10)
CHLORIDE SERPL-SCNC: 109 MMOL/L (ref 98–107)
COLOR UR AUTO: YELLOW
CREAT SERPL-MCNC: 0.81 MG/DL (ref 0.51–0.95)
DEPRECATED HCO3 PLAS-SCNC: 22 MMOL/L (ref 22–29)
EGFRCR SERPLBLD CKD-EPI 2021: 84 ML/MIN/1.73M2
EOSINOPHIL # BLD AUTO: 0.6 10E3/UL (ref 0–0.7)
EOSINOPHIL NFR BLD AUTO: 7 %
ERYTHROCYTE [DISTWIDTH] IN BLOOD BY AUTOMATED COUNT: 13.9 % (ref 10–15)
GLUCOSE SERPL-MCNC: 125 MG/DL (ref 70–99)
GLUCOSE UR STRIP-MCNC: 30 MG/DL
GRAM STAIN RESULT: ABNORMAL
GRAM STAIN RESULT: ABNORMAL
HCT VFR BLD AUTO: 44.3 % (ref 35–47)
HGB BLD-MCNC: 13.8 G/DL (ref 11.7–15.7)
HGB UR QL STRIP: ABNORMAL
HOLD SPECIMEN: NORMAL
HOLD SPECIMEN: NORMAL
HYALINE CASTS: 2 /LPF
IMM GRANULOCYTES # BLD: 0 10E3/UL
IMM GRANULOCYTES NFR BLD: 0 %
KETONES UR STRIP-MCNC: ABNORMAL MG/DL
LACTATE SERPL-SCNC: 1.2 MMOL/L (ref 0.7–2)
LEUKOCYTE ESTERASE UR QL STRIP: NEGATIVE
LIPASE SERPL-CCNC: 29 U/L (ref 13–60)
LYMPHOCYTES # BLD AUTO: 2.1 10E3/UL (ref 0.8–5.3)
LYMPHOCYTES NFR BLD AUTO: 24 %
MCH RBC QN AUTO: 29 PG (ref 26.5–33)
MCHC RBC AUTO-ENTMCNC: 31.2 G/DL (ref 31.5–36.5)
MCV RBC AUTO: 93 FL (ref 78–100)
MONOCYTES # BLD AUTO: 0.6 10E3/UL (ref 0–1.3)
MONOCYTES NFR BLD AUTO: 7 %
MUCOUS THREADS #/AREA URNS LPF: PRESENT /LPF
NEUTROPHILS # BLD AUTO: 5.2 10E3/UL (ref 1.6–8.3)
NEUTROPHILS NFR BLD AUTO: 62 %
NITRATE UR QL: NEGATIVE
NRBC # BLD AUTO: 0 10E3/UL
NRBC BLD AUTO-RTO: 0 /100
PH UR STRIP: 6 [PH] (ref 5–7)
PLATELET # BLD AUTO: 239 10E3/UL (ref 150–450)
POTASSIUM SERPL-SCNC: 4 MMOL/L (ref 3.4–5.3)
PROT SERPL-MCNC: 7.1 G/DL (ref 6.4–8.3)
RBC # BLD AUTO: 4.76 10E6/UL (ref 3.8–5.2)
RBC URINE: >182 /HPF
RENAL TUB EPI: 5 /HPF
SODIUM SERPL-SCNC: 145 MMOL/L (ref 135–145)
SP GR UR STRIP: 1.03 (ref 1–1.03)
UROBILINOGEN UR STRIP-MCNC: NORMAL MG/DL
WBC # BLD AUTO: 8.5 10E3/UL (ref 4–11)
WBC CLUMPS #/AREA URNS HPF: PRESENT /HPF
WBC URINE: 21 /HPF

## 2024-07-01 PROCEDURE — 250N000011 HC RX IP 250 OP 636: Performed by: INTERNAL MEDICINE

## 2024-07-01 PROCEDURE — 258N000003 HC RX IP 258 OP 636: Performed by: SURGERY

## 2024-07-01 PROCEDURE — 250N000011 HC RX IP 250 OP 636: Performed by: ANESTHESIOLOGY

## 2024-07-01 PROCEDURE — 250N000011 HC RX IP 250 OP 636: Performed by: NURSE ANESTHETIST, CERTIFIED REGISTERED

## 2024-07-01 PROCEDURE — 81001 URINALYSIS AUTO W/SCOPE: CPT | Performed by: EMERGENCY MEDICINE

## 2024-07-01 PROCEDURE — 0FT44ZZ RESECTION OF GALLBLADDER, PERCUTANEOUS ENDOSCOPIC APPROACH: ICD-10-PCS | Performed by: SURGERY

## 2024-07-01 PROCEDURE — 47562 LAPAROSCOPIC CHOLECYSTECTOMY: CPT | Performed by: SURGERY

## 2024-07-01 PROCEDURE — 76705 ECHO EXAM OF ABDOMEN: CPT

## 2024-07-01 PROCEDURE — 47562 LAPAROSCOPIC CHOLECYSTECTOMY: CPT | Mod: AS | Performed by: PHYSICIAN ASSISTANT

## 2024-07-01 PROCEDURE — 250N000011 HC RX IP 250 OP 636: Performed by: SURGERY

## 2024-07-01 PROCEDURE — 88304 TISSUE EXAM BY PATHOLOGIST: CPT | Mod: TC | Performed by: SURGERY

## 2024-07-01 PROCEDURE — 258N000003 HC RX IP 258 OP 636: Performed by: ANESTHESIOLOGY

## 2024-07-01 PROCEDURE — G0378 HOSPITAL OBSERVATION PER HR: HCPCS

## 2024-07-01 PROCEDURE — 250N000013 HC RX MED GY IP 250 OP 250 PS 637: Performed by: SURGERY

## 2024-07-01 PROCEDURE — 710N000010 HC RECOVERY PHASE 1, LEVEL 2, PER MIN: Performed by: SURGERY

## 2024-07-01 PROCEDURE — 370N000017 HC ANESTHESIA TECHNICAL FEE, PER MIN: Performed by: SURGERY

## 2024-07-01 PROCEDURE — 250N000009 HC RX 250: Performed by: ANESTHESIOLOGY

## 2024-07-01 PROCEDURE — 250N000013 HC RX MED GY IP 250 OP 250 PS 637: Performed by: INTERNAL MEDICINE

## 2024-07-01 PROCEDURE — 0DNW4ZZ RELEASE PERITONEUM, PERCUTANEOUS ENDOSCOPIC APPROACH: ICD-10-PCS | Performed by: SURGERY

## 2024-07-01 PROCEDURE — 250N000009 HC RX 250: Performed by: NURSE ANESTHETIST, CERTIFIED REGISTERED

## 2024-07-01 PROCEDURE — 99214 OFFICE O/P EST MOD 30 MIN: CPT | Mod: 57 | Performed by: SURGERY

## 2024-07-01 PROCEDURE — 36415 COLL VENOUS BLD VENIPUNCTURE: CPT | Performed by: EMERGENCY MEDICINE

## 2024-07-01 PROCEDURE — 250N000025 HC SEVOFLURANE, PER MIN: Performed by: SURGERY

## 2024-07-01 PROCEDURE — 250N000013 HC RX MED GY IP 250 OP 250 PS 637: Performed by: ANESTHESIOLOGY

## 2024-07-01 PROCEDURE — 80053 COMPREHEN METABOLIC PANEL: CPT | Performed by: EMERGENCY MEDICINE

## 2024-07-01 PROCEDURE — 83605 ASSAY OF LACTIC ACID: CPT | Performed by: EMERGENCY MEDICINE

## 2024-07-01 PROCEDURE — 250N000009 HC RX 250: Performed by: SURGERY

## 2024-07-01 PROCEDURE — 87086 URINE CULTURE/COLONY COUNT: CPT | Performed by: EMERGENCY MEDICINE

## 2024-07-01 PROCEDURE — 74176 CT ABD & PELVIS W/O CONTRAST: CPT | Mod: MG

## 2024-07-01 PROCEDURE — 258N000001 HC RX 258: Performed by: SURGERY

## 2024-07-01 PROCEDURE — 83690 ASSAY OF LIPASE: CPT | Performed by: EMERGENCY MEDICINE

## 2024-07-01 PROCEDURE — 87186 SC STD MICRODIL/AGAR DIL: CPT | Performed by: SURGERY

## 2024-07-01 PROCEDURE — 360N000083 HC SURGERY LEVEL 3 W/ FLUORO, PER MIN: Performed by: SURGERY

## 2024-07-01 PROCEDURE — 80076 HEPATIC FUNCTION PANEL: CPT | Performed by: EMERGENCY MEDICINE

## 2024-07-01 PROCEDURE — 258N000003 HC RX IP 258 OP 636: Performed by: INTERNAL MEDICINE

## 2024-07-01 PROCEDURE — 87075 CULTR BACTERIA EXCEPT BLOOD: CPT | Performed by: SURGERY

## 2024-07-01 PROCEDURE — 999N000141 HC STATISTIC PRE-PROCEDURE NURSING ASSESSMENT: Performed by: SURGERY

## 2024-07-01 PROCEDURE — 85025 COMPLETE CBC W/AUTO DIFF WBC: CPT | Performed by: EMERGENCY MEDICINE

## 2024-07-01 PROCEDURE — 96375 TX/PRO/DX INJ NEW DRUG ADDON: CPT

## 2024-07-01 PROCEDURE — 87205 SMEAR GRAM STAIN: CPT | Performed by: SURGERY

## 2024-07-01 PROCEDURE — 272N000001 HC OR GENERAL SUPPLY STERILE: Performed by: SURGERY

## 2024-07-01 PROCEDURE — 99222 1ST HOSP IP/OBS MODERATE 55: CPT | Mod: AI | Performed by: INTERNAL MEDICINE

## 2024-07-01 RX ORDER — ONDANSETRON 4 MG/1
4 TABLET, ORALLY DISINTEGRATING ORAL EVERY 30 MIN PRN
Status: DISCONTINUED | OUTPATIENT
Start: 2024-07-01 | End: 2024-07-01 | Stop reason: HOSPADM

## 2024-07-01 RX ORDER — AMOXICILLIN 250 MG
2 CAPSULE ORAL 2 TIMES DAILY PRN
Status: DISCONTINUED | OUTPATIENT
Start: 2024-07-01 | End: 2024-07-06 | Stop reason: HOSPADM

## 2024-07-01 RX ORDER — CEFTRIAXONE 2 G/1
2 INJECTION, POWDER, FOR SOLUTION INTRAMUSCULAR; INTRAVENOUS EVERY 24 HOURS
Status: DISCONTINUED | OUTPATIENT
Start: 2024-07-01 | End: 2024-07-05

## 2024-07-01 RX ORDER — LIDOCAINE HYDROCHLORIDE 20 MG/ML
INJECTION, SOLUTION INFILTRATION; PERINEURAL PRN
Status: DISCONTINUED | OUTPATIENT
Start: 2024-07-01 | End: 2024-07-01

## 2024-07-01 RX ORDER — CARBAMAZEPINE 200 MG/1
400 TABLET ORAL ONCE
Status: COMPLETED | OUTPATIENT
Start: 2024-07-01 | End: 2024-07-01

## 2024-07-01 RX ORDER — SODIUM CHLORIDE, SODIUM LACTATE, POTASSIUM CHLORIDE, CALCIUM CHLORIDE 600; 310; 30; 20 MG/100ML; MG/100ML; MG/100ML; MG/100ML
INJECTION, SOLUTION INTRAVENOUS CONTINUOUS
Status: DISCONTINUED | OUTPATIENT
Start: 2024-07-01 | End: 2024-07-04

## 2024-07-01 RX ORDER — ONDANSETRON 2 MG/ML
4 INJECTION INTRAMUSCULAR; INTRAVENOUS EVERY 30 MIN PRN
Status: DISCONTINUED | OUTPATIENT
Start: 2024-07-01 | End: 2024-07-01 | Stop reason: HOSPADM

## 2024-07-01 RX ORDER — ONDANSETRON 4 MG/1
4 TABLET, ORALLY DISINTEGRATING ORAL EVERY 6 HOURS PRN
Status: DISCONTINUED | OUTPATIENT
Start: 2024-07-01 | End: 2024-07-06 | Stop reason: HOSPADM

## 2024-07-01 RX ORDER — PANTOPRAZOLE SODIUM 40 MG/1
40 TABLET, DELAYED RELEASE ORAL
Status: DISCONTINUED | OUTPATIENT
Start: 2024-07-02 | End: 2024-07-06 | Stop reason: HOSPADM

## 2024-07-01 RX ORDER — DEXAMETHASONE SODIUM PHOSPHATE 4 MG/ML
INJECTION, SOLUTION INTRA-ARTICULAR; INTRALESIONAL; INTRAMUSCULAR; INTRAVENOUS; SOFT TISSUE PRN
Status: DISCONTINUED | OUTPATIENT
Start: 2024-07-01 | End: 2024-07-01

## 2024-07-01 RX ORDER — MECLIZINE HYDROCHLORIDE 25 MG/1
25 TABLET ORAL ONCE
Status: COMPLETED | OUTPATIENT
Start: 2024-07-01 | End: 2024-07-01

## 2024-07-01 RX ORDER — HYDROMORPHONE HCL IN WATER/PF 6 MG/30 ML
0.2 PATIENT CONTROLLED ANALGESIA SYRINGE INTRAVENOUS EVERY 5 MIN PRN
Status: DISCONTINUED | OUTPATIENT
Start: 2024-07-01 | End: 2024-07-01 | Stop reason: HOSPADM

## 2024-07-01 RX ORDER — CARBAMAZEPINE 200 MG/1
400 TABLET ORAL 2 TIMES DAILY
Status: DISCONTINUED | OUTPATIENT
Start: 2024-07-01 | End: 2024-07-06 | Stop reason: HOSPADM

## 2024-07-01 RX ORDER — SODIUM CHLORIDE, SODIUM LACTATE, POTASSIUM CHLORIDE, CALCIUM CHLORIDE 600; 310; 30; 20 MG/100ML; MG/100ML; MG/100ML; MG/100ML
INJECTION, SOLUTION INTRAVENOUS CONTINUOUS
Status: DISCONTINUED | OUTPATIENT
Start: 2024-07-01 | End: 2024-07-01 | Stop reason: HOSPADM

## 2024-07-01 RX ORDER — AMOXICILLIN 250 MG
1 CAPSULE ORAL 2 TIMES DAILY PRN
Status: DISCONTINUED | OUTPATIENT
Start: 2024-07-01 | End: 2024-07-06 | Stop reason: HOSPADM

## 2024-07-01 RX ORDER — CEFAZOLIN SODIUM/WATER 3 G/30 ML
3 SYRINGE (ML) INTRAVENOUS
Status: COMPLETED | OUTPATIENT
Start: 2024-07-01 | End: 2024-07-01

## 2024-07-01 RX ORDER — OXYCODONE HYDROCHLORIDE 5 MG/1
5 TABLET ORAL
Status: DISCONTINUED | OUTPATIENT
Start: 2024-07-01 | End: 2024-07-01 | Stop reason: HOSPADM

## 2024-07-01 RX ORDER — LABETALOL HYDROCHLORIDE 5 MG/ML
10 INJECTION, SOLUTION INTRAVENOUS
Status: COMPLETED | OUTPATIENT
Start: 2024-07-01 | End: 2024-07-01

## 2024-07-01 RX ORDER — FENTANYL CITRATE 50 UG/ML
INJECTION, SOLUTION INTRAMUSCULAR; INTRAVENOUS PRN
Status: DISCONTINUED | OUTPATIENT
Start: 2024-07-01 | End: 2024-07-01

## 2024-07-01 RX ORDER — PROPOFOL 10 MG/ML
INJECTION, EMULSION INTRAVENOUS PRN
Status: DISCONTINUED | OUTPATIENT
Start: 2024-07-01 | End: 2024-07-01

## 2024-07-01 RX ORDER — LABETALOL HYDROCHLORIDE 5 MG/ML
INJECTION, SOLUTION INTRAVENOUS PRN
Status: DISCONTINUED | OUTPATIENT
Start: 2024-07-01 | End: 2024-07-01

## 2024-07-01 RX ORDER — NALOXONE HYDROCHLORIDE 0.4 MG/ML
0.4 INJECTION, SOLUTION INTRAMUSCULAR; INTRAVENOUS; SUBCUTANEOUS
Status: DISCONTINUED | OUTPATIENT
Start: 2024-07-01 | End: 2024-07-06 | Stop reason: HOSPADM

## 2024-07-01 RX ORDER — ONDANSETRON 2 MG/ML
4 INJECTION INTRAMUSCULAR; INTRAVENOUS EVERY 6 HOURS PRN
Status: DISCONTINUED | OUTPATIENT
Start: 2024-07-01 | End: 2024-07-06 | Stop reason: HOSPADM

## 2024-07-01 RX ORDER — HYDROMORPHONE HCL IN WATER/PF 6 MG/30 ML
0.4 PATIENT CONTROLLED ANALGESIA SYRINGE INTRAVENOUS
Status: DISCONTINUED | OUTPATIENT
Start: 2024-07-01 | End: 2024-07-03

## 2024-07-01 RX ORDER — PROCHLORPERAZINE MALEATE 5 MG
10 TABLET ORAL EVERY 6 HOURS PRN
Status: DISCONTINUED | OUTPATIENT
Start: 2024-07-01 | End: 2024-07-06 | Stop reason: HOSPADM

## 2024-07-01 RX ORDER — CARBAMAZEPINE 200 MG/1
200 TABLET ORAL DAILY
Status: DISCONTINUED | OUTPATIENT
Start: 2024-07-01 | End: 2024-07-06 | Stop reason: HOSPADM

## 2024-07-01 RX ORDER — HYDROMORPHONE HCL IN WATER/PF 6 MG/30 ML
0.2 PATIENT CONTROLLED ANALGESIA SYRINGE INTRAVENOUS
Status: DISCONTINUED | OUTPATIENT
Start: 2024-07-01 | End: 2024-07-03

## 2024-07-01 RX ORDER — FENTANYL CITRATE 50 UG/ML
25 INJECTION, SOLUTION INTRAMUSCULAR; INTRAVENOUS EVERY 5 MIN PRN
Status: DISCONTINUED | OUTPATIENT
Start: 2024-07-01 | End: 2024-07-01 | Stop reason: HOSPADM

## 2024-07-01 RX ORDER — HYDROMORPHONE HCL IN WATER/PF 6 MG/30 ML
0.4 PATIENT CONTROLLED ANALGESIA SYRINGE INTRAVENOUS EVERY 5 MIN PRN
Status: DISCONTINUED | OUTPATIENT
Start: 2024-07-01 | End: 2024-07-01 | Stop reason: HOSPADM

## 2024-07-01 RX ORDER — ACETAMINOPHEN 325 MG/1
650 TABLET ORAL EVERY 4 HOURS PRN
Status: DISCONTINUED | OUTPATIENT
Start: 2024-07-01 | End: 2024-07-06 | Stop reason: HOSPADM

## 2024-07-01 RX ORDER — CEFAZOLIN SODIUM/WATER 3 G/30 ML
3 SYRINGE (ML) INTRAVENOUS SEE ADMIN INSTRUCTIONS
Status: DISCONTINUED | OUTPATIENT
Start: 2024-07-01 | End: 2024-07-01 | Stop reason: HOSPADM

## 2024-07-01 RX ORDER — BISACODYL 10 MG
10 SUPPOSITORY, RECTAL RECTAL DAILY PRN
Status: DISCONTINUED | OUTPATIENT
Start: 2024-07-01 | End: 2024-07-06 | Stop reason: HOSPADM

## 2024-07-01 RX ORDER — DEXAMETHASONE SODIUM PHOSPHATE 4 MG/ML
4 INJECTION, SOLUTION INTRA-ARTICULAR; INTRALESIONAL; INTRAMUSCULAR; INTRAVENOUS; SOFT TISSUE
Status: DISCONTINUED | OUTPATIENT
Start: 2024-07-01 | End: 2024-07-01 | Stop reason: HOSPADM

## 2024-07-01 RX ORDER — INDOCYANINE GREEN AND WATER 25 MG
2.5 KIT INJECTION ONCE
Status: COMPLETED | OUTPATIENT
Start: 2024-07-01 | End: 2024-07-01

## 2024-07-01 RX ORDER — NALOXONE HYDROCHLORIDE 0.4 MG/ML
0.2 INJECTION, SOLUTION INTRAMUSCULAR; INTRAVENOUS; SUBCUTANEOUS
Status: DISCONTINUED | OUTPATIENT
Start: 2024-07-01 | End: 2024-07-06 | Stop reason: HOSPADM

## 2024-07-01 RX ORDER — ALBUTEROL SULFATE 0.83 MG/ML
2.5 SOLUTION RESPIRATORY (INHALATION) EVERY 4 HOURS PRN
Status: DISCONTINUED | OUTPATIENT
Start: 2024-07-01 | End: 2024-07-06 | Stop reason: HOSPADM

## 2024-07-01 RX ORDER — NALOXONE HYDROCHLORIDE 0.4 MG/ML
0.1 INJECTION, SOLUTION INTRAMUSCULAR; INTRAVENOUS; SUBCUTANEOUS
Status: DISCONTINUED | OUTPATIENT
Start: 2024-07-01 | End: 2024-07-01 | Stop reason: HOSPADM

## 2024-07-01 RX ORDER — ALBUTEROL SULFATE 0.83 MG/ML
2.5 SOLUTION RESPIRATORY (INHALATION) ONCE
Status: COMPLETED | OUTPATIENT
Start: 2024-07-01 | End: 2024-07-01

## 2024-07-01 RX ORDER — ACETAMINOPHEN 650 MG/1
650 SUPPOSITORY RECTAL EVERY 4 HOURS PRN
Status: DISCONTINUED | OUTPATIENT
Start: 2024-07-01 | End: 2024-07-06 | Stop reason: HOSPADM

## 2024-07-01 RX ORDER — FENTANYL CITRATE 50 UG/ML
50 INJECTION, SOLUTION INTRAMUSCULAR; INTRAVENOUS EVERY 5 MIN PRN
Status: DISCONTINUED | OUTPATIENT
Start: 2024-07-01 | End: 2024-07-01 | Stop reason: HOSPADM

## 2024-07-01 RX ORDER — PROCHLORPERAZINE 25 MG
25 SUPPOSITORY, RECTAL RECTAL EVERY 12 HOURS PRN
Status: DISCONTINUED | OUTPATIENT
Start: 2024-07-01 | End: 2024-07-06 | Stop reason: HOSPADM

## 2024-07-01 RX ORDER — LIDOCAINE 40 MG/G
CREAM TOPICAL
Status: DISCONTINUED | OUTPATIENT
Start: 2024-07-01 | End: 2024-07-01 | Stop reason: HOSPADM

## 2024-07-01 RX ORDER — OXYCODONE HYDROCHLORIDE 5 MG/1
5 TABLET ORAL EVERY 4 HOURS PRN
Status: DISCONTINUED | OUTPATIENT
Start: 2024-07-01 | End: 2024-07-06 | Stop reason: HOSPADM

## 2024-07-01 RX ORDER — POLYETHYLENE GLYCOL 3350 17 G/17G
17 POWDER, FOR SOLUTION ORAL 2 TIMES DAILY PRN
Status: DISCONTINUED | OUTPATIENT
Start: 2024-07-01 | End: 2024-07-06 | Stop reason: HOSPADM

## 2024-07-01 RX ORDER — BUPIVACAINE HYDROCHLORIDE 5 MG/ML
INJECTION, SOLUTION EPIDURAL; INTRACAUDAL PRN
Status: DISCONTINUED | OUTPATIENT
Start: 2024-07-01 | End: 2024-07-01 | Stop reason: HOSPADM

## 2024-07-01 RX ORDER — ACETAMINOPHEN 325 MG/1
975 TABLET ORAL
Status: DISCONTINUED | OUTPATIENT
Start: 2024-07-01 | End: 2024-07-01 | Stop reason: HOSPADM

## 2024-07-01 RX ORDER — CARBAMAZEPINE 200 MG/1
200 TABLET ORAL DAILY
Status: DISCONTINUED | OUTPATIENT
Start: 2024-07-01 | End: 2024-07-01

## 2024-07-01 RX ADMIN — SODIUM CHLORIDE, POTASSIUM CHLORIDE, SODIUM LACTATE AND CALCIUM CHLORIDE: 600; 310; 30; 20 INJECTION, SOLUTION INTRAVENOUS at 08:57

## 2024-07-01 RX ADMIN — MECLIZINE HYDROCHLORIDE 25 MG: 25 TABLET ORAL at 10:10

## 2024-07-01 RX ADMIN — SUGAMMADEX 200 MG: 100 INJECTION, SOLUTION INTRAVENOUS at 13:57

## 2024-07-01 RX ADMIN — HYDROMORPHONE HYDROCHLORIDE 1 MG: 1 INJECTION, SOLUTION INTRAMUSCULAR; INTRAVENOUS; SUBCUTANEOUS at 12:05

## 2024-07-01 RX ADMIN — MICONAZOLE NITRATE: 20 POWDER TOPICAL at 18:05

## 2024-07-01 RX ADMIN — LABETALOL HYDROCHLORIDE 5 MG: 5 INJECTION, SOLUTION INTRAVENOUS at 12:07

## 2024-07-01 RX ADMIN — CARBAMAZEPINE 400 MG: 200 TABLET ORAL at 10:46

## 2024-07-01 RX ADMIN — CEFTRIAXONE 2 G: 2 INJECTION, POWDER, FOR SOLUTION INTRAMUSCULAR; INTRAVENOUS at 08:14

## 2024-07-01 RX ADMIN — FENTANYL CITRATE 100 MCG: 50 INJECTION INTRAMUSCULAR; INTRAVENOUS at 11:30

## 2024-07-01 RX ADMIN — LABETALOL HYDROCHLORIDE 10 MG: 5 INJECTION, SOLUTION INTRAVENOUS at 14:42

## 2024-07-01 RX ADMIN — INDOCYANINE GREEN AND WATER 2.5 MG: KIT at 10:00

## 2024-07-01 RX ADMIN — LABETALOL HYDROCHLORIDE 5 MG: 5 INJECTION, SOLUTION INTRAVENOUS at 12:42

## 2024-07-01 RX ADMIN — Medication 200 MG: at 11:30

## 2024-07-01 RX ADMIN — ROCURONIUM BROMIDE 6 MG: 50 INJECTION, SOLUTION INTRAVENOUS at 11:30

## 2024-07-01 RX ADMIN — SODIUM CHLORIDE, POTASSIUM CHLORIDE, SODIUM LACTATE AND CALCIUM CHLORIDE: 600; 310; 30; 20 INJECTION, SOLUTION INTRAVENOUS at 10:10

## 2024-07-01 RX ADMIN — SODIUM CHLORIDE, POTASSIUM CHLORIDE, SODIUM LACTATE AND CALCIUM CHLORIDE: 600; 310; 30; 20 INJECTION, SOLUTION INTRAVENOUS at 12:45

## 2024-07-01 RX ADMIN — ALBUTEROL SULFATE 2.5 MG: 2.5 SOLUTION RESPIRATORY (INHALATION) at 14:55

## 2024-07-01 RX ADMIN — OXYCODONE HYDROCHLORIDE 2.5 MG: 5 TABLET ORAL at 08:13

## 2024-07-01 RX ADMIN — Medication 3 G: at 11:20

## 2024-07-01 RX ADMIN — MIDAZOLAM 2 MG: 1 INJECTION INTRAMUSCULAR; INTRAVENOUS at 11:24

## 2024-07-01 RX ADMIN — CARBAMAZEPINE 400 MG: 200 TABLET ORAL at 21:15

## 2024-07-01 RX ADMIN — CARBAMAZEPINE 200 MG: 200 TABLET ORAL at 16:29

## 2024-07-01 RX ADMIN — LIDOCAINE HYDROCHLORIDE 50 MG: 20 INJECTION, SOLUTION INFILTRATION; PERINEURAL at 11:30

## 2024-07-01 RX ADMIN — ONDANSETRON 4 MG: 2 INJECTION INTRAMUSCULAR; INTRAVENOUS at 13:40

## 2024-07-01 RX ADMIN — FENTANYL CITRATE 50 MCG: 50 INJECTION, SOLUTION INTRAMUSCULAR; INTRAVENOUS at 14:32

## 2024-07-01 RX ADMIN — SODIUM CHLORIDE, POTASSIUM CHLORIDE, SODIUM LACTATE AND CALCIUM CHLORIDE: 600; 310; 30; 20 INJECTION, SOLUTION INTRAVENOUS at 15:50

## 2024-07-01 RX ADMIN — ROCURONIUM BROMIDE 44 MG: 50 INJECTION, SOLUTION INTRAVENOUS at 11:48

## 2024-07-01 RX ADMIN — DEXAMETHASONE SODIUM PHOSPHATE 4 MG: 4 INJECTION, SOLUTION INTRA-ARTICULAR; INTRALESIONAL; INTRAMUSCULAR; INTRAVENOUS; SOFT TISSUE at 11:30

## 2024-07-01 RX ADMIN — PROPOFOL 25 MCG/KG/MIN: 10 INJECTION, EMULSION INTRAVENOUS at 11:42

## 2024-07-01 RX ADMIN — FENTANYL CITRATE 25 MCG: 50 INJECTION, SOLUTION INTRAMUSCULAR; INTRAVENOUS at 14:27

## 2024-07-01 RX ADMIN — FENTANYL CITRATE 25 MCG: 50 INJECTION, SOLUTION INTRAMUSCULAR; INTRAVENOUS at 14:47

## 2024-07-01 RX ADMIN — FENTANYL CITRATE 100 MCG: 50 INJECTION INTRAMUSCULAR; INTRAVENOUS at 11:42

## 2024-07-01 RX ADMIN — OXYCODONE HYDROCHLORIDE 2.5 MG: 5 TABLET ORAL at 18:18

## 2024-07-01 RX ADMIN — ACETAMINOPHEN 650 MG: 325 TABLET, FILM COATED ORAL at 08:13

## 2024-07-01 RX ADMIN — HYDROMORPHONE HYDROCHLORIDE 0.2 MG: 0.2 INJECTION, SOLUTION INTRAMUSCULAR; INTRAVENOUS; SUBCUTANEOUS at 16:02

## 2024-07-01 RX ADMIN — PROPOFOL 300 MG: 10 INJECTION, EMULSION INTRAVENOUS at 11:30

## 2024-07-01 ASSESSMENT — ACTIVITIES OF DAILY LIVING (ADL)
ADLS_ACUITY_SCORE: 48
ADLS_ACUITY_SCORE: 48
ADLS_ACUITY_SCORE: 40
ADLS_ACUITY_SCORE: 42
ADLS_ACUITY_SCORE: 42
ADLS_ACUITY_SCORE: 40
ADLS_ACUITY_SCORE: 48
ADLS_ACUITY_SCORE: 40
ADLS_ACUITY_SCORE: 42
ADLS_ACUITY_SCORE: 42
ADLS_ACUITY_SCORE: 48
ADLS_ACUITY_SCORE: 36
ADLS_ACUITY_SCORE: 42
ADLS_ACUITY_SCORE: 48
ADLS_ACUITY_SCORE: 42
ADLS_ACUITY_SCORE: 36
ADLS_ACUITY_SCORE: 40
ADLS_ACUITY_SCORE: 42

## 2024-07-01 ASSESSMENT — ENCOUNTER SYMPTOMS
SEIZURES: 1
DYSRHYTHMIAS: 0

## 2024-07-01 NOTE — PROGRESS NOTES
Brissa Corado is a 59-year-old female with past medical history significant for morbid obesity, RICK, asthma, hypertension, hypercholesterolemia, Cushing syndrome, MDD, GERD, developmental delay, and SBO who presented to [facility] on 7/1/2024 with abdominal pain and was found to have acute cholecystitis.  She was admitted by my colleague, Carlos Meyer MD, earlier this morning.  Please see his H&P from the same date for further details.  She was taken to the operating room for cholecystectomy, which was performed without complications.  She is doing well postoperatively.    Calvin Doe MD  Hospitalist
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
## 2024-07-01 NOTE — ANESTHESIA PROCEDURE NOTES
Airway       Patient location during procedure: OR       Procedure Start/Stop Times: 7/1/2024 11:35 AM  Staff -        CRNA: Dipak Mills APRN CRNA       Performed By: CRNA  Consent for Airway        Urgency: elective  Indications and Patient Condition       Indications for airway management: mirna-procedural       Induction type:intravenous       Mask difficulty assessment: 2 - vent by mask + OA or adjuvant +/- NMBA    Final Airway Details       Final airway type: endotracheal airway       Successful airway: ETT - single and Oral  Endotracheal Airway Details        ETT size (mm): 7.0       Cuffed: yes       Cuff volume (mL): 5       Successful intubation technique: video laryngoscopy       VL Blade Size: Glidescope 3       Grade View of Cords: 1       Adjucts: stylet       Position: Right       Measured from: gums/teeth       Secured at (cm): 22       Bite block used: Soft    Post intubation assessment        Placement verified by: capnometry, equal breath sounds and chest rise        Number of attempts at approach: 1       Number of other approaches attempted: 0       Secured with: tape       Ease of procedure: easy       Dentition: Intact and Unchanged    Medication(s) Administered   Medication Administration Time: 7/1/2024 11:35 AM

## 2024-07-01 NOTE — PROVIDER NOTIFICATION
Patient has been tachypneic since arrival to PACU.  Dr. Leblanc made aware; orders received to give an albuterol nebulizer as patient has expiratory wheezes that were not present upon PACU arrival.  Continue to monitor.
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
## 2024-07-01 NOTE — PLAN OF CARE
"Goal Outcome Evaluation:      Plan of Care Reviewed With: patient      PRIMARY DIAGNOSIS: ACUTE PAIN - LAP CATAR[place]  OUTPATIENT/OBSERVATION GOALS TO BE MET BEFORE DISCHARGE:  1. Pain Status: Improved but still requiring IV narcotics.    2. Return to near baseline physical activity: No    3. Cleared for discharge by consultants (if involved): No    Discharge Planner Nurse   Safe discharge environment identified: No  Barriers to discharge: Yes       Entered by: Evonne Collins RN 07/01/2024 4:49 PM   Pt A/Ox4, VSS on 3 L oxy mask. IV running LR @ 100. IV dilaudid given for acute pain with relief. On clear liquid diet, no appetite. MARINE drain to bulb suction, bright red/bloody drainage. Not OOB yet, walker at baseline. Assist 2 with repo/roll in bed. Incontinent urine, purewick in place. Redness/moisture to bilateral breasts/abdominal folds/panus fold. Paged MD for WOC consult. Miconazole applied.   Please review provider order for any additional goals.   Nurse to notify provider when observation goals have been met and patient is ready for discharge.        Problem: Adult Inpatient Plan of Care  Goal: Plan of Care Review  Description: The Plan of Care Review/Shift note should be completed every shift.  The Outcome Evaluation is a brief statement about your assessment that the patient is improving, declining, or no change.  This information will be displayed automatically on your shift  note.  Outcome: Progressing  Flowsheets (Taken 7/1/2024 3990)  Plan of Care Reviewed With: patient  Goal: Patient-Specific Goal (Individualized)  Description: You can add care plan individualizations to a care plan. Examples of Individualization might be:  \"Parent requests to be called daily at 9am for status\", \"I have a hard time hearing out of my right ear\", or \"Do not touch me to wake me up as it startles  me\".  Outcome: Progressing  Goal: Absence of Hospital-Acquired Illness or Injury  Outcome: Progressing  Intervention: Identify and " Manage Fall Risk  Recent Flowsheet Documentation  Taken 7/1/2024 1600 by Evonne Collins RN  Safety Promotion/Fall Prevention:   safety round/check completed   supervised activity  Intervention: Prevent Skin Injury  Recent Flowsheet Documentation  Taken 7/1/2024 1600 by Evonne Collins RN  Body Position:   turned   right   left   log-rolled  Intervention: Prevent and Manage VTE (Venous Thromboembolism) Risk  Recent Flowsheet Documentation  Taken 7/1/2024 1600 by Evonne Collins RN  VTE Prevention/Management: SCDs on (sequential compression devices)  Goal: Optimal Comfort and Wellbeing  Outcome: Progressing  Intervention: Monitor Pain and Promote Comfort  Recent Flowsheet Documentation  Taken 7/1/2024 1620 by Evonne Collins RN  Pain Management Interventions:   rest   repositioned  Taken 7/1/2024 1602 by Evonne Collins RN  Pain Management Interventions: medication (see MAR)  Goal: Readiness for Transition of Care  Outcome: Progressing  Intervention: Mutually Develop Transition Plan  Recent Flowsheet Documentation  Taken 7/1/2024 1600 by Evonne Collins RN  Equipment Currently Used at Home: (bariatric walker with seat) other (see comments)     Problem: Pain Acute  Goal: Optimal Pain Control and Function  Outcome: Progressing  Intervention: Develop Pain Management Plan  Recent Flowsheet Documentation  Taken 7/1/2024 1620 by Evonne Collins RN  Pain Management Interventions:   rest   repositioned  Taken 7/1/2024 1602 by Evonne Collins RN  Pain Management Interventions: medication (see MAR)  Intervention: Prevent or Manage Pain  Recent Flowsheet Documentation  Taken 7/1/2024 1600 by Evonne Collins RN  Medication Review/Management: medications reviewed     Problem: Skin Injury Risk Increased  Goal: Skin Health and Integrity  Outcome: Progressing  Intervention: Plan: Nurse Driven Intervention: Moisture Management  Recent Flowsheet Documentation  Taken 7/1/2024 1600 by Evonne Collins  RN  Moisture Interventions:   Encourage regular toileting   Incontinence pad  Intervention: Plan: Nurse Driven Intervention: Friction and Shear  Recent Flowsheet Documentation  Taken 7/1/2024 1600 by Evonne Collins RN  Friction/Shear Interventions: HOB 30 degrees or less  Intervention: Optimize Skin Protection  Recent Flowsheet Documentation  Taken 7/1/2024 1600 by Evonne Collins, RN  Activity Management: activity adjusted per tolerance
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
## 2024-07-01 NOTE — PLAN OF CARE
"  Problem: Adult Inpatient Plan of Care  Goal: Plan of Care Review  Description: The Plan of Care Review/Shift note should be completed every shift.  The Outcome Evaluation is a brief statement about your assessment that the patient is improving, declining, or no change.  This information will be displayed automatically on your shift  note.  Outcome: Progressing  Flowsheets (Taken 7/1/2024 0928)  Outcome Evaluation: Pain is improving oral pain meds. Planning to order WOC consult.  Plan of Care Reviewed With: patient  Overall Patient Progress: improving  Goal: Patient-Specific Goal (Individualized)  Description: You can add care plan individualizations to a care plan. Examples of Individualization might be:  \"Parent requests to be called daily at 9am for status\", \"I have a hard time hearing out of my right ear\", or \"Do not touch me to wake me up as it startles  me\".  Outcome: Progressing  Goal: Absence of Hospital-Acquired Illness or Injury  Outcome: Progressing  Goal: Optimal Comfort and Wellbeing  Outcome: Progressing  Goal: Readiness for Transition of Care  Outcome: Progressing     Problem: Pain Acute  Goal: Optimal Pain Control and Function  Outcome: Progressing     Problem: Skin Injury Risk Increased  Goal: Skin Health and Integrity  Outcome: Progressing   Goal Outcome Evaluation:      Plan of Care Reviewed With: patient    Overall Patient Progress: improvingOverall Patient Progress: improving    Outcome Evaluation: Pain is improving oral pain meds. Planning to order WOC consult.      "

## 2024-07-01 NOTE — ED NOTES
[facility]  ED Nurse Handoff Report    ED Chief complaint: Abdominal Pain  . ED Diagnosis:   Final diagnoses:   Abdominal pain, unspecified abdominal location   Symptomatic cholelithiasis   Hepatic steatosis   Intertrigo       Allergies:   Allergies   Allergen Reactions    Aspirin Unknown    Iodine      Iodinated Contrast Media  --- Hives      Penicillins Unknown    Tetracyclines & Related Unknown    Hydrochlorothiazide Palpitations     dehydration    Influenza Vac Split [Influenza Virus Vaccine] Rash     Patient states she is allergic to the vaccine.  Got a rash all over body many years ago after receiving injection.       Code Status: Full Code    Activity level - Baseline/Home:  independent.  Activity Level - Current:   independent.   Lift room needed: No.   Bariatric: Yes   Needed: No   Isolation: No.   Infection: Not Applicable.     Respiratory status: Room air    Vital Signs (within 30 minutes):   Vitals:    06/30/24 2339 07/01/24 0311   BP: (!) 144/73 139/74   Pulse: 86 79   Resp: 20 18   Temp: 98.4  F (36.9  C)    SpO2: 91% 92%       Cardiac Rhythm:  ,      Pain level:    Patient confused: No.   Patient Falls Risk: nonskid shoes/slippers when out of bed, patient and family education, and activity supervised.   Elimination Status:  Straight cath      Patient Report - Initial Complaint: Abdominal pain.   Focused Assessment: BIBA per EMS Lower abd pain starting 30min PTA. States hx of intussusception feels similar. Lives at home with . ABCs intact A&Ox4      Pt states it has been several years since this has last occurred. Pain in LLQ achy like pain.  Endorses nausea no vomiting or diarrhea.      Abnormal Results:   Labs Ordered and Resulted from Time of ED Arrival to Time of ED Departure   BASIC METABOLIC PANEL - Abnormal       Result Value    Sodium 145      Potassium 4.0      Chloride 109 (*)     Carbon Dioxide (CO2) 22      Anion Gap 14      Urea Nitrogen 18.1       Creatinine 0.81      GFR Estimate 84      Calcium 8.9      Glucose 125 (*)    CBC WITH PLATELETS AND DIFFERENTIAL - Abnormal    WBC Count 8.5      RBC Count 4.76      Hemoglobin 13.8      Hematocrit 44.3      MCV 93      MCH 29.0      MCHC 31.2 (*)     RDW 13.9      Platelet Count 239      % Neutrophils 62      % Lymphocytes 24      % Monocytes 7      % Eosinophils 7      % Basophils 1      % Immature Granulocytes 0      NRBCs per 100 WBC 0      Absolute Neutrophils 5.2      Absolute Lymphocytes 2.1      Absolute Monocytes 0.6      Absolute Eosinophils 0.6      Absolute Basophils 0.0      Absolute Immature Granulocytes 0.0      Absolute NRBCs 0.0     ROUTINE UA WITH MICROSCOPIC REFLEX TO CULTURE - Abnormal    Color Urine Yellow      Appearance Urine Slightly Cloudy (*)     Glucose Urine 30 (*)     Bilirubin Urine Negative      Ketones Urine Trace (*)     Specific Gravity Urine 1.030      Blood Urine Moderate (*)     pH Urine 6.0      Protein Albumin Urine 100 (*)     Urobilinogen Urine Normal      Nitrite Urine Negative      Leukocyte Esterase Urine Negative      WBC Clumps Urine Present (*)     Mucus Urine Present (*)     RBC Urine >182 (*)     WBC Urine 21 (*)     Renal Tubular Epithelials Urine 5 (*)     Hyaline Casts Urine 2     LIPASE - Normal    Lipase 29     HEPATIC FUNCTION PANEL - Normal    Protein Total 7.1      Albumin 3.9      Bilirubin Total <0.2      Alkaline Phosphatase 106      AST 24      ALT 29      Bilirubin Direct <0.20     LACTIC ACID WHOLE BLOOD - Normal    Lactic Acid 1.2     URINE CULTURE        US Abdomen Limited   Final Result   IMPRESSION:   1.  Gallbladder is mildly distended and appears to contain internal shadowing stones. There is also some dirty shadowing visible in the region of the gallbladder wall, possibly gas within the wall. Overall there is limited visualization of the    gallbladder and wall secondary to the patient's body habitus and due to overlying bowel gas. Common duct  not well visualized. No obvious intrahepatic ductal dilatation. Patient was tender over the right upper quadrant on sonographic examination. Findings    remain indeterminate but cholecystitis including possibly emphysematous cholecystitis are not excluded. Recommend close clinical followup. If further imaging is clinically indicated, a HIDA scan could be obtained.   2.  Echogenic liver suggestive of hepatic steatosis.            CT Abdomen Pelvis w/o Contrast   Final Result   IMPRESSION:    1. Distended gallbladder with mild diffuse gallbladder wall thickening, and small focus of gas along the gallbladder wall, findings worrisome for acute cholecystitis, which could be emphysematous cholecystitis, recommend further evaluation with right    upper quadrant abdominal ultrasound.   2. Multiple enlarged inguinal lymph nodes, not significantly changed as compared to 5/5/2022 exam, remains indeterminate.             Treatments provided: see mar  Family Comments: N/A  OBS brochure/video discussed/provided to patient:  Yes  ED Medications: Medications - No data to display    Drips infusing:  No  For the majority of the shift this patient was Green.   Interventions performed were Scans, labs, medications.    Sepsis treatment initiated: No    Cares/treatment/interventions/medications to be completed following ED care: all admission orders    ED Nurse Name: Sonia Clark RN  5:10 AM

## 2024-07-01 NOTE — ANESTHESIA PROCEDURE NOTES
Airway       Patient location during procedure: OR       Procedure Start/Stop Times: 7/1/2024 11:35 AM  Staff -        Anesthesiologist:  Nhi Ayon MD       CRNA: Dipak Mills APRN CRNA       Performed By: CRNA  Consent for Airway        Urgency: elective  Indications and Patient Condition       Indications for airway management: mirna-procedural and airway protection       Induction type:intravenous       Mask difficulty assessment: 2 - vent by mask + OA or adjuvant +/- NMBA    Final Airway Details       Final airway type: endotracheal airway       Successful airway: ETT - single  Endotracheal Airway Details        ETT size (mm): 7.0       Cuffed: yes       Successful intubation technique: video laryngoscopy       VL Blade Size: Glidescope 3       Grade View of Cords: 1       Adjucts: stylet       Position: Center       Measured from: lips       Secured at (cm): 22       Bite block used: Soft    Post intubation assessment        Placement verified by: capnometry, equal breath sounds and chest rise        Number of attempts at approach: 1       Number of other approaches attempted: 0       Secured with: tape       Ease of procedure: easy       Dentition: Intact and Unchanged    Medication(s) Administered   Medication Administration Time: 7/1/2024 11:35 AM

## 2024-07-01 NOTE — ANESTHESIA CARE TRANSFER NOTE
Patient: Brissa Corado    Procedure: Procedure(s):  CHOLECYSTECTOMY, LAPAROSCOPIC       Diagnosis: Acute cholecystitis [K81.0]  Diagnosis Additional Information: No value filed.    Anesthesia Type:   General     Note:    Oropharynx: oropharynx clear of all foreign objects and spontaneously breathing  Level of Consciousness: drowsy  Oxygen Supplementation: face mask  Level of Supplemental Oxygen (L/min / FiO2): 1  Independent Airway: airway patency satisfactory and stable  Dentition: dentition unchanged  Vital Signs Stable: post-procedure vital signs reviewed and stable  Report to RN Given: handoff report given  Patient transferred to: PACU    Handoff Report: Identifed the Patient, Identified the Reponsible Provider, Reviewed the pertinent medical history, Discussed the surgical course, Reviewed Intra-OP anesthesia mangement and issues during anesthesia, Set expectations for post-procedure period and Allowed opportunity for questions and acknowledgement of understanding      Vitals:  Vitals Value Taken Time   /78 07/01/24 1407   Temp     Pulse 85 07/01/24 1411   Resp 23 07/01/24 1411   SpO2 97 % 07/01/24 1411   Vitals shown include unfiled device data.    Electronically Signed By: Dean Dennis Severson, APRN CRNA  July 1, 2024  2:12 PM

## 2024-07-01 NOTE — TELEPHONE ENCOUNTER
Request for lift chair received via fax. Patient will need an appointment with Dr Vizcaino about this but she is currently in the hospital right now. We will have to call patient to schedule an appointment once she is out of the hosptial. Form in Billie's green folder.

## 2024-07-01 NOTE — OP NOTE
General Surgery Operative Note    Pre-operative diagnosis:  Acute cholecystitis [K81.0]   Post-operative diagnosis: Severe acute cholecystitis with gangrenous change   Procedure: Laparoscopic Cholecystectomy-increased difficulty   Surgeon: Jaya Higgins MD   Assistant(s): Shannan Gomez PA-C - the physician assistant was medically necessary to assist in prepping, positioning, camera operation, retraction/exposure and closure of the port site.    Anesthesia: General    Estimated blood loss: 50 cc's   Drains placed: Devon   Complications:  None   Findings:  Extremely inflamed gallbladder with necrotic portions of the wall.  Dissection and position of the trocars was much more difficult than usual due to extensive adhesions within the abdomen.  Dissection of the gallbladder required triple the normal operative time, adding 2 hours to the expected time.     INDICATIONS FOR OPERATION: This is a patient with upper abdominal pain and gallstones.  Laparoscopic cholecystectomy was recommended.  The procedure is expected to be more difficult than usual due to obesity and an extended period of symptoms.  The procedure along with its risks and complications was discussed in detail and the patient agreed to proceed.    DETAILS OF THE OPERATION: After informed consent the patient was taken to the operating room where she underwent satisfactory induction of general anesthesia.  The patient was then sterilely prepped and draped.  An upper abdominal midline skin incision was made using a skin knife.  The dissection was carried bluntly down to the fascia.  The fascia was opened using electrocautery and the Yao trocar was then inserted.  Pneumoperitoneum was achieved using CO2 insufflation, and under direct visualization  three  5 mm upper abdominal ports were placed.  This was difficult due to extensive adhesions extending well over to the right side of midline and covering the midline all the way up to the falciform  ligament.  There were very dense adhesions over the gallbladder, making it so that we could not initially identify the structure.  Eventually, I was able to take down the dense adhesions to the gallbladder, exposing the dome of the gallbladder.  I attempted to aspirate the gallbladder, but only about 5 cc of cloudy fluid was removed.  Eventually, I was able to take adhesions down from the gallbladder.  During this dissection, numerous holes opened and the gallbladder wall, exposing stone material.  The couple of the initial stones were retrieved using a stone clamshell grasper.  We continued to dissect down the neck of the gallbladder until the cystic duct was encountered.  There was no evidence of green coloration in the cystic duct, showing that this was an obstructed duct.  We now clipped the cystic duct and the cystic artery.  A prolonged dissection was now carried out, mostly using blunt dissection, to remove the gallbladder from the liver.  The gallbladder was very intrahepatic and extremely inflamed.  Most of the stones within the gallbladder came out of the gallbladder during the dissection, due to numerous necrotic areas of the gallbladder wall.  Eventually, we got the entire gallbladder wall  from the liver.  The gallbladder had a fair number of the stones were now placed in an Endo Catch bag and set aside.  A second Endo Catch bag was now inserted into the abdomen and the remainder of the stones were placed into this Endo Catch bag.  We now irrigated the abdomen out.  There was excellent hemostasis.  We I did place Surgicel Nu-Knit in the gallbladder fossa to maintain hemostasis.  A 15 Yoruba round channel drain was placed through the most lateral port site.  This was laid across the gallbladder fossa.  The drain was sutured in place at the skin.  The trocar sites were now infiltrated with 0.5% Marcaine.  The trocars were removed under direct visualization.  The upper abdominal fascial incision  was closed using interrupted 0 Vicryl sutures.   Skin incisions were closed using 4-0 subcuticular Vicryl followed by Steri-Strips.    The patient was transferred to the recovery room in satisfactory condition.  Sponge and needle counts were correct at the close of the case.      Specimens:   ID Type Source Tests Collected by Time Destination   1 : Gallbladder Tissue Gallbladder SURGICAL PATHOLOGY EXAM Jaya Higgins MD 7/1/2024 12:31 PM    A : Bile Body fluid, unsp Gallbladder ANAEROBIC BACTERIAL CULTURE ROUTINE, GRAM STAIN, AEROBIC BACTERIAL CULTURE ROUTINE Jaya Higgins MD 7/1/2024 12:18 PM            Jaya Higgins MD

## 2024-07-01 NOTE — ANESTHESIA POSTPROCEDURE EVALUATION
Patient: Brissa Corado    Procedure: Procedure(s):  CHOLECYSTECTOMY, LAPAROSCOPIC       Anesthesia Type:  General    Note:  Disposition: Inpatient   Postop Pain Control: Uneventful            Sign Out: Well controlled pain   PONV: No   Neuro/Psych: Uneventful            Sign Out: Acceptable/Baseline neuro status   Airway/Respiratory: Uneventful            Sign Out: Acceptable/Baseline resp. status   CV/Hemodynamics: Uneventful            Sign Out: Acceptable CV status; No obvious hypovolemia; No obvious fluid overload   Other NRE:    DID A NON-ROUTINE EVENT OCCUR? No           Last vitals:  Vitals Value Taken Time   /88 07/01/24 1420   Temp     Pulse 90 07/01/24 1420   Resp 24 07/01/24 1422   SpO2 97 % 07/01/24 1422   Vitals shown include unfiled device data.    Electronically Signed By: Rocio Leblanc MD  July 1, 2024  2:23 PM

## 2024-07-01 NOTE — ED TRIAGE NOTES
BIBA per EMS Lower abd pain starting 30min PTA. States hx of intussusception feels similar. Lives at home with . ABCs intact A&Ox4     Pt states it has been several years since this has last occurred. Pain in LLQ achy like pain.  Endorses nausea no vomiting or diarrhea.

## 2024-07-02 PROBLEM — R31.9 HEMATURIA, UNSPECIFIED TYPE: Status: ACTIVE | Noted: 2024-07-02

## 2024-07-02 LAB
ALBUMIN SERPL BCG-MCNC: 3.2 G/DL (ref 3.5–5.2)
ALP SERPL-CCNC: 81 U/L (ref 40–150)
ALT SERPL W P-5'-P-CCNC: 39 U/L (ref 0–50)
ANION GAP SERPL CALCULATED.3IONS-SCNC: 10 MMOL/L (ref 7–15)
AST SERPL W P-5'-P-CCNC: 36 U/L (ref 0–45)
BILIRUB SERPL-MCNC: 0.3 MG/DL
BUN SERPL-MCNC: 11.2 MG/DL (ref 6–20)
CALCIUM SERPL-MCNC: 7.8 MG/DL (ref 8.6–10)
CHLORIDE SERPL-SCNC: 102 MMOL/L (ref 98–107)
CREAT SERPL-MCNC: 0.65 MG/DL (ref 0.51–0.95)
DEPRECATED HCO3 PLAS-SCNC: 26 MMOL/L (ref 22–29)
EGFRCR SERPLBLD CKD-EPI 2021: >90 ML/MIN/1.73M2
ERYTHROCYTE [DISTWIDTH] IN BLOOD BY AUTOMATED COUNT: 13.9 % (ref 10–15)
GLUCOSE SERPL-MCNC: 140 MG/DL (ref 70–99)
HCT VFR BLD AUTO: 37 % (ref 35–47)
HGB BLD-MCNC: 11.4 G/DL (ref 11.7–15.7)
MCH RBC QN AUTO: 29 PG (ref 26.5–33)
MCHC RBC AUTO-ENTMCNC: 30.8 G/DL (ref 31.5–36.5)
MCV RBC AUTO: 94 FL (ref 78–100)
PLATELET # BLD AUTO: 190 10E3/UL (ref 150–450)
POTASSIUM SERPL-SCNC: 3.5 MMOL/L (ref 3.4–5.3)
PROT SERPL-MCNC: 5.7 G/DL (ref 6.4–8.3)
RBC # BLD AUTO: 3.93 10E6/UL (ref 3.8–5.2)
SODIUM SERPL-SCNC: 138 MMOL/L (ref 135–145)
WBC # BLD AUTO: 9 10E3/UL (ref 4–11)

## 2024-07-02 PROCEDURE — 250N000013 HC RX MED GY IP 250 OP 250 PS 637: Performed by: INTERNAL MEDICINE

## 2024-07-02 PROCEDURE — 250N000013 HC RX MED GY IP 250 OP 250 PS 637: Performed by: SURGERY

## 2024-07-02 PROCEDURE — 250N000011 HC RX IP 250 OP 636: Performed by: PHYSICIAN ASSISTANT

## 2024-07-02 PROCEDURE — 258N000003 HC RX IP 258 OP 636: Performed by: SURGERY

## 2024-07-02 PROCEDURE — 36415 COLL VENOUS BLD VENIPUNCTURE: CPT | Performed by: SURGERY

## 2024-07-02 PROCEDURE — G0378 HOSPITAL OBSERVATION PER HR: HCPCS

## 2024-07-02 PROCEDURE — 250N000013 HC RX MED GY IP 250 OP 250 PS 637: Performed by: NURSE PRACTITIONER

## 2024-07-02 PROCEDURE — 80053 COMPREHEN METABOLIC PANEL: CPT | Performed by: SURGERY

## 2024-07-02 PROCEDURE — 96376 TX/PRO/DX INJ SAME DRUG ADON: CPT

## 2024-07-02 PROCEDURE — 99233 SBSQ HOSP IP/OBS HIGH 50: CPT | Performed by: NURSE PRACTITIONER

## 2024-07-02 PROCEDURE — 250N000011 HC RX IP 250 OP 636: Performed by: SURGERY

## 2024-07-02 PROCEDURE — 999N000157 HC STATISTIC RCP TIME EA 10 MIN

## 2024-07-02 PROCEDURE — 94799 UNLISTED PULMONARY SVC/PX: CPT

## 2024-07-02 PROCEDURE — 120N000001 HC R&B MED SURG/OB

## 2024-07-02 PROCEDURE — G0463 HOSPITAL OUTPT CLINIC VISIT: HCPCS

## 2024-07-02 PROCEDURE — 85027 COMPLETE CBC AUTOMATED: CPT | Performed by: SURGERY

## 2024-07-02 RX ORDER — LOVASTATIN 40 MG
40 TABLET ORAL AT BEDTIME
Qty: 90 TABLET | Refills: 1 | Status: SHIPPED | OUTPATIENT
Start: 2024-07-02

## 2024-07-02 RX ORDER — SERTRALINE HYDROCHLORIDE 100 MG/1
100 TABLET, FILM COATED ORAL DAILY
Status: DISCONTINUED | OUTPATIENT
Start: 2024-07-02 | End: 2024-07-06 | Stop reason: HOSPADM

## 2024-07-02 RX ORDER — LISINOPRIL 20 MG/1
20 TABLET ORAL DAILY
Status: DISCONTINUED | OUTPATIENT
Start: 2024-07-02 | End: 2024-07-06 | Stop reason: HOSPADM

## 2024-07-02 RX ORDER — AZELASTINE 1 MG/ML
1 SPRAY, METERED NASAL DAILY
COMMUNITY

## 2024-07-02 RX ORDER — NYSTATIN 100000 U/G
CREAM TOPICAL DAILY PRN
COMMUNITY

## 2024-07-02 RX ADMIN — PANTOPRAZOLE SODIUM 40 MG: 40 INJECTION, POWDER, FOR SOLUTION INTRAVENOUS at 18:27

## 2024-07-02 RX ADMIN — ACETAMINOPHEN 650 MG: 325 TABLET, FILM COATED ORAL at 20:58

## 2024-07-02 RX ADMIN — SODIUM CHLORIDE, POTASSIUM CHLORIDE, SODIUM LACTATE AND CALCIUM CHLORIDE: 600; 310; 30; 20 INJECTION, SOLUTION INTRAVENOUS at 11:37

## 2024-07-02 RX ADMIN — OXYCODONE HYDROCHLORIDE 5 MG: 5 TABLET ORAL at 14:34

## 2024-07-02 RX ADMIN — CARBAMAZEPINE 400 MG: 200 TABLET ORAL at 20:58

## 2024-07-02 RX ADMIN — HYDROMORPHONE HYDROCHLORIDE 0.4 MG: 0.2 INJECTION, SOLUTION INTRAMUSCULAR; INTRAVENOUS; SUBCUTANEOUS at 17:00

## 2024-07-02 RX ADMIN — HYDROMORPHONE HYDROCHLORIDE 0.4 MG: 0.2 INJECTION, SOLUTION INTRAMUSCULAR; INTRAVENOUS; SUBCUTANEOUS at 01:15

## 2024-07-02 RX ADMIN — SERTRALINE 100 MG: 100 TABLET, FILM COATED ORAL at 16:59

## 2024-07-02 RX ADMIN — CEFTRIAXONE 2 G: 2 INJECTION, POWDER, FOR SOLUTION INTRAMUSCULAR; INTRAVENOUS at 08:40

## 2024-07-02 RX ADMIN — SODIUM CHLORIDE, POTASSIUM CHLORIDE, SODIUM LACTATE AND CALCIUM CHLORIDE: 600; 310; 30; 20 INJECTION, SOLUTION INTRAVENOUS at 01:15

## 2024-07-02 RX ADMIN — ACETAMINOPHEN 650 MG: 325 TABLET, FILM COATED ORAL at 14:34

## 2024-07-02 RX ADMIN — PANTOPRAZOLE SODIUM 40 MG: 40 TABLET, DELAYED RELEASE ORAL at 08:40

## 2024-07-02 RX ADMIN — SODIUM CHLORIDE, POTASSIUM CHLORIDE, SODIUM LACTATE AND CALCIUM CHLORIDE: 600; 310; 30; 20 INJECTION, SOLUTION INTRAVENOUS at 21:08

## 2024-07-02 RX ADMIN — MICONAZOLE NITRATE: 20 POWDER TOPICAL at 08:40

## 2024-07-02 RX ADMIN — LISINOPRIL 20 MG: 20 TABLET ORAL at 16:59

## 2024-07-02 RX ADMIN — OXYCODONE HYDROCHLORIDE 5 MG: 5 TABLET ORAL at 08:39

## 2024-07-02 RX ADMIN — HYDROMORPHONE HYDROCHLORIDE 0.4 MG: 0.2 INJECTION, SOLUTION INTRAMUSCULAR; INTRAVENOUS; SUBCUTANEOUS at 04:13

## 2024-07-02 RX ADMIN — HYDROMORPHONE HYDROCHLORIDE 0.4 MG: 0.2 INJECTION, SOLUTION INTRAMUSCULAR; INTRAVENOUS; SUBCUTANEOUS at 10:37

## 2024-07-02 RX ADMIN — Medication 5 MG: at 20:58

## 2024-07-02 RX ADMIN — OXYCODONE HYDROCHLORIDE 5 MG: 5 TABLET ORAL at 20:58

## 2024-07-02 RX ADMIN — CARBAMAZEPINE 200 MG: 200 TABLET ORAL at 11:41

## 2024-07-02 RX ADMIN — CARBAMAZEPINE 400 MG: 200 TABLET ORAL at 08:39

## 2024-07-02 RX ADMIN — OXYCODONE HYDROCHLORIDE 2.5 MG: 5 TABLET ORAL at 04:13

## 2024-07-02 RX ADMIN — ACETAMINOPHEN 650 MG: 325 TABLET, FILM COATED ORAL at 08:39

## 2024-07-02 RX ADMIN — MICONAZOLE NITRATE: 20 POWDER TOPICAL at 21:14

## 2024-07-02 ASSESSMENT — ACTIVITIES OF DAILY LIVING (ADL)
ADLS_ACUITY_SCORE: 48
ADLS_ACUITY_SCORE: 37
ADLS_ACUITY_SCORE: 48
ADLS_ACUITY_SCORE: 37
ADLS_ACUITY_SCORE: 42
ADLS_ACUITY_SCORE: 48
ADLS_ACUITY_SCORE: 48
ADLS_ACUITY_SCORE: 41
ADLS_ACUITY_SCORE: 44
ADLS_ACUITY_SCORE: 48
ADLS_ACUITY_SCORE: 48
ADLS_ACUITY_SCORE: 44
ADLS_ACUITY_SCORE: 48

## 2024-07-02 NOTE — PROVIDER NOTIFICATION
X Cover    Called for indigestion, patient with GERD on omeprazole.  Status post cholecystectomy.  Complaining of indigestion having difficulty taking in p.o.  Did have oral dose of Protonix today.  -One-time dose IV Protonix  -Reassess in a.m. likely can continue oral PPI   -can consider GI cocktail if refractory tonight

## 2024-07-02 NOTE — PLAN OF CARE
"PRIMARY DIAGNOSIS:  Cholecystectomy  OUTPATIENT/OBSERVATION GOALS TO BE MET BEFORE DISCHARGE:  1. Pain Status: Improved but still requiring IV narcotics.     2. Return to near baseline physical activity: No     3. Cleared for discharge by consultants (if involved): No        Discharge Planner Nurse  Safe discharge environment identified: Yes  Barriers to discharge: Yes        BP (!) 161/96 (BP Location: Left arm)   Pulse 82   Temp 97.7  F (36.5  C) (Oral)   Resp 26   Ht 1.702 m (5' 7\")   Wt (!) 182.6 kg (402 lb 9 oz)   LMP 08/18/2008   SpO2 96%   BMI 63.05 kg/m     Patient is Alert and Oriented x4. They are 2 assist with Gait Belt and Walker.  Pt is a Clear liquid diet.  They are complaining of 7/10 pain in their abdomen.  Dilaudid and Oxycodone given for pain.  Patient has Lactated Ringer's running at 100 mL per hour.- IV Rocephin. Incision sites clean/dry- MARINE drain. Pt had rash/wounds under breast/ inner thigh area- Pt needs WOC/PT consult. Pt is on 3 L NC      Please review provider order for any additional goals.   Nurse to notify provider when observation goals have been met and patient is ready for discharge.  Goal Outcome Evaluation:      Plan of Care Reviewed With: patient    Overall Patient Progress: no changeOverall Patient Progress: no change    Outcome Evaluation: Pt resting in bed. Has rash/wounds under breast area/inner thighs      "

## 2024-07-02 NOTE — PLAN OF CARE
PRIMARY DIAGNOSIS:  Cholecystomy   OUTPATIENT/OBSERVATION GOALS TO BE MET BEFORE DISCHARGE:  1. Pain Status: Improved but still requiring IV narcotics.    2. Return to near baseline physical activity: No    3. Cleared for discharge by consultants (if involved): No    Discharge Planner Nurse   Safe discharge environment identified: Yes  Barriers to discharge: Yes       Vitals are Temp: 97.7  F (36.5  C) Temp src: Oral BP: (!) 161/96 Pulse: 82   Resp: 26 SpO2: 96 %.  Patient is Alert and Oriented x4. They are 2 assist with Gait Belt and Walker.  Pt is a Clear liquid diet.  They are complaining of 87/10 pain in their abdomen.  Dilaudid and Oxycodone given for pain.  Patient has Lactated Ringer's running at 100 mL per hour.- IV Rocephin. Incision sites clean/dry- MARINE drain. Pt had rash/wounds under breast/ inner thigh area- Pt needs WOC/PT consult. Pt is on 3 L NC    Please review provider order for any additional goals.   Nurse to notify provider when observation goals have been met and patient is ready for discharge.Goal Outcome Evaluation:      Plan of Care Reviewed With: patient    Overall Patient Progress: no changeOverall Patient Progress: no change    Outcome Evaluation: Pt resting in bed. Has rash/wounds under breast area/inner thighs

## 2024-07-02 NOTE — PHARMACY-ADMISSION MEDICATION HISTORY
Pharmacist Admission Medication History    Admission medication history is complete. The information provided in this note is only as accurate as the sources available at the time of the update.    Information Source(s): Patient and CareEverywhere/SureScripts via in-person    Pertinent Information: None    Changes made to PTA medication list:  Added: None  Deleted: benzonatate, duplicate albuterol neb, omeprazole, prednisone  Changed: nystatin from scheduled to as needed     Allergies reviewed with patient and updates made in EHR: yes    Medication History Completed By: Bel Perry [facility] 7/2/2024 10:30 AM    PTA Med List   Medication Sig Last Dose    albuterol (PROVENTIL) (2.5 MG/3ML) 0.083% neb solution Take 1 vial (2.5 mg) by nebulization every 4 hours as needed prn at prn    azelastine (ASTELIN) 0.1 % nasal spray Spray 1 spray into both nostrils daily 6/30/2024 at am    carBAMazepine (TEGRETOL) 200 MG tablet TAKE TWO TABLETS BY MOUTH IN THE MORNING, TAKE ONE TABLET BY MOUTH AT NOON (WITH LUNCH) AND TAKE TWO TABLETS BY MOUTH AT NIGHT 6/30/2024 at 2000    fluticasone (FLONASE) 50 MCG/ACT nasal spray USE ONE SPRAY INTO BOTH NOSTRILS DAILY AS NEEDED FOR RHINITIS OR ALLERGIES prn at prn    lisinopril (ZESTRIL) 20 MG tablet TAKE 1 TABLET (20 MG) BY MOUTH DAILY 6/30/2024    lovastatin (MEVACOR) 40 MG tablet TAKE 1 TABLET (40 MG) BY MOUTH AT BEDTIME 6/30/2024    Multiple Vitamin (MULTI-VITAMIN PO) Take 1 tablet by mouth daily 6/30/2024 at 0800    nystatin (MYCOSTATIN) 956339 UNIT/GM external cream Apply topically daily as needed prn at prn    sertraline (ZOLOFT) 100 MG tablet Take 1 tablet (100 mg) by mouth daily 6/30/2024 at 0800    [DISCONTINUED] lovastatin (MEVACOR) 40 MG tablet Take 1 tablet (40 mg) by mouth at bedtime 6/30/2024 at 2000

## 2024-07-02 NOTE — PLAN OF CARE
"PRIMARY DIAGNOSIS:  Cholecystectomy  OUTPATIENT/OBSERVATION GOALS TO BE MET BEFORE DISCHARGE:  1. Pain Status: Improved but still requiring IV narcotics.     2. Return to near baseline physical activity: No     3. Cleared for discharge by consultants (if involved): No        Discharge Planner Nurse  Safe discharge environment identified: Yes  Barriers to discharge: Yes        BP (!) 145/74 (BP Location: Left arm)   Pulse 93   Temp 98.8  F (37.1  C) (Oral)   Resp 28   Ht 1.702 m (5' 7\")   Wt (!) 182.6 kg (402 lb 9 oz)   LMP 08/18/2008   SpO2 93%   BMI 63.05 kg/m     Patient is Alert and Oriented x4. They are 2 assist with Gait Belt and Walker.  Pt is a Clear liquid diet.  They are complaining of 7/10 pain in their abdomen.  Dilaudid and Oxycodone given for pain.  Patient has Lactated Ringer's running at 100 mL per hour.- IV Rocephin. Incision sites clean/dry- MARINE drain. Pt had rash/wounds under breast/ inner thigh area-Inner dry and powder applied.- Pt needs WOC/PT consult. Pt is on 3 L NC, pt has had high RR- paged provider FYI-believe its due to pain- encourage deep breathing.      Please review provider order for any additional goals.   Nurse to notify provider when observation goals have been met and patient is ready for discharge.  Goal Outcome Evaluation:      Plan of Care Reviewed With: patient    Overall Patient Progress: no changeOverall Patient Progress: no change    Outcome Evaluation: Pt resting in bed. Has rash/wounds under breast area/inner thighs      "

## 2024-07-02 NOTE — CONSULTS
[facility] Nurse Inpatient Assessment     Consulted for: Intertrigo    Patient History (according to provider note(s):      Brissa Corado is a 59 year old female admitted on 7/1/2024 with abdominal pain possibly due to acute cholecystitis. She has history of morbid obesity (weight 420 pounds), obstructive sleep apnea, asthma, hypertension, hypercholesterolemia, Cushing syndrome, depression, GERD, developmental delay, and small bowel obstruction.  She presented to the emergency department last night (6/30/2024) for evaluation of abdominal pain.  She said that she awoke with abdominal pain history morning and it persisted throughout the day.  Initially it was reported as left sided pain but over the course of her emergency department stay it seemed to localize in the right upper quadrant.  She reported some nausea but no vomiting.  She denied fevers.  She denied cough and shortness of breath.     Assessment:      Areas visualized during today's visit: Skin folds     Wound location: Under breasts and pannus  Last photo: none  Wound due to: Intertrigo, Moisture Associated Skin Damage (MASD), and Fungal  Wound history/plan of care: Patient with morbid obesity and moisture damage to folds. Patient reports this has been going on for several months and she sometimes puts antifungal powder on it.    Wound base: Erythema and satellite lesions under breasts and to abdominal and groin folds which extend out past the fold.  Scattered areas of dermis and dry drainage ranging from pinpoint to 0.7x1cm      Palpation of the wound bed: normal      Drainage: small     Description of drainage: bloody     Measurements (length x width x depth, in cm): see above      Tunneling: N/A     Undermining: N/A  Periwound skin: Erythema- blanchable      Color: pink      Temperature: normal   Odor: none  Pain: denies   Pain interventions prior to dressing change: N/A  Treatment goal: Heal   STATUS: initial  assessment  Supplies ordered: at bedside       Treatment Plan:     Abdominal, breast and groin fold wound(s): BID  Cleanse with soap and water and dry areas well  Apply Miconazole powder      Orders: Written    RECOMMEND PRIMARY TEAM ORDER: None, at this time  Education provided: plan of care and Hygiene  Discussed plan of care with: Patient and Nurse  WO nurse follow-up plan: weekly  Notify WOC if wound(s) deteriorate.  Nursing to notify the Provider(s) and re-consult the WOC Nurse if new skin concern.    DATA:     Current support surface: Standard  Standard Isoflex gel  Containment of urine/stool: Suction based external urinary catheter   BMI: Body mass index is 62.39 kg/m .   Active diet order: Orders Placed This Encounter      Advance Diet as Tolerated: Clear Liquid Diet     Output: I/O last 3 completed shifts:  In: 1500 [I.V.:1500]  Out: 140 [Drains:90; Blood:50]     Labs:   Recent Labs   Lab 07/02/24  0717   ALBUMIN 3.2*   HGB 11.4*   WBC 9.0     Pressure injury risk assessment:   Sensory Perception: 4-->no impairment  Moisture: 2-->very moist  Activity: 2-->chairfast  Mobility: 2-->very limited  Nutrition: 3-->adequate  Friction and Shear: 2-->potential problem  Randy Score: 15    Bharat Cooper RN CWOCN  Contact Via [facility] Nurse (Kristy)  Dept. Office Number: [phone]

## 2024-07-02 NOTE — PLAN OF CARE
"PRIMARY DIAGNOSIS: BILIARY COLIC/UNCOMPLICATED EARLY ACUTE CHOLECYSTITIS  OUTPATIENT/OBSERVATION GOALS TO BE MET BEFORE DISCHARGE:    1. Pain status: Improved but still requiring IV narcotics.  2. Stable vital signs and labs (if performed) at disposition: No  3. Tolerating adequate PO diet: Yes  4. Successful cholecystectomy or clear follow up plan with General Surgery team if immediate surgery not performed Yes  5. ADLs back to baseline?  No  6. Activity and level of assistance: Up with maximum assistance. Consider SW and/or PT evaluation.   7. Barriers to discharge noted Yes pt is refused to get up,     Discharge Planner Nurse   Safe discharge environment identified: Yes  Barriers to discharge: Yes       Entered by: Lyudmila Singh RN 07/02/2024 12:01 PM   Pt, A&O, has pain got PRN IV dilaudid and PRN oral tylenol, oxycodone, tolerated  Problem: Adult Inpatient Plan of Care  Goal: Plan of Care Review  Description: The Plan of Care Review/Shift note should be completed every shift.  The Outcome Evaluation is a brief statement about your assessment that the patient is improving, declining, or no change.  This information will be displayed automatically on your shift  note.  Outcome: Not Progressing  Flowsheets (Taken 7/2/2024 1157)  Outcome Evaluation: pt did not get up on this shift, use pure-wick in place, on 3 l oxygen, on clear diet, on IV LR continuing fluids, has a lot abdominal, on torso wounds.  Plan of Care Reviewed With: patient  Goal: Patient-Specific Goal (Individualized)  Description: You can add care plan individualizations to a care plan. Examples of Individualization might be:  \"Parent requests to be called daily at 9am for status\", \"I have a hard time hearing out of my right ear\", or \"Do not touch me to wake me up as it startles  me\".  Outcome: Not Progressing  Goal: Absence of Hospital-Acquired Illness or Injury  Outcome: Not Progressing  Goal: Optimal Comfort and Wellbeing  Outcome: Not " Progressing  Goal: Readiness for Transition of Care  Outcome: Not Progressing  Flowsheets (Taken 7/2/2024 1157)  Anticipated Changes Related to Illness: inability to care for self  Transportation Anticipated: family or friend will provide  Concerns to be Addressed:   adjustment to diagnosis/illness   care coordination/care conferences   coping/stress   decision making   compliance issue  Barriers to Discharge: on clear diet, can not torelated regular, on 3 l oxygen  Intervention: Mutually Develop Transition Plan  Recent Flowsheet Documentation  Taken 7/2/2024 1157 by Lyudmila Singh RN  Anticipated Changes Related to Illness: inability to care for self  Transportation Anticipated: family or friend will provide  Concerns to be Addressed:   adjustment to diagnosis/illness   care coordination/care conferences   coping/stress   decision making   compliance issue     Problem: Pain Acute  Goal: Optimal Pain Control and Function  Outcome: Not Progressing     Problem: Skin Injury Risk Increased  Goal: Skin Health and Integrity  Outcome: Not Progressing  Intervention: Plan: Nurse Driven Intervention: Moisture Management  Recent Flowsheet Documentation  Taken 7/2/2024 0800 by Lyudmila Singh RN  Moisture Interventions:   No brief in bed   Incontinence pad  Bathing/Skin Care:   back care   wipes, CHG     Problem: Infection  Goal: Absence of Infection Signs and Symptoms  Outcome: Not Progressing    clear diet ate 75%, use pure-wick, voided,  Please review provider order for any additional goals.   Nurse to notify provider when observation goals have been met and patient is ready for discharge.  Goal Outcome Evaluation:      Plan of Care Reviewed With: patient          Outcome Evaluation: pt did not get up on this shift, use pure-wick in place, on 3 l oxygen, on clear diet, on IV LR continuing fluids, has a lot abdominal, on torso wounds.

## 2024-07-02 NOTE — PROGRESS NOTES
[facility]    Medicine Progress Note - Hospitalist Service    Date of Admission:  7/1/2024    Assessment & Plan     59-year-old female with history of asthma, hypertension, severe obesity, obstructive sleep apnea was admitted to [facility] on 7/1/2024 with acute calculus cholecystitis. Patient underwent laparoscopic cholecystectomy on 7/1/2024 where she was found to have severe acute cholecystitis with gangrenous changes. She also had placement of intraperitoneal drain and continues on IV fluids, IV antibiotics while monitoring output from the drain. This was a complicated gangrenous cholecystitis that will require ongoing monitoring in the hospital after surgery.     Acute medical issues:  #Acute postoperative hypoxic respiratory failure: Multifactorial including morbid obesity, recent abdominal surgery, developmental delay, RICK/asthma.  # Severe acute cholecystitis with gangrenous changes: Status post laparoscopic cholecystectomy, placement of intraperitoneal drain    Plan:  --Changed to inpatient status.  --Continue IV antibiotics for now.  Likely remove drain in the a.m. if clearing.  Likely will discharge out on Augmentin.  --Continue pulmonary toilet.  As needed oxygen.  I-S as able.  Is currently struggling with I-S, therefore will add flutter valve.  --PT consult.  She needs to be out of bed 3 times daily and as needed.  --Multimodal pain management.  Judicious use of opiates.    #Intertrigo:  -- Miconazole powder  --Wound RN consult    Chronic medical issues:  #Asthma: Overall lungs are quiescent.  She has poor pulmonary toileting.  #RICK  --Continue as needed albuterol nebs.    #Hypertension: Resume lisinopril.  Repeat BMP in the AM.    #Hyperlipidemia: Resume statin at discharge    #GERD: Continue PTA PPI.    #Superobesity: BMI 62.  Complicates cares as noted above.    #Developmental delay: Her parents are listed as her guardians.  She has a longtime significant other.   "Her mother states that they had a \"wedding ceremony \"but legally are not .  Next of kin is technically her parents who remain available to help as needed.  However, her parents are in their 80s and her mother recently had knee surgery and is recovering herself.        Diet: Advance Diet as Tolerated: Clear Liquid Diet    DVT Prophylaxis: Pneumatic Compression Devices  Grajeda Catheter: Not present  Lines: None     Cardiac Monitoring: None  Code Status: Full Code      Clinically Significant Risk Factors Present on Admission          # Hypocalcemia: Lowest Ca = 7.8 mg/dL in last 2 days, will monitor and replace as appropriate     # Hypoalbuminemia: Lowest albumin = 3.2 g/dL at 7/2/2024  7:17 AM, will monitor as appropriate     # Hypertension: Noted on problem list             # Severe Obesity: Estimated body mass index is 62.39 kg/m  as calculated from the following:    Height as of this encounter: 1.702 m (5' 7\").    Weight as of this encounter: 180.7 kg (398 lb 5.9 oz).       # Asthma: noted on problem list        Disposition Plan     Medically Ready for Discharge: Anticipated Tomorrow           The patient's care was discussed with the Bedside Nurse, Care Coordinator/, Patient, Patient's Family, and ACS Consultant(s).    FERNANDA [facility]  Hospitalist Service  [facility]  Securely message with [email or web address] (more info)  Text page via [facility] Paging/Directory   ______________________________________________________________________    Interval History   Assumed care.  No chest pain.  She endorses generalized discomfort and soreness related to her incisions and the drain site.  No nausea.  She is tolerating clear liquids.  Voiding per pure wick.  Mild flatus.  No fever chills or sweats.  Needs to be out of bed 3 times daily.  Overall has poor insight to her current state, likely secondary to underlying mental delay.      Physical Exam   Vital Signs: Temp: 98.6  F (37  C) Temp " src: Oral BP: (!) 155/66 Pulse: 85   Resp: 24 SpO2: 93 % O2 Device: Oxymask Oxygen Delivery: 3 LPM  Weight: 398 lbs 5.94 oz    GEN:   Alert, oriented x 3, appears comfortable, NAD.  NECK:   Supple ,no mass or thyromegaly   HEENT:  Normocephalic/atraumatic, no scleral icterus, no nasal discharge, mouth moist.  CV:   Regular rate and rhythm, no murmur or JVD.  S1 + S2 noted, no S3 or S4.  LUNGS:   Clear to auscultation bilaterally without rales/rhonchi/wheezing/retractions.  Symmetric chest rise on inhalation noted.  ABD: Soft, obese, tenderness noted near incisions and drain.  Incisions are clean dry and intact.  There is a Devon drain in the right lateral abdomen with dark brown serous output.  EXT:   No edema.  No cyanosis.  No joint synovitis noted.  SKIN: Intertrigo primarily underneath breasts and pannus.  Neurologic: Grossly intact,non focal.   Neuropsychiatric:  General: normal, calm and normal eye contact  Level of consciousness: alert / normal  Affect: normal  Orientation: oriented to self, place, time and situation     Medical Decision Making       45 MINUTES SPENT BY ME on the date of service doing chart review, history, exam, documentation & further activities per the note.      Data   ------------------------- PAST 24 HR DATA REVIEWED -----------------------------------------------    I have personally reviewed the following data over the past 24 hrs:    9.0  \   11.4 (L)   / 190     138 102 11.2 /  140 (H)   3.5 26 0.65 \     ALT: 39 AST: 36 AP: 81 TBILI: 0.3   ALB: 3.2 (L) TOT PROTEIN: 5.7 (L) LIPASE: N/A       Imaging results reviewed over the past 24 hrs:   No results found for this or any previous visit (from the past 24 hour(s)).

## 2024-07-02 NOTE — PLAN OF CARE
"PRIMARY DIAGNOSIS: BILIARY COLIC/UNCOMPLICATED EARLY ACUTE CHOLECYSTITIS  OUTPATIENT/OBSERVATION GOALS TO BE MET BEFORE DISCHARGE:    1. Pain status: Improved-controlled with oral pain medications.  2. Stable vital signs and labs (if performed) at disposition: No  3. Tolerating adequate PO diet: No  4. Successful cholecystectomy or clear follow up plan with General Surgery team if immediate surgery not performed Yes  5. ADLs back to baseline?  No  6. Activity and level of assistance: Up with maximum assistance. Consider SW and/or PT evaluation.   7. Barriers to discharge noted Yes will stay 1 night per MD.    Discharge Planner Nurse   Safe discharge environment identified: Yes  Barriers to discharge: Yes       Entered by: Lyudmila Singh RN 07/02/2024 08:00 am   Pt. Alert to self and situation, has some pain, pain meds was given, abdominal powder was applied, on clear diet, on continuing LR IV fluids, voided by using pure-wick.  BP (!) 155/66 (BP Location: Left arm)   Pulse 85   Temp 98.6  F (37  C) (Oral)   Resp 24   Ht 1.702 m (5' 7\")   Wt (!) 180.7 kg (398 lb 5.9 oz)   LMP 08/18/2008   SpO2 93%   BMI 62.39 kg/m     Problem: Adult Inpatient Plan of Care  Goal: Plan of Care Review  Description: The Plan of Care Review/Shift note should be completed every shift.  The Outcome Evaluation is a brief statement about your assessment that the patient is improving, declining, or no change.  This information will be displayed automatically on your shift  note.  7/2/2024 1235 by Lyudmila Singh RN  Outcome: Not Progressing  Flowsheets (Taken 7/2/2024 1235)  Plan of Care Reviewed With: patient  7/2/2024 1205 by Lyudmila Singh RN  Outcome: Not Progressing  Flowsheets (Taken 7/2/2024 1205)  Outcome Evaluation: pt pain is better has MARINE drain with 30 ml outpute  Plan of Care Reviewed With: patient  7/2/2024 1204 by Lyudmila Singh RN  Outcome: Not Progressing  7/2/2024 1157 by Lyudmila Singh, " "RN  Outcome: Not Progressing  Flowsheets (Taken 7/2/2024 1157)  Outcome Evaluation: pt did not get up on this shift, use pure-wick in place, on 3 l oxygen, on clear diet, on IV LR continuing fluids, has a lot abdominal, on torso wounds.  Plan of Care Reviewed With: patient  Goal: Patient-Specific Goal (Individualized)  Description: You can add care plan individualizations to a care plan. Examples of Individualization might be:  \"Parent requests to be called daily at 9am for status\", \"I have a hard time hearing out of my right ear\", or \"Do not touch me to wake me up as it startles  me\".  7/2/2024 1235 by Lyudmila Singh RN  Outcome: Not Progressing  7/2/2024 1205 by Lyudmila Singh RN  Outcome: Not Progressing  7/2/2024 1204 by Lyudmila Singh RN  Outcome: Not Progressing  7/2/2024 1157 by Lyudmila Singh RN  Outcome: Not Progressing  Goal: Absence of Hospital-Acquired Illness or Injury  7/2/2024 1235 by Lyudmila Singh RN  Outcome: Not Progressing  7/2/2024 1205 by Lyudmila Singh RN  Outcome: Not Progressing  7/2/2024 1204 by Lyudmila Singh RN  Outcome: Not Progressing  7/2/2024 1157 by Lyudmila Singh RN  Outcome: Not Progressing  Intervention: Identify and Manage Fall Risk  Recent Flowsheet Documentation  Taken 7/2/2024 1200 by Lyudmila Singh RN  Safety Promotion/Fall Prevention:   safety round/check completed   supervised activity  Intervention: Prevent Skin Injury  Recent Flowsheet Documentation  Taken 7/2/2024 1200 by Lyudmila Singh RN  Body Position:   supine, legs elevated   weight shifting  Device Skin Pressure Protection: absorbent pad utilized/changed  Taken 7/2/2024 0924 by Lyudmila Singh RN  Body Position:   supine, legs elevated   weight shifting  Intervention: Prevent and Manage VTE (Venous Thromboembolism) Risk  Recent Flowsheet Documentation  Taken 7/2/2024 1200 by Lyudmila Singh RN  VTE Prevention/Management: SCDs on (sequential compression " devices)  Intervention: Prevent Infection  Recent Flowsheet Documentation  Taken 7/2/2024 1200 by Lyudmila Singh RN  Infection Prevention:   cohorting utilized   rest/sleep promoted   single patient room provided  Goal: Optimal Comfort and Wellbeing  7/2/2024 1235 by Lyudmila Singh RN  Outcome: Not Progressing  7/2/2024 1205 by Lyudmila Singh RN  Outcome: Not Progressing  7/2/2024 1204 by Lyudmila Singh RN  Outcome: Not Progressing  7/2/2024 1157 by Lyudmila Singh RN  Outcome: Not Progressing  Intervention: Monitor Pain and Promote Comfort  Recent Flowsheet Documentation  Taken 7/2/2024 0924 by Lyudmila Singh RN  Pain Management Interventions: medication (see MAR)  Goal: Readiness for Transition of Care  7/2/2024 1235 by Lyudmila Singh RN  Outcome: Not Progressing  Flowsheets (Taken 7/2/2024 1235)  Anticipated Changes Related to Illness: inability to care for self  Transportation Anticipated: family or friend will provide  Concerns to be Addressed:   basic needs   coping/stress   home safety  Barriers to Discharge: MARINE drain  7/2/2024 1205 by Lyudmila Singh RN  Outcome: Not Progressing  Flowsheets (Taken 7/2/2024 1205)  Transportation Anticipated: family or friend will provide  Concerns to be Addressed:   all concerns addressed in this encounter   mental health   decision making   developmental  7/2/2024 1204 by Lyudmila Singh RN  Outcome: Not Progressing  7/2/2024 1157 by Lyudmila Singh RN  Outcome: Not Progressing  Flowsheets (Taken 7/2/2024 1157)  Anticipated Changes Related to Illness: inability to care for self  Transportation Anticipated: family or friend will provide  Concerns to be Addressed:   adjustment to diagnosis/illness   care coordination/care conferences   coping/stress   decision making   compliance issue  Barriers to Discharge: on clear diet, can not torelated regular, on 3 l oxygen  Intervention: Mutually Develop Transition Plan  Recent Flowsheet  Documentation  Taken 7/2/2024 1235 by Lyudmila Singh RN  Anticipated Changes Related to Illness: inability to care for self  Transportation Anticipated: family or friend will provide  Concerns to be Addressed:   basic needs   coping/stress   home safety  Taken 7/2/2024 1205 by Lyudmila Singh RN  Transportation Anticipated: family or friend will provide  Concerns to be Addressed:   all concerns addressed in this encounter   mental health   decision making   developmental  Taken 7/2/2024 1157 by Lyudmila Singh RN  Anticipated Changes Related to Illness: inability to care for self  Transportation Anticipated: family or friend will provide  Concerns to be Addressed:   adjustment to diagnosis/illness   care coordination/care conferences   coping/stress   decision making   compliance issue     Problem: Pain Acute  Goal: Optimal Pain Control and Function  7/2/2024 1235 by Lyudmila Singh RN  Outcome: Not Progressing  7/2/2024 1205 by Lyudmila Singh RN  Outcome: Not Progressing  7/2/2024 1204 by Lyudmila Singh RN  Outcome: Not Progressing  7/2/2024 1157 by Lyudmila Singh RN  Outcome: Not Progressing  Intervention: Develop Pain Management Plan  Recent Flowsheet Documentation  Taken 7/2/2024 0924 by Lyudmila Singh RN  Pain Management Interventions: medication (see MAR)  Intervention: Prevent or Manage Pain  Recent Flowsheet Documentation  Taken 7/2/2024 1200 by Lyudmila Singh RN  Medication Review/Management: medications reviewed     Problem: Skin Injury Risk Increased  Goal: Skin Health and Integrity  7/2/2024 1235 by Lyudmila Singh RN  Outcome: Not Progressing  7/2/2024 1205 by Lyudmila Singh RN  Outcome: Not Progressing  7/2/2024 1204 by Lyudmila Singh RN  Outcome: Not Progressing  7/2/2024 1157 by Lyudmila Singh RN  Outcome: Not Progressing  Intervention: Plan: Nurse Driven Intervention: Moisture Management  Recent Flowsheet Documentation  Taken 7/2/2024 1200  by Lyudmila Singh RN  Moisture Interventions:   No brief in bed   Incontinence pad  Taken 7/2/2024 0924 by Lyudmila Singh RN  Bathing/Skin Care: wipes, CHG  Taken 7/2/2024 0800 by Lyudmila Singh RN  Moisture Interventions:   No brief in bed   Incontinence pad  Bathing/Skin Care:   back care   wipes, CHG  Intervention: Plan: Nurse Driven Intervention: Friction and Shear  Recent Flowsheet Documentation  Taken 7/2/2024 1200 by Lyudmila Singh RN  Friction/Shear Interventions: HOB 30 degrees or less  Intervention: Optimize Skin Protection  Recent Flowsheet Documentation  Taken 7/2/2024 1200 by Lyudmila Singh RN  Activity Management: activity adjusted per tolerance  Head of Bed (HOB) Positioning: HOB at 30 degrees  Taken 7/2/2024 0924 by Lyudmila Singh RN  Activity Management: activity adjusted per tolerance  Head of Bed (HOB) Positioning: HOB at 30 degrees     Problem: Infection  Goal: Absence of Infection Signs and Symptoms  7/2/2024 1235 by Lyudmila Singh RN  Outcome: Not Progressing  7/2/2024 1205 by Lyudmila Singh RN  Outcome: Not Progressing  7/2/2024 1204 by Lyudmila Singh RN  Outcome: Not Progressing  7/2/2024 1157 by Lyudmila Singh RN  Outcome: Not Progressing     Please review provider order for any additional goals.   Nurse to notify provider when observation goals have been met and patient is ready for discharge.  Goal Outcome Evaluation:      Plan of Care Reviewed With: patient          Outcome Evaluation: pt pain is better has MARINE drain with 30 ml outpute

## 2024-07-02 NOTE — UTILIZATION REVIEW
Inpatient appropriate    Admission Status; Secondary Review Determination      Under the authority of the Utilization Management Committee, the utilization review process indicated a secondary review on the above patient. The review outcome is based on review of the medical records, discussions with staff, and applying clinical experience noted on the date of the review.    (x) Inpatient Status Appropriate - This patient's medical care is consistent with medical management for inpatient care and reasonable inpatient medical practice.    RATIONALE FOR DETERMINATION  59-year-old female with history of asthma, hypertension, severe obesity, obstructive sleep apnea was admitted to [facility] on 7/1/2024 with acute calculus cholecystitis.  Patient underwent laparoscopic cholecystectomy on 7/1/2024 where she was found to have severe acute cholecystitis with gangrenous changes.  She also had placement of intraperitoneal drain and continues on IV fluids, IV antibiotics while monitoring output from the drain.  This was a complicated gangrenous cholecystitis that will require ongoing monitoring in the hospital after surgery.  Inpatient is appropriate at this time.    At the time of admission with the information available to the attending physician more than 2 nights Hospital complex care was anticipated, based on patient risk of adverse outcome if treated as outpatient and complex care required. Inpatient admission is appropriate based on the Medicare guidelines.    This document was produced using voice recognition software      The information on this document is developed by the utilization review team in order for the business office to ensure compliance. This only denotes the appropriateness of proper admission status and does not reflect the quality of care rendered.  The definitions of Inpatient Status and Observation Status used in making the determination above are those provided in the CMS Coverage  Manual, Chapter 1 and Chapter 6, section 70.4.    Sincerely,    Utilization Review  Physician Advisor  [facility].

## 2024-07-02 NOTE — PROGRESS NOTES
"[facility]   General Surgery Progress Note           Assessment and Plan:   Assessment:   -Severe acute cholecystitis with gangrenous change   -1 Day Post-Op Laparoscopic Cholecystectomy, placement of intraperitoneal drain; Pathology: pending, GB fluid gram stain with WBCs and Gram +cocci, culture pending  -Postoperative hypoxia, supplemental oxygen; on oxymask. H/o RICK/asthma, morbid obesity  -Elevated BP  -Afebrile      Plan:   -IV fluids: Lactated Ringer @100mL/hr  -IV Rocephin  -Drain in place, monitor output  -Pain management: acetaminophen, oxycodone, prn IV dilaudid  -Prophylaxis: Pneumatic Compression Devices  -Diet: Clear liquids, ADAT to low fat diet for 1-2 weeks.  -Advance activity as tolerated, encouraged OOB and ambulate 3-4 times a day  -IS hourly as able, encouraged  -Dispo: Continue drain in place until tomorrow, plan to remove if clearing.  Plan also to discharge home on probably Augmentin; await culture info.         Interval History:   Feeling soreness seemingly related to incisions and drain site.  No nausea, tolerating clear liquids.  Voiding (purewick in place), no flatus, no ambulation.  No fever/chills/sweats.  Encouraged walking today a couple times.  Oxygen currently in place, IS at bedside, respiratory treatments.          Physical Exam:   Blood pressure (!) 165/80, pulse 87, temperature 98.6  F (37  C), temperature source Oral, resp. rate 26, height 1.702 m (5' 7\"), weight (!) 180.7 kg (398 lb 5.9 oz), last menstrual period 08/18/2008, SpO2 94%, not currently breastfeeding.  I/O last 3 completed shifts:  In: 1500 [I.V.:1500]  Out: 140 [Drains:90; Blood:50]  Abdomen:   Soft, obese, tenderness noted near incisions/drain   Incisions - bandages clean, dry, intact      Devon drain x1 @ right lateral abd - dark/brown serous output, color change likely related to hemostatic agent applied during surgery.          Data:   Pathology: pending  Gram stain:   4+ Gram positive cocci   4+ " WBC seen   Cultures: pending    Recent Labs   Lab 07/02/24  0717 07/01/24  0015   WBC 9.0 8.5   HGB 11.4* 13.8   HCT 37.0 44.3   MCV 94 93    239     Recent Labs   Lab 07/02/24  0717 07/01/24  0015    145   POTASSIUM 3.5 4.0   CHLORIDE 102 109*   CO2 26 22   ANIONGAP 10 14   * 125*   BUN 11.2 18.1   CR 0.65 0.81   GFRESTIMATED >90 84   CESAR 7.8* 8.9   PROTTOTAL 5.7* 7.1   ALBUMIN 3.2* 3.9   BILITOTAL 0.3 <0.2   ALKPHOS 81 106   AST 36 24   ALT 39 29       SHIRA Hernandez message, 8 am to 4 pm  After 4 pm, call [phone]      Seen and agree,    Jaya Higgins MD  [facility]

## 2024-07-03 ENCOUNTER — PATIENT OUTREACH (OUTPATIENT)
Dept: CARE COORDINATION | Facility: CLINIC | Age: 59
End: 2024-07-03
Payer: MEDICARE

## 2024-07-03 ENCOUNTER — APPOINTMENT (OUTPATIENT)
Dept: GENERAL RADIOLOGY | Facility: CLINIC | Age: 59
DRG: 417 | End: 2024-07-03
Attending: PHYSICIAN ASSISTANT
Payer: MEDICARE

## 2024-07-03 ENCOUNTER — APPOINTMENT (OUTPATIENT)
Dept: PHYSICAL THERAPY | Facility: CLINIC | Age: 59
DRG: 417 | End: 2024-07-03
Attending: NURSE PRACTITIONER
Payer: MEDICARE

## 2024-07-03 LAB
BACTERIA FLD CULT: ABNORMAL
BACTERIA UR CULT: ABNORMAL
BACTERIA UR CULT: ABNORMAL
PATH REPORT.COMMENTS IMP SPEC: NORMAL
PATH REPORT.COMMENTS IMP SPEC: NORMAL
PATH REPORT.FINAL DX SPEC: NORMAL
PATH REPORT.GROSS SPEC: NORMAL
PATH REPORT.MICROSCOPIC SPEC OTHER STN: NORMAL
PATH REPORT.RELEVANT HX SPEC: NORMAL
PHOTO IMAGE: NORMAL

## 2024-07-03 PROCEDURE — 250N000013 HC RX MED GY IP 250 OP 250 PS 637: Performed by: INTERNAL MEDICINE

## 2024-07-03 PROCEDURE — 258N000003 HC RX IP 258 OP 636: Performed by: SURGERY

## 2024-07-03 PROCEDURE — 97530 THERAPEUTIC ACTIVITIES: CPT | Mod: GP | Performed by: PHYSICAL THERAPIST

## 2024-07-03 PROCEDURE — 94799 UNLISTED PULMONARY SVC/PX: CPT

## 2024-07-03 PROCEDURE — 250N000013 HC RX MED GY IP 250 OP 250 PS 637: Performed by: SURGERY

## 2024-07-03 PROCEDURE — 99232 SBSQ HOSP IP/OBS MODERATE 35: CPT | Performed by: PHYSICIAN ASSISTANT

## 2024-07-03 PROCEDURE — 97161 PT EVAL LOW COMPLEX 20 MIN: CPT | Mod: GP | Performed by: PHYSICAL THERAPIST

## 2024-07-03 PROCEDURE — 250N000013 HC RX MED GY IP 250 OP 250 PS 637: Performed by: NURSE PRACTITIONER

## 2024-07-03 PROCEDURE — 250N000011 HC RX IP 250 OP 636: Performed by: SURGERY

## 2024-07-03 PROCEDURE — 88304 TISSUE EXAM BY PATHOLOGIST: CPT | Mod: 26 | Performed by: PATHOLOGY

## 2024-07-03 PROCEDURE — 250N000009 HC RX 250: Performed by: PHYSICIAN ASSISTANT

## 2024-07-03 PROCEDURE — 71045 X-RAY EXAM CHEST 1 VIEW: CPT

## 2024-07-03 PROCEDURE — 120N000001 HC R&B MED SURG/OB

## 2024-07-03 PROCEDURE — 999N000127 HC STATISTIC PERIPHERAL IV START W US GUIDANCE

## 2024-07-03 RX ORDER — FLUTICASONE FUROATE AND VILANTEROL 100; 25 UG/1; UG/1
1 POWDER RESPIRATORY (INHALATION) DAILY
Status: DISCONTINUED | OUTPATIENT
Start: 2024-07-04 | End: 2024-07-06 | Stop reason: HOSPADM

## 2024-07-03 RX ORDER — IPRATROPIUM BROMIDE AND ALBUTEROL SULFATE 2.5; .5 MG/3ML; MG/3ML
3 SOLUTION RESPIRATORY (INHALATION)
Status: DISCONTINUED | OUTPATIENT
Start: 2024-07-03 | End: 2024-07-04

## 2024-07-03 RX ADMIN — CARBAMAZEPINE 200 MG: 200 TABLET ORAL at 12:15

## 2024-07-03 RX ADMIN — SERTRALINE 100 MG: 100 TABLET, FILM COATED ORAL at 07:57

## 2024-07-03 RX ADMIN — PANTOPRAZOLE SODIUM 40 MG: 40 TABLET, DELAYED RELEASE ORAL at 07:57

## 2024-07-03 RX ADMIN — ACETAMINOPHEN 650 MG: 325 TABLET, FILM COATED ORAL at 15:54

## 2024-07-03 RX ADMIN — HYDROMORPHONE HYDROCHLORIDE 0.4 MG: 0.2 INJECTION, SOLUTION INTRAMUSCULAR; INTRAVENOUS; SUBCUTANEOUS at 07:56

## 2024-07-03 RX ADMIN — OXYCODONE HYDROCHLORIDE 5 MG: 5 TABLET ORAL at 15:51

## 2024-07-03 RX ADMIN — CEFTRIAXONE 2 G: 2 INJECTION, POWDER, FOR SOLUTION INTRAMUSCULAR; INTRAVENOUS at 07:57

## 2024-07-03 RX ADMIN — CARBAMAZEPINE 400 MG: 200 TABLET ORAL at 07:57

## 2024-07-03 RX ADMIN — MICONAZOLE NITRATE: 20 POWDER TOPICAL at 20:31

## 2024-07-03 RX ADMIN — OXYCODONE HYDROCHLORIDE 5 MG: 5 TABLET ORAL at 01:49

## 2024-07-03 RX ADMIN — IPRATROPIUM BROMIDE AND ALBUTEROL SULFATE 3 ML: .5; 3 SOLUTION RESPIRATORY (INHALATION) at 21:27

## 2024-07-03 RX ADMIN — MICONAZOLE NITRATE: 20 POWDER TOPICAL at 07:58

## 2024-07-03 RX ADMIN — SODIUM CHLORIDE, POTASSIUM CHLORIDE, SODIUM LACTATE AND CALCIUM CHLORIDE: 600; 310; 30; 20 INJECTION, SOLUTION INTRAVENOUS at 15:54

## 2024-07-03 RX ADMIN — CARBAMAZEPINE 400 MG: 200 TABLET ORAL at 20:32

## 2024-07-03 RX ADMIN — LISINOPRIL 20 MG: 20 TABLET ORAL at 07:57

## 2024-07-03 RX ADMIN — SODIUM CHLORIDE, POTASSIUM CHLORIDE, SODIUM LACTATE AND CALCIUM CHLORIDE: 600; 310; 30; 20 INJECTION, SOLUTION INTRAVENOUS at 06:14

## 2024-07-03 RX ADMIN — SODIUM CHLORIDE, POTASSIUM CHLORIDE, SODIUM LACTATE AND CALCIUM CHLORIDE: 600; 310; 30; 20 INJECTION, SOLUTION INTRAVENOUS at 23:30

## 2024-07-03 RX ADMIN — ACETAMINOPHEN 650 MG: 325 TABLET, FILM COATED ORAL at 21:31

## 2024-07-03 RX ADMIN — SENNOSIDES AND DOCUSATE SODIUM 2 TABLET: 50; 8.6 TABLET ORAL at 16:51

## 2024-07-03 RX ADMIN — ONDANSETRON 4 MG: 2 INJECTION INTRAMUSCULAR; INTRAVENOUS at 02:20

## 2024-07-03 RX ADMIN — POLYETHYLENE GLYCOL 3350 17 G: 17 POWDER, FOR SOLUTION ORAL at 16:57

## 2024-07-03 ASSESSMENT — ACTIVITIES OF DAILY LIVING (ADL)
ADLS_ACUITY_SCORE: 42
ADLS_ACUITY_SCORE: 41
ADLS_ACUITY_SCORE: 42
ADLS_ACUITY_SCORE: 41
ADLS_ACUITY_SCORE: 42
ADLS_ACUITY_SCORE: 41
ADLS_ACUITY_SCORE: 42
ADLS_ACUITY_SCORE: 41
ADLS_ACUITY_SCORE: 42
ADLS_ACUITY_SCORE: 41
ADLS_ACUITY_SCORE: 42

## 2024-07-03 NOTE — PROGRESS NOTES
[facility]    Medicine Progress Note - Hospitalist Service    Date of Admission:  7/1/2024    Assessment & Plan   59-year-old female with history of asthma, hypertension, severe obesity, obstructive sleep apnea, seizure disorder, developmental delay, was admitted to [facility] on 7/1/2024 with acute calculus cholecystitis. Patient underwent laparoscopic cholecystectomy on 7/1/2024 where she was found to have severe acute cholecystitis with gangrenous changes. She also had placement of intraperitoneal drain and continues on IV fluids, IV antibiotics while monitoring output from the drain. This was a complicated gangrenous cholecystitis that will require ongoing monitoring in the hospital after surgery.  Physical therapy is recommending TCU placement.     Acute postoperative hypoxic respiratory failure:   Asthma   Untreated RICK  Suspect OHS  Post op requiring 3-4L oximask to maintain sats of mid 90's.   Likely hypoxia is multifactorial including untreated RICK, likley obesity hypoventilation syndrome, Dec pulm volume due to abdominal surgery and hx of asthma.  She was also recently admitted to the hospital from 5/2-5/9 for acute asthma exacerbation with atypical PNA and recommended outpt initiation of steroid inhaler/LABA  - Wheezy on exam   - Start duonebs QID for 8 doses   - MUST get OOB, she has been laying in bed for several days, refusing to get up  -Encourage incentive spirometry with flutter valve  - Aggressive IS  - Check CXR  - Start advair     Acute cholecystitis with gangrenous changes  -Status post laparoscopic cholecystectomy on 7/1/2024 -changed to inpatient status  -MARINE drain removed on 7/3/2024  -Currently on Rocephin, will tailor antibiotics when culture results and sensitivities are finalized  -Multimodal pain management     Intertrigo:  -Miconazole powder  -WOC consult appreciated      Hypertension  -Resume lisinopril.  Repeat BMP in the AM.    "  Hyperlipidemia  -Resume statin at discharge     GERD  -Continue PTA PPI.     Superobesity: BMI 62.  Complicates cares as noted above.     #Developmental delay: Her parents are listed as her guardians.  She has a longtime significant other.  Her mother states that they had a \"wedding ceremony \"but legally are not .  Next of kin is technically her parents who remain available to help as needed.  However, her parents are in their 80s and her mother recently had knee surgery and is recovering herself.          Diet: Advance Diet as Tolerated: Clear Liquid Diet    DVT Prophylaxis: Pneumatic Compression Devices  Grajeda Catheter: Not present  Lines: None     Cardiac Monitoring: None  Code Status: Full Code        # Hypocalcemia: Lowest Ca = 7.8 mg/dL in last 2 days, will monitor and replace as appropriate     # Hypoalbuminemia: Lowest albumin = 3.2 g/dL at 7/2/2024  7:17 AM, will monitor as appropriate     # Hypertension: Noted on problem list             # Asthma: noted on problem list        Disposition Plan     Medically Ready for Discharge: Anticipated in 2-4 Days           The patient's care was discussed with Aretha Niño PA-C.    I was present with the student who participated in the service and in the documentation of the note.  I have verified the history and personally performed the physical exam and plan of care as documented in the note.    Aretha Niño PA-C on 7/3/2024 at 6:46 PM      Bryce SEGOVIA  Hospitalist Service  [facility]  Securely message with [employer] (more info)  Text page via [facility] Paging/Directory   ______________________________________________________________________    Interval History   Patient continues to need supplemental oxygen to maintain O2 sats.  PT recommending TCU placement.    Physical Exam   Vital Signs: Temp: 98.3  F (36.8  C) Temp src: Oral BP: (!) 163/79 Pulse: 78   Resp: 24 SpO2: 95 % O2 Device: Oxymask Oxygen Delivery: 3 " LPM  Weight: 398 lbs 5.94 oz        Imaging results reviewed over the past 24 hrs:   No results found for this or any previous visit (from the past 24 hour(s)).

## 2024-07-03 NOTE — DISCHARGE INSTRUCTIONS
HOME CARE FOLLOWING LAPAROSCOPIC CHOLECYSTECTOMY  SHAWNEE Dawson, SIXTO Mccauley C. Pratt, J. Shaheen    INCISIONAL CARE:  Replace the bandage over your incisions DAILY until all drainage stops, or if more comfortable to have in place.  If present, leave the steri-strips (white paper tapes) in place for 14 days after surgery.  If Dermabond (a type of skin glue) is present, leave in place until it wears/flakes off (2-3 weeks).     BATHING:  OK to shower 48 hours after surgery.  Avoid baths for 1 week after surgery.  You may wash your hair at any time.  Gently pat your incision dry after bathing.  Do not apply lotions, creams, or ointments to incisions.    ACTIVITY:  Light Activity -- you may immediately be up and about as tolerated.  Walking is encouraged, increase as tolerated.  Driving/Light Work-- when comfortable and off narcotic pain medications.  Strenuous Work/Activity -- limit lifting to 20 pounds for 2 weeks.  Progressively increase with time.  Active Sports (running, biking, etc.) -- cautiously resume after 2 weeks.    DISCOMFORT:  Local anesthetic placed at surgery should provide relief for 4-8 hours.  Begin taking pain pills before discomfort is severe.  Take the pain medication with some food, when possible, to minimize side effects.  Intermittent use of ice packs may help during the first 1-3 weeks after surgery.  Expect gradual improvement.    Over-the-counter anti-inflammatory medications (i.e. Ibuprofen/Advil/Motrin or Naprosyn/Aleve) may be used per package instructions in addition to or while tapering off the narcotic pain medications to decrease swelling and sensitivity.  DO NOT TAKE these Anti-inflammatory medications if your primary physician has advised against doing so, or if you have acid reflux, ulcer, or bleeding disorder, or take blood-thinner medications.  Call your primary physician or the surgery office if you have medication questions.    After laparoscopic  cholecystectomy, you may have shoulder or upper back discomfort due to the gas used during surgery.  This is temporary and should resolve within 2-3 days.  Frequent short walks may help with this.  You may have decreased energy level for 1-2 weeks after surgery related to your recovery.    DIET:  Start with liquids and gradually increase diet as tolerated.  Drink plenty of fluids.  While taking pain medications, consider use of a stool softener, increase your fiber in your diet, or add a fiber supplement (like Metamucil, Citrucel) to help prevent constipation - a possible side effect of pain medications.  It is not uncommon to experience some bowel changes (loose stools or constipation) after surgery.  Your body has to adapt to you no longer having a gall bladder.  To help minimize this side effect, avoid fatty foods for 1-2 weeks after surgery.  You may then slowly increase the amount of fatty foods in your diet.      NAUSEA:  If nauseated from the anesthetic/pain meds; rest in bed, get up cautiously with assistance, and drink clear liquids (juice, tea, broth).    FOLLOW-UP AFTER SURGERY:  -Our office will contact you approximately 2-3 weeks after surgery to check on your progress and answer any questions you may have.  If you are doing well, you will not need to return for an office appointment.  If any concerns are identified over the phone, we will help you make an appointment to see a provider.    -If you have not received a phone call, have any questions or concerns, or would like to be seen, please call us at [phone].  We are located at: [address], [address]; [place] [address]    -CONTACT US IF THE FOLLOWING DEVELOPS:   1. A fever that is above 101     2. Increased redness, warmth, drainage, bleeding, or swelling.   3. Pain that is not relieved by rest/ice and your prescription.   4.  Increasing pain after 48 hours.   5. Drainage that is thick, cloudy, yellow, green or white.   6. Any other  questions or concerns.      FREQUENTLY ASKED QUESTIONS:    Q:  How should my incision look?    A:  Normally your incision will appear slightly swollen with light redness directly along the incision itself as it heals.  It may feel like a bump or ridge as the healing/scarring happens, and over time (3-4 months) this bump or ridge feeling should slowly go away.  In general, clear or pink watery drainage can be normal at first as your incision heals, but should decrease over time.    Q:  How do I know if my incision is infected?  A:  Look at your incision for signs of infection, like redness around the incision spreading to surrounding skin, or drainage of cloudy or foul-smelling drainage.  If you feel warm, check your temperature to see if you are running a fever.    **If any of these things occur, please notify the nurse at our office.  We may need you to come into the office for an incision check.      Q:  How do I take care of my incision?  A:  If you have a dressing in place - Starting the day after surgery, replace the dressing 1-2 times a day until there is no further drainage from the incision.  At that time, a dressing is no longer needed.  Try to minimize tape on the skin if irritation is occurring at the tape sites.  If you have significant irritation from tape on the skin, please call the office to discuss other method of dressing your incision.    Small pieces of tape called  steri-strips  may be present directly overlying your incision; these may be removed 10 days after surgery unless otherwise specified by your surgeon.  If these tapes start to loosen at the ends, you may trim them back until they fall off or are removed.    A:  If you had  Dermabond  tissue glue used as a dressing (this causes your incision to look shiny with a clear covering over it) - This type of dressing wears off with time and does not require more dressings over the top unless it is draining around the glue as it wears off.  Do  not apply ointments or lotions over the incisions until the glue has completely worn off.    Q:  There is a piece of tape or a sticky  lead  still on my skin.  Can I remove this?  A:  Sometimes the sticky  leads  used for monitoring during surgery or for evaluation in the emergency department are not all removed while you are in the hospital.  These sometimes have a tab or metal dot on them.  You can easily remove these on your own, like taking off a band-aid.  If there is a gel substance under the  lead , simply wipe/clean it off with a washcloth or paper towel.      Q:  What can I do to minimize constipation (very hard stools, or lack of stools)?  A:  Stay well hydrated.  Increase your dietary fiber intake or take a fiber supplement -with plenty of water.  Walk around frequently.  You may consider an over-the-counter stool-softener.  Your Pharmacist can assist you with choosing one that is stocked at your pharmacy.  Constipation is also one of the most common side effects of pain medication.  If you are using pain medication, be pro-active and try to PREVENT problems with constipation by taking the steps above BEFORE constipation becomes a problem.    Q:  What do I do if I need more pain medications?  A:  Call the office to receive refills.  Be aware that certain pain meds cannot be called into a pharmacy and actually require a paper prescription.  A change may be made in your pain med as you progress thru your recovery period or if you have side effects to certain meds.    --Pain meds are NOT refilled after 5pm on weekdays, and NOT AT ALL on the weekends, so please look ahead to prevent problems.      Q:  Why am I having a hard time sleeping now that I am at home?  A:  Many medications you receive while you are in the hospital can impact your sleep for a number of days after your surgery/hospitalization.  Decreased level of activity and naps during the day may also make sleeping at night difficult.  Try to  minimize day-time naps, and get up frequently during the day to walk around your home during your recovery time.  Sleep aides may be of some help, but are not recommended for long-term use.      Q:  I am having some back discomfort.  What should I do?  A:  This may be related to certain positioning that was required for your surgery, extended periods of time in bed, or other changes in your overall activity level.  You may try ice, heat, acetaminophen, or ibuprofen to treat this temporarily.  Note that many pain medications have acetaminophen in them and would state this on the prescription bottle.  Be sure not to exceed the maximum of 4000mg per day of acetaminophen.     **If the pain you are having does not resolve, is severe, or is a flare of back pain you have had on other occasions prior to surgery, please contact your primary physician for further recommendations or for an appointment to be examined at their office.    Q:  Why am I having headaches?  A:  Headaches can be caused by many things:  caffeine withdrawal, use of pain meds, dehydration, high blood pressure, lack of sleep, over-activity/exhaustion, flare-up of usual migraine headaches.  If you feel this is related to muscle tension (a band-like feeling around the head, or a pressure at the low-back of the head) you may try ice or heat to this area.  You may need to drink more fluids (try electrolyte drink like Gatorade), rest, or take your usual migraine medications.   **If your headaches do not resolve, worsen, are accompanied by other symptoms, or if your blood pressure is high, please call your primary physician for recommendation and/or examination.    Q:  I am unable to urinate.  What do I do?  A:  A small percentage of people can have difficulty urinating initially after surgery.  This includes being able to urinate only a very small amount at a time and feeling discomfort or pressure in the very low abdomen.  This is called  urinary retention ,  and is actually an urgent situation.  Proceed to your nearest Emergency department for evaluation (not an Urgent Care Center).  Sometimes the bladder does not work correctly after certain medications you receive during surgery, or related to certain procedures.  You may need to have a catheter placed until your bladder recovers.  When planning to go to an Emergency department, it may help to call the ER to let them know you are coming in for this problem after a surgery.  This may help you get in quicker to be evaluated.  **If you have symptoms of a urinary tract infection, please contact your primary physician for the proper evaluation and treatment.          If you have other questions, please call the office Monday thru Friday between 8am and 4:30pm to discuss with the nurse or physician assistant.  #[phone]    There is a surgeon ON CALL on weekday evenings and over the weekend in case of urgent need only, and may be contacted at the same number.    If you are having an emergency, call 911 or proceed to your nearest emergency department.

## 2024-07-03 NOTE — PROGRESS NOTES
"[facility]   General Surgery Progress Note           Assessment and Plan:   Assessment:   -Severe acute cholecystitis with gangrenous change   -2 Days Post-Op Laparoscopic Cholecystectomy, placement of intraperitoneal drain; Pathology: pending, GB fluid culture with klebsiella prelim result  -Postoperative hypoxia, supplemental oxygen; on oxymask. H/o RICK/asthma, morbid obesity  -Elevated BP  -Afebrile      Plan:   -IV fluids: Lactated Ringer @100mL/hr  -IV Rocephin  -Prior drain site: daily dressing change  -Pain management: acetaminophen, oxycodone, stop IV dilaudid.  Hope pain med needs decrease now that drain out.  -Prophylaxis: Pneumatic Compression Devices  -Diet: Clear liquids, ADAT to low fat diet for 1-2 weeks.  -Advance activity as tolerated, encouraged OOB and ambulate 3-4 times a day; PT consult pending  -IS/flutter valve hourly as able, encouraged  -Dispo: Doing well regarding GB postop, activity very low, PT starting today, possible consideration of rehab placement?  Will defer discharge timing to medical team.  Likely discharge on ABX for 5 days.  Will sign off, contact if needed.  Discharge instructions placed.           Interval History:   Feeling better this morning after having some pain overnight.  Also felt a bit of nausea, none now and tolerating clears.  OOB once to go to bathroom, no walking.  Encouraged activity and use of breathing exercises.  Passing flatus, no BM.  May need to start bowel regimen.          Physical Exam:   Blood pressure (!) 163/79, pulse 78, temperature 98.3  F (36.8  C), temperature source Oral, resp. rate 24, height 1.702 m (5' 7\"), weight (!) 180.7 kg (398 lb 5.9 oz), last menstrual period 08/18/2008, SpO2 95%, not currently breastfeeding.  I/O last 3 completed shifts:  In: 200 [P.O.:200]  Out: 1490 [Urine:1450; Drains:40]  Abdomen:   Soft, obese  Incisions - bandages clean, dry, intact      Devon drain x1 @ right lateral abd - drain removed without " issues, dressing placed.          Data:   Pathology: pending  Gram stain:   4+ Gram positive cocci   4+ WBC seen   GB Cultures:   2+ Klebsiella oxytoca Abnormal    Urine culture:  <10,000 CFU/mL Aerococcus urinae Abnormal      Recent Labs   Lab 07/02/24  0717 07/01/24  0015    145   POTASSIUM 3.5 4.0   CHLORIDE 102 109*   CO2 26 22   ANIONGAP 10 14   * 125*   BUN 11.2 18.1   CR 0.65 0.81   GFRESTIMATED >90 84   CESAR 7.8* 8.9   PROTTOTAL 5.7* 7.1   ALBUMIN 3.2* 3.9   BILITOTAL 0.3 <0.2   ALKPHOS 81 106   AST 36 24   ALT 39 29       SHIRA Hernandez message, 8 am to 4 pm  After 4 pm, call [phone]

## 2024-07-03 NOTE — CONSULTS
Care Management Initial Consult    General Information  Assessment completed with: Patient,    Type of CM/SW Visit: Initial Assessment    Primary Care Provider verified and updated as needed: Yes   Readmission within the last 30 days: no previous admission in last 30 days   Reason for Consult: discharge planning    Communication Assessment  Patient's communication style: spoken language (English or Bilingual)    Hearing Difficulty or Deaf: no   Wear Glasses or Blind: yes    Cognitive  Cognitive/Neuro/Behavioral: WDL  Level of Consciousness: alert  Arousal Level: opens eyes spontaneously  Orientation: oriented x 4  Mood/Behavior: calm, cooperative  Best Language: 0 - No aphasia  Speech: spontaneous, clear    Living Environment:   People in home: significant other     Current living Arrangements: apartment      Able to return to prior arrangements: yes       Family/Social Support:  Care provided by: self, spouse/significant other  Provides care for: no one  Marital Status: Lives with Significant Other  Significant Other          Description of Support System:     Support from  per pt and friends in her complex.        Current Resources:   Patient receiving home care services: No     Community Resources: None  Equipment currently used at home: walker, standard, other (see comments) portable   Supplies currently used at home:        Lifestyle & Psychosocial Needs:  Social Determinants of Health     Food Insecurity: Low Risk  (5/14/2024)    Food Insecurity     Within the past 12 months, did you worry that your food would run out before you got money to buy more?: No     Within the past 12 months, did the food you bought just not last and you didn t have money to get more?: No   Depression: Not at risk (1/3/2024)    PHQ-2     PHQ-2 Score: 0   Housing Stability: Low Risk  (5/14/2024)    Housing Stability     Do you have housing? : Yes     Are you worried about losing your housing?: No   Tobacco Use: Low Risk   (5/17/2024)    Patient History     Smoking Tobacco Use: Never     Smokeless Tobacco Use: Never     Passive Exposure: Not on file   Financial Resource Strain: Low Risk  (5/14/2024)    Financial Resource Strain     Within the past 12 months, have you or your family members you live with been unable to get utilities (heat, electricity) when it was really needed?: No   Alcohol Use: Not on file   Transportation Needs: High Risk (5/14/2024)    Transportation Needs     Within the past 12 months, has lack of transportation kept you from medical appointments, getting your medicines, non-medical meetings or appointments, work, or from getting things that you need?: Yes   Physical Activity: Not on file   Interpersonal Safety: Low Risk  (1/3/2024)    Interpersonal Safety     Do you feel physically and emotionally safe where you currently live?: Yes     Within the past 12 months, have you been hit, slapped, kicked or otherwise physically hurt by someone?: No     Within the past 12 months, have you been humiliated or emotionally abused in other ways by your partner or ex-partner?: No   Stress: Not on file   Social Connections: Not on file   Health Literacy: Not on file       Additional Information:    SW met with patient bedside to complete consult. SW introduced self to patient. The pt shared she lives in an apartment with her /significant other. She reported he assists her with needs and that no home care comes into the home. She stated once a month housekeeping comes into the home to assist with cleaning. The pt stated she has no current needs as her significant other and friends in the complex helps with needs. The pt stated her significant other does the cooking in the home as well. She reported at home she uses a walker and has a portable toilet. The pt stated her parents will provide transportation at the time of discharge. PT consult pending and SW following.      Update: PT recommendation TCU. SW notified pt and  pt's father. The TCU options were discussed and the pt and her father requested SW submit two referrals-1 for [facility] & [facility]. SW submitted the request and referrals pending. Additional options maybe needed from pt & family.    AISSATOU Kahn, [facility]   Care Management

## 2024-07-03 NOTE — PROGRESS NOTES
07/03/24 1126   Appointment Info   Signing Clinician's Name / Credentials (PT) SEAN Napoles   Student Supervision Direct supervision provided;Direct Patient Contact Provided;Therapy services provided with the co-signing licensed therapist guiding and directing the services, and providing the skilled judgement and assessment throughout the session   Living Environment   People in Home spouse   Current Living Arrangements apartment   Home Accessibility no concerns   Transportation Anticipated family or friend will provide   Self-Care   Usual Activity Tolerance moderate   Current Activity Tolerance fair   Regular Exercise No   Equipment Currently Used at Home other (see comments)  (FWW and 4-wheel walker)   Fall history within last six months no   General Information   Onset of Illness/Injury or Date of Surgery 07/01/24   Referring Physician Neeraj Chamberlain APRN CNP   Patient/Family Therapy Goals Statement (PT) return to home   Pertinent History of Current Problem (include personal factors and/or comorbidities that impact the POC) 59-year-old female with history of asthma, hypertension, severe obesity, obstructive sleep apnea was [facility] to [facility] on 7/1/2024 with acute calculus cholecystitis.  Patient underwent laparoscopic cholecystectomy on 7/1/2024 where she was found to have severe acute cholecystitis with gangrenous changes.   Existing Precautions/Restrictions fall   Cognition   Affect/Mental Status (Cognition) WFL   Orientation Status (Cognition) oriented x 4   Follows Commands (Cognition) WFL   Pain Assessment   Patient Currently in Pain Yes, see Vital Sign flowsheet  (Abdominal pain)   Integumentary/Edema   Integumentary/Edema other (describe)   Integumentary/Edema Comments Wounds under breasts and groin folds   Posture    Posture Not impaired   Range of Motion (ROM)   Range of Motion ROM is WFL   Strength (Manual Muscle Testing)   Strength (Manual Muscle Testing) Deficits  observed during functional mobility   Strength Comments Pt demonstrates mms weakness   Bed Mobility   Bed Mobility supine-sit   Supine-Sit [place] (Bed Mobility) minimum assist (75% patient effort)   Bed Mobility Limitations decreased ability to use legs for bridging/pushing   Impairments Contributing to Impaired Bed Mobility decreased strength   Assistive Device (Bed Mobility) bed rails   Comment, (Bed Mobility) Pt required David for LE to clear EOB   Transfers   Transfers sit-stand transfer   Impairments Contributing to Impaired Transfers decreased strength   Sit-Stand Transfer   Sit-Stand [place] (Transfers) minimum assist (75% patient effort)   Assistive Device (Sit-Stand Transfers) walker, front-wheeled   Comment, (Sit-Stand Transfer) Ax2   Gait/Stairs (Locomotion)   [place] Level (Gait) not tested   Comment, (Gait/Stairs) Not able to assess due to pt's decreased activity tolerance.   Balance   Balance other (describe)   Balance Comments Decreased standing balance with FWW   Sensory Examination   Sensory Perception patient reports no sensory changes   Coordination   Coordination no deficits were identified   Muscle Tone   Muscle Tone no deficits were identified   Clinical Impression   Criteria for Skilled Therapeutic Intervention Yes, treatment indicated   PT Diagnosis (PT) decreased functional mobility   Influenced by the following impairments decreased mms strength and overall balance when standing with FWW   Functional limitations due to impairments decreased bed mobility, transfers, and gait   Clinical Presentation (PT Evaluation Complexity) stable   Clinical Presentation Rationale Pt medically stable   Clinical Decision Making (Complexity) low complexity   Planned Therapy Interventions (PT) balance training;bed mobility training;gait training;home exercise program;neuromuscular re-education;patient/family education;stair training;strengthening;stretching;transfer training;progressive  activity/exercise   Risk & Benefits of therapy have been explained evaluation/treatment results reviewed;care plan/treatment goals reviewed;risks/benefits reviewed;current/potential barriers reviewed;participants voiced agreement with care plan;participants included;patient   PT Total Evaluation Time   PT Malik Low Complexity Minutes (03743) 10   Physical Therapy Goals   PT Frequency Daily   PT Predicted Duration/Target Date for Goal Attainment 07/08/24   PT Goals Bed Mobility;Transfers;Gait   PT: Bed Mobility Independent;Supine to/from sit   PT: Transfers Modified independent;Sit to/from stand;Assistive device   PT: Gait Modified independent;Standard walker;Assistive device;100 feet   PT Discharge Planning   PT Plan Repetive STS, assess gait ambulation with FWW if able   PT Discharge Recommendation (DC Rec) Transitional Care Facility   PT Rationale for DC Rec Pt is below baseline for all functional mobility and required David for all activities.  Pt lives in apartement with spouse and supportive family environment, need to assess gait to determine if pt can return home safely.  Currently rec TCU facility because pt does not have strength to ambulate yet.  Pt would benefit from IP PT and participation in TCU facility program.   PT Brief overview of current status A x 2 with FWW, use bariatric commode within room instead of room bathroom   Total Session Time   Timed Code Treatment Minutes 30   Total Session Time (sum of timed and untimed services) 40

## 2024-07-03 NOTE — PLAN OF CARE
"Temp: 98.3  F (36.8  C) Temp src: Oral BP: (!) 134/93 Pulse: 85   Resp: 24 SpO2: 95 % O2 Device: Oxymask Oxygen Delivery: 4 LPM desats to low 80s when sleeping, O2 increased to 4L sating in mid to upper 90s    A&Ox4. Per pt she uses a walker at baseline up Independently at home. Purewick removed to encourage getting oob. Pt up Ax1 with walker- tolerates well- just needs encouragement. Incontinent still- urinates when she coughs. Tolerated clear liquids but later complained of heart burn- provider notified, x1 dose of IV protonix given and helpful. Heart burn was causing her to dry heave. Prn IV dilaudid given x1 and prn oxy/tylenol each given x1 for mid upper abdominal/back pain- refused ice pack. Bariatric bed now in place. Abdominal folds/under breasts washed with soap and water/miconazole powder applied. Plan- IS encouraged/RT following, pain control, MARINE drain- likely pull 7/3 if clearing, general surgery following, WOC following, clear liquids- ADAT, IVF, rocephin daily, PT consult, daily weight, discharge on augmentin.     Problem: Adult Inpatient Plan of Care  Goal: Plan of Care Review  Description: The Plan of Care Review/Shift note should be completed every shift.  The Outcome Evaluation is a brief statement about your assessment that the patient is improving, declining, or no change.  This information will be displayed automatically on your shift  note.  Outcome: Not Progressing  Goal: Patient-Specific Goal (Individualized)  Description: You can add care plan individualizations to a care plan. Examples of Individualization might be:  \"Parent requests to be called daily at 9am for status\", \"I have a hard time hearing out of my right ear\", or \"Do not touch me to wake me up as it startles  me\".  Outcome: Not Progressing  Goal: Absence of Hospital-Acquired Illness or Injury  Outcome: Not Progressing  Intervention: Identify and Manage Fall Risk  Recent Flowsheet Documentation  Taken 7/2/2024 1708 by Padmini Torrez " C, RN  Safety Promotion/Fall Prevention:   safety round/check completed   nonskid shoes/slippers when out of bed  Intervention: Prevent Skin Injury  Recent Flowsheet Documentation  Taken 7/2/2024 1708 by Padmini Torrez RN  Body Position: position changed independently  Device Skin Pressure Protection: absorbent pad utilized/changed  Goal: Optimal Comfort and Wellbeing  Outcome: Not Progressing  Intervention: Monitor Pain and Promote Comfort  Recent Flowsheet Documentation  Taken 7/2/2024 2053 by Padmini Torrez RN  Pain Management Interventions: medication (see MAR)  Taken 7/2/2024 1721 by Padmini Torrez RN  Pain Management Interventions: declines  Taken 7/2/2024 1651 by Padmini Torrez RN  Pain Management Interventions: (refused ice pack) medication (see MAR)  Goal: Readiness for Transition of Care  Outcome: Not Progressing     Problem: Pain Acute  Goal: Optimal Pain Control and Function  Outcome: Not Progressing  Intervention: Develop Pain Management Plan  Recent Flowsheet Documentation  Taken 7/2/2024 2053 by Padmini Torrez RN  Pain Management Interventions: medication (see MAR)  Taken 7/2/2024 1721 by Padmini Torrez RN  Pain Management Interventions: declines  Taken 7/2/2024 1651 by Padmini Torrez RN  Pain Management Interventions: (refused ice pack) medication (see MAR)  Intervention: Prevent or Manage Pain  Recent Flowsheet Documentation  Taken 7/2/2024 1708 by Padmini Torrez RN  Medication Review/Management: medications reviewed     Problem: Skin Injury Risk Increased  Goal: Skin Health and Integrity  Outcome: Not Progressing  Intervention: Plan: Nurse Driven Intervention: Moisture Management  Recent Flowsheet Documentation  Taken 7/2/2024 2202 by Padmini Torrez RN  Bathing/Skin Care: incontinence care  Taken 7/2/2024 1708 by Padmini Torrez RN  Moisture Interventions: Incontinence pad  Intervention: Plan: Nurse Driven Intervention: Friction and Shear  Recent Flowsheet Documentation  Taken 7/2/2024 1708 by Padmini Torrez  RN  Friction/Shear Interventions: HOB 30 degrees or less  Intervention: Optimize Skin Protection  Recent Flowsheet Documentation  Taken 7/2/2024 1708 by Padmini Torrez, RN  Pressure Reduction Techniques: (bariatric bed ordered) other (see comments)  Activity Management: activity adjusted per tolerance     Problem: Infection  Goal: Absence of Infection Signs and Symptoms  Outcome: Not Progressing

## 2024-07-03 NOTE — PROGRESS NOTES
Clinic Care Coordination Contact  Ambulatory Care Coordination to Inpatient Care Management   Hand-In Communication    Date:  July 3, 2024  Name: Brissa Corado is enrolled in Ambulatory Care Coordination program and I am the Lead Care Coordinator.  CC Contact Information: Epic InBasket + phone  Payor Source: Payor: MEDICARE / Plan: MEDICARE / Product Type: Medicare /   Current services in place:     Please see the CC [place] and Care Management Flowsheets for specific  details of this Brissa Corado care plan.   Additional details/specific concerns r/t this admission:    Discharge Disposition Home VS TCU    I will follow this admission in Epic. Please feel free to contact me with questions or for further collaboration in discharge planning.    Rocio Govea, RN, BSN, CPHN, CCM  [facility] Ambulatory Care Management  [facility]  Phone: [phone]  Email: [email or web address]

## 2024-07-03 NOTE — PLAN OF CARE
"Care from [id number]    Inpatient Progress Note:  For complete assessment see flow sheet documentation.    BP (!) 153/68 (BP Location: Left arm)   Pulse 77   Temp 98.2  F (36.8  C) (Oral)   Resp 22   Ht 1.702 m (5' 7\")   Wt (!) 180.7 kg (398 lb 5.9 oz)   LMP 08/18/2008   SpO2 96%   BMI 62.39 kg/m         Patient is a heavy assist x2, still requiring 4L oxy mask, incontinent or urine- purewick applied, multiple skin issues, interdry applied to abdominal folds and underneath breast, IVF @ 100, intermittent nausea- zofran given, tolerating small amount of clears, general surgery following, WOC also following     Goal Outcome Evaluation:      Plan of Care Reviewed With: patient    Overall Patient Progress: no changeOverall Patient Progress: no change    Outcome Evaluation: incisions c/d/i, incontient or urine, muliple skin issues      Problem: Adult Inpatient Plan of Care  Goal: Plan of Care Review  Description: The Plan of Care Review/Shift note should be completed every shift.  The Outcome Evaluation is a brief statement about your assessment that the patient is improving, declining, or no change.  This information will be displayed automatically on your shift  note.  Outcome: Not Progressing  Flowsheets (Taken 7/3/2024 0601)  Outcome Evaluation: incisions c/d/i, incontient or urine, muliple skin issues  Plan of Care Reviewed With: patient  Overall Patient Progress: no change  Goal: Patient-Specific Goal (Individualized)  Description: You can add care plan individualizations to a care plan. Examples of Individualization might be:  \"Parent requests to be called daily at 9am for status\", \"I have a hard time hearing out of my right ear\", or \"Do not touch me to wake me up as it startles  me\".  Outcome: Not Progressing  Goal: Absence of Hospital-Acquired Illness or Injury  Outcome: Not Progressing  Intervention: Identify and Manage Fall Risk  Recent Flowsheet Documentation  Taken 7/2/2024 3111 by Nallely Linn, " RN  Safety Promotion/Fall Prevention: activity supervised  Intervention: Prevent Skin Injury  Recent Flowsheet Documentation  Taken 7/2/2024 2347 by Nallely Linn RN  Body Position: position changed independently  Intervention: Prevent Infection  Recent Flowsheet Documentation  Taken 7/2/2024 2347 by Nallely Linn RN  Infection Prevention: rest/sleep promoted  Goal: Optimal Comfort and Wellbeing  Outcome: Not Progressing  Intervention: Monitor Pain and Promote Comfort  Recent Flowsheet Documentation  Taken 7/3/2024 0149 by Nallely Linn RN  Pain Management Interventions: medication (see MAR)  Goal: Readiness for Transition of Care  Outcome: Not Progressing     Problem: Pain Acute  Goal: Optimal Pain Control and Function  Outcome: Not Progressing  Intervention: Develop Pain Management Plan  Recent Flowsheet Documentation  Taken 7/3/2024 0149 by Nallely Linn RN  Pain Management Interventions: medication (see MAR)  Intervention: Prevent or Manage Pain  Recent Flowsheet Documentation  Taken 7/2/2024 2347 by Nallely Linn RN  Medication Review/Management: medications reviewed     Problem: Skin Injury Risk Increased  Goal: Skin Health and Integrity  Outcome: Not Progressing  Intervention: Plan: Nurse Driven Intervention: Moisture Management  Recent Flowsheet Documentation  Taken 7/2/2024 2347 by Nallely Linn RN  Moisture Interventions: Encourage regular toileting  Intervention: Plan: Nurse Driven Intervention: Friction and Shear  Recent Flowsheet Documentation  Taken 7/2/2024 2347 by Nallely Linn RN  Friction/Shear Interventions: HOB 30 degrees or less  Intervention: Optimize Skin Protection  Recent Flowsheet Documentation  Taken 7/2/2024 2347 by Nallely Linn RN  Activity Management: activity adjusted per tolerance     Problem: Infection  Goal: Absence of Infection Signs and Symptoms  Outcome: Not Progressing

## 2024-07-03 NOTE — PLAN OF CARE
"Goal Outcome Evaluation:      Plan of Care Reviewed With: patient, guardian      Pt. Alert to self and situation, on clear diet,voided adequately, tolerated full liquid diet, refused to get up, has pain, got PRN Tylenol, oxycodone, pt. Got PRN Murelax and Senna for constipation, got 2 nd IV line, on IV Rocephin.      BP (!) 163/79 (BP Location: Left arm)   Pulse 78   Temp 98.3  F (36.8  C) (Oral)   Resp 24   Ht 1.702 m (5' 7\")   Wt (!) 180.7 kg (398 lb 5.9 oz)   LMP 08/18/2008   SpO2 95%   BMI 62.39 kg/m     Problem: Adult Inpatient Plan of Care  Goal: Plan of Care Review  Description: The Plan of Care Review/Shift note should be completed every shift.  The Outcome Evaluation is a brief statement about your assessment that the patient is improving, declining, or no change.  This information will be displayed automatically on your shift  note.  Outcome: Not Progressing  Flowsheets (Taken 7/3/2024 1021)  Outcome Evaluation: Pt refused get up, use pure-wick for void, ate papcickle only, has back pain, PRN Diladed was given., on 3 l oxygen  Plan of Care Reviewed With:   patient   guardian  Goal: Patient-Specific Goal (Individualized)  Description: You can add care plan individualizations to a care plan. Examples of Individualization might be:  \"Parent requests to be called daily at 9am for status\", \"I have a hard time hearing out of my right ear\", or \"Do not touch me to wake me up as it startles  me\".  Outcome: Not Progressing  Goal: Absence of Hospital-Acquired Illness or Injury  Outcome: Not Progressing  Intervention: Identify and Manage Fall Risk  Recent Flowsheet Documentation  Taken 7/3/2024 1000 by Lyudmila Singh RN  Safety Promotion/Fall Prevention: activity supervised  Intervention: Prevent Skin Injury  Recent Flowsheet Documentation  Taken 7/3/2024 1000 by Lyudmila Singh RN  Device Skin Pressure Protection: absorbent pad utilized/changed  Intervention: Prevent and Manage VTE (Venous " Thromboembolism) Risk  Recent Flowsheet Documentation  Taken 7/3/2024 1000 by Lyudmila Singh RN  VTE Prevention/Management: SCDs on (sequential compression devices)  Intervention: Prevent Infection  Recent Flowsheet Documentation  Taken 7/3/2024 1000 by Lyudmila Singh RN  Infection Prevention: rest/sleep promoted  Goal: Optimal Comfort and Wellbeing  Outcome: Not Progressing  Intervention: Monitor Pain and Promote Comfort  Recent Flowsheet Documentation  Taken 7/3/2024 0811 by Lyudmila Singh RN  Pain Management Interventions: medication (see MAR)  Taken 7/3/2024 0750 by Lyudmila Singh RN  Pain Management Interventions: medication (see MAR)  Goal: Readiness for Transition of Care  Outcome: Not Progressing  Flowsheets (Taken 7/3/2024 1021)  Anticipated Changes Related to Illness: inability to care for someone else  Transportation Anticipated: agency  Concerns to be Addressed:   compliance issue   coping/stress   decision making   basic needs  Barriers to Discharge: pt is on 3 l oxygen, on IV Rocephine  Intervention: Mutually Develop Transition Plan  Recent Flowsheet Documentation  Taken 7/3/2024 1021 by Lyudmila Singh RN  Anticipated Changes Related to Illness: inability to care for someone else  Transportation Anticipated: agency  Concerns to be Addressed:   compliance issue   coping/stress   decision making   basic needs     Problem: Pain Acute  Goal: Optimal Pain Control and Function  Outcome: Not Progressing  Intervention: Develop Pain Management Plan  Recent Flowsheet Documentation  Taken 7/3/2024 0811 by Lyudmila Singh RN  Pain Management Interventions: medication (see MAR)  Taken 7/3/2024 0750 by Lyudmila Singh RN  Pain Management Interventions: medication (see MAR)  Intervention: Prevent or Manage Pain  Recent Flowsheet Documentation  Taken 7/3/2024 1000 by Lyudmila Singh RN  Medication Review/Management: medications reviewed     Problem: Skin Injury Risk  Increased  Goal: Skin Health and Integrity  Outcome: Not Progressing  Intervention: Plan: Nurse Driven Intervention: Moisture Management  Recent Flowsheet Documentation  Taken 7/3/2024 1000 by Lyudmila Singh RN  Moisture Interventions: Incontinence pad  Taken 7/3/2024 0721 by Lyudmila Singh RN  Moisture Interventions: Incontinence pad  Bathing/Skin Care: linen changed  Intervention: Plan: Nurse Driven Intervention: Friction and Shear  Recent Flowsheet Documentation  Taken 7/3/2024 1000 by Lyudmila Singh RN  Friction/Shear Interventions: HOB 30 degrees or less  Intervention: Optimize Skin Protection  Recent Flowsheet Documentation  Taken 7/3/2024 1000 by Lyudmila Singh RN  Pressure Reduction Techniques: other (see comments)     Problem: Infection  Goal: Absence of Infection Signs and Symptoms  Outcome: Not Progressing       Outcome Evaluation: Pt refused get up, use pure-wick for void, ate papcickle only, has back pain, PRN Diladed was given., on 3 l oxygen

## 2024-07-04 PROCEDURE — 999N000157 HC STATISTIC RCP TIME EA 10 MIN

## 2024-07-04 PROCEDURE — 94640 AIRWAY INHALATION TREATMENT: CPT | Mod: 76

## 2024-07-04 PROCEDURE — 250N000013 HC RX MED GY IP 250 OP 250 PS 637: Performed by: PHYSICIAN ASSISTANT

## 2024-07-04 PROCEDURE — 94799 UNLISTED PULMONARY SVC/PX: CPT

## 2024-07-04 PROCEDURE — 94640 AIRWAY INHALATION TREATMENT: CPT

## 2024-07-04 PROCEDURE — 250N000013 HC RX MED GY IP 250 OP 250 PS 637: Performed by: SURGERY

## 2024-07-04 PROCEDURE — 250N000009 HC RX 250: Performed by: INTERNAL MEDICINE

## 2024-07-04 PROCEDURE — 250N000009 HC RX 250: Performed by: PHYSICIAN ASSISTANT

## 2024-07-04 PROCEDURE — 250N000011 HC RX IP 250 OP 636: Performed by: PHYSICIAN ASSISTANT

## 2024-07-04 PROCEDURE — 120N000001 HC R&B MED SURG/OB

## 2024-07-04 PROCEDURE — 250N000011 HC RX IP 250 OP 636: Performed by: SURGERY

## 2024-07-04 PROCEDURE — 258N000003 HC RX IP 258 OP 636: Performed by: SURGERY

## 2024-07-04 PROCEDURE — 250N000009 HC RX 250: Performed by: SURGERY

## 2024-07-04 PROCEDURE — 999N000156 HC STATISTIC RCP CONSULT EA 30 MIN

## 2024-07-04 PROCEDURE — 250N000013 HC RX MED GY IP 250 OP 250 PS 637: Performed by: NURSE PRACTITIONER

## 2024-07-04 PROCEDURE — 250N000013 HC RX MED GY IP 250 OP 250 PS 637: Performed by: INTERNAL MEDICINE

## 2024-07-04 PROCEDURE — 99232 SBSQ HOSP IP/OBS MODERATE 35: CPT | Performed by: PHYSICIAN ASSISTANT

## 2024-07-04 RX ORDER — IPRATROPIUM BROMIDE AND ALBUTEROL SULFATE 2.5; .5 MG/3ML; MG/3ML
3 SOLUTION RESPIRATORY (INHALATION)
Status: DISCONTINUED | OUTPATIENT
Start: 2024-07-04 | End: 2024-07-06 | Stop reason: HOSPADM

## 2024-07-04 RX ORDER — ENOXAPARIN SODIUM 100 MG/ML
40 INJECTION SUBCUTANEOUS EVERY 12 HOURS
Status: DISCONTINUED | OUTPATIENT
Start: 2024-07-04 | End: 2024-07-06 | Stop reason: HOSPADM

## 2024-07-04 RX ADMIN — ENOXAPARIN SODIUM 40 MG: 40 INJECTION SUBCUTANEOUS at 18:44

## 2024-07-04 RX ADMIN — ALBUTEROL SULFATE 2.5 MG: 2.5 SOLUTION RESPIRATORY (INHALATION) at 04:57

## 2024-07-04 RX ADMIN — MICONAZOLE NITRATE: 20 POWDER TOPICAL at 08:37

## 2024-07-04 RX ADMIN — FLUTICASONE FUROATE AND VILANTEROL TRIFENATATE 1 PUFF: 100; 25 POWDER RESPIRATORY (INHALATION) at 08:21

## 2024-07-04 RX ADMIN — CARBAMAZEPINE 400 MG: 200 TABLET ORAL at 21:16

## 2024-07-04 RX ADMIN — ACETAMINOPHEN 650 MG: 325 TABLET, FILM COATED ORAL at 11:44

## 2024-07-04 RX ADMIN — CEFTRIAXONE 2 G: 2 INJECTION, POWDER, FOR SOLUTION INTRAMUSCULAR; INTRAVENOUS at 08:37

## 2024-07-04 RX ADMIN — IPRATROPIUM BROMIDE AND ALBUTEROL SULFATE 3 ML: .5; 3 SOLUTION RESPIRATORY (INHALATION) at 15:37

## 2024-07-04 RX ADMIN — LISINOPRIL 20 MG: 20 TABLET ORAL at 08:37

## 2024-07-04 RX ADMIN — OXYCODONE HYDROCHLORIDE 5 MG: 5 TABLET ORAL at 18:44

## 2024-07-04 RX ADMIN — OXYCODONE HYDROCHLORIDE 5 MG: 5 TABLET ORAL at 21:17

## 2024-07-04 RX ADMIN — SERTRALINE 100 MG: 100 TABLET, FILM COATED ORAL at 08:37

## 2024-07-04 RX ADMIN — SODIUM CHLORIDE, POTASSIUM CHLORIDE, SODIUM LACTATE AND CALCIUM CHLORIDE: 600; 310; 30; 20 INJECTION, SOLUTION INTRAVENOUS at 05:50

## 2024-07-04 RX ADMIN — CARBAMAZEPINE 200 MG: 200 TABLET ORAL at 11:45

## 2024-07-04 RX ADMIN — OXYCODONE HYDROCHLORIDE 5 MG: 5 TABLET ORAL at 11:45

## 2024-07-04 RX ADMIN — IPRATROPIUM BROMIDE AND ALBUTEROL SULFATE 3 ML: .5; 3 SOLUTION RESPIRATORY (INHALATION) at 11:55

## 2024-07-04 RX ADMIN — CARBAMAZEPINE 400 MG: 200 TABLET ORAL at 08:37

## 2024-07-04 RX ADMIN — POLYETHYLENE GLYCOL 3350 17 G: 17 POWDER, FOR SOLUTION ORAL at 08:37

## 2024-07-04 RX ADMIN — IPRATROPIUM BROMIDE AND ALBUTEROL SULFATE 3 ML: .5; 3 SOLUTION RESPIRATORY (INHALATION) at 20:48

## 2024-07-04 RX ADMIN — OXYCODONE HYDROCHLORIDE 5 MG: 5 TABLET ORAL at 05:22

## 2024-07-04 RX ADMIN — IPRATROPIUM BROMIDE AND ALBUTEROL SULFATE 3 ML: .5; 3 SOLUTION RESPIRATORY (INHALATION) at 08:21

## 2024-07-04 RX ADMIN — PANTOPRAZOLE SODIUM 40 MG: 40 TABLET, DELAYED RELEASE ORAL at 08:37

## 2024-07-04 RX ADMIN — MICONAZOLE NITRATE: 20 POWDER TOPICAL at 21:17

## 2024-07-04 RX ADMIN — ACETAMINOPHEN 650 MG: 325 TABLET, FILM COATED ORAL at 18:43

## 2024-07-04 RX ADMIN — ACETAMINOPHEN 650 MG: 325 TABLET, FILM COATED ORAL at 05:21

## 2024-07-04 ASSESSMENT — ACTIVITIES OF DAILY LIVING (ADL)
ADLS_ACUITY_SCORE: 40
ADLS_ACUITY_SCORE: 37
ADLS_ACUITY_SCORE: 42
ADLS_ACUITY_SCORE: 40
ADLS_ACUITY_SCORE: 37
ADLS_ACUITY_SCORE: 40
ADLS_ACUITY_SCORE: 37
ADLS_ACUITY_SCORE: 37
ADLS_ACUITY_SCORE: 40
ADLS_ACUITY_SCORE: 37
ADLS_ACUITY_SCORE: 42
ADLS_ACUITY_SCORE: 40
ADLS_ACUITY_SCORE: 41
ADLS_ACUITY_SCORE: 37
ADLS_ACUITY_SCORE: 37
ADLS_ACUITY_SCORE: 42
ADLS_ACUITY_SCORE: 42
ADLS_ACUITY_SCORE: 46
ADLS_ACUITY_SCORE: 42

## 2024-07-04 NOTE — PROGRESS NOTES
Care Management Follow Up    Expected Discharge Date: 07/04/2024     Concerns to be Addressed: Discharge planning   Patient plan of care discussed at interdisciplinary rounds: Yes    Anticipated Discharge Disposition: TCU     Anticipated Discharge Services: PT/OT     Patient/family educated on Medicare website which has current facility and service quality ratings: Yes   Education Provided on the Discharge Plan: Yes   Patient/Family in Agreement with the Plan: Yes     Referrals Placed by CM/SW: Skilled Nursing Facility   Private pay costs discussed: Not applicable    Additional Information:  TCU referrals pending. Post acute care team assisting with following up on pending referrals. Additional bariatric TCU referrals sent for consideration. No accepting facility at this time. Anticipate family will transport at discharge. SW will continue to follow.     AISSATOU Srivastava, [facility]   Inpatient Care Coordination  [facility]   [phone]

## 2024-07-04 NOTE — PROGRESS NOTES
"Formerly [facility] RCAT     Date:7/4/24  Admission Dx:Cholecystis gangrene infestion/post surgical 7/1  Pulmonary History: Asthma, RICK  Home Nebulizer/MDI Use:Dulera, albuterol  Home Oxygen:RA  Acuity Level (RCAT flow sheet):Level 2  Aerosol Therapy initiated: Duoneb Q4 w/a      Pulmonary Hygiene initiated: Flutter valve QID      Volume Expansion initiated:Incentive spirometry      Current Oxygen Requirements:4LNC  Current SpO2: 94%    Re-evaluation date:7/7/24    Patient Education:Education was performed with the patient in regards to indications/benefits and possible side effects of bronchodilators. Will continue to do education with patient.         See \"RT Assessments\" flow sheet for patient assessment scoring and Acuity Level Details.           "

## 2024-07-04 NOTE — PLAN OF CARE
"[id number]    Inpatient Progress Note:  A & O X 4. Lung sounds diminished. Expiratory wheezes noted, prn albuterol neb txt administered -See MAR. Patient on 3 LPM supplemental oxygen. Frequent productive cough noted. Denies  chills, shortness of breath, nausea, vomit, or diarrhea. Bowel sounds present all quads and active. Pure wick in  place with adequate output. PIV infusing LR at 100 mL, and PIV saline lock. Admitting DX: Severe acute cholecystitis with gangrenous, s/p laparoscopic cholecystectomy , Acute postoperative hypoxic respiratory failure.  Lap rigo surgical site X 4-dressing CDI. On  continuous pulse oximetry. On full liquid diet. Patient on IV Rocephin once daily. Pain is managed with Tylenol, and Oxycodone. C/O pain rating at 8 out of 10, prn Tylenol, and Oxycodone given X 1. Prn Duoneb, and albuterol treatment available.  A X 2 with cares. PT recommended TCU. PT/Respiratory/SW following.  /67 (BP Location: Left arm)   Pulse 75   Temp 98.8  F (37.1  C) (Axillary)   Resp 20   Ht 1.702 m (5' 7\")   Wt (!) 180.7 kg (398 lb 5.9 oz)   LMP 08/18/2008   SpO2 94%   BMI 62.39 kg/m       Will continue to provide supportive cares.     Anahi Lora RN   Problem: Adult Inpatient Plan of Care  Goal: Plan of Care Review  Description: The Plan of Care Review/Shift note should be completed every shift.  The Outcome Evaluation is a brief statement about your assessment that the patient is improving, declining, or no change.  This information will be displayed automatically on your shift  note.  Outcome: Progressing  Flowsheets (Taken 7/4/2024 0405)  Plan of Care Reviewed With: patient  Goal: Patient-Specific Goal (Individualized)  Description: You can add care plan individualizations to a care plan. Examples of Individualization might be:  \"Parent requests to be called daily at 9am for status\", \"I have a hard time hearing out of my right ear\", or \"Do not touch me to wake me up as it " "startles  me\".  Outcome: Progressing  Goal: Absence of Hospital-Acquired Illness or Injury  Outcome: Progressing  Intervention: Identify and Manage Fall Risk  Recent Flowsheet Documentation  Taken 7/4/2024 0028 by Anahi Lora RN  Safety Promotion/Fall Prevention:   activity supervised   supervised activity   safety round/check completed   room organization consistent   room near nurse's station   clutter free environment maintained  Intervention: Prevent Skin Injury  Recent Flowsheet Documentation  Taken 7/4/2024 0028 by Anahi Lora RN  Body Position: position changed independently  Skin Protection: adhesive use limited  Device Skin Pressure Protection:   adhesive use limited   tubing/devices free from skin contact  Intervention: Prevent Infection  Recent Flowsheet Documentation  Taken 7/4/2024 0028 by Anahi Lora RN  Infection Prevention:   rest/sleep promoted   single patient room provided  Goal: Optimal Comfort and Wellbeing  Outcome: Progressing  Goal: Readiness for Transition of Care  Outcome: Progressing     Problem: Pain Acute  Goal: Optimal Pain Control and Function  Outcome: Progressing  Intervention: Prevent or Manage Pain  Recent Flowsheet Documentation  Taken 7/4/2024 0028 by Anahi Lora RN  Sensory Stimulation Regulation: quiet environment promoted  Medication Review/Management: medications reviewed  Intervention: Optimize Psychosocial Wellbeing  Recent Flowsheet Documentation  Taken 7/4/2024 0028 by Anahi Lora RN  Supportive Measures: active listening utilized     Problem: Skin Injury Risk Increased  Goal: Skin Health and Integrity  Outcome: Progressing  Intervention: Plan: Nurse Driven Intervention: Moisture Management  Recent Flowsheet Documentation  Taken 7/4/2024 0028 by Anahi Lora RN  Moisture Interventions:   Encourage regular toileting   Urinary collection device  Intervention: Plan: Nurse Driven Intervention: Friction and Shear  Recent " Flowsheet Documentation  Taken 7/4/2024 0028 by Anahi Lora RN  Friction/Shear Interventions: HOB 30 degrees or less  Intervention: Optimize Skin Protection  Recent Flowsheet Documentation  Taken 7/4/2024 0028 by Anahi Lora, RN  Pressure Reduction Devices: alternating pressure pump (CROW)  Skin Protection: adhesive use limited  Activity Management: activity adjusted per tolerance  Head of Bed (HOB) Positioning: HOB at 20-30 degrees     Problem: Infection  Goal: Absence of Infection Signs and Symptoms  Outcome: Progressing         Plan of Care Reviewed With: patient

## 2024-07-04 NOTE — PLAN OF CARE
"Goal Outcome Evaluation:      Plan of Care Reviewed With: patient      Pt. A&O, one assist with gait belt, walker for mobility, ate breakfast by setting at the edge at her bed, refused to walks to the bathroom, love use pure-wick, has multiple wound in her body, powder was applied and skin was cleaned per MD order.    BP (!) 157/77 (BP Location: Left arm)   Pulse 86   Temp 98.7  F (37.1  C) (Axillary)   Resp 18   Ht 1.702 m (5' 7\")   Wt (!) 186.2 kg (410 lb 9.6 oz)   LMP 08/18/2008   SpO2 94%   BMI 64.31 kg/m     Problem: Adult Inpatient Plan of Care  Goal: Plan of Care Review  Description: The Plan of Care Review/Shift note should be completed every shift.  The Outcome Evaluation is a brief statement about your assessment that the patient is improving, declining, or no change.  This information will be displayed automatically on your shift  note.  Outcome: Not Progressing  Flowsheets (Taken 7/4/2024 1108)  Outcome Evaluation: Pt is on 3 l oxygen, has some pain.  Plan of Care Reviewed With: patient  Goal: Patient-Specific Goal (Individualized)  Description: You can add care plan individualizations to a care plan. Examples of Individualization might be:  \"Parent requests to be called daily at 9am for status\", \"I have a hard time hearing out of my right ear\", or \"Do not touch me to wake me up as it startles  me\".  Outcome: Not Progressing  Goal: Absence of Hospital-Acquired Illness or Injury  Outcome: Not Progressing  Intervention: Identify and Manage Fall Risk  Recent Flowsheet Documentation  Taken 7/4/2024 1000 by Lyudmila Singh RN  Safety Promotion/Fall Prevention:   activity supervised   supervised activity   safety round/check completed   room organization consistent   room near nurse's station   clutter free environment maintained  Intervention: Prevent Skin Injury  Recent Flowsheet Documentation  Taken 7/4/2024 1000 by Lyudmila Singh RN  Body Position: position changed independently  Skin " Protection: adhesive use limited  Device Skin Pressure Protection:   adhesive use limited   tubing/devices free from skin contact  Intervention: Prevent and Manage VTE (Venous Thromboembolism) Risk  Recent Flowsheet Documentation  Taken 7/4/2024 1000 by Lyudmila Singh RN  VTE Prevention/Management: SCDs on (sequential compression devices)  Intervention: Prevent Infection  Recent Flowsheet Documentation  Taken 7/4/2024 1000 by Lyudmila Singh RN  Infection Prevention:   rest/sleep promoted   single patient room provided  Goal: Optimal Comfort and Wellbeing  Outcome: Not Progressing  Intervention: Monitor Pain and Promote Comfort  Recent Flowsheet Documentation  Taken 7/4/2024 1000 by Lyudmila Singh RN  Pain Management Interventions: medication (see MAR)  Goal: Readiness for Transition of Care  Outcome: Not Progressing  Flowsheets (Taken 7/4/2024 1108)  Anticipated Changes Related to Illness: inability to care for self  Transportation Anticipated: family or friend will provide  Concerns to be Addressed:   coping/stress   decision making   compliance issue  Barriers to Discharge: pt is on 3 l oxygen, IV Rocephine  Intervention: Mutually Develop Transition Plan  Recent Flowsheet Documentation  Taken 7/4/2024 1108 by Lyudmila Singh RN  Anticipated Changes Related to Illness: inability to care for self  Transportation Anticipated: family or friend will provide  Concerns to be Addressed:   coping/stress   decision making   compliance issue     Problem: Pain Acute  Goal: Optimal Pain Control and Function  Outcome: Not Progressing  Intervention: Develop Pain Management Plan  Recent Flowsheet Documentation  Taken 7/4/2024 1000 by Lyudmila Singh RN  Pain Management Interventions: medication (see MAR)  Intervention: Prevent or Manage Pain  Recent Flowsheet Documentation  Taken 7/4/2024 1000 by Lyudmila Singh RN  Sensory Stimulation Regulation: quiet environment promoted  Medication  Review/Management: medications reviewed  Intervention: Optimize Psychosocial Wellbeing  Recent Flowsheet Documentation  Taken 7/4/2024 1000 by Lyudmila Singh RN  Supportive Measures: active listening utilized     Problem: Skin Injury Risk Increased  Goal: Skin Health and Integrity  Outcome: Not Progressing  Intervention: Plan: Nurse Driven Intervention: Moisture Management  Recent Flowsheet Documentation  Taken 7/4/2024 1000 by Lyudmila Singh RN  Moisture Interventions: Encourage regular toileting  Bathing/Skin Care:   incontinence care   linen changed  Intervention: Plan: Nurse Driven Intervention: Friction and Shear  Recent Flowsheet Documentation  Taken 7/4/2024 1000 by Lyudmila Singh RN  Friction/Shear Interventions: HOB 30 degrees or less  Intervention: Optimize Skin Protection  Recent Flowsheet Documentation  Taken 7/4/2024 1000 by Lyudmila Singh RN  Pressure Reduction Techniques: other (see comments)  Pressure Reduction Devices: alternating pressure pump (CROW)  Skin Protection: adhesive use limited  Activity Management:   standing at bedside   back to bed  Head of Bed (HOB) Positioning: HOB at 20-30 degrees     Problem: Infection  Goal: Absence of Infection Signs and Symptoms  Outcome: Not Progressing        Outcome Evaluation: Pt is on 3 l oxygen, has some pain.

## 2024-07-04 NOTE — PLAN OF CARE
"Pt is A&Ox4. VSS, on 3L NC. PRN tylenol given for back pain. Purewick in place. LR infusing @ 100 ml/hr.     Problem: Adult Inpatient Plan of Care  Goal: Plan of Care Review  Description: The Plan of Care Review/Shift note should be completed every shift.  The Outcome Evaluation is a brief statement about your assessment that the patient is improving, declining, or no change.  This information will be displayed automatically on your shift  note.  Outcome: Not Progressing  Flowsheets (Taken 7/3/2024 2303)  Outcome Evaluation: PRN Tylenol given for pain  Plan of Care Reviewed With: patient  Goal: Patient-Specific Goal (Individualized)  Description: You can add care plan individualizations to a care plan. Examples of Individualization might be:  \"Parent requests to be called daily at 9am for status\", \"I have a hard time hearing out of my right ear\", or \"Do not touch me to wake me up as it startles  me\".  Outcome: Not Progressing  Goal: Absence of Hospital-Acquired Illness or Injury  Outcome: Not Progressing  Intervention: Identify and Manage Fall Risk  Recent Flowsheet Documentation  Taken 7/3/2024 2100 by Judi Lloyd, RN  Safety Promotion/Fall Prevention:   safety round/check completed   activity supervised  Intervention: Prevent Skin Injury  Recent Flowsheet Documentation  Taken 7/3/2024 2100 by Judi Lloyd, RN  Body Position: position changed independently  Intervention: Prevent Infection  Recent Flowsheet Documentation  Taken 7/3/2024 2100 by Judi Lloyd, RN  Infection Prevention:   cohorting utilized   rest/sleep promoted   single patient room provided  Goal: Optimal Comfort and Wellbeing  Outcome: Not Progressing  Goal: Readiness for Transition of Care  Outcome: Not Progressing         Goal Outcome Evaluation:      Plan of Care Reviewed With: patient          Outcome Evaluation: PRN Tylenol given for pain      "

## 2024-07-04 NOTE — PROGRESS NOTES
[facility]  Hospitalist Progress Note  Aretha Niño PA-C 07/04/2024           Assessment and Plan:      59-year-old female with history of asthma, hypertension, severe obesity, obstructive sleep apnea, seizure disorder, developmental delay, was admitted to [facility] on 7/1/2024 with acute calculus cholecystitis. Patient underwent laparoscopic cholecystectomy on 7/1/2024 where she was found to have severe acute cholecystitis with gangrenous changes. She also had placement of intraperitoneal drain and continues on IV fluids, IV antibiotics while monitoring output from the drain. This was a complicated gangrenous cholecystitis that will require ongoing monitoring in the hospital after surgery.  Physical therapy is recommending TCU placement.     Acute postoperative hypoxic respiratory failure  Hx of Asthma with mild exacerbation   Untreated RICK  Suspect OHS  Post op requiring 3-4L oximask to maintain sats of mid 90's.   Likely hypoxia is multifactorial including untreated RICK, likley obesity hypoventilation syndrome, Dec pulm volume due to abdominal surgery and hx of asthma.  She was also recently admitted to the hospital from 5/2-5/9 for acute asthma exacerbation with atypical PNA and recommended outpt initiation of steroid inhaler/LABA  - Started Breo inhlaer 7/3 and duonebs with improved breathing, no wheezing   - CXR 7/3 shows no infiltrate but + Bl atelectasis  - Main dayna now is pt's lack of willingness to get OOB and do IS, fortunately RN able to get her up at edge of bed and improving somewhat on her mobility   - Cont to increase mobility  - Start to wean O2, I suspect she runs low 90's at baseline , will aim for O2 sats of 89-92%  -Encourage incentive spirometry with flutter valve  - Aggressive IS    Acute cholecystitis with gangrenous changes  -Status post laparoscopic cholecystectomy on 7/1/2024 -changed to inpatient status  -MARINE drain removed on 7/3/2024  -Currently on  Vitals done, labs drawn by venipuncture.  See doc flow sheets for details.     "Rocephin, will tailor antibiotics when culture results and sensitivities are finalized  -Multimodal pain management     Intertrigo:  -Miconazole powder  -WOC consult appreciated      Hypertension  -Resume lisinopril.  Repeat BMP in the AM.     Hyperlipidemia  -Resume statin at discharge     GERD  -Continue PTA PPI.     Superobesity: BMI 62.  Complicates cares as noted above.     #Developmental delay: Her parents are listed as her guardians.  She has a longtime significant other.  Her mother states that they had a \"wedding ceremony \"but legally are not .  Next of kin is technically her parents who remain available to help as needed.  However, her parents are in their 80s and her mother recently had knee surgery and is recovering herself.           Diet: Advance Diet as Tolerated: regular diet   DVT Prophylaxis: Pneumatic Compression Devices, start Lovenox 40mg BID or per pharmacy dosing   Grajeda Catheter: Not present  Lines: None     Cardiac Monitoring: None  Code Status: Full Code        # Hypocalcemia: Lowest Ca = 7.8 mg/dL in last 2 days, will monitor and replace as appropriate     # Hypoalbuminemia: Lowest albumin = 3.2 g/dL at 7/2/2024  7:17 AM, will monitor as appropriate     # Hypertension: Noted on problem list              # Asthma: noted on problem list         Disposition Plan     Medically Ready for Discharge: close to medical readiness,  ok to discharge to TCU once bed available. Likely will need short term O2.         Interval History (Subjective):      Breathing is much better today.   Eating regular diet  When I asked her to get up to move around, she said \" No thanks\"                  Physical Exam:      Last Vital Signs:  BP (!) 154/76 (BP Location: Left arm)   Pulse 75   Temp 97.8  F (36.6  C) (Oral)   Resp 18   Ht 1.702 m (5' 7\")   Wt (!) 186.2 kg (410 lb 9.6 oz)   LMP 08/18/2008   SpO2 96%   BMI 64.31 kg/m      Constitutional: Awake, alert, cooperative, no apparent distress "     Respiratory: Improved air exchange but no wheezing today no crackles or wheezing   Cardiovascular: Regular rate and rhythm, normal S1 and S2, and no murmur noted   Abdomen: Normal bowel sounds, soft, non-distended, non-tender   Skin: No rashes, no cyanosis, dry to touch   Neuro: Alert and oriented x3, no weakness, numbness, memory loss   Extremities: No edema, normal range of motion   Other(s):        All other systems: Negative          Medications:      All current medications were reviewed with changes reflected in problem list.         Data:      All new lab and imaging data was reviewed.   Labs:       Lab Results   Component Value Date     07/02/2024     07/01/2024     06/24/2024     06/09/2021     05/10/2021     05/03/2021    Lab Results   Component Value Date    CHLORIDE 102 07/02/2024    CHLORIDE 109 07/01/2024    CHLORIDE 100 06/24/2024    CHLORIDE 108 05/07/2022    CHLORIDE 106 05/05/2022    CHLORIDE 107 06/09/2021    CHLORIDE 108 05/10/2021    CHLORIDE 107 05/03/2021    Lab Results   Component Value Date    BUN 11.2 07/02/2024    BUN 18.1 07/01/2024    BUN 14.6 06/24/2024    BUN 13 05/07/2022    BUN 18 05/05/2022    BUN 15 06/09/2021    BUN 13 05/10/2021    BUN 17 05/03/2021      Lab Results   Component Value Date    POTASSIUM 3.5 07/02/2024    POTASSIUM 4.0 07/01/2024    POTASSIUM 4.1 06/24/2024    POTASSIUM 3.7 05/07/2022    POTASSIUM 4.2 05/05/2022    POTASSIUM 4.2 06/09/2021    POTASSIUM 3.6 05/10/2021    POTASSIUM 3.7 05/03/2021    Lab Results   Component Value Date    CO2 26 07/02/2024    CO2 22 07/01/2024    CO2 25 06/24/2024    CO2 27 05/07/2022    CO2 29 05/05/2022    CO2 28 06/09/2021    CO2 28 05/10/2021    CO2 23 05/03/2021    Lab Results   Component Value Date    CR 0.65 07/02/2024    CR 0.81 07/01/2024    CR 0.72 06/24/2024    CR 0.69 06/09/2021    CR 0.55 05/10/2021    CR 0.56 05/03/2021        Recent Labs   Lab 07/02/24  0717 07/01/24  0015   WBC 9.0  8.5   HGB 11.4* 13.8   HCT 37.0 44.3   MCV 94 93    239      Imaging:   Results for orders placed or performed during the hospital encounter of 07/01/24   CT Abdomen Pelvis w/o Contrast    Narrative    EXAM: CT ABDOMEN PELVIS W/O CONTRAST  LOCATION: [facility]  DATE: 7/1/2024    INDICATION: LLQ abdominal pain.  COMPARISON: CT abdomen and pelvis on 5/5/2022.  TECHNIQUE: CT scan of the abdomen and pelvis was performed without intravenous contrast Multiplanar reformats were obtained. Dose reduction techniques were used.    FINDINGS:   LOWER CHEST: Basilar pulmonary opacities, likely atelectasis.    ABDOMEN/PELVIS: Limited evaluation of the abdominal organs due to lack of intravenous contrast.    HEPATOBILIARY: No suspicious focal hepatic lesion. The gallbladder is distended with mild diffuse gallbladder wall thickening and small focus of gas along the gallbladder wall.    PANCREAS: The unenhanced pancreas is grossly unremarkable.    SPLEEN: No splenomegaly. Small splenule along the spleen.    ADRENAL GLANDS: No adrenal nodules.    KIDNEYS/BLADDER: No radiodense kidney/ureteral stones or hydronephrosis in either kidney.    BOWEL: No abnormally dilated bowel loops. The appendix is visualized and appears normal.    PERITONEUM: No evidence of free fluid in the abdomen and pelvis. No free peritoneal or portal venous gas.    PELVIC ORGANS: 2 cm left adnexal cyst, not significantly changed as compared to 5/5/2022 exam, likely ovarian.    VASCULATURE: There is an IVC filter.    LYMPH NODES: Multiple enlarged inguinal lymph nodes, not significantly changed as compared to 5/5/2022 exam.    MUSCULOSKELETAL: Postsurgical changes of the anterior pelvis and left sacroiliac joint. Multilevel degenerative changes of the spine.      Impression    IMPRESSION:   1. Distended gallbladder with mild diffuse gallbladder wall thickening, and small focus of gas along the gallbladder wall, findings worrisome for  acute cholecystitis, which could be emphysematous cholecystitis, recommend further evaluation with right   upper quadrant abdominal ultrasound.  2. Multiple enlarged inguinal lymph nodes, not significantly changed as compared to 5/5/2022 exam, remains indeterminate.     US Abdomen Limited    Narrative    EXAM: US ABDOMEN LIMITED  LOCATION: [facility]  DATE: 7/1/2024    INDICATION: abdominal pain  COMPARISON: Same day CT  TECHNIQUE: Limited abdominal ultrasound.    FINDINGS:    GALLBLADDER: Gallbladder is mildly distended and appears to contain internal shadowing stones. There is also some dirty shadowing visible in the region of the gallbladder wall, possibly gas within the wall. Overall there is limited visualization of the   gallbladder and wall secondary to the patient's body habitus and due to overlying bowel gas. Patient was tender over the right upper quadrant on sonographic examination.    BILE DUCTS: Common duct not well visualized. No obvious intrahepatic ductal dilatation.    LIVER: Echogenic parenchyma with smooth contour. No focal mass.    RIGHT KIDNEY: No hydronephrosis.    PANCREAS: The visualized portions are normal.    No ascites.      Impression    IMPRESSION:  1.  Gallbladder is mildly distended and appears to contain internal shadowing stones. There is also some dirty shadowing visible in the region of the gallbladder wall, possibly gas within the wall. Overall there is limited visualization of the   gallbladder and wall secondary to the patient's body habitus and due to overlying bowel gas. Common duct not well visualized. No obvious intrahepatic ductal dilatation. Patient was tender over the right upper quadrant on sonographic examination. Findings   remain indeterminate but cholecystitis including possibly emphysematous cholecystitis are not excluded. Recommend close clinical followup. If further imaging is clinically indicated, a HIDA scan could be obtained.  2.   Echogenic liver suggestive of hepatic steatosis.       XR Chest Port 1 View    Narrative    EXAM: XR CHEST PORT 1 VIEW  LOCATION: [facility]  DATE: 7/3/2024    INDICATION: r o persistent infiltrate  COMPARISON: 6/24/2024      Impression    IMPRESSION: New trace fluid along the right major fissure. Linear opacities in the left upper lung, likely subsegmental atelectasis. No pneumothorax. Normal cardiomediastinal silhouette.

## 2024-07-05 ENCOUNTER — APPOINTMENT (OUTPATIENT)
Dept: OCCUPATIONAL THERAPY | Facility: CLINIC | Age: 59
DRG: 417 | End: 2024-07-05
Attending: PHYSICIAN ASSISTANT
Payer: MEDICARE

## 2024-07-05 ENCOUNTER — APPOINTMENT (OUTPATIENT)
Dept: PHYSICAL THERAPY | Facility: CLINIC | Age: 59
DRG: 417 | End: 2024-07-05
Payer: MEDICARE

## 2024-07-05 ENCOUNTER — APPOINTMENT (OUTPATIENT)
Dept: CT IMAGING | Facility: CLINIC | Age: 59
DRG: 417 | End: 2024-07-05
Attending: PHYSICIAN ASSISTANT
Payer: MEDICARE

## 2024-07-05 LAB
ANION GAP SERPL CALCULATED.3IONS-SCNC: 10 MMOL/L (ref 7–15)
BUN SERPL-MCNC: 6.7 MG/DL (ref 6–20)
CALCIUM SERPL-MCNC: 8.1 MG/DL (ref 8.6–10)
CHLORIDE SERPL-SCNC: 104 MMOL/L (ref 98–107)
CREAT SERPL-MCNC: 0.52 MG/DL (ref 0.51–0.95)
DEPRECATED HCO3 PLAS-SCNC: 30 MMOL/L (ref 22–29)
EGFRCR SERPLBLD CKD-EPI 2021: >90 ML/MIN/1.73M2
GLUCOSE SERPL-MCNC: 121 MG/DL (ref 70–99)
POTASSIUM SERPL-SCNC: 3.4 MMOL/L (ref 3.4–5.3)
SODIUM SERPL-SCNC: 144 MMOL/L (ref 135–145)

## 2024-07-05 PROCEDURE — 80048 BASIC METABOLIC PNL TOTAL CA: CPT | Performed by: PHYSICIAN ASSISTANT

## 2024-07-05 PROCEDURE — 250N000009 HC RX 250: Performed by: INTERNAL MEDICINE

## 2024-07-05 PROCEDURE — 250N000011 HC RX IP 250 OP 636: Performed by: PHYSICIAN ASSISTANT

## 2024-07-05 PROCEDURE — 94640 AIRWAY INHALATION TREATMENT: CPT | Mod: 76

## 2024-07-05 PROCEDURE — 120N000001 HC R&B MED SURG/OB

## 2024-07-05 PROCEDURE — 999N000157 HC STATISTIC RCP TIME EA 10 MIN

## 2024-07-05 PROCEDURE — 250N000013 HC RX MED GY IP 250 OP 250 PS 637: Performed by: SURGERY

## 2024-07-05 PROCEDURE — 250N000013 HC RX MED GY IP 250 OP 250 PS 637: Performed by: NURSE PRACTITIONER

## 2024-07-05 PROCEDURE — 97165 OT EVAL LOW COMPLEX 30 MIN: CPT | Mod: GO

## 2024-07-05 PROCEDURE — 71275 CT ANGIOGRAPHY CHEST: CPT | Mod: MF

## 2024-07-05 PROCEDURE — 36415 COLL VENOUS BLD VENIPUNCTURE: CPT | Performed by: PHYSICIAN ASSISTANT

## 2024-07-05 PROCEDURE — 250N000013 HC RX MED GY IP 250 OP 250 PS 637: Performed by: INTERNAL MEDICINE

## 2024-07-05 PROCEDURE — 94640 AIRWAY INHALATION TREATMENT: CPT

## 2024-07-05 PROCEDURE — 250N000011 HC RX IP 250 OP 636: Performed by: SURGERY

## 2024-07-05 PROCEDURE — 99232 SBSQ HOSP IP/OBS MODERATE 35: CPT | Performed by: PHYSICIAN ASSISTANT

## 2024-07-05 PROCEDURE — 97535 SELF CARE MNGMENT TRAINING: CPT | Mod: GO

## 2024-07-05 PROCEDURE — 97530 THERAPEUTIC ACTIVITIES: CPT | Mod: GP | Performed by: PHYSICAL THERAPIST

## 2024-07-05 PROCEDURE — 250N000011 HC RX IP 250 OP 636: Performed by: INTERNAL MEDICINE

## 2024-07-05 PROCEDURE — 250N000009 HC RX 250: Performed by: SURGERY

## 2024-07-05 RX ORDER — LEVOFLOXACIN 5 MG/ML
750 INJECTION, SOLUTION INTRAVENOUS EVERY 24 HOURS
Status: DISCONTINUED | OUTPATIENT
Start: 2024-07-05 | End: 2024-07-06 | Stop reason: HOSPADM

## 2024-07-05 RX ORDER — PREDNISONE 20 MG/1
20 TABLET ORAL DAILY
Status: DISCONTINUED | OUTPATIENT
Start: 2024-07-06 | End: 2024-07-06 | Stop reason: HOSPADM

## 2024-07-05 RX ORDER — IOPAMIDOL 755 MG/ML
120 INJECTION, SOLUTION INTRAVASCULAR ONCE
Status: COMPLETED | OUTPATIENT
Start: 2024-07-05 | End: 2024-07-05

## 2024-07-05 RX ORDER — METHYLPREDNISOLONE SODIUM SUCCINATE 125 MG/2ML
125 INJECTION, POWDER, LYOPHILIZED, FOR SOLUTION INTRAMUSCULAR; INTRAVENOUS ONCE
Status: COMPLETED | OUTPATIENT
Start: 2024-07-05 | End: 2024-07-05

## 2024-07-05 RX ORDER — DIPHENHYDRAMINE HYDROCHLORIDE 50 MG/ML
50 INJECTION INTRAMUSCULAR; INTRAVENOUS ONCE
Status: COMPLETED | OUTPATIENT
Start: 2024-07-05 | End: 2024-07-05

## 2024-07-05 RX ORDER — GUAIFENESIN 600 MG/1
600 TABLET, EXTENDED RELEASE ORAL 2 TIMES DAILY
Status: DISCONTINUED | OUTPATIENT
Start: 2024-07-05 | End: 2024-07-06

## 2024-07-05 RX ADMIN — CARBAMAZEPINE 400 MG: 200 TABLET ORAL at 19:56

## 2024-07-05 RX ADMIN — IPRATROPIUM BROMIDE AND ALBUTEROL SULFATE 3 ML: .5; 3 SOLUTION RESPIRATORY (INHALATION) at 07:42

## 2024-07-05 RX ADMIN — METHYLPREDNISOLONE SODIUM SUCCINATE 125 MG: 125 INJECTION, POWDER, FOR SOLUTION INTRAMUSCULAR; INTRAVENOUS at 15:42

## 2024-07-05 RX ADMIN — SODIUM CHLORIDE 100 ML: 9 INJECTION, SOLUTION INTRAVENOUS at 18:41

## 2024-07-05 RX ADMIN — MICONAZOLE NITRATE: 20 POWDER TOPICAL at 08:57

## 2024-07-05 RX ADMIN — IOPAMIDOL 100 ML: 755 INJECTION, SOLUTION INTRAVENOUS at 18:36

## 2024-07-05 RX ADMIN — ENOXAPARIN SODIUM 40 MG: 40 INJECTION SUBCUTANEOUS at 19:58

## 2024-07-05 RX ADMIN — ENOXAPARIN SODIUM 40 MG: 40 INJECTION SUBCUTANEOUS at 06:06

## 2024-07-05 RX ADMIN — CARBAMAZEPINE 400 MG: 200 TABLET ORAL at 09:02

## 2024-07-05 RX ADMIN — ACETAMINOPHEN 650 MG: 325 TABLET, FILM COATED ORAL at 04:23

## 2024-07-05 RX ADMIN — MICONAZOLE NITRATE: 20 POWDER TOPICAL at 19:57

## 2024-07-05 RX ADMIN — FLUTICASONE FUROATE AND VILANTEROL TRIFENATATE 1 PUFF: 100; 25 POWDER RESPIRATORY (INHALATION) at 07:42

## 2024-07-05 RX ADMIN — LISINOPRIL 20 MG: 20 TABLET ORAL at 09:02

## 2024-07-05 RX ADMIN — DIPHENHYDRAMINE HYDROCHLORIDE 50 MG: 50 INJECTION INTRAMUSCULAR; INTRAVENOUS at 15:42

## 2024-07-05 RX ADMIN — OXYCODONE HYDROCHLORIDE 5 MG: 5 TABLET ORAL at 12:03

## 2024-07-05 RX ADMIN — LEVOFLOXACIN 750 MG: 5 INJECTION, SOLUTION INTRAVENOUS at 16:02

## 2024-07-05 RX ADMIN — ALBUTEROL SULFATE 2.5 MG: 2.5 SOLUTION RESPIRATORY (INHALATION) at 04:23

## 2024-07-05 RX ADMIN — IPRATROPIUM BROMIDE AND ALBUTEROL SULFATE 3 ML: .5; 3 SOLUTION RESPIRATORY (INHALATION) at 15:52

## 2024-07-05 RX ADMIN — CEFTRIAXONE 2 G: 2 INJECTION, POWDER, FOR SOLUTION INTRAMUSCULAR; INTRAVENOUS at 08:57

## 2024-07-05 RX ADMIN — IPRATROPIUM BROMIDE AND ALBUTEROL SULFATE 3 ML: .5; 3 SOLUTION RESPIRATORY (INHALATION) at 19:10

## 2024-07-05 RX ADMIN — GUAIFENESIN 600 MG: 600 TABLET ORAL at 20:09

## 2024-07-05 RX ADMIN — CARBAMAZEPINE 200 MG: 200 TABLET ORAL at 12:03

## 2024-07-05 RX ADMIN — IPRATROPIUM BROMIDE AND ALBUTEROL SULFATE 3 ML: .5; 3 SOLUTION RESPIRATORY (INHALATION) at 12:03

## 2024-07-05 RX ADMIN — PANTOPRAZOLE SODIUM 40 MG: 40 TABLET, DELAYED RELEASE ORAL at 09:02

## 2024-07-05 RX ADMIN — SERTRALINE 100 MG: 100 TABLET, FILM COATED ORAL at 09:02

## 2024-07-05 ASSESSMENT — ACTIVITIES OF DAILY LIVING (ADL)
ADLS_ACUITY_SCORE: 42
ADLS_ACUITY_SCORE: 39
ADLS_ACUITY_SCORE: 42
ADLS_ACUITY_SCORE: 46
ADLS_ACUITY_SCORE: 42
ADLS_ACUITY_SCORE: 39
ADLS_ACUITY_SCORE: 47
ADLS_ACUITY_SCORE: 46
ADLS_ACUITY_SCORE: 39
ADLS_ACUITY_SCORE: 46
ADLS_ACUITY_SCORE: 47
ADLS_ACUITY_SCORE: 46
ADLS_ACUITY_SCORE: 46
ADLS_ACUITY_SCORE: 42
ADLS_ACUITY_SCORE: 42
ADLS_ACUITY_SCORE: 47
ADLS_ACUITY_SCORE: 42
ADLS_ACUITY_SCORE: 47
ADLS_ACUITY_SCORE: 42
ADLS_ACUITY_SCORE: 39
ADLS_ACUITY_SCORE: 46

## 2024-07-05 NOTE — PLAN OF CARE
"[id number]    Inpatient Progress Note:  A & O X 4. Lung sounds diminished. Expiratory wheezes noted. Patient on 3 LPM supplemental oxygen. Frequent loose productive cough noted. Denies  chills, shortness of breath, nausea, vomit, or diarrhea. Bowel sounds present all quads and active. Incontinent of bowel and bladder. On scheduled Miconazole powder-See MAR. PIV saline lock. Admitting DX: Severe acute cholecystitis with gangrenous, s/p laparoscopic cholecystectomy , Acute postoperative hypoxic respiratory failure.  Lap rigo surgical site X 4-dressing CDI. On  continuous pulse oximetry. On regular diet. Patient on IV Rocephin once daily. Pain is managed with Tylenol, and Oxycodone. C/O pain rating at 9 out of 10, Oxycodone given X 1. Prn Duoneb, and albuterol treatment available.  A X 2 with cares. PT recommended TCU. Referrals sent. PT/Respiratory/SW following.   BP (!) 177/74 (BP Location: Left arm)   Pulse 75   Temp 97.9  F (36.6  C) (Oral)   Resp 18   Ht 1.702 m (5' 7\")   Wt (!) 186.2 kg (410 lb 9.6 oz)   LMP 08/18/2008   SpO2 94%   BMI 64.31 kg/m       Will continue to provide supportive cares.     Anahi Lora RN   Problem: Adult Inpatient Plan of Care  Goal: Plan of Care Review  Description: The Plan of Care Review/Shift note should be completed every shift.  The Outcome Evaluation is a brief statement about your assessment that the patient is improving, declining, or no change.  This information will be displayed automatically on your shift  note.  Outcome: Progressing  Flowsheets (Taken 7/4/2024 4663)  Plan of Care Reviewed With: patient  Goal: Patient-Specific Goal (Individualized)  Description: You can add care plan individualizations to a care plan. Examples of Individualization might be:  \"Parent requests to be called daily at 9am for status\", \"I have a hard time hearing out of my right ear\", or \"Do not touch me to wake me up as it startles  me\".  Outcome: Progressing  Goal: Absence of " Hospital-Acquired Illness or Injury  Outcome: Progressing  Intervention: Identify and Manage Fall Risk  Recent Flowsheet Documentation  Taken 7/4/2024 2057 by Anahi Lora RN  Safety Promotion/Fall Prevention:   activity supervised   supervised activity   safety round/check completed   room organization consistent   room near nurse's station   clutter free environment maintained  Intervention: Prevent Skin Injury  Recent Flowsheet Documentation  Taken 7/4/2024 2057 by Anahi Lora RN  Body Position: position changed independently  Skin Protection: adhesive use limited  Device Skin Pressure Protection:   adhesive use limited   tubing/devices free from skin contact  Intervention: Prevent and Manage VTE (Venous Thromboembolism) Risk  Recent Flowsheet Documentation  Taken 7/4/2024 2057 by Anahi Lora RN  VTE Prevention/Management: SCDs on (sequential compression devices)  Intervention: Prevent Infection  Recent Flowsheet Documentation  Taken 7/4/2024 2057 by Anahi Lora RN  Infection Prevention:   rest/sleep promoted   single patient room provided  Goal: Optimal Comfort and Wellbeing  Outcome: Progressing  Intervention: Monitor Pain and Promote Comfort  Recent Flowsheet Documentation  Taken 7/4/2024 2056 by Anahi Lora RN  Pain Management Interventions: medication (see MAR)  Goal: Readiness for Transition of Care  Outcome: Progressing     Problem: Pain Acute  Goal: Optimal Pain Control and Function  Outcome: Progressing  Intervention: Develop Pain Management Plan  Recent Flowsheet Documentation  Taken 7/4/2024 2056 by Anahi Lora RN  Pain Management Interventions: medication (see MAR)  Intervention: Prevent or Manage Pain  Recent Flowsheet Documentation  Taken 7/4/2024 2057 by Anahi Lora RN  Sensory Stimulation Regulation:   lighting decreased   quiet environment promoted   television on  Medication Review/Management: medications reviewed  Intervention:  Optimize Psychosocial Wellbeing  Recent Flowsheet Documentation  Taken 7/4/2024 2057 by Anahi Lora RN  Supportive Measures:   active listening utilized   decision-making supported     Problem: Skin Injury Risk Increased  Goal: Skin Health and Integrity  Outcome: Progressing  Intervention: Plan: Nurse Driven Intervention: Moisture Management  Recent Flowsheet Documentation  Taken 7/4/2024 2057 by Anahi Lora RN  Moisture Interventions: Encourage regular toileting  Bathing/Skin Care: incontinence care  Intervention: Plan: Nurse Driven Intervention: Friction and Shear  Recent Flowsheet Documentation  Taken 7/4/2024 2057 by Anahi Lora RN  Friction/Shear Interventions: HOB 30 degrees or less  Intervention: Optimize Skin Protection  Recent Flowsheet Documentation  Taken 7/4/2024 2057 by Anahi Lora RN  Pressure Reduction Techniques: weight shift assistance provided  Pressure Reduction Devices: alternating pressure pump (CROW)  Skin Protection: adhesive use limited  Activity Management: activity adjusted per tolerance  Head of Bed (HOB) Positioning: HOB at 20-30 degrees     Problem: Infection  Goal: Absence of Infection Signs and Symptoms  Outcome: Progressing         Plan of Care Reviewed With: patient

## 2024-07-05 NOTE — PLAN OF CARE
"[id number]    Inpatient Progress Note:  A & O X 4. Lung sounds diminished. Expiratory wheezes noted. Prn albuterol given -See MAR. Patient on 3 LPM supplemental oxygen. Frequent loose productive cough noted. Denies  chills, shortness of breath, nausea, vomit, or diarrhea. Bowel sounds present all quads and active. Incontinent of bowel and bladder. On scheduled Miconazole powder-See MAR. PIV saline lock.Patient on scheduled Lovenox Q 12 hours.  Admitting DX: Severe acute cholecystitis with gangrenous, s/p laparoscopic cholecystectomy , Acute postoperative hypoxic respiratory failure.  Lap rigo surgical site X 4-dressing CDI. On  continuous pulse oximetry. On regular diet. Patient on IV Rocephin once daily. Pain is managed with Tylenol, and Oxycodone. C/O pain rating at 9 out of 10, Oxycodone, and Tylenol given X 1-See MAR. Prn Duoneb, and albuterol treatment available.  A X 2 with cares. PT recommended TCU. Referrals sent. PT/Respiratory/SW following.   /71 (BP Location: Left arm)   Pulse 85   Temp 97.9  F (36.6  C) (Oral)   Resp 18   Ht 1.702 m (5' 7\")   Wt (!) 184.9 kg (407 lb 9.6 oz)   LMP 08/18/2008   SpO2 90%   BMI 63.84 kg/m       Will continue to provide supportive cares.     Anahi Lora RN   Problem: Adult Inpatient Plan of Care  Goal: Plan of Care Review  Description: The Plan of Care Review/Shift note should be completed every shift.  The Outcome Evaluation is a brief statement about your assessment that the patient is improving, declining, or no change.  This information will be displayed automatically on your shift  note.  Outcome: Progressing  Flowsheets (Taken 7/4/2024 5932)  Plan of Care Reviewed With: patient  Goal: Patient-Specific Goal (Individualized)  Description: You can add care plan individualizations to a care plan. Examples of Individualization might be:  \"Parent requests to be called daily at 9am for status\", \"I have a hard time hearing out of my right ear\", or \"Do not " "touch me to wake me up as it startles  me\".  Outcome: Progressing  Goal: Absence of Hospital-Acquired Illness or Injury  Outcome: Progressing  Intervention: Identify and Manage Fall Risk  Recent Flowsheet Documentation  Taken 7/4/2024 2057 by Anahi Lora RN  Safety Promotion/Fall Prevention:   activity supervised   supervised activity   safety round/check completed   room organization consistent   room near nurse's station   clutter free environment maintained  Intervention: Prevent Skin Injury  Recent Flowsheet Documentation  Taken 7/4/2024 2057 by Anahi Lora RN  Body Position: position changed independently  Skin Protection: adhesive use limited  Device Skin Pressure Protection:   adhesive use limited   tubing/devices free from skin contact  Intervention: Prevent and Manage VTE (Venous Thromboembolism) Risk  Recent Flowsheet Documentation  Taken 7/4/2024 2057 by Anahi Lora RN  VTE Prevention/Management: SCDs on (sequential compression devices)  Intervention: Prevent Infection  Recent Flowsheet Documentation  Taken 7/4/2024 2057 by Anahi Lora RN  Infection Prevention:   rest/sleep promoted   single patient room provided  Goal: Optimal Comfort and Wellbeing  Outcome: Progressing  Intervention: Monitor Pain and Promote Comfort  Recent Flowsheet Documentation  Taken 7/4/2024 2056 by Anahi Lora RN  Pain Management Interventions: medication (see MAR)  Goal: Readiness for Transition of Care  Outcome: Progressing     Problem: Pain Acute  Goal: Optimal Pain Control and Function  Outcome: Progressing  Intervention: Develop Pain Management Plan  Recent Flowsheet Documentation  Taken 7/4/2024 2056 by Anahi Lora RN  Pain Management Interventions: medication (see MAR)  Intervention: Prevent or Manage Pain  Recent Flowsheet Documentation  Taken 7/4/2024 2057 by Anahi Lora RN  Sensory Stimulation Regulation:   lighting decreased   quiet environment promoted   " television on  Medication Review/Management: medications reviewed  Intervention: Optimize Psychosocial Wellbeing  Recent Flowsheet Documentation  Taken 7/4/2024 2057 by Anahi Lora RN  Supportive Measures:   active listening utilized   decision-making supported     Problem: Skin Injury Risk Increased  Goal: Skin Health and Integrity  Outcome: Progressing  Intervention: Plan: Nurse Driven Intervention: Moisture Management  Recent Flowsheet Documentation  Taken 7/4/2024 2057 by Anahi Lora RN  Moisture Interventions: Encourage regular toileting  Bathing/Skin Care: incontinence care  Intervention: Plan: Nurse Driven Intervention: Friction and Shear  Recent Flowsheet Documentation  Taken 7/4/2024 2057 by Anahi Lora RN  Friction/Shear Interventions: HOB 30 degrees or less  Intervention: Optimize Skin Protection  Recent Flowsheet Documentation  Taken 7/4/2024 2057 by Anahi Lora RN  Pressure Reduction Techniques: weight shift assistance provided  Pressure Reduction Devices: alternating pressure pump (CROW)  Skin Protection: adhesive use limited  Activity Management: activity adjusted per tolerance  Head of Bed (HOB) Positioning: HOB at 20-30 degrees     Problem: Infection  Goal: Absence of Infection Signs and Symptoms  Outcome: Progressing         Plan of Care Reviewed With: patient

## 2024-07-05 NOTE — PROGRESS NOTES
[facility]  Hospitalist Progress Note  Aretha Niño PA-C 07/05/2024           Assessment and Plan:      59-year-old female with history of asthma, hypertension, severe obesity, obstructive sleep apnea, seizure disorder, developmental delay, was admitted to [facility] on 7/1/2024 with acute calculus cholecystitis. Patient underwent laparoscopic cholecystectomy on 7/1/2024 where she was found to have severe acute cholecystitis with gangrenous changes. She also had placement of intraperitoneal drain and continues on IV fluids, IV antibiotics while monitoring output from the drain. This was a complicated gangrenous cholecystitis that will require ongoing monitoring in the hospital after surgery.  Physical therapy is recommending TCU placement.     Acute postoperative hypoxic respiratory failure  Hx of Asthma with mild exacerbation   Untreated RICK  Suspect OHS  Post op requiring 3-4L oximask to maintain sats of mid 90's.   Likely hypoxia is multifactorial including untreated RICK, likley obesity hypoventilation syndrome, Dec pulm volume due to abdominal surgery and hx of asthma.  She was also recently admitted to the hospital from 5/2-5/9 for acute asthma exacerbation with atypical PNA and recommended outpt initiation of steroid inhaler/LABA  - Started Breo inhlaer 7/3 and duonebs with improved breathing, no wheezing   - CXR 7/3 shows no infiltrate but + Bl atelectasis  - Feeling better but still with Sats in te 80's with any activity, will check CT chest to r/o PE  - If negative for PE then will add Prednisone 20 mg for asthma exacerbation   -Main dayna now is pt's lack of willingness to get OOB and do IS, fortunately RN able to get her up at edge of bed and improving somewhat on her mobility   - Cont to increase mobility  - Start to wean O2, I suspect she runs low 90's at baseline , will aim for O2 sats of 89-92%  -Encourage incentive spirometry with flutter valve  - Aggressive IS    "  Acute cholecystitis with gangrenous changes  -Status post laparoscopic cholecystectomy on 7/1/2024 -changed to inpatient status  -MARINE drain removed on 7/3/2024  -Currently on Rocephin  - Cx shows citrobacter freundii, flebseilla and H. Parainfluenza and Strep Constellatus.   Citrobacter is resistant to Rocephin so will change to IV levaquin 750 today   -Multimodal pain management     Intertrigo:  -Miconazole powder  -WOC consult appreciated      Hypertension  -Resume lisinopril.  Repeat BMP in the AM.     Hyperlipidemia  -Resume statin at discharge     GERD  -Continue PTA PPI.     Superobesity: BMI 62.  Complicates cares as noted above.     #Developmental delay: Her parents are listed as her guardians.  She has a longtime significant other.  Her mother states that they had a \"wedding ceremony \"but legally are not .  Next of kin is technically her parents who remain available to help as needed.  However, her parents are in their 80s and her mother recently had knee surgery and is recovering herself.      Diet: Advance Diet as Tolerated: regular diet   DVT Prophylaxis: Pneumatic Compression Devices, start Lovenox 40mg BID or per pharmacy dosing   Grajeda Catheter: Not present  Lines: None     Cardiac Monitoring: None  Code Status: Full Code        # Hypocalcemia: Limproved     # Hypoalbuminemia: Lowest albumin = 3.2 g/dL at 7/2/2024  7:17 AM, will monitor as appropriate     # Hypertension: Noted on problem list         Disposition Plan     Medically Ready for Discharge: close to medical readiness,  ok to discharge to TCU once bed available. Likely will need short term O2.            Interval History (Subjective):      Feeling better today. Less SOB  Trying to get more activity                  Physical Exam:      Last Vital Signs:  BP (!) 173/72 (BP Location: Left arm, Patient Position: Semi-South's, Cuff Size: Adult Regular)   Pulse 72   Temp 98.2  F (36.8  C) (Oral)   Resp 18   Ht 1.702 m (5' 7\")   Wt " (!) 184.9 kg (407 lb 9.6 oz)   LMP 08/18/2008   SpO2 96%   BMI 63.84 kg/m        Constitutional: Awake, alert, cooperative, no apparent distress     Respiratory: Sl wheeze on exam  no crackles or wheezing   Cardiovascular: Regular rate and rhythm, normal S1 and S2, and no murmur noted   Abdomen: Normal bowel sounds, soft, non-distended, non-tender   Skin: No rashes, no cyanosis, dry to touch   Neuro: Alert and oriented x3, no weakness, numbness, memory loss   Extremities: No edema, normal range of motion   Other(s):        All other systems: Negative          Medications:      All current medications were reviewed with changes reflected in problem list.         Data:      All new lab and imaging data was reviewed.   Labs:       Lab Results   Component Value Date     07/05/2024     07/02/2024     07/01/2024     06/09/2021     05/10/2021     05/03/2021    Lab Results   Component Value Date    CHLORIDE 104 07/05/2024    CHLORIDE 102 07/02/2024    CHLORIDE 109 07/01/2024    CHLORIDE 108 05/07/2022    CHLORIDE 106 05/05/2022    CHLORIDE 107 06/09/2021    CHLORIDE 108 05/10/2021    CHLORIDE 107 05/03/2021    Lab Results   Component Value Date    BUN 6.7 07/05/2024    BUN 11.2 07/02/2024    BUN 18.1 07/01/2024    BUN 13 05/07/2022    BUN 18 05/05/2022    BUN 15 06/09/2021    BUN 13 05/10/2021    BUN 17 05/03/2021      Lab Results   Component Value Date    POTASSIUM 3.4 07/05/2024    POTASSIUM 3.5 07/02/2024    POTASSIUM 4.0 07/01/2024    POTASSIUM 3.7 05/07/2022    POTASSIUM 4.2 05/05/2022    POTASSIUM 4.2 06/09/2021    POTASSIUM 3.6 05/10/2021    POTASSIUM 3.7 05/03/2021    Lab Results   Component Value Date    CO2 30 07/05/2024    CO2 26 07/02/2024    CO2 22 07/01/2024    CO2 27 05/07/2022    CO2 29 05/05/2022    CO2 28 06/09/2021    CO2 28 05/10/2021    CO2 23 05/03/2021    Lab Results   Component Value Date    CR 0.52 07/05/2024    CR 0.65 07/02/2024    CR 0.81 07/01/2024    CR  0.69 06/09/2021    CR 0.55 05/10/2021    CR 0.56 05/03/2021        Recent Labs   Lab 07/02/24  0717 07/01/24  0015   WBC 9.0 8.5   HGB 11.4* 13.8   HCT 37.0 44.3   MCV 94 93    239      Imaging:   Results for orders placed or performed during the hospital encounter of 07/01/24   CT Abdomen Pelvis w/o Contrast    Narrative    EXAM: CT ABDOMEN PELVIS W/O CONTRAST  LOCATION: [facility]  DATE: 7/1/2024    INDICATION: LLQ abdominal pain.  COMPARISON: CT abdomen and pelvis on 5/5/2022.  TECHNIQUE: CT scan of the abdomen and pelvis was performed without intravenous contrast Multiplanar reformats were obtained. Dose reduction techniques were used.    FINDINGS:   LOWER CHEST: Basilar pulmonary opacities, likely atelectasis.    ABDOMEN/PELVIS: Limited evaluation of the abdominal organs due to lack of intravenous contrast.    HEPATOBILIARY: No suspicious focal hepatic lesion. The gallbladder is distended with mild diffuse gallbladder wall thickening and small focus of gas along the gallbladder wall.    PANCREAS: The unenhanced pancreas is grossly unremarkable.    SPLEEN: No splenomegaly. Small splenule along the spleen.    ADRENAL GLANDS: No adrenal nodules.    KIDNEYS/BLADDER: No radiodense kidney/ureteral stones or hydronephrosis in either kidney.    BOWEL: No abnormally dilated bowel loops. The appendix is visualized and appears normal.    PERITONEUM: No evidence of free fluid in the abdomen and pelvis. No free peritoneal or portal venous gas.    PELVIC ORGANS: 2 cm left adnexal cyst, not significantly changed as compared to 5/5/2022 exam, likely ovarian.    VASCULATURE: There is an IVC filter.    LYMPH NODES: Multiple enlarged inguinal lymph nodes, not significantly changed as compared to 5/5/2022 exam.    MUSCULOSKELETAL: Postsurgical changes of the anterior pelvis and left sacroiliac joint. Multilevel degenerative changes of the spine.      Impression    IMPRESSION:   1. Distended  gallbladder with mild diffuse gallbladder wall thickening, and small focus of gas along the gallbladder wall, findings worrisome for acute cholecystitis, which could be emphysematous cholecystitis, recommend further evaluation with right   upper quadrant abdominal ultrasound.  2. Multiple enlarged inguinal lymph nodes, not significantly changed as compared to 5/5/2022 exam, remains indeterminate.     US Abdomen Limited    Narrative    EXAM: US ABDOMEN LIMITED  LOCATION: [facility]  DATE: 7/1/2024    INDICATION: abdominal pain  COMPARISON: Same day CT  TECHNIQUE: Limited abdominal ultrasound.    FINDINGS:    GALLBLADDER: Gallbladder is mildly distended and appears to contain internal shadowing stones. There is also some dirty shadowing visible in the region of the gallbladder wall, possibly gas within the wall. Overall there is limited visualization of the   gallbladder and wall secondary to the patient's body habitus and due to overlying bowel gas. Patient was tender over the right upper quadrant on sonographic examination.    BILE DUCTS: Common duct not well visualized. No obvious intrahepatic ductal dilatation.    LIVER: Echogenic parenchyma with smooth contour. No focal mass.    RIGHT KIDNEY: No hydronephrosis.    PANCREAS: The visualized portions are normal.    No ascites.      Impression    IMPRESSION:  1.  Gallbladder is mildly distended and appears to contain internal shadowing stones. There is also some dirty shadowing visible in the region of the gallbladder wall, possibly gas within the wall. Overall there is limited visualization of the   gallbladder and wall secondary to the patient's body habitus and due to overlying bowel gas. Common duct not well visualized. No obvious intrahepatic ductal dilatation. Patient was tender over the right upper quadrant on sonographic examination. Findings   remain indeterminate but cholecystitis including possibly emphysematous cholecystitis are not  excluded. Recommend close clinical followup. If further imaging is clinically indicated, a HIDA scan could be obtained.  2.  Echogenic liver suggestive of hepatic steatosis.       XR Chest Port 1 View    Narrative    EXAM: XR CHEST PORT 1 VIEW  LOCATION: [facility]  DATE: 7/3/2024    INDICATION: r o persistent infiltrate  COMPARISON: 6/24/2024      Impression    IMPRESSION: New trace fluid along the right major fissure. Linear opacities in the left upper lung, likely subsegmental atelectasis. No pneumothorax. Normal cardiomediastinal silhouette.

## 2024-07-05 NOTE — PLAN OF CARE
"Goal Outcome Evaluation:      Plan of Care Reviewed With: patient    Overall Patient Progress: improvingOverall Patient Progress: improving    Outcome Evaluation: Up to commode. Up to chair.    Care from [id number]      Inpatient Progress Note:  For complete assessment see flow sheet documentation.    BP (!) 173/72 (BP Location: Left arm, Patient Position: Semi-South's, Cuff Size: Adult Regular)   Pulse 72   Temp 98.2  F (36.8  C) (Oral)   Resp 18   Ht 1.702 m (5' 7\")   Wt (!) 184.9 kg (407 lb 9.6 oz)   LMP 08/18/2008   SpO2 96%   BMI 63.84 kg/m            Orientation: WDL  Neuro: WDL  Pain status: back pain 10/10, 5 mg Oxy given, pain decreased to 4/10  Activity: Up to commode, Up to chair with walker, GB. SBA. Not able to stand for long - Pt states they are too weak  Resp: wheezing on auscultation, scheduled neb given, Pt using flutter valve with encouragement, 3L O2 via NC in use, dyspnea on exertion  Cardiac: hypertensive, cardiac meds given  GI: liquid stool, incontinent of stool  : incontinent of urine  Skin: wound cares performed per order. Because of liquid stool, mirna area cleaned and powdered PRN. Pt's skin raw with numerous small areas that are open and bleeding.   Infusions: Rocephin q24 hours    Diet: regular    Discharge plan: skilled nursing facility for rehabilitation        Will Continue to monitor and provide cares.    Caterina Whittaker RN  "

## 2024-07-05 NOTE — PROGRESS NOTES
Care Management Follow Up      Expected Discharge Date: 07/06/2024     Concerns to be Addressed: Discharge planning  Patient plan of care discussed at interdisciplinary rounds: Yes    Anticipated Discharge Disposition: [facility] TCU, [id number]       Anticipated Discharge Services: PT/OT     Patient/family educated on Medicare website which has current facility and service quality ratings: Yes   Education Provided on the Discharge Plan: Yes   Patient/Family in Agreement with the Plan: Yes     Referrals Placed by CM/SW: Skilled Nursing Facility    Private pay costs discussed: Not applicable    Additional Information:  SW following for TCU placement. SW updated patient's father/co-guardian Adam ([phone]). Updated that initial two referrals that were sent are still pending and may not have admissions staff until Monday. Updated that additional TCU referrals were sent for consideration as pt will likely be medically ready tomorrow.     1600: [facility] has clinically accepted patient and has a bariatric bed and private room available tomorrow, 7/6. Spoke with provider who anticipates patient will be medically stable tomorrow. SW spoke with patient's co-guardians via phone. They are agreeable with proceeding with a discharge to [facility] when medically stable. PAS completed. SW to call TCU at [phone] (PAULA Zhu) to confirm discharge Saturday and fax orders to ([phone]).    Reviewed out of pocket cost for [facility] stretcher transport, $1117.00 for base rate and $26.06 per mile to the destination. Pt/family expressed understanding and are agreeable to this.      Patient requires stretcher transportation due to BMI, bariatric status, requiring O2    Stretcher transportation has been arranged for Saturday, 7/6, [id number]. Updated facility and provider on transport time for tomorrow.    Ambulance PCS form completed and placed in patient chart. Ambulance PCS form should be  given to transportation team.    SW received a call from patient's [facility] AIDEN Shane [phone]. Updated on discharge plans for TCU.   SW received VM from [facility] [phone]. [phone]. Updated on discharge plans for TCU.     AISSATOU Srivastava, [facility]   Inpatient Care Coordination  [facility]   [phone]

## 2024-07-05 NOTE — PROGRESS NOTES
"   07/05/24 1300   Appointment Info   Signing Clinician's Name / Credentials (OT) Grisel Henrandez, OTD, OTR/L   Living Environment   People in Home spouse   Current Living Arrangements apartment   Home Accessibility no concerns   Transportation Anticipated family or friend will provide   Living Environment Comments Pt lives with spouse in apartment, tub shower with shower bench, SHT.   Self-Care   Usual Activity Tolerance moderate   Current Activity Tolerance poor   Regular Exercise No   Equipment Currently Used at Home other (see comments)  (bariatric 4WW)   Fall history within last six months no   Activity/Exercise/Self-Care Comment Pt ind in BADLs including g/h cares, toileting, dressing.   Instrumental Activities of Daily Living (IADL)   IADL Comments Per pt report, pts spouse assist with most IADLs including cooking, cleaning, laundry, & heavier ADLs.   General Information   Onset of Illness/Injury or Date of Surgery 07/01/24   Referring Physician Aretha Niño PA-C   Patient/Family Therapy Goal Statement (OT) did not state   Additional Occupational Profile Info/Pertinent History of Current Problem Per chart: \"59-year-old female with history of asthma, hypertension, severe obesity, obstructive sleep apnea, seizure disorder, developmental delay, was admitted to [facility] on 7/1/2024 with acute calculus cholecystitis. Patient underwent laparoscopic cholecystectomy on 7/1/2024 where she was found to have severe acute cholecystitis with gangrenous changes. She also had placement of intraperitoneal drain and continues on IV fluids, IV antibiotics while monitoring output from the drain. This was a complicated gangrenous cholecystitis that will require ongoing monitoring in the hospital after surgery. \"   Existing Precautions/Restrictions fall;oxygen therapy device and L/min   Cognitive Status Examination   Orientation Status orientation to person, place and time   Visual Perception   Visual " Impairment/Limitations corrective lenses full-time   Sensory   Sensory Quick Adds sensation intact   Sensory Comments No new reports of N/T   Pain Assessment   Patient Currently in Pain Yes, see Vital Sign flowsheet   Posture   Posture forward head position;protracted shoulders   Range of Motion Comprehensive   Comment, General Range of Motion BUE WFL   Strength Comprehensive (MMT)   Comment, General Manual Muscle Testing (MMT) Assessment BUE WFL, global weakness and deconditioning with decreased activity tolerance   Bed Mobility   Comment (Bed Mobility) Did not assess-defer to PT   Transfers   Transfer Comments Pt declined any OOB activity this date   Balance   Balance Comments Pt declined any OOB activity this date   Activities of Daily Living   BADL Assessment/Intervention lower body dressing;toileting;bathing   Bathing Assessment/Intervention   Howard Level (Bathing) dependent (less than 25% patient effort)   Lower Body Dressing Assessment/Training   Comment, (Lower Body Dressing) Max A without AE   Toileting   Howard Level (Toileting) dependent (less than 25% patient effort)   Clinical Impression   Criteria for Skilled Therapeutic Interventions Met (OT) Yes, treatment indicated   OT Diagnosis Impaired ADLs   Influenced by the following impairments acute calculus cholecystitis   OT Problem List-Impairments impacting ADL problems related to;activity tolerance impaired;strength   Assessment of Occupational Performance 3-5 Performance Deficits   Identified Performance Deficits dressing, bathing, toileting, home mgmt   Planned Therapy Interventions (OT) ADL retraining;transfer training;progressive activity/exercise;risk factor education   Clinical Decision Making Complexity (OT) problem focused assessment/low complexity   Risk & Benefits of therapy have been explained evaluation/treatment results reviewed;care plan/treatment goals reviewed;risks/benefits reviewed;current/potential barriers  reviewed;participants voiced agreement with care plan;participants included;patient   OT Total Evaluation Time   OT Eval, Low Complexity Minutes (28639) 6   OT Goals   Therapy Frequency (OT) 5 times/week   OT Predicted Duration/Target Date for Goal Attainment 07/12/24   OT Goals Lower Body Dressing;Hygiene/Grooming;Lower Body Bathing;Toilet Transfer/Toileting;OT Goal 1   OT: Hygiene/Grooming modified independent   OT: Lower Body Dressing Modified independent   OT: Lower Body Bathing Minimal assist;using adaptive equipment   OT: Toilet Transfer/Toileting Minimal assist;toilet transfer;cleaning and garment management;using adaptive equipment   OT: Goal 1 Pt will participate in BUE HEP to increase endurance and strength for future ADLS prior to discharge.   Interventions   Interventions Quick Adds Self-Care/Home Management   Self-Care/Home Management   Self-Care/Home Mgmt/ADL, Compensatory, Meal Prep Minutes (53946) 14   Symptoms Noted During/After Treatment (Meal Preparation/Planning Training) fatigue   Treatment Detail/Skilled Intervention Pt greeted supine in bed with HOB raised and agreeable to therapy session. Pt participated in g/h cares with set-up assist including face washing and toothbrushing. Pt demonstrated functional strength and ROM with BUEs throughout session while particiapting in ADLs. Pt required max A to doff/don socks on BLEs due to decreased functional reach towards LB. At baseline pt uses sock aid and reacher for dressing lower body. Pt declined further ADLs or any OOB activity this date despite encouragment and disucssed therapy benefits for d/c recommendations. Pt left in bed with all immediate  needs met and call light within reach.   OT Discharge Planning   OT Plan Commode tx, LB dressing with AE, seated g/h cares   OT Discharge Recommendation (DC Rec) Transitional Care Facility   OT Rationale for DC Rec Pt below functional baseline in ADLs and mobility. Pt requires ax1 for all ADLs and txs  at this time. Would benefit from TCU to increase strength and endurance prior to safely returning home. At this time pt would not tolerate 3 hours of therapy for d/c to ARU. Will continue to follow for IP OT.   OT Brief overview of current status Declined any OOB activity-g/h cares with SBA   Total Session Time   Timed Code Treatment Minutes 14   Total Session Time (sum of timed and untimed services) 20

## 2024-07-05 NOTE — PROGRESS NOTES
Care Management Follow Up    Length of Stay (days): 3    Expected Discharge Date: 07/06/2024     Concerns to be Addressed: coping/stress, decision making, compliance issue     Patient plan of care discussed at interdisciplinary rounds: Yes     Private pay costs discussed: transportation costs    Additional Information:    You have successfully submitted the preadmission screening (PAS) to the [employer] on:  Created On  7/5/2024 3:49 PM    Your confirmation number is:  [id number]    AISSATOU Macdonald, [facility]  Inpatient Care Coordination  [facility]  [phone]

## 2024-07-06 ENCOUNTER — MEDICAL CORRESPONDENCE (OUTPATIENT)
Dept: HEALTH INFORMATION MANAGEMENT | Facility: CLINIC | Age: 59
End: 2024-07-06

## 2024-07-06 VITALS
OXYGEN SATURATION: 92 % | WEIGHT: 293 LBS | SYSTOLIC BLOOD PRESSURE: 157 MMHG | RESPIRATION RATE: 18 BRPM | TEMPERATURE: 97.9 F | BODY MASS INDEX: 45.99 KG/M2 | HEART RATE: 80 BPM | DIASTOLIC BLOOD PRESSURE: 88 MMHG | HEIGHT: 67 IN

## 2024-07-06 LAB
ANION GAP SERPL CALCULATED.3IONS-SCNC: 13 MMOL/L (ref 7–15)
BUN SERPL-MCNC: 8.4 MG/DL (ref 6–20)
CALCIUM SERPL-MCNC: 8.1 MG/DL (ref 8.6–10)
CHLORIDE SERPL-SCNC: 99 MMOL/L (ref 98–107)
CREAT SERPL-MCNC: 0.49 MG/DL (ref 0.51–0.95)
DEPRECATED HCO3 PLAS-SCNC: 27 MMOL/L (ref 22–29)
EGFRCR SERPLBLD CKD-EPI 2021: >90 ML/MIN/1.73M2
ERYTHROCYTE [DISTWIDTH] IN BLOOD BY AUTOMATED COUNT: 13.5 % (ref 10–15)
GLUCOSE SERPL-MCNC: 96 MG/DL (ref 70–99)
HCT VFR BLD AUTO: 37 % (ref 35–47)
HGB BLD-MCNC: 11.4 G/DL (ref 11.7–15.7)
MAGNESIUM SERPL-MCNC: 1.9 MG/DL (ref 1.7–2.3)
MCH RBC QN AUTO: 28.4 PG (ref 26.5–33)
MCHC RBC AUTO-ENTMCNC: 30.8 G/DL (ref 31.5–36.5)
MCV RBC AUTO: 92 FL (ref 78–100)
PHOSPHATE SERPL-MCNC: 3.1 MG/DL (ref 2.5–4.5)
PLATELET # BLD AUTO: 198 10E3/UL (ref 150–450)
POTASSIUM SERPL-SCNC: 3.8 MMOL/L (ref 3.4–5.3)
RBC # BLD AUTO: 4.02 10E6/UL (ref 3.8–5.2)
SODIUM SERPL-SCNC: 139 MMOL/L (ref 135–145)
WBC # BLD AUTO: 6.7 10E3/UL (ref 4–11)

## 2024-07-06 PROCEDURE — 250N000013 HC RX MED GY IP 250 OP 250 PS 637: Performed by: SURGERY

## 2024-07-06 PROCEDURE — 250N000009 HC RX 250: Performed by: INTERNAL MEDICINE

## 2024-07-06 PROCEDURE — 250N000012 HC RX MED GY IP 250 OP 636 PS 637: Performed by: NURSE PRACTITIONER

## 2024-07-06 PROCEDURE — 80048 BASIC METABOLIC PNL TOTAL CA: CPT | Performed by: PHYSICIAN ASSISTANT

## 2024-07-06 PROCEDURE — 84100 ASSAY OF PHOSPHORUS: CPT | Performed by: PHYSICIAN ASSISTANT

## 2024-07-06 PROCEDURE — 250N000013 HC RX MED GY IP 250 OP 250 PS 637: Performed by: INTERNAL MEDICINE

## 2024-07-06 PROCEDURE — 250N000013 HC RX MED GY IP 250 OP 250 PS 637: Performed by: PHYSICIAN ASSISTANT

## 2024-07-06 PROCEDURE — 250N000013 HC RX MED GY IP 250 OP 250 PS 637: Performed by: NURSE PRACTITIONER

## 2024-07-06 PROCEDURE — 85027 COMPLETE CBC AUTOMATED: CPT | Performed by: PHYSICIAN ASSISTANT

## 2024-07-06 PROCEDURE — 83735 ASSAY OF MAGNESIUM: CPT | Performed by: PHYSICIAN ASSISTANT

## 2024-07-06 PROCEDURE — 36415 COLL VENOUS BLD VENIPUNCTURE: CPT | Performed by: PHYSICIAN ASSISTANT

## 2024-07-06 PROCEDURE — 94640 AIRWAY INHALATION TREATMENT: CPT | Mod: 76

## 2024-07-06 PROCEDURE — 94640 AIRWAY INHALATION TREATMENT: CPT

## 2024-07-06 PROCEDURE — 250N000011 HC RX IP 250 OP 636: Performed by: PHYSICIAN ASSISTANT

## 2024-07-06 PROCEDURE — 99239 HOSP IP/OBS DSCHRG MGMT >30: CPT | Performed by: PHYSICIAN ASSISTANT

## 2024-07-06 PROCEDURE — 999N000157 HC STATISTIC RCP TIME EA 10 MIN

## 2024-07-06 RX ORDER — FUROSEMIDE 10 MG/ML
40 INJECTION INTRAMUSCULAR; INTRAVENOUS ONCE
Status: COMPLETED | OUTPATIENT
Start: 2024-07-06 | End: 2024-07-06

## 2024-07-06 RX ORDER — PANTOPRAZOLE SODIUM 40 MG/1
40 TABLET, DELAYED RELEASE ORAL
DISCHARGE
Start: 2024-07-07

## 2024-07-06 RX ORDER — AMOXICILLIN 250 MG
1 CAPSULE ORAL DAILY PRN
DISCHARGE
Start: 2024-07-06

## 2024-07-06 RX ORDER — IPRATROPIUM BROMIDE AND ALBUTEROL SULFATE 2.5; .5 MG/3ML; MG/3ML
3 SOLUTION RESPIRATORY (INHALATION) 4 TIMES DAILY
DISCHARGE
Start: 2024-07-06 | End: 2024-08-07

## 2024-07-06 RX ORDER — ENOXAPARIN SODIUM 100 MG/ML
40 INJECTION SUBCUTANEOUS EVERY 12 HOURS
DISCHARGE
Start: 2024-07-06 | End: 2024-07-11

## 2024-07-06 RX ORDER — OXYCODONE HYDROCHLORIDE 5 MG/1
5 TABLET ORAL EVERY 4 HOURS PRN
Qty: 6 TABLET | Refills: 0 | Status: SHIPPED | OUTPATIENT
Start: 2024-07-06

## 2024-07-06 RX ORDER — PREDNISONE 20 MG/1
20 TABLET ORAL DAILY
DISCHARGE
Start: 2024-07-07 | End: 2024-07-12

## 2024-07-06 RX ORDER — POLYETHYLENE GLYCOL 3350 17 G/17G
17 POWDER, FOR SOLUTION ORAL DAILY
DISCHARGE
Start: 2024-07-06

## 2024-07-06 RX ORDER — GUAIFENESIN 600 MG/1
1200 TABLET, EXTENDED RELEASE ORAL 2 TIMES DAILY
Status: DISCONTINUED | OUTPATIENT
Start: 2024-07-06 | End: 2024-07-06 | Stop reason: HOSPADM

## 2024-07-06 RX ORDER — ACETAMINOPHEN 325 MG/1
975 TABLET ORAL EVERY 8 HOURS PRN
DISCHARGE
Start: 2024-07-06

## 2024-07-06 RX ORDER — LEVOFLOXACIN 750 MG/1
750 TABLET, FILM COATED ORAL DAILY
DISCHARGE
Start: 2024-07-06 | End: 2024-07-16

## 2024-07-06 RX ORDER — FLUTICASONE FUROATE AND VILANTEROL TRIFENATATE 50; 25 UG/1; UG/1
1 POWDER RESPIRATORY (INHALATION) 2 TIMES DAILY
DISCHARGE
Start: 2024-07-06

## 2024-07-06 RX ORDER — GUAIFENESIN 600 MG/1
1200 TABLET, EXTENDED RELEASE ORAL 2 TIMES DAILY
DISCHARGE
Start: 2024-07-06 | End: 2024-07-11

## 2024-07-06 RX ADMIN — PREDNISONE 20 MG: 20 TABLET ORAL at 08:02

## 2024-07-06 RX ADMIN — PANTOPRAZOLE SODIUM 40 MG: 40 TABLET, DELAYED RELEASE ORAL at 08:02

## 2024-07-06 RX ADMIN — SERTRALINE 100 MG: 100 TABLET, FILM COATED ORAL at 08:02

## 2024-07-06 RX ADMIN — ENOXAPARIN SODIUM 40 MG: 40 INJECTION SUBCUTANEOUS at 08:02

## 2024-07-06 RX ADMIN — ACETAMINOPHEN 650 MG: 325 TABLET, FILM COATED ORAL at 08:39

## 2024-07-06 RX ADMIN — IPRATROPIUM BROMIDE AND ALBUTEROL SULFATE 3 ML: .5; 3 SOLUTION RESPIRATORY (INHALATION) at 11:42

## 2024-07-06 RX ADMIN — FUROSEMIDE 40 MG: 10 INJECTION, SOLUTION INTRAMUSCULAR; INTRAVENOUS at 08:07

## 2024-07-06 RX ADMIN — CARBAMAZEPINE 200 MG: 200 TABLET ORAL at 12:24

## 2024-07-06 RX ADMIN — GUAIFENESIN 1200 MG: 600 TABLET ORAL at 08:07

## 2024-07-06 RX ADMIN — OXYCODONE HYDROCHLORIDE 5 MG: 5 TABLET ORAL at 12:23

## 2024-07-06 RX ADMIN — IPRATROPIUM BROMIDE AND ALBUTEROL SULFATE 3 ML: .5; 3 SOLUTION RESPIRATORY (INHALATION) at 07:47

## 2024-07-06 RX ADMIN — FLUTICASONE FUROATE AND VILANTEROL TRIFENATATE 1 PUFF: 100; 25 POWDER RESPIRATORY (INHALATION) at 07:51

## 2024-07-06 RX ADMIN — MICONAZOLE NITRATE: 20 POWDER TOPICAL at 08:07

## 2024-07-06 RX ADMIN — CARBAMAZEPINE 400 MG: 200 TABLET ORAL at 08:02

## 2024-07-06 RX ADMIN — LISINOPRIL 20 MG: 20 TABLET ORAL at 08:02

## 2024-07-06 ASSESSMENT — ACTIVITIES OF DAILY LIVING (ADL)
ADLS_ACUITY_SCORE: 46
ADLS_ACUITY_SCORE: 47
ADLS_ACUITY_SCORE: 46
ADLS_ACUITY_SCORE: 46
ADLS_ACUITY_SCORE: 47
ADLS_ACUITY_SCORE: 46

## 2024-07-06 NOTE — PLAN OF CARE
A/o x4. IV removed. Sent on oxygen. Packet given to transport. All belongings gathered and sent with patient. Oxy script sent in packet. Transport bringing to TCU via stretcher.       Goal Outcome Evaluation:

## 2024-07-06 NOTE — PLAN OF CARE
Occupational Therapy Discharge Summary    Reason for therapy discharge:    Discharged to transitional care facility.    Progress towards therapy goal(s). See goals on Care Plan in [facility] electronic health record for goal details.  Goals partially met.  Barriers to achieving goals:   discharge from facility.    Therapy recommendation(s):    Continued therapy is recommended.  Rationale/Recommendations:  Pt below functional baseline in ADLs and mobility. Pt requires ax1 for all ADLs and txs at this time. Would benefit from TCU to increase strength and endurance prior to safely returning home. At this time pt would not tolerate 3 hours of therapy for d/c to ARU.       **Pt not seen by writer on this date, note written based on previous treating OT's note and recommendations.

## 2024-07-06 NOTE — PROGRESS NOTES
Care Management Discharge Note    Discharge Date: 07/06/2024       Discharge Disposition:  TCU    Discharge Services:      Discharge DME:      Discharge Transportation: stretcher    Private pay costs discussed: Not applicable    Does the patient's insurance plan have a 3 day qualifying hospital stay waiver?  Yes     Which insurance plan 3 day waiver is available? [employer]    Will the waiver be used for post-acute placement? No    PAS Confirmation Code: [id number]  Patient/family educated on Medicare website which has current facility and service quality ratings:      Education Provided on the Discharge Plan:    Persons Notified of Discharge Plans: Bryson Medina and PAULA [facility] at [facility]  Patient/Family in Agreement with the Plan:      Handoff Referral Completed: Yes    Additional Information:  Planned discharge today to [facility], spoke with PAULA Zhu supervisor, confirmed they are expecting this patient.  Will fax orders as soon as signed to fax [phone].  Stretcher transport between [id number].  Spoke with father/guardian Adam and confirmed discharge and provided facility information, they will meet patient at the TCU.    JOSE A Ramos  [phone]

## 2024-07-06 NOTE — DISCHARGE SUMMARY
[facility]  Discharge Summary        Brissa Corado MRN# [id number]   YOB: 1965 Age: 59 year old     Date of Admission:  7/1/2024  Date of Discharge:  7/6/2024 12:38 PM  Admitting Physician:  J Luis Meyer MD  Discharge Physician: Aretha Niño PA-C  Discharging Service: Hospitalist     Primary Provider: Aury Vizcaino  Primary Care Physician Phone Number: [phone]         Discharge Diagnoses/Problem Oriented Hospital Course (Providers):      Brissa Corado was admitted on 7/1/2024 by J Luis Meyer MD and I would refer you to their history and physical.  The following problems were addressed during her hospitalization:    Acute cholecystitis - s/p lap rigo   Gangrenous gallbladder requiring post op drain and outpt abx   3.  Post op hypoxic respiratory failure requiring O2- multifactorial due to post op atelectasis, decreased pulmonary functional capacity due to intra op air insufflation,  untreated RICK, suspected obesity hypoventilation syndrome, and dec mobility   4. Severe obesity   5. Intertrigo   6. HTN  7. HLP   8. GERD           Code Status:      Full Code        Brief Hospital Stay Summary Sent Home With Patient in AVS:          Reason for your hospital stay      You are admitted for concerns of abdominal pain.  You are found to have   acute cholecystitis and underwent laparoscopic cholecystectomy.  You are   placed on IV antibiotics and intraoperative culture did show 4 different   organisms and your antibiotics have been tailored to culture results.  In   addition you have remained hypoxic postop requiring 2 to 3 L of O2 which I   suspect is multifactorial relating to sleep apnea, suspected obesity   hypoventilation syndrome, decreased pulmonary capacity given recent   abdominal surgery as well as lack of mobility.  I suspect he will also   have mild asthma exacerbation and your symptoms did improve with DuoNeb's   and short-term prednisone.  It is  imperative that you perform aggressive   incentive spirometry and pulmonary toilet.  You will be on short-term   DuoNebs and Mucinex and will need to do incentive spirometry at least   every couple hours each day.  He will be placed on oral Levaquin to cover abdominal infection.  He will   need to follow-up with general surgery as instructed and PCP or nursing   home physician as directed.        59-year-old female with history of asthma, hypertension, severe obesity, obstructive sleep apnea, seizure disorder, developmental delay, was admitted to [facility] on 7/1/2024 with acute calculus cholecystitis. Patient underwent laparoscopic cholecystectomy on 7/1/2024 where she was found to have severe acute cholecystitis with gangrenous changes. She also had placement of intraperitoneal drain and continues on IV fluids, IV antibiotics while monitoring output from the drain. This was a complicated gangrenous cholecystitis that will require ongoing monitoring in the hospital after surgery.  Physical therapy is recommending TCU placement.     Acute postoperative hypoxic respiratory failure  Hx of Asthma with mild exacerbation   Untreated RICK  Suspect OHS  Post op requiring 3-4L oximask to maintain sats of mid 90's.   Likely hypoxia is multifactorial including untreated RICK, likley obesity hypoventilation syndrome, Dec pulm volume due to abdominal surgery and hx of asthma.  She was also recently admitted to the hospital from 5/2-5/9 for acute asthma exacerbation with atypical PNA and recommended outpt initiation of steroid inhaler/LABA  - Started Breo inhlaer 7/3 and duonebs with improved breathing, will continue inhaler at discharge   - CXR 7/3 shows no infiltrate but + Bl atelectasis  - CT chest 7/5 shows no PE but bl atelectasis. Started Po steroid burst for persistent wheezing.   -Main dayna had been pt's lack of willingness to get OOB and do IS, fortunately RN able to get her up at edge of bed and  "improving somewhat on her mobility   - Start to wean O2, I suspect she runs low 90's at baseline , will aim for O2 sats of 89-92%  -Encourage incentive spirometry with flutter valve  - Breathing better at time of disharge, she was able to wean down to 2-3L of O2 with 3-4 at night but that's due to untreated RICK  - Will need outpt PFT and sleep eval     Acute cholecystitis with gangrenous changes  -Status post laparoscopic cholecystectomy on 7/1/2024 -changed to inpatient status  -MARINE drain removed on 7/3/2024  -Currently on Rocephin  - Cx shows citrobacter freundii, flebseilla and H. Parainfluenza and Strep Constellatus.   Citrobacter is resistant to Rocephin so will change to IV levaquin 750 mg for total of 10 days   -Multimodal pain management     Intertrigo:  -Miconazole powder  -WOC consult appreciated      Hypertension  -Resume lisinopril.  Repeat BMP in the AM.     Hyperlipidemia  -Resume statin at discharge     GERD  -Continue PTA PPI.     Superobesity: BMI 62.  Complicates cares as noted above.     #Developmental delay: Her parents are listed as her guardians.  She has a longtime significant other.  Her mother states that they had a \"wedding ceremony \"but legally are not .  Next of kin is technically her parents who remain available to help as needed.  However, her parents are in their 80s and her mother recently had knee surgery and is recovering herself.           Important Results:        Recent Labs   Lab 07/06/24  0902   WBC 6.7   HGB 11.4*   HCT 37.0   MCV 92        Recent Labs   Lab 07/06/24  0822 07/05/24  0624    144   POTASSIUM 3.8 3.4   CHLORIDE 99 104   CO2 27 30*   ANIONGAP 13 10   GLC 96 121*   BUN 8.4 6.7   CR 0.49* 0.52   GFRESTIMATED >90 >90   CESAR 8.1* 8.1*     7-Day Micro Results       No results found for the last 168 hours.            4+ Streptococcus constellatus Abnormal    Beta hemolytic strain  This organism is susceptible to ampicillin, penicillin, vancomycin and " the cephalosporins. If treatment is required and your patient is allergic to penicillin, contact the microbiology lab within 5 days to request susceptibility testing.   4+ Citrobacter freundii complex Abnormal       2+ Klebsiella oxytoca Abnormal       4+ Haemophilus parainfluenzae Abnormal                 Susceptibility     Citrobacter freundii complex Klebsiella oxytoca     ERICA ERICA     Ampicillin  Resistant 1  Resistant 1     Ampicillin/ Sulbactam  Resistant 1 16 ug/mL Intermediate     Cefepime <=1 ug/mL Susceptible <=1 ug/mL Susceptible     Ceftazidime  Resistant <=1 ug/mL Susceptible     Ceftriaxone  Resistant <=1 ug/mL Susceptible     Ciprofloxacin <=0.25 ug/mL Susceptible <=0.25 ug/mL Susceptible     Gentamicin <=1 ug/mL Susceptible <=1 ug/mL Susceptible     Levofloxacin <=0.12 ug/mL Susceptible <=0.12 ug/mL Susceptible     Meropenem <=0.25 ug/mL Susceptible <=0.25 ug/mL Susceptible     Piperacillin/Tazobactam  Resistant <=4 ug/mL Susceptible     Tobramycin <=1 ug/mL Susceptible <=1 ug/mL Susceptible     Trimethoprim/Sulfamethoxazole <=1/19 ug/mL Susceptible <=1/19 ug/mL Susceptible                1 Intrinsically Resistant       Susceptibility Comments    Citrobacter freundii complex   Enterobacter cloacae, Klebsiella aerogenes, and Citrobacter freundii have moderate to high levels of inducible AmpC ß-lactamase expression. The use of 3rd generation cephalosporins including ceftriaxone and ceftazidime, as well as  piperacillin-tazobactam, should be avoided for invasive infections, regardless of susceptibility results.               Pending Results:        Unresulted Labs Ordered in the Past 30 Days of this Admission       Date and Time Order Name Status Description    7/1/2024 12:19 PM Anaerobic Bacterial Culture Routine Preliminary               Discharge Instructions and Follow-Up:      Follow-up Appointments     Follow Up and recommended labs and tests      Follow up with shelter physician.  The  following labs/tests are   recommended: CBC each Monday while on Lovenox .              Discharge Disposition:        Discharged to home         Discharge Medications:        Current Discharge Medication List        START taking these medications    Details   acetaminophen (TYLENOL) 325 MG tablet Take 3 tablets (975 mg) by mouth every 8 hours as needed for mild pain or other (and adjunct with moderate or severe pain or per patient request)    Associated Diagnoses: Acute cholecystitis; Moderate persistent asthma with acute exacerbation      enoxaparin ANTICOAGULANT (LOVENOX) 40 MG/0.4ML syringe Inject 0.4 mLs (40 mg) Subcutaneous every 12 hours for 5 days    Associated Diagnoses: Acute cholecystitis; Moderate persistent asthma with acute exacerbation      Fluticasone Furoate-Vilanterol (BREO ELLIPTA) 50-25 MCG/ACT AEPB Inhale 1 puff into the lungs 2 times daily    Associated Diagnoses: Acute cholecystitis; Moderate persistent asthma with acute exacerbation      guaiFENesin (MUCINEX) 600 MG 12 hr tablet Take 2 tablets (1,200 mg) by mouth 2 times daily for 5 days    Associated Diagnoses: Acute cholecystitis; Moderate persistent asthma with acute exacerbation      ipratropium - albuterol 0.5 mg/2.5 mg/3 mL (DUONEB) 0.5-2.5 (3) MG/3ML neb solution Take 1 vial (3 mLs) by nebulization 4 times daily for 10 days    Associated Diagnoses: Acute cholecystitis; Moderate persistent asthma with acute exacerbation      levofloxacin (LEVAQUIN) 750 MG tablet Take 1 tablet (750 mg) by mouth daily for 10 days    Associated Diagnoses: Acute cholecystitis; Moderate persistent asthma with acute exacerbation      miconazole (MICATIN) 2 % external powder Apply topically 2 times daily    Associated Diagnoses: Acute cholecystitis; Moderate persistent asthma with acute exacerbation      oxyCODONE (ROXICODONE) 5 MG tablet Take 1 tablet (5 mg) by mouth every 4 hours as needed for severe pain (IF pain not managed with non-pharmacological and  non-opioid interventions)  Qty: 6 tablet, Refills: 0    Associated Diagnoses: Acute cholecystitis; Moderate persistent asthma with acute exacerbation      pantoprazole (PROTONIX) 40 MG EC tablet Take 1 tablet (40 mg) by mouth daily before breakfast    Associated Diagnoses: Acute cholecystitis; Moderate persistent asthma with acute exacerbation      polyethylene glycol (MIRALAX) 17 GM/Dose powder Take 17 g by mouth daily    Comments: Hold for loose stools  Associated Diagnoses: Acute cholecystitis; Moderate persistent asthma with acute exacerbation      predniSONE (DELTASONE) 20 MG tablet Take 1 tablet (20 mg) by mouth daily for 5 days    Associated Diagnoses: Acute cholecystitis; Moderate persistent asthma with acute exacerbation      senna-docusate (SENOKOT-S/PERICOLACE) 8.6-50 MG tablet Take 1 tablet by mouth daily as needed for constipation    Associated Diagnoses: Acute cholecystitis; Moderate persistent asthma with acute exacerbation           CONTINUE these medications which have NOT CHANGED    Details   albuterol (PROVENTIL) (2.5 MG/3ML) 0.083% neb solution Take 1 vial (2.5 mg) by nebulization every 4 hours as needed  Qty: 75 mL, Refills: 0      azelastine (ASTELIN) 0.1 % nasal spray Spray 1 spray into both nostrils daily      carBAMazepine (TEGRETOL) 200 MG tablet TAKE TWO TABLETS BY MOUTH IN THE MORNING, TAKE ONE TABLET BY MOUTH AT NOON (WITH LUNCH) AND TAKE TWO TABLETS BY MOUTH AT NIGHT  Qty: 450 tablet, Refills: 3    Associated Diagnoses: Seizure disorder (H)      fluticasone (FLONASE) 50 MCG/ACT nasal spray USE ONE SPRAY INTO BOTH NOSTRILS DAILY AS NEEDED FOR RHINITIS OR ALLERGIES  Qty: 16 mL, Refills: 1    Associated Diagnoses: Seasonal allergic rhinitis due to pollen      lisinopril (ZESTRIL) 20 MG tablet TAKE 1 TABLET (20 MG) BY MOUTH DAILY  Qty: 90 tablet, Refills: 2    Associated Diagnoses: Essential hypertension, benign      lovastatin (MEVACOR) 40 MG tablet TAKE 1 TABLET (40 MG) BY MOUTH AT  "BEDTIME  Qty: 90 tablet, Refills: 1    Associated Diagnoses: Hyperlipidemia LDL goal <130      Multiple Vitamin (MULTI-VITAMIN PO) Take 1 tablet by mouth daily      nystatin (MYCOSTATIN) 179532 UNIT/GM external cream Apply topically daily as needed      sertraline (ZOLOFT) 100 MG tablet Take 1 tablet (100 mg) by mouth daily  Qty: 90 tablet, Refills: 1    Associated Diagnoses: Depression, unspecified depression type           STOP taking these medications       omeprazole (PRILOSEC) 20 MG DR capsule Comments:   Reason for Stopping:                 Allergies:         Allergies   Allergen Reactions    Aspirin Unknown    Iodine      Iodinated Contrast Media  --- Hives      Penicillins Unknown    Tetracyclines & Related Unknown    Hydrochlorothiazide Palpitations     dehydration    Influenza Vac Split [Influenza Virus Vaccine] Rash     Patient states she is allergic to the vaccine.  Got a rash all over body many years ago after receiving injection.           Consultations This Hospital Stay:        Consultation during this admission received from surgery         Condition and Physical on Discharge:        Discharge condition: Stable   Vitals: Blood pressure (!) 168/81, pulse 85, temperature 97.6  F (36.4  C), temperature source Oral, resp. rate 18, height 1.702 m (5' 7\"), weight (!) 184.9 kg (407 lb 9.6 oz), last menstrual period 08/18/2008, SpO2 94%, not currently breastfeeding.  407 lbs 9.6 oz      Constitutional: Awake, alert, cooperative, no apparent distress      Respiratory: Dec BS on exam but no ethel crackles or wheezing   Cardiovascular: Regular rate and rhythm, normal S1 and S2, and no murmur noted   Abdomen: Normal bowel sounds, soft, non-distended, non-tender, obese, incision c.d.I   Skin: Vast area of intertrigo,  no cyanosis, dry to touch   Neuro: Alert and oriented x3, no weakness, numbness, memory loss   Extremities: No edema, normal range of motion   Other(s):           All other systems: Negative "             Discharge Time:      > 30 mins         Image Results From This Hospital Stay (For Non-EPIC Providers):        Results for orders placed or performed during the hospital encounter of 07/01/24   CT Abdomen Pelvis w/o Contrast    Narrative    EXAM: CT ABDOMEN PELVIS W/O CONTRAST  LOCATION: [facility]  DATE: 7/1/2024    INDICATION: LLQ abdominal pain.  COMPARISON: CT abdomen and pelvis on 5/5/2022.  TECHNIQUE: CT scan of the abdomen and pelvis was performed without intravenous contrast Multiplanar reformats were obtained. Dose reduction techniques were used.    FINDINGS:   LOWER CHEST: Basilar pulmonary opacities, likely atelectasis.    ABDOMEN/PELVIS: Limited evaluation of the abdominal organs due to lack of intravenous contrast.    HEPATOBILIARY: No suspicious focal hepatic lesion. The gallbladder is distended with mild diffuse gallbladder wall thickening and small focus of gas along the gallbladder wall.    PANCREAS: The unenhanced pancreas is grossly unremarkable.    SPLEEN: No splenomegaly. Small splenule along the spleen.    ADRENAL GLANDS: No adrenal nodules.    KIDNEYS/BLADDER: No radiodense kidney/ureteral stones or hydronephrosis in either kidney.    BOWEL: No abnormally dilated bowel loops. The appendix is visualized and appears normal.    PERITONEUM: No evidence of free fluid in the abdomen and pelvis. No free peritoneal or portal venous gas.    PELVIC ORGANS: 2 cm left adnexal cyst, not significantly changed as compared to 5/5/2022 exam, likely ovarian.    VASCULATURE: There is an IVC filter.    LYMPH NODES: Multiple enlarged inguinal lymph nodes, not significantly changed as compared to 5/5/2022 exam.    MUSCULOSKELETAL: Postsurgical changes of the anterior pelvis and left sacroiliac joint. Multilevel degenerative changes of the spine.      Impression    IMPRESSION:   1. Distended gallbladder with mild diffuse gallbladder wall thickening, and small focus of gas along the  gallbladder wall, findings worrisome for acute cholecystitis, which could be emphysematous cholecystitis, recommend further evaluation with right   upper quadrant abdominal ultrasound.  2. Multiple enlarged inguinal lymph nodes, not significantly changed as compared to 5/5/2022 exam, remains indeterminate.     US Abdomen Limited    Narrative    EXAM: US ABDOMEN LIMITED  LOCATION: [facility]  DATE: 7/1/2024    INDICATION: abdominal pain  COMPARISON: Same day CT  TECHNIQUE: Limited abdominal ultrasound.    FINDINGS:    GALLBLADDER: Gallbladder is mildly distended and appears to contain internal shadowing stones. There is also some dirty shadowing visible in the region of the gallbladder wall, possibly gas within the wall. Overall there is limited visualization of the   gallbladder and wall secondary to the patient's body habitus and due to overlying bowel gas. Patient was tender over the right upper quadrant on sonographic examination.    BILE DUCTS: Common duct not well visualized. No obvious intrahepatic ductal dilatation.    LIVER: Echogenic parenchyma with smooth contour. No focal mass.    RIGHT KIDNEY: No hydronephrosis.    PANCREAS: The visualized portions are normal.    No ascites.      Impression    IMPRESSION:  1.  Gallbladder is mildly distended and appears to contain internal shadowing stones. There is also some dirty shadowing visible in the region of the gallbladder wall, possibly gas within the wall. Overall there is limited visualization of the   gallbladder and wall secondary to the patient's body habitus and due to overlying bowel gas. Common duct not well visualized. No obvious intrahepatic ductal dilatation. Patient was tender over the right upper quadrant on sonographic examination. Findings   remain indeterminate but cholecystitis including possibly emphysematous cholecystitis are not excluded. Recommend close clinical followup. If further imaging is clinically indicated, a  HIDA scan could be obtained.  2.  Echogenic liver suggestive of hepatic steatosis.       XR Chest Port 1 View    Narrative    EXAM: XR CHEST PORT 1 VIEW  LOCATION: [facility]  DATE: 7/3/2024    INDICATION: r o persistent infiltrate  COMPARISON: 6/24/2024      Impression    IMPRESSION: New trace fluid along the right major fissure. Linear opacities in the left upper lung, likely subsegmental atelectasis. No pneumothorax. Normal cardiomediastinal silhouette.   CT Chest Pulmonary Embolism w Contrast    Narrative    EXAM: CT CHEST PULMONARY EMBOLISM W CONTRAST  LOCATION: [facility]  DATE: 7/5/2024    INDICATION: Laparoscopic cholecystectomy on 7/1/2024. Abdominal pain. New opacities recent chest x-ray.  COMPARISON: Chest x-ray 07/03/2024  TECHNIQUE: CT chest pulmonary angiogram during arterial phase injection of IV contrast. Multiplanar reformats and MIP reconstructions were performed. Dose reduction techniques were used.   CONTRAST: 100mL Isovue 370    FINDINGS:  ANGIOGRAM CHEST: Pulmonary arteries are normal caliber and negative for pulmonary emboli. The segmental vessels to both lower lobes are attenuated by compressive atelectasis due to pleural effusions. Thoracic aorta is negative for dissection. No CT   evidence of right heart strain.    LUNGS AND PLEURA: Small bilateral pleural effusions. Compressive atelectasis involving moderate portions of both lower lobes, and posterior aspects of both upper lobes. Nodular groundglass opacities within the right upper and right middle lobes.    MEDIASTINUM/AXILLAE: No enlarged mediastinal or hilar nodes.    CORONARY ARTERY CALCIFICATION: None.    UPPER ABDOMEN: Postoperative changes from recent laparoscopic cholecystectomy, with numerous small gas collections and small amount of fluid at the operative site.    MUSCULOSKELETAL: Normal.      Impression    IMPRESSION:  1.  No evidence for emboli.  2.  Small bilateral pleural  effusions and compressive atelectasis involving portions of both lower and upper lobes.   3.  Groundglass nodular opacities right upper and right middle lobe concerning for pneumonia.  4.  Postop changes recent laparoscopic cholecystectomy.

## 2024-07-06 NOTE — PLAN OF CARE
"Orientation: AAOx4, VSS  Pain: pain is managed by PRN pain medication  O2: NC @ 3LPM  GI/: incontinent of stool and unrine  Activity: A1 GB with walker. Stand by assist.  Diet: regular diet  Protocols: K, Mg, Phos  Major shift events:  Plan: discharge to TCU   Problem: Adult Inpatient Plan of Care  Goal: Plan of Care Review  Description: The Plan of Care Review/Shift note should be completed every shift.  The Outcome Evaluation is a brief statement about your assessment that the patient is improving, declining, or no change.  This information will be displayed automatically on your shift  note.  Outcome: Progressing  Flowsheets (Taken 7/6/2024 0001)  Plan of Care Reviewed With: patient  Overall Patient Progress: improving  Goal: Patient-Specific Goal (Individualized)  Description: You can add care plan individualizations to a care plan. Examples of Individualization might be:  \"Parent requests to be called daily at 9am for status\", \"I have a hard time hearing out of my right ear\", or \"Do not touch me to wake me up as it startles  me\".  Outcome: Progressing  Goal: Absence of Hospital-Acquired Illness or Injury  Outcome: Progressing  Intervention: Identify and Manage Fall Risk  Recent Flowsheet Documentation  Taken 7/5/2024 2002 by Adam Haji RN  Safety Promotion/Fall Prevention:   activity supervised   supervised activity   safety round/check completed   room organization consistent   room near nurse's station   clutter free environment maintained  Intervention: Prevent Skin Injury  Recent Flowsheet Documentation  Taken 7/5/2024 2002 by Adam Haji RN  Body Position: position changed independently  Skin Protection: adhesive use limited  Device Skin Pressure Protection:   adhesive use limited   tubing/devices free from skin contact  Intervention: Prevent and Manage VTE (Venous Thromboembolism) Risk  Recent Flowsheet Documentation  Taken 7/5/2024 2002 by Adam Haji RN  VTE Prevention/Management: SCDs on " (sequential compression devices)  Intervention: Prevent Infection  Recent Flowsheet Documentation  Taken 7/5/2024 2002 by Adam Haji RN  Infection Prevention:   rest/sleep promoted   single patient room provided  Goal: Optimal Comfort and Wellbeing  Outcome: Progressing  Goal: Readiness for Transition of Care  Outcome: Progressing     Problem: Pain Acute  Goal: Optimal Pain Control and Function  Outcome: Progressing  Intervention: Prevent or Manage Pain  Recent Flowsheet Documentation  Taken 7/5/2024 2002 by Adam Haji RN  Sensory Stimulation Regulation:   lighting decreased   quiet environment promoted   television on  Medication Review/Management: medications reviewed  Intervention: Optimize Psychosocial Wellbeing  Recent Flowsheet Documentation  Taken 7/5/2024 2002 by Adam Haji RN  Supportive Measures:   active listening utilized   decision-making supported     Problem: Skin Injury Risk Increased  Goal: Skin Health and Integrity  Outcome: Progressing  Intervention: Plan: Nurse Driven Intervention: Moisture Management  Recent Flowsheet Documentation  Taken 7/5/2024 2002 by Adam Haji RN  Moisture Interventions: Encourage regular toileting  Bathing/Skin Care: incontinence care  Intervention: Plan: Nurse Driven Intervention: Friction and Shear  Recent Flowsheet Documentation  Taken 7/5/2024 2002 by Adam Haji RN  Friction/Shear Interventions: HOB 30 degrees or less  Intervention: Optimize Skin Protection  Recent Flowsheet Documentation  Taken 7/5/2024 2002 by Adam Haji RN  Pressure Reduction Techniques: weight shift assistance provided  Pressure Reduction Devices: alternating pressure pump (CROW)  Skin Protection: adhesive use limited  Activity Management: activity adjusted per tolerance  Head of Bed (HOB) Positioning: HOB at 20-30 degrees     Problem: Infection  Goal: Absence of Infection Signs and Symptoms  Outcome: Progressing   Goal Outcome Evaluation:      Plan of Care Reviewed  With: patient    Overall Patient Progress: improvingOverall Patient Progress: improving

## 2024-07-06 NOTE — PLAN OF CARE
Physical Therapy Discharge Summary    Reason for therapy discharge:    Discharged to transitional care facility.    Progress towards therapy goal(s). See goals on Care Plan in [facility] electronic health record for goal details.  Goals not met.  Barriers to achieving goals:   limited tolerance for therapy and discharge from facility.    Therapy recommendation(s):    Continued therapy is recommended.  Rationale/Recommendations:  TCU to maximize return to PLOF.

## 2024-07-08 ENCOUNTER — PATIENT OUTREACH (OUTPATIENT)
Dept: CARE COORDINATION | Facility: CLINIC | Age: 59
End: 2024-07-08
Payer: MEDICARE

## 2024-07-08 LAB
BACTERIA FLD CULT: ABNORMAL
BACTERIA FLD CULT: ABNORMAL

## 2024-07-08 NOTE — PROGRESS NOTES
Clinic Care Coordination Contact  Care Coordination Transition Communication    Clinical Data: Patient was hospitalized at [facility] from 7/1/24 to 7/6/24 with diagnosis of acute cholecystitis.     Assessment: Patient has transitioned to TCU/ARU for short term rehabilitation:    Facility Name: [facility] TCU  Transition Communication:  Notified facility of Primary Care- Care Coordination support via Epic fax.    Plan: Care Coordinator will await notification from facility staff informing of patient's discharge plans/needs. Care Coordinator will review chart and outreach to facility staff every 4 weeks and as needed.     Rocio Govea RN, BSN, CPHN, CCM  [facility] Amb[facility] Care Management  [facility]  Phone: [phone]  Email: [email or web address]

## 2024-07-08 NOTE — LETTER
[facility]   To:             Please give to facility    From:   Rocio Govea RN  Care Coordinator   [facility]   P: [phone]  [email or web address]   Patient Name:  Brissa Corado YOB: 1965   Admit date: 7/6/24      *Information Needed:  Please contact me when the patient will discharge (or if they will move to long term care)- include the discharge date, disposition, and main diagnosis   If the patient is discharged with home care services, please provide the name of the agency    Also- Please inform me if a care conference is being held.   Phone, Fax or Email with information                        Thank you

## 2024-07-09 NOTE — TELEPHONE ENCOUNTER
Patient is now in TCU so she will not be able to come for an OFFICE VISIT until she gets released from there.

## 2024-07-10 ENCOUNTER — TELEPHONE (OUTPATIENT)
Dept: INTERNAL MEDICINE | Facility: CLINIC | Age: 59
End: 2024-07-10
Payer: MEDICARE

## 2024-07-10 DIAGNOSIS — G47.33 OSA (OBSTRUCTIVE SLEEP APNEA): Primary | ICD-10-CM

## 2024-07-10 NOTE — TELEPHONE ENCOUNTER
General Call      Reason for Call:  Pt had an order for a Cpap after her sleep study, and it was sent back by pt parents. Pt would now like the Cpap and wondering if she needs another order for this.    What are your questions or concerns:  Pt had an order for a Cpap after her sleep study, and it was sent back by pt parents. Pt would now like the Cpap and wondering if she needs another order for this.    Date of last appointment with provider: Unknown    Could we send this information to you in [place] or would you prefer to receive a phone call?:   Patient would prefer a phone call   Okay to leave a detailed message?: Yes at Other phone number: [phone] ([facility])

## 2024-07-11 NOTE — TELEPHONE ENCOUNTER
Was referral signed. This writer not able to see recent referral to sleep clinic. Pended referral for provider to review.    Contacted patient's mom/guardian (consent to communicate on file) to relay provider's message and gave number for sleep clinic to follow up - [phone].    Thank you,  Marek Richard, Triage RN [facility]  11:05 AM 7/11/2024

## 2024-07-12 ENCOUNTER — TRANSFERRED RECORDS (OUTPATIENT)
Dept: HEALTH INFORMATION MANAGEMENT | Facility: CLINIC | Age: 59
End: 2024-07-12
Payer: MEDICARE

## 2024-07-15 NOTE — TELEPHONE ENCOUNTER
Spoke with patient and she is in a rehab facility for post op recovery and states she will be there for a while.    Closing encounter at this time and patient will call when/if she is back at home.

## 2024-07-17 DIAGNOSIS — G47.33 OSA (OBSTRUCTIVE SLEEP APNEA): Primary | ICD-10-CM

## 2024-07-26 ENCOUNTER — TELEPHONE (OUTPATIENT)
Dept: INTERNAL MEDICINE | Facility: CLINIC | Age: 59
End: 2024-07-26
Payer: MEDICARE

## 2024-07-26 NOTE — TELEPHONE ENCOUNTER
We received a fax from [employer] that states [facility] is the CC for this patient. [facility] can be reached at [phone]

## 2024-07-31 ENCOUNTER — TELEPHONE (OUTPATIENT)
Dept: SURGERY | Facility: CLINIC | Age: 59
End: 2024-07-31
Payer: MEDICARE

## 2024-07-31 DIAGNOSIS — Z98.890 POSTOPERATIVE STATE: Primary | ICD-10-CM

## 2024-07-31 PROCEDURE — 99024 POSTOP FOLLOW-UP VISIT: CPT | Performed by: PHYSICIAN ASSISTANT

## 2024-07-31 NOTE — TELEPHONE ENCOUNTER
[facility]   Postoperative Follow-up Phone Call  -Call to patient to review recent procedure and recovery    Procedure Date:  July/01  Surgeon:  Dr. Higgins  Procedure:  Laparoscopic cholecystectomy  Pathology, reviewed with patient:  acute on chronic inflammation, stones    She is now 4 weeks postop.  She is now also back home after transitional care/rehab.  She is feeling well and denies incision concerns.   Diet:  Regular  Bowel function: Normal  Pt concerns:  none    She was recommended to call office at any time if ongoing questions/concerns during recovery, but otherwise may follow-up on a prn basis.  [place] is in agreement with this plan.    Shannan Gomez PA-C    Please route or send letter to:  Primary Care Provider (PCP)

## 2024-08-06 ENCOUNTER — MEDICAL CORRESPONDENCE (OUTPATIENT)
Dept: HEALTH INFORMATION MANAGEMENT | Facility: CLINIC | Age: 59
End: 2024-08-06

## 2024-08-06 DIAGNOSIS — F32.A DEPRESSION, UNSPECIFIED DEPRESSION TYPE: ICD-10-CM

## 2024-08-06 ASSESSMENT — ASTHMA QUESTIONNAIRES
HOSPITALIZATION_OVERNIGHT_LAST_YEAR_TOTAL: ONE
QUESTION_5 LAST FOUR WEEKS HOW WOULD YOU RATE YOUR ASTHMA CONTROL: SOMEWHAT CONTROLLED
ACT_TOTALSCORE: 17
QUESTION_1 LAST FOUR WEEKS HOW MUCH OF THE TIME DID YOUR ASTHMA KEEP YOU FROM GETTING AS MUCH DONE AT WORK, SCHOOL OR AT HOME: SOME OF THE TIME
ACT_TOTALSCORE: 17
QUESTION_4 LAST FOUR WEEKS HOW OFTEN HAVE YOU USED YOUR RESCUE INHALER OR NEBULIZER MEDICATION (SUCH AS ALBUTEROL): ONCE A WEEK OR LESS
QUESTION_3 LAST FOUR WEEKS HOW OFTEN DID YOUR ASTHMA SYMPTOMS (WHEEZING, COUGHING, SHORTNESS OF BREATH, CHEST TIGHTNESS OR PAIN) WAKE YOU UP AT NIGHT OR EARLIER THAN USUAL IN THE MORNING: ONCE A WEEK
EMERGENCY_ROOM_LAST_YEAR_TOTAL: TWO
QUESTION_2 LAST FOUR WEEKS HOW OFTEN HAVE YOU HAD SHORTNESS OF BREATH: ONCE OR TWICE A WEEK

## 2024-08-07 ENCOUNTER — OFFICE VISIT (OUTPATIENT)
Dept: INTERNAL MEDICINE | Facility: CLINIC | Age: 59
End: 2024-08-07
Payer: MEDICARE

## 2024-08-07 VITALS
HEART RATE: 84 BPM | HEIGHT: 67 IN | TEMPERATURE: 98.2 F | SYSTOLIC BLOOD PRESSURE: 132 MMHG | OXYGEN SATURATION: 96 % | BODY MASS INDEX: 63.84 KG/M2 | DIASTOLIC BLOOD PRESSURE: 72 MMHG

## 2024-08-07 DIAGNOSIS — E66.01 MORBID OBESITY WITH BMI OF 50.0-59.9, ADULT (H): Primary | ICD-10-CM

## 2024-08-07 DIAGNOSIS — I10 ESSENTIAL HYPERTENSION, BENIGN: ICD-10-CM

## 2024-08-07 DIAGNOSIS — R26.81 DIFFICULTY STANDING: ICD-10-CM

## 2024-08-07 DIAGNOSIS — R26.81 UNSTEADINESS ON FEET: ICD-10-CM

## 2024-08-07 DIAGNOSIS — J45.41 MODERATE PERSISTENT ASTHMA WITH ACUTE EXACERBATION: ICD-10-CM

## 2024-08-07 PROBLEM — J45.30 MILD PERSISTENT ASTHMA WITHOUT COMPLICATION: Status: ACTIVE | Noted: 2024-05-02

## 2024-08-07 PROCEDURE — G2211 COMPLEX E/M VISIT ADD ON: HCPCS | Performed by: INTERNAL MEDICINE

## 2024-08-07 PROCEDURE — 99215 OFFICE O/P EST HI 40 MIN: CPT | Performed by: INTERNAL MEDICINE

## 2024-08-07 RX ORDER — SERTRALINE HYDROCHLORIDE 100 MG/1
100 TABLET, FILM COATED ORAL DAILY
Qty: 90 TABLET | Refills: 1 | Status: SHIPPED | OUTPATIENT
Start: 2024-08-07

## 2024-08-07 RX ORDER — IPRATROPIUM BROMIDE AND ALBUTEROL SULFATE 2.5; .5 MG/3ML; MG/3ML
3 SOLUTION RESPIRATORY (INHALATION) 4 TIMES DAILY
Qty: 90 ML | Refills: 0 | Status: SHIPPED | OUTPATIENT
Start: 2024-08-07

## 2024-08-07 RX ORDER — ALBUTEROL SULFATE 0.83 MG/ML
2.5 SOLUTION RESPIRATORY (INHALATION) EVERY 4 HOURS PRN
Qty: 75 ML | Refills: 0 | Status: SHIPPED | OUTPATIENT
Start: 2024-08-07

## 2024-08-07 NOTE — PROGRESS NOTES
Assessment & Plan     Morbid obesity with BMI of 50.0-59.9, adult (H)  Followed up with endocrinology from 6880-0870.  Was worked up for possible Cushing's syndrome, started on phentermine with resultant loss of around 64 pounds.   Phentermine was then switched to Belviq due to stagnation of weight loss. from chart review and history, seems like patient was then lost to follow-up.  Has not been on any weight reducing medications since.  Would like to consider pharmacotherapy.  Patient not interested in surgical options for now.  - Adult Comprehensive Weight Management  Referral; Future    Essential hypertension, benign  Blood pressure reviewed, within target.  Target of less than 140/90.  Continue lisinopril at the current dose of 20 mg daily.          Moderate persistent asthma with acute exacerbation  Overall, symptoms have been stable.  Requesting for refills of albuterol and DuoNeb inhalers.  - albuterol (PROVENTIL) (2.5 MG/3ML) 0.083% neb solution; Take 1 vial (2.5 mg) by nebulization every 4 hours as needed for shortness of breath, wheezing or cough  - ipratropium - albuterol 0.5 mg/2.5 mg/3 mL (DUONEB) 0.5-2.5 (3) MG/3ML neb solution; Take 1 vial (3 mLs) by nebulization 4 times daily    Difficulty standing  Request for lift chair.  DME order placed and form completed.  Patient has difficulty standing up from a sitting position.  Has done physical therapy in the past to help with strength.  Needs assistance of another person to stand up.  Will benefit from a lift chair.  Face-to-face visit completed today.  - Lift Chair W Seat Lift Mechanism Order for DME - ONLY FOR DME    Unsteadiness on feet    - Lift Chair W Seat Lift Mechanism Order for DME - ONLY FOR DME      40 minutes spent by me on the date of the encounter doing chart review, history and exam, documentation and further activities per the note.    Vic Brumfield is a 59 year old, presenting for the following health  issues:  Forms        8/7/2024    12:51 PM   Additional Questions   Roomed by [place]   Accompanied by Dad      HPI   Patient comes in today accompanied by the father for the visit.  The mother is listening over the phone.  Request for lift chair.  DME order placed and form completed.  Patient has difficulty standing up from a sitting position.  Has done physical therapy in the past to help with strength.  Needs assistance of another person to stand up.  Will benefit from a lift chair.    As well, patient and father discuss concerns about Morbid obesity and patient's past medical history of Cushing's syndrome.  Cushing syndrome was diagnosed in 2015 when patient had a follow-up with endocrinology for concerns of obesity.  Cortisol lab work was completed with elevated cortisol levels, patient had a follow-up brain MRI which was unremarkable and showed a normal pituitary.  Patient then had a CT abdomen on 5/2016 which showed a 0.8 cm left adrenal adenoma.  Patient was diagnosed with subclinical Cushing's syndrome.  As patient was undergoing workup, was started on phentermine and lost around 64 pounds while on the medication.  Phentermine was then switched to Belviq due to stagnation of weight loss. from chart review and history, seems like patient was then lost to follow-up.  Has not been on any weight reducing medications since.  Would like to consider pharmacotherapy.  Patient not interested in surgical options for now.    Answers submitted by the patient for this visit:  Patient Health Questionnaire (Submitted on 8/7/2024)  If you checked off any problems, how difficult have these problems made it for you to do your work, take care of things at home, or get along with other people?: Not difficult at all  PHQ9 TOTAL SCORE: 0      Review of Systems  Constitutional, HEENT, cardiovascular, pulmonary, gi and gu systems are negative, except as otherwise noted.      Objective    /72 (BP Location: Right arm, Patient  "Position: Sitting, Cuff Size: Adult Regular)   Pulse 84   Temp 98.2  F (36.8  C) (Tympanic)   Ht 1.702 m (5' 7\")   LMP 08/18/2008   SpO2 96%   BMI 63.84 kg/m    Body mass index is 63.84 kg/m .  Physical Exam   GENERAL: alert and no distress  RESP: lungs clear to auscultation - no rales, rhonchi or wheezes  CV: regular rate and rhythm, normal S1 S2  MS: no gross musculoskeletal defects noted, no edema  NEURO: Normal strength and tone, mentation intact and speech normal  PSYCH: mentation appears normal, affect normal.            Signed Electronically by: Aury Vizcaino MD    "

## 2024-08-09 ENCOUNTER — PATIENT OUTREACH (OUTPATIENT)
Dept: CARE COORDINATION | Facility: CLINIC | Age: 59
End: 2024-08-09
Payer: MEDICARE

## 2024-08-09 NOTE — PROGRESS NOTES
Clinic Care Coordination Contact    Situation: Patient chart reviewed by care coordinator.    Background: Patient is enrolled in Care Coordination and followed by RN CC.     Assessment: Task initiated to send patient updated Care Plan every 90 days or upon change in condition.     Plan/Recommendations: RN AYAAN sent patient updated Care Plan via her [employer] account.     Rocio Govea RN, BSN, CPHN, [facility] Care Management  [facility]  Phone: [phone]  Email: [email or web address]

## 2024-08-09 NOTE — LETTER
Dear [place],   Attached is an updated Patient Centered Plan of Care for your continued enrollment in Care Coordination. Please let us know if you have additional questions, concerns or goals that we can assist with.     Sincerely,   Rocio Govea RN, BSN, CPHN, CM  [facility] Ambulatory Care Management  [facility]  Phone: [phone]  Email: [email or web address]  Patient Centered Plan of Care  About Me:        Patient Name:  Brissa Corado    YOB: 1965  Age:         59 year old   [place] MRN:    [id number] Telephone Information:  Home Phone Not on file.   Mobile [phone]       Address:  [address]  [place] [address] Email address:  [email or web address]      Emergency Contact(s)    Name Relationship Lgl Grd Work Phone Home Phone Mobile Phone   1. KARAN RONDONE * Father Yes   [phone]   2. WAN TAN (C* Mother Yes   [phone]   3. TIANA CORADO Other No [phone]           Primary language:  English     needed? No   [facility]:  [phone] op. 1  Other communication barriers:Cognitive impairment; Physical impairment    Preferred Method of Communication:  Abhishek  Current living arrangement: Other (Lives in an apartment with significant other)    Mobility Status/ Medical Equipment: Dependent/Assisted by Another    Health Maintenance  Health Maintenance Reviewed: Due/Overdue (Discussed with parents)      My Access Plan  Medical Emergency 911   Primary Clinic Line [facility] - [phone]   24 Hour Appointment Line [phone] or  [phone]) (toll-free)   24 Hour Nurse Line [phone] (toll-free)   Preferred Urgent Care -- (States she does not use Urgent Care.)     Preferred Hospital [facility]  [phone]     Preferred Pharmacy [facility]  [place], [place] - [address] [address]     Behavioral Health Crisis Line The National Suicide Prevention Lifeline at 1-861.734.5361 or Text/Call 778     My Care Team Members  Patient Care Team         Relationship Specialty Notifications Start End    Aury Vizcaino MD PCP - General Internal Medicine  2/13/24     Phone: [phone] Fax: [phone]         [address] [facility] [address]    Catarina Govea APRN CNP  Clinical Nurse Specialist  3/23/17     Phone: [phone] Fax: [phone]         [address] [facility] [address]    Mira Mayberry MD MD INTERNAL MEDICINE - ENDOCRINOLOGY, DIABETES & METABOLISM  3/23/17     Phone: [phone] Fax: [phone] [address] [facility] [address]    Rosy MEADOWS CM [place]    9/6/18     Phone: [phone]         [facility]  3/15/21     Phone: [phone]         Aury Vizcaino MD Assigned PCP   1/6/24     Phone: [phone] Fax: [phone]         [address] [facility] [address]    Rocio Govea, RN Lead Care Coordinator Primary Care -  Admissions 5/11/24     Phone: [phone]                   My Care Plans  Self Management and Treatment Plan    Care Plan  Care Plan: Health Maintenance       Problem: Health Maintenance Due or Overdue       Goal: Become up-to-date with health maintenance visit(s)       Start Date: 5/14/2024 Expected End Date: 11/12/2024    This Visit's Progress: 10% Recent Progress: 0%    Note:     Barriers: Cognitive disability; Physical disability; Poor support from Significant other; Provider availability - wait time to complete appointments.   Strengths: Parents/guardians Motivated; Agreeable to Care Coordination.   Patient expressed understanding of goal: Yes  Action steps to achieve this goal:  1. I will take my medications as prescribed.   2. I will discuss, review, schedule and complete recommended overdue health maintenance with my Primary  Care Provider.   3. I will contact my care team with questions, concerns or support needs. I will use the clinic as a resource and I understand I can contact my clinic with 24/7 after hours services available. Care Coordinator will remain available as needed.                               Care Plan: Resources       Problem: Resources       Goal: Resources for equipment       Start Date: 5/14/2024 Expected End Date: 11/12/2024    This Visit's Progress: 20% Recent Progress: 10%    Note:     Barriers: Cognitive disability; Physical disability; Poor support from Significant other; Provider availability - wait time to complete appointments.   Strengths: Parents/guardians Motivated; Agreeable to Care Coordination.   Patient expressed understanding of goal: Yes  Action steps to achieve this goal:  1. I will review and follow up with resource for medical equipment ([employer] ([phone]) for required forms for Bariatric Electric Wheelchair and bedside commode.   2. I will discuss with [employer]  Jagjit Barrientos how to obtain incontinent supplies through [place]'s waiver.   3. I will contact my care team with questions, concerns or support needs. I will use the clinic as a resource and I understand I can contact my clinic with 24/7 after hours services available. Care Coordinator will remain available as needed.                             Action Plans on File:     Depression    Advance Care Plans/Directives:   Advanced Care Plan/Directives on file:   Yes    Status of Document(s):   On File and Validated    Advanced Care Plan/Directives Type:   Advanced Directive - On File         My Medical and Care Information  Problem List   Patient Active Problem List   Diagnosis    Essential hypertension, benign    Delay in development    Injury, other and unspecified, knee, leg, ankle, and foot    Perforation of tympanic membrane    Hyperlipidemia LDL goal <130    Seizure disorder (H)    Other peripheral enthesopathies     IFG (impaired fasting glucose)    Insulin resistance    Elevated cortisol level    Morbid obesity (H)    Low vitamin D level    BMI 50.0-59.9, adult (H)    SBO (small bowel obstruction) (H)    Non-intractable vomiting with nausea, unspecified vomiting type    Critical illness myopathy    Depression    Esophageal reflux    Candidiasis of skin    Skin lesion    Small bowel obstruction (H)    Atypical pneumonia    Mild persistent asthma without complication    Intertrigo    Hepatic steatosis    Symptomatic cholelithiasis    Abdominal pain, unspecified abdominal location    Acute cholecystitis    Hematuria, unspecified type      Current Medications and Allergies:    Current Outpatient Medications   Medication Sig Dispense Refill    acetaminophen (TYLENOL) 325 MG tablet Take 3 tablets (975 mg) by mouth every 8 hours as needed for mild pain or other (and adjunct with moderate or severe pain or per patient request)      albuterol (PROVENTIL) (2.5 MG/3ML) 0.083% neb solution Take 1 vial (2.5 mg) by nebulization every 4 hours as needed for shortness of breath, wheezing or cough 75 mL 0    azelastine (ASTELIN) 0.1 % nasal spray Spray 1 spray into both nostrils daily      carBAMazepine (TEGRETOL) 200 MG tablet TAKE TWO TABLETS BY MOUTH IN THE MORNING, TAKE ONE TABLET BY MOUTH AT NOON (WITH LUNCH) AND TAKE TWO TABLETS BY MOUTH AT NIGHT 450 tablet 3    fluticasone (FLONASE) 50 MCG/ACT nasal spray USE ONE SPRAY INTO BOTH NOSTRILS DAILY AS NEEDED FOR RHINITIS OR ALLERGIES 16 mL 1    Fluticasone Furoate-Vilanterol (BREO ELLIPTA) 50-25 MCG/ACT AEPB Inhale 1 puff into the lungs 2 times daily      ipratropium - albuterol 0.5 mg/2.5 mg/3 mL (DUONEB) 0.5-2.5 (3) MG/3ML neb solution Take 1 vial (3 mLs) by nebulization 4 times daily 90 mL 0    lisinopril (ZESTRIL) 20 MG tablet TAKE 1 TABLET (20 MG) BY MOUTH DAILY 90 tablet 2    lovastatin (MEVACOR) 40 MG tablet TAKE 1 TABLET (40 MG) BY MOUTH AT BEDTIME 90 tablet 1    miconazole  (MICATIN) 2 % external powder Apply topically 2 times daily      Multiple Vitamin (MULTI-VITAMIN PO) Take 1 tablet by mouth daily      nystatin (MYCOSTATIN) 576883 UNIT/GM external cream Apply topically daily as needed      oxyCODONE (ROXICODONE) 5 MG tablet Take 1 tablet (5 mg) by mouth every 4 hours as needed for severe pain (IF pain not managed with non-pharmacological and non-opioid interventions) 6 tablet 0    pantoprazole (PROTONIX) 40 MG EC tablet Take 1 tablet (40 mg) by mouth daily before breakfast      polyethylene glycol (MIRALAX) 17 GM/Dose powder Take 17 g by mouth daily      senna-docusate (SENOKOT-S/PERICOLACE) 8.6-50 MG tablet Take 1 tablet by mouth daily as needed for constipation      sertraline (ZOLOFT) 100 MG tablet TAKE 1 TABLET (100 MG) BY MOUTH DAILY 90 tablet 1     No current facility-administered medications for this visit.        Allergies   Allergen Reactions    Aspirin Unknown    Iodine      Iodinated Contrast Media  --- Hives      Penicillins Unknown    Tetracyclines & Related Unknown    Hydrochlorothiazide Palpitations     dehydration    Influenza Vac Split [Influenza Virus Vaccine] Rash     Patient states she is allergic to the vaccine.  Got a rash all over body many years ago after receiving injection.     Care Coordination Start Date: 5/11/2024   Frequency of Care Coordination: monthly, more frequently as needed     Form Last Updated: 08/09/2024

## 2024-08-12 ENCOUNTER — PATIENT OUTREACH (OUTPATIENT)
Dept: CARE COORDINATION | Facility: CLINIC | Age: 59
End: 2024-08-12
Payer: MEDICARE

## 2024-08-12 ENCOUNTER — TELEPHONE (OUTPATIENT)
Dept: INTERNAL MEDICINE | Facility: CLINIC | Age: 59
End: 2024-08-12
Payer: MEDICARE

## 2024-08-12 ASSESSMENT — ACTIVITIES OF DAILY LIVING (ADL): DEPENDENT_IADLS:: CLEANING;COOKING;LAUNDRY;SHOPPING;MEAL PREPARATION;TRANSPORTATION;INCONTINENCE

## 2024-08-12 NOTE — TELEPHONE ENCOUNTER
Fax received from [facility]. [facility] 08/09/12 / Order Nbr: [id number].  for review and signature.  Put in Dr. Vizcaino's in basket.

## 2024-08-13 ENCOUNTER — MEDICAL CORRESPONDENCE (OUTPATIENT)
Dept: HEALTH INFORMATION MANAGEMENT | Facility: CLINIC | Age: 59
End: 2024-08-13

## 2024-08-14 ENCOUNTER — TELEPHONE (OUTPATIENT)
Dept: INTERNAL MEDICINE | Facility: CLINIC | Age: 59
End: 2024-08-14
Payer: MEDICARE

## 2024-08-14 ENCOUNTER — MEDICAL CORRESPONDENCE (OUTPATIENT)
Dept: HEALTH INFORMATION MANAGEMENT | Facility: CLINIC | Age: 59
End: 2024-08-14

## 2024-08-14 DIAGNOSIS — Z53.9 DIAGNOSIS NOT YET DEFINED: Primary | ICD-10-CM

## 2024-08-14 PROCEDURE — G0180 MD CERTIFICATION HHA PATIENT: HCPCS | Performed by: INTERNAL MEDICINE

## 2024-08-21 DIAGNOSIS — Z53.9 DIAGNOSIS NOT YET DEFINED: Primary | ICD-10-CM

## 2024-08-21 PROCEDURE — G0180 MD CERTIFICATION HHA PATIENT: HCPCS | Performed by: INTERNAL MEDICINE

## 2024-08-23 ENCOUNTER — TELEPHONE (OUTPATIENT)
Dept: INTERNAL MEDICINE | Facility: CLINIC | Age: 59
End: 2024-08-23
Payer: MEDICARE

## 2024-09-04 ENCOUNTER — TELEPHONE (OUTPATIENT)
Dept: INTERNAL MEDICINE | Facility: CLINIC | Age: 59
End: 2024-09-04
Payer: MEDICARE

## 2024-09-05 ENCOUNTER — TELEPHONE (OUTPATIENT)
Dept: INTERNAL MEDICINE | Facility: CLINIC | Age: 59
End: 2024-09-05
Payer: MEDICARE

## 2024-09-11 ENCOUNTER — PATIENT OUTREACH (OUTPATIENT)
Dept: CARE COORDINATION | Facility: CLINIC | Age: 59
End: 2024-09-11
Payer: MEDICARE

## 2024-09-11 NOTE — PROGRESS NOTES
Clinic Care Coordination Contact  Follow Up Progress Note      Assessment: RN CC contacted patient's guardian, Lola (her mother), for monthly outreach. Patient has become incontinent. Lola asked about getting her incontinent supplies paid for by insurance. RN CC recommended they contact her [employer] CM or her [employer]. They are still waiting for her electric wheelchair. They talked to the medical equipment company 2 days ago. The equipment company is waiting for the [facility] to approve. Patient did receive her lift chair.     Care Gaps:    Health Maintenance Due   Topic Date Due    ASTHMA ACTION PLAN  Never done    Pneumococcal Vaccine: Pediatrics (0 to 5 Years) and At-Risk Patients (6 to 64 Years) (1 of 2 - PCV) Never done    HIV SCREENING  Never done    HEPATITIS C SCREENING  Never done    ZOSTER IMMUNIZATION (1 of 2) Never done    HEPATITIS B IMMUNIZATION (1 of 3 - 19+ 3-dose series) Never done    COLORECTAL CANCER SCREENING  04/19/2016    MEDICARE ANNUAL WELLNESS VISIT  10/01/2021    MAMMO SCREENING  10/23/2021    HPV TEST  10/01/2023    PAP  10/01/2023    INFLUENZA VACCINE (1) 09/01/2024    COVID-19 Vaccine (4 - 2023-24 season) 09/01/2024     Discussed with patient's mom.     Care Plans  Care Plan: Health Maintenance       Problem: Health Maintenance Due or Overdue       Goal: Become up-to-date with health maintenance visit(s)       Start Date: 5/14/2024 Expected End Date: 11/12/2024    This Visit's Progress: 10% Recent Progress: 0%    Note:     Barriers: Cognitive disability; Physical disability; Poor support from Significant other; Provider availability - wait time to complete appointments.   Strengths: Parents/guardians Motivated; Agreeable to Care Coordination.   Patient expressed understanding of goal: Yes  Action steps to achieve this goal:  1. I will take my medications as prescribed.   2. I will discuss, review, schedule and complete recommended overdue health maintenance with my Primary Care Provider.    3. I will contact my care team with questions, concerns or support needs. I will use the clinic as a resource and I understand I can contact my clinic with 24/7 after hours services available. Care Coordinator will remain available as needed.                               Care Plan: Resources       Problem: Resources       Goal: Resources for equipment       Start Date: 5/14/2024 Expected End Date: 11/12/2024    This Visit's Progress: 20% Recent Progress: 10%    Note:     Barriers: Cognitive disability; Physical disability; Poor support from Significant other; Provider availability - wait time to complete appointments.   Strengths: Parents/guardians Motivated; Agreeable to Care Coordination.   Patient expressed understanding of goal: Yes  Action steps to achieve this goal:  1. I will review and follow up with resource for medical equipment ([employer] ([phone]) for required forms for Bariatric Electric Wheelchair and bedside commode.   2. I will discuss with [employer]  Jagjit Barrientos how to obtain incontinent supplies through [place]'s waiver.   3. I will contact my care team with questions, concerns or support needs. I will use the clinic as a resource and I understand I can contact my clinic with 24/7 after hours services available. Care Coordinator will remain available as needed.                             Intervention/Education provided during outreach: Discussed patients plan of care. CC RN asked open ended questions, provided support, resources, and encouragement as needed. Patient will reach out to care team sooner than planned with new questions or concerns.     Plan: RN CC will continue to follow patient for any additional needs.     Care Coordinator will follow up in 1 month.     Rocio Govea RN, BSN, CPHN, CCM  [facility]  Phone: [phone]  Email: [email or web address]

## 2024-10-04 ENCOUNTER — TELEPHONE (OUTPATIENT)
Dept: INTERNAL MEDICINE | Facility: CLINIC | Age: 59
End: 2024-10-04
Payer: MEDICARE

## 2024-10-07 ENCOUNTER — TELEPHONE (OUTPATIENT)
Dept: INTERNAL MEDICINE | Facility: CLINIC | Age: 59
End: 2024-10-07
Payer: MEDICARE

## 2024-10-08 ENCOUNTER — MEDICAL CORRESPONDENCE (OUTPATIENT)
Dept: HEALTH INFORMATION MANAGEMENT | Facility: CLINIC | Age: 59
End: 2024-10-08

## 2024-10-09 ENCOUNTER — HOSPITAL ENCOUNTER (INPATIENT)
Facility: CLINIC | Age: 59
LOS: 7 days | Discharge: HOME | DRG: 202 | End: 2024-10-17
Attending: EMERGENCY MEDICINE | Admitting: STUDENT IN AN ORGANIZED HEALTH CARE EDUCATION/TRAINING PROGRAM
Payer: MEDICARE

## 2024-10-09 DIAGNOSIS — J96.01 ACUTE RESPIRATORY FAILURE WITH HYPOXIA (H): ICD-10-CM

## 2024-10-09 DIAGNOSIS — J45.30 MILD PERSISTENT ASTHMA WITHOUT COMPLICATION: ICD-10-CM

## 2024-10-09 DIAGNOSIS — B37.2 CANDIDAL INTERTRIGO: Primary | ICD-10-CM

## 2024-10-09 DIAGNOSIS — J45.901 EXACERBATION OF ASTHMA, UNSPECIFIED ASTHMA SEVERITY, UNSPECIFIED WHETHER PERSISTENT: ICD-10-CM

## 2024-10-09 PROCEDURE — 93005 ELECTROCARDIOGRAM TRACING: CPT

## 2024-10-09 PROCEDURE — 99285 EMERGENCY DEPT VISIT HI MDM: CPT | Mod: 25

## 2024-10-09 ASSESSMENT — COLUMBIA-SUICIDE SEVERITY RATING SCALE - C-SSRS
2. HAVE YOU ACTUALLY HAD ANY THOUGHTS OF KILLING YOURSELF IN THE PAST MONTH?: NO
6. HAVE YOU EVER DONE ANYTHING, STARTED TO DO ANYTHING, OR PREPARED TO DO ANYTHING TO END YOUR LIFE?: NO
1. IN THE PAST MONTH, HAVE YOU WISHED YOU WERE DEAD OR WISHED YOU COULD GO TO SLEEP AND NOT WAKE UP?: NO

## 2024-10-10 ENCOUNTER — APPOINTMENT (OUTPATIENT)
Dept: CT IMAGING | Facility: CLINIC | Age: 59
DRG: 202 | End: 2024-10-10
Attending: EMERGENCY MEDICINE
Payer: MEDICARE

## 2024-10-10 PROBLEM — J96.01 ACUTE RESPIRATORY FAILURE WITH HYPOXIA (H): Status: ACTIVE | Noted: 2024-10-10

## 2024-10-10 PROBLEM — J45.901 EXACERBATION OF ASTHMA, UNSPECIFIED ASTHMA SEVERITY, UNSPECIFIED WHETHER PERSISTENT: Status: ACTIVE | Noted: 2024-10-10

## 2024-10-10 LAB
ANION GAP SERPL CALCULATED.3IONS-SCNC: 13 MMOL/L (ref 7–15)
ANION GAP SERPL CALCULATED.3IONS-SCNC: 9 MMOL/L (ref 7–15)
ATRIAL RATE - MUSE: 93 BPM
BASOPHILS # BLD AUTO: 0 10E3/UL (ref 0–0.2)
BASOPHILS NFR BLD AUTO: 1 %
BUN SERPL-MCNC: 11.6 MG/DL (ref 6–20)
BUN SERPL-MCNC: 12.9 MG/DL (ref 6–20)
CALCIUM SERPL-MCNC: 8.3 MG/DL (ref 8.8–10.4)
CALCIUM SERPL-MCNC: 8.4 MG/DL (ref 8.8–10.4)
CHLORIDE SERPL-SCNC: 102 MMOL/L (ref 98–107)
CHLORIDE SERPL-SCNC: 104 MMOL/L (ref 98–107)
CREAT SERPL-MCNC: 0.61 MG/DL (ref 0.51–0.95)
CREAT SERPL-MCNC: 0.71 MG/DL (ref 0.51–0.95)
D DIMER PPP FEU-MCNC: 1.05 UG/ML FEU (ref 0–0.5)
DIASTOLIC BLOOD PRESSURE - MUSE: NORMAL MMHG
EGFRCR SERPLBLD CKD-EPI 2021: >90 ML/MIN/1.73M2
EGFRCR SERPLBLD CKD-EPI 2021: >90 ML/MIN/1.73M2
EOSINOPHIL # BLD AUTO: 0.5 10E3/UL (ref 0–0.7)
EOSINOPHIL NFR BLD AUTO: 7 %
ERYTHROCYTE [DISTWIDTH] IN BLOOD BY AUTOMATED COUNT: 14.5 % (ref 10–15)
ERYTHROCYTE [DISTWIDTH] IN BLOOD BY AUTOMATED COUNT: 14.5 % (ref 10–15)
FLUAV RNA SPEC QL NAA+PROBE: NEGATIVE
FLUBV RNA RESP QL NAA+PROBE: NEGATIVE
GLUCOSE SERPL-MCNC: 147 MG/DL (ref 70–99)
GLUCOSE SERPL-MCNC: 178 MG/DL (ref 70–99)
HCO3 SERPL-SCNC: 26 MMOL/L (ref 22–29)
HCO3 SERPL-SCNC: 27 MMOL/L (ref 22–29)
HCT VFR BLD AUTO: 38.1 % (ref 35–47)
HCT VFR BLD AUTO: 40.2 % (ref 35–47)
HGB BLD-MCNC: 11.3 G/DL (ref 11.7–15.7)
HGB BLD-MCNC: 12 G/DL (ref 11.7–15.7)
IMM GRANULOCYTES # BLD: 0 10E3/UL
IMM GRANULOCYTES NFR BLD: 0 %
INTERPRETATION ECG - MUSE: NORMAL
LYMPHOCYTES # BLD AUTO: 1.3 10E3/UL (ref 0.8–5.3)
LYMPHOCYTES NFR BLD AUTO: 16 %
MAGNESIUM SERPL-MCNC: 2.5 MG/DL (ref 1.7–2.3)
MCH RBC QN AUTO: 27.7 PG (ref 26.5–33)
MCH RBC QN AUTO: 27.9 PG (ref 26.5–33)
MCHC RBC AUTO-ENTMCNC: 29.7 G/DL (ref 31.5–36.5)
MCHC RBC AUTO-ENTMCNC: 29.9 G/DL (ref 31.5–36.5)
MCV RBC AUTO: 93 FL (ref 78–100)
MCV RBC AUTO: 94 FL (ref 78–100)
MONOCYTES # BLD AUTO: 0.6 10E3/UL (ref 0–1.3)
MONOCYTES NFR BLD AUTO: 8 %
NEUTROPHILS # BLD AUTO: 5.6 10E3/UL (ref 1.6–8.3)
NEUTROPHILS NFR BLD AUTO: 68 %
NRBC # BLD AUTO: 0 10E3/UL
NRBC BLD AUTO-RTO: 0 /100
P AXIS - MUSE: 43 DEGREES
PLATELET # BLD AUTO: 169 10E3/UL (ref 150–450)
PLATELET # BLD AUTO: 176 10E3/UL (ref 150–450)
POTASSIUM SERPL-SCNC: 4.3 MMOL/L (ref 3.4–5.3)
PR INTERVAL - MUSE: 198 MS
QRS DURATION - MUSE: 72 MS
QT - MUSE: 372 MS
QTC - MUSE: 462 MS
R AXIS - MUSE: 16 DEGREES
RBC # BLD AUTO: 4.05 10E6/UL (ref 3.8–5.2)
RBC # BLD AUTO: 4.33 10E6/UL (ref 3.8–5.2)
RSV RNA SPEC NAA+PROBE: NEGATIVE
SARS-COV-2 RNA RESP QL NAA+PROBE: NEGATIVE
SODIUM SERPL-SCNC: 139 MMOL/L (ref 135–145)
SODIUM SERPL-SCNC: 142 MMOL/L (ref 135–145)
SYSTOLIC BLOOD PRESSURE - MUSE: NORMAL MMHG
T AXIS - MUSE: 52 DEGREES
VENTRICULAR RATE- MUSE: 93 BPM
WBC # BLD AUTO: 7.2 10E3/UL (ref 4–11)
WBC # BLD AUTO: 8.1 10E3/UL (ref 4–11)

## 2024-10-10 PROCEDURE — 94640 AIRWAY INHALATION TREATMENT: CPT

## 2024-10-10 PROCEDURE — 250N000009 HC RX 250: Performed by: EMERGENCY MEDICINE

## 2024-10-10 PROCEDURE — 250N000013 HC RX MED GY IP 250 OP 250 PS 637: Performed by: INTERNAL MEDICINE

## 2024-10-10 PROCEDURE — 82310 ASSAY OF CALCIUM: CPT | Performed by: STUDENT IN AN ORGANIZED HEALTH CARE EDUCATION/TRAINING PROGRAM

## 2024-10-10 PROCEDURE — 96375 TX/PRO/DX INJ NEW DRUG ADDON: CPT

## 2024-10-10 PROCEDURE — 250N000011 HC RX IP 250 OP 636: Performed by: STUDENT IN AN ORGANIZED HEALTH CARE EDUCATION/TRAINING PROGRAM

## 2024-10-10 PROCEDURE — 99418 PROLNG IP/OBS E/M EA 15 MIN: CPT | Performed by: STUDENT IN AN ORGANIZED HEALTH CARE EDUCATION/TRAINING PROGRAM

## 2024-10-10 PROCEDURE — 36415 COLL VENOUS BLD VENIPUNCTURE: CPT | Performed by: EMERGENCY MEDICINE

## 2024-10-10 PROCEDURE — G0463 HOSPITAL OUTPT CLINIC VISIT: HCPCS

## 2024-10-10 PROCEDURE — 85025 COMPLETE CBC W/AUTO DIFF WBC: CPT | Performed by: EMERGENCY MEDICINE

## 2024-10-10 PROCEDURE — 87637 SARSCOV2&INF A&B&RSV AMP PRB: CPT | Performed by: EMERGENCY MEDICINE

## 2024-10-10 PROCEDURE — 250N000012 HC RX MED GY IP 250 OP 636 PS 637: Performed by: STUDENT IN AN ORGANIZED HEALTH CARE EDUCATION/TRAINING PROGRAM

## 2024-10-10 PROCEDURE — 80048 BASIC METABOLIC PNL TOTAL CA: CPT | Performed by: EMERGENCY MEDICINE

## 2024-10-10 PROCEDURE — 85379 FIBRIN DEGRADATION QUANT: CPT | Performed by: EMERGENCY MEDICINE

## 2024-10-10 PROCEDURE — 71275 CT ANGIOGRAPHY CHEST: CPT | Mod: MA

## 2024-10-10 PROCEDURE — 250N000011 HC RX IP 250 OP 636: Performed by: EMERGENCY MEDICINE

## 2024-10-10 PROCEDURE — 80048 BASIC METABOLIC PNL TOTAL CA: CPT | Performed by: STUDENT IN AN ORGANIZED HEALTH CARE EDUCATION/TRAINING PROGRAM

## 2024-10-10 PROCEDURE — 99223 1ST HOSP IP/OBS HIGH 75: CPT | Mod: AI | Performed by: STUDENT IN AN ORGANIZED HEALTH CARE EDUCATION/TRAINING PROGRAM

## 2024-10-10 PROCEDURE — 120N000001 HC R&B MED SURG/OB

## 2024-10-10 PROCEDURE — 36415 COLL VENOUS BLD VENIPUNCTURE: CPT | Performed by: STUDENT IN AN ORGANIZED HEALTH CARE EDUCATION/TRAINING PROGRAM

## 2024-10-10 PROCEDURE — 96365 THER/PROPH/DIAG IV INF INIT: CPT | Mod: 59

## 2024-10-10 PROCEDURE — 99207 PR APP CREDIT; MD BILLING SHARED VISIT: CPT | Performed by: INTERNAL MEDICINE

## 2024-10-10 PROCEDURE — 85018 HEMOGLOBIN: CPT | Performed by: STUDENT IN AN ORGANIZED HEALTH CARE EDUCATION/TRAINING PROGRAM

## 2024-10-10 PROCEDURE — 83735 ASSAY OF MAGNESIUM: CPT | Performed by: STUDENT IN AN ORGANIZED HEALTH CARE EDUCATION/TRAINING PROGRAM

## 2024-10-10 RX ORDER — LISINOPRIL 20 MG/1
20 TABLET ORAL DAILY
Status: DISCONTINUED | OUTPATIENT
Start: 2024-10-10 | End: 2024-10-17 | Stop reason: HOSPADM

## 2024-10-10 RX ORDER — METHYLPREDNISOLONE SODIUM SUCCINATE 125 MG/2ML
125 INJECTION INTRAMUSCULAR; INTRAVENOUS ONCE
Status: COMPLETED | OUTPATIENT
Start: 2024-10-10 | End: 2024-10-10

## 2024-10-10 RX ORDER — AMOXICILLIN 250 MG
1 CAPSULE ORAL 2 TIMES DAILY PRN
Status: DISCONTINUED | OUTPATIENT
Start: 2024-10-10 | End: 2024-10-17 | Stop reason: HOSPADM

## 2024-10-10 RX ORDER — FLUTICASONE FUROATE AND VILANTEROL 100; 25 UG/1; UG/1
1 POWDER RESPIRATORY (INHALATION) DAILY
Status: DISCONTINUED | OUTPATIENT
Start: 2024-10-10 | End: 2024-10-17 | Stop reason: HOSPADM

## 2024-10-10 RX ORDER — CALCIUM CARBONATE 500 MG/1
1000 TABLET, CHEWABLE ORAL 4 TIMES DAILY PRN
Status: DISCONTINUED | OUTPATIENT
Start: 2024-10-10 | End: 2024-10-17 | Stop reason: HOSPADM

## 2024-10-10 RX ORDER — IOPAMIDOL 755 MG/ML
500 INJECTION, SOLUTION INTRAVASCULAR ONCE
Status: COMPLETED | OUTPATIENT
Start: 2024-10-10 | End: 2024-10-10

## 2024-10-10 RX ORDER — CARBAMAZEPINE 200 MG/1
200 TABLET ORAL DAILY
COMMUNITY

## 2024-10-10 RX ORDER — DIPHENHYDRAMINE HYDROCHLORIDE 50 MG/ML
50 INJECTION INTRAMUSCULAR; INTRAVENOUS EVERY 6 HOURS PRN
Status: DISCONTINUED | OUTPATIENT
Start: 2024-10-10 | End: 2024-10-17 | Stop reason: HOSPADM

## 2024-10-10 RX ORDER — PREDNISONE 20 MG/1
40 TABLET ORAL DAILY
Status: DISCONTINUED | OUTPATIENT
Start: 2024-10-10 | End: 2024-10-11

## 2024-10-10 RX ORDER — AMOXICILLIN 250 MG
2 CAPSULE ORAL 2 TIMES DAILY PRN
Status: DISCONTINUED | OUTPATIENT
Start: 2024-10-10 | End: 2024-10-17 | Stop reason: HOSPADM

## 2024-10-10 RX ORDER — IPRATROPIUM BROMIDE AND ALBUTEROL SULFATE 2.5; .5 MG/3ML; MG/3ML
3 SOLUTION RESPIRATORY (INHALATION) ONCE
Status: COMPLETED | OUTPATIENT
Start: 2024-10-10 | End: 2024-10-10

## 2024-10-10 RX ORDER — CARBAMAZEPINE 200 MG/1
200 TABLET ORAL DAILY
Status: DISCONTINUED | OUTPATIENT
Start: 2024-10-10 | End: 2024-10-10

## 2024-10-10 RX ORDER — MAGNESIUM SULFATE HEPTAHYDRATE 40 MG/ML
2 INJECTION, SOLUTION INTRAVENOUS ONCE
Status: COMPLETED | OUTPATIENT
Start: 2024-10-10 | End: 2024-10-10

## 2024-10-10 RX ORDER — DIPHENHYDRAMINE HCL 25 MG
50 CAPSULE ORAL EVERY 6 HOURS PRN
Status: DISCONTINUED | OUTPATIENT
Start: 2024-10-10 | End: 2024-10-17 | Stop reason: HOSPADM

## 2024-10-10 RX ORDER — CARBAMAZEPINE 200 MG/1
400 TABLET ORAL 2 TIMES DAILY
Status: DISCONTINUED | OUTPATIENT
Start: 2024-10-10 | End: 2024-10-17 | Stop reason: HOSPADM

## 2024-10-10 RX ORDER — IPRATROPIUM BROMIDE AND ALBUTEROL SULFATE 2.5; .5 MG/3ML; MG/3ML
1 SOLUTION RESPIRATORY (INHALATION) EVERY 6 HOURS PRN
COMMUNITY

## 2024-10-10 RX ORDER — CARBAMAZEPINE 200 MG/1
200 TABLET ORAL DAILY
Status: DISCONTINUED | OUTPATIENT
Start: 2024-10-10 | End: 2024-10-17 | Stop reason: HOSPADM

## 2024-10-10 RX ORDER — IPRATROPIUM BROMIDE AND ALBUTEROL SULFATE 2.5; .5 MG/3ML; MG/3ML
3 SOLUTION RESPIRATORY (INHALATION) EVERY 4 HOURS PRN
Status: DISCONTINUED | OUTPATIENT
Start: 2024-10-10 | End: 2024-10-11

## 2024-10-10 RX ORDER — FLUTICASONE PROPIONATE 50 MCG
1 SPRAY, SUSPENSION (ML) NASAL DAILY
Status: DISCONTINUED | OUTPATIENT
Start: 2024-10-10 | End: 2024-10-17 | Stop reason: HOSPADM

## 2024-10-10 RX ORDER — ACETAMINOPHEN 500 MG
1000 TABLET ORAL PRN
COMMUNITY

## 2024-10-10 RX ORDER — IBUPROFEN 400 MG/1
800 TABLET, FILM COATED ORAL EVERY 6 HOURS PRN
Status: DISCONTINUED | OUTPATIENT
Start: 2024-10-10 | End: 2024-10-17 | Stop reason: HOSPADM

## 2024-10-10 RX ORDER — CARBAMAZEPINE 200 MG/1
400 TABLET ORAL 2 TIMES DAILY
COMMUNITY

## 2024-10-10 RX ORDER — LIDOCAINE 40 MG/G
CREAM TOPICAL
Status: DISCONTINUED | OUTPATIENT
Start: 2024-10-10 | End: 2024-10-17 | Stop reason: HOSPADM

## 2024-10-10 RX ORDER — SERTRALINE HYDROCHLORIDE 100 MG/1
100 TABLET, FILM COATED ORAL DAILY
Status: DISCONTINUED | OUTPATIENT
Start: 2024-10-10 | End: 2024-10-17 | Stop reason: HOSPADM

## 2024-10-10 RX ORDER — IBUPROFEN 200 MG
800 TABLET ORAL PRN
COMMUNITY

## 2024-10-10 RX ADMIN — IPRATROPIUM BROMIDE AND ALBUTEROL SULFATE 3 ML: .5; 3 SOLUTION RESPIRATORY (INHALATION) at 01:02

## 2024-10-10 RX ADMIN — PREDNISONE 40 MG: 20 TABLET ORAL at 08:00

## 2024-10-10 RX ADMIN — IOPAMIDOL 100 ML: 755 INJECTION, SOLUTION INTRAVENOUS at 04:29

## 2024-10-10 RX ADMIN — FLUTICASONE PROPIONATE 1 SPRAY: 50 SPRAY, METERED NASAL at 16:05

## 2024-10-10 RX ADMIN — METHYLPREDNISOLONE SODIUM SUCCINATE 125 MG: 125 INJECTION, POWDER, FOR SOLUTION INTRAMUSCULAR; INTRAVENOUS at 01:08

## 2024-10-10 RX ADMIN — CARBAMAZEPINE 400 MG: 200 TABLET ORAL at 19:26

## 2024-10-10 RX ADMIN — IPRATROPIUM BROMIDE AND ALBUTEROL SULFATE 3 ML: .5; 3 SOLUTION RESPIRATORY (INHALATION) at 01:48

## 2024-10-10 RX ADMIN — SERTRALINE 100 MG: 100 TABLET, FILM COATED ORAL at 16:05

## 2024-10-10 RX ADMIN — MAGNESIUM SULFATE HEPTAHYDRATE 2 G: 40 INJECTION, SOLUTION INTRAVENOUS at 01:09

## 2024-10-10 RX ADMIN — SODIUM CHLORIDE 100 ML: 9 INJECTION, SOLUTION INTRAVENOUS at 04:30

## 2024-10-10 RX ADMIN — LISINOPRIL 20 MG: 20 TABLET ORAL at 16:05

## 2024-10-10 RX ADMIN — IPRATROPIUM BROMIDE AND ALBUTEROL SULFATE 3 ML: .5; 3 SOLUTION RESPIRATORY (INHALATION) at 00:47

## 2024-10-10 RX ADMIN — FLUTICASONE FUROATE AND VILANTEROL TRIFENATATE 1 PUFF: 100; 25 POWDER RESPIRATORY (INHALATION) at 19:26

## 2024-10-10 RX ADMIN — DIPHENHYDRAMINE HYDROCHLORIDE 50 MG: 50 INJECTION, SOLUTION INTRAMUSCULAR; INTRAVENOUS at 03:08

## 2024-10-10 ASSESSMENT — ACTIVITIES OF DAILY LIVING (ADL)
ADLS_ACUITY_SCORE: 41
ADLS_ACUITY_SCORE: 42
ADLS_ACUITY_SCORE: 42
DEPENDENT_IADLS:: CLEANING;COOKING;LAUNDRY;SHOPPING;MEAL PREPARATION;TRANSPORTATION;INCONTINENCE
ADLS_ACUITY_SCORE: 42
ADLS_ACUITY_SCORE: 41
ADLS_ACUITY_SCORE: 42
ADLS_ACUITY_SCORE: 41
ADLS_ACUITY_SCORE: 41
ADLS_ACUITY_SCORE: 53
ADLS_ACUITY_SCORE: 42
ADLS_ACUITY_SCORE: 41
ADLS_ACUITY_SCORE: 42
ADLS_ACUITY_SCORE: 41
ADLS_ACUITY_SCORE: 41
ADLS_ACUITY_SCORE: 42
ADLS_ACUITY_SCORE: 42
ADLS_ACUITY_SCORE: 41
ADLS_ACUITY_SCORE: 41
ADLS_ACUITY_SCORE: 42
ADLS_ACUITY_SCORE: 41
ADLS_ACUITY_SCORE: 42

## 2024-10-10 NOTE — PLAN OF CARE
Goal Outcome Evaluation:      Plan of Care Reviewed With: patient    Overall Patient Progress: improvingOverall Patient Progress: improving    Outcome Evaluation: Pt admitted (as an inpatient) to OBS at 0930. Denies pain and with wheezing/SOB but is on 4L O2. Pt does not use O2 at home. With history of asthma. Pt has significant moisture related skin injury under abdominal and breast folds. Seen by WOC - recommendations placed. Repositioning. Purewick in use due to incontinence and risks associated with moisture. Pt is up Assit of 1 to pivot, and pivotted from bed initially. PRN nebs available for SOB - I haven't used these yet. BM Y. Wound cares completed. Anton bed did arrive to obs unit, but pt refused.     CORERECTION TO ABOVE: *WITHOUT wheezing/SOB*    Pt has hx of dev delay. Aox4 but certainly with intermittent confusion. Pt stated seeing one of her dogs through the window in pt's room. Incidental. Pt otherwise Aox4, pleasant.    Problem: Wound  Goal: Skin Health and Integrity  10/10/2024 1316 by Franck Lora RN  Outcome: Not Progressing  10/10/2024 1312 by Franck Lora RN  Outcome: Not Progressing  10/10/2024 1251 by Franck Lora RN  Outcome: Not Progressing  Intervention: Optimize Skin Protection  Recent Flowsheet Documentation  Taken 10/10/2024 0930 by Franck Lora RN  Activity Management:   ambulated in room   back to bed  Goal: Optimal Wound Healing  10/10/2024 1316 by Franck Lora, RN  Outcome: Not Progressing  10/10/2024 1312 by Franck Lora RN  Outcome: Not Progressing  10/10/2024 1251 by Franck Lora RN  Outcome: Not Progressing     Problem: Adult Inpatient Plan of Care  Goal: Plan of Care Review  Description: The Plan of Care Review/Shift note should be completed every shift.  The Outcome Evaluation is a brief statement about your assessment that the patient is improving, declining, or no change.  This information will be displayed automatically on your shift  note.  10/10/2024  "1316 by Franck Lora RN  Outcome: Progressing  Flowsheets (Taken 10/10/2024 1316)  Outcome Evaluation: Pt admitted (as an inpatient) to OBS at 0930. Denies pain and with wheezing/SOB but is on 4L O2. Pt does not use O2 at home. With history of asthma. Pt has significant moisture related skin injury under abdominal and breast folds. Seen by WOC - recommendations placed. Repositioning. Purewick in use due to incontinence and risks associated with moisture. Pt is up Assit of 1 to pivot, and pivotted from bed initially. PRN nebs available for SOB - I haven't used these yet. BM Y.  Plan of Care Reviewed With: patient  Overall Patient Progress: improving  10/10/2024 1312 by Franck Lora RN  Outcome: Progressing  Flowsheets (Taken 10/10/2024 1312)  Outcome Evaluation: Pt admitted (as an inpatient) to OBS at 0930. Denies pain and with wheezing/SOB but is on 4L O2. Pt does not use O2 at home. With history of asthma. Pt has significant moisture related skin injury under abdominal and breast folds. Seen by WOC - recommendations placed. Repositioning. Purewick in use due to incontinence and risks associated with moisture. Pt is up Assit of 1 to pivot, and pivotted from bed initially. PRN nebs available for SOB - I haven't used these yet. BM Y.  Overall Patient Progress: improving  10/10/2024 1251 by Franck Lora RN  Outcome: Progressing  Goal: Patient-Specific Goal (Individualized)  Description: You can add care plan individualizations to a care plan. Examples of Individualization might be:  \"Parent requests to be called daily at 9am for status\", \"I have a hard time hearing out of my right ear\", or \"Do not touch me to wake me up as it startles  me\".  10/10/2024 1316 by Franck Lora RN  Outcome: Progressing  10/10/2024 1312 by Franck Lora RN  Outcome: Progressing  10/10/2024 1251 by Franck Lora RN  Outcome: Progressing  Goal: Absence of Hospital-Acquired Illness or Injury  10/10/2024 1316 by Franck Lora, " RN  Outcome: Progressing  10/10/2024 1312 by Franck Lora RN  Outcome: Progressing  10/10/2024 1251 by Franck Lora RN  Outcome: Progressing  Intervention: Identify and Manage Fall Risk  Recent Flowsheet Documentation  Taken 10/10/2024 0930 by Franck Lora RN  Safety Promotion/Fall Prevention:   nonskid shoes/slippers when out of bed   supervised activity   safety round/check completed  Intervention: Prevent Skin Injury  Recent Flowsheet Documentation  Taken 10/10/2024 0937 by Franck Lora RN  Body Position: supine, head elevated  Intervention: Prevent and Manage VTE (Venous Thromboembolism) Risk  Recent Flowsheet Documentation  Taken 10/10/2024 0930 by Franck Lora RN  VTE Prevention/Management: SCDs off (sequential compression devices)  Goal: Optimal Comfort and Wellbeing  10/10/2024 1316 by Franck Lora RN  Outcome: Progressing  10/10/2024 1312 by Franck Lora RN  Outcome: Progressing  10/10/2024 1251 by Franck Lora RN  Outcome: Progressing  Goal: Readiness for Transition of Care  10/10/2024 1316 by Franck Lora RN  Outcome: Progressing  10/10/2024 1312 by Franck Lora RN  Outcome: Progressing  10/10/2024 1251 by Franck Lora RN  Outcome: Progressing  Intervention: Mutually Develop Transition Plan  Recent Flowsheet Documentation  Taken 10/10/2024 1200 by Franck Lora RN  Equipment Currently Used at Home:   tub bench   grab bar, tub/shower   other (see comments)     Problem: Comorbidity Management  Goal: Maintenance of Asthma Control  10/10/2024 1316 by Franck Lora RN  Outcome: Progressing  10/10/2024 1312 by Franck Lora RN  Outcome: Progressing  10/10/2024 1251 by Franck Lora RN  Outcome: Progressing  Goal: Maintenance of Seizure Control  10/10/2024 1316 by Franck Lora RN  Outcome: Progressing  10/10/2024 1312 by Franck Lora RN  Outcome: Progressing  10/10/2024 1251 by Franck Lora RN  Outcome: Progressing     Problem: Pain Acute  Goal: Optimal Pain  Control and Function  10/10/2024 1316 by Franck Lora RN  Outcome: Progressing  10/10/2024 1312 by Franck Lora RN  Outcome: Progressing  10/10/2024 1251 by Franck Lora RN  Outcome: Progressing     Problem: Wound  Goal: Optimal Coping  10/10/2024 1316 by Franck Lora RN  Outcome: Progressing  10/10/2024 1312 by Franck Lora RN  Outcome: Progressing  10/10/2024 1251 by Franck Lora RN  Outcome: Progressing  Goal: Optimal Functional Ability  10/10/2024 1316 by Franck Lora RN  Outcome: Progressing  10/10/2024 1312 by Franck Lora RN  Outcome: Progressing  10/10/2024 1251 by Franck Lora RN  Outcome: Progressing  Intervention: Optimize Functional Ability  Recent Flowsheet Documentation  Taken 10/10/2024 0930 by Franck Lora RN  Assistive Device Utilized: gait belt  Activity Management:   ambulated in room   back to bed  Activity Assistance Provided: assistance, 1 person  Goal: Absence of Infection Signs and Symptoms  10/10/2024 1316 by Franck Lora RN  Outcome: Progressing  10/10/2024 1312 by Franck Lora RN  Outcome: Progressing  10/10/2024 1251 by Franck Lora RN  Outcome: Progressing  Goal: Improved Oral Intake  10/10/2024 1316 by Franck Lora RN  Outcome: Progressing  10/10/2024 1312 by Franck Lora RN  Outcome: Progressing  10/10/2024 1251 by Franck Lora RN  Outcome: Progressing  Goal: Optimal Pain Control and Function  10/10/2024 1316 by Franck Lora RN  Outcome: Progressing  10/10/2024 1312 by Franck Lora RN  Outcome: Progressing  10/10/2024 1251 by Franck Lora RN  Outcome: Progressing     Problem: Skin Injury Risk Increased  Goal: Skin Health and Integrity  10/10/2024 1316 by Franck Lora RN  Outcome: Progressing  10/10/2024 1312 by Franck Lora RN  Outcome: Progressing  10/10/2024 1251 by Franck Lora RN  Outcome: Progressing  Intervention: Plan: Nurse Driven Intervention: Moisture Management  Recent Flowsheet Documentation  Taken 10/10/2024  0930 by Franck Lora, RN  Moisture Interventions:   Encourage regular toileting   Urinary collection device  Intervention: Plan: Nurse Driven Intervention: Friction and Shear  Recent Flowsheet Documentation  Taken 10/10/2024 0930 by Franck Lora, RN  Friction/Shear Interventions: HOB 30 degrees or less  Intervention: Optimize Skin Protection  Recent Flowsheet Documentation  Taken 10/10/2024 0930 by Franck Lora, RN  Activity Management:   ambulated in room   back to bed

## 2024-10-10 NOTE — CONSULTS
[facility] Nurse Inpatient Assessment     Consulted for: diffuse     Patient History (according to provider note(s):      Brissa Corado is a 59 year old female admitted on 10/9/2024. She has a history of asthma, hypertension, developmental delay, HLD, HTN, prior seizures, RICK, GERD, MDD, obesity who presented to the ED with shortness of breath.  Significant wheezing present on exam, hypoxic on room air and started on supplemental O2.  Being treated for asthma exacerbation.     Assessment:      Areas visualized during today's visit: Skin folds  and Thigh    Wound location: Under breasts, abdominal folds  Last photo: 10/10/24    Right breast      Left abdominal fold and thigh      Wound due to: Moisture Associated Skin Damage (MASD) and Fungal  Wound history/plan of care: Patient with chronic moisture damage to folds.  Patient reports she uses antifungal powder and wash clothes in folds but areas don't really improve.     Wound base: Bilateral breasts with extensive moist erythema and satellite lesions.  Abdominal folds with extensive moist erythema and satellite lesions.  Abdominal folds also with scattered areas of dermis ranging from pinpoint to 0.7x0.7cm.  3 raised areas which have slight hypergranular appearance ranging from 0.3x0.3cm to 0.7x0.7cm.       Palpation of the wound bed: normal      Drainage: copious     Description of drainage:  seems mostly like perspiration      Measurements (length x width x depth, in cm): see above      Tunneling: N/A     Undermining: N/A  Periwound skin: Erythema- blanchable      Color: pink      Temperature: normal   Odor: moderate fungal odor  Pain: denies   Pain interventions prior to dressing change: N/A  Treatment goal: Heal , Decrease moisture, and Protection  STATUS: initial assessment  Supplies ordered: ordered Vashe     Wound location: Left posterior thigh  Last photo: 10/10/24    Wound due to: Friction  Wound history/plan of care:  "Patient reports this is a \"rug burn\" from when paramedics tried to move her.  Appears to also have a pressure component.     Wound base: 7 areas ranging from 0.3x1cm to 1x2.2x0.2cm.  Areas are a mix of dermis, fibrin and pink moist tissue.       Palpation of the wound bed: normal      Drainage: small     Description of drainage: serosanguinous     Measurements (length x width x depth, in cm): see above      Tunneling: N/A     Undermining: N/A  Periwound skin: Dry/scaly      Color: normal and consistent with surrounding tissue      Temperature: normal   Odor: none  Pain: denies   Pain interventions prior to dressing change: N/A  Treatment goal: Heal   STATUS: initial assessment  Supplies ordered: supplies stored on unit      Skin Injury Location: Gluteal cleft to perianal area  Last photo: none  Skin injury due to: Moisture associated skin damage (MASD)  Skin history and plan of care:   Patient with morbid obesity and skin breakdown in fold  Affected area:      Skin assessment: Erythema and Lesions-satellite     Measurements (length x width x depth, in cm) 10x5cm area of moist erythema and satellite lesions.  Primarily intact with some scattered pinpoint areas of dermis     Color: pink     Temperature  normal      Drainage: none .      Color: none      Odor: none  Pain: denies   Pain interventions prior to dressing change: N/A  Treatment goal: Heal   STATUS: initial assessment  Supplies ordered: ordered Aubree Antifungal    Wound location: Labia  Last photo: none  Wound due to: Medical Adhesive Related Skin Injury (MARSI)  Wound history/plan of care: see above  Wound base: 100 % Moist and Pink     Palpation of the wound bed: normal      Drainage: none     Description of drainage: none     Measurements (length x width x depth, in cm): 0.7  x 0.5 cm      Tunneling: N/A     Undermining: N/A  Periwound skin: Intact      Color: pink      Temperature: normal   Odor: none  Pain: denies   Pain interventions prior to dressing " "change: N/A  Treatment goal: Heal   STATUS: initial assessment  Supplies ordered: at bedside      Treatment Plan:     Abdominal and breast folds: Daily   Cleanse with Vashe   Soak role of kerlix with Vashe and place kerlix in folds for 15 minutes and then remove  Dry area well  With clean scissors, cut enough fabric to cover the affected area, allowing for a minimum of 2 inches to extend beyond the skin fold for moisture evaporation.  Lay a single layer of fabric in the skin fold, placing one edge into the base of the fold. Gently smooth the rest of the fabric over the skin, keeping it flat.  Leave at least 2 inches of fabric exposed outside the skin fold.  Each piece of InterDry  may be used up to 5 days, depending on fabric soiling, odor, amount of moisture and general skin condition. Replace InterDry  if it becomes soiled with blood, urine or stool.  Do not use creams, ointments, or powders with InterDry  as it may interfere with product efficacy.    Left posterior thigh wound(s): Every 3 days   Cleanse with Vashe and dry  Apply Mepilex sacral    Coccyx and perianal area: BID  Cleanse with Vashe and dry  Apply a thin layer of Aubree antifungal ([facility]#[id number])     Pressure Injury Prevention (PIP) Plan:  If patient is declining pressure injury prevention interventions: Explore reason why and address patient's concerns, Educate on pressure injury risk and prevention intervention(s), and If patient is still declining, document \"informed refusal\"   Mattress: Follow bed algorithm, add Low Air Loss (Air+) mattress pump if skin is very moist or constantly moist.   HOB: Maintain at or below 30 degrees, unless contraindicated  Repositioning in bed: Every 1-2 hours  and Left/right positioning; avoid supine  Heels: Pillows under calves  Moisture Management: Avoid brief in bed  Under Devices: Inspect skin under all medical devices during skin inspection , Ensure tubes are stabilized without tension, and Ensure patient is not lying " on medical devices or equipment when repositioned  Ask provider to discontinue device when no longer needed.      Orders: Written    RECOMMEND PRIMARY TEAM ORDER: None, at this time  Education provided: importance of repositioning, plan of care, Moisture management, Hygiene, and Off-loading pressure  Discussed plan of care with: Patient and Nurse  WOC nurse follow-up plan: weekly  Notify WOC if wound(s) deteriorate.  Nursing to notify the Provider(s) and re-consult the WOC Nurse if new skin concern.    DATA:     Current support surface: Standard  Standard gel mattress (Isoflex)-start Bariatric bed  Containment of urine/stool: Suction based external urinary catheter   BMI: Body mass index is 64.33 kg/m .   Active diet order: Orders Placed This Encounter      Combination Diet Regular Diet Adult     Output: No intake/output data recorded.     Labs:   Recent Labs   Lab 10/10/24  0653   HGB 12.0   WBC 7.2     Pressure injury risk assessment:                          Bharat Cooper RN CWOCN  Contact Via [facility] Nurse Renetta)  Dept. Office Number: [phone]

## 2024-10-10 NOTE — PHARMACY-ADMISSION MEDICATION HISTORY
Pharmacist Admission Medication History    Admission medication history is complete. The information provided in this note is only as accurate as the sources available at the time of the update.    Information Source(s): Patient via in-person    Pertinent Information: Removed ASA as pt said she is not allergic    Changes made to PTA medication list:  Added: ibuprofen  Deleted: oxycodone, protonix, miralax, senokot-s  Changed: tylenol, miconazole, duonebs    Allergies reviewed with patient and updates made in EHR: yes    Medication History Completed By: Sue Young [facility] 10/10/2024 8:52 AM    PTA Med List   Medication Sig Last Dose    acetaminophen (TYLENOL) 500 MG tablet Take 1,000 mg by mouth as needed for mild pain.     albuterol (PROVENTIL) (2.5 MG/3ML) 0.083% neb solution Take 1 vial (2.5 mg) by nebulization every 4 hours as needed for shortness of breath, wheezing or cough 10/10/2024 at am    azelastine (ASTELIN) 0.1 % nasal spray Spray 1 spray into both nostrils daily as needed for allergies.     carBAMazepine (TEGRETOL) 200 MG tablet Take 400 mg by mouth 2 times daily. AM and HS 10/9/2024 at am    carBAMazepine (TEGRETOL) 200 MG tablet Take 200 mg by mouth daily. @1200 10/9/2024 at 1200    fluticasone (FLONASE) 50 MCG/ACT nasal spray USE ONE SPRAY INTO BOTH NOSTRILS DAILY AS NEEDED FOR RHINITIS OR ALLERGIES 10/10/2024 at am    Fluticasone Furoate-Vilanterol (BREO ELLIPTA) 50-25 MCG/ACT AEPB Inhale 1 puff into the lungs 2 times daily 10/10/2024 at am    ibuprofen (ADVIL/MOTRIN) 200 MG tablet Take 800 mg by mouth as needed for pain.     ipratropium - albuterol 0.5 mg/2.5 mg/3 mL (DUONEB) 0.5-2.5 (3) MG/3ML neb solution Take 1 vial by nebulization every 6 hours as needed for shortness of breath, wheezing or cough. 10/10/2024 at am    lisinopril (ZESTRIL) 20 MG tablet TAKE 1 TABLET (20 MG) BY MOUTH DAILY 10/10/2024 at am    lovastatin (MEVACOR) 40 MG tablet TAKE 1 TABLET (40 MG) BY MOUTH AT BEDTIME 10/9/2024  at hs    miconazole (MICATIN) 2 % external powder Apply topically as needed for itching or other.     Multiple Vitamin (MULTI-VITAMIN PO) Take 1 tablet by mouth daily 10/10/2024 at am    nystatin (MYCOSTATIN) 576490 UNIT/GM external cream Apply topically daily as needed     sertraline (ZOLOFT) 100 MG tablet TAKE 1 TABLET (100 MG) BY MOUTH DAILY 10/10/2024 at am

## 2024-10-10 NOTE — ED NOTES
[facility]  ED Nurse Handoff Report    ED Chief complaint: Shortness of Breath  . ED Diagnosis:   Final diagnoses:   Acute respiratory failure with hypoxia (H)   Exacerbation of asthma, unspecified asthma severity, unspecified whether persistent       Allergies:   Allergies   Allergen Reactions    Aspirin Unknown    Iodine      Iodinated Contrast Media  --- Hives      Penicillins Unknown    Tetracyclines & Related Unknown    Hydrochlorothiazide Palpitations     dehydration    Influenza Vac Split [Influenza Virus Vaccine] Rash     Patient states she is allergic to the vaccine.  Got a rash all over body many years ago after receiving injection.       Code Status: Full Code    Activity level - Baseline/Home:  independent and walker.  Activity Level - Current:   assist of 1, assist of 2, and walker.   Lift room needed: No.   Bariatric: Yes   Needed: No   Isolation: No.   Infection: Not Applicable.     Respiratory status: Nasal cannula    Vital Signs (within 30 minutes):   Vitals:    10/10/24 0146 10/10/24 0200 10/10/24 0215 10/10/24 0230   BP: (!) 146/72 (!) 145/61 (!) 151/68 (!) 146/66   Pulse:  101 99 96   Resp:       Temp:       SpO2: 93% 91% 93% 92%       Cardiac Rhythm:  ,      Pain level:    Patient confused: No.   Patient Falls Risk: nonskid shoes/slippers when out of bed, arm band in place, assistive device/personal items within reach, and activity supervised.   Elimination Status: Has voided     Patient Report - Initial Complaint: shortness of breath.   Focused Assessment: 59 year old female presenting with few days of symptoms including runny nose, congestion, mild sore throat, wheezing and difficulty breathing despite using home inhaler/nebulizers.  States this feels like an asthma flare to her.  She does not normally wear oxygen but was noted by medics to have a room air saturation of 85%.  Mild nonproductive cough.  Both of her parents recently were diagnosed with COVID.   No history of VTE.  No vomiting or diarrhea.  Had her gallbladder out 3 months ago.      Abnormal Results:   Labs Ordered and Resulted from Time of ED Arrival to Time of ED Departure   BASIC METABOLIC PANEL - Abnormal       Result Value    Sodium 142      Potassium 4.3      Chloride 102      Carbon Dioxide (CO2) 27      Anion Gap 13      Urea Nitrogen 12.9      Creatinine 0.71      GFR Estimate >90      Calcium 8.4 (*)     Glucose 147 (*)    D DIMER QUANTITATIVE - Abnormal    D-Dimer Quantitative 1.05 (*)    CBC WITH PLATELETS AND DIFFERENTIAL - Abnormal    WBC Count 8.1      RBC Count 4.05      Hemoglobin 11.3 (*)     Hematocrit 38.1      MCV 94      MCH 27.9      MCHC 29.7 (*)     RDW 14.5      Platelet Count 176      % Neutrophils 68      % Lymphocytes 16      % Monocytes 8      % Eosinophils 7      % Basophils 1      % Immature Granulocytes 0      NRBCs per 100 WBC 0      Absolute Neutrophils 5.6      Absolute Lymphocytes 1.3      Absolute Monocytes 0.6      Absolute Eosinophils 0.5      Absolute Basophils 0.0      Absolute Immature Granulocytes 0.0      Absolute NRBCs 0.0     INFLUENZA A/B, RSV, & SARS-COV2 PCR - Normal    Influenza A PCR Negative      Influenza B PCR Negative      RSV PCR Negative      SARS CoV2 PCR Negative     BASIC METABOLIC PANEL   CBC WITH PLATELETS   MAGNESIUM        CT Chest Pulmonary Embolism w Contrast   Final Result   IMPRESSION:      1.  Mildly motion limited study for evaluation of pulmonary embolism. No convincing pulmonary embolus identified within this limitation. No pneumonia.      2.  Redemonstration of mosaic attenuation of the lung may represent a focal air trapping indicative of underlying COPD.      3.  3 mm left lower lobe nodule. Recommend follow-up chest CT in one year per Fleischner Society 2017 guideline.      4.  Mildly enlarged bilateral axillary lymph nodes have increased in size from prior study, possibly reactive. Clinical follow-up is recommended.           Treatments provided: see MAR  Family Comments: N/A  OBS brochure/video discussed/provided to patient:  N/A  ED Medications:   Medications   lidocaine 1 % 0.1-1 mL (has no administration in time range)   lidocaine (LMX4) cream (has no administration in time range)   sodium chloride (PF) 0.9% PF flush 3 mL (3 mLs Intracatheter $Given 10/10/24 0245)   sodium chloride (PF) 0.9% PF flush 3 mL (has no administration in time range)   senna-docusate (SENOKOT-S/PERICOLACE) 8.6-50 MG per tablet 1 tablet (has no administration in time range)     Or   senna-docusate (SENOKOT-S/PERICOLACE) 8.6-50 MG per tablet 2 tablet (has no administration in time range)   calcium carbonate (TUMS) chewable tablet 1,000 mg (has no administration in time range)   diphenhydrAMINE (BENADRYL) capsule 50 mg ( Oral See Alternative 10/10/24 0308)     Or   diphenhydrAMINE (BENADRYL) injection 50 mg (50 mg Intravenous $Given 10/10/24 0308)   ipratropium - albuterol 0.5 mg/2.5 mg/3 mL (DUONEB) neb solution 3 mL (3 mLs Nebulization $Given 10/10/24 0047)   ipratropium - albuterol 0.5 mg/2.5 mg/3 mL (DUONEB) neb solution 3 mL (3 mLs Nebulization $Given 10/10/24 0102)   magnesium sulfate 2 g in 50 mL sterile water intermittent infusion (0 g Intravenous Stopped 10/10/24 0208)   methylPREDNISolone Na Suc (solu-MEDROL) injection 125 mg (125 mg Intravenous $Given 10/10/24 0108)   ipratropium - albuterol 0.5 mg/2.5 mg/3 mL (DUONEB) neb solution 3 mL (3 mLs Nebulization $Given 10/10/24 0148)   iopamidol (ISOVUE-370) solution 500 mL (100 mLs Intravenous $Given 10/10/24 0429)       Drips infusing:  No  For the majority of the shift this patient was Green.   Interventions performed were N/A.    Sepsis treatment initiated: No    Cares/treatment/interventions/medications to be completed following ED care: see notes    ED Nurse Name: Jahaira Tatum RN  5:14 AM     RECEIVING UNIT ED HANDOFF REVIEW    Above ED Nurse Handoff Report was reviewed: Yes  Reviewed  by: Franck Lora RN on October 10, 2024 at 8:38 AM   I Qiana called the ED to inform them the note was read: No

## 2024-10-10 NOTE — PLAN OF CARE
Admitted/transferred from:   2 RN full   skin assessment completed by Franck Lora, RN and Crystal Barlow RN.  Skin assessment finding: issues found : profound amount of skin issues present on initial assessment includin open wounds of various size on posterior proximal medial L thigh - largest being approx. Dime sized. Pt also has profound moisture skin injury that originates at abdominal, hip, breast folds - and extends approx 4-5 inches in the form of rash. Small openings can be seen across fold areas. I also assessed a small opening on top of labia. Redness blanchable posterior buttox, mirna. On pt's face, cold sore noted on lower lip.        Interventions/actions: WOC consult ordered

## 2024-10-10 NOTE — ED TRIAGE NOTES
Pt arrives via EMS from home with c/o shortness of breath. Pt did 4 albuterol nebs at home with out relief prompting EMS call. EMS administered 1 duoneb, pt feeling better but still hypoxic on RA, 85%. 94% of 2 LPM NC. Pt very unkempt.

## 2024-10-10 NOTE — PLAN OF CARE
Goal Outcome Evaluation:      Plan of Care Reviewed With: parent    Overall Patient Progress: improvingOverall Patient Progress: improving    Outcome Evaluation: Planning for discharge to home with HC RN/PT/OT/HHA at discharge
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
## 2024-10-10 NOTE — CONSULTS
Care Management Initial Consult    General Information  Assessment completed with: Parents, Other (Mother Lola who is guardian),    Type of CM/SW Visit: Initial Assessment    Primary Care Provider verified and updated as needed: Yes   Readmission within the last 30 days: no previous admission in last 30 days      Reason for Consult: discharge planning    Communication Assessment  Patient's communication style: spoken language (English or Bilingual)    Hearing Difficulty or Deaf: no   Wear Glasses or Blind: yes    Cognitive  Cognitive/Neuro/Behavioral: WDL                      Living Environment:   People in home: significant other     Current living Arrangements: apartment      Able to return to prior arrangements: yes     Family/Social Support:  Care provided by: self  Provides care for: no one  Marital Status: Lives with Significant Other  Support system: Significant Other, Guardian, Parent(s)          Description of Support System: Supportive, Involved       Current Resources:   Patient receiving home care services: No  Community Resources: ARM, County Worker, Other (see comment), , Housekeeping/Chore Agency (CADI waiver, Mom's meals)  Equipment currently used at home: tub bench, grab bar, tub/shower, other (see comments)  Supplies currently used at home: None    Does the patient's insurance plan have a 3 day qualifying hospital stay waiver?  Yes     Which insurance plan 3 day waiver is available? [employer] REACH    Will the waiver be used for post-acute placement? Undetermined at this time    Lifestyle & Psychosocial Needs:  Social Determinants of Health     Food Insecurity: Low Risk  (10/10/2024)    Food Insecurity     Within the past 12 months, did you worry that your food would run out before you got money to buy more?: No     Within the past 12 months, did the food you bought just not last and you didn t have money to get more?: No   Depression: Not at risk (8/7/2024)    PHQ-2     PHQ-2 Score: 0    Housing Stability: Low Risk  (10/10/2024)    Housing Stability     Do you have housing? : Yes     Are you worried about losing your housing?: No   Tobacco Use: Low Risk  (8/7/2024)    Patient History     Smoking Tobacco Use: Never     Smokeless Tobacco Use: Never     Passive Exposure: Not on file   Financial Resource Strain: Low Risk  (10/10/2024)    Financial Resource Strain     Within the past 12 months, have you or your family members you live with been unable to get utilities (heat, electricity) when it was really needed?: No   Alcohol Use: Not on file   Transportation Needs: Low Risk  (10/10/2024)    Transportation Needs     Within the past 12 months, has lack of transportation kept you from medical appointments, getting your medicines, non-medical meetings or appointments, work, or from getting things that you need?: No   Physical Activity: Not on file   Interpersonal Safety: Low Risk  (10/10/2024)    Interpersonal Safety     Do you feel physically and emotionally safe where you currently live?: Yes     Within the past 12 months, have you been hit, slapped, kicked or otherwise physically hurt by someone?: No     Within the past 12 months, have you been humiliated or emotionally abused in other ways by your partner or ex-partner?: No   Stress: Not on file   Social Connections: Not on file   Health Literacy: Not on file       Functional Status:  Prior to admission patient needed assistance:   Dependent ADLs:: Ambulation-walker, Incontinence  Dependent IADLs:: Cleaning, Cooking, Laundry, Shopping, Meal Preparation, Transportation, Incontinence  Assesssment of Functional Status: At functional baseline    Additional Information:  CM consulted for discharge planning, spoke with pt's mother/guardian Lola via phone P:[phone]. She reports that pt lives in an apartment with her significant other Sudhir (they are not legally ) and pt's mother and father are co-guardians for pt. Pt is independent with WW  at baseline, she manages her own medications. She is on a CADI waiver and gets housekeeping and Mom's Meals. Significant other helps with cooking and occasionally transportation, parents otherwise assist with transportation.     Informed Lola of pt's skin issues noted in WOC note and need for ongoing wound cares. She feels as though with instructions pt is capable of meeting her own needs with this, mother reports that at times pt is not motivated to do things for herself. She reports that pt had HC RN/PT/OT through [facility] but this just ended last week. She feels pt would benefit from HC RN/PT/OT/HHA at discharge. Referral sent to HC hub, awaiting accepting agency at this time.     Mother unsure if they will be able to transport at discharge as she reports being on day 7 of Covid +. She thinks that [place] likely would be able to provide transportation at discharge.     Mother also concerned that pt may need increased services through the [facility], advised she call pt's CADI CM and request pt needs be reassessed.     Tamra Pablo RN BSN   Inpatient Care Coordination  [facility]   Phone [phone]

## 2024-10-10 NOTE — PROGRESS NOTES
[facility]    Medicine Progress Note - Hospitalist Service    Date of Admission:  10/9/2024    Assessment & Plan     Brissa Corado is a 59 year old female admitted on 10/9/2024. She has a history of asthma, hypertension, developmental delay, HLD, HTN, prior seizures, RICK, GERD, MDD, obesity who presented to the ED 10/9/2024 with shortness of breath.  Significant wheezing present on exam, hypoxic on room air and started on supplemental O2 as well as IV Solu-Medrol and scheduled nebs.  D-dimer was mildly elevated and she underwent CT PE protocol which was negative for pulmonary embolism and pneumonia but did show some changes compatible with air trapping and possible underlying COPD.  She was admitted for further care and is being treated for asthma exacerbation though likely has a component of obesity hypoventilation syndrome as well.    Seeing some improvement but still requiring 3 to 4 L/min O2.     Acute hypoxic respiratory failure  Likely asthma exacerbation, potential component of COPD based on imaging  URI-like symptoms  Presented with several days of URI-like symptoms.  States that breathing became progressively more difficult, more wheezing at home, inhaler and nebulizers were no longer effective.  Feels similar to previous asthma flares.  Does not use oxygen at home, per EMS O2 sats were in the mid 80s on room air on arrival.  Wheezing present on exam in ED, received magnesium, DuoNebs, 125 mg IV Solu-Medrol.  Feels symptoms are improving.  ED provider ordered CT PE study, patient indicates she has a CT contrast allergy and is previously been premedicated with Benadryl.  Was given Benadryl and will have CT PE study, follow-up results.  - S/p Solu-Medrol 1 and 25 mg x 1 in ED  - prednisone 40 mg daily for now  - Supplemental O2 as needed  - DuoNebs as needed  - CT PE study negative for pulmonary embolism but does show mosaic attenuation suggestive of possible underlying COPD and a  "3 mm left lobe lung nodule     Hypertension  Resume PTA lisinopril     Hyperlipidemia  Chronically on statin      GERD  Continue PTA PPI     Untreated RICK  Likely OHS  Had a recent hospitalization for acute cholecystitis with cholecystectomy.  Had some postoperative hypoxic respiratory failure.  Advised to complete outpatient pulmonology visit for PFTs as well as a sleep evaluation, does not appear these have been completed yet.  As above, supplemental O2 as needed.     Obesity: BMI 62.  Noted.     Hx seizure disorder  No seizure activity since teenage years.  Continue PTA carbamazepine     Developmental delay: Her parents are listed as her guardians.  She has a longtime significant other.  Her mother states that they had a \"wedding ceremony \"but legally are not .  Next of kin is technically her parents who are available via phone to help as needed, however reportedly are both at home with COVID[employer] at this time.     Wounds: Wound RN consult.  See photos in chart.    Enlarged axillary lymph nodes: Noted on CT scan.  Would consider outpatient follow-up when not acutely ill.          Diet: Combination Diet Regular Diet Adult    DVT Prophylaxis: Pneumatic Compression Devices  Grajeda Catheter: Not present  Lines: None     Cardiac Monitoring: None  Code Status: Full Code      Clinically Significant Risk Factors Present on Admission                   # Hypertension: Noted on problem list         # Severe Obesity: Estimated body mass index is 64.33 kg/m  as calculated from the following:    Height as of this encounter: 1.702 m (5' 7\").    Weight as of this encounter: 186.3 kg (410 lb 11.5 oz).       # Asthma: noted on problem list        Disposition Plan     Medically Ready for Discharge: Anticipated in 2-4 Days             Shiraz Roman MD  Hospitalist Service  [facility]  Securely message with [employer] (more info)  Text page via [employer] Paging/Directory "   ______________________________________________________________________    Interval History     I assumed care today, [place] tells me she is feeling better overall  Still requiring 3 to 4 L/min supplemental O2  Denies chest pain or pressure  Resumed numerous home medications now that medication reconciliation is done    Physical Exam   Vital Signs: Temp: 98  F (36.7  C) Temp src: Oral BP: (!) 165/80 Pulse: 98   Resp: 16 SpO2: 97 % O2 Device: Nasal cannula Oxygen Delivery: 5 LPM  Weight: 410 lbs 11.47 oz    General: Alert, awake, no acute distress.  Nasal cannula in place  HEENT: NC/AT, eyes anicteric, external occular movements intact, face symmetric.  Cardiac: RRR, S1, S2.  No murmurs appreciated.  Pulmonary: Normal chest rise, normal work of breathing.  Lungs CTA BL  Abdomen: Morbidly obese, pannus.  Soft, non-tender, non-distended.  Bowel Sounds Present.  No guarding.  Extremities: no deformities.  Warm, well perfused.  Skin:  Warm and Dry.  Neuro: No focal deficits noted.  Speech clear.  Coordination and strength grossly normal.  Psych: Appropriate affect.      Medical Decision Making       35 MINUTES SPENT BY ME on the date of service doing chart review, history, exam, documentation & further activities per the note.      Data     I have personally reviewed the following data over the past 24 hrs:    7.2  \   12.0   / 169     139 104 11.6 /  178 (H)   4.3 26 0.61 \     INR:  N/A PTT:  N/A   D-dimer:  1.05 (H) Fibrinogen:  N/A

## 2024-10-11 ENCOUNTER — PATIENT OUTREACH (OUTPATIENT)
Dept: CARE COORDINATION | Facility: CLINIC | Age: 59
End: 2024-10-11
Payer: MEDICARE

## 2024-10-11 PROCEDURE — 94640 AIRWAY INHALATION TREATMENT: CPT

## 2024-10-11 PROCEDURE — 250N000012 HC RX MED GY IP 250 OP 636 PS 637: Performed by: STUDENT IN AN ORGANIZED HEALTH CARE EDUCATION/TRAINING PROGRAM

## 2024-10-11 PROCEDURE — 250N000013 HC RX MED GY IP 250 OP 250 PS 637: Performed by: INTERNAL MEDICINE

## 2024-10-11 PROCEDURE — 94640 AIRWAY INHALATION TREATMENT: CPT | Mod: 76

## 2024-10-11 PROCEDURE — 250N000009 HC RX 250: Performed by: HOSPITALIST

## 2024-10-11 PROCEDURE — 250N000009 HC RX 250: Performed by: STUDENT IN AN ORGANIZED HEALTH CARE EDUCATION/TRAINING PROGRAM

## 2024-10-11 PROCEDURE — 999N000157 HC STATISTIC RCP TIME EA 10 MIN

## 2024-10-11 PROCEDURE — 120N000001 HC R&B MED SURG/OB

## 2024-10-11 PROCEDURE — 250N000011 HC RX IP 250 OP 636: Performed by: HOSPITALIST

## 2024-10-11 PROCEDURE — 99232 SBSQ HOSP IP/OBS MODERATE 35: CPT | Performed by: HOSPITALIST

## 2024-10-11 RX ORDER — METHYLPREDNISOLONE SODIUM SUCCINATE 125 MG/2ML
60 INJECTION INTRAMUSCULAR; INTRAVENOUS EVERY 12 HOURS
Status: DISCONTINUED | OUTPATIENT
Start: 2024-10-11 | End: 2024-10-11

## 2024-10-11 RX ORDER — METHYLPREDNISOLONE SODIUM SUCCINATE 125 MG/2ML
60 INJECTION INTRAMUSCULAR; INTRAVENOUS 2 TIMES DAILY
Status: DISCONTINUED | OUTPATIENT
Start: 2024-10-11 | End: 2024-10-14

## 2024-10-11 RX ORDER — ENOXAPARIN SODIUM 100 MG/ML
40 INJECTION SUBCUTANEOUS EVERY 12 HOURS
Status: DISCONTINUED | OUTPATIENT
Start: 2024-10-11 | End: 2024-10-15

## 2024-10-11 RX ORDER — IPRATROPIUM BROMIDE AND ALBUTEROL SULFATE 2.5; .5 MG/3ML; MG/3ML
3 SOLUTION RESPIRATORY (INHALATION)
Status: COMPLETED | OUTPATIENT
Start: 2024-10-11 | End: 2024-10-16

## 2024-10-11 RX ORDER — ALBUTEROL SULFATE 0.83 MG/ML
2.5 SOLUTION RESPIRATORY (INHALATION)
Status: ACTIVE | OUTPATIENT
Start: 2024-10-11 | End: 2024-10-17

## 2024-10-11 RX ADMIN — IBUPROFEN 800 MG: 400 TABLET, FILM COATED ORAL at 11:25

## 2024-10-11 RX ADMIN — METHYLPREDNISOLONE SODIUM SUCCINATE 62.5 MG: 125 INJECTION, POWDER, FOR SOLUTION INTRAMUSCULAR; INTRAVENOUS at 21:32

## 2024-10-11 RX ADMIN — IPRATROPIUM BROMIDE AND ALBUTEROL SULFATE 3 ML: .5; 3 SOLUTION RESPIRATORY (INHALATION) at 04:35

## 2024-10-11 RX ADMIN — METHYLPREDNISOLONE SODIUM SUCCINATE 62.5 MG: 125 INJECTION, POWDER, FOR SOLUTION INTRAMUSCULAR; INTRAVENOUS at 12:02

## 2024-10-11 RX ADMIN — FLUTICASONE PROPIONATE 1 SPRAY: 50 SPRAY, METERED NASAL at 08:49

## 2024-10-11 RX ADMIN — PREDNISONE 40 MG: 20 TABLET ORAL at 08:48

## 2024-10-11 RX ADMIN — IPRATROPIUM BROMIDE AND ALBUTEROL SULFATE 3 ML: .5; 3 SOLUTION RESPIRATORY (INHALATION) at 19:47

## 2024-10-11 RX ADMIN — CARBAMAZEPINE 200 MG: 200 TABLET ORAL at 11:26

## 2024-10-11 RX ADMIN — CARBAMAZEPINE 400 MG: 200 TABLET ORAL at 21:33

## 2024-10-11 RX ADMIN — ENOXAPARIN SODIUM 40 MG: 40 INJECTION SUBCUTANEOUS at 14:27

## 2024-10-11 RX ADMIN — CARBAMAZEPINE 400 MG: 200 TABLET ORAL at 08:48

## 2024-10-11 RX ADMIN — LISINOPRIL 20 MG: 20 TABLET ORAL at 08:48

## 2024-10-11 RX ADMIN — IPRATROPIUM BROMIDE AND ALBUTEROL SULFATE 3 ML: .5; 3 SOLUTION RESPIRATORY (INHALATION) at 17:13

## 2024-10-11 RX ADMIN — SERTRALINE 100 MG: 100 TABLET, FILM COATED ORAL at 08:48

## 2024-10-11 RX ADMIN — FLUTICASONE FUROATE AND VILANTEROL TRIFENATATE 1 PUFF: 100; 25 POWDER RESPIRATORY (INHALATION) at 08:49

## 2024-10-11 ASSESSMENT — ACTIVITIES OF DAILY LIVING (ADL)
ADLS_ACUITY_SCORE: 42
ADLS_ACUITY_SCORE: 42
ADLS_ACUITY_SCORE: 31
ADLS_ACUITY_SCORE: 30
ADLS_ACUITY_SCORE: 36
ADLS_ACUITY_SCORE: 31
ADLS_ACUITY_SCORE: 36
ADLS_ACUITY_SCORE: 42
ADLS_ACUITY_SCORE: 42
ADLS_ACUITY_SCORE: 36
ADLS_ACUITY_SCORE: 42
ADLS_ACUITY_SCORE: 30
ADLS_ACUITY_SCORE: 31
ADLS_ACUITY_SCORE: 42
ADLS_ACUITY_SCORE: 42
ADLS_ACUITY_SCORE: 31
ADLS_ACUITY_SCORE: 31
ADLS_ACUITY_SCORE: 42
ADLS_ACUITY_SCORE: 42
ADLS_ACUITY_SCORE: 31
ADLS_ACUITY_SCORE: 36

## 2024-10-11 NOTE — CONSULTS
Care Management Follow Up    Length of Stay (days): 1    Expected Discharge Date: 10/12/2024     Concerns to be Addressed: discharge planning     Patient plan of care discussed at interdisciplinary rounds: Yes    Anticipated Discharge Disposition: Home, Home Care              Anticipated Discharge Services: Home Care  Anticipated Discharge DME: None    Patient/family educated on Medicare website which has current facility and service quality ratings: yes  Education Provided on the Discharge Plan: Yes  Patient/Family in Agreement with the Plan: yes    Handoff Completed: Yes, [facility] PCP: Internal handoff referral completed    Additional Information:  CM consulted by clinic CC with hand-in information. CM already following for discharge planning. [facility] has accepted for HC RN/PT/OT/HHA at discharge. AVS updated, will send orders at discharge.     Tamra Pablo RN BSN   Inpatient Care Coordination  [facility]   Phone [phone]

## 2024-10-11 NOTE — PROGRESS NOTES
Clinic Care Coordination Contact  Care Coordination Transition Communication    Clinical Data: Patient is hospitalized at [facility] as of 10/10/24 with diagnosis of acute respiratory failure with hypoxia and exacerbation of asthma.     Assessment: RN CC entered an IP Care Coordination order for to update IP CC what services, support and equipment patient has.     Plan: Care Coordinator will await notification from facility staff informing of patient's discharge plans/needs.     Rocio Govea RN, BSN, CPHN, CCM  [facility] Ambulatory Care Management  [facility]  [email or web address]  Office: [phone]  Employed by [facility]

## 2024-10-11 NOTE — PLAN OF CARE
"Pt is A&Ox4. Regular diet. PIV-SL. VSS on 2.5 L NC. PRN neb given for SOB/Wheezing.  Interdry under breast and in abdominal folds.  Pt had purewick in, blood noted in mirna-area and on purewick. Purewick removed and encouraged pt to call when she needs to use the bathroom. Bright red urine in container. Pt denies any urinary symptoms.     Problem: Adult Inpatient Plan of Care  Goal: Plan of Care Review  Description: The Plan of Care Review/Shift note should be completed every shift.  The Outcome Evaluation is a brief statement about your assessment that the patient is improving, declining, or no change.  This information will be displayed automatically on your shift  note.  Outcome: Not Progressing  Goal: Patient-Specific Goal (Individualized)  Description: You can add care plan individualizations to a care plan. Examples of Individualization might be:  \"Parent requests to be called daily at 9am for status\", \"I have a hard time hearing out of my right ear\", or \"Do not touch me to wake me up as it startles  me\".  Outcome: Not Progressing  Goal: Absence of Hospital-Acquired Illness or Injury  Outcome: Not Progressing  Intervention: Identify and Manage Fall Risk  Recent Flowsheet Documentation  Taken 10/11/2024 0500 by Judi Lloyd RN  Safety Promotion/Fall Prevention:   activity supervised   increase visualization of patient   nonskid shoes/slippers when out of bed   patient and family education   safety round/check completed  Intervention: Prevent Skin Injury  Recent Flowsheet Documentation  Taken 10/11/2024 0500 by Judi Lloyd, RN  Body Position: turned  Device Skin Pressure Protection: absorbent pad utilized/changed  Intervention: Prevent and Manage VTE (Venous Thromboembolism) Risk  Recent Flowsheet Documentation  Taken 10/11/2024 0500 by Judi Lloyd RN  VTE Prevention/Management: SCDs off (sequential compression devices)  Intervention: Prevent Infection  Recent Flowsheet Documentation  Taken 10/11/2024 0500 by " Judi Lloyd, RN  Infection Prevention:   cohorting utilized   hand hygiene promoted   personal protective equipment utilized   rest/sleep promoted   single patient room provided  Goal: Optimal Comfort and Wellbeing  Outcome: Not Progressing  Goal: Readiness for Transition of Care  Outcome: Not Progressing      Unknown

## 2024-10-11 NOTE — PLAN OF CARE
Goal Outcome Evaluation:    Alert and oriented x4,VSS on 3 LPM,denies pain ,PRN Duo neb,PO steroids,scheduled inhalation,regular diet tolerated well,      Problem: Adult Inpatient Plan of Care  Goal: Absence of Hospital-Acquired Illness or Injury  Intervention: Identify and Manage Fall Risk  Recent Flowsheet Documentation  Taken 10/10/2024 1546 by Duran Morales RN  Safety Promotion/Fall Prevention:   nonskid shoes/slippers when out of bed   supervised activity   safety round/check completed  Intervention: Prevent Skin Injury  Recent Flowsheet Documentation  Taken 10/10/2024 1546 by Duran Morales RN  Body Position:   turned   right  Intervention: Prevent and Manage VTE (Venous Thromboembolism) Risk  Recent Flowsheet Documentation  Taken 10/10/2024 1546 by Duran Morales RN  VTE Prevention/Management: SCDs off (sequential compression devices)     Problem: Wound  Goal: Optimal Functional Ability  Intervention: Optimize Functional Ability  Recent Flowsheet Documentation  Taken 10/10/2024 1546 by Duran Morales RN  Assistive Device Utilized: gait belt  Activity Management: activity adjusted per tolerance  Activity Assistance Provided: assistance, 2 people  Goal: Skin Health and Integrity  Intervention: Optimize Skin Protection  Recent Flowsheet Documentation  Taken 10/10/2024 1546 by Duran Morales RN  Activity Management: activity adjusted per tolerance     Problem: Skin Injury Risk Increased  Goal: Skin Health and Integrity  Intervention: Plan: Nurse Driven Intervention: Moisture Management  Recent Flowsheet Documentation  Taken 10/10/2024 1546 by Duran Morales RN  Moisture Interventions:   Encourage regular toileting   Urinary collection device  Intervention: Plan: Nurse Driven Intervention: Friction and Shear  Recent Flowsheet Documentation  Taken 10/10/2024 1546 by Duran Morales RN  Friction/Shear Interventions: HOB 30 degrees or less  Intervention: Optimize Skin Protection  Recent Flowsheet  Documentation  Taken 10/10/2024 1546 by Duran Morales, RN  Activity Management: activity adjusted per tolerance

## 2024-10-11 NOTE — PROGRESS NOTES
[facility]    Medicine Progress Note - Hospitalist Service    Date of Admission:  10/9/2024    Assessment & Plan     Brissa Corado is a 59 year old female admitted on 10/9/2024. She has a history of asthma, hypertension, developmental delay, HLD, HTN, prior seizures, RICK, GERD, MDD, obesity who presented to the ED 10/9/2024 with shortness of breath.    Admitted for asthma exacerbation    Acute hypoxic respiratory failure  Likely asthma exacerbation, potential component of COPD based on imaging  URI-like symptoms  Obesity-hypoventilation likely contributing    - presented with several days of URI-like symptoms    - CTPE did not show PE    - was changed to Prednisone 40mg today    - still needing 2L: will increase steroids to IV solumedrol 60mg BID    - cont to wean O2    - schedule duonebs    - add PRN albuterol    Hypertension    - cont lisinopril     Hyperlipidemia    - resume statin on discharge      GERD    - not on meds     Untreated RICK  Likely OHS    - had a recent hospitalization for acute cholecystitis with cholecystectomy    - had some postoperative hypoxic respiratory failure    - advised to complete outpatient pulmonology visit for PFTs as well as a sleep evaluation, does not appear these have been completed yet    - has sleep study scheduled for Jan     Obesity: BMI 62.  Noted.     Hx seizure disorder    - no seizure activity since teenage years    - continue PTA carbamazepine     Developmental delay    - parents are listed as her guardians    - has a longtime significant other    - next of kin is technically her parents     - both at home with COVID-19 at this time.     Wounds    - wound RN consult    - see photos in chart.    Enlarged axillary lymph nodes    - noted on CT scan    - outpatient follow-up     Called and updated her mother        Diet: Combination Diet Regular Diet Adult    DVT Prophylaxis: Pneumatic Compression Devices, added lovenox  Grajeda Catheter: Not  "present  Lines: None     Cardiac Monitoring: None  Code Status: Full Code      Clinically Significant Risk Factors                   # Hypertension: Noted on problem list           # Severe Obesity: Estimated body mass index is 64.33 kg/m  as calculated from the following:    Height as of this encounter: 1.702 m (5' 7\").    Weight as of this encounter: 186.3 kg (410 lb 11.5 oz)., PRESENT ON ADMISSION     # Asthma: noted on problem list        Disposition Plan     Medically Ready for Discharge: Anticipated in 2-4 Days    Colt Egan MD  Hospitalist Service  [facility]  Securely message with [employer] (more info)  Text page via [facility] Paging/Directory   ______________________________________________________________________    Interval History   I assumed care today.  Patient now on 2L. No new complaints. Does not feel SOB per say. No chest pain. Eating and drinking. Getting OOB to bathroom. I encouraged ambulating in the [place]    Physical Exam   Vital Signs: Temp: 97.3  F (36.3  C) Temp src: Oral BP: (!) 147/79 Pulse: 87   Resp: 20 SpO2: 93 % O2 Device: Nasal cannula Oxygen Delivery: 2 LPM  Weight: 410 lbs 11.47 oz    General: Alert, awake, no acute distress.  Nasal cannula in place  HEENT: NC/AT, eyes anicteric, external occular movements intact, face symmetric.  Cardiac: RRR, S1, S2.  No murmurs appreciated.  Pulmonary: Normal chest rise. Distant breath sounds. Has some expiratory wheezing  Abdomen: Morbidly obese, pannus.  Soft, non-tender, non-distended.  Bowel Sounds Present.  No guarding.  Extremities: no deformities.  Warm, well perfused.  Skin:  Warm and Dry.  Neuro: No focal deficits noted.  Speech clear.  Coordination and strength grossly normal.  Psych: Appropriate affect.      Medical Decision Making       35 MINUTES SPENT BY ME on the date of service doing chart review, history, exam, documentation & further activities per the note.      Data         "

## 2024-10-12 LAB
ANION GAP SERPL CALCULATED.3IONS-SCNC: 10 MMOL/L (ref 7–15)
BUN SERPL-MCNC: 18 MG/DL (ref 6–20)
CALCIUM SERPL-MCNC: 9.2 MG/DL (ref 8.8–10.4)
CHLORIDE SERPL-SCNC: 101 MMOL/L (ref 98–107)
CREAT SERPL-MCNC: 0.65 MG/DL (ref 0.51–0.95)
EGFRCR SERPLBLD CKD-EPI 2021: >90 ML/MIN/1.73M2
ERYTHROCYTE [DISTWIDTH] IN BLOOD BY AUTOMATED COUNT: 14.1 % (ref 10–15)
GLUCOSE SERPL-MCNC: 139 MG/DL (ref 70–99)
HCO3 SERPL-SCNC: 29 MMOL/L (ref 22–29)
HCT VFR BLD AUTO: 41.9 % (ref 35–47)
HGB BLD-MCNC: 12.6 G/DL (ref 11.7–15.7)
MCH RBC QN AUTO: 27.3 PG (ref 26.5–33)
MCHC RBC AUTO-ENTMCNC: 30.1 G/DL (ref 31.5–36.5)
MCV RBC AUTO: 91 FL (ref 78–100)
PLATELET # BLD AUTO: 215 10E3/UL (ref 150–450)
POTASSIUM SERPL-SCNC: 4.2 MMOL/L (ref 3.4–5.3)
PROCALCITONIN SERPL IA-MCNC: 0.05 NG/ML
RBC # BLD AUTO: 4.61 10E6/UL (ref 3.8–5.2)
SODIUM SERPL-SCNC: 140 MMOL/L (ref 135–145)
WBC # BLD AUTO: 10.9 10E3/UL (ref 4–11)

## 2024-10-12 PROCEDURE — 82310 ASSAY OF CALCIUM: CPT | Performed by: INTERNAL MEDICINE

## 2024-10-12 PROCEDURE — 85014 HEMATOCRIT: CPT | Performed by: INTERNAL MEDICINE

## 2024-10-12 PROCEDURE — 94640 AIRWAY INHALATION TREATMENT: CPT

## 2024-10-12 PROCEDURE — 250N000011 HC RX IP 250 OP 636: Performed by: HOSPITALIST

## 2024-10-12 PROCEDURE — 80048 BASIC METABOLIC PNL TOTAL CA: CPT | Performed by: INTERNAL MEDICINE

## 2024-10-12 PROCEDURE — 36415 COLL VENOUS BLD VENIPUNCTURE: CPT | Performed by: INTERNAL MEDICINE

## 2024-10-12 PROCEDURE — 99232 SBSQ HOSP IP/OBS MODERATE 35: CPT | Performed by: INTERNAL MEDICINE

## 2024-10-12 PROCEDURE — 250N000009 HC RX 250: Performed by: HOSPITALIST

## 2024-10-12 PROCEDURE — 120N000001 HC R&B MED SURG/OB

## 2024-10-12 PROCEDURE — 999N000157 HC STATISTIC RCP TIME EA 10 MIN

## 2024-10-12 PROCEDURE — 94640 AIRWAY INHALATION TREATMENT: CPT | Mod: 76

## 2024-10-12 PROCEDURE — 84145 PROCALCITONIN (PCT): CPT | Performed by: INTERNAL MEDICINE

## 2024-10-12 PROCEDURE — 250N000013 HC RX MED GY IP 250 OP 250 PS 637: Performed by: INTERNAL MEDICINE

## 2024-10-12 RX ADMIN — CARBAMAZEPINE 400 MG: 200 TABLET ORAL at 08:22

## 2024-10-12 RX ADMIN — IPRATROPIUM BROMIDE AND ALBUTEROL SULFATE 3 ML: .5; 3 SOLUTION RESPIRATORY (INHALATION) at 07:25

## 2024-10-12 RX ADMIN — IPRATROPIUM BROMIDE AND ALBUTEROL SULFATE 3 ML: .5; 3 SOLUTION RESPIRATORY (INHALATION) at 16:13

## 2024-10-12 RX ADMIN — SERTRALINE 100 MG: 100 TABLET, FILM COATED ORAL at 08:23

## 2024-10-12 RX ADMIN — ENOXAPARIN SODIUM 40 MG: 40 INJECTION SUBCUTANEOUS at 02:37

## 2024-10-12 RX ADMIN — CARBAMAZEPINE 200 MG: 200 TABLET ORAL at 11:56

## 2024-10-12 RX ADMIN — IPRATROPIUM BROMIDE AND ALBUTEROL SULFATE 3 ML: .5; 3 SOLUTION RESPIRATORY (INHALATION) at 11:40

## 2024-10-12 RX ADMIN — FLUTICASONE FUROATE AND VILANTEROL TRIFENATATE 1 PUFF: 100; 25 POWDER RESPIRATORY (INHALATION) at 07:25

## 2024-10-12 RX ADMIN — CARBAMAZEPINE 400 MG: 200 TABLET ORAL at 20:29

## 2024-10-12 RX ADMIN — ENOXAPARIN SODIUM 40 MG: 40 INJECTION SUBCUTANEOUS at 15:33

## 2024-10-12 RX ADMIN — METHYLPREDNISOLONE SODIUM SUCCINATE 62.5 MG: 125 INJECTION, POWDER, FOR SOLUTION INTRAMUSCULAR; INTRAVENOUS at 08:22

## 2024-10-12 RX ADMIN — METHYLPREDNISOLONE SODIUM SUCCINATE 62.5 MG: 125 INJECTION, POWDER, FOR SOLUTION INTRAMUSCULAR; INTRAVENOUS at 20:28

## 2024-10-12 RX ADMIN — IPRATROPIUM BROMIDE AND ALBUTEROL SULFATE 3 ML: .5; 3 SOLUTION RESPIRATORY (INHALATION) at 19:43

## 2024-10-12 RX ADMIN — FLUTICASONE PROPIONATE 1 SPRAY: 50 SPRAY, METERED NASAL at 08:23

## 2024-10-12 RX ADMIN — LISINOPRIL 20 MG: 20 TABLET ORAL at 08:23

## 2024-10-12 ASSESSMENT — ACTIVITIES OF DAILY LIVING (ADL)
ADLS_ACUITY_SCORE: 36
ADLS_ACUITY_SCORE: 36
ADLS_ACUITY_SCORE: 30
ADLS_ACUITY_SCORE: 41
ADLS_ACUITY_SCORE: 31
ADLS_ACUITY_SCORE: 41
ADLS_ACUITY_SCORE: 36
ADLS_ACUITY_SCORE: 31
ADLS_ACUITY_SCORE: 31
ADLS_ACUITY_SCORE: 30
ADLS_ACUITY_SCORE: 36
ADLS_ACUITY_SCORE: 41
ADLS_ACUITY_SCORE: 31
ADLS_ACUITY_SCORE: 31
ADLS_ACUITY_SCORE: 36
ADLS_ACUITY_SCORE: 31
ADLS_ACUITY_SCORE: 30
ADLS_ACUITY_SCORE: 36
ADLS_ACUITY_SCORE: 30
ADLS_ACUITY_SCORE: 41
ADLS_ACUITY_SCORE: 41
ADLS_ACUITY_SCORE: 33
ADLS_ACUITY_SCORE: 36

## 2024-10-12 NOTE — PROGRESS NOTES
[facility]    Hospitalist Progress Note  Name: Brissa Corado    MRN: [id number]  Provider:  Michael Murray DO  Date of Service: 10/12/2024    Summary of Stay: Brissa Corado is a 59 year old female admitted on 10/9/2024. She has a history of asthma, hypertension, developmental delay, HLD, HTN, prior seizures, RICK, GERD, MDD, obesity who presented to the ED 10/9/2024 with shortness of breath.     Admitted for asthma exacerbation    TODAY'S PLAN:  Pt still requiring supplemental oxygen.  Increase IV steroids yesterday.  No wheezing on exam and good breath sounds.  Maintain steroids today and scheduled duonebs.  Consider starting steroid taper tomorrow.  Check procal today, but does not look toxic on exam.  Discussed with guardians and parents Ray and Lola via phone.  Pt had a sleep study previously showing sleep apnea but stopped wearing her cpap.  They scheduled another one for her, but earliest they could get was January 2025.  We discussed that her persistent hypoxemia could be related to her untreated sleep apnea.  If able to titrate off oxygen, pt could discharge home.    Problem List:   Acute hypoxic respiratory failure  Likely asthma exacerbation, potential component of COPD based on imaging  URI-like symptoms  Obesity-hypoventilation likely contributing    - presented with several days of URI-like symptoms    - CTPE did not show PE    - still needing 2L: IV solumedrol 60mg BID    - cont to wean O2    - schedule duonebs    - add PRN albuterol     Hypertension    - cont lisinopril     Hyperlipidemia    - resume statin on discharge      GERD    - not on meds     Untreated RICK  Likely OHS    - had a recent hospitalization for acute cholecystitis with cholecystectomy    - had some postoperative hypoxic respiratory failure    - advised to complete outpatient pulmonology visit for PFTs as well as a sleep evaluation, does not appear these have been completed yet    - has sleep study  "scheduled for Jan.  Previous diagnosis of RICK but stopped using her cpap     Obesity: BMI 62.  Noted.     Hx seizure disorder    - no seizure activity since teenage years    - continue PTA carbamazepine     Developmental delay    - parents are listed as her guardians    - has a longtime significant other    - next of kin is technically her parents     - both at home with COVID-19 at this time.     Wounds    - wound RN consult    - see photos in chart.     Enlarged axillary lymph nodes    - noted on CT scan    - outpatient follow-up with repeat CT in 1 year (discussed with parents/guardians on 10/12)    Clinically Significant Risk Factors                   # Hypertension: Noted on problem list           # Severe Obesity: Estimated body mass index is 64.33 kg/m  as calculated from the following:    Height as of this encounter: 1.702 m (5' 7\").    Weight as of this encounter: 186.3 kg (410 lb 11.5 oz)., PRESENT ON ADMISSION     # Asthma: noted on problem list         I spent 45 minutes in reviewing this patient's labs, imaging, medications, medical history.  In addition time was spent interviewing the patient, communicating with family, and medical decision making.     DVT Prophylaxis: Pneumatic Compression Devices  Code Status: Full Code  Diet: Combination Diet Regular Diet Adult    Grajeda Catheter: Not present    Disposition: Medically Ready for Discharge: Anticipated Tomorrow (1-2 days)    Goals to discharge include: titrated off oxygen  Family updated today: Yes      Interval History   Pt seen and examined.  Pt reports feeling better than yesterday.  Coughing but nothing comes up.    -Data reviewed today: I personally reviewed all new labs and imaging results over the last 24 hours.     Physical Exam   Temp: 97.7  F (36.5  C) Temp src: Oral BP: 136/72 Pulse: 70   Resp: 18 SpO2: 91 % O2 Device: Nasal cannula Oxygen Delivery: 1.5 LPM  Vitals:    10/10/24 0858   Weight: (!) 186.3 kg (410 lb 11.5 oz)     Vital Signs " with Ranges  Temp:  [97.7  F (36.5  C)-98.6  F (37  C)] 97.7  F (36.5  C)  Pulse:  [70-92] 70  Resp:  [17-20] 18  BP: (132-145)/(72-73) 136/72  SpO2:  [91 %-94 %] 91 %  I/O last 3 completed shifts:  In: 120 [P.O.:120]  Out: 1500 [Urine:1500]    GENERAL: No apparent distress. Awake, alert, and fully oriented.  HEENT: Normocephalic, atraumatic. Extraocular movements intact.  CARDIOVASCULAR: Regular rate and rhythm without murmurs or rubs. No S3.  PULMONARY: Clear bilaterally.  GASTROINTESTINAL: Soft, non-tender, non-distended. Bowel sounds normoactive.   EXTREMITIES: No cyanosis or clubbing. No edema.  NEUROLOGICAL: CN 2-12 grossly intact, no focal neurological deficits.  DERMATOLOGICAL: No rash, ulcer, bruising, nor jaundice.  OTHER:     Medications   Current Facility-Administered Medications   Medication Dose Route Frequency Provider Last Rate Last Admin     Current Facility-Administered Medications   Medication Dose Route Frequency Provider Last Rate Last Admin    carBAMazepine (TEGretol) tablet 200 mg  200 mg Oral Daily Shiraz Roman MD   200 mg at 10/12/24 1156    carBAMazepine (TEGretol) tablet 400 mg  400 mg Oral BID Shiraz Roman MD   400 mg at 10/12/24 0822    enoxaparin ANTICOAGULANT (LOVENOX) injection 40 mg  40 mg Subcutaneous Q12H Colt Egan MD   40 mg at 10/12/24 0237    fluticasone (FLONASE) 50 MCG/ACT spray 1 spray  1 spray Both Nostrils Daily Shiraz Roman MD   1 spray at 10/12/24 0823    fluticasone-vilanterol (BREO ELLIPTA) 100-25 MCG/ACT inhaler 1 puff  1 puff Inhalation Daily Shiraz Roman MD   1 puff at 10/12/24 0725    ipratropium - albuterol 0.5 mg/2.5 mg/3 mL (DUONEB) neb solution 3 mL  3 mL Nebulization 4x daily Colt Egan MD   3 mL at 10/12/24 1140    lisinopril (ZESTRIL) tablet 20 mg  20 mg Oral Daily Shiraz Roman MD   20 mg at 10/12/24 0823    methylPREDNISolone Na Suc (solu-MEDROL) injection  "62.5 mg  62.5 mg Intravenous BID Colt Egan MD   62.5 mg at 10/12/24 0822    sertraline (ZOLOFT) tablet 100 mg  100 mg Oral Daily Shiraz Roman MD   100 mg at 10/12/24 0823    sodium chloride (PF) 0.9% PF flush 3 mL  3 mL Intracatheter Q8H Mckinley Rosenbaum MD   3 mL at 10/11/24 2133     Data     Laboratory:  Recent Labs   Lab 10/10/24  0653 10/10/24  0112   WBC 7.2 8.1   HGB 12.0 11.3*   HCT 40.2 38.1   MCV 93 94    176     Recent Labs   Lab 10/10/24  0653 10/10/24  0112    142   POTASSIUM  --  4.3   CHLORIDE 104 102   CO2 26 27   ANIONGAP 9 13   * 147*   BUN 11.6 12.9   CR 0.61 0.71   GFRESTIMATED >90 >90   CESAR 8.3* 8.4*     No results for input(s): \"CULT\" in the last 168 hours.  Troponin I ES   Date Value Ref Range Status   04/08/2021 <0.015 0.000 - 0.045 ug/L Final     Comment:     The 99th percentile for upper reference range is 0.045 ug/L.  Troponin values   in the range of 0.045 - 0.120 ug/L may be associated with risks of adverse   clinical events.         Imaging:  No results found for this or any previous visit (from the past 24 hour(s)).      Michael Murray DO  [facility] Hospitalist  [address] [place] [address]  Securely message with [employer] (more info)  Text page via [facility] Paging/Directory   10/12/2024   "

## 2024-10-12 NOTE — PLAN OF CARE
"Vitals: /69  Pulse 77  Temp 98  F  Resp 18   SpO2 94% on 1L O2       Shift Summary: A&Ox4 but forgetful, hx cognitive delay. VSS; now weaned to 1L O2 via NC. IV solumedrol continued. LS dim, no wheezing noted today. LAZAR present. Up Ax1, GB, walker. Denies pain. Patient showered, hair washed, linen changed this shift. Wound care completed to thigh, pannus, under breasts. Periarea malodorous. Unable to sit in chair this shift d/t body habitus. Refused specialty bed. Repositions self often. Per patient, only walks approx 20ft from lift chair to bathroom at home. SO visited this afternoon.     Goal Outcome Evaluation:    Plan of Care Reviewed With: patient, significant other    Overall Patient Progress: improvingOverall Patient Progress: improving    Outcome Evaluation: Weaned to 1L O2 via NC this evening. IV steroids/nebs continued.    Problem: Adult Inpatient Plan of Care  Goal: Plan of Care Review  Description: The Plan of Care Review/Shift note should be completed every shift.  The Outcome Evaluation is a brief statement about your assessment that the patient is improving, declining, or no change.  This information will be displayed automatically on your shift  note.  Outcome: Progressing  Flowsheets (Taken 10/12/2024 1824)  Outcome Evaluation: Weaned to 1L O2 via NC this evening. IV steroids/nebs continued.  Plan of Care Reviewed With:   patient   significant other  Overall Patient Progress: improving  Goal: Patient-Specific Goal (Individualized)  Description: You can add care plan individualizations to a care plan. Examples of Individualization might be:  \"Parent requests to be called daily at 9am for status\", \"I have a hard time hearing out of my right ear\", or \"Do not touch me to wake me up as it startles  me\".  Outcome: Progressing  Goal: Absence of Hospital-Acquired Illness or Injury  Outcome: Progressing  Intervention: Identify and Manage Fall Risk  Recent Flowsheet Documentation  Taken 10/12/2024 " 0823 by Kaia Light RN  Safety Promotion/Fall Prevention:   activity supervised   increase visualization of patient   nonskid shoes/slippers when out of bed   patient and family education   safety round/check completed  Intervention: Prevent Skin Injury  Recent Flowsheet Documentation  Taken 10/12/2024 0823 by Kaia Light RN  Body Position: position changed independently  Skin Protection: adhesive use limited  Device Skin Pressure Protection:   absorbent pad utilized/changed   adhesive use limited   tubing/devices free from skin contact  Intervention: Prevent and Manage VTE (Venous Thromboembolism) Risk  Recent Flowsheet Documentation  Taken 10/12/2024 0823 by Kaia Light RN  VTE Prevention/Management: (lovenox) other (see comments)  Intervention: Prevent Infection  Recent Flowsheet Documentation  Taken 10/12/2024 0823 by Kaia Light RN  Infection Prevention:   cohorting utilized   hand hygiene promoted   personal protective equipment utilized   rest/sleep promoted   single patient room provided  Goal: Optimal Comfort and Wellbeing  Outcome: Progressing  Goal: Readiness for Transition of Care  Outcome: Progressing     Problem: Comorbidity Management  Goal: Maintenance of Asthma Control  Outcome: Progressing  Intervention: Maintain Asthma Symptom Control  Recent Flowsheet Documentation  Taken 10/12/2024 0823 by Kaia Light RN  Medication Review/Management: medications reviewed  Goal: Maintenance of Seizure Control  Outcome: Progressing  Intervention: Maintain Seizure Symptom Control  Recent Flowsheet Documentation  Taken 10/12/2024 0823 by Kaia Light RN  Seizure Precautions:   activity supervised   clutter-free environment maintained  Medication Review/Management: medications reviewed     Problem: Pain Acute  Goal: Optimal Pain Control and Function  Outcome: Progressing  Intervention: Prevent or Manage Pain  Recent Flowsheet Documentation  Taken  10/12/2024 0823 by Kaia Light RN  Medication Review/Management: medications reviewed     Problem: Wound  Goal: Optimal Coping  Outcome: Progressing  Goal: Optimal Functional Ability  Outcome: Progressing  Goal: Absence of Infection Signs and Symptoms  Outcome: Progressing  Goal: Improved Oral Intake  Outcome: Progressing  Goal: Optimal Pain Control and Function  Outcome: Progressing  Goal: Skin Health and Integrity  Outcome: Progressing  Intervention: Optimize Skin Protection  Recent Flowsheet Documentation  Taken 10/12/2024 0823 by Kaia Light RN  Pressure Reduction Techniques: frequent weight shift encouraged  Pressure Reduction Devices: (refused specialty bed) other (see comments)  Skin Protection: adhesive use limited  Goal: Optimal Wound Healing  Outcome: Progressing     Problem: Skin Injury Risk Increased  Goal: Skin Health and Integrity  Outcome: Progressing  Intervention: Plan: Nurse Driven Intervention: Moisture Management  Recent Flowsheet Documentation  Taken 10/12/2024 0823 by Kaia Light RN  Moisture Interventions:   Encourage regular toileting   No brief in bed   Incontinence pad   Perineal cleanser   Barrier ointment (CriticAid, Triad paste)  Bathing/Skin Care:   back care   shower   linen changed   incontinence care   hair washed   dressed/undressed  Intervention: Plan: Nurse Driven Intervention: Friction and Shear  Recent Flowsheet Documentation  Taken 10/12/2024 0823 by Kaia Light RN  Friction/Shear Interventions:   HOB 30 degrees or less   Pad bony prominence (elbow pads, heel pads, ear protectors)  Intervention: Optimize Skin Protection  Recent Flowsheet Documentation  Taken 10/12/2024 0823 by Kaia Light RN  Pressure Reduction Techniques: frequent weight shift encouraged  Pressure Reduction Devices: (refused specialty bed) other (see comments)  Skin Protection: adhesive use limited

## 2024-10-12 NOTE — PLAN OF CARE
"Vitals: /72  Pulse 92  Temp 98.4  F  Resp 20  SpO2 94% on RA       Shift Summary: A&Ox4 but forgetful, hx cognitive delay. VSS on 1.5L O2; unable to wean further. Steroids switched to IV today. Up Ax1, GB, walker. Ibuprofen given x 1 for headache. Wound care completed. Mepilex on thigh changed d/t coming off. Patient malodorous; pericare completed and linens changed. Unable to sit in chair this shift d/t body habitus. Refused specialty bed. Repositions self often. Per patient, only walks approx 20ft from lift chair to bathroom at home.        Goal Outcome Evaluation:    Plan of Care Reviewed With: patient    Overall Patient Progress: improving    Outcome Evaluation: Remains on 1.5L O2. Unable to wean further this shift. Discharge pending respiratory status.      Problem: Adult Inpatient Plan of Care  Goal: Plan of Care Review  Description: The Plan of Care Review/Shift note should be completed every shift.  The Outcome Evaluation is a brief statement about your assessment that the patient is improving, declining, or no change.  This information will be displayed automatically on your shift  note.  Outcome: Progressing  Flowsheets (Taken 10/11/2024 1959)  Outcome Evaluation: Remains on 1.5L O2. Unable to wean further this shift. Discharge pending respiratory status.  Plan of Care Reviewed With: patient  Overall Patient Progress: improving  Goal: Patient-Specific Goal (Individualized)  Description: You can add care plan individualizations to a care plan. Examples of Individualization might be:  \"Parent requests to be called daily at 9am for status\", \"I have a hard time hearing out of my right ear\", or \"Do not touch me to wake me up as it startles  me\".  Outcome: Progressing  Goal: Absence of Hospital-Acquired Illness or Injury  Outcome: Progressing  Intervention: Identify and Manage Fall Risk  Recent Flowsheet Documentation  Taken 10/11/2024 1621 by Kaia Light RN  Safety Promotion/Fall " Prevention:   activity supervised   increase visualization of patient   nonskid shoes/slippers when out of bed   patient and family education   safety round/check completed  Intervention: Prevent Skin Injury  Recent Flowsheet Documentation  Taken 10/11/2024 0845 by Kaia Light RN  Body Position: position changed independently  Skin Protection: adhesive use limited  Device Skin Pressure Protection:   absorbent pad utilized/changed   adhesive use limited   tubing/devices free from skin contact  Intervention: Prevent and Manage VTE (Venous Thromboembolism) Risk  Recent Flowsheet Documentation  Taken 10/11/2024 0845 by Kaia Light RN  VTE Prevention/Management: (lovenox) other (see comments)  Intervention: Prevent Infection  Recent Flowsheet Documentation  Taken 10/11/2024 1756 by Kaia Light RN  Infection Prevention:   cohorting utilized   hand hygiene promoted   personal protective equipment utilized   rest/sleep promoted   single patient room provided  Taken 10/11/2024 0845 by Kaia Light RN  Infection Prevention:   cohorting utilized   hand hygiene promoted   personal protective equipment utilized   rest/sleep promoted   single patient room provided  Goal: Optimal Comfort and Wellbeing  Outcome: Progressing  Goal: Readiness for Transition of Care  Outcome: Progressing     Problem: Comorbidity Management  Goal: Maintenance of Asthma Control  Outcome: Progressing  Intervention: Maintain Asthma Symptom Control  Recent Flowsheet Documentation  Taken 10/11/2024 0845 by Kaia Light RN  Medication Review/Management: medications reviewed  Goal: Maintenance of Seizure Control  Outcome: Progressing  Intervention: Maintain Seizure Symptom Control  Recent Flowsheet Documentation  Taken 10/11/2024 0845 by Kaia Light RN  Medication Review/Management: medications reviewed     Problem: Pain Acute  Goal: Optimal Pain Control and Function  Outcome:  Progressing  Intervention: Prevent or Manage Pain  Recent Flowsheet Documentation  Taken 10/11/2024 0845 by Kaia Light RN  Medication Review/Management: medications reviewed     Problem: Wound  Goal: Optimal Coping  Outcome: Progressing  Goal: Optimal Functional Ability  Outcome: Progressing  Intervention: Optimize Functional Ability  Recent Flowsheet Documentation  Taken 10/11/2024 1437 by Kaia Light RN  Assistive Device Utilized: walker  Activity Management: ambulated to bathroom  Activity Assistance Provided: assistance, stand-by  Goal: Absence of Infection Signs and Symptoms  Outcome: Progressing  Goal: Improved Oral Intake  Outcome: Progressing  Goal: Optimal Pain Control and Function  Outcome: Progressing  Goal: Skin Health and Integrity  Outcome: Progressing  Intervention: Optimize Skin Protection  Recent Flowsheet Documentation  Taken 10/11/2024 1437 by Kaia Light RN  Activity Management: ambulated to bathroom  Taken 10/11/2024 0845 by Kaia Light RN  Pressure Reduction Techniques: frequent weight shift encouraged  Pressure Reduction Devices: (refused specialty bed) other (see comments)  Skin Protection: adhesive use limited  Goal: Optimal Wound Healing  Outcome: Progressing     Problem: Skin Injury Risk Increased  Goal: Skin Health and Integrity  Outcome: Progressing  Intervention: Plan: Nurse Driven Intervention: Moisture Management  Recent Flowsheet Documentation  Taken 10/11/2024 0845 by Kaia Light RN  Moisture Interventions:   Encourage regular toileting   No brief in bed   Perineal cleanser  Intervention: Plan: Nurse Driven Intervention: Friction and Shear  Recent Flowsheet Documentation  Taken 10/11/2024 0845 by Kaia Light RN  Friction/Shear Interventions:   HOB 30 degrees or less   Pad bony prominence (elbow pads, heel pads, ear protectors)  Intervention: Optimize Skin Protection  Recent Flowsheet Documentation  Taken 10/11/2024  1437 by Kaia Light, RN  Activity Management: ambulated to bathroom  Taken 10/11/2024 0845 by Kaia Light, RN  Pressure Reduction Techniques: frequent weight shift encouraged  Pressure Reduction Devices: (refused specialty bed) other (see comments)  Skin Protection: adhesive use limited

## 2024-10-12 NOTE — PLAN OF CARE
"Care from [id number]      Inpatient Progress Note:  For complete assessment see flow sheet documentation.       BP (!) 145/72 (BP Location: Right arm)   Pulse 92   Temp 98.4  F (36.9  C) (Oral)   Resp 20   Ht 1.702 m (5' 7\")   Wt (!) 186.3 kg (410 lb 11.5 oz)   LMP 08/18/2008   SpO2 94%   BMI 64.33 kg/m         Neuro: Intact. Forgetful at times.   Vital Signs: Stable. Pt is on 1.5 L O2, attempted O2 at 1 L and pt was dropping into 80's.   Pain/Comfort: Denies pain.   Diet/po intake: Regular   Output: Up to bathroom for use. Is incontinent at times.   Activity/Ambulation: SBA   Pertinent assessments: Wound care completed on coccyx and thigh.   Infusions: Saline locked.   Major Shift Events: Replaced thigh mepliex and vashe on wound x2.   Plan (Upcoming Events): Cont IV solumedrol, wean O2 as needed. Wound cares   Discharge/Transfer Needs:  has HC set up for pt when medically ready to discharge through [facility].     Will continue with POC.          Will continue to monitor and provide cares.    Goal Outcome Evaluation:      Plan of Care Reviewed With: patient    Overall Patient Progress: improvingOverall Patient Progress: improving    Outcome Evaluation: Pt on 1.5 L. Wound care on coccyx and thigh completed.      "

## 2024-10-13 PROCEDURE — 99232 SBSQ HOSP IP/OBS MODERATE 35: CPT | Performed by: INTERNAL MEDICINE

## 2024-10-13 PROCEDURE — 120N000001 HC R&B MED SURG/OB

## 2024-10-13 PROCEDURE — 94640 AIRWAY INHALATION TREATMENT: CPT

## 2024-10-13 PROCEDURE — 250N000011 HC RX IP 250 OP 636: Performed by: HOSPITALIST

## 2024-10-13 PROCEDURE — 999N000157 HC STATISTIC RCP TIME EA 10 MIN

## 2024-10-13 PROCEDURE — 94640 AIRWAY INHALATION TREATMENT: CPT | Mod: 76

## 2024-10-13 PROCEDURE — 250N000013 HC RX MED GY IP 250 OP 250 PS 637: Performed by: INTERNAL MEDICINE

## 2024-10-13 PROCEDURE — 250N000009 HC RX 250: Performed by: HOSPITALIST

## 2024-10-13 RX ADMIN — METHYLPREDNISOLONE SODIUM SUCCINATE 62.5 MG: 125 INJECTION, POWDER, FOR SOLUTION INTRAMUSCULAR; INTRAVENOUS at 20:57

## 2024-10-13 RX ADMIN — ENOXAPARIN SODIUM 40 MG: 40 INJECTION SUBCUTANEOUS at 17:55

## 2024-10-13 RX ADMIN — CARBAMAZEPINE 400 MG: 200 TABLET ORAL at 20:57

## 2024-10-13 RX ADMIN — CARBAMAZEPINE 200 MG: 200 TABLET ORAL at 12:34

## 2024-10-13 RX ADMIN — FLUTICASONE FUROATE AND VILANTEROL TRIFENATATE 1 PUFF: 100; 25 POWDER RESPIRATORY (INHALATION) at 08:15

## 2024-10-13 RX ADMIN — LISINOPRIL 20 MG: 20 TABLET ORAL at 08:45

## 2024-10-13 RX ADMIN — ENOXAPARIN SODIUM 40 MG: 40 INJECTION SUBCUTANEOUS at 06:30

## 2024-10-13 RX ADMIN — IPRATROPIUM BROMIDE AND ALBUTEROL SULFATE 3 ML: .5; 3 SOLUTION RESPIRATORY (INHALATION) at 15:33

## 2024-10-13 RX ADMIN — METHYLPREDNISOLONE SODIUM SUCCINATE 62.5 MG: 125 INJECTION, POWDER, FOR SOLUTION INTRAMUSCULAR; INTRAVENOUS at 08:45

## 2024-10-13 RX ADMIN — CARBAMAZEPINE 400 MG: 200 TABLET ORAL at 08:49

## 2024-10-13 RX ADMIN — SERTRALINE 100 MG: 100 TABLET, FILM COATED ORAL at 08:45

## 2024-10-13 RX ADMIN — IPRATROPIUM BROMIDE AND ALBUTEROL SULFATE 3 ML: .5; 3 SOLUTION RESPIRATORY (INHALATION) at 08:15

## 2024-10-13 RX ADMIN — IPRATROPIUM BROMIDE AND ALBUTEROL SULFATE 3 ML: .5; 3 SOLUTION RESPIRATORY (INHALATION) at 19:30

## 2024-10-13 RX ADMIN — FLUTICASONE PROPIONATE 1 SPRAY: 50 SPRAY, METERED NASAL at 08:45

## 2024-10-13 RX ADMIN — IPRATROPIUM BROMIDE AND ALBUTEROL SULFATE 3 ML: .5; 3 SOLUTION RESPIRATORY (INHALATION) at 11:20

## 2024-10-13 ASSESSMENT — ACTIVITIES OF DAILY LIVING (ADL)
ADLS_ACUITY_SCORE: 31
ADLS_ACUITY_SCORE: 33
ADLS_ACUITY_SCORE: 31
ADLS_ACUITY_SCORE: 33
ADLS_ACUITY_SCORE: 33
ADLS_ACUITY_SCORE: 35
ADLS_ACUITY_SCORE: 33
ADLS_ACUITY_SCORE: 31
ADLS_ACUITY_SCORE: 33
ADLS_ACUITY_SCORE: 33
ADLS_ACUITY_SCORE: 31
ADLS_ACUITY_SCORE: 33
ADLS_ACUITY_SCORE: 31
ADLS_ACUITY_SCORE: 31
ADLS_ACUITY_SCORE: 33
ADLS_ACUITY_SCORE: 31

## 2024-10-13 NOTE — PLAN OF CARE
Vitals: /81  Pulse 79  Temp 98.1  F  Resp 18  SpO2 92%       Shift Summary: A&Ox4 but forgetful, hx cognitive delay. VSS; now requiring 2L O2. IV solumedrol continued. LS dim, no wheezing noted today. LAZAR present. Up SBA w/ GB, walker. Denies pain. Wound care completed to thigh, pannus, under breasts. Tolerating diet. Repositions self often in bed. IV steroids, nebs continued. Patient requesting inhaler to go home with at discharge.     Goal Outcome Evaluation:    Plan of Care Reviewed With: patient    Overall Patient Progress: no change    Outcome Evaluation: Unable to wean O2 further; sats 91-93% on 2L via NC.

## 2024-10-13 NOTE — PLAN OF CARE
"Care from [id number]      Inpatient Progress Note:  For complete assessment see flow sheet documentation.       /69 (BP Location: Right arm)   Pulse 77   Temp 98  F (36.7  C) (Oral)   Resp 18   Ht 1.702 m (5' 7\")   Wt (!) 186.3 kg (410 lb 11.5 oz)   LMP 08/18/2008   SpO2 92%   BMI 64.33 kg/m         Neuro: Intact. Forgetful at times.   Vital Signs: Stable. Pt is on 1 L O2.  Pain/Comfort: Denies pain.   Diet/po intake: Regular   Output: Up to bathroom for use. Is incontinent at times, purewick loosely placed as pt is routinely soaked.   Activity/Ambulation: SBA   Pertinent assessments: Wound care completed on coccyx and thigh.   Infusions: Saline locked.   Major Shift Events: Replaced thigh mepliex and vashe on wound x2.   Plan (Upcoming Events): Cont IV solumedrol, wean O2 as needed. Wound cares.  Discharge/Transfer Needs:  has HC set up for pt when medically ready to discharge through [facility].      Will continue with POC.          Will continue to monitor and provide cares.    Goal Outcome Evaluation:      Plan of Care Reviewed With: patient    Overall Patient Progress: improvingOverall Patient Progress: improving    Outcome Evaluation: Pt on 1.5 L. Wound care on coccyx and thigh completed.      Problem: Adult Inpatient Plan of Care  Goal: Plan of Care Review  Description: The Plan of Care Review/Shift note should be completed every shift.  The Outcome Evaluation is a brief statement about your assessment that the patient is improving, declining, or no change.  This information will be displayed automatically on your shift  note.  Outcome: Progressing  Flowsheets (Taken 10/12/2024 2236)  Plan of Care Reviewed With: patient  Overall Patient Progress: improving  Goal: Patient-Specific Goal (Individualized)  Description: You can add care plan individualizations to a care plan. Examples of Individualization might be:  \"Parent requests to be called daily at 9am for status\", \"I have a hard time " "hearing out of my right ear\", or \"Do not touch me to wake me up as it startles  me\".  Outcome: Progressing  Goal: Absence of Hospital-Acquired Illness or Injury  Outcome: Progressing  Intervention: Identify and Manage Fall Risk  Recent Flowsheet Documentation  Taken 10/12/2024 2102 by Nhi Moya RN  Safety Promotion/Fall Prevention:   activity supervised   increase visualization of patient   nonskid shoes/slippers when out of bed   patient and family education   safety round/check completed  Intervention: Prevent Skin Injury  Recent Flowsheet Documentation  Taken 10/12/2024 2102 by Nhi Moya RN  Body Position: position changed independently  Skin Protection: adhesive use limited  Device Skin Pressure Protection:   absorbent pad utilized/changed   adhesive use limited   tubing/devices free from skin contact  Taken 10/12/2024 2000 by Nhi Moya RN  Body Position: position changed independently  Intervention: Prevent and Manage VTE (Venous Thromboembolism) Risk  Recent Flowsheet Documentation  Taken 10/12/2024 2102 by Nhi Moya RN  VTE Prevention/Management: (lovenox) other (see comments)  Intervention: Prevent Infection  Recent Flowsheet Documentation  Taken 10/12/2024 2102 by Nhi Moya RN  Infection Prevention:   cohorting utilized   hand hygiene promoted   personal protective equipment utilized   rest/sleep promoted   single patient room provided  Goal: Optimal Comfort and Wellbeing  Outcome: Progressing  Goal: Readiness for Transition of Care  Outcome: Progressing     Problem: Comorbidity Management  Goal: Maintenance of Asthma Control  Outcome: Progressing  Intervention: Maintain Asthma Symptom Control  Recent Flowsheet Documentation  Taken 10/12/2024 2102 by Nhi Moya RN  Medication Review/Management: medications reviewed  Goal: Maintenance of Seizure Control  Outcome: Progressing  Intervention: Maintain Seizure Symptom Control  Recent Flowsheet " Documentation  Taken 10/12/2024 2102 by Nhi Moya RN  Seizure Precautions:   activity supervised   clutter-free environment maintained  Medication Review/Management: medications reviewed     Problem: Pain Acute  Goal: Optimal Pain Control and Function  Outcome: Progressing  Intervention: Prevent or Manage Pain  Recent Flowsheet Documentation  Taken 10/12/2024 2102 by Nhi Moya RN  Medication Review/Management: medications reviewed     Problem: Wound  Goal: Optimal Coping  Outcome: Progressing  Goal: Optimal Functional Ability  Outcome: Progressing  Goal: Absence of Infection Signs and Symptoms  Outcome: Progressing  Goal: Improved Oral Intake  Outcome: Progressing  Goal: Optimal Pain Control and Function  Outcome: Progressing  Goal: Skin Health and Integrity  Outcome: Progressing  Intervention: Optimize Skin Protection  Recent Flowsheet Documentation  Taken 10/12/2024 2102 by Nhi Moya RN  Pressure Reduction Techniques: frequent weight shift encouraged  Pressure Reduction Devices: (refused specialty bed) other (see comments)  Skin Protection: adhesive use limited  Taken 10/12/2024 2000 by Nhi Moya RN  Head of Bed (HOB) Positioning: HOB at 20-30 degrees  Goal: Optimal Wound Healing  Outcome: Progressing     Problem: Skin Injury Risk Increased  Goal: Skin Health and Integrity  Outcome: Progressing  Intervention: Plan: Nurse Driven Intervention: Moisture Management  Recent Flowsheet Documentation  Taken 10/12/2024 2102 by Nhi Moya RN  Moisture Interventions:   No brief in bed   Incontinence pad   Encourage regular toileting  Bathing/Skin Care:   back care   shower   linen changed   incontinence care   hair washed   dressed/undressed  Taken 10/12/2024 2000 by Nhi Moya RN  Moisture Interventions:   No brief in bed   Incontinence pad   Encourage regular toileting  Bathing/Skin Care: incontinence care  Intervention: Plan: Nurse Driven Intervention: Friction and  Shear  Recent Flowsheet Documentation  Taken 10/12/2024 2102 by Nhi Moya RN  Friction/Shear Interventions: HOB 30 degrees or less  Intervention: Optimize Skin Protection  Recent Flowsheet Documentation  Taken 10/12/2024 2102 by Nhi Moya, RN  Pressure Reduction Techniques: frequent weight shift encouraged  Pressure Reduction Devices: (refused specialty bed) other (see comments)  Skin Protection: adhesive use limited  Taken 10/12/2024 2000 by Nhi Moya, RN  Head of Bed (HOB) Positioning: HOB at 20-30 degrees

## 2024-10-14 PROCEDURE — 250N000011 HC RX IP 250 OP 636: Performed by: HOSPITALIST

## 2024-10-14 PROCEDURE — 94640 AIRWAY INHALATION TREATMENT: CPT | Mod: 76

## 2024-10-14 PROCEDURE — 250N000013 HC RX MED GY IP 250 OP 250 PS 637: Performed by: INTERNAL MEDICINE

## 2024-10-14 PROCEDURE — 250N000009 HC RX 250: Performed by: HOSPITALIST

## 2024-10-14 PROCEDURE — 94640 AIRWAY INHALATION TREATMENT: CPT

## 2024-10-14 PROCEDURE — 999N000157 HC STATISTIC RCP TIME EA 10 MIN

## 2024-10-14 PROCEDURE — 99232 SBSQ HOSP IP/OBS MODERATE 35: CPT | Performed by: INTERNAL MEDICINE

## 2024-10-14 PROCEDURE — 120N000001 HC R&B MED SURG/OB

## 2024-10-14 RX ORDER — PREDNISONE 20 MG/1
40 TABLET ORAL DAILY
Status: DISCONTINUED | OUTPATIENT
Start: 2024-10-15 | End: 2024-10-16

## 2024-10-14 RX ADMIN — CARBAMAZEPINE 400 MG: 200 TABLET ORAL at 21:08

## 2024-10-14 RX ADMIN — FLUTICASONE FUROATE AND VILANTEROL TRIFENATATE 1 PUFF: 100; 25 POWDER RESPIRATORY (INHALATION) at 07:51

## 2024-10-14 RX ADMIN — FLUTICASONE PROPIONATE 1 SPRAY: 50 SPRAY, METERED NASAL at 08:20

## 2024-10-14 RX ADMIN — IPRATROPIUM BROMIDE AND ALBUTEROL SULFATE 3 ML: .5; 3 SOLUTION RESPIRATORY (INHALATION) at 19:27

## 2024-10-14 RX ADMIN — ENOXAPARIN SODIUM 40 MG: 40 INJECTION SUBCUTANEOUS at 18:32

## 2024-10-14 RX ADMIN — IPRATROPIUM BROMIDE AND ALBUTEROL SULFATE 3 ML: .5; 3 SOLUTION RESPIRATORY (INHALATION) at 11:50

## 2024-10-14 RX ADMIN — LISINOPRIL 20 MG: 20 TABLET ORAL at 08:20

## 2024-10-14 RX ADMIN — ENOXAPARIN SODIUM 40 MG: 40 INJECTION SUBCUTANEOUS at 05:40

## 2024-10-14 RX ADMIN — CARBAMAZEPINE 200 MG: 200 TABLET ORAL at 11:58

## 2024-10-14 RX ADMIN — CARBAMAZEPINE 400 MG: 200 TABLET ORAL at 08:20

## 2024-10-14 RX ADMIN — IPRATROPIUM BROMIDE AND ALBUTEROL SULFATE 3 ML: .5; 3 SOLUTION RESPIRATORY (INHALATION) at 07:51

## 2024-10-14 RX ADMIN — IPRATROPIUM BROMIDE AND ALBUTEROL SULFATE 3 ML: .5; 3 SOLUTION RESPIRATORY (INHALATION) at 16:03

## 2024-10-14 RX ADMIN — SERTRALINE 100 MG: 100 TABLET, FILM COATED ORAL at 08:20

## 2024-10-14 RX ADMIN — METHYLPREDNISOLONE SODIUM SUCCINATE 62.5 MG: 125 INJECTION, POWDER, FOR SOLUTION INTRAMUSCULAR; INTRAVENOUS at 08:45

## 2024-10-14 ASSESSMENT — ACTIVITIES OF DAILY LIVING (ADL)
ADLS_ACUITY_SCORE: 34

## 2024-10-14 NOTE — PROGRESS NOTES
[facility]    Hospitalist Progress Note      Assessment & Plan   Brissa Corado is a 59 year old woman who was admitted on 10/9/2024. PMH significant for   asthma, hypertension, developmental delay, HLD, HTN, prior seizures, RICK, GERD, MDD, obesity who presented to the ED 10/9/2024 with shortness of breath.  Admitted for asthma exacerbation     TODAY'S PLAN:  Pt still requiring supplemental oxygen. Continuing IV steroids. Wean O2 and follow requirements with ambulation. Hopeful to discharge in next 1-2 days.       Problem List:   Acute hypoxic respiratory failure  Likely asthma exacerbation, potential component of COPD based on imaging  URI-like symptoms  Obesity-hypoventilation likely contributing    - presented with several days of URI-like symptoms    - CTPE did not show PE    - still needing 1L: IV solumedrol 60mg BID. Wheezing improved. Possible transition to oral steroids tomorrow.     - cont to wean O2    - schedule duonebs and PRN albuterol     Hypertension    - cont lisinopril     Hyperlipidemia    - resume statin on discharge      GERD    - not on meds     Untreated RICK  Likely OHS    - had a recent hospitalization for acute cholecystitis with cholecystectomy    - had some postoperative hypoxic respiratory failure    - advised to complete outpatient pulmonology visit for PFTs as well as a sleep evaluation, does not appear these have been completed yet    - has sleep study scheduled for Jan.  Previous diagnosis of RICK but stopped using her cpap     Obesity: BMI 62.  Noted.     Hx seizure disorder    - no seizure activity since teenage years    - continue PTA carbamazepine     Developmental delay    - parents are listed as her guardians    - has a longtime significant other    - next of kin is technically her parents     - both at home with COVID-19 at this time.     Wounds    - wound RN consult    - see photos in chart.     Enlarged axillary lymph nodes    - noted on CT scan    -  outpatient follow-up with repeat CT in 1 year (discussed with parents/guardians on 10/12)    DVT Prophylaxis: Enoxaparin (Lovenox) SQ  Code Status: Full Code    Medically Ready for Discharge: Anticipated in 2-4 Days      Expected discharge: Hopeful discharge in next 2 days    Shahida Kahn MD FACP  Hospitalist Service  [facility]      Interval History   Pt still requiring supplemental oxygen. Continuing IV steroids. Wean O2 and follow requirements with ambulation. Hopeful to discharge in next 1-2 days.    -Data reviewed today: I reviewed all new labs and imaging results over the last 24 hours.      Physical Exam   Temp: 98.1  F (36.7  C) Temp src: Oral BP: (!) 147/81 Pulse: 79   Resp: 18 SpO2: 94 % O2 Device: Nasal cannula Oxygen Delivery: 2 LPM  Vitals:    10/10/24 0858   Weight: (!) 186.3 kg (410 lb 11.5 oz)     Vital Signs with Ranges  Temp:  [97.2  F (36.2  C)-98.1  F (36.7  C)] 98.1  F (36.7  C)  Pulse:  [73-80] 79  Resp:  [16-18] 18  BP: (145-149)/(80-88) 147/81  SpO2:  [86 %-94 %] 94 %  I/O last 3 completed shifts:  In: 120 [P.O.:120]  Out: 1400 [Urine:1400]    Constitutional: Morbidly obese. Resting in bed. Alert and oriented x3. No acute distress. No increased work of breathing. On 1L O2.  HEENT: NCAT. EOMI. Moist oral mucosa.  Respiratory: Clear to auscultation bilaterally. No crackles or wheezes. No increased work of breathing and patient with good inspiratory effort. On 1 L O2 at rest.   Cardiovascular: Regular rate and rhythm at time of exam. No murmur.  GI: Soft, nontender, nondistended. Normoactive bowel sounds.   Musculoskeletal: No gross deformities.   Neurologic: Alert and oriented x3. No focal neurologic deficits. Did not assess gait.      Medications   Current Facility-Administered Medications   Medication Dose Route Frequency Provider Last Rate Last Admin     Current Facility-Administered Medications   Medication Dose Route Frequency Provider Last Rate Last Admin     carBAMazepine (TEGretol) tablet 200 mg  200 mg Oral Daily Shiraz Roman MD   200 mg at 10/13/24 1234    carBAMazepine (TEGretol) tablet 400 mg  400 mg Oral BID Shiraz Roman MD   400 mg at 10/13/24 0849    enoxaparin ANTICOAGULANT (LOVENOX) injection 40 mg  40 mg Subcutaneous Q12H Colt Egan MD   40 mg at 10/13/24 1755    fluticasone (FLONASE) 50 MCG/ACT spray 1 spray  1 spray Both Nostrils Daily Shiraz Roman MD   1 spray at 10/13/24 0845    fluticasone-vilanterol (BREO ELLIPTA) 100-25 MCG/ACT inhaler 1 puff  1 puff Inhalation Daily Shiraz Roman MD   1 puff at 10/13/24 0815    ipratropium - albuterol 0.5 mg/2.5 mg/3 mL (DUONEB) neb solution 3 mL  3 mL Nebulization 4x daily Colt Egan MD   3 mL at 10/13/24 1930    lisinopril (ZESTRIL) tablet 20 mg  20 mg Oral Daily Shiraz Roman MD   20 mg at 10/13/24 0845    methylPREDNISolone Na Suc (solu-MEDROL) injection 62.5 mg  62.5 mg Intravenous BID Colt Egan MD   62.5 mg at 10/13/24 0845    sertraline (ZOLOFT) tablet 100 mg  100 mg Oral Daily Shiraz Roman MD   100 mg at 10/13/24 0845    sodium chloride (PF) 0.9% PF flush 3 mL  3 mL Intracatheter Q8H Mckinley Rosenbaum MD   3 mL at 10/13/24 0628       Data   Recent Labs   Lab 10/12/24  1348 10/10/24  0653 10/10/24  0112   WBC 10.9 7.2 8.1   HGB 12.6 12.0 11.3*   MCV 91 93 94    169 176    139 142   POTASSIUM 4.2  --  4.3   CHLORIDE 101 104 102   CO2 29 26 27   BUN 18.0 11.6 12.9   CR 0.65 0.61 0.71   ANIONGAP 10 9 13   CESAR 9.2 8.3* 8.4*   * 178* 147*       No results found for this or any previous visit (from the past 24 hour(s)).

## 2024-10-14 NOTE — PROGRESS NOTES
[facility]    Hospitalist Progress Note      Assessment & Plan   Brissa Corado is a 59 year old woman who was admitted on 10/9/2024. PMH significant for   asthma, hypertension, developmental delay, HLD, HTN, prior seizures, RICK, GERD, MDD, obesity who presented to the ED 10/9/2024 with shortness of breath.  Admitted for asthma exacerbation      Problem List:   Acute hypoxic respiratory failure  Likely asthma exacerbation, potential component of COPD based on imaging  URI-like symptoms  Obesity-hypoventilation likely contributing    - presented with several days of URI-like symptoms    - CTPE did not show PE    -Patient has weaned to room air on 10/14.  Encouraging ambulation and monitoring response.  Stopping IV Solu-Medrol twice daily and will start prednisone 40 mg orally tomorrow.  Anticipate discharge home tomorrow if tolerates.      -Monitor oxygen saturations on room air.      - schedule duonebs and PRN albuterol     Hypertension    - cont lisinopril     Hyperlipidemia    - resume statin on discharge      GERD    - not on meds     Untreated RICK  Likely OHS    - had a recent hospitalization for acute cholecystitis with cholecystectomy    - had some postoperative hypoxic respiratory failure    - advised to complete outpatient pulmonology visit for PFTs as well as a sleep evaluation, does not appear these have been completed yet    - has sleep study scheduled for Jan.  Previous diagnosis of RICK but stopped using her cpap     Obesity: BMI 62.  Noted.     Hx seizure disorder    - no seizure activity since teenage years    - continue PTA carbamazepine     Developmental delay    - parents are listed as her guardians    - has a longtime significant other    - next of kin is technically her parents     - both at home with COVID-19 at this time.     Wounds    - wound RN consult    - see photos in chart.     Enlarged axillary lymph nodes    - noted on CT scan    - outpatient follow-up with  repeat CT in 1 year (discussed with parents/guardians on 10/12)    DVT Prophylaxis: Enoxaparin (Lovenox) SQ  Code Status: Full Code    Medically Ready for Discharge: Anticipated Tomorrow      Expected discharge: Anticipate discharge tomorrow    Shahida Kahn MD FACP  Hospitalist Service  [facility]      Interval History   Patient is weaned to room air today.  Patient reports being able to ambulate to and from bathroom.  Stopping IV steroids and switching to oral prednisone.  Anticipate discharge home tomorrow.    -Data reviewed today: I reviewed all new labs and imaging results over the last 24 hours.      Physical Exam   Temp: 98.1  F (36.7  C) Temp src: Oral BP: (!) 148/69 Pulse: 81   Resp: 20 SpO2: 92 % O2 Device: None (Room air) Oxygen Delivery: 1 LPM (titrated up due to sats down to 88% with deep breathing and coughing)  Vitals:    10/10/24 0858   Weight: (!) 186.3 kg (410 lb 11.5 oz)     Vital Signs with Ranges  Temp:  [97.7  F (36.5  C)-98.1  F (36.7  C)] 98.1  F (36.7  C)  Pulse:  [72-83] 81  Resp:  [20] 20  BP: (134-161)/(69-79) 148/69  SpO2:  [90 %-94 %] 92 %  I/O last 3 completed shifts:  In: 120 [P.O.:120]  Out: 1000 [Urine:1000]    Constitutional: Morbidly obese. Resting in bed.  Seen after completing breathing treatment.  Alert and oriented x3.  On room air at time of exam.  No increased work of breathing.    HEENT: NCAT. EOMI. Moist oral mucosa.  Respiratory: Clear to auscultation bilaterally. No crackles or wheezes, though seen shortly after breathing treatment. No increased work of breathing and patient with good inspiratory effort. On room air.  Cardiovascular: Regular rate and rhythm. No murmur.  Neurologic: Alert and oriented x3. No focal neurologic deficits. Did not assess gait.      Medications   Current Facility-Administered Medications   Medication Dose Route Frequency Provider Last Rate Last Admin     Current Facility-Administered Medications   Medication Dose Route  Frequency Provider Last Rate Last Admin    carBAMazepine (TEGretol) tablet 200 mg  200 mg Oral Daily Shiraz Roman MD   200 mg at 10/14/24 1158    carBAMazepine (TEGretol) tablet 400 mg  400 mg Oral BID Shiraz Roman MD   400 mg at 10/14/24 0820    enoxaparin ANTICOAGULANT (LOVENOX) injection 40 mg  40 mg Subcutaneous Q12H Colt Egan MD   40 mg at 10/14/24 0540    fluticasone (FLONASE) 50 MCG/ACT spray 1 spray  1 spray Both Nostrils Daily Shiraz Roman MD   1 spray at 10/14/24 0820    fluticasone-vilanterol (BREO ELLIPTA) 100-25 MCG/ACT inhaler 1 puff  1 puff Inhalation Daily Shiraz Roman MD   1 puff at 10/14/24 0751    ipratropium - albuterol 0.5 mg/2.5 mg/3 mL (DUONEB) neb solution 3 mL  3 mL Nebulization 4x daily Colt Egan MD   3 mL at 10/14/24 1603    lisinopril (ZESTRIL) tablet 20 mg  20 mg Oral Daily Shiraz Roman MD   20 mg at 10/14/24 0820    [START ON 10/15/2024] predniSONE (DELTASONE) tablet 40 mg  40 mg Oral Daily Shahida Cervantes MD        sertraline (ZOLOFT) tablet 100 mg  100 mg Oral Daily Shiraz Roman MD   100 mg at 10/14/24 0820    sodium chloride (PF) 0.9% PF flush 3 mL  3 mL Intracatheter Q8H Mckinley Rosenbaum MD   3 mL at 10/14/24 0540       Data   Recent Labs   Lab 10/12/24  1348 10/10/24  0653 10/10/24  0112   WBC 10.9 7.2 8.1   HGB 12.6 12.0 11.3*   MCV 91 93 94    169 176    139 142   POTASSIUM 4.2  --  4.3   CHLORIDE 101 104 102   CO2 29 26 27   BUN 18.0 11.6 12.9   CR 0.65 0.61 0.71   ANIONGAP 10 9 13   CESAR 9.2 8.3* 8.4*   * 178* 147*       No results found for this or any previous visit (from the past 24 hour(s)).

## 2024-10-14 NOTE — PLAN OF CARE
"Care from 6200-1411      Inpatient Progress Note:  For complete assessment see flow sheet documentation.       BP (!) 161/79 (BP Location: Right arm)   Pulse 72   Temp 97.7  F (36.5  C) (Oral)   Resp 20   Ht 1.702 m (5' 7\")   Wt (!) 186.3 kg (410 lb 11.5 oz)   LMP 08/18/2008   SpO2 94%   BMI 64.33 kg/m         Neuro: Intact.   Vital Signs: Stable  Pain/Comfort: Denies pain.  Diet/po intake: Regular diet.   Output: Up to bathroom during the day. Is incontinent of urine over night so purewick in until morning.   Activity/Ambulation: SBA in room.   Pertinent assessments: Cont to do wound cares. Monitor O2.  Infusions: N/A  Major Shift Events: Wound cares on all wounds on this shift.   Plan (Upcoming Events): IV solumedrol. Cont to wean O2.  Discharge/Transfer Needs: Need to wean O2. Will be transported home by significant other, Sudhir.     Will continue with POC.          Will continue to monitor and provide cares.    Goal Outcome Evaluation:      Plan of Care Reviewed With: patient    Overall Patient Progress: no changeOverall Patient Progress: no change    Outcome Evaluation: Still on 2L O2 NC. Wound cares completed.      Problem: Adult Inpatient Plan of Care  Goal: Plan of Care Review  Description: The Plan of Care Review/Shift note should be completed every shift.  The Outcome Evaluation is a brief statement about your assessment that the patient is improving, declining, or no change.  This information will be displayed automatically on your shift  note.  Outcome: Progressing  Flowsheets (Taken 10/14/2024 0341)  Outcome Evaluation: Still on 2L O2 NC. Wound cares completed.  Plan of Care Reviewed With: patient  Overall Patient Progress: no change  Goal: Patient-Specific Goal (Individualized)  Description: You can add care plan individualizations to a care plan. Examples of Individualization might be:  \"Parent requests to be called daily at 9am for status\", \"I have a hard time hearing out of my right ear\", " "or \"Do not touch me to wake me up as it startles  me\".  Outcome: Progressing  Goal: Absence of Hospital-Acquired Illness or Injury  Outcome: Progressing  Intervention: Identify and Manage Fall Risk  Recent Flowsheet Documentation  Taken 10/13/2024 1930 by Nhi Moya RN  Safety Promotion/Fall Prevention:   activity supervised   increase visualization of patient   nonskid shoes/slippers when out of bed   patient and family education   safety round/check completed  Intervention: Prevent Skin Injury  Recent Flowsheet Documentation  Taken 10/13/2024 1930 by Nhi Moya RN  Body Position: position changed independently  Skin Protection: adhesive use limited  Device Skin Pressure Protection:   absorbent pad utilized/changed   adhesive use limited   tubing/devices free from skin contact  Intervention: Prevent and Manage VTE (Venous Thromboembolism) Risk  Recent Flowsheet Documentation  Taken 10/13/2024 1930 by Nhi Moya RN  VTE Prevention/Management: (Lovenox) other (see comments)  Goal: Optimal Comfort and Wellbeing  Outcome: Progressing  Goal: Readiness for Transition of Care  Outcome: Progressing     Problem: Comorbidity Management  Goal: Maintenance of Asthma Control  Outcome: Not Progressing  Intervention: Maintain Asthma Symptom Control  Recent Flowsheet Documentation  Taken 10/13/2024 1930 by Nhi Moya RN  Medication Review/Management: medications reviewed  Goal: Maintenance of Seizure Control  Outcome: Progressing  Intervention: Maintain Seizure Symptom Control  Recent Flowsheet Documentation  Taken 10/13/2024 1930 by Nhi Moya RN  Medication Review/Management: medications reviewed     Problem: Pain Acute  Goal: Optimal Pain Control and Function  Outcome: Progressing  Intervention: Prevent or Manage Pain  Recent Flowsheet Documentation  Taken 10/13/2024 1930 by Nhi Moya RN  Medication Review/Management: medications reviewed     Problem: Wound  Goal: Optimal " Coping  Outcome: Progressing  Goal: Optimal Functional Ability  Outcome: Progressing  Intervention: Optimize Functional Ability  Recent Flowsheet Documentation  Taken 10/13/2024 1930 by Nhi Moya RN  Assistive Device Utilized: walker  Activity Management:   activity adjusted per tolerance   ambulated to bathroom   back to bed  Activity Assistance Provided: assistance, stand-by  Goal: Absence of Infection Signs and Symptoms  Outcome: Progressing  Goal: Improved Oral Intake  Outcome: Progressing  Goal: Optimal Pain Control and Function  Outcome: Progressing  Goal: Skin Health and Integrity  Outcome: Not Progressing  Intervention: Optimize Skin Protection  Recent Flowsheet Documentation  Taken 10/13/2024 1930 by Nhi Moya RN  Pressure Reduction Techniques: frequent weight shift encouraged  Skin Protection: adhesive use limited  Activity Management:   activity adjusted per tolerance   ambulated to bathroom   back to bed  Head of Bed (HOB) Positioning: HOB at 20-30 degrees  Goal: Optimal Wound Healing  Outcome: Progressing

## 2024-10-14 NOTE — PROGRESS NOTES
Patient tried to ambulate in the room, patient walk from the bed to the door and back. Patient became shaky at the knees, felt unsteady and needed to sit. O2 stayed at a steady 91

## 2024-10-15 LAB — LMWH PPP CHRO-ACNC: 0.26 IU/ML

## 2024-10-15 PROCEDURE — 85520 HEPARIN ASSAY: CPT | Performed by: INTERNAL MEDICINE

## 2024-10-15 PROCEDURE — 94640 AIRWAY INHALATION TREATMENT: CPT | Mod: 76

## 2024-10-15 PROCEDURE — 120N000001 HC R&B MED SURG/OB

## 2024-10-15 PROCEDURE — 250N000009 HC RX 250: Performed by: HOSPITALIST

## 2024-10-15 PROCEDURE — 36415 COLL VENOUS BLD VENIPUNCTURE: CPT | Performed by: INTERNAL MEDICINE

## 2024-10-15 PROCEDURE — 999N000157 HC STATISTIC RCP TIME EA 10 MIN

## 2024-10-15 PROCEDURE — 250N000012 HC RX MED GY IP 250 OP 636 PS 637: Performed by: INTERNAL MEDICINE

## 2024-10-15 PROCEDURE — 250N000011 HC RX IP 250 OP 636: Performed by: HOSPITALIST

## 2024-10-15 PROCEDURE — 99232 SBSQ HOSP IP/OBS MODERATE 35: CPT | Performed by: INTERNAL MEDICINE

## 2024-10-15 PROCEDURE — 250N000013 HC RX MED GY IP 250 OP 250 PS 637: Performed by: INTERNAL MEDICINE

## 2024-10-15 RX ORDER — ENOXAPARIN SODIUM 100 MG/ML
60 INJECTION SUBCUTANEOUS EVERY 12 HOURS
Status: DISCONTINUED | OUTPATIENT
Start: 2024-10-15 | End: 2024-10-17 | Stop reason: HOSPADM

## 2024-10-15 RX ADMIN — LISINOPRIL 20 MG: 20 TABLET ORAL at 08:36

## 2024-10-15 RX ADMIN — ENOXAPARIN SODIUM 40 MG: 40 INJECTION SUBCUTANEOUS at 06:09

## 2024-10-15 RX ADMIN — CARBAMAZEPINE 400 MG: 200 TABLET ORAL at 08:37

## 2024-10-15 RX ADMIN — CARBAMAZEPINE 400 MG: 200 TABLET ORAL at 20:06

## 2024-10-15 RX ADMIN — IPRATROPIUM BROMIDE AND ALBUTEROL SULFATE 3 ML: .5; 3 SOLUTION RESPIRATORY (INHALATION) at 11:34

## 2024-10-15 RX ADMIN — CARBAMAZEPINE 200 MG: 200 TABLET ORAL at 11:48

## 2024-10-15 RX ADMIN — IPRATROPIUM BROMIDE AND ALBUTEROL SULFATE 3 ML: .5; 3 SOLUTION RESPIRATORY (INHALATION) at 19:18

## 2024-10-15 RX ADMIN — ENOXAPARIN SODIUM 60 MG: 60 INJECTION SUBCUTANEOUS at 17:57

## 2024-10-15 RX ADMIN — FLUTICASONE FUROATE AND VILANTEROL TRIFENATATE 1 PUFF: 100; 25 POWDER RESPIRATORY (INHALATION) at 07:49

## 2024-10-15 RX ADMIN — IBUPROFEN 800 MG: 400 TABLET, FILM COATED ORAL at 08:36

## 2024-10-15 RX ADMIN — PREDNISONE 40 MG: 20 TABLET ORAL at 08:36

## 2024-10-15 RX ADMIN — SERTRALINE 100 MG: 100 TABLET, FILM COATED ORAL at 08:36

## 2024-10-15 RX ADMIN — IPRATROPIUM BROMIDE AND ALBUTEROL SULFATE 3 ML: .5; 3 SOLUTION RESPIRATORY (INHALATION) at 07:49

## 2024-10-15 RX ADMIN — FLUTICASONE PROPIONATE 1 SPRAY: 50 SPRAY, METERED NASAL at 08:37

## 2024-10-15 RX ADMIN — IPRATROPIUM BROMIDE AND ALBUTEROL SULFATE 3 ML: .5; 3 SOLUTION RESPIRATORY (INHALATION) at 15:17

## 2024-10-15 ASSESSMENT — ACTIVITIES OF DAILY LIVING (ADL)
ADLS_ACUITY_SCORE: 34
ADLS_ACUITY_SCORE: 35
ADLS_ACUITY_SCORE: 34

## 2024-10-15 NOTE — PLAN OF CARE
"Goal Outcome Evaluation:      Plan of Care Reviewed With: patient    Overall Patient Progress: improvingOverall Patient Progress: improving    Outcome Evaluation: Pt A&OX4 W/ some forgetfulness, SOB improved, RA 91%, on 2L when sleeping at night. Plan is sleep study out patient.      Problem: Adult Inpatient Plan of Care  Goal: Plan of Care Review  Description:  Outcome: Progressing  Flowsheets (Taken 10/14/2024 2114)  Outcome Evaluation: Pt A&OX4 W/ some forgetfulness, SOB improved, RA 91%, on 2L when sleeping at night. Plan is sleep study out patient.  Plan of Care Reviewed With: patient  Overall Patient Progress: improving  Goal: Patient-Specific Goal (Individualized)  Description: You can add care plan individualizations to a care plan. Examples of Individualization might be:  \"Parent requests to be called daily at 9am for status\", \"I have a hard time hearing out of my right ear\", or \"Do not touch me to wake me up as it startles  me\".  Outcome: Progressing  Goal: Absence of Hospital-Acquired Illness or Injury  Outcome: Progressing  Intervention: Identify and Manage Fall Risk  Recent Flowsheet Documentation  Taken 10/14/2024 1927 by Mitchell Cao, RN  Safety Promotion/Fall Prevention:   activity supervised   increase visualization of patient   nonskid shoes/slippers when out of bed   patient and family education   safety round/check completed  Intervention: Prevent Skin Injury  Recent Flowsheet Documentation  Taken 10/14/2024 1927 by Mitchell Cao, RN  Body Position: position changed independently  Skin Protection: adhesive use limited  Device Skin Pressure Protection:   absorbent pad utilized/changed   adhesive use limited   tubing/devices free from skin contact  Intervention: Prevent and Manage VTE (Venous Thromboembolism) Risk  Recent Flowsheet Documentation  Taken 10/14/2024 1927 by Mitchell Cao, RN  VTE Prevention/Management: (Lovenox) other (see comments)  Goal: Optimal Comfort and Wellbeing  Outcome: " Progressing  Goal: Readiness for Transition of Care  Outcome: Progressing

## 2024-10-15 NOTE — PLAN OF CARE
"Shift: 1900 - 0730  Admitted Dx: Acute respiratory failure with hypoxia with SOB    Neuro/Orientation: WDL  Lungs: Infrequent cough. NC @ 1.5L overnight while sleeping.   Cardiac: WDL  : Purewick at night.   Skin: LE edema, +1. WOC following for redness of abd folds and mepelex thigh wound.   IV: R PIV, SL.  Activity: SBA with walker.  Diet: Regular  Pain: Denies pain. PRN ibuprofen.   Consult: Wound care  Plan: Sleep study outpt. Discharge home if pt tolerates weaning off O2 to RA.      Goal Outcome Evaluation:      Plan of Care Reviewed With: patient    Overall Patient Progress: improvingOverall Patient Progress: improving    Outcome Evaluation: Pt AOx4. Denies pain/SOB/n/v. O2 through NC @ 1.5L 93%. Outpt sleep study. Pt ready to d/c 10/15.      Problem: Adult Inpatient Plan of Care  Goal: Plan of Care Review  Description: The Plan of Care Review/Shift note should be completed every shift.  The Outcome Evaluation is a brief statement about your assessment that the patient is improving, declining, or no change.  This information will be displayed automatically on your shift  note.  10/15/2024 0404 by Jazlyn Arroyo RN  Outcome: Progressing  Flowsheets (Taken 10/15/2024 0403)  Outcome Evaluation: Pt AOx4. Denies pain/SOB/n/v. O2 through NC @ 1.5L 93%. Outpt sleep study. Pt ready to d/c 10/15.  Plan of Care Reviewed With: patient  Overall Patient Progress: improving  10/15/2024 0403 by Jazlyn Arroyo RN  Outcome: Progressing  Flowsheets (Taken 10/15/2024 0403)  Outcome Evaluation: Pt AOx4. Denies pain/SOB/n/v. O2 through NC @ 1.5L 93%. Outpt sleep study. Pt ready to d/c 10/15.  Plan of Care Reviewed With: patient  Overall Patient Progress: improving  Goal: Patient-Specific Goal (Individualized)  Description: You can add care plan individualizations to a care plan. Examples of Individualization might be:  \"Parent requests to be called daily at 9am for status\", \"I have a hard time hearing out of my right ear\", or \"Do " "not touch me to wake me up as it startles  me\".  10/15/2024 0404 by Jazlyn Arroyo RN  Outcome: Progressing  10/15/2024 0403 by Jazlyn Arroyo RN  Outcome: Progressing  Goal: Absence of Hospital-Acquired Illness or Injury  10/15/2024 0404 by Jazlyn Arroyo RN  Outcome: Progressing  10/15/2024 0403 by Jazlyn Arroyo RN  Outcome: Progressing  Intervention: Identify and Manage Fall Risk  Recent Flowsheet Documentation  Taken 10/14/2024 2330 by Jazlyn Arroyo RN  Safety Promotion/Fall Prevention:   clutter free environment maintained   increased rounding and observation   increase visualization of patient   lighting adjusted   mobility aid in reach   nonskid shoes/slippers when out of bed   room door open   room near nurse's station   room organization consistent   safety round/check completed   supervised activity  Intervention: Prevent Skin Injury  Recent Flowsheet Documentation  Taken 10/14/2024 2330 by Jazlyn Arroyo RN  Body Position: position changed independently  Goal: Optimal Comfort and Wellbeing  10/15/2024 0404 by Jazlyn Arroyo RN  Outcome: Progressing  10/15/2024 0403 by Jazlyn Arroyo RN  Outcome: Progressing  Goal: Readiness for Transition of Care  10/15/2024 0404 by Jazlyn Arroyo RN  Outcome: Progressing  10/15/2024 0403 by Jazlyn Arroyo RN  Outcome: Progressing     Problem: Comorbidity Management  Goal: Maintenance of Asthma Control  10/15/2024 0404 by Jazlyn Aroryo RN  Outcome: Progressing  10/15/2024 0403 by Jazlyn Arroyo RN  Outcome: Progressing  Goal: Maintenance of Seizure Control  10/15/2024 0404 by Jazlyn Arroyo RN  Outcome: Progressing  10/15/2024 0403 by Jazlyn Arroyo RN  Outcome: Progressing     Problem: Pain Acute  Goal: Optimal Pain Control and Function  10/15/2024 0404 by Jazlyn Arroyo RN  Outcome: Progressing  10/15/2024 0403 by Jazlyn Arroyo RN  Outcome: Progressing     Problem: Wound  Goal: Optimal Coping  10/15/2024 0404 by Jazlyn Arroyo RN  Outcome: Progressing  10/15/2024 0403 by Cruz" PAULA Hobson  Outcome: Progressing  Goal: Optimal Functional Ability  10/15/2024 0404 by Jazlyn Arroyo RN  Outcome: Progressing  10/15/2024 0403 by Jazlyn Arroyo RN  Outcome: Progressing  Goal: Absence of Infection Signs and Symptoms  10/15/2024 0404 by Jazlyn Arroyo RN  Outcome: Progressing  10/15/2024 0403 by Jazlyn Arroyo RN  Outcome: Progressing  Goal: Improved Oral Intake  10/15/2024 0404 by Jazlyn Arroyo RN  Outcome: Progressing  10/15/2024 0403 by Jazlyn Arroyo RN  Outcome: Progressing  Goal: Optimal Pain Control and Function  10/15/2024 0404 by Jazlyn Arroyo RN  Outcome: Progressing  10/15/2024 0403 by aJzlyn Arroyo RN  Outcome: Progressing  Goal: Skin Health and Integrity  10/15/2024 0404 by Jazlyn Arroyo RN  Outcome: Progressing  10/15/2024 0403 by Jazlyn Arroyo RN  Outcome: Progressing  Intervention: Optimize Skin Protection  Recent Flowsheet Documentation  Taken 10/14/2024 2330 by Jazlyn Arroyo RN  Head of Bed (HOB) Positioning: HOB at 20-30 degrees  Goal: Optimal Wound Healing  10/15/2024 0404 by Jazlyn Arroyo RN  Outcome: Progressing  10/15/2024 0403 by Jazlyn Arroyo RN  Outcome: Progressing     Problem: Skin Injury Risk Increased  Goal: Skin Health and Integrity  10/15/2024 0404 by Jazlyn Arroyo RN  Outcome: Progressing  10/15/2024 0403 by Jazlyn Arroyo RN  Outcome: Progressing  Intervention: Plan: Nurse Driven Intervention: Moisture Management  Recent Flowsheet Documentation  Taken 10/14/2024 2330 by Jazlyn Arroyo RN  Moisture Interventions: Urinary collection device  Intervention: Optimize Skin Protection  Recent Flowsheet Documentation  Taken 10/14/2024 2330 by Jazlyn Arroyo RN  Head of Bed (HOB) Positioning: HOB at 20-30 degrees

## 2024-10-15 NOTE — PLAN OF CARE
INPATIENT NOTE: CARES PROVIDED FROM [id number]  Diagnosis:  Hypoxia/SOB    Temp: 98  F (36.7  C) Temp src: Oral BP: (!) 127/96 Pulse: 72   Resp: 20 SpO2: 93 % O2 Device: None (Room air) Oxygen Delivery: 1.5 LPM    Labs: Low Molecular Weight Heparin: 0.26    Pt is A&Ox4, hypertensive on RA. Encouraging I.S and deep breathing. Reporting some knee pain, Ibuprofen given and effective. Encouraging ambulation to bathroom for elimination, voiding without difficulties, no BM yet. SBA with walker, walked in room/halls. WOC order completed. Able to make needs known.     Lines: PIV is SL    Plan: Continue PO Steroid, monitor O2 and overnight Oximetry, Anticipate discharge home with HC services 10/16.     Plan of Care Reviewed With: patient  Overall Patient Progress: improving  Outcome Evaluation: A&Ox4, been on RA. Outpatient sleep study. Ambulating. Potential discharge with HC services.    Problem: Adult Inpatient Plan of Care  Goal: Plan of Care Review  Outcome: Progressing  Flowsheets (Taken 10/15/2024 1126)  Outcome Evaluation: A&Ox4, been on RA. Outpatient sleep study. Ambulating. Potential discharge with HC services.  Plan of Care Reviewed With: patient  Overall Patient Progress: improving  Goal: Patient-Specific Goal (Individualized)  Outcome: Progressing  Goal: Absence of Hospital-Acquired Illness or Injury  Outcome: Progressing  Intervention: Identify and Manage Fall Risk  Recent Flowsheet Documentation  Taken 10/15/2024 0837 by Pam Brooks RN  Safety Promotion/Fall Prevention:   activity supervised   assistive device/personal items within reach   clutter free environment maintained   nonskid shoes/slippers when out of bed   patient and family education   room near nurse's station   room organization consistent   safety round/check completed  Intervention: Prevent Skin Injury  Recent Flowsheet Documentation  Taken 10/15/2024 0837 by Pam Brooks RN  Body Position: position changed  independently  Intervention: Prevent and Manage VTE (Venous Thromboembolism) Risk  Recent Flowsheet Documentation  Taken 10/15/2024 0837 by Pam Brooks RN  VTE Prevention/Management: (Lovenox) other (see comments)  Goal: Optimal Comfort and Wellbeing  Outcome: Progressing  Goal: Readiness for Transition of Care  Outcome: Progressing     Problem: Wound  Goal: Optimal Coping  Outcome: Progressing  Goal: Optimal Functional Ability  Outcome: Progressing  Intervention: Optimize Functional Ability  Recent Flowsheet Documentation  Taken 10/15/2024 0837 by Pam Brooks RN  Assistive Device Utilized:   gait belt   walker  Activity Management:   activity adjusted per tolerance   activity encouraged  Activity Assistance Provided: assistance, 1 person  Goal: Absence of Infection Signs and Symptoms  Outcome: Progressing  Goal: Improved Oral Intake  Outcome: Progressing  Goal: Optimal Pain Control and Function  Outcome: Progressing  Goal: Skin Health and Integrity  Outcome: Progressing  Intervention: Optimize Skin Protection  Recent Flowsheet Documentation  Taken 10/15/2024 0837 by Pam Brooks RN  Activity Management:   activity adjusted per tolerance   activity encouraged  Head of Bed (HOB) Positioning: HOB at 20-30 degrees  Goal: Optimal Wound Healing  Outcome: Progressing

## 2024-10-15 NOTE — PLAN OF CARE
INPATIENT NOTE: CARES PROVIDED FROM [id number]    Temp: 98.1  F (36.7  C) Temp src: Oral BP: (!) 148/69 Pulse: 81   Resp: 20 SpO2: 92 % O2 Device: None (Room air) Oxygen Delivery: 1 LPM (titrated up due to sats down to 88% with deep breathing and coughing)    Pt A&Ox4, VSS weaned to RA, above 90% at rest. Denies pain/CP/SOB. Ambulate in room with slight LAZAR O2 at 91%. Tolerating diet/urinating, No BM yet. WOC cares completed. SBA in room with walker. Able to make needs known.    Lines: PIV is SL, new IV placed.     Plan: Monitor O2, transition to PO Steroid tomorrow. Anticipate discharge home tomorrow 10/15.       Plan of Care Reviewed With: patient  Overall Patient Progress: improving  Outcome Evaluation: A&OX4, on RA now, WOC completed    Problem: Adult Inpatient Plan of Care  Goal: Plan of Care Review  Outcome: Progressing  Flowsheets (Taken 10/14/2024 1901)  Outcome Evaluation: A&OX4, on RA now, WOC completed  Plan of Care Reviewed With: patient  Overall Patient Progress: improving  Goal: Patient-Specific Goal (Individualized)  Outcome: Progressing  Goal: Absence of Hospital-Acquired Illness or Injury  Outcome: Progressing  Intervention: Identify and Manage Fall Risk  Recent Flowsheet Documentation  Taken 10/14/2024 0815 by Pam Brooks RN  Safety Promotion/Fall Prevention:   activity supervised   assistive device/personal items within reach   clutter free environment maintained   nonskid shoes/slippers when out of bed   patient and family education   room near nurse's station   room organization consistent   safety round/check completed  Intervention: Prevent and Manage VTE (Venous Thromboembolism) Risk  Recent Flowsheet Documentation  Taken 10/14/2024 0815 by Pam Brooks RN  VTE Prevention/Management: SCDs off (sequential compression devices)  Intervention: Prevent Infection  Recent Flowsheet Documentation  Taken 10/14/2024 0815 by Pam Brooks  RN  Infection Prevention:   cohorting utilized   hand hygiene promoted   personal protective equipment utilized   rest/sleep promoted   single patient room provided  Goal: Optimal Comfort and Wellbeing  Outcome: Progressing  Goal: Readiness for Transition of Care  Outcome: Progressing

## 2024-10-16 LAB
ALLEN'S TEST: YES
BASE EXCESS BLDA CALC-SCNC: 2.1 MMOL/L (ref -3–3)
COHGB MFR BLD: 91.4 % (ref 96–97)
HCO3 BLD-SCNC: 28 MMOL/L (ref 21–28)
O2/TOTAL GAS SETTING VFR VENT: 1 %
PCO2 BLD: 46 MM HG (ref 35–45)
PH BLD: 7.39 [PH] (ref 7.35–7.45)
PLATELET # BLD AUTO: 183 10E3/UL (ref 150–450)
PO2 BLD: 62 MM HG (ref 80–105)
SAO2 % BLDA: 90 % (ref 92–100)

## 2024-10-16 PROCEDURE — 999N000157 HC STATISTIC RCP TIME EA 10 MIN

## 2024-10-16 PROCEDURE — 250N000009 HC RX 250: Performed by: INTERNAL MEDICINE

## 2024-10-16 PROCEDURE — 250N000012 HC RX MED GY IP 250 OP 636 PS 637: Performed by: INTERNAL MEDICINE

## 2024-10-16 PROCEDURE — G0463 HOSPITAL OUTPT CLINIC VISIT: HCPCS

## 2024-10-16 PROCEDURE — 85049 AUTOMATED PLATELET COUNT: CPT | Performed by: HOSPITALIST

## 2024-10-16 PROCEDURE — 250N000009 HC RX 250: Performed by: HOSPITALIST

## 2024-10-16 PROCEDURE — 250N000013 HC RX MED GY IP 250 OP 250 PS 637: Performed by: INTERNAL MEDICINE

## 2024-10-16 PROCEDURE — 36600 WITHDRAWAL OF ARTERIAL BLOOD: CPT

## 2024-10-16 PROCEDURE — 250N000011 HC RX IP 250 OP 636: Performed by: HOSPITALIST

## 2024-10-16 PROCEDURE — 82805 BLOOD GASES W/O2 SATURATION: CPT | Performed by: INTERNAL MEDICINE

## 2024-10-16 PROCEDURE — 120N000001 HC R&B MED SURG/OB

## 2024-10-16 PROCEDURE — 99232 SBSQ HOSP IP/OBS MODERATE 35: CPT | Performed by: INTERNAL MEDICINE

## 2024-10-16 PROCEDURE — 36415 COLL VENOUS BLD VENIPUNCTURE: CPT | Performed by: HOSPITALIST

## 2024-10-16 PROCEDURE — 94640 AIRWAY INHALATION TREATMENT: CPT | Mod: 76

## 2024-10-16 PROCEDURE — 94762 N-INVAS EAR/PLS OXIMTRY CONT: CPT

## 2024-10-16 RX ADMIN — ENOXAPARIN SODIUM 60 MG: 60 INJECTION SUBCUTANEOUS at 17:35

## 2024-10-16 RX ADMIN — LISINOPRIL 20 MG: 20 TABLET ORAL at 08:24

## 2024-10-16 RX ADMIN — ENOXAPARIN SODIUM 60 MG: 60 INJECTION SUBCUTANEOUS at 06:24

## 2024-10-16 RX ADMIN — CARBAMAZEPINE 400 MG: 200 TABLET ORAL at 08:54

## 2024-10-16 RX ADMIN — FLUTICASONE FUROATE AND VILANTEROL TRIFENATATE 1 PUFF: 100; 25 POWDER RESPIRATORY (INHALATION) at 07:29

## 2024-10-16 RX ADMIN — FLUTICASONE PROPIONATE 1 SPRAY: 50 SPRAY, METERED NASAL at 08:24

## 2024-10-16 RX ADMIN — IPRATROPIUM BROMIDE AND ALBUTEROL SULFATE 3 ML: .5; 3 SOLUTION RESPIRATORY (INHALATION) at 19:36

## 2024-10-16 RX ADMIN — CARBAMAZEPINE 400 MG: 200 TABLET ORAL at 19:48

## 2024-10-16 RX ADMIN — CARBAMAZEPINE 200 MG: 200 TABLET ORAL at 11:56

## 2024-10-16 RX ADMIN — PREDNISONE 40 MG: 20 TABLET ORAL at 08:24

## 2024-10-16 RX ADMIN — IPRATROPIUM BROMIDE AND ALBUTEROL SULFATE 3 ML: .5; 3 SOLUTION RESPIRATORY (INHALATION) at 11:23

## 2024-10-16 RX ADMIN — SERTRALINE 100 MG: 100 TABLET, FILM COATED ORAL at 08:24

## 2024-10-16 RX ADMIN — IPRATROPIUM BROMIDE AND ALBUTEROL SULFATE 3 ML: .5; 3 SOLUTION RESPIRATORY (INHALATION) at 15:19

## 2024-10-16 RX ADMIN — IPRATROPIUM BROMIDE AND ALBUTEROL SULFATE 3 ML: .5; 3 SOLUTION RESPIRATORY (INHALATION) at 07:29

## 2024-10-16 ASSESSMENT — ACTIVITIES OF DAILY LIVING (ADL)
ADLS_ACUITY_SCORE: 36
ADLS_ACUITY_SCORE: 35
ADLS_ACUITY_SCORE: 36
ADLS_ACUITY_SCORE: 35
ADLS_ACUITY_SCORE: 36
ADLS_ACUITY_SCORE: 36
ADLS_ACUITY_SCORE: 35
ADLS_ACUITY_SCORE: 35
ADLS_ACUITY_SCORE: 36
ADLS_ACUITY_SCORE: 35
ADLS_ACUITY_SCORE: 36
ADLS_ACUITY_SCORE: 35
ADLS_ACUITY_SCORE: 36
ADLS_ACUITY_SCORE: 34

## 2024-10-16 NOTE — DISCHARGE INSTRUCTIONS
Your home care referral was sent to [facility] for RN HHA PT OT  If you haven't heard from them within the next 24-48 hours,  Please call them at [phone]

## 2024-10-16 NOTE — PLAN OF CARE
"Goal Outcome Evaluation:      Plan of Care Reviewed With: patient    Overall Patient Progress: improvingOverall Patient Progress: improving    Outcome Evaluation: Pt A&Ox4, forgetful. Denies pain/n/v/d/numbness/tingling/tenderness/sob/dizziness. WDL heart/breath sounds diminished. Saline locked, clean/dry/intact/flushed. SBA walker/gb. Purewick removed, using at bedtime. McLean regular diet, tolerating well. 2 LPM applied by RT. Miplex applied to back of thigh. No O2 order needed. ABG to be collected at 0400 by RT to see if need BiPAP. PT/OT consulted. WOC consulted. Bed alarm on. Possible discharge tomorrow with home care.    BP (!) 147/74 (BP Location: Right arm)   Pulse 84   Temp 98.1  F (36.7  C) (Oral)   Resp 18   Ht 1.702 m (5' 7\")   Wt (!) 186.3 kg (410 lb 11.5 oz)   LMP 08/18/2008   SpO2 91%   BMI 64.33 kg/m      Problem: Adult Inpatient Plan of Care  Goal: Plan of Care Review  Description: The Plan of Care Review/Shift note should be completed every shift.  The Outcome Evaluation is a brief statement about your assessment that the patient is improving, declining, or no change.  This information will be displayed automatically on your shift  note.  Outcome: Progressing  Flowsheets (Taken 10/16/2024 1438)  Outcome Evaluation: Pt A&Ox4, forgetful. Denies pain/n/v/d/numbness/tingling/tenderness/sob/dizziness. WDL heart/breath sounds diminished. Saline locked, clean/dry/intact/flushed. SBA walker/gb. Purewick removed, using at bedtime. McLean regular diet, tolerating well. 2 LPM applied by RT. Miplex applied to back of thigh. No O2 order needed. ABG to be collected at 0400 by RT to see if need BiPAP. PT/OT consulted. WOC consulted. Bed alarm on. Possible discharge tomorrow with home care.  Plan of Care Reviewed With: patient  Overall Patient Progress: improving  Goal: Patient-Specific Goal (Individualized)  Description: You can add care plan individualizations to a care plan. Examples of Individualization " "might be:  \"Parent requests to be called daily at 9am for status\", \"I have a hard time hearing out of my right ear\", or \"Do not touch me to wake me up as it startles  me\".  Outcome: Progressing  Goal: Absence of Hospital-Acquired Illness or Injury  Outcome: Progressing  Intervention: Identify and Manage Fall Risk  Recent Flowsheet Documentation  Taken 10/16/2024 0824 by Rosalina Bone, RN  Safety Promotion/Fall Prevention:   supervised activity   safety round/check completed   room organization consistent   room near nurse's station   patient and family education   nonskid shoes/slippers when out of bed   mobility aid in reach   clutter free environment maintained   assistive device/personal items within reach   activity supervised  Intervention: Prevent Skin Injury  Recent Flowsheet Documentation  Taken 10/16/2024 0824 by Rosalina Bone, RN  Device Skin Pressure Protection:   adhesive use limited   absorbent pad utilized/changed  Intervention: Prevent Infection  Recent Flowsheet Documentation  Taken 10/16/2024 0824 by Rosalina Bone, RN  Infection Prevention:   single patient room provided   rest/sleep promoted   hand hygiene promoted   equipment surfaces disinfected   environmental surveillance performed  Goal: Optimal Comfort and Wellbeing  Outcome: Progressing  Goal: Readiness for Transition of Care  Outcome: Progressing     Problem: Comorbidity Management  Goal: Maintenance of Asthma Control  Outcome: Progressing  Intervention: Maintain Asthma Symptom Control  Recent Flowsheet Documentation  Taken 10/16/2024 0824 by Rosalina Bone, RN  Medication Review/Management: medications reviewed  Goal: Maintenance of Seizure Control  Outcome: Progressing  Intervention: Maintain Seizure Symptom Control  Recent Flowsheet Documentation  Taken 10/16/2024 0824 by Rosalina Bone, RN  Seizure Precautions:   activity supervised   clutter-free environment maintained  Medication Review/Management: " medications reviewed     Problem: Pain Acute  Goal: Optimal Pain Control and Function  Outcome: Progressing  Intervention: Prevent or Manage Pain  Recent Flowsheet Documentation  Taken 10/16/2024 0824 by Rosalina Bone RN  Bowel Elimination Promotion:   adequate fluid intake promoted   ambulation promoted  Medication Review/Management: medications reviewed     Problem: Wound  Goal: Optimal Coping  Outcome: Progressing  Goal: Optimal Functional Ability  Outcome: Progressing  Intervention: Optimize Functional Ability  Recent Flowsheet Documentation  Taken 10/16/2024 0824 by Rosalina Bone RN  Assistive Device Utilized:   walker   gait belt  Activity Management:   activity adjusted per tolerance   activity encouraged  Activity Assistance Provided: assistance, 1 person  Goal: Absence of Infection Signs and Symptoms  Outcome: Progressing  Goal: Improved Oral Intake  Outcome: Progressing  Goal: Optimal Pain Control and Function  Outcome: Progressing  Goal: Skin Health and Integrity  Outcome: Progressing  Intervention: Optimize Skin Protection  Recent Flowsheet Documentation  Taken 10/16/2024 0824 by Rosalina Bone RN  Activity Management:   activity adjusted per tolerance   activity encouraged  Goal: Optimal Wound Healing  Outcome: Progressing     Problem: Skin Injury Risk Increased  Goal: Skin Health and Integrity  Outcome: Progressing  Intervention: Plan: Nurse Driven Intervention: Moisture Management  Recent Flowsheet Documentation  Taken 10/16/2024 0824 by Rosalina Bone RN  Moisture Interventions:   Encourage regular toileting   No brief in bed   Incontinence pad  Bathing/Skin Care: incontinence care  Taken 10/16/2024 0800 by Rosalina Bone RN  Moisture Interventions:   Encourage regular toileting   No brief in bed   Incontinence pad  Bathing/Skin Care: incontinence care  Intervention: Plan: Nurse Driven Intervention: Friction and Shear  Recent Flowsheet Documentation  Taken 10/16/2024  0824 by Rosalina Bone, RN  Friction/Shear Interventions: HOB 30 degrees or less  Intervention: Optimize Skin Protection  Recent Flowsheet Documentation  Taken 10/16/2024 0824 by Rosalina Bone, RN  Activity Management:   activity adjusted per tolerance   activity encouraged
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
## 2024-10-16 NOTE — PROGRESS NOTES
[facility]    Hospitalist Progress Note      Assessment & Plan   Brissa Corado is a 59 year old woman who was admitted on 10/9/2024. PMH significant for   asthma, hypertension, developmental delay, HLD, HTN, prior seizures, RICK, GERD, MDD, obesity who presented to the ED 10/9/2024 with shortness of breath.  Admitted for asthma exacerbation.      Problem List:   Acute hypoxic respiratory failure  Likely asthma exacerbation, potential component of COPD based on imaging  URI-like symptoms  Obesity-hypoventilation likely contributing    - presented with several days of URI-like symptoms    - CTPE did not show PE    -Patient has weaned to room air on 10/14.  -She has completed steroid course.  -Stopping schedule DuoNebs and albuterol 10/17 in order to complete PFTs for home BiPAP qualification.  Will resume meds thereafter.  Will need to call respiratory 10/17 morning to request ability to go down for PFTs and lab.  -ABG 10/16 qualifies patient for home BiPAP testing.  Planning to repeat ABG for a.m. 10/17 to determine if PaCO2 increases by 7.     Untreated RICK  Likely OHS    - had a recent hospitalization for acute cholecystitis with cholecystectomy    - had some postoperative hypoxic respiratory failure    - advised to complete outpatient pulmonology visit for PFTs as well as a sleep evaluation, does not appear these have been completed yet    - has sleep study scheduled for Jan.  Previous diagnosis of RICK but stopped using her cpap  - Patient required oxygen overnight.  Planning formal respiratory therapy evaluation/certification for consideration of home O2/RICK evaluation pending outpatient sleep study in January 2025.    - -Stopping schedule DuoNebs and albuterol 10/17 in order to complete PFTs for home BiPAP qualification.  Will resume meds thereafter.  Will need to call respiratory 10/17 morning to request ability to go down for PFTs and lab.  -ABG 10/16 qualifies patient for home BiPAP  testing.  Planning to repeat ABG for a.m. 10/17 to determine if PaCO2 increases by 7.    Hypertension    - cont lisinopril     Hyperlipidemia    - resume statin on discharge      GERD    - not on meds    Obesity: BMI 62.  Noted.     Hx seizure disorder    - no seizure activity since teenage years    - continue PTA carbamazepine     Developmental delay    - parents are listed as her guardians    - has a longtime significant other    - next of kin is technically her parents     - both at home with COVID-19 at this time.     Wounds    - wound RN consult    - see photos in chart.     Enlarged axillary lymph nodes    - noted on CT scan    - outpatient follow-up with repeat CT in 1 year (discussed with parents/guardians on 10/12)    DVT Prophylaxis: Enoxaparin (Lovenox) SQ  Code Status: Full Code    Medically Ready for Discharge: Anticipated Tomorrow      Expected discharge: Anticipate discharge tomorrow    Shahida Kahn MD FACP  Hospitalist Service  [facility]      Interval History   Oximetry study done. Stopping schedule DuoNebs and albuterol 10/17 in order to complete PFTs for home BiPAP qualification.  Will resume meds thereafter.  Will need to call respiratory 10/17 morning to request ability to go down for PFTs and lab. ABG 10/16 qualifies patient for home BiPAP testing.  Planning to repeat ABG for a.m. 10/17 to determine if PaCO2 increases by 7.    -Data reviewed today: I reviewed all new labs and imaging results over the last 24 hours.      Physical Exam   Temp: 97.5  F (36.4  C) Temp src: Oral BP: 123/67 Pulse: 86   Resp: 17 SpO2: 93 % O2 Device: Nasal cannula Oxygen Delivery: 2 LPM  Vitals:    10/10/24 0858   Weight: (!) 186.3 kg (410 lb 11.5 oz)     Vital Signs with Ranges  Temp:  [97.5  F (36.4  C)-98.1  F (36.7  C)] 97.5  F (36.4  C)  Pulse:  [69-86] 86  Resp:  [17-18] 17  BP: (123-151)/(67-74) 123/67  SpO2:  [86 %-93 %] 93 %  I/O last 3 completed shifts:  In: 600 [P.O.:600]  Out: 600  [Urine:600]    Constitutional: Morbidly obese. Resting in bed. Alert and oriented x3.  On 1L O2 at time of exam.  No increased work of breathing.    HEENT: NCAT. EOMI. Moist oral mucosa.  Respiratory: Clear to auscultation bilaterally. No crackles or wheezes. Good inspiratory effort. On room air.  Cardiovascular: Regular rate and rhythm. No murmur.  Neurologic: Alert and oriented x3. No focal neurologic deficits. Did not assess gait.      Medications   Current Facility-Administered Medications   Medication Dose Route Frequency Provider Last Rate Last Admin     Current Facility-Administered Medications   Medication Dose Route Frequency Provider Last Rate Last Admin    carBAMazepine (TEGretol) tablet 200 mg  200 mg Oral Daily Shiraz Roman MD   200 mg at 10/16/24 1156    carBAMazepine (TEGretol) tablet 400 mg  400 mg Oral BID Shiraz Roman MD   400 mg at 10/16/24 0854    enoxaparin ANTICOAGULANT (LOVENOX) injection 60 mg  60 mg Subcutaneous Q12H Colt Egan MD   60 mg at 10/16/24 0624    fluticasone (FLONASE) 50 MCG/ACT spray 1 spray  1 spray Both Nostrils Daily Shiraz Roman MD   1 spray at 10/16/24 0824    fluticasone-vilanterol (BREO ELLIPTA) 100-25 MCG/ACT inhaler 1 puff  1 puff Inhalation Daily Shiraz Roman MD   1 puff at 10/16/24 0729    ipratropium - albuterol 0.5 mg/2.5 mg/3 mL (DUONEB) neb solution 3 mL  3 mL Nebulization 4x daily Colt Egan MD   3 mL at 10/16/24 1519    lisinopril (ZESTRIL) tablet 20 mg  20 mg Oral Daily Shiraz Roman MD   20 mg at 10/16/24 0824    predniSONE (DELTASONE) tablet 40 mg  40 mg Oral Daily Shahida Cervantes MD   40 mg at 10/16/24 0824    sertraline (ZOLOFT) tablet 100 mg  100 mg Oral Daily Shiraz Roman MD   100 mg at 10/16/24 0824    sodium chloride (PF) 0.9% PF flush 3 mL  3 mL Intracatheter Q8H Mckinley Rosenbaum MD   3 mL at 10/16/24 1347       Data   Recent Labs    Lab 10/16/24  0639 10/12/24  1348 10/10/24  0653 10/10/24  0112   WBC  --  10.9 7.2 8.1   HGB  --  12.6 12.0 11.3*   MCV  --  91 93 94    215 169 176   NA  --  140 139 142   POTASSIUM  --  4.2  --  4.3   CHLORIDE  --  101 104 102   CO2  --  29 26 27   BUN  --  18.0 11.6 12.9   CR  --  0.65 0.61 0.71   ANIONGAP  --  10 9 13   CESAR  --  9.2 8.3* 8.4*   GLC  --  139* 178* 147*       No results found for this or any previous visit (from the past 24 hour(s)).

## 2024-10-16 NOTE — PROGRESS NOTES
Respiratory Care Note:    Overnight Oximetry Study Complete. Results in Epic.    Sincere Hauser, RT on 10/16/2024 at 5:42 AM

## 2024-10-16 NOTE — PROGRESS NOTES
Jose Alejandro a right radial ABG to determine pt's baseline CO2 to qualify for a home BIPAP machine. Pt was on 1L NC.  No complications were noted.    Lisa Holt, RT on 10/16/2024 at 11:38 AM

## 2024-10-16 NOTE — PROGRESS NOTES
[facility]    Hospitalist Progress Note      Assessment & Plan   Brissa Corado is a 59 year old woman who was admitted on 10/9/2024. PMH significant for   asthma, hypertension, developmental delay, HLD, HTN, prior seizures, RICK, GERD, MDD, obesity who presented to the ED 10/9/2024 with shortness of breath.  Admitted for asthma exacerbation      Problem List:   Acute hypoxic respiratory failure  Likely asthma exacerbation, potential component of COPD based on imaging  URI-like symptoms  Obesity-hypoventilation likely contributing    - presented with several days of URI-like symptoms    - CTPE did not show PE    -Patient has weaned to room air on 10/14.  Encouraging ambulation and monitoring response.  Have changed to prednisone 40 mg daily. Anticipate discharge home tomorrow.    -Monitor oxygen saturations on room air.      - schedule duonebs and PRN albuterol     Hypertension    - cont lisinopril     Hyperlipidemia    - resume statin on discharge      GERD    - not on meds     Untreated RICK  Likely OHS    - had a recent hospitalization for acute cholecystitis with cholecystectomy    - had some postoperative hypoxic respiratory failure    - advised to complete outpatient pulmonology visit for PFTs as well as a sleep evaluation, does not appear these have been completed yet    - has sleep study scheduled for Jan.  Previous diagnosis of RICK but stopped using her cpap  - Patient required oxygen overnight.  Planning formal respiratory therapy evaluation/certification for consideration of home O2/RICK evaluation pending outpatient sleep study in January 2025.  Anticipate that patient can discharge home tomorrow with respiratory treatment if needed.    Obesity: BMI 62.  Noted.     Hx seizure disorder    - no seizure activity since teenage years    - continue PTA carbamazepine     Developmental delay    - parents are listed as her guardians    - has a longtime significant other    - next of kin is  technically her parents     - both at home with COVID-19 at this time.     Wounds    - wound RN consult    - see photos in chart.     Enlarged axillary lymph nodes    - noted on CT scan    - outpatient follow-up with repeat CT in 1 year (discussed with parents/guardians on 10/12)    DVT Prophylaxis: Enoxaparin (Lovenox) SQ  Code Status: Full Code    Medically Ready for Discharge: Anticipated Tomorrow      Expected discharge: Anticipate discharge tomorrow    Shahida Kahn MD FACP  Hospitalist Service  [facility]      Interval History   Patient required oxygen overnight.  Planning formal respiratory therapy evaluation/certification for consideration of home O2/RICK evaluation pending outpatient sleep study in January 2025.  Anticipate that patient can discharge home tomorrow with respiratory treatment if needed.    -Data reviewed today: I reviewed all new labs and imaging results over the last 24 hours.      Physical Exam   Temp: 98  F (36.7  C) Temp src: Oral BP: (!) 127/96 Pulse: 72   Resp: 20 SpO2: 91 % O2 Device: None (Room air) Oxygen Delivery: 1.5 LPM  Vitals:    10/10/24 0858   Weight: (!) 186.3 kg (410 lb 11.5 oz)     Vital Signs with Ranges  Temp:  [97.7  F (36.5  C)-98  F (36.7  C)] 98  F (36.7  C)  Pulse:  [67-77] 72  Resp:  [17-20] 20  BP: (127-141)/(67-96) 127/96  SpO2:  [91 %-94 %] 91 %  I/O last 3 completed shifts:  In: 233 [P.O.:230; I.V.:3]  Out: -     Constitutional: Morbidly obese. Resting in bed. Alert and oriented x3.  On room air at time of exam.  No increased work of breathing.    HEENT: NCAT. EOMI. Moist oral mucosa.  Respiratory: Clear to auscultation bilaterally. No crackles or wheezes. Good inspiratory effort. On room air.  Cardiovascular: Regular rate and rhythm. No murmur.  Neurologic: Alert and oriented x3. No focal neurologic deficits. Did not assess gait.      Medications   Current Facility-Administered Medications   Medication Dose Route Frequency Provider Last Rate  Last Admin     Current Facility-Administered Medications   Medication Dose Route Frequency Provider Last Rate Last Admin    carBAMazepine (TEGretol) tablet 200 mg  200 mg Oral Daily Shiraz Roman MD   200 mg at 10/15/24 1148    carBAMazepine (TEGretol) tablet 400 mg  400 mg Oral BID Shiraz Roman MD   400 mg at 10/15/24 0837    enoxaparin ANTICOAGULANT (LOVENOX) injection 60 mg  60 mg Subcutaneous Q12H Colt Egan MD   60 mg at 10/15/24 1757    fluticasone (FLONASE) 50 MCG/ACT spray 1 spray  1 spray Both Nostrils Daily Shiraz Roman MD   1 spray at 10/15/24 0837    fluticasone-vilanterol (BREO ELLIPTA) 100-25 MCG/ACT inhaler 1 puff  1 puff Inhalation Daily Shiraz Roman MD   1 puff at 10/15/24 0749    ipratropium - albuterol 0.5 mg/2.5 mg/3 mL (DUONEB) neb solution 3 mL  3 mL Nebulization 4x daily Colt Egan MD   3 mL at 10/15/24 1918    lisinopril (ZESTRIL) tablet 20 mg  20 mg Oral Daily Shiraz Roman MD   20 mg at 10/15/24 0836    predniSONE (DELTASONE) tablet 40 mg  40 mg Oral Daily Shahida Cervantes MD   40 mg at 10/15/24 0836    sertraline (ZOLOFT) tablet 100 mg  100 mg Oral Daily Shiraz Roman MD   100 mg at 10/15/24 0836    sodium chloride (PF) 0.9% PF flush 3 mL  3 mL Intracatheter Q8H Mckinley Rosenbaum MD   3 mL at 10/15/24 0617       Data   Recent Labs   Lab 10/12/24  1348 10/10/24  0653 10/10/24  0112   WBC 10.9 7.2 8.1   HGB 12.6 12.0 11.3*   MCV 91 93 94    169 176    139 142   POTASSIUM 4.2  --  4.3   CHLORIDE 101 104 102   CO2 29 26 27   BUN 18.0 11.6 12.9   CR 0.65 0.61 0.71   ANIONGAP 10 9 13   CESAR 9.2 8.3* 8.4*   * 178* 147*       No results found for this or any previous visit (from the past 24 hour(s)).

## 2024-10-16 NOTE — PLAN OF CARE
PRIMARY DIAGNOSIS: ACUTE PAIN  OUTPATIENT/OBSERVATION GOALS TO BE MET BEFORE DISCHARGE:  1. Pain Status: Improved-controlled with oral pain medications.    2. Return to near baseline physical activity: No    3. Cleared for discharge by consultants (if involved): No    Discharge Planner Nurse   Safe discharge environment identified: No  Barriers to discharge: Yes       Entered by: Lashae Ladd RN 10/16/2024 12:49 AM    Pt alert &Oriented x 4 some forgetfulness VSS,RA pt denies pain, SOB  N/V  PIV saline locked,Assist of 1 with walker regular diet PO prednisone 40 mg daily continues pulse monitoring for RT, for home care arrangement, sleep study outpatient.       Please review provider order for any additional goals.   Nurse to notify provider when observation goals have been met and patient is ready for discharge.Goal Outcome Evaluation:

## 2024-10-17 VITALS
HEART RATE: 79 BPM | TEMPERATURE: 98 F | DIASTOLIC BLOOD PRESSURE: 60 MMHG | SYSTOLIC BLOOD PRESSURE: 145 MMHG | BODY MASS INDEX: 45.99 KG/M2 | RESPIRATION RATE: 16 BRPM | WEIGHT: 293 LBS | OXYGEN SATURATION: 95 % | HEIGHT: 67 IN

## 2024-10-17 DIAGNOSIS — R06.89 HYPOVENTILATION: Primary | ICD-10-CM

## 2024-10-17 LAB
ALLEN'S TEST: YES
ANION GAP SERPL CALCULATED.3IONS-SCNC: 12 MMOL/L (ref 7–15)
BASE EXCESS BLDA CALC-SCNC: 1.4 MMOL/L (ref -3–3)
BASOPHILS # BLD AUTO: 0.1 10E3/UL (ref 0–0.2)
BASOPHILS NFR BLD AUTO: 1 %
BUN SERPL-MCNC: 21.3 MG/DL (ref 6–20)
CALCIUM SERPL-MCNC: 8.3 MG/DL (ref 8.8–10.4)
CHLORIDE SERPL-SCNC: 103 MMOL/L (ref 98–107)
COHGB MFR BLD: 94.3 % (ref 96–97)
CREAT SERPL-MCNC: 0.69 MG/DL (ref 0.51–0.95)
EGFRCR SERPLBLD CKD-EPI 2021: >90 ML/MIN/1.73M2
EOSINOPHIL # BLD AUTO: 0.4 10E3/UL (ref 0–0.7)
EOSINOPHIL NFR BLD AUTO: 4 %
ERYTHROCYTE [DISTWIDTH] IN BLOOD BY AUTOMATED COUNT: 14.6 % (ref 10–15)
GLUCOSE SERPL-MCNC: 111 MG/DL (ref 70–99)
HCO3 BLD-SCNC: 28 MMOL/L (ref 21–28)
HCO3 SERPL-SCNC: 26 MMOL/L (ref 22–29)
HCT VFR BLD AUTO: 42.2 % (ref 35–47)
HGB BLD-MCNC: 12.9 G/DL (ref 11.7–15.7)
IMM GRANULOCYTES # BLD: 0.1 10E3/UL
IMM GRANULOCYTES NFR BLD: 1 %
LYMPHOCYTES # BLD AUTO: 2.5 10E3/UL (ref 0.8–5.3)
LYMPHOCYTES NFR BLD AUTO: 29 %
MCH RBC QN AUTO: 27.9 PG (ref 26.5–33)
MCHC RBC AUTO-ENTMCNC: 30.6 G/DL (ref 31.5–36.5)
MCV RBC AUTO: 91 FL (ref 78–100)
MONOCYTES # BLD AUTO: 0.5 10E3/UL (ref 0–1.3)
MONOCYTES NFR BLD AUTO: 6 %
NEUTROPHILS # BLD AUTO: 4.9 10E3/UL (ref 1.6–8.3)
NEUTROPHILS NFR BLD AUTO: 59 %
NRBC # BLD AUTO: 0 10E3/UL
NRBC BLD AUTO-RTO: 0 /100
O2/TOTAL GAS SETTING VFR VENT: 28 %
PCO2 BLD: 51 MM HG (ref 35–45)
PEEP: 0 CM H2O
PH BLD: 7.34 [PH] (ref 7.35–7.45)
PLATELET # BLD AUTO: 187 10E3/UL (ref 150–450)
PO2 BLD: 74 MM HG (ref 80–105)
POTASSIUM SERPL-SCNC: 3.5 MMOL/L (ref 3.4–5.3)
RBC # BLD AUTO: 4.62 10E6/UL (ref 3.8–5.2)
SAO2 % BLDA: 92 % (ref 92–100)
SODIUM SERPL-SCNC: 141 MMOL/L (ref 135–145)
WBC # BLD AUTO: 8.4 10E3/UL (ref 4–11)

## 2024-10-17 PROCEDURE — 36415 COLL VENOUS BLD VENIPUNCTURE: CPT | Performed by: INTERNAL MEDICINE

## 2024-10-17 PROCEDURE — 99239 HOSP IP/OBS DSCHRG MGMT >30: CPT | Performed by: INTERNAL MEDICINE

## 2024-10-17 PROCEDURE — 80048 BASIC METABOLIC PNL TOTAL CA: CPT | Performed by: INTERNAL MEDICINE

## 2024-10-17 PROCEDURE — 999N000157 HC STATISTIC RCP TIME EA 10 MIN

## 2024-10-17 PROCEDURE — 82805 BLOOD GASES W/O2 SATURATION: CPT | Performed by: INTERNAL MEDICINE

## 2024-10-17 PROCEDURE — 85025 COMPLETE CBC W/AUTO DIFF WBC: CPT | Performed by: INTERNAL MEDICINE

## 2024-10-17 PROCEDURE — 250N000013 HC RX MED GY IP 250 OP 250 PS 637: Performed by: INTERNAL MEDICINE

## 2024-10-17 PROCEDURE — 250N000011 HC RX IP 250 OP 636: Performed by: HOSPITALIST

## 2024-10-17 PROCEDURE — 36600 WITHDRAWAL OF ARTERIAL BLOOD: CPT

## 2024-10-17 RX ADMIN — ENOXAPARIN SODIUM 60 MG: 60 INJECTION SUBCUTANEOUS at 06:48

## 2024-10-17 RX ADMIN — CARBAMAZEPINE 200 MG: 200 TABLET ORAL at 11:54

## 2024-10-17 RX ADMIN — FLUTICASONE PROPIONATE 1 SPRAY: 50 SPRAY, METERED NASAL at 08:47

## 2024-10-17 RX ADMIN — CARBAMAZEPINE 400 MG: 200 TABLET ORAL at 08:46

## 2024-10-17 RX ADMIN — LISINOPRIL 20 MG: 20 TABLET ORAL at 08:46

## 2024-10-17 RX ADMIN — SERTRALINE 100 MG: 100 TABLET, FILM COATED ORAL at 08:46

## 2024-10-17 RX ADMIN — ENOXAPARIN SODIUM 60 MG: 60 INJECTION SUBCUTANEOUS at 17:51

## 2024-10-17 ASSESSMENT — ACTIVITIES OF DAILY LIVING (ADL)
ADLS_ACUITY_SCORE: 38
ADLS_ACUITY_SCORE: 38
ADLS_ACUITY_SCORE: 42
ADLS_ACUITY_SCORE: 38
ADLS_ACUITY_SCORE: 36
ADLS_ACUITY_SCORE: 38
ADLS_ACUITY_SCORE: 36
ADLS_ACUITY_SCORE: 42
ADLS_ACUITY_SCORE: 36
ADLS_ACUITY_SCORE: 38
ADLS_ACUITY_SCORE: 36
ADLS_ACUITY_SCORE: 38
ADLS_ACUITY_SCORE: 42
ADLS_ACUITY_SCORE: 38

## 2024-10-17 NOTE — PLAN OF CARE
"INPATIENT NOTE: 1500 - 2300     PRIMARY PROBLEM: SOB/Asthma Exacerbation     Vital Signs: Stable        Orientation: A/O x 4, forgetful at times Hx developmental Delay  Neuro: Intact  Pain status: Denies  Resp: Lung sounds CTA, denies any SOB, on 2 LPM  Cardiac: WDL  GI: Bowel sounds active. Last BM 10/15  : Continent, voiding adequately   Skin: Redness to under breasts, meplex to right thigh intact  LDA: Peripheral IV RLA  Pertinent Labs/Imaging: CK elevated at 528  Diet: Regular  Activity: SBA with walker  Alarms/Safety: All alarms on and audible   Consults: WOC/SW   Discharge Plan: RT to to perform ABG by 4 AM  Discharge Date: TBD     Will continue to monitor and provide cares.     Landy Castillo RN Problem: Adult Inpatient Plan of Care  Goal: Plan of Care Review  Description: The Plan of Care Review/Shift note should be completed every shift.  The Outcome Evaluation is a brief statement about your assessment that the patient is improving, declining, or no change.  This information will be displayed automatically on your shift  note.  Outcome: Progressing  Goal: Patient-Specific Goal (Individualized)  Description: You can add care plan individualizations to a care plan. Examples of Individualization might be:  \"Parent requests to be called daily at 9am for status\", \"I have a hard time hearing out of my right ear\", or \"Do not touch me to wake me up as it startles  me\".  Outcome: Progressing  Goal: Absence of Hospital-Acquired Illness or Injury  Outcome: Progressing  Intervention: Identify and Manage Fall Risk  Recent Flowsheet Documentation  Taken 10/16/2024 1705 by Landy Castillo RN  Safety Promotion/Fall Prevention: activity supervised  Intervention: Prevent Skin Injury  Recent Flowsheet Documentation  Taken 10/16/2024 1705 by Landy Castillo RN  Body Position: position changed independently  Intervention: Prevent and Manage VTE (Venous Thromboembolism) Risk  Recent Flowsheet Documentation  Taken " 10/16/2024 1705 by Landy Castillo RN  VTE Prevention/Management: (Lovenox injection) other (see comments)  Intervention: Prevent Infection  Recent Flowsheet Documentation  Taken 10/16/2024 1705 by Landy Castillo RN  Infection Prevention:   cohorting utilized   rest/sleep promoted   single patient room provided   hand hygiene promoted  Goal: Optimal Comfort and Wellbeing  Outcome: Progressing  Goal: Readiness for Transition of Care  Outcome: Progressing     Problem: Comorbidity Management  Goal: Maintenance of Asthma Control  Outcome: Progressing  Intervention: Maintain Asthma Symptom Control  Recent Flowsheet Documentation  Taken 10/16/2024 1705 by Landy Castillo RN  Medication Review/Management: medications reviewed  Goal: Maintenance of Seizure Control  Outcome: Progressing  Intervention: Maintain Seizure Symptom Control  Recent Flowsheet Documentation  Taken 10/16/2024 1705 by Landy Castillo RN  Seizure Precautions: activity supervised  Medication Review/Management: medications reviewed     Problem: Pain Acute  Goal: Optimal Pain Control and Function  Outcome: Progressing  Intervention: Prevent or Manage Pain  Recent Flowsheet Documentation  Taken 10/16/2024 1705 by Landy Castillo RN  Bowel Elimination Promotion: adequate fluid intake promoted  Medication Review/Management: medications reviewed  Intervention: Optimize Psychosocial Wellbeing  Recent Flowsheet Documentation  Taken 10/16/2024 1705 by Landy Castillo RN  Supportive Measures: active listening utilized     Problem: Wound  Goal: Optimal Coping  Outcome: Progressing  Intervention: Support Patient and Family Response  Recent Flowsheet Documentation  Taken 10/16/2024 1705 by Landy Castillo RN  Supportive Measures: active listening utilized  Goal: Optimal Functional Ability  Outcome: Progressing  Intervention: Optimize Functional Ability  Recent Flowsheet Documentation  Taken 10/16/2024 1705 by Landy Castillo RN  Assistive Device  Utilized:   walker   gait belt  Activity Management: activity adjusted per tolerance  Activity Assistance Provided: assistance, 1 person  Goal: Absence of Infection Signs and Symptoms  Outcome: Progressing  Goal: Improved Oral Intake  Outcome: Progressing  Goal: Optimal Pain Control and Function  Outcome: Progressing  Goal: Skin Health and Integrity  Outcome: Progressing  Intervention: Optimize Skin Protection  Recent Flowsheet Documentation  Taken 10/16/2024 1705 by Landy Castillo RN  Activity Management: activity adjusted per tolerance  Head of Bed (HOB) Positioning: HOB at 30-45 degrees  Goal: Optimal Wound Healing  Outcome: Progressing     Problem: Skin Injury Risk Increased  Goal: Skin Health and Integrity  Outcome: Progressing  Intervention: Plan: Nurse Driven Intervention: Moisture Management  Recent Flowsheet Documentation  Taken 10/16/2024 1705 by Landy Castillo RN  Moisture Interventions: Encourage regular toileting  Intervention: Plan: Nurse Driven Intervention: Friction and Shear  Recent Flowsheet Documentation  Taken 10/16/2024 1705 by Landy Castillo RN  Friction/Shear Interventions: HOB 30 degrees or less  Intervention: Optimize Skin Protection  Recent Flowsheet Documentation  Taken 10/16/2024 1705 by Landy Castillo RN  Activity Management: activity adjusted per tolerance  Head of Bed (HOB) Positioning: HOB at 30-45 degrees

## 2024-10-17 NOTE — PLAN OF CARE
Patient has been assessed for Home Oxygen needs.  Oxygen readings:   *   RA - at rest  Pulse oximetry SPO2 91 %  *   RA - during activity/with exercise SPO2 86 %  *   O2 at  2 liters/minute (at rest) ...SPO2 93 %  *   O2 at  2 liters/minute (during activity/with exercise) ...SPO2 92 %

## 2024-10-17 NOTE — PROGRESS NOTES
Oxygen Documentation  I certify that this patient, Brissa Corado has been under my care (or a nurse practitioner or physican's assistant working with me). This is the face-to-face encounter for oxygen medical necessity.      At the time of this encounter, I have reviewed the qualifying testing and have determined that supplemental oxygen is reasonable and necessary and is expected to improve the patient's condition in a home setting.         Patient has continued oxygen desaturation due to Mild Persistent Asthma J45.30  Bronchiectasis J47.9.    If portability is ordered, is the patient mobile within the home? yes    Was this visit performed as a telehealth visit: No         Patient has been assessed for Home Oxygen needs.  Oxygen readings:   *   RA - at rest  Pulse oximetry SPO2 91 %  *   RA - during activity/with exercise SPO2 86 %  *   O2 at  2 liters/minute (at rest) ...SPO2 93 %  *   O2 at  2 liters/minute (during activity/with exercise) ...SPO2 92 %        Shahida Kahn MD FACP  Hospitalist Service  [facility]

## 2024-10-17 NOTE — PLAN OF CARE
"Care from [id number]      Inpatient Progress Note:  For complete assessment see flow sheet documentation.       /64 (BP Location: Right arm)   Pulse 82   Temp 97.7  F (36.5  C) (Oral)   Resp 20   Ht 1.702 m (5' 7\")   Wt (!) 186.3 kg (410 lb 11.5 oz)   LMP 08/18/2008   SpO2 94%   BMI 64.33 kg/m         Neuro: Intact.   Vital Signs: Stable   Pain/Comfort: Denies any pain.  Diet/po intake: Regular diet.   Output: Up to bathroom during day, purewick at night.   Activity/Ambulation: SBA  Pertinent assessments: Monitor oxygen.  Infusions: N/A  Major Shift Events: N/A  Plan (Upcoming Events): RT to redraw ABGs at 4:30. Cont wound care routine.  Discharge/Transfer Needs: Discharge home tomorrow 10/17. Sudhir- significant other will provide transportation.     Will continue with POC.          Will continue to monitor and provide cares.    Goal Outcome Evaluation:    Overall Patient Progress: no changeOverall Patient Progress: no change    Outcome Evaluation: Pt A&O x4 but forgetful. Denies pain. Up SBA to bathroom. Using purewick at night. RT to draw ABGs at 430.      "

## 2024-10-17 NOTE — CONSULTS
SPIRITUAL [facility] - Consult Note  Obs 2    Referral Source/ Reason for Visit: Staff consult for emotional support.    Summary and Recommendations -     Compassionately listened to patient share experience at  this past week.  She is excited to return home; but aware that she is becoming more dependent on oxygen support.  This represents a loss of autonomy.  She is hoping to continue doing the ADLs she used to do for herself.    Patient is Mandaen.  She is not currently connected with a Rastafari.    Plan: Patient is scheduled to discharge to home today.  No further needs.  SHS remains available upon reqeust.    Rev. SHAWNEE Acevedo.  Staff     SHS available 24/7 for emergent requests/ referrals, either by paging the on-call  or by entering an ASAP/STAT consult in [facility], which will also page the on-call .      Assessment    Saw pt Brissa Corado regarding staff consult for emotional support.    Patient/Family Understanding of Illness and Goals of Care - Patient understands that she is at  due to an asthma exacerbation and other hospital problems.  Medical record indicates that patient has a developmental delay.  She has active health care agents.    Distress and Loss - She is distressed that she is now going home with oxygen support.  She views this as a change that will make her feel more infirm.    Strengths, Coping and Resources - She named her father as a supportive person in her life.    Meaning, Beliefs and Spirituality - Patient is Mandaen.  She has no Rastafari affiliation at this time.

## 2024-10-17 NOTE — PLAN OF CARE
"Goal Outcome Evaluation:      Plan of Care Reviewed With: patient    Overall Patient Progress: no changeOverall Patient Progress: no change    Outcome Evaluation:   Pt A&Ox4, forgetful. VSS. Denies pain/n/v/d/numbness/tingling/tenderness/sob/dizziness. WDL heart/breath sounds diminished. Saline locked, clean/dry/intact/flushed, removed due to discharging. SBA walker/gb. Brock regular diet, tolerating well. 2 LPM O2. Inner dry applied under breast/abdomen. Home O2 ordered, in pt room. PT/OT consulted. WOC consulted. Bed alarm on. Discharging home with  at 19:00 with home care services.     /73 (BP Location: Right arm)   Pulse 68   Temp 97.8  F (36.6  C) (Oral)   Resp 18   Ht 1.702 m (5' 7\")   Wt (!) 186.3 kg (410 lb 11.5 oz)   LMP 08/18/2008   SpO2 94%   BMI 64.33 kg/m      Problem: Adult Inpatient Plan of Care  Goal: Plan of Care Review  Description: The Plan of Care Review/Shift note should be completed every shift.  The Outcome Evaluation is a brief statement about your assessment that the patient is improving, declining, or no change.  This information will be displayed automatically on your shift  note.  Outcome: Progressing  Flowsheets (Taken 10/17/2024 1433)  Plan of Care Reviewed With: patient  Overall Patient Progress: no change  Goal: Patient-Specific Goal (Individualized)  Description: You can add care plan individualizations to a care plan. Examples of Individualization might be:  \"Parent requests to be called daily at 9am for status\", \"I have a hard time hearing out of my right ear\", or \"Do not touch me to wake me up as it startles  me\".  Outcome: Progressing  Goal: Absence of Hospital-Acquired Illness or Injury  Outcome: Progressing  Intervention: Identify and Manage Fall Risk  Recent Flowsheet Documentation  Taken 10/17/2024 8631 by Rosalina Bone, RN  Safety Promotion/Fall Prevention:   safety round/check completed   room organization consistent   patient and family " education   nonskid shoes/slippers when out of bed   mobility aid in reach   clutter free environment maintained   assistive device/personal items within reach   activity supervised  Intervention: Prevent Skin Injury  Recent Flowsheet Documentation  Taken 10/17/2024 0846 by Rosalina Bone RN  Skin Protection: adhesive use limited  Device Skin Pressure Protection:   adhesive use limited   absorbent pad utilized/changed  Intervention: Prevent and Manage VTE (Venous Thromboembolism) Risk  Recent Flowsheet Documentation  Taken 10/17/2024 0846 by Rosalina Bone RN  VTE Prevention/Management: SCDs off (sequential compression devices)  Intervention: Prevent Infection  Recent Flowsheet Documentation  Taken 10/17/2024 0846 by Rosalina Bone RN  Infection Prevention:   single patient room provided   rest/sleep promoted   hand hygiene promoted   equipment surfaces disinfected   environmental surveillance performed  Goal: Optimal Comfort and Wellbeing  Outcome: Progressing  Goal: Readiness for Transition of Care  Outcome: Progressing     Problem: Comorbidity Management  Goal: Maintenance of Asthma Control  Outcome: Progressing  Intervention: Maintain Asthma Symptom Control  Recent Flowsheet Documentation  Taken 10/17/2024 0846 by Rosalina Bone RN  Medication Review/Management: medications reviewed  Goal: Maintenance of Seizure Control  Outcome: Progressing  Intervention: Maintain Seizure Symptom Control  Recent Flowsheet Documentation  Taken 10/17/2024 0846 by Rosalina Bone RN  Seizure Precautions:   clutter-free environment maintained   activity supervised  Medication Review/Management: medications reviewed     Problem: Pain Acute  Goal: Optimal Pain Control and Function  Outcome: Progressing  Intervention: Prevent or Manage Pain  Recent Flowsheet Documentation  Taken 10/17/2024 0846 by Rosalina Bone RN  Medication Review/Management: medications reviewed     Problem: Wound  Goal: Optimal  Coping  Outcome: Progressing  Goal: Optimal Functional Ability  Outcome: Progressing  Intervention: Optimize Functional Ability  Recent Flowsheet Documentation  Taken 10/17/2024 0846 by Rosalina Bone RN  Assistive Device Utilized:   walker   gait belt  Activity Assistance Provided: assistance, 1 person  Goal: Absence of Infection Signs and Symptoms  Outcome: Progressing  Goal: Improved Oral Intake  Outcome: Progressing  Goal: Optimal Pain Control and Function  Outcome: Progressing  Goal: Skin Health and Integrity  Outcome: Progressing  Intervention: Optimize Skin Protection  Recent Flowsheet Documentation  Taken 10/17/2024 0846 by Rosalina Bone RN  Pressure Reduction Techniques:   heels elevated off bed   frequent weight shift encouraged  Skin Protection: adhesive use limited  Goal: Optimal Wound Healing  Outcome: Progressing     Problem: Skin Injury Risk Increased  Goal: Skin Health and Integrity  Outcome: Progressing  Intervention: Plan: Nurse Driven Intervention: Moisture Management  Recent Flowsheet Documentation  Taken 10/17/2024 0846 by Rosalina Bone RN  Moisture Interventions:   Encourage regular toileting   No brief in bed   Incontinence pad  Taken 10/17/2024 0800 by Rosalina Bone RN  Moisture Interventions:   Encourage regular toileting   No brief in bed   Incontinence pad  Bathing/Skin Care:   incontinence care   linen changed  Intervention: Plan: Nurse Driven Intervention: Friction and Shear  Recent Flowsheet Documentation  Taken 10/17/2024 0846 by Rosalina Bone RN  Friction/Shear Interventions: HOB 30 degrees or less  Intervention: Optimize Skin Protection  Recent Flowsheet Documentation  Taken 10/17/2024 0846 by Rosalina Bone RN  Pressure Reduction Techniques:   heels elevated off bed   frequent weight shift encouraged  Skin Protection: adhesive use limited

## 2024-10-17 NOTE — DISCHARGE SUMMARY
[facility]    Discharge Summary  Hospitalist    Date of Admission:  10/9/2024  Date of Discharge:  10/17/2024  Discharging Provider: Shahida Kahn MD  Date of Service (when I saw the patient): 10/17/24    Discharge Diagnoses   Acute hypoxic respiratory failure  Likely asthma exacerbation, potential component of COPD based on imaging  URI-like symptoms  Obesity-hypoventilation likely contributing  Untreated RICK  Likely OHS  Hypertension  Hyperlipidemia  GERD  Morbid Obesity  Hx seizure disorder  Developmental delay  Wounds  Enlarged axillary lymph nodes    History of Present Illness   Brissa Corado is a 59 year old woman who was admitted on 10/9/2024. PMH significant for asthma, hypertension, developmental delay, HLD, HTN, prior seizures, RICK, GERD, MDD, obesity who presented to the ED 10/9/2024 with shortness of breath. Admitted for asthma exacerbation.    Hospital Course   Brissa Corado was admitted on 10/9/2024.  The following problems were addressed during her hospitalization:    Acute hypoxic respiratory failure  Likely asthma exacerbation, potential component of COPD based on imaging  URI-like symptoms  Obesity-hypoventilation likely contributing    - presented with several days of URI-like symptoms    - CTPE did not show PE    -Patient had weaned to room air on 10/14, but still requires O2 with activity and has qualified for home O2.   - Patient did not qualify for home BiPAP based on ABG.   -She has completed steroid course.  - Continue home regimen.      Untreated RICK  Likely OHS    - had a recent hospitalization for acute cholecystitis with cholecystectomy    - had some postoperative hypoxic respiratory failure    - advised to complete outpatient pulmonology visit for PFTs as well as a sleep evaluation, does not appear these have been completed yet    - has sleep study scheduled for Jan.  Previous diagnosis of RICK but stopped using her cpap  - Patient did not qualify  for home BiPAP based on ABG.   - Did qualify for home O2 with activity. Home RN to follow.   - Patient to keep January 2025 sleep study appointment.       Hypertension    - cont lisinopril     Hyperlipidemia    - resume statin on discharge      GERD    - not on meds     Obesity: BMI 62.  Noted.     Hx seizure disorder    - no seizure activity since teenage years    - continue PTA carbamazepine     Developmental delay    - parents are listed as her guardians    - has a longtime significant other    - next of kin is technically her parents     - both at home with COVID-19 at this time.     Wounds    - wound RN seen in hospital. Home RN ordered and consider outpatient wound care.      Enlarged axillary lymph nodes    - noted on CT scan    - outpatient follow-up with repeat CT in 1 year (discussed with parents/guardians on 10/12)    Shahida Kahn MD FACP  Hospitalist Service  [facility]      Significant Results and Procedures   Imaging studies as noted    Pending Results   N/A    Code Status   Full Code       Primary Care Physician   Aury Vizcaino    Physical Exam   Temp: 97.8  F (36.6  C) Temp src: Oral BP: 137/73 Pulse: 68   Resp: 18 SpO2: 94 % O2 Device: Nasal cannula Oxygen Delivery: 2 LPM  Vitals:    10/10/24 0858   Weight: (!) 186.3 kg (410 lb 11.5 oz)     Vital Signs with Ranges  Temp:  [97.5  F (36.4  C)-98  F (36.7  C)] 97.8  F (36.6  C)  Pulse:  [68-87] 68  Resp:  [17-20] 18  BP: (123-146)/(60-73) 137/73  SpO2:  [92 %-97 %] 94 %  I/O last 3 completed shifts:  In: 600 [P.O.:600]  Out: 400 [Urine:400]    Constitutional: Morbidly obese. Resting in bed. Alert and oriented x3. No increased work of breathing.    HEENT: NCAT. EOMI. Moist oral mucosa.  Respiratory: Clear to auscultation bilaterally. No crackles or wheezes. Good inspiratory effort.   Cardiovascular: Regular rate and rhythm. No murmur.  Neurologic: Alert and oriented x3. No focal neurologic deficits. Did not assess gait.    Discharge  Disposition   Discharged to home  Condition at discharge: Fair    Consultations This Hospital Stay   CARE MANAGEMENT / SOCIAL WORK IP CONSULT  WOUND OSTOMY CONTINENCE NURSE  IP CONSULT  CARE MANAGEMENT / SOCIAL WORK IP CONSULT    Time Spent on this Encounter   I, Shahida Kahn MD, personally saw the patient today and spent greater than 30 minutes discharging this patient.    Discharge Orders      Primary Care - Care Coordination Referral      Home Care Referral      Reason for your hospital stay    You were hospitalized for an asthma exacerbation. You were treated with steroids and breathing treatments. You have improved significantly. You can use oxygen with activity. Recommend following closely with primary care. You have an outpatient sleep study scheduled in January that you should keep to be considered for a CPAP machine.     Activity    Your activity upon discharge: activity as tolerated     Follow-up and recommended labs and tests     Follow up with primary care provider, Aury Vizcaino, within 7 days for hospital follow- up and regarding new diagnosis.  The following labs/tests are recommended: CBC, BMP. Note that patient has qualified for home O2 with activity. Continue to follow for needs. Consider outpatient wound consult to continue to follow. Home RN may be able to guide needs. Patient had enlarged axillary lymph nodes seen on CT. Recommend outpatient follow-up with repeat CT in 1 year (discussed with parents/guardians on 10/12).  Keep January 2025 appointment for outpatient sleep study.   Home health is ordered.     Home Oxygen Order for DME - ONLY FOR DME     Diet    Follow this diet upon discharge: Regular diet     Discharge Medications   Current Discharge Medication List        CONTINUE these medications which have NOT CHANGED    Details   acetaminophen (TYLENOL) 500 MG tablet Take 1,000 mg by mouth as needed for mild pain.      albuterol (PROVENTIL) (2.5 MG/3ML) 0.083% neb solution Take 1 vial  (2.5 mg) by nebulization every 4 hours as needed for shortness of breath, wheezing or cough  Qty: 75 mL, Refills: 0    Associated Diagnoses: Moderate persistent asthma with acute exacerbation      azelastine (ASTELIN) 0.1 % nasal spray Spray 1 spray into both nostrils daily as needed for allergies.      !! carBAMazepine (TEGRETOL) 200 MG tablet Take 400 mg by mouth 2 times daily. AM and HS      !! carBAMazepine (TEGRETOL) 200 MG tablet Take 200 mg by mouth daily. @1200      fluticasone (FLONASE) 50 MCG/ACT nasal spray USE ONE SPRAY INTO BOTH NOSTRILS DAILY AS NEEDED FOR RHINITIS OR ALLERGIES  Qty: 16 mL, Refills: 1    Associated Diagnoses: Seasonal allergic rhinitis due to pollen      Fluticasone Furoate-Vilanterol (BREO ELLIPTA) 50-25 MCG/ACT AEPB Inhale 1 puff into the lungs 2 times daily    Associated Diagnoses: Acute cholecystitis; Moderate persistent asthma with acute exacerbation      ibuprofen (ADVIL/MOTRIN) 200 MG tablet Take 800 mg by mouth as needed for pain.      ipratropium - albuterol 0.5 mg/2.5 mg/3 mL (DUONEB) 0.5-2.5 (3) MG/3ML neb solution Take 1 vial by nebulization every 6 hours as needed for shortness of breath, wheezing or cough.      lisinopril (ZESTRIL) 20 MG tablet TAKE 1 TABLET (20 MG) BY MOUTH DAILY  Qty: 90 tablet, Refills: 2    Associated Diagnoses: Essential hypertension, benign      lovastatin (MEVACOR) 40 MG tablet TAKE 1 TABLET (40 MG) BY MOUTH AT BEDTIME  Qty: 90 tablet, Refills: 1    Associated Diagnoses: Hyperlipidemia LDL goal <130      miconazole (MICATIN) 2 % external powder Apply topically as needed for itching or other.      Multiple Vitamin (MULTI-VITAMIN PO) Take 1 tablet by mouth daily      nystatin (MYCOSTATIN) 341873 UNIT/GM external cream Apply topically daily as needed      sertraline (ZOLOFT) 100 MG tablet TAKE 1 TABLET (100 MG) BY MOUTH DAILY  Qty: 90 tablet, Refills: 1    Associated Diagnoses: Depression, unspecified depression type       !! - Potential duplicate  medications found. Please discuss with provider.        Allergies   Allergies   Allergen Reactions    Iodine      Iodinated Contrast Media  --- Hives      Penicillins Unknown    Tetracyclines & Related Unknown    Hydrochlorothiazide Palpitations     dehydration    Influenza Vac Split [Influenza Virus Vaccine] Rash     Patient states she is allergic to the vaccine.  Got a rash all over body many years ago after receiving injection.     Data   Most Recent 3 CBC's:  Recent Labs   Lab Test 10/17/24  0855 10/16/24  0639 10/12/24  1348 10/10/24  0653   WBC 8.4  --  10.9 7.2   HGB 12.9  --  12.6 12.0   MCV 91  --  91 93    183 215 169      Most Recent 3 BMP's:  Recent Labs   Lab Test 10/17/24  0855 10/12/24  1348 10/10/24  0653 10/10/24  0112    140 139 142   POTASSIUM 3.5 4.2  --  4.3   CHLORIDE 103 101 104 102   CO2 26 29 26 27   BUN 21.3* 18.0 11.6 12.9   CR 0.69 0.65 0.61 0.71   ANIONGAP 12 10 9 13   CESAR 8.3* 9.2 8.3* 8.4*   * 139* 178* 147*     Results for orders placed or performed during the hospital encounter of 10/09/24   CT Chest Pulmonary Embolism w Contrast    Narrative    EXAM: CT CHEST PULMONARY EMBOLISM W CONTRAST  LOCATION: [facility]  DATE: 10/10/2024    INDICATION: Shortness of breath, hypoxia, elevated d-dimer.  COMPARISON: 07/05/2024  TECHNIQUE: CT chest pulmonary angiogram during arterial phase injection of IV contrast. Multiplanar reformats and MIP reconstructions were performed. Dose reduction techniques were used.   CONTRAST: 100 mL Isovue 370    FINDINGS:  ANGIOGRAM CHEST: Pulmonary arteries are normal caliber. Evaluation for pulmonary embolism is mildly limited by diffuse motion artifact. No convincing pulmonary embolus identified within this limitation. Thoracic aorta is negative for aneurysm or   dissection. No CT evidence of right heart strain.    LUNGS AND PLEURA: Mild diffuse mosaic attenuation of bilateral lung, more pronounced than prior study  secondary to relative expiratory phase of contrast. A 3 mm nodule is seen in the left lower lobe on series 7 image 147, not previously identifiable due   to atelectasis.    MEDIASTINUM/AXILLAE: No intrathoracic lymphadenopathy. Mildly enlarged bilateral axillary lymph nodes have mildly increased in size. For example, a left axillary lymph node measuring 14 x 11 mm on series 5 image 19 was previously 14 x 6 mm. Heart size is   normal without pericardial effusion.    CORONARY ARTERY CALCIFICATION: None.    UPPER ABDOMEN: No acute findings. Gallbladder is surgically absent.    MUSCULOSKELETAL: Moderate compression deformity of T3 vertebral body is stable. Mild anterior osteophyte formation in the thoracic spine. No suspicious osseous lesions or acute fractures.        Impression    IMPRESSION:    1.  Mildly motion limited study for evaluation of pulmonary embolism. No convincing pulmonary embolus identified within this limitation. No pneumonia.    2.  Redemonstration of mosaic attenuation of the lung may represent a focal air trapping indicative of underlying COPD.    3.  3 mm left lower lobe nodule. Recommend follow-up chest CT in one year per Fleischner Society 2017 guideline.    4.  Mildly enlarged bilateral axillary lymph nodes have increased in size from prior study, possibly reactive. Clinical follow-up is recommended.

## 2024-10-17 NOTE — PROGRESS NOTES
An ABG was completed on the pt's left arm @ 0433 on an FIO2 of 28%(2L nasal cannula).  Pressure was held until bleeding stopped.  No complications noted during the procedure.     Judy Farr, RT on 10/17/2024 at 4:38 AM    Recent Labs   Lab 10/17/24  0436 10/16/24  1137   PH 7.34* 7.39   PCO2 51* 46*   PO2 74* 62*   HCO3 28 28   O2PER 28 1     Judy Farr, RT on 10/17/2024 at 5:22 AM

## 2024-10-17 NOTE — PROGRESS NOTES
Care Management Discharge Note    Discharge Date: 10/17/2024       Discharge Disposition: Home, Home Care    Discharge Services: Home Care    Discharge DME: None    Discharge Transportation: family or friend will provide    Private pay costs discussed: Not applicable    Does the patient's insurance plan have a 3 day qualifying hospital stay waiver?  No    PAS Confirmation Code:    Patient/family educated on Medicare website which has current facility and service quality ratings: yes    Education Provided on the Discharge Plan: Yes  Persons Notified of Discharge Plans: HC agency  Patient/Family in Agreement with the Plan: yes    Handoff Referral Completed: Yes, [facility] PCP: Internal handoff referral completed    Additional Information:  Patient discharging to home with S.O. transporting her. She will Have Home Care through "[facility] for RN, PT, OT, HHA and SW. Orders were sent to agency and a call was placed to let the agency know of patient's discharge. CM reviewed with patient the Home Care agency info and when to call them.  She will discharge on continuous O2.    Courtney Cordova RN, BSN, CM  Inpatient Care Coordination  [facility]  [phone]

## 2024-10-18 ENCOUNTER — PATIENT OUTREACH (OUTPATIENT)
Dept: CARE COORDINATION | Facility: CLINIC | Age: 59
End: 2024-10-18
Payer: MEDICARE

## 2024-10-18 ENCOUNTER — MEDICAL CORRESPONDENCE (OUTPATIENT)
Dept: HEALTH INFORMATION MANAGEMENT | Facility: CLINIC | Age: 59
End: 2024-10-18

## 2024-10-18 ASSESSMENT — ACTIVITIES OF DAILY LIVING (ADL): DEPENDENT_IADLS:: CLEANING;COOKING;LAUNDRY;SHOPPING;MEAL PREPARATION;TRANSPORTATION;INCONTINENCE
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
## 2024-10-21 ENCOUNTER — TELEPHONE (OUTPATIENT)
Dept: INTERNAL MEDICINE | Facility: CLINIC | Age: 59
End: 2024-10-21
Payer: MEDICARE

## 2024-10-21 NOTE — TELEPHONE ENCOUNTER
Forms/Letter Request    Type of form/letter: Skilled Nursing Therapy Plan 10/18/2024      Do we have the form/letter: Yes: placed in provider mailbox for signature    Who is the form from? [facility] # [id number]    Where did/will the form come from? form was faxed in    When is form/letter needed by: 5-7    How would you like the form/letter returned: Fax : [phone]    Patient Notified form requests are processed in 5-7 business days:Yes    Could we send this information to you in [place] or would you prefer to receive a phone call?:   NA

## 2024-10-22 ENCOUNTER — MEDICAL CORRESPONDENCE (OUTPATIENT)
Dept: HEALTH INFORMATION MANAGEMENT | Facility: CLINIC | Age: 59
End: 2024-10-22

## 2024-10-24 ENCOUNTER — TELEPHONE (OUTPATIENT)
Dept: INTERNAL MEDICINE | Facility: CLINIC | Age: 59
End: 2024-10-24
Payer: MEDICARE

## 2024-10-25 DIAGNOSIS — Z53.9 DIAGNOSIS NOT YET DEFINED: Primary | ICD-10-CM

## 2024-10-25 PROCEDURE — G0180 MD CERTIFICATION HHA PATIENT: HCPCS | Performed by: INTERNAL MEDICINE

## 2024-10-30 ENCOUNTER — HOSPITAL ENCOUNTER (EMERGENCY)
Facility: CLINIC | Age: 59
Discharge: HOME | End: 2024-10-30
Attending: EMERGENCY MEDICINE | Admitting: EMERGENCY MEDICINE
Payer: MEDICARE

## 2024-10-30 ENCOUNTER — TELEPHONE (OUTPATIENT)
Dept: INTERNAL MEDICINE | Facility: CLINIC | Age: 59
End: 2024-10-30

## 2024-10-30 ENCOUNTER — APPOINTMENT (OUTPATIENT)
Dept: GENERAL RADIOLOGY | Facility: CLINIC | Age: 59
End: 2024-10-30
Attending: EMERGENCY MEDICINE
Payer: MEDICARE

## 2024-10-30 VITALS
DIASTOLIC BLOOD PRESSURE: 73 MMHG | OXYGEN SATURATION: 90 % | SYSTOLIC BLOOD PRESSURE: 169 MMHG | TEMPERATURE: 98 F | WEIGHT: 293 LBS | HEIGHT: 67 IN | BODY MASS INDEX: 45.99 KG/M2 | RESPIRATION RATE: 24 BRPM | HEART RATE: 96 BPM

## 2024-10-30 DIAGNOSIS — J45.901 EXACERBATION OF ASTHMA, UNSPECIFIED ASTHMA SEVERITY, UNSPECIFIED WHETHER PERSISTENT: ICD-10-CM

## 2024-10-30 DIAGNOSIS — J45.41 MODERATE PERSISTENT ASTHMA WITH ACUTE EXACERBATION: ICD-10-CM

## 2024-10-30 LAB
ANION GAP SERPL CALCULATED.3IONS-SCNC: 14 MMOL/L (ref 7–15)
BASOPHILS # BLD AUTO: 0.1 10E3/UL (ref 0–0.2)
BASOPHILS NFR BLD AUTO: 1 %
BUN SERPL-MCNC: 17.3 MG/DL (ref 6–20)
CALCIUM SERPL-MCNC: 8.4 MG/DL (ref 8.8–10.4)
CHLORIDE SERPL-SCNC: 108 MMOL/L (ref 98–107)
CREAT SERPL-MCNC: 0.76 MG/DL (ref 0.51–0.95)
EGFRCR SERPLBLD CKD-EPI 2021: >90 ML/MIN/1.73M2
EOSINOPHIL # BLD AUTO: 0.9 10E3/UL (ref 0–0.7)
EOSINOPHIL NFR BLD AUTO: 10 %
ERYTHROCYTE [DISTWIDTH] IN BLOOD BY AUTOMATED COUNT: 14.4 % (ref 10–15)
GLUCOSE SERPL-MCNC: 110 MG/DL (ref 70–99)
HCO3 BLDV-SCNC: 26 MMOL/L (ref 21–28)
HCO3 SERPL-SCNC: 21 MMOL/L (ref 22–29)
HCT VFR BLD AUTO: 40.1 % (ref 35–47)
HGB BLD-MCNC: 12.5 G/DL (ref 11.7–15.7)
HOLD SPECIMEN: NORMAL
IMM GRANULOCYTES # BLD: 0 10E3/UL
IMM GRANULOCYTES NFR BLD: 0 %
LACTATE BLD-SCNC: 1.2 MMOL/L
LYMPHOCYTES # BLD AUTO: 1.8 10E3/UL (ref 0.8–5.3)
LYMPHOCYTES NFR BLD AUTO: 20 %
MCH RBC QN AUTO: 28.6 PG (ref 26.5–33)
MCHC RBC AUTO-ENTMCNC: 31.2 G/DL (ref 31.5–36.5)
MCV RBC AUTO: 92 FL (ref 78–100)
MONOCYTES # BLD AUTO: 0.5 10E3/UL (ref 0–1.3)
MONOCYTES NFR BLD AUTO: 6 %
NEUTROPHILS # BLD AUTO: 5.8 10E3/UL (ref 1.6–8.3)
NEUTROPHILS NFR BLD AUTO: 64 %
NRBC # BLD AUTO: 0 10E3/UL
NRBC BLD AUTO-RTO: 0 /100
PCO2 BLDV: 54 MM HG (ref 40–50)
PH BLDV: 7.28 [PH] (ref 7.32–7.43)
PLATELET # BLD AUTO: 205 10E3/UL (ref 150–450)
PO2 BLDV: 36 MM HG (ref 25–47)
POTASSIUM SERPL-SCNC: 4.1 MMOL/L (ref 3.4–5.3)
RBC # BLD AUTO: 4.37 10E6/UL (ref 3.8–5.2)
SAO2 % BLDV: 61 % (ref 70–75)
SODIUM SERPL-SCNC: 143 MMOL/L (ref 135–145)
WBC # BLD AUTO: 9.1 10E3/UL (ref 4–11)

## 2024-10-30 PROCEDURE — 82803 BLOOD GASES ANY COMBINATION: CPT

## 2024-10-30 PROCEDURE — 80048 BASIC METABOLIC PNL TOTAL CA: CPT | Performed by: EMERGENCY MEDICINE

## 2024-10-30 PROCEDURE — 85025 COMPLETE CBC W/AUTO DIFF WBC: CPT | Performed by: EMERGENCY MEDICINE

## 2024-10-30 PROCEDURE — 93005 ELECTROCARDIOGRAM TRACING: CPT

## 2024-10-30 PROCEDURE — 250N000012 HC RX MED GY IP 250 OP 636 PS 637: Performed by: EMERGENCY MEDICINE

## 2024-10-30 PROCEDURE — 36415 COLL VENOUS BLD VENIPUNCTURE: CPT | Performed by: EMERGENCY MEDICINE

## 2024-10-30 PROCEDURE — 94640 AIRWAY INHALATION TREATMENT: CPT

## 2024-10-30 PROCEDURE — 99285 EMERGENCY DEPT VISIT HI MDM: CPT | Mod: 25

## 2024-10-30 PROCEDURE — 71046 X-RAY EXAM CHEST 2 VIEWS: CPT

## 2024-10-30 PROCEDURE — 250N000009 HC RX 250: Performed by: EMERGENCY MEDICINE

## 2024-10-30 RX ORDER — IPRATROPIUM BROMIDE AND ALBUTEROL SULFATE 2.5; .5 MG/3ML; MG/3ML
3 SOLUTION RESPIRATORY (INHALATION) ONCE
Status: COMPLETED | OUTPATIENT
Start: 2024-10-30 | End: 2024-10-30

## 2024-10-30 RX ORDER — PREDNISONE 20 MG/1
60 TABLET ORAL ONCE
Status: COMPLETED | OUTPATIENT
Start: 2024-10-30 | End: 2024-10-30

## 2024-10-30 RX ORDER — PREDNISONE 20 MG/1
60 TABLET ORAL DAILY
Qty: 12 TABLET | Refills: 0 | Status: SHIPPED | OUTPATIENT
Start: 2024-10-30 | End: 2024-11-03

## 2024-10-30 RX ORDER — BENZONATATE 100 MG/1
100 CAPSULE ORAL 3 TIMES DAILY PRN
Qty: 15 CAPSULE | Refills: 0 | Status: SHIPPED | OUTPATIENT
Start: 2024-10-30

## 2024-10-30 RX ORDER — ALBUTEROL SULFATE 0.83 MG/ML
2.5 SOLUTION RESPIRATORY (INHALATION) EVERY 6 HOURS PRN
Qty: 75 ML | Refills: 0 | Status: SHIPPED | OUTPATIENT
Start: 2024-10-30 | End: 2024-11-12

## 2024-10-30 RX ADMIN — IPRATROPIUM BROMIDE AND ALBUTEROL SULFATE 3 ML: .5; 3 SOLUTION RESPIRATORY (INHALATION) at 19:22

## 2024-10-30 RX ADMIN — IPRATROPIUM BROMIDE AND ALBUTEROL SULFATE 3 ML: .5; 3 SOLUTION RESPIRATORY (INHALATION) at 21:25

## 2024-10-30 RX ADMIN — PREDNISONE 60 MG: 20 TABLET ORAL at 19:47

## 2024-10-30 RX ADMIN — IPRATROPIUM BROMIDE AND ALBUTEROL SULFATE 3 ML: .5; 3 SOLUTION RESPIRATORY (INHALATION) at 19:47

## 2024-10-30 ASSESSMENT — COLUMBIA-SUICIDE SEVERITY RATING SCALE - C-SSRS
1. IN THE PAST MONTH, HAVE YOU WISHED YOU WERE DEAD OR WISHED YOU COULD GO TO SLEEP AND NOT WAKE UP?: NO
6. HAVE YOU EVER DONE ANYTHING, STARTED TO DO ANYTHING, OR PREPARED TO DO ANYTHING TO END YOUR LIFE?: NO
2. HAVE YOU ACTUALLY HAD ANY THOUGHTS OF KILLING YOURSELF IN THE PAST MONTH?: NO

## 2024-10-30 ASSESSMENT — ACTIVITIES OF DAILY LIVING (ADL)
ADLS_ACUITY_SCORE: 0

## 2024-10-30 NOTE — TELEPHONE ENCOUNTER
Forms/Letter Request     Type of form/letter: Eval and Treat  10/28/24     Do we have the form/letter: Yes: placed in provider mailbox for signature     Who is the form from? [facility] # [id number]   Where did/will the form come from? form was faxed in     When is form/letter needed by: 5-7     How would you like the form/letter returned: Fax : [phone]     Patient Notified form requests are processed in 5-7 business days:Yes     Could we send this information to you in [place] [employer] or would you prefer to receive a phone call?:   NA

## 2024-10-31 ENCOUNTER — PATIENT OUTREACH (OUTPATIENT)
Dept: CARE COORDINATION | Facility: CLINIC | Age: 59
End: 2024-10-31

## 2024-10-31 ENCOUNTER — MEDICAL CORRESPONDENCE (OUTPATIENT)
Dept: HEALTH INFORMATION MANAGEMENT | Facility: CLINIC | Age: 59
End: 2024-10-31

## 2024-10-31 LAB
ATRIAL RATE - MUSE: 88 BPM
DIASTOLIC BLOOD PRESSURE - MUSE: NORMAL MMHG
INTERPRETATION ECG - MUSE: NORMAL
P AXIS - MUSE: 37 DEGREES
PR INTERVAL - MUSE: 192 MS
QRS DURATION - MUSE: 76 MS
QT - MUSE: 350 MS
QTC - MUSE: 423 MS
R AXIS - MUSE: 2 DEGREES
SYSTOLIC BLOOD PRESSURE - MUSE: NORMAL MMHG
T AXIS - MUSE: 48 DEGREES
VENTRICULAR RATE- MUSE: 88 BPM

## 2024-10-31 RX ORDER — ALBUTEROL SULFATE 0.83 MG/ML
2.5 SOLUTION RESPIRATORY (INHALATION) EVERY 4 HOURS PRN
Qty: 75 ML | Refills: 0 | Status: SHIPPED | OUTPATIENT
Start: 2024-10-31 | End: 2024-11-12
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
## 2024-10-31 NOTE — PROGRESS NOTES
Clinic Care Coordination Contact    Situation: Patient chart reviewed by care coordinator.    Background: Patient is enrolled in Care Coordination and followed by RN CC.     Assessment: Patient was seen at [facility] ED for shortness of breath. Per provider note: Brissa Corado is a 59 year old female who presents with worsening shortness of breath since yesterday.  She ran out of her nebulizer solution before symptoms started.  She is wheezing and tachypneic on arrival.  I suspect this likely an asthma exacerbation.  Low suspicion for pneumonia.  Doubt CHF.  She was recently admitted for same and had extensive workup with ultimate diagnosis of asthma exacerbation.  Workup here shows very mild respiratory acidosis.  Chest x-ray shows no obvious pneumonia.  Patient was given a dose of prednisone and 2 DuoNebs with significant improvement in her wheezing and respiratory rate.  Her room air oxygen saturation fluctuated between 90% and 96%.  Given her improvement and and the fact that her exacerbation was likely brought on by her lack of home nebulizer solution, I feel that she can be safely discharged at this point.  She does have home oxygen that she can use as needed as well.  We will give her albuterol solution from the [employer] machine and I will give another 4 days of oral prednisone.  We discussed return precautions and recommended  close follow-up with her primary care physician     Plan/Recommendations: Patient has a follow up appointment with her Primary Care Provider scheduled for today, 10/31/24 at 2:30pm. No further action needed by Care Coordination.     Rocio Govea RN, BSN, CPHN, [facility] Ambulatory Care Management  [facility], and [facility]  [email or web address]  Office: [phone]  Employed by [employer]

## 2024-10-31 NOTE — ED PROVIDER NOTES
Emergency Department Note      History of Present Illness     Chief Complaint   Shortness of Breath      HPI   Brissa Corado is a 59 year old female with a history of asthma, hypertension, and pneumonia who presents to the ED for evaluation of shortness of breath. The patient states she was recently admitted to the hospital for an asthma exacerbation, was on prednisone until discharge, and was started on supplemental oxygen at night due to RICK. Symptoms she was admitted with had significantly improved until a couple of days ago, she started to develop a mildly productive cough, worsening shortness of breath, and chest tightness. She has been using her inhalers and nebulizers at home with some relief but ran out of nebulizer solution yesterday and has had worsening symptoms since.  Denies fever, abdominal pain, nausea, vomiting, diarrhea, chest pain, or hemoptysis.     Independent Historian   None    Review of External Notes   I reviewed the discharge summary from 10/17/2024.  Patient was admitted for hypoxic respiratory failure which was likely due to asthma exacerbation.    Past Medical History     Medical History and Problem List   Asthma  RICK  Depression  GERD  HTN  Pneumonia  Hepatic steatosis  HLD  Obesity  SBO  Seizure disorder   Cushing syndrome     Medications   Albuterol  Tergretol  Breo ellipta  Fluticasone  Duoneb  Lisinopril  Lovastatin  Sertraline     Surgical History   Adenoidectomy  Cholecystectomy  Foot ORIF  Pelvis ORIF  Wrist surgery  Splenectomy  Tonsillectomy  Tubal ligation     Physical Exam     Patient Vitals for the past 24 hrs:   BP Temp Temp src Pulse Resp SpO2 Height Weight   10/30/24 2200 (!) 169/73 -- -- 96 24 90 % -- --   10/30/24 2140 123/59 -- -- 96 22 96 % -- --   10/30/24 2015 (!) 143/59 -- -- 86 18 -- -- --   10/30/24 2000 -- -- -- 91 23 -- -- --   10/30/24 1955 -- -- -- 88 23 96 % -- --   10/30/24 1950 -- -- -- -- -- 94 % -- --   10/30/24 1940 (!) 163/47 -- -- 98 22 93 % --  "--   10/30/24 1930 -- -- -- 93 23 97 % -- --   10/30/24 1920 (!) 194/79 -- -- -- -- -- -- --   10/30/24 1913 (!) 187/75 98  F (36.7  C) Temporal 94 30 90 % 1.702 m (5' 7\") (!) 182.7 kg (402 lb 12.5 oz)     Physical Exam  Vitals and nursing note reviewed.   Constitutional:       General: She is not in acute distress.     Appearance: She is obese. She is not ill-appearing.   HENT:      Head: Normocephalic and atraumatic.      Right Ear: External ear normal.      Left Ear: External ear normal.      Nose: Nose normal.      Mouth/Throat:      Mouth: Mucous membranes are moist.   Eyes:      Extraocular Movements: Extraocular movements intact.      Conjunctiva/sclera: Conjunctivae normal.   Cardiovascular:      Rate and Rhythm: Normal rate and regular rhythm.      Heart sounds: No murmur heard.  Pulmonary:      Effort: Pulmonary effort is normal. Tachypnea present. No respiratory distress.      Breath sounds: Wheezing present. No rhonchi or rales.   Abdominal:      General: Abdomen is flat. Bowel sounds are normal. There is no distension.      Palpations: Abdomen is soft.      Tenderness: There is no abdominal tenderness. There is no guarding or rebound.   Musculoskeletal:         General: No deformity or signs of injury.      Cervical back: Normal range of motion and neck supple.   Skin:     General: Skin is warm and dry.      Findings: No rash.   Neurological:      Mental Status: She is alert and oriented to person, place, and time.   Psychiatric:         Behavior: Behavior normal.           Diagnostics     Lab Results   Labs Ordered and Resulted from Time of ED Arrival to Time of ED Departure   BASIC METABOLIC PANEL - Abnormal       Result Value    Sodium 143      Potassium 4.1      Chloride 108 (*)     Carbon Dioxide (CO2) 21 (*)     Anion Gap 14      Urea Nitrogen 17.3      Creatinine 0.76      GFR Estimate >90      Calcium 8.4 (*)     Glucose 110 (*)    CBC WITH PLATELETS AND DIFFERENTIAL - Abnormal    WBC Count 9.1  "     RBC Count 4.37      Hemoglobin 12.5      Hematocrit 40.1      MCV 92      MCH 28.6      MCHC 31.2 (*)     RDW 14.4      Platelet Count 205      % Neutrophils 64      % Lymphocytes 20      % Monocytes 6      % Eosinophils 10      % Basophils 1      % Immature Granulocytes 0      NRBCs per 100 WBC 0      Absolute Neutrophils 5.8      Absolute Lymphocytes 1.8      Absolute Monocytes 0.5      Absolute Eosinophils 0.9 (*)     Absolute Basophils 0.1      Absolute Immature Granulocytes 0.0      Absolute NRBCs 0.0     ISTAT GASES LACTATE VENOUS POCT - Abnormal    Lactic Acid POCT 1.2      Bicarbonate Venous POCT 26      O2 Sat, Venous POCT 61 (*)     pCO2 Venous POCT 54 (*)     pH Venous POCT 7.28 (*)     pO2 Venous POCT 36         Imaging   XR Chest 2 Views   Final Result   IMPRESSION: Low lung volumes. Heart size prominent on this AP view. Pulmonary vascularity normal. The lungs are clear. Old left rib fracture.          EKG   ECG taken at 1949, ECG read at 1959  Sinus rhythm   No significant change as compared to prior, dated 10/10/24  Rate 88 bpm. NE interval 192 ms. QRS duration 76 ms. QT/QTc 350/423 ms. P-R-T axes 37 2 48.    Independent Interpretation   Chest x-ray shows no pneumothorax, pleural effusion, pneumonia per my read    ED Course      Medications Administered   Medications   ipratropium - albuterol 0.5 mg/2.5 mg/3 mL (DUONEB) neb solution 3 mL (3 mLs Nebulization $Given 10/30/24 1922)   ipratropium - albuterol 0.5 mg/2.5 mg/3 mL (DUONEB) neb solution 3 mL (3 mLs Nebulization $Given 10/30/24 1947)   predniSONE (DELTASONE) tablet 60 mg (60 mg Oral $Given 10/30/24 1947)   ipratropium - albuterol 0.5 mg/2.5 mg/3 mL (DUONEB) neb solution 3 mL (3 mLs Nebulization $Given 10/30/24 2125)       Procedures   Procedures     Discussion of Management   None    ED Course   ED Course as of 10/31/24 0049   Wed Oct 30, 2024   1922 I obtained history and performed a physical exam as noted above.    2103 I rechecked and  updated the patient.        Additional Documentation  None    Medical Decision Making / Diagnosis     CMS Diagnoses: None    MIPS       None    MDM   Brissa Corado is a 59 year old female who presents with worsening shortness of breath since yesterday.  She ran out of her nebulizer solution before symptoms started.  She is wheezing and tachypneic on arrival.  I suspect this likely an asthma exacerbation.  Low suspicion for pneumonia.  Doubt CHF.  She was recently admitted for same and had extensive workup with ultimate diagnosis of asthma exacerbation.  Workup here shows very mild respiratory acidosis.  Chest x-ray shows no obvious pneumonia.  Patient was given a dose of prednisone and 2 DuoNebs with significant improvement in her wheezing and respiratory rate.  Her room air oxygen saturation fluctuated between 90% and 96%.  Given her improvement and and the fact that her exacerbation was likely brought on by her lack of home nebulizer solution, I feel that she can be safely discharged at this point.  She does have home oxygen that she can use as needed as well.  We will give her albuterol solution from the [facility] machine and I will give another 4 days of oral prednisone.  We discussed return precautions and recommended  close follow-up with her primary care physician.    Disposition   The patient was discharged.     Diagnosis     ICD-10-CM    1. Exacerbation of asthma, unspecified asthma severity, unspecified whether persistent  J45.901            Discharge Medications   Discharge Medication List as of 10/30/2024  9:52 PM        START taking these medications    Details   !! albuterol (PROVENTIL) (2.5 MG/3ML) 0.083% neb solution Take 1 vial (2.5 mg) by nebulization every 6 hours as needed for shortness of breath, wheezing or cough., Disp-75 mL, R-0, [facility]      benzonatate (TESSALON) 100 MG capsule Take 1 capsule (100 mg) by mouth 3 times daily as needed for cough., Disp-15 capsule, R-0, [facility]       predniSONE (DELTASONE) 20 MG tablet Take 3 tablets (60 mg) by mouth daily for 4 days., Disp-12 tablet, R-0, E-Prescribe       !! - Potential duplicate medications found. Please discuss with provider.            Scribe Disclosure:  I, Dulce Nolasco, am serving as a scribe at 7:17 PM on 10/30/2024 to document services personally performed by Moustapha Owusu MD based on my observations and the provider's statements to me.        Moustapha Owusu MD  10/31/24 0055

## 2024-11-01 ENCOUNTER — MEDICAL CORRESPONDENCE (OUTPATIENT)
Dept: HEALTH INFORMATION MANAGEMENT | Facility: CLINIC | Age: 59
End: 2024-11-01

## 2024-11-08 ENCOUNTER — TELEPHONE (OUTPATIENT)
Dept: INTERNAL MEDICINE | Facility: CLINIC | Age: 59
End: 2024-11-08
Payer: MEDICARE

## 2024-11-08 NOTE — TELEPHONE ENCOUNTER
Patient Returning Call    Reason for call:  Patient guardian will a call back because she need to make a follow up from er visit and her annual check visit in one appt      Information relayed to patient:      Patient has additional questions:  Yes    What are your questions/concerns:  when call back     Who does the patient want to speak with:      Is an  needed?:  No      Could we send this information to you in [employer] or would you prefer to receive a phone call?:   Patient would prefer a phone call   Okay to leave a detailed message?: Yes at Cell number on file:    Telephone Information:   Mobile [phone]

## 2024-11-12 ENCOUNTER — HOSPITAL ENCOUNTER (EMERGENCY)
Facility: CLINIC | Age: 59
Discharge: HOME | End: 2024-11-12
Attending: EMERGENCY MEDICINE | Admitting: EMERGENCY MEDICINE
Payer: MEDICARE

## 2024-11-12 VITALS
HEART RATE: 82 BPM | TEMPERATURE: 97.7 F | WEIGHT: 293 LBS | HEIGHT: 67 IN | RESPIRATION RATE: 20 BRPM | OXYGEN SATURATION: 94 % | DIASTOLIC BLOOD PRESSURE: 88 MMHG | BODY MASS INDEX: 45.99 KG/M2 | SYSTOLIC BLOOD PRESSURE: 142 MMHG

## 2024-11-12 DIAGNOSIS — I10 ELEVATED BLOOD PRESSURE READING WITH DIAGNOSIS OF HYPERTENSION: ICD-10-CM

## 2024-11-12 DIAGNOSIS — J45.901 ACUTE ASTHMA EXACERBATION: ICD-10-CM

## 2024-11-12 LAB
ANION GAP SERPL CALCULATED.3IONS-SCNC: 16 MMOL/L (ref 7–15)
BASOPHILS # BLD AUTO: 0.1 10E3/UL (ref 0–0.2)
BASOPHILS NFR BLD AUTO: 1 %
BUN SERPL-MCNC: 18.8 MG/DL (ref 6–20)
CALCIUM SERPL-MCNC: 8.4 MG/DL (ref 8.8–10.4)
CHLORIDE SERPL-SCNC: 109 MMOL/L (ref 98–107)
CREAT SERPL-MCNC: 0.66 MG/DL (ref 0.51–0.95)
EGFRCR SERPLBLD CKD-EPI 2021: >90 ML/MIN/1.73M2
EOSINOPHIL # BLD AUTO: 0.7 10E3/UL (ref 0–0.7)
EOSINOPHIL NFR BLD AUTO: 9 %
ERYTHROCYTE [DISTWIDTH] IN BLOOD BY AUTOMATED COUNT: 14.1 % (ref 10–15)
FLUAV RNA SPEC QL NAA+PROBE: NEGATIVE
FLUBV RNA RESP QL NAA+PROBE: NEGATIVE
GLUCOSE SERPL-MCNC: 107 MG/DL (ref 70–99)
HCO3 SERPL-SCNC: 21 MMOL/L (ref 22–29)
HCT VFR BLD AUTO: 40.8 % (ref 35–47)
HGB BLD-MCNC: 12.7 G/DL (ref 11.7–15.7)
HOLD SPECIMEN: NORMAL
HOLD SPECIMEN: NORMAL
IMM GRANULOCYTES # BLD: 0.1 10E3/UL
IMM GRANULOCYTES NFR BLD: 1 %
LYMPHOCYTES # BLD AUTO: 1.3 10E3/UL (ref 0.8–5.3)
LYMPHOCYTES NFR BLD AUTO: 17 %
MCH RBC QN AUTO: 28 PG (ref 26.5–33)
MCHC RBC AUTO-ENTMCNC: 31.1 G/DL (ref 31.5–36.5)
MCV RBC AUTO: 90 FL (ref 78–100)
MONOCYTES # BLD AUTO: 0.5 10E3/UL (ref 0–1.3)
MONOCYTES NFR BLD AUTO: 6 %
NEUTROPHILS # BLD AUTO: 5.2 10E3/UL (ref 1.6–8.3)
NEUTROPHILS NFR BLD AUTO: 67 %
NRBC # BLD AUTO: 0 10E3/UL
NRBC BLD AUTO-RTO: 0 /100
PLATELET # BLD AUTO: 235 10E3/UL (ref 150–450)
POTASSIUM SERPL-SCNC: 3.7 MMOL/L (ref 3.4–5.3)
RBC # BLD AUTO: 4.53 10E6/UL (ref 3.8–5.2)
RSV RNA SPEC NAA+PROBE: NEGATIVE
SARS-COV-2 RNA RESP QL NAA+PROBE: NEGATIVE
SODIUM SERPL-SCNC: 146 MMOL/L (ref 135–145)
WBC # BLD AUTO: 7.8 10E3/UL (ref 4–11)

## 2024-11-12 PROCEDURE — 82947 ASSAY GLUCOSE BLOOD QUANT: CPT | Performed by: EMERGENCY MEDICINE

## 2024-11-12 PROCEDURE — 85004 AUTOMATED DIFF WBC COUNT: CPT | Performed by: EMERGENCY MEDICINE

## 2024-11-12 PROCEDURE — 36415 COLL VENOUS BLD VENIPUNCTURE: CPT | Performed by: EMERGENCY MEDICINE

## 2024-11-12 PROCEDURE — 250N000012 HC RX MED GY IP 250 OP 636 PS 637: Performed by: EMERGENCY MEDICINE

## 2024-11-12 PROCEDURE — 80048 BASIC METABOLIC PNL TOTAL CA: CPT | Performed by: EMERGENCY MEDICINE

## 2024-11-12 PROCEDURE — 94640 AIRWAY INHALATION TREATMENT: CPT

## 2024-11-12 PROCEDURE — 87637 SARSCOV2&INF A&B&RSV AMP PRB: CPT | Performed by: EMERGENCY MEDICINE

## 2024-11-12 PROCEDURE — 250N000009 HC RX 250: Performed by: BEHAVIOR TECHNICIAN

## 2024-11-12 PROCEDURE — 99284 EMERGENCY DEPT VISIT MOD MDM: CPT | Mod: 25

## 2024-11-12 RX ORDER — ALBUTEROL SULFATE 1.25 MG/3ML
1.25 SOLUTION RESPIRATORY (INHALATION) EVERY 6 HOURS PRN
Qty: 90 ML | Refills: 0 | Status: SHIPPED | OUTPATIENT
Start: 2024-11-12 | End: 2024-11-12

## 2024-11-12 RX ORDER — ALBUTEROL SULFATE 90 UG/1
2 INHALANT RESPIRATORY (INHALATION) EVERY 6 HOURS PRN
Qty: 18 G | Refills: 0 | Status: SHIPPED | OUTPATIENT
Start: 2024-11-12

## 2024-11-12 RX ORDER — PREDNISONE 20 MG/1
TABLET ORAL
Qty: 10 TABLET | Refills: 0 | Status: SHIPPED | OUTPATIENT
Start: 2024-11-15

## 2024-11-12 RX ORDER — ALBUTEROL SULFATE 1.25 MG/3ML
2.5 SOLUTION RESPIRATORY (INHALATION) EVERY 6 HOURS PRN
Qty: 90 ML | Refills: 0 | Status: SHIPPED | OUTPATIENT
Start: 2024-11-12 | End: 2024-11-12

## 2024-11-12 RX ORDER — ALBUTEROL SULFATE 0.83 MG/ML
2.5 SOLUTION RESPIRATORY (INHALATION) EVERY 6 HOURS PRN
Qty: 75 ML | Refills: 0 | Status: SHIPPED | OUTPATIENT
Start: 2024-11-12

## 2024-11-12 RX ORDER — PREDNISONE 20 MG/1
TABLET ORAL
Qty: 10 TABLET | Refills: 0 | Status: SHIPPED | OUTPATIENT
Start: 2024-11-12 | End: 2024-11-12

## 2024-11-12 RX ORDER — IPRATROPIUM BROMIDE AND ALBUTEROL SULFATE 2.5; .5 MG/3ML; MG/3ML
3 SOLUTION RESPIRATORY (INHALATION) ONCE
Status: COMPLETED | OUTPATIENT
Start: 2024-11-12 | End: 2024-11-12

## 2024-11-12 RX ADMIN — IPRATROPIUM BROMIDE AND ALBUTEROL SULFATE 3 ML: .5; 3 SOLUTION RESPIRATORY (INHALATION) at 09:59

## 2024-11-12 RX ADMIN — IPRATROPIUM BROMIDE AND ALBUTEROL SULFATE 3 ML: .5; 3 SOLUTION RESPIRATORY (INHALATION) at 10:59

## 2024-11-12 RX ADMIN — DEXAMETHASONE 10 MG: 2 TABLET ORAL at 09:16

## 2024-11-12 ASSESSMENT — ASTHMA QUESTIONNAIRES
ACT_TOTALSCORE: 14
EMERGENCY_ROOM_LAST_YEAR_TOTAL: ONE
QUESTION_5 LAST FOUR WEEKS HOW WOULD YOU RATE YOUR ASTHMA CONTROL: SOMEWHAT CONTROLLED
QUESTION_2 LAST FOUR WEEKS HOW OFTEN HAVE YOU HAD SHORTNESS OF BREATH: THREE TO SIX TIMES A WEEK
QUESTION_3 LAST FOUR WEEKS HOW OFTEN DID YOUR ASTHMA SYMPTOMS (WHEEZING, COUGHING, SHORTNESS OF BREATH, CHEST TIGHTNESS OR PAIN) WAKE YOU UP AT NIGHT OR EARLIER THAN USUAL IN THE MORNING: ONCE A WEEK
QUESTION_1 LAST FOUR WEEKS HOW MUCH OF THE TIME DID YOUR ASTHMA KEEP YOU FROM GETTING AS MUCH DONE AT WORK, SCHOOL OR AT HOME: SOME OF THE TIME
QUESTION_4 LAST FOUR WEEKS HOW OFTEN HAVE YOU USED YOUR RESCUE INHALER OR NEBULIZER MEDICATION (SUCH AS ALBUTEROL): ONE OR TWO TIMES PER DAY
HOSPITALIZATION_OVERNIGHT_LAST_YEAR_TOTAL: TWO
ACT_TOTALSCORE: 14

## 2024-11-12 ASSESSMENT — ACTIVITIES OF DAILY LIVING (ADL)
ADLS_ACUITY_SCORE: 0
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
## 2024-11-12 NOTE — ED TRIAGE NOTES
Pt arrives via EMS from home d/t 2-3 days increased WOB. EMS noticed wheezing with sats 90s on RA. Duo neb given when improved sats 99 on RA. No more wheezing. No CP. Non-productive cough. No increased SHAW. ABC intact.

## 2024-11-12 NOTE — DISCHARGE INSTRUCTIONS
As discussed, your symptoms are likely due to an asthma exacerbation. Please take medications as prescribed. Please follow up with your primary care physician in 3-5 days for reassessment. Please return to the ED with increased work of breathing, shortness of breath, chest pain, fever, chills, persistent nausea and vomiting, or any other concerning symptoms.

## 2024-11-12 NOTE — ED PROVIDER NOTES
Emergency Department Attending Supervision Note  11/12/2024  9:23 AM      I evaluated this patient in conjunction with Hank CLARK      Briefly, the patient presented with  EMS with episode at wheezing at home with O2 sats in the 90's.  Wheezing cleared with EMS duoneb and O2 sat improved from there.  Ran out of inhaler and nebulizer 3 days ago.  Afebrile here.  Cough.  SOB.  No leg swelling.  Feels symptoms returned 3 days ago.      Seen 10.30.24 same similar.  Had CXR full labs and home on prednisone.    On my exam,   General: Patient is well appearing. No distress.  Morbidly obese.    Head: Atraumatic.  Eyes: Conjunctivae and EOM are normal. No scleral icterus.  Neck: Normal range of motion. Neck supple.   Cardiovascular: Normal rate, regular rhythm, normal heart sounds and intact distal pulses.   Pulmonary/Chest: Breath sounds normal. No respiratory distress.  On my exam no wheezes rales rhonchi and no hypoxia.   Abdominal: Soft. Bowel sounds are normal. No distension. No tenderness. No rebound or guarding.   Musculoskeletal: Normal range of motion.  Skin: Warm and dry. No rash noted. Not diaphoretic.      Results: Viral swabs neg    ED course:  She had improvement here with nebs and steroids.  No hypoxia.  No CBC and diff changes.  Does not appear on exam pneumonia or CV resp otherwise than asthma and with timing out of medications and treated as asthma exacerbation doing well.     Refilled home medications.      My impression is asthma exacerbation.          Diagnosis  Asthma exacerbation.        Jose Ledemza MD Stevens, Andrew C, MD  11/12/24 1037

## 2024-11-13 ENCOUNTER — VIRTUAL VISIT (OUTPATIENT)
Dept: INTERNAL MEDICINE | Facility: CLINIC | Age: 59
End: 2024-11-13
Payer: MEDICARE

## 2024-11-13 ENCOUNTER — PATIENT OUTREACH (OUTPATIENT)
Dept: CARE COORDINATION | Facility: CLINIC | Age: 59
End: 2024-11-13

## 2024-11-13 DIAGNOSIS — J45.41 MODERATE PERSISTENT ASTHMA WITH ACUTE EXACERBATION: ICD-10-CM

## 2024-11-13 DIAGNOSIS — J96.01 ACUTE RESPIRATORY FAILURE WITH HYPOXIA (H): ICD-10-CM

## 2024-11-13 DIAGNOSIS — G47.33 OSA (OBSTRUCTIVE SLEEP APNEA): ICD-10-CM

## 2024-11-13 DIAGNOSIS — I10 BENIGN ESSENTIAL HYPERTENSION: ICD-10-CM

## 2024-11-13 DIAGNOSIS — Z09 HOSPITAL DISCHARGE FOLLOW-UP: Primary | ICD-10-CM

## 2024-11-13 PROCEDURE — 99215 OFFICE O/P EST HI 40 MIN: CPT | Performed by: INTERNAL MEDICINE

## 2024-11-13 NOTE — PROGRESS NOTES
"Virginia is a 59 year old who is being evaluated via a billable telephone visit.    How would you like to obtain your AVS? MyChart  If the video visit is dropped, the invitation should be resent by: Text to cell phone: [phone]  Will anyone else be joining your video visit? No      Visit was initially scheduled as a video visit but due to poor connection, visit was completed via telephone.  35 minutes were spent talking to the patient on the phone.    41 minutes spent by me on the date of the encounter doing chart review, history and exam, documentation and further activities per the note  Assessment & Plan     Hospital discharge follow-up  Overall, patient has been doing well.  She had a return visit to the emergency room yesterday due to concerns of wheezing,Patient had run out of inhaler and nebulizer 3 days ago which led to the exacerbation of the symptoms.  Started on prednisone and completed DuoNeb with improvement of symptoms.    Moderate persistent asthma with acute exacerbation  Acute respiratory failure with hypoxia (H)  Overall, symptoms have been stable.  Continue current inhaler regimen.  Patient will be completing pulmonary function test evaluation in 1 week.  Patient needed oxygen due to concerns of hypoxia.  Qualified for home oxygen and currently uses it with activity and nocturnally at 2 L.  - Home Oxygen Order for DME - ONLY FOR DME    Benign essential hypertension  Continue lisinopril at the current dose.  Continue monitoring blood pressure at home.    RICK (obstructive sleep apnea)  Will be following up with the sleep medicine team.  has a previous diagnosis of RICK but has not used CPAP machine.        MED REC REQUIRED  Post Medication Reconciliation Status: Completed  BMI  Estimated body mass index is 62.29 kg/m  as calculated from the following:    Height as of 11/12/24: 1.702 m (5' 7\").    Weight as of 11/12/24: 180.4 kg (397 lb 11.4 oz).             Subjective   Virginia is a 59 year old, " presenting for the following health issues:  Follow Up        8/7/2024    12:51 PM   Additional Questions   Roomed by [place]   Accompanied by Daughter            HPI     ED/UC Followup:    Facility:  [facility] Emergency Dept  Date of visit: 10/9/2024-10/17/2024  Reason for visit: Shortness of Breath   Current Status: improved.    Presented to the emergency room with symptoms of upper respiratory infection-like symptoms.  Completed CT PE, negative for PE.  Patient was treated for asthma exacerbation with possible COPD.  Needed oxygen due to concerns of hypoxia and qualified for home oxygen with activity.  Has a history of obstructive sleep apnea, currently not using CPAP machine.  Encouraged to follow-up with sleep medicine team outpatient.  Will also be completing pulmonary function test.    Review of Systems  Constitutional, HEENT, cardiovascular, pulmonary, gi and gu systems are negative, except as otherwise noted.      Objective           Vitals:  No vitals were obtained today due to virtual visit.    Physical Exam   Not completed due to this being a telephone visit.  Patient did not have audible wheezing or shortness of breath while on the phone.          Telephone-Visit Details    Type of service: Telephone visit  Originating Location (pt. Location): Home    Distant Location (provider location):  On-site  Platform used for Video Visit: Maya  Signed Electronically by: Aury Vizcaino MD

## 2024-11-18 ENCOUNTER — HOSPITAL ENCOUNTER (OUTPATIENT)
Dept: RESPIRATORY THERAPY | Facility: CLINIC | Age: 59
Discharge: HOME | End: 2024-11-18
Attending: INTERNAL MEDICINE | Admitting: INTERNAL MEDICINE
Payer: MEDICARE

## 2024-11-18 DIAGNOSIS — R06.89 HYPOVENTILATION: ICD-10-CM

## 2024-11-18 PROCEDURE — 94729 DIFFUSING CAPACITY: CPT

## 2024-11-18 PROCEDURE — 94726 PLETHYSMOGRAPHY LUNG VOLUMES: CPT

## 2024-11-18 PROCEDURE — 94010 BREATHING CAPACITY TEST: CPT

## 2024-11-18 PROCEDURE — 999N000157 HC STATISTIC RCP TIME EA 10 MIN

## 2024-11-18 NOTE — PROGRESS NOTES
PFT Note:        Pt attempted pulmonary function testing with DLCO.  Good Pt effort and cooperation.  Pt was unable to fully exhale to plateau/early termination of breath for accurate VC measurements.  During DLCO she was also unable to get a deep enough breath in or hold it long enough, no DLCO reported.  All plethysmographic measurements meet ATS recommendations.  FEV1 measurements were repeatable.    November 18, 2024.2:19 PM  Chang Casas, RT

## 2024-11-19 ENCOUNTER — TELEPHONE (OUTPATIENT)
Dept: INTERNAL MEDICINE | Facility: CLINIC | Age: 59
End: 2024-11-19
Payer: MEDICARE

## 2024-11-19 LAB
ERV-%PRED-PRE: 8 %
ERV-PRE: 0.11 L
ERV-PRED: 1.24 L
EXPTIME-PRE: 1.83 SEC
FEF2575-%PRED-PRE: 97 %
FEF2575-PRE: 2.34 L/SEC
FEF2575-PRED: 2.41 L/SEC
FEFMAX-%PRED-PRE: 75 %
FEFMAX-PRE: 5.18 L/SEC
FEFMAX-PRED: 6.9 L/SEC
FEV1-%PRED-PRE: 57 %
FEV1-PRE: 1.52 L
FEV1FEV6-PRE: 91 %
FEV1FEV6-PRED: 81 %
FEV1FVC-PRE: 91 %
FEV1FVC-PRED: 80 %
FEV1SVC-PRE: 78 %
FEV1SVC-PRED: 73 %
FIFMAX-PRE: 3.6 L/SEC
FRCPLETH-%PRED-PRE: 109 %
FRCPLETH-PRE: 3.15 L
FRCPLETH-PRED: 2.87 L
FVC-%PRED-PRE: 50 %
FVC-PRE: 1.67 L
FVC-PRED: 3.31 L
IC-%PRED-PRE: 74 %
IC-PRE: 1.84 L
IC-PRED: 2.47 L
RVPLETH-%PRED-PRE: 164 %
RVPLETH-PRE: 3.04 L
RVPLETH-PRED: 1.85 L
TLCPLETH-%PRED-PRE: 91 %
TLCPLETH-PRE: 5 L
TLCPLETH-PRED: 5.44 L
VC-%PRED-PRE: 54 %
VC-PRE: 1.95 L
VC-PRED: 3.6 L

## 2024-11-20 ENCOUNTER — PATIENT OUTREACH (OUTPATIENT)
Dept: CARE COORDINATION | Facility: CLINIC | Age: 59
End: 2024-11-20
Payer: MEDICARE

## 2024-11-20 ENCOUNTER — MEDICAL CORRESPONDENCE (OUTPATIENT)
Dept: HEALTH INFORMATION MANAGEMENT | Facility: CLINIC | Age: 59
End: 2024-11-20

## 2024-11-20 NOTE — PROGRESS NOTES
Clinic Care Coordination Contact  Follow Up Progress Note      Assessment: RN CC contacted patient's guardian, Lola (mother) for monthly outreach. Patient has a RN for 2 days a week. Patient completed her pulmonary function tests; they are waiting for the results. She started on home oxygen; delivery pending. She has her electric wheelchair and her lift chair. Lola states that patient doesn't see herself as overweight. Lola has tried to educate her that a different type of diet would be healthier for her - weight not considered. So far patient isn't convinced she needs to change her diet.     Care Gaps:    Health Maintenance Due   Topic Date Due    ASTHMA ACTION PLAN  Never done    Pneumococcal Vaccine: Pediatrics (0 to 5 Years) and At-Risk Patients (6 to 64 Years) (1 of 2 - PCV) Never done    HIV SCREENING  Never done    HEPATITIS C SCREENING  Never done    ZOSTER IMMUNIZATION (1 of 2) Never done    HEPATITIS B IMMUNIZATION (1 of 3 - 19+ 3-dose series) Never done    COLORECTAL CANCER SCREENING  04/19/2016    MEDICARE ANNUAL WELLNESS VISIT  10/01/2021    MAMMO SCREENING  10/23/2021    HPV TEST  10/01/2023    PAP  10/01/2023    INFLUENZA VACCINE (1) 09/01/2024    COVID-19 Vaccine (4 - 2024-25 season) 09/01/2024     Did not discuss this outreach as patient is waiting on results from PFTs and for her oxygen to be delivered.     Care Plans  Care Plan: Health Maintenance       Problem: Health Maintenance Due or Overdue       Goal: Become up-to-date with health maintenance visit(s)       Start Date: 5/14/2024 Expected End Date: 11/12/2024    This Visit's Progress: 20% Recent Progress: 10%    Note:     Barriers: Cognitive disability; Physical disability; Poor support from Significant other; Provider availability - wait time to complete appointments.   Strengths: Parents/guardians Motivated; Agreeable to Care Coordination.   Patient expressed understanding of goal: Yes  Action steps to achieve this goal:  1. I will take  my medications as prescribed.   2. I will discuss, review, schedule and complete recommended overdue health maintenance with my Primary Care Provider.   3. I will contact my care team with questions, concerns or support needs. I will use the clinic as a resource and I understand I can contact my clinic with 24/7 after hours services available. Care Coordinator will remain available as needed.   (Still in progress as patient hasn't had an office visit, just virtual visits)                                Care Plan: Resources       Problem: Resources       Goal: Resources for equipment       Start Date: 5/14/2024 Expected End Date: 11/12/2024    This Visit's Progress: 50% Recent Progress: 20%    Note:     Barriers: Cognitive disability; Physical disability; Poor support from Significant other; Provider availability - wait time to complete appointments.   Strengths: Parents/guardians Motivated; Agreeable to Care Coordination.   Patient expressed understanding of goal: Yes  Action steps to achieve this goal:  1. I will review and follow up with resource for medical equipment ([employer] ([phone]) for required forms for Bariatric Electric Wheelchair and bedside commode. (Completed)  2. I will discuss with [facility]  Jagjit Barrientos how to obtain incontinent supplies through [place]'s waiver. (Pending)  3. I will contact my care team with questions, concerns or support needs. I will use the clinic as a resource and I understand I can contact my clinic with 24/7 after hours services available. Care Coordinator will remain available as needed.                             Intervention/Education provided during outreach: Discussed patients plan of care. CC RN asked open ended questions, provided support, resources, and encouragement as needed. Patient will reach out to care team sooner than planned with new questions or concerns.     Plan: RN CC will continue to follow patient for any additional needs.      Care Coordinator will follow up in 1 month.     Rocio Govea RN, BSN, CPHN, [facility]  [facility], and [facility]  [email or web address]  Office: [phone]  Employed by [facility]

## 2024-11-20 NOTE — LETTER
Dear [place],   Attached is an updated Patient Centered Plan of Care for your continued enrollment in Care Coordination. Please let us know if you have additional questions, concerns or goals that we can assist with.     Sincerely,   Rocio Govea RN, BSN, CPHN, CM  [facility]  [facility]  Phone: [phone]  Email: [email or web address]  Patient Centered Plan of Care  About Me:        Patient Name:  Brissa Corado    YOB: 1965  Age:         59 year old   [facility] MRN:    [id number] Telephone Information:  Home Phone [phone]   Mobile [phone]       Address:  [address]  [facility] [facility] [address] Email address:  [email or web address]      Emergency Contact(s)    Name Relationship Lgl Grd Work Phone Home Phone Mobile Phone   1. KARAN TAN * Father Yes   [phone]   2. WAN DOMINICK (C* Mother Yes   [phone]   3. TIANA CORADO Other No [phone]           Primary language:  English     needed? No   [facility] [employer]:  [phone] op. 1  Other communication barriers:Cognitive impairment; Physical impairment    Preferred Method of Communication:  Abhishek  Current living arrangement: Other (Lives in an apartment with significant other)    Mobility Status/ Medical Equipment: Dependent/Assisted by Another    Health Maintenance  Health Maintenance Reviewed: Due/Overdue (Did not discuss during TCM outreach.)    My Access Plan  Medical Emergency 911   Primary Clinic Line [facility] - [phone]   24 Hour Appointment Line [phone] or  [phone]) (toll-free)   24 Hour Nurse Line [phone] (toll-free)   Preferred Urgent Care -- (States she does not use Urgent Care.)     Preferred Hospital [facility]  [phone]     Preferred [facility] [place] [address] [address]     Behavioral Health Crisis Line The National Suicide Prevention Lifeline at 1-895.990.4985 or Text/Call 988           My Care Team Members  Patient Care Team         Relationship Specialty Notifications Start End    Aury Vizcaino MD PCP - General Internal Medicine  2/13/24     Phone: [phone] Fax: [phone]         [address] [facility] [address]    Catarina Govea APRN CNP  Clinical Nurse Specialist  3/23/17     Phone: [phone] Fax: [phone]         [address] [facility] [address] [facility] [address]    Mira Mayberry MD MD INTERNAL MEDICINE - ENDOCRINOLOGY, DIABETES & METABOLISM  3/23/17     Phone: [phone] Fax: [phone]         [address] [facility] [address]    Rosy MEADOWS CM, Acevedo    9/6/18     Phone: [phone]         [facility] [place] [facility]  3/15/21     Phone: [phone]         Aury Vizcaino MD Assigned PCP   1/6/24     Phone: [phone] Fax: [phone]         [address] [facility] [address]    Rocio Govea, RN Lead Care Coordinator Primary Care - CC Admissions 5/11/24     Phone: [phone]                   My Care Plans  Self Management and Treatment Plan    Care Plan  Care Plan: Health Maintenance       Problem: Health Maintenance Due or Overdue       Goal: Become up-to-date with health maintenance visit(s)       Start Date: 5/14/2024 Expected End Date: 11/12/2024    This Visit's Progress: 20% Recent Progress: 10%    Note:     Barriers: Cognitive disability; Physical disability; Poor support from Significant other; Provider availability - wait time to complete appointments.   Strengths: Parents/guardians Motivated; Agreeable to Care Coordination.   Patient expressed understanding of goal: Yes  Action steps to achieve this goal:  1. I will take my medications as prescribed.   2. I will discuss, review, schedule and complete recommended overdue health maintenance  with my Primary Care Provider.   3. I will contact my care team with questions, concerns or support needs. I will use the clinic as a resource and I understand I can contact my clinic with 24/7 after hours services available. Care Coordinator will remain available as needed.   (Still in progress as patient hasn't had an office visit, just virtual visits)                                Care Plan: Resources       Problem: Resources       Goal: Resources for equipment       Start Date: 5/14/2024 Expected End Date: 11/12/2024    This Visit's Progress: 50% Recent Progress: 20%    Note:     Barriers: Cognitive disability; Physical disability; Poor support from Significant other; Provider availability - wait time to complete appointments.   Strengths: Parents/guardians Motivated; Agreeable to Care Coordination.   Patient expressed understanding of goal: Yes  Action steps to achieve this goal:  1. I will review and follow up with resource for medical equipment ([employer] ([phone]) for required forms for Bariatric Electric Wheelchair and bedside commode. (Completed)  2. I will discuss with [employer]  Jagjit Barrientos how to obtain incontinent supplies through [place]'s waiver. (Pending)  3. I will contact my care team with questions, concerns or support needs. I will use the clinic as a resource and I understand I can contact my clinic with 24/7 after hours services available. Care Coordinator will remain available as needed.                             Action Plans on File:     Depression    Advance Care Plans/Directives:   Advanced Care Plan/Directives on file: Yes    Status of Document(s): On File and Validated    Advanced Care Plan/Directives Type: Advanced Directive - On File         My Medical and Care Information  Problem List   Patient Active Problem List   Diagnosis    Essential hypertension, benign    Delay in development    Injury, other and unspecified, knee, leg, ankle, and foot    Perforation  of tympanic membrane    Hyperlipidemia LDL goal <130    Seizure disorder (H)    Other peripheral enthesopathies    IFG (impaired fasting glucose)    Insulin resistance    Elevated cortisol level    Morbid obesity (H)    Low vitamin D level    BMI 50.0-59.9, adult (H)    SBO (small bowel obstruction) (H)    Non-intractable vomiting with nausea, unspecified vomiting type    Critical illness myopathy    Depression    Esophageal reflux    Candidiasis of skin    Skin lesion    Small bowel obstruction (H)    Atypical pneumonia    Mild persistent asthma without complication    Intertrigo    Hepatic steatosis    Symptomatic cholelithiasis    Abdominal pain, unspecified abdominal location    Acute cholecystitis    Hematuria, unspecified type    Acute respiratory failure with hypoxia (H)    Exacerbation of asthma, unspecified asthma severity, unspecified whether persistent      Current Medications:    Current Outpatient Medications   Medication Sig Dispense Refill    acetaminophen (TYLENOL) 500 MG tablet Take 1,000 mg by mouth as needed for mild pain.      albuterol (PROAIR HFA/PROVENTIL HFA/VENTOLIN HFA) 108 (90 Base) MCG/ACT inhaler Inhale 2 puffs into the lungs every 6 hours as needed for shortness of breath, wheezing or cough. 18 g 0    albuterol (PROVENTIL) (2.5 MG/3ML) 0.083% neb solution Take 1 vial (2.5 mg) by nebulization every 6 hours as needed for shortness of breath or wheezing. 75 mL 0    azelastine (ASTELIN) 0.1 % nasal spray Spray 1 spray into both nostrils daily as needed for allergies.      benzonatate (TESSALON) 100 MG capsule Take 1 capsule (100 mg) by mouth 3 times daily as needed for cough. 15 capsule 0    carBAMazepine (TEGRETOL) 200 MG tablet Take 400 mg by mouth 2 times daily. AM and HS      carBAMazepine (TEGRETOL) 200 MG tablet Take 200 mg by mouth daily. @1200      fluticasone (FLONASE) 50 MCG/ACT nasal spray USE ONE SPRAY INTO BOTH NOSTRILS DAILY AS NEEDED FOR RHINITIS OR ALLERGIES 16 mL 1     Fluticasone Furoate-Vilanterol (BREO ELLIPTA) 50-25 MCG/ACT AEPB Inhale 1 puff into the lungs 2 times daily      ibuprofen (ADVIL/MOTRIN) 200 MG tablet Take 800 mg by mouth as needed for pain.      ipratropium - albuterol 0.5 mg/2.5 mg/3 mL (DUONEB) 0.5-2.5 (3) MG/3ML neb solution Take 1 vial by nebulization every 6 hours as needed for shortness of breath, wheezing or cough.      lisinopril (ZESTRIL) 20 MG tablet TAKE 1 TABLET (20 MG) BY MOUTH DAILY 90 tablet 2    lovastatin (MEVACOR) 40 MG tablet TAKE 1 TABLET (40 MG) BY MOUTH AT BEDTIME 90 tablet 1    miconazole (MICATIN) 2 % external powder Apply topically as needed for itching or other.      Multiple Vitamin (MULTI-VITAMIN PO) Take 1 tablet by mouth daily      nystatin (MYCOSTATIN) 784294 UNIT/GM external cream Apply topically daily as needed      predniSONE (DELTASONE) 20 MG tablet Take two tablets (= 40mg) each day for 5 (five) days 10 tablet 0    sertraline (ZOLOFT) 100 MG tablet TAKE 1 TABLET (100 MG) BY MOUTH DAILY 90 tablet 1     No current facility-administered medications for this visit.        Allergies   Allergen Reactions    Iodine      Iodinated Contrast Media  --- Hives      Penicillins Unknown    Tetracyclines & Related Unknown    Hydrochlorothiazide Palpitations     dehydration    Influenza Vac Split [Influenza Virus Vaccine] Rash     Patient states she is allergic to the vaccine.  Got a rash all over body many years ago after receiving injection.     Care Coordination Start Date: 5/11/2024   Frequency of Care Coordination: monthly, more frequently as needed     Form Last Updated: 11/20/2024

## 2024-12-05 ENCOUNTER — APPOINTMENT (OUTPATIENT)
Dept: GENERAL RADIOLOGY | Facility: CLINIC | Age: 59
End: 2024-12-05
Attending: EMERGENCY MEDICINE
Payer: MEDICARE

## 2024-12-05 ENCOUNTER — HOSPITAL ENCOUNTER (EMERGENCY)
Facility: CLINIC | Age: 59
Discharge: HOME | End: 2024-12-05
Attending: EMERGENCY MEDICINE
Payer: MEDICARE

## 2024-12-05 VITALS
TEMPERATURE: 97.9 F | RESPIRATION RATE: 16 BRPM | DIASTOLIC BLOOD PRESSURE: 75 MMHG | SYSTOLIC BLOOD PRESSURE: 180 MMHG | HEART RATE: 86 BPM | OXYGEN SATURATION: 93 % | BODY MASS INDEX: 62.29 KG/M2 | HEIGHT: 67 IN

## 2024-12-05 DIAGNOSIS — J45.901 EXACERBATION OF ASTHMA, UNSPECIFIED ASTHMA SEVERITY, UNSPECIFIED WHETHER PERSISTENT: ICD-10-CM

## 2024-12-05 LAB
ANION GAP SERPL CALCULATED.3IONS-SCNC: 13 MMOL/L (ref 7–15)
ATRIAL RATE - MUSE: 76 BPM
BASOPHILS # BLD AUTO: 0.1 10E3/UL (ref 0–0.2)
BASOPHILS NFR BLD AUTO: 1 %
BUN SERPL-MCNC: 15.5 MG/DL (ref 6–20)
CALCIUM SERPL-MCNC: 8.2 MG/DL (ref 8.8–10.4)
CHLORIDE SERPL-SCNC: 108 MMOL/L (ref 98–107)
CREAT SERPL-MCNC: 0.66 MG/DL (ref 0.51–0.95)
DIASTOLIC BLOOD PRESSURE - MUSE: NORMAL MMHG
EGFRCR SERPLBLD CKD-EPI 2021: >90 ML/MIN/1.73M2
EOSINOPHIL # BLD AUTO: 0.5 10E3/UL (ref 0–0.7)
EOSINOPHIL NFR BLD AUTO: 6 %
ERYTHROCYTE [DISTWIDTH] IN BLOOD BY AUTOMATED COUNT: 14.4 % (ref 10–15)
FLUAV RNA SPEC QL NAA+PROBE: NEGATIVE
FLUBV RNA RESP QL NAA+PROBE: NEGATIVE
GLUCOSE SERPL-MCNC: 124 MG/DL (ref 70–99)
HCO3 SERPL-SCNC: 23 MMOL/L (ref 22–29)
HCT VFR BLD AUTO: 39 % (ref 35–47)
HGB BLD-MCNC: 12.1 G/DL (ref 11.7–15.7)
IMM GRANULOCYTES # BLD: 0 10E3/UL
IMM GRANULOCYTES NFR BLD: 0 %
INTERPRETATION ECG - MUSE: NORMAL
LYMPHOCYTES # BLD AUTO: 0.9 10E3/UL (ref 0.8–5.3)
LYMPHOCYTES NFR BLD AUTO: 11 %
MCH RBC QN AUTO: 28.3 PG (ref 26.5–33)
MCHC RBC AUTO-ENTMCNC: 31 G/DL (ref 31.5–36.5)
MCV RBC AUTO: 91 FL (ref 78–100)
MONOCYTES # BLD AUTO: 0.4 10E3/UL (ref 0–1.3)
MONOCYTES NFR BLD AUTO: 5 %
NEUTROPHILS # BLD AUTO: 6.4 10E3/UL (ref 1.6–8.3)
NEUTROPHILS NFR BLD AUTO: 77 %
NRBC # BLD AUTO: 0 10E3/UL
NRBC BLD AUTO-RTO: 0 /100
P AXIS - MUSE: 31 DEGREES
PLATELET # BLD AUTO: 191 10E3/UL (ref 150–450)
POTASSIUM SERPL-SCNC: 3.6 MMOL/L (ref 3.4–5.3)
PR INTERVAL - MUSE: 210 MS
QRS DURATION - MUSE: 74 MS
QT - MUSE: 360 MS
QTC - MUSE: 405 MS
R AXIS - MUSE: 7 DEGREES
RBC # BLD AUTO: 4.28 10E6/UL (ref 3.8–5.2)
RSV RNA SPEC NAA+PROBE: NEGATIVE
SARS-COV-2 RNA RESP QL NAA+PROBE: NEGATIVE
SODIUM SERPL-SCNC: 144 MMOL/L (ref 135–145)
SYSTOLIC BLOOD PRESSURE - MUSE: NORMAL MMHG
T AXIS - MUSE: 35 DEGREES
VENTRICULAR RATE- MUSE: 76 BPM
WBC # BLD AUTO: 8.3 10E3/UL (ref 4–11)

## 2024-12-05 PROCEDURE — 250N000012 HC RX MED GY IP 250 OP 636 PS 637: Performed by: EMERGENCY MEDICINE

## 2024-12-05 PROCEDURE — 87637 SARSCOV2&INF A&B&RSV AMP PRB: CPT | Performed by: EMERGENCY MEDICINE

## 2024-12-05 PROCEDURE — 82310 ASSAY OF CALCIUM: CPT | Performed by: EMERGENCY MEDICINE

## 2024-12-05 PROCEDURE — 99285 EMERGENCY DEPT VISIT HI MDM: CPT | Mod: 25 | Performed by: EMERGENCY MEDICINE

## 2024-12-05 PROCEDURE — 94640 AIRWAY INHALATION TREATMENT: CPT | Performed by: EMERGENCY MEDICINE

## 2024-12-05 PROCEDURE — 80048 BASIC METABOLIC PNL TOTAL CA: CPT | Performed by: EMERGENCY MEDICINE

## 2024-12-05 PROCEDURE — 36415 COLL VENOUS BLD VENIPUNCTURE: CPT | Performed by: EMERGENCY MEDICINE

## 2024-12-05 PROCEDURE — 250N000009 HC RX 250: Performed by: EMERGENCY MEDICINE

## 2024-12-05 PROCEDURE — 85004 AUTOMATED DIFF WBC COUNT: CPT | Performed by: EMERGENCY MEDICINE

## 2024-12-05 PROCEDURE — 71046 X-RAY EXAM CHEST 2 VIEWS: CPT

## 2024-12-05 RX ORDER — PREDNISONE 20 MG/1
TABLET ORAL
Qty: 10 TABLET | Refills: 0 | Status: SHIPPED | OUTPATIENT
Start: 2024-12-05

## 2024-12-05 RX ORDER — PREDNISONE 20 MG/1
60 TABLET ORAL ONCE
Status: COMPLETED | OUTPATIENT
Start: 2024-12-05 | End: 2024-12-05

## 2024-12-05 RX ORDER — ALBUTEROL SULFATE 0.83 MG/ML
2.5 SOLUTION RESPIRATORY (INHALATION) EVERY 6 HOURS PRN
Qty: 75 ML | Refills: 0 | Status: SHIPPED | OUTPATIENT
Start: 2024-12-05

## 2024-12-05 RX ORDER — ALBUTEROL SULFATE 90 UG/1
2 INHALANT RESPIRATORY (INHALATION) EVERY 6 HOURS PRN
Qty: 18 G | Refills: 0 | Status: SHIPPED | OUTPATIENT
Start: 2024-12-05

## 2024-12-05 RX ORDER — IPRATROPIUM BROMIDE AND ALBUTEROL SULFATE 2.5; .5 MG/3ML; MG/3ML
3 SOLUTION RESPIRATORY (INHALATION) ONCE
Status: COMPLETED | OUTPATIENT
Start: 2024-12-05 | End: 2024-12-05

## 2024-12-05 RX ADMIN — PREDNISONE 60 MG: 20 TABLET ORAL at 09:03

## 2024-12-05 RX ADMIN — IPRATROPIUM BROMIDE AND ALBUTEROL SULFATE 3 ML: .5; 3 SOLUTION RESPIRATORY (INHALATION) at 09:03

## 2024-12-05 ASSESSMENT — ACTIVITIES OF DAILY LIVING (ADL)
ADLS_ACUITY_SCORE: 62

## 2024-12-05 ASSESSMENT — COLUMBIA-SUICIDE SEVERITY RATING SCALE - C-SSRS
1. IN THE PAST MONTH, HAVE YOU WISHED YOU WERE DEAD OR WISHED YOU COULD GO TO SLEEP AND NOT WAKE UP?: NO
2. HAVE YOU ACTUALLY HAD ANY THOUGHTS OF KILLING YOURSELF IN THE PAST MONTH?: NO
6. HAVE YOU EVER DONE ANYTHING, STARTED TO DO ANYTHING, OR PREPARED TO DO ANYTHING TO END YOUR LIFE?: NO

## 2024-12-05 NOTE — ED TRIAGE NOTES
The following hx is provided by EMS:    Arrives from home where she lives independently with  for SOB. Hx asthma. Ran out of inhaler and neb, therefore called 911. Wheezing. -170s/100s.

## 2024-12-05 NOTE — ED PROVIDER NOTES
Emergency Department Note      History of Present Illness     Chief Complaint   Shortness of Breath      HPI   Brissa Corado is a 59 year old female with history of asthma who presents via EMS for shortness of breath.  She says she has not seen her primary care doctor recently.  She ran out of her albuterol yesterday.  She typically uses it at least 3 times per day.  She became increasingly short of breath and wheezy overnight.  She felt improved with a nebulizer en route.  She says she has had a dry cough in the last couple of days.  She does not smoke or self and says she does have secondhand smoke exposures.  No fever.  No chest pain.  No vomiting or diarrhea.      Independent Historian   History taken from EMS report patient ran out of her medications    Review of External Notes   Discharge summary reviewed from October 17, 2024 and the patient was admitted with hypoxic respiratory failure related to asthma and potentially COPD.  She had URI symptoms at that time.  It is felt that she could have obesity related hypoventilation contributing.    Past Medical History     Medical History and Problem List   Asthma  RICK  Depression  GERD  HTN  Pneumonia  Hepatic steatosis  HLD  Obesity  SBO  Seizure disorder   Cushing syndrome       Medications   Albuterol  Tergretol  Breo ellipta  Fluticasone  Duoneb  Lisinopril  Lovastatin  Sertraline     Surgical History  Adenoidectomy  Cholecystectomy  Foot ORIF  Pelvis ORIF  Wrist surgery  Splenectomy  Tonsillectomy  Tubal ligation        Physical Exam     Patient Vitals for the past 24 hrs:   BP Temp Temp src Pulse Resp SpO2 Height   12/05/24 1245 (!) 181/86 -- -- 95 -- 96 % --   12/05/24 1230 (!) 164/90 -- -- 88 -- 91 % --   12/05/24 1215 (!) 149/97 -- -- 87 -- 93 % --   12/05/24 1200 (!) 152/127 -- -- 75 -- 92 % --   12/05/24 1145 (!) 160/65 -- -- 80 -- 93 % --   12/05/24 1130 (!) 185/92 -- -- 77 -- 90 % --   12/05/24 1115 136/57 -- -- 73 -- 90 % --   12/05/24 1100  "119/73 -- -- 78 -- 91 % --   12/05/24 1045 112/79 -- -- 74 -- 92 % --   12/05/24 1000 (!) 156/60 -- -- 74 -- 92 % --   12/05/24 0945 (!) 161/62 -- -- -- -- -- --   12/05/24 0930 (!) 136/98 97.9  F (36.6  C) Oral 76 20 93 % --   12/05/24 0857 126/81 -- -- -- 16 91 % 1.702 m (5' 7\")     Physical Exam  Constitutional:       General: She is not in acute distress.     Appearance: Normal appearance. She is obese. She is not diaphoretic.   HENT:      Head: Atraumatic.      Right Ear: External ear normal.      Left Ear: External ear normal.      Mouth/Throat:      Mouth: Mucous membranes are moist.   Eyes:      General: No scleral icterus.     Conjunctiva/sclera: Conjunctivae normal.   Cardiovascular:      Rate and Rhythm: Normal rate and regular rhythm.      Heart sounds: Normal heart sounds.   Pulmonary:      Effort: No respiratory distress.      Comments: Speaking in full paragraphs.  Scattered wheezes.  Abdominal:      General: Abdomen is flat. There is no distension.      Tenderness: There is no abdominal tenderness.   Musculoskeletal:      Cervical back: Neck supple.      Right lower leg: No edema.      Left lower leg: No edema.   Skin:     General: Skin is warm.      Capillary Refill: Capillary refill takes less than 2 seconds.      Findings: No rash.   Neurological:      General: No focal deficit present.      Mental Status: She is alert and oriented to person, place, and time.   Psychiatric:         Mood and Affect: Mood normal.         Behavior: Behavior normal.           Diagnostics     Lab Results   Labs Ordered and Resulted from Time of ED Arrival to Time of ED Departure   BASIC METABOLIC PANEL - Abnormal       Result Value    Sodium 144      Potassium 3.6      Chloride 108 (*)     Carbon Dioxide (CO2) 23      Anion Gap 13      Urea Nitrogen 15.5      Creatinine 0.66      GFR Estimate >90      Calcium 8.2 (*)     Glucose 124 (*)    CBC WITH PLATELETS AND DIFFERENTIAL - Abnormal    WBC Count 8.3      RBC Count " 4.28      Hemoglobin 12.1      Hematocrit 39.0      MCV 91      MCH 28.3      MCHC 31.0 (*)     RDW 14.4      Platelet Count 191      % Neutrophils 77      % Lymphocytes 11      % Monocytes 5      % Eosinophils 6      % Basophils 1      % Immature Granulocytes 0      NRBCs per 100 WBC 0      Absolute Neutrophils 6.4      Absolute Lymphocytes 0.9      Absolute Monocytes 0.4      Absolute Eosinophils 0.5      Absolute Basophils 0.1      Absolute Immature Granulocytes 0.0      Absolute NRBCs 0.0     INFLUENZA A/B, RSV AND SARS-COV2 PCR - Normal    Influenza A PCR Negative      Influenza B PCR Negative      RSV PCR Negative      SARS CoV2 PCR Negative         Imaging   XR Chest 2 Views   Final Result   IMPRESSION: Low lung volumes with bronchovascular crowding. Suggestion   of peribronchial cuffing could reflect reactive airways or bronchial   infectious/inflammatory process. No focal consolidation, pleural   effusion or pneumothorax. Similar cardiomediastinal silhouette.      SAMI MAY MD            SYSTEM ID:  [id number]          EKG   ECG results from 12/05/24   EKG 12-lead, tracing only     Value    Systolic Blood Pressure     Diastolic Blood Pressure     Ventricular Rate 76    Atrial Rate 76    CO Interval 210    QRS Duration 74        QTc 405    P Axis 31    R AXIS 7    T Axis 35    Interpretation ECG      Sinus rhythm with 1st degree A-V block  Low voltage QRS  Borderline ECG  When compared with ECG of 30-Oct-2024 19:49,  No significant change was found        Independent Interpretation   CXR: No pneumothorax, infiltrate, or pleural effusion.    ED Course      Medications Administered   Medications   predniSONE (DELTASONE) tablet 60 mg (60 mg Oral $Given 12/5/24 0903)   ipratropium - albuterol 0.5 mg/2.5 mg/3 mL (DUONEB) neb solution 3 mL (3 mLs Nebulization $Given 12/5/24 0903)       ED Course   ED Course as of 12/05/24 1254   Thu Dec 05, 2024   0817 I obtained the history and examined the  patient as noted above.     1136 I rechecked the patient and explained findings.        Medical Decision Making / Diagnosis     DA Corado is a 59 year old female who presents to the ED with shortness of breath.  She says she ran out of her albuterol.  She had slight wheezing on arrival.  She is overall hemodynamically stable.  She improved with a nebulizer and dose of prednisone.  She continue 4 more days of prednisone.  Her albuterol was refilled.  She is advised to continue to follow through her clinic.    Disposition   The patient was discharged.     Diagnosis     ICD-10-CM    1. Exacerbation of asthma, unspecified asthma severity, unspecified whether persistent  J45.901            Discharge Medications   New Prescriptions    ALBUTEROL (PROAIR HFA/PROVENTIL HFA/VENTOLIN HFA) 108 (90 BASE) MCG/ACT INHALER    Inhale 2 puffs into the lungs every 6 hours as needed for shortness of breath, wheezing or cough.    ALBUTEROL (PROVENTIL) (2.5 MG/3ML) 0.083% NEB SOLUTION    Take 1 vial (2.5 mg) by nebulization every 6 hours as needed.    PREDNISONE (DELTASONE) 20 MG TABLET    Take two tablets (= 40mg) each day for 5 (five) days       Scribe Disclosure:  I, Maria Eugenia Brizuela, am serving as a scribe at 8:52 AM on 12/5/2024 to document services personally performed by Macario Hooks MD based on my observations and the provider's statements to me.        Macario Hooks MD  12/05/24 9938

## 2024-12-17 ENCOUNTER — TELEPHONE (OUTPATIENT)
Dept: INTERNAL MEDICINE | Facility: CLINIC | Age: 59
End: 2024-12-17
Payer: MEDICARE

## 2024-12-19 ENCOUNTER — HOSPITAL ENCOUNTER (OUTPATIENT)
Facility: CLINIC | Age: 59
Setting detail: OBSERVATION
End: 2024-12-19
Attending: EMERGENCY MEDICINE | Admitting: STUDENT IN AN ORGANIZED HEALTH CARE EDUCATION/TRAINING PROGRAM
Payer: MEDICARE

## 2024-12-19 ENCOUNTER — APPOINTMENT (OUTPATIENT)
Dept: GENERAL RADIOLOGY | Facility: CLINIC | Age: 59
End: 2024-12-19
Attending: EMERGENCY MEDICINE
Payer: MEDICARE

## 2024-12-19 DIAGNOSIS — G72.81 CRITICAL ILLNESS MYOPATHY: ICD-10-CM

## 2024-12-19 DIAGNOSIS — E88.819 INSULIN RESISTANCE: ICD-10-CM

## 2024-12-19 DIAGNOSIS — R31.9 HEMATURIA, UNSPECIFIED TYPE: ICD-10-CM

## 2024-12-19 DIAGNOSIS — E78.5 HYPERLIPIDEMIA LDL GOAL <130: ICD-10-CM

## 2024-12-19 DIAGNOSIS — J96.11 CHRONIC RESPIRATORY FAILURE WITH HYPOXIA (H): ICD-10-CM

## 2024-12-19 DIAGNOSIS — K76.0 HEPATIC STEATOSIS: ICD-10-CM

## 2024-12-19 DIAGNOSIS — K81.0 ACUTE CHOLECYSTITIS: ICD-10-CM

## 2024-12-19 DIAGNOSIS — K56.609 SBO (SMALL BOWEL OBSTRUCTION) (H): ICD-10-CM

## 2024-12-19 DIAGNOSIS — S71.109A WOUND OF THIGH: ICD-10-CM

## 2024-12-19 DIAGNOSIS — L89.223 PRESSURE INJURY OF LEFT THIGH, STAGE 3 (H): Primary | ICD-10-CM

## 2024-12-19 DIAGNOSIS — J18.9 ATYPICAL PNEUMONIA: ICD-10-CM

## 2024-12-19 DIAGNOSIS — J45.901 EXACERBATION OF ASTHMA, UNSPECIFIED ASTHMA SEVERITY, UNSPECIFIED WHETHER PERSISTENT: ICD-10-CM

## 2024-12-19 DIAGNOSIS — I10 ESSENTIAL HYPERTENSION, BENIGN: ICD-10-CM

## 2024-12-19 DIAGNOSIS — L30.4 INTERTRIGO: ICD-10-CM

## 2024-12-19 DIAGNOSIS — B37.2 CANDIDIASIS OF SKIN: ICD-10-CM

## 2024-12-19 DIAGNOSIS — E66.01 MORBID OBESITY (H): ICD-10-CM

## 2024-12-19 DIAGNOSIS — R11.2 NON-INTRACTABLE VOMITING WITH NAUSEA: ICD-10-CM

## 2024-12-19 DIAGNOSIS — L98.9 SKIN LESION: ICD-10-CM

## 2024-12-19 DIAGNOSIS — J45.30 MILD PERSISTENT ASTHMA WITHOUT COMPLICATION: ICD-10-CM

## 2024-12-19 DIAGNOSIS — K56.609 SMALL BOWEL OBSTRUCTION (H): ICD-10-CM

## 2024-12-19 DIAGNOSIS — R79.89 ELEVATED CORTISOL LEVEL: ICD-10-CM

## 2024-12-19 DIAGNOSIS — G40.909 SEIZURE DISORDER (H): ICD-10-CM

## 2024-12-19 DIAGNOSIS — K80.20 SYMPTOMATIC CHOLELITHIASIS: ICD-10-CM

## 2024-12-19 DIAGNOSIS — R73.01 IFG (IMPAIRED FASTING GLUCOSE): ICD-10-CM

## 2024-12-19 DIAGNOSIS — R79.89 LOW VITAMIN D LEVEL: ICD-10-CM

## 2024-12-19 DIAGNOSIS — R62.50 DELAY IN DEVELOPMENT: ICD-10-CM

## 2024-12-19 DIAGNOSIS — J96.01 ACUTE RESPIRATORY FAILURE WITH HYPOXIA (H): ICD-10-CM

## 2024-12-19 DIAGNOSIS — R10.9 ABDOMINAL PAIN, UNSPECIFIED ABDOMINAL LOCATION: ICD-10-CM

## 2024-12-19 LAB
ANION GAP SERPL CALCULATED.3IONS-SCNC: 13 MMOL/L (ref 7–15)
ATRIAL RATE - MUSE: 79 BPM
BASE EXCESS BLDV CALC-SCNC: 4 MMOL/L (ref -3–3)
BASOPHILS # BLD AUTO: 0.1 10E3/UL (ref 0–0.2)
BASOPHILS NFR BLD AUTO: 1 %
BUN SERPL-MCNC: 14.9 MG/DL (ref 6–20)
CALCIUM SERPL-MCNC: 8.5 MG/DL (ref 8.8–10.4)
CHLORIDE SERPL-SCNC: 108 MMOL/L (ref 98–107)
CREAT SERPL-MCNC: 0.62 MG/DL (ref 0.51–0.95)
DIASTOLIC BLOOD PRESSURE - MUSE: NORMAL MMHG
EGFRCR SERPLBLD CKD-EPI 2021: >90 ML/MIN/1.73M2
EOSINOPHIL # BLD AUTO: 0.5 10E3/UL (ref 0–0.7)
EOSINOPHIL NFR BLD AUTO: 7 %
ERYTHROCYTE [DISTWIDTH] IN BLOOD BY AUTOMATED COUNT: 13.9 % (ref 10–15)
FLUAV RNA SPEC QL NAA+PROBE: NEGATIVE
FLUBV RNA RESP QL NAA+PROBE: NEGATIVE
GLUCOSE SERPL-MCNC: 116 MG/DL (ref 70–99)
HCO3 BLDV-SCNC: 32 MMOL/L (ref 21–28)
HCO3 SERPL-SCNC: 26 MMOL/L (ref 22–29)
HCT VFR BLD AUTO: 38.2 % (ref 35–47)
HGB BLD-MCNC: 11.7 G/DL (ref 11.7–15.7)
HOLD SPECIMEN: NORMAL
IMM GRANULOCYTES # BLD: 0 10E3/UL
IMM GRANULOCYTES NFR BLD: 0 %
INTERPRETATION ECG - MUSE: NORMAL
LYMPHOCYTES # BLD AUTO: 1.5 10E3/UL (ref 0.8–5.3)
LYMPHOCYTES NFR BLD AUTO: 20 %
MCH RBC QN AUTO: 28.3 PG (ref 26.5–33)
MCHC RBC AUTO-ENTMCNC: 30.6 G/DL (ref 31.5–36.5)
MCV RBC AUTO: 92 FL (ref 78–100)
MONOCYTES # BLD AUTO: 0.5 10E3/UL (ref 0–1.3)
MONOCYTES NFR BLD AUTO: 7 %
NEUTROPHILS # BLD AUTO: 4.8 10E3/UL (ref 1.6–8.3)
NEUTROPHILS NFR BLD AUTO: 65 %
NRBC # BLD AUTO: 0 10E3/UL
NRBC BLD AUTO-RTO: 0 /100
O2/TOTAL GAS SETTING VFR VENT: 4 %
OXYHGB MFR BLDV: 50 % (ref 70–75)
P AXIS - MUSE: 29 DEGREES
PCO2 BLDV: 60 MM HG (ref 40–50)
PH BLDV: 7.33 [PH] (ref 7.32–7.43)
PLATELET # BLD AUTO: 212 10E3/UL (ref 150–450)
PO2 BLDV: 30 MM HG (ref 25–47)
POTASSIUM SERPL-SCNC: 3.8 MMOL/L (ref 3.4–5.3)
PR INTERVAL - MUSE: 218 MS
QRS DURATION - MUSE: 72 MS
QT - MUSE: 360 MS
QTC - MUSE: 412 MS
R AXIS - MUSE: 10 DEGREES
RBC # BLD AUTO: 4.14 10E6/UL (ref 3.8–5.2)
RSV RNA SPEC NAA+PROBE: NEGATIVE
SAO2 % BLDV: 50.3 % (ref 70–75)
SARS-COV-2 RNA RESP QL NAA+PROBE: NEGATIVE
SODIUM SERPL-SCNC: 147 MMOL/L (ref 135–145)
SYSTOLIC BLOOD PRESSURE - MUSE: NORMAL MMHG
T AXIS - MUSE: 39 DEGREES
TROPONIN T SERPL HS-MCNC: 10 NG/L
TROPONIN T SERPL HS-MCNC: 11 NG/L
VENTRICULAR RATE- MUSE: 79 BPM
WBC # BLD AUTO: 7.3 10E3/UL (ref 4–11)

## 2024-12-19 PROCEDURE — 96374 THER/PROPH/DIAG INJ IV PUSH: CPT

## 2024-12-19 PROCEDURE — 84484 ASSAY OF TROPONIN QUANT: CPT | Performed by: EMERGENCY MEDICINE

## 2024-12-19 PROCEDURE — 71046 X-RAY EXAM CHEST 2 VIEWS: CPT

## 2024-12-19 PROCEDURE — 250N000011 HC RX IP 250 OP 636: Performed by: EMERGENCY MEDICINE

## 2024-12-19 PROCEDURE — 99222 1ST HOSP IP/OBS MODERATE 55: CPT | Mod: AI | Performed by: STUDENT IN AN ORGANIZED HEALTH CARE EDUCATION/TRAINING PROGRAM

## 2024-12-19 PROCEDURE — 93005 ELECTROCARDIOGRAM TRACING: CPT

## 2024-12-19 PROCEDURE — 80048 BASIC METABOLIC PNL TOTAL CA: CPT | Performed by: EMERGENCY MEDICINE

## 2024-12-19 PROCEDURE — 250N000009 HC RX 250: Performed by: EMERGENCY MEDICINE

## 2024-12-19 PROCEDURE — 82805 BLOOD GASES W/O2 SATURATION: CPT | Performed by: EMERGENCY MEDICINE

## 2024-12-19 PROCEDURE — 82374 ASSAY BLOOD CARBON DIOXIDE: CPT | Performed by: EMERGENCY MEDICINE

## 2024-12-19 PROCEDURE — 99285 EMERGENCY DEPT VISIT HI MDM: CPT | Mod: 25

## 2024-12-19 PROCEDURE — 36415 COLL VENOUS BLD VENIPUNCTURE: CPT | Performed by: EMERGENCY MEDICINE

## 2024-12-19 PROCEDURE — 85025 COMPLETE CBC W/AUTO DIFF WBC: CPT | Performed by: EMERGENCY MEDICINE

## 2024-12-19 PROCEDURE — 87637 SARSCOV2&INF A&B&RSV AMP PRB: CPT | Performed by: EMERGENCY MEDICINE

## 2024-12-19 PROCEDURE — G0378 HOSPITAL OBSERVATION PER HR: HCPCS

## 2024-12-19 RX ORDER — IPRATROPIUM BROMIDE AND ALBUTEROL SULFATE 2.5; .5 MG/3ML; MG/3ML
3 SOLUTION RESPIRATORY (INHALATION) ONCE
Status: COMPLETED | OUTPATIENT
Start: 2024-12-19 | End: 2024-12-19

## 2024-12-19 RX ORDER — IPRATROPIUM BROMIDE AND ALBUTEROL SULFATE 2.5; .5 MG/3ML; MG/3ML
3 SOLUTION RESPIRATORY (INHALATION) EVERY 4 HOURS PRN
Status: DISCONTINUED | OUTPATIENT
Start: 2024-12-19 | End: 2024-12-20

## 2024-12-19 RX ORDER — ONDANSETRON 4 MG/1
4 TABLET, ORALLY DISINTEGRATING ORAL EVERY 6 HOURS PRN
Status: DISCONTINUED | OUTPATIENT
Start: 2024-12-19 | End: 2024-12-21 | Stop reason: HOSPADM

## 2024-12-19 RX ORDER — PROCHLORPERAZINE MALEATE 5 MG/1
10 TABLET ORAL EVERY 6 HOURS PRN
Status: DISCONTINUED | OUTPATIENT
Start: 2024-12-19 | End: 2024-12-21 | Stop reason: HOSPADM

## 2024-12-19 RX ORDER — AMOXICILLIN 250 MG
1 CAPSULE ORAL 2 TIMES DAILY PRN
Status: DISCONTINUED | OUTPATIENT
Start: 2024-12-19 | End: 2024-12-21 | Stop reason: HOSPADM

## 2024-12-19 RX ORDER — AMOXICILLIN 250 MG
2 CAPSULE ORAL 2 TIMES DAILY PRN
Status: DISCONTINUED | OUTPATIENT
Start: 2024-12-19 | End: 2024-12-21 | Stop reason: HOSPADM

## 2024-12-19 RX ORDER — METHYLPREDNISOLONE SODIUM SUCCINATE 125 MG/2ML
125 INJECTION INTRAMUSCULAR; INTRAVENOUS ONCE
Status: COMPLETED | OUTPATIENT
Start: 2024-12-19 | End: 2024-12-19

## 2024-12-19 RX ORDER — PREDNISONE 20 MG/1
40 TABLET ORAL DAILY
Status: DISCONTINUED | OUTPATIENT
Start: 2024-12-20 | End: 2024-12-21 | Stop reason: HOSPADM

## 2024-12-19 RX ORDER — ONDANSETRON 2 MG/ML
4 INJECTION INTRAMUSCULAR; INTRAVENOUS EVERY 6 HOURS PRN
Status: DISCONTINUED | OUTPATIENT
Start: 2024-12-19 | End: 2024-12-21 | Stop reason: HOSPADM

## 2024-12-19 RX ADMIN — IPRATROPIUM BROMIDE AND ALBUTEROL SULFATE 3 ML: .5; 3 SOLUTION RESPIRATORY (INHALATION) at 18:25

## 2024-12-19 RX ADMIN — IPRATROPIUM BROMIDE AND ALBUTEROL SULFATE 3 ML: .5; 3 SOLUTION RESPIRATORY (INHALATION) at 15:54

## 2024-12-19 RX ADMIN — METHYLPREDNISOLONE SODIUM SUCCINATE 125 MG: 125 INJECTION, POWDER, FOR SOLUTION INTRAMUSCULAR; INTRAVENOUS at 15:54

## 2024-12-19 ASSESSMENT — ACTIVITIES OF DAILY LIVING (ADL)
ADLS_ACUITY_SCORE: 62

## 2024-12-19 ASSESSMENT — COLUMBIA-SUICIDE SEVERITY RATING SCALE - C-SSRS
6. HAVE YOU EVER DONE ANYTHING, STARTED TO DO ANYTHING, OR PREPARED TO DO ANYTHING TO END YOUR LIFE?: NO
2. HAVE YOU ACTUALLY HAD ANY THOUGHTS OF KILLING YOURSELF IN THE PAST MONTH?: NO
1. IN THE PAST MONTH, HAVE YOU WISHED YOU WERE DEAD OR WISHED YOU COULD GO TO SLEEP AND NOT WAKE UP?: NO

## 2024-12-19 NOTE — ED TRIAGE NOTES
Pt arrives via EMS from home with complaints of SOB that started yesterday. States that she has a history of asthma, used 2 inhalers at home,states she feels the same. Is having 5/10 chest pain that feels like pressure in her epigastric region. States duo nebs do not usually work, denied use for EMS en route. Spo2 in the high 80s for EMS, on 4L NC at bedside.      Triage Assessment (Adult)       Row Name 12/19/24 1511          Triage Assessment    Airway WDL WDL        Respiratory WDL    Respiratory WDL X        Skin Circulation/Temperature WDL    Skin Circulation/Temperature WDL WDL        Cardiac WDL    Cardiac WDL X;chest pain     Cardiac Rhythm NSR        Chest Pain Assessment    Chest Pain Location epigastric     Character pressure        Peripheral/Neurovascular WDL    Peripheral Neurovascular WDL WDL        Cognitive/Neuro/Behavioral WDL    Cognitive/Neuro/Behavioral WDL WDL        [place] Coma Scale    Best Eye Response 4-->(E4) spontaneous     Best Motor Response 6-->(M6) obeys commands     Best Verbal Response 5-->(V5) oriented     [place] Coma Scale Score 15
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
## 2024-12-20 ENCOUNTER — APPOINTMENT (OUTPATIENT)
Dept: PHYSICAL THERAPY | Facility: CLINIC | Age: 59
End: 2024-12-20
Attending: STUDENT IN AN ORGANIZED HEALTH CARE EDUCATION/TRAINING PROGRAM
Payer: MEDICARE

## 2024-12-20 ENCOUNTER — MEDICAL CORRESPONDENCE (OUTPATIENT)
Dept: HEALTH INFORMATION MANAGEMENT | Facility: CLINIC | Age: 59
End: 2024-12-20

## 2024-12-20 VITALS
BODY MASS INDEX: 45.99 KG/M2 | OXYGEN SATURATION: 95 % | TEMPERATURE: 98.4 F | DIASTOLIC BLOOD PRESSURE: 67 MMHG | RESPIRATION RATE: 24 BRPM | HEART RATE: 86 BPM | SYSTOLIC BLOOD PRESSURE: 147 MMHG | WEIGHT: 293 LBS | HEIGHT: 67 IN

## 2024-12-20 LAB
ANION GAP SERPL CALCULATED.3IONS-SCNC: 12 MMOL/L (ref 7–15)
ATRIAL RATE - MUSE: 79 BPM
BUN SERPL-MCNC: 9.8 MG/DL (ref 6–20)
CALCIUM SERPL-MCNC: 8.5 MG/DL (ref 8.8–10.4)
CHLORIDE SERPL-SCNC: 105 MMOL/L (ref 98–107)
CREAT SERPL-MCNC: 0.58 MG/DL (ref 0.51–0.95)
DIASTOLIC BLOOD PRESSURE - MUSE: NORMAL MMHG
EGFRCR SERPLBLD CKD-EPI 2021: >90 ML/MIN/1.73M2
ERYTHROCYTE [DISTWIDTH] IN BLOOD BY AUTOMATED COUNT: 14.1 % (ref 10–15)
GLUCOSE SERPL-MCNC: 102 MG/DL (ref 70–99)
HCO3 SERPL-SCNC: 26 MMOL/L (ref 22–29)
HCT VFR BLD AUTO: 40 % (ref 35–47)
HGB BLD-MCNC: 12.3 G/DL (ref 11.7–15.7)
INTERPRETATION ECG - MUSE: NORMAL
MCH RBC QN AUTO: 28.3 PG (ref 26.5–33)
MCHC RBC AUTO-ENTMCNC: 30.8 G/DL (ref 31.5–36.5)
MCV RBC AUTO: 92 FL (ref 78–100)
P AXIS - MUSE: 29 DEGREES
PLATELET # BLD AUTO: 205 10E3/UL (ref 150–450)
POTASSIUM SERPL-SCNC: 3.7 MMOL/L (ref 3.4–5.3)
PR INTERVAL - MUSE: 218 MS
QRS DURATION - MUSE: 72 MS
QT - MUSE: 360 MS
QTC - MUSE: 412 MS
R AXIS - MUSE: 10 DEGREES
RBC # BLD AUTO: 4.35 10E6/UL (ref 3.8–5.2)
SODIUM SERPL-SCNC: 143 MMOL/L (ref 135–145)
SYSTOLIC BLOOD PRESSURE - MUSE: NORMAL MMHG
T AXIS - MUSE: 39 DEGREES
VENTRICULAR RATE- MUSE: 79 BPM
WBC # BLD AUTO: 8.5 10E3/UL (ref 4–11)

## 2024-12-20 PROCEDURE — 250N000013 HC RX MED GY IP 250 OP 250 PS 637: Performed by: INTERNAL MEDICINE

## 2024-12-20 PROCEDURE — G0378 HOSPITAL OBSERVATION PER HR: HCPCS

## 2024-12-20 PROCEDURE — 250N000012 HC RX MED GY IP 250 OP 636 PS 637: Performed by: STUDENT IN AN ORGANIZED HEALTH CARE EDUCATION/TRAINING PROGRAM

## 2024-12-20 PROCEDURE — G0463 HOSPITAL OUTPT CLINIC VISIT: HCPCS | Mod: 25

## 2024-12-20 PROCEDURE — 99232 SBSQ HOSP IP/OBS MODERATE 35: CPT | Performed by: INTERNAL MEDICINE

## 2024-12-20 PROCEDURE — 999N000156 HC STATISTIC RCP CONSULT EA 30 MIN

## 2024-12-20 PROCEDURE — 999N000157 HC STATISTIC RCP TIME EA 10 MIN

## 2024-12-20 PROCEDURE — 80048 BASIC METABOLIC PNL TOTAL CA: CPT | Performed by: STUDENT IN AN ORGANIZED HEALTH CARE EDUCATION/TRAINING PROGRAM

## 2024-12-20 PROCEDURE — 97602 WOUND(S) CARE NON-SELECTIVE: CPT | Mod: GP

## 2024-12-20 PROCEDURE — 250N000009 HC RX 250: Performed by: STUDENT IN AN ORGANIZED HEALTH CARE EDUCATION/TRAINING PROGRAM

## 2024-12-20 PROCEDURE — 97161 PT EVAL LOW COMPLEX 20 MIN: CPT | Mod: GP | Performed by: PHYSICAL THERAPIST

## 2024-12-20 PROCEDURE — 85014 HEMATOCRIT: CPT | Performed by: STUDENT IN AN ORGANIZED HEALTH CARE EDUCATION/TRAINING PROGRAM

## 2024-12-20 PROCEDURE — 250N000009 HC RX 250: Performed by: INTERNAL MEDICINE

## 2024-12-20 PROCEDURE — 36415 COLL VENOUS BLD VENIPUNCTURE: CPT | Performed by: STUDENT IN AN ORGANIZED HEALTH CARE EDUCATION/TRAINING PROGRAM

## 2024-12-20 RX ORDER — ALBUTEROL SULFATE 0.83 MG/ML
2.5 SOLUTION RESPIRATORY (INHALATION)
Status: DISCONTINUED | OUTPATIENT
Start: 2024-12-20 | End: 2024-12-21 | Stop reason: HOSPADM

## 2024-12-20 RX ORDER — PANTOPRAZOLE SODIUM 40 MG/1
40 TABLET, DELAYED RELEASE ORAL
Status: DISCONTINUED | OUTPATIENT
Start: 2024-12-20 | End: 2024-12-20

## 2024-12-20 RX ORDER — CARBAMAZEPINE 200 MG/1
200 TABLET ORAL DAILY
Status: DISCONTINUED | OUTPATIENT
Start: 2024-12-20 | End: 2024-12-21 | Stop reason: HOSPADM

## 2024-12-20 RX ORDER — FLUTICASONE FUROATE AND VILANTEROL 100; 25 UG/1; UG/1
1 POWDER RESPIRATORY (INHALATION) DAILY
Status: DISCONTINUED | OUTPATIENT
Start: 2024-12-20 | End: 2024-12-21 | Stop reason: HOSPADM

## 2024-12-20 RX ORDER — AZELASTINE 1 MG/ML
1 SPRAY, METERED NASAL DAILY PRN
Status: DISCONTINUED | OUTPATIENT
Start: 2024-12-20 | End: 2024-12-21 | Stop reason: HOSPADM

## 2024-12-20 RX ORDER — PANTOPRAZOLE SODIUM 40 MG/1
40 TABLET, DELAYED RELEASE ORAL
Status: DISCONTINUED | OUTPATIENT
Start: 2024-12-21 | End: 2024-12-21 | Stop reason: HOSPADM

## 2024-12-20 RX ORDER — ACETAMINOPHEN 325 MG/1
975 TABLET ORAL EVERY 6 HOURS PRN
Status: DISCONTINUED | OUTPATIENT
Start: 2024-12-20 | End: 2024-12-21 | Stop reason: HOSPADM

## 2024-12-20 RX ORDER — CARBAMAZEPINE 200 MG/1
400 TABLET ORAL 2 TIMES DAILY
Status: DISCONTINUED | OUTPATIENT
Start: 2024-12-20 | End: 2024-12-21 | Stop reason: HOSPADM

## 2024-12-20 RX ORDER — LISINOPRIL 20 MG/1
20 TABLET ORAL DAILY
Status: DISCONTINUED | OUTPATIENT
Start: 2024-12-20 | End: 2024-12-21 | Stop reason: HOSPADM

## 2024-12-20 RX ORDER — IPRATROPIUM BROMIDE AND ALBUTEROL SULFATE 2.5; .5 MG/3ML; MG/3ML
3 SOLUTION RESPIRATORY (INHALATION)
Status: DISCONTINUED | OUTPATIENT
Start: 2024-12-20 | End: 2024-12-21 | Stop reason: HOSPADM

## 2024-12-20 RX ORDER — SERTRALINE HYDROCHLORIDE 100 MG/1
100 TABLET, FILM COATED ORAL DAILY
Status: DISCONTINUED | OUTPATIENT
Start: 2024-12-20 | End: 2024-12-21 | Stop reason: HOSPADM

## 2024-12-20 RX ORDER — ATORVASTATIN CALCIUM 10 MG/1
10 TABLET, FILM COATED ORAL EVERY EVENING
Status: DISCONTINUED | OUTPATIENT
Start: 2024-12-20 | End: 2024-12-21 | Stop reason: HOSPADM

## 2024-12-20 RX ADMIN — IPRATROPIUM BROMIDE AND ALBUTEROL SULFATE 3 ML: .5; 3 SOLUTION RESPIRATORY (INHALATION) at 14:15

## 2024-12-20 RX ADMIN — IPRATROPIUM BROMIDE AND ALBUTEROL SULFATE 3 ML: .5; 3 SOLUTION RESPIRATORY (INHALATION) at 09:48

## 2024-12-20 RX ADMIN — CARBAMAZEPINE 200 MG: 200 TABLET ORAL at 15:38

## 2024-12-20 RX ADMIN — MICONAZOLE NITRATE ANTIFUNGAL POWDER: 2 POWDER TOPICAL at 20:25

## 2024-12-20 RX ADMIN — ACETAMINOPHEN 975 MG: 325 TABLET, FILM COATED ORAL at 20:24

## 2024-12-20 RX ADMIN — ACETAMINOPHEN 975 MG: 325 TABLET, FILM COATED ORAL at 03:57

## 2024-12-20 RX ADMIN — PREDNISONE 40 MG: 20 TABLET ORAL at 09:22

## 2024-12-20 RX ADMIN — CARBAMAZEPINE 400 MG: 200 TABLET ORAL at 20:24

## 2024-12-20 RX ADMIN — ATORVASTATIN CALCIUM 10 MG: 10 TABLET, FILM COATED ORAL at 20:24

## 2024-12-20 RX ADMIN — IPRATROPIUM BROMIDE AND ALBUTEROL SULFATE 3 ML: .5; 3 SOLUTION RESPIRATORY (INHALATION) at 20:34

## 2024-12-20 RX ADMIN — SERTRALINE 100 MG: 100 TABLET, FILM COATED ORAL at 15:38

## 2024-12-20 RX ADMIN — LISINOPRIL 20 MG: 20 TABLET ORAL at 15:37

## 2024-12-20 ASSESSMENT — ACTIVITIES OF DAILY LIVING (ADL)
ADLS_ACUITY_SCORE: 68
ADLS_ACUITY_SCORE: 66
ADLS_ACUITY_SCORE: 62
ADLS_ACUITY_SCORE: 68
ADLS_ACUITY_SCORE: 62
ADLS_ACUITY_SCORE: 68
ADLS_ACUITY_SCORE: 62
ADLS_ACUITY_SCORE: 68
DEPENDENT_IADLS:: CLEANING;COOKING;LAUNDRY;SHOPPING;MEAL PREPARATION;TRANSPORTATION;INCONTINENCE
ADLS_ACUITY_SCORE: 68
ADLS_ACUITY_SCORE: 66
ADLS_ACUITY_SCORE: 62
ADLS_ACUITY_SCORE: 64
ADLS_ACUITY_SCORE: 68
ADLS_ACUITY_SCORE: 59
ADLS_ACUITY_SCORE: 68

## 2024-12-20 NOTE — PHARMACY-ADMISSION MEDICATION HISTORY
Pharmacy Intern Admission Medication History    Admission medication history is complete. The information provided in this note is only as accurate as the sources available at the time of the update.    Information Source(s): Patient and CareEverywhere/SureScripts via in-person    Pertinent Information: none     Changes made to PTA medication list:  Added: Omeprazole   Deleted: Albuterol inhaler and nebulizer (both duplicate orders), Tessalon, Prednisone  Changed: None    Allergies reviewed with patient and updates made in EHR: yes, PEN-FAST assessment updated     Medication History Completed By: Magaly Giordano [facility] 12/20/2024 9:57 AM    PTA Med List   Medication Sig Last Dose/Taking    acetaminophen (TYLENOL) 500 MG tablet Take 1,000 mg by mouth as needed for mild pain. Taking As Needed    albuterol (PROAIR HFA/PROVENTIL HFA/VENTOLIN HFA) 108 (90 Base) MCG/ACT inhaler Inhale 2 puffs into the lungs every 6 hours as needed for shortness of breath, wheezing or cough. Taking As Needed    albuterol (PROVENTIL) (2.5 MG/3ML) 0.083% neb solution Take 1 vial (2.5 mg) by nebulization every 6 hours as needed for shortness of breath or wheezing. Taking As Needed    azelastine (ASTELIN) 0.1 % nasal spray Spray 1 spray into both nostrils daily as needed for allergies. Taking As Needed    carBAMazepine (TEGRETOL) 200 MG tablet Take 400 mg by mouth 2 times daily. AM and HS 12/19/2024 Morning    carBAMazepine (TEGRETOL) 200 MG tablet Take 200 mg by mouth daily. @1200 12/19/2024 Noon    fluticasone (FLONASE) 50 MCG/ACT nasal spray USE ONE SPRAY INTO BOTH NOSTRILS DAILY AS NEEDED FOR RHINITIS OR ALLERGIES Taking    Fluticasone Furoate-Vilanterol (BREO ELLIPTA) 50-25 MCG/ACT AEPB Inhale 1 puff into the lungs 2 times daily 12/19/2024    ibuprofen (ADVIL/MOTRIN) 200 MG tablet Take 800 mg by mouth as needed for pain. Taking As Needed    ipratropium - albuterol 0.5 mg/2.5 mg/3 mL (DUONEB) 0.5-2.5 (3) MG/3ML neb solution Take 1 vial by  nebulization every 6 hours as needed for shortness of breath, wheezing or cough. Taking As Needed    lisinopril (ZESTRIL) 20 MG tablet TAKE 1 TABLET (20 MG) BY MOUTH DAILY 12/19/2024    lovastatin (MEVACOR) 40 MG tablet TAKE 1 TABLET (40 MG) BY MOUTH AT BEDTIME 12/18/2024    miconazole (MICATIN) 2 % external powder Apply topically as needed for itching or other. Taking As Needed    Multiple Vitamin (MULTI-VITAMIN PO) Take 1 tablet by mouth daily 12/19/2024    nystatin (MYCOSTATIN) 256587 UNIT/GM external cream Apply topically daily as needed Taking As Needed    omeprazole (PRILOSEC) 20 MG DR capsule Take 20 mg by mouth daily. 12/19/2024    sertraline (ZOLOFT) 100 MG tablet TAKE 1 TABLET (100 MG) BY MOUTH DAILY 12/19/2024

## 2024-12-20 NOTE — PROGRESS NOTES
12/20/24 1612   Appointment Info   Signing Clinician's Name / Credentials (PT) Elsa Spangler DPT   Living Environment   People in Home significant other   Current Living Arrangements apartment   Home Accessibility no concerns   Transportation Anticipated family or friend will provide   Living Environment Comments Uses walker in apartment (states only walks ~10-15'), otherwise uses electric scooter; IND with dressing, showering, toileting (has bidet to assist); has hired assist for housekeeping; SO does laundry and cooking; sleeps in lift chair   Self-Care   Usual Activity Tolerance fair   Current Activity Tolerance fair   Equipment Currently Used at Home walker, rolling  (bidet, electric scooter)   Fall history within last six months no   General Information   Onset of Illness/Injury or Date of Surgery 12/19/24   Referring Physician Mckinley Rosenbaum MD   Patient/Family Therapy Goals Statement (PT) to go home soon   Pertinent History of Current Problem (include personal factors and/or comorbidities that impact the POC) 59 year old female admitted on 12/19/2024. She has a history of asthma, hypertension, developmental delay, HLD, HTN, prior seizures, RICK, GERD, MDD, obesity who presented to the ED with shortness of breath.  Found to have acute asthma exacerbation   Existing Precautions/Restrictions oxygen therapy device and L/min  (2L at baseline all the time)   General Observations supine, NAD   Cognition   Affect/Mental Status (Cognition) WFL   Orientation Status (Cognition) oriented x 3   Follows Commands (Cognition) WFL   Cognitive Status Comments slightly impulsive   Pain Assessment   Patient Currently in Pain No   Integumentary/Edema   Integumentary/Edema Comments see WOC assessment   Posture    Posture Forward head position;Protracted shoulders   Range of Motion (ROM)   ROM Comment decreased 2/2 body habitus   Strength (Manual Muscle Testing)   Strength (Manual Muscle Testing) strength is WFL   Bed Mobility    Bed Mobility no deficits identified   Comment, (Bed Mobility) HOB elevated and used rail but sleeps in lift chair   Transfers   Transfers no deficits identified   Comment, (Transfers) SBA with 2WW   Gait/Stairs (Locomotion)   [place] Level (Gait)   (SBA)   Assistive Device (Gait) walker, front-wheeled   Distance in Feet (Gait) 12' x2   Balance   Balance Comments No overt LOB noted throughout session   Clinical Impression   Criteria for Skilled Therapeutic Intervention Evaluation only   PT Diagnosis (PT) at/close to baseline for mobility   Clinical Presentation (PT Evaluation Complexity) stable   Clinical Presentation Rationale clinical judgement   Clinical Decision Making (Complexity) low complexity   Risk & Benefits of therapy have been explained evaluation/treatment results reviewed;care plan/treatment goals reviewed;risks/benefits reviewed;current/potential barriers reviewed;participants voiced agreement with care plan;participants included;patient   PT Total Evaluation Time   PT Malik, Low Complexity Minutes (43601) 16   PT Discharge Planning   PT Plan no acute PT goals identified; discharge   PT Discharge Recommendation (DC Rec) home with assist   PT Rationale for DC Rec Pt appears at/close to baseline for mobility.  No acute PT goals identified.  Rec home with assist from SO.  No further PT needs   PT Brief overview of current status SBA with 2WW, short distance ambulation   Physical Therapy Time and Intention   Total Session Time (sum of timed and untimed services) 16
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
## 2024-12-20 NOTE — PLAN OF CARE
Vitals are Temp: 97.9  F (36.6  C) Temp src: Oral BP: (!) 151/74 Pulse: 94   Resp: 20 SpO2: 95 %.  Patient is Alert and Oriented x4. complaining of pain in their posterior L thigh wound.  Patient is Saline locked.    Pt is a Regular diet.  1 Assist with Gait Belt and Walker. PT assessment completed.     PRIMARY DIAGNOSIS: ASTHMA exacerbation   OUTPATIENT/OBSERVATION GOALS TO BE MET BEFORE DISCHARGE:  1. Vital signs stable: Yes    2. Improvement of peak flow to greater than 70% sustained off nebulizer for 4 hours:  NA    3. Dyspnea improved and O2 sats >88% at RA or at prior home O2 therapy level: Yes      SpO2: 95 %, O2 Device: Nasal cannula    4. Short term supplemental O2 needed for use with activity at home:  Baseline    5. Tolerating adequate PO diet and medications: Yes    6. Return to near baseline physical activity: No    Discharge Planner Nurse   Safe discharge environment identified: Yes  Barriers to discharge: Yes       Entered by: Subha Encarnacion RN 12/20/2024   Please review provider order for any additional goals.   Nurse to notify provider when observation goals have been met and patient is ready for discharge.

## 2024-12-20 NOTE — PROGRESS NOTES
[facility]    Medicine Progress Note - Hospitalist Service    Date of Admission:  12/19/2024    Assessment & Plan   Brissa Corado is a 59 year old female admitted on 12/19/2024. She has a history of asthma, hypertension, developmental delay, HLD, HTN, prior seizures, RICK, GERD, MDD, obesity who presented to the ED with shortness of breath.  Found to have acute asthma exacerbation.    Acute asthma exacerbation.  -Initially given IV methylprednisolone.  -Symptoms and exam improving.  -Continue prednisone 40 mg a day.  -Continue inhaled fluticasone/vilanterol.  -Add scheduled DuoNebs 4 times a day.  -Albuterol more often if needed.  -Supplemental oxygen as needed.    Obstructive sleep apnea.  Suspected obesity hypoventilation syndrome.  -Does not use CPAP at home.  -Has repeat sleep study scheduled for January.  -Needs to keep scheduled sleep study.    Morbid obesity.  -Complicates care.  -Would benefit from long-term weight loss.    Hypernatremia.  -Resolved.    Deconditioning.  -Physical and Occupational Therapy consults.    Hypertension.  -Restart lisinopril 20 mg a day.    GERD.  -Restart omeprazole 20 mg a day.    Hyperlipidemia.  -Continue atorvastatin 10 mg a day.    Seizure disorder.  -Restart carbamazepine.    Depression.  -Restart sertraline 100 mg a day.           Observation Goals: -diagnostic tests and consults completed and resulted, -vital signs normal or at patient baseline, -dyspnea improved and O2 sats greater than 88% on room air or prior home oxygen levels, -safe disposition plan has been identified, Nurse to notify provider when observation goals have been met and patient is ready for discharge.  Diet: Regular Diet Adult    DVT Prophylaxis: Pneumatic Compression Devices  Grajeda Catheter: Not present  Lines: None     Cardiac Monitoring: None  Code Status: Full Code      Clinically Significant Risk Factors Present on Admission         # Hypernatremia: Highest Na = 147  "mmol/L in last 2 days, will monitor as appropriate  # Hyperchloremia: Highest Cl = 108 mmol/L in last 2 days, will monitor as appropriate              # Hypertension: Noted on problem list           # Severe Obesity: Estimated body mass index is 62.4 kg/m  as calculated from the following:    Height as of this encounter: 1.702 m (5' 7.01\").    Weight as of this encounter: 180.8 kg (398 lb 8 oz).       # Asthma: noted on problem list        Social Drivers of Health            Disposition Plan     Medically Ready for Discharge: Anticipated in 2-4 Days             Johnnie Wong DO  Hospitalist Service  [facility]  Securely message with [employer] (more info)  Text page via [employer] Paging/Directory   ______________________________________________________________________    Interval History   Short of breath.  Denies chest pain, fevers, chills, nausea, vomiting.    Physical Exam   Vital Signs: Temp: 97.8  F (36.6  C) Temp src: Oral BP: (!) 148/78 Pulse: 88   Resp: 20 SpO2: 92 % O2 Device: Nasal cannula Oxygen Delivery: 2 LPM  Weight: 398 lbs 8 oz    Gen:  NAD, A&Ox3.  Eyes:  PERRL, sclera anicteric.  OP:  MMM, no lesions.  Neck:  Supple.  CV:  Regular, no murmurs.  Lung:  Wheeze b/l, normal effort.  Ab:  +BS, soft.  Skin:  Warm, dry to touch.  No rash.  Ext:  No pitting edema LE b/l.      Medical Decision Making       45 MINUTES SPENT BY ME on the date of service doing chart review, history, exam, documentation & further activities per the note.      Data     I have personally reviewed the following data over the past 24 hrs:    8.5  \   12.3   / 205     143 105 9.8 /  102 (H)   3.7 26 0.58 \     Trop: 11 BNP: N/A       Imaging results reviewed over the past 24 hrs:   Recent Results (from the past 24 hours)   Chest XR,  PA & LAT    Narrative    EXAM: XR CHEST 2 VIEWS  LOCATION: [facility]  DATE: 12/19/2024    INDICATION: SOB, cough  COMPARISON: 12/5/2024      Impression    " IMPRESSION: Negative chest.

## 2024-12-20 NOTE — ED NOTES
"[facility]  ED Nurse Handoff Report    ED Chief complaint: Shortness of Breath  . ED Diagnosis:   Final diagnoses:   None       Allergies:   Allergies   Allergen Reactions    Iodine      Iodinated Contrast Media  --- Hives      Penicillins Unknown    Tetracyclines & Related Unknown    Hydrochlorothiazide Palpitations     dehydration    Influenza Vac Split [Influenza Virus Vaccine] Rash     Patient states she is allergic to the vaccine.  Got a rash all over body many years ago after receiving injection.       Code Status: Full Code    Activity level - Baseline/Home:  independent.  Activity Level - Current:   standby.   Lift room needed: No.   Bariatric: No, borderline pt is 400lbs   Needed: No   Isolation: No.   Infection: Not Applicable.     Respiratory status: Nasal cannula 2L    Vital Signs (within 30 minutes):   Vitals:    12/19/24 1509 12/19/24 1515   BP: (!) 192/93    Pulse: 88    Resp: 24    Temp:  97.8  F (36.6  C)   TempSrc:  Oral   SpO2:  96%   Weight: (!) 179.7 kg (396 lb 2.7 oz)    Height: 1.702 m (5' 7\")        Cardiac Rhythm:  ,   Cardiac  Cardiac Rhythm: Normal sinus rhythm  Pain level:    Patient confused: No.   Patient Falls Risk: nonskid shoes/slippers when out of bed, patient and family education, and activity supervised.   Elimination Status: Has voided, patient had an episode of incontinence, is now on the purewiq    Patient Report - Initial Complaint: SOB    Focused Assessment:     RespiratoryAirway WDL: WDL  Respiratory WDLRespiratory WDL: .WDL except; all (Pt reports wheezing and increased SOB. States duonebs at home do not help, only wears O2 at nightime but is requiring it right now. Increased SOB with acitivty.)Rhythm/Pattern, Respiratory: shortness of breath       Abnormal Results:   Labs Ordered and Resulted from Time of ED Arrival to Time of ED Departure   BLOOD GAS VENOUS - Abnormal       Result Value    pH Venous 7.33      pCO2 Venous 60 (*)     pO2 " Venous 30      Bicarbonate Venous 32 (*)     Base Excess/Deficit Venous 4.0 (*)     FIO2 4      Oxyhemoglobin Venous 50 (*)     O2 Sat, Venous 50.3 (*)    BASIC METABOLIC PANEL - Abnormal    Sodium 147 (*)     Potassium 3.8      Chloride 108 (*)     Carbon Dioxide (CO2) 26      Anion Gap 13      Urea Nitrogen 14.9      Creatinine 0.62      GFR Estimate >90      Calcium 8.5 (*)     Glucose 116 (*)    CBC WITH PLATELETS AND DIFFERENTIAL - Abnormal    WBC Count 7.3      RBC Count 4.14      Hemoglobin 11.7      Hematocrit 38.2      MCV 92      MCH 28.3      MCHC 30.6 (*)     RDW 13.9      Platelet Count 212      % Neutrophils 65      % Lymphocytes 20      % Monocytes 7      % Eosinophils 7      % Basophils 1      % Immature Granulocytes 0      NRBCs per 100 WBC 0      Absolute Neutrophils 4.8      Absolute Lymphocytes 1.5      Absolute Monocytes 0.5      Absolute Eosinophils 0.5      Absolute Basophils 0.1      Absolute Immature Granulocytes 0.0      Absolute NRBCs 0.0     INFLUENZA A/B, RSV AND SARS-COV2 PCR - Normal    Influenza A PCR Negative      Influenza B PCR Negative      RSV PCR Negative      SARS CoV2 PCR Negative     TROPONIN T, HIGH SENSITIVITY - Normal    Troponin T, High Sensitivity 10     TROPONIN T, HIGH SENSITIVITY - Normal    Troponin T, High Sensitivity 11          Chest XR,  PA & LAT   Final Result   IMPRESSION: Negative chest.          Treatments provided: Duoneb x2, chest x-ray, steroids IV  Family Comments: Per patient  OBS brochure/video discussed/provided to patient:  N/A  ED Medications:   Medications   ipratropium - albuterol 0.5 mg/2.5 mg/3 mL (DUONEB) neb solution 3 mL (3 mLs Nebulization $Given 12/19/24 5624)   methylPREDNISolone Na Suc (solu-MEDROL) injection 125 mg (125 mg Intravenous $Given 12/19/24 2114)   ipratropium - albuterol 0.5 mg/2.5 mg/3 mL (DUONEB) neb solution 3 mL (3 mLs Nebulization $Given 12/19/24 3536)       Drips infusing:  No  For the majority of the shift this patient  christina Ravi.   Interventions performed were N/A.    Sepsis treatment initiated: No    Cares/treatment/interventions/medications to be completed following ED care: Per MD orderset    ED Nurse Name: Zahida Lucas RN  7:34 PM

## 2024-12-20 NOTE — CONSULTS
[facility] Nurse Inpatient Assessment     Consulted for: Left thigh    Patient History (according to provider note(s):      Brissa Corado is a 59 year old female admitted on 12/19/2024. She has a history of asthma, hypertension, developmental delay, HLD, HTN, prior seizures, RICK, GERD, MDD, obesity who presented to the ED with shortness of breath.     Assessment:      Areas visualized during today's visit: Skin folds  and Thigh    Pressure Injury Location: Left posterior thigh  Last photo: 12/20/24-unable to obtain full photo of wound as patient had shortness of breath and needed to sit up    Wound type: Pressure Injury and Friction     Pressure Injury Stage: 3, present on admission   Wound history/plan of care:   Wound started a little over 2 months ago and patient reports it hasn't been improving.  Patient reports sitting most of the time but does have a chair cushion.  She reports home care has been putting a dressing on it but isn't sure what kind.    Wound base: Superior wound is 1.5x1cm, 100% pink.  Inferior wound is 6x3x0.2cm, 90 % Pink, 10 % Slough     Palpation of the wound bed: normal      Drainage:  large     Description of drainage: purulent     Measurements (length x width x depth, in cm) see above      Tunneling N/A     Undermining N/A  Periwound skin: Scar tissue and hyperpigmentation      Color: pink      Temperature: normal   Odor: none  Pain: moderate  Pain intervention prior to dressing change: slow and gentle cares   Treatment goal: Heal , Infection control/prevention, and Remove necrotic tissue  STATUS: initial assessment  Supplies ordered: ordered Vashe, Aquacel Ag    My PI Risk Assessment     Sensory Perception: 3 - Slightly Limited     Moisture: 3 - Occasionally moist      Activity: 2 - Chairfast     Mobility: 3 - Slightly limited      Nutrition: 3 - Adequate     Friction/Shear: 2 - Potential problem      TOTAL: 16     Wound location: Breast and abdominal  folds  Last photo: none  Wound due to: Moisture Associated Skin Damage (MASD) and Fungal  Wound history/plan of care: Patient with chronic moisture damage to folds.   Wound base: Bilateral breasts with extensive moist erythema and satellite lesions. Abdominal folds with extensive moist erythema and satellite lesions. Folds also with scattered areas of dermis ranging from pinpoint to 0.3x0.7cm.     Palpation of the wound bed: normal      Drainage: moderate     Description of drainage: tan     Measurements (length x width x depth, in cm): see above      Tunneling: N/A     Undermining: N/A  Periwound skin: Intact      Color: pink      Temperature: normal   Odor: moderate  Pain: denies   Pain interventions prior to dressing change: N/A  Treatment goal: Heal  and Infection control/prevention  STATUS: initial assessment  Supplies ordered: discussed with RN      Treatment Plan:     Left posterior thigh wound(s): Every other day  Soak with Vashe moistened gauze for 10 minutes and then remove  Apply Aquacel Ag (SPD#[id number]) cut to fit to wounds  Cover with Mepilex sacral     Under breasts and abdominal fold wound(s): Daily   Interdry(order#[id number]):   1.  Wash skin gently with Vashe. Pat, do not rub.  2.  With clean scissors, cut enough fabric to cover the affected area, allowing for a minimum of 2 inches to extend beyond the skin fold for moisture evaporation.  3.  Lay a single layer of fabric in the skin fold, placing one edge into the base of the fold. Gently smooth the rest of the fabric over the skin, keeping it flat.  4.  Leave at least 2 inches of fabric exposed outside the skin fold.  5.  Secure the fabric in one of several ways: with the skin fold, with a small amount of skin-friendly tape, or tucked under clothing.  6.  Remove the fabric before bathing and reuse it when finished. When removing, gently separate the skin fold and lift away.  Helpful Hints  1. InterDry  can be written on with a ballpoint pen. It may be  helpful to label each piece of InterDry  with the date you started using it.  2.  Each piece of InterDry  may be used up to 5 days, depending on fabric soiling, odor, amount of moisture and general skin condition. Replace InterDry  if it becomes soiled with blood, urine or stool.  3.  Do not use creams, ointments, or powders with InterDry  as it may interfere with product efficacy.       Orders: Written    RECOMMEND PRIMARY TEAM ORDER: None, at this time  Education provided: importance of repositioning, plan of care, and Off-loading pressure  Discussed plan of care with: Patient and Nurse  WOC nurse follow-up plan: weekly  Notify WOC if wound(s) deteriorate.  Nursing to notify the Provider(s) and re-consult the WOC Nurse if new skin concern.    DATA:     Current support surface: Standard  ED cart  Containment of urine/stool: Incontinence Protocol and Suction based external urinary catheter   BMI: Body mass index is 62.05 kg/m .   Active diet order: Orders Placed This Encounter      Regular Diet Adult     Output: No intake/output data recorded.     Labs:   Recent Labs   Lab 12/20/24  0735   HGB 12.3   WBC 8.5     Pressure injury risk assessment:                          Bharat Cooper RN CWOCN  Contact Via [facility] Nurse (Kristy)  Dept. Office Number: [phone]

## 2024-12-20 NOTE — ED NOTES
"[facility]    ED Boarding Nurse Handoff Addendum Report:    Date/time: 12/20/2024, 4:59 AM    Activity Level: in bed, can stand at EOB with walker.     Fall Risk: Yes:  nonskid shoes/slippers when out of bed, patient and family education, assistive device/personal items within reach, activity supervised, and mobility aid in reach    Active Infusions: none     Current Meds Due: review MAR    Current care needs: continue plan of care.     Oxygen requirements (liters/min and/or FiO2):     Respiratory status: Room air    Vital signs (within last 30 minutes):  BP (!) 151/74 (BP Location: Right arm)   Pulse 79   Temp 98.4  F (36.9  C) (Oral)   Resp 22   Ht 1.702 m (5' 7\")   Wt (!) 179.7 kg (396 lb 2.7 oz)   LMP 08/18/2008   SpO2 94%   BMI 62.05 kg/m          Focused assessment within last 30 minutes:    A&O. VSS on 2L. Tylenol for headache. Thigh wound dressing intact.     ED Boarding Nurse name: Sara Germain RN    "

## 2024-12-21 VITALS
WEIGHT: 293 LBS | DIASTOLIC BLOOD PRESSURE: 62 MMHG | SYSTOLIC BLOOD PRESSURE: 122 MMHG | HEART RATE: 80 BPM | HEIGHT: 67 IN | OXYGEN SATURATION: 94 % | BODY MASS INDEX: 45.99 KG/M2 | TEMPERATURE: 98.2 F | RESPIRATION RATE: 20 BRPM

## 2024-12-21 LAB
ANION GAP SERPL CALCULATED.3IONS-SCNC: 14 MMOL/L (ref 7–15)
BUN SERPL-MCNC: 14.9 MG/DL (ref 6–20)
CALCIUM SERPL-MCNC: 8.3 MG/DL (ref 8.8–10.4)
CHLORIDE SERPL-SCNC: 104 MMOL/L (ref 98–107)
CREAT SERPL-MCNC: 0.65 MG/DL (ref 0.51–0.95)
EGFRCR SERPLBLD CKD-EPI 2021: >90 ML/MIN/1.73M2
ERYTHROCYTE [DISTWIDTH] IN BLOOD BY AUTOMATED COUNT: 14.4 % (ref 10–15)
GLUCOSE SERPL-MCNC: 117 MG/DL (ref 70–99)
HCO3 SERPL-SCNC: 25 MMOL/L (ref 22–29)
HCT VFR BLD AUTO: 36.7 % (ref 35–47)
HGB BLD-MCNC: 11.1 G/DL (ref 11.7–15.7)
MCH RBC QN AUTO: 27.8 PG (ref 26.5–33)
MCHC RBC AUTO-ENTMCNC: 30.2 G/DL (ref 31.5–36.5)
MCV RBC AUTO: 92 FL (ref 78–100)
PLATELET # BLD AUTO: 220 10E3/UL (ref 150–450)
POTASSIUM SERPL-SCNC: 3.7 MMOL/L (ref 3.4–5.3)
RBC # BLD AUTO: 4 10E6/UL (ref 3.8–5.2)
SODIUM SERPL-SCNC: 143 MMOL/L (ref 135–145)
WBC # BLD AUTO: 7.8 10E3/UL (ref 4–11)

## 2024-12-21 PROCEDURE — 999N000111 HC STATISTIC OT IP EVAL DEFER

## 2024-12-21 PROCEDURE — 250N000012 HC RX MED GY IP 250 OP 636 PS 637: Performed by: PHYSICIAN ASSISTANT

## 2024-12-21 PROCEDURE — 80048 BASIC METABOLIC PNL TOTAL CA: CPT | Performed by: INTERNAL MEDICINE

## 2024-12-21 PROCEDURE — 85018 HEMOGLOBIN: CPT | Performed by: INTERNAL MEDICINE

## 2024-12-21 PROCEDURE — 250N000009 HC RX 250: Performed by: INTERNAL MEDICINE

## 2024-12-21 PROCEDURE — 94640 AIRWAY INHALATION TREATMENT: CPT

## 2024-12-21 PROCEDURE — G0378 HOSPITAL OBSERVATION PER HR: HCPCS

## 2024-12-21 PROCEDURE — 999N000157 HC STATISTIC RCP TIME EA 10 MIN

## 2024-12-21 PROCEDURE — 99238 HOSP IP/OBS DSCHRG MGMT 30/<: CPT | Performed by: PHYSICIAN ASSISTANT

## 2024-12-21 PROCEDURE — 85041 AUTOMATED RBC COUNT: CPT | Performed by: INTERNAL MEDICINE

## 2024-12-21 PROCEDURE — 36415 COLL VENOUS BLD VENIPUNCTURE: CPT | Performed by: INTERNAL MEDICINE

## 2024-12-21 PROCEDURE — 250N000013 HC RX MED GY IP 250 OP 250 PS 637: Performed by: INTERNAL MEDICINE

## 2024-12-21 PROCEDURE — 250N000009 HC RX 250: Performed by: PHYSICIAN ASSISTANT

## 2024-12-21 RX ORDER — IPRATROPIUM BROMIDE AND ALBUTEROL SULFATE 2.5; .5 MG/3ML; MG/3ML
1 SOLUTION RESPIRATORY (INHALATION) 4 TIMES DAILY
Qty: 360 ML | Refills: 0 | Status: SHIPPED | OUTPATIENT
Start: 2024-12-21

## 2024-12-21 RX ORDER — PREDNISONE 20 MG/1
TABLET ORAL
Qty: 15 TABLET | Refills: 0 | Status: SHIPPED | OUTPATIENT
Start: 2024-12-22 | End: 2025-01-03

## 2024-12-21 RX ADMIN — LISINOPRIL 20 MG: 20 TABLET ORAL at 09:53

## 2024-12-21 RX ADMIN — CARBAMAZEPINE 400 MG: 200 TABLET ORAL at 09:52

## 2024-12-21 RX ADMIN — SERTRALINE 100 MG: 100 TABLET, FILM COATED ORAL at 09:53

## 2024-12-21 RX ADMIN — PANTOPRAZOLE SODIUM 40 MG: 40 TABLET, DELAYED RELEASE ORAL at 09:54

## 2024-12-21 RX ADMIN — IPRATROPIUM BROMIDE AND ALBUTEROL SULFATE 3 ML: .5; 3 SOLUTION RESPIRATORY (INHALATION) at 07:27

## 2024-12-21 RX ADMIN — PREDNISONE 40 MG: 20 TABLET ORAL at 09:53

## 2024-12-21 RX ADMIN — MICONAZOLE NITRATE ANTIFUNGAL POWDER: 2 POWDER TOPICAL at 10:07

## 2024-12-21 RX ADMIN — ALBUTEROL SULFATE 2.5 MG: 2.5 SOLUTION RESPIRATORY (INHALATION) at 06:10

## 2024-12-21 RX ADMIN — IPRATROPIUM BROMIDE AND ALBUTEROL SULFATE 3 ML: .5; 3 SOLUTION RESPIRATORY (INHALATION) at 11:31

## 2024-12-21 ASSESSMENT — ACTIVITIES OF DAILY LIVING (ADL)
ADLS_ACUITY_SCORE: 64

## 2024-12-21 NOTE — DISCHARGE SUMMARY
"[facility]  Hospitalist Discharge Summary      Date of Admission:  12/19/2024  Date of Discharge:  12/21/2024 12:27 PM  Discharging Provider: Rosy Friend PA-C  Discharge Service: Hospitalist Service    Discharge Diagnoses   Acute asthma exacerbation  RICK  Suspected obesity hypoventilation syndrome  Chronic respiratory failure with hypoxia on home O2    Clinically Significant Risk Factors     # Severe Obesity: Estimated body mass index is 62.4 kg/m  as calculated from the following:    Height as of this encounter: 1.702 m (5' 7.01\").    Weight as of this encounter: 180.8 kg (398 lb 8 oz).       Follow-ups Needed After Discharge   Follow-up Appointments       Follow-up and recommended labs and tests       Follow up with primary care provider, Aury Vizcaino, within 7 days for hospital follow- up.  No follow up labs or test are needed.  Keep sleep medicine appt in Jan  Continue Duonebs 4 times daily until back to baseline, then consider continuing twice daily  Complete Prednisone taper                Unresulted Labs Ordered in the Past 30 Days of this Admission       No orders found from 11/19/2024 to 12/20/2024.        These results will be followed up by PCP    Discharge Disposition   Discharged to home  Condition at discharge: Stable    Hospital Course   Brissa Corado is a 59 year old female admitted on 12/19/2024. She has a history of asthma, hypertension, developmental delay, HLD, HTN, prior seizures, RICK, GERD, MDD, and obesity who presented to the ED with shortness of breath.  Found to have acute asthma exacerbation.    Acute asthma exacerbation.  -Initially given IV methylprednisolone, switched to PO Prednisone on admission.  -Symptoms and exam improved.  -Continue prednisone 40 mg a day for 3 more days, then 30 mg for 3 days, then 20 mg for 3 days, then 10 mg for 3 days  -Continue inhaled fluticasone/vilanterol.  -Continue scheduled DuoNebs 4 times a day on discharge until " back to baseline then continue BID  -continue PRN Albuterol   -continue home O2, no increased O2 needs here  -PFTs ordered on a previous hospital discharge, they have been done. Pulmonology referral placed to follow up on results  -obesity likely contributing to sx as well. Comprehensive weight management referral placed as well    Obstructive sleep apnea.  Suspected obesity hypoventilation syndrome.  -Does not use CPAP at home.  -Has repeat sleep study scheduled for January.  -Needs to keep scheduled sleep study.    Morbid obesity.  -Complicates care.  -Would benefit from long-term weight loss.  -Comprehensive Weight management referral placed    Hypernatremia.  -Resolved.    Deconditioning.  -Physical Therapy consulted, home with assist. Lives with spouse.    Hypertension.  -continue lisinopril 20 mg a day.    GERD.  -continue omeprazole 20 mg a day.    Hyperlipidemia.  -Continue atorvastatin 10 mg a day.    Seizure disorder.  -continue carbamazepine.    Depression.  -continue sertraline 100 mg a day.    Developmental delay  Both parents listed as legal guardians.  Has a longtime significant other, but parents listed as legal guardians.     Posterior L thigh pressure ulcer, present on admission  Tinea cruris, intertrigo  Wound present on posterior left thigh that appears to be chronic in nature, although patient endorses this is a rug burn from recently.  Also noted to have tinea cruris/intertrigo of skin folds present on exam.  Presence of these findings makes me concerned patient is not adequately taking care of herself, may benefit from home health to ensure she is not developing pressure ulcers and is able to complete her ADLs.  - Social work consult for discharge planning  - WOC consulted  - has Home Care RN to assist with wounds  Per WOC nurse:  Left posterior thigh wound(s): Every other day  Soak with Vashe moistened gauze for 10 minutes and then remove  Apply Aquacel Ag (SPD#[id number]) cut to fit to  wounds  Cover with Mepilex sacral      Under breasts and abdominal fold wound(s): Daily   Interdry(order#[id number]):   1.  Wash skin gently with Vashe. Pat, do not rub.  2.  With clean scissors, cut enough fabric to cover the affected area, allowing for a minimum of 2 inches to extend beyond the skin fold for moisture evaporation.  3.  Lay a single layer of fabric in the skin fold, placing one edge into the base of the fold. Gently smooth the rest of the fabric over the skin, keeping it flat.  4.  Leave at least 2 inches of fabric exposed outside the skin fold.  5.  Secure the fabric in one of several ways: with the skin fold, with a small amount of skin-friendly tape, or tucked under clothing.  6.  Remove the fabric before bathing and reuse it when finished. When removing, gently separate the skin fold and lift away.  Helpful Hints  1. InterDry  can be written on with a ballpoint pen. It may be helpful to label each piece of InterDry  with the date you started using it.  2.  Each piece of InterDry  may be used up to 5 days, depending on fabric soiling, odor, amount of moisture and general skin condition. Replace InterDry  if it becomes soiled with blood, urine or stool.  3.  Do not use creams, ointments, or powders with InterDry  as it may interfere with product efficacy.    Enlarged axillary lymph nodes  Noted on CT scan during last admission in October.  Plan is for outpatient follow-up and a repeat CT scan in 1 year.    Consultations This Hospital Stay   PHYSICAL THERAPY ADULT IP CONSULT  OCCUPATIONAL THERAPY ADULT IP CONSULT  CARE MANAGEMENT / SOCIAL WORK IP CONSULT  WOUND OSTOMY CONTINENCE NURSE  IP CONSULT  CARE MANAGEMENT / SOCIAL WORK IP CONSULT  CARE MANAGEMENT / SOCIAL WORK IP CONSULT    Code Status   Full Code    Time Spent on this Encounter   Rosy OLIVO PA-C, personally saw the patient today and spent less than or equal to 30 minutes discharging this patient.       SHIRA Pantoja  [facility] OBSERVATION DEPT  [address] [place] [address]  Phone: [phone]  ______________________________________________________________________    Physical Exam   Vital Signs: Temp: 98  F (36.7  C) Temp src: Oral BP: 119/56 Pulse: 81   Resp: 20 SpO2: 93 % O2 Device: Nasal cannula Oxygen Delivery: 2 LPM  Weight: 398 lbs 8 oz    GENERAL:  Comfortable, morbidly obese.  PSYCH: pleasant, oriented, No acute distress.  HEART:  Normal S1, S2 with no murmur, no pericardial rub, gallops or S3 or S4.  LUNGS:  slight expiratory wheeze otherwise clear to auscultation, normal Respiratory effort. No rales or rhonchi.  GI:  Soft, normal bowel sounds. Non-tender, non distended.   EXTREMITIES:  able to ambulate in room  NEUROLOGIC:  grossly intact       Primary Care Physician   Aury Vizcaino    Discharge Orders      Primary Care - Care Coordination Referral      Adult Comprehensive Weight Management  Referral      Adult Pulmonary Medicine  Referral      Reason for your hospital stay    Asthma exacerbation. Symptoms improved. No increasing oxygen needs at this time. Able to ambulate in room without severe SOB or worsening hypoxia.     Follow-up and recommended labs and tests     Follow up with primary care provider, Aury Vizcaino, within 7 days for hospital follow- up.  No follow up labs or test are needed.  Keep sleep medicine appt in Jan  Continue Duonebs 4 times daily until back to baseline, then consider continuing twice daily  Complete Prednisone taper     Activity    Your activity upon discharge: activity as tolerated     When to contact your care team    Call your primary doctor if you have any of the following: temperature greater than 101, increased shortness of breath, or chest pain.     Oxygen Adult/Peds    Oxygen Documentation  I certify that this patient, Brissa Corado has been under my care (or a nurse practitioner or physican's assistant working with me). This is  the face-to-face encounter for oxygen medical necessity.      At the time of this encounter, I have reviewed the qualifying testing and have determined that supplemental oxygen is reasonable and necessary and is expected to improve the patient's condition in a home setting.         Patient has continued oxygen desaturation due to Chronic Respiratory Failure with Hypoxia J96.11.    If portability is ordered, is the patient mobile within the home? yes    Was this visit performed as a telehealth visit: No     Diet    Follow this diet upon discharge: Regular       Significant Results and Procedures   Most Recent 3 CBC's:  Recent Labs   Lab Test 12/21/24  0607 12/20/24  0735 12/19/24  1534   WBC 7.8 8.5 7.3   HGB 11.1* 12.3 11.7   MCV 92 92 92    205 212     Most Recent 3 BMP's:  Recent Labs   Lab Test 12/21/24  0607 12/20/24  0735 12/19/24  1534    143 147*   POTASSIUM 3.7 3.7 3.8   CHLORIDE 104 105 108*   CO2 25 26 26   BUN 14.9 9.8 14.9   CR 0.65 0.58 0.62   ANIONGAP 14 12 13   CESAR 8.3* 8.5* 8.5*   * 102* 116*     Most Recent 3 Troponin's:  Recent Labs   Lab Test 04/08/21 2034 08/28/18  1732   TROPI <0.015 <0.015   ,   Results for orders placed or performed during the hospital encounter of 12/19/24   Chest XR,  PA & LAT    Narrative    EXAM: XR CHEST 2 VIEWS  LOCATION: [facility]  DATE: 12/19/2024    INDICATION: SOB, cough  COMPARISON: 12/5/2024      Impression    IMPRESSION: Negative chest.       Discharge Medications   Current Discharge Medication List        START taking these medications    Details   predniSONE (DELTASONE) 20 MG tablet Take 2 tablets (40 mg) by mouth daily for 3 days, THEN 1.5 tablets (30 mg) daily for 3 days, THEN 1 tablet (20 mg) daily for 3 days, THEN 0.5 tablets (10 mg) daily for 3 days.  Qty: 15 tablet, Refills: 0    Associated Diagnoses: Exacerbation of asthma, unspecified asthma severity, unspecified whether persistent           CONTINUE these  medications which have CHANGED    Details   ipratropium - albuterol 0.5 mg/2.5 mg/3 mL (DUONEB) 0.5-2.5 (3) MG/3ML neb solution Take 1 vial (3 mLs) by nebulization 4 times daily.  Qty: 360 mL, Refills: 0    Associated Diagnoses: Exacerbation of asthma, unspecified asthma severity, unspecified whether persistent           CONTINUE these medications which have NOT CHANGED    Details   acetaminophen (TYLENOL) 500 MG tablet Take 1,000 mg by mouth as needed for mild pain.      albuterol (PROAIR HFA/PROVENTIL HFA/VENTOLIN HFA) 108 (90 Base) MCG/ACT inhaler Inhale 2 puffs into the lungs every 6 hours as needed for shortness of breath, wheezing or cough.  Qty: 18 g, Refills: 0    Comments: Pharmacy may dispense brand covered by insurance (Proair, or proventil or ventolin or generic albuterol inhaler)      albuterol (PROVENTIL) (2.5 MG/3ML) 0.083% neb solution Take 1 vial (2.5 mg) by nebulization every 6 hours as needed for shortness of breath or wheezing.  Qty: 75 mL, Refills: 0      azelastine (ASTELIN) 0.1 % nasal spray Spray 1 spray into both nostrils daily as needed for allergies.      !! carBAMazepine (TEGRETOL) 200 MG tablet Take 400 mg by mouth 2 times daily. AM and HS      !! carBAMazepine (TEGRETOL) 200 MG tablet Take 200 mg by mouth daily. @1200      fluticasone (FLONASE) 50 MCG/ACT nasal spray USE ONE SPRAY INTO BOTH NOSTRILS DAILY AS NEEDED FOR RHINITIS OR ALLERGIES  Qty: 16 mL, Refills: 1    Associated Diagnoses: Seasonal allergic rhinitis due to pollen      Fluticasone Furoate-Vilanterol (BREO ELLIPTA) 50-25 MCG/ACT AEPB Inhale 1 puff into the lungs 2 times daily    Associated Diagnoses: Acute cholecystitis; Moderate persistent asthma with acute exacerbation      ibuprofen (ADVIL/MOTRIN) 200 MG tablet Take 800 mg by mouth as needed for pain.      lisinopril (ZESTRIL) 20 MG tablet TAKE 1 TABLET (20 MG) BY MOUTH DAILY  Qty: 90 tablet, Refills: 2    Associated Diagnoses: Essential hypertension, benign       lovastatin (MEVACOR) 40 MG tablet TAKE 1 TABLET (40 MG) BY MOUTH AT BEDTIME  Qty: 90 tablet, Refills: 1    Associated Diagnoses: Hyperlipidemia LDL goal <130      miconazole (MICATIN) 2 % external powder Apply topically as needed for itching or other.      Multiple Vitamin (MULTI-VITAMIN PO) Take 1 tablet by mouth daily      nystatin (MYCOSTATIN) 828095 UNIT/GM external cream Apply topically daily as needed      omeprazole (PRILOSEC) 20 MG DR capsule Take 20 mg by mouth daily.      sertraline (ZOLOFT) 100 MG tablet TAKE 1 TABLET (100 MG) BY MOUTH DAILY  Qty: 90 tablet, Refills: 1    Associated Diagnoses: Depression, unspecified depression type       !! - Potential duplicate medications found. Please discuss with provider.        Allergies   Allergies   Allergen Reactions    Penicillins Anaphylaxis     PEN-FAST - Penicillin Allergy Risk Tool completed by [facility] December 20, 2024    Score: 3  Risk: Moderate  Assessment: Patient has a 20 % chance of a positive penicillin allergy test    Iodine      Iodinated Contrast Media  --- Hives      Tetracyclines & Related Unknown    Hydrochlorothiazide Palpitations     dehydration    Influenza Vac Split [Influenza Virus Vaccine] Rash     Patient states she is allergic to the vaccine.  Got a rash all over body many years ago after receiving injection.

## 2024-12-21 NOTE — PLAN OF CARE
PRIMARY DIAGNOSIS: SOB  OUTPATIENT/OBSERVATION GOALS TO BE MET BEFORE DISCHARGE:  1. Vital signs stable: Yes    2. Improvement of peak flow to greater than 70% sustained off nebulizer for 4 hours: Yes    3. Dyspnea improved and O2 sats >88% at RA or at prior home O2 therapy level: Yes      SpO2: 93 %, O2 Device: Nasal cannula    4. Short term supplemental O2 needed for use with activity at home: Yes    5. Tolerating adequate PO diet and medications: Yes    6. Return to near baseline physical activity: Yes    Discharge Planner Nurse   Safe discharge environment identified: Yes  Barriers to discharge: Yes       Entered by: Kamran Alva RN 12/21/2024      Please review provider order for any additional goals.   Nurse to notify provider when observation goals have been met and patient is ready for discharge.    Patient is alert and oriented x 4. Headache managed with prn tylenol. Denies sob, dizziness, nausea. No peripheral iv. Regular diet. Ambulates with assist of 1 gait belt and walker. Infrequent cough. Wound dressing at the posterior left thigh intact. Redness and rash at abdominal fold, under breast, and armpit, InterDry in place. Possible discharge home with assist, SW following for discharge planning. Patient is sleeping well and able to make needs known.    Goal Outcome Evaluation:      Plan of Care Reviewed With: patient    Overall Patient Progress: improvingOverall Patient Progress: improving

## 2024-12-21 NOTE — PLAN OF CARE
PRIMARY DIAGNOSIS: SOB  OUTPATIENT/OBSERVATION GOALS TO BE MET BEFORE DISCHARGE:  1. Vital signs stable: Yes    2. Improvement of peak flow to greater than 70% sustained off nebulizer for 4 hours: Yes    3. Dyspnea improved and O2 sats >88% at RA or at prior home O2 therapy level: Yes      SpO2: 93 %, O2 Device: Nasal cannula    4. Short term supplemental O2 needed for use with activity at home: Yes    5. Tolerating adequate PO diet and medications: Yes    6. Return to near baseline physical activity: Yes    Discharge Planner Nurse   Safe discharge environment identified: Yes  Barriers to discharge: Yes       Entered by: Kamran Alva RN 12/21/2024      Please review provider order for any additional goals.   Nurse to notify provider when observation goals have been met and patient is ready for discharge.    Patient is alert and oriented x 4. Headache managed with prn tylenol. Denies sob, dizziness, nausea. No peripheral iv. Regular diet. Ambulates with assist of 1 gait belt and walker. Infrequent cough. Wound dressing at the posterior left thigh intact. Redness and rash at abdominal fold, under breast, and armpit, InterDry in place. Possible discharge home with assist, SW following for discharge planning.    Goal Outcome Evaluation:      Plan of Care Reviewed With: patient    Overall Patient Progress: improvingOverall Patient Progress: improving

## 2024-12-21 NOTE — CONSULTS
Care Management Initial Consult    General Information  Assessment completed with: Parents, Other (Parents are guardians.),    Type of CM/SW Visit: Initial Assessment    Primary Care Provider verified and updated as needed:     Readmission within the last 30 days: no previous admission in last 30 days      Reason for Consult: discharge planning  Advance Care Planning: Advance Care Planning Reviewed: present on chart          Communication Assessment  Patient's communication style: spoken language (English or Bilingual)    Hearing Difficulty or Deaf: no        Cognitive  Cognitive/Neuro/Behavioral: WDL                      Living Environment:   People in home: significant other     Current living Arrangements: apartment      Able to return to prior arrangements: yes       Family/Social Support:  Care provided by: self  Provides care for: no one     Support system:            Description of Support System:           Current Resources:   Patient receiving home care services: No        Community Resources: [facility], [facility], Other (see comment),  (CADI waiver, moms meals)  Equipment currently used at home: walker, rolling (bidet, electric scooter)  Supplies currently used at home: None    Employment/Financial:  Employment Status: disabled        Financial Concerns:             Does the patient's insurance plan have a 3 day qualifying hospital stay waiver?  Yes     Which insurance plan 3 day waiver is available? [employer]    Will the waiver be used for post-acute placement? No    Lifestyle & Psychosocial Needs:  Social Drivers of Health     Food Insecurity: Low Risk  (12/20/2024)    Food Insecurity     Within the past 12 months, did you worry that your food would run out before you got money to buy more?: No     Within the past 12 months, did the food you bought just not last and you didn t have money to get more?: No   Depression: Not at risk (12/12/2024)    PHQ-2     PHQ-2 Score: 0   Housing Stability:  Low Risk  (12/20/2024)    Housing Stability     Do you have housing? : Yes     Are you worried about losing your housing?: No   Tobacco Use: Low Risk  (12/12/2024)    Patient History     Smoking Tobacco Use: Never     Smokeless Tobacco Use: Never     Passive Exposure: Not on file   Financial Resource Strain: Low Risk  (12/20/2024)    Financial Resource Strain     Within the past 12 months, have you or your family members you live with been unable to get utilities (heat, electricity) when it was really needed?: No   Alcohol Use: Not on file   Transportation Needs: Low Risk  (12/20/2024)    Transportation Needs     Within the past 12 months, has lack of transportation kept you from medical appointments, getting your medicines, non-medical meetings or appointments, work, or from getting things that you need?: No   Physical Activity: Not on file   Interpersonal Safety: Low Risk  (12/20/2024)    Interpersonal Safety     Do you feel physically and emotionally safe where you currently live?: Yes     Within the past 12 months, have you been hit, slapped, kicked or otherwise physically hurt by someone?: No     Within the past 12 months, have you been humiliated or emotionally abused in other ways by your partner or ex-partner?: No   Stress: Not on file   Social Connections: Not on file   Health Literacy: Not on file       Functional Status:  Prior to admission patient needed assistance:   Dependent ADLs:: Ambulation-walker, Incontinence, Wheelchair-independent  Dependent IADLs:: Cleaning, Cooking, Laundry, Shopping, Meal Preparation, Transportation, Incontinence  Assesssment of Functional Status: Not at baseline with ADL Functioning    Mental Health Status:          Chemical Dependency Status:                Values/Beliefs:  Spiritual, Cultural Beliefs, Baptism Practices, Values that affect care:                 Discussed  Partnership in Safe Discharge Planning  document with patient/family: No    Additional  Information:    SW spoke with pt co-guardian Lola over the phone to discuss discharge planning. Lola reports that the pt lives in a home with her significant other. Pt in independent with a walker at baseline and manages her own medications. Lola reports that pt is receiving homecare RN through [employer] who assists with wound care. SW left in basket message to verify services. Pt also has a power WC at home. Pt is on a CADI waiver and receives housekeeping and moms meals. Pt has access to a HHA with her CADI waiver but the pt refuses help with showering and daily tasks which is why they have not pursued this. Pts significant other and parents assist with transport and plan to transport at discharge. They state that pt went to TCU in the past and it did not go well. They are supportive of the plan for pt to return home and deny the need for resources from SW. Pt mother already made the pt a PCP hospital follow up for 12/27.    Next Steps: follow for team recommendations and homecare.     Sherie Egan/AISSATOU Butler, SW  Inpatient Care Coordination  Emergency Room /Float  [phone]    Sherie Egan, SW

## 2024-12-21 NOTE — PROVIDER NOTIFICATION
"   12/20/24 2034   RCAT Assessment   Reason for Assessment Asthma   Pulmonary Status 4   Surgical Status 0   Chest X-ray 0   Respiratory Pattern 2   Mental Status 0   Breath Sounds 2   Cough Effectiveness 0   Level of Activity 1   O2 Required for SpO2>=92% 1   Acuity Level (points) 10   Acuity Level  4  (Will keep QID)   Re-eval Interval Guideline Every 3 days   Re-evaluation Date 12/23/24   Clinical Indications/Symptoms   Aerosol Therapy RCAT protocol   Broncho-pulmonary Hygiene History of mucous producing disease   Aerosol Therapy Plan   RT Treatment Nebulizer   Anticholinergic/Beta-Andrenergic Agonist Duoneb soln (0.5mg/3mg per 3mL) neb Max 6 doses/24h   Aerosol Treatment Frequency Acuity Level 3: QID/PRN @noc-Mod wheezing/Hx asthma/secretion removal   Aerosol Therapy (SVN)   Respiratory Treatment Status (SVN) given   Patient Position HOB elevated   Posttreatment Assessment (SVN) breath sounds unchanged   Broncho-Pulmonary Hygiene Plan   Broncho-Pulmonary Hygiene Treatment Coughing techniques   Broncho-Pulm Hygiene Frequency Acuity Level 3: TID-Small amounts secretions/poor cough, hx of secretions     Pt does nebs at home and will keep QID.     /60 (BP Location: Right arm)   Pulse 90   Temp 97.7  F (36.5  C) (Oral)   Resp 22   Ht 1.702 m (5' 7.01\")   Wt (!) 180.8 kg (398 lb 8 oz)   LMP 08/18/2008   SpO2 94%   BMI 62.40 kg/m      Sincere Hauser, RT on 12/20/2024 at 11:01 PM    "

## 2024-12-21 NOTE — PLAN OF CARE
Vitals are Temp: 98.2  F (36.8  C) Temp src: Oral BP: 122/62 Pulse: 80   Resp: 20 SpO2: 94 %.  Patient is Alert and Oriented x4. denying pain.  Patient is Saline locked. Pt is a Regular diet. Assist x1 with Gait Belt and Walker.      PRIMARY DIAGNOSIS: ASTHMA exacerbation   OUTPATIENT/OBSERVATION GOALS TO BE MET BEFORE DISCHARGE:  1. Vital signs stable: Yes    2. Improvement of peak flow to greater than 70% sustained off nebulizer for 4 hours:  N/A    3. Dyspnea improved and O2 sats >88% at RA or at prior home O2 therapy level: Yes      SpO2: 94 %, O2 Device: Nasal cannula    4. Short term supplemental O2 needed for use with activity at home:  baseline NS 2 LPM    5. Tolerating adequate PO diet and medications: Yes    6. Return to near baseline physical activity: Yes    Discharge Planner Nurse   Safe discharge environment identified: Yes  Barriers to discharge: Yes       Entered by: Subha Encarnacion RN 12/21/2024      Please review provider order for any additional goals.   Nurse to notify provider when observation goals have been met and patient is ready for discharge.

## 2024-12-21 NOTE — PLAN OF CARE
Occupational Therapy: Orders received. Chart reviewed and discussed with care team.? Occupational Therapy not indicated due to as pt mobilizing close to baseline and has support in home environment for ADLs/IADLs. Skilled IP OT eval not indicated.? Defer discharge recommendations to PT/care team.? Will complete IP OT orders.

## 2024-12-23 ENCOUNTER — PATIENT OUTREACH (OUTPATIENT)
Dept: CARE COORDINATION | Facility: CLINIC | Age: 59
End: 2024-12-23
Payer: MEDICARE

## 2024-12-23 ASSESSMENT — ACTIVITIES OF DAILY LIVING (ADL): DEPENDENT_IADLS:: CLEANING;COOKING;LAUNDRY;SHOPPING;MEAL PREPARATION;TRANSPORTATION;INCONTINENCE

## 2024-12-23 NOTE — PROGRESS NOTES
Clinic Care Coordination Contact  Clinic Care Coordination Contact  OUTREACH with Post Discharge Assessment    Referral Information: RN CC contacted patient's guardian, Lola (mother), for TCM outreach and monthly follow up.   Referral Source: Care Team     Chief Complaint   Patient presents with    Clinic Care Coordination - Post Hospital    Clinic Care Coordination - Follow-up      Universal Utilization: Risk of admission or ED visit 95%  Clinic Utilization  Difficulty keeping appointments:: No  Compliance Concerns: No  No-Show Concerns: No  No PCP office visit in Past Year: No  Utilization      No Show Count (past year)  1             ED Visits  9             Hospital Admissions  4                    Current as of: 12/23/2024 10:57 AM              Clinical Concerns:  Current Medical Concerns:  Provider at hospital feels patient's morbid obesity plays a role in her recurrent asthma exacerbations.     Current Behavioral Concerns: None.     Education Provided to patient: None at this time.       Health Maintenance Reviewed: Due/Overdue (Did not discuss during TCM outreach.)  Clinical Pathway: None    Transitions of Care Outreach    Most Recent Admission Date: 12/19/2024   Most Recent Admission Diagnosis: Candidiasis of skin - B37.2  Exacerbation of asthma, unspecified asthma severity, unspecified whether persistent - J45.901  Wound of thigh - S71.109A     Most Recent Discharge Date: 12/21/2024   Most Recent Discharge Diagnosis: Exacerbation of asthma, unspecified asthma severity, unspecified whether persistent - J45.901  Wound of thigh - S71.109A  Candidiasis of skin - B37.2  Pressure injury of left thigh, stage 3 (H) - L89.223  Acute respiratory failure with hypoxia (H) - J96.01  Chronic respiratory failure with hypoxia (H) - J96.11  Mild persistent asthma without complication - J45.30  Morbid obesity (H) - E66.01  Hematuria, unspecified type - R31.9  Acute cholecystitis - K81.0  Abdominal pain, unspecified  abdominal location - R10.9  Symptomatic cholelithiasis - K80.20  Hepatic steatosis - K76.0  Intertrigo - L30.4  Atypical pneumonia - J18.9  Small bowel obstruction (H) - K56.609  Skin lesion - L98.9  Critical illness myopathy - G72.81  Non-intractable vomiting with nausea - R11.2  SBO (small bowel obstruction) (H) - K56.609  BMI 50.0-59.9, adult (H) - Z68.43  Low vitamin D level - R79.89  Elevated cortisol level - R79.89  Insulin resistance - E88.819  IFG (impaired fasting glucose) - R73.01  Seizure disorder (H) - G40.909  Hyperlipidemia LDL goal <130 - E78.5  Delay in development - R62.50  Essential hypertension, benign - I10     Transitions of Care Assessment    Discharge Assessment  How are your symptoms? (Red Flag symptoms escalate to triage hotline per guidelines): Improved  Do you know how to contact your clinic care team if you have future questions or changes to your health status? : Yes  Does the patient have their discharge instructions? : Yes  Does the patient have questions regarding their discharge instructions? : No  Were you started on any new medications or were there changes to any of your previous medications? : Yes  Does the patient have all of their medications?: Yes  Do you have questions regarding any of your medications? : No  Do you have all of your needed medical supplies or equipment (DME)?  (i.e. oxygen tank, CPAP, cane, etc.): Yes    Post-op ([facility] CTA Only)  If the patient had a surgery or procedure, do they have any questions for a nurse?: No    Post-op (Clinicians Only)  Did the patient have surgery or a procedure: No  Fever: No  Chills: No  Eating & Drinking: eating and drinking without complaints/concerns  PO Intake: regular diet  Bowel Function: normal  Date of last BM:  (Unknown.)  Urinary Status: voiding without complaint/concerns    Care Management       Care Mgmt General Assessment  Referral  Referral Source: Care Team  Health Care Home/Utilization  Preferred Hospital: [place]  [facility]  [phone]  Preferred Urgent Care:  (States she does not use Urgent Care.)  Living Situation  Current living arrangement:: Other (Lives in an apartment with significant other)  Type of residence:: Apartment  Resources  Patient receiving home care services:: No  Community Resources: [facility], [place] Worker, Other (see comment),  (ABDIEL maurer, momaaron hernandez)  Supplies Currently Used at Home: None  Equipment Currently Used at Home: tub bench, grab bar, tub/shower, other (see comments), wheelchair, power (Bariatric walker with a seat)  Referrals Placed: None  Employment Status: disabled  Psychosocial  Judaism or spiritual beliefs that impact treatment:: No  Mental health DX:: No  Mental health management concern (GOAL):: No  Chemical Dependency Status: No Current Concerns  Chemical Dependency Management:  (N/A)  Informal Support system:: Parent, Significant other  Functional Status  Dependent ADLs:: Ambulation-walker, Incontinence  Dependent IADLs:: Cleaning, Cooking, Laundry, Shopping, Meal Preparation, Transportation, Incontinence  Bed or wheelchair confined:: No  Mobility Status: Dependent/Assisted by Another  Fallen 2 or more times in the past year?: No  Any fall with injury in the past year?: No  Advance Care Plan/Directive  Advanced Care Plans/Directives on file:: Yes  Status of record:: On File and Validated  Type Advanced Care Plans/Directives: Advanced Directive - On File and     Care Mgmt Encounter Assessment  Preventative Care  Routine Health maintenance Reviewed: Due/Overdue (Did not discuss during TCM outreach.)  Clinic Utilization  Difficulty keeping appointments:: No  Compliance Concerns: No  No-Show Concerns: No  No PCP office visit in Past Year: No  Transportation  Transportation means:: Medical transport, Family        Barriers in Communication  Other concerns:: Cognitive impairment, Physical impairment     Medication Review  Medication adherence problem (GOAL)::  No  Knowledgeable about how to use meds:: Yes  Medication side effects suspected:: No  Diet/Exercise/Sleep  Diet:: Regular  Inadequate nutrition (GOAL):: No  Tube Feeding: No  Inadequate activity/exercise (GOAL):: No  Significant changes in sleep pattern (GOAL): No    Medication Management:  Medication review status: Medications reviewed at hospital on 12/20/24. RN AYAAN did not re-review during TCM outreach.   Current Outpatient Medications   Medication Sig Dispense Refill    acetaminophen (TYLENOL) 500 MG tablet Take 1,000 mg by mouth as needed for mild pain.      albuterol (PROAIR HFA/PROVENTIL HFA/VENTOLIN HFA) 108 (90 Base) MCG/ACT inhaler Inhale 2 puffs into the lungs every 6 hours as needed for shortness of breath, wheezing or cough. 18 g 0    albuterol (PROVENTIL) (2.5 MG/3ML) 0.083% neb solution Take 1 vial (2.5 mg) by nebulization every 6 hours as needed for shortness of breath or wheezing. 75 mL 0    azelastine (ASTELIN) 0.1 % nasal spray Spray 1 spray into both nostrils daily as needed for allergies.      carBAMazepine (TEGRETOL) 200 MG tablet Take 400 mg by mouth 2 times daily. AM and HS      carBAMazepine (TEGRETOL) 200 MG tablet Take 200 mg by mouth daily. @1200      fluticasone (FLONASE) 50 MCG/ACT nasal spray USE ONE SPRAY INTO BOTH NOSTRILS DAILY AS NEEDED FOR RHINITIS OR ALLERGIES 16 mL 1    Fluticasone Furoate-Vilanterol (BREO ELLIPTA) 50-25 MCG/ACT AEPB Inhale 1 puff into the lungs 2 times daily      ibuprofen (ADVIL/MOTRIN) 200 MG tablet Take 800 mg by mouth as needed for pain.      ipratropium - albuterol 0.5 mg/2.5 mg/3 mL (DUONEB) 0.5-2.5 (3) MG/3ML neb solution Take 1 vial (3 mLs) by nebulization 4 times daily. 360 mL 0    lisinopril (ZESTRIL) 20 MG tablet TAKE 1 TABLET (20 MG) BY MOUTH DAILY 90 tablet 2    lovastatin (MEVACOR) 40 MG tablet TAKE 1 TABLET (40 MG) BY MOUTH AT BEDTIME 90 tablet 1    miconazole (MICATIN) 2 % external powder Apply topically as needed for itching or other.       Multiple Vitamin (MULTI-VITAMIN PO) Take 1 tablet by mouth daily      nystatin (MYCOSTATIN) 949789 UNIT/GM external cream Apply topically daily as needed      omeprazole (PRILOSEC) 20 MG DR capsule Take 20 mg by mouth daily.      predniSONE (DELTASONE) 20 MG tablet Take 2 tablets (40 mg) by mouth daily for 3 days, THEN 1.5 tablets (30 mg) daily for 3 days, THEN 1 tablet (20 mg) daily for 3 days, THEN 0.5 tablets (10 mg) daily for 3 days. 15 tablet 0    sertraline (ZOLOFT) 100 MG tablet TAKE 1 TABLET (100 MG) BY MOUTH DAILY 90 tablet 1     No current facility-administered medications for this visit.        Allergies   Allergen Reactions    Penicillins Anaphylaxis     PEN-FAST - Penicillin Allergy Risk Tool completed by [facility] December 20, 2024    Score: 3  Risk: Moderate  Assessment: Patient has a 20 % chance of a positive penicillin allergy test    Iodine      Iodinated Contrast Media  --- Hives      Tetracyclines & Related Unknown    Hydrochlorothiazide Palpitations     dehydration    Influenza Vac Split [Influenza Virus Vaccine] Rash     Patient states she is allergic to the vaccine.  Got a rash all over body many years ago after receiving injection.     Functional Status:  Dependent ADLs:: Ambulation-walker, Incontinence  Dependent IADLs:: Cleaning, Cooking, Laundry, Shopping, Meal Preparation, Transportation, Incontinence  Bed or wheelchair confined:: No  Mobility Status: Dependent/Assisted by Another  Fallen 2 or more times in the past year?: No  Any fall with injury in the past year?: No    Living Situation:  Current living arrangement:: Other (Lives in an apartment with significant other)  Type of residence:: Apartment    Lifestyle & Psychosocial Needs:    Social Drivers of Health     Food Insecurity: Low Risk  (12/20/2024)    Food Insecurity     Within the past 12 months, did you worry that your food would run out before you got money to buy more?: No     Within the past 12 months, did the food you bought just  not last and you didn t have money to get more?: No   Depression: Not at risk (12/12/2024)    PHQ-2     PHQ-2 Score: 0   Housing Stability: Low Risk  (12/20/2024)    Housing Stability     Do you have housing? : Yes     Are you worried about losing your housing?: No   Tobacco Use: Low Risk  (12/12/2024)    Patient History     Smoking Tobacco Use: Never     Smokeless Tobacco Use: Never     Passive Exposure: Not on file   Financial Resource Strain: Low Risk  (12/20/2024)    Financial Resource Strain     Within the past 12 months, have you or your family members you live with been unable to get utilities (heat, electricity) when it was really needed?: No   Alcohol Use: Not on file   Transportation Needs: Low Risk  (12/20/2024)    Transportation Needs     Within the past 12 months, has lack of transportation kept you from medical appointments, getting your medicines, non-medical meetings or appointments, work, or from getting things that you need?: No   Physical Activity: Not on file   Interpersonal Safety: Low Risk  (12/20/2024)    Interpersonal Safety     Do you feel physically and emotionally safe where you currently live?: Yes     Within the past 12 months, have you been hit, slapped, kicked or otherwise physically hurt by someone?: No     Within the past 12 months, have you been humiliated or emotionally abused in other ways by your partner or ex-partner?: No   Stress: Not on file   Social Connections: Not on file   Health Literacy: Not on file     Diet:: Regular  Inadequate nutrition (GOAL):: No  Tube Feeding: No  Inadequate activity/exercise (GOAL):: No  Significant changes in sleep pattern (GOAL): No  Transportation means:: Medical transport, Family     Holiness or spiritual beliefs that impact treatment:: No  Mental health DX:: No  Mental health management concern (GOAL):: No  Chemical Dependency Status: No Current Concerns  Chemical Dependency Management:  (N/A)  Informal Support system:: Parent, Significant  other      Resources and Interventions:  Current Resources:      Community Resources: [facility], [place] Worker, Other (see comment),  (CADGEOVANI wabrittany, moms meals)  Supplies Currently Used at Home: None  Equipment Currently Used at Home: tub bench, grab bar, tub/shower, other (see comments), wheelchair, power (Bariatric walker with a seat)  Employment Status: disabled     Advance Care Plan/Directive  Advanced Care Plans/Directives on file:: Yes  Status of record:: On File and Validated  Type Advanced Care Plans/Directives: Advanced Directive - On File    Referrals Placed: None     Care Plan:   Care Plan: Health Maintenance       Problem: Health Maintenance Due or Overdue       Goal: Become up-to-date with health maintenance visit(s)       Start Date: 5/14/2024 Expected End Date: 11/12/2024    This Visit's Progress: 20% Recent Progress: 10%    Note:     Barriers: Cognitive disability; Physical disability; Poor support from Significant other; Provider availability - wait time to complete appointments.   Strengths: Parents/guardians Motivated; Agreeable to Care Coordination.   Patient expressed understanding of goal: Yes  Action steps to achieve this goal:  1. I will take my medications as prescribed.   2. I will discuss, review, schedule and complete recommended overdue health maintenance with my Primary Care Provider.   3. I will contact my care team with questions, concerns or support needs. I will use the clinic as a resource and I understand I can contact my clinic with 24/7 after hours services available. Care Coordinator will remain available as needed.   (Still in progress as patient hasn't had an office visit, just virtual visits)                                Care Plan: Resources       Problem: Resources       Goal: Resources for equipment       Start Date: 5/14/2024 Expected End Date: 11/12/2024    This Visit's Progress: 50% Recent Progress: 20%    Note:     Barriers: Cognitive disability; Physical  disability; Poor support from Significant other; Provider availability - wait time to complete appointments.   Strengths: Parents/guardians Motivated; Agreeable to Care Coordination.   Patient expressed understanding of goal: Yes  Action steps to achieve this goal:  1. I will review and follow up with resource for medical equipment ([employer] ([phone]) for required forms for Bariatric Electric Wheelchair and bedside commode. (Completed)  2. I will discuss with [facility]  Jagjit Barrientos how to obtain incontinent supplies through [place]'s waiver. (Pending)  3. I will contact my care team with questions, concerns or support needs. I will use the clinic as a resource and I understand I can contact my clinic with 24/7 after hours services available. Care Coordinator will remain available as needed.                             Patient/Caregiver understanding: Patient/caregiver verbalized understanding and denies any additional questions or concerns at this time. RNCC engaged in [employer] communications during encounter.     Outreach Frequency: monthly, more frequently as needed    Future Appointments                In 4 days Aury Vizcaino MD [facility]    In 1 week [place] [facility]    In 3 weeks Lary Valentin MD [facility] SLEEP    In 1 month Aury Vizcaino MD [place]          Follow up Plan     Discharge Follow-Up  Discharge follow up appointment scheduled in alignment with recommended follow up timeframe or Transitions of Risk Category? (Low = within 30 days; Moderate= within 14 days; High= within 7 days): Yes  Discharge Follow Up Appointment Date: 12/27/24  Discharge Follow Up Appointment Scheduled with?: Primary Care Provider    RN CC contacted patient for post-hospital follow up and to introduce Care Coordination. Full assessment not  indicated as patient is already enrolled in Care Coordination.     Future Appointments   Date Time Provider Department Center   12/27/2024 11:30 AM Aury Vizcaino MD [facility] [place]   12/30/2024  1:45 PM [place] [facility] [place]   1/13/2025  1:00 PM [place] [facility] [place]   1/22/2025  1:00 PM Aury Vizcaino MD [facility] [place]       Outpatient Plan as outlined on AVS reviewed with patient.    For any urgent concerns, please contact our 24 hour nurse triage line: [phone] ([phone])       Rocio Govea RN, BSN, CPHN, CCM  [facility] Ambulatory Care Management  [facility], and [facility]  [email or web address]  Office: [phone]  Employed by [facility] Care Coordination Contact  Follow Up Progress Note      Assessment: RN CC contacted patient's guardian, Lola (mother), for monthly outreach. Patient just discharged from [facility] on 12/21/24. One provider at hospital told patient she would be staying through the weekend and the next provider discharged her the following morning. Patient was confused about this abrupt change in plans. The provider did discuss the idea that the bedding of her guinea pigs could be causing her recurrent asthma exacerbations. The provider doesn't believe this is the case because at one point the patient had 7 guinea pigs and had no issues. She is doing ok now and denies any needs at this time.     Care Gaps:    Health Maintenance Due   Topic Date Due    ASTHMA ACTION PLAN  Never done    HIV SCREENING  Never done    HEPATITIS C SCREENING  Never done    Pneumococcal Vaccine: 50+ Years (1 of 2 - PCV) Never done    ZOSTER IMMUNIZATION (1 of 2) Never done    HEPATITIS B IMMUNIZATION (1 of 3 - 19+ 3-dose series) Never done    COLORECTAL CANCER SCREENING  04/19/2016    MEDICARE ANNUAL WELLNESS VISIT  10/01/2021    MAMMO SCREENING  10/23/2021    HPV TEST  10/01/2023    PAP  10/01/2023     INFLUENZA VACCINE (1) 09/01/2024    COVID-19 Vaccine (4 - 2024-25 season) 09/01/2024     Postponed to after recovery from newest asthma exacerbation.      Care Plans  Care Plan: Health Maintenance       Problem: Health Maintenance Due or Overdue       Goal: Become up-to-date with health maintenance visit(s)       Start Date: 5/14/2024 Expected End Date: 11/12/2024    This Visit's Progress: 20% Recent Progress: 10%    Note:     Barriers: Cognitive disability; Physical disability; Poor support from Significant other; Provider availability - wait time to complete appointments.   Strengths: Parents/guardians Motivated; Agreeable to Care Coordination.   Patient expressed understanding of goal: Yes  Action steps to achieve this goal:  1. I will take my medications as prescribed.   2. I will discuss, review, schedule and complete recommended overdue health maintenance with my Primary Care Provider.   3. I will contact my care team with questions, concerns or support needs. I will use the clinic as a resource and I understand I can contact my clinic with 24/7 after hours services available. Care Coordinator will remain available as needed.   (Still in progress as patient hasn't had an office visit, just virtual visits)                                Care Plan: Resources       Problem: Resources       Goal: Resources for equipment       Start Date: 5/14/2024 Expected End Date: 11/12/2024    This Visit's Progress: 50% Recent Progress: 20%    Note:     Barriers: Cognitive disability; Physical disability; Poor support from Significant other; Provider availability - wait time to complete appointments.   Strengths: Parents/guardians Motivated; Agreeable to Care Coordination.   Patient expressed understanding of goal: Yes  Action steps to achieve this goal:  1. I will review and follow up with resource for medical equipment ([employer] ([phone]) for required forms for Bariatric Electric Wheelchair and bedside  commode. (Completed)  2. I will discuss with [facility]  Jagjit Barrientos how to obtain incontinent supplies through [place]'s waiver. (Pending)  3. I will contact my care team with questions, concerns or support needs. I will use the clinic as a resource and I understand I can contact my clinic with 24/7 after hours services available. Care Coordinator will remain available as needed.                             Intervention/Education provided during outreach: Discussed patients plan of care. CC RN asked open ended questions, provided support, resources, and encouragement as needed. Patient will reach out to care team sooner than planned with new questions or concerns.      Outreach Frequency: monthly, more frequently as needed    Plan: RN CC will continue to follow patient for any additional needs.     Care Coordinator will follow up in 1 month.     Rocio Govea RN, BSN, CPHN, [facility] Ambulatory Care Management  [facility], and [facility]  [email or web address]  Office: [phone]  Employed by [facility]

## 2024-12-26 ENCOUNTER — PATIENT OUTREACH (OUTPATIENT)
Dept: CARE COORDINATION | Facility: CLINIC | Age: 59
End: 2024-12-26
Payer: MEDICARE

## 2024-12-26 ENCOUNTER — TELEPHONE (OUTPATIENT)
Dept: INTERNAL MEDICINE | Facility: CLINIC | Age: 59
End: 2024-12-26
Payer: MEDICARE

## 2024-12-26 NOTE — TELEPHONE ENCOUNTER
Jessica  with [employer] ([phone]) calling to request order for RN eval and treat since discharging from hospital. VICTOR MANUEL Shukal R.N.

## 2024-12-26 NOTE — PROGRESS NOTES
Patient outreach to remind of post acute follow up appointment on 12/27.  Patient confirmed appointment.

## 2024-12-27 ENCOUNTER — TELEPHONE (OUTPATIENT)
Dept: INTERNAL MEDICINE | Facility: CLINIC | Age: 59
End: 2024-12-27

## 2024-12-27 NOTE — TELEPHONE ENCOUNTER
Forms/Letter Request    Type of form/letter: Hold All Services 12/19/24      Do we have the form/letter: Yes: placed in provider mailbox for signature    Who is the form from? [facility] Services # [id number]    Where did/will the form come from? form was faxed in    When is form/letter needed by: 5-7    How would you like the form/letter returned: Fax : [phone]    Patient Notified form requests are processed in 5-7 business days:Yes    Could we send this information to you in [place] or would you prefer to receive a phone call?:   NA

## 2024-12-31 ENCOUNTER — TELEPHONE (OUTPATIENT)
Dept: INTERNAL MEDICINE | Facility: CLINIC | Age: 59
End: 2024-12-31
Payer: MEDICARE

## 2024-12-31 ENCOUNTER — MEDICAL CORRESPONDENCE (OUTPATIENT)
Dept: HEALTH INFORMATION MANAGEMENT | Facility: CLINIC | Age: 59
End: 2024-12-31

## 2025-01-02 ENCOUNTER — MEDICAL CORRESPONDENCE (OUTPATIENT)
Dept: HEALTH INFORMATION MANAGEMENT | Facility: CLINIC | Age: 60
End: 2025-01-02

## 2025-01-10 PROBLEM — J96.01 ACUTE RESPIRATORY FAILURE WITH HYPOXIA (H): Status: RESOLVED | Noted: 2024-10-10 | Resolved: 2025-01-10

## 2025-01-14 ENCOUNTER — MEDICAL CORRESPONDENCE (OUTPATIENT)
Dept: HEALTH INFORMATION MANAGEMENT | Facility: CLINIC | Age: 60
End: 2025-01-14

## 2025-01-14 ENCOUNTER — TELEPHONE (OUTPATIENT)
Dept: INTERNAL MEDICINE | Facility: CLINIC | Age: 60
End: 2025-01-14

## 2025-01-15 ENCOUNTER — MEDICAL CORRESPONDENCE (OUTPATIENT)
Dept: HEALTH INFORMATION MANAGEMENT | Facility: CLINIC | Age: 60
End: 2025-01-15

## 2025-01-15 ENCOUNTER — HOSPITAL ENCOUNTER (INPATIENT)
Facility: CLINIC | Age: 60
DRG: 202 | End: 2025-01-15
Attending: EMERGENCY MEDICINE | Admitting: INTERNAL MEDICINE
Payer: MEDICARE

## 2025-01-15 ENCOUNTER — APPOINTMENT (OUTPATIENT)
Dept: GENERAL RADIOLOGY | Facility: CLINIC | Age: 60
DRG: 202 | End: 2025-01-15
Attending: EMERGENCY MEDICINE
Payer: MEDICARE

## 2025-01-15 DIAGNOSIS — E66.2 OBESITY HYPOVENTILATION SYNDROME (H): ICD-10-CM

## 2025-01-15 DIAGNOSIS — J45.901 EXACERBATION OF ASTHMA, UNSPECIFIED ASTHMA SEVERITY, UNSPECIFIED WHETHER PERSISTENT: Primary | ICD-10-CM

## 2025-01-15 DIAGNOSIS — S71.102A OPEN THIGH WOUND, LEFT, INITIAL ENCOUNTER: ICD-10-CM

## 2025-01-15 DIAGNOSIS — J06.9 URI WITH COUGH AND CONGESTION: ICD-10-CM

## 2025-01-15 DIAGNOSIS — J45.901 ASTHMA WITH ACUTE EXACERBATION, UNSPECIFIED ASTHMA SEVERITY, UNSPECIFIED WHETHER PERSISTENT: ICD-10-CM

## 2025-01-15 LAB
ANION GAP SERPL CALCULATED.3IONS-SCNC: 14 MMOL/L (ref 7–15)
ATRIAL RATE - MUSE: 94 BPM
BASOPHILS # BLD AUTO: 0 10E3/UL (ref 0–0.2)
BASOPHILS NFR BLD AUTO: 1 %
BUN SERPL-MCNC: 20.1 MG/DL (ref 6–20)
CALCIUM SERPL-MCNC: 8.5 MG/DL (ref 8.8–10.4)
CHLORIDE SERPL-SCNC: 102 MMOL/L (ref 98–107)
CREAT SERPL-MCNC: 0.62 MG/DL (ref 0.51–0.95)
DIASTOLIC BLOOD PRESSURE - MUSE: NORMAL MMHG
EGFRCR SERPLBLD CKD-EPI 2021: >90 ML/MIN/1.73M2
EOSINOPHIL # BLD AUTO: 0.5 10E3/UL (ref 0–0.7)
EOSINOPHIL NFR BLD AUTO: 7 %
ERYTHROCYTE [DISTWIDTH] IN BLOOD BY AUTOMATED COUNT: 13.9 % (ref 10–15)
FLUAV RNA SPEC QL NAA+PROBE: NEGATIVE
FLUBV RNA RESP QL NAA+PROBE: NEGATIVE
GLUCOSE SERPL-MCNC: 140 MG/DL (ref 70–99)
HCO3 BLDV-SCNC: 29 MMOL/L (ref 21–28)
HCO3 BLDV-SCNC: 30 MMOL/L (ref 21–28)
HCO3 SERPL-SCNC: 24 MMOL/L (ref 22–29)
HCT VFR BLD AUTO: 39.5 % (ref 35–47)
HGB BLD-MCNC: 11.8 G/DL (ref 11.7–15.7)
IMM GRANULOCYTES # BLD: 0 10E3/UL
IMM GRANULOCYTES NFR BLD: 0 %
INTERPRETATION ECG - MUSE: NORMAL
LACTATE BLD-SCNC: 0.9 MMOL/L
LACTATE BLD-SCNC: 1 MMOL/L
LYMPHOCYTES # BLD AUTO: 2.1 10E3/UL (ref 0.8–5.3)
LYMPHOCYTES NFR BLD AUTO: 28 %
MCH RBC QN AUTO: 27.6 PG (ref 26.5–33)
MCHC RBC AUTO-ENTMCNC: 29.9 G/DL (ref 31.5–36.5)
MCV RBC AUTO: 92 FL (ref 78–100)
MONOCYTES # BLD AUTO: 0.5 10E3/UL (ref 0–1.3)
MONOCYTES NFR BLD AUTO: 6 %
NEUTROPHILS # BLD AUTO: 4.6 10E3/UL (ref 1.6–8.3)
NEUTROPHILS NFR BLD AUTO: 59 %
NRBC # BLD AUTO: 0 10E3/UL
NRBC BLD AUTO-RTO: 0 /100
P AXIS - MUSE: 33 DEGREES
PCO2 BLDV: 53 MM HG (ref 40–50)
PCO2 BLDV: 64 MM HG (ref 40–50)
PH BLDV: 7.28 [PH] (ref 7.32–7.43)
PH BLDV: 7.35 [PH] (ref 7.32–7.43)
PLATELET # BLD AUTO: 189 10E3/UL (ref 150–450)
PO2 BLDV: 37 MM HG (ref 25–47)
PO2 BLDV: 43 MM HG (ref 25–47)
POTASSIUM SERPL-SCNC: 4 MMOL/L (ref 3.4–5.3)
PR INTERVAL - MUSE: 200 MS
QRS DURATION - MUSE: 74 MS
QT - MUSE: 354 MS
QTC - MUSE: 442 MS
R AXIS - MUSE: 9 DEGREES
RBC # BLD AUTO: 4.28 10E6/UL (ref 3.8–5.2)
RSV RNA SPEC NAA+PROBE: NEGATIVE
SAO2 % BLDV: 61 % (ref 70–75)
SAO2 % BLDV: 75 % (ref 70–75)
SARS-COV-2 RNA RESP QL NAA+PROBE: NEGATIVE
SODIUM SERPL-SCNC: 140 MMOL/L (ref 135–145)
SYSTOLIC BLOOD PRESSURE - MUSE: NORMAL MMHG
T AXIS - MUSE: 46 DEGREES
TROPONIN T SERPL HS-MCNC: 10 NG/L
VENTRICULAR RATE- MUSE: 94 BPM
WBC # BLD AUTO: 7.7 10E3/UL (ref 4–11)

## 2025-01-15 PROCEDURE — 80048 BASIC METABOLIC PNL TOTAL CA: CPT | Performed by: EMERGENCY MEDICINE

## 2025-01-15 PROCEDURE — 120N000001 HC R&B MED SURG/OB

## 2025-01-15 PROCEDURE — 999N000157 HC STATISTIC RCP TIME EA 10 MIN

## 2025-01-15 PROCEDURE — 85041 AUTOMATED RBC COUNT: CPT | Performed by: EMERGENCY MEDICINE

## 2025-01-15 PROCEDURE — 99285 EMERGENCY DEPT VISIT HI MDM: CPT

## 2025-01-15 PROCEDURE — 250N000011 HC RX IP 250 OP 636: Performed by: EMERGENCY MEDICINE

## 2025-01-15 PROCEDURE — 99207 PR NO CHARGE LOS: CPT | Performed by: HOSPITALIST

## 2025-01-15 PROCEDURE — 250N000009 HC RX 250: Performed by: EMERGENCY MEDICINE

## 2025-01-15 PROCEDURE — 84484 ASSAY OF TROPONIN QUANT: CPT | Performed by: EMERGENCY MEDICINE

## 2025-01-15 PROCEDURE — 94640 AIRWAY INHALATION TREATMENT: CPT

## 2025-01-15 PROCEDURE — G0463 HOSPITAL OUTPT CLINIC VISIT: HCPCS | Mod: 25

## 2025-01-15 PROCEDURE — 250N000009 HC RX 250: Performed by: INTERNAL MEDICINE

## 2025-01-15 PROCEDURE — 36415 COLL VENOUS BLD VENIPUNCTURE: CPT | Performed by: EMERGENCY MEDICINE

## 2025-01-15 PROCEDURE — 71045 X-RAY EXAM CHEST 1 VIEW: CPT

## 2025-01-15 PROCEDURE — 87637 SARSCOV2&INF A&B&RSV AMP PRB: CPT | Performed by: EMERGENCY MEDICINE

## 2025-01-15 PROCEDURE — 94640 AIRWAY INHALATION TREATMENT: CPT | Mod: 76

## 2025-01-15 PROCEDURE — 99222 1ST HOSP IP/OBS MODERATE 55: CPT | Mod: AI | Performed by: INTERNAL MEDICINE

## 2025-01-15 PROCEDURE — 250N000013 HC RX MED GY IP 250 OP 250 PS 637: Performed by: INTERNAL MEDICINE

## 2025-01-15 PROCEDURE — 96365 THER/PROPH/DIAG IV INF INIT: CPT

## 2025-01-15 PROCEDURE — 250N000011 HC RX IP 250 OP 636: Performed by: HOSPITALIST

## 2025-01-15 PROCEDURE — 93005 ELECTROCARDIOGRAM TRACING: CPT

## 2025-01-15 PROCEDURE — 82803 BLOOD GASES ANY COMBINATION: CPT

## 2025-01-15 PROCEDURE — 85004 AUTOMATED DIFF WBC COUNT: CPT | Performed by: EMERGENCY MEDICINE

## 2025-01-15 RX ORDER — BENZONATATE 100 MG/1
100 CAPSULE ORAL 3 TIMES DAILY PRN
Status: DISCONTINUED | OUTPATIENT
Start: 2025-01-15 | End: 2025-01-17 | Stop reason: HOSPADM

## 2025-01-15 RX ORDER — ATORVASTATIN CALCIUM 10 MG/1
10 TABLET, FILM COATED ORAL DAILY
Status: DISCONTINUED | OUTPATIENT
Start: 2025-01-15 | End: 2025-01-17 | Stop reason: HOSPADM

## 2025-01-15 RX ORDER — AMOXICILLIN 250 MG
2 CAPSULE ORAL 2 TIMES DAILY PRN
Status: DISCONTINUED | OUTPATIENT
Start: 2025-01-15 | End: 2025-01-17 | Stop reason: HOSPADM

## 2025-01-15 RX ORDER — PANTOPRAZOLE SODIUM 40 MG/1
40 TABLET, DELAYED RELEASE ORAL
Status: DISCONTINUED | OUTPATIENT
Start: 2025-01-15 | End: 2025-01-17 | Stop reason: HOSPADM

## 2025-01-15 RX ORDER — POLYETHYLENE GLYCOL 3350 17 G/17G
17 POWDER, FOR SOLUTION ORAL 2 TIMES DAILY PRN
Status: DISCONTINUED | OUTPATIENT
Start: 2025-01-15 | End: 2025-01-17 | Stop reason: HOSPADM

## 2025-01-15 RX ORDER — FLUTICASONE FUROATE AND VILANTEROL 100; 25 UG/1; UG/1
1 POWDER RESPIRATORY (INHALATION) 2 TIMES DAILY
Status: DISCONTINUED | OUTPATIENT
Start: 2025-01-15 | End: 2025-01-17 | Stop reason: HOSPADM

## 2025-01-15 RX ORDER — ONDANSETRON 4 MG/1
4 TABLET, ORALLY DISINTEGRATING ORAL EVERY 6 HOURS PRN
Status: DISCONTINUED | OUTPATIENT
Start: 2025-01-15 | End: 2025-01-17 | Stop reason: HOSPADM

## 2025-01-15 RX ORDER — LIDOCAINE 40 MG/G
CREAM TOPICAL
Status: DISCONTINUED | OUTPATIENT
Start: 2025-01-15 | End: 2025-01-17 | Stop reason: HOSPADM

## 2025-01-15 RX ORDER — ACETAMINOPHEN 325 MG/1
975 TABLET ORAL EVERY 6 HOURS PRN
Status: DISCONTINUED | OUTPATIENT
Start: 2025-01-15 | End: 2025-01-17 | Stop reason: HOSPADM

## 2025-01-15 RX ORDER — IPRATROPIUM BROMIDE AND ALBUTEROL SULFATE 2.5; .5 MG/3ML; MG/3ML
6 SOLUTION RESPIRATORY (INHALATION) ONCE
Status: COMPLETED | OUTPATIENT
Start: 2025-01-15 | End: 2025-01-15

## 2025-01-15 RX ORDER — ALBUTEROL SULFATE 0.83 MG/ML
2.5 SOLUTION RESPIRATORY (INHALATION)
Status: DISCONTINUED | OUTPATIENT
Start: 2025-01-15 | End: 2025-01-17 | Stop reason: HOSPADM

## 2025-01-15 RX ORDER — CARBAMAZEPINE 200 MG/1
200 TABLET ORAL EVERY 24 HOURS
Status: DISCONTINUED | OUTPATIENT
Start: 2025-01-15 | End: 2025-01-17 | Stop reason: HOSPADM

## 2025-01-15 RX ORDER — FLUTICASONE PROPIONATE 50 MCG
1 SPRAY, SUSPENSION (ML) NASAL DAILY
Status: DISCONTINUED | OUTPATIENT
Start: 2025-01-15 | End: 2025-01-17 | Stop reason: HOSPADM

## 2025-01-15 RX ORDER — SERTRALINE HYDROCHLORIDE 100 MG/1
100 TABLET, FILM COATED ORAL DAILY
Status: DISCONTINUED | OUTPATIENT
Start: 2025-01-15 | End: 2025-01-17 | Stop reason: HOSPADM

## 2025-01-15 RX ORDER — AZELASTINE 1 MG/ML
1 SPRAY, METERED NASAL DAILY PRN
Status: DISCONTINUED | OUTPATIENT
Start: 2025-01-15 | End: 2025-01-17 | Stop reason: HOSPADM

## 2025-01-15 RX ORDER — METHYLPREDNISOLONE SODIUM SUCCINATE 125 MG/2ML
60 INJECTION INTRAMUSCULAR; INTRAVENOUS 2 TIMES DAILY
Status: COMPLETED | OUTPATIENT
Start: 2025-01-15 | End: 2025-01-15

## 2025-01-15 RX ORDER — CARBAMAZEPINE 200 MG/1
400 TABLET ORAL 2 TIMES DAILY
Status: DISCONTINUED | OUTPATIENT
Start: 2025-01-15 | End: 2025-01-17 | Stop reason: HOSPADM

## 2025-01-15 RX ORDER — GUAIFENESIN 200 MG/10ML
200 LIQUID ORAL EVERY 4 HOURS PRN
Status: DISCONTINUED | OUTPATIENT
Start: 2025-01-15 | End: 2025-01-17 | Stop reason: HOSPADM

## 2025-01-15 RX ORDER — IPRATROPIUM BROMIDE AND ALBUTEROL SULFATE 2.5; .5 MG/3ML; MG/3ML
3 SOLUTION RESPIRATORY (INHALATION)
Status: DISCONTINUED | OUTPATIENT
Start: 2025-01-15 | End: 2025-01-17 | Stop reason: HOSPADM

## 2025-01-15 RX ORDER — AMOXICILLIN 250 MG
1 CAPSULE ORAL 2 TIMES DAILY PRN
Status: DISCONTINUED | OUTPATIENT
Start: 2025-01-15 | End: 2025-01-17 | Stop reason: HOSPADM

## 2025-01-15 RX ORDER — MAGNESIUM SULFATE HEPTAHYDRATE 40 MG/ML
2 INJECTION, SOLUTION INTRAVENOUS ONCE
Status: COMPLETED | OUTPATIENT
Start: 2025-01-15 | End: 2025-01-15

## 2025-01-15 RX ORDER — LISINOPRIL 20 MG/1
20 TABLET ORAL DAILY
Status: DISCONTINUED | OUTPATIENT
Start: 2025-01-15 | End: 2025-01-17 | Stop reason: HOSPADM

## 2025-01-15 RX ORDER — ALBUTEROL SULFATE 90 UG/1
2 INHALANT RESPIRATORY (INHALATION)
Status: DISCONTINUED | OUTPATIENT
Start: 2025-01-15 | End: 2025-01-17 | Stop reason: HOSPADM

## 2025-01-15 RX ORDER — ONDANSETRON 2 MG/ML
4 INJECTION INTRAMUSCULAR; INTRAVENOUS EVERY 6 HOURS PRN
Status: DISCONTINUED | OUTPATIENT
Start: 2025-01-15 | End: 2025-01-17 | Stop reason: HOSPADM

## 2025-01-15 RX ORDER — PREDNISONE 20 MG/1
40 TABLET ORAL DAILY
Status: DISCONTINUED | OUTPATIENT
Start: 2025-01-16 | End: 2025-01-17 | Stop reason: HOSPADM

## 2025-01-15 RX ORDER — CALCIUM CARBONATE 500 MG/1
1000 TABLET, CHEWABLE ORAL 4 TIMES DAILY PRN
Status: DISCONTINUED | OUTPATIENT
Start: 2025-01-15 | End: 2025-01-17 | Stop reason: HOSPADM

## 2025-01-15 RX ADMIN — MAGNESIUM SULFATE HEPTAHYDRATE 2 G: 40 INJECTION, SOLUTION INTRAVENOUS at 05:15

## 2025-01-15 RX ADMIN — CARBAMAZEPINE 400 MG: 200 TABLET ORAL at 11:28

## 2025-01-15 RX ADMIN — IPRATROPIUM BROMIDE AND ALBUTEROL SULFATE 3 ML: .5; 3 SOLUTION RESPIRATORY (INHALATION) at 16:55

## 2025-01-15 RX ADMIN — IPRATROPIUM BROMIDE AND ALBUTEROL SULFATE 3 ML: .5; 3 SOLUTION RESPIRATORY (INHALATION) at 11:20

## 2025-01-15 RX ADMIN — LISINOPRIL 20 MG: 20 TABLET ORAL at 11:20

## 2025-01-15 RX ADMIN — MICONAZOLE NITRATE ANTIFUNGAL POWDER: 2 POWDER TOPICAL at 21:44

## 2025-01-15 RX ADMIN — IPRATROPIUM BROMIDE AND ALBUTEROL SULFATE 3 ML: .5; 3 SOLUTION RESPIRATORY (INHALATION) at 20:43

## 2025-01-15 RX ADMIN — FLUTICASONE FUROATE AND VILANTEROL TRIFENATATE 1 PUFF: 100; 25 POWDER RESPIRATORY (INHALATION) at 11:28

## 2025-01-15 RX ADMIN — FLUTICASONE PROPIONATE 1 SPRAY: 50 SPRAY, METERED NASAL at 11:28

## 2025-01-15 RX ADMIN — IPRATROPIUM BROMIDE AND ALBUTEROL SULFATE 6 ML: .5; 3 SOLUTION RESPIRATORY (INHALATION) at 05:15

## 2025-01-15 RX ADMIN — ATORVASTATIN CALCIUM 10 MG: 10 TABLET, FILM COATED ORAL at 11:20

## 2025-01-15 RX ADMIN — SERTRALINE HYDROCHLORIDE 100 MG: 100 TABLET ORAL at 11:29

## 2025-01-15 RX ADMIN — CARBAMAZEPINE 400 MG: 200 TABLET ORAL at 21:43

## 2025-01-15 RX ADMIN — PANTOPRAZOLE SODIUM 40 MG: 40 TABLET, DELAYED RELEASE ORAL at 11:22

## 2025-01-15 RX ADMIN — METHYLPREDNISOLONE SODIUM SUCCINATE 62.5 MG: 125 INJECTION, POWDER, FOR SOLUTION INTRAMUSCULAR; INTRAVENOUS at 18:17

## 2025-01-15 RX ADMIN — CARBAMAZEPINE 200 MG: 200 TABLET ORAL at 16:55

## 2025-01-15 ASSESSMENT — ACTIVITIES OF DAILY LIVING (ADL)
ADLS_ACUITY_SCORE: 50
ADLS_ACUITY_SCORE: 70
ADLS_ACUITY_SCORE: 63
ADLS_ACUITY_SCORE: 70
ADLS_ACUITY_SCORE: 66
ADLS_ACUITY_SCORE: 70
ADLS_ACUITY_SCORE: 63
ADLS_ACUITY_SCORE: 70
ADLS_ACUITY_SCORE: 70
ADLS_ACUITY_SCORE: 63
ADLS_ACUITY_SCORE: 70
ADLS_ACUITY_SCORE: 63
ADLS_ACUITY_SCORE: 50
ADLS_ACUITY_SCORE: 63
ADLS_ACUITY_SCORE: 63
ADLS_ACUITY_SCORE: 70
ADLS_ACUITY_SCORE: 70
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
## 2025-01-15 NOTE — CONSULTS
[facility]  WO Nurse Inpatient Assessment     Consulted for: left thigh    Summary: Pt seen by WO 12/2024 for skin fold breakdown and left thigh friction wound. Pt currently has an active fungal rash under breasts- can't use interdry and powder at the same time- once pt no longer needs powder please apply interdry to skin folds.     Patient History (according to H&P provider note(s) 1//15/25:      Brissa Corado is a 59 year old female with PMH including poorly controlled asthma with current exacerbations, morbid obesity, RICK does not have CPAP, suspected obesity hypoventilation syndrome, developmental delay, seizure disorder, HTN, GERD, HLD, MDD, tinea cruris/intertrigo, left posterior thigh pressure ulcer who presents with shortness of breath.  Numerous ER visits and hospitalizations over the last 6 months for asthma exacerbations including hospitalization last month for 2 days.  Symptoms always improve when on prednisone and then when she is off symptoms recur.  She has had a minimally productive cough for the last 2 days or so.  She has not had any fevers or chills.  She was doing okay with her breathing until she woke up in the middle of the night with short of breath with significant wheezing.  She was wearing her 2 L oxygen at the time that she wears while sleeping.  She has not yet made it to a follow-up appointment to get a CPAP.  She says she does not have a pulmonologist to manage her poorly controlled asthma with frequent exacerbations.  She does not smoke or vape.     Posterior left thigh pressure ulcer, present on admission: Evaluated by WOC RN last month for left posterior thigh pressure ulcer.  She has significant tinea cruris and intertrigo.  -Miconazole powder twice daily to skin folds  -WOC RN consult to evaluate the left thigh pressure ulcer    Assessment:      Areas visualized during today's visit:  left posterior thigh    Pressure Injury Location: left posterior  thigh      Last photo: 1/15/25  Wound type: Pressure Injury and Friction     Pressure Injury Stage: 3, present on admission   Wound history/plan of care:   Wound started a little over 3 months ago and patient reports it hasn't been improving- seen by [facility] 12/2024.  Patient reports sitting most of the time but does have a chair cushion.      Wound base: 10 % Granulation tissue     Palpation of the wound bed: normal      Drainage: moderate to large     Description of drainage: serous     Measurements (length x width x depth, in cm) 3  x 7  x  0.1 cm      Tunneling N/A     Undermining N/A  Periwound skin: Intact, Macerated, and Scar tissue      Color: normal and consistent with surrounding tissue      Temperature: normal   Odor: none  Pain: moderate and during dressing change, sharp and tender  Pain intervention prior to dressing change: slow and gentle cares   Treatment goal: Drainage control, Infection control/prevention, Maintain (prevention of deterioration), and Protection  STATUS: initial assessment  Supplies ordered: ordered Aquacel AG, discussed with RN, and discussed with patient       Treatment Plan:     Left posterior thigh wound(s): Every other day  Soak with Vashe moistened gauze for 10 minutes and then remove  Apply Aquacel Ag (SPD#[id number]) cut to fit to wounds  Cover with Mepilex sacral      Start Interdry after fungal rash improves and no longer needs powder  Under breasts and abdominal fold wound(s): Daily   Interdry(order#[id number]):   1.  Wash skin gently with Vashe. Pat, do not rub.  2.  With clean scissors, cut enough fabric to cover the affected area, allowing for a minimum of 2 inches to extend beyond the skin fold for moisture evaporation.  3.  Lay a single layer of fabric in the skin fold, placing one edge into the base of the fold. Gently smooth the rest of the fabric over the skin, keeping it flat.  4.  Leave at least 2 inches of fabric exposed outside the skin fold.  5.  Secure the fabric in one  of several ways: with the skin fold, with a small amount of skin-friendly tape, or tucked under clothing.  6.  Remove the fabric before bathing and reuse it when finished. When removing, gently separate the skin fold and lift away.  Helpful Hints  1. InterDry  can be written on with a ballpoint pen. It may be helpful to label each piece of InterDry  with the date you started using it.  2.  Each piece of InterDry  may be used up to 5 days, depending on fabric soiling, odor, amount of moisture and general skin condition. Replace InterDry  if it becomes soiled with blood, urine or stool.  3.  Do not use creams, ointments, or powders with InterDry  as it may interfere with product efficacy.     Orders: Written    RECOMMEND PRIMARY TEAM ORDER: Aubree antifungal to under skin folds  Education provided: plan of care and wound progress  Discussed plan of care with: Patient and Nurse  WOC nurse follow-up plan: weekly  Notify WOC if wound(s) deteriorate.  Nursing to notify the Provider(s) and re-consult the WOC Nurse if new skin concern.    DATA:     Current support surface: Standard  ED cart  Containment of urine/stool: Incontinent pad in bed  BMI: Body mass index is 62.34 kg/m .   Active diet order: Orders Placed This Encounter      Combination Diet Regular Diet Adult     Output: No intake/output data recorded.     Labs:   Recent Labs   Lab 01/15/25  0511   HGB 11.8   WBC 7.7     Pressure injury risk assessment:                          Lilliam Clay RN CWOCN  Pager no longer is use, please contact through [employer] group: [facility] Nurse Kristy  Dept. Office Number: [phone]

## 2025-01-15 NOTE — PHARMACY-ADMISSION MEDICATION HISTORY
Pharmacist Admission Medication History    Admission medication history is complete. The information provided in this note is only as accurate as the sources available at the time of the update.    Information Source(s): Patient and CareEverywhere/SureScripts via in-person    Pertinent Information: some of patients meds not filled since August but also has been hospitalized.    Changes made to PTA medication list:  Added: None  Deleted: duoneb  Changed: ibuprofen    Allergies reviewed with patient and updates made in EHR: yes    Medication History Completed By: Evie Cummings [facility] 1/15/2025 9:36 AM    PTA Med List   Medication Sig Last Dose/Taking    acetaminophen (TYLENOL) 500 MG tablet Take 1,000 mg by mouth as needed for mild pain. Past Week    albuterol (PROAIR HFA/PROVENTIL HFA/VENTOLIN HFA) 108 (90 Base) MCG/ACT inhaler Inhale 2 puffs into the lungs every 6 hours as needed for shortness of breath, wheezing or cough. 1/14/2025    albuterol (PROVENTIL) (2.5 MG/3ML) 0.083% neb solution Take 1 vial (2.5 mg) by nebulization every 6 hours as needed for shortness of breath or wheezing. 1/14/2025    azelastine (ASTELIN) 0.1 % nasal spray Spray 1 spray into both nostrils daily as needed for allergies. 1/14/2025    carBAMazepine (TEGRETOL) 200 MG tablet Take 400 mg by mouth 2 times daily. AM and HS 1/14/2025    carBAMazepine (TEGRETOL) 200 MG tablet Take 200 mg by mouth daily. @1200 1/14/2025    fluticasone (FLONASE) 50 MCG/ACT nasal spray USE ONE SPRAY INTO BOTH NOSTRILS DAILY AS NEEDED FOR RHINITIS OR ALLERGIES 1/14/2025    Fluticasone Furoate-Vilanterol (BREO ELLIPTA) 50-25 MCG/ACT AEPB Inhale 1 puff into the lungs 2 times daily 1/14/2025    ibuprofen (ADVIL/MOTRIN) 200 MG tablet Take 400 mg by mouth as needed for pain. 1/14/2025    lisinopril (ZESTRIL) 20 MG tablet TAKE 1 TABLET (20 MG) BY MOUTH DAILY 1/14/2025    lovastatin (MEVACOR) 40 MG tablet TAKE 1 TABLET (40 MG) BY MOUTH AT BEDTIME 1/14/2025    miconazole  (MICATIN) 2 % external powder Apply topically as needed for itching or other. Past Month    Multiple Vitamin (MULTI-VITAMIN PO) Take 1 tablet by mouth daily 1/14/2025    nystatin (MYCOSTATIN) 617041 UNIT/GM external cream Apply topically daily as needed Past Month    omeprazole (PRILOSEC) 20 MG DR capsule Take 20 mg by mouth daily. 1/14/2025    sertraline (ZOLOFT) 100 MG tablet TAKE 1 TABLET (100 MG) BY MOUTH DAILY 1/14/2025

## 2025-01-15 NOTE — ED TRIAGE NOTES
Patient came in via EMS from home, Patient had history of Asthma, Tonight patient is sleeping in recliner and had an asthma exacerbation around 0330. EMS came in on 77% on RA, very shortness of breath. Had a baseline O2 at night but not seen using it. EMS gave 1 Duoneb, 1 Albuterol and Solumedrol. . Ambulate using walker. MD is in room.     Triage Assessment (Adult)       Row Name 01/15/25 0458          Triage Assessment    Airway WDL WDL        Respiratory WDL    Respiratory WDL X;all     Rhythm/Pattern, Respiratory shortness of breath     Expansion/Accessory Muscles/Retractions accessory muscle use     Nailbeds no discoloration     Mucous Membranes intact;moist;pink     Cough Frequency no cough     Cough Type no productive sputum        Peripheral/Neurovascular WDL    Peripheral Neurovascular WDL WDL        Cognitive/Neuro/Behavioral WDL    Cognitive/Neuro/Behavioral WDL WDL        [place] Coma Scale    Best Eye Response 4-->(E4) spontaneous     Best Motor Response 6-->(M6) obeys commands     Best Verbal Response 5-->(V5) oriented     [place] Coma Scale Score 15

## 2025-01-15 NOTE — ED NOTES
[facility]  ED Nurse Handoff Report    ED Chief complaint: Asthma Exacerbation  . ED Diagnosis:   Final diagnoses:   Asthma with acute exacerbation, unspecified asthma severity, unspecified whether persistent   URI with cough and congestion   Obesity hypoventilation syndrome (H)   Open thigh wound, left, initial encounter - chronic        Allergies:   Allergies   Allergen Reactions    Penicillins Anaphylaxis     PEN-FAST - Penicillin Allergy Risk Tool completed by [facility] December 20, 2024    Score: 3  Risk: Moderate  Assessment: Patient has a 20 % chance of a positive penicillin allergy test    Iodine      Iodinated Contrast Media  --- Hives      Tetracyclines & Related Unknown    Hydrochlorothiazide Palpitations     dehydration    Influenza Vac Split [Influenza Virus Vaccine] Rash     Patient states she is allergic to the vaccine.  Got a rash all over body many years ago after receiving injection.       Code Status: Full Code    Activity level - Baseline/Home:  walker.  Activity Level - Current:   standby, assist of 1, and walker.   Lift room needed: No.   Bariatric: No   Needed: No   Isolation: No.   Infection: Not Applicable.     Respiratory status: Nasal cannula    Vital Signs (within 30 minutes):   Vitals:    01/15/25 0614 01/15/25 0619 01/15/25 0634 01/15/25 0649   BP:       Pulse: 93 93 91 92   Resp: 19 18 17 24   Temp:       TempSrc:       SpO2: 93% 95% 93% 94%       Cardiac Rhythm:  ,      Pain level:    Patient confused: No.   Patient Falls Risk: arm band in place and patient and family education.   Elimination Status: Has voided     Patient Report - Initial Complaint: Asthma Exacerbation.   Focused Assessment: Asthma Exacerbation        HPI   Brissa Corado is a 59 year old female      H/o persistent asthma, RICK, obesity hypoventilation syndrome admitted in December for acute on chronic respiratory failure and asthma exacerbation discharged on a prednisone taper.      Returns from home via EMS with shortness of breath worsening throughout the day today.   with recent respiratory tract infectious symptoms.  Symptoms were refractory to her home inhalers.  There is a 2 L of nasal cannula at night however medics found her without her home oxygen on initial saturation was 77%.  In route EMS had given her 1 DuoNeb, 1 albuterol neb as well as 125 mg of Solu-Medrol.     At baseline on a good day she can walk about 5 to 10 feet with a walker.  She has a known left thigh pressure wound that wound care is coming to help her with in her home.  No new vomiting, melena, hematochezia, dysuria frequency or urgency.     Currently continues to feel chest tightness and shortness of breath.  No recent falls or trauma.        Abnormal Results:   Labs Ordered and Resulted from Time of ED Arrival to Time of ED Departure   BASIC METABOLIC PANEL - Abnormal       Result Value    Sodium 140      Potassium 4.0      Chloride 102      Carbon Dioxide (CO2) 24      Anion Gap 14      Urea Nitrogen 20.1 (*)     Creatinine 0.62      GFR Estimate >90      Calcium 8.5 (*)     Glucose 140 (*)    CBC WITH PLATELETS AND DIFFERENTIAL - Abnormal    WBC Count 7.7      RBC Count 4.28      Hemoglobin 11.8      Hematocrit 39.5      MCV 92      MCH 27.6      MCHC 29.9 (*)     RDW 13.9      Platelet Count 189      % Neutrophils 59      % Lymphocytes 28      % Monocytes 6      % Eosinophils 7      % Basophils 1      % Immature Granulocytes 0      NRBCs per 100 WBC 0      Absolute Neutrophils 4.6      Absolute Lymphocytes 2.1      Absolute Monocytes 0.5      Absolute Eosinophils 0.5      Absolute Basophils 0.0      Absolute Immature Granulocytes 0.0      Absolute NRBCs 0.0     ISTAT GASES LACTATE VENOUS POCT - Abnormal    Lactic Acid POCT 1.0      Bicarbonate Venous POCT 30 (*)     O2 Sat, Venous POCT 61 (*)     pCO2 Venous POCT 64 (*)     pH Venous POCT 7.28 (*)     pO2 Venous POCT 37     ISTAT GASES LACTATE VENOUS  POCT - Abnormal    Lactic Acid POCT 0.9      Bicarbonate Venous POCT 29 (*)     O2 Sat, Venous POCT 75      pCO2 Venous POCT 53 (*)     pH Venous POCT 7.35      pO2 Venous POCT 43     TROPONIN T, HIGH SENSITIVITY - Normal    Troponin T, High Sensitivity 10     INFLUENZA A/B, RSV AND SARS-COV2 PCR - Normal    Influenza A PCR Negative      Influenza B PCR Negative      RSV PCR Negative      SARS CoV2 PCR Negative          XR Chest Port 1 View   Final Result   IMPRESSION: Heart size within normal limits for portable technique. Low lung volumes with mild streaky opacities in the left upper lung zone. The right lung is clear. No pneumothorax.          Treatments provided: See MAR  Family Comments: None  OBS brochure/video discussed/provided to patient:  Yes  ED Medications:   Medications   ipratropium - albuterol 0.5 mg/2.5 mg/3 mL (DUONEB) neb solution 6 mL (6 mLs Nebulization $Given 1/15/25 0515)   magnesium sulfate 2 g in 50 mL sterile water intermittent infusion (0 g Intravenous Stopped 1/15/25 0615)       Drips infusing:  No  For the majority of the shift this patient was Green.   Interventions performed were None.    Sepsis treatment initiated: No    Cares/treatment/interventions/medications to be completed following ED care: Patient care monitoring.    ED Nurse Name: Sofy Eason RN  6:52 AM     RECEIVING UNIT ED HANDOFF REVIEW    Above ED Nurse Handoff Report was reviewed: Yes  Reviewed by: Jennifer Miranda RN on January 15, 2025 at 3:40 PM

## 2025-01-15 NOTE — PROGRESS NOTES
See H&P from Dr. Kwon.  Pt seen and evaluated.  Pt here with acute on chronic mixed respiratory failure.  Treating for asthma exacerbation.  She notes her  had viral illness recently.  COVID and influenza negative.  Chest imaging without clear consolidation.  No leukocytosis.     On exam, pt has nl WOB. Has distant breath sounds with prolonged expiratory phase but not clearly wheezing.  She is currently on RA on my eval but uses 2L at baseline.    Plan:  -One more dose of IV steroids. Start oral prednisone tomorrow. Continue duonebs.      Pt father and mother (both guardians), García, called and updated on 1/15.      Neeraj Reed MD

## 2025-01-16 ENCOUNTER — TELEPHONE (OUTPATIENT)
Dept: INTERNAL MEDICINE | Facility: CLINIC | Age: 60
End: 2025-01-16

## 2025-01-16 ENCOUNTER — PATIENT OUTREACH (OUTPATIENT)
Dept: CARE COORDINATION | Facility: CLINIC | Age: 60
End: 2025-01-16

## 2025-01-16 VITALS
OXYGEN SATURATION: 92 % | HEART RATE: 83 BPM | RESPIRATION RATE: 15 BRPM | DIASTOLIC BLOOD PRESSURE: 38 MMHG | WEIGHT: 293 LBS | HEIGHT: 67 IN | TEMPERATURE: 97.6 F | BODY MASS INDEX: 45.99 KG/M2 | SYSTOLIC BLOOD PRESSURE: 119 MMHG

## 2025-01-16 LAB
ANION GAP SERPL CALCULATED.3IONS-SCNC: 10 MMOL/L (ref 7–15)
BASE EXCESS BLDV CALC-SCNC: 3.7 MMOL/L (ref -3–3)
BUN SERPL-MCNC: 13.4 MG/DL (ref 6–20)
CALCIUM SERPL-MCNC: 8.7 MG/DL (ref 8.8–10.4)
CHLORIDE SERPL-SCNC: 104 MMOL/L (ref 98–107)
CREAT SERPL-MCNC: 0.58 MG/DL (ref 0.51–0.95)
EGFRCR SERPLBLD CKD-EPI 2021: >90 ML/MIN/1.73M2
GLUCOSE SERPL-MCNC: 109 MG/DL (ref 70–99)
HCO3 BLDV-SCNC: 31 MMOL/L (ref 21–28)
HCO3 SERPL-SCNC: 25 MMOL/L (ref 22–29)
O2/TOTAL GAS SETTING VFR VENT: 2 %
OXYHGB MFR BLDV: 76 % (ref 70–75)
PCO2 BLDV: 60 MM HG (ref 40–50)
PH BLDV: 7.33 [PH] (ref 7.32–7.43)
PO2 BLDV: 46 MM HG (ref 25–47)
POTASSIUM SERPL-SCNC: 4.2 MMOL/L (ref 3.4–5.3)
SAO2 % BLDV: 77.2 % (ref 70–75)
SODIUM SERPL-SCNC: 139 MMOL/L (ref 135–145)

## 2025-01-16 PROCEDURE — 250N000009 HC RX 250: Performed by: INTERNAL MEDICINE

## 2025-01-16 PROCEDURE — 250N000012 HC RX MED GY IP 250 OP 636 PS 637: Performed by: HOSPITALIST

## 2025-01-16 PROCEDURE — 94640 AIRWAY INHALATION TREATMENT: CPT | Mod: 76

## 2025-01-16 PROCEDURE — 250N000013 HC RX MED GY IP 250 OP 250 PS 637: Performed by: INTERNAL MEDICINE

## 2025-01-16 PROCEDURE — 82805 BLOOD GASES W/O2 SATURATION: CPT | Performed by: INTERNAL MEDICINE

## 2025-01-16 PROCEDURE — 80048 BASIC METABOLIC PNL TOTAL CA: CPT | Performed by: INTERNAL MEDICINE

## 2025-01-16 PROCEDURE — 94640 AIRWAY INHALATION TREATMENT: CPT

## 2025-01-16 PROCEDURE — 36415 COLL VENOUS BLD VENIPUNCTURE: CPT | Performed by: INTERNAL MEDICINE

## 2025-01-16 PROCEDURE — 120N000001 HC R&B MED SURG/OB

## 2025-01-16 PROCEDURE — 999N000157 HC STATISTIC RCP TIME EA 10 MIN

## 2025-01-16 PROCEDURE — 99232 SBSQ HOSP IP/OBS MODERATE 35: CPT | Performed by: HOSPITALIST

## 2025-01-16 RX ADMIN — PREDNISONE 40 MG: 20 TABLET ORAL at 08:46

## 2025-01-16 RX ADMIN — IPRATROPIUM BROMIDE AND ALBUTEROL SULFATE 3 ML: .5; 3 SOLUTION RESPIRATORY (INHALATION) at 07:46

## 2025-01-16 RX ADMIN — MICONAZOLE NITRATE ANTIFUNGAL POWDER: 2 POWDER TOPICAL at 20:37

## 2025-01-16 RX ADMIN — SERTRALINE HYDROCHLORIDE 100 MG: 100 TABLET ORAL at 08:47

## 2025-01-16 RX ADMIN — FLUTICASONE FUROATE AND VILANTEROL TRIFENATATE 1 PUFF: 100; 25 POWDER RESPIRATORY (INHALATION) at 19:00

## 2025-01-16 RX ADMIN — ATORVASTATIN CALCIUM 10 MG: 10 TABLET, FILM COATED ORAL at 08:46

## 2025-01-16 RX ADMIN — PANTOPRAZOLE SODIUM 40 MG: 40 TABLET, DELAYED RELEASE ORAL at 06:31

## 2025-01-16 RX ADMIN — CARBAMAZEPINE 400 MG: 200 TABLET ORAL at 08:46

## 2025-01-16 RX ADMIN — FLUTICASONE PROPIONATE 1 SPRAY: 50 SPRAY, METERED NASAL at 08:49

## 2025-01-16 RX ADMIN — MICONAZOLE NITRATE ANTIFUNGAL POWDER: 2 POWDER TOPICAL at 08:48

## 2025-01-16 RX ADMIN — LISINOPRIL 20 MG: 20 TABLET ORAL at 08:47

## 2025-01-16 RX ADMIN — CARBAMAZEPINE 200 MG: 200 TABLET ORAL at 14:35

## 2025-01-16 RX ADMIN — IPRATROPIUM BROMIDE AND ALBUTEROL SULFATE 3 ML: .5; 3 SOLUTION RESPIRATORY (INHALATION) at 19:00

## 2025-01-16 RX ADMIN — IPRATROPIUM BROMIDE AND ALBUTEROL SULFATE 3 ML: .5; 3 SOLUTION RESPIRATORY (INHALATION) at 15:20

## 2025-01-16 RX ADMIN — FLUTICASONE FUROATE AND VILANTEROL TRIFENATATE 1 PUFF: 100; 25 POWDER RESPIRATORY (INHALATION) at 07:46

## 2025-01-16 RX ADMIN — IPRATROPIUM BROMIDE AND ALBUTEROL SULFATE 3 ML: .5; 3 SOLUTION RESPIRATORY (INHALATION) at 11:19

## 2025-01-16 RX ADMIN — CARBAMAZEPINE 400 MG: 200 TABLET ORAL at 20:37

## 2025-01-16 ASSESSMENT — ACTIVITIES OF DAILY LIVING (ADL)
ADLS_ACUITY_SCORE: 50

## 2025-01-16 NOTE — PROGRESS NOTES
Clinic Care Coordination Contact    Situation: Patient chart reviewed by care coordinator.    Background: Patient is enrolled in Care Coordination and followed by RN CC who has been monitoring patient during enrollment for goal progression.     Assessment: [place] is admitted to [facility] for obesity hypoventilation syndrome.     Plan/Recommendations: RN CC completed an order for IP Care Coordination for transition of care. RN CC will continue to monitor patient for discharge to home.     Rocio Govea RN, BSN, CPHN, CCM  [facility] Ambulatory Care Management  [facility], and [facility]  [email or web address]  Office: [phone]  Employed by [facility]

## 2025-01-16 NOTE — PLAN OF CARE
"Goal Outcome Evaluation:      Plan of Care Reviewed With: patient    Overall Patient Progress: no changeOverall Patient Progress: no change         [id number]     No change. Baseline 2LO2 NC overnight. Breaths through mouth while sleeping. Denies pain. No prns needed for shortness of breath/ wheezing.     Discharge back home tbd      Problem: Adult Inpatient Plan of Care  Goal: Plan of Care Review  Description: The Plan of Care Review/Shift note should be completed every shift.  The Outcome Evaluation is a brief statement about your assessment that the patient is improving, declining, or no change.  This information will be displayed automatically on your shift  note.  Outcome: Progressing  Flowsheets (Taken 1/16/2025 0404)  Plan of Care Reviewed With: patient  Overall Patient Progress: no change  Goal: Patient-Specific Goal (Individualized)  Description: You can add care plan individualizations to a care plan. Examples of Individualization might be:  \"Parent requests to be called daily at 9am for status\", \"I have a hard time hearing out of my right ear\", or \"Do not touch me to wake me up as it startles  me\".  Outcome: Progressing  Goal: Absence of Hospital-Acquired Illness or Injury  Outcome: Progressing  Intervention: Identify and Manage Fall Risk  Recent Flowsheet Documentation  Taken 1/15/2025 2242 by Jose Mills RN  Safety Promotion/Fall Prevention:   activity supervised   safety round/check completed   clutter free environment maintained   nonskid shoes/slippers when out of bed   patient and family education   room organization consistent  Intervention: Prevent Skin Injury  Recent Flowsheet Documentation  Taken 1/15/2025 2242 by Jose Mills RN  Body Position: position changed independently  Intervention: Prevent and Manage VTE (Venous Thromboembolism) Risk  Recent Flowsheet Documentation  Taken 1/15/2025 2242 by Jose Mills, RN  VTE Prevention/Management: SCDs off (sequential compression " devices)  Intervention: Prevent Infection  Recent Flowsheet Documentation  Taken 1/15/2025 2242 by Jose Mills, RN  Infection Prevention:   hand hygiene promoted   rest/sleep promoted   equipment surfaces disinfected  Goal: Optimal Comfort and Wellbeing  Outcome: Progressing  Goal: Readiness for Transition of Care  Outcome: Progressing  Flowsheets (Taken 1/15/2025 2100)  Transportation Anticipated: family or friend will provide  Intervention: Mutually Develop Transition Plan  Recent Flowsheet Documentation  Taken 1/15/2025 2100 by Jose Mills, RN  Transportation Anticipated: family or friend will provide  Patient/Family Anticipates Transition to: home with family  Equipment Currently Used at Home:   tub bench   grab bar, tub/shower   wheelchair, power

## 2025-01-16 NOTE — CONSULTS
Care Management Follow Up    Length of Stay (days): 1    Expected Discharge Date: 01/17/2025     Concerns to be Addressed:       Patient plan of care discussed at interdisciplinary rounds: Yes    Anticipated Discharge Disposition:     Home to apartment with SO           Anticipated Discharge Services:  OP CC  Anticipated Discharge DME:     Education Provided on the Discharge  Plan:  No     Referrals Placed by CM/SW:  none  Private pay costs discussed: Not applicable    Discussed  Partnership in Safe Discharge Planning  document with patient/family: No     Handoff Completed: Yes, [facility] PCP: Internal handoff referral completed    Additional Information:  Outpatient Care Coordination is following patient while inpatient: Consult cleared as there are no other needs at this time.  Please re-consult if any new needs should arise    Care Management / Social Work IP Consult  ONE TIME     Complete     Comments: Ambulatory Care Coordination to Acute Care Management: Hand-In Communication    Type of residence: Type of residence:: Apartment  Living arrangement: Current living arrangement:: Other (Lives in an apartment with significant other)  Equipment within the home: Equipment Currently Used at Home: tub bench; grab bar, tub/shower; wheelchair, power  Current services in place: Community Resources: formerly [facility]; County Worker; Other (see comment);  (ABDIEL maurer, moms david)    Additional details/specific concerns r/t this admission: Patient is enrolled in Ambulatory Care Coordination. Her current goals are to update her Health Maintenance recommendations and use the resources provided. She has been admitted multiple times for asthma exacerbation. The possible cause being her guinea pigs has been ruled out.    I will follow this admission in Epic. Please feel free to contact me with questions or for further collaboration in discharge planning.    Rocio Govea, RN, BSN, CPHN, [facility] Ambulatory Care  Management  [facility], and [facility]  [email or web address]  Office: [phone]  Employed by [facility]             Courtney Cordova RN, BSN, CM  Inpatient Care Coordination  [facility]  [phone]

## 2025-01-16 NOTE — PROVIDER NOTIFICATION
"   01/15/25 2043   RCAT Assessment   Reason for Assessment Asthma   Pulmonary Status 3   Surgical Status 0   Chest X-ray 2   Respiratory Pattern 0   Mental Status 0   Breath Sounds 2   Cough Effectiveness 0   Level of Activity 1   O2 Required for SpO2>=92% 1   Acuity Level (points) 9   Acuity Level  4   Re-eval Interval Guideline Every 3 days   Re-evaluation Date 01/18/25   Clinical Indications/Symptoms   Aerosol Therapy RCAT protocol   Broncho-pulmonary Hygiene History of mucous producing disease   Volume Expansion Prevent atelectasis   Aerosol Therapy Plan   RT Treatment Nebulizer   Anticholinergic/Beta-Andrenergic Agonist Duoneb soln (0.5mg/3mg per 3mL) neb Max 6 doses/24h   Aerosol Treatment Frequency Acuity Level 3: QID/PRN @noc-Mod wheezing/Hx asthma/secretion removal   Aerosol Therapy (SVN)   Respiratory Treatment Status (SVN) given   Patient Position HOB elevated   Posttreatment Assessment (SVN) breath sounds unchanged   Signs of Intolerance (SVN) none   Broncho-Pulmonary Hygiene Plan   Broncho-Pulmonary Hygiene Treatment Coughing techniques   Broncho-Pulm Hygiene Frequency Acuity Level 3: TID-Small amounts secretions/poor cough, hx of secretions   Volume Expansion Plan   Volume Expansion Treatment Incentive Spirometer   Volume Expansion Frequency Acuity Level 4: BID-Prevention of atelectasis   Breath Sounds   Breath Sounds All Fields   All Lung Fields Breath Sounds diminished;wheezes, expiratory       Will keep pt on scheduled nebs. Continue to assess pt's respiratory status.     BP (!) 129/39 (BP Location: Right arm, Patient Position: Semi-South's)   Pulse 93   Temp 98.1  F (36.7  C) (Temporal)   Resp 19   Ht 1.702 m (5' 7\")   Wt (!) 180.5 kg (398 lb)   LMP 08/18/2008   SpO2 94%   BMI 62.34 kg/m      Cici Eduardo, RT    "

## 2025-01-16 NOTE — PLAN OF CARE
Goal Outcome Evaluation:    Plan of Care Reviewed With: patient    Overall Patient Progress: no change    Outcome Evaluation: Arrived to unit around 1830. A&O x4. Assist x1 W/GB. 2L NC.    Problem: Adult Inpatient Plan of Care  Goal: Plan of Care Review  1/15/2025 1937 by Jennfier Miranda RN  Outcome: Not Progressing  Flowsheets (Taken 1/15/2025 1937)  Plan of Care Reviewed With: patient  Overall Patient Progress: no change  1/15/2025 1936 by Jennifer Miranda RN  Outcome: Not Progressing  Flowsheets (Taken 1/15/2025 1936)  Outcome Evaluation: Arrived to unit around 1830. A&O x4. Assist x1 W/GB. 2L NC.  Overall Patient Progress: no change  Goal: Patient-Specific Goal (Individualized)  1/15/2025 1937 by Jennifer Miranda RN  Outcome: Not Progressing  1/15/2025 1936 by Jennifer Miranda RN  Outcome: Not Progressing  Goal: Absence of Hospital-Acquired Illness or Injury  1/15/2025 1937 by Jennifer Miranda RN  Outcome: Not Progressing  1/15/2025 1936 by Jennifer Miranda RN  Outcome: Not Progressing  Goal: Optimal Comfort and Wellbeing  1/15/2025 1937 by Jennifer Miranda RN  Outcome: Not Progressing  1/15/2025 1936 by Jennifer Miranda RN  Outcome: Not Progressing  Goal: Readiness for Transition of Care  1/15/2025 1937 by Jennifer Miranda RN  Outcome: Not Progressing  1/15/2025 1936 by Jennifer Miranda RN  Outcome: Not Progressing     Problem: Gas Exchange Impaired  Goal: Optimal Gas Exchange  1/15/2025 1937 by Jennifer Miranda RN  Outcome: Not Progressing  1/15/2025 1936 by Jennifer Miranda RN  Outcome: Not Progressing

## 2025-01-16 NOTE — PROGRESS NOTES
[facility]    Hospitalist Progress Note      Assessment & Plan   Brissa Corado is a 59 year old female with PMH including poorly controlled asthma with current exacerbations, morbid obesity, RICK does not have CPAP, suspected obesity hypoventilation syndrome, developmental delay, seizure disorder, HTN, GERD, HLD, MDD, tinea cruris/intertrigo, left posterior thigh pressure ulcer who presents with shortness of breath.     #Acute on chronic hypoxemic and hypercapnic respiratory failure.  Asthma with acute exacerbation.  Likely viral URI.  RICK and probable OHS: She reports a minimally productive cough over the last 2 to 3 days.  She became acutely short of breath overnight PTA when sleeping with significant wheezing.  She did have her 2 L nightly O2 on when symptoms began.  No chest pain.  No fevers.  Has not had a follow-up appointment to get a CPAP yet.  She says she does not have a pulmonologist.  Multiple recent admissions and ER presentations of the past 6 months for asthma exacerbation that improved with steroids and then when off of them she has recurrent exacerbation.  She does not smoke or vape.    -In the ER she was wheezy, tachypneic, and had acute on chronic hypercapnia with VBG pH 7.28, pCO2 64, bicarb 30, pO2 37.  CBC and BMP otherwise unremarkable.  Lactic acid not elevated.  Troponin T negative.  Chest x-ray with some streaky opacities in the left upper lung, but it is very minimal and not indicative of pneumonia.  COVID-19, influenza A/B, RSV PCR negative.  Repeat VBG improved following nebs and magnesium in the ER.  Still on 4 L via nasal cannula when she normally does not need any during the day when she is not sleeping.    -On 1/16, patient weaned to RA while awake. Sats remaining around 94%.  Moving air well on exam.  Need to get her up to chair today and ambulating halls if possible.  Discussed with nursing.   -Transition to oral steroids on 1/16.  -Scheduled duonebs.  Albuterol nebs prn. Prn antitussives.  -I think a lot of this patient's issues are chronic and related to OHS/RICK given her compensated respiratory acidosis.  She has home O2 and may very well need it during day without weight loss.   -She will need a prednisone taper on discharge and I strongly recommended to her that she follows up with sleep medicine to get a CPAP and additionally that she starts following with a pulmonologist in clinic     #Morbid obesity: BMI 62.  Complicates care as above.     #Developmental delay: Parents listed as guardian.     #Seizure disorder: Resume PTA carbamazepine.     #HTN: Resume PTA lisinopril 20 mg daily.     #GERD: Takes omeprazole daily, use pantoprazole per formulary here.     #HLD: Resume PTA statin.     #MDD: Resume PTA sertraline.     #Tinea cruris, intertrigo  Posterior left thigh pressure ulcer, present on admission: Evaluated by [facility] RN last month for left posterior thigh pressure ulcer.  She has significant tinea cruris and intertrigo.  -Miconazole powder twice daily to skin folds  -[facility] RN consult to evaluate the left thigh pressure ulcer     #Enlarged axillary lymph nodes:  Noted on CT scan during admission in October.  Plan is for outpatient follow-up and a repeat CT scan in 1 year.     DVT Prophylaxis: Pneumatic Compression Devices  Code Status: Full Code  Medically Ready for Discharge: Anticipated Tomorrow    I did call patient's guardians and parents to provide update on 1/16.     Neeraj Reed MD    Interval History   No events. On RA this AM while awake. Feels she is still wheezing but breathing better. No pain. No HA. No fevers/chills.     -Data reviewed today: I reviewed all new labs and imaging results over the last 24 hours. I personally reviewed     Physical Exam   Temp: 96.8  F (36  C) Temp src: Temporal BP: (!) 133/38 Pulse: 77   Resp: 20 SpO2: 100 % O2 Device: Nasal cannula Oxygen Delivery: 2 LPM  Vitals:    01/15/25 0802   Weight: (!) 180.5 kg (398 lb)      Vital Signs with Ranges  Temp:  [96.8  F (36  C)-98.7  F (37.1  C)] 96.8  F (36  C)  Pulse:  [] 77  Resp:  [18-32] 20  BP: (129-148)/() 133/38  SpO2:  [89 %-100 %] 100 %  I/O last 3 completed shifts:  In: 120 [P.O.:120]  Out: 2320 [Urine:2320]    Constitutional: Obese. Chronically deconditioned. NAD.   HEENT: Normocephalic. MMM, Cannot appreciate JVD secondary to body habitus.   Respiratory: Nl WOB, Distant BS. Mild wheeze bilaterally. No rhonchi. Conversatoinal.   Cardiovascular: Distant, Regular, no murmur  GI: Obese, BS+, NT, ND  Skin/Integumen: WWP, no rash. No edema  Neuro: CNII-XII intact. Moves all extremities. No tremor. A&Ox3.    Medications   Current Facility-Administered Medications   Medication Dose Route Frequency Provider Last Rate Last Admin     Current Facility-Administered Medications   Medication Dose Route Frequency Provider Last Rate Last Admin    atorvastatin (LIPITOR) tablet 10 mg  10 mg Oral Daily Alex Kwon MD   10 mg at 01/16/25 0846    carBAMazepine (TEGretol) tablet 200 mg  200 mg Oral Q24H Alex wKon MD   200 mg at 01/15/25 1655    carBAMazepine (TEGretol) tablet 400 mg  400 mg Oral BID Alex Kwon MD   400 mg at 01/16/25 0846    fluticasone (FLONASE) 50 MCG/ACT spray 1 spray  1 spray Both Nostrils Daily Alex Kwon MD   1 spray at 01/16/25 0849    fluticasone-vilanterol (BREO ELLIPTA) 100-25 MCG/ACT inhaler 1 puff  1 puff Inhalation BID Alex Kwon MD   1 puff at 01/16/25 0746    ipratropium - albuterol 0.5 mg/2.5 mg/3 mL (DUONEB) neb solution 3 mL  3 mL Nebulization Q4H WA Alex Kwon MD   3 mL at 01/16/25 0746    lisinopril (ZESTRIL) tablet 20 mg  20 mg Oral Daily Alex Kwon MD   20 mg at 01/16/25 0847    miconazole (MICATIN) 2 % powder   Topical BID Alex Kwon MD   Given at 01/16/25 0848    pantoprazole (PROTONIX) EC tablet 40 mg  40 mg Oral QAM AC Alex Kwon MD   40  mg at 01/16/25 0631    predniSONE (DELTASONE) tablet 40 mg  40 mg Oral Daily Neeraj Reed MD   40 mg at 01/16/25 0846    sertraline (ZOLOFT) tablet 100 mg  100 mg Oral Daily Alex Kwon MD   100 mg at 01/16/25 0847    sodium chloride (PF) 0.9% PF flush 3 mL  3 mL Intracatheter Q8H Alex Kwon MD   3 mL at 01/16/25 0631       Data   Recent Labs   Lab 01/16/25  0622 01/15/25  0511   WBC  --  7.7   HGB  --  11.8   MCV  --  92   PLT  --  189    140   POTASSIUM 4.2 4.0   CHLORIDE 104 102   CO2 25 24   BUN 13.4 20.1*   CR 0.58 0.62   ANIONGAP 10 14   CESAR 8.7* 8.5*   * 140*       No results found for this or any previous visit (from the past 24 hours).

## 2025-01-17 VITALS
HEIGHT: 67 IN | OXYGEN SATURATION: 95 % | SYSTOLIC BLOOD PRESSURE: 129 MMHG | BODY MASS INDEX: 45.99 KG/M2 | HEART RATE: 84 BPM | TEMPERATURE: 96.8 F | DIASTOLIC BLOOD PRESSURE: 51 MMHG | WEIGHT: 293 LBS | RESPIRATION RATE: 20 BRPM

## 2025-01-17 PROCEDURE — 94640 AIRWAY INHALATION TREATMENT: CPT

## 2025-01-17 PROCEDURE — 94640 AIRWAY INHALATION TREATMENT: CPT | Mod: 76

## 2025-01-17 PROCEDURE — 250N000012 HC RX MED GY IP 250 OP 636 PS 637: Performed by: HOSPITALIST

## 2025-01-17 PROCEDURE — 999N000157 HC STATISTIC RCP TIME EA 10 MIN

## 2025-01-17 PROCEDURE — 99239 HOSP IP/OBS DSCHRG MGMT >30: CPT | Performed by: HOSPITALIST

## 2025-01-17 PROCEDURE — 250N000009 HC RX 250: Performed by: INTERNAL MEDICINE

## 2025-01-17 PROCEDURE — 250N000013 HC RX MED GY IP 250 OP 250 PS 637: Performed by: INTERNAL MEDICINE

## 2025-01-17 RX ORDER — PREDNISONE 10 MG/1
TABLET ORAL
Qty: 23 TABLET | Refills: 0 | Status: SHIPPED | OUTPATIENT
Start: 2025-01-17 | End: 2025-01-29

## 2025-01-17 RX ADMIN — FLUTICASONE PROPIONATE 1 SPRAY: 50 SPRAY, METERED NASAL at 07:37

## 2025-01-17 RX ADMIN — MICONAZOLE NITRATE ANTIFUNGAL POWDER: 2 POWDER TOPICAL at 07:37

## 2025-01-17 RX ADMIN — FLUTICASONE FUROATE AND VILANTEROL TRIFENATATE 1 PUFF: 100; 25 POWDER RESPIRATORY (INHALATION) at 07:16

## 2025-01-17 RX ADMIN — IPRATROPIUM BROMIDE AND ALBUTEROL SULFATE 3 ML: .5; 3 SOLUTION RESPIRATORY (INHALATION) at 11:18

## 2025-01-17 RX ADMIN — CARBAMAZEPINE 400 MG: 200 TABLET ORAL at 07:33

## 2025-01-17 RX ADMIN — LISINOPRIL 20 MG: 20 TABLET ORAL at 07:33

## 2025-01-17 RX ADMIN — PREDNISONE 40 MG: 20 TABLET ORAL at 07:33

## 2025-01-17 RX ADMIN — ATORVASTATIN CALCIUM 10 MG: 10 TABLET, FILM COATED ORAL at 07:33

## 2025-01-17 RX ADMIN — SERTRALINE HYDROCHLORIDE 100 MG: 100 TABLET ORAL at 07:33

## 2025-01-17 RX ADMIN — IPRATROPIUM BROMIDE AND ALBUTEROL SULFATE 3 ML: .5; 3 SOLUTION RESPIRATORY (INHALATION) at 07:16

## 2025-01-17 RX ADMIN — PANTOPRAZOLE SODIUM 40 MG: 40 TABLET, DELAYED RELEASE ORAL at 05:32

## 2025-01-17 ASSESSMENT — ACTIVITIES OF DAILY LIVING (ADL)
ADLS_ACUITY_SCORE: 50

## 2025-01-17 NOTE — DISCHARGE SUMMARY
[facility]    Discharge Summary  Hospitalist    Date of Admission:  1/15/2025  Date of Discharge:  1/17/2025  Discharging Provider: Neeraj Reed MD  Date of Service (when I saw the patient): 01/17/25    Discharge Diagnoses   #Acute on chronic hypoxemic and hypercapnic respiratory failure  #Asthma with acute exacerbation  #Likely viral URI  #RICK and probable OHS  #Morbid obesity  #Developmental delay  #Seizure disorder  #HTN  #GERD  #HLD  #MDD  #Tinea cruris, intertrigo  Stage 3 pressure injury left posterior thigh, POA  #Enlarged axillary lymph nodes    Hospital Course   Brissa Corado is a 59 year old female with PMH including poorly controlled asthma with current exacerbations, morbid obesity, RICK does not have CPAP, suspected obesity hypoventilation syndrome, developmental delay, seizure disorder, HTN, GERD, HLD, MDD, tinea cruris/intertrigo, left posterior thigh pressure ulcer who presents with shortness of breath.      #Acute on chronic hypoxemic and hypercapnic respiratory failure.  Asthma with acute exacerbation.  Likely viral URI.  RICK and probable OHS: She reports a minimally productive cough over the last 2 to 3 days.  She became acutely short of breath overnight PTA when sleeping with significant wheezing.  She did have her 2 L nightly O2 on when symptoms began.  No chest pain.  No fevers.  Has not had a follow-up appointment to get a CPAP yet.  She says she does not have a pulmonologist.  Multiple recent admissions and ER presentations of the past 6 months for asthma exacerbation that improved with steroids and then when off of them she has recurrent exacerbation.  She does not smoke or vape.    -In the ER she was wheezy, tachypneic, and had acute on chronic hypercapnia with VBG pH 7.28, pCO2 64, bicarb 30, pO2 37.  CBC and BMP otherwise unremarkable.  Lactic acid not elevated.  Troponin T negative.  Chest x-ray with some streaky opacities in the left upper lung, but it is very  minimal and not indicative of pneumonia.  COVID-19, influenza A/B, RSV PCR negative.  Repeat VBG improved following nebs and magnesium in the ER.  Still on 4 L via nasal cannula when she normally does not need any during the day when she is not sleeping.    -On 1/16 and 1/17, patient weaned to RA while awake.  Moving air well on exam.  She continues to use around 2L O2 at night which is her baseline.    -Transition to oral steroids on 1/16.  Wean steroids on discharge over 12 days.  Continue her home inhalers.   -I think a lot of this patient's issues are chronic and related to OHS/RICK given her compensated respiratory acidosis.  She has home O2 and may very well need it during day without weight loss in near future.   -I strongly recommended to her that she follows up with sleep medicine to get a CPAP and additionally that she starts following with a pulmonologist in clinic  -Patient has follow up with PCP scheduled for next week.      #Morbid obesity: BMI 62.  Complicates care as above.     #Developmental delay: Parents listed as guardian.     #Seizure disorder: Resume PTA carbamazepine.     #HTN: Resume PTA lisinopril 20 mg daily.     #GERD: Takes omeprazole daily, use pantoprazole per formulary here.     #HLD: Resume PTA statin.     #MDD: Resume PTA sertraline.     #Tinea cruris, intertrigo  Stage 3 pressure injury left posterior thigh, POA: Evaluated by [facility] RN last month for left posterior thigh pressure ulcer.  She has significant tinea cruris and intertrigo.  -[facility] consulted     #Enlarged axillary lymph nodes:  Noted on CT scan during admission in October.  Plan is for outpatient follow-up and a repeat CT scan in 1 year.    Guardians and parents updated daily including on day of discharge.      Neeraj Reed MD    Code Status   Full Code       Primary Care Physician   Aury Vizcaino    Physical Exam   Temp: 96.8  F (36  C) Temp src: Temporal BP: 129/51 Pulse: 73   Resp: 20 SpO2: 93 % O2 Device: None (Room air)  Oxygen Delivery: 1 LPM  Vitals:    01/15/25 0802   Weight: (!) 180.5 kg (398 lb)     Vital Signs with Ranges  Temp:  [96.8  F (36  C)-97.9  F (36.6  C)] 96.8  F (36  C)  Pulse:  [71-86] 73  Resp:  [15-22] 20  BP: (119-129)/(38-56) 129/51  SpO2:  [90 %-99 %] 93 %  I/O last 3 completed shifts:  In: -   Out: 1900 [Urine:1900]    Constitutional: Obese. Chronically deconditioned. NAD.   HEENT: Normocephalic. MMM, Cannot appreciate JVD secondary to body habitus.   Respiratory: Nl WOB, Distant BS. Mild wheeze bilaterally. No rhonchi. Conversatoinal.   Cardiovascular: Distant, Regular, no murmur  GI: Obese, BS+, NT, ND  Skin/Integumen: WWP, no rash. No edema  Neuro: CNII-XII intact. Moves all extremities. No tremor. A&Ox3.    Discharge Disposition   Discharged to home  Condition at discharge: Stable    Consultations This Hospital Stay   WOUND OSTOMY CONTINENCE NURSE  IP CONSULT  CARE MANAGEMENT / SOCIAL WORK IP CONSULT    Time Spent on this Encounter   I, Neeraj Reed MD, personally saw the patient today and spent greater than 30 minutes discharging this patient.    Discharge Orders      Primary Care - Care Coordination Referral      Reason for your hospital stay    You were hospitalized for shortness of breath secondary to asthma exacerbation in setting of obesity and likely obesity hypoventilation syndrome and untreated obstructive sleep apnea.  You will discharge with a prednisone taper.  You should follow up with your PCP next week for follow up.  You need to have a sleep study done in order to treat your sleep apnea.     Follow-up and recommended labs and tests     Follow up with primary care provider, Aury Vizcaino, within 7 days for hospital follow- up.  The following labs/tests are recommended: CBC and BMP.  Need to work on weight loss given OHS/RICK    Follow up with sleep medicine with a sleep study ASAP in order to be evaluated and treated for sleep apnea.     Activity    Your activity upon discharge: activity as  tolerated     Full Code     Diet    Follow this diet upon discharge: Current Diet:Orders Placed This Encounter      Combination Diet Regular Diet Adult     Discharge Medications   Current Discharge Medication List        START taking these medications    Details   predniSONE (DELTASONE) 10 MG tablet Take 4 tablets (40 mg) by mouth daily for 3 days, THEN 2 tablets (20 mg) daily for 3 days, THEN 1 tablet (10 mg) daily for 3 days, THEN 0.5 tablets (5 mg) daily for 3 days.  Qty: 23 tablet, Refills: 0    Associated Diagnoses: Exacerbation of asthma, unspecified asthma severity, unspecified whether persistent           CONTINUE these medications which have NOT CHANGED    Details   acetaminophen (TYLENOL) 500 MG tablet Take 1,000 mg by mouth as needed for mild pain.      albuterol (PROAIR HFA/PROVENTIL HFA/VENTOLIN HFA) 108 (90 Base) MCG/ACT inhaler Inhale 2 puffs into the lungs every 6 hours as needed for shortness of breath, wheezing or cough.  Qty: 18 g, Refills: 2    Comments: Pharmacy may dispense brand covered by insurance (Proair, or proventil or ventolin or generic albuterol inhaler)  Associated Diagnoses: Moderate persistent asthma without complication      albuterol (PROVENTIL) (2.5 MG/3ML) 0.083% neb solution Take 1 vial (2.5 mg) by nebulization every 6 hours as needed for shortness of breath or wheezing.  Qty: 75 mL, Refills: 0      azelastine (ASTELIN) 0.1 % nasal spray Spray 1 spray into both nostrils daily as needed for allergies.      !! carBAMazepine (TEGRETOL) 200 MG tablet Take 400 mg by mouth 2 times daily. AM and HS      !! carBAMazepine (TEGRETOL) 200 MG tablet Take 200 mg by mouth daily. @1200      fluticasone (FLONASE) 50 MCG/ACT nasal spray USE ONE SPRAY INTO BOTH NOSTRILS DAILY AS NEEDED FOR RHINITIS OR ALLERGIES  Qty: 16 mL, Refills: 1    Associated Diagnoses: Seasonal allergic rhinitis due to pollen      Fluticasone Furoate-Vilanterol (BREO ELLIPTA) 50-25 MCG/ACT AEPB Inhale 1 puff into the  lungs 2 times daily    Associated Diagnoses: Acute cholecystitis; Moderate persistent asthma with acute exacerbation      ibuprofen (ADVIL/MOTRIN) 200 MG tablet Take 400 mg by mouth as needed for pain.      lisinopril (ZESTRIL) 20 MG tablet TAKE 1 TABLET (20 MG) BY MOUTH DAILY  Qty: 90 tablet, Refills: 2    Associated Diagnoses: Essential hypertension, benign      lovastatin (MEVACOR) 40 MG tablet TAKE 1 TABLET (40 MG) BY MOUTH AT BEDTIME  Qty: 90 tablet, Refills: 1    Associated Diagnoses: Hyperlipidemia LDL goal <130      miconazole (MICATIN) 2 % external powder Apply topically as needed for itching or other.      Multiple Vitamin (MULTI-VITAMIN PO) Take 1 tablet by mouth daily      nystatin (MYCOSTATIN) 331183 UNIT/GM external cream Apply topically daily as needed      omeprazole (PRILOSEC) 20 MG DR capsule Take 20 mg by mouth daily.      sertraline (ZOLOFT) 100 MG tablet TAKE 1 TABLET (100 MG) BY MOUTH DAILY  Qty: 90 tablet, Refills: 1    Associated Diagnoses: Depression, unspecified depression type       !! - Potential duplicate medications found. Please discuss with provider.        Allergies   Allergies   Allergen Reactions    Penicillins Anaphylaxis     PEN-FAST - Penicillin Allergy Risk Tool completed by [facility] December 20, 2024    Score: 3  Risk: Moderate  Assessment: Patient has a 20 % chance of a positive penicillin allergy test    Iodine      Iodinated Contrast Media  --- Hives      Tetracyclines & Related Unknown    Hydrochlorothiazide Palpitations     dehydration    Influenza Vac Split [Influenza Virus Vaccine] Rash     Patient states she is allergic to the vaccine.  Got a rash all over body many years ago after receiving injection.     Data   Most Recent 3 CBC's:  Recent Labs   Lab Test 01/15/25  0511 12/21/24  0607 12/20/24  0735   WBC 7.7 7.8 8.5   HGB 11.8 11.1* 12.3   MCV 92 92 92    220 205      Most Recent 3 BMP's:  Recent Labs   Lab Test 01/16/25  0622 01/15/25  0511 12/21/24  0607   NA  139 140 143   POTASSIUM 4.2 4.0 3.7   CHLORIDE 104 102 104   CO2 25 24 25   BUN 13.4 20.1* 14.9   CR 0.58 0.62 0.65   ANIONGAP 10 14 14   CESAR 8.7* 8.5* 8.3*   * 140* 117*     Most Recent 2 LFT's:  Recent Labs   Lab Test 07/02/24  0717 07/01/24  0015   AST 36 24   ALT 39 29   ALKPHOS 81 106   BILITOTAL 0.3 <0.2     Most Recent INR's and Anticoagulation Dosing History:  Anticoagulation Dose History          Latest Ref Rng & Units 4/15/2021   Recent Dosing and Labs   INR 0.90 - 1.10 1.02  1.02          Details          This result is from an external source.    Multiple values from one day are sorted in reverse-chronological order             Most Recent 3 Troponin's:  Recent Labs   Lab Test 04/08/21 2034 08/28/18  1732   TROPI <0.015 <0.015     Most Recent Cholesterol Panel:  Recent Labs   Lab Test 06/24/24  0408   CHOL 195   *   HDL 52   TRIG 147     Most Recent 6 Bacteria Isolates From Any Culture (See EPIC Reports for Culture Details):  Recent Labs   Lab Test 04/08/21  2336 04/08/21  2331 04/08/21  2034 08/28/18  1824 08/28/18  1750 03/31/17  1424   CULT No growth No growth >100,000 colonies/mL  Escherichia coli  * Canceled, Test credited  Duplicate request   50,000 to 100,000 colonies/mL  mixed urogenital jonathan  Susceptibility testing not routinely done   10,000 to 50,000 colonies/mL mixed urogenital jonathan  Susceptibility testing not routinely done       Most Recent TSH, T4 and A1c Labs:  Recent Labs   Lab Test 06/09/21  1204 11/25/19  1327 08/28/18  1732   TSH  --   --  2.00   A1C 4.7   < >  --     < > = values in this interval not displayed.

## 2025-01-17 NOTE — PLAN OF CARE
Goal Outcome Evaluation:    Plan of Care Reviewed With: patient    Overall Patient Progress: no change    Outcome Evaluation: A&O x4. RA. SL. Assist x1 W/GB. Possible discharge home with  tomorrow.    Problem: Adult Inpatient Plan of Care  Goal: Plan of Care Review  1/16/2025 1834 by Jennifer Miranda RN  Outcome: Progressing  Flowsheets (Taken 1/16/2025 1834)  Outcome Evaluation: A&O x4. RA. SL. Assist x1 W/GB. Possible discharge home with  tomorrow.  Plan of Care Reviewed With: patient  Overall Patient Progress: no change  1/16/2025 1433 by Jennifer Miranda RN  Outcome: Progressing  Goal: Patient-Specific Goal (Individualized)  1/16/2025 1834 by Jennifer Miranda RN  Outcome: Progressing  1/16/2025 1433 by Jennifer Miranda RN  Outcome: Progressing  Goal: Absence of Hospital-Acquired Illness or Injury  1/16/2025 1834 by Jennifer Miranda RN  Outcome: Progressing  1/16/2025 1433 by Jennifer Miranda RN  Outcome: Progressing  Intervention: Identify and Manage Fall Risk  Recent Flowsheet Documentation  Taken 1/16/2025 0847 by Jennifer Miranda RN  Safety Promotion/Fall Prevention:   activity supervised   assistive device/personal items within reach   clutter free environment maintained   increased rounding and observation   increase visualization of patient   lighting adjusted   mobility aid in reach   nonskid shoes/slippers when out of bed   patient and family education   room organization consistent   safety round/check completed   supervised activity  Intervention: Prevent Skin Injury  Recent Flowsheet Documentation  Taken 1/16/2025 0847 by Jennifer Miranda RN  Body Position: position changed independently  Intervention: Prevent and Manage VTE (Venous Thromboembolism) Risk  Recent Flowsheet Documentation  Taken 1/16/2025 0847 by Jennifer Miranda RN  VTE Prevention/Management: SCDs off (sequential compression devices)  Goal: Optimal Comfort and Wellbeing  1/16/2025 1834 by Jennifer Miranda  Patient looking for MRI results, placed in your basket. RN  Outcome: Progressing  1/16/2025 1433 by Jennifer Miranda RN  Outcome: Progressing  Goal: Readiness for Transition of Care  1/16/2025 1834 by Jennifer Miranda RN  Outcome: Progressing  1/16/2025 1433 by Jennifer Miranda RN  Outcome: Progressing     Problem: Gas Exchange Impaired  Goal: Optimal Gas Exchange  1/16/2025 1834 by Jennifer Miranda RN  Outcome: Progressing  1/16/2025 1433 by Jennifer Miranda RN  Outcome: Progressing  Intervention: Optimize Oxygenation and Ventilation  Recent Flowsheet Documentation  Taken 1/16/2025 0847 by Jennifer Miranda RN  Head of Bed (HOB) Positioning: HOB at 30-45 degrees

## 2025-01-17 NOTE — PLAN OF CARE
"Goal Outcome Evaluation:      Plan of Care Reviewed With: patient    Overall Patient Progress: no changeOverall Patient Progress: no change         [id number]  Refused oob this shift. Denies pain. Dressing change completed per order. RA while awake, baseline 2 LO2 NC while sleeping. Voiding with purewick overnight, refuses to use commode or bathroom.   Powder applied to folds.   Discharge back home today      Problem: Adult Inpatient Plan of Care  Goal: Plan of Care Review  Description: The Plan of Care Review/Shift note should be completed every shift.  The Outcome Evaluation is a brief statement about your assessment that the patient is improving, declining, or no change.  This information will be displayed automatically on your shift  note.  Outcome: Progressing  Flowsheets (Taken 1/17/2025 0159)  Plan of Care Reviewed With: patient  Overall Patient Progress: no change  Goal: Patient-Specific Goal (Individualized)  Description: You can add care plan individualizations to a care plan. Examples of Individualization might be:  \"Parent requests to be called daily at 9am for status\", \"I have a hard time hearing out of my right ear\", or \"Do not touch me to wake me up as it startles  me\".  Outcome: Progressing  Goal: Absence of Hospital-Acquired Illness or Injury  Outcome: Progressing  Intervention: Identify and Manage Fall Risk  Recent Flowsheet Documentation  Taken 1/16/2025 2149 by Jose Mills, RN  Safety Promotion/Fall Prevention:   activity supervised   mobility aid in reach   lighting adjusted   room near nurse's station   room door open   room organization consistent   safety round/check completed  Intervention: Prevent Skin Injury  Recent Flowsheet Documentation  Taken 1/16/2025 2149 by Jose Mills, RN  Body Position: position changed independently  Skin Protection: adhesive use limited  Intervention: Prevent and Manage VTE (Venous Thromboembolism) Risk  Recent Flowsheet Documentation  Taken 1/16/2025 " 2149 by Jose Mills, RN  VTE Prevention/Management: SCDs off (sequential compression devices)  Intervention: Prevent Infection  Recent Flowsheet Documentation  Taken 1/16/2025 2149 by Jose Mills, RN  Infection Prevention:   hand hygiene promoted   rest/sleep promoted   equipment surfaces disinfected  Goal: Optimal Comfort and Wellbeing  Outcome: Progressing  Goal: Readiness for Transition of Care  Outcome: Progressing

## 2025-01-17 NOTE — PROGRESS NOTES
Care Management Discharge Note    Discharge Date: 01/17/2025       Discharge Disposition:  home    Discharge Services:      Discharge DME:      Discharge Transportation: family or friend will provide    Private pay costs discussed: Not applicable    Does the patient's insurance plan have a 3 day qualifying hospital stay waiver?  No    PAS Confirmation Code:    Patient/family educated on Medicare website which has current facility and service quality ratings:      Education Provided on the Discharge Plan:    Persons Notified of Discharge Plans: aware  Patient/Family in Agreement with the Plan:      Handoff Referral Completed: Yes, [facility] PCP: Internal handoff referral completed    Additional Information:  Pat;ient discharging to home with spouse OP CC ordered.    Courtney Cordova, RN, BSN, CM  Inpatient Care Coordination  [facility]  [phone]

## 2025-01-17 NOTE — PROGRESS NOTES
Pt discharged to home via family transport. Belongings with patient. New meds - prednisone, filled here, reviewed interval with patient - verbalized understanding. PIV already out on assessment. Discharge instructions reviewed with patient.

## 2025-01-17 NOTE — PLAN OF CARE
"Goal Outcome Evaluation:      Plan of Care Reviewed With: patient    Overall Patient Progress: no changeOverall Patient Progress: no change         [id number]   Cleared for discharge. Waiting on family to transport home. RA while awake, baseline 2 LO2 nc while sleeping.        Problem: Adult Inpatient Plan of Care  Goal: Plan of Care Review  Description: The Plan of Care Review/Shift note should be completed every shift.  The Outcome Evaluation is a brief statement about your assessment that the patient is improving, declining, or no change.  This information will be displayed automatically on your shift  note.  1/17/2025 1030 by Jose Mills, RN  Outcome: Progressing  Flowsheets (Taken 1/17/2025 1030)  Plan of Care Reviewed With: patient  Overall Patient Progress: no change  1/17/2025 0159 by Jose Mills, RN  Outcome: Progressing  Flowsheets (Taken 1/17/2025 0159)  Plan of Care Reviewed With: patient  Overall Patient Progress: no change  Goal: Patient-Specific Goal (Individualized)  Description: You can add care plan individualizations to a care plan. Examples of Individualization might be:  \"Parent requests to be called daily at 9am for status\", \"I have a hard time hearing out of my right ear\", or \"Do not touch me to wake me up as it startles  me\".  1/17/2025 1030 by Jose Mills, RN  Outcome: Progressing  1/17/2025 0159 by Jose Mills, RN  Outcome: Progressing  Goal: Absence of Hospital-Acquired Illness or Injury  1/17/2025 1030 by Jose Mills, RN  Outcome: Progressing  1/17/2025 0159 by Jose Mills, RN  Outcome: Progressing  Intervention: Identify and Manage Fall Risk  Recent Flowsheet Documentation  Taken 1/16/2025 2149 by Jose Mills, RN  Safety Promotion/Fall Prevention:   activity supervised   mobility aid in reach   lighting adjusted   room near nurse's station   room door open   room organization consistent   safety round/check completed  Intervention: Prevent Skin Injury  Recent " Flowsheet Documentation  Taken 1/17/2025 0733 by Jose Mills RN  Body Position: position changed independently  Taken 1/16/2025 2149 by Jose Mills RN  Body Position: position changed independently  Skin Protection: adhesive use limited  Intervention: Prevent and Manage VTE (Venous Thromboembolism) Risk  Recent Flowsheet Documentation  Taken 1/17/2025 0733 by Jose Mills RN  VTE Prevention/Management: SCDs off (sequential compression devices)  Taken 1/16/2025 2149 by Jose Mills RN  VTE Prevention/Management: SCDs off (sequential compression devices)  Intervention: Prevent Infection  Recent Flowsheet Documentation  Taken 1/16/2025 2149 by Jose Mills RN  Infection Prevention:   hand hygiene promoted   rest/sleep promoted   equipment surfaces disinfected  Goal: Optimal Comfort and Wellbeing  1/17/2025 1030 by Jose Mills RN  Outcome: Progressing  1/17/2025 0159 by Jose Mills RN  Outcome: Progressing  Goal: Readiness for Transition of Care  1/17/2025 1030 by Jose Mills RN  Outcome: Progressing  1/17/2025 0159 by Jose Mills RN  Outcome: Progressing

## 2025-01-20 ENCOUNTER — PATIENT OUTREACH (OUTPATIENT)
Dept: CARE COORDINATION | Facility: CLINIC | Age: 60
End: 2025-01-20

## 2025-01-20 ENCOUNTER — TELEPHONE (OUTPATIENT)
Dept: INTERNAL MEDICINE | Facility: CLINIC | Age: 60
End: 2025-01-20

## 2025-01-20 ASSESSMENT — ACTIVITIES OF DAILY LIVING (ADL): DEPENDENT_IADLS:: CLEANING;COOKING;LAUNDRY;SHOPPING;MEAL PREPARATION;TRANSPORTATION;INCONTINENCE

## 2025-01-20 NOTE — TELEPHONE ENCOUNTER
1/20/25 hospital hold services 1/15 due to patient hospitalization form recieved via fax. Form in your mailbox for signature.

## 2025-01-20 NOTE — PROGRESS NOTES
Clinic Care Coordination Contact  Clinic Care Coordination Contact  OUTREACH with Post Discharge Assessment    Referral Information: RN CC contacted patient's mother, Lola, who is one of her guardians. Patient is also due for monthly outreach.   Referral Source: Care Team    Primary Diagnosis: Respiratory Disorders - other    Chief Complaint   Patient presents with    Clinic Care Coordination - Post Hospital    Clinic Care Coordination - Follow-up      Universal Utilization: Risk of admission or ED visit 96%  Clinic Utilization  Difficulty keeping appointments:: No  Compliance Concerns: No  No-Show Concerns: No  No PCP office visit in Past Year: No  Utilization      No Show Count (past year)  1             ED Visits  10             Hospital Admissions  5                    Current as of: 1/18/2025  2:04 PM              Clinical Concerns:  Current Medical Concerns:  New diagnosis of obesity hypoventilation syndrome.     Current Behavioral Concerns: None.     Education Provided to patient: Educated mother about constipation and taking either stool softeners or fiber pills to resolve constipation.    Pain  Pain (GOAL):: No  Health Maintenance Reviewed: Due/Overdue  (Did not discuss during TCM outreach.) Patient and mother (guardian) will discuss with Primary Care Provider at hospital follow up visit on 1/22/25.   Clinical Pathway: None    Transitions of Care Outreach    Most Recent Admission Date: 1/15/2025   Most Recent Admission Diagnosis: Obesity hypoventilation syndrome (H) - E66.2  Open thigh wound, left, initial encounter - S71.102A  URI with cough and congestion - J06.9  Asthma with acute exacerbation, unspecified asthma severity, unspecified whether persistent - J45.901     Most Recent Discharge Date: 1/17/2025   Most Recent Discharge Diagnosis: Asthma with acute exacerbation, unspecified asthma severity, unspecified whether persistent - J45.901  URI with cough and congestion - J06.9  Obesity hypoventilation  syndrome (H) - E66.2  Open thigh wound, left, initial encounter - S71.102A  Exacerbation of asthma, unspecified asthma severity, unspecified whether persistent - J45.901     Transitions of Care Assessment    Discharge Assessment  How are you doing now that you are home?: Doing better.  How are your symptoms? (Red Flag symptoms escalate to triage hotline per guidelines): Improved  Do you know how to contact your clinic care team if you have future questions or changes to your health status? : Yes  Does the patient have their discharge instructions? : Yes  Does the patient have questions regarding their discharge instructions? : No  Were you started on any new medications or were there changes to any of your previous medications? : Yes  Does the patient have all of their medications?: Yes  Do you have questions regarding any of your medications? : No  Do you have all of your needed medical supplies or equipment (DME)?  (i.e. oxygen tank, CPAP, cane, etc.): Yes    Post-op ([facility] Only)  If the patient had a surgery or procedure, do they have any questions for a nurse?: No    Post-op (Clinicians Only)  Did the patient have surgery or a procedure: No  Fever: No  Chills: No  Eating & Drinking: eating and drinking without complaints/concerns  PO Intake: regular diet  Bowel Function: constipation  Date of last BM: 01/17/25 (Taking senekot.)  Urinary Status: voiding without complaint/concerns    Care Management       Care Mgmt General Assessment  Referral  Referral Source: Care Team  Health Care Home/Utilization  Preferred Hospital: [facility]  [phone]  Preferred Urgent Care:  (States she does not use Urgent Care.)  Living Situation  Current living arrangement:: Other (Lives in an apartment with significant other)  Type of residence:: Apartment  Resources  Patient receiving home care services:: No  Community Resources: ARM, County Worker, Other (see comment),  (may L[employer]pez  meals)  Supplies Currently Used at Home: None  Equipment Currently Used at Home: tub bench, grab bar, tub/shower, other (see comments), wheelchair, power (Bariatric walker with a seat)  Referrals Placed: None  Employment Status: disabled  Psychosocial  Catholic or spiritual beliefs that impact treatment:: No  Mental health DX:: No  Mental health management concern (GOAL):: No  Chemical Dependency Status: No Current Concerns  Chemical Dependency Management:  (N/A)  Informal Support system:: Parent, Significant other  Functional Status  Dependent ADLs:: Ambulation-walker, Incontinence  Dependent IADLs:: Cleaning, Cooking, Laundry, Shopping, Meal Preparation, Transportation, Incontinence  Bed or wheelchair confined:: No  Mobility Status: Dependent/Assisted by Another  Fallen 2 or more times in the past year?: No  Any fall with injury in the past year?: No  Advance Care Plan/Directive  Advanced Care Plans/Directives on file:: Yes  Status of record:: On File and Validated  Type Advanced Care Plans/Directives: Advanced Directive - On File and     Care Mgmt Encounter Assessment  Preventative Care  Routine Health maintenance Reviewed: Due/Overdue (Did not discuss during TCM outreach.)  Clinic Utilization  Difficulty keeping appointments:: No  Compliance Concerns: No  No-Show Concerns: No  No PCP office visit in Past Year: No  Transportation  Transportation means:: Medical transport, Family     Primary Diagnosis  Primary Diagnosis: Respiratory Disorders - other  Barriers in Communication  Other concerns:: Cognitive impairment, Physical impairment  Pain  Pain (GOAL):: No  Medication Review  Medication adherence problem (GOAL):: No  Knowledgeable about how to use meds:: Yes  Medication side effects suspected:: No  Diet/Exercise/Sleep  Diet:: Regular  Inadequate nutrition (GOAL):: No  Tube Feeding: No  Inadequate activity/exercise (GOAL):: No  Significant changes in sleep pattern (GOAL): No    Medication  Management:  Medication review status: Medications reviewed on 1/15/25. RN CC did not re-review medications during TCM outreach.   Current Outpatient Medications   Medication Sig Dispense Refill    acetaminophen (TYLENOL) 500 MG tablet Take 1,000 mg by mouth as needed for mild pain.      albuterol (PROAIR HFA/PROVENTIL HFA/VENTOLIN HFA) 108 (90 Base) MCG/ACT inhaler Inhale 2 puffs into the lungs every 6 hours as needed for shortness of breath, wheezing or cough. 18 g 2    albuterol (PROVENTIL) (2.5 MG/3ML) 0.083% neb solution Take 1 vial (2.5 mg) by nebulization every 6 hours as needed for shortness of breath or wheezing. 75 mL 0    azelastine (ASTELIN) 0.1 % nasal spray Spray 1 spray into both nostrils daily as needed for allergies.      carBAMazepine (TEGRETOL) 200 MG tablet Take 400 mg by mouth 2 times daily. AM and HS      carBAMazepine (TEGRETOL) 200 MG tablet Take 200 mg by mouth daily. @1200      fluticasone (FLONASE) 50 MCG/ACT nasal spray USE ONE SPRAY INTO BOTH NOSTRILS DAILY AS NEEDED FOR RHINITIS OR ALLERGIES 16 mL 1    Fluticasone Furoate-Vilanterol (BREO ELLIPTA) 50-25 MCG/ACT AEPB Inhale 1 puff into the lungs 2 times daily      ibuprofen (ADVIL/MOTRIN) 200 MG tablet Take 400 mg by mouth as needed for pain.      lisinopril (ZESTRIL) 20 MG tablet TAKE 1 TABLET (20 MG) BY MOUTH DAILY 90 tablet 2    lovastatin (MEVACOR) 40 MG tablet TAKE 1 TABLET (40 MG) BY MOUTH AT BEDTIME 90 tablet 1    miconazole (MICATIN) 2 % external powder Apply topically as needed for itching or other.      Multiple Vitamin (MULTI-VITAMIN PO) Take 1 tablet by mouth daily      nystatin (MYCOSTATIN) 446947 UNIT/GM external cream Apply topically daily as needed      omeprazole (PRILOSEC) 20 MG DR capsule Take 20 mg by mouth daily.      predniSONE (DELTASONE) 10 MG tablet Take 4 tablets (40 mg) by mouth daily for 3 days, THEN 2 tablets (20 mg) daily for 3 days, THEN 1 tablet (10 mg) daily for 3 days, THEN 0.5 tablets (5 mg) daily for 3  days. 23 tablet 0    sertraline (ZOLOFT) 100 MG tablet TAKE 1 TABLET (100 MG) BY MOUTH DAILY 90 tablet 1     No current facility-administered medications for this visit.      Allergies   Allergen Reactions    Penicillins Anaphylaxis     PEN-FAST - Penicillin Allergy Risk Tool completed by [facility] December 20, 2024    Score: 3  Risk: Moderate  Assessment: Patient has a 20 % chance of a positive penicillin allergy test    Iodine      Iodinated Contrast Media  --- Hives      Tetracyclines & Related Unknown    Hydrochlorothiazide Palpitations     dehydration    Influenza Vac Split [Influenza Virus Vaccine] Rash     Patient states she is allergic to the vaccine.  Got a rash all over body many years ago after receiving injection.     Functional Status:  Dependent ADLs:: Ambulation-walker, Incontinence  Dependent IADLs:: Cleaning, Cooking, Laundry, Shopping, Meal Preparation, Transportation, Incontinence  Bed or wheelchair confined:: No  Mobility Status: Dependent/Assisted by Another  Fallen 2 or more times in the past year?: No  Any fall with injury in the past year?: No    Living Situation:  Current living arrangement:: Other (Lives in an apartment with significant other)  Type of residence:: Apartment    Lifestyle & Psychosocial Needs:    Social Drivers of Health     Food Insecurity: Low Risk  (1/15/2025)    Food Insecurity     Within the past 12 months, did you worry that your food would run out before you got money to buy more?: No     Within the past 12 months, did the food you bought just not last and you didn t have money to get more?: No   Depression: Not at risk (1/9/2025)    PHQ-2     PHQ-2 Score: Incomplete   Housing Stability: Low Risk  (1/15/2025)    Housing Stability     Do you have housing? : Yes     Are you worried about losing your housing?: No   Tobacco Use: Low Risk  (1/10/2025)    Patient History     Smoking Tobacco Use: Never     Smokeless Tobacco Use: Never     Passive Exposure: Not on file   Financial  Resource Strain: Low Risk  (1/15/2025)    Financial Resource Strain     Within the past 12 months, have you or your family members you live with been unable to get utilities (heat, electricity) when it was really needed?: No   Alcohol Use: Not on file   Transportation Needs: Low Risk  (1/15/2025)    Transportation Needs     Within the past 12 months, has lack of transportation kept you from medical appointments, getting your medicines, non-medical meetings or appointments, work, or from getting things that you need?: No   Physical Activity: Not on file   Interpersonal Safety: Low Risk  (1/15/2025)    Interpersonal Safety     Do you feel physically and emotionally safe where you currently live?: Yes     Within the past 12 months, have you been hit, slapped, kicked or otherwise physically hurt by someone?: No     Within the past 12 months, have you been humiliated or emotionally abused in other ways by your partner or ex-partner?: No   Stress: Not on file   Social Connections: Not on file   Health Literacy: Not on file     Diet:: Regular  Inadequate nutrition (GOAL):: No  Tube Feeding: No  Inadequate activity/exercise (GOAL):: No  Significant changes in sleep pattern (GOAL): No  Transportation means:: Medical transport, Family     Anabaptist or spiritual beliefs that impact treatment:: No  Mental health DX:: No  Mental health management concern (GOAL):: No  Chemical Dependency Status: No Current Concerns  Chemical Dependency Management:  (N/A)  Informal Support system:: Parent, Significant other      Resources and Interventions:  Current Resources:      Community Resources: ARM, County Worker, Other (see comment),  (may López)  Supplies Currently Used at Home: None  Equipment Currently Used at Home: tub bench, grab bar, tub/shower, other (see comments), wheelchair, power (Bariatric walker with a seat)  Employment Status: disabled     Advance Care Plan/Directive  Advanced Care  Plans/Directives on file:: Yes  Status of record:: On File and Validated  Type Advanced Care Plans/Directives: Advanced Directive - On File    Referrals Placed: None     Care Plan:   Care Plan: Health Maintenance       Problem: Health Maintenance Due or Overdue       Goal: Become up-to-date with health maintenance visit(s)       Start Date: 5/14/2024 Expected End Date: 11/12/2024    This Visit's Progress: 20% Recent Progress: 10%    Note:     Barriers: Cognitive disability; Physical disability; Poor support from Significant other; Provider availability - wait time to complete appointments.   Strengths: Parents/guardians Motivated; Agreeable to Care Coordination.   Patient expressed understanding of goal: Yes  Action steps to achieve this goal:  1. I will take my medications as prescribed.   2. I will discuss, review, schedule and complete recommended overdue health maintenance with my Primary Care Provider.   3. I will contact my care team with questions, concerns or support needs. I will use the clinic as a resource and I understand I can contact my clinic with 24/7 after hours services available. Care Coordinator will remain available as needed.   (Still in progress as patient hasn't had an office visit, just virtual visits)                                Care Plan: Resources       Problem: Resources       Goal: Resources for equipment       Start Date: 5/14/2024 Expected End Date: 11/12/2024    This Visit's Progress: 50% Recent Progress: 20%    Note:     Barriers: Cognitive disability; Physical disability; Poor support from Significant other; Provider availability - wait time to complete appointments.   Strengths: Parents/guardians Motivated; Agreeable to Care Coordination.   Patient expressed understanding of goal: Yes  Action steps to achieve this goal:  1. I will review and follow up with resource for medical equipment ([employer] ([phone]) for required forms for Bariatric [employer]  Wheelchair and bedside commode. (Completed)  2. I will discuss with [facility]  Jagjit Barrientos how to obtain incontinent supplies through [place]'s waiver. (Pending)  3. I will contact my care team with questions, concerns or support needs. I will use the clinic as a resource and I understand I can contact my clinic with 24/7 after hours services available. Care Coordinator will remain available as needed.                             Patient/Caregiver understanding: Patient/caregiver verbalized understanding and denies any additional questions or concerns at this time. RNCC engaged in [employer] communications during encounter.     Outreach Frequency: monthly, more frequently as needed    Future Appointments                Today [place] [facility], [place]    In 2 days Aury Vizcaino MD [place], [place]    In 5 months Lary Valentin MD [facility] SLEEP          Follow up Plan     Discharge Follow-Up  Discharge follow up appointment scheduled in alignment with recommended follow up timeframe or Transitions of Risk Category? (Low = within 30 days; Moderate= within 14 days; High= within 7 days): Yes  Discharge Follow Up Appointment Date: 01/22/25  Discharge Follow Up Appointment Scheduled with?: Primary Care Provider    RN CC contacted patient for post-hospital follow up and to introduce Care Coordination. Full assessment not indicated as patient is already enrolled in Care Coordination.     Future Appointments   Date Time Provider Department Center   1/20/2025  1:15 PM [place] [facility] [place]   1/22/2025  1:00 PM Aury Vizcaino MD [facility] [place]   6/30/2025  2:30 PM Lary Valentin MD [facility] SLEEP     Outpatient Plan as outlined on AVS reviewed with patient.    For any urgent concerns, please contact our 24 hour nurse triage line: [phone] ([phone])       Rocio Govea RN, BSN, CPHN,  [facility] Ambulatory Care Management  [facility], and [facility]  [email or web address]  Office: [phone]  Employed by [facility]

## 2025-01-21 ENCOUNTER — MEDICAL CORRESPONDENCE (OUTPATIENT)
Dept: HEALTH INFORMATION MANAGEMENT | Facility: CLINIC | Age: 60
End: 2025-01-21

## 2025-01-24 ENCOUNTER — VIRTUAL VISIT (OUTPATIENT)
Dept: INTERNAL MEDICINE | Facility: CLINIC | Age: 60
End: 2025-01-24
Payer: MEDICARE

## 2025-01-24 DIAGNOSIS — E66.2 OBESITY HYPOVENTILATION SYNDROME (H): ICD-10-CM

## 2025-01-24 DIAGNOSIS — R73.03 PREDIABETES: ICD-10-CM

## 2025-01-24 DIAGNOSIS — Z09 HOSPITAL DISCHARGE FOLLOW-UP: Primary | ICD-10-CM

## 2025-01-24 DIAGNOSIS — G47.33 OSA (OBSTRUCTIVE SLEEP APNEA): ICD-10-CM

## 2025-01-24 DIAGNOSIS — J45.40 MODERATE PERSISTENT ASTHMA WITHOUT COMPLICATION: ICD-10-CM

## 2025-01-24 PROCEDURE — 99495 TRANSJ CARE MGMT MOD F2F 14D: CPT | Mod: 95 | Performed by: INTERNAL MEDICINE
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
## 2025-01-27 ENCOUNTER — TELEPHONE (OUTPATIENT)
Dept: INTERNAL MEDICINE | Facility: CLINIC | Age: 60
End: 2025-01-27
Payer: MEDICARE

## 2025-01-27 NOTE — TELEPHONE ENCOUNTER
Looks like the wegovy prescribed for this patient requires a prior authorization.  Is it possible to request the start of the PA process for her?  Thanks so much    Barbara Barlow [facility]   on behalf of [facility]

## 2025-01-28 ENCOUNTER — TELEPHONE (OUTPATIENT)
Dept: INTERNAL MEDICINE | Facility: CLINIC | Age: 60
End: 2025-01-28

## 2025-01-28 ENCOUNTER — MEDICAL CORRESPONDENCE (OUTPATIENT)
Dept: HEALTH INFORMATION MANAGEMENT | Facility: CLINIC | Age: 60
End: 2025-01-28

## 2025-01-28 DIAGNOSIS — F22 DELUSIONS (H): ICD-10-CM

## 2025-01-28 DIAGNOSIS — N39.498 OTHER URINARY INCONTINENCE: ICD-10-CM

## 2025-01-28 DIAGNOSIS — F32.A ANXIETY AND DEPRESSION: Primary | ICD-10-CM

## 2025-01-28 DIAGNOSIS — F22 DELUSIONAL DISORDER (H): ICD-10-CM

## 2025-01-28 DIAGNOSIS — F41.9 ANXIETY AND DEPRESSION: Primary | ICD-10-CM

## 2025-01-28 NOTE — TELEPHONE ENCOUNTER
Prior Authorization Retail Medication Request    Medication/Dose: Wegovy 0.5 mg/0.5 mL  Diagnosis and ICD code (if different than what is on RX):    New/renewal/insurance change PA/secondary ins. PA:  Previously Tried and Failed:    Rationale:      Insurance   Primary: Medicare  Insurance ID:  [id number]    Secondary (if applicable):  Insurance ID:      Pharmacy Information (if different than what is on RX)  Name:    Phone:    Fax:    Clinic Information  Preferred routing pool for dept communication: [facility]
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
## 2025-01-28 NOTE — TELEPHONE ENCOUNTER
If it is not covered, patient needs to call insurance and find out which equivalent is covered.

## 2025-01-30 NOTE — TELEPHONE ENCOUNTER
PA Initiation    Medication: WEGOVY 0.5 MG/0.5ML SC SOAJ  Insurance Company: [employer] Non-Specialty PA's - Phone [phone] Fax [phone]  Pharmacy Filling the Rx: [place], [place] - [address] [address] [facility]  Filling Pharmacy Phone: [phone]  Filling Pharmacy Fax: [phone]  Start Date: 1/29/2025

## 2025-01-30 NOTE — TELEPHONE ENCOUNTER
Prior Authorization Approval    Medication: WEGOVY 0.5 MG/0.5ML SC SOAJ  Authorization Effective Date: 12/30/2024  Authorization Expiration Date: 8/28/2025  Approved Dose/Quantity:   Reference #:     Insurance Company: [employer] Non-Specialty PA's - Phone [phone] Fax [phone]  Expected CoPay: $    CoPay Card Available:      Financial Assistance Needed:   Which Pharmacy is filling the prescription: [facility] [id number] [facility]  Pharmacy Notified: YES  Patient Notified: **Instructed pharmacy to notify patient when script is ready to /ship.**

## 2025-01-30 NOTE — TELEPHONE ENCOUNTER
Treva KUO from [facility] is calling to give us the Guardian information for this patient as they will have the information for this patient.Their number is  [phone] and their names are Charisjake and Lola Barlow.    Treva [phone] ext [phone]

## 2025-01-30 NOTE — TELEPHONE ENCOUNTER
RN team, please contact patient to find out details of pull ups patient is needing. Need to know size, how many she uses, disposable vs reusable, reason for pull ups. - Also need to know where to send them, does she use a specific medical supply store?    Pended DME, but need details filled out.    Thank you,  Marek, Triage RN Daniel Mireles    10:42 AM 1/30/2025

## 2025-01-30 NOTE — TELEPHONE ENCOUNTER
Spoke with patient's mother Lola who states patient uses about 4 pull ups per day for urinary incontinence. Lola states patient is about 400 lbs so needs an XXXL. Does provider want to order quantity of 120 for the month? Or order multiple months worth?

## 2025-01-30 NOTE — TELEPHONE ENCOUNTER
Attempted to call patient. Calls were answered, but disconnect without option to leave a message.    Left a detailed message for care coordinator Treva with information below.

## 2025-01-31 ENCOUNTER — TELEPHONE (OUTPATIENT)
Dept: INTERNAL MEDICINE | Facility: CLINIC | Age: 60
End: 2025-01-31
Payer: MEDICARE

## 2025-01-31 NOTE — TELEPHONE ENCOUNTER
Forms/Letter Request    Type of form/letter: Resume of Care visit      Do we have the form/letter: Yes: placed in provider mailbox for signature    Who is the form from? [facility] # [id number]     Where did/will the form come from? form was faxed in    When is form/letter needed by: 5-7    How would you like the form/letter returned: Fax : [phone]    Patient Notified form requests are processed in 5-7 business days:Yes    Could we send this information to you in [employer] or would you prefer to receive a phone call?:   NA

## 2025-02-03 ENCOUNTER — MEDICAL CORRESPONDENCE (OUTPATIENT)
Dept: HEALTH INFORMATION MANAGEMENT | Facility: CLINIC | Age: 60
End: 2025-02-03

## 2025-02-03 NOTE — TELEPHONE ENCOUNTER
Left voicemail for patient's mother/co-guardian to callback to clinic to see where prescription for incontinence supplies should go.    Printed prescription is in [facility] green folder to fax upon callback

## 2025-02-11 ENCOUNTER — HOSPITAL ENCOUNTER (OUTPATIENT)
Facility: CLINIC | Age: 60
Setting detail: OBSERVATION
End: 2025-02-11
Attending: EMERGENCY MEDICINE | Admitting: HOSPITALIST
Payer: MEDICARE

## 2025-02-11 ENCOUNTER — PATIENT OUTREACH (OUTPATIENT)
Dept: CARE COORDINATION | Facility: CLINIC | Age: 60
End: 2025-02-11

## 2025-02-11 ENCOUNTER — APPOINTMENT (OUTPATIENT)
Dept: GENERAL RADIOLOGY | Facility: CLINIC | Age: 60
End: 2025-02-11
Attending: EMERGENCY MEDICINE
Payer: MEDICARE

## 2025-02-11 DIAGNOSIS — J45.41 MODERATE PERSISTENT ASTHMA WITH ACUTE EXACERBATION: ICD-10-CM

## 2025-02-11 DIAGNOSIS — J45.40 MODERATE PERSISTENT ASTHMA WITHOUT COMPLICATION: ICD-10-CM

## 2025-02-11 DIAGNOSIS — K81.0 ACUTE CHOLECYSTITIS: ICD-10-CM

## 2025-02-11 DIAGNOSIS — L89.223 PRESSURE INJURY OF LEFT THIGH, STAGE 3 (H): Primary | ICD-10-CM

## 2025-02-11 DIAGNOSIS — J45.51 SEVERE PERSISTENT ASTHMA WITH EXACERBATION (H): ICD-10-CM

## 2025-02-11 LAB
ANION GAP SERPL CALCULATED.3IONS-SCNC: 13 MMOL/L (ref 7–15)
BASOPHILS # BLD AUTO: 0 10E3/UL (ref 0–0.2)
BASOPHILS NFR BLD AUTO: 1 %
BUN SERPL-MCNC: 13.7 MG/DL (ref 6–20)
CALCIUM SERPL-MCNC: 8.6 MG/DL (ref 8.8–10.4)
CHLORIDE SERPL-SCNC: 104 MMOL/L (ref 98–107)
CREAT SERPL-MCNC: 0.59 MG/DL (ref 0.51–0.95)
EGFRCR SERPLBLD CKD-EPI 2021: >90 ML/MIN/1.73M2
EOSINOPHIL # BLD AUTO: 0.3 10E3/UL (ref 0–0.7)
EOSINOPHIL NFR BLD AUTO: 3 %
ERYTHROCYTE [DISTWIDTH] IN BLOOD BY AUTOMATED COUNT: 13.5 % (ref 10–15)
FLUAV RNA SPEC QL NAA+PROBE: NEGATIVE
FLUBV RNA RESP QL NAA+PROBE: NEGATIVE
GLUCOSE SERPL-MCNC: 138 MG/DL (ref 70–99)
HCO3 SERPL-SCNC: 25 MMOL/L (ref 22–29)
HCT VFR BLD AUTO: 41.3 % (ref 35–47)
HGB BLD-MCNC: 12.4 G/DL (ref 11.7–15.7)
IMM GRANULOCYTES # BLD: 0 10E3/UL
IMM GRANULOCYTES NFR BLD: 1 %
LYMPHOCYTES # BLD AUTO: 0.9 10E3/UL (ref 0.8–5.3)
LYMPHOCYTES NFR BLD AUTO: 10 %
MCH RBC QN AUTO: 27.3 PG (ref 26.5–33)
MCHC RBC AUTO-ENTMCNC: 30 G/DL (ref 31.5–36.5)
MCV RBC AUTO: 91 FL (ref 78–100)
MONOCYTES # BLD AUTO: 0.2 10E3/UL (ref 0–1.3)
MONOCYTES NFR BLD AUTO: 3 %
NEUTROPHILS # BLD AUTO: 7.2 10E3/UL (ref 1.6–8.3)
NEUTROPHILS NFR BLD AUTO: 83 %
NRBC # BLD AUTO: 0 10E3/UL
NRBC BLD AUTO-RTO: 0 /100
PLATELET # BLD AUTO: 181 10E3/UL (ref 150–450)
POTASSIUM SERPL-SCNC: 4.5 MMOL/L (ref 3.4–5.3)
RBC # BLD AUTO: 4.54 10E6/UL (ref 3.8–5.2)
RSV RNA SPEC NAA+PROBE: NEGATIVE
SARS-COV-2 RNA RESP QL NAA+PROBE: NEGATIVE
SODIUM SERPL-SCNC: 142 MMOL/L (ref 135–145)
WBC # BLD AUTO: 8.7 10E3/UL (ref 4–11)

## 2025-02-11 PROCEDURE — 85025 COMPLETE CBC W/AUTO DIFF WBC: CPT | Performed by: EMERGENCY MEDICINE

## 2025-02-11 PROCEDURE — 250N000013 HC RX MED GY IP 250 OP 250 PS 637: Performed by: PHYSICIAN ASSISTANT

## 2025-02-11 PROCEDURE — G0378 HOSPITAL OBSERVATION PER HR: HCPCS

## 2025-02-11 PROCEDURE — 250N000009 HC RX 250: Performed by: PHYSICIAN ASSISTANT

## 2025-02-11 PROCEDURE — 250N000011 HC RX IP 250 OP 636: Performed by: EMERGENCY MEDICINE

## 2025-02-11 PROCEDURE — 71046 X-RAY EXAM CHEST 2 VIEWS: CPT

## 2025-02-11 PROCEDURE — 250N000009 HC RX 250: Performed by: EMERGENCY MEDICINE

## 2025-02-11 PROCEDURE — 99285 EMERGENCY DEPT VISIT HI MDM: CPT | Mod: 25

## 2025-02-11 PROCEDURE — 99222 1ST HOSP IP/OBS MODERATE 55: CPT | Mod: AI | Performed by: PHYSICIAN ASSISTANT

## 2025-02-11 PROCEDURE — 250N000011 HC RX IP 250 OP 636: Performed by: PHYSICIAN ASSISTANT

## 2025-02-11 PROCEDURE — 36415 COLL VENOUS BLD VENIPUNCTURE: CPT | Performed by: EMERGENCY MEDICINE

## 2025-02-11 PROCEDURE — 96372 THER/PROPH/DIAG INJ SC/IM: CPT | Performed by: PHYSICIAN ASSISTANT

## 2025-02-11 PROCEDURE — 94640 AIRWAY INHALATION TREATMENT: CPT

## 2025-02-11 PROCEDURE — 94640 AIRWAY INHALATION TREATMENT: CPT | Mod: 76

## 2025-02-11 PROCEDURE — 87637 SARSCOV2&INF A&B&RSV AMP PRB: CPT | Performed by: PHYSICIAN ASSISTANT

## 2025-02-11 PROCEDURE — 96365 THER/PROPH/DIAG IV INF INIT: CPT | Mod: 59

## 2025-02-11 PROCEDURE — 250N000009 HC RX 250: Performed by: STUDENT IN AN ORGANIZED HEALTH CARE EDUCATION/TRAINING PROGRAM

## 2025-02-11 PROCEDURE — 999N000156 HC STATISTIC RCP CONSULT EA 30 MIN

## 2025-02-11 PROCEDURE — 84295 ASSAY OF SERUM SODIUM: CPT | Performed by: EMERGENCY MEDICINE

## 2025-02-11 PROCEDURE — 250N000013 HC RX MED GY IP 250 OP 250 PS 637: Performed by: EMERGENCY MEDICINE

## 2025-02-11 PROCEDURE — 999N000157 HC STATISTIC RCP TIME EA 10 MIN

## 2025-02-11 RX ORDER — IPRATROPIUM BROMIDE AND ALBUTEROL SULFATE 2.5; .5 MG/3ML; MG/3ML
3 SOLUTION RESPIRATORY (INHALATION) 3 TIMES DAILY
Status: DISCONTINUED | OUTPATIENT
Start: 2025-02-11 | End: 2025-02-14 | Stop reason: HOSPADM

## 2025-02-11 RX ORDER — IPRATROPIUM BROMIDE AND ALBUTEROL SULFATE 2.5; .5 MG/3ML; MG/3ML
3 SOLUTION RESPIRATORY (INHALATION) ONCE
Status: COMPLETED | OUTPATIENT
Start: 2025-02-11 | End: 2025-02-11

## 2025-02-11 RX ORDER — SERTRALINE HYDROCHLORIDE 100 MG/1
100 TABLET, FILM COATED ORAL DAILY
Status: DISCONTINUED | OUTPATIENT
Start: 2025-02-11 | End: 2025-02-14 | Stop reason: HOSPADM

## 2025-02-11 RX ORDER — CARBAMAZEPINE 200 MG/1
400 TABLET ORAL 2 TIMES DAILY
Status: DISCONTINUED | OUTPATIENT
Start: 2025-02-11 | End: 2025-02-14 | Stop reason: HOSPADM

## 2025-02-11 RX ORDER — PREDNISONE 20 MG/1
40 TABLET ORAL DAILY
Status: DISCONTINUED | OUTPATIENT
Start: 2025-02-12 | End: 2025-02-14 | Stop reason: HOSPADM

## 2025-02-11 RX ORDER — ACETAMINOPHEN 650 MG/1
650 SUPPOSITORY RECTAL EVERY 4 HOURS PRN
Status: DISCONTINUED | OUTPATIENT
Start: 2025-02-11 | End: 2025-02-14 | Stop reason: HOSPADM

## 2025-02-11 RX ORDER — PANTOPRAZOLE SODIUM 40 MG/1
40 TABLET, DELAYED RELEASE ORAL DAILY
Status: DISCONTINUED | OUTPATIENT
Start: 2025-02-11 | End: 2025-02-14 | Stop reason: HOSPADM

## 2025-02-11 RX ORDER — LIDOCAINE 40 MG/G
CREAM TOPICAL
Status: DISCONTINUED | OUTPATIENT
Start: 2025-02-11 | End: 2025-02-14 | Stop reason: HOSPADM

## 2025-02-11 RX ORDER — MAGNESIUM SULFATE HEPTAHYDRATE 40 MG/ML
2 INJECTION, SOLUTION INTRAVENOUS ONCE
Status: COMPLETED | OUTPATIENT
Start: 2025-02-11 | End: 2025-02-11

## 2025-02-11 RX ORDER — IPRATROPIUM BROMIDE AND ALBUTEROL SULFATE 2.5; .5 MG/3ML; MG/3ML
3 SOLUTION RESPIRATORY (INHALATION)
Status: DISCONTINUED | OUTPATIENT
Start: 2025-02-11 | End: 2025-02-11

## 2025-02-11 RX ORDER — ALBUTEROL SULFATE 0.83 MG/ML
2.5 SOLUTION RESPIRATORY (INHALATION) EVERY 4 HOURS PRN
Status: DISCONTINUED | OUTPATIENT
Start: 2025-02-11 | End: 2025-02-14 | Stop reason: HOSPADM

## 2025-02-11 RX ORDER — ENOXAPARIN SODIUM 100 MG/ML
40 INJECTION SUBCUTANEOUS EVERY 12 HOURS
Status: DISCONTINUED | OUTPATIENT
Start: 2025-02-11 | End: 2025-02-14 | Stop reason: HOSPADM

## 2025-02-11 RX ORDER — ACETAMINOPHEN 325 MG/1
650 TABLET ORAL EVERY 4 HOURS PRN
Status: DISCONTINUED | OUTPATIENT
Start: 2025-02-11 | End: 2025-02-14 | Stop reason: HOSPADM

## 2025-02-11 RX ORDER — LISINOPRIL 20 MG/1
20 TABLET ORAL DAILY
Status: DISCONTINUED | OUTPATIENT
Start: 2025-02-11 | End: 2025-02-14 | Stop reason: HOSPADM

## 2025-02-11 RX ORDER — CARBAMAZEPINE 200 MG/1
200 TABLET ORAL DAILY
Status: DISCONTINUED | OUTPATIENT
Start: 2025-02-11 | End: 2025-02-14 | Stop reason: HOSPADM

## 2025-02-11 RX ORDER — ALBUTEROL SULFATE 0.83 MG/ML
2.5 SOLUTION RESPIRATORY (INHALATION)
Status: DISCONTINUED | OUTPATIENT
Start: 2025-02-11 | End: 2025-02-11

## 2025-02-11 RX ADMIN — Medication 16 MG: at 07:25

## 2025-02-11 RX ADMIN — SERTRALINE HYDROCHLORIDE 100 MG: 100 TABLET ORAL at 13:58

## 2025-02-11 RX ADMIN — LISINOPRIL 20 MG: 20 TABLET ORAL at 16:30

## 2025-02-11 RX ADMIN — ACETAMINOPHEN 650 MG: 325 TABLET, FILM COATED ORAL at 23:15

## 2025-02-11 RX ADMIN — CARBAMAZEPINE 400 MG: 200 TABLET ORAL at 21:39

## 2025-02-11 RX ADMIN — IPRATROPIUM BROMIDE AND ALBUTEROL SULFATE 3 ML: .5; 3 SOLUTION RESPIRATORY (INHALATION) at 15:40

## 2025-02-11 RX ADMIN — CARBAMAZEPINE 200 MG: 200 TABLET ORAL at 16:30

## 2025-02-11 RX ADMIN — IPRATROPIUM BROMIDE AND ALBUTEROL SULFATE 3 ML: .5; 3 SOLUTION RESPIRATORY (INHALATION) at 08:34

## 2025-02-11 RX ADMIN — MAGNESIUM SULFATE HEPTAHYDRATE 2 G: 40 INJECTION, SOLUTION INTRAVENOUS at 10:33

## 2025-02-11 RX ADMIN — PANTOPRAZOLE SODIUM 40 MG: 40 TABLET, DELAYED RELEASE ORAL at 13:58

## 2025-02-11 RX ADMIN — IPRATROPIUM BROMIDE AND ALBUTEROL SULFATE 3 ML: .5; 3 SOLUTION RESPIRATORY (INHALATION) at 08:35

## 2025-02-11 RX ADMIN — IPRATROPIUM BROMIDE AND ALBUTEROL SULFATE 3 ML: .5; 3 SOLUTION RESPIRATORY (INHALATION) at 07:25

## 2025-02-11 RX ADMIN — ENOXAPARIN SODIUM 40 MG: 40 INJECTION SUBCUTANEOUS at 13:58

## 2025-02-11 RX ADMIN — IPRATROPIUM BROMIDE AND ALBUTEROL SULFATE 3 ML: .5; 3 SOLUTION RESPIRATORY (INHALATION) at 19:02

## 2025-02-11 ASSESSMENT — ACTIVITIES OF DAILY LIVING (ADL)
ADLS_ACUITY_SCORE: 62
ADLS_ACUITY_SCORE: 51
ADLS_ACUITY_SCORE: 62
ADLS_ACUITY_SCORE: 51
ADLS_ACUITY_SCORE: 52
ADLS_ACUITY_SCORE: 62
ADLS_ACUITY_SCORE: 51
ADLS_ACUITY_SCORE: 51
ADLS_ACUITY_SCORE: 52
ADLS_ACUITY_SCORE: 52
ADLS_ACUITY_SCORE: 62
ADLS_ACUITY_SCORE: 52
ADLS_ACUITY_SCORE: 52
ADLS_ACUITY_SCORE: 51

## 2025-02-11 NOTE — ED PROVIDER NOTES
Emergency Department Note      History of Present Illness     Chief Complaint   Shortness of Breath      FRANCINE Corado is a 59 year old female with a history of asthma, pneumonia, obesity hypoventilation syndrome, hyperlipidemia, and hypertension who presents to the ED via EMS for shortness of breath. Per EMS, the patient used the last dose of her inhaler today, and had persistent shortness of breath and wheezing. She is normally on 2L of oxygen. EMS gave 2L of oxygen and duoneb. Patient's O2 sats improved from 90% to 96%. Patient endorses a productive cough, but denies any fever or chills.     Independent Historian   EMS as detailed above.    Review of External Notes   I reviewed 12/19/24 ED to hospital admission note for acute asthma exacerbation.    Past Medical History     Medical History and Problem List   Essential hypertension, benign   Delay in development  Perforation of tympanic membrane  Hyperlipidemia  Seizure disorder  Impaired fasting glucose  Insulin resistance  Elevated cortisol level  Morbid obesity  Low vitamin D level  Small bowel obstruction  Critical illness myopathy  Depression  GERD  Candidiasis of skin  Atypical pneumonia  Mild persistent asthma with acute exacerbation  Intertrigo  Hepatic steatosis  Symptomatic cholelithiasis  Acute cholecystitis  Obesity hypoventilation syndrome  URI with cough and congestion  Cushing syndrome  Seasonal allergies  Acute metabolic encephalopathy  Adjustment disorder with anxious mood  Myopia of left eye  Myopia of right eye with astigmatism  Presbyopia   Severe protein-calorie malnutrition  Critical illness myopathy  Peripheral enthesopathies   Acute respiratory distress syndrome due to COVID-19 virus    Medications   Albuterol  Flonase  Breo ellipta  Zestril  Mevacor  Wegovy  Zoloft  Tylenol  Astelin  Tegretol  Advil/motrin  Prilosec     Surgical History   Past Surgical History:   Procedure Laterality Date    ADENOIDECTOMY      Colostomy (since  reversed)  Joaquin filter placed prophylactically      Elective   Age 31    LAPAROSCOPIC CHOLECYSTECTOMY N/A 2024    Procedure: CHOLECYSTECTOMY, LAPAROSCOPIC;  Surgeon: Jaya Higgins MD;  Location: [facility] OR    Open Pelvis Fx with Plate      ORIF for foot crushing injury      Right Arthroscopic wrist surgery secondary to injury  Teens    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      TONSILLECTOMY      Tonsillectomy    TUBAL LIGATION NOS  2001       Physical Exam     Patient Vitals for the past 24 hrs:   BP Temp Temp src Pulse Resp SpO2   25 1424 (!) 149/49 97.4  F (36.3  C) Oral 99 22 (!) 91 %   25 1059 (!) 145/47 97.5  F (36.4  C) Oral 86 20 (!) 91 %   25 0900 -- -- -- -- -- 93 %   25 0822 -- -- -- 87 20 95 %   25 0731 (!) (P) 128/34 (P) 97.6  F (36.4  C) (P) Oral (P) 84 (P) 20 (P) 96 %   25 0306 126/47 98.1  F (36.7  C) Oral 81 24 94 %   25 2300 138/47 97.9  F (36.6  C) Oral 91 22 92 %   25 1944 136/45 97.8  F (36.6  C) Oral 98 24 92 %   25 1902 -- -- -- 91 18 96 %     Physical Exam  General: No acute distress  Head: No obvious trauma to head.  Ears, Nose, Throat:  External ears normal.  Nose normal.  No pharyngeal erythema, swelling or exudate.  Midline uvula. Moist mucus membranes.  Eyes:  Conjunctivae clear.   Neck: Normal range of motion.  Neck supple.   CV: Regular rate and rhythm.  No murmurs.      Respiratory: Effort normal and breath sounds normal. Inspiratory and expiatory wheezing diffusely.   Gastrointestinal: Soft.  No distension. There is no tenderness.  There is no rigidity, no rebound and no guarding.   Musculoskeletal: Normal range of motion.  Non tender extremities to palpations. No lower extremity edema  Neuro: Alert. Moving all extremities appropriately.  Normal speech.    Skin: Skin is warm and dry.  No rash noted.   Psych: Normal mood and affect. Behavior is normal.       Diagnostics     Lab Results   Labs Ordered and  Resulted from Time of ED Arrival to Time of ED Departure   BASIC METABOLIC PANEL - Abnormal       Result Value    Sodium 142      Potassium 4.5      Chloride 104      Carbon Dioxide (CO2) 25      Anion Gap 13      Urea Nitrogen 13.7      Creatinine 0.59      GFR Estimate >90      Calcium 8.6 (*)     Glucose 138 (*)    CBC WITH PLATELETS AND DIFFERENTIAL - Abnormal    WBC Count 8.7      RBC Count 4.54      Hemoglobin 12.4      Hematocrit 41.3      MCV 91      MCH 27.3      MCHC 30.0 (*)     RDW 13.5      Platelet Count 181      % Neutrophils 83      % Lymphocytes 10      % Monocytes 3      % Eosinophils 3      % Basophils 1      % Immature Granulocytes 1      NRBCs per 100 WBC 0      Absolute Neutrophils 7.2      Absolute Lymphocytes 0.9      Absolute Monocytes 0.2      Absolute Eosinophils 0.3      Absolute Basophils 0.0      Absolute Immature Granulocytes 0.0      Absolute NRBCs 0.0     INFLUENZA A/B, RSV AND SARS-COV2 PCR - Normal    Influenza A PCR Negative      Influenza B PCR Negative      RSV PCR Negative      SARS CoV2 PCR Negative         Imaging   Chest XR,  PA & LAT   Final Result   IMPRESSION: Cardiac silhouette is within normal limits. Low lung volumes are noted. No infiltrate, effusion, or pneumothorax. No significant bony abnormalities.        Independent Interpretation   CXR: No pneumothorax, infiltrate, or pleural effusion.    ED Course      Medications Administered   Medications   carBAMazepine (TEGretol) tablet 400 mg (400 mg Oral $Given 2/12/25 0859)   carBAMazepine (TEGretol) tablet 200 mg (200 mg Oral $Given 2/12/25 1255)   lisinopril (ZESTRIL) tablet 20 mg (20 mg Oral $Given 2/12/25 0858)   pantoprazole (PROTONIX) EC tablet 40 mg (40 mg Oral $Given 2/12/25 0858)   sertraline (ZOLOFT) tablet 100 mg (100 mg Oral $Given 2/12/25 0858)   lidocaine 1 % 0.1-1 mL (has no administration in time range)   lidocaine (LMX4) cream (has no administration in time range)   sodium chloride (PF) 0.9% PF flush 3  mL (3 mLs Intracatheter $Given 2/12/25 0901)   sodium chloride (PF) 0.9% PF flush 3 mL ( Intracatheter Canceled Entry 2/12/25 1302)   acetaminophen (TYLENOL) tablet 650 mg (650 mg Oral $Given 2/11/25 2315)   acetaminophen (TYLENOL) Suppository 650 mg (has no administration in time range)   predniSONE (DELTASONE) tablet 40 mg (40 mg Oral $Given 2/12/25 0858)   enoxaparin ANTICOAGULANT (LOVENOX) injection 40 mg (40 mg Subcutaneous $Given 2/12/25 1255)   ipratropium - albuterol 0.5 mg/2.5 mg/3 mL (DUONEB) neb solution 3 mL (3 mLs Nebulization Not Given 2/12/25 1558)   albuterol (PROVENTIL) neb solution 2.5 mg (2.5 mg Nebulization $Given 2/12/25 0622)   dexAMETHasone (DECADRON) alcohol-free oral solution 16 mg (16 mg Oral $Given 2/11/25 0725)   ipratropium - albuterol 0.5 mg/2.5 mg/3 mL (DUONEB) neb solution 3 mL (3 mLs Nebulization $Given 2/11/25 0725)   ipratropium - albuterol 0.5 mg/2.5 mg/3 mL (DUONEB) neb solution 3 mL (3 mLs Nebulization $Given 2/11/25 0835)   ipratropium - albuterol 0.5 mg/2.5 mg/3 mL (DUONEB) neb solution 3 mL (3 mLs Nebulization $Given 2/11/25 0834)   magnesium sulfate 2 g in 50 mL sterile water intermittent infusion (0 g Intravenous Stopped 2/11/25 1150)       Procedures   Procedures     Discussion of Management   Admitting Hospitalist, Rosy Friend PA-C    ED Course   ED Course as of 02/12/25 1707   Tue Feb 11, 2025   0707 I obtained history and examined the patient as noted above.     0821 I rechecked the patient. Still wheezing.   0910 I rechecked the patient. Still wheezing.   0936 I consulted with Rosy Friend PA-C for Dr. Egan, of the hospitalist service and discussed patient admission. They accepted care of the patient.       Additional Documentation  None    Medical Decision Making / Diagnosis     CMS Diagnoses: None    MIPS       None    [facility]   Virginia REDD Corado is a 59 year old female presenting with shortness of breath.  She received 1 DuoNeb treatment prior to arrival.   Wheezing is still appreciated upon arrival.  She is given another DuoNeb treatment and Decadron.  Chest x-ray is unremarkable.  Viral swab is negative.  CBC, BMP are grossly unremarkable.  Upon reevaluation, she reports she is not feeling any better and continues to sound wheezy.  She is given 2 additional treatments of DuoNebs, for a total of 4 when combined with the dose given by EMS.  I reevaluated the patient and she does not have improvement in her wheezing.  She states she is feeling the same.  She is given a dose of magnesium.  I discussed the patient with the hospitalist who agreed to admit the patient to their service.  Patient remains in stable condition.        Disposition   The patient was admitted to the hospital.     Diagnosis     ICD-10-CM    1. Pressure injury of left thigh, stage 3 (H) [L89.223]  L89.223       2. Severe persistent asthma with exacerbation (H)  J45.51            Discharge Medications   Current Discharge Medication List            Scribe Disclosure:  Mariela OLIVO, am serving as a scribe at 7:29 AM on 2/11/2025 to document services personally performed by Johnnie Leonard MD based on my observations and the provider's statements to me.        Johnnei Leonard MD  02/13/25 0034
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
## 2025-02-11 NOTE — PROVIDER NOTIFICATION
02/11/25 1540   RCAT Assessment   Reason for Assessment Asthma   Pulmonary Status 4   Surgical Status 0   Chest X-ray 0   Respiratory Pattern 2   Mental Status 0   Breath Sounds 2   Cough Effectiveness 0   Level of Activity 1   O2 Required for SpO2>=92% 1   Acuity Level (points) 10   Acuity Level  4  (will treat TID as home use)   Re-eval Interval Guideline Every 3 days   Re-evaluation Date 02/14/25   Clinical Indications/Symptoms   Aerosol Therapy History of bronchospasm;Home regimen;Physician order;RCAT protocol   Broncho-pulmonary Hygiene History of mucous producing disease   Volume Expansion Decreased breath sounds;Prevent atelectasis   Aerosol Therapy Plan   RT Treatment Nebulizer   Anticholinergic/Beta-Andrenergic Agonist Duoneb soln (0.5mg/3mg per 3mL) neb Max 6 doses/24h   Aerosol Treatment Frequency Acuity Level 3: QID/PRN @noc-Mod wheezing/Hx asthma/secretion removal  (will treat TID as she uses at home)   Aerosol Therapy (SVN)   Respiratory Treatment Status (SVN) given   Patient Position HOB elevated   Posttreatment Assessment (SVN) breath sounds unchanged   Signs of Intolerance (SVN) none   Broncho-Pulmonary Hygiene Plan   Broncho-Pulmonary Hygiene Treatment Coughing techniques   Broncho-Pulm Hygiene Frequency Acuity Level 3: TID-Small amounts secretions/poor cough, hx of secretions   Volume Expansion Plan   Volume Expansion Treatment Incentive Spirometer   Volume Expansion Frequency Acuity Level 3: TID-At risk for developing atelectasis   Breath Sounds   Breath Sounds All Fields   All Lung Fields Breath Sounds diminished;other (see comments)  (upper airway wheeze)

## 2025-02-11 NOTE — ED TRIAGE NOTES
Pt arrives via EMS from home after running out of inhalers for her asthma. ON 2 L NC at baseline. Sating low 90s on 2 L NC, improved to upper 90s after duo neb. Denies CP or fevers. ABC intact. A&Ox4.

## 2025-02-11 NOTE — PROGRESS NOTES
02/11/25 1312   Skin   Skin WDL X;integrity;moisture   Skin Moisture moist   Skin Integrity rash;abrasion/excoriation   Abrasion / Excoriation Left;Thigh   Rash Abdominal;Other (Comment);Chest  (under breasts)   Device Skin Interventions Taken Skin barrier applied     Admitted/transferred from:   2 RN full   skin assessment completed by Ally Dockery, PAULA and Willa Olivares RN.  Skin assessment finding: issues found Rash under breasts, abdominal folds and groin. Wound found on back of L upper thigh.     Interventions/actions: WOC consult ordered and skin interventions Intradry and baby powder applied under folds.      Will continue to monitor.

## 2025-02-11 NOTE — ED NOTES
[facility]  ED Nurse Handoff Report    ED Chief complaint: Shortness of Breath  . ED Diagnosis:   Final diagnoses:   Severe persistent asthma with exacerbation (H)       Allergies:   Allergies   Allergen Reactions    Penicillins Anaphylaxis     PEN-FAST - Penicillin Allergy Risk Tool completed by [facility] December 20, 2024    Score: 3  Risk: Moderate  Assessment: Patient has a 20 % chance of a positive penicillin allergy test    Iodine      Iodinated Contrast Media  --- Hives      Tetracyclines & Related Unknown    Hydrochlorothiazide Palpitations     dehydration    Influenza Vac Split [Influenza Virus Vaccine] Rash     Patient states she is allergic to the vaccine.  Got a rash all over body many years ago after receiving injection.       Code Status: Full Code    Activity level - Baseline/Home:  standby and independent.  Activity Level - Current:   independent.   Lift room needed: No.   Bariatric: No   Needed: No   Isolation: No.   Infection: Not Applicable.     Respiratory status: Nasal cannula    Vital Signs (within 30 minutes):   Vitals:    02/11/25 0826 02/11/25 0846 02/11/25 0851 02/11/25 0905   BP:       Pulse:       Resp:       Temp:       TempSrc:       SpO2: 93% 93% 92% 92%       Cardiac Rhythm:  ,      Pain level:    Patient confused: No.   Patient Falls Risk: nonskid shoes/slippers when out of bed, patient and family education, and assistive device/personal items within reach.   Elimination Status: Has voided     Patient Report - Initial Complaint:  Wheeze, hypoxia   Focused Assessment:  Brissa Corado is a 59 year old female with a history of asthma, pneumonia, obesity hypoventilation syndrome, hyperlipidemia, and hypertension who presents to the ED via EMS for shortness of breath. Per EMS, the patient used the last dose of her inhaler today, and had persistent shortness of breath and wheezing. She is normally on 2L of oxygen. EMS gave 2L of oxygen and duoneb. Patient's  O2 sats improved from 90% to 96%. Patient endorses a productive cough, but denies any fever or chills.     Abnormal Results:   Labs Ordered and Resulted from Time of ED Arrival to Time of ED Departure   BASIC METABOLIC PANEL - Abnormal       Result Value    Sodium 142      Potassium 4.5      Chloride 104      Carbon Dioxide (CO2) 25      Anion Gap 13      Urea Nitrogen 13.7      Creatinine 0.59      GFR Estimate >90      Calcium 8.6 (*)     Glucose 138 (*)    CBC WITH PLATELETS AND DIFFERENTIAL - Abnormal    WBC Count 8.7      RBC Count 4.54      Hemoglobin 12.4      Hematocrit 41.3      MCV 91      MCH 27.3      MCHC 30.0 (*)     RDW 13.5      Platelet Count 181      % Neutrophils 83      % Lymphocytes 10      % Monocytes 3      % Eosinophils 3      % Basophils 1      % Immature Granulocytes 1      NRBCs per 100 WBC 0      Absolute Neutrophils 7.2      Absolute Lymphocytes 0.9      Absolute Monocytes 0.2      Absolute Eosinophils 0.3      Absolute Basophils 0.0      Absolute Immature Granulocytes 0.0      Absolute NRBCs 0.0          Chest XR,  PA & LAT   Final Result   IMPRESSION: Cardiac silhouette is within normal limits. Low lung volumes are noted. No infiltrate, effusion, or pneumothorax. No significant bony abnormalities.          Treatments provided: see MAR  Family Comments: n/a  OBS brochure/video discussed/provided to patient:  N/A  ED Medications:   Medications   magnesium sulfate 2 g in 50 mL sterile water intermittent infusion (2 g Intravenous $New Bag 2/11/25 1033)   dexAMETHasone (DECADRON) alcohol-free oral solution 16 mg (16 mg Oral $Given 2/11/25 0725)   ipratropium - albuterol 0.5 mg/2.5 mg/3 mL (DUONEB) neb solution 3 mL (3 mLs Nebulization $Given 2/11/25 0725)   ipratropium - albuterol 0.5 mg/2.5 mg/3 mL (DUONEB) neb solution 3 mL (3 mLs Nebulization $Given 2/11/25 0835)   ipratropium - albuterol 0.5 mg/2.5 mg/3 mL (DUONEB) neb solution 3 mL (3 mLs Nebulization $Given 2/11/25 0834)       Drips  infusing:  No  For the majority of the shift this patient was Green.   Interventions performed were n/a.    Sepsis treatment initiated: No    Cares/treatment/interventions/medications to be completed following ED care: n/a    ED Nurse Name: Mily Rao RN  11:05 AM

## 2025-02-11 NOTE — ED NOTES
Asked pt OK if update Stacy (guardian). Pt states that's her old guardian and not to talk with her. Pt's wished honored.

## 2025-02-11 NOTE — PHARMACY-ADMISSION MEDICATION HISTORY
Pharmacist Admission Medication History    Admission medication history is complete. The information provided in this note is only as accurate as the sources available at the time of the update.    Information Source(s): Patient, Hospital records, and Discharge summary on 1/17/2025  via in-person    Pertinent Information: Pt did not start taking Wegovy yet.    Changes made to PTA medication list:  Added: None  Deleted: None  Changed: None    Allergies reviewed with patient and updates made in EHR: yes    Medication History Completed By: Xi Harper PharmD 2/11/2025 11:06 AM    PTA Med List   Medication Sig Last Dose/Taking    acetaminophen (TYLENOL) 500 MG tablet Take 1,000 mg by mouth as needed for mild pain. Taking As Needed    albuterol (PROAIR HFA/PROVENTIL HFA/VENTOLIN HFA) 108 (90 Base) MCG/ACT inhaler Inhale 2 puffs into the lungs every 6 hours as needed for shortness of breath, wheezing or cough. Taking As Needed    albuterol (PROVENTIL) (2.5 MG/3ML) 0.083% neb solution Take 1 vial (2.5 mg) by nebulization every 6 hours as needed for shortness of breath or wheezing. Taking As Needed    azelastine (ASTELIN) 0.1 % nasal spray Spray 1 spray into both nostrils daily as needed for allergies. Taking As Needed    carBAMazepine (TEGRETOL) 200 MG tablet Take 400 mg by mouth 2 times daily. AM and HS 2/10/2025 Evening    carBAMazepine (TEGRETOL) 200 MG tablet Take 200 mg by mouth daily. @1200 2/10/2025 Noon    fluticasone (FLONASE) 50 MCG/ACT nasal spray USE ONE SPRAY INTO BOTH NOSTRILS DAILY AS NEEDED FOR RHINITIS OR ALLERGIES Taking    Fluticasone Furoate-Vilanterol (BREO ELLIPTA) 50-25 MCG/ACT AEPB Inhale 1 puff into the lungs 2 times daily 2/10/2025    ibuprofen (ADVIL/MOTRIN) 200 MG tablet Take 400 mg by mouth as needed for pain. Taking As Needed    lisinopril (ZESTRIL) 20 MG tablet TAKE 1 TABLET (20 MG) BY MOUTH DAILY 2/10/2025    lovastatin (MEVACOR) 40 MG tablet TAKE 1 TABLET (40 MG) BY MOUTH AT BEDTIME  2/10/2025 Evening    miconazole (MICATIN) 2 % external powder Apply topically as needed for itching or other. Taking As Needed    Multiple Vitamin (MULTI-VITAMIN PO) Take 1 tablet by mouth daily 2/10/2025    nystatin (MYCOSTATIN) [id number] UNIT/GM external cream Apply topically daily as needed Taking As Needed    omeprazole (PRILOSEC) 20 MG DR capsule Take 20 mg by mouth daily. 2/10/2025    sertraline (ZOLOFT) 100 MG tablet TAKE 1 TABLET (100 MG) BY MOUTH DAILY 2/10/2025

## 2025-02-11 NOTE — PROGRESS NOTES
Clinic Care Coordination Contact    Situation: Patient chart reviewed by care coordinator.    Background: Patient is enrolled in Care Coordination and followed by RN AYAAN who has been monitoring patient thoughout enrollment for goal progression.     Assessment: Patient was seen at [facility] ED for shortness of breath. She had used her last inhaler prior to being seen at the ED and was still SOB. Per provider note:     Plan/Recommendations:

## 2025-02-12 ENCOUNTER — PATIENT OUTREACH (OUTPATIENT)
Dept: CARE COORDINATION | Facility: CLINIC | Age: 60
End: 2025-02-12

## 2025-02-12 LAB
ANION GAP SERPL CALCULATED.3IONS-SCNC: 19 MMOL/L (ref 7–15)
BUN SERPL-MCNC: 16.3 MG/DL (ref 6–20)
CALCIUM SERPL-MCNC: 8.5 MG/DL (ref 8.8–10.4)
CHLORIDE SERPL-SCNC: 103 MMOL/L (ref 98–107)
CREAT SERPL-MCNC: 0.64 MG/DL (ref 0.51–0.95)
EGFRCR SERPLBLD CKD-EPI 2021: >90 ML/MIN/1.73M2
ERYTHROCYTE [DISTWIDTH] IN BLOOD BY AUTOMATED COUNT: 13.7 % (ref 10–15)
GLUCOSE SERPL-MCNC: 128 MG/DL (ref 70–99)
HCO3 SERPL-SCNC: 21 MMOL/L (ref 22–29)
HCT VFR BLD AUTO: 39.2 % (ref 35–47)
HGB BLD-MCNC: 11.9 G/DL (ref 11.7–15.7)
MCH RBC QN AUTO: 27.7 PG (ref 26.5–33)
MCHC RBC AUTO-ENTMCNC: 30.4 G/DL (ref 31.5–36.5)
MCV RBC AUTO: 91 FL (ref 78–100)
PLATELET # BLD AUTO: 183 10E3/UL (ref 150–450)
POTASSIUM SERPL-SCNC: 4.8 MMOL/L (ref 3.4–5.3)
RBC # BLD AUTO: 4.29 10E6/UL (ref 3.8–5.2)
SODIUM SERPL-SCNC: 143 MMOL/L (ref 135–145)
WBC # BLD AUTO: 7 10E3/UL (ref 4–11)

## 2025-02-12 PROCEDURE — 250N000012 HC RX MED GY IP 250 OP 636 PS 637: Performed by: PHYSICIAN ASSISTANT

## 2025-02-12 PROCEDURE — G0463 HOSPITAL OUTPT CLINIC VISIT: HCPCS

## 2025-02-12 PROCEDURE — G0378 HOSPITAL OBSERVATION PER HR: HCPCS

## 2025-02-12 PROCEDURE — 94640 AIRWAY INHALATION TREATMENT: CPT

## 2025-02-12 PROCEDURE — 250N000013 HC RX MED GY IP 250 OP 250 PS 637: Performed by: PHYSICIAN ASSISTANT

## 2025-02-12 PROCEDURE — 99233 SBSQ HOSP IP/OBS HIGH 50: CPT | Performed by: INTERNAL MEDICINE

## 2025-02-12 PROCEDURE — 999N000157 HC STATISTIC RCP TIME EA 10 MIN

## 2025-02-12 PROCEDURE — 250N000009 HC RX 250: Performed by: STUDENT IN AN ORGANIZED HEALTH CARE EDUCATION/TRAINING PROGRAM

## 2025-02-12 PROCEDURE — 96372 THER/PROPH/DIAG INJ SC/IM: CPT | Performed by: PHYSICIAN ASSISTANT

## 2025-02-12 PROCEDURE — 82565 ASSAY OF CREATININE: CPT | Performed by: PHYSICIAN ASSISTANT

## 2025-02-12 PROCEDURE — 85014 HEMATOCRIT: CPT | Performed by: PHYSICIAN ASSISTANT

## 2025-02-12 PROCEDURE — 250N000011 HC RX IP 250 OP 636: Performed by: PHYSICIAN ASSISTANT

## 2025-02-12 PROCEDURE — 36415 COLL VENOUS BLD VENIPUNCTURE: CPT | Performed by: PHYSICIAN ASSISTANT

## 2025-02-12 RX ADMIN — ENOXAPARIN SODIUM 40 MG: 40 INJECTION SUBCUTANEOUS at 12:55

## 2025-02-12 RX ADMIN — IPRATROPIUM BROMIDE AND ALBUTEROL SULFATE 3 ML: .5; 3 SOLUTION RESPIRATORY (INHALATION) at 08:22

## 2025-02-12 RX ADMIN — ENOXAPARIN SODIUM 40 MG: 40 INJECTION SUBCUTANEOUS at 00:53

## 2025-02-12 RX ADMIN — IPRATROPIUM BROMIDE AND ALBUTEROL SULFATE 3 ML: .5; 3 SOLUTION RESPIRATORY (INHALATION) at 19:03

## 2025-02-12 RX ADMIN — PREDNISONE 40 MG: 20 TABLET ORAL at 08:58

## 2025-02-12 RX ADMIN — ALBUTEROL SULFATE 2.5 MG: 2.5 SOLUTION RESPIRATORY (INHALATION) at 06:22

## 2025-02-12 RX ADMIN — SERTRALINE HYDROCHLORIDE 100 MG: 100 TABLET ORAL at 08:58

## 2025-02-12 RX ADMIN — LISINOPRIL 20 MG: 20 TABLET ORAL at 08:58

## 2025-02-12 RX ADMIN — CARBAMAZEPINE 400 MG: 200 TABLET ORAL at 08:59

## 2025-02-12 RX ADMIN — CARBAMAZEPINE 200 MG: 200 TABLET ORAL at 12:55

## 2025-02-12 RX ADMIN — ALBUTEROL SULFATE 2.5 MG: 2.5 SOLUTION RESPIRATORY (INHALATION) at 00:15

## 2025-02-12 RX ADMIN — PANTOPRAZOLE SODIUM 40 MG: 40 TABLET, DELAYED RELEASE ORAL at 08:58

## 2025-02-12 RX ADMIN — CARBAMAZEPINE 400 MG: 200 TABLET ORAL at 21:01

## 2025-02-12 ASSESSMENT — ACTIVITIES OF DAILY LIVING (ADL)
ADLS_ACUITY_SCORE: 54
ADLS_ACUITY_SCORE: 53
ADLS_ACUITY_SCORE: 57
ADLS_ACUITY_SCORE: 52
ADLS_ACUITY_SCORE: 54
ADLS_ACUITY_SCORE: 52
ADLS_ACUITY_SCORE: 52
ADLS_ACUITY_SCORE: 53
ADLS_ACUITY_SCORE: 54
ADLS_ACUITY_SCORE: 57
DEPENDENT_IADLS:: CLEANING;COOKING;SHOPPING;MEAL PREPARATION;MEDICATION MANAGEMENT
ADLS_ACUITY_SCORE: 52
ADLS_ACUITY_SCORE: 54
ADLS_ACUITY_SCORE: 57
ADLS_ACUITY_SCORE: 53
ADLS_ACUITY_SCORE: 54
ADLS_ACUITY_SCORE: 57

## 2025-02-12 NOTE — DISCHARGE INSTRUCTIONS
Left posterior thigh wound(s): Every other day  Cleanse wound and surrounding skin with Vashe and dry  Cut Hydrofera blue to size of wound and moisten with saline  Apply Hydrofera blue to wound and cover with Mepilex     Breast and abdominal folds: Daily   Remove Interdry and set aside (is good for 5 days if not soiled)  Soak areas with Vashe moistened kerlix for 15 minutes and then remove  Dry skin well  Apply Interdry to folds (single layer and making sure Interdry sticks out of fold 1-2 inches to allow it to wick out moisture)

## 2025-02-12 NOTE — PLAN OF CARE
To Do:  End of Shift Summary  For vital signs and complete assessments, please see documentation flowsheets.     Pertinent assessments: Pt is alert and oriented x 4. VSS on 2L oxygen. Regular diet. Reported some pain on L thigh d/t wound, prn tylenol given. WOC consult placed. Incontinent of bowel and bladder. Rash under breasts and abdominal folds, teradry and powder applied. Open wound on L upper thigh, mepilex placed. Q4 vitals. Regular diet, ate 75% dinner. Call light within reach and able to make needs known.  /47 (BP Location: Left arm)   Pulse 91   Temp 97.9  F (36.6  C) (Oral)   Resp 22   LMP 08/18/2008   SpO2 92%      Major Shift Events: None     Treatment Plan: WOC consult, L thigh wound care, pain management, repositioning with pillows     Bedside Nurse: Katrina Lacy RN                                             Problem: Skin Injury Risk Increased  Goal: Skin Health and Integrity  2/12/2025 0044 by Katrina Lacy RN  Outcome: Not Progressing  2/12/2025 0044 by Katrina Lacy RN  Outcome: Progressing  Intervention: Plan: Nurse Driven Intervention: Moisture Management  Recent Flowsheet Documentation  Taken 2/11/2025 2000 by Katrina Lacy RN  Moisture Interventions: Incontinence pad  Bathing/Skin Care:   incontinence care   linen changed   moisturizer applied  Taken 2/11/2025 1900 by Katrina Lacy RN  Moisture Interventions:   Encourage regular toileting   Incontinence pad  Bathing/Skin Care:   incontinence care   linen changed  Intervention: Plan: Nurse Driven Intervention: Friction and Shear  Recent Flowsheet Documentation  Taken 2/11/2025 1900 by Katrina Lacy RN  Friction/Shear Interventions: HOB 30 degrees or less  Intervention: Optimize Skin Protection  Recent Flowsheet Documentation  Taken 2/11/2025 1900 by Katrina Lacy RN  Head of Bed (HOB) Positioning: HOB at 30-45 degrees   Goal Outcome Evaluation:

## 2025-02-12 NOTE — CONSULTS
Care Management Initial Consult    General Information  Assessment completed with: Parents,    Type of CM/SW Visit: Initial Assessment    Primary Care Provider verified and updated as needed:     Readmission within the last 30 days: no previous admission in last 30 days      Reason for Consult: discharge planning  Advance Care Planning:            Communication Assessment  Patient's communication style: spoken language (English or Bilingual)    Hearing Difficulty or Deaf: no   Wear Glasses or Blind: yes    Cognitive  Cognitive/Neuro/Behavioral: WDL                      Living Environment:   People in home: significant other     Current living Arrangements: apartment      Able to return to prior arrangements: yes       Family/Social Support:  Care provided by: self, spouse/significant other  Provides care for: no one, unable/limited ability to care for self     Support system: Guardian          Description of Support System: Involved, Supportive         Current Resources:   Patient receiving home care services: Yes  Skilled Home Care Services: Skilled Nursing     Community Resources: County Programs, County Worker, Home Care  Equipment currently used at home: shower chair, wheelchair, power  Supplies currently used at home:      Employment/Financial:  Employment Status:          Financial Concerns:             Does the patient's insurance plan have a 3 day qualifying hospital stay waiver?  Yes     Which insurance plan 3 day waiver is available? [employer]    Will the waiver be used for post-acute placement? Undetermined at this time    Lifestyle & Psychosocial Needs:  Social Drivers of Health     Food Insecurity: Low Risk  (2/11/2025)    Food Insecurity     Within the past 12 months, did you worry that your food would run out before you got money to buy more?: No     Within the past 12 months, did the food you bought just not last and you didn t have money to get more?: No   Depression: Not at risk (1/24/2025)     PHQ-2     PHQ-2 Score: Incomplete   Housing Stability: Low Risk  (2/11/2025)    Housing Stability     Do you have housing? : Yes     Are you worried about losing your housing?: No   Tobacco Use: Low Risk  (1/24/2025)    Patient History     Smoking Tobacco Use: Never     Smokeless Tobacco Use: Never     Passive Exposure: Not on file   Financial Resource Strain: Low Risk  (2/11/2025)    Financial Resource Strain     Within the past 12 months, have you or your family members you live with been unable to get utilities (heat, electricity) when it was really needed?: No   Alcohol Use: Not on file   Transportation Needs: Low Risk  (2/11/2025)    Transportation Needs     Within the past 12 months, has lack of transportation kept you from medical appointments, getting your medicines, non-medical meetings or appointments, work, or from getting things that you need?: No   Physical Activity: Not on file   Interpersonal Safety: Low Risk  (1/15/2025)    Interpersonal Safety     Do you feel physically and emotionally safe where you currently live?: Yes     Within the past 12 months, have you been hit, slapped, kicked or otherwise physically hurt by someone?: No     Within the past 12 months, have you been humiliated or emotionally abused in other ways by your partner or ex-partner?: No   Stress: Not on file   Social Connections: Not on file   Health Literacy: Not on file       Functional Status:  Prior to admission patient needed assistance:   Dependent ADLs:: Ambulation-walker  Dependent IADLs:: Cleaning, Cooking, Shopping, Meal Preparation, Medication Management  Assesssment of Functional Status: Not at baseline with ADL Functioning    Mental Health Status:          Chemical Dependency Status:                Values/Beliefs:  Spiritual, Cultural Beliefs, Yarsani Practices, Values that affect care:                 Discussed  Partnership in Safe Discharge Planning  document with patient/family: No    Additional Information:    LUZ MARIA  spoke with pt guardians Ray and Lola to discuss discharge planning. They report that the pt lives in a home with her significant other and has been requiring assist for meals, medications, laundry, and now has housekeeping. Pt has home RN through [employer] (formerly [employer]), SW left in basket message to verify. Pt has a power WC at home. Pt has moms meals and a CADI worker Florencio MEADOWS CM Svitlana [phone], LUZ MARIA left VM for Svitlana to touch base. Pts chris reports that they are working with the [facility] to increase services in the home for pt. They state that pt has been refusing services and blocked their numbers because she does not want additional assist. They report that this is very difficult for them as they want the pt to get the help that she needs. They are working with the [facility] to figure out what to do next. They request and update from the MD and would like pts RNCC with [facility] to call them regarding questions around the sleep study and weight loss medications. They would like to transport pt at discharge. They live 2 hours away and will need notice.     Next Steps: follow for return home with [facility].     Sherie Egan/AISSATOU Butler, LGSW  Inpatient Care Coordination  Emergency Room /Float  [phone]    Sherie Egan, SW

## 2025-02-12 NOTE — CONSULTS
[facility]  WO Nurse Inpatient Assessment     Consulted for: Left thigh    WOC nurse follow-up plan: weekly    Patient History (according to provider note(s):      Brissa Corado is a 59 year old female with PMHx significant for severe persistent asthma poorly controlled, developmental delay, HLD, HTN, seizure disorder, RICK without CPAP, suspected obesity hypoventilation syndrome, GERD, MDD, and morbid obesity, who was admitted on 2/11/2025 with SOB.     Assessment:      Areas visualized during today's visit: Focused:    Pressure Injury Location: Left posterior thigh  Last photo: 2/12/25    Wound type: Pressure Injury     Pressure Injury Stage: 3, present on admission   Wound history/plan of care:   Patient with chronic pressure for over 4 months.  Has been using Aquacel Ag for a couple months.  Wound base: 100 % Granulation tissue     Palpation of the wound bed: normal      Drainage: small     Description of drainage: serosanguinous     Measurements (length x width x depth, in cm) 2  x 5.5  x  0.2 cm      Tunneling N/A     Undermining N/A  Periwound skin: Scar tissue      Color: pink      Temperature: normal   Odor: none  Pain: moderate  Pain intervention prior to dressing change: slow and gentle cares   Treatment goal: Heal  and Infection control/prevention  STATUS: initial assessment  Supplies ordered: ordered Hydrofera blue    My PI Risk Assessment     Sensory Perception: 3 - Slightly Limited     Moisture: 2 - Very moist      Activity: 3 - Walks occasionally      Mobility: 3 - Slightly limited      Nutrition: 3 - Adequate     Friction/Shear: 2 - Potential problem      TOTAL: 16     Wound location: Abdominal and breast folds  Last photo: none  Wound due to: Moisture Associated Skin Damage (MASD)  Wound history/plan of care: Patient with extensive redness and breakdown due to morbid obesity and moisture to folds.    Wound base: Extensive erythema and satellite lesions measuring over  20cm wide in each area.  Scattered areas of epidermis and dermal breakdown.      Palpation of the wound bed: normal      Drainage: scant     Description of drainage: serosanguinous     Measurements (length x width x depth, in cm): see above      Tunneling: N/A     Undermining: N/A  Periwound skin: Erythema- blanchable and Rash      Color: pink      Temperature: normal   Odor: moderate  Pain: mild  Pain interventions prior to dressing change: slow and gentle cares   Treatment goal: Heal   STATUS: initial assessment  Supplies ordered: supplies stored on unit      Treatment Plan:     Left posterior thigh wound(s): Every other day  Cleanse wound and surrounding skin with Vashe and dry  Cut Hydrofera blue (SPD#[id number]) to size of wound and moisten with saline  Apply Hydrofera blue to wound and cover with Mepilex     Breast and abdominal folds: Daily   Remove Interdry and set aside (is good for 5 days if not soiled)  Soak areas with Vashe moistened kerlix for 15 minutes and then remove  Dry skin well  Apply Interdry to folds (single layer and making sure Interdry sticks out of fold 1-2 inches to allow it to wick out moisture)     Orders: Written    RECOMMEND PRIMARY TEAM ORDER: None, at this time  Education provided: importance of repositioning, plan of care, and Off-loading pressure  Discussed plan of care with: Patient and Nurse  Notify WOC if wound(s) deteriorate.  Nursing to notify the Provider(s) and re-consult the WOC Nurse if new skin concern.    DATA:     Current support surface: Standard  Standard gel mattress (Isoflex)-patient declines bariatric bed  Containment of urine/stool: Continent of bladder and Continent of bowel  BMI: There is no height or weight on file to calculate BMI.   Active diet order: Orders Placed This Encounter      Regular Diet Adult     Output: I/O last 3 completed shifts:  In: 240 [P.O.:240]  Out: -      Labs:   Recent Labs   Lab 02/12/25  0732   HGB 11.9   WBC 7.0     Pressure injury risk  assessment:   Sensory Perception: 3-->slightly limited  Moisture: 3-->occasionally moist  Activity: 3-->walks occasionally  Mobility: 3-->slightly limited  Nutrition: 3-->adequate  Friction and Shear: 2-->potential problem  Randy Score: 17    Bharat Cooper RN CWOCN  Contact Via [facility] Nurse Renetta)  Dept. Office Number: [phone]

## 2025-02-12 NOTE — PROGRESS NOTES
[facility]    Medicine Progress Note - Hospitalist Service    Date of Admission:  2/11/2025    Assessment & Plan       Addendum: I was notified by  that patient still has not active legal guardian as previously noted on her epic chart.  I called and updated patient's legal guardian namely her father and mother  All questions were answered.  They are provided with plan of care.  They shared with me that her patient has not been communicating with them at least in the last several weeks.  They are aware of her current condition and the need for close follow-up with PCP and pulmonary service.  We also discussed about the need for further evaluation such as sleep study and pulmonary function testing once he recovered from this recent illness.  We also reiterated importance of follow-up care, compliance with medications.         Brissa Corado is a 59 year old female with PMHx significant for severe persistent asthma poorly controlled, developmental delay, HLD, HTN, seizure disorder, RICK without CPAP, suspected obesity hypoventilation syndrome, GERD, MDD, and morbid obesity, who was admitted on 2/11/2025 with SOB.     Acute asthma exacerbation   #History of severe persistent asthma with recurrent Exacerbation  RICK without CPAP  Suspected obesity hypoventilation syndrome  Morbid obesity complicates cares  Pt notes 1 day of worsening SOB, wheezing and cough. She notes she finished her last course of Prednisone about 1.5 weeks ago. She ran out of her albuterol inhaler and nebs 2 days ago and unable to get them filled until later this week. She also notes exposure to her mother who was Covid and influenza positive, per her report.   She notes a cough is unusual with her asthma exacerbations.   VSS on 2L supplemental O2, which is chronic for her. Previously only used O2 at night but seems like she has been using it more frequently during the day as well. BMP and CBC are unremarkable.  CXR is clear. Covid, influenza and RSV are negative.   She was given 16 mg PO dexamethasone, Duoneb x3 and 2 g mag sulfate in the ED.  - admit to OBS  -[place] mentioned that she ran out of her maintenance and rescue inhalers  -Will ask for  and social service input    - possible viral URI as well, fortunately, Covid and influenza are negative  - resume home Breo Ellipta or substitution  - Duonebs 4 times daily, PRN albuterol neb  - Prednisone 40 mg daily. Consider short taper if sx improve quickly  - needs to establish care with Pulmonology, recommended many times but does not look like its been scheduled  - follow up with sleep medicine has also been recommended to likely start CPAP, this is scheduled for June.    HTN  HLP  - resume home Lisinopril and lovastatin     GERD  - resume home omeprazole    Seizure disorder  Developmental delay  MDD  - resume home Tegretol  - resume home Zoloft    Hx of significant Tinea cruris, intertrigo  Hx of Posterior left thigh pressure ulcer    I offered [place] this morning regarding informing and providing information with her plan of care and working diagnosis to her listed guardian namely her father and mother.  However our patient stated that they were her previous guardian but no longer her current legal guardian  She provided me instructions not to give them information.  However epic chart review showed otherwise and it appears that there is still her legal guardian.  This needs to be clarified.         Observation Goals: List all goals to be met before discharge home:, - Improvement of Peak Flow to greater than 70% sustained off nebulizer for 4 hours., - Dyspnea improved and oxygen saturations greater than 88% on room air or prior home oxygen levels, - Vitals signs normal or at patient baseline, - Safe disposition plan has been identified, - Nurse to notify provider when observation goals have been met and patient is ready for discharge.  Diet: Regular Diet  "Adult    DVT Prophylaxis: Pneumatic Compression Devices and Ambulate every shift  Grajeda Catheter: Not present  Lines: None     Cardiac Monitoring: None  Code Status: Full Code      Clinically Significant Risk Factors Present on Admission              # Anion Gap Metabolic Acidosis: Highest Anion Gap = 19 mmol/L in last 2 days, will monitor and treat as appropriate      # Hypertension: Noted on problem list           # Severe Obesity: Estimated body mass index is 62.34 kg/m  as calculated from the following:    Height as of 1/15/25: 1.702 m (5' 7\").    Weight as of 1/15/25: 180.5 kg (398 lb).       # Asthma: noted on problem list        Social Drivers of Health            Disposition Plan     Medically Ready for Discharge: Anticipated Tomorrow If continues to demonstrate improvement with no recurrent hypoxia, not needing oxygen supportand ambulating with complaining of shortness of breath           Rafael Calero MD, MD  Hospitalist Service  [facility]  Securely message with [employer] (more info)  Text page via [employer] Paging/Directory   ______________________________________________________________________    Interval History   I assume medicine service care today.  Seen and examined.  Chart reviewed.  Case discussed with charge nurse.  I met this pleasant lady while she is sitting comfortably in the hospital recliner and endorsing no ongoing worsening shortness of breath but still having intermittent coughing spells.  She denies any hemoptysis nor any nausea or vomiting.  No reported chest pain nor abdominal pain.  Currently afebrile.  Currently not requiring any oxygen support this morning.  No reports of being agitated or combative.    Physical Exam   Vital Signs: Temp: 97.5  F (36.4  C) Temp src: Oral BP: (!) 145/47 Pulse: 86   Resp: 20 SpO2: (!) 91 % O2 Device: Nasal cannula Oxygen Delivery: 2 LPM  Weight: 0 lbs 0 oz    HEENT; Atraumatic, normocephalic, pinkish conjuctiva, pupils " bilateral reactive   Skin: warm and moist, no rashes  Lungs: equal chest expansion, clear to auscultation, no wheezes, no stridor, no crackles,   Heart: normal rate, normal rhythm, no rubs or gallops.   Abdomen: normal bowel sounds, no tenderness, no peritoneal signs, no guarding  Extremities: no deformities, no edema   Neuro; follow commands, alert and oriented x3, spontaneous speech, coherent, moves all extremities spontaneously  Psych; no hallucination, euthymic mood, not agitated      Medical Decision Making       50 MINUTES SPENT BY ME on the date of service doing chart review, history, exam, documentation & further activities per the note.  MANAGEMENT DISCUSSED with the following over the past 24 hours: Yes yes   NOTE(S)/MEDICAL RECORDS REVIEWED over the past 24 hours: yes       Data     I have personally reviewed the following data over the past 24 hrs:    7.0  \   11.9   / 183     143 103 16.3 /  128 (H)   4.8 21 (L) 0.64 \       Imaging results reviewed over the past 24 hrs:   No results found for this or any previous visit (from the past 24 hours).

## 2025-02-12 NOTE — PROGRESS NOTES
Clinic Care Coordination Contact    Situation: Patient chart reviewed by care coordinator.    Background: Patient is enrolled in Care Coordination and followed by RN CC, who has been monitoring patient throughout enrollment for goal progression. Patient is currently admitted to [facility] under Observation status. RN CC also received message from CASSI Butler at the clinic who is following up on sleep study and weight management medication. RN CC provided her with CADI CM information as requested.      Assessment: RN CC received VM from guardian Lola, requesting return call. RN AYAAN contacted patient's guardian, Lola (mother). She stated that patient has been scheduled for a sleep study, but not until June 2025. She also has a new prescription for Wegovy that they were told was sent to [facility], so they don't know what the co-pay would be. Per chart review, Wegovy was sent to [facility] ([phone]). Provided Lola with phone number to follow up.     Plan/Recommendations: Lola will discuss with IP Provider about getting a respiratory evaluation while patient is admitted to the hospital. She will also follow up on the Wegovy prescription. RN CC will continue to follow patient for discharge from facility.     Rocio Govea RN, BSN, CPHN, CCM  [facility] Ambulatory Care Management  [facility], and [facility]  [email or web address]  Office: [phone]  Employed by [employer]

## 2025-02-12 NOTE — PLAN OF CARE
"Pertinent assessments: A&O, up sba with walker, alarms off per pt request, calls appropriately. Received shower. Dressing changed to L thigh. Ate well. Weaned to RA, LS wheezy.     Major Shift Events: Weaned to RA    Treatment Plan: Awaiting safe discharge plan, SW consulted.     Bedside Nurse: Ally Dockery RN     Problem: Adult Inpatient Plan of Care  Goal: Plan of Care Review  Description: The Plan of Care Review/Shift note should be completed every shift.  The Outcome Evaluation is a brief statement about your assessment that the patient is improving, declining, or no change.  This information will be displayed automatically on your shift  note.  Outcome: Progressing  Flowsheets (Taken 2/12/2025 8391)  Outcome Evaluation: Treatment Plan: Awaiting safe discharge plan, SW consulted.  Plan of Care Reviewed With: patient  Overall Patient Progress: no change  Goal: Patient-Specific Goal (Individualized)  Description: You can add care plan individualizations to a care plan. Examples of Individualization might be:  \"Parent requests to be called daily at 9am for status\", \"I have a hard time hearing out of my right ear\", or \"Do not touch me to wake me up as it startles  me\".  Outcome: Progressing  Goal: Absence of Hospital-Acquired Illness or Injury  Outcome: Progressing  Intervention: Identify and Manage Fall Risk  Recent Flowsheet Documentation  Taken 2/12/2025 1030 by Ally Dockery RN  Safety Promotion/Fall Prevention:   activity supervised   clutter free environment maintained   increased rounding and observation   increase visualization of patient  Intervention: Prevent and Manage VTE (Venous Thromboembolism) Risk  Recent Flowsheet Documentation  Taken 2/12/2025 1030 by Ally Dockery RN  VTE Prevention/Management: SCDs off (sequential compression devices)  Goal: Optimal Comfort and Wellbeing  Outcome: Progressing  Goal: Readiness for Transition of Care  Outcome: Progressing     Problem: Gas Exchange " Impaired  Goal: Optimal Gas Exchange  Outcome: Progressing     Problem: Skin Injury Risk Increased  Goal: Skin Health and Integrity  Outcome: Progressing     Problem: Wound  Goal: Optimal Coping  Outcome: Progressing  Goal: Optimal Functional Ability  Outcome: Progressing  Goal: Absence of Infection Signs and Symptoms  Outcome: Progressing  Goal: Improved Oral Intake  Outcome: Progressing  Goal: Optimal Pain Control and Function  Outcome: Progressing  Goal: Skin Health and Integrity  Outcome: Progressing  Goal: Optimal Wound Healing  Outcome: Progressing   Goal Outcome Evaluation:      Plan of Care Reviewed With: patient    Overall Patient Progress: no changeOverall Patient Progress: no change    Outcome Evaluation: Treatment Plan: Awaiting safe discharge plan, SW consulted.

## 2025-02-12 NOTE — PLAN OF CARE
"End of Shift Summary  For vital signs and complete assessments, please see documentation flowsheets.     Pertinent assessments: Alert and oriented x 4. VSS, 2L oxygen, afebrile, lung sounds diminished with exp wheezing, short of breath with exertion, WOC consult placed--preexisting left thigh wound as well as severe breast and abd fold redness/excoriation, cleaned with soap and water and barrier cream applied as well as interdry placed, Incontinent of bowel and bladder at times, Refusing bed alarm--pt is turning off bed alarm as soon as staff exit room      Major Shift Events: partial bath when pt up to bathroom, 2 PRN nebs this shift    Treatment Plan: WOC consult, discharge pending     Bedside Nurse: Yvonne Pacheco RN               Problem: Adult Inpatient Plan of Care  Goal: Plan of Care Review  Description: The Plan of Care Review/Shift note should be completed every shift.  The Outcome Evaluation is a brief statement about your assessment that the patient is improving, declining, or no change.  This information will be displayed automatically on your shift  note.  Outcome: Not Progressing  Flowsheets (Taken 2/12/2025 0624)  Plan of Care Reviewed With: patient  Overall Patient Progress: no change  Goal: Patient-Specific Goal (Individualized)  Description: You can add care plan individualizations to a care plan. Examples of Individualization might be:  \"Parent requests to be called daily at 9am for status\", \"I have a hard time hearing out of my right ear\", or \"Do not touch me to wake me up as it startles  me\".  Outcome: Not Progressing  Goal: Absence of Hospital-Acquired Illness or Injury  Outcome: Not Progressing  Intervention: Identify and Manage Fall Risk  Recent Flowsheet Documentation  Taken 2/12/2025 0132 by Yvonne Pacheco, RN  Safety Promotion/Fall Prevention:   activity supervised   clutter free environment maintained   increased rounding and observation  Intervention: Prevent Skin Injury  Recent " Flowsheet Documentation  Taken 2/12/2025 0132 by Yvonne Pacheco RN  Body Position: position changed independently  Intervention: Prevent and Manage VTE (Venous Thromboembolism) Risk  Recent Flowsheet Documentation  Taken 2/12/2025 0132 by Yvonne Pacheco RN  VTE Prevention/Management: SCDs off (sequential compression devices)  Intervention: Prevent Infection  Recent Flowsheet Documentation  Taken 2/12/2025 0132 by Yvonne Pacheco RN  Infection Prevention: cohorting utilized  Goal: Optimal Comfort and Wellbeing  Outcome: Not Progressing  Goal: Readiness for Transition of Care  Outcome: Not Progressing   Goal Outcome Evaluation:      Plan of Care Reviewed With: patient    Overall Patient Progress: no changeOverall Patient Progress: no change

## 2025-02-13 ENCOUNTER — DOCUMENTATION ONLY (OUTPATIENT)
Dept: OTHER | Facility: CLINIC | Age: 60
End: 2025-02-13
Payer: MEDICARE

## 2025-02-13 VITALS
SYSTOLIC BLOOD PRESSURE: 145 MMHG | OXYGEN SATURATION: 95 % | HEART RATE: 86 BPM | DIASTOLIC BLOOD PRESSURE: 62 MMHG | RESPIRATION RATE: 16 BRPM | TEMPERATURE: 97.8 F

## 2025-02-13 PROCEDURE — 250N000009 HC RX 250: Performed by: STUDENT IN AN ORGANIZED HEALTH CARE EDUCATION/TRAINING PROGRAM

## 2025-02-13 PROCEDURE — 96372 THER/PROPH/DIAG INJ SC/IM: CPT | Performed by: PHYSICIAN ASSISTANT

## 2025-02-13 PROCEDURE — 250N000013 HC RX MED GY IP 250 OP 250 PS 637: Performed by: PHYSICIAN ASSISTANT

## 2025-02-13 PROCEDURE — G0378 HOSPITAL OBSERVATION PER HR: HCPCS

## 2025-02-13 PROCEDURE — 250N000011 HC RX IP 250 OP 636: Performed by: PHYSICIAN ASSISTANT

## 2025-02-13 PROCEDURE — 999N000157 HC STATISTIC RCP TIME EA 10 MIN

## 2025-02-13 PROCEDURE — 94640 AIRWAY INHALATION TREATMENT: CPT

## 2025-02-13 PROCEDURE — 99233 SBSQ HOSP IP/OBS HIGH 50: CPT | Performed by: INTERNAL MEDICINE

## 2025-02-13 PROCEDURE — 250N000012 HC RX MED GY IP 250 OP 636 PS 637: Performed by: PHYSICIAN ASSISTANT

## 2025-02-13 RX ADMIN — ENOXAPARIN SODIUM 40 MG: 40 INJECTION SUBCUTANEOUS at 13:36

## 2025-02-13 RX ADMIN — ALBUTEROL SULFATE 2.5 MG: 2.5 SOLUTION RESPIRATORY (INHALATION) at 04:23

## 2025-02-13 RX ADMIN — SERTRALINE HYDROCHLORIDE 100 MG: 100 TABLET ORAL at 08:07

## 2025-02-13 RX ADMIN — IPRATROPIUM BROMIDE AND ALBUTEROL SULFATE 3 ML: .5; 3 SOLUTION RESPIRATORY (INHALATION) at 08:13

## 2025-02-13 RX ADMIN — PANTOPRAZOLE SODIUM 40 MG: 40 TABLET, DELAYED RELEASE ORAL at 08:08

## 2025-02-13 RX ADMIN — LISINOPRIL 20 MG: 20 TABLET ORAL at 08:08

## 2025-02-13 RX ADMIN — CARBAMAZEPINE 200 MG: 200 TABLET ORAL at 13:35

## 2025-02-13 RX ADMIN — PREDNISONE 40 MG: 20 TABLET ORAL at 08:08

## 2025-02-13 RX ADMIN — CARBAMAZEPINE 400 MG: 200 TABLET ORAL at 20:38

## 2025-02-13 RX ADMIN — IPRATROPIUM BROMIDE AND ALBUTEROL SULFATE 3 ML: .5; 3 SOLUTION RESPIRATORY (INHALATION) at 15:47

## 2025-02-13 RX ADMIN — CARBAMAZEPINE 400 MG: 200 TABLET ORAL at 08:07

## 2025-02-13 RX ADMIN — IPRATROPIUM BROMIDE AND ALBUTEROL SULFATE 3 ML: .5; 3 SOLUTION RESPIRATORY (INHALATION) at 19:10

## 2025-02-13 RX ADMIN — ENOXAPARIN SODIUM 40 MG: 40 INJECTION SUBCUTANEOUS at 01:43

## 2025-02-13 ASSESSMENT — ACTIVITIES OF DAILY LIVING (ADL)
ADLS_ACUITY_SCORE: 53
ADLS_ACUITY_SCORE: 50
ADLS_ACUITY_SCORE: 50
ADLS_ACUITY_SCORE: 53

## 2025-02-13 NOTE — PROGRESS NOTES
[facility]    Medicine Progress Note - Hospitalist Service    Date of Admission:  2/11/2025    Assessment & Plan            Brissa Corado is a 59 year old female with PMHx significant for severe persistent asthma poorly controlled, developmental delay, HLD, HTN, seizure disorder, RICK without CPAP, suspected obesity hypoventilation syndrome, GERD, MDD, and morbid obesity, who was admitted on 2/11/2025 with SOB.     Acute asthma exacerbation   #History of severe persistent asthma with recurrent Exacerbation  RICK without CPAP  Suspected obesity hypoventilation syndrome  Morbid obesity complicates cares  Pt notes 1 day of worsening SOB, wheezing and cough. She notes she finished her last course of Prednisone about 1.5 weeks ago. She ran out of her albuterol inhaler and nebs 2 days ago and unable to get them filled until later this week. She also notes exposure to her mother who was Covid and influenza positive, per her report.   She notes a cough is unusual with her asthma exacerbations.   VSS on 2L supplemental O2, which is chronic for her. Previously only used O2 at night but seems like she has been using it more frequently during the day as well. BMP and CBC are unremarkable. CXR is clear. Covid, influenza and RSV are negative.   She was given 16 mg PO dexamethasone, Duoneb x3 and 2 g mag sulfate in the ED.  - admit to OBS  -Virginia mentioned that she ran out of her maintenance and rescue inhalers  -Will ask for  and social service input    - possible viral URI as well, fortunately, Covid and influenza are negative  - resume home Breo Ellipta or substitution  - Duonebs 4 times daily, PRN albuterol neb  - Prednisone 40 mg daily. Consider short taper if sx improve quickly  - needs to establish care with Pulmonology, recommended many times but does not look like its been scheduled  - follow up with sleep medicine has also been recommended to likely start CPAP, this is scheduled  for June.  --Extensive discussion with patient and her guardians (parents) over the phone regarding importance of follow-up care, compliance with medications  -Still having wheezes, she still not comfortable about going home.  Will continue current treatment.  -Discussed with Virginia that if she remained stable overnight and not requiring oxygen support.  Improvement with her oxygenation and resolution of earlier shortness of breath and wheezing then she will be a good candidate for hospital discharge at that time.    HTN  HLP  - resume home Lisinopril and lovastatin     GERD  - resume home omeprazole    Seizure disorder  Developmental delay  MDD  - resume home Tegretol  - resume home Zoloft    Hx of significant Tinea cruris, intertrigo  Hx of Posterior left thigh pressure ulcer        Addendum: I was notified by  that patient still has not active legal guardian as previously noted on her epic chart.  I called and updated patient's legal guardian namely her father and mother  All questions were answered.  They are provided with plan of care.  They shared with me that her patient has not been communicating with them at least in the last several weeks.  They are aware of her current condition and the need for close follow-up with PCP and pulmonary service.  We also discussed about the need for further evaluation such as sleep study and pulmonary function testing once he recovered from this recent illness.  We also reiterated importance of follow-up care, compliance with medications.     Observation Goals: List all goals to be met before discharge home:, - Improvement of Peak Flow to greater than 70% sustained off nebulizer for 4 hours., - Dyspnea improved and oxygen saturations greater than 88% on room air or prior home oxygen levels, - Vitals signs normal or at patient baseline, - Safe disposition plan has been identified, - Nurse to notify provider when observation goals have been met and patient is  "ready for discharge.  Diet: Regular Diet Adult    DVT Prophylaxis: Pneumatic Compression Devices and Ambulate every shift  Grajeda Catheter: Not present  Lines: None     Cardiac Monitoring: None  Code Status: Full Code      Clinically Significant Risk Factors Present on Admission              # Anion Gap Metabolic Acidosis: Highest Anion Gap = 19 mmol/L in last 2 days, will monitor and treat as appropriate      # Hypertension: Noted on problem list           # Severe Obesity: Estimated body mass index is 62.34 kg/m  as calculated from the following:    Height as of 1/15/25: 1.702 m (5' 7\").    Weight as of 1/15/25: 180.5 kg (398 lb).       # Asthma: noted on problem list        Social Drivers of Health            Disposition Plan     Medically Ready for Discharge: Anticipated Tomorrow             Rafael Calero MD, MD  Hospitalist Service  [facility]  Securely message with [employer] (more info)  Text page via [employer] Paging/Directory   ______________________________________________________________________    Interval History   Continuing medicine service care  Seen and examined  Chart reviewed  I met Virginia this morning while she is laying comfortably in bed.  She mentioned that she is feeling a little better but not yet at her baseline given intermittent coughing spells, still feeling weak and has obvious wheezes.  Fortunately currently she is not requiring oxygen support but overnight she still needing intermittently oxygen support by nasal cannula    Physical Exam   Vital Signs: Temp: 97.4  F (36.3  C) Temp src: Oral BP: (!) 143/60 Pulse: 84   Resp: 18 SpO2: (!) 90 % O2 Device: None (Room air) Oxygen Delivery: 2 LPM  Weight: 0 lbs 0 oz    HEENT; Atraumatic, normocephalic, pinkish conjuctiva, pupils bilateral reactive   Morbidly obese  Skin: warm and moist, no rashes  Lymphatics: no cervical or axillary lymphandenopathy  Lungs: equal chest expansion, fair air entry, scant wheezes  Heart: " normal rate, normal rhythm, no rubs or gallops.   Abdomen: normal bowel sounds, no tenderness, no peritoneal signs, no guarding  Extremities: no deformities, bilateral lower extremity nonpitting edema   Neuro; follow commands, alert and oriented x3, spontaneous speech, coherent, moves all extremities spontaneously  Psych; no hallucination, euthymic mood, not agitated      Medical Decision Making       50 MINUTES SPENT BY ME on the date of service doing chart review, history, exam, documentation & further activities per the note.  MANAGEMENT DISCUSSED with the following over the past 24 hours: Yes   NOTE(S)/MEDICAL RECORDS REVIEWED over the past 24 hours: Yes       Data         Imaging results reviewed over the past 24 hrs:   No results found for this or any previous visit (from the past 24 hours).

## 2025-02-13 NOTE — PLAN OF CARE
"Pertinent assessments: A&O. VSS. On RA. LAZAR.  Exp wheezes. Incontinent at times. Assist x1 with walker.  PIV SL. Dressing changed to back of L thigh C/D/I. Redness/excoriation on abdominal folds & bilateral under breasts. Cleaned & interdry applied. Up in chair.  Tolerating regular diet well     Major Shift Events: Dressing change    Treatment Plan: Wound cares, nebs, prednisone, and discharge pending          Goal Outcome Evaluation:  Plan of Care Reviewed With: patient  Overall Patient Progress: improvingOverall Patient Progress: improving  Outcome Evaluation: Wean off to RA. Denied pain  Problem: Adult Inpatient Plan of Care  Goal: Plan of Care Review  Description: The Plan of Care Review/Shift note should be completed every shift.  The Outcome Evaluation is a brief statement about your assessment that the patient is improving, declining, or no change.  This information will be displayed automatically on your shift  note.  Outcome: Progressing  Flowsheets (Taken 2/13/2025 1435)  Outcome Evaluation: Wean off to RA. Denied pain  Plan of Care Reviewed With: patient  Overall Patient Progress: improving  Goal: Patient-Specific Goal (Individualized)  Description: You can add care plan individualizations to a care plan. Examples of Individualization might be:  \"Parent requests to be called daily at 9am for status\", \"I have a hard time hearing out of my right ear\", or \"Do not touch me to wake me up as it startles  me\".  Outcome: Progressing  Goal: Absence of Hospital-Acquired Illness or Injury  Outcome: Progressing  Intervention: Identify and Manage Fall Risk  Recent Flowsheet Documentation  Taken 2/13/2025 0816 by Aury Brewster RN  Safety Promotion/Fall Prevention:   supervised activity   safety round/check completed   room organization consistent   room near nurse's station   patient and family education   nonskid shoes/slippers when out of bed  Intervention: Prevent Skin Injury  Recent Flowsheet Documentation  Taken " 2/13/2025 0816 by Aury Brewster RN  Body Position: position changed independently  Intervention: Prevent and Manage VTE (Venous Thromboembolism) Risk  Recent Flowsheet Documentation  Taken 2/13/2025 0816 by Aury Brewster RN  VTE Prevention/Management: SCDs off (sequential compression devices)  Intervention: Prevent Infection  Recent Flowsheet Documentation  Taken 2/13/2025 0816 by Aury Brewster RN  Infection Prevention:   cohorting utilized   hand hygiene promoted   rest/sleep promoted   single patient room provided  Goal: Optimal Comfort and Wellbeing  Outcome: Progressing  Intervention: Monitor Pain and Promote Comfort  Recent Flowsheet Documentation  Taken 2/13/2025 0816 by Aury Brewster RN  Pain Management Interventions: declines  Goal: Readiness for Transition of Care  Outcome: Progressing     Problem: Gas Exchange Impaired  Goal: Optimal Gas Exchange  Outcome: Progressing  Intervention: Optimize Oxygenation and Ventilation  Recent Flowsheet Documentation  Taken 2/13/2025 0816 by Aury Brewster RN  Head of Bed (HOB) Positioning: HOB at 20-30 degrees     Problem: Skin Injury Risk Increased  Goal: Skin Health and Integrity  Outcome: Progressing  Intervention: Plan: Nurse Driven Intervention: Moisture Management  Recent Flowsheet Documentation  Taken 2/13/2025 0816 by Aury Brewster RN  Moisture Interventions:   Encourage regular toileting   Incontinence pad  Intervention: Plan: Nurse Driven Intervention: Friction and Shear  Recent Flowsheet Documentation  Taken 2/13/2025 0816 by Aury Brewster RN  Friction/Shear Interventions: HOB 30 degrees or less  Intervention: Optimize Skin Protection  Recent Flowsheet Documentation  Taken 2/13/2025 0816 by Aury Brewster RN  Activity Management:   activity adjusted per tolerance   ambulated to bathroom   back to bed  Head of Bed (HOB) Positioning: HOB at 20-30 degrees     Problem: Wound  Goal: Optimal Coping  Outcome: Progressing  Goal: Optimal Functional  Ability  Outcome: Progressing  Intervention: Optimize Functional Ability  Recent Flowsheet Documentation  Taken 2/13/2025 0816 by Aury Brewster, RN  Assistive Device Utilized: walker  Activity Management:   activity adjusted per tolerance   ambulated to bathroom   back to bed  Activity Assistance Provided: assistance, stand-by  Goal: Absence of Infection Signs and Symptoms  Outcome: Progressing  Goal: Improved Oral Intake  Outcome: Progressing  Goal: Optimal Pain Control and Function  Outcome: Progressing  Intervention: Prevent or Manage Pain  Recent Flowsheet Documentation  Taken 2/13/2025 0816 by Aury Brewster, PAULA  Pain Management Interventions: declines  Goal: Skin Health and Integrity  Outcome: Progressing  Intervention: Optimize Skin Protection  Recent Flowsheet Documentation  Taken 2/13/2025 0816 by Aury Brewster, RN  Activity Management:   activity adjusted per tolerance   ambulated to bathroom   back to bed  Head of Bed (HOB) Positioning: HOB at 20-30 degrees  Goal: Optimal Wound Healing  Outcome: Progressing

## 2025-02-13 NOTE — PLAN OF CARE
"Pertinent assessments: A&Ox4. On room air. Up with SBA and a walker. Refusing gait belt and bed alarm. Saline locked PIV. Pressure injury to thigh. Excoriation to buttocks and sacrum/coccyx.     Major Shift Events: Thigh dressing changed x2.    Treatment Plan: Wound cares, nebs, prednisone, and discharge pending.    Bedside Nurse: Randy Coronado RN    Problem: Adult Inpatient Plan of Care  Goal: Plan of Care Review  Description: The Plan of Care Review/Shift note should be completed every shift.  The Outcome Evaluation is a brief statement about your assessment that the patient is improving, declining, or no change.  This information will be displayed automatically on your shift  note.  Outcome: Not Progressing  Flowsheets (Taken 2/12/2025 2245)  Outcome Evaluation: Continue wound cares. Discharge pending.  Plan of Care Reviewed With: patient  Overall Patient Progress: no change  Goal: Patient-Specific Goal (Individualized)  Description: You can add care plan individualizations to a care plan. Examples of Individualization might be:  \"Parent requests to be called daily at 9am for status\", \"I have a hard time hearing out of my right ear\", or \"Do not touch me to wake me up as it startles  me\".  Outcome: Not Progressing  Goal: Absence of Hospital-Acquired Illness or Injury  Outcome: Not Progressing  Intervention: Identify and Manage Fall Risk  Recent Flowsheet Documentation  Taken 2/12/2025 1804 by Randy Coronado RN  Safety Promotion/Fall Prevention:   nonskid shoes/slippers when out of bed   patient and family education   safety round/check completed   treat reversible contributory factors  Intervention: Prevent Skin Injury  Recent Flowsheet Documentation  Taken 2/12/2025 1804 by Randy Coronado RN  Body Position: position changed independently  Intervention: Prevent Infection  Recent Flowsheet Documentation  Taken 2/12/2025 1804 by Randy Coronado RN  Infection Prevention:   cohorting utilized   hand hygiene " promoted   rest/sleep promoted   single patient room provided  Goal: Optimal Comfort and Wellbeing  Outcome: Not Progressing  Goal: Readiness for Transition of Care  Outcome: Not Progressing     Goal Outcome Evaluation:      Plan of Care Reviewed With: patient    Overall Patient Progress: no change    Overall Patient Progress: no change    Outcome Evaluation: Continue wound cares. Discharge pending.

## 2025-02-13 NOTE — PLAN OF CARE
"End of Shift Summary  For vital signs and complete assessments, please see documentation flowsheets.     Pertinent assessments: A&O. VSS on 2L NC overnight. LS with exp wheezes- PRN neb given. SOB when up to bathroom. Incontinent at times. Up to br sba with walker. No BM this shift. PIV SL. Dressing to back of L thigh CDI. Redness/excoriation under abd folds and under breasts.    Major Shift Events: uneventful    Treatment Plan: Wound cares, nebs, prednisone, and discharge pending    Bedside Nurse: Rich Quezada RN       Problem: Adult Inpatient Plan of Care  Goal: Plan of Care Review  Description: The Plan of Care Review/Shift note should be completed every shift.  The Outcome Evaluation is a brief statement about your assessment that the patient is improving, declining, or no change.  This information will be displayed automatically on your shift  note.  Outcome: Progressing  Flowsheets (Taken 2/13/2025 0283)  Outcome Evaluation: VSS on 2L NC. PRN neb given for wheezing and SOB. Continue wound cares. Discharge pending.  Plan of Care Reviewed With: patient  Overall Patient Progress: no change  Goal: Patient-Specific Goal (Individualized)  Description: You can add care plan individualizations to a care plan. Examples of Individualization might be:  \"Parent requests to be called daily at 9am for status\", \"I have a hard time hearing out of my right ear\", or \"Do not touch me to wake me up as it startles  me\".  Outcome: Progressing  Goal: Absence of Hospital-Acquired Illness or Injury  Outcome: Progressing  Intervention: Identify and Manage Fall Risk  Recent Flowsheet Documentation  Taken 2/13/2025 0156 by Rich Quezada, RN  Safety Promotion/Fall Prevention:   supervised activity   safety round/check completed   room organization consistent   room near nurse's station   patient and family education   nonskid shoes/slippers when out of bed  Intervention: Prevent Skin Injury  Recent Flowsheet Documentation  Taken " 2/13/2025 0429 by Rich Quezada RN  Body Position: position changed independently  Taken 2/13/2025 0156 by Rich Quezada RN  Body Position: position changed independently  Intervention: Prevent and Manage VTE (Venous Thromboembolism) Risk  Recent Flowsheet Documentation  Taken 2/13/2025 0156 by Rich Quezada RN  VTE Prevention/Management: SCDs off (sequential compression devices)  Intervention: Prevent Infection  Recent Flowsheet Documentation  Taken 2/13/2025 0156 by Rich Quezada RN  Infection Prevention:   cohorting utilized   hand hygiene promoted   rest/sleep promoted   single patient room provided  Goal: Optimal Comfort and Wellbeing  Outcome: Progressing  Goal: Readiness for Transition of Care  Outcome: Progressing     Problem: Gas Exchange Impaired  Goal: Optimal Gas Exchange  Outcome: Progressing  Intervention: Optimize Oxygenation and Ventilation  Recent Flowsheet Documentation  Taken 2/13/2025 0429 by Rich Quezada RN  Head of Bed (HOB) Positioning: HOB at 30-45 degrees  Taken 2/13/2025 0156 by Rich Quezada RN  Head of Bed (HOB) Positioning: HOB at 20-30 degrees     Problem: Skin Injury Risk Increased  Goal: Skin Health and Integrity  Outcome: Progressing  Intervention: Plan: Nurse Driven Intervention: Moisture Management  Recent Flowsheet Documentation  Taken 2/13/2025 0429 by Rich Quezada RN  Bathing/Skin Care: incontinence care  Taken 2/13/2025 0156 by Rich Quezada RN  Moisture Interventions:   Encourage regular toileting   Incontinence pad  Intervention: Plan: Nurse Driven Intervention: Friction and Shear  Recent Flowsheet Documentation  Taken 2/13/2025 0156 by Rich Quezada RN  Friction/Shear Interventions: HOB 30 degrees or less  Intervention: Optimize Skin Protection  Recent Flowsheet Documentation  Taken 2/13/2025 0429 by Rich Quezada RN  Activity Management:   ambulated to bathroom   back to bed  Head of Bed (HOB) Positioning: HOB at 30-45 degrees  Taken 2/13/2025 0156 by  Lommen, Aubrea, RN  Activity Management:   activity adjusted per tolerance   ambulated to bathroom   back to bed  Head of Bed (HOB) Positioning: HOB at 20-30 degrees     Problem: Wound  Goal: Optimal Coping  Outcome: Progressing  Goal: Optimal Functional Ability  Outcome: Progressing  Intervention: Optimize Functional Ability  Recent Flowsheet Documentation  Taken 2/13/2025 0429 by Rich Quezada RN  Assistive Device Utilized: walker  Activity Management:   ambulated to bathroom   back to bed  Activity Assistance Provided: assistance, stand-by  Taken 2/13/2025 0156 by Rich Quezada RN  Activity Management:   activity adjusted per tolerance   ambulated to bathroom   back to bed  Activity Assistance Provided: assistance, stand-by  Goal: Absence of Infection Signs and Symptoms  Outcome: Progressing  Goal: Improved Oral Intake  Outcome: Progressing  Goal: Optimal Pain Control and Function  Outcome: Progressing  Goal: Skin Health and Integrity  Outcome: Progressing  Intervention: Optimize Skin Protection  Recent Flowsheet Documentation  Taken 2/13/2025 0429 by Rich Quezada RN  Activity Management:   ambulated to bathroom   back to bed  Head of Bed (HOB) Positioning: HOB at 30-45 degrees  Taken 2/13/2025 0156 by Rich Quezada RN  Activity Management:   activity adjusted per tolerance   ambulated to bathroom   back to bed  Head of Bed (HOB) Positioning: HOB at 20-30 degrees  Goal: Optimal Wound Healing  Outcome: Progressing       Goal Outcome Evaluation:      Plan of Care Reviewed With: patient    Overall Patient Progress: no changeOverall Patient Progress: no change    Outcome Evaluation: VSS on 2L NC. PRN neb given for wheezing and SOB. Continue wound cares. Discharge pending.

## 2025-02-14 VITALS
SYSTOLIC BLOOD PRESSURE: 141 MMHG | HEART RATE: 75 BPM | OXYGEN SATURATION: 93 % | TEMPERATURE: 97.7 F | RESPIRATION RATE: 20 BRPM | DIASTOLIC BLOOD PRESSURE: 53 MMHG

## 2025-02-14 DIAGNOSIS — J45.40 MODERATE PERSISTENT ASTHMA WITHOUT COMPLICATION: Primary | ICD-10-CM

## 2025-02-14 LAB
CREAT SERPL-MCNC: 0.65 MG/DL (ref 0.51–0.95)
EGFRCR SERPLBLD CKD-EPI 2021: >90 ML/MIN/1.73M2
PLATELET # BLD AUTO: 167 10E3/UL (ref 150–450)

## 2025-02-14 PROCEDURE — 85049 AUTOMATED PLATELET COUNT: CPT | Performed by: HOSPITALIST

## 2025-02-14 PROCEDURE — 36415 COLL VENOUS BLD VENIPUNCTURE: CPT | Performed by: HOSPITALIST

## 2025-02-14 PROCEDURE — 250N000009 HC RX 250: Performed by: STUDENT IN AN ORGANIZED HEALTH CARE EDUCATION/TRAINING PROGRAM

## 2025-02-14 PROCEDURE — 99239 HOSP IP/OBS DSCHRG MGMT >30: CPT | Performed by: INTERNAL MEDICINE

## 2025-02-14 PROCEDURE — 250N000011 HC RX IP 250 OP 636: Performed by: PHYSICIAN ASSISTANT

## 2025-02-14 PROCEDURE — 250N000012 HC RX MED GY IP 250 OP 636 PS 637: Performed by: PHYSICIAN ASSISTANT

## 2025-02-14 PROCEDURE — 96372 THER/PROPH/DIAG INJ SC/IM: CPT | Performed by: PHYSICIAN ASSISTANT

## 2025-02-14 PROCEDURE — 82565 ASSAY OF CREATININE: CPT | Performed by: HOSPITALIST

## 2025-02-14 PROCEDURE — 999N000156 HC STATISTIC RCP CONSULT EA 30 MIN

## 2025-02-14 PROCEDURE — G0378 HOSPITAL OBSERVATION PER HR: HCPCS

## 2025-02-14 PROCEDURE — 250N000013 HC RX MED GY IP 250 OP 250 PS 637: Performed by: PHYSICIAN ASSISTANT

## 2025-02-14 RX ORDER — FLUTICASONE FUROATE AND VILANTEROL TRIFENATATE 50; 25 UG/1; UG/1
1 POWDER RESPIRATORY (INHALATION) 2 TIMES DAILY
Qty: 60 EACH | Refills: 0 | Status: SHIPPED | OUTPATIENT
Start: 2025-02-14

## 2025-02-14 RX ORDER — IPRATROPIUM BROMIDE AND ALBUTEROL SULFATE 2.5; .5 MG/3ML; MG/3ML
1 SOLUTION RESPIRATORY (INHALATION) 4 TIMES DAILY
COMMUNITY
End: 2025-02-14

## 2025-02-14 RX ORDER — ALBUTEROL SULFATE 0.83 MG/ML
2.5 SOLUTION RESPIRATORY (INHALATION) EVERY 6 HOURS PRN
Qty: 75 ML | Refills: 0 | Status: SHIPPED | OUTPATIENT
Start: 2025-02-14

## 2025-02-14 RX ORDER — PREDNISONE 20 MG/1
TABLET ORAL
Qty: 9 TABLET | Refills: 0 | Status: SHIPPED | OUTPATIENT
Start: 2025-02-15 | End: 2025-02-23

## 2025-02-14 RX ORDER — ALBUTEROL SULFATE 90 UG/1
2 INHALANT RESPIRATORY (INHALATION) EVERY 6 HOURS PRN
Qty: 18 G | Refills: 0 | Status: SHIPPED | OUTPATIENT
Start: 2025-02-14

## 2025-02-14 RX ADMIN — ALBUTEROL SULFATE 2.5 MG: 2.5 SOLUTION RESPIRATORY (INHALATION) at 06:32

## 2025-02-14 RX ADMIN — PREDNISONE 40 MG: 20 TABLET ORAL at 08:26

## 2025-02-14 RX ADMIN — LISINOPRIL 20 MG: 20 TABLET ORAL at 08:26

## 2025-02-14 RX ADMIN — CARBAMAZEPINE 400 MG: 200 TABLET ORAL at 08:26

## 2025-02-14 RX ADMIN — ENOXAPARIN SODIUM 40 MG: 40 INJECTION SUBCUTANEOUS at 02:07

## 2025-02-14 RX ADMIN — SERTRALINE HYDROCHLORIDE 100 MG: 100 TABLET ORAL at 08:26

## 2025-02-14 RX ADMIN — PANTOPRAZOLE SODIUM 40 MG: 40 TABLET, DELAYED RELEASE ORAL at 08:26

## 2025-02-14 ASSESSMENT — ACTIVITIES OF DAILY LIVING (ADL)
ADLS_ACUITY_SCORE: 53

## 2025-02-14 NOTE — PROGRESS NOTES
Care Management Discharge Note    Discharge Date: 02/14/2025       Discharge Disposition:  Home    Discharge Services:  Home Care    Discharge DME:  None    Discharge Transportation:  Significant other    Private pay costs discussed: Not applicable    Does the patient's insurance plan have a 3 day qualifying hospital stay waiver?  Yes     Which insurance plan 3 day waiver is available? [employer]    Will the waiver be used for post-acute placement? No    PAS Confirmation Code:  N/A  Patient/family educated on Medicare website which has current facility and service quality ratings:  yes    Education Provided on the Discharge Plan:  yes  Persons Notified of Discharge Plans: Pt, Pt's guardians/parents, and [facility].  Patient/Family in Agreement with the Plan:  yes    Handoff Referral Completed: No, handoff not indicated or clinically appropriate    Additional Information:  The pt will discharge home today with her significant other and resumption of [email or web address].  RN services.   Juliane spoke with the pt's parents/guardians, Ray and Diomedes, regarding the pt's discharge plan. They are agreeable with the pt's discharge plan. They offered to transport the pt home if she is agreeable to that. However, they said that the pt will likely refuse to let them transport and want her significant other Sudhir to transport. If this is her choice, they are okay with that plan. Sw addressed their questions and concerns. They have been trying to reach out to the pt, but she will not respond and will block their phone numbers. Ray and Diomedes do not know what they have done to make the pt not want to talk with them. They just want to make sure that her needs are being met.  Juliane met wit the pt, who stated that her significant other Sudhir would transport her home. She does not want her parents to transport her home. She is agreeable to the  RN resumption services.  Juliane faxed the pt's discharge orders to [email or web address].  Juliane will  continue to be available as needed until discharge.      AISSATOU Macdonald, [facility]  Inpatient Care Coordination  [facility]  [phone]
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
## 2025-02-14 NOTE — PLAN OF CARE
"Pertinent assessments: A&Ox4. On room air. Up with SBA and a walker. Refusing gait belt. Saline locked PIV with ecchymosis to the site. Pressure injury to thigh, dressing in place per wound care orders. Excoriation to buttocks and sacrum/coccyx.      Major Shift Events: none.     Treatment Plan: Wound cares, nebs, prednisone, and discharge pending.     Bedside Nurse: Randy Coronado RN    Problem: Adult Inpatient Plan of Care  Goal: Plan of Care Review  Description: The Plan of Care Review/Shift note should be completed every shift.  The Outcome Evaluation is a brief statement about your assessment that the patient is improving, declining, or no change.  This information will be displayed automatically on your shift  note.  Outcome: Progressing  Flowsheets (Taken 2/13/2025 2245)  Outcome Evaluation: Probable discharge on 2/14.  Plan of Care Reviewed With:   patient   parent  Overall Patient Progress: improving  Goal: Patient-Specific Goal (Individualized)  Description: You can add care plan individualizations to a care plan. Examples of Individualization might be:  \"Parent requests to be called daily at 9am for status\", \"I have a hard time hearing out of my right ear\", or \"Do not touch me to wake me up as it startles  me\".  Outcome: Progressing  Goal: Absence of Hospital-Acquired Illness or Injury  Outcome: Progressing  Intervention: Identify and Manage Fall Risk  Recent Flowsheet Documentation  Taken 2/13/2025 1624 by Randy Coronado RN  Safety Promotion/Fall Prevention:   nonskid shoes/slippers when out of bed   patient and family education   safety round/check completed   treat reversible contributory factors   room near nurse's station  Intervention: Prevent Skin Injury  Recent Flowsheet Documentation  Taken 2/13/2025 1624 by Randy Coronado RN  Body Position: position changed independently  Intervention: Prevent and Manage VTE (Venous Thromboembolism) Risk  Recent Flowsheet Documentation  Taken 2/13/2025 " 1624 by Randy Coronado, RN  VTE Prevention/Management: SCDs off (sequential compression devices)  Intervention: Prevent Infection  Recent Flowsheet Documentation  Taken 2/13/2025 1624 by Randy Coronado, RN  Infection Prevention:   cohorting utilized   hand hygiene promoted   rest/sleep promoted   single patient room provided  Goal: Optimal Comfort and Wellbeing  Outcome: Progressing  Goal: Readiness for Transition of Care  Outcome: Progressing     Goal Outcome Evaluation:      Plan of Care Reviewed With: patient, parent    Overall Patient Progress: improving    Overall Patient Progress: improving    Outcome Evaluation: Probable discharge on 2/14.

## 2025-02-14 NOTE — DISCHARGE SUMMARY
Discharge Note    Patient discharged to home via private vehicle  accompanied by significant other .  IV: Discontinued  Prescriptions filled and given to patient/family.   Belongings reviewed and sent with patient.   Home medications returned to patient: NA  Equipment sent with: N/A.   patient verbalizes understanding of discharge instructions. AVS given to patient.  Additional education completed?  Medication adherence education given, and to come to pharmacy Monday to  last prescription (Breo). All questions answered at time of discharge.

## 2025-02-14 NOTE — PLAN OF CARE
"Goal Outcome Evaluation:      Plan of Care Reviewed With: patient    Overall Patient Progress: improvingOverall Patient Progress: improving    Outcome Evaluation: Had 1 episode of SOB after activity. PRN albuterol given. Effective per Pt.  To Do:  End of Shift Summary  For vital signs and complete assessments, please see documentation flowsheets.     Pertinent assessments: Assumed cares [id number]. A&Ox4. On room air. Up with SBA and a walker. Refusing gait belt. Saline locked PIV with ecchymosis to the site. Pressure injury to thigh, dressing changed new dressing applied.  Excoriation to buttocks and sacrum/coccyx. Intra dry applied under breast fold, perineal and abdominal fold.     Major Shift Events: none. Sleeping with 2L/NC, for sleep apnea. PRN albuterolgiven for SOB.     Treatment Plan: Wound cares, nebs, prednisone, and discharge pending.     Bedside Nurse: Rickey Ramsey RN   Problem: Adult Inpatient Plan of Care  Goal: Plan of Care Review  Description: The Plan of Care Review/Shift note should be completed every shift.  The Outcome Evaluation is a brief statement about your assessment that the patient is improving, declining, or no change.  This information will be displayed automatically on your shift  note.  Outcome: Progressing  Flowsheets (Taken 2/14/2025 3146)  Outcome Evaluation: Had 1 episode of SOB after activity. PRN albuterol given. Effective per Pt.  Plan of Care Reviewed With: patient  Overall Patient Progress: improving  Goal: Patient-Specific Goal (Individualized)  Description: You can add care plan individualizations to a care plan. Examples of Individualization might be:  \"Parent requests to be called daily at 9am for status\", \"I have a hard time hearing out of my right ear\", or \"Do not touch me to wake me up as it startles  me\".  Outcome: Progressing  Goal: Absence of Hospital-Acquired Illness or Injury  Outcome: Progressing  Intervention: Identify and Manage Fall Risk  Recent Flowsheet " Documentation  Taken 2/13/2025 2342 by Rickey Ramsey RN  Safety Promotion/Fall Prevention:   activity supervised   safety round/check completed  Intervention: Prevent Skin Injury  Recent Flowsheet Documentation  Taken 2/13/2025 2342 by Rickey Ramsey RN  Body Position: position changed independently  Skin Protection: adhesive use limited  Intervention: Prevent and Manage VTE (Venous Thromboembolism) Risk  Recent Flowsheet Documentation  Taken 2/13/2025 2342 by Rickey Ramsey RN  VTE Prevention/Management: SCDs off (sequential compression devices)  Intervention: Prevent Infection  Recent Flowsheet Documentation  Taken 2/13/2025 2342 by Rickey Ramsey RN  Infection Prevention:   cohorting utilized   hand hygiene promoted   rest/sleep promoted   single patient room provided  Goal: Optimal Comfort and Wellbeing  Outcome: Progressing  Intervention: Monitor Pain and Promote Comfort  Recent Flowsheet Documentation  Taken 2/13/2025 2342 by Rickey Ramsey RN  Pain Management Interventions: declines  Taken 2/13/2025 2340 by Rickey Ramsey RN  Pain Management Interventions: declines  Goal: Readiness for Transition of Care  Outcome: Progressing     Problem: Gas Exchange Impaired  Goal: Optimal Gas Exchange  Outcome: Progressing  Intervention: Optimize Oxygenation and Ventilation  Recent Flowsheet Documentation  Taken 2/13/2025 2342 by Rickey Ramsey RN  Head of Bed (HOB) Positioning: HOB at 20-30 degrees     Problem: Skin Injury Risk Increased  Goal: Skin Health and Integrity  Outcome: Progressing  Intervention: Plan: Nurse Driven Intervention: Moisture Management  Recent Flowsheet Documentation  Taken 2/13/2025 2342 by Rickey Ramsey RN  Moisture Interventions:   Encourage regular toileting   Incontinence pad  Bathing/Skin Care: incontinence care  Intervention: Plan: Nurse Driven Intervention: Friction and Shear  Recent Flowsheet Documentation  Taken 2/13/2025 2342 by Rickey Ramsey  RN  Friction/Shear Interventions: HOB 30 degrees or less  Intervention: Optimize Skin Protection  Recent Flowsheet Documentation  Taken 2/13/2025 2342 by Rickey Ramsey RN  Skin Protection: adhesive use limited  Activity Management:   activity adjusted per tolerance   ambulated to bathroom   back to bed  Head of Bed (HOB) Positioning: HOB at 20-30 degrees     Problem: Wound  Goal: Optimal Coping  Outcome: Progressing  Goal: Optimal Functional Ability  Outcome: Progressing  Intervention: Optimize Functional Ability  Recent Flowsheet Documentation  Taken 2/13/2025 2342 by Rickey Ramsey RN  Assistive Device Utilized: walker  Activity Management:   activity adjusted per tolerance   ambulated to bathroom   back to bed  Activity Assistance Provided: assistance, stand-by  Goal: Absence of Infection Signs and Symptoms  Outcome: Progressing  Goal: Improved Oral Intake  Outcome: Progressing  Goal: Optimal Pain Control and Function  Outcome: Progressing  Intervention: Prevent or Manage Pain  Recent Flowsheet Documentation  Taken 2/13/2025 2342 by Rickey Ramsey RN  Pain Management Interventions: declines  Taken 2/13/2025 2340 by Rickey Ramsey RN  Pain Management Interventions: declines  Goal: Skin Health and Integrity  Outcome: Progressing  Intervention: Optimize Skin Protection  Recent Flowsheet Documentation  Taken 2/13/2025 2342 by Rickey Ramsey RN  Activity Management:   activity adjusted per tolerance   ambulated to bathroom   back to bed  Head of Bed (HOB) Positioning: HOB at 20-30 degrees  Goal: Optimal Wound Healing  Outcome: Progressing

## 2025-02-14 NOTE — TELEPHONE ENCOUNTER
Hi there,    Pt requesting to refill duo-sg soln. Please review/advise if appropriate    Thank you,  Vale Mckee PhT  [facility]

## 2025-02-16 DIAGNOSIS — F32.A DEPRESSION, UNSPECIFIED DEPRESSION TYPE: ICD-10-CM

## 2025-02-16 DIAGNOSIS — E78.5 HYPERLIPIDEMIA LDL GOAL <130: ICD-10-CM

## 2025-02-17 ENCOUNTER — PATIENT OUTREACH (OUTPATIENT)
Dept: CARE COORDINATION | Facility: CLINIC | Age: 60
End: 2025-02-17
Payer: MEDICARE

## 2025-02-17 ASSESSMENT — ACTIVITIES OF DAILY LIVING (ADL): DEPENDENT_IADLS:: CLEANING;COOKING;LAUNDRY;SHOPPING;MEAL PREPARATION;TRANSPORTATION;INCONTINENCE
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
## 2025-02-18 RX ORDER — SERTRALINE HYDROCHLORIDE 100 MG/1
100 TABLET, FILM COATED ORAL DAILY
Qty: 90 TABLET | Refills: 1 | Status: SHIPPED | OUTPATIENT
Start: 2025-02-18

## 2025-02-18 RX ORDER — IPRATROPIUM BROMIDE AND ALBUTEROL SULFATE 2.5; .5 MG/3ML; MG/3ML
1 SOLUTION RESPIRATORY (INHALATION) 4 TIMES DAILY
Qty: 90 ML | Refills: 2 | Status: SHIPPED | OUTPATIENT
Start: 2025-02-18

## 2025-02-18 RX ORDER — LOVASTATIN 40 MG/1
40 TABLET ORAL
Qty: 90 TABLET | Refills: 1 | Status: SHIPPED | OUTPATIENT
Start: 2025-02-18

## 2025-03-06 DIAGNOSIS — G40.909 SEIZURE DISORDER (H): ICD-10-CM

## 2025-03-06 RX ORDER — CARBAMAZEPINE 200 MG/1
TABLET ORAL
Qty: 450 TABLET | Refills: 3 | Status: SHIPPED | OUTPATIENT
Start: 2025-03-06

## 2025-03-10 DIAGNOSIS — J45.41 MODERATE PERSISTENT ASTHMA WITH ACUTE EXACERBATION: ICD-10-CM

## 2025-03-11 DIAGNOSIS — J45.40 MODERATE PERSISTENT ASTHMA WITHOUT COMPLICATION: ICD-10-CM

## 2025-03-11 RX ORDER — FLUTICASONE FUROATE AND VILANTEROL TRIFENATATE 50; 25 UG/1; UG/1
1 POWDER RESPIRATORY (INHALATION) 2 TIMES DAILY
Qty: 60 EACH | Refills: 2 | Status: ON HOLD | OUTPATIENT
Start: 2025-03-11

## 2025-03-12 ENCOUNTER — HOSPITAL ENCOUNTER (INPATIENT)
Facility: CLINIC | Age: 60
DRG: 202 | End: 2025-03-12
Attending: EMERGENCY MEDICINE | Admitting: HOSPITALIST
Payer: MEDICARE

## 2025-03-12 ENCOUNTER — APPOINTMENT (OUTPATIENT)
Dept: GENERAL RADIOLOGY | Facility: CLINIC | Age: 60
DRG: 202 | End: 2025-03-12
Attending: EMERGENCY MEDICINE
Payer: MEDICARE

## 2025-03-12 DIAGNOSIS — J96.21 ACUTE ON CHRONIC RESPIRATORY FAILURE WITH HYPOXIA AND HYPERCAPNIA (H): ICD-10-CM

## 2025-03-12 DIAGNOSIS — J96.22 ACUTE ON CHRONIC RESPIRATORY FAILURE WITH HYPOXIA AND HYPERCAPNIA (H): ICD-10-CM

## 2025-03-12 DIAGNOSIS — J45.40 MODERATE PERSISTENT ASTHMA WITHOUT COMPLICATION: ICD-10-CM

## 2025-03-12 DIAGNOSIS — J45.41 MODERATE PERSISTENT ASTHMA WITH EXACERBATION: ICD-10-CM

## 2025-03-12 PROBLEM — J45.901 ASTHMA EXACERBATION: Status: ACTIVE | Noted: 2025-03-12

## 2025-03-12 LAB
ANION GAP SERPL CALCULATED.3IONS-SCNC: 10 MMOL/L (ref 7–15)
ATRIAL RATE - MUSE: 84 BPM
BASE EXCESS BLDV CALC-SCNC: 4.9 MMOL/L (ref -3–3)
BASOPHILS # BLD AUTO: 0.1 10E3/UL (ref 0–0.2)
BASOPHILS NFR BLD AUTO: 1 %
BUN SERPL-MCNC: 10.7 MG/DL (ref 6–20)
CALCIUM SERPL-MCNC: 9.1 MG/DL (ref 8.8–10.4)
CHLORIDE SERPL-SCNC: 103 MMOL/L (ref 98–107)
CREAT SERPL-MCNC: 0.64 MG/DL (ref 0.51–0.95)
DIASTOLIC BLOOD PRESSURE - MUSE: NORMAL MMHG
EGFRCR SERPLBLD CKD-EPI 2021: >90 ML/MIN/1.73M2
EOSINOPHIL # BLD AUTO: 0.9 10E3/UL (ref 0–0.7)
EOSINOPHIL NFR BLD AUTO: 13 %
ERYTHROCYTE [DISTWIDTH] IN BLOOD BY AUTOMATED COUNT: 13.8 % (ref 10–15)
FLUAV RNA SPEC QL NAA+PROBE: NEGATIVE
FLUBV RNA RESP QL NAA+PROBE: NEGATIVE
GLUCOSE SERPL-MCNC: 110 MG/DL (ref 70–99)
HCO3 BLDV-SCNC: 33 MMOL/L (ref 21–28)
HCO3 SERPL-SCNC: 28 MMOL/L (ref 22–29)
HCT VFR BLD AUTO: 41.3 % (ref 35–47)
HGB BLD-MCNC: 12.6 G/DL (ref 11.7–15.7)
IMM GRANULOCYTES # BLD: 0 10E3/UL
IMM GRANULOCYTES NFR BLD: 0 %
INTERPRETATION ECG - MUSE: NORMAL
LYMPHOCYTES # BLD AUTO: 1.3 10E3/UL (ref 0.8–5.3)
LYMPHOCYTES NFR BLD AUTO: 18 %
MCH RBC QN AUTO: 27.9 PG (ref 26.5–33)
MCHC RBC AUTO-ENTMCNC: 30.5 G/DL (ref 31.5–36.5)
MCV RBC AUTO: 91 FL (ref 78–100)
MONOCYTES # BLD AUTO: 0.4 10E3/UL (ref 0–1.3)
MONOCYTES NFR BLD AUTO: 5 %
NEUTROPHILS # BLD AUTO: 4.7 10E3/UL (ref 1.6–8.3)
NEUTROPHILS NFR BLD AUTO: 64 %
NRBC # BLD AUTO: 0 10E3/UL
NRBC BLD AUTO-RTO: 0 /100
O2/TOTAL GAS SETTING VFR VENT: 0 %
OXYHGB MFR BLDV: 41 % (ref 70–75)
P AXIS - MUSE: 59 DEGREES
PCO2 BLDV: 61 MM HG (ref 40–50)
PH BLDV: 7.34 [PH] (ref 7.32–7.43)
PLATELET # BLD AUTO: 191 10E3/UL (ref 150–450)
PO2 BLDV: 27 MM HG (ref 25–47)
POTASSIUM SERPL-SCNC: 4.3 MMOL/L (ref 3.4–5.3)
PR INTERVAL - MUSE: 212 MS
QRS DURATION - MUSE: 74 MS
QT - MUSE: 358 MS
QTC - MUSE: 423 MS
R AXIS - MUSE: 13 DEGREES
RBC # BLD AUTO: 4.52 10E6/UL (ref 3.8–5.2)
RSV RNA SPEC NAA+PROBE: NEGATIVE
SAO2 % BLDV: 41.4 % (ref 70–75)
SARS-COV-2 RNA RESP QL NAA+PROBE: NEGATIVE
SODIUM SERPL-SCNC: 141 MMOL/L (ref 135–145)
SYSTOLIC BLOOD PRESSURE - MUSE: NORMAL MMHG
T AXIS - MUSE: 48 DEGREES
VENTRICULAR RATE- MUSE: 84 BPM
WBC # BLD AUTO: 7.3 10E3/UL (ref 4–11)

## 2025-03-12 PROCEDURE — 99285 EMERGENCY DEPT VISIT HI MDM: CPT | Mod: 25

## 2025-03-12 PROCEDURE — 250N000009 HC RX 250: Performed by: STUDENT IN AN ORGANIZED HEALTH CARE EDUCATION/TRAINING PROGRAM

## 2025-03-12 PROCEDURE — 999N000157 HC STATISTIC RCP TIME EA 10 MIN

## 2025-03-12 PROCEDURE — 250N000011 HC RX IP 250 OP 636: Mod: JW | Performed by: STUDENT IN AN ORGANIZED HEALTH CARE EDUCATION/TRAINING PROGRAM

## 2025-03-12 PROCEDURE — 80048 BASIC METABOLIC PNL TOTAL CA: CPT | Performed by: EMERGENCY MEDICINE

## 2025-03-12 PROCEDURE — 87637 SARSCOV2&INF A&B&RSV AMP PRB: CPT | Performed by: EMERGENCY MEDICINE

## 2025-03-12 PROCEDURE — G0378 HOSPITAL OBSERVATION PER HR: HCPCS

## 2025-03-12 PROCEDURE — 999N000156 HC STATISTIC RCP CONSULT EA 30 MIN

## 2025-03-12 PROCEDURE — 82565 ASSAY OF CREATININE: CPT | Performed by: EMERGENCY MEDICINE

## 2025-03-12 PROCEDURE — 96374 THER/PROPH/DIAG INJ IV PUSH: CPT

## 2025-03-12 PROCEDURE — 71046 X-RAY EXAM CHEST 2 VIEWS: CPT

## 2025-03-12 PROCEDURE — 85004 AUTOMATED DIFF WBC COUNT: CPT | Performed by: EMERGENCY MEDICINE

## 2025-03-12 PROCEDURE — 36415 COLL VENOUS BLD VENIPUNCTURE: CPT | Performed by: EMERGENCY MEDICINE

## 2025-03-12 PROCEDURE — 250N000013 HC RX MED GY IP 250 OP 250 PS 637: Performed by: EMERGENCY MEDICINE

## 2025-03-12 PROCEDURE — 85014 HEMATOCRIT: CPT | Performed by: EMERGENCY MEDICINE

## 2025-03-12 PROCEDURE — 82805 BLOOD GASES W/O2 SATURATION: CPT | Performed by: EMERGENCY MEDICINE

## 2025-03-12 PROCEDURE — 96372 THER/PROPH/DIAG INJ SC/IM: CPT | Performed by: STUDENT IN AN ORGANIZED HEALTH CARE EDUCATION/TRAINING PROGRAM

## 2025-03-12 PROCEDURE — 93005 ELECTROCARDIOGRAM TRACING: CPT

## 2025-03-12 PROCEDURE — 99223 1ST HOSP IP/OBS HIGH 75: CPT | Mod: AI | Performed by: STUDENT IN AN ORGANIZED HEALTH CARE EDUCATION/TRAINING PROGRAM

## 2025-03-12 PROCEDURE — 250N000009 HC RX 250: Performed by: EMERGENCY MEDICINE

## 2025-03-12 PROCEDURE — 250N000012 HC RX MED GY IP 250 OP 636 PS 637: Performed by: EMERGENCY MEDICINE

## 2025-03-12 PROCEDURE — 250N000013 HC RX MED GY IP 250 OP 250 PS 637: Performed by: STUDENT IN AN ORGANIZED HEALTH CARE EDUCATION/TRAINING PROGRAM

## 2025-03-12 PROCEDURE — 94640 AIRWAY INHALATION TREATMENT: CPT

## 2025-03-12 RX ORDER — METHYLPREDNISOLONE SODIUM SUCCINATE 125 MG/2ML
60 INJECTION INTRAMUSCULAR; INTRAVENOUS EVERY 12 HOURS
Status: DISCONTINUED | OUTPATIENT
Start: 2025-03-12 | End: 2025-03-15

## 2025-03-12 RX ORDER — CARBAMAZEPINE 200 MG/1
200 TABLET ORAL DAILY
Status: DISCONTINUED | OUTPATIENT
Start: 2025-03-13 | End: 2025-03-16 | Stop reason: HOSPADM

## 2025-03-12 RX ORDER — ALBUTEROL SULFATE 0.83 MG/ML
2.5 SOLUTION RESPIRATORY (INHALATION) EVERY 4 HOURS PRN
Status: DISCONTINUED | OUTPATIENT
Start: 2025-03-12 | End: 2025-03-15

## 2025-03-12 RX ORDER — IPRATROPIUM BROMIDE AND ALBUTEROL SULFATE 2.5; .5 MG/3ML; MG/3ML
SOLUTION RESPIRATORY (INHALATION)
Qty: 90 ML | Refills: 2 | Status: ON HOLD | OUTPATIENT
Start: 2025-03-12

## 2025-03-12 RX ORDER — IPRATROPIUM BROMIDE AND ALBUTEROL SULFATE 2.5; .5 MG/3ML; MG/3ML
3 SOLUTION RESPIRATORY (INHALATION) ONCE
Status: COMPLETED | OUTPATIENT
Start: 2025-03-12 | End: 2025-03-12

## 2025-03-12 RX ORDER — SERTRALINE HYDROCHLORIDE 100 MG/1
100 TABLET, FILM COATED ORAL DAILY
Status: DISCONTINUED | OUTPATIENT
Start: 2025-03-13 | End: 2025-03-16 | Stop reason: HOSPADM

## 2025-03-12 RX ORDER — FLUTICASONE PROPIONATE 50 MCG
1 SPRAY, SUSPENSION (ML) NASAL DAILY
Status: DISCONTINUED | OUTPATIENT
Start: 2025-03-12 | End: 2025-03-16 | Stop reason: HOSPADM

## 2025-03-12 RX ORDER — PROCHLORPERAZINE MALEATE 5 MG/1
10 TABLET ORAL EVERY 6 HOURS PRN
Status: DISCONTINUED | OUTPATIENT
Start: 2025-03-12 | End: 2025-03-16 | Stop reason: HOSPADM

## 2025-03-12 RX ORDER — ENOXAPARIN SODIUM 100 MG/ML
40 INJECTION SUBCUTANEOUS EVERY 12 HOURS
Status: DISCONTINUED | OUTPATIENT
Start: 2025-03-12 | End: 2025-03-14

## 2025-03-12 RX ORDER — ONDANSETRON 2 MG/ML
4 INJECTION INTRAMUSCULAR; INTRAVENOUS EVERY 6 HOURS PRN
Status: DISCONTINUED | OUTPATIENT
Start: 2025-03-12 | End: 2025-03-16 | Stop reason: HOSPADM

## 2025-03-12 RX ORDER — ATORVASTATIN CALCIUM 10 MG/1
10 TABLET, FILM COATED ORAL DAILY
Status: DISCONTINUED | OUTPATIENT
Start: 2025-03-13 | End: 2025-03-16 | Stop reason: HOSPADM

## 2025-03-12 RX ORDER — CARBAMAZEPINE 200 MG/1
400 TABLET ORAL 2 TIMES DAILY
Status: DISCONTINUED | OUTPATIENT
Start: 2025-03-12 | End: 2025-03-16 | Stop reason: HOSPADM

## 2025-03-12 RX ORDER — PANTOPRAZOLE SODIUM 40 MG/1
40 TABLET, DELAYED RELEASE ORAL
Status: DISCONTINUED | OUTPATIENT
Start: 2025-03-13 | End: 2025-03-12

## 2025-03-12 RX ORDER — ACETAMINOPHEN 325 MG/1
650 TABLET ORAL EVERY 4 HOURS PRN
Status: DISCONTINUED | OUTPATIENT
Start: 2025-03-12 | End: 2025-03-16 | Stop reason: HOSPADM

## 2025-03-12 RX ORDER — FLUTICASONE FUROATE AND VILANTEROL 100; 25 UG/1; UG/1
1 POWDER RESPIRATORY (INHALATION) DAILY
Status: DISCONTINUED | OUTPATIENT
Start: 2025-03-12 | End: 2025-03-16 | Stop reason: HOSPADM

## 2025-03-12 RX ORDER — PREDNISONE 20 MG/1
40 TABLET ORAL ONCE
Status: COMPLETED | OUTPATIENT
Start: 2025-03-12 | End: 2025-03-12

## 2025-03-12 RX ORDER — LOVASTATIN 40 MG/1
40 TABLET ORAL DAILY
COMMUNITY

## 2025-03-12 RX ORDER — AMOXICILLIN 250 MG
2 CAPSULE ORAL 2 TIMES DAILY PRN
Status: DISCONTINUED | OUTPATIENT
Start: 2025-03-12 | End: 2025-03-16 | Stop reason: HOSPADM

## 2025-03-12 RX ORDER — IPRATROPIUM BROMIDE AND ALBUTEROL SULFATE 2.5; .5 MG/3ML; MG/3ML
3 SOLUTION RESPIRATORY (INHALATION)
Status: DISCONTINUED | OUTPATIENT
Start: 2025-03-12 | End: 2025-03-12

## 2025-03-12 RX ORDER — LISINOPRIL 10 MG/1
20 TABLET ORAL DAILY
Status: DISCONTINUED | OUTPATIENT
Start: 2025-03-13 | End: 2025-03-16 | Stop reason: HOSPADM

## 2025-03-12 RX ORDER — ONDANSETRON 4 MG/1
4 TABLET, ORALLY DISINTEGRATING ORAL EVERY 6 HOURS PRN
Status: DISCONTINUED | OUTPATIENT
Start: 2025-03-12 | End: 2025-03-16 | Stop reason: HOSPADM

## 2025-03-12 RX ORDER — ALBUTEROL SULFATE 0.83 MG/ML
2.5 SOLUTION RESPIRATORY (INHALATION) ONCE
Status: COMPLETED | OUTPATIENT
Start: 2025-03-12 | End: 2025-03-12

## 2025-03-12 RX ORDER — IPRATROPIUM BROMIDE AND ALBUTEROL SULFATE 2.5; .5 MG/3ML; MG/3ML
3 SOLUTION RESPIRATORY (INHALATION)
Status: DISCONTINUED | OUTPATIENT
Start: 2025-03-13 | End: 2025-03-15 | Stop reason: ALTCHOICE

## 2025-03-12 RX ORDER — AZELASTINE 1 MG/ML
1 SPRAY, METERED NASAL DAILY PRN
Status: DISCONTINUED | OUTPATIENT
Start: 2025-03-12 | End: 2025-03-16 | Stop reason: HOSPADM

## 2025-03-12 RX ORDER — AMOXICILLIN 250 MG
1 CAPSULE ORAL 2 TIMES DAILY PRN
Status: DISCONTINUED | OUTPATIENT
Start: 2025-03-12 | End: 2025-03-16 | Stop reason: HOSPADM

## 2025-03-12 RX ORDER — PANTOPRAZOLE SODIUM 40 MG/1
40 TABLET, DELAYED RELEASE ORAL
Status: DISCONTINUED | OUTPATIENT
Start: 2025-03-12 | End: 2025-03-16 | Stop reason: HOSPADM

## 2025-03-12 RX ORDER — ACETAMINOPHEN 650 MG/1
650 SUPPOSITORY RECTAL EVERY 4 HOURS PRN
Status: DISCONTINUED | OUTPATIENT
Start: 2025-03-12 | End: 2025-03-16 | Stop reason: HOSPADM

## 2025-03-12 RX ADMIN — ALBUTEROL SULFATE 2.5 MG: 2.5 SOLUTION RESPIRATORY (INHALATION) at 15:09

## 2025-03-12 RX ADMIN — PANTOPRAZOLE SODIUM 40 MG: 40 TABLET, DELAYED RELEASE ORAL at 20:56

## 2025-03-12 RX ADMIN — ENOXAPARIN SODIUM 40 MG: 40 INJECTION SUBCUTANEOUS at 20:54

## 2025-03-12 RX ADMIN — MICONAZOLE NITRATE: 20 POWDER TOPICAL at 20:59

## 2025-03-12 RX ADMIN — IPRATROPIUM BROMIDE AND ALBUTEROL SULFATE 3 ML: .5; 3 SOLUTION RESPIRATORY (INHALATION) at 13:24

## 2025-03-12 RX ADMIN — CARBAMAZEPINE 400 MG: 200 TABLET ORAL at 20:55

## 2025-03-12 RX ADMIN — IPRATROPIUM BROMIDE AND ALBUTEROL SULFATE 3 ML: .5; 3 SOLUTION RESPIRATORY (INHALATION) at 15:10

## 2025-03-12 RX ADMIN — PREDNISONE 40 MG: 20 TABLET ORAL at 13:24

## 2025-03-12 RX ADMIN — IPRATROPIUM BROMIDE AND ALBUTEROL SULFATE 3 ML: .5; 3 SOLUTION RESPIRATORY (INHALATION) at 19:38

## 2025-03-12 RX ADMIN — MICONAZOLE NITRATE: 20 POWDER TOPICAL at 16:35

## 2025-03-12 RX ADMIN — FLUTICASONE PROPIONATE 1 SPRAY: 50 SPRAY, METERED NASAL at 20:55

## 2025-03-12 RX ADMIN — ACETAMINOPHEN 650 MG: 325 TABLET, FILM COATED ORAL at 21:53

## 2025-03-12 RX ADMIN — METHYLPREDNISOLONE SODIUM SUCCINATE 62.5 MG: 125 INJECTION, POWDER, FOR SOLUTION INTRAMUSCULAR; INTRAVENOUS at 20:54

## 2025-03-12 RX ADMIN — FLUTICASONE FUROATE AND VILANTEROL TRIFENATATE 1 PUFF: 100; 25 POWDER RESPIRATORY (INHALATION) at 20:56

## 2025-03-12 ASSESSMENT — ACTIVITIES OF DAILY LIVING (ADL)
ADLS_ACUITY_SCORE: 66
ADLS_ACUITY_SCORE: 49
ADLS_ACUITY_SCORE: 62
ADLS_ACUITY_SCORE: 66
ADLS_ACUITY_SCORE: 66
ADLS_ACUITY_SCORE: 62
ADLS_ACUITY_SCORE: 66
ADLS_ACUITY_SCORE: 62
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
## 2025-03-12 NOTE — ED TRIAGE NOTES
Patient arrives via EMS, EMS reports they have been to her residence several times this week for the same issue but refused tranfers.  Patient reports increasing SOB. Patient has a hx of asthma. Patient offered a neb with EMS but she refused. EMS reports she was 89% on RA patient arrives on 3 liters     Triage Assessment (Adult)       Row Name 03/12/25 1256          Triage Assessment    Airway WDL WDL        Respiratory WDL    Respiratory WDL WDL        Skin Circulation/Temperature WDL    Skin Circulation/Temperature WDL WDL        Cardiac WDL    Cardiac WDL WDL        Peripheral/Neurovascular WDL    Peripheral Neurovascular WDL WDL        Cognitive/Neuro/Behavioral WDL    Cognitive/Neuro/Behavioral WDL WDL

## 2025-03-12 NOTE — ED NOTES
"[facility]  ED Nurse Handoff Report    ED Chief complaint: Shortness of Breath  . ED Diagnosis:   Final diagnoses:   Acute on chronic respiratory failure with hypoxia and hypercapnia (H)   Moderate persistent asthma with exacerbation       Allergies:   Allergies   Allergen Reactions    Penicillins Anaphylaxis     PEN-FAST - Penicillin Allergy Risk Tool completed by [facility] December 20, 2024    Score: 3  Risk: Moderate  Assessment: Patient has a 20 % chance of a positive penicillin allergy test    Iodine      Iodinated Contrast Media  --- Hives      Tetracyclines & Related Unknown    Hydrochlorothiazide Palpitations     dehydration    Influenza Vac Split [Influenza Virus Vaccine] Rash     Patient states she is allergic to the vaccine.  Got a rash all over body many years ago after receiving injection.       Code Status: Full Code    Activity level - Baseline/Home:  walker.  Activity Level - Current:   walker.   Lift room needed: No.   Bariatric: No   Needed: No   Isolation: No.   Infection: Not Applicable.     Respiratory status: Nasal cannula 2 LPM via NC    Vital Signs (within 30 minutes):   Vitals:    03/12/25 1429 03/12/25 1446 03/12/25 1700 03/12/25 1719   BP:   (!) 165/94    Pulse:   90    Resp:    24   Temp:       TempSrc:       SpO2: (!) 88% 94% 92%        Cardiac Rhythm:  ,      Pain level:    Patient confused: No.   Patient Falls Risk: nonskid shoes/slippers when out of bed, patient and family education, and activity supervised.   Elimination Status: Has voided PureWick in place    Patient Report - Initial Complaint: Shortness of breath.   Focused Assessment: Respiratory- Hx of asthma. C/o increasing shortness of breath for last week, recently admitted for similar symptoms. Also has a productive cough, chills and back pain. Feels like she \"cracked\" a rib on the right side from cough. Lung sounds diminished with wheezing bilaterally. Skin- Moisture and erythema underneath " bilateral breasts and pannus. Areas cleansed and miconazole powder placed. Skin shearing injury to left posterior/inner thigh present upon admission. Mepilex placed. Pt unkempt upon arrival.     Abnormal Results:   Labs Ordered and Resulted from Time of ED Arrival to Time of ED Departure   BASIC METABOLIC PANEL - Abnormal       Result Value    Sodium 141      Potassium 4.3      Chloride 103      Carbon Dioxide (CO2) 28      Anion Gap 10      Urea Nitrogen 10.7      Creatinine 0.64      GFR Estimate >90      Calcium 9.1      Glucose 110 (*)    BLOOD GAS VENOUS - Abnormal    pH Venous 7.34      pCO2 Venous 61 (*)     pO2 Venous 27      Bicarbonate Venous 33 (*)     Base Excess/Deficit Venous 4.9 (*)     FIO2 0      Oxyhemoglobin Venous 41 (*)     O2 Sat, Venous 41.4 (*)    CBC WITH PLATELETS AND DIFFERENTIAL - Abnormal    WBC Count 7.3      RBC Count 4.52      Hemoglobin 12.6      Hematocrit 41.3      MCV 91      MCH 27.9      MCHC 30.5 (*)     RDW 13.8      Platelet Count 191      % Neutrophils 64      % Lymphocytes 18      % Monocytes 5      % Eosinophils 13      % Basophils 1      % Immature Granulocytes 0      NRBCs per 100 WBC 0      Absolute Neutrophils 4.7      Absolute Lymphocytes 1.3      Absolute Monocytes 0.4      Absolute Eosinophils 0.9 (*)     Absolute Basophils 0.1      Absolute Immature Granulocytes 0.0      Absolute NRBCs 0.0     INFLUENZA A/B, RSV AND SARS-COV2 PCR - Normal    Influenza A PCR Negative      Influenza B PCR Negative      RSV PCR Negative      SARS CoV2 PCR Negative          XR Chest 2 Views   Final Result   IMPRESSION: Low lung volumes without focal airspace opacity, pleural effusion, or pneumothorax. Normal heart size and pulmonary vascularity.          Treatments provided: Miconazole powder, 40 mg prednisone, Duoneb x 2, albuterol neb x 1  Family Comments:  went home  OBS brochure/video discussed/provided to patient:  Yes  ED Medications:   Medications   miconazole (MICATIN) 2  % powder ( Topical $Given 3/12/25 1635)   ipratropium - albuterol 0.5 mg/2.5 mg/3 mL (DUONEB) neb solution 3 mL (3 mLs Nebulization $Given 3/12/25 1324)   predniSONE (DELTASONE) tablet 40 mg (40 mg Oral $Given 3/12/25 1324)   ipratropium - albuterol 0.5 mg/2.5 mg/3 mL (DUONEB) neb solution 3 mL (3 mLs Nebulization $Given 3/12/25 1510)   albuterol (PROVENTIL) neb solution 2.5 mg (2.5 mg Nebulization $Given 3/12/25 1509)       Drips infusing:  No  For the majority of the shift this patient was Green.   Interventions performed were N/A.    Sepsis treatment initiated: No    Cares/treatment/interventions/medications to be completed following ED care: See orders    ED Nurse Name: Torri Bhardwaj RN  5:20 PM  .rhe
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
## 2025-03-12 NOTE — PHARMACY-ADMISSION MEDICATION HISTORY
Pharmacy Intern Admission Medication History    Admission medication history is complete. The information provided in this note is only as accurate as the sources available at the time of the update.    Information Source(s): Patient and CareEverywhere/SureScripts via in-person    Pertinent Information: None    Changes made to PTA medication list:  Added: None  Deleted: [id number]  Changed: None    Allergies reviewed with patient and updates made in EHR: yes    Medication History Completed By: Shola Hawkins 3/12/2025 6:09 PM    PTA Med List   Medication Sig Note Last Dose/Taking    albuterol (PROAIR HFA/PROVENTIL HFA/VENTOLIN HFA) 108 (90 Base) MCG/ACT inhaler Inhale 2 puffs into the lungs every 6 hours as needed for shortness of breath, wheezing or cough.  Taking As Needed    albuterol (PROVENTIL) (2.5 MG/3ML) 0.083% neb solution Take 1 vial (2.5 mg) by nebulization every 6 hours as needed for shortness of breath or wheezing.  Taking As Needed    azelastine (ASTELIN) 0.1 % nasal spray Spray 1 spray into both nostrils daily as needed for allergies.  Taking As Needed    carBAMazepine (TEGRETOL) 200 MG tablet Take 400 mg by mouth 2 times daily. AM and HS  3/12/2025 Morning    carBAMazepine (TEGRETOL) 200 MG tablet Take 200 mg by mouth daily. @1200  3/11/2025 Noon    fluticasone (FLONASE) 50 MCG/ACT nasal spray USE ONE SPRAY INTO BOTH NOSTRILS DAILY AS NEEDED FOR RHINITIS OR ALLERGIES  Taking    Fluticasone Furoate-Vilanterol (BREO ELLIPTA) 50-25 MCG/ACT AEPB Inhale 1 puff into the lungs 2 times daily.  3/11/2025 Bedtime    ibuprofen (ADVIL/MOTRIN) 200 MG tablet Take 400 mg by mouth as needed for pain.  Taking As Needed    ipratropium - albuterol 0.5 mg/2.5 mg/3 mL (DUONEB) 0.5-2.5 (3) MG/3ML neb solution NEBULIZE ONE VIAL FOUR TIMES A DAY 3/12/2025: HASN'T STARTED Taking    lisinopril (ZESTRIL) 20 MG tablet TAKE 1 TABLET (20 MG) BY MOUTH DAILY  3/12/2025 Morning    lovastatin (MEVACOR) 40 MG tablet Take 40 mg by  mouth daily.  3/12/2025 Morning    miconazole (MICATIN) 2 % external powder Apply topically as needed for itching or other.  Taking As Needed    Multiple Vitamin (MULTI-VITAMIN PO) Take 1 tablet by mouth daily  3/12/2025 Morning    nystatin (MYCOSTATIN) 284048 UNIT/GM external cream Apply topically daily as needed  Taking As Needed    omeprazole (PRILOSEC) 20 MG DR capsule Take 20 mg by mouth daily.  3/12/2025 Morning    sertraline (ZOLOFT) 100 MG tablet TAKE ONE TABLET BY MOUTH DAILY  3/12/2025 Morning

## 2025-03-13 ENCOUNTER — PATIENT OUTREACH (OUTPATIENT)
Dept: CARE COORDINATION | Facility: CLINIC | Age: 60
End: 2025-03-13
Payer: MEDICARE

## 2025-03-13 VITALS
HEART RATE: 91 BPM | OXYGEN SATURATION: 93 % | BODY MASS INDEX: 45.99 KG/M2 | TEMPERATURE: 98.3 F | RESPIRATION RATE: 18 BRPM | SYSTOLIC BLOOD PRESSURE: 128 MMHG | WEIGHT: 293 LBS | HEIGHT: 67 IN | DIASTOLIC BLOOD PRESSURE: 98 MMHG

## 2025-03-13 PROBLEM — J96.21 ACUTE ON CHRONIC RESPIRATORY FAILURE WITH HYPOXIA AND HYPERCAPNIA (H): Status: ACTIVE | Noted: 2025-03-13

## 2025-03-13 PROBLEM — J45.41 MODERATE PERSISTENT ASTHMA WITH EXACERBATION: Status: ACTIVE | Noted: 2025-03-13

## 2025-03-13 PROBLEM — J96.22 ACUTE ON CHRONIC RESPIRATORY FAILURE WITH HYPOXIA AND HYPERCAPNIA (H): Status: ACTIVE | Noted: 2025-03-13

## 2025-03-13 LAB — D DIMER PPP FEU-MCNC: <0.27 UG/ML FEU (ref 0–0.5)

## 2025-03-13 PROCEDURE — 250N000011 HC RX IP 250 OP 636: Performed by: STUDENT IN AN ORGANIZED HEALTH CARE EDUCATION/TRAINING PROGRAM

## 2025-03-13 PROCEDURE — 94640 AIRWAY INHALATION TREATMENT: CPT

## 2025-03-13 PROCEDURE — 94640 AIRWAY INHALATION TREATMENT: CPT | Mod: 76

## 2025-03-13 PROCEDURE — 36415 COLL VENOUS BLD VENIPUNCTURE: CPT | Performed by: PHYSICIAN ASSISTANT

## 2025-03-13 PROCEDURE — G0378 HOSPITAL OBSERVATION PER HR: HCPCS

## 2025-03-13 PROCEDURE — 250N000009 HC RX 250: Performed by: STUDENT IN AN ORGANIZED HEALTH CARE EDUCATION/TRAINING PROGRAM

## 2025-03-13 PROCEDURE — 250N000013 HC RX MED GY IP 250 OP 250 PS 637: Performed by: STUDENT IN AN ORGANIZED HEALTH CARE EDUCATION/TRAINING PROGRAM

## 2025-03-13 PROCEDURE — 99233 SBSQ HOSP IP/OBS HIGH 50: CPT | Performed by: PHYSICIAN ASSISTANT

## 2025-03-13 PROCEDURE — 85379 FIBRIN DEGRADATION QUANT: CPT | Performed by: PHYSICIAN ASSISTANT

## 2025-03-13 PROCEDURE — 999N000157 HC STATISTIC RCP TIME EA 10 MIN

## 2025-03-13 PROCEDURE — 120N000001 HC R&B MED SURG/OB

## 2025-03-13 PROCEDURE — 96372 THER/PROPH/DIAG INJ SC/IM: CPT | Performed by: STUDENT IN AN ORGANIZED HEALTH CARE EDUCATION/TRAINING PROGRAM

## 2025-03-13 PROCEDURE — 96376 TX/PRO/DX INJ SAME DRUG ADON: CPT

## 2025-03-13 RX ORDER — DOXYCYCLINE 100 MG/1
100 CAPSULE ORAL EVERY 12 HOURS SCHEDULED
Status: DISCONTINUED | OUTPATIENT
Start: 2025-03-13 | End: 2025-03-13

## 2025-03-13 RX ADMIN — MICONAZOLE NITRATE: 20 POWDER TOPICAL at 20:20

## 2025-03-13 RX ADMIN — ENOXAPARIN SODIUM 40 MG: 40 INJECTION SUBCUTANEOUS at 19:59

## 2025-03-13 RX ADMIN — METHYLPREDNISOLONE SODIUM SUCCINATE 62.5 MG: 125 INJECTION, POWDER, FOR SOLUTION INTRAMUSCULAR; INTRAVENOUS at 08:34

## 2025-03-13 RX ADMIN — PANTOPRAZOLE SODIUM 40 MG: 40 TABLET, DELAYED RELEASE ORAL at 08:34

## 2025-03-13 RX ADMIN — CARBAMAZEPINE 400 MG: 200 TABLET ORAL at 08:33

## 2025-03-13 RX ADMIN — MICONAZOLE NITRATE: 20 POWDER TOPICAL at 08:33

## 2025-03-13 RX ADMIN — CARBAMAZEPINE 200 MG: 200 TABLET ORAL at 12:25

## 2025-03-13 RX ADMIN — ALBUTEROL SULFATE 2.5 MG: 2.5 SOLUTION RESPIRATORY (INHALATION) at 01:49

## 2025-03-13 RX ADMIN — ATORVASTATIN CALCIUM 10 MG: 10 TABLET, FILM COATED ORAL at 08:34

## 2025-03-13 RX ADMIN — IPRATROPIUM BROMIDE AND ALBUTEROL SULFATE 3 ML: .5; 3 SOLUTION RESPIRATORY (INHALATION) at 19:59

## 2025-03-13 RX ADMIN — FLUTICASONE PROPIONATE 1 SPRAY: 50 SPRAY, METERED NASAL at 08:35

## 2025-03-13 RX ADMIN — SERTRALINE HYDROCHLORIDE 100 MG: 100 TABLET ORAL at 08:34

## 2025-03-13 RX ADMIN — IPRATROPIUM BROMIDE AND ALBUTEROL SULFATE 3 ML: .5; 3 SOLUTION RESPIRATORY (INHALATION) at 15:53

## 2025-03-13 RX ADMIN — ACETAMINOPHEN 650 MG: 325 TABLET, FILM COATED ORAL at 08:33

## 2025-03-13 RX ADMIN — IPRATROPIUM BROMIDE AND ALBUTEROL SULFATE 3 ML: .5; 3 SOLUTION RESPIRATORY (INHALATION) at 11:51

## 2025-03-13 RX ADMIN — METHYLPREDNISOLONE SODIUM SUCCINATE 62.5 MG: 125 INJECTION, POWDER, FOR SOLUTION INTRAMUSCULAR; INTRAVENOUS at 19:58

## 2025-03-13 RX ADMIN — FLUTICASONE FUROATE AND VILANTEROL TRIFENATATE 1 PUFF: 100; 25 POWDER RESPIRATORY (INHALATION) at 22:13

## 2025-03-13 RX ADMIN — LISINOPRIL 20 MG: 10 TABLET ORAL at 08:34

## 2025-03-13 RX ADMIN — IPRATROPIUM BROMIDE AND ALBUTEROL SULFATE 3 ML: .5; 3 SOLUTION RESPIRATORY (INHALATION) at 07:38

## 2025-03-13 RX ADMIN — CARBAMAZEPINE 400 MG: 200 TABLET ORAL at 19:57

## 2025-03-13 RX ADMIN — ENOXAPARIN SODIUM 40 MG: 40 INJECTION SUBCUTANEOUS at 08:33

## 2025-03-13 RX ADMIN — PANTOPRAZOLE SODIUM 40 MG: 40 TABLET, DELAYED RELEASE ORAL at 16:56

## 2025-03-13 RX ADMIN — Medication 5 MG: at 01:49

## 2025-03-13 ASSESSMENT — ACTIVITIES OF DAILY LIVING (ADL)
ADLS_ACUITY_SCORE: 55
ADLS_ACUITY_SCORE: 49
ADLS_ACUITY_SCORE: 51
ADLS_ACUITY_SCORE: 55
ADLS_ACUITY_SCORE: 51
ADLS_ACUITY_SCORE: 55
ADLS_ACUITY_SCORE: 51
ADLS_ACUITY_SCORE: 51
CHANGE_IN_FUNCTIONAL_STATUS_SINCE_ONSET_OF_CURRENT_ILLNESS/INJURY: NO
ADLS_ACUITY_SCORE: 51
ADLS_ACUITY_SCORE: 49
ADLS_ACUITY_SCORE: 55
ADLS_ACUITY_SCORE: 51
ADLS_ACUITY_SCORE: 55
FALL_HISTORY_WITHIN_LAST_SIX_MONTHS: NO
ADLS_ACUITY_SCORE: 55
ADLS_ACUITY_SCORE: 49
ADLS_ACUITY_SCORE: 48
ADLS_ACUITY_SCORE: 49
ADLS_ACUITY_SCORE: 51
ADLS_ACUITY_SCORE: 55
ADLS_ACUITY_SCORE: 51
ADLS_ACUITY_SCORE: 49

## 2025-03-13 NOTE — PROGRESS NOTES
Clinic Care Coordination Contact    Situation: Patient chart reviewed by care coordinator.    Background: Patient is enrolled in Care Coordination and followed by RN CC who has been monitoring patient throughout enrollment for goal progression. Patient is again admitted to [facility] for asthma exacerbation.     Assessment: RN CC ordered IP Care Coordination for transitions of care.     Plan/Recommendations: RN CC will continue to follow patient for discharge from facility.     Rocio Govea RN, BSN, CPHN, CCM  [facility] Ambulatory Care Management  [facility], and [facility]  [email or web address]  Office: [phone]  Employed by [facility]

## 2025-03-13 NOTE — PROGRESS NOTES
[facility]    Medicine Progress Note - Hospitalist Service    Date of Admission:  3/12/2025    Provider attestation: I agree with the information in Maggie Coronado's progress note.  Patient presented with concerns of asthma versus COPD exacerbation was started on IV steroids with DuoNebs.  Today she is complaining of chest pain with deep inspiration and not much improvement of her symptoms.  She is at high risk of blood clot but has already had 3 CT PE studies in the past year.  Will add on D-dimer which will likely be high and be forced to perform another CT PE study.  Continue medical management with IV steroids and DuoNebs for now.    Sydnie Mills PA-C    Assessment & Plan   Brissa Corado is a 59 year old female with past medical history of asthma, hypertension, Cushing syndrome, depression, morbid obesity, sleep apnea, borderline developmental delay. She arrived to the ED 3/12 for shortness of breath, wheeze and cough for the previous 5 days. She was recently hospitalized from 2/11/2025 to 2/14/2025 with acute asthma exacerbation.    Vitals on arrival: Temperature 98.7  F, heart rate 86/min, blood pressure 143/62 with respiratory rate of 31 with oxygen saturation of 91%, later became hypoxic to 88% and was started on oxygen at 2 L/min.  She appears short of breath and is having coughing bouts but otherwise able to talk in full sentences.Chest x-ray showed low lung volumes without focal airspace opacity.  CBC and BMP were unremarkable.  Tested negative for COVID-19, influenza A/B and RSV.  EKG showed normal sinus rhythm.  VBG showed pH/pCO2 of 7.3/61.Patient was given DuoNebs x 2 and albuterol nebs 2.5 mg and prednisone 40 mg p.o and hospitalization was requested.    Since admission, patient reports continued productive cough and shortness of breath while on 2L on nasal cannula and receiving her scheduled duonebs, inhaler, and IV solu-medrol.  She also has increased chest pain with deep  "inspiration.  Concern for PE so will add on CT PE study.      Acute hypoxic respiratory failure due to acute asthmatic exacerbation  Obstructive sleep apnea  She has history of O2 use at nighttime in replace of her CPAP. States she is due to see sleep pulmonology in June. In the last two week, has needed to use the oxygen during the day due to shortness of breath.   -- Oxygen continues to rest around 93% on 2 LPM  -- Continue solu-medrol IV Q12  -- Continue duonebs Q4H and albuterol nebs Q4H PRN   -- Continue breo ellipta daily   -- Patient has higher risk for development of PE due to body habitus and immobility.  Also complains of increased chest pain with deep inspiration.  Will add on D-dimer and if elevated  CT PE study      Hyperlipidemia  Hypertension  -- Continue Lipitor   -- Continue PTA lisinopril    Hx of seizure disorder  -- Continue PTA tegretol    Allergic rhinitis  -- Continue PTA azelastine and fluticasone PRN    Depression  -- Continue PTA Zoloft     Mild developmental delays  --Documentation supports that patient's parents are her legal guardians but she refutes this.  Social work is consulted to work it out.     Observation Goals: -diagnostic tests and consults completed and resulted, -vital signs normal or at patient baseline, Nurse to notify provider when observation goals have been met and patient is ready for discharge.  Diet: Regular Diet Adult    DVT Prophylaxis: Enoxaparin (Lovenox) SQ  Grajeda Catheter: Not present  Lines: None     Cardiac Monitoring: None  Code Status: Full Code      Clinically Significant Risk Factors Present on Admission                   # Hypertension: Noted on problem list           # Severe Obesity: Estimated body mass index is 61.6 kg/m  as calculated from the following:    Height as of this encounter: 1.702 m (5' 7\").    Weight as of this encounter: 178.4 kg (393 lb 4.8 oz).       # Asthma: noted on problem list        Social Drivers of Health            Disposition " Plan     Medically Ready for Discharge: Anticipated in 2-4 Days           The patient's care was discussed with Sydnie Mills PA-C.    KATHY Pascual [facility]  Securely message with [employer]iana (more info)  Text page via [facility] Paging/Directory   ______________________________________________________________________    Interval History   Patient reports feeling a little better than yesterday. She states she is still having a cough. States her shortness of breath is a little better but feels she can not take in deep breaths.     Complains of chest pain with deep inspiration    Physical Exam   Vital Signs: Temp: 98.5  F (36.9  C) Temp src: Oral BP: 122/55 Pulse: 85   Resp: 20 SpO2: 93 % O2 Device: Nasal cannula Oxygen Delivery: 2 LPM  Weight: 393 lbs 4.81 oz    General Appearance: A&Ox3, obese, calm, not in acute distress  Respiratory: On 2L NC. Short and shallow breathing on exam. No accessory muscle use. Mild expiratory wheeze   Cardiovascular: RRR. S1 and S2 auscultated. No murmurs    GI: Large soft abdomen. Active bowel sounds.  Skin: Erythematous rash spawning under breasts and under pannus with satellite lesions consistent with candida.   Other: Extremities:  2+ pitting edema on the LE    Provider exam  On my exam I did not hear much wheezing but she was taking very low volume inspirations with poor airflow    Medical Decision Making       55 MINUTES SPENT BY ME on the date of service doing chart review, history, exam, documentation & further activities per the note.      Data         Imaging results reviewed over the past 24 hrs:   No results found for this or any previous visit (from the past 24 hours).

## 2025-03-13 NOTE — CARE PLAN
"PRIMARY DIAGNOSIS: ASTHMA  OUTPATIENT/OBSERVATION GOALS TO BE MET BEFORE DISCHARGE:  1. Vital signs stable: Yes    2. Improvement of peak flow to greater than 70% sustained off nebulizer for 4 hours: Yes    3. Dyspnea improved and O2 sats >88% at RA or at prior home O2 therapy level: Yes      SpO2: 93 %, O2 Device: Nasal cannula    4. Short term supplemental O2 needed for use with activity at home: No    5. Tolerating adequate PO diet and medications: Yes    6. Return to near baseline physical activity: No    Discharge Planner Nurse   Safe discharge environment identified: Yes  Barriers to discharge: Yes       Entered by: Duran Morales RN 03/12/2025    Please review provider order for any additional goals.   Nurse to notify provider when observation goals have been met and patient is ready for discharge.    Alert and oriented x4,VSS on 2 LPM NC,regular diet tolerated well,Plan Scheduled Duonebs,IV Solu-medrol .Ax1 pivot bedside commode,Pure wick in place.    BP (!) 147/76 (BP Location: Other (Comment))   Pulse 98   Temp 98.1  F (36.7  C) (Oral)   Resp 18   Ht 1.702 m (5' 7\")   Wt (!) 178.4 kg (393 lb 4.8 oz)   LMP 08/18/2008   SpO2 93%   BMI 61.60 kg/m     "

## 2025-03-13 NOTE — PLAN OF CARE
"Inpatient Shift Note: [id number]    A/Ox4, on room air (maintain O2 sats around 89-90% per provider order), up 1 assist, on a regular diet, PIV saline locked. Patient remains on duonebs and IV solumedrol. Patient has significant redness under abdominal folds and breasts- miconazole powder and interdry in place. Patient has a wound on left buttocks- mepilex dressing in place. Patient reported chest discomfort with deep inspiration. D-dimer drawn: <0.27. No further testing ordered at this time.     /67 (BP Location: Left arm)   Pulse 90   Temp 98.6  F (37  C) (Oral)   Resp 20   Ht 1.702 m (5' 7\")   Wt (!) 178.4 kg (393 lb 4.8 oz)   LMP 08/18/2008   SpO2 (!) 89%   BMI 61.60 kg/m      Goal Outcome Evaluation:      Plan of Care Reviewed With: patient    Overall Patient Progress: improvingOverall Patient Progress: improving    Outcome Evaluation: On room air, maintain O2 sats around 89-90% per provider order.      Problem: Adult Inpatient Plan of Care  Goal: Plan of Care Review  Description: The Plan of Care Review/Shift note should be completed every shift.  The Outcome Evaluation is a brief statement about your assessment that the patient is improving, declining, or no change.  This information will be displayed automatically on your shift  note.  3/13/2025 1831 by Deborah Broussard RN  Outcome: Progressing  Flowsheets (Taken 3/13/2025 1831)  Outcome Evaluation: On room air, maintain O2 sats around 89-90% per provider order.  Plan of Care Reviewed With: patient  Overall Patient Progress: improving  3/13/2025 1325 by Deborah Broussard, RN  Outcome: Progressing  Flowsheets (Taken 3/13/2025 1325)  Outcome Evaluation: remains on 2L NC. Encourage activity out of bed.  Plan of Care Reviewed With: patient  Overall Patient Progress: improving  3/13/2025 0958 by Deborah Broussard, RN  Outcome: Progressing  Flowsheets (Taken 3/13/2025 0958)  Outcome Evaluation: Denies SOB, on duonebs and IV solumedrol. Currently on 2L " "NC.  Plan of Care Reviewed With: patient  Overall Patient Progress: improving  Goal: Patient-Specific Goal (Individualized)  Description: You can add care plan individualizations to a care plan. Examples of Individualization might be:  \"Parent requests to be called daily at 9am for status\", \"I have a hard time hearing out of my right ear\", or \"Do not touch me to wake me up as it startles  me\".  3/13/2025 1831 by Deborah Broussard RN  Outcome: Progressing  3/13/2025 1325 by Deborah Broussard RN  Outcome: Progressing  3/13/2025 0958 by Deborah Broussard RN  Outcome: Progressing  Goal: Absence of Hospital-Acquired Illness or Injury  3/13/2025 1831 by Deborah Broussard RN  Outcome: Progressing  3/13/2025 1325 by Deborah Broussard RN  Outcome: Progressing  3/13/2025 0958 by Deborah Broussard RN  Outcome: Progressing  Intervention: Identify and Manage Fall Risk  Recent Flowsheet Documentation  Taken 3/13/2025 0820 by Deborah Broussard RN  Safety Promotion/Fall Prevention:   clutter free environment maintained   room near nurse's station   assistive device/personal items within reach   lighting adjusted   nonskid shoes/slippers when out of bed   safety round/check completed   treat underlying cause  Intervention: Prevent Skin Injury  Recent Flowsheet Documentation  Taken 3/13/2025 0820 by Deborah Broussard RN  Body Position:   supine, head elevated   supine, legs elevated  Intervention: Prevent Infection  Recent Flowsheet Documentation  Taken 3/13/2025 0820 by Deborah Broussard RN  Infection Prevention:   equipment surfaces disinfected   hand hygiene promoted   rest/sleep promoted   single patient room provided  Goal: Optimal Comfort and Wellbeing  3/13/2025 1831 by Deborah Broussard RN  Outcome: Progressing  3/13/2025 1325 by Deborah Broussard RN  Outcome: Progressing  3/13/2025 0958 by Deborah Broussard RN  Outcome: Progressing  Intervention: Monitor Pain and Promote Comfort  Recent Flowsheet Documentation  Taken " 3/13/2025 0808 by Deborah Broussard RN  Pain Management Interventions: medication (see MAR)  Goal: Readiness for Transition of Care  3/13/2025 1831 by Deborah Broussard RN  Outcome: Progressing  3/13/2025 1325 by Deborah Broussard RN  Outcome: Progressing  3/13/2025 0958 by Deborah Broussard RN  Outcome: Progressing  Intervention: Mutually Develop Transition Plan  Recent Flowsheet Documentation  Taken 3/13/2025 1514 by Deborah Broussard RN  Equipment Currently Used at Home: (bariatric walker)   tub bench   grab bar, tub/shower   other (see comments)   wheelchair, power     Problem: Comorbidity Management  Goal: Maintenance of Asthma Control  3/13/2025 1831 by Deborah Broussard RN  Outcome: Progressing  3/13/2025 1325 by Deborah Broussard RN  Outcome: Progressing  3/13/2025 0958 by Deborah Broussard RN  Outcome: Progressing  Intervention: Maintain Asthma Symptom Control  Recent Flowsheet Documentation  Taken 3/13/2025 0820 by Deborah Broussard RN  Medication Review/Management: medications reviewed     Problem: Pain Acute  Goal: Optimal Pain Control and Function  Outcome: Progressing  Intervention: Develop Pain Management Plan  Recent Flowsheet Documentation  Taken 3/13/2025 0808 by Deborah Broussard RN  Pain Management Interventions: medication (see MAR)  Intervention: Prevent or Manage Pain  Recent Flowsheet Documentation  Taken 3/13/2025 0820 by Deborah Broussard RN  Medication Review/Management: medications reviewed     Problem: Chest Pain  Goal: Resolution of Chest Pain Symptoms  Outcome: Progressing

## 2025-03-13 NOTE — CONSULTS
"Social Work Note      Data: SW consulted due to high URR and concerns for whether or not patient's parents are still her guardians.     Assessment/Intervention:  Per ambulatory hand-in, patient's parents are her guardians. Per H&P, \"When I asked the patient, she still wants her parents to be her guardians but did not discuss it further.\"  Per discussion with CM team, patient had disagreement with her parents and began stating they were no longer her guardians during her last hospitalization -- this is not accurate.     Chart is accurate; patient's parents are still her guardians.     Patient open with [facility] RN and will need resumption orders at discharge.     Ambulatory Hand-In Info:   Type of residence: Type of residence:: Apartment   Living arrangement: Current living arrangement:: Other (Lives in an apartment with significant other)   Equipment within the home: Equipment Currently Used at Home: tub bench; grab bar, tub/shower; other (see comments); wheelchair, power (Bariatric walker with a seat)   Current services in place: Community Resources: [facility]; County Worker; Other (see comment);  (ABDIEL maurer, moms david)   Additional details/specific concerns r/t this admission: Patient enrolled in Ambulatory Care Coordination. RN CC and guardians have been working to discover cause of multiple asthma exacerbations. Patient is very resistant to input from guardians.     Plan: SW signing off. Please re-consult if discharge coordination needs arise.      Tamra Byrd LUZ MARIA   Social Work Care Manager   [facility]   [phone]    "

## 2025-03-13 NOTE — PLAN OF CARE
"PRIMARY DIAGNOSIS: ASTHMA EXACERBATION  OUTPATIENT/OBSERVATION GOALS TO BE MET BEFORE DISCHARGE:  1. Vital signs stable: Yes    2. Improvement of peak flow to greater than 70% sustained off nebulizer for 4 hours: Yes    3. Dyspnea improved and O2 sats >88% at RA or at prior home O2 therapy level: Yes      SpO2: (!) 91 %, O2 Device: Nasal cannula    4. Short term supplemental O2 needed for use with activity at home:  on 2L NC currently    5. Tolerating adequate PO diet and medications: Yes    6. Return to near baseline physical activity: Yes    Discharge Planner Nurse   Safe discharge environment identified: Yes  Barriers to discharge: Yes    Patient remains stable. On 2L NC. No pain. Purewick removed. Encourage activity out of bed. Receiving duonebs and IV solumedrol.     /71 (BP Location: Left arm)   Pulse 91   Temp 98.5  F (36.9  C) (Oral)   Resp 18   Ht 1.702 m (5' 7\")   Wt (!) 178.4 kg (393 lb 4.8 oz)   LMP 08/18/2008   SpO2 (!) 91%   BMI 61.60 kg/m       Please review provider order for any additional goals.   Nurse to notify provider when observation goals have been met and patient is ready for discharge.    Goal Outcome Evaluation:      Plan of Care Reviewed With: patient    Overall Patient Progress: improvingOverall Patient Progress: improving    Outcome Evaluation: remains on 2L NC. Encourage activity out of bed.      Problem: Adult Inpatient Plan of Care  Goal: Plan of Care Review  Description: The Plan of Care Review/Shift note should be completed every shift.  The Outcome Evaluation is a brief statement about your assessment that the patient is improving, declining, or no change.  This information will be displayed automatically on your shift  note.  3/13/2025 1325 by Deborah Broussard, RN  Outcome: Progressing  Flowsheets (Taken 3/13/2025 1325)  Outcome Evaluation: remains on 2L NC. Encourage activity out of bed.  Plan of Care Reviewed With: patient  Overall Patient Progress: " "improving  3/13/2025 0958 by Deborah Broussard RN  Outcome: Progressing  Flowsheets (Taken 3/13/2025 0958)  Outcome Evaluation: Denies SOB, on duonebs and IV solumedrol. Currently on 2L NC.  Plan of Care Reviewed With: patient  Overall Patient Progress: improving  Goal: Patient-Specific Goal (Individualized)  Description: You can add care plan individualizations to a care plan. Examples of Individualization might be:  \"Parent requests to be called daily at 9am for status\", \"I have a hard time hearing out of my right ear\", or \"Do not touch me to wake me up as it startles  me\".  3/13/2025 1325 by Deborah Broussard RN  Outcome: Progressing  3/13/2025 0958 by Deborah Broussard RN  Outcome: Progressing  Goal: Absence of Hospital-Acquired Illness or Injury  3/13/2025 1325 by Deborah Broussard RN  Outcome: Progressing  3/13/2025 0958 by Deborah Broussard RN  Outcome: Progressing  Intervention: Identify and Manage Fall Risk  Recent Flowsheet Documentation  Taken 3/13/2025 0820 by Deborah Broussard RN  Safety Promotion/Fall Prevention:   clutter free environment maintained   room near nurse's station   assistive device/personal items within reach   lighting adjusted   nonskid shoes/slippers when out of bed   safety round/check completed   treat underlying cause  Intervention: Prevent Skin Injury  Recent Flowsheet Documentation  Taken 3/13/2025 0820 by Deborah Broussard RN  Body Position:   supine, head elevated   supine, legs elevated  Intervention: Prevent Infection  Recent Flowsheet Documentation  Taken 3/13/2025 0820 by Deborah Broussard RN  Infection Prevention:   equipment surfaces disinfected   hand hygiene promoted   rest/sleep promoted   single patient room provided  Goal: Optimal Comfort and Wellbeing  3/13/2025 1325 by Deborah Broussard RN  Outcome: Progressing  3/13/2025 0958 by Deborah Broussard RN  Outcome: Progressing  Intervention: Monitor Pain and Promote Comfort  Recent Flowsheet Documentation  Taken " 3/13/2025 0808 by Deborah Broussard, RN  Pain Management Interventions: medication (see MAR)  Goal: Readiness for Transition of Care  3/13/2025 1325 by Deborah Broussard RN  Outcome: Progressing  3/13/2025 0958 by Deborah Broussard, RN  Outcome: Progressing     Problem: Comorbidity Management  Goal: Maintenance of Asthma Control  3/13/2025 1325 by Deborah Broussard RN  Outcome: Progressing  3/13/2025 0958 by Deborah Broussard RN  Outcome: Progressing  Intervention: Maintain Asthma Symptom Control  Recent Flowsheet Documentation  Taken 3/13/2025 0820 by Deborah Broussard, RN  Medication Review/Management: medications reviewed

## 2025-03-13 NOTE — PLAN OF CARE
PRIMARY DIAGNOSIS: ASTHMA  OUTPATIENT/OBSERVATION GOALS TO BE MET BEFORE DISCHARGE:  1. Vital signs stable: Yes    2. Improvement of peak flow to greater than 70% sustained off nebulizer for 4 hours:  NA    3. Dyspnea improved and O2 sats >88% at RA or at prior home O2 therapy level: Yes      SpO2: 93 %, O2 Device: Nasal cannula    4. Short term supplemental O2 needed for use with activity at home: Yes    5. Tolerating adequate PO diet and medications: Yes    6. Return to near baseline physical activity: Yes    Discharge Planner Nurse   Safe discharge environment identified: Yes  Barriers to discharge: Yes       Entered by: Clayton Ryan RN 03/13/2025 2:00 AM   Pt AXO4, Purewick on , O2 on at 2L NC. Skin fold inter-dry in place.  Please review provider order for any additional goals.   Nurse to notify provider when observation goals have been met and patient is ready for discharge.

## 2025-03-13 NOTE — PLAN OF CARE
Problem: Adult Inpatient Plan of Care  Goal: Absence of Hospital-Acquired Illness or Injury  Intervention: Identify and Manage Fall Risk  Recent Flowsheet Documentation  Taken 3/12/2025 2300 by Clayton Ryan RN  Safety Promotion/Fall Prevention:   activity supervised   clutter free environment maintained   increased rounding and observation   increase visualization of patient   room near nurse's station  Intervention: Prevent Skin Injury  Recent Flowsheet Documentation  Taken 3/12/2025 2300 by Clayton Ryan RN  Body Position: position changed independently  Intervention: Prevent and Manage VTE (Venous Thromboembolism) Risk  Recent Flowsheet Documentation  Taken 3/12/2025 2300 by Clayton Ryan RN  VTE Prevention/Management: SCDs off (sequential compression devices)      PRIMARY DIAGNOSIS: ASTHMA  OUTPATIENT/OBSERVATION GOALS TO BE MET BEFORE DISCHARGE:  1. Vital signs stable: Yes     2. Improvement of peak flow to greater than 70% sustained off nebulizer for 4 hours:  NA     3. Dyspnea improved and O2 sats >88% at RA or at prior home O2 therapy level: Yes      SpO2: 93 %, O2 Device: Nasal cannula     4. Short term supplemental O2 needed for use with activity at home: Yes     5. Tolerating adequate PO diet and medications: Yes     6. Return to near baseline physical activity: Yes        Discharge Planner Nurse  Safe discharge environment identified: Yes  Barriers to discharge: Yes       Entered by: Clayton Ryan RN 03/13/2025 2:00 AM     Pt AXO4, Purewick on , O2 on at 2L NC. Skin fold inter-dry in place.  Please review provider order for any additional goals.   Nurse to notify provider when observation goals have been met and patient is ready for discharge.                             .

## 2025-03-13 NOTE — PLAN OF CARE
"PRIMARY DIAGNOSIS: ASTHMA EXACERBATION  OUTPATIENT/OBSERVATION GOALS TO BE MET BEFORE DISCHARGE:  1. Vital signs stable: Yes    2. Improvement of peak flow to greater than 70% sustained off nebulizer for 4 hours: Yes    3. Dyspnea improved and O2 sats >88% at RA or at prior home O2 therapy level: Yes      SpO2: 93 %, O2 Device: Nasal cannula    4. Short term supplemental O2 needed for use with activity at home:  currently on 2L NC    5. Tolerating adequate PO diet and medications: Yes    6. Return to near baseline physical activity:  has not gotten out of bed yet.    Discharge Planner Nurse   Safe discharge environment identified: Yes  Barriers to discharge: Yes        A/Ox4, on 2L NC, denies SOB, PIV saline locked, on a regular diet. Patient reports 9/10 pain in left buttocks wound- PRN tylenol given. Patient has significant redness in abdominal folds and under breasts- cleaned with soap/water, applied powder, and new interdry in place. Patient has a slightly open reddened wound on left buttocks- cleaned with soap/water and applied new mepilex dressing.  SW consult in place. Has not gotten out of bed yet- encourage activity OOB this shift.     /55 (BP Location: Left arm)   Pulse 85   Temp 98.5  F (36.9  C) (Oral)   Resp 20   Ht 1.702 m (5' 7\")   Wt (!) 178.4 kg (393 lb 4.8 oz)   LMP 08/18/2008   SpO2 93%   BMI 61.60 kg/m        Please review provider order for any additional goals.   Nurse to notify provider when observation goals have been met and patient is ready for discharge.    Goal Outcome Evaluation:      Plan of Care Reviewed With: patient    Overall Patient Progress: improvingOverall Patient Progress: improving    Outcome Evaluation: Denies SOB, on duonebs and IV solumedrol. Currently on 2L NC.      Problem: Adult Inpatient Plan of Care  Goal: Plan of Care Review  Description: The Plan of Care Review/Shift note should be completed every shift.  The Outcome Evaluation is a brief statement " "about your assessment that the patient is improving, declining, or no change.  This information will be displayed automatically on your shift  note.  Outcome: Progressing  Flowsheets (Taken 3/13/2025 0958)  Outcome Evaluation: Denies SOB, on duonebs and IV solumedrol. Currently on 2L NC.  Plan of Care Reviewed With: patient  Overall Patient Progress: improving  Goal: Patient-Specific Goal (Individualized)  Description: You can add care plan individualizations to a care plan. Examples of Individualization might be:  \"Parent requests to be called daily at 9am for status\", \"I have a hard time hearing out of my right ear\", or \"Do not touch me to wake me up as it startles  me\".  Outcome: Progressing  Goal: Absence of Hospital-Acquired Illness or Injury  Outcome: Progressing  Intervention: Identify and Manage Fall Risk  Recent Flowsheet Documentation  Taken 3/13/2025 0820 by Deborah Broussard RN  Safety Promotion/Fall Prevention:   clutter free environment maintained   room near nurse's station   assistive device/personal items within reach   lighting adjusted   nonskid shoes/slippers when out of bed   safety round/check completed   treat underlying cause  Intervention: Prevent Skin Injury  Recent Flowsheet Documentation  Taken 3/13/2025 0820 by Deborah Broussard RN  Body Position:   supine, head elevated   supine, legs elevated  Intervention: Prevent Infection  Recent Flowsheet Documentation  Taken 3/13/2025 0820 by Deborah Broussard RN  Infection Prevention:   equipment surfaces disinfected   hand hygiene promoted   rest/sleep promoted   single patient room provided  Goal: Optimal Comfort and Wellbeing  Outcome: Progressing  Intervention: Monitor Pain and Promote Comfort  Recent Flowsheet Documentation  Taken 3/13/2025 0808 by Deborah Broussard RN  Pain Management Interventions: medication (see MAR)  Goal: Readiness for Transition of Care  Outcome: Progressing     Problem: Comorbidity Management  Goal: Maintenance of " Asthma Control  Outcome: Progressing  Intervention: Maintain Asthma Symptom Control  Recent Flowsheet Documentation  Taken 3/13/2025 0820 by Deborah Broussard, RN  Medication Review/Management: medications reviewed

## 2025-03-13 NOTE — PROVIDER NOTIFICATION
"  [facility] RCAT     Date: 3/12/2025  Admission Dx: 3/12/2025  Pulmonary History: Asthma  Home Nebulizer/MDI Use:  Home Oxygen: 2.5L at home        Current Oxygen Requirements: 2L  Current SpO2: 93%      Patient Education: Completed      See \"RT Assessments\" flow sheet for patient assessment scoring and Acuity Level Details.             03/12/25 1938   RCAT Assessment   Reason for Assessment Asthma   Pulmonary Status 4   Surgical Status 0   Chest X-ray 0   Respiratory Pattern 2   Mental Status 0   Breath Sounds 4   Cough Effectiveness 0   Level of Activity 1   O2 Required for SpO2>=92% 1   Acuity Level (points) 12   Acuity Level  3   Re-eval Interval Guideline Every 3 days   Re-evaluation Date 03/15/25   $RT Consult Time Spent RCAT (30 minute increments) 30   Clinical Indications/Symptoms   Aerosol Therapy RCAT protocol;Physician order   Volume Expansion Prevent atelectasis   Aerosol Therapy Plan   RT Treatment Nebulizer   Anticholinergic/Beta-Andrenergic Agonist Duoneb soln (0.5mg/3mg per 3mL) neb Max 6 doses/24h   Aerosol Treatment Frequency Acuity Level 3: QID/PRN @noc-Mod wheezing/Hx asthma/secretion removal   Aerosol Therapy (SVN)   Daily Review of Necessity (SVN) completed   Respiratory Treatment Status (SVN) given   Patient Position HOB elevated   Posttreatment Assessment (SVN) breath sounds unchanged   Signs of Intolerance (SVN) none   Broncho-Pulmonary Hygiene Plan   Broncho-Pulmonary Hygiene Treatment Coughing techniques   Broncho-Pulm Hygiene Frequency Acuity Level 3: TID-Small amounts secretions/poor cough, hx of secretions   Volume Expansion Plan   Volume Expansion Treatment Incentive Spirometer   Volume Expansion Frequency Acuity Level 3: TID-At risk for developing atelectasis   Breath Sounds   Breath Sounds All Fields   All Lung Fields Breath Sounds diminished;wheezes, expiratory     Judy Farr, RT on 3/12/2025 at 7:49 PM    "

## 2025-03-14 LAB — LMWH PPP CHRO-ACNC: 0.24 IU/ML

## 2025-03-14 PROCEDURE — 250N000011 HC RX IP 250 OP 636: Performed by: PHYSICIAN ASSISTANT

## 2025-03-14 PROCEDURE — 250N000013 HC RX MED GY IP 250 OP 250 PS 637: Performed by: STUDENT IN AN ORGANIZED HEALTH CARE EDUCATION/TRAINING PROGRAM

## 2025-03-14 PROCEDURE — 250N000009 HC RX 250: Performed by: STUDENT IN AN ORGANIZED HEALTH CARE EDUCATION/TRAINING PROGRAM

## 2025-03-14 PROCEDURE — 99233 SBSQ HOSP IP/OBS HIGH 50: CPT | Performed by: PHYSICIAN ASSISTANT

## 2025-03-14 PROCEDURE — 250N000011 HC RX IP 250 OP 636: Mod: JW | Performed by: STUDENT IN AN ORGANIZED HEALTH CARE EDUCATION/TRAINING PROGRAM

## 2025-03-14 PROCEDURE — 94640 AIRWAY INHALATION TREATMENT: CPT | Mod: 76

## 2025-03-14 PROCEDURE — 36415 COLL VENOUS BLD VENIPUNCTURE: CPT | Performed by: PHYSICIAN ASSISTANT

## 2025-03-14 PROCEDURE — 85520 HEPARIN ASSAY: CPT | Performed by: PHYSICIAN ASSISTANT

## 2025-03-14 PROCEDURE — 94640 AIRWAY INHALATION TREATMENT: CPT

## 2025-03-14 PROCEDURE — 120N000001 HC R&B MED SURG/OB

## 2025-03-14 PROCEDURE — 999N000157 HC STATISTIC RCP TIME EA 10 MIN

## 2025-03-14 RX ORDER — ENOXAPARIN SODIUM 100 MG/ML
50 INJECTION SUBCUTANEOUS EVERY 12 HOURS
Status: DISCONTINUED | OUTPATIENT
Start: 2025-03-14 | End: 2025-03-16 | Stop reason: HOSPADM

## 2025-03-14 RX ORDER — GUAIFENESIN/DEXTROMETHORPHAN 100-10MG/5
10 SYRUP ORAL EVERY 4 HOURS PRN
Status: DISCONTINUED | OUTPATIENT
Start: 2025-03-14 | End: 2025-03-16 | Stop reason: HOSPADM

## 2025-03-14 RX ADMIN — IPRATROPIUM BROMIDE AND ALBUTEROL SULFATE 3 ML: .5; 3 SOLUTION RESPIRATORY (INHALATION) at 08:17

## 2025-03-14 RX ADMIN — FLUTICASONE PROPIONATE 1 SPRAY: 50 SPRAY, METERED NASAL at 08:25

## 2025-03-14 RX ADMIN — CARBAMAZEPINE 200 MG: 200 TABLET ORAL at 12:16

## 2025-03-14 RX ADMIN — METHYLPREDNISOLONE SODIUM SUCCINATE 62.5 MG: 125 INJECTION, POWDER, FOR SOLUTION INTRAMUSCULAR; INTRAVENOUS at 08:24

## 2025-03-14 RX ADMIN — ENOXAPARIN SODIUM 40 MG: 40 INJECTION SUBCUTANEOUS at 08:25

## 2025-03-14 RX ADMIN — PANTOPRAZOLE SODIUM 40 MG: 40 TABLET, DELAYED RELEASE ORAL at 16:48

## 2025-03-14 RX ADMIN — IPRATROPIUM BROMIDE AND ALBUTEROL SULFATE 3 ML: .5; 3 SOLUTION RESPIRATORY (INHALATION) at 15:30

## 2025-03-14 RX ADMIN — CARBAMAZEPINE 400 MG: 200 TABLET ORAL at 21:10

## 2025-03-14 RX ADMIN — CARBAMAZEPINE 400 MG: 200 TABLET ORAL at 08:24

## 2025-03-14 RX ADMIN — FLUTICASONE FUROATE AND VILANTEROL TRIFENATATE 1 PUFF: 100; 25 POWDER RESPIRATORY (INHALATION) at 21:11

## 2025-03-14 RX ADMIN — ENOXAPARIN SODIUM 50 MG: 60 INJECTION SUBCUTANEOUS at 21:09

## 2025-03-14 RX ADMIN — PANTOPRAZOLE SODIUM 40 MG: 40 TABLET, DELAYED RELEASE ORAL at 08:25

## 2025-03-14 RX ADMIN — LISINOPRIL 20 MG: 10 TABLET ORAL at 08:25

## 2025-03-14 RX ADMIN — METHYLPREDNISOLONE SODIUM SUCCINATE 62.5 MG: 125 INJECTION, POWDER, FOR SOLUTION INTRAMUSCULAR; INTRAVENOUS at 21:09

## 2025-03-14 RX ADMIN — IPRATROPIUM BROMIDE AND ALBUTEROL SULFATE 3 ML: .5; 3 SOLUTION RESPIRATORY (INHALATION) at 19:26

## 2025-03-14 RX ADMIN — MICONAZOLE NITRATE: 20 POWDER TOPICAL at 08:26

## 2025-03-14 RX ADMIN — SERTRALINE HYDROCHLORIDE 100 MG: 100 TABLET ORAL at 08:25

## 2025-03-14 RX ADMIN — MICONAZOLE NITRATE: 20 POWDER TOPICAL at 21:12

## 2025-03-14 RX ADMIN — IPRATROPIUM BROMIDE AND ALBUTEROL SULFATE 3 ML: .5; 3 SOLUTION RESPIRATORY (INHALATION) at 11:52

## 2025-03-14 RX ADMIN — ATORVASTATIN CALCIUM 10 MG: 10 TABLET, FILM COATED ORAL at 08:25

## 2025-03-14 ASSESSMENT — ACTIVITIES OF DAILY LIVING (ADL)
ADLS_ACUITY_SCORE: 51
ADLS_ACUITY_SCORE: 48
ADLS_ACUITY_SCORE: 48
ADLS_ACUITY_SCORE: 51
ADLS_ACUITY_SCORE: 51
ADLS_ACUITY_SCORE: 48
ADLS_ACUITY_SCORE: 51
ADLS_ACUITY_SCORE: 48
ADLS_ACUITY_SCORE: 51
ADLS_ACUITY_SCORE: 48
ADLS_ACUITY_SCORE: 51
ADLS_ACUITY_SCORE: 48
ADLS_ACUITY_SCORE: 51
ADLS_ACUITY_SCORE: 51

## 2025-03-14 NOTE — PLAN OF CARE
"    Goal Outcome Evaluation:      Plan of Care Reviewed With: patient    Overall Patient Progress: improvingOverall Patient Progress: improving    Outcome Evaluation: Pt AOx4,on RA goal 89-90%.Regular diet.No purewick.Ax1, denies pain . Plan:  To discharge home for home today health cares.          Problem: Adult Inpatient Plan of Care  Goal: Plan of Care Review  Description: The Plan of Care Review/Shift note should be completed every shift.  The Outcome Evaluation is a brief statement about your assessment that the patient is improving, declining, or no change.  This information will be displayed automatically on your shift  note.  Outcome: Progressing  Flowsheets (Taken 3/14/2025 0215)  Outcome Evaluation: Pt AOx4,on RA goal 89-90%.Regular diet.No purewick.Ax1, denies pain . Plan:  To discharge home for home today health cares.  Plan of Care Reviewed With: patient  Overall Patient Progress: improving  Goal: Patient-Specific Goal (Individualized)  Description: You can add care plan individualizations to a care plan. Examples of Individualization might be:  \"Parent requests to be called daily at 9am for status\", \"I have a hard time hearing out of my right ear\", or \"Do not touch me to wake me up as it startles  me\".  Outcome: Progressing  Goal: Absence of Hospital-Acquired Illness or Injury  Outcome: Progressing  Intervention: Prevent Skin Injury  Recent Flowsheet Documentation  Taken 3/13/2025 2017 by Clayton Ryan, RN  Body Position: position changed independently  Intervention: Prevent and Manage VTE (Venous Thromboembolism) Risk  Recent Flowsheet Documentation  Taken 3/13/2025 2017 by Clayton Ryan, RN  VTE Prevention/Management: SCDs off (sequential compression devices)  Goal: Optimal Comfort and Wellbeing  Outcome: Progressing  Goal: Readiness for Transition of Care  Outcome: Progressing     Problem: Comorbidity Management  Goal: Maintenance of Asthma Control  Outcome: Progressing     Problem: Pain " Acute  Goal: Optimal Pain Control and Function  Outcome: Progressing     Problem: Chest Pain  Goal: Resolution of Chest Pain Symptoms  Outcome: Progressing

## 2025-03-14 NOTE — PROGRESS NOTES
[facility]    Medicine Progress Note - Hospitalist Service    Date of Admission:  3/12/2025    Assessment & Plan   Brissa Corado is a 59 year old female with past medical history of asthma, hypertension, Cushing syndrome, depression, morbid obesity, sleep apnea, borderline developmental delay. She arrived to the ED 3/12 for shortness of breath, wheeze and cough for the previous 5 days. She was recently hospitalized from 2/11/2025 to 2/14/2025 with acute asthma exacerbation.    Vitals on arrival: Temperature 98.7  F, heart rate 86/min, blood pressure 143/62 with respiratory rate of 31 with oxygen saturation of 91%, later became hypoxic to 88% and was started on oxygen at 2 L/min.  She appears short of breath and is having coughing bouts but otherwise able to talk in full sentences.Chest x-ray showed low lung volumes without focal airspace opacity.  CBC and BMP were unremarkable.  Tested negative for COVID-19, influenza A/B and RSV.  EKG showed normal sinus rhythm.  VBG showed pH/pCO2 of 7.3/61.Patient was given DuoNebs x 2 and albuterol nebs 2.5 mg and prednisone 40 mg p.o and hospitalization was requested.    On the second day of admission she continued to feel short of breath with central chest discomfort and hypoxia.  Due to high risk of PE D-dimer was added and fortunately normal.  She was continued on medical management with scheduled DuoNebs and IV steroids.  By 3/14 we were able to wean off oxygen at rest but she continued to be slightly hypoxic to 85% with ambulation and therefore will stay tonight for continued IV steroids and DuoNebs.    Suspect the patient will be medically ready to discharge on 3/15.    Acute hypoxic respiratory failure due to acute asthmatic exacerbation  Obstructive sleep apnea  She has history of O2 use at nighttime in place of her CPAP. States she is due to see sleep pulmonology in June. In the last two week, has needed to use the oxygen during the day due  "to shortness of breath.   --Chest x-ray negative for pneumonia with negative influenza/RSV/COVID-19  -- Weaned off of oxygen on 3/14 but is slightly hypoxic to 85% with ambulation  -- Continue solu-medrol IV Q12  -- Continue duonebs 4x daily and albuterol nebs Q4H PRN   --Robitussin DM ordered  -- Continue breo ellipta daily   -- D-dimer normal so low suspicion of PE      Hyperlipidemia  Hypertension  -- Continue Lipitor   -- Continue PTA lisinopril    Hx of seizure disorder  -- Continue PTA tegretol    Allergic rhinitis  -- Continue PTA azelastine and fluticasone PRN    Depression  -- Continue PTA Zoloft     Mild developmental delays  --Documentation supports that patient's parents are her legal guardians but she refutes this.   -- I spoke with her mother who states that she is still her medical guardian and they are working on getting her into an assisted living but she refuses to talk to them at this point.  I did not tell the patient that I spoke with her mother.  The patient plans to have her spouse pick her up when she is ready to go.          Diet: Regular Diet Adult    DVT Prophylaxis: Heparin SQ  Grajeda Catheter: Not present  Lines: None     Cardiac Monitoring: None  Code Status: Full Code      Clinically Significant Risk Factors                   # Hypertension: Noted on problem list            # Severe Obesity: Estimated body mass index is 61.6 kg/m  as calculated from the following:    Height as of this encounter: 1.702 m (5' 7\").    Weight as of this encounter: 178.4 kg (393 lb 4.8 oz)., PRESENT ON ADMISSION     # Asthma: noted on problem list        Social Drivers of Health            Disposition Plan     Medically Ready for Discharge: Anticipated Tomorrow           The patient's care was discussed with the Bedside Nurse, Care Coordinator/, Patient, and Patient's Family.    Sydnie Mills PA-C  Hospitalist Service  [facility]  Securely message with [employer] " (more info)  Text page via [facility] Paging/Directory   ______________________________________________________________________    Interval History   Overall patient feels better with improvement of cough.  Shortness of breath is also improving.  She denies chest pain, nausea, vomiting, diarrhea and urinary symptoms.    Physical Exam   Vital Signs: Temp: 98  F (36.7  C) Temp src: Oral BP: (!) 145/84 Pulse: 78   Resp: 20 SpO2: (!) 90 % O2 Device: None (Room air) Oxygen Delivery: 1 LPM  Weight: 393 lbs 4.81 oz    General Appearance: Alert and oriented x 3, poor insight  Respiratory: Not taking deep breaths, mild rhonchi heard  Cardiovascular: RRR without murmur  GI: Bowel sounds are present without tenderness  Skin: No rashes or open sores are noted  Other: Bilateral lymphedema    Medical Decision Making       55 MINUTES SPENT BY ME on the date of service doing chart review, history, exam, documentation & further activities per the note.      Data     I have personally reviewed the following data over the past 24 hrs:    INR:  N/A PTT:  N/A   D-dimer:  <0.27 Fibrinogen:  N/A       Imaging results reviewed over the past 24 hrs:   No results found for this or any previous visit (from the past 24 hours).

## 2025-03-14 NOTE — PLAN OF CARE
"Goal Outcome Evaluation:      Plan of Care Reviewed With: patient    Overall Patient Progress: improvingOverall Patient Progress: improving     BP (!) 145/84 (BP Location: Left arm)   Pulse 78   Temp 98  F (36.7  C) (Oral)   Resp 20   Ht 1.702 m (5' 7\")   Wt (!) 178.4 kg (393 lb 4.8 oz)   LMP 08/18/2008   SpO2 (!) 90%   BMI 61.60 kg/m      Pt A & O x 4, able to make needs known, call light with in reach for needs, ambulated in the hole way and became hypoxic and dizzy, did also report SOB, updated provider, plan is to discharge when medically ready.     Problem: Adult Inpatient Plan of Care  Goal: Plan of Care Review  Description: The Plan of Care Review/Shift note should be completed every shift.  The Outcome Evaluation is a brief statement about your assessment that the patient is improving, declining, or no change.  This information will be displayed automatically on your shift  note.  Outcome: Progressing  Flowsheets (Taken 3/14/2025 1241)  Plan of Care Reviewed With: patient  Overall Patient Progress: improving  Goal: Patient-Specific Goal (Individualized)  Description: You can add care plan individualizations to a care plan. Examples of Individualization might be:  \"Parent requests to be called daily at 9am for status\", \"I have a hard time hearing out of my right ear\", or \"Do not touch me to wake me up as it startles  me\".  Outcome: Progressing  Goal: Absence of Hospital-Acquired Illness or Injury  Outcome: Progressing  Intervention: Prevent Skin Injury  Recent Flowsheet Documentation  Taken 3/14/2025 0900 by Zachary Adan RN  Body Position: position changed independently  Intervention: Prevent and Manage VTE (Venous Thromboembolism) Risk  Recent Flowsheet Documentation  Taken 3/14/2025 0900 by Zachary Adan RN  VTE Prevention/Management: SCDs off (sequential compression devices)  Goal: Optimal Comfort and Wellbeing  Outcome: Progressing  Goal: Readiness for Transition of Care  Outcome: " Progressing     Problem: Comorbidity Management  Goal: Maintenance of Asthma Control  Outcome: Progressing     Problem: Pain Acute  Goal: Optimal Pain Control and Function  Outcome: Progressing     Problem: Chest Pain  Goal: Resolution of Chest Pain Symptoms  Outcome: Progressing

## 2025-03-15 LAB
CREAT SERPL-MCNC: 0.64 MG/DL (ref 0.51–0.95)
EGFRCR SERPLBLD CKD-EPI 2021: >90 ML/MIN/1.73M2
PLATELET # BLD AUTO: 200 10E3/UL (ref 150–450)

## 2025-03-15 PROCEDURE — 82565 ASSAY OF CREATININE: CPT | Performed by: STUDENT IN AN ORGANIZED HEALTH CARE EDUCATION/TRAINING PROGRAM

## 2025-03-15 PROCEDURE — 999N000157 HC STATISTIC RCP TIME EA 10 MIN

## 2025-03-15 PROCEDURE — 85049 AUTOMATED PLATELET COUNT: CPT | Performed by: STUDENT IN AN ORGANIZED HEALTH CARE EDUCATION/TRAINING PROGRAM

## 2025-03-15 PROCEDURE — 250N000009 HC RX 250: Performed by: STUDENT IN AN ORGANIZED HEALTH CARE EDUCATION/TRAINING PROGRAM

## 2025-03-15 PROCEDURE — 36415 COLL VENOUS BLD VENIPUNCTURE: CPT | Performed by: STUDENT IN AN ORGANIZED HEALTH CARE EDUCATION/TRAINING PROGRAM

## 2025-03-15 PROCEDURE — 250N000013 HC RX MED GY IP 250 OP 250 PS 637: Performed by: STUDENT IN AN ORGANIZED HEALTH CARE EDUCATION/TRAINING PROGRAM

## 2025-03-15 PROCEDURE — 94640 AIRWAY INHALATION TREATMENT: CPT

## 2025-03-15 PROCEDURE — 120N000001 HC R&B MED SURG/OB

## 2025-03-15 PROCEDURE — 94640 AIRWAY INHALATION TREATMENT: CPT | Mod: 76

## 2025-03-15 PROCEDURE — 250N000011 HC RX IP 250 OP 636: Performed by: STUDENT IN AN ORGANIZED HEALTH CARE EDUCATION/TRAINING PROGRAM

## 2025-03-15 PROCEDURE — 250N000011 HC RX IP 250 OP 636: Performed by: PHYSICIAN ASSISTANT

## 2025-03-15 PROCEDURE — 99232 SBSQ HOSP IP/OBS MODERATE 35: CPT | Performed by: INTERNAL MEDICINE

## 2025-03-15 PROCEDURE — 999N000156 HC STATISTIC RCP CONSULT EA 30 MIN

## 2025-03-15 RX ORDER — METHYLPREDNISOLONE SODIUM SUCCINATE 125 MG/2ML
60 INJECTION INTRAMUSCULAR; INTRAVENOUS EVERY 24 HOURS
Status: DISCONTINUED | OUTPATIENT
Start: 2025-03-16 | End: 2025-03-16 | Stop reason: HOSPADM

## 2025-03-15 RX ORDER — ALBUTEROL SULFATE 0.83 MG/ML
2.5 SOLUTION RESPIRATORY (INHALATION) EVERY 6 HOURS PRN
Status: DISCONTINUED | OUTPATIENT
Start: 2025-03-15 | End: 2025-03-16 | Stop reason: HOSPADM

## 2025-03-15 RX ADMIN — FLUTICASONE PROPIONATE 1 SPRAY: 50 SPRAY, METERED NASAL at 08:10

## 2025-03-15 RX ADMIN — ENOXAPARIN SODIUM 50 MG: 60 INJECTION SUBCUTANEOUS at 08:09

## 2025-03-15 RX ADMIN — CARBAMAZEPINE 400 MG: 200 TABLET ORAL at 19:47

## 2025-03-15 RX ADMIN — CARBAMAZEPINE 400 MG: 200 TABLET ORAL at 08:08

## 2025-03-15 RX ADMIN — CARBAMAZEPINE 200 MG: 200 TABLET ORAL at 12:20

## 2025-03-15 RX ADMIN — ENOXAPARIN SODIUM 50 MG: 60 INJECTION SUBCUTANEOUS at 19:48

## 2025-03-15 RX ADMIN — SERTRALINE HYDROCHLORIDE 100 MG: 100 TABLET ORAL at 08:09

## 2025-03-15 RX ADMIN — MICONAZOLE NITRATE: 20 POWDER TOPICAL at 19:48

## 2025-03-15 RX ADMIN — PANTOPRAZOLE SODIUM 40 MG: 40 TABLET, DELAYED RELEASE ORAL at 08:09

## 2025-03-15 RX ADMIN — IPRATROPIUM BROMIDE AND ALBUTEROL SULFATE 3 ML: .5; 3 SOLUTION RESPIRATORY (INHALATION) at 11:43

## 2025-03-15 RX ADMIN — MICONAZOLE NITRATE: 20 POWDER TOPICAL at 08:11

## 2025-03-15 RX ADMIN — PANTOPRAZOLE SODIUM 40 MG: 40 TABLET, DELAYED RELEASE ORAL at 16:47

## 2025-03-15 RX ADMIN — IPRATROPIUM BROMIDE AND ALBUTEROL SULFATE 3 ML: .5; 3 SOLUTION RESPIRATORY (INHALATION) at 07:43

## 2025-03-15 RX ADMIN — LISINOPRIL 20 MG: 10 TABLET ORAL at 08:09

## 2025-03-15 RX ADMIN — ATORVASTATIN CALCIUM 10 MG: 10 TABLET, FILM COATED ORAL at 08:09

## 2025-03-15 RX ADMIN — METHYLPREDNISOLONE SODIUM SUCCINATE 62.5 MG: 125 INJECTION, POWDER, FOR SOLUTION INTRAMUSCULAR; INTRAVENOUS at 08:08

## 2025-03-15 ASSESSMENT — ACTIVITIES OF DAILY LIVING (ADL)
ADLS_ACUITY_SCORE: 48
ADLS_ACUITY_SCORE: 48
ADLS_ACUITY_SCORE: 51
ADLS_ACUITY_SCORE: 51
ADLS_ACUITY_SCORE: 48
ADLS_ACUITY_SCORE: 51
ADLS_ACUITY_SCORE: 51
ADLS_ACUITY_SCORE: 48
ADLS_ACUITY_SCORE: 51
ADLS_ACUITY_SCORE: 48

## 2025-03-15 NOTE — PROGRESS NOTES
[facility]    Medicine Progress Note - Hospitalist Service    Date of Admission:  3/12/2025    Assessment & Plan   Brissa Corado is a 59 year old female with past medical history of asthma, hypertension, Cushing syndrome, depression, morbid obesity, sleep apnea, borderline developmental delay. She arrived to the ED 3/12 for shortness of breath, wheeze and cough for the previous 5 days. She was recently hospitalized from 2/11/2025 to 2/14/2025 with acute asthma exacerbation.    Vitals on arrival: Temperature 98.7  F, heart rate 86/min, blood pressure 143/62 with respiratory rate of 31 with oxygen saturation of 91%, later became hypoxic to 88% and was started on oxygen at 2 L/min.  She appears short of breath and is having coughing bouts but otherwise able to talk in full sentences.Chest x-ray showed low lung volumes without focal airspace opacity.  CBC and BMP were unremarkable.  Tested negative for COVID-19, influenza A/B and RSV.  EKG showed normal sinus rhythm.  VBG showed pH/pCO2 of 7.3/61.Patient was given DuoNebs x 2 and albuterol nebs 2.5 mg and prednisone 40 mg p.o and hospitalization was requested.    On the second day of admission she continued to feel short of breath with central chest discomfort and hypoxia.  Due to high risk of PE D-dimer was added and fortunately normal.  She was continued on medical management with scheduled DuoNebs and IV steroids.  By 3/14 we were able to wean off oxygen at rest but she continued to be slightly hypoxic to 85% with ambulation and therefore will stay tonight for continued IV steroids and DuoNebs.    Suspect the patient will be medically ready to discharge on 3/15.    Acute hypoxic respiratory failure due to acute asthmatic exacerbation, resolved. Continues to have wheezes responding well to nebulization therapy   Obstructive sleep apnea  She has history of O2 use at nighttime in place of her CPAP. States she is due to see sleep pulmonology  "in June. In the last two week, has needed to use the oxygen during the day due to shortness of breath.   --Chest x-ray negative for pneumonia with negative influenza/RSV/COVID-19  -- Weaned off of oxygen on 3/14 but is slightly hypoxic to 85% with ambulation  -- discontinued solu-medrol IV  62.5 mg Q12 to once daily   -- Continue duonebs 4x daily and albuterol nebs Q4H PRN   --Robitussin DM ordered  -- Continue breo ellipta daily   -- D-dimer normal so low suspicion of PE      Hyperlipidemia  Hypertension  -- Continue Lipitor   -- Continue PTA lisinopril    Hx of seizure disorder  -- Continue PTA tegretol    Allergic rhinitis  -- Continue PTA azelastine and fluticasone PRN    Depression  -- Continue PTA Zoloft     Mild developmental delays  --Documentation supports that patient's parents are her legal guardians but she refutes this.   -- I spoke with her mother who states that she is still her medical guardian and they are working on getting her into an assisted living but she refuses to talk to them at this point.  I did not tell the patient that I spoke with her mother.  The patient plans to have her spouse pick her up when she is ready to go.          Diet: Regular Diet Adult    DVT Prophylaxis: Heparin SQ  Grajeda Catheter: Not present  Lines: None     Cardiac Monitoring: None  Code Status: Full Code      Clinically Significant Risk Factors                   # Hypertension: Noted on problem list            # Severe Obesity: Estimated body mass index is 61.6 kg/m  as calculated from the following:    Height as of this encounter: 1.702 m (5' 7\").    Weight as of this encounter: 178.4 kg (393 lb 4.8 oz)., PRESENT ON ADMISSION     # Asthma: noted on problem list        Social Drivers of Health            Disposition Plan     Medically Ready for Discharge: Anticipated Tomorrow           The patient's care was discussed with the Bedside Nurse, Care Coordinator/, Patient, and Patient's Family.    Verenice" MD Yash  Hospitalist Service  [facility]  Securely message with [employer] (more info)  Text page via [employer] Paging/Directory   ______________________________________________________________________    Interval History   Pt seen and evaluated , still wheezing, on room air   Overall patient feels better with improvement of cough.  Shortness of breath is also improving.  She denies chest pain, nausea, vomiting, diarrhea and urinary symptoms.    Physical Exam   Vital Signs: Temp: 98.6  F (37  C) Temp src: Oral BP: 138/73 Pulse: 95   Resp: 18 SpO2: 95 % O2 Device: None (Room air) Oxygen Delivery: 2 LPM  Weight: 393 lbs 4.81 oz    General Appearance: Alert and oriented x 3, poor insight  Respiratory: Not taking deep breaths, mild rhonchi heard  Cardiovascular: RRR without murmur  GI: Bowel sounds are present without tenderness  Skin: No rashes or open sores are noted  Other: Bilateral lymphedema    Medical Decision Making       55 MINUTES SPENT BY ME on the date of service doing chart review, history, exam, documentation & further activities per the note.      Data     I have personally reviewed the following data over the past 24 hrs:    N/A  \   N/A   / 200     N/A N/A N/A /  N/A   N/A N/A 0.64 \       Imaging results reviewed over the past 24 hrs:   No results found for this or any previous visit (from the past 24 hours).

## 2025-03-15 NOTE — PLAN OF CARE
"    Goal Outcome Evaluation:      Plan of Care Reviewed With: patient    Overall Patient Progress: improvingOverall Patient Progress: improving    Outcome Evaluation: Outcome Evaluation: Pt AOX4, on 2L O2 NC to keep Sats at 89-90%.On scheduled duonebs and IV steroids. Ax1 WGB. To Discharge home with selfcare. Oxygen dropped to low 80s while sleeping, Oxygen applied.      Problem: Adult Inpatient Plan of Care  Goal: Plan of Care Review  Description: The Plan of Care Review/Shift note should be completed every shift.  The Outcome Evaluation is a brief statement about your assessment that the patient is improving, declining, or no change.  This information will be displayed automatically on your shift  note.  3/15/2025 0257 by Clayton Ryan RN  Outcome: Progressing  Flowsheets (Taken 3/15/2025 0257)  Outcome Evaluation: Outcome Evaluation: Pt AOX4, on 2L O2 NC to keep Sats at 89-90%.On scheduled duonebs and IV steroids. Ax1 WGB. To Discharge home with selfcare.  Plan of Care Reviewed With: patient  Overall Patient Progress: improving  3/15/2025 0251 by Clayton Ryan RN  Outcome: Progressing  Flowsheets (Taken 3/15/2025 0251)  Outcome Evaluation: Pt AOX4, on 2L O2 NC to keep Sats at 89-90%.On scheduled duonebs and IV steroids. Ax1 WGB. To Discharge home w.  Plan of Care Reviewed With: patient  Overall Patient Progress: improving  Goal: Patient-Specific Goal (Individualized)  Description: You can add care plan individualizations to a care plan. Examples of Individualization might be:  \"Parent requests to be called daily at 9am for status\", \"I have a hard time hearing out of my right ear\", or \"Do not touch me to wake me up as it startles  me\".  3/15/2025 0257 by Clayton Ryan RN  Outcome: Progressing  3/15/2025 0251 by Clayton Ryan RN  Outcome: Progressing  Goal: Absence of Hospital-Acquired Illness or Injury  3/15/2025 0257 by Clayton Ryan, RN  Outcome: Progressing  3/15/2025 0251 by Clayton Ryan, " RN  Outcome: Progressing  Intervention: Identify and Manage Fall Risk  Recent Flowsheet Documentation  Taken 3/14/2025 2230 by Clayton Ryan RN  Safety Promotion/Fall Prevention:   clutter free environment maintained   room near nurse's station   assistive device/personal items within reach   lighting adjusted   nonskid shoes/slippers when out of bed   safety round/check completed   treat underlying cause  Intervention: Prevent Skin Injury  Recent Flowsheet Documentation  Taken 3/14/2025 2230 by Clayton Ryan RN  Body Position: position changed independently  Intervention: Prevent and Manage VTE (Venous Thromboembolism) Risk  Recent Flowsheet Documentation  Taken 3/14/2025 2230 by Clayton Ryan RN  VTE Prevention/Management: SCDs off (sequential compression devices)  Intervention: Prevent Infection  Recent Flowsheet Documentation  Taken 3/14/2025 2230 by Clayton Ryan RN  Infection Prevention:   equipment surfaces disinfected   hand hygiene promoted   rest/sleep promoted   single patient room provided  Goal: Optimal Comfort and Wellbeing  3/15/2025 0257 by Clayton Ryan RN  Outcome: Progressing  3/15/2025 0251 by Clayton Ryan RN  Outcome: Progressing  Goal: Readiness for Transition of Care  3/15/2025 0257 by Clayton Ryan RN  Outcome: Progressing  3/15/2025 0251 by Clayton Ryan RN  Outcome: Progressing     Problem: Comorbidity Management  Goal: Maintenance of Asthma Control  3/15/2025 0257 by Clayton Ryan RN  Outcome: Progressing  3/15/2025 0251 by Clayton Ryan RN  Outcome: Progressing  Intervention: Maintain Asthma Symptom Control  Recent Flowsheet Documentation  Taken 3/14/2025 2230 by Clayton Ryan RN  Medication Review/Management: medications reviewed     Problem: Pain Acute  Goal: Optimal Pain Control and Function  3/15/2025 0257 by Clayton Ryan RN  Outcome: Progressing  3/15/2025 0251 by Clayton Ryan RN  Outcome: Progressing  Intervention: Prevent or Manage  Pain  Recent Flowsheet Documentation  Taken 3/14/2025 2230 by Clayton Ryan, RN  Bowel Elimination Promotion: adequate fluid intake promoted  Medication Review/Management: medications reviewed     Problem: Chest Pain  Goal: Resolution of Chest Pain Symptoms  3/15/2025 0257 by Clayton Ryan, RN  Outcome: Progressing  3/15/2025 0251 by Clayton Ryan, RN  Outcome: Progressing

## 2025-03-15 NOTE — PLAN OF CARE
"Goal Outcome Evaluation:      Plan of Care Reviewed With: patient    Overall Patient Progress: improvingOverall Patient Progress: improving     /73 (BP Location: Left arm)   Pulse 95   Temp 98.6  F (37  C) (Oral)   Resp 18   Ht 1.702 m (5' 7\")   Wt (!) 178.4 kg (393 lb 4.8 oz)   LMP 08/18/2008   SpO2 95%   BMI 61.60 kg/m      /73 (BP Location: Left arm)   Pulse 95   Temp 98.6  F (37  C) (Oral)   Resp 18   Ht 1.702 m (5' 7\")   Wt (!) 178.4 kg (393 lb 4.8 oz)   LMP 08/18/2008   SpO2 95%   BMI 61.60 kg/m      Pt A & O x 4, able to make needs known, call light with in reach for needs, in RA and drops with actively, the pt said that she uses oxygen as needed and during the night at home, plan is to discharge when medically ready.     Problem: Adult Inpatient Plan of Care  Goal: Plan of Care Review  Description: The Plan of Care Review/Shift note should be completed every shift.  The Outcome Evaluation is a brief statement about your assessment that the patient is improving, declining, or no change.  This information will be displayed automatically on your shift  note.  Outcome: Progressing  Flowsheets (Taken 3/15/2025 1232)  Plan of Care Reviewed With: patient  Overall Patient Progress: improving  Goal: Patient-Specific Goal (Individualized)  Description: You can add care plan individualizations to a care plan. Examples of Individualization might be:  \"Parent requests to be called daily at 9am for status\", \"I have a hard time hearing out of my right ear\", or \"Do not touch me to wake me up as it startles  me\".  Outcome: Progressing  Goal: Absence of Hospital-Acquired Illness or Injury  Outcome: Progressing  Intervention: Identify and Manage Fall Risk  Recent Flowsheet Documentation  Taken 3/15/2025 0900 by Zachary Adan RN  Safety Promotion/Fall Prevention:   clutter free environment maintained   room near nurse's station   assistive device/personal items within reach   lighting " adjusted   nonskid shoes/slippers when out of bed   safety round/check completed   treat underlying cause  Intervention: Prevent Skin Injury  Recent Flowsheet Documentation  Taken 3/15/2025 0900 by Zachary Adan RN  Body Position: position changed independently  Intervention: Prevent and Manage VTE (Venous Thromboembolism) Risk  Recent Flowsheet Documentation  Taken 3/15/2025 0900 by Zachary Adan RN  VTE Prevention/Management: SCDs off (sequential compression devices)  Intervention: Prevent Infection  Recent Flowsheet Documentation  Taken 3/15/2025 0900 by Zachary Adan RN  Infection Prevention:   equipment surfaces disinfected   hand hygiene promoted   rest/sleep promoted   single patient room provided  Goal: Optimal Comfort and Wellbeing  Outcome: Progressing  Goal: Readiness for Transition of Care  Outcome: Progressing     Problem: Comorbidity Management  Goal: Maintenance of Asthma Control  Outcome: Progressing  Intervention: Maintain Asthma Symptom Control  Recent Flowsheet Documentation  Taken 3/15/2025 0900 by Zachary Adan RN  Medication Review/Management: medications reviewed     Problem: Pain Acute  Goal: Optimal Pain Control and Function  Outcome: Progressing  Intervention: Prevent or Manage Pain  Recent Flowsheet Documentation  Taken 3/15/2025 0900 by Zachray Adan RN  Bowel Elimination Promotion: adequate fluid intake promoted  Medication Review/Management: medications reviewed     Problem: Chest Pain  Goal: Resolution of Chest Pain Symptoms  Outcome: Progressing

## 2025-03-15 NOTE — PROVIDER NOTIFICATION
03/15/25 1251   RCAT Assessment   Reason for Assessment Asthma   Pulmonary Status 4   Surgical Status 0   Chest X-ray 0   Respiratory Pattern 0   Mental Status 0   Breath Sounds 0   Cough Effectiveness 0   Level of Activity 1   O2 Required for SpO2>=92% 0   Acuity Level (points) 5   Acuity Level  5   Re-eval Interval Guideline Every 3 days   Re-evaluation Date 03/18/25   $RT Consult Time Spent RCAT (30 minute increments) 1   Clinical Indications/Symptoms   Aerosol Therapy History of bronchospasm;RCAT protocol   Broncho-pulmonary Hygiene History of mucous producing disease   Volume Expansion Prevent atelectasis   Aerosol Therapy Plan   RT Treatment Nebulizer   Aerosol Treatment Frequency Acuity Level 4: PRN [id number] wheezing  (Scored acuity level 5, no 5 option)   Beta-Adrenergic Agonists Albuterol solution (premix) 2.5mg/3mL neb Max 12 doses/24h   Broncho-Pulmonary Hygiene Plan   Broncho-Pulmonary Hygiene Treatment Coughing techniques   Broncho-Pulm Hygiene Frequency Acuity Level 4: BID-Unable to deep breathe & cough spontaneously   Volume Expansion Plan   Volume Expansion Treatment Incentive Spirometer   Volume Expansion Frequency Acuity Level 5: Incentive Spirometry-Able to perform well on their own   Breath Sounds   Breath Sounds All Fields   All Lung Fields Breath Sounds clear     Scheduled nebs changed to Every 6 hours PRN per RCAT protocol.    Azeb Mercado, RT on 3/15/2025 at 1:04 PM

## 2025-03-15 NOTE — PLAN OF CARE
"Goal Outcome Evaluation:      Plan of Care Reviewed With: patient    Overall Patient Progress: improvingOverall Patient Progress: improving     /65 (BP Location: Right arm)   Pulse 89   Temp 98.4  F (36.9  C) (Oral)   Resp 18   Ht 1.702 m (5' 7\")   Wt (!) 178.4 kg (393 lb 4.8 oz)   LMP 08/18/2008   SpO2 (!) 90%   BMI 61.60 kg/m      /65 (BP Location: Right arm)   Pulse 89   Temp 98.4  F (36.9  C) (Oral)   Resp 18   Ht 1.702 m (5' 7\")   Wt (!) 178.4 kg (393 lb 4.8 oz)   LMP 08/18/2008   SpO2 (!) 90%   BMI 61.60 kg/m      Pt A & O x 4, able to make needs known, call light with in reach for needs, in RA and drops with actively, the pt said that she uses oxygen as needed and during the night at home, plan is to discharge when medically ready.     Problem: Adult Inpatient Plan of Care  Goal: Plan of Care Review  Description: The Plan of Care Review/Shift note should be completed every shift.  The Outcome Evaluation is a brief statement about your assessment that the patient is improving, declining, or no change.  This information will be displayed automatically on your shift  note.  Outcome: Progressing  Flowsheets (Taken 3/15/2025 1232)  Plan of Care Reviewed With: patient  Overall Patient Progress: improving  Goal: Patient-Specific Goal (Individualized)  Description: You can add care plan individualizations to a care plan. Examples of Individualization might be:  \"Parent requests to be called daily at 9am for status\", \"I have a hard time hearing out of my right ear\", or \"Do not touch me to wake me up as it startles  me\".  Outcome: Progressing  Goal: Absence of Hospital-Acquired Illness or Injury  Outcome: Progressing  Intervention: Identify and Manage Fall Risk  Recent Flowsheet Documentation  Taken 3/15/2025 0900 by Zachary Adan RN  Safety Promotion/Fall Prevention:   clutter free environment maintained   room near nurse's station   assistive device/personal items within reach   " lighting adjusted   nonskid shoes/slippers when out of bed   safety round/check completed   treat underlying cause  Intervention: Prevent Skin Injury  Recent Flowsheet Documentation  Taken 3/15/2025 0900 by Zachary Adan RN  Body Position: position changed independently  Intervention: Prevent and Manage VTE (Venous Thromboembolism) Risk  Recent Flowsheet Documentation  Taken 3/15/2025 0900 by Zachary Adan RN  VTE Prevention/Management: SCDs off (sequential compression devices)  Intervention: Prevent Infection  Recent Flowsheet Documentation  Taken 3/15/2025 0900 by Zachary Adan RN  Infection Prevention:   equipment surfaces disinfected   hand hygiene promoted   rest/sleep promoted   single patient room provided  Goal: Optimal Comfort and Wellbeing  Outcome: Progressing  Goal: Readiness for Transition of Care  Outcome: Progressing     Problem: Comorbidity Management  Goal: Maintenance of Asthma Control  Outcome: Progressing  Intervention: Maintain Asthma Symptom Control  Recent Flowsheet Documentation  Taken 3/15/2025 0900 by Zachary Adan RN  Medication Review/Management: medications reviewed     Problem: Pain Acute  Goal: Optimal Pain Control and Function  Outcome: Progressing  Intervention: Prevent or Manage Pain  Recent Flowsheet Documentation  Taken 3/15/2025 0900 by Zachary Adan RN  Bowel Elimination Promotion: adequate fluid intake promoted  Medication Review/Management: medications reviewed     Problem: Chest Pain  Goal: Resolution of Chest Pain Symptoms  Outcome: Progressing

## 2025-03-16 VITALS
HEART RATE: 74 BPM | BODY MASS INDEX: 45.99 KG/M2 | TEMPERATURE: 98.1 F | RESPIRATION RATE: 18 BRPM | SYSTOLIC BLOOD PRESSURE: 168 MMHG | HEIGHT: 67 IN | WEIGHT: 293 LBS | OXYGEN SATURATION: 92 % | DIASTOLIC BLOOD PRESSURE: 83 MMHG

## 2025-03-16 PROCEDURE — 99239 HOSP IP/OBS DSCHRG MGMT >30: CPT | Performed by: INTERNAL MEDICINE

## 2025-03-16 PROCEDURE — 250N000013 HC RX MED GY IP 250 OP 250 PS 637: Performed by: STUDENT IN AN ORGANIZED HEALTH CARE EDUCATION/TRAINING PROGRAM

## 2025-03-16 PROCEDURE — 250N000011 HC RX IP 250 OP 636: Mod: JW | Performed by: INTERNAL MEDICINE

## 2025-03-16 PROCEDURE — 250N000011 HC RX IP 250 OP 636: Performed by: PHYSICIAN ASSISTANT

## 2025-03-16 RX ADMIN — FLUTICASONE PROPIONATE 1 SPRAY: 50 SPRAY, METERED NASAL at 08:48

## 2025-03-16 RX ADMIN — CARBAMAZEPINE 400 MG: 200 TABLET ORAL at 08:47

## 2025-03-16 RX ADMIN — MICONAZOLE NITRATE: 20 POWDER TOPICAL at 08:48

## 2025-03-16 RX ADMIN — FLUTICASONE FUROATE AND VILANTEROL TRIFENATATE 1 PUFF: 100; 25 POWDER RESPIRATORY (INHALATION) at 00:06

## 2025-03-16 RX ADMIN — PANTOPRAZOLE SODIUM 40 MG: 40 TABLET, DELAYED RELEASE ORAL at 08:47

## 2025-03-16 RX ADMIN — LISINOPRIL 20 MG: 10 TABLET ORAL at 08:47

## 2025-03-16 RX ADMIN — ATORVASTATIN CALCIUM 10 MG: 10 TABLET, FILM COATED ORAL at 08:47

## 2025-03-16 RX ADMIN — ENOXAPARIN SODIUM 50 MG: 60 INJECTION SUBCUTANEOUS at 08:48

## 2025-03-16 RX ADMIN — SERTRALINE HYDROCHLORIDE 100 MG: 100 TABLET ORAL at 08:47

## 2025-03-16 RX ADMIN — METHYLPREDNISOLONE SODIUM SUCCINATE 62.5 MG: 125 INJECTION, POWDER, FOR SOLUTION INTRAMUSCULAR; INTRAVENOUS at 08:47

## 2025-03-16 ASSESSMENT — ACTIVITIES OF DAILY LIVING (ADL)
ADLS_ACUITY_SCORE: 48

## 2025-03-16 NOTE — DISCHARGE SUMMARY
Medicine Discharge Summary       Brissa Corado MRN# [id number]   YOB: 1965 Age: 59 year old     Date of Admission:  3/12/2025  Date of Discharge:  3/16/2025  Admitting Physician:  Colt Egan MD  Discharge Physician:  Fatmata Kerns  Discharging Service:  Hospital Medicine     Primary Provider: Aury Vizcaino              Discharge Diagnosis:   Acute respiratory failure with hypoxia         Consultations:   Social work         Hospital Course   Brissa Corado is a 59 year old female with past medical history of asthma, hypertension, Cushing syndrome, depression, morbid obesity, sleep apnea, borderline developmental delay. She arrived to the ED 3/12 for shortness of breath, wheeze and cough for the previous 5 days. She was recently hospitalized from 2/11/2025 to 2/14/2025 with acute asthma exacerbation.     Vitals on arrival: Temperature 98.7  F, heart rate 86/min, blood pressure 143/62 with respiratory rate of 31 with oxygen saturation of 91%, later became hypoxic to 88% and was started on oxygen at 2 L/min.  She appears short of breath and is having coughing bouts but otherwise able to talk in full sentences.Chest x-ray showed low lung volumes without focal airspace opacity.  CBC and BMP were unremarkable.  Tested negative for COVID-19, influenza A/B and RSV.  EKG showed normal sinus rhythm.  VBG showed pH/pCO2 of 7.3/61.Patient was given DuoNebs x 2 and albuterol nebs 2.5 mg and prednisone 40 mg p.o and hospitalization was requested.     On the second day of admission she continued to feel short of breath with central chest discomfort and hypoxia.  Due to high risk of PE D-dimer was added and fortunately normal.  She was continued on medical management with scheduled DuoNebs and IV steroids.  By 3/14 we were able to wean off oxygen at rest but she continued to be slightly hypoxic to 85% with ambulation and therefore will stay tonight for continued IV steroids and DuoNebs.     Suspect  "the patient will be medically ready to discharge on 3/15.     Acute hypoxic respiratory failure due to acute asthmatic exacerbation, resolved.  wheezes responded well to nebulization therapy , no requiring oxygen during the day and not when sleeping  Obstructive sleep apnea  She has history of O2 use at nighttime in place of her CPAP. States she is due to see sleep pulmonology in June. In the last two week, has needed to use the oxygen during the day due to shortness of breath.   --Chest x-ray negative for pneumonia with negative influenza/RSV/COVID-19  -- Weaned off of oxygen on 3/14 but is slightly hypoxic to 85% with ambulation  -- discontinued solu-medrol IV  62.5 mg Q12 to once daily   -- Continue duonebs 4x daily and albuterol nebs Q4H PRN   --Robitussin DM ordered  -- Continue breo ellipta daily   -- D-dimer normal so low suspicion of PE        Hyperlipidemia  Hypertension  -- Continue Lipitor   -- Continue PTA lisinopril     Hx of seizure disorder  -- Continue PTA tegretol     Allergic rhinitis  -- Continue PTA azelastine and fluticasone PRN     Depression  -- Continue PTA Zoloft      Mild developmental delays  --she has a legal guardian         On Exam ;   Alert and oriented . No acute distress  Vital signs:  Temp: 98.1  F (36.7  C) Temp src: Oral BP: (!) 168/83 Pulse: 74   Resp: 18 SpO2: 92 % O2 Device: None (Room air) Oxygen Delivery: 2 LPM Height: 170.2 cm (5' 7\") Weight: (!) 178.4 kg (393 lb 4.8 oz)  Estimated body mass index is 61.6 kg/m  as calculated from the following:    Height as of this encounter: 1.702 m (5' 7\").    Weight as of this encounter: 178.4 kg (393 lb 4.8 oz).  Neck ; supple , no JVD  Chest ; equal BS bilaterally , no rales or rhonchi   CVS; RRR, no murmur /rubs or gallops  GI ; soft abdomen, non tender, BS positive  Ext ; no edema , no cynosis  Neuro ; CN 2 to 12 grossly intact , No Facial asymmetry noticed  .  Psych ; appropriate mood and effect  Skin ; no rash or purpura on exposed " skin     ROUTINE IP LABS (Last four results)  BMP  Recent Labs   Lab 03/15/25  0608 03/12/25  1415   NA  --  141   POTASSIUM  --  4.3   CHLORIDE  --  103   CESAR  --  9.1   CO2  --  28   BUN  --  10.7   CR 0.64 0.64   GLC  --  110*     CBC  Recent Labs   Lab 03/15/25  0608 03/12/25  1415   WBC  --  7.3   RBC  --  4.52   HGB  --  12.6   HCT  --  41.3   MCV  --  91   MCH  --  27.9   MCHC  --  30.5*   RDW  --  13.8    191     INRNo lab results found in last 7 days.                 Discharge Medications:     Current Discharge Medication List        CONTINUE these medications which have NOT CHANGED    Details   albuterol (PROAIR HFA/PROVENTIL HFA/VENTOLIN HFA) 108 (90 Base) MCG/ACT inhaler Inhale 2 puffs into the lungs every 6 hours as needed for shortness of breath, wheezing or cough.  Qty: 18 g, Refills: 0    Comments: Pharmacy may dispense brand covered by insurance (Proair, or proventil or ventolin or generic albuterol inhaler)  Associated Diagnoses: Moderate persistent asthma without complication      albuterol (PROVENTIL) (2.5 MG/3ML) 0.083% neb solution Take 1 vial (2.5 mg) by nebulization every 6 hours as needed for shortness of breath or wheezing.  Qty: 75 mL, Refills: 0    Associated Diagnoses: Moderate persistent asthma with acute exacerbation      azelastine (ASTELIN) 0.1 % nasal spray Spray 1 spray into both nostrils daily as needed for allergies.      !! carBAMazepine (TEGRETOL) 200 MG tablet Take 400 mg by mouth 2 times daily. AM and HS      !! carBAMazepine (TEGRETOL) 200 MG tablet Take 200 mg by mouth daily. @1200      fluticasone (FLONASE) 50 MCG/ACT nasal spray USE ONE SPRAY INTO BOTH NOSTRILS DAILY AS NEEDED FOR RHINITIS OR ALLERGIES  Qty: 16 mL, Refills: 1    Associated Diagnoses: Seasonal allergic rhinitis due to pollen      Fluticasone Furoate-Vilanterol (BREO ELLIPTA) 50-25 MCG/ACT AEPB Inhale 1 puff into the lungs 2 times daily.  Qty: 60 each, Refills: 2    Associated Diagnoses: Moderate  persistent asthma with acute exacerbation      ibuprofen (ADVIL/MOTRIN) 200 MG tablet Take 400 mg by mouth as needed for pain.      ipratropium - albuterol 0.5 mg/2.5 mg/3 mL (DUONEB) 0.5-2.5 (3) MG/3ML neb solution NEBULIZE ONE VIAL FOUR TIMES A DAY  Qty: 90 mL, Refills: 2    Associated Diagnoses: Moderate persistent asthma without complication      lisinopril (ZESTRIL) 20 MG tablet TAKE 1 TABLET (20 MG) BY MOUTH DAILY  Qty: 90 tablet, Refills: 2    Associated Diagnoses: Essential hypertension, benign      lovastatin (MEVACOR) 40 MG tablet Take 40 mg by mouth daily.      miconazole (MICATIN) 2 % external powder Apply topically as needed for itching or other.      Multiple Vitamin (MULTI-VITAMIN PO) Take 1 tablet by mouth daily      nystatin (MYCOSTATIN) 588520 UNIT/GM external cream Apply topically daily as needed      omeprazole (PRILOSEC) 20 MG DR capsule Take 20 mg by mouth daily.      sertraline (ZOLOFT) 100 MG tablet TAKE ONE TABLET BY MOUTH DAILY  Qty: 90 tablet, Refills: 1    Associated Diagnoses: Depression, unspecified depression type       !! - Potential duplicate medications found. Please discuss with provider.               Discharge Instructions and Follow-Up:     Discharge Procedure Orders   Primary Care - Care Coordination Referral   Standing Status: Future   Referral Priority: Routine: Next available opening Referral Type: Care Coordination   Number of Visits Requested: 1                   Discharge Disposition:   40 minutes spent in discharge, including >50% in counseling and coordination of care, medication review and plan of care recommended on follow up. Questions were answered to satisfaction       Verenice Berrios MD  Internal Medicine Hospitalist & Staff Physician  [facility]

## 2025-03-16 NOTE — PLAN OF CARE
"Patient's After Visit Summary was reviewed with patient and/or family.   Patient verbalized understanding of After Visit Summary, recommended follow up and was given an opportunity to ask questions.   Discharge medications sent home with patient/family: No   Discharged with spouse, pt is medically ready to be discharged, pt's all belongings sent home with pt, pt's spouse transporting the pt to home.       Problem: Adult Inpatient Plan of Care  Goal: Plan of Care Review  Description: The Plan of Care Review/Shift note should be completed every shift.  The Outcome Evaluation is a brief statement about your assessment that the patient is improving, declining, or no change.  This information will be displayed automatically on your shift  note.  3/16/2025 1257 by Zachary Adan RN  Outcome: Met  Flowsheets (Taken 3/16/2025 1257)  Plan of Care Reviewed With: patient  Overall Patient Progress: improving  3/16/2025 1239 by Zachary Adan RN  Outcome: Progressing  Flowsheets (Taken 3/16/2025 1239)  Plan of Care Reviewed With: patient  Overall Patient Progress: improving  Goal: Patient-Specific Goal (Individualized)  Description: You can add care plan individualizations to a care plan. Examples of Individualization might be:  \"Parent requests to be called daily at 9am for status\", \"I have a hard time hearing out of my right ear\", or \"Do not touch me to wake me up as it startles  me\".  3/16/2025 1257 by Zachary Adan RN  Outcome: Met  3/16/2025 1239 by Zachary Adan RN  Outcome: Progressing  Goal: Absence of Hospital-Acquired Illness or Injury  3/16/2025 1257 by Zachary Adan RN  Outcome: Met  3/16/2025 1239 by Zachary Adan RN  Outcome: Progressing  Intervention: Prevent Skin Injury  Recent Flowsheet Documentation  Taken 3/16/2025 1000 by Zachary Adan RN  Body Position: position changed independently  Intervention: Prevent and Manage VTE (Venous Thromboembolism) Risk  Recent " Flowsheet Documentation  Taken 3/16/2025 1000 by Zachary Adan RN  VTE Prevention/Management: SCDs off (sequential compression devices)  Goal: Optimal Comfort and Wellbeing  3/16/2025 1257 by Zachary Adan RN  Outcome: Met  3/16/2025 1239 by Zachary Adan RN  Outcome: Progressing  Goal: Readiness for Transition of Care  3/16/2025 1257 by Zachary Adan RN  Outcome: Met  3/16/2025 1239 by Zachary Adan RN  Outcome: Progressing     Problem: Comorbidity Management  Goal: Maintenance of Asthma Control  3/16/2025 1257 by Zachary Adan RN  Outcome: Met  3/16/2025 1239 by Zachary Adan RN  Outcome: Progressing     Problem: Pain Acute  Goal: Optimal Pain Control and Function  3/16/2025 1257 by Zachary Adan RN  Outcome: Met  3/16/2025 1239 by Zachary Adan RN  Outcome: Progressing  Intervention: Prevent or Manage Pain  Recent Flowsheet Documentation  Taken 3/16/2025 1000 by Zachary Adan RN  Bowel Elimination Promotion: adequate fluid intake promoted     Problem: Chest Pain  Goal: Resolution of Chest Pain Symptoms  3/16/2025 1257 by Zachary Adan RN  Outcome: Met  3/16/2025 1239 by Zachary Adan RN  Outcome: Progressing   Goal Outcome Evaluation:      Plan of Care Reviewed With: patient    Overall Patient Progress: improvingOverall Patient Progress: improving

## 2025-03-16 NOTE — PLAN OF CARE
"Goal Outcome Evaluation:      Plan of Care Reviewed With: patient    Overall Patient Progress: improvingOverall Patient Progress: improving     BP (!) 168/83 (BP Location: Right arm)   Pulse 74   Temp 98.1  F (36.7  C) (Oral)   Resp 18   Ht 1.702 m (5' 7\")   Wt (!) 178.4 kg (393 lb 4.8 oz)   LMP 08/18/2008   SpO2 92%   BMI 61.60 kg/m      Pt A & O x 4, able to make needs known, call light with in reach for needs, in RA and drops with actively, the pt said that she uses oxygen as needed and during the night at home, plan is to discharge home today.    Problem: Adult Inpatient Plan of Care  Goal: Plan of Care Review  Description: The Plan of Care Review/Shift note should be completed every shift.  The Outcome Evaluation is a brief statement about your assessment that the patient is improving, declining, or no change.  This information will be displayed automatically on your shift  note.  Outcome: Progressing  Flowsheets (Taken 3/16/2025 1239)  Plan of Care Reviewed With: patient  Overall Patient Progress: improving  Goal: Patient-Specific Goal (Individualized)  Description: You can add care plan individualizations to a care plan. Examples of Individualization might be:  \"Parent requests to be called daily at 9am for status\", \"I have a hard time hearing out of my right ear\", or \"Do not touch me to wake me up as it startles  me\".  Outcome: Progressing  Goal: Absence of Hospital-Acquired Illness or Injury  Outcome: Progressing  Intervention: Prevent Skin Injury  Recent Flowsheet Documentation  Taken 3/16/2025 1000 by Zachary Adan RN  Body Position: position changed independently  Intervention: Prevent and Manage VTE (Venous Thromboembolism) Risk  Recent Flowsheet Documentation  Taken 3/16/2025 1000 by Zachary Adan RN  VTE Prevention/Management: SCDs off (sequential compression devices)  Goal: Optimal Comfort and Wellbeing  Outcome: Progressing  Goal: Readiness for Transition of Care  Outcome: " Progressing     Problem: Comorbidity Management  Goal: Maintenance of Asthma Control  Outcome: Progressing     Problem: Pain Acute  Goal: Optimal Pain Control and Function  Outcome: Progressing  Intervention: Prevent or Manage Pain  Recent Flowsheet Documentation  Taken 3/16/2025 1000 by Zachary Adan RN  Bowel Elimination Promotion: adequate fluid intake promoted     Problem: Chest Pain  Goal: Resolution of Chest Pain Symptoms  Outcome: Progressing

## 2025-03-16 NOTE — PLAN OF CARE
"Goal Outcome Evaluation:    Care From: 1402-6308  Admitted Diagnosis: Asthma Exacerbation0        Plan of Care Reviewed With: patient    Overall Patient Progress: improvingOverall Patient Progress: improving    Outcome Evaluation: A&Ox4. Denies pain. Needed 2L O2 at bed time. on continuous pulse-ox. SBA. Daily Solumedrol.    Vitals: /63 (BP Location: Right arm)   Pulse 76   Temp 97.9  F (36.6  C) (Oral)   Resp 18   Ht 1.702 m (5' 7\")   Wt (!) 178.4 kg (393 lb 4.8 oz)   LMP 08/18/2008   SpO2 94%   BMI 61.60 kg/m       Neuro: alert and oriented x4. Able to make needs known.   Cardiac: wdl  Lungs: wdl. LS clear. No cough noted. Mild LAZAR with activity. Required to 2L O2 at bedtime due to O2 dropped to 87% on RA while pt was sleeping. IS use encouraged.   GI: wdl  : wdl  Pain: denies  IV: saline locked. Solumedrol Q24 hrs.  Labs/Imaging:   Diet: regular  Activity: SBA with no assistive device  Consult: RT  Plan: continue monitoring O2 on pulse-ox. Solumedrol in the morning. Scheduled Neb and Inhaler. Pt hoping discharging home today.      Problem: Adult Inpatient Plan of Care  Goal: Plan of Care Review  Description: The Plan of Care Review/Shift note should be completed every shift.  The Outcome Evaluation is a brief statement about your assessment that the patient is improving, declining, or no change.  This information will be displayed automatically on your shift  note.  Outcome: Progressing  Flowsheets (Taken 3/16/2025 0447)  Outcome Evaluation: A&Ox4. Denies pain. Needed 2L O2 at bed time. on continuous pulse-ox. SBA. Daily Solumedrol.  Plan of Care Reviewed With: patient  Overall Patient Progress: improving  Goal: Patient-Specific Goal (Individualized)  Description: You can add care plan individualizations to a care plan. Examples of Individualization might be:  \"Parent requests to be called daily at 9am for status\", \"I have a hard time hearing out of my right ear\", or \"Do not touch me to wake me up " "as it startles  me\".  Outcome: Progressing  Goal: Absence of Hospital-Acquired Illness or Injury  Outcome: Progressing  Intervention: Prevent Skin Injury  Recent Flowsheet Documentation  Taken 3/15/2025 2100 by Cat Ramos RN  Body Position: position changed independently  Intervention: Prevent and Manage VTE (Venous Thromboembolism) Risk  Recent Flowsheet Documentation  Taken 3/15/2025 2100 by Cat Ramos RN  VTE Prevention/Management: SCDs off (sequential compression devices)  Goal: Optimal Comfort and Wellbeing  Outcome: Progressing  Goal: Readiness for Transition of Care  Outcome: Progressing     Problem: Comorbidity Management  Goal: Maintenance of Asthma Control  Outcome: Progressing     Problem: Pain Acute  Goal: Optimal Pain Control and Function  Outcome: Progressing  Intervention: Prevent or Manage Pain  Recent Flowsheet Documentation  Taken 3/15/2025 2100 by Cat Ramos RN  Bowel Elimination Promotion: adequate fluid intake promoted     Problem: Chest Pain  Goal: Resolution of Chest Pain Symptoms  Outcome: Progressing     "

## 2025-03-17 ENCOUNTER — PATIENT OUTREACH (OUTPATIENT)
Dept: CARE COORDINATION | Facility: CLINIC | Age: 60
End: 2025-03-17
Payer: MEDICARE

## 2025-03-17 NOTE — PROGRESS NOTES
Clinic Care Coordination Contact    Situation: Patient chart reviewed by care coordinator.    Background: Patient is enrolled in Care Coordination and followed by RN CC, who has been monitoring patient throughout enrollment for goal progression.     Assessment: RN CC has spoken with patient's guardian, Lola (Mother) for every outreach. Patient has blocked her parents from contacting her and she won't speak to her parents, who are her co-guardians. This writer has contacted Lola twice for TCM outreaches and she has been unable to provide any information as patient will not speak to her and if Lola attempts to reach her, patient calls the police.     Plan/Recommendations: RN CC has been unable to obtain information from the guardian and patient will not speak to PAULA KUO. PAULA KUO contacted Lola to discuss closing the program until the situation is resolved. Lola was agreeable to this plan. Verified Lola has Care Coordinator's contact information for the future.     Rocio Govea RN, BSN, CPHN, [facility] Management  [facility], and [facility]  [email or web address]  Office: [phone]  Employed by [facility]     Clinic Care Coordination Contact  Transitions of Care Outreach  Chief Complaint   Patient presents with    Clinic Care Coordination - Post Hospital     RNCC post hospital follow up       Most Recent Admission Date: 3/12/2025   Most Recent Admission Diagnosis: Moderate persistent asthma with exacerbation - J45.41  Acute on chronic respiratory failure with hypoxia and hypercapnia (H) - J96.21, J96.22     Most Recent Discharge Date: 3/16/2025   Most Recent Discharge Diagnosis: Acute on chronic respiratory failure with hypoxia and hypercapnia (H) - J96.21, J96.22  Moderate persistent asthma with exacerbation - J45.41  Moderate persistent asthma without complication - J45.40     Transitions of Care Assessment    Discharge Assessment  How are you  doing now that you are home?: Mother is the guardian who is unable to speak with patient, she continues to have them blocked and calls the police if they show up at her house  Were you started on any new medications or were there changes to any of your previous medications? : No         Post-op (Clinicians Only)  Did the patient have surgery or a procedure: No      Follow up Plan     Discharge Follow-Up  Discharge follow up appointment scheduled in alignment with recommended follow up timeframe or Transitions of Risk Category? (Low = within 30 days; Moderate= within 14 days; High= within 7 days): Yes  Discharge Follow Up Appointment Date: 04/02/25  Discharge Follow Up Appointment Scheduled with?: Primary Care Provider    Future Appointments   Date Time Provider Department Center   4/2/2025  2:30 PM Aury Vizcaino MD [facility]   6/30/2025  2:30 PM Lary Valentin MD [facility]       Outpatient Plan as outlined on AVS reviewed with patient.    For any urgent concerns, please contact our 24 hour nurse triage line: [phone] ([phone])       RNCC performed post hospital follow up, patient has legal guardians her mother and father. RNCC spoke with Mother Lola, guardians do not have contact with patient. They have been blocked and patient calls the police if her parents arrive at her home. Unable to review TCM questions due to this. Per mother they are working with the county to get patient into an assisted living so she can be cared for and not have to go into the hospital so much. Lead RNCC to follow up and review if CARE COORDINATION is appropriate for patient still due to above.     Evonne Holder, Casual RNCC  Primary Care Clinic Care Coordination  Tele: [phone]

## 2025-04-01 ENCOUNTER — HOSPITAL ENCOUNTER (INPATIENT)
Facility: CLINIC | Age: 60
End: 2025-04-01
Attending: EMERGENCY MEDICINE | Admitting: STUDENT IN AN ORGANIZED HEALTH CARE EDUCATION/TRAINING PROGRAM
Payer: MEDICARE

## 2025-04-01 ENCOUNTER — APPOINTMENT (OUTPATIENT)
Dept: GENERAL RADIOLOGY | Facility: CLINIC | Age: 60
DRG: 202 | End: 2025-04-01
Attending: EMERGENCY MEDICINE
Payer: MEDICARE

## 2025-04-01 DIAGNOSIS — J45.41 MODERATE PERSISTENT ASTHMA WITH ACUTE EXACERBATION: ICD-10-CM

## 2025-04-01 DIAGNOSIS — J96.21 ACUTE ON CHRONIC RESPIRATORY FAILURE WITH HYPOXIA (H): ICD-10-CM

## 2025-04-01 DIAGNOSIS — I82.442 ACUTE DEEP VEIN THROMBOSIS (DVT) OF TIBIAL VEIN OF LEFT LOWER EXTREMITY (H): ICD-10-CM

## 2025-04-01 DIAGNOSIS — J96.22 ACUTE ON CHRONIC RESPIRATORY FAILURE WITH HYPOXIA AND HYPERCAPNIA (H): Primary | ICD-10-CM

## 2025-04-01 DIAGNOSIS — J96.21 ACUTE ON CHRONIC RESPIRATORY FAILURE WITH HYPOXIA AND HYPERCAPNIA (H): Primary | ICD-10-CM

## 2025-04-01 LAB
ANION GAP SERPL CALCULATED.3IONS-SCNC: 12 MMOL/L (ref 7–15)
BASE EXCESS BLDV CALC-SCNC: 1.7 MMOL/L (ref -3–3)
BASOPHILS # BLD AUTO: 0.1 10E3/UL (ref 0–0.2)
BASOPHILS NFR BLD AUTO: 1 %
BUN SERPL-MCNC: 12.7 MG/DL (ref 6–20)
CALCIUM SERPL-MCNC: 8.7 MG/DL (ref 8.8–10.4)
CHLORIDE SERPL-SCNC: 105 MMOL/L (ref 98–107)
CREAT SERPL-MCNC: 0.6 MG/DL (ref 0.51–0.95)
EGFRCR SERPLBLD CKD-EPI 2021: >90 ML/MIN/1.73M2
EOSINOPHIL # BLD AUTO: 0.7 10E3/UL (ref 0–0.7)
EOSINOPHIL NFR BLD AUTO: 9 %
ERYTHROCYTE [DISTWIDTH] IN BLOOD BY AUTOMATED COUNT: 14 % (ref 10–15)
GLUCOSE SERPL-MCNC: 127 MG/DL (ref 70–99)
HCO3 BLDV-SCNC: 29 MMOL/L (ref 21–28)
HCO3 SERPL-SCNC: 25 MMOL/L (ref 22–29)
HCT VFR BLD AUTO: 39.8 % (ref 35–47)
HGB BLD-MCNC: 12 G/DL (ref 11.7–15.7)
HOLD SPECIMEN: NORMAL
HOLD SPECIMEN: NORMAL
IMM GRANULOCYTES # BLD: 0 10E3/UL
IMM GRANULOCYTES NFR BLD: 1 %
LYMPHOCYTES # BLD AUTO: 1.5 10E3/UL (ref 0.8–5.3)
LYMPHOCYTES NFR BLD AUTO: 19 %
MCH RBC QN AUTO: 27.3 PG (ref 26.5–33)
MCHC RBC AUTO-ENTMCNC: 30.2 G/DL (ref 31.5–36.5)
MCV RBC AUTO: 91 FL (ref 78–100)
MONOCYTES # BLD AUTO: 0.4 10E3/UL (ref 0–1.3)
MONOCYTES NFR BLD AUTO: 5 %
NEUTROPHILS # BLD AUTO: 5.1 10E3/UL (ref 1.6–8.3)
NEUTROPHILS NFR BLD AUTO: 66 %
NRBC # BLD AUTO: 0 10E3/UL
NRBC BLD AUTO-RTO: 0 /100
NT-PROBNP SERPL-MCNC: <36 PG/ML (ref 0–900)
O2/TOTAL GAS SETTING VFR VENT: 4 %
OXYHGB MFR BLDV: 60 % (ref 70–75)
PCO2 BLDV: 55 MM HG (ref 40–50)
PH BLDV: 7.32 [PH] (ref 7.32–7.43)
PLATELET # BLD AUTO: 223 10E3/UL (ref 150–450)
PO2 BLDV: 36 MM HG (ref 25–47)
POTASSIUM SERPL-SCNC: 4.3 MMOL/L (ref 3.4–5.3)
RBC # BLD AUTO: 4.39 10E6/UL (ref 3.8–5.2)
SAO2 % BLDV: 61.4 % (ref 70–75)
SODIUM SERPL-SCNC: 142 MMOL/L (ref 135–145)
TROPONIN T SERPL HS-MCNC: 18 NG/L
TROPONIN T SERPL HS-MCNC: 24 NG/L
TROPONIN T SERPL HS-MCNC: 8 NG/L
WBC # BLD AUTO: 7.7 10E3/UL (ref 4–11)

## 2025-04-01 PROCEDURE — 36415 COLL VENOUS BLD VENIPUNCTURE: CPT | Performed by: EMERGENCY MEDICINE

## 2025-04-01 PROCEDURE — 999N000215 HC STATISTIC HFNC ADULT NON-CPAP

## 2025-04-01 PROCEDURE — 71046 X-RAY EXAM CHEST 2 VIEWS: CPT

## 2025-04-01 PROCEDURE — 94640 AIRWAY INHALATION TREATMENT: CPT

## 2025-04-01 PROCEDURE — 93005 ELECTROCARDIOGRAM TRACING: CPT

## 2025-04-01 PROCEDURE — 36415 COLL VENOUS BLD VENIPUNCTURE: CPT | Performed by: STUDENT IN AN ORGANIZED HEALTH CARE EDUCATION/TRAINING PROGRAM

## 2025-04-01 PROCEDURE — 85004 AUTOMATED DIFF WBC COUNT: CPT | Performed by: EMERGENCY MEDICINE

## 2025-04-01 PROCEDURE — 250N000009 HC RX 250: Performed by: STUDENT IN AN ORGANIZED HEALTH CARE EDUCATION/TRAINING PROGRAM

## 2025-04-01 PROCEDURE — 87486 CHLMYD PNEUM DNA AMP PROBE: CPT | Performed by: STUDENT IN AN ORGANIZED HEALTH CARE EDUCATION/TRAINING PROGRAM

## 2025-04-01 PROCEDURE — 82805 BLOOD GASES W/O2 SATURATION: CPT | Performed by: EMERGENCY MEDICINE

## 2025-04-01 PROCEDURE — 120N000001 HC R&B MED SURG/OB

## 2025-04-01 PROCEDURE — 82785 ASSAY OF IGE: CPT | Performed by: INTERNAL MEDICINE

## 2025-04-01 PROCEDURE — 94640 AIRWAY INHALATION TREATMENT: CPT | Mod: 76

## 2025-04-01 PROCEDURE — 96365 THER/PROPH/DIAG IV INF INIT: CPT

## 2025-04-01 PROCEDURE — 96375 TX/PRO/DX INJ NEW DRUG ADDON: CPT

## 2025-04-01 PROCEDURE — 250N000013 HC RX MED GY IP 250 OP 250 PS 637: Performed by: STUDENT IN AN ORGANIZED HEALTH CARE EDUCATION/TRAINING PROGRAM

## 2025-04-01 PROCEDURE — 250N000009 HC RX 250: Performed by: EMERGENCY MEDICINE

## 2025-04-01 PROCEDURE — 999N000157 HC STATISTIC RCP TIME EA 10 MIN

## 2025-04-01 PROCEDURE — 84484 ASSAY OF TROPONIN QUANT: CPT | Performed by: EMERGENCY MEDICINE

## 2025-04-01 PROCEDURE — 99291 CRITICAL CARE FIRST HOUR: CPT | Mod: 25

## 2025-04-01 PROCEDURE — 80048 BASIC METABOLIC PNL TOTAL CA: CPT | Performed by: EMERGENCY MEDICINE

## 2025-04-01 PROCEDURE — 82435 ASSAY OF BLOOD CHLORIDE: CPT | Performed by: EMERGENCY MEDICINE

## 2025-04-01 PROCEDURE — 250N000011 HC RX IP 250 OP 636: Performed by: EMERGENCY MEDICINE

## 2025-04-01 PROCEDURE — 272N000054 HC CANNULA HIGH FLOW, ADULT

## 2025-04-01 PROCEDURE — 83880 ASSAY OF NATRIURETIC PEPTIDE: CPT | Performed by: EMERGENCY MEDICINE

## 2025-04-01 PROCEDURE — 85018 HEMOGLOBIN: CPT | Performed by: EMERGENCY MEDICINE

## 2025-04-01 PROCEDURE — 99222 1ST HOSP IP/OBS MODERATE 55: CPT | Mod: AI | Performed by: STUDENT IN AN ORGANIZED HEALTH CARE EDUCATION/TRAINING PROGRAM

## 2025-04-01 PROCEDURE — 84484 ASSAY OF TROPONIN QUANT: CPT | Performed by: STUDENT IN AN ORGANIZED HEALTH CARE EDUCATION/TRAINING PROGRAM

## 2025-04-01 RX ORDER — METHYLPREDNISOLONE SODIUM SUCCINATE 125 MG/2ML
60 INJECTION INTRAMUSCULAR; INTRAVENOUS EVERY 12 HOURS
Status: DISCONTINUED | OUTPATIENT
Start: 2025-04-02 | End: 2025-04-02

## 2025-04-01 RX ORDER — AMOXICILLIN 250 MG
2 CAPSULE ORAL 2 TIMES DAILY PRN
Status: ACTIVE | OUTPATIENT
Start: 2025-04-01

## 2025-04-01 RX ORDER — CALCIUM CARBONATE 500 MG/1
1000 TABLET, CHEWABLE ORAL 4 TIMES DAILY PRN
Status: ACTIVE | OUTPATIENT
Start: 2025-04-01

## 2025-04-01 RX ORDER — CARBAMAZEPINE 200 MG/1
200 TABLET ORAL
Status: DISPENSED | OUTPATIENT
Start: 2025-04-02

## 2025-04-01 RX ORDER — CARBAMAZEPINE 200 MG/1
400 TABLET ORAL 2 TIMES DAILY
Status: DISPENSED | OUTPATIENT
Start: 2025-04-01

## 2025-04-01 RX ORDER — METHYLPREDNISOLONE SODIUM SUCCINATE 125 MG/2ML
60 INJECTION INTRAMUSCULAR; INTRAVENOUS EVERY 12 HOURS
Status: DISCONTINUED | OUTPATIENT
Start: 2025-04-01 | End: 2025-04-01

## 2025-04-01 RX ORDER — SERTRALINE HYDROCHLORIDE 100 MG/1
100 TABLET, FILM COATED ORAL EVERY EVENING
Status: DISCONTINUED | OUTPATIENT
Start: 2025-04-01 | End: 2025-04-01

## 2025-04-01 RX ORDER — MAGNESIUM SULFATE HEPTAHYDRATE 40 MG/ML
2 INJECTION, SOLUTION INTRAVENOUS ONCE
Status: COMPLETED | OUTPATIENT
Start: 2025-04-01 | End: 2025-04-01

## 2025-04-01 RX ORDER — ENOXAPARIN SODIUM 100 MG/ML
40 INJECTION SUBCUTANEOUS EVERY 12 HOURS
Status: DISCONTINUED | OUTPATIENT
Start: 2025-04-02 | End: 2025-04-03

## 2025-04-01 RX ORDER — SERTRALINE HYDROCHLORIDE 100 MG/1
100 TABLET, FILM COATED ORAL DAILY
Status: DISPENSED | OUTPATIENT
Start: 2025-04-02

## 2025-04-01 RX ORDER — LOVASTATIN 20 MG/1
40 TABLET ORAL AT BEDTIME
Status: DISCONTINUED | OUTPATIENT
Start: 2025-04-01 | End: 2025-04-02

## 2025-04-01 RX ORDER — LISINOPRIL 20 MG/1
20 TABLET ORAL DAILY
Status: DISPENSED | OUTPATIENT
Start: 2025-04-02

## 2025-04-01 RX ORDER — DEXAMETHASONE SODIUM PHOSPHATE 10 MG/ML
10 INJECTION, SOLUTION INTRAMUSCULAR; INTRAVENOUS ONCE
Status: COMPLETED | OUTPATIENT
Start: 2025-04-01 | End: 2025-04-01

## 2025-04-01 RX ORDER — IPRATROPIUM BROMIDE AND ALBUTEROL SULFATE 2.5; .5 MG/3ML; MG/3ML
3 SOLUTION RESPIRATORY (INHALATION) ONCE
Status: COMPLETED | OUTPATIENT
Start: 2025-04-01 | End: 2025-04-01

## 2025-04-01 RX ORDER — LIDOCAINE 40 MG/G
CREAM TOPICAL
Status: ACTIVE | OUTPATIENT
Start: 2025-04-01

## 2025-04-01 RX ORDER — IPRATROPIUM BROMIDE AND ALBUTEROL SULFATE 2.5; .5 MG/3ML; MG/3ML
3 SOLUTION RESPIRATORY (INHALATION)
Status: DISPENSED | OUTPATIENT
Start: 2025-04-01

## 2025-04-01 RX ORDER — ALBUTEROL SULFATE 0.83 MG/ML
SOLUTION RESPIRATORY (INHALATION)
Qty: 75 ML | Refills: 0 | Status: ON HOLD | OUTPATIENT
Start: 2025-04-01

## 2025-04-01 RX ORDER — AMOXICILLIN 250 MG
1 CAPSULE ORAL 2 TIMES DAILY PRN
Status: ACTIVE | OUTPATIENT
Start: 2025-04-01

## 2025-04-01 RX ORDER — ACETYLCYSTEINE 200 MG/ML
2 SOLUTION ORAL; RESPIRATORY (INHALATION) 4 TIMES DAILY
Status: DISPENSED | OUTPATIENT
Start: 2025-04-01

## 2025-04-01 RX ADMIN — IPRATROPIUM BROMIDE AND ALBUTEROL SULFATE 3 ML: .5; 3 SOLUTION RESPIRATORY (INHALATION) at 21:15

## 2025-04-01 RX ADMIN — ACETYLCYSTEINE 2 ML: 200 SOLUTION ORAL; RESPIRATORY (INHALATION) at 21:15

## 2025-04-01 RX ADMIN — IPRATROPIUM BROMIDE AND ALBUTEROL SULFATE 3 ML: .5; 3 SOLUTION RESPIRATORY (INHALATION) at 16:30

## 2025-04-01 RX ADMIN — CARBAMAZEPINE 400 MG: 200 TABLET ORAL at 21:04

## 2025-04-01 RX ADMIN — Medication 10 MG/HR: at 20:09

## 2025-04-01 RX ADMIN — DEXAMETHASONE SODIUM PHOSPHATE 10 MG: 10 INJECTION, SOLUTION INTRAMUSCULAR; INTRAVENOUS at 16:30

## 2025-04-01 RX ADMIN — LOVASTATIN 40 MG: 20 TABLET ORAL at 21:04

## 2025-04-01 RX ADMIN — MAGNESIUM SULFATE HEPTAHYDRATE 2 G: 40 INJECTION, SOLUTION INTRAVENOUS at 18:27

## 2025-04-01 ASSESSMENT — ACTIVITIES OF DAILY LIVING (ADL)
ADLS_ACUITY_SCORE: 63

## 2025-04-01 NOTE — ED NOTES
Pt. Had episode of urinary incontinence in the bed. Upon removing the Pt.'s pants to clean the Pt. Up, extensive redness and strong odor was noted throughout the Pt.'s groin area. Pt. Did not endorse any pain to the touch with the rash. RN notified. Writer and RN placed antifungal barrier cream on the Pt.'s groin area. When RN was present in the room, an open wound on the posterior side of the left thigh was observed along with a used Mepilex dressing that had fallen off the leg. RN placed a new mepilex on the wound. After changing the Pt.'s sheets and blankets, Pt. Was transported to their CT scan.
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
## 2025-04-02 ENCOUNTER — APPOINTMENT (OUTPATIENT)
Dept: CARDIOLOGY | Facility: CLINIC | Age: 60
DRG: 202 | End: 2025-04-02
Attending: INTERNAL MEDICINE
Payer: MEDICARE

## 2025-04-02 ENCOUNTER — APPOINTMENT (OUTPATIENT)
Dept: ULTRASOUND IMAGING | Facility: CLINIC | Age: 60
DRG: 202 | End: 2025-04-02
Attending: INTERNAL MEDICINE
Payer: MEDICARE

## 2025-04-02 LAB
ATRIAL RATE - MUSE: 99 BPM
C PNEUM DNA SPEC QL NAA+PROBE: NOT DETECTED
DIASTOLIC BLOOD PRESSURE - MUSE: NORMAL MMHG
FLUAV H1 2009 PAND RNA SPEC QL NAA+PROBE: NOT DETECTED
FLUAV H1 RNA SPEC QL NAA+PROBE: NOT DETECTED
FLUAV H3 RNA SPEC QL NAA+PROBE: NOT DETECTED
FLUAV RNA SPEC QL NAA+PROBE: NOT DETECTED
FLUBV RNA SPEC QL NAA+PROBE: NOT DETECTED
HADV DNA SPEC QL NAA+PROBE: NOT DETECTED
HCOV PNL SPEC NAA+PROBE: NOT DETECTED
HMPV RNA SPEC QL NAA+PROBE: NOT DETECTED
HPIV1 RNA SPEC QL NAA+PROBE: NOT DETECTED
HPIV2 RNA SPEC QL NAA+PROBE: NOT DETECTED
HPIV3 RNA SPEC QL NAA+PROBE: NOT DETECTED
HPIV4 RNA SPEC QL NAA+PROBE: NOT DETECTED
INTERPRETATION ECG - MUSE: NORMAL
LVEF ECHO: NORMAL
M PNEUMO DNA SPEC QL NAA+PROBE: NOT DETECTED
P AXIS - MUSE: 41 DEGREES
PR INTERVAL - MUSE: 192 MS
QRS DURATION - MUSE: 70 MS
QT - MUSE: 336 MS
QTC - MUSE: 431 MS
R AXIS - MUSE: 11 DEGREES
RSV RNA SPEC QL NAA+PROBE: NOT DETECTED
RSV RNA SPEC QL NAA+PROBE: NOT DETECTED
RV+EV RNA SPEC QL NAA+PROBE: NOT DETECTED
SYSTOLIC BLOOD PRESSURE - MUSE: NORMAL MMHG
T AXIS - MUSE: 50 DEGREES
TROPONIN T SERPL HS-MCNC: 13 NG/L
VENTRICULAR RATE- MUSE: 99 BPM

## 2025-04-02 PROCEDURE — 255N000002 HC RX 255 OP 636: Performed by: INTERNAL MEDICINE

## 2025-04-02 PROCEDURE — 120N000001 HC R&B MED SURG/OB

## 2025-04-02 PROCEDURE — 250N000013 HC RX MED GY IP 250 OP 250 PS 637: Performed by: INTERNAL MEDICINE

## 2025-04-02 PROCEDURE — 94640 AIRWAY INHALATION TREATMENT: CPT | Mod: 76

## 2025-04-02 PROCEDURE — 250N000009 HC RX 250: Performed by: STUDENT IN AN ORGANIZED HEALTH CARE EDUCATION/TRAINING PROGRAM

## 2025-04-02 PROCEDURE — 93970 EXTREMITY STUDY: CPT

## 2025-04-02 PROCEDURE — 93306 TTE W/DOPPLER COMPLETE: CPT | Mod: 26 | Performed by: INTERNAL MEDICINE

## 2025-04-02 PROCEDURE — 250N000011 HC RX IP 250 OP 636: Mod: JW | Performed by: STUDENT IN AN ORGANIZED HEALTH CARE EDUCATION/TRAINING PROGRAM

## 2025-04-02 PROCEDURE — 250N000013 HC RX MED GY IP 250 OP 250 PS 637: Performed by: STUDENT IN AN ORGANIZED HEALTH CARE EDUCATION/TRAINING PROGRAM

## 2025-04-02 PROCEDURE — 84484 ASSAY OF TROPONIN QUANT: CPT | Performed by: STUDENT IN AN ORGANIZED HEALTH CARE EDUCATION/TRAINING PROGRAM

## 2025-04-02 PROCEDURE — 99233 SBSQ HOSP IP/OBS HIGH 50: CPT | Performed by: INTERNAL MEDICINE

## 2025-04-02 PROCEDURE — 36415 COLL VENOUS BLD VENIPUNCTURE: CPT | Performed by: STUDENT IN AN ORGANIZED HEALTH CARE EDUCATION/TRAINING PROGRAM

## 2025-04-02 PROCEDURE — 999N000208 ECHOCARDIOGRAM COMPLETE

## 2025-04-02 PROCEDURE — 999N000157 HC STATISTIC RCP TIME EA 10 MIN

## 2025-04-02 PROCEDURE — C8929 TTE W OR WO FOL WCON,DOPPLER: HCPCS

## 2025-04-02 PROCEDURE — 250N000011 HC RX IP 250 OP 636: Mod: JW | Performed by: INTERNAL MEDICINE

## 2025-04-02 PROCEDURE — 99222 1ST HOSP IP/OBS MODERATE 55: CPT | Performed by: INTERNAL MEDICINE

## 2025-04-02 PROCEDURE — 94640 AIRWAY INHALATION TREATMENT: CPT

## 2025-04-02 PROCEDURE — 999N000156 HC STATISTIC RCP CONSULT EA 30 MIN

## 2025-04-02 RX ORDER — PREDNISONE 20 MG/1
20 TABLET ORAL DAILY
Status: ACTIVE | OUTPATIENT
Start: 2025-04-08 | End: 2025-04-13

## 2025-04-02 RX ORDER — IBUPROFEN 400 MG/1
400 TABLET, FILM COATED ORAL EVERY 6 HOURS PRN
Status: ACTIVE | OUTPATIENT
Start: 2025-04-02

## 2025-04-02 RX ORDER — MULTIPLE VITAMINS W/ MINERALS TAB 9MG-400MCG
1 TAB ORAL DAILY
Status: DISPENSED | OUTPATIENT
Start: 2025-04-02

## 2025-04-02 RX ORDER — ACETAMINOPHEN 650 MG/1
650 SUPPOSITORY RECTAL EVERY 4 HOURS PRN
Status: ACTIVE | OUTPATIENT
Start: 2025-04-02

## 2025-04-02 RX ORDER — ATORVASTATIN CALCIUM 10 MG/1
10 TABLET, FILM COATED ORAL AT BEDTIME
Status: DISPENSED | OUTPATIENT
Start: 2025-04-02

## 2025-04-02 RX ORDER — PREDNISONE 20 MG/1
40 TABLET ORAL DAILY
Status: DISPENSED | OUTPATIENT
Start: 2025-04-03 | End: 2025-04-08

## 2025-04-02 RX ORDER — ACETAMINOPHEN 325 MG/1
650 TABLET ORAL EVERY 4 HOURS PRN
Status: DISPENSED | OUTPATIENT
Start: 2025-04-02

## 2025-04-02 RX ORDER — PREDNISONE 5 MG/1
10 TABLET ORAL DAILY
Status: ACTIVE | OUTPATIENT
Start: 2025-04-13 | End: 2025-04-18

## 2025-04-02 RX ORDER — AZITHROMYCIN 250 MG/1
250 TABLET, FILM COATED ORAL DAILY
Status: DISPENSED | OUTPATIENT
Start: 2025-04-02 | End: 2025-07-01

## 2025-04-02 RX ORDER — PREDNISONE 5 MG/1
5 TABLET ORAL DAILY
Status: ACTIVE | OUTPATIENT
Start: 2025-04-18 | End: 2025-04-23

## 2025-04-02 RX ORDER — ALBUTEROL SULFATE 0.83 MG/ML
2.5 SOLUTION RESPIRATORY (INHALATION)
Status: ACTIVE | OUTPATIENT
Start: 2025-04-02

## 2025-04-02 RX ORDER — METHYLPREDNISOLONE SODIUM SUCCINATE 125 MG/2ML
60 INJECTION INTRAMUSCULAR; INTRAVENOUS EVERY 12 HOURS
Status: DISCONTINUED | OUTPATIENT
Start: 2025-04-02 | End: 2025-04-03

## 2025-04-02 RX ADMIN — HUMAN ALBUMIN MICROSPHERES AND PERFLUTREN 4 ML: 10; .22 INJECTION, SOLUTION INTRAVENOUS at 13:21

## 2025-04-02 RX ADMIN — LISINOPRIL 20 MG: 20 TABLET ORAL at 08:47

## 2025-04-02 RX ADMIN — ATORVASTATIN CALCIUM 10 MG: 10 TABLET, FILM COATED ORAL at 21:20

## 2025-04-02 RX ADMIN — Medication 1 TABLET: at 16:14

## 2025-04-02 RX ADMIN — ENOXAPARIN SODIUM 40 MG: 40 INJECTION SUBCUTANEOUS at 08:52

## 2025-04-02 RX ADMIN — ACETAMINOPHEN 650 MG: 325 TABLET, FILM COATED ORAL at 01:49

## 2025-04-02 RX ADMIN — CARBAMAZEPINE 200 MG: 200 TABLET ORAL at 13:19

## 2025-04-02 RX ADMIN — IPRATROPIUM BROMIDE AND ALBUTEROL SULFATE 3 ML: .5; 3 SOLUTION RESPIRATORY (INHALATION) at 07:54

## 2025-04-02 RX ADMIN — SERTRALINE 100 MG: 100 TABLET, FILM COATED ORAL at 08:54

## 2025-04-02 RX ADMIN — AZITHROMYCIN DIHYDRATE 250 MG: 250 TABLET ORAL at 16:14

## 2025-04-02 RX ADMIN — CARBAMAZEPINE 400 MG: 200 TABLET ORAL at 21:19

## 2025-04-02 RX ADMIN — IPRATROPIUM BROMIDE AND ALBUTEROL SULFATE 3 ML: .5; 3 SOLUTION RESPIRATORY (INHALATION) at 19:33

## 2025-04-02 RX ADMIN — METHYLPREDNISOLONE SODIUM SUCCINATE 62.5 MG: 125 INJECTION, POWDER, FOR SOLUTION INTRAMUSCULAR; INTRAVENOUS at 08:47

## 2025-04-02 RX ADMIN — IPRATROPIUM BROMIDE AND ALBUTEROL SULFATE 3 ML: .5; 3 SOLUTION RESPIRATORY (INHALATION) at 15:21

## 2025-04-02 RX ADMIN — ACETYLCYSTEINE 2 ML: 200 SOLUTION ORAL; RESPIRATORY (INHALATION) at 15:21

## 2025-04-02 RX ADMIN — ACETYLCYSTEINE 2 ML: 200 SOLUTION ORAL; RESPIRATORY (INHALATION) at 19:33

## 2025-04-02 RX ADMIN — ACETYLCYSTEINE 2 ML: 200 SOLUTION ORAL; RESPIRATORY (INHALATION) at 07:54

## 2025-04-02 RX ADMIN — METHYLPREDNISOLONE SODIUM SUCCINATE 62.5 MG: 125 INJECTION, POWDER, FOR SOLUTION INTRAMUSCULAR; INTRAVENOUS at 21:19

## 2025-04-02 RX ADMIN — CARBAMAZEPINE 400 MG: 200 TABLET ORAL at 08:54

## 2025-04-02 RX ADMIN — ENOXAPARIN SODIUM 40 MG: 40 INJECTION SUBCUTANEOUS at 21:19

## 2025-04-02 RX ADMIN — MICONAZOLE NITRATE: 20 POWDER TOPICAL at 23:05

## 2025-04-02 ASSESSMENT — ACTIVITIES OF DAILY LIVING (ADL)
ADLS_ACUITY_SCORE: 55
ADLS_ACUITY_SCORE: 63
ADLS_ACUITY_SCORE: 55
ADLS_ACUITY_SCORE: 58
ADLS_ACUITY_SCORE: 55
ADLS_ACUITY_SCORE: 58
ADLS_ACUITY_SCORE: 55
ADLS_ACUITY_SCORE: 55
ADLS_ACUITY_SCORE: 58
DEPENDENT_IADLS:: CLEANING;COOKING;LAUNDRY;SHOPPING;MEAL PREPARATION;TRANSPORTATION;INCONTINENCE
ADLS_ACUITY_SCORE: 58
ADLS_ACUITY_SCORE: 55

## 2025-04-02 NOTE — CONSULTS
[facility]  WO Nurse Inpatient Assessment     Consulted for: breasts, abdominal folds, thigh    Summary: extensive ICD, intertrigo to rubio breast, abdomen and suprapubic, groin and thigh skin folds, suspect yeast. See EMA. Will add DAVI pump to support surface for additional moisture management  Left thigh: Chronic pressure injury stage 3, POA. improving    WOC nurse follow-up plan: weekly    Patient History (according to provider note(s):      59 year old female Brissa Corado is a 59 year old female with past medical history of asthma, hypertension, Cushing syndrome, depression, morbid obesity, sleep apnea, borderline developmental delay. She arrived to the ED 3/12 for shortness of breath, wheeze and cough for a while and was found to have acute on chronic hypoxic respiratory failure.     Assessment:      Areas visualized during today's visit: Focused:, Skin folds , Sacrum/coccyx, Abdomen, and left thigh    Pressure Injury Location: left thigh     2/12/2025 at previous admission   4/3/2025    Last photo: 4/3/2025  Wound type: Pressure Injury     Pressure Injury Stage: 3, present on admission       Wound history/plan of care:   chronic stage 3 pressure injury, POA. Moist, red, improved, scar tissue to periwound skin    Wound base: 100 % Dermis and Granulation tissue,      Palpation of the wound bed: normal      Drainage: small     Description of drainage: serosanguinous     Measurements (length x width x depth, in cm) 3.5  x 2.6  x  0.2 cm        Periwound skin: Scar tissue      Color: pink      Temperature: warm  Odor: none  Pain: mild, tender  Pain intervention prior to dressing change: patient tolerated well, no significant pain present , soaking, and slow and gentle cares   Treatment goal: Heal , Infection control/prevention, Maintain (prevention of deterioration), Protection, and Simplify plan of care  STATUS: initial assessment  Supplies ordered: gathered, supplies stored on unit,  discussed with RN, and discussed with patient     My PI Risk Assessment     Sensory Perception: 4 - No impairment     Moisture: 2 - Very moist      Activity: 3 - Walks occasionally      Mobility: 3 - Slightly limited      Nutrition: 3 - Adequate     Friction/Shear: 3 - No apparent problem      TOTAL: 18     Skin Injury Location: pannus, breast skin folds    Pannus, thighs, groin     Right breast      Left breast    Last photo: 4/2/2025  Skin injury due to: Intertrigo and Moisture associated skin damage (MASD)  Skin history and plan of care:   extensive red rash with satelite lesions similar to yeast within skin folds. Deep folds related to body habitus. Patient endorses itching, mild odor. Discussed hygiene and drying skin thoroughly after bathing. Will add low air loss pump to isoflex support surface for additional moisture management  Affected area:      Skin assessment: Denudement, Erythema, Lesions-satellite, and Rash     Measurements (length x width x depth, in cm) extensive, see media     Color: normal and consistent with surrounding tissue     Temperature  warm     Drainage: scant .      Color: clear      Odor: mild and moderate  Pain: denies , none  Pain interventions prior to dressing change: slow and gentle cares  and distraction  Treatment goal: Heal , Infection control/prevention, Decrease moisture, Maintain (prevention of deterioration), Protection, and Simplify plan of care  STATUS: initial assessment  Supplies ordered: discussed with RN and discussed with patient       Treatment Plan:     Skin folds of bilateral breasts, pannus, groins: Daily   Hygiene daily and dry thoroughly within skin folds  Moisture dry skin areas with body lotion  See EMAR for fungal powder and apply per Provider productions   Patient may utilize Interdry moisture wicking cloth if no improvement with fungal powder  DAVI pump for additional moisture management    Left posterior thigh: every other day and as needed  Cleanse with  Vashe wound cleanser and pat dry.  Apply oil emulsion guaze and cover with 4x4 Mepilex foam dressing  Strict PIP measures    Orders: Written    RECOMMEND PRIMARY TEAM ORDER: None, at this time  Education provided: importance of repositioning, plan of care, wound progress, Infection prevention , Moisture management, Hygiene, and Off-loading pressure  Discussed plan of care with: Patient and Nurse  Notify WOC if wound(s) deteriorate.  Nursing to notify the Provider(s) and re-consult the WOC Nurse if new skin concern.    DATA:     Current support surface: Bariatric Bariatric low air loss   Containment of urine/stool: Incontinence Protocol, Incontinent pad in bed, and Suction based external urinary catheter   BMI: Body mass index is 61.43 kg/m .   Active diet order: Orders Placed This Encounter      Combination Diet Regular Diet Adult     Output: I/O last 3 completed shifts:  In: 120 [P.O.:120]  Out: 1000 [Urine:1000]     Labs:   Recent Labs   Lab 04/01/25  1625   HGB 12.0   WBC 7.7     Pressure injury risk assessment:   Sensory Perception: 3-->slightly limited  Moisture: 3-->occasionally moist  Activity: 3-->walks occasionally  Mobility: 3-->slightly limited  Nutrition: 3-->adequate  Friction and Shear: 2-->potential problem  Randy Score: 17    Alejandra Brown, RN, BSN, CWOCN   Pager no longer is use, please contact through [employer] group: [facility] [place]

## 2025-04-02 NOTE — PLAN OF CARE
VSS 5L oxymask. LAZAR. RT following Nebs. On IV steroids. Pulmonary consult added.  WOCN consulted. Left back thigh open area and breasts/folds/groin/axillary rash yeast/redness. Pt has Legal gaurdian which are her parents. Pt not happy with them and was saying they are her exparents. Pt's father did visit and trying to plan for better living enviroment due to current situation at apartment has mold, which could be contributing to re admission's. [facility] .  consulted.     Echo added and US BLE added.         Goal Outcome Evaluation:      Plan of Care Reviewed With: patient    Overall Patient Progress: improvingOverall Patient Progress: improving    Outcome Evaluation: 5L oxymask. LAZAR. RT following Nebs. On IV steroids. Pulmonary consult added.  Needs a WOCN consult. Left back thigh open area and breasts/folds/groin/axillary rash yeast/redness. Pt has Legal gaurdian which are her parents. Pt not happy with them and was saying they are her exparents. Pt's father did visit and trying to plan for better living enviroment due to current situation at apartment has mold, which could be contributing to re admission's. [facility]  consulted.      Problem: Adult Inpatient Plan of Care  Goal: Plan of Care Review  Description: The Plan of Care Review/Shift note should be completed every shift.  The Outcome Evaluation is a brief statement about your assessment that the patient is improving, declining, or no change.  This information will be displayed automatically on your shiftnote.  Outcome: Progressing  Flowsheets (Taken 4/2/2025 1137)  Outcome Evaluation: 5L oxymask. LAZAR. RT following Nebs. On IV steroids. Pulmonary consult added.  Needs a WOCN consult. Left back thigh open area and breasts/folds/groin/axillary rash yeast/redness. Pt has Legal gaurdian which are her parents. Pt not happy with them and was saying they are her exparents. Pt's father did visit and  "trying to plan for better living enviroment due to current situation at apartment has mold, which could be contributing to re admission's. Pt has [facility] .  consulted.  Plan of Care Reviewed With: patient  Overall Patient Progress: improving  Goal: Patient-Specific Goal (Individualized)  Description: You can add care plan individualizations to a care plan. Examples of Individualization might be:  \"Parent requests to be called daily at 9am for status\", \"I have a hard time hearing out of my right ear\", or \"Do not touch me to wake me up as it startlesme\".  Outcome: Progressing  Goal: Absence of Hospital-Acquired Illness or Injury  Outcome: Progressing  Goal: Optimal Comfort and Wellbeing  Outcome: Progressing  Goal: Readiness for Transition of Care  Outcome: Progressing     Problem: Gas Exchange Impaired  Goal: Optimal Gas Exchange  Outcome: Progressing     Problem: Comorbidity Management  Goal: Blood Pressure in Desired Range  Outcome: Progressing     "

## 2025-04-02 NOTE — PLAN OF CARE
"Pt is alert and oriented. PRN Tylenol administered to manage pain. Diminished lung sounds and pt on 5L  oxymask.    Pt declined CPAP machine when asked by respiratory.    External catheter patent. Ongoing monitoring.    Plan: SW consult.    /59 (BP Location: Right arm)   Pulse 89   Temp 97.9  F (36.6  C) (Oral)   Resp 20   Ht 1.702 m (5' 7\")   Wt (!) 177.9 kg (392 lb 3.2 oz)   LMP 08/18/2008   SpO2 93%   BMI 61.43 kg/m       Problem: Adult Inpatient Plan of Care  Goal: Plan of Care Review  Description: The Plan of Care Review/Shift note should be completed every shift.  The Outcome Evaluation is a brief statement about your assessment that the patient is improving, declining, or no change.  This information will be displayed automatically on your shiftnote.  Outcome: Progressing  Flowsheets (Taken 4/2/2025 0514)  Outcome Evaluation: Pt on 5L oxymask.  Plan of Care Reviewed With: patient  Overall Patient Progress: improving  Goal: Patient-Specific Goal (Individualized)  Description: You can add care plan individualizations to a care plan. Examples of Individualization might be:  \"Parent requests to be called daily at 9am for status\", \"I have a hard time hearing out of my right ear\", or \"Do not touch me to wake me up as it startlesme\".  Outcome: Progressing  Goal: Absence of Hospital-Acquired Illness or Injury  Outcome: Progressing  Intervention: Identify and Manage Fall Risk  Recent Flowsheet Documentation  Taken 4/2/2025 0500 by Zakia Jones RN  Safety Promotion/Fall Prevention: safety round/check completed  Taken 4/2/2025 0400 by Zakia Jones RN  Safety Promotion/Fall Prevention: safety round/check completed  Taken 4/2/2025 0300 by Zakia Jones RN  Safety Promotion/Fall Prevention: safety round/check completed  Taken 4/2/2025 0200 by Zakia Jones RN  Safety Promotion/Fall Prevention: safety round/check completed  Taken 4/2/2025 0132 by Zakia Jones, PAULA  Safety Promotion/Fall " Prevention:   safety round/check completed   patient and family education   lighting adjusted   nonskid shoes/slippers when out of bed   assistive device/personal items within reach   clutter free environment maintained  Taken 4/2/2025 0100 by Zakia Jones RN  Safety Promotion/Fall Prevention: safety round/check completed  Taken 4/2/2025 0000 by Zakia Jones RN  Safety Promotion/Fall Prevention: safety round/check completed  Taken 4/1/2025 2300 by Zakia Jones RN  Safety Promotion/Fall Prevention: safety round/check completed  Intervention: Prevent and Manage VTE (Venous Thromboembolism) Risk  Recent Flowsheet Documentation  Taken 4/2/2025 0132 by Zakia Jones RN  VTE Prevention/Management: SCDs off (sequential compression devices)  Intervention: Prevent Infection  Recent Flowsheet Documentation  Taken 4/2/2025 0132 by Zakia Jones RN  Infection Prevention:   rest/sleep promoted   single patient room provided   hand hygiene promoted  Goal: Optimal Comfort and Wellbeing  Outcome: Progressing  Intervention: Monitor Pain and Promote Comfort  Recent Flowsheet Documentation  Taken 4/2/2025 0132 by Zakia Jones RN  Pain Management Interventions: medication (see MAR)  Goal: Readiness for Transition of Care  Outcome: Progressing  Flowsheets (Taken 4/2/2025 0124)  Transportation Anticipated: family or friend will provide  Intervention: Mutually Develop Transition Plan  Recent Flowsheet Documentation  Taken 4/2/2025 0124 by Zakia Jones RN  Transportation Anticipated: family or friend will provide  Patient/Family Anticipated Services at Transition: none  Patient/Family Anticipates Transition to: home with family  Taken 4/2/2025 0121 by Zakia Jones RN  Equipment Currently Used at Home:   walker, standard   wheelchair, power   shower chair     Problem: Gas Exchange Impaired  Goal: Optimal Gas Exchange  Outcome: Progressing   Goal Outcome Evaluation:      Plan of Care Reviewed With:  patient    Overall Patient Progress: improvingOverall Patient Progress: improving    Outcome Evaluation: Pt on 5L oxymask.

## 2025-04-02 NOTE — CONSULTS
CLINICAL NUTRITION SERVICES - ASSESSMENT NOTE    Registered Dietitian Interventions:  Continue current diet order per provider    Nutrition supplements: Adding Ensure Max Vanilla BID to assist with adequate protein intake    Adding MVI/M d/t concern for lack of meals balanced enough to meet all micronutrient needs    Recommend high protein diet - discussed high protein foods and recommendation for at least 4 separate intakes of protein daily. Pt was able to list multiple food sources of protein and endorsed sufficient knowledge on the subject.     REASON FOR ASSESSMENT  Positive admission nutrition risk screen     PMH: asthma, hypertension, Cushing syndrome, depression, sleep apnea, borderline developmental delay   - She was admitted on 3/12/25 for acute respiratory failure w/ hypoxia.     Per EMR, pt presented to ED on 4/1 with SOB.     INFORMATION OBTAINED  Assessed patient in room.    NUTRITION HISTORY  - Information obtained from chart review and pt at bedside.  - Diet at home: Pt reports that she does not follow any dietary restrictions at home.   - Usual intakes: Reports usual intakes of 2 meals per day and denies any snacking throughout the day. Reports that she is not a big breakfast eater, but gave the following examples for meals that she has for lunch and dinner.   L: [place], cottage cheese, chips, and a treat (e.g. rice krispie bar)   D: Whatever her  cooks - noted that he often makes pizza.  - Barriers to PO intakes: None identified.  - Use of oral supplements: Takes MVI/M at home  - Chewing/swallowing issues: None noted  - Meal preparation/grocery shopping:  does the majority of the cooking  - Allergies: NKFA    Pt denies any issues with eating enough throughout the day or any significant changes in eating patterns.    CURRENT NUTRITION ORDERS  Diet: Regular    CURRENT INTAKE/TOLERANCE  Per nursing flowsheet, 100% intakes and good appetite.    Per [facility], well portioned meal  "ordered for breakfast this AM.    Pt reports that she has been eating well and has experienced no changes in appetite or intake.     LABS  Nutrition-relevant labs: Reviewed    MEDICATIONS  Nutrition-relevant medications: Reviewed  Noted: solu-medrol      ANTHROPOMETRICS  Height: 170.2 cm (5' 7\")  Most Recent Weight: (!) 177.9 kg (392 lb 3.2 oz)  IBW: 61.6 kg  % IBW: 289%  BMI (kg/m ): Body mass index is 61.43 kg/m . (Obesity Class III BMI > 40)  Weight History:  Wt Readings from Last 10 Encounters:   04/02/25 (!) 177.9 kg (392 lb 3.2 oz)   03/12/25 (!) 178.4 kg (393 lb 4.8 oz)   01/15/25 (!) 180.5 kg (398 lb)   12/20/24 (!) 180.8 kg (398 lb 8 oz)   11/12/24 (!) 180.4 kg (397 lb 11.4 oz)   10/30/24 (!) 182.7 kg (402 lb 12.5 oz)   10/10/24 (!) 186.3 kg (410 lb 11.5 oz)   07/05/24 (!) 184.9 kg (407 lb 9.6 oz)   06/24/24 (!) 190.6 kg (420 lb 1.6 oz)   05/08/24 (!) 180.3 kg (397 lb 8 oz)     -4.51% BW x 6 months    Reports that her weight fluctuates regularly. Stated that this is normal for her.       ASSESSED NUTRITION NEEDS  Dosing Weight: 177.9 kg, based on actual wt   Estimated Energy Needs: 14 - 17 kcals/kg  Justification: Maintenance  Estimated Protein Needs: 1 - 1.2 grams of pro/kg  Justification:  Preservation of lean mass and Maintenance  Estimated Fluid Needs: per provider pending fluid status    SYSTEM AND PHYSICAL FINDINGS    GI symptoms:   - Stools: No BM yet documented. Limited window of admission so far  - No episodes of emesis documented    Skin/wounds:   - Edema: 2+ L&R feet  - Wounds noted to left thigh. WOC consulted.    MALNUTRITION  % Intake: No decreased intake noted  % Weight Loss: Weight loss does not meet criteria   Subcutaneous Fat Loss: None observed - difficult to assess d/t body habitus  Muscle Loss: None observed - difficult to assess d/t body habitus  Fluid Accumulation/Edema: None noted  Malnutrition Diagnosis: Patient does not meet two of the established criteria necessary for diagnosing " "malnutrition  Malnutrition Present on Admission: No    NUTRITION DIAGNOSIS  Predicted inadequate oral intake related to ongoing clinical course and potential for appetite to decrease pending LOS.    INTERVENTIONS  See nutrition interventions above    Goals  Patient to consume % of nutritionally adequate meal trays TID, or the equivalent with supplements/snacks.     Monitoring/Evaluation  Progress toward goals will be monitored and evaluated per policy.        May Kaur RD, LD  Clinical Dietitian  [place] Message Group: \"Dietitian [Kristy]\"  Office Phone: [phone]  Pagers: 3rd floor/ICU: [phone]  All other floors: [phone]  Weekend/holiday: [phone]    "

## 2025-04-02 NOTE — PROGRESS NOTES
[facility]    PROGRESS NOTE - Hospitalist Service    ASSESSMENT AND PLAN     Active Problems:    Acute on chronic respiratory failure with hypoxia (H)    Moderate persistent asthma with acute exacerbation    Brissa Corado is a 59 year old female with past medical history of asthma, hypertension, Cushing syndrome, depression, morbid obesity, sleep apnea, borderline developmental delay. She arrived to the ED 3/12 for shortness of breath, wheeze and cough for a while and was found to have acute on chronic hypoxic respiratory failure.     Per chart reviewed this is patient's 5th hospitalization in 5 months due to acute on chronic hypoxic respiratory failure in the setting of asthma exacerbation and obesity hypoventilation.       Acute on chronic hypoxic respiratory failure  Chronic hypercapnic respiratory failure  Acute asthma exacerbation in the setting of severe persistent asthma  Obesity hypoventilation syndrome  4 hospitalizations since December 2024   On Breo Ellipta however compliance is questionable given her history of developmental delay.    Baseline 2.5 L of nasal cannula at night and during the daytime as needed.    Planned outpatient sleep study- no current CPAP at home   Requiring 5 L oxymask currently   Chest x-ray showed bilateral perihilar opacities concerning for atelectasis.    IS  Steroids, breathing treatments   Pulm planning on long prednisone taper and azithromycin   Respiratory viral panel-Negative   Trial dose of lasix? Check ECHO  Check bilateral venous duplex    NSTEMI type 2    Elevated troponin, suspect demand  Patient denies any chest pain.  Troponin upon presentation was 8 which increased to 24.  EKG without any changes.  Suspect this is demand in the setting of acute hypoxic respiratory failure.  Low suspicion for ACS.  Will trend until peak.     History of obstructive sleep apnea  Historically she has used oxygen at nighttime in place of CPAP.  Sleep study as  "outpatient       Hyperlipidemia  Hypertension  -- Continue Lipitor   -- Continue PTA lisinopril     Hx of seizure disorder  -- Continue PTA tegretol     Allergic rhinitis  -- Continue PTA azelastine and fluticasone PRN     Depression  -- Continue PTA Zoloft      Mild developmental delays  --she has a legal guardian      Social   Patient has history of developmental delay and has parents listed as her legal guardians.  Patient said that she is not on talking terms with her parents as they blew off her trust when they shared her medical information with her other siblings which she never wanted.    -Social work to assist with discharge planning    Barriers to discharge: Hypoxia, safe discharge plan     Anticipated length of stay: 2 days     Medically Ready for Discharge: Anticipated in 2-4 Days    Clinically Significant Risk Factors Present on Admission                   # Hypertension: Noted on problem list           # Severe Obesity: Estimated body mass index is 61.43 kg/m  as calculated from the following:    Height as of this encounter: 1.702 m (5' 7\").    Weight as of this encounter: 177.9 kg (392 lb 3.2 oz).       # Asthma: noted on problem list        Subjective:  Patient resting in bed.  Notes her breathing is not back to baseline.  States she does not have a pulmonologist.  Notes she does have a sleep study coming up.  No other complaints.    PHYSICAL EXAM  Temp:  [97.9  F (36.6  C)-99.2  F (37.3  C)] 98  F (36.7  C)  Pulse:  [] 77  Resp:  [18-22] 20  BP: (109-174)/(53-89) 153/53  FiO2 (%):  [50 %] 50 %  SpO2:  [87 %-97 %] 95 %  Wt Readings from Last 1 Encounters:   04/02/25 (!) 177.9 kg (392 lb 3.2 oz)       Intake/Output Summary (Last 24 hours) at 4/2/2025 1231  Last data filed at 4/2/2025 0132  Gross per 24 hour   Intake 120 ml   Output 1000 ml   Net -880 ml      Body mass index is 61.43 kg/m .    GENRL: Alert and answering questions appropriately. Not in acute distress. Sitting in bed   CHEST: " Diminished throughout.  No wheezes auscultated.    HEART: Regular rate   ABDMN: Soft. Obese  EXTRM: + pedal edema  NEURO: No focal neurological deficit. No involuntary movements. Normal mentation  PSYCH: Normal affect and mood.   INTGM: No skin rash    Medical Decision Making       55 MINUTES SPENT BY ME on the date of service doing chart review, history, exam, documentation & further activities per the note.      PERTINENT LABS/IMAGING:  Results for orders placed or performed during the hospital encounter of 04/01/25   Chest XR,  PA & LAT    Impression    IMPRESSION: Heart size is normal. Limited depth of inspiration. Bilateral perihilar opacities favored for atelectasis. No pleural effusion or pneumothorax.     Most Recent 3 CBC's:  Recent Labs   Lab Test 04/01/25  1625 03/15/25  0608 03/12/25  1415 02/14/25  0744 02/12/25  0732   WBC 7.7  --  7.3  --  7.0   HGB 12.0  --  12.6  --  11.9   MCV 91  --  91  --  91    200 191   < > 183    < > = values in this interval not displayed.     Most Recent 3 BMP's:  Recent Labs   Lab Test 04/01/25  1625 03/15/25  0608 03/12/25  1415 02/14/25  0744 02/12/25  0732     --  141  --  143   POTASSIUM 4.3  --  4.3  --  4.8   CHLORIDE 105  --  103  --  103   CO2 25  --  28  --  21*   BUN 12.7  --  10.7  --  16.3   CR 0.60 0.64 0.64   < > 0.64   ANIONGAP 12  --  10  --  19*   CESAR 8.7*  --  9.1  --  8.5*   *  --  110*  --  128*    < > = values in this interval not displayed.     Most Recent 2 LFT's:  Recent Labs   Lab Test 07/02/24  0717 07/01/24  0015   AST 36 24   ALT 39 29   ALKPHOS 81 106   BILITOTAL 0.3 <0.2       Recent Labs   Lab Test 06/24/24  0408   CHOL 195   HDL 52   *   TRIG 147     Recent Labs   Lab Test 06/24/24  0408 09/14/20  1318 07/24/19  1654   * 130* 131*     Recent Labs   Lab Test 04/01/25  1625      POTASSIUM 4.3   CHLORIDE 105   CO2 25   *   BUN 12.7   CR 0.60   GFRESTIMATED >90   CESAR 8.7*     Recent Labs   Lab Test  06/09/21  1204 04/16/21  0504 04/16/21  0000   A1C 4.7 5.7* 5.7*     Recent Labs   Lab Test 04/01/25  1625 03/12/25  1415 02/12/25  0732   HGB 12.0 12.6 11.9     Recent Labs   Lab Test 04/15/21  2220   TROPONINI 0.01     Recent Labs   Lab Test 04/01/25  1625 06/24/24  0408 05/02/24  0655   NTBNPI <36 <36 <36     Recent Labs   Lab Test 08/28/18  1732   TSH 2.00     Recent Labs   Lab Test 04/15/21  2220 04/15/21  0000   INR 1.02 1.02       Ramya hCahal DO  Hospitalist Service  [facility]

## 2025-04-02 NOTE — CONSULTS
Care Management Initial Consult    General Information  Assessment completed with: Parents, Other, Patient, Ray Brumfield, and Lola  Type of CM/SW Visit: Initial Assessment    Primary Care Provider verified and updated as needed: Yes   Readmission within the last 30 days: unable to assess      Reason for Consult: discharge planning  Advance Care Planning: Advance Care Planning Reviewed: present on chart          Communication Assessment  Patient's communication style: spoken language (English or Bilingual)    Hearing Difficulty or Deaf: no   Wear Glasses or Blind: yes    Cognitive  Cognitive/Neuro/Behavioral: WDL                      Living Environment:   People in home: significant other  [place]  Current living Arrangements: apartment      Able to return to prior arrangements: no       Family/Social Support:  Care provided by: spouse/significant other  Provides care for: no one, unable/limited ability to care for self  Marital Status: Lives with Significant Other  Support system: Significant Other       [place]  Description of Support System: Supportive, Involved    Support Assessment: Adequate family and caregiver support    Current Resources:   Patient receiving home care services: No        Community Resources: Critical access hospital, County Worker, Other (see comment),   Equipment currently used at home: walker, standard, wheelchair, power, shower chair  Supplies currently used at home: None    Employment/Financial:  Employment Status: disabled        Financial Concerns:             Does the patient's insurance plan have a 3 day qualifying hospital stay waiver?  Yes     Which insurance plan 3 day waiver is available? [employer]    Will the waiver be used for post-acute placement? Undetermined at this time    Lifestyle & Psychosocial Needs:  Social Drivers of Health     Food Insecurity: Low Risk  (4/2/2025)    Food Insecurity     Within the past 12 months, did you worry that your food would run out before you  got money to buy more?: No     Within the past 12 months, did the food you bought just not last and you didn t have money to get more?: No   Depression: Not at risk (1/24/2025)    PHQ-2     PHQ-2 Score: Incomplete   Housing Stability: Low Risk  (4/2/2025)    Housing Stability     Do you have housing? : Yes     Are you worried about losing your housing?: No   Tobacco Use: Low Risk  (1/24/2025)    Patient History     Smoking Tobacco Use: Never     Smokeless Tobacco Use: Never     Passive Exposure: Not on file   Financial Resource Strain: Low Risk  (4/2/2025)    Financial Resource Strain     Within the past 12 months, have you or your family members you live with been unable to get utilities (heat, electricity) when it was really needed?: No   Alcohol Use: Not on file   Transportation Needs: Low Risk  (4/2/2025)    Transportation Needs     Within the past 12 months, has lack of transportation kept you from medical appointments, getting your medicines, non-medical meetings or appointments, work, or from getting things that you need?: No   Physical Activity: Not on file   Interpersonal Safety: Low Risk  (4/2/2025)    Interpersonal Safety     Do you feel physically and emotionally safe where you currently live?: Yes     Within the past 12 months, have you been hit, slapped, kicked or otherwise physically hurt by someone?: No     Within the past 12 months, have you been humiliated or emotionally abused in other ways by your partner or ex-partner?: No   Stress: Not on file   Social Connections: Not on file   Health Literacy: Not on file       Functional Status:  Prior to admission patient needed assistance:   Dependent ADLs:: Ambulation-walker, Incontinence  Dependent IADLs:: Cleaning, Cooking, Laundry, Shopping, Meal Preparation, Transportation, Incontinence  Assesssment of Functional Status: Not at  functional baseline    Mental Health Status:          Chemical Dependency Status:                Values/Beliefs:  Spiritual,  Cultural Beliefs, Yazidi Practices, Values that affect care:                 Discussed  Partnership in Safe Discharge Planning  document with patient/family: No    Additional Information:  SW consulted for elevated readmission risk. LUZ MARIA met with pt's parents/guardians, Ray and Lola, regarding pt's care. Ray shared that they are working with [facility] to coordinate a safe place for pt to receive the care that is needed. A down payment has been made and room is reserved for her.     LUZ MARIA spoke with pt's county SW, Pauline, ([phone]) regarding care for pt. Pauline is supportive of pt receiving a higher level of care at [facility]. She is currently working on getting pt's CADI accepted for payment of facility.     LUZ MARIA spoke with pt, who acknowledged her current home is not safe for her, and prefers to move into another home in the community. She is not accepting of [facility] at this time. Parents are legal guardians and are getting things in place for a safe discharge plan.    SW aware of primary care coordination referral and will address closer to discharge.     Next Steps: LUZ MARIA to follow for discharge plans.     AISSATOU Alexander, LGSW  [facility] Care Coordinator/ Med Surg [address] [place] [address]  [phone]
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
## 2025-04-02 NOTE — PROGRESS NOTES
Pt settled from ED.  See flowsheet for vital signs.  Safety WDL, call light within reach, bed alarm on.
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
## 2025-04-02 NOTE — PHARMACY-ADMISSION MEDICATION HISTORY
Pharmacist Admission Medication History    Admission medication history is complete. The information provided in this note is only as accurate as the sources available at the time of the update.    Information Source(s): Patient and CareEverywhere/SureScripts via in-person    Pertinent Information: none    Changes made to PTA medication list:  Added: None  Deleted: None  Changed: None    Allergies reviewed with patient and updates made in EHR: yes    Medication History Completed By: Jeffry Jean [facility] 4/1/2025 8:43 PM    PTA Med List   Medication Sig Last Dose/Taking    albuterol (PROAIR HFA/PROVENTIL HFA/VENTOLIN HFA) 108 (90 Base) MCG/ACT inhaler Inhale 2 puffs into the lungs every 6 hours as needed for shortness of breath, wheezing or cough. Taking As Needed    albuterol (PROVENTIL) (2.5 MG/3ML) 0.083% neb solution TAKE 1 VIAL (2.5 MG) BY NEBULIZATION EVERY 4 HOURS AS NEEDED FOR SHORTNESS OF BREATH, WHEEZING OR COUGH Taking    azelastine (ASTELIN) 0.1 % nasal spray Spray 1 spray into both nostrils daily as needed for allergies. Taking As Needed    carBAMazepine (TEGRETOL) 200 MG tablet Take 400 mg by mouth 2 times daily. AM and HS 4/1/2025 Morning    carBAMazepine (TEGRETOL) 200 MG tablet Take 200 mg by mouth daily. @1200 4/1/2025 Noon    fluticasone (FLONASE) 50 MCG/ACT nasal spray USE ONE SPRAY INTO BOTH NOSTRILS DAILY AS NEEDED FOR RHINITIS OR ALLERGIES Taking    Fluticasone Furoate-Vilanterol (BREO ELLIPTA) 50-25 MCG/ACT AEPB Inhale 1 puff into the lungs 2 times daily. 4/1/2025 Morning    ibuprofen (ADVIL/MOTRIN) 200 MG tablet Take 400 mg by mouth as needed for pain. Taking As Needed    lisinopril (ZESTRIL) 20 MG tablet TAKE 1 TABLET (20 MG) BY MOUTH DAILY 4/1/2025 Morning    lovastatin (MEVACOR) 40 MG tablet Take 40 mg by mouth daily. 3/31/2025 Evening    miconazole (MICATIN) 2 % external powder Apply topically as needed for itching or other. Taking As Needed    Multiple Vitamin (MULTI-VITAMIN PO) Take 1  tablet by mouth daily 4/1/2025 Morning    nystatin (MYCOSTATIN) [id number] UNIT/GM external cream Apply topically daily as needed Taking As Needed    omeprazole (PRILOSEC) 20 MG DR capsule Take 20 mg by mouth daily. 4/1/2025 Morning    sertraline (ZOLOFT) 100 MG tablet TAKE ONE TABLET BY MOUTH DAILY 4/1/2025 Morning
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
## 2025-04-02 NOTE — ED NOTES
[facility]  ED Nurse Handoff Report    ED Chief complaint: Shortness of Breath  . ED Diagnosis:   Final diagnoses:   Moderate persistent asthma with acute exacerbation   Acute on chronic respiratory failure with hypoxia (H)       Allergies:   Allergies   Allergen Reactions    Penicillins Anaphylaxis     PEN-FAST - Penicillin Allergy Risk Tool completed by [facility] December 20, 2024    Score: 3  Risk: Moderate  Assessment: Patient has a 20 % chance of a positive penicillin allergy test    Iodine      Iodinated Contrast Media  --- Hives      Tetracyclines & Related Unknown    Hydrochlorothiazide Palpitations     dehydration    Influenza Vac Split [Influenza Virus Vaccine] Rash     Patient states she is allergic to the vaccine.  Got a rash all over body many years ago after receiving injection.       Code Status: Full Code    Activity level - Baseline/Home:  walker.  Activity Level - Current:   assist of 1 and walker.   Lift room needed: No.   Bariatric: Yes   Needed: No   Isolation: No.   Infection: Not Applicable.     Respiratory status: Nasal cannula    Vital Signs (within 30 minutes):   Vitals:    04/01/25 2009 04/01/25 2015 04/01/25 2030 04/01/25 2045   BP:  (!) 174/89 (!) 161/78 (!) 158/73   Pulse: 89 87 92 90   Resp:       Temp:       TempSrc:       SpO2: 93% 94% 95% 94%       Cardiac Rhythm:  ,      Pain level:    Patient confused: No.   Patient Falls Risk: nonskid shoes/slippers when out of bed, arm band in place, patient and family education, assistive device/personal items within reach, activity supervised, mobility aid in reach, room door open, and toileting schedule implemented.   Elimination Status: Has voided     Patient Report - Initial Complaint: SOB.   Focused Assessment: pt arrives from home after running out of nebulizer treatments and experiencing increased SOB. Recently admitted/discharged for pneumonia. Currently on 4L oxymask satting 92-96%. A/OX4, on a purewick,  groin area is excoriated w/mepalex on L thigh wound. Ambulates at home w/4 wheel walker w/a seat, has not ambulated s/t SOB here. PERRLA, ABCs intact, AVSS. RT to place pt on 1hr continuous neb w/high flow and then reassess RA sats after that.      Abnormal Results:   Labs Ordered and Resulted from Time of ED Arrival to Time of ED Departure   BASIC METABOLIC PANEL - Abnormal       Result Value    Sodium 142      Potassium 4.3      Chloride 105      Carbon Dioxide (CO2) 25      Anion Gap 12      Urea Nitrogen 12.7      Creatinine 0.60      GFR Estimate >90      Calcium 8.7 (*)     Glucose 127 (*)    BLOOD GAS VENOUS - Abnormal    pH Venous 7.32      pCO2 Venous 55 (*)     pO2 Venous 36      Bicarbonate Venous 29 (*)     Base Excess/Deficit Venous 1.7      FIO2 4      Oxyhemoglobin Venous 60 (*)     O2 Sat, Venous 61.4 (*)    CBC WITH PLATELETS AND DIFFERENTIAL - Abnormal    WBC Count 7.7      RBC Count 4.39      Hemoglobin 12.0      Hematocrit 39.8      MCV 91      MCH 27.3      MCHC 30.2 (*)     RDW 14.0      Platelet Count 223      % Neutrophils 66      % Lymphocytes 19      % Monocytes 5      % Eosinophils 9      % Basophils 1      % Immature Granulocytes 1      NRBCs per 100 WBC 0      Absolute Neutrophils 5.1      Absolute Lymphocytes 1.5      Absolute Monocytes 0.4      Absolute Eosinophils 0.7      Absolute Basophils 0.1      Absolute Immature Granulocytes 0.0      Absolute NRBCs 0.0     TROPONIN T, HIGH SENSITIVITY - Abnormal    Troponin T, High Sensitivity 24 (*)    TROPONIN T, HIGH SENSITIVITY - Normal    Troponin T, High Sensitivity 8     NT PROBNP INPATIENT - Normal    N terminal Pro BNP Inpatient <36     TROPONIN T, HIGH SENSITIVITY   RESPIRATORY PANEL PCR        Chest XR,  PA & LAT   Final Result   IMPRESSION: Heart size is normal. Limited depth of inspiration. Bilateral perihilar opacities favored for atelectasis. No pleural effusion or pneumothorax.          Treatments provided: oxygen, IV mag, high  flow O2, breathing Tx  Family Comments:  on phone earlier, cooperative  OBS brochure/video discussed/provided to patient:  N/A  ED Medications:   Medications   albuterol (PROVENTIL) continuous nebulization (10 mg/hr Nebulization $New Bag 4/1/25 2009)   lidocaine 1 % 0.1-1 mL (has no administration in time range)   lidocaine (LMX4) cream (has no administration in time range)   sodium chloride (PF) 0.9% PF flush 3 mL (has no administration in time range)   sodium chloride (PF) 0.9% PF flush 3 mL (has no administration in time range)   senna-docusate (SENOKOT-S/PERICOLACE) 8.6-50 MG per tablet 1 tablet (has no administration in time range)     Or   senna-docusate (SENOKOT-S/PERICOLACE) 8.6-50 MG per tablet 2 tablet (has no administration in time range)   calcium carbonate (TUMS) chewable tablet 1,000 mg (has no administration in time range)   enoxaparin ANTICOAGULANT (LOVENOX) injection 40 mg (has no administration in time range)   ipratropium - albuterol 0.5 mg/2.5 mg/3 mL (DUONEB) neb solution 3 mL (has no administration in time range)   acetylcysteine (MUCOMYST) 20 % nebulizer solution 2 mL (has no administration in time range)   methylPREDNISolone Na Suc (solu-MEDROL) injection 62.5 mg (has no administration in time range)   carBAMazepine (TEGretol) tablet 400 mg (400 mg Oral $Given 4/1/25 2104)   carBAMazepine (TEGretol) tablet 200 mg (has no administration in time range)   lisinopril (ZESTRIL) tablet 20 mg (has no administration in time range)   lovastatin (MEVACOR) tablet 40 mg (40 mg Oral $Given 4/1/25 2104)   sertraline (ZOLOFT) tablet 100 mg (has no administration in time range)   ipratropium - albuterol 0.5 mg/2.5 mg/3 mL (DUONEB) neb solution 3 mL (3 mLs Nebulization $Given 4/1/25 1630)   dexAMETHasone PF (DECADRON) injection 10 mg (10 mg Intravenous $Given 4/1/25 1630)   magnesium sulfate 2 g in 50 mL sterile water intermittent infusion (0 g Intravenous Stopped 4/1/25 2010)       Drips infusing:   No  For the majority of the shift this patient was Green.   Interventions performed were n/a.    Sepsis treatment initiated: No    Cares/treatment/interventions/medications to be completed following ED care: see provider orders    ED Nurse Name: Nelly Ugalde RN  7:46 PM    RECEIVING UNIT ED HANDOFF REVIEW    Above ED Nurse Handoff Report was reviewed: Yes  Reviewed by: Mirian Chase RN on April 1, 2025 at 10:31 PM   I Qiana called the ED to inform them the note was read: Yes

## 2025-04-02 NOTE — PLAN OF CARE
Goal Outcome Evaluation:      Plan of Care Reviewed With: patient, guardian    Overall Patient Progress: improvingOverall Patient Progress: improving    Outcome Evaluation: Anticipating discharge to [facility] when medically ready.    AISSATOU Alexander, CONCHISSW   Care Coordinator/ Med Surg [address] [place] [address]  [phone]

## 2025-04-03 ENCOUNTER — APPOINTMENT (OUTPATIENT)
Dept: CT IMAGING | Facility: CLINIC | Age: 60
DRG: 202 | End: 2025-04-03
Attending: INTERNAL MEDICINE
Payer: MEDICARE

## 2025-04-03 LAB
A ALTERNATA IGE QN: <0.1 KU(A)/L
A FUMIGATUS IGE QN: <0.1 KU(A)/L
ANION GAP SERPL CALCULATED.3IONS-SCNC: 11 MMOL/L (ref 7–15)
BERMUDA GRASS IGE QN: <0.1 KU(A)/L
BUN SERPL-MCNC: 16.3 MG/DL (ref 6–20)
C HERBARUM IGE QN: <0.1 KU(A)/L
CALCIUM SERPL-MCNC: 8.8 MG/DL (ref 8.8–10.4)
CAT DANDER IGG QN: <0.1 KU(A)/L
CEDAR IGE QN: <0.1 KU(A)/L
CHLORIDE SERPL-SCNC: 104 MMOL/L (ref 98–107)
COMMON RAGWEED IGE QN: <0.1 KU(A)/L
COTTONWOOD IGE QN: <0.1 KU(A)/L
CREAT SERPL-MCNC: 0.56 MG/DL (ref 0.51–0.95)
D FARINAE IGE QN: <0.1 KU(A)/L
D PTERONYSS IGE QN: <0.1 KU(A)/L
DOG DANDER+EPITH IGE QN: <0.1 KU(A)/L
EGFRCR SERPLBLD CKD-EPI 2021: >90 ML/MIN/1.73M2
GLUCOSE SERPL-MCNC: 107 MG/DL (ref 70–99)
HCO3 SERPL-SCNC: 26 MMOL/L (ref 22–29)
HOLD SPECIMEN: NORMAL
HOLD SPECIMEN: NORMAL
IGE SERPL-ACNC: 26 KU/L (ref 0–114)
INR PPP: 0.95 (ref 0.85–1.15)
MAPLE IGE QN: <0.1 KU(A)/L
MARSH ELDER IGE QN: <0.1 KU(A)/L
MOUSE URINE PROT IGE QN: <0.1 KU(A)/L
NETTLE IGE QN: <0.1 KU(A)/L
P NOTATUM IGE QN: <0.1 KU(A)/L
POTASSIUM SERPL-SCNC: 4.4 MMOL/L (ref 3.4–5.3)
ROACH IGE QN: 0.13 KU(A)/L
SALTWORT IGE QN: <0.1 KU(A)/L
SILVER BIRCH IGE QN: <0.1 KU(A)/L
SODIUM SERPL-SCNC: 141 MMOL/L (ref 135–145)
TIMOTHY IGE QN: <0.1 KU(A)/L
WHITE ASH IGE QN: <0.1 KU(A)/L
WHITE ELM IGE QN: <0.1 KU(A)/L
WHITE MULBERRY IGE QN: <0.1 KU(A)/L
WHITE OAK IGE QN: <0.1 KU(A)/L

## 2025-04-03 PROCEDURE — 250N000013 HC RX MED GY IP 250 OP 250 PS 637: Performed by: INTERNAL MEDICINE

## 2025-04-03 PROCEDURE — 82310 ASSAY OF CALCIUM: CPT | Performed by: INTERNAL MEDICINE

## 2025-04-03 PROCEDURE — 250N000009 HC RX 250: Performed by: STUDENT IN AN ORGANIZED HEALTH CARE EDUCATION/TRAINING PROGRAM

## 2025-04-03 PROCEDURE — 250N000012 HC RX MED GY IP 250 OP 636 PS 637: Performed by: INTERNAL MEDICINE

## 2025-04-03 PROCEDURE — 250N000009 HC RX 250: Performed by: INTERNAL MEDICINE

## 2025-04-03 PROCEDURE — 999N000157 HC STATISTIC RCP TIME EA 10 MIN

## 2025-04-03 PROCEDURE — G0463 HOSPITAL OUTPT CLINIC VISIT: HCPCS

## 2025-04-03 PROCEDURE — 94640 AIRWAY INHALATION TREATMENT: CPT

## 2025-04-03 PROCEDURE — 85610 PROTHROMBIN TIME: CPT | Performed by: INTERNAL MEDICINE

## 2025-04-03 PROCEDURE — 250N000013 HC RX MED GY IP 250 OP 250 PS 637: Performed by: STUDENT IN AN ORGANIZED HEALTH CARE EDUCATION/TRAINING PROGRAM

## 2025-04-03 PROCEDURE — 94640 AIRWAY INHALATION TREATMENT: CPT | Mod: 76

## 2025-04-03 PROCEDURE — 120N000001 HC R&B MED SURG/OB

## 2025-04-03 PROCEDURE — 99233 SBSQ HOSP IP/OBS HIGH 50: CPT | Performed by: INTERNAL MEDICINE

## 2025-04-03 PROCEDURE — 80048 BASIC METABOLIC PNL TOTAL CA: CPT | Performed by: INTERNAL MEDICINE

## 2025-04-03 PROCEDURE — 36415 COLL VENOUS BLD VENIPUNCTURE: CPT | Performed by: INTERNAL MEDICINE

## 2025-04-03 PROCEDURE — 71275 CT ANGIOGRAPHY CHEST: CPT

## 2025-04-03 PROCEDURE — 250N000011 HC RX IP 250 OP 636: Performed by: INTERNAL MEDICINE

## 2025-04-03 RX ORDER — WARFARIN SODIUM 4 MG/1
4 TABLET ORAL
Status: COMPLETED | OUTPATIENT
Start: 2025-04-03 | End: 2025-04-03

## 2025-04-03 RX ORDER — IOPAMIDOL 755 MG/ML
77 INJECTION, SOLUTION INTRAVASCULAR ONCE
Status: COMPLETED | OUTPATIENT
Start: 2025-04-03 | End: 2025-04-03

## 2025-04-03 RX ADMIN — CARBAMAZEPINE 400 MG: 200 TABLET ORAL at 08:21

## 2025-04-03 RX ADMIN — ATORVASTATIN CALCIUM 10 MG: 10 TABLET, FILM COATED ORAL at 22:06

## 2025-04-03 RX ADMIN — LISINOPRIL 20 MG: 20 TABLET ORAL at 08:21

## 2025-04-03 RX ADMIN — IPRATROPIUM BROMIDE AND ALBUTEROL SULFATE 3 ML: .5; 3 SOLUTION RESPIRATORY (INHALATION) at 15:58

## 2025-04-03 RX ADMIN — CARBAMAZEPINE 200 MG: 200 TABLET ORAL at 12:16

## 2025-04-03 RX ADMIN — AZITHROMYCIN DIHYDRATE 250 MG: 250 TABLET ORAL at 08:21

## 2025-04-03 RX ADMIN — ACETAMINOPHEN 650 MG: 325 TABLET, FILM COATED ORAL at 14:35

## 2025-04-03 RX ADMIN — CARBAMAZEPINE 400 MG: 200 TABLET ORAL at 22:04

## 2025-04-03 RX ADMIN — IOPAMIDOL 77 ML: 755 INJECTION, SOLUTION INTRAVENOUS at 15:19

## 2025-04-03 RX ADMIN — PREDNISONE 40 MG: 20 TABLET ORAL at 22:06

## 2025-04-03 RX ADMIN — IPRATROPIUM BROMIDE AND ALBUTEROL SULFATE 3 ML: .5; 3 SOLUTION RESPIRATORY (INHALATION) at 19:13

## 2025-04-03 RX ADMIN — SERTRALINE 100 MG: 100 TABLET, FILM COATED ORAL at 09:22

## 2025-04-03 RX ADMIN — ACETYLCYSTEINE 2 ML: 200 SOLUTION ORAL; RESPIRATORY (INHALATION) at 11:45

## 2025-04-03 RX ADMIN — MICONAZOLE NITRATE: 20 POWDER TOPICAL at 08:23

## 2025-04-03 RX ADMIN — SODIUM CHLORIDE 90 ML: 9 INJECTION, SOLUTION INTRAVENOUS at 15:22

## 2025-04-03 RX ADMIN — APIXABAN 10 MG: 5 TABLET, FILM COATED ORAL at 08:21

## 2025-04-03 RX ADMIN — MICONAZOLE NITRATE: 20 POWDER TOPICAL at 22:08

## 2025-04-03 RX ADMIN — IPRATROPIUM BROMIDE AND ALBUTEROL SULFATE 3 ML: .5; 3 SOLUTION RESPIRATORY (INHALATION) at 08:59

## 2025-04-03 RX ADMIN — IPRATROPIUM BROMIDE AND ALBUTEROL SULFATE 3 ML: .5; 3 SOLUTION RESPIRATORY (INHALATION) at 11:45

## 2025-04-03 RX ADMIN — WARFARIN SODIUM 4 MG: 4 TABLET ORAL at 17:16

## 2025-04-03 RX ADMIN — ACETYLCYSTEINE 2 ML: 200 SOLUTION ORAL; RESPIRATORY (INHALATION) at 08:59

## 2025-04-03 RX ADMIN — Medication 1 TABLET: at 08:21

## 2025-04-03 RX ADMIN — ACETYLCYSTEINE 2 ML: 200 SOLUTION ORAL; RESPIRATORY (INHALATION) at 15:58

## 2025-04-03 RX ADMIN — ACETYLCYSTEINE 2 ML: 200 SOLUTION ORAL; RESPIRATORY (INHALATION) at 19:13

## 2025-04-03 ASSESSMENT — ACTIVITIES OF DAILY LIVING (ADL)
ADLS_ACUITY_SCORE: 52
ADLS_ACUITY_SCORE: 52
ADLS_ACUITY_SCORE: 55
ADLS_ACUITY_SCORE: 52
ADLS_ACUITY_SCORE: 55
ADLS_ACUITY_SCORE: 52
ADLS_ACUITY_SCORE: 55
ADLS_ACUITY_SCORE: 52
ADLS_ACUITY_SCORE: 52
ADLS_ACUITY_SCORE: 55
ADLS_ACUITY_SCORE: 52
ADLS_ACUITY_SCORE: 55
ADLS_ACUITY_SCORE: 52
ADLS_ACUITY_SCORE: 52
ADLS_ACUITY_SCORE: 55
ADLS_ACUITY_SCORE: 52
ADLS_ACUITY_SCORE: 55
ADLS_ACUITY_SCORE: 52

## 2025-04-03 NOTE — PHARMACY-ANTICOAGULATION SERVICE
Clinical Pharmacy - Warfarin Dosing Consult     Pharmacy has been consulted to manage this patient s warfarin therapy.  Indication: DVT/PE Prophylaxis  Therapy Goal: INR 2-3  Warfarin Prior to Admission: No  Significant drug interactions: tegretol, prednisone  Recent documented change in oral intake/nutrition: Unknown    INR   Date Value Ref Range Status   04/03/2025 0.95 0.85 - 1.15 Final   04/15/2021 1.02 0.90 - 1.10 Final       Recommend warfarin 4 mg today.  Pharmacy will monitor Brissa Corado daily and order warfarin doses to achieve specified goal.      Please contact pharmacy as soon as possible if the warfarin needs to be held for a procedure or if the warfarin goals change.
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
## 2025-04-03 NOTE — PROGRESS NOTES
[facility]    PROGRESS NOTE - Hospitalist Service    ASSESSMENT AND PLAN     Active Problems:    Acute on chronic respiratory failure with hypoxia (H)    Moderate persistent asthma with acute exacerbation    Brissa Corado is a 59 year old female with past medical history of asthma, hypertension, Cushing syndrome, depression, morbid obesity, sleep apnea, borderline developmental delay. She arrived to the ED 3/12 for shortness of breath, wheeze and cough for a while and was found to have acute on chronic hypoxic respiratory failure.     Per chart reviewed this is patient's 5th hospitalization in 5 months due to acute on chronic hypoxic respiratory failure in the setting of asthma exacerbation and obesity hypoventilation.       Acute on chronic hypoxic respiratory failure  Chronic hypercapnic respiratory failure  Acute asthma exacerbation in the setting of severe persistent asthma  Obesity hypoventilation syndrome  4 hospitalizations since December 2024   On Breo Ellipta however compliance is questionable given her history of developmental delay.    Baseline 2.5 L of nasal cannula at night and during the daytime as needed.    Planned outpatient sleep study- no current CPAP at home   Requiring 5 L oxymask currently   Chest x-ray showed bilateral perihilar opacities concerning for atelectasis.    IS  Steroids, breathing treatments   Pulm recommending long prednisone taper and azithromycin   Respiratory viral panel-Negative   ECHO reviewed with EF of 55 to 60%.  Limited study.  Venous duplex ordered with suspected left proximal posterior tibialis vein DVT.  Eliquis and Xarelto interaction with Tegretol.  Initiate warfarin  Check CT PE study     NSTEMI type 2    Elevated troponin, suspect demand   Patient denies any chest pain.  Troponin upon presentation was 8 which increased to 24.  EKG without any changes.  Suspect this is demand in the setting of acute hypoxic respiratory failure.  Low  "suspicion for ACS.  Will trend until peak.     History of obstructive sleep apnea  Historically she has used oxygen at nighttime in place of CPAP.  Sleep study as outpatient       Hyperlipidemia  Hypertension  -- Continue Lipitor   -- Continue PTA lisinopril     Hx of seizure disorder  -- Continue PTA tegretol     Allergic rhinitis  -- Continue PTA azelastine and fluticasone PRN     Depression  -- Continue PTA Zoloft      Mild developmental delays  --she has a legal guardian      Social   Patient has history of developmental delay and has parents listed as her legal guardians.  Patient said that she is not on talking terms with her parents as they blew off her trust when they shared her medical information with her other siblings which she never wanted.    -Social work to assist with discharge planning     Barriers to discharge: Hypoxia, CT PE study, safe discharge plan      Anticipated length of stay: 2-3 days     Medically Ready for Discharge: Anticipated in 2-4 Days    Clinically Significant Risk Factors                   # Hypertension: Noted on problem list            # Severe Obesity: Estimated body mass index is 61.43 kg/m  as calculated from the following:    Height as of this encounter: 1.702 m (5' 7\").    Weight as of this encounter: 177.9 kg (392 lb 3.2 oz)., PRESENT ON ADMISSION     # Asthma: noted on problem list            Subjective:  Patient resting comfortably in bed.  Notes her breathing is doing a little bit better.  No other complaints.    PHYSICAL EXAM  Temp:  [97.6  F (36.4  C)-98.7  F (37.1  C)] 97.7  F (36.5  C)  Pulse:  [71-99] 98  Resp:  [18-22] 20  BP: (107-147)/(31-74) 146/73  SpO2:  [93 %-95 %] 93 %  Wt Readings from Last 1 Encounters:   04/02/25 (!) 177.9 kg (392 lb 3.2 oz)       Intake/Output Summary (Last 24 hours) at 4/3/2025 1147  Last data filed at 4/2/2025 2348  Gross per 24 hour   Intake 400 ml   Output --   Net 400 ml      Body mass index is 61.43 kg/m .    GENRL: Alert and " answering questions appropriately. Not in acute distress. Sitting in bed   CHEST: Diminished throughout.  No wheezes auscultated.    HEART: Regular rate   ABDMN: Soft. Obese  EXTRM: + pedal edema  NEURO: No focal neurological deficit. No involuntary movements. Normal mentation  PSYCH: Normal affect and mood.   INTGM: No skin rash    Medical Decision Making       55 MINUTES SPENT BY ME on the date of service doing chart review, history, exam, documentation & further activities per the note.      PERTINENT LABS/IMAGING:  Results for orders placed or performed during the hospital encounter of 04/01/25   Chest XR,  PA & LAT    Impression    IMPRESSION: Heart size is normal. Limited depth of inspiration. Bilateral perihilar opacities favored for atelectasis. No pleural effusion or pneumothorax.   US Lower Extremity Venous Duplex Bilateral    Impression    IMPRESSION:  1.  Suspect left leg proximal posterior tibialis vein deep venous thrombus. This is somewhat limited in view.  2.  No deep venous thrombosis at the right leg.  3.  Right leg popliteal fossa cyst is 3.5 cm.   Echocardiogram Complete   Result Value Ref Range    LVEF  55-60%      Most Recent 3 CBC's:  Recent Labs   Lab Test 04/01/25  1625 03/15/25  0608 03/12/25  1415 02/14/25  0744 02/12/25  0732   WBC 7.7  --  7.3  --  7.0   HGB 12.0  --  12.6  --  11.9   MCV 91  --  91  --  91    200 191   < > 183    < > = values in this interval not displayed.     Most Recent 3 BMP's:  Recent Labs   Lab Test 04/03/25  0749 04/01/25  1625 03/15/25  0608 03/12/25  1415    142  --  141   POTASSIUM 4.4 4.3  --  4.3   CHLORIDE 104 105  --  103   CO2 26 25  --  28   BUN 16.3 12.7  --  10.7   CR 0.56 0.60 0.64 0.64   ANIONGAP 11 12  --  10   CESAR 8.8 8.7*  --  9.1   * 127*  --  110*     Most Recent 2 LFT's:  Recent Labs   Lab Test 07/02/24  0717 07/01/24  0015   AST 36 24   ALT 39 29   ALKPHOS 81 106   BILITOTAL 0.3 <0.2       Recent Labs   Lab Test  06/24/24  0408   CHOL 195   HDL 52   *   TRIG 147     Recent Labs   Lab Test 06/24/24  0408 09/14/20  1318 07/24/19  1654   * 130* 131*     Recent Labs   Lab Test 04/03/25  0749      POTASSIUM 4.4   CHLORIDE 104   CO2 26   *   BUN 16.3   CR 0.56   GFRESTIMATED >90   CESAR 8.8     Recent Labs   Lab Test 06/09/21  1204 04/16/21  0504 04/16/21  0000   A1C 4.7 5.7* 5.7*     Recent Labs   Lab Test 04/01/25  1625 03/12/25  1415 02/12/25  0732   HGB 12.0 12.6 11.9     Recent Labs   Lab Test 04/15/21  2220   TROPONINI 0.01     Recent Labs   Lab Test 04/01/25  1625 06/24/24  0408 05/02/24  0655   NTBNPI <36 <36 <36     Recent Labs   Lab Test 08/28/18  1732   TSH 2.00     Recent Labs   Lab Test 04/15/21  2220 04/15/21  0000   INR 1.02 1.02       Ramya Chahal DO  Hospitalist Service  [facility]

## 2025-04-03 NOTE — PLAN OF CARE
"A&Ox 4, VSS 5L NC (2.5L baseline). Some exp. Wheezes noted, but pt declined RT involvement, stating that it \"it is normal for me\". Refuses CPAP, prefers oxygen overnight.     IV: L PIV saline locked  Diet: reg  Activity: 1 gb walker  Pain: denies      Plan to discharge to nursing home when ready. Pulm signed off. WOC consulted for vast skin rashes/issues.           Temp: 97.6  F (36.4  C) Temp src: Oral BP: (!) 147/74 Pulse: 73   Resp: 20 SpO2: 93 % O2 Device: Nasal cannula Oxygen Delivery: 5 LPM     Goal Outcome Evaluation:      Plan of Care Reviewed With: patient    Overall Patient Progress: no changeOverall Patient Progress: no change    Outcome Evaluation: Exp wheeze, pt declined RT intervetions. VSS 5L NC      Problem: Adult Inpatient Plan of Care  Goal: Plan of Care Review  Description: The Plan of Care Review/Shift note should be completed every shift.  The Outcome Evaluation is a brief statement about your assessment that the patient is improving, declining, or no change.  This information will be displayed automatically on your shiftnote.  Outcome: Progressing  Flowsheets (Taken 4/3/2025 0600)  Outcome Evaluation: Exp wheeze, pt declined RT intervetions. VSS 5L NC  Plan of Care Reviewed With: patient  Overall Patient Progress: no change  Goal: Patient-Specific Goal (Individualized)  Description: You can add care plan individualizations to a care plan. Examples of Individualization might be:  \"Parent requests to be called daily at 9am for status\", \"I have a hard time hearing out of my right ear\", or \"Do not touch me to wake me up as it startlesme\".  Outcome: Progressing  Goal: Absence of Hospital-Acquired Illness or Injury  Outcome: Progressing  Intervention: Identify and Manage Fall Risk  Recent Flowsheet Documentation  Taken 4/3/2025 2401 by Katrina Bah, RN  Safety Promotion/Fall Prevention: safety round/check completed  Taken 4/3/2025 9385 by Katrina Bah, RN  Safety Promotion/Fall Prevention: safety " round/check completed  Taken 4/3/2025 0146 by Katrina Bah RN  Safety Promotion/Fall Prevention: safety round/check completed  Intervention: Prevent and Manage VTE (Venous Thromboembolism) Risk  Recent Flowsheet Documentation  Taken 4/3/2025 0146 by Katrina Bah RN  VTE Prevention/Management:   SCDs off (sequential compression devices)   patient refused intervention  Intervention: Prevent Infection  Recent Flowsheet Documentation  Taken 4/3/2025 0146 by Katrina Bah RN  Infection Prevention:   equipment surfaces disinfected   rest/sleep promoted   single patient room provided  Goal: Optimal Comfort and Wellbeing  Outcome: Progressing  Intervention: Monitor Pain and Promote Comfort  Recent Flowsheet Documentation  Taken 4/3/2025 0146 by Katrina Bah RN  Pain Management Interventions: medication (see MAR)  Goal: Readiness for Transition of Care  Outcome: Progressing     Problem: Gas Exchange Impaired  Goal: Optimal Gas Exchange  Outcome: Progressing     Problem: Comorbidity Management  Goal: Blood Pressure in Desired Range  Outcome: Progressing

## 2025-04-03 NOTE — CONSULTS
4/3 Test claim for DOAC'S   Both Eliquis and Xarelto are covered 100% no cost for the patient.     Thank you for allowing me to help with your patient  Svitlana Rick [facility]  Pharmacy Discharge Liaison   [facility] locations  Available on teams and [employer]  Phone [phone] Fax [phone]    Disclaimer: Pharmacy test claims are estimates and may not reflect final costs. Suggested alternatives aim to be cost-effective but may not be therapeutically equivalent as this consult is informational and does not constitute medical advice. Clinical decisions should be made by qualified healthcare providers.

## 2025-04-03 NOTE — PROGRESS NOTES
Care Management Follow Up    Length of Stay (days): 2    Expected Discharge Date: 04/11/2025     Concerns to be Addressed: discharge planning     Patient plan of care discussed at interdisciplinary rounds: Yes    Anticipated Discharge Disposition:                Anticipated Discharge Services:    Anticipated Discharge DME:      Patient/family educated on Medicare website which has current facility and service quality ratings:    Education Provided on the Discharge Plan:    Patient/Family in Agreement with the Plan:      Referrals Placed by CM/SW:    Private pay costs discussed: Not applicable    Discussed  Partnership in Safe Discharge Planning  document with patient/family: No     Handoff Completed: No, handoff not indicated or clinically appropriate    Additional Information:  LUZ MARIA received a phone call from Yasir, [facility] (Phone number: [phone] #2 #[id number]) regarding pt's incontinent products. Yasir stated he had spoken to pt who was requesting additional incontinent products. LUZ MARIA spoke with pt's RN who stated that pt did not need additional products here as pt was not currently using them due to wounds. Update provided to Yasir.      Next Steps: LUZ MARIA to follow for discharge plans.    AISSATOU Alexander, LGSW  [facility] Care Coordinator/ Med Surg [address] [place] [address]  [phone]

## 2025-04-04 VITALS
SYSTOLIC BLOOD PRESSURE: 135 MMHG | WEIGHT: 293 LBS | RESPIRATION RATE: 16 BRPM | DIASTOLIC BLOOD PRESSURE: 65 MMHG | BODY MASS INDEX: 45.99 KG/M2 | OXYGEN SATURATION: 94 % | TEMPERATURE: 97.7 F | HEIGHT: 67 IN | HEART RATE: 79 BPM

## 2025-04-04 LAB
CREAT SERPL-MCNC: 0.63 MG/DL (ref 0.51–0.95)
EGFRCR SERPLBLD CKD-EPI 2021: >90 ML/MIN/1.73M2
INR PPP: 1.06 (ref 0.85–1.15)
PLATELET # BLD AUTO: 218 10E3/UL (ref 150–450)

## 2025-04-04 PROCEDURE — 94640 AIRWAY INHALATION TREATMENT: CPT

## 2025-04-04 PROCEDURE — 999N000157 HC STATISTIC RCP TIME EA 10 MIN

## 2025-04-04 PROCEDURE — 250N000013 HC RX MED GY IP 250 OP 250 PS 637: Performed by: STUDENT IN AN ORGANIZED HEALTH CARE EDUCATION/TRAINING PROGRAM

## 2025-04-04 PROCEDURE — 82565 ASSAY OF CREATININE: CPT | Performed by: STUDENT IN AN ORGANIZED HEALTH CARE EDUCATION/TRAINING PROGRAM

## 2025-04-04 PROCEDURE — 85049 AUTOMATED PLATELET COUNT: CPT | Performed by: STUDENT IN AN ORGANIZED HEALTH CARE EDUCATION/TRAINING PROGRAM

## 2025-04-04 PROCEDURE — 36415 COLL VENOUS BLD VENIPUNCTURE: CPT | Performed by: STUDENT IN AN ORGANIZED HEALTH CARE EDUCATION/TRAINING PROGRAM

## 2025-04-04 PROCEDURE — 94640 AIRWAY INHALATION TREATMENT: CPT | Mod: 76

## 2025-04-04 PROCEDURE — 85610 PROTHROMBIN TIME: CPT | Performed by: INTERNAL MEDICINE

## 2025-04-04 PROCEDURE — 120N000001 HC R&B MED SURG/OB

## 2025-04-04 PROCEDURE — 250N000013 HC RX MED GY IP 250 OP 250 PS 637: Performed by: INTERNAL MEDICINE

## 2025-04-04 PROCEDURE — 250N000009 HC RX 250: Performed by: STUDENT IN AN ORGANIZED HEALTH CARE EDUCATION/TRAINING PROGRAM

## 2025-04-04 PROCEDURE — 99232 SBSQ HOSP IP/OBS MODERATE 35: CPT | Performed by: INTERNAL MEDICINE

## 2025-04-04 PROCEDURE — 250N000012 HC RX MED GY IP 250 OP 636 PS 637: Performed by: INTERNAL MEDICINE

## 2025-04-04 RX ORDER — WARFARIN SODIUM 3 MG/1
6 TABLET ORAL
Status: COMPLETED | OUTPATIENT
Start: 2025-04-04 | End: 2025-04-04

## 2025-04-04 RX ADMIN — CARBAMAZEPINE 400 MG: 200 TABLET ORAL at 21:03

## 2025-04-04 RX ADMIN — PREDNISONE 40 MG: 20 TABLET ORAL at 09:57

## 2025-04-04 RX ADMIN — ATORVASTATIN CALCIUM 10 MG: 10 TABLET, FILM COATED ORAL at 21:03

## 2025-04-04 RX ADMIN — WARFARIN SODIUM 6 MG: 3 TABLET ORAL at 17:37

## 2025-04-04 RX ADMIN — ACETYLCYSTEINE 2 ML: 200 SOLUTION ORAL; RESPIRATORY (INHALATION) at 16:54

## 2025-04-04 RX ADMIN — CARBAMAZEPINE 400 MG: 200 TABLET ORAL at 09:57

## 2025-04-04 RX ADMIN — LISINOPRIL 20 MG: 20 TABLET ORAL at 09:57

## 2025-04-04 RX ADMIN — MICONAZOLE NITRATE: 20 POWDER TOPICAL at 21:02

## 2025-04-04 RX ADMIN — ACETYLCYSTEINE 2 ML: 200 SOLUTION ORAL; RESPIRATORY (INHALATION) at 07:36

## 2025-04-04 RX ADMIN — Medication 1 TABLET: at 09:57

## 2025-04-04 RX ADMIN — MICONAZOLE NITRATE: 20 POWDER TOPICAL at 11:01

## 2025-04-04 RX ADMIN — ACETYLCYSTEINE 2 ML: 200 SOLUTION ORAL; RESPIRATORY (INHALATION) at 11:59

## 2025-04-04 RX ADMIN — IPRATROPIUM BROMIDE AND ALBUTEROL SULFATE 3 ML: .5; 3 SOLUTION RESPIRATORY (INHALATION) at 19:19

## 2025-04-04 RX ADMIN — IPRATROPIUM BROMIDE AND ALBUTEROL SULFATE 3 ML: .5; 3 SOLUTION RESPIRATORY (INHALATION) at 11:59

## 2025-04-04 RX ADMIN — AZITHROMYCIN DIHYDRATE 250 MG: 250 TABLET ORAL at 09:57

## 2025-04-04 RX ADMIN — CARBAMAZEPINE 200 MG: 200 TABLET ORAL at 14:01

## 2025-04-04 RX ADMIN — IPRATROPIUM BROMIDE AND ALBUTEROL SULFATE 3 ML: .5; 3 SOLUTION RESPIRATORY (INHALATION) at 07:37

## 2025-04-04 RX ADMIN — IPRATROPIUM BROMIDE AND ALBUTEROL SULFATE 3 ML: .5; 3 SOLUTION RESPIRATORY (INHALATION) at 16:54

## 2025-04-04 RX ADMIN — SERTRALINE HYDROCHLORIDE 100 MG: 100 TABLET ORAL at 11:01

## 2025-04-04 RX ADMIN — ACETYLCYSTEINE 2 ML: 200 SOLUTION ORAL; RESPIRATORY (INHALATION) at 19:19

## 2025-04-04 ASSESSMENT — ACTIVITIES OF DAILY LIVING (ADL)
ADLS_ACUITY_SCORE: 50
ADLS_ACUITY_SCORE: 52
ADLS_ACUITY_SCORE: 50
ADLS_ACUITY_SCORE: 52
ADLS_ACUITY_SCORE: 55
ADLS_ACUITY_SCORE: 52
ADLS_ACUITY_SCORE: 50
ADLS_ACUITY_SCORE: 55
ADLS_ACUITY_SCORE: 52
ADLS_ACUITY_SCORE: 50
ADLS_ACUITY_SCORE: 52
ADLS_ACUITY_SCORE: 52
ADLS_ACUITY_SCORE: 50
ADLS_ACUITY_SCORE: 50
ADLS_ACUITY_SCORE: 52
ADLS_ACUITY_SCORE: 52
ADLS_ACUITY_SCORE: 50
ADLS_ACUITY_SCORE: 50
ADLS_ACUITY_SCORE: 52
ADLS_ACUITY_SCORE: 55
ADLS_ACUITY_SCORE: 50

## 2025-04-04 NOTE — PROGRESS NOTES
Care Management Follow Up    Length of Stay (days): 3    Expected Discharge Date: 04/11/2025     Concerns to be Addressed: discharge planning     Patient plan of care discussed at interdisciplinary rounds: Yes    Anticipated Discharge Disposition:                Anticipated Discharge Services:    Anticipated Discharge DME:      Patient/family educated on Medicare website which has current facility and service quality ratings:    Education Provided on the Discharge Plan:    Patient/Family in Agreement with the Plan:      Referrals Placed by CM/SW:    Private pay costs discussed: Not applicable    Discussed  Partnership in Safe Discharge Planning  document with patient/family: No     Handoff Completed: No, handoff not indicated or clinically appropriate    Additional Information:  Medical records sent to [facility] admission RN Coordinator, Bethanie Mckeon P: [phone] F: [phone] at the request of pt's legal guardians/parents.     Next Steps: SW to follow for discharge     AISSATOU Alexander, CELSA LOERA Care Coordinator/ Med Surg [address] [place] [address]  [phone]

## 2025-04-04 NOTE — PROGRESS NOTES
[facility]    PROGRESS NOTE - Hospitalist Service    ASSESSMENT AND PLAN     Active Problems:    Acute on chronic respiratory failure with hypoxia (H)    Moderate persistent asthma with acute exacerbation    Brsisa Corado is a 59 year old female with past medical history of asthma, hypertension, Cushing syndrome, depression, morbid obesity, sleep apnea, borderline developmental delay. She arrived to the ED 3/12 for shortness of breath, wheeze and cough for a while and was found to have acute on chronic hypoxic respiratory failure.     Per chart reviewed this is patient's 5th hospitalization in 5 months due to acute on chronic hypoxic respiratory failure in the setting of asthma exacerbation and obesity hypoventilation.       Acute on chronic hypoxic respiratory failure  Chronic hypercapnic respiratory failure  Acute asthma exacerbation in the setting of severe persistent asthma  Obesity hypoventilation syndrome  4 hospitalizations since December 2024   On Breo Ellipta however compliance is questionable given her history of developmental delay.    Baseline 2.5 L of nasal cannula at night and during the daytime as needed.    Planned outpatient sleep study- no current CPAP at home   Chest x-ray showed bilateral perihilar opacities concerning for atelectasis.    IS  Steroids, breathing treatments   Pulm recommending long prednisone taper and azithromycin   Respiratory viral panel-Negative   ECHO reviewed with EF of 55 to 60%.  Limited study.  Venous duplex ordered with suspected left proximal posterior tibialis vein DVT.  Eliquis and Xarelto interaction with Tegretol.  Initiated warfarin  Check CT PE study- no PE. Patchy mosaic attenuation in both lungs is similar to the previous exam, and suggests small airways disease.      NSTEMI type 2    Elevated troponin, suspect demand   Patient denies any chest pain.  Troponin upon presentation was 8 which increased to 24.  EKG without any changes.   "Suspect this is demand in the setting of acute hypoxic respiratory failure.  Low suspicion for ACS.      History of obstructive sleep apnea  Historically she has used oxygen at nighttime in place of CPAP.  Sleep study as outpatient       Hyperlipidemia  Hypertension  -- Continue Lipitor   -- Continue PTA lisinopril     Hx of seizure disorder  -- Continue PTA tegretol     Allergic rhinitis  -- Continue PTA azelastine and fluticasone PRN     Depression  -- Continue PTA Zoloft      Mild developmental delays  --she has a legal guardian      Social   Patient has history of developmental delay and has parents listed as her legal guardians.  Patient said that she is not on talking terms with her parents as they blew off her trust when they shared her medical information with her other siblings which she never wanted.    -Social work to assist with discharge planning     Barriers to discharge: Hypoxia     Anticipated length of stay: 1-3 days     Medically Ready for Discharge: Anticipated in 2-4 Days    Clinically Significant Risk Factors                   # Hypertension: Noted on problem list            # Severe Obesity: Estimated body mass index is 61.43 kg/m  as calculated from the following:    Height as of this encounter: 1.702 m (5' 7\").    Weight as of this encounter: 177.9 kg (392 lb 3.2 oz)., PRESENT ON ADMISSION     # Asthma: noted on problem list        Subjective:  Patient resting comfortably in bed. Notes her breathing is doing a little bit better. No other complaints.     PHYSICAL EXAM  Temp:  [97.7  F (36.5  C)-98.1  F (36.7  C)] 97.9  F (36.6  C)  Pulse:  [64-85] 82  Resp:  [16-20] 18  BP: (116-135)/(46-65) 125/58  SpO2:  [94 %-98 %] 94 %  Wt Readings from Last 1 Encounters:   04/02/25 (!) 177.9 kg (392 lb 3.2 oz)       Intake/Output Summary (Last 24 hours) at 4/4/2025 1222  Last data filed at 4/4/2025 1220  Gross per 24 hour   Intake 1136 ml   Output 1825 ml   Net -689 ml      Body mass index is 61.43 " kg/m .    GENRL: Alert and answering questions appropriately. Not in acute distress. Sitting in bed   CHEST: Breathing easily   HEART: Regular rate   ABDMN: Obese  NEURO: No focal neurological deficit. No involuntary movements. Normal mentation  PSYCH: Normal affect and mood.   INTGM: No skin rash    Medical Decision Making       35 MINUTES SPENT BY ME on the date of service doing chart review, history, exam, documentation & further activities per the note.      PERTINENT LABS/IMAGING:  Results for orders placed or performed during the hospital encounter of 04/01/25   Chest XR,  PA & LAT    Impression    IMPRESSION: Heart size is normal. Limited depth of inspiration. Bilateral perihilar opacities favored for atelectasis. No pleural effusion or pneumothorax.   US Lower Extremity Venous Duplex Bilateral    Impression    IMPRESSION:  1.  Suspect left leg proximal posterior tibialis vein deep venous thrombus. This is somewhat limited in view.  2.  No deep venous thrombosis at the right leg.  3.  Right leg popliteal fossa cyst is 3.5 cm.   CT Chest Pulmonary Embolism w Contrast    Impression    IMPRESSION:  1.  No evidence for pulmonary embolism.  2.  Patchy mosaic attenuation in both lungs is similar to the previous exam, and suggests small airways disease.  3.  Mildly prominent and borderline enlarged right axillary lymph nodes have decreased slightly in size.  4.  Small pulmonary nodules in both lungs are unchanged.   Echocardiogram Complete   Result Value Ref Range    LVEF  55-60%      Most Recent 3 CBC's:  Recent Labs   Lab Test 04/04/25  0900 04/01/25  1625 03/15/25  0608 03/12/25  1415 02/14/25  0744 02/12/25  0732   WBC  --  7.7  --  7.3  --  7.0   HGB  --  12.0  --  12.6  --  11.9   MCV  --  91  --  91  --  91    223 200 191   < > 183    < > = values in this interval not displayed.     Most Recent 3 BMP's:  Recent Labs   Lab Test 04/04/25  0900 04/03/25  0749 04/01/25  1625 03/15/25  0608 03/12/25  1415    NA  --  141 142  --  141   POTASSIUM  --  4.4 4.3  --  4.3   CHLORIDE  --  104 105  --  103   CO2  --  26 25  --  28   BUN  --  16.3 12.7  --  10.7   CR 0.63 0.56 0.60   < > 0.64   ANIONGAP  --  11 12  --  10   CESAR  --  8.8 8.7*  --  9.1   GLC  --  107* 127*  --  110*    < > = values in this interval not displayed.     Most Recent 2 LFT's:  Recent Labs   Lab Test 07/02/24  0717 07/01/24  0015   AST 36 24   ALT 39 29   ALKPHOS 81 106   BILITOTAL 0.3 <0.2       Recent Labs   Lab Test 06/24/24  0408   CHOL 195   HDL 52   *   TRIG 147     Recent Labs   Lab Test 06/24/24  0408 09/14/20  1318 07/24/19  1654   * 130* 131*     Recent Labs   Lab Test 04/04/25  0900 04/03/25  0749   NA  --  141   POTASSIUM  --  4.4   CHLORIDE  --  104   CO2  --  26   GLC  --  107*   BUN  --  16.3   CR 0.63 0.56   GFRESTIMATED >90 >90   CESAR  --  8.8     Recent Labs   Lab Test 06/09/21  1204 04/16/21  0504 04/16/21  0000   A1C 4.7 5.7* 5.7*     Recent Labs   Lab Test 04/01/25  1625 03/12/25  1415 02/12/25  0732   HGB 12.0 12.6 11.9     Recent Labs   Lab Test 04/15/21  2220   TROPONINI 0.01     Recent Labs   Lab Test 04/01/25  1625 06/24/24  0408 05/02/24  0655   NTBNPI <36 <36 <36     Recent Labs   Lab Test 08/28/18  1732   TSH 2.00     Recent Labs   Lab Test 04/04/25  0900 04/03/25  1213 04/15/21  2220   INR 1.06 0.95 1.02       Ramya Chahal, DO  Hospitalist Service  [facility]

## 2025-04-04 NOTE — PLAN OF CARE
"Pt. A&Ox4, up with assist of 1 and walker, pt. Continues on 3L O2 (baseline 2.5L) with sat's at 94%. LAZAR. Pt. On prednisone and abx (axithromycin) for Asthma exac. And coumadin for Left leg DVT. Miconazole powder for rash under bilateral breasts, under abd. Folds, and perineum. External cath for urinary incontinence. Pt. Denies any c/o pain, denies any needs at this time. Will continue with POC    Goal Outcome Evaluation:      Plan of Care Reviewed With: patient    Overall Patient Progress: no changeOverall Patient Progress: no change    Outcome Evaluation: 3L O2 overnight (baseline 2.5L), LAZAR, Coumadin for Left leg DVT. Continues on Prednisone and azithromycin for Asthma Exac.      Problem: Adult Inpatient Plan of Care  Goal: Plan of Care Review  Description: The Plan of Care Review/Shift note should be completed every shift.  The Outcome Evaluation is a brief statement about your assessment that the patient is improving, declining, or no change.  This information will be displayed automatically on your shiftnote.  Outcome: Progressing  Flowsheets (Taken 4/4/2025 0306)  Outcome Evaluation: 3L O2 overnight (baseline 2.5L), LAZAR, Coumadin for Left leg DVT. Continues on Prednisone and azithromycin for Asthma Exac.  Plan of Care Reviewed With: patient  Overall Patient Progress: no change  Goal: Patient-Specific Goal (Individualized)  Description: You can add care plan individualizations to a care plan. Examples of Individualization might be:  \"Parent requests to be called daily at 9am for status\", \"I have a hard time hearing out of my right ear\", or \"Do not touch me to wake me up as it startlesme\".  Outcome: Progressing  Goal: Absence of Hospital-Acquired Illness or Injury  Outcome: Progressing  Intervention: Identify and Manage Fall Risk  Recent Flowsheet Documentation  Taken 4/3/2025 2205 by Elizabeth Camacho RN  Safety Promotion/Fall Prevention:   activity supervised   clutter free environment maintained   increased " rounding and observation   increase visualization of patient   lighting adjusted   nonskid shoes/slippers when out of bed   safety round/check completed  Intervention: Prevent Skin Injury  Recent Flowsheet Documentation  Taken 4/3/2025 2205 by Elizabeth Camacho RN  Body Position:   position changed independently   weight shifting  Skin Protection:   adhesive use limited   incontinence pads utilized  Intervention: Prevent and Manage VTE (Venous Thromboembolism) Risk  Recent Flowsheet Documentation  Taken 4/3/2025 2205 by Elizabeth Camacho RN  VTE Prevention/Management: (Pt. on coumadin) SCDs off (sequential compression devices)  Goal: Optimal Comfort and Wellbeing  Outcome: Progressing  Goal: Readiness for Transition of Care  Outcome: Progressing     Problem: Gas Exchange Impaired  Goal: Optimal Gas Exchange  Outcome: Progressing     Problem: Comorbidity Management  Goal: Blood Pressure in Desired Range  Outcome: Progressing

## 2025-04-04 NOTE — PLAN OF CARE
A&O x4. VSS on 2.5L NC.   Up SBA w/ walker.   Possible discharge to [facility] when medically cleared.   Care management following.     Goal Outcome Evaluation:      Plan of Care Reviewed With: patient    Overall Patient Progress: improvingOverall Patient Progress: improving    Outcome Evaluation: 2.5 L NC, reporting improved breathing      Problem: Adult Inpatient Plan of Care  Goal: Plan of Care Review  Description: The Plan of Care Review/Shift note should be completed every shift.  The Outcome Evaluation is a brief statement about your assessment that the patient is improving, declining, or no change.  This information will be displayed automatically on your shiftnote.  Outcome: Progressing  Flowsheets (Taken 4/4/2025 7670)  Outcome Evaluation: 2.5 L NC, reporting improved breathing  Plan of Care Reviewed With: patient  Overall Patient Progress: improving  Goal: Absence of Hospital-Acquired Illness or Injury  Intervention: Identify and Manage Fall Risk  Recent Flowsheet Documentation  Taken 4/4/2025 0801 by Arnie Cornelius RN  Safety Promotion/Fall Prevention: safety round/check completed  Intervention: Prevent and Manage VTE (Venous Thromboembolism) Risk  Recent Flowsheet Documentation  Taken 4/4/2025 0801 by Arnie Cornelius RN  VTE Prevention/Management: SCDs off (sequential compression devices)

## 2025-04-05 LAB — INR PPP: 1.02 (ref 0.85–1.15)

## 2025-04-05 PROCEDURE — 120N000001 HC R&B MED SURG/OB

## 2025-04-05 PROCEDURE — 94640 AIRWAY INHALATION TREATMENT: CPT | Mod: 76

## 2025-04-05 PROCEDURE — 250N000013 HC RX MED GY IP 250 OP 250 PS 637: Performed by: INTERNAL MEDICINE

## 2025-04-05 PROCEDURE — 99232 SBSQ HOSP IP/OBS MODERATE 35: CPT | Performed by: HOSPITALIST

## 2025-04-05 PROCEDURE — 250N000013 HC RX MED GY IP 250 OP 250 PS 637: Performed by: STUDENT IN AN ORGANIZED HEALTH CARE EDUCATION/TRAINING PROGRAM

## 2025-04-05 PROCEDURE — 250N000009 HC RX 250: Performed by: STUDENT IN AN ORGANIZED HEALTH CARE EDUCATION/TRAINING PROGRAM

## 2025-04-05 PROCEDURE — 250N000012 HC RX MED GY IP 250 OP 636 PS 637: Performed by: INTERNAL MEDICINE

## 2025-04-05 PROCEDURE — 999N000157 HC STATISTIC RCP TIME EA 10 MIN

## 2025-04-05 PROCEDURE — 85610 PROTHROMBIN TIME: CPT | Performed by: INTERNAL MEDICINE

## 2025-04-05 PROCEDURE — 250N000013 HC RX MED GY IP 250 OP 250 PS 637: Performed by: HOSPITALIST

## 2025-04-05 PROCEDURE — 36415 COLL VENOUS BLD VENIPUNCTURE: CPT | Performed by: INTERNAL MEDICINE

## 2025-04-05 PROCEDURE — 999N000156 HC STATISTIC RCP CONSULT EA 30 MIN

## 2025-04-05 RX ADMIN — ACETYLCYSTEINE 2 ML: 200 SOLUTION ORAL; RESPIRATORY (INHALATION) at 11:51

## 2025-04-05 RX ADMIN — CARBAMAZEPINE 400 MG: 200 TABLET ORAL at 08:11

## 2025-04-05 RX ADMIN — IPRATROPIUM BROMIDE AND ALBUTEROL SULFATE 3 ML: .5; 3 SOLUTION RESPIRATORY (INHALATION) at 15:16

## 2025-04-05 RX ADMIN — ACETYLCYSTEINE 2 ML: 200 SOLUTION ORAL; RESPIRATORY (INHALATION) at 07:29

## 2025-04-05 RX ADMIN — SERTRALINE HYDROCHLORIDE 100 MG: 100 TABLET ORAL at 08:29

## 2025-04-05 RX ADMIN — ATORVASTATIN CALCIUM 10 MG: 10 TABLET, FILM COATED ORAL at 21:19

## 2025-04-05 RX ADMIN — MICONAZOLE NITRATE: 20 POWDER TOPICAL at 08:14

## 2025-04-05 RX ADMIN — IPRATROPIUM BROMIDE AND ALBUTEROL SULFATE 3 ML: .5; 3 SOLUTION RESPIRATORY (INHALATION) at 19:51

## 2025-04-05 RX ADMIN — LISINOPRIL 20 MG: 20 TABLET ORAL at 08:11

## 2025-04-05 RX ADMIN — PREDNISONE 40 MG: 20 TABLET ORAL at 08:11

## 2025-04-05 RX ADMIN — IPRATROPIUM BROMIDE AND ALBUTEROL SULFATE 3 ML: .5; 3 SOLUTION RESPIRATORY (INHALATION) at 11:51

## 2025-04-05 RX ADMIN — WARFARIN SODIUM 7.5 MG: 5 TABLET ORAL at 18:27

## 2025-04-05 RX ADMIN — CARBAMAZEPINE 200 MG: 200 TABLET ORAL at 12:53

## 2025-04-05 RX ADMIN — CARBAMAZEPINE 400 MG: 200 TABLET ORAL at 21:19

## 2025-04-05 RX ADMIN — AZITHROMYCIN DIHYDRATE 250 MG: 250 TABLET ORAL at 08:12

## 2025-04-05 RX ADMIN — IPRATROPIUM BROMIDE AND ALBUTEROL SULFATE 3 ML: .5; 3 SOLUTION RESPIRATORY (INHALATION) at 07:29

## 2025-04-05 RX ADMIN — MICONAZOLE NITRATE: 20 POWDER TOPICAL at 21:26

## 2025-04-05 RX ADMIN — Medication 1 TABLET: at 08:12

## 2025-04-05 ASSESSMENT — ACTIVITIES OF DAILY LIVING (ADL)
ADLS_ACUITY_SCORE: 50
ADLS_ACUITY_SCORE: 48
ADLS_ACUITY_SCORE: 50
ADLS_ACUITY_SCORE: 48
ADLS_ACUITY_SCORE: 50
ADLS_ACUITY_SCORE: 53
ADLS_ACUITY_SCORE: 50
ADLS_ACUITY_SCORE: 53
ADLS_ACUITY_SCORE: 50

## 2025-04-05 NOTE — PLAN OF CARE
"Shift [id number]  VSS. A&Ox4. On 2.5L NC per baseline, LAZAR, nonproductive cough. On prednisone taper, azithromycin, and warfarin. Miconazole powder for red chest, abdomen, and perineum. Left thigh wound dressing changed. BLE 2+ edema. L PIV. Reg diet, supplements. Up to bathroom. SBA-A1, gb, walker. Purewick in place for bedtime. Parents legal guardians. LUZ MARIA following.        Goal Outcome Evaluation:      Plan of Care Reviewed With: patient    Overall Patient Progress: improvingOverall Patient Progress: improving    Outcome Evaluation: On 2.5L NC per baseline, LAZAR, nonproductive cough. On prednisone taper, azithromycin, and warfarin. Miconazole powder for red chest, abdomen, and perineum. Left thigh wound dressing changed. BLE 2+ edema. Up to bathroom. SBA-A1, gb, walker. Parents legal guardians. LUZ MARIA following.      Problem: Adult Inpatient Plan of Care  Goal: Plan of Care Review  Description: The Plan of Care Review/Shift note should be completed every shift.  The Outcome Evaluation is a brief statement about your assessment that the patient is improving, declining, or no change.  This information will be displayed automatically on your shiftnote.  Outcome: Progressing  Flowsheets (Taken 4/4/2025 2323)  Outcome Evaluation: On 2.5L NC per baseline, LAZAR, nonproductive cough. On prednisone taper, azithromycin, and warfarin. Miconazole powder for red chest, abdomen, and perineum. Left thigh wound dressing changed. BLE 2+ edema. Up to bathroom. SBA-A1, gb, walker. Parents legal guardians. LUZ MARIA following.  Plan of Care Reviewed With: patient  Overall Patient Progress: improving  Goal: Patient-Specific Goal (Individualized)  Description: You can add care plan individualizations to a care plan. Examples of Individualization might be:  \"Parent requests to be called daily at 9am for status\", \"I have a hard time hearing out of my right ear\", or \"Do not touch me to wake me up as it startlesme\".  Outcome: Progressing  Goal: Absence " of Hospital-Acquired Illness or Injury  Outcome: Progressing  Intervention: Identify and Manage Fall Risk  Recent Flowsheet Documentation  Taken 4/4/2025 2110 by Marge Don RN  Safety Promotion/Fall Prevention: safety round/check completed  Taken 4/4/2025 1730 by Marge Don RN  Safety Promotion/Fall Prevention:   activity supervised   assistive device/personal items within reach   clutter free environment maintained   safety round/check completed  Intervention: Prevent Skin Injury  Recent Flowsheet Documentation  Taken 4/4/2025 2110 by Marge Don RN  Body Position: position changed independently  Taken 4/4/2025 1935 by Marge Don RN  Body Position: position changed independently  Intervention: Prevent Infection  Recent Flowsheet Documentation  Taken 4/4/2025 1730 by Marge Don RN  Infection Prevention:   cohorting utilized   environmental surveillance performed   equipment surfaces disinfected   hand hygiene promoted   personal protective equipment utilized   rest/sleep promoted   single patient room provided  Goal: Optimal Comfort and Wellbeing  Outcome: Progressing  Goal: Readiness for Transition of Care  Outcome: Progressing

## 2025-04-05 NOTE — PROGRESS NOTES
[facility]    Medicine Progress Note - Hospitalist Service    Date of Admission:  4/1/2025    Assessment & Plan   Brissa Corado is a 59 year old female with past medical history of asthma, hypertension, Cushing syndrome, depression, morbid obesity, sleep apnea, borderline developmental delay. She arrived to the ED 3/12 for shortness of breath, wheeze and cough for a while and was found to have acute on chronic hypoxic respiratory failure.  Per chart reviewed this is patient's 5th hospitalization in 5 months due to acute on chronic hypoxic respiratory failure in the setting of asthma exacerbation and obesity hypoventilation.       Active Problems:    Acute on chronic respiratory failure with hypoxia (H)    Moderate persistent asthma with acute exacerbation        Acute on chronic hypoxic respiratory failure  Chronic hypercapnic respiratory failure  Acute asthma exacerbation in the setting of severe persistent asthma  Obesity hypoventilation syndrome  4 hospitalizations since December 2024   On Breo Ellipta however compliance is questionable given her history of developmental delay.    Baseline 2.5 L of nasal cannula at night and during the daytime as needed.    Planned outpatient sleep study- no current CPAP at home   Chest x-ray showed bilateral perihilar opacities concerning for atelectasis.    IS  Steroids, breathing treatments   Pulm recommending long prednisone taper and azithromycin   Respiratory viral panel-Negative   ECHO reviewed with EF of 55 to 60%.  Limited study.    Poss DVT in proximal L post tibialis vein.  Venous duplex ordered with suspected left proximal posterior tibialis vein DVT.  Eliquis and Xarelto interaction with Tegretol.  Initiated warfarin  CT PE study- negative for PE. Patchy mosaic attenuation in both lungs is similar to the previous exam, and suggests small airways disease.   INR   Date Value Ref Range Status   04/05/2025 1.02 0.85 - 1.15 Final   04/15/2021  "1.02 0.90 - 1.10 Final        NSTEMI type 2    Elevated troponin, suspect demand   Patient denies any chest pain.  Troponin upon presentation was 8 which increased to 24.  EKG without any changes.  Suspect this is demand in the setting of acute hypoxic respiratory failure.  Low suspicion for ACS.      History of obstructive sleep apnea  Historically she has used oxygen at nighttime in place of CPAP.  Sleep study as outpatient       Hyperlipidemia  Hypertension  -- Continue Lipitor   -- Continue PTA lisinopril     Hx of seizure disorder  -- Continue PTA tegretol     Allergic rhinitis  -- Continue PTA azelastine and fluticasone PRN     Depression  -- Continue PTA Zoloft      Mild developmental delays  --she has a legal guardian        Diet: Combination Diet Regular Diet Adult  Snacks/Supplements Adult: Ensure Max Protein (bariatric); Between Meals    DVT Prophylaxis: Pneumatic Compression Devices  Grajeda Catheter: Not present  Lines: None     Cardiac Monitoring: None  Code Status: Full Code      Clinically Significant Risk Factors                   # Hypertension: Noted on problem list            # Severe Obesity: Estimated body mass index is 61.43 kg/m  as calculated from the following:    Height as of this encounter: 1.702 m (5' 7\").    Weight as of this encounter: 177.9 kg (392 lb 3.2 oz)., PRESENT ON ADMISSION     # Asthma: noted on problem list        Social Drivers of Health    Patient has history of developmental delay and has parents listed as her legal guardians.  Patient said that she is not on talking terms with her parents as they blew off her trust when they shared her medical information with her other siblings which she never wanted.    -Social work to assist with discharge planning          Disposition Plan     Medically Ready for Discharge: Anticipated in 2-4 Days       Telly Baum MD  Hospitalist Service  [facility]  Securely message with [employer] (more info)  Text page via " [facility] Paging/Directory   ______________________________________________________________________    Interval History     Feeling better than yst. Less SOB. Slept and rest. Normal appetite     Physical Exam   Vital Signs: Temp: 97.9  F (36.6  C) Temp src: Oral BP: 136/49 Pulse: 74   Resp: 18 SpO2: 95 % O2 Device: Nasal cannula Oxygen Delivery: 2.5 LPM  Weight: 392 lbs 3.17 oz    Constitutional: Obese. Awake, alert, cooperative, no apparent distress, and appears stated age  Respiratory: No increased work of breathing, good air exchange, clear to auscultation bilaterally, no crackles or wheezing  Cardiovascular: Normal apical impulse, regular rate and rhythm, normal S1 and S2, no S3 or S4, and no murmur noted    Medical Decision Making       40 MINUTES SPENT BY ME on the date of service doing chart review, history, exam, documentation & further activities per the note.      Data     I have personally reviewed the following data over the past 24 hrs:    N/A  \   N/A   / N/A     N/A N/A N/A /  N/A   N/A N/A N/A \     INR:  1.02 PTT:  N/A   D-dimer:  N/A Fibrinogen:  N/A       Imaging results reviewed over the past 24 hrs:   No results found for this or any previous visit (from the past 24 hours).

## 2025-04-05 NOTE — PLAN OF CARE
"Overall Patient Progress: improvingOverall Patient Progress: improving    Outcome Evaluation: Pt is on 2.5 L NC per baseline, declines pain, has purewick for overnight, On prednisone taper, abx: zithromax, Left thight wound dressing CDI.    Neuro: Alert and Oriented x4  Activity: are SBA /Ax-1 with Gait Belt and Walker  Telemetry Monitoring: No  Pain: denying pain.  : Purewick in place overnight.   O2: 2.5 L NC per Pts Baseline.   LDA's: Peripheral. New IV placed via Ultra sound by ED RN.  Fluids: is Saline locked.  Diet: Regular  Skin: redness to groin, abdominal folds, under arm and breast. Powder in place, skin is dry. Mepilex on left thigh CDI.   Consult: LUZ MARIA  Living Situation: Home with partner  Discharge Disposition:  Possible discharge to assisted living, info sent to [facility].     Plan of Care:    Skin/wound Cares  Abx: Zithromax      Problem: Adult Inpatient Plan of Care  Goal: Plan of Care Review  Description: The Plan of Care Review/Shift note should be completed every shift.  The Outcome Evaluation is a brief statement about your assessment that the patient is improving, declining, or no change.  This information will be displayed automatically on your shiftnote.  Outcome: Progressing  Flowsheets (Taken 4/5/2025 0332)  Outcome Evaluation: Pt is on 2.5 L NC per baseline, declines pain, has purewick for overnight, On prednisone taper, abx: zithromax, Left thight wound dressing CDI.  Overall Patient Progress: improving  Goal: Patient-Specific Goal (Individualized)  Description: You can add care plan individualizations to a care plan. Examples of Individualization might be:  \"Parent requests to be called daily at 9am for status\", \"I have a hard time hearing out of my right ear\", or \"Do not touch me to wake me up as it startlesme\".  Outcome: Progressing  Goal: Absence of Hospital-Acquired Illness or Injury  Outcome: Progressing  Goal: Optimal Comfort and Wellbeing  Outcome: Progressing  Goal: Readiness " for Transition of Care  Outcome: Progressing     Problem: Gas Exchange Impaired  Goal: Optimal Gas Exchange  Outcome: Progressing     Problem: Comorbidity Management  Goal: Blood Pressure in Desired Range  Outcome: Progressing

## 2025-04-05 NOTE — PLAN OF CARE
"Goal Outcome Evaluation:      Plan of Care Reviewed With: patient          Outcome Evaluation: A&Ox4. VSS. Up with SBA with gait belt and walker. O2 Sat 95% on oxygen, 2 LPM via NC. LS diminished. BS+. Denies pain, nausea or SOB. Aolerating regular diet. Activity promoted. Abx Zithromax. On Prednisone. L thigh wound dressing, CDI. Bed bath done.      Problem: Adult Inpatient Plan of Care  Goal: Plan of Care Review  Description: The Plan of Care Review/Shift note should be completed every shift.  The Outcome Evaluation is a brief statement about your assessment that the patient is improving, declining, or no change.  This information will be displayed automatically on your shiftnote.  Outcome: Progressing  Flowsheets (Taken 4/5/2025 1603)  Outcome Evaluation: A&Ox4. VSS. Up with SBA with gait belt and walker. O2 Sat 95% on oxygen, 2 LPM via NC. LS diminished. BS+. Denies pain, nausea or SOB. Aolerating regular diet. Activity promoted. Abx Zithromax. On Prednisone. L thigh wound dressing, CDI. Bed bath done.  Plan of Care Reviewed With: patient  Goal: Patient-Specific Goal (Individualized)  Description: You can add care plan individualizations to a care plan. Examples of Individualization might be:  \"Parent requests to be called daily at 9am for status\", \"I have a hard time hearing out of my right ear\", or \"Do not touch me to wake me up as it startlesme\".  Outcome: Progressing  Goal: Absence of Hospital-Acquired Illness or Injury  Outcome: Progressing  Intervention: Identify and Manage Fall Risk  Recent Flowsheet Documentation  Taken 4/5/2025 1546 by Mona Maloney, RN  Safety Promotion/Fall Prevention:   activity supervised   clutter free environment maintained   nonskid shoes/slippers when out of bed   safety round/check completed  Taken 4/5/2025 0800 by Mona Maloney, RN  Safety Promotion/Fall Prevention:   activity supervised   clutter free environment maintained   nonskid shoes/slippers when out of bed   safety " round/check completed  Intervention: Prevent Skin Injury  Recent Flowsheet Documentation  Taken 4/5/2025 1546 by Mona Maloney RN  Body Position: position changed independently  Taken 4/5/2025 0759 by Mona Maloney RN  Body Position: position changed independently  Intervention: Prevent and Manage VTE (Venous Thromboembolism) Risk  Recent Flowsheet Documentation  Taken 4/5/2025 1546 by Mona Maloney RN  VTE Prevention/Management: SCDs off (sequential compression devices)  Taken 4/5/2025 0800 by Mona Maloney RN  VTE Prevention/Management: SCDs off (sequential compression devices)  Intervention: Prevent Infection  Recent Flowsheet Documentation  Taken 4/5/2025 1546 by Mona Maloney RN  Infection Prevention:   hand hygiene promoted   single patient room provided  Taken 4/5/2025 0800 by Mona Maloney RN  Infection Prevention:   hand hygiene promoted   single patient room provided  Goal: Optimal Comfort and Wellbeing  Outcome: Progressing  Goal: Readiness for Transition of Care  Outcome: Progressing

## 2025-04-05 NOTE — PROVIDER NOTIFICATION
04/05/25 1151   RCAT Assessment   Reason for Assessment Asthma   Pulmonary Status 4   Surgical Status 0   Chest X-ray 1  (CT 4/3/2025)   Respiratory Pattern 2   Mental Status 0   Breath Sounds 2   Cough Effectiveness 0   Level of Activity 1   O2 Required for SpO2>=92% 1   Acuity Level (points) 11   Acuity Level  3   Re-eval Interval Guideline Every 3 days   Re-evaluation Date 04/08/25   Clinical Indications/Symptoms   Aerosol Therapy Physician order;RCAT protocol;Home regimen;History of bronchospasm   Broncho-pulmonary Hygiene History of mucous producing disease   Volume Expansion Decreased breath sounds;Prevent atelectasis   Aerosol Therapy Plan   RT Treatment Nebulizer   Anticholinergic/Beta-Andrenergic Agonist Duoneb soln (0.5mg/3mg per 3mL) neb Max 6 doses/24h   Aerosol Treatment Frequency Acuity Level 3: QID/PRN @noc-Mod wheezing/Hx asthma/secretion removal   Mucolytic   (Stopping, cough is dry, mucomyst is causing more coughing)   Aerosol Therapy (SVN)   Respiratory Treatment Status (SVN) given   Patient Position HOB elevated   Posttreatment Assessment (SVN) breath sounds unchanged   Signs of Intolerance (SVN) other (see comments)  (increased dry cough)   Broncho-Pulmonary Hygiene Plan   Broncho-Pulmonary Hygiene Treatment Coughing techniques   Broncho-Pulm Hygiene Frequency Acuity Level 3: TID-Small amounts secretions/poor cough, hx of secretions   Volume Expansion Plan   Volume Expansion Treatment Incentive Spirometer   Volume Expansion Frequency Acuity Level 3: TID-At risk for developing atelectasis   Breath Sounds   Breath Sounds All Fields   All Lung Fields Breath Sounds Anterior:;Lateral:;diminished     Continue Duoneb QID and albuterol neb Q2 hours prn.  Stopping mucomyst as she is an asthmatic and has a dry nonproductive cough that increases with the neb.

## 2025-04-06 LAB — INR PPP: 1.01 (ref 0.85–1.15)

## 2025-04-06 PROCEDURE — 999N000157 HC STATISTIC RCP TIME EA 10 MIN

## 2025-04-06 PROCEDURE — 250N000013 HC RX MED GY IP 250 OP 250 PS 637: Performed by: HOSPITALIST

## 2025-04-06 PROCEDURE — 94640 AIRWAY INHALATION TREATMENT: CPT

## 2025-04-06 PROCEDURE — 36415 COLL VENOUS BLD VENIPUNCTURE: CPT | Performed by: INTERNAL MEDICINE

## 2025-04-06 PROCEDURE — 120N000001 HC R&B MED SURG/OB

## 2025-04-06 PROCEDURE — 250N000009 HC RX 250: Performed by: STUDENT IN AN ORGANIZED HEALTH CARE EDUCATION/TRAINING PROGRAM

## 2025-04-06 PROCEDURE — 250N000013 HC RX MED GY IP 250 OP 250 PS 637: Performed by: INTERNAL MEDICINE

## 2025-04-06 PROCEDURE — 250N000013 HC RX MED GY IP 250 OP 250 PS 637: Performed by: STUDENT IN AN ORGANIZED HEALTH CARE EDUCATION/TRAINING PROGRAM

## 2025-04-06 PROCEDURE — 250N000012 HC RX MED GY IP 250 OP 636 PS 637: Performed by: INTERNAL MEDICINE

## 2025-04-06 PROCEDURE — 99232 SBSQ HOSP IP/OBS MODERATE 35: CPT | Performed by: HOSPITALIST

## 2025-04-06 PROCEDURE — 85610 PROTHROMBIN TIME: CPT | Performed by: INTERNAL MEDICINE

## 2025-04-06 PROCEDURE — 94640 AIRWAY INHALATION TREATMENT: CPT | Mod: 76

## 2025-04-06 RX ORDER — WARFARIN SODIUM 5 MG/1
10 TABLET ORAL
Status: COMPLETED | OUTPATIENT
Start: 2025-04-06 | End: 2025-04-06

## 2025-04-06 RX ADMIN — LISINOPRIL 20 MG: 20 TABLET ORAL at 08:25

## 2025-04-06 RX ADMIN — IPRATROPIUM BROMIDE AND ALBUTEROL SULFATE 3 ML: .5; 3 SOLUTION RESPIRATORY (INHALATION) at 07:35

## 2025-04-06 RX ADMIN — WARFARIN SODIUM 10 MG: 5 TABLET ORAL at 19:59

## 2025-04-06 RX ADMIN — ATORVASTATIN CALCIUM 10 MG: 10 TABLET, FILM COATED ORAL at 21:06

## 2025-04-06 RX ADMIN — AZITHROMYCIN DIHYDRATE 250 MG: 250 TABLET ORAL at 08:25

## 2025-04-06 RX ADMIN — PREDNISONE 40 MG: 20 TABLET ORAL at 08:25

## 2025-04-06 RX ADMIN — CARBAMAZEPINE 400 MG: 200 TABLET ORAL at 08:25

## 2025-04-06 RX ADMIN — CARBAMAZEPINE 400 MG: 200 TABLET ORAL at 21:06

## 2025-04-06 RX ADMIN — IPRATROPIUM BROMIDE AND ALBUTEROL SULFATE 3 ML: .5; 3 SOLUTION RESPIRATORY (INHALATION) at 11:12

## 2025-04-06 RX ADMIN — SERTRALINE HYDROCHLORIDE 100 MG: 100 TABLET ORAL at 08:25

## 2025-04-06 RX ADMIN — IPRATROPIUM BROMIDE AND ALBUTEROL SULFATE 3 ML: .5; 3 SOLUTION RESPIRATORY (INHALATION) at 15:47

## 2025-04-06 RX ADMIN — MICONAZOLE NITRATE: 20 POWDER TOPICAL at 08:27

## 2025-04-06 RX ADMIN — Medication 1 TABLET: at 08:25

## 2025-04-06 RX ADMIN — CARBAMAZEPINE 200 MG: 200 TABLET ORAL at 12:34

## 2025-04-06 RX ADMIN — MICONAZOLE NITRATE: 20 POWDER TOPICAL at 21:06

## 2025-04-06 ASSESSMENT — ACTIVITIES OF DAILY LIVING (ADL)
ADLS_ACUITY_SCORE: 58
ADLS_ACUITY_SCORE: 50
ADLS_ACUITY_SCORE: 50
ADLS_ACUITY_SCORE: 53
ADLS_ACUITY_SCORE: 50
ADLS_ACUITY_SCORE: 53
ADLS_ACUITY_SCORE: 53
ADLS_ACUITY_SCORE: 50
ADLS_ACUITY_SCORE: 53
ADLS_ACUITY_SCORE: 50
ADLS_ACUITY_SCORE: 58
ADLS_ACUITY_SCORE: 50
ADLS_ACUITY_SCORE: 58
ADLS_ACUITY_SCORE: 58
ADLS_ACUITY_SCORE: 53

## 2025-04-06 NOTE — PROGRESS NOTES
[facility]    Medicine Progress Note - Hospitalist Service    Date of Admission:  4/1/2025    Assessment & Plan   Brissa Corado is a 59 year old female with past medical history of asthma, hypertension, Cushing syndrome, depression, morbid obesity, sleep apnea, borderline developmental delay. She arrived to the ED 3/12 for shortness of breath, wheeze and cough for a while and was found to have acute on chronic hypoxic respiratory failure.  Per chart reviewed this is patient's 5th hospitalization in 5 months due to acute on chronic hypoxic respiratory failure in the setting of asthma exacerbation and obesity hypoventilation.       Active Problems:    Acute on chronic respiratory failure with hypoxia (H)    Moderate persistent asthma with acute exacerbation        Acute on chronic hypoxic respiratory failure  Chronic hypercapnic respiratory failure  Acute asthma exacerbation in the setting of severe persistent asthma  Obesity hypoventilation syndrome  4 hospitalizations since December 2024   On Breo Ellipta however compliance is questionable given her history of developmental delay.    Baseline 2.5 L of nasal cannula at night and during the daytime as needed.    Planned outpatient sleep study- no current CPAP at home   Chest x-ray showed bilateral perihilar opacities concerning for atelectasis.    IS  Steroids, breathing treatments   Pulm recommending long prednisone taper and azithromycin   Respiratory viral panel-Negative   ECHO reviewed with EF of 55 to 60%.  Limited study.    Poss DVT in proximal L post tibialis vein.  Venous duplex ordered with suspected left proximal posterior tibialis vein DVT.  Eliquis and Xarelto interaction with Tegretol.  Initiated warfarin  CT PE study- negative for PE. Patchy mosaic attenuation in both lungs is similar to the previous exam, and suggests small airways disease.   INR   Date Value Ref Range Status   04/06/2025 1.01 0.85 - 1.15 Final   04/15/2021  "1.02 0.90 - 1.10 Final        NSTEMI type 2    Elevated troponin, suspect demand   Patient denies any chest pain.  Troponin upon presentation was 8 which increased to 24.  EKG without any changes.  Suspect this is demand in the setting of acute hypoxic respiratory failure.  Low suspicion for ACS.      History of obstructive sleep apnea  Historically she has used oxygen at nighttime in place of CPAP.  Sleep study as outpatient       Hyperlipidemia  Hypertension  -- Continue Lipitor   -- Continue PTA lisinopril     Hx of seizure disorder  -- Continue PTA tegretol     Allergic rhinitis  -- Continue PTA azelastine and fluticasone PRN     Depression  -- Continue PTA Zoloft      Mild developmental delays  --she has a legal guardian        Diet: Combination Diet Regular Diet Adult  Snacks/Supplements Adult: Ensure Max Protein (bariatric); Between Meals    DVT Prophylaxis: Pneumatic Compression Devices  Grajeda Catheter: Not present  Lines: None     Cardiac Monitoring: None  Code Status: Full Code      Clinically Significant Risk Factors                   # Hypertension: Noted on problem list            # Severe Obesity: Estimated body mass index is 61.43 kg/m  as calculated from the following:    Height as of this encounter: 1.702 m (5' 7\").    Weight as of this encounter: 177.9 kg (392 lb 3.2 oz).      # Asthma: noted on problem list        Social Drivers of Health    Patient has history of developmental delay and has parents listed as her legal guardians.  Patient said that she is not on talking terms with her parents as they blew off her trust when they shared her medical information with her other siblings which she never wanted.    -Social work to assist with discharge planning          Disposition Plan     Medically Ready for Discharge: Anticipated Tomorrow       Telly Baum MD  Hospitalist Service  [facility]  Securely message with [employer] (more info)  Text page via [facility] Paging/Directory "   ______________________________________________________________________    Interval History   Denies SOB. Slept and rest. Normal appetite     Physical Exam   Vital Signs: Temp: 98.1  F (36.7  C) Temp src: Oral BP: 136/44 Pulse: 75   Resp: 16 SpO2: 95 % O2 Device: Nasal cannula Oxygen Delivery: 1/2 LPM  Weight: 392 lbs 3.17 oz    Constitutional: Obese. Awake, alert, cooperative, no apparent distress, and appears stated age  Respiratory: No increased work of breathing, good air exchange, clear to auscultation bilaterally, no crackles or wheezing  Cardiovascular: Normal apical impulse, regular rate and rhythm, normal S1 and S2, no S3 or S4, and no murmur noted    Medical Decision Making       36 MINUTES SPENT BY ME on the date of service doing chart review, history, exam, documentation & further activities per the note.      Data     I have personally reviewed the following data over the past 24 hrs:    INR:  1.01 PTT:  N/A   D-dimer:  N/A Fibrinogen:  N/A       Imaging results reviewed over the past 24 hrs:   No results found for this or any previous visit (from the past 24 hours).

## 2025-04-06 NOTE — PROGRESS NOTES
Care Management Follow Up    Length of Stay (days): 5    Expected Discharge Date: 04/07/2025     Concerns to be Addressed: discharge planning     Patient plan of care discussed at interdisciplinary rounds: Yes    Anticipated Discharge Disposition:  home to new [facility]     Patient/Family in Agreement with the Plan:  yes    Referrals Placed by CM/SW:  n/a  Private pay costs discussed: Not applicable    Discussed  Partnership in Safe Discharge Planning  document with patient/family: Yes     Handoff Completed: No, handoff not indicated or clinically appropriate    Additional Information:  Writer spoke with father/guardian Adam regarding discharge. Adam updated writer that [facility] payment for new [facility] placement was approved late on Friday 4/4. Adam has paid deposit for apartment and apt is available for patient to move in whenever she has been discharged and her belongings have been moved. Adam is hoping patient is able to discharge tomorrow 4/7 so he is able to take patient on a tour of facility at noon. Adam has made arrangements with movers to pack and move her belongings to new apartment on Tues or Wed this week. Patient will discharge back to her current apartment until her belongings have been fully moved to [facility]. Adam will transport at time of discharge. Hospitalist updated on hopeful plan for discharge tomorrow.     Next Steps: Coordinate discharge to home tomorrow 4/7.       Yoselin Valencia RN  Care Coordinator  [facility]  [phone]

## 2025-04-06 NOTE — PLAN OF CARE
Shift Summary:  VSS  L thigh mepi changed  Oriented x4, no complaints of pain  Purewick in use overnight    Goal Outcome Evaluation:      Plan of Care Reviewed With: patient    Overall Patient Progress: improvingOverall Patient Progress: improving    Outcome Evaluation: Remains on 2.5L O2, ambulating well with minimal SOB      Problem: Adult Inpatient Plan of Care  Goal: Plan of Care Review  Description: The Plan of Care Review/Shift note should be completed every shift.  The Outcome Evaluation is a brief statement about your assessment that the patient is improving, declining, or no change.  This information will be displayed automatically on your shiftnote.  Outcome: Progressing  Flowsheets (Taken 4/6/2025 3423)  Outcome Evaluation: Remains on 2.5L O2, ambulating well with minimal SOB  Plan of Care Reviewed With: patient  Overall Patient Progress: improving     Problem: Gas Exchange Impaired  Goal: Optimal Gas Exchange  Outcome: Progressing     Bridgette Morales RN on 4/6/2025 at 4:50 AM

## 2025-04-06 NOTE — PLAN OF CARE
"Goal Outcome Evaluation:      Plan of Care Reviewed With: patient    Overall Patient Progress: improvingOverall Patient Progress: improving    Outcome Evaluation: A&Ox4. VSS. Up with SBA and with walker. O2 Sat 96% on oxygen, 2 LPM, via NC. Denies pain, nausea or SOB. Left thigh wound dressing change done. On Prednisone and Zithromax. Echo shows EF of 55- 60%. Hx DVT and on Warfarin. Posible discharge tomorrow.      Problem: Adult Inpatient Plan of Care  Goal: Plan of Care Review  Description: The Plan of Care Review/Shift note should be completed every shift.  The Outcome Evaluation is a brief statement about your assessment that the patient is improving, declining, or no change.  This information will be displayed automatically on your shiftnote.  Outcome: Progressing  Flowsheets (Taken 4/6/2025 1437)  Outcome Evaluation: A&Ox4. VSS. Up with SBA and with walker. O2 Sat 96% on oxygen, 2 LPM, via NC. Denies pain, nausea or SOB. Left thigh wound dressing change done. On Prednisone and Zithromax. Echo shows EF of 55- 60%. Hx DVT and on Warfarin. Posible discharge tomorrow.  Plan of Care Reviewed With: patient  Overall Patient Progress: improving  Goal: Patient-Specific Goal (Individualized)  Description: You can add care plan individualizations to a care plan. Examples of Individualization might be:  \"Parent requests to be called daily at 9am for status\", \"I have a hard time hearing out of my right ear\", or \"Do not touch me to wake me up as it startlesme\".  Outcome: Progressing  Goal: Absence of Hospital-Acquired Illness or Injury  Outcome: Progressing  Intervention: Identify and Manage Fall Risk  Recent Flowsheet Documentation  Taken 4/6/2025 0808 by Mona Maloney, RN  Safety Promotion/Fall Prevention:   activity supervised   clutter free environment maintained   nonskid shoes/slippers when out of bed   safety round/check completed  Intervention: Prevent Skin Injury  Recent Flowsheet Documentation  Taken 4/6/2025 " 0808 by Mona Maloney RN  Body Position: position changed independently  Intervention: Prevent and Manage VTE (Venous Thromboembolism) Risk  Recent Flowsheet Documentation  Taken 4/6/2025 0808 by Mona Maloney RN  VTE Prevention/Management: SCDs off (sequential compression devices)  Intervention: Prevent Infection  Recent Flowsheet Documentation  Taken 4/6/2025 0808 by Mona Maloney RN  Infection Prevention:   hand hygiene promoted   single patient room provided  Goal: Optimal Comfort and Wellbeing  Outcome: Progressing  Goal: Readiness for Transition of Care  Outcome: Progressing

## 2025-04-07 ENCOUNTER — TELEPHONE (OUTPATIENT)
Dept: INTERNAL MEDICINE | Facility: CLINIC | Age: 60
End: 2025-04-07
Payer: MEDICARE

## 2025-04-07 VITALS
RESPIRATION RATE: 20 BRPM | HEIGHT: 67 IN | SYSTOLIC BLOOD PRESSURE: 136 MMHG | WEIGHT: 293 LBS | TEMPERATURE: 97.9 F | HEART RATE: 72 BPM | OXYGEN SATURATION: 91 % | BODY MASS INDEX: 45.99 KG/M2 | DIASTOLIC BLOOD PRESSURE: 68 MMHG

## 2025-04-07 PROBLEM — I82.442 ACUTE DEEP VEIN THROMBOSIS (DVT) OF TIBIAL VEIN OF LEFT LOWER EXTREMITY (H): Status: ACTIVE | Noted: 2025-04-07

## 2025-04-07 LAB
HOLD SPECIMEN: NORMAL
HOLD SPECIMEN: NORMAL
INR PPP: 0.98 (ref 0.85–1.15)

## 2025-04-07 PROCEDURE — 99239 HOSP IP/OBS DSCHRG MGMT >30: CPT | Performed by: HOSPITALIST

## 2025-04-07 PROCEDURE — 999N000157 HC STATISTIC RCP TIME EA 10 MIN

## 2025-04-07 PROCEDURE — 250N000013 HC RX MED GY IP 250 OP 250 PS 637: Performed by: INTERNAL MEDICINE

## 2025-04-07 PROCEDURE — 250N000009 HC RX 250: Performed by: STUDENT IN AN ORGANIZED HEALTH CARE EDUCATION/TRAINING PROGRAM

## 2025-04-07 PROCEDURE — 94640 AIRWAY INHALATION TREATMENT: CPT | Mod: 76

## 2025-04-07 PROCEDURE — 94640 AIRWAY INHALATION TREATMENT: CPT

## 2025-04-07 PROCEDURE — 85610 PROTHROMBIN TIME: CPT | Performed by: INTERNAL MEDICINE

## 2025-04-07 PROCEDURE — 250N000013 HC RX MED GY IP 250 OP 250 PS 637: Performed by: STUDENT IN AN ORGANIZED HEALTH CARE EDUCATION/TRAINING PROGRAM

## 2025-04-07 PROCEDURE — 250N000012 HC RX MED GY IP 250 OP 636 PS 637: Performed by: INTERNAL MEDICINE

## 2025-04-07 PROCEDURE — 36415 COLL VENOUS BLD VENIPUNCTURE: CPT | Performed by: INTERNAL MEDICINE

## 2025-04-07 RX ORDER — WARFARIN SODIUM 5 MG/1
10 TABLET ORAL DAILY
Qty: 60 TABLET | Refills: 0 | Status: SHIPPED | OUTPATIENT
Start: 2025-04-07 | End: 2025-05-07

## 2025-04-07 RX ORDER — ENOXAPARIN SODIUM 150 MG/ML
1 INJECTION SUBCUTANEOUS EVERY 12 HOURS
Qty: 8.4 ML | Refills: 0 | Status: SHIPPED | OUTPATIENT
Start: 2025-04-07 | End: 2025-04-13

## 2025-04-07 RX ADMIN — IPRATROPIUM BROMIDE AND ALBUTEROL SULFATE 3 ML: .5; 3 SOLUTION RESPIRATORY (INHALATION) at 11:44

## 2025-04-07 RX ADMIN — SERTRALINE HYDROCHLORIDE 100 MG: 100 TABLET ORAL at 08:29

## 2025-04-07 RX ADMIN — AZITHROMYCIN DIHYDRATE 250 MG: 250 TABLET ORAL at 08:29

## 2025-04-07 RX ADMIN — IPRATROPIUM BROMIDE AND ALBUTEROL SULFATE 3 ML: .5; 3 SOLUTION RESPIRATORY (INHALATION) at 08:01

## 2025-04-07 RX ADMIN — PREDNISONE 40 MG: 20 TABLET ORAL at 08:29

## 2025-04-07 RX ADMIN — LISINOPRIL 20 MG: 20 TABLET ORAL at 08:29

## 2025-04-07 RX ADMIN — CARBAMAZEPINE 400 MG: 200 TABLET ORAL at 08:29

## 2025-04-07 RX ADMIN — MICONAZOLE NITRATE: 20 POWDER TOPICAL at 08:30

## 2025-04-07 RX ADMIN — CARBAMAZEPINE 200 MG: 200 TABLET ORAL at 13:33

## 2025-04-07 RX ADMIN — Medication 1 TABLET: at 08:29

## 2025-04-07 ASSESSMENT — ACTIVITIES OF DAILY LIVING (ADL)
ADLS_ACUITY_SCORE: 50
ADLS_ACUITY_SCORE: 57
ADLS_ACUITY_SCORE: 57
ADLS_ACUITY_SCORE: 50
ADLS_ACUITY_SCORE: 57
ADLS_ACUITY_SCORE: 57
ADLS_ACUITY_SCORE: 50
ADLS_ACUITY_SCORE: 57
ADLS_ACUITY_SCORE: 50
ADLS_ACUITY_SCORE: 50

## 2025-04-07 NOTE — PLAN OF CARE
"Goal Outcome Evaluation:      Plan of Care Reviewed With: patient      /68 (BP Location: Left arm)   Pulse 72   Temp 97.9  F (36.6  C) (Oral)   Resp 18   Ht 1.702 m (5' 7\")   Wt (!) 177.9 kg (392 lb 3.2 oz)   LMP 08/18/2008   SpO2 94%   BMI 61.43 kg/m     Problem: Adult Inpatient Plan of Care  Goal: Plan of Care Review  Description: The Plan of Care Review/Shift note should be completed every shift.  The Outcome Evaluation is a brief statement about your assessment that the patient is improving, declining, or no change.  This information will be displayed automatically on your shiftnote.  Outcome: Progressing  Flowsheets (Taken 4/7/2025 0900)  Outcome Evaluation: Pt A&O, on RA, denied pain, ate 100% of her meal, on pure-wick for urinaton, refused get up, refused personal care, refused shower, has edema in BLE, keeps her feet elevated, awaiting for a placement.  Plan of Care Reviewed With: patient  Goal: Patient-Specific Goal (Individualized)  Description: You can add care plan individualizations to a care plan. Examples of Individualization might be:  \"Parent requests to be called daily at 9am for status\", \"I have a hard time hearing out of my right ear\", or \"Do not touch me to wake me up as it startlesme\".  Outcome: Progressing  Goal: Absence of Hospital-Acquired Illness or Injury  Outcome: Progressing  Intervention: Identify and Manage Fall Risk  Recent Flowsheet Documentation  Taken 4/7/2025 0801 by Lyudmila Singh RN  Safety Promotion/Fall Prevention:   activity supervised   safety round/check completed  Intervention: Prevent Skin Injury  Recent Flowsheet Documentation  Taken 4/7/2025 0801 by Lyudmila Singh RN  Body Position: position changed independently  Skin Protection:   adhesive use limited   incontinence pads utilized  Intervention: Prevent and Manage VTE (Venous Thromboembolism) Risk  Recent Flowsheet Documentation  Taken 4/7/2025 0801 by Lyudmila Singh RN  VTE " Prevention/Management: SCDs off (sequential compression devices)  Intervention: Prevent Infection  Recent Flowsheet Documentation  Taken 4/7/2025 0801 by Lyudmila Singh RN  Infection Prevention: rest/sleep promoted  Goal: Optimal Comfort and Wellbeing  Outcome: Progressing  Goal: Readiness for Transition of Care  Outcome: Progressing     Problem: Gas Exchange Impaired  Goal: Optimal Gas Exchange  Outcome: Progressing  Intervention: Optimize Oxygenation and Ventilation  Recent Flowsheet Documentation  Taken 4/7/2025 0801 by Lyudmila Singh RN  Airway/Ventilation Management: airway patency maintained  Head of Bed (HOB) Positioning: HOB at 30-45 degrees     Problem: Comorbidity Management  Goal: Blood Pressure in Desired Range  Outcome: Progressing  Intervention: Maintain Blood Pressure Management  Recent Flowsheet Documentation  Taken 4/7/2025 0801 by Lyudmila Singh RN  Medication Review/Management: medications reviewed       Outcome Evaluation: Pt A&O, on RA, denied pain, ate 100% of her meal, on pure-wick for urinaton, refused get up, refused personal care, refused shower, has edema in BLE, keeps her feet elevated, awaiting for a placement.

## 2025-04-07 NOTE — TELEPHONE ENCOUNTER
Reason for Call:  Appointment Request    Patient requesting this type of appt:  Hospital/ED Follow-Up     Requested provider: Aury Vizcaino    Reason patient unable to be scheduled: Not within requested timeframe    When does patient want to be seen/preferred time:  7 days    Comments: Patient available Mon, Tues, Wed    [facility] or would you prefer to receive a phone call?:   Patient would prefer a phone call   Okay to leave a detailed message?: Yes at Cell number on file:    Telephone Information:   Mobile [phone]       Call taken on 4/7/2025 at 11:31 AM by Stephenie Ledesma

## 2025-04-07 NOTE — DISCHARGE SUMMARY
"[facility]  Hospitalist Discharge Summary      Date of Admission:  4/1/2025  Date of Discharge:  4/7/2025  Discharging Provider: Telly Baum MD  Discharge Service: Hospitalist Service    Discharge Diagnoses   Acute on chronic hypoxic respiratory failure  Chronic hypercapnic respiratory failure  Acute asthma exacerbation in the setting of severe persistent asthma  Obesity hypoventilation syndrome  Poss DVT in proximal L post tibialis vein.  NSTEMI type 2    Elevated troponin, suspect demand    History of obstructive sleep apnea    Hyperlipidemia  Hypertension  Hx of seizure disorder  Allergic rhinitis  Depression  Mild developmental delays    Clinically Significant Risk Factors     # Severe Obesity: Estimated body mass index is 61.43 kg/m  as calculated from the following:    Height as of this encounter: 1.702 m (5' 7\").    Weight as of this encounter: 177.9 kg (392 lb 3.2 oz).       Follow-ups Needed After Discharge   Additional follow-up instructions/to-do's for PCP    : Coagulation clinic and PCP for therapy guidance, dose adjustment and maintenece     Unresulted Labs Ordered in the Past 30 Days of this Admission       No orders found from 3/2/2025 to 4/2/2025.        These results will be followed up by pcp    Discharge Disposition   Discharged to home  Condition at discharge: Stable    Hospital Course   Brissa Corado is a 59 year old female with past medical history of asthma, hypertension, Cushing syndrome, depression, morbid obesity, sleep apnea, borderline developmental delay. She arrived to the ED 3/12 for shortness of breath, wheeze and cough for a while and was found to have acute on chronic hypoxic respiratory failure.  Per chart reviewed this is patient's 5th hospitalization in 5 months due to acute on chronic hypoxic respiratory failure in the setting of asthma exacerbation and obesity hypoventilation.       Active Problems:    Acute on chronic respiratory failure with " hypoxia (H)    Moderate persistent asthma with acute exacerbation        Acute on chronic hypoxic respiratory failure  Chronic hypercapnic respiratory failure  Acute asthma exacerbation in the setting of severe persistent asthma  Obesity hypoventilation syndrome  4 hospitalizations since December 2024   On Breo Ellipta however compliance is questionable given her history of developmental delay.    Baseline 2.5 L of nasal cannula at night and during the daytime as needed.    Planned outpatient sleep study- no current CPAP at home   Chest x-ray showed bilateral perihilar opacities concerning for atelectasis.    IS  Steroids, breathing treatments   Pulm recommending long prednisone taper and azithromycin   Respiratory viral panel-Negative   ECHO reviewed with EF of 55 to 60%.  Limited study.    Poss DVT in proximal L post tibialis vein.  Venous duplex ordered with suspected left proximal posterior tibialis vein DVT.  Eliquis and Xarelto interaction with Tegretol.  Initiated warfarin  CT PE study- negative for PE. Patchy mosaic attenuation in both lungs is similar to the previous exam, and suggests small airways disease.   INR   Date Value Ref Range Status   04/07/2025 0.98 0.85 - 1.15 Final   04/15/2021 1.02 0.90 - 1.10 Final        NSTEMI type 2    Elevated troponin, suspect demand   Patient denies any chest pain.  Troponin upon presentation was 8 which increased to 24.  EKG without any changes.  Suspect this is demand in the setting of acute hypoxic respiratory failure.  Low suspicion for ACS.      History of obstructive sleep apnea  Historically she has used oxygen at nighttime in place of CPAP.  Sleep study as outpatient       Hyperlipidemia  Hypertension  -- Continue Lipitor   -- Continue PTA lisinopril     Hx of seizure disorder  -- Continue PTA tegretol     Allergic rhinitis  -- Continue PTA azelastine and fluticasone PRN     Depression  -- Continue PTA Zoloft      Mild developmental delays  --she has a legal  guardian        Consultations This Hospital Stay   CARE MANAGEMENT / SOCIAL WORK IP CONSULT  PULMONARY IP CONSULT  WOUND OSTOMY CONTINENCE NURSE  IP CONSULT  PHARMACY LIAISON FOR MEDICATION COVERAGE CONSULT  PHARMACY DISCHARGE EDUCATION BY PHARMACIST  PHARMACY TO DOSE WARFARIN    Code Status   Full Code    Time Spent on this Encounter   I, Telly Baum MD, personally saw the patient today and spent greater than 30 minutes discharging this patient.       Telly Baum MD  [place] [address] MEDICAL SURGICAL  [address] [facility] [address]  Phone: [phone]  Fax: [phone]  ______________________________________________________________________    Physical Exam   Vital Signs: Temp: 97.9  F (36.6  C) Temp src: Oral BP: 136/68 Pulse: 72   Resp: 18 SpO2: 94 % O2 Device: None (Room air) Oxygen Delivery: 1.5 LPM  Weight: 392 lbs 3.17 oz  Respiratory: No increased work of breathing, good air exchange, clear to auscultation bilaterally, no crackles or wheezing  Cardiovascular: Normal apical impulse, regular rate and rhythm, normal S1 and S2, no S3 or S4, and no murmur noted       Primary Care Physician   Aury Vizcaino    Discharge Orders   No discharge procedures on file.    Significant Results and Procedures   Results for orders placed or performed during the hospital encounter of 04/01/25   Chest XR,  PA & LAT    Narrative    EXAM: XR CHEST 2 VIEWS  LOCATION: [facility]  DATE: 4/1/2025    INDICATION: sob  COMPARISON: Chest radiograph 3/12/2025.      Impression    IMPRESSION: Heart size is normal. Limited depth of inspiration. Bilateral perihilar opacities favored for atelectasis. No pleural effusion or pneumothorax.   US Lower Extremity Venous Duplex Bilateral    Narrative    EXAM: US LOWER EXTREMITY VENOUS DUPLEX BILATERAL  LOCATION: [facility]  DATE: 4/2/2025    INDICATION: Lower extremity edema.  COMPARISON: None.  TECHNIQUE: Venous duplex  ultrasound of bilateral lower extremities with and without compression, augmentation and duplex. Color flow and spectral Doppler with waveform analysis performed.    FINDINGS: Exam includes the common femoral, femoral, popliteal veins as well as segmentally visualized deep calf veins and greater saphenous vein.     RIGHT: No deep vein thrombosis. No superficial thrombophlebitis. Popliteal fossa cyst is 3.5 x 2.3 x 1.0 cm.    LEFT: Suspect deep venous thrombosis involving the proximal posterior tibialis vein with no color-flow and ill-defined echogenicity on image 41. The left leg peroneal vein cannot be assessed as it is not visualized. No superficial thrombophlebitis. No   popliteal cyst.      Impression    IMPRESSION:  1.  Suspect left leg proximal posterior tibialis vein deep venous thrombus. This is somewhat limited in view.  2.  No deep venous thrombosis at the right leg.  3.  Right leg popliteal fossa cyst is 3.5 cm.   CT Chest Pulmonary Embolism w Contrast    Narrative    EXAM: CT CHEST PULMONARY EMBOLISM W CONTRAST  LOCATION: [facility]  DATE: 4/3/2025    INDICATION: Dyspnea.  COMPARISON: Chest CT 10/10/2024.  TECHNIQUE: CT chest pulmonary angiogram during arterial phase injection of IV contrast. Multiplanar reformats and MIP reconstructions were performed. Dose reduction techniques were used.   CONTRAST: 77mL Isovue 370.    FINDINGS: Multiple images are mildly degraded related to patient motion artifact.    ANGIOGRAM CHEST: Pulmonary arteries are normal caliber and negative for pulmonary emboli. Thoracic aorta is negative for dissection. No evidence for thoracic aortic aneurysm.    LUNGS AND PLEURA: 0.3 cm left lower lobe nodule (series 7 image 142) is unchanged. A 0.3 cm right upper lobe nodule (series 7 image 112) is also unchanged. Patchy mosaic attenuation in both lungs is similar to the previous exam, and suggests small   airways disease. No pleural  effusions.    MEDIASTINUM/AXILLAE: Mildly prominent and borderline enlarged right axillary lymph nodes have decreased slightly in size. No other areas of lymph node enlargement in the chest. No pericardial effusion.    CORONARY ARTERY CALCIFICATION: None.    UPPER ABDOMEN: Cholecystectomy.    MUSCULOSKELETAL: Degenerative changes in the thoracic spine. Moderate compression of the T3 vertebral body is unchanged. Old left rib fractures.      Impression    IMPRESSION:  1.  No evidence for pulmonary embolism.  2.  Patchy mosaic attenuation in both lungs is similar to the previous exam, and suggests small airways disease.  3.  Mildly prominent and borderline enlarged right axillary lymph nodes have decreased slightly in size.  4.  Small pulmonary nodules in both lungs are unchanged.   Echocardiogram Complete     Value    LVEF  55-60%    Narrative    [id number]  [id number]  [id number]  [id number]^BERNADETTE^SAMIRA^LUPE     [facility]  Echocardiography Laboratory  [address] [place] [address]     Name: RONY LOVE  MRN: [id number]  : 1965  Study Date: 2025 12:45 PM  Age: 59 yrs  Gender: Female  Patient Location: [facility]  Reason For Study: Dyspnea  Ordering Physician: SAMIRA FLEMING  Referring Physician: Aury Vizcaino  Performed By: Amanda Govea     BSA: 2.7 m2  Height: 67 in  Weight: 392 lb  HR: 90  BP: 153/53 mmHg  ______________________________________________________________________________  Procedure  Echocardiogram with two-dimensional, color and spectral Doppler. Optison (NDC  #[id number]) given intravenously. Technically difficult study.Extremely  difficult acoustic windows despite the use of contrast for endcardial border  definition.  ______________________________________________________________________________  Interpretation Summary     The visual ejection fraction is 55-60%.  Left ventricular systolic function is normal.  The study was technically  difficult. The study was technically limited.  ______________________________________________________________________________  Left Ventricle  The left ventricle is normal in size. There is mild concentric left  ventricular hypertrophy. The visual ejection fraction is 55-60%. Left  ventricular systolic function is normal. Left ventricular diastolic function  is not assessable.     Right Ventricle  The right ventricle is normal size. Right ventricular function cannot be  assessed due to poor image quality.     Atria  Normal left atrial size. Right atrial size is normal.     Mitral Valve  There is trace mitral regurgitation.     Aortic Valve  The aortic valve is not well visualized. No aortic regurgitation is present.  No hemodynamically significant valvular aortic stenosis.     Pulmonic Valve  There is no pulmonic valvular regurgitation. Normal pulmonic valve velocity.     Vessels  The aortic root is normal size. Normal size ascending aorta. IVC diameter <2.1  cm collapsing >50% with sniff suggests a normal RA pressure of 3 mmHg.     Pericardium  There is no pericardial effusion.     Rhythm  Sinus rhythm was noted.     ______________________________________________________________________________  MMode/2D Measurements & Calculations  IVSd: 1.2 cm  LVIDd: 5.2 cm  LVIDs: 2.4 cm  LVPWd: 1.2 cm  FS: 54.1 %  LV mass(C)d: 251.0 grams  LV mass(C)dI: 93.3 grams/m2     Ao root diam: 3.7 cm  asc Aorta Diam: 3.8 cm  LVOT diam: 2.0 cm  LVOT area: 3.0 cm2  Ao root diam index Ht(cm/m): 2.2  Ao root diam index BSA (cm/m2): 1.4  Asc Ao diam index BSA (cm/m2): 1.4  Asc Ao diam index Ht(cm/m): 2.2  LA Volume (BP): 85.2 ml  LA Volume Index (BP): 31.7 ml/m2  RWT: 0.48     TAPSE: 2.1 cm     Doppler Measurements & Calculations  MV max P.9 mmHg  MV mean P.3 mmHg  MV V2 VTI: 38.6 cm  MVA(VTI): 2.1 cm2  Ao V2 max: 164.5 cm/sec  Ao max P.0 mmHg  Ao V2 mean: 110.5 cm/sec  Ao mean P.5 mmHg  Ao V2 VTI: 30.2 cm  SHADY(I,D): 2.7  cm2  SHADY(V,D): 2.6 cm2  LV V1 max P.1 mmHg  LV V1 max: 142.0 cm/sec  LV V1 VTI: 27.3 cm  SV(LVOT): 81.9 ml  SI(LVOT): 30.4 ml/m2  PA V2 max: 118.1 cm/sec  PA max P.6 mmHg  PA acc time: 0.11 sec  AV Christiano Ratio (DI): 0.86  SHADY Index (cm2/m2): 1.0  RV S Christiano: 13.6 cm/sec     ______________________________________________________________________________  Report approved by: Willard Craig MD on 2025 01:44 PM             Discharge Medications   Current Discharge Medication List        CONTINUE these medications which have NOT CHANGED    Details   albuterol (PROAIR HFA/PROVENTIL HFA/VENTOLIN HFA) 108 (90 Base) MCG/ACT inhaler Inhale 2 puffs into the lungs every 6 hours as needed for shortness of breath, wheezing or cough.  Qty: 18 g, Refills: 0    Comments: Pharmacy may dispense brand covered by insurance (Proair, or proventil or ventolin or generic albuterol inhaler)  Associated Diagnoses: Moderate persistent asthma without complication      albuterol (PROVENTIL) (2.5 MG/3ML) 0.083% neb solution TAKE 1 VIAL (2.5 MG) BY NEBULIZATION EVERY 4 HOURS AS NEEDED FOR SHORTNESS OF BREATH, WHEEZING OR COUGH  Qty: 75 mL, Refills: 0    Associated Diagnoses: Moderate persistent asthma with acute exacerbation      azelastine (ASTELIN) 0.1 % nasal spray Spray 1 spray into both nostrils daily as needed for allergies.      !! carBAMazepine (TEGRETOL) 200 MG tablet Take 400 mg by mouth 2 times daily. AM and HS      !! carBAMazepine (TEGRETOL) 200 MG tablet Take 200 mg by mouth daily. @1200      fluticasone (FLONASE) 50 MCG/ACT nasal spray USE ONE SPRAY INTO BOTH NOSTRILS DAILY AS NEEDED FOR RHINITIS OR ALLERGIES  Qty: 16 mL, Refills: 1    Associated Diagnoses: Seasonal allergic rhinitis due to pollen      Fluticasone Furoate-Vilanterol (BREO ELLIPTA) 50-25 MCG/ACT AEPB Inhale 1 puff into the lungs 2 times daily.  Qty: 60 each, Refills: 2    Associated Diagnoses: Moderate persistent asthma with acute exacerbation       ibuprofen (ADVIL/MOTRIN) 200 MG tablet Take 400 mg by mouth as needed for pain.      lisinopril (ZESTRIL) 20 MG tablet TAKE 1 TABLET (20 MG) BY MOUTH DAILY  Qty: 90 tablet, Refills: 2    Associated Diagnoses: Essential hypertension, benign      lovastatin (MEVACOR) 40 MG tablet Take 40 mg by mouth daily.      miconazole (MICATIN) 2 % external powder Apply topically as needed for itching or other.      Multiple Vitamin (MULTI-VITAMIN PO) Take 1 tablet by mouth daily      nystatin (MYCOSTATIN) 991677 UNIT/GM external cream Apply topically daily as needed      omeprazole (PRILOSEC) 20 MG DR capsule Take 20 mg by mouth daily.      sertraline (ZOLOFT) 100 MG tablet TAKE ONE TABLET BY MOUTH DAILY  Qty: 90 tablet, Refills: 1    Associated Diagnoses: Depression, unspecified depression type      ipratropium - albuterol 0.5 mg/2.5 mg/3 mL (DUONEB) 0.5-2.5 (3) MG/3ML neb solution NEBULIZE ONE VIAL FOUR TIMES A DAY  Qty: 90 mL, Refills: 2    Associated Diagnoses: Moderate persistent asthma without complication       !! - Potential duplicate medications found. Please discuss with provider.        Allergies   Allergies   Allergen Reactions    Penicillins Anaphylaxis     PEN-FAST - Penicillin Allergy Risk Tool completed by [facility] December 20, 2024    Score: 3  Risk: Moderate  Assessment: Patient has a 20 % chance of a positive penicillin allergy test    Iodine      Iodinated Contrast Media  --- Hives      Tetracyclines & Related Unknown    Hydrochlorothiazide Palpitations     dehydration    Influenza Vac Split [Influenza Virus Vaccine] Rash     Patient states she is allergic to the vaccine.  Got a rash all over body many years ago after receiving injection.

## 2025-04-07 NOTE — PROGRESS NOTES
Care Management Discharge Note    Discharge Date: 04/07/2025       Discharge Disposition:      Discharge Services:      Discharge DME:      Discharge Transportation: family or friend will provide    Private pay costs discussed: Not applicable    Does the patient's insurance plan have a 3 day qualifying hospital stay waiver?  Yes     Which insurance plan 3 day waiver is available? [employer]    Will the waiver be used for post-acute placement? No    PAS Confirmation Code:    Patient/family educated on Medicare website which has current facility and service quality ratings:      Education Provided on the Discharge Plan:    Persons Notified of Discharge Plans: pt's guardians/parents  Patient/Family in Agreement with the Plan:  yes    Handoff Referral Completed: Yes, [facility] PCP: Internal handoff referral completed    Additional Information:  Pt is medically ready for discharge. Pt to tour [facility], with plan to move pt there in a few days according to guardians/parents. Pt's CADI has been accepted for payment at [facility] and a room has been reserved for pt.     Family to transport pt home at time of discharge.     AISSATOU Alexander, LGSW  SW Care Coordinator/ Med Surg [address] [place] [address]  [phone]

## 2025-04-07 NOTE — PHARMACY-ANTICOAGULATION SERVICE
Clinical Pharmacy- Warfarin Discharge Note  This patient is currently on warfarin for the treatment of  DVT/PE prophylaxis .  INR Goal= 2-3  Expected length of therapy undetermined.           Anticoagulation Dose History          Latest Ref Rng & Units 4/15/2021 4/3/2025 4/4/2025 4/5/2025 4/6/2025 4/7/2025   Recent Dosing and Labs   warfarin ANTICOAGULANT (COUMADIN) 0.25 mg TABS quarter-tab - - - - 7.5 mg, $Given - -   warfarin ANTICOAGULANT (COUMADIN) 3 MG tablet - - - 6 mg, $Given - - -   warfarin ANTICOAGULANT (COUMADIN) 4 MG tablet - - 4 mg, $Given - - - -   warfarin ANTICOAGULANT (COUMADIN) 5 MG tablet - - - - - 10 mg, $Given       not given by previous RN -   INR 0.85 - 1.15 1.02  1.02     0.95  1.06  1.02  1.01  0.98       Details          This result is from an external source.    Multiple values from one day are sorted in reverse-chronological order               Vitamin K doses administered during the last 7 days: none  FFP administered during the last 7 days: none  Recommend discharging the patient on a warfarin regimen of 10 mg daily with INR check on Wednesday, 4/9/2025

## 2025-04-07 NOTE — PLAN OF CARE
Shift Summary:  VSS, on room air  Purewick utilized overnight, other wise SBA to BR  No complaints of pain    Goal Outcome Evaluation:      Plan of Care Reviewed With: patient    Overall Patient Progress: improvingOverall Patient Progress: improving    Outcome Evaluation: On room air throughout shift, minimal complaints of SOB      Problem: Adult Inpatient Plan of Care  Goal: Plan of Care Review  Description: The Plan of Care Review/Shift note should be completed every shift.  The Outcome Evaluation is a brief statement about your assessment that the patient is improving, declining, or no change.  This information will be displayed automatically on your shiftnote.  Outcome: Progressing  Flowsheets (Taken 4/7/2025 0536)  Outcome Evaluation: On room air throughout shift, minimal complaints of SOB  Plan of Care Reviewed With: patient  Overall Patient Progress: improving     Problem: Gas Exchange Impaired  Goal: Optimal Gas Exchange  Outcome: Progressing     Bridgette Morales RN on 4/7/2025 at 5:38 AM

## 2025-04-07 NOTE — PLAN OF CARE
"Goal Outcome Evaluation:      Plan of Care Reviewed With: patient      Patient's After Visit Summary was reviewed with patient and/or family.   Patient verbalized understanding of After Visit Summary, recommended follow up and was given an opportunity to ask questions.   Discharge medications sent home with patient/family: YES, No   Discharged with spouse      /68 (BP Location: Left arm)   Pulse 72   Temp 97.9  F (36.6  C) (Oral)   Resp 20   Ht 1.702 m (5' 7\")   Wt (!) 177.9 kg (392 lb 3.2 oz)   LMP 08/18/2008   SpO2 (!) 91%   BMI 61.43 kg/m     Problem: Adult Inpatient Plan of Care  Goal: Plan of Care Review  Description: The Plan of Care Review/Shift note should be completed every shift.  The Outcome Evaluation is a brief statement about your assessment that the patient is improving, declining, or no change.  This information will be displayed automatically on your shiftnote.  4/7/2025 1453 by Lyudmila Singh RN  Outcome: Met  4/7/2025 0900 by Lyudmila Singh RN  Outcome: Progressing  Flowsheets (Taken 4/7/2025 0900)  Outcome Evaluation: Pt A&O, on RA, denied pain, ate 100% of her meal, on pure-wick for urinaton, refused get up, refused personal care, refused shower, has edema in BLE, keeps her feet elevated, awaiting for a placement.  Plan of Care Reviewed With: patient  Goal: Patient-Specific Goal (Individualized)  Description: You can add care plan individualizations to a care plan. Examples of Individualization might be:  \"Parent requests to be called daily at 9am for status\", \"I have a hard time hearing out of my right ear\", or \"Do not touch me to wake me up as it startlesme\".  4/7/2025 1453 by Lyudmila Singh RN  Outcome: Met  4/7/2025 0900 by Lyudmila Singh RN  Outcome: Progressing  Goal: Absence of Hospital-Acquired Illness or Injury  4/7/2025 1453 by Lyudmila Singh RN  Outcome: Met  4/7/2025 0900 by Makhanov, Lyudmila V, RN  Outcome: Progressing  Intervention: Identify " and Manage Fall Risk  Recent Flowsheet Documentation  Taken 4/7/2025 0801 by Lyudmila Singh RN  Safety Promotion/Fall Prevention:   activity supervised   safety round/check completed  Intervention: Prevent Skin Injury  Recent Flowsheet Documentation  Taken 4/7/2025 0801 by Lyudmila Singh RN  Body Position: position changed independently  Skin Protection:   adhesive use limited   incontinence pads utilized  Intervention: Prevent and Manage VTE (Venous Thromboembolism) Risk  Recent Flowsheet Documentation  Taken 4/7/2025 0801 by Lyudmila Singh RN  VTE Prevention/Management: SCDs off (sequential compression devices)  Intervention: Prevent Infection  Recent Flowsheet Documentation  Taken 4/7/2025 0801 by Lyudmila Singh RN  Infection Prevention: rest/sleep promoted  Goal: Optimal Comfort and Wellbeing  4/7/2025 1453 by Lyudmila Singh RN  Outcome: Met  4/7/2025 0900 by Lyudmila Singh RN  Outcome: Progressing  Goal: Readiness for Transition of Care  4/7/2025 1453 by Lyudmila Singh RN  Outcome: Met  4/7/2025 0900 by Lyudmila Singh RN  Outcome: Progressing     Problem: Gas Exchange Impaired  Goal: Optimal Gas Exchange  4/7/2025 1453 by Lyudmila Singh RN  Outcome: Met  4/7/2025 0900 by Lyudmila Singh RN  Outcome: Progressing  Intervention: Optimize Oxygenation and Ventilation  Recent Flowsheet Documentation  Taken 4/7/2025 0801 by Lyudmila Singh RN  Airway/Ventilation Management: airway patency maintained  Head of Bed (HOB) Positioning: HOB at 30-45 degrees     Problem: Comorbidity Management  Goal: Blood Pressure in Desired Range  4/7/2025 1453 by Lyudmila Singh RN  Outcome: Met  4/7/2025 0900 by Lyudmila Singh RN  Outcome: Progressing  Intervention: Maintain Blood Pressure Management  Recent Flowsheet Documentation  Taken 4/7/2025 0801 by Lyudmila Singh RN  Medication Review/Management: medications reviewed     Outcome Evaluation: Pt A&O, on RA, denied  pain, ate 100% of her meal, on pure-wick for urinaton, refused get up, refused personal care, refused shower, has edema in BLE, keeps her feet elevated, awaiting for a placement.

## 2025-04-08 ENCOUNTER — TELEPHONE (OUTPATIENT)
Dept: ANTICOAGULATION | Facility: CLINIC | Age: 60
End: 2025-04-08
Payer: MEDICARE

## 2025-04-08 ENCOUNTER — DOCUMENTATION ONLY (OUTPATIENT)
Dept: ANTICOAGULATION | Facility: CLINIC | Age: 60
End: 2025-04-08
Payer: MEDICARE

## 2025-04-08 ENCOUNTER — TELEPHONE (OUTPATIENT)
Dept: PULMONOLOGY | Facility: CLINIC | Age: 60
End: 2025-04-08
Payer: MEDICARE

## 2025-04-08 DIAGNOSIS — I82.442 ACUTE DEEP VEIN THROMBOSIS (DVT) OF TIBIAL VEIN OF LEFT LOWER EXTREMITY (H): Primary | ICD-10-CM

## 2025-04-08 NOTE — PROGRESS NOTES
Dr Vizcaino,    Your patient, Brissa Corado, is newly referred to [facility] at hospital discharge by the inpatient team. Anticoagulation clinic needing a new referring provider that will follow patient in ambulatory setting.     Indication(s): DVT   Goal Range: 2.0-3.0  Duration: Indefinite     Anticoagulation clinic requires a referral from one of [place] [facility] ambulatory provider (PCP or specialist) in order to provide anticoagulation management. The referring provider should anticipate seeing the patient on an ongoing basis, will have INRs and warfarin Rx ordered under their name per protocol, and will be point of contact for [facility] questions on patient's anticoagulation care (procedural holds, critical results, etc).     Please review and sign the pended referral order for Brissa Corado if you are willing to oversee anticoagulation care.         Thank you,  Michelle Mills RN  Anticoagulation clinic

## 2025-04-08 NOTE — TELEPHONE ENCOUNTER
"ANTICOAGULATION  MANAGEMENT: NEW REFERRAL      SUBJECTIVE/OBJECTIVE     Brissa Corado, a 59 year old female  is newly referred to [facility].    Anticoagulation:    Previously on warfarin: No, new to anticoagulation  Warfarin initiation date (approximate): 4/3/25   Indication(s): DVT   Goal Range: 2-3   Anticoagulation Bridge/Overlap: Yes, bridging with Enoxaparin until INR >= 2.3 or INR >= 2.0 x 2 (ACC protocol goal INR 2-3 new start)   Referring provider: from inpatient provider; ambulatory responsible provider from primary or specialty care needed.  Request sent to Dr Vizcaino to oversee management.    General Dietary/Social Hx:    Typical vitamin K intake: low; consistent     Other dietary considerations: None - consumed while in the hospital but hasn't picked any up yet     Social History:   Social History     Tobacco Use    Smoking status: Never    Smokeless tobacco: Never   Vaping Use    Vaping status: Never Used   Substance Use Topics    Alcohol use: No     Alcohol/week: 0.0 standard drinks of alcohol    Drug use: No       In the past 2 weeks, patient estimates taking medications as instructed % of time: 100%    Results:        Recent labs: (last 7 days)     04/07/25  0641   INR 0.98       Wt Readings from Last 2 Encounters:   04/02/25 (!) 177.9 kg (392 lb 3.2 oz)   03/12/25 (!) 178.4 kg (393 lb 4.8 oz)      Estimated body mass index is 61.43 kg/m  as calculated from the following:    Height as of 4/1/25: 1.702 m (5' 7\").    Weight as of 4/2/25: 177.9 kg (392 lb 3.2 oz).  Lab Results   Component Value Date    AST 36 07/02/2024    ALT 39 07/02/2024    ALBUMIN 3.2 (L) 07/02/2024     Lab Results   Component Value Date    CR 0.63 04/04/2025     Estimated Creatinine Clearance: 168.1 mL/min (based on SCr of 0.63 mg/dL).    ASSESSMENT     Goal INR 2-3, standard for indication(s) above  Establishing initial warfarin maintenance dose (on warfarin < 30 days)   Factors that may increase " sensitivity to warfarin: Female gender and Low greens/vitamin K intake  Factors that may reduce sensitivity to warfarin: Weight > 90 kg  Potential significant drug interactions with home medications: Carbamazepine  Starting warfarin dose is appropriate for patient's anticipated sensitivity to warfarin - confirmed with [facility] to keep 10 mg dose tonight despite drug interaction/sensitivities    PLAN     Dosing Instructions: Continue your current warfarin dose with INR in 1 day       Summary  As of 4/8/2025      Full warfarin instructions:  10 mg every day   Next INR check:  4/9/2025               Education provided:   Taking warfarin: purpose of warfarin and how it works, take warfarin at same time each day; preferably in the evening, prescribed tablet strength and color, importance of following ACC instructions vs instructions on the prescription bottle, and Importance of taking warfarin as instructed  Goal range and lab monitoring: Importance of therapeutic range, Importance of following up at instructed interval, and frequency of lab work when starting warfarin and importance of following up when instructed (extends after stability established)  Dietary considerations: importance of consistent vitamin K intake, impact of vitamin K foods on INR, vitamin K content of foods, Impact of protein intake on INR , and importance of notifying ACC to changes in diet  Healthy lifestyle considerations: potential interaction between warfarin and alcohol, impact of smoking or tobacco on INR, impact of exercise/activity level on INR, impact of CBD, marijuana, or medical marijuana on INR, and avoid activities that have a high risk for falling or injury such as contact sports  Symptom monitoring: monitoring for bleeding signs and symptoms, monitoring for clotting signs and symptoms, monitoring for stroke signs and symptoms, when to seek medical attention/emergency care, and if you hit your head or have a bad fall seek emergency  care  Importance of notifying anticoagulation clinic for: changes in medications; a sooner lab recheck maybe needed, diarrhea, nausea/vomiting, reduced intake, cold/flu, and/or infections; a sooner lab recheck maybe needed, upcoming surgeries and procedures 2 weeks in advance, and if you did not receive dosing instructions on the same day as your labs were checked  Lovenox/Heparin education provided: role of enoxaparin/heparin in bridge therapy, role of enoxaparin/heparin in setting of new blood clot, prescribed dose and frequency, and monitoring for signs and symptoms of bruising and bleeding   Information on home INR monitoring  Written instructions provided  Contact [phone] with any changes, questions or concerns.     Education still needed:   None required      Telephone call with [place] who verbalizes understanding and agrees to plan    Check at provider office visit    Standing orders placed in Epic: Point of Care INR (Lab 5000)    Plan made with [facility] Pharmacist Cailin Mills, RN  Anticoagulation Clinic  4/8/2025

## 2025-04-08 NOTE — TELEPHONE ENCOUNTER
Patient Contacted for the patient to call back and schedule the following:    Appointment type: Return Pulm  Provider: ENRRIQUE  Return date: NA  Specialty phone number: [phone]  Additional appointment(s) needed: NA  Additonal Notes: per msg: Can we have this patient set up a visit with any general pulmonary physician. Non -urgent.

## 2025-04-08 NOTE — TELEPHONE ENCOUNTER
New patient packet placed in outgoing mail to the patient.  Pauline Mills RN, BSN  Anticoagulation Clinic

## 2025-04-08 NOTE — TELEPHONE ENCOUNTER
ANTICOAGULATION  MANAGEMENT: Discharge Review    Brissa Corado chart reviewed for anticoagulation continuity of care    Hospital Admission on 4/1-4/7/25 for DVT/respiratory failure.    Discharge disposition: Home    Results:    Recent labs: (last 7 days)     04/03/25  1213 04/04/25  0900 04/05/25  0631 04/06/25  0754 04/07/25  0641   INR 0.95 1.06 1.02 1.01 0.98     Anticoagulation inpatient management:     anticoagulation calendar updated with inpatient dosing    Anticoagulation discharge instructions:     Warfarin dosing:  10 mg daily advised    Bridging: bridging with enoxaparin (Lovenox) q12h   INR goal change: No      Medication changes affecting anticoagulation: No (ibuprofen listed PRN on med list but will discuss avoidance of NSAIDs during new enroll education call)    Additional factors affecting anticoagulation: No     PLAN     No adjustment to anticoagulation plan needed  Recommend to check INR on 4/9 at hosp follow up appt    Communication with [place] noted above    Anticoagulation Calendar updated    Michelle Mills RN  4/8/2025  Anticoagulation Clinic  [facility] for routing messages: yadira TOBIN [facility] patient phone line: [phone]

## 2025-04-09 ENCOUNTER — ANTICOAGULATION THERAPY VISIT (OUTPATIENT)
Dept: ANTICOAGULATION | Facility: CLINIC | Age: 60
End: 2025-04-09

## 2025-04-09 DIAGNOSIS — I82.442 ACUTE DEEP VEIN THROMBOSIS (DVT) OF TIBIAL VEIN OF LEFT LOWER EXTREMITY (H): Primary | ICD-10-CM

## 2025-04-09 LAB — INR (EXTERNAL): 1.1 (ref 0.9–1.1)

## 2025-04-09 NOTE — PROGRESS NOTES
ANTICOAGULATION MANAGEMENT     Brissa RAINEY [facility] 59 year old female is on warfarin with subtherapeutic INR result. (Goal INR 2.0-3.0)    Recent labs: (last 7 days)     04/09/25  0000   INR 1.1*       ASSESSMENT     Source(s): Chart Review and Home Care/Facility Nurse     Warfarin doses taken: Warfarin taken as instructed  Diet: No new diet changes identified  Medication/supplement changes: None noted - pt is on carbamazepine, ACC H consulted   New illness, injury, or hospitalization: Yes: Newly on warfarin for DVT.   Signs or symptoms of bleeding or clotting: No  Previous result: Subtherapeutic  Additional findings: New start on day 7 of warfarin and Bridging with Enoxaparin until INR >= 2.3 or INR >= 2.0 x 2 (ACC protocol goal INR 2-3 new start)  Pt has a lab appointment in clinic on 3/14/25, then has scheduled appointment for [facility] to check INR on 3/17/25.        PLAN     Recommended plan for temporary change(s) and ongoing change(s) affecting INR     Dosing Instructions: Increase your warfarin dose (28.6% change) with next INR in 5 days       Summary  As of 4/9/2025      Full warfarin instructions:  10 mg every Tue, Thu, Sat; 15 mg all other days   Next INR check:  4/14/2025               Telephone call with Herm[facility] home care nurse who verbalizes understanding and agrees to plan and who agrees to plan and repeated back plan correctly - pt will continue bridging with Lovenox     Pt already has a lab appointment scheduled on 4/14/25.     Education provided: Taking warfarin: importance of following ACC instructions vs instructions on the prescription bottle  Goal range and lab monitoring: goal range and significance of current result, Importance of therapeutic range, and Importance of following up at instructed interval  Dietary considerations: importance of notifying ACC to changes in diet  Importance of notifying anticoagulation clinic for: changes in medications; a sooner lab recheck  maybe needed  Lovenox/Heparin education provided: role of enoxaparin/heparin in bridge therapy     Plan made with [facility] Pharmacist Svitlana Ravi RN  4/9/2025  Anticoagulation Clinic  [facility] for routing messages: yadira ARITA  [facility] patient phone line: [phone]        _______________________________________________________________________     Anticoagulation Episode Summary       Current INR goal:  2.0-3.0   TTR:  --   Target end date:  Indefinite   Send INR reminders to:  JAYLAN ARITA    Indications    Acute deep vein thrombosis (DVT) of tibial vein of left lower extremity (H) [I82.628]             Comments:  Return to [facility] when discharged from Home Care             Anticoagulation Care Providers       Provider Role Specialty Phone number    Telly Baum MD Referring Family Medicine [phone]

## 2025-04-10 ENCOUNTER — PATIENT OUTREACH (OUTPATIENT)
Dept: CARE COORDINATION | Facility: CLINIC | Age: 60
End: 2025-04-10
Payer: MEDICARE

## 2025-04-10 NOTE — PROGRESS NOTES
Clinic Care Coordination Contact    Situation: Patient chart reviewed by care coordinator.    Background: RN CC received TCM outreach for post-hospital follow up. Patient is well known to Care Coordination and was enrolled in Care Coordination. Her guardian, Lola (mother) was .     Assessment: At last contact with Lola, PAULA KUO was updated that patient has been refusing to speak with her guardians (parents) and patient won't speak to Care Coordinators. Guardians are trying to get her admitted to an assisted living facility so she has help with ADLs. Care Coordination program was closed due to no information available on status of patient.     Plan/Recommendations: TCM program has been closed due to above situation.     Rocio Govea RN, BSN, CPHN, [facility] Ambulatory Care Management  [facility], and [facility]  [email or web address]  Office: [phone]  Employed by [employer]

## 2025-04-14 ENCOUNTER — ANTICOAGULATION THERAPY VISIT (OUTPATIENT)
Dept: ANTICOAGULATION | Facility: CLINIC | Age: 60
End: 2025-04-14

## 2025-04-14 ENCOUNTER — LAB (OUTPATIENT)
Dept: LAB | Facility: CLINIC | Age: 60
End: 2025-04-14
Payer: MEDICARE

## 2025-04-14 DIAGNOSIS — I82.442 ACUTE DEEP VEIN THROMBOSIS (DVT) OF TIBIAL VEIN OF LEFT LOWER EXTREMITY (H): ICD-10-CM

## 2025-04-14 DIAGNOSIS — I82.442 ACUTE DEEP VEIN THROMBOSIS (DVT) OF TIBIAL VEIN OF LEFT LOWER EXTREMITY (H): Primary | ICD-10-CM

## 2025-04-14 LAB — INR BLD: 1.1 (ref 0.9–1.1)

## 2025-04-14 PROCEDURE — 85610 PROTHROMBIN TIME: CPT

## 2025-04-14 PROCEDURE — 36415 COLL VENOUS BLD VENIPUNCTURE: CPT

## 2025-04-14 RX ORDER — ENOXAPARIN SODIUM 150 MG/ML
1 INJECTION SUBCUTANEOUS EVERY 12 HOURS
Qty: 8.4 ML | Refills: 1 | Status: SHIPPED | OUTPATIENT
Start: 2025-04-14

## 2025-04-14 NOTE — PROGRESS NOTES
"ANTICOAGULATION MANAGEMENT     Brissa Corado 59 year old female is on warfarin with subtherapeutic INR result. (Goal INR 2.0-3.0)    Recent labs: (last 7 days)     04/14/25  1403   INR 1.1       ASSESSMENT     Source(s): Chart Review and Patient/Caregiver Call     Warfarin doses taken: Warfarin taken as instructed, out of lovenox as of this am  Diet: No new diet changes identified  Medication/supplement changes: None noted  New illness, injury, or hospitalization: No  Signs or symptoms of bleeding or clotting: No  Previous result: Subtherapeutic  Additional findings: None and Bridging with Enoxaparin until INR >= 2.0  Estimated Creatinine Clearance: 168.1 mL/min (based on SCr of 0.63 mg/dL).  Estimated body mass index is 61.43 kg/m  as calculated from the following:    Height as of 4/1/25: 1.702 m (5' 7\").    Weight as of 4/2/25: 177.9 kg (392 lb 3.2 oz).        PLAN     Recommended plan for no diet, medication or health factor changes affecting INR     Dosing Instructions: Increase your warfarin dose (22.2% change) with next INR in 3 days       Summary  As of 4/14/2025      Full warfarin instructions:  20 mg every Mon; 15 mg all other days   Next INR check:  4/17/2025               Telephone call with [place] who agrees to plan and repeated back plan correctly    Orders given to  Homecare nurse/facility to recheck    Education provided:  informed of Rx for lovenox sent to pharmacy of choice. Informed will need to stay on till therapeutic INR.    Plan made with [facility] Pharmacist Svitlana Ruiz RN  4/14/2025  Anticoagulation Clinic  [facility] for routing messages: yadira ARITA  [facility] patient phone line: [phone]        _______________________________________________________________________     Anticoagulation Episode Summary       Current INR goal:  2.0-3.0   TTR:  --   Target end date:  Indefinite   Send INR reminders to:  JAYLAN ARITA    Indications    Acute deep vein " thrombosis (DVT) of tibial vein of left lower extremity (H) [I82.442]             Comments:  Return to [facility] when discharged from Home Care             Anticoagulation Care Providers       Provider Role Specialty Phone number    Telly Baum MD Referring Family Medicine [phone]

## 2025-04-14 NOTE — PROGRESS NOTES
Estimated Creatinine Clearance: 168.1 mL/min (based on SCr of 0.63 mg/dL).    BMI 61kg/m2, 177kg, discharged on 105mg Q12H, OK to refill RX Maryse has already been using since is accustomed to this syringe size to decrease risk for med errors.    Svitlana Mills, PharmD BCACP, CACP  Anticoagulation Clinical Pharmacist     Mode of arrival (squad #, walk in, police, etc) : walk in        Chief complaint(s): needs 2nd rabies shot        Arrival Note (brief scenario, treatment PTA, etc).: got bit, needs 2nd shot        C= \"Have you ever felt that you should Cut down on your drinking?\"  No  A= \"Have people Annoyed you by criticizing your drinking?\"  No  G= \"Have you ever felt bad or Guilty about your drinking?\"  No  E= \"Have you ever had a drink as an Eye-opener first thing in the morning to steady your nerves or to help a hangover?\"  No      Deferred []      Reason for deferring: N/A    *If yes to two or more: probable alcohol abuse.*

## 2025-04-16 DIAGNOSIS — J45.41 MODERATE PERSISTENT ASTHMA WITH ACUTE EXACERBATION: ICD-10-CM

## 2025-04-17 ENCOUNTER — TELEPHONE (OUTPATIENT)
Dept: INTERNAL MEDICINE | Facility: CLINIC | Age: 60
End: 2025-04-17
Payer: MEDICARE

## 2025-04-17 ENCOUNTER — ANTICOAGULATION THERAPY VISIT (OUTPATIENT)
Dept: ANTICOAGULATION | Facility: CLINIC | Age: 60
End: 2025-04-17
Payer: MEDICARE

## 2025-04-17 DIAGNOSIS — I82.442 ACUTE DEEP VEIN THROMBOSIS (DVT) OF TIBIAL VEIN OF LEFT LOWER EXTREMITY (H): Primary | ICD-10-CM

## 2025-04-17 LAB — INR (EXTERNAL): 1.7 (ref 0.9–1.1)

## 2025-04-17 RX ORDER — ALBUTEROL SULFATE 0.83 MG/ML
SOLUTION RESPIRATORY (INHALATION)
Qty: 75 ML | Refills: 0 | Status: SHIPPED | OUTPATIENT
Start: 2025-04-17

## 2025-04-17 RX ORDER — WARFARIN SODIUM 5 MG/1
TABLET ORAL
Qty: 290 TABLET | Refills: 0 | Status: SHIPPED | OUTPATIENT
Start: 2025-04-17

## 2025-04-17 NOTE — PROGRESS NOTES
ANTICOAGULATION MANAGEMENT     Brissa RAINEY [facility] 59 year old female is on warfarin with subtherapeutic INR result. (Goal INR 2.0-3.0)    Recent labs: (last 7 days)     04/17/25  1257   INR 1.7*       ASSESSMENT     Source(s): Chart Review and Home Care/Facility Nurse     Warfarin doses taken: Warfarin taken as instructed, still on lovenox  Diet: No new diet changes identified  Medication/supplement changes: None noted  New illness, injury, or hospitalization: No  Signs or symptoms of bleeding or clotting: No  Previous result: Subtherapeutic  Additional findings: Bridging with Enoxaparin until INR >= 2.0 and Refill needed today. [place] meets all criteria for refill (current [facility] referral, visit with referring provider/group in last 15 months unless directed to return in 2 years in last referring provider visit note, lab monitoring up to date or not exceeding 2 weeks overdue). Rx instructions and quantity supplied updated to match patient's current dosing plan.  90 day supply with 0 refills granted per [facility] protocol        PLAN     Recommended plan for no diet, medication or health factor changes affecting INR     Dosing Instructions: Continue your current warfarin dose and lovenos with next INR in 4 days       Summary  As of 4/17/2025      Full warfarin instructions:  20 mg every Mon; 15 mg all other days   Next INR check:  4/21/2025               Telephone call with Renetta home care nurse who agrees to plan and repeated back plan correctly. Left message for [place] to call as Renetta requested that [facility] call and instruct the patient.    Patient called back and was given dosing for warfarin (given in tablets) and lovenox.    Orders given to  Homecare nurse/facility to recheck    Education provided: None required    Plan made with [facility] Pharmacist Cailin Ruiz, RN  4/17/2025  Anticoagulation Clinic  [facility] for routing messages: yadira TIAN [facility] patient phone line:  [phone]        _______________________________________________________________________     Anticoagulation Episode Summary       Current INR goal:  2.0-3.0   TTR:  --   Target end date:  Indefinite   [facility]    Indications    Acute deep vein thrombosis (DVT) of tibial vein of left lower extremity (H) [I82.442]             Comments:  Return to [facility] when discharged from Home Care             Anticoagulation Care Providers       Provider Role Specialty Phone number    Telly Baum MD Referring Family Medicine [phone]    Aury Vizcaino MD Referring Internal Medicine [phone]

## 2025-04-17 NOTE — TELEPHONE ENCOUNTER
Patient Returning Call    Reason for call:  Pt is returning a call from Sarah from [facility] and would like to get a call back    Information relayed to patient:  Sarah will give you a call back when she receives this message    Patient has additional questions:  No      Could we send this information to you in [employer] or would you prefer to receive a phone call?:   Patient would prefer a phone call   Okay to leave a detailed message?: Yes at Cell number on file:    Telephone Information:   Mobile [phone]

## 2025-04-17 NOTE — TELEPHONE ENCOUNTER
Please see the April 17, 2025 Anticoagulation encounter for further information.     Sarah Ruiz RN    [facility]

## 2025-04-21 ENCOUNTER — TELEPHONE (OUTPATIENT)
Dept: ANTICOAGULATION | Facility: CLINIC | Age: 60
End: 2025-04-21
Payer: MEDICARE

## 2025-04-21 ENCOUNTER — TELEPHONE (OUTPATIENT)
Dept: INTERNAL MEDICINE | Facility: CLINIC | Age: 60
End: 2025-04-21
Payer: MEDICARE

## 2025-04-21 ENCOUNTER — ANTICOAGULATION THERAPY VISIT (OUTPATIENT)
Dept: ANTICOAGULATION | Facility: CLINIC | Age: 60
End: 2025-04-21
Payer: MEDICARE

## 2025-04-21 DIAGNOSIS — I82.442 ACUTE DEEP VEIN THROMBOSIS (DVT) OF TIBIAL VEIN OF LEFT LOWER EXTREMITY (H): Primary | ICD-10-CM

## 2025-04-21 LAB — INR (EXTERNAL): 2.3

## 2025-04-21 NOTE — PROGRESS NOTES
ANTICOAGULATION MANAGEMENT     Brissa Corado 59 year old female is on warfarin with therapeutic INR result. (Goal INR 2.0-3.0)    Recent labs: (last 7 days)     04/21/25  1721   INR 2.3       ASSESSMENT     Source(s): Chart Review, Patient/Caregiver Call, and Home Care/Facility Nurse     Warfarin doses taken: Warfarin taken as instructed  Diet: No new diet changes identified  Medication/supplement changes:  carbamazepine; newer to Warfarin for DVT   New illness, injury, or hospitalization: No  Signs or symptoms of bleeding or clotting: No  Previous result: Subtherapeutic  Additional findings: Bridging with Enoxaparin until INR >= 2.0/ wound care       PLAN     Recommended plan for no diet, medication or health factor changes affecting INR     Dosing Instructions: Continue your current warfarin dose Stop bridging with Enoxaparin with next INR in 3 days       Summary  As of 4/21/2025      Full warfarin instructions:  20 mg every Mon; 15 mg all other days   Next INR check:  4/28/2025               Telephone call with Vandana home care nurse who verbalizes understanding and agrees to plan and who agrees to plan and repeated back plan correctly    Orders given to  Homecare nurse/facility to recheck    Education provided: Please call back if any changes to your diet, medications or how you've been taking warfarin    Plan made per [facility] anticoagulation protocol    Vianca Cassidy RN  4/21/2025  Anticoagulation Clinic  [facility] for routing messages: yadira ARITA  [facility] patient phone line: [phone]        _______________________________________________________________________     Anticoagulation Episode Summary       Current INR goal:  2.0-3.0   TTR:  42.3% (4 d)   Target end date:  Indefinite   Send INR reminders to:  JAYLAN ARITA    Indications    Acute deep vein thrombosis (DVT) of tibial vein of left lower extremity (H) [I28.662]             Comments:  Return to [facility] when discharged from Home  Care             Anticoagulation Care Providers       Provider Role Specialty Phone number    Telly Baum MD Referring Family Medicine [phone]    Aury Vizcaino MD Referring Internal Medicine [phone]

## 2025-04-21 NOTE — TELEPHONE ENCOUNTER
Home Care is calling regarding an established patient with [facility].  Requesting orders from:  Anticoagulation RNs  OTHER ACTION: Need warfarin/lovenox orders, see bolded area   Is this a request for a temporary pause in the home care episode?  No    Orders Requested    Home Visit by Wound Care Specialist (WOC)    Wound care is 1.2 by 1.7 wide, 0 depth. Not new.   RN gave verbal order: Yes      Wound Care Supplies    Silver Alginate, cleanse with Vashe, then cover with foam adhesive foam dressing. To be done with skilled nursing visits twice weekly   RN gave verbal order: Yes      Phone number Home Care can be reached at: Vandana [phone]  Okay to leave a detailed message?: Yes      Shanti Millan RN    LOV 1/10/25    INR 2.3     No Lovenox if she is in proper range, she just wants to make note that she is in the range.     Seeking change in warfarin dose   Taking 15 mg daily since 17th, in addition to the Lovenox     Routing to anticoagulation pool

## 2025-04-21 NOTE — TELEPHONE ENCOUNTER
Katrina MITCHELL, [place], calling in to verify the dosing of warfarin to enter into chart. Did review that the patient was to be on lovenox also. Notes needed to clarify what was entered. Did not feel comfortable transcribing it in.    She notes that a different nurse will be out later today to recheck INR and call it in.    Sarah Ruiz RN    [facility] Anticoagulation Clinic

## 2025-04-21 NOTE — TELEPHONE ENCOUNTER
INR addressed see [facility] encounter dated 4/21/25  Vianca Cassidy, RN  Anticoagulation Nurse - [facility] [place]

## 2025-04-21 NOTE — PROGRESS NOTES
Home Care is calling regarding an established patient with [facility].  Requesting orders from:  Anticoagulation RNs  OTHER ACTION: Need warfarin/lovenox orders, see bolded area   Is this a request for a temporary pause in the home care episode?  No     Orders Requested     Home Visit by Wound Care Specialist (WOC)    Wound care is 1.2 by 1.7 wide, 0 depth. Not new.   RN gave verbal order: Yes        Wound Care Supplies    Silver Alginate, cleanse with Vashe, then cover with foam adhesive foam dressing. To be done with skilled nursing visits twice weekly   RN gave verbal order: Yes        Phone number Home Care can be reached at: Vandana [phone]  Okay to leave a detailed message?: Yes        Shanti Millan RN     LOV 1/10/25     INR 2.3      No Lovenox if she is in proper range, she just wants to make note that she is in the range.      Seeking change in warfarin dose   Taking 15 mg daily since 17th, in addition to the Lovenox      Routing to anticoagulation pool

## 2025-04-21 NOTE — TELEPHONE ENCOUNTER
"ANTICOAGULATION     Brissa Pascualman is overdue for an INR check.     Spoke with [place], she reports homecare nurse is \"coming at 12:00 today\" Episode changed to Cesar Franco RN  4/21/2025  Anticoagulation Clinic  [facility] for routing messages: yadira TOBIN [facility] patient phone line: [phone]    "

## 2025-04-22 ENCOUNTER — TELEPHONE (OUTPATIENT)
Dept: INTERNAL MEDICINE | Facility: CLINIC | Age: 60
End: 2025-04-22
Payer: MEDICARE

## 2025-04-22 NOTE — TELEPHONE ENCOUNTER
Forms/Letter Request    Type of form/letter: OTHER: Therapy Animals = 2 guinea pigs.  Need a letter for apartment office to allow to have these animals       Do we have the form/letter: No    Who is the form from? Apartment     Where did/will the form come from?     When is form/letter needed by: asap    How would you like the form/letter returned:     Patient Notified form requests are processed in 5-7 business days:Yes    Could we send this information to you in [place] or would you prefer to receive a phone call?:   Patient would prefer a phone call   Okay to leave a detailed message?: Yes at Cell number on file:    Telephone Information:   Mobile [phone]

## 2025-04-22 NOTE — TELEPHONE ENCOUNTER
Dr. Vizcaino, are you able to provide a letter for this?    Thank you,  Marek, Triage RN [facility]    2:47 PM 4/22/2025

## 2025-04-23 DIAGNOSIS — J45.41 MODERATE PERSISTENT ASTHMA WITH ACUTE EXACERBATION: ICD-10-CM

## 2025-04-23 RX ORDER — ALBUTEROL SULFATE 0.83 MG/ML
SOLUTION RESPIRATORY (INHALATION)
Qty: 75 ML | Refills: 0 | Status: SHIPPED | OUTPATIENT
Start: 2025-04-23

## 2025-04-23 NOTE — TELEPHONE ENCOUNTER
Call to patient to relay primary care provider's message. Patient okay to wait until appointment.    Thank you,  Marek, Triage RN Daniel Mireles    2:45 PM 4/23/2025

## 2025-04-24 ENCOUNTER — ANTICOAGULATION THERAPY VISIT (OUTPATIENT)
Dept: ANTICOAGULATION | Facility: CLINIC | Age: 60
End: 2025-04-24
Payer: MEDICARE

## 2025-04-24 DIAGNOSIS — I82.442 ACUTE DEEP VEIN THROMBOSIS (DVT) OF TIBIAL VEIN OF LEFT LOWER EXTREMITY (H): Primary | ICD-10-CM

## 2025-04-24 LAB — INR (EXTERNAL): 1.5

## 2025-04-24 NOTE — PROGRESS NOTES
ANTICOAGULATION MANAGEMENT     Brissa Corado 59 year old female is on warfarin with subtherapeutic INR result. (Goal INR 2.0-3.0)    Recent labs: (last 7 days)     04/24/25  0000   INR 1.5       ASSESSMENT     Source(s): Chart Review, Patient/Caregiver Call, and Home Care/Facility Nurse - Sara from [facility]    Warfarin doses taken: Warfarin taken as instructed  Diet: Protein supplement/shake started which maybe affecting INR - patient reporting taking Ensure twice daily for almost a month and will continue  Medication/supplement changes: None noted  New illness, injury, or hospitalization: No - getting over  a cold  Signs or symptoms of bleeding or clotting: No  Previous result: Therapeutic last visit; previously outside of goal range  Additional findings: Will resume back enoxaparin 105 mg (0.7mLs) every 12 hours until Monday 4/28/25 night. Recheck INR on 4/29/25 to determine if needed to continue.  Last prescription sent on 4/14/25 with 1 additional refill - advised to call pharmacy to refill enoxaparin and restarted back on it.      PLAN     Recommended plan for ongoing change(s) affecting INR     Dosing Instructions: Increase your warfarin dose (9.1% change) Start bridging with Enoxaparin with next INR in 5 days       Summary  As of 4/24/2025      Full warfarin instructions:  20 mg every Mon, Thu, Sat; 15 mg all other days   Next INR check:  4/29/2025               Telephone call with Sara home care nurse who agrees to plan and repeated back plan correctly    Orders given to  Homecare nurse/facility to recheck    Education provided: Please call back if any changes to your diet, medications or how you've been taking warfarin  Dietary considerations: importance of consistent vitamin K intake, impact of vitamin K foods on INR, and Impact of protein intake on INR   Symptom monitoring: monitoring for bleeding signs and symptoms and monitoring for clotting signs and symptoms    Plan made with [facility]  Pharmacist Svitlana Holly RN  4/24/2025  Anticoagulation Clinic  [facility] for routing messages: yadira ARITA  [facility] patient phone line: [phone]        _______________________________________________________________________     Anticoagulation Episode Summary       Current INR goal:  2.0-3.0   TTR:  40.7% (1 wk)   Target end date:  Indefinite   Send INR reminders to:  JAYLAN ARITA    Indications    Acute deep vein thrombosis (DVT) of tibial vein of left lower extremity (H) [I82.718]             Comments:  Return to [facility] when discharged from Home Care             Anticoagulation Care Providers       Provider Role Specialty Phone number    Telly Baum MD Referring Family Medicine [phone]    Aury Vizcaino MD Referring Internal Medicine [phone]

## 2025-04-25 ENCOUNTER — TELEPHONE (OUTPATIENT)
Dept: INTERNAL MEDICINE | Facility: CLINIC | Age: 60
End: 2025-04-25
Payer: MEDICARE

## 2025-04-25 NOTE — TELEPHONE ENCOUNTER
Home health certfication and Plan of care for 4/9/25 to 6/7/25 recieved via fax. Form in your mailbox for signature.

## 2025-04-29 ENCOUNTER — ANTICOAGULATION THERAPY VISIT (OUTPATIENT)
Dept: ANTICOAGULATION | Facility: CLINIC | Age: 60
End: 2025-04-29
Payer: MEDICARE

## 2025-04-29 DIAGNOSIS — J45.41 MODERATE PERSISTENT ASTHMA WITH ACUTE EXACERBATION: ICD-10-CM

## 2025-04-29 DIAGNOSIS — I82.442 ACUTE DEEP VEIN THROMBOSIS (DVT) OF TIBIAL VEIN OF LEFT LOWER EXTREMITY (H): Primary | ICD-10-CM

## 2025-04-29 LAB — INR (EXTERNAL): 2.1

## 2025-04-29 RX ORDER — ALBUTEROL SULFATE 0.83 MG/ML
SOLUTION RESPIRATORY (INHALATION)
Qty: 75 ML | Refills: 0 | Status: SHIPPED | OUTPATIENT
Start: 2025-04-29

## 2025-04-29 NOTE — PROGRESS NOTES
ANTICOAGULATION MANAGEMENT     Brissa Corado 59 year old female is on warfarin with therapeutic INR result. (Goal INR 2.0-3.0)    Recent labs: (last 7 days)     04/29/25  0000   INR 2.1       ASSESSMENT     Source(s): Chart Review and Home Care/Facility Nurse     Warfarin doses taken: Warfarin taken as instructed  Diet:  No diet changes; she continues to drink Ensure 2 x per day.  Medication/supplement changes: Patient started Warfarin 4/3 for DVT.  New illness, injury, or hospitalization: No  Signs or symptoms of bleeding or clotting: No  Previous result: Subtherapeutic  Additional findings:  Plan to stop Lovenox today and slightly increase maintenance dose (4.2%).       PLAN     Recommended plan for ongoing change(s) affecting INR     Dosing Instructions: Increase your warfarin dose (4.2% change) with next INR in 1 week       Summary  As of 4/29/2025      Full warfarin instructions:  15 mg every Sun, Tue, Fri; 20 mg all other days   Next INR check:  5/6/2025               Telephone call with [facility] care nurse who verbalizes understanding and agrees to plan    Orders given to  Homecare nurse/facility to recheck    Education provided: Please call back if any changes to your diet, medications or how you've been taking warfarin  Contact [phone] with any changes, questions or concerns.     Plan made with [facility] Pharmacist Svitlana Pham RN  4/29/2025  Anticoagulation Clinic  [facility] for routing messages: aydira ARITA  [facility] patient phone line: [phone]        _______________________________________________________________________     Anticoagulation Episode Summary       Current INR goal:  2.0-3.0   TTR:  30.7% (1.7 wk)   Target end date:  Indefinite   Send INR reminders to:  JAYLAN ARITA    Indications    Acute deep vein thrombosis (DVT) of tibial vein of left lower extremity (H) [R00.863]             Comments:  Return to [facility] when discharged from Home Care              Anticoagulation Care Providers       Provider Role Specialty Phone number    Telly Baum MD Referring Family Medicine [phone]    Aury Vizcaino MD Referring Internal Medicine [phone]

## 2025-04-30 ENCOUNTER — TELEPHONE (OUTPATIENT)
Dept: INTERNAL MEDICINE | Facility: CLINIC | Age: 60
End: 2025-04-30
Payer: MEDICARE

## 2025-04-30 ENCOUNTER — MEDICAL CORRESPONDENCE (OUTPATIENT)
Dept: HEALTH INFORMATION MANAGEMENT | Facility: CLINIC | Age: 60
End: 2025-04-30

## 2025-05-04 DIAGNOSIS — J45.41 MODERATE PERSISTENT ASTHMA WITH ACUTE EXACERBATION: ICD-10-CM

## 2025-05-05 ENCOUNTER — APPOINTMENT (OUTPATIENT)
Dept: GENERAL RADIOLOGY | Facility: CLINIC | Age: 60
End: 2025-05-05
Attending: EMERGENCY MEDICINE
Payer: MEDICARE

## 2025-05-05 ENCOUNTER — HOSPITAL ENCOUNTER (INPATIENT)
Facility: CLINIC | Age: 60
LOS: 2 days | Discharge: HOME | End: 2025-05-08
Attending: EMERGENCY MEDICINE | Admitting: INTERNAL MEDICINE
Payer: MEDICARE

## 2025-05-05 ENCOUNTER — APPOINTMENT (OUTPATIENT)
Dept: ULTRASOUND IMAGING | Facility: CLINIC | Age: 60
End: 2025-05-05
Attending: INTERNAL MEDICINE
Payer: MEDICARE

## 2025-05-05 ENCOUNTER — APPOINTMENT (OUTPATIENT)
Dept: CT IMAGING | Facility: CLINIC | Age: 60
End: 2025-05-05
Attending: EMERGENCY MEDICINE
Payer: MEDICARE

## 2025-05-05 DIAGNOSIS — I82.442 ACUTE DEEP VEIN THROMBOSIS (DVT) OF TIBIAL VEIN OF LEFT LOWER EXTREMITY (H): ICD-10-CM

## 2025-05-05 DIAGNOSIS — J96.22 ACUTE ON CHRONIC RESPIRATORY FAILURE WITH HYPOXIA AND HYPERCAPNIA (H): ICD-10-CM

## 2025-05-05 DIAGNOSIS — L89.223 PRESSURE INJURY OF LEFT THIGH, STAGE 3 (H): Primary | ICD-10-CM

## 2025-05-05 DIAGNOSIS — R79.1 SUBTHERAPEUTIC INTERNATIONAL NORMALIZED RATIO (INR): ICD-10-CM

## 2025-05-05 DIAGNOSIS — J45.40 MODERATE PERSISTENT ASTHMA WITHOUT COMPLICATION: ICD-10-CM

## 2025-05-05 DIAGNOSIS — J45.41 MODERATE PERSISTENT ASTHMA WITH ACUTE EXACERBATION: ICD-10-CM

## 2025-05-05 DIAGNOSIS — J96.20 ACUTE ON CHRONIC RESPIRATORY FAILURE, UNSPECIFIED WHETHER WITH HYPOXIA OR HYPERCAPNIA (H): ICD-10-CM

## 2025-05-05 DIAGNOSIS — B37.2 CANDIDIASIS OF SKIN: ICD-10-CM

## 2025-05-05 DIAGNOSIS — J96.21 ACUTE ON CHRONIC RESPIRATORY FAILURE WITH HYPOXIA AND HYPERCAPNIA (H): ICD-10-CM

## 2025-05-05 DIAGNOSIS — J45.901 EXACERBATION OF ASTHMA, UNSPECIFIED ASTHMA SEVERITY, UNSPECIFIED WHETHER PERSISTENT: ICD-10-CM

## 2025-05-05 LAB
ALBUMIN UR-MCNC: NEGATIVE MG/DL
ANION GAP SERPL CALCULATED.3IONS-SCNC: 11 MMOL/L (ref 7–15)
APPEARANCE UR: CLEAR
ATRIAL RATE - MUSE: 82 BPM
BASOPHILS # BLD AUTO: 0.1 10E3/UL (ref 0–0.2)
BASOPHILS NFR BLD AUTO: 1 %
BILIRUB UR QL STRIP: NEGATIVE
BUN SERPL-MCNC: 15.4 MG/DL (ref 6–20)
CALCIUM SERPL-MCNC: 8.9 MG/DL (ref 8.8–10.4)
CHLORIDE SERPL-SCNC: 101 MMOL/L (ref 98–107)
COLOR UR AUTO: NORMAL
CREAT SERPL-MCNC: 0.68 MG/DL (ref 0.51–0.95)
DIASTOLIC BLOOD PRESSURE - MUSE: NORMAL MMHG
EGFRCR SERPLBLD CKD-EPI 2021: >90 ML/MIN/1.73M2
EOSINOPHIL # BLD AUTO: 0.5 10E3/UL (ref 0–0.7)
EOSINOPHIL NFR BLD AUTO: 6 %
ERYTHROCYTE [DISTWIDTH] IN BLOOD BY AUTOMATED COUNT: 14.4 % (ref 10–15)
FLUAV RNA SPEC QL NAA+PROBE: NEGATIVE
FLUBV RNA RESP QL NAA+PROBE: NEGATIVE
GLUCOSE SERPL-MCNC: 119 MG/DL (ref 70–99)
GLUCOSE UR STRIP-MCNC: NEGATIVE MG/DL
HCO3 BLDV-SCNC: 28 MMOL/L (ref 21–28)
HCO3 SERPL-SCNC: 27 MMOL/L (ref 22–29)
HCT VFR BLD AUTO: 40.7 % (ref 35–47)
HGB BLD-MCNC: 12.6 G/DL (ref 11.7–15.7)
HGB UR QL STRIP: NEGATIVE
IMM GRANULOCYTES # BLD: 0 10E3/UL
IMM GRANULOCYTES NFR BLD: 1 %
INR PPP: 1.03 (ref 0.85–1.15)
INR PPP: 1.04 (ref 0.85–1.15)
INTERPRETATION ECG - MUSE: NORMAL
KETONES UR STRIP-MCNC: NEGATIVE MG/DL
LACTATE BLD-SCNC: 0.8 MMOL/L (ref 0.7–2)
LEUKOCYTE ESTERASE UR QL STRIP: NEGATIVE
LYMPHOCYTES # BLD AUTO: 2.3 10E3/UL (ref 0.8–5.3)
LYMPHOCYTES NFR BLD AUTO: 27 %
MCH RBC QN AUTO: 27.6 PG (ref 26.5–33)
MCHC RBC AUTO-ENTMCNC: 31 G/DL (ref 31.5–36.5)
MCV RBC AUTO: 89 FL (ref 78–100)
MONOCYTES # BLD AUTO: 0.5 10E3/UL (ref 0–1.3)
MONOCYTES NFR BLD AUTO: 6 %
NEUTROPHILS # BLD AUTO: 5.1 10E3/UL (ref 1.6–8.3)
NEUTROPHILS NFR BLD AUTO: 60 %
NITRATE UR QL: NEGATIVE
NRBC # BLD AUTO: 0 10E3/UL
NRBC BLD AUTO-RTO: 0 /100
P AXIS - MUSE: 41 DEGREES
PCO2 BLDV: 54 MM HG (ref 40–50)
PH BLDV: 7.33 [PH] (ref 7.32–7.43)
PH UR STRIP: 6 [PH] (ref 5–7)
PLATELET # BLD AUTO: 246 10E3/UL (ref 150–450)
PO2 BLDV: 27 MM HG (ref 25–47)
POTASSIUM SERPL-SCNC: 4.4 MMOL/L (ref 3.4–5.3)
PR INTERVAL - MUSE: 190 MS
PROTHROMBIN TIME: 13.6 SECONDS (ref 11.8–14.8)
PROTHROMBIN TIME: 13.7 SECONDS (ref 11.8–14.8)
QRS DURATION - MUSE: 76 MS
QT - MUSE: 364 MS
QTC - MUSE: 425 MS
R AXIS - MUSE: 7 DEGREES
RBC # BLD AUTO: 4.56 10E6/UL (ref 3.8–5.2)
RBC URINE: 1 /HPF
RSV RNA SPEC NAA+PROBE: NEGATIVE
SAO2 % BLDV: 44 % (ref 70–75)
SARS-COV-2 RNA RESP QL NAA+PROBE: NEGATIVE
SODIUM SERPL-SCNC: 139 MMOL/L (ref 135–145)
SP GR UR STRIP: 1.02 (ref 1–1.03)
SQUAMOUS EPITHELIAL: <1 /HPF
SYSTOLIC BLOOD PRESSURE - MUSE: NORMAL MMHG
T AXIS - MUSE: 50 DEGREES
TROPONIN T SERPL HS-MCNC: 9 NG/L
UROBILINOGEN UR STRIP-MCNC: NORMAL MG/DL
VENTRICULAR RATE- MUSE: 82 BPM
WBC # BLD AUTO: 8.5 10E3/UL (ref 4–11)
WBC URINE: 1 /HPF

## 2025-05-05 PROCEDURE — 83605 ASSAY OF LACTIC ACID: CPT

## 2025-05-05 PROCEDURE — 36415 COLL VENOUS BLD VENIPUNCTURE: CPT | Performed by: EMERGENCY MEDICINE

## 2025-05-05 PROCEDURE — 84484 ASSAY OF TROPONIN QUANT: CPT | Performed by: EMERGENCY MEDICINE

## 2025-05-05 PROCEDURE — 99285 EMERGENCY DEPT VISIT HI MDM: CPT | Mod: 25

## 2025-05-05 PROCEDURE — 99207 PR NO BILLABLE SERVICE THIS VISIT: CPT | Performed by: INTERNAL MEDICINE

## 2025-05-05 PROCEDURE — 87086 URINE CULTURE/COLONY COUNT: CPT | Performed by: EMERGENCY MEDICINE

## 2025-05-05 PROCEDURE — G0378 HOSPITAL OBSERVATION PER HR: HCPCS

## 2025-05-05 PROCEDURE — 96365 THER/PROPH/DIAG IV INF INIT: CPT

## 2025-05-05 PROCEDURE — 85610 PROTHROMBIN TIME: CPT | Performed by: EMERGENCY MEDICINE

## 2025-05-05 PROCEDURE — 99222 1ST HOSP IP/OBS MODERATE 55: CPT | Mod: AI | Performed by: INTERNAL MEDICINE

## 2025-05-05 PROCEDURE — 250N000013 HC RX MED GY IP 250 OP 250 PS 637: Performed by: INTERNAL MEDICINE

## 2025-05-05 PROCEDURE — 93971 EXTREMITY STUDY: CPT | Mod: LT

## 2025-05-05 PROCEDURE — 82565 ASSAY OF CREATININE: CPT | Performed by: EMERGENCY MEDICINE

## 2025-05-05 PROCEDURE — 81001 URINALYSIS AUTO W/SCOPE: CPT | Performed by: EMERGENCY MEDICINE

## 2025-05-05 PROCEDURE — 120N000001 HC R&B MED SURG/OB

## 2025-05-05 PROCEDURE — 96375 TX/PRO/DX INJ NEW DRUG ADDON: CPT

## 2025-05-05 PROCEDURE — 999N000157 HC STATISTIC RCP TIME EA 10 MIN

## 2025-05-05 PROCEDURE — G0463 HOSPITAL OUTPT CLINIC VISIT: HCPCS

## 2025-05-05 PROCEDURE — 85610 PROTHROMBIN TIME: CPT | Performed by: INTERNAL MEDICINE

## 2025-05-05 PROCEDURE — 36415 COLL VENOUS BLD VENIPUNCTURE: CPT | Performed by: INTERNAL MEDICINE

## 2025-05-05 PROCEDURE — 250N000009 HC RX 250: Performed by: EMERGENCY MEDICINE

## 2025-05-05 PROCEDURE — 250N000012 HC RX MED GY IP 250 OP 636 PS 637: Performed by: INTERNAL MEDICINE

## 2025-05-05 PROCEDURE — 94640 AIRWAY INHALATION TREATMENT: CPT | Mod: 76

## 2025-05-05 PROCEDURE — 250N000013 HC RX MED GY IP 250 OP 250 PS 637: Performed by: EMERGENCY MEDICINE

## 2025-05-05 PROCEDURE — 93005 ELECTROCARDIOGRAM TRACING: CPT

## 2025-05-05 PROCEDURE — 94660 CPAP INITIATION&MGMT: CPT

## 2025-05-05 PROCEDURE — 250N000011 HC RX IP 250 OP 636: Performed by: INTERNAL MEDICINE

## 2025-05-05 PROCEDURE — 250N000011 HC RX IP 250 OP 636: Performed by: EMERGENCY MEDICINE

## 2025-05-05 PROCEDURE — 87637 SARSCOV2&INF A&B&RSV AMP PRB: CPT | Performed by: EMERGENCY MEDICINE

## 2025-05-05 PROCEDURE — 250N000011 HC RX IP 250 OP 636: Mod: JZ | Performed by: EMERGENCY MEDICINE

## 2025-05-05 PROCEDURE — 96372 THER/PROPH/DIAG INJ SC/IM: CPT | Performed by: INTERNAL MEDICINE

## 2025-05-05 PROCEDURE — 250N000009 HC RX 250: Performed by: INTERNAL MEDICINE

## 2025-05-05 PROCEDURE — 71275 CT ANGIOGRAPHY CHEST: CPT

## 2025-05-05 PROCEDURE — 85025 COMPLETE CBC W/AUTO DIFF WBC: CPT | Performed by: EMERGENCY MEDICINE

## 2025-05-05 PROCEDURE — 71045 X-RAY EXAM CHEST 1 VIEW: CPT

## 2025-05-05 RX ORDER — LISINOPRIL 20 MG/1
20 TABLET ORAL DAILY
Status: DISCONTINUED | OUTPATIENT
Start: 2025-05-05 | End: 2025-05-08 | Stop reason: HOSPADM

## 2025-05-05 RX ORDER — METHYLPREDNISOLONE SODIUM SUCCINATE 125 MG/2ML
125 INJECTION INTRAMUSCULAR; INTRAVENOUS ONCE
Status: COMPLETED | OUTPATIENT
Start: 2025-05-05 | End: 2025-05-05

## 2025-05-05 RX ORDER — MAGNESIUM SULFATE HEPTAHYDRATE 40 MG/ML
2 INJECTION, SOLUTION INTRAVENOUS ONCE
Status: COMPLETED | OUTPATIENT
Start: 2025-05-05 | End: 2025-05-05

## 2025-05-05 RX ORDER — IPRATROPIUM BROMIDE AND ALBUTEROL SULFATE 2.5; .5 MG/3ML; MG/3ML
3 SOLUTION RESPIRATORY (INHALATION)
Status: DISCONTINUED | OUTPATIENT
Start: 2025-05-05 | End: 2025-05-08 | Stop reason: HOSPADM

## 2025-05-05 RX ORDER — ACETAMINOPHEN 325 MG/1
650 TABLET ORAL EVERY 4 HOURS PRN
Status: DISCONTINUED | OUTPATIENT
Start: 2025-05-05 | End: 2025-05-08 | Stop reason: HOSPADM

## 2025-05-05 RX ORDER — PANTOPRAZOLE SODIUM 40 MG/1
40 TABLET, DELAYED RELEASE ORAL DAILY
Status: DISCONTINUED | OUTPATIENT
Start: 2025-05-05 | End: 2025-05-08 | Stop reason: HOSPADM

## 2025-05-05 RX ORDER — ENOXAPARIN SODIUM 100 MG/ML
40 INJECTION SUBCUTANEOUS EVERY 12 HOURS
Status: DISCONTINUED | OUTPATIENT
Start: 2025-05-05 | End: 2025-05-05

## 2025-05-05 RX ORDER — ENOXAPARIN SODIUM 150 MG/ML
120 INJECTION SUBCUTANEOUS EVERY 12 HOURS SCHEDULED
Status: DISCONTINUED | OUTPATIENT
Start: 2025-05-05 | End: 2025-05-08 | Stop reason: HOSPADM

## 2025-05-05 RX ORDER — IOPAMIDOL 755 MG/ML
500 INJECTION, SOLUTION INTRAVASCULAR ONCE
Status: COMPLETED | OUTPATIENT
Start: 2025-05-05 | End: 2025-05-05

## 2025-05-05 RX ORDER — SERTRALINE HYDROCHLORIDE 100 MG/1
100 TABLET, FILM COATED ORAL DAILY
Status: DISCONTINUED | OUTPATIENT
Start: 2025-05-05 | End: 2025-05-08 | Stop reason: HOSPADM

## 2025-05-05 RX ORDER — CARBAMAZEPINE 200 MG/1
400 TABLET ORAL ONCE
Status: COMPLETED | OUTPATIENT
Start: 2025-05-05 | End: 2025-05-05

## 2025-05-05 RX ORDER — ALBUTEROL SULFATE 90 UG/1
2 INHALANT RESPIRATORY (INHALATION) EVERY 6 HOURS PRN
Status: DISCONTINUED | OUTPATIENT
Start: 2025-05-05 | End: 2025-05-08 | Stop reason: HOSPADM

## 2025-05-05 RX ORDER — ONDANSETRON 4 MG/1
4 TABLET, ORALLY DISINTEGRATING ORAL EVERY 6 HOURS PRN
Status: DISCONTINUED | OUTPATIENT
Start: 2025-05-05 | End: 2025-05-08 | Stop reason: HOSPADM

## 2025-05-05 RX ORDER — AMOXICILLIN 250 MG
1 CAPSULE ORAL 2 TIMES DAILY PRN
Status: DISCONTINUED | OUTPATIENT
Start: 2025-05-05 | End: 2025-05-08 | Stop reason: HOSPADM

## 2025-05-05 RX ORDER — FLUTICASONE FUROATE AND VILANTEROL 100; 25 UG/1; UG/1
1 POWDER RESPIRATORY (INHALATION) DAILY
Status: DISCONTINUED | OUTPATIENT
Start: 2025-05-05 | End: 2025-05-08 | Stop reason: HOSPADM

## 2025-05-05 RX ORDER — ENOXAPARIN SODIUM 100 MG/ML
60 INJECTION SUBCUTANEOUS EVERY 12 HOURS SCHEDULED
Status: DISCONTINUED | OUTPATIENT
Start: 2025-05-05 | End: 2025-05-05

## 2025-05-05 RX ORDER — PREDNISONE 20 MG/1
60 TABLET ORAL DAILY
Status: COMPLETED | OUTPATIENT
Start: 2025-05-05 | End: 2025-05-07

## 2025-05-05 RX ORDER — WARFARIN SODIUM 5 MG/1
20 TABLET ORAL
Status: COMPLETED | OUTPATIENT
Start: 2025-05-05 | End: 2025-05-05

## 2025-05-05 RX ORDER — DIPHENHYDRAMINE HYDROCHLORIDE 50 MG/ML
25 INJECTION, SOLUTION INTRAMUSCULAR; INTRAVENOUS
Status: DISCONTINUED | OUTPATIENT
Start: 2025-05-05 | End: 2025-05-05

## 2025-05-05 RX ORDER — POLYETHYLENE GLYCOL 3350 17 G/17G
17 POWDER, FOR SOLUTION ORAL DAILY
Status: DISCONTINUED | OUTPATIENT
Start: 2025-05-05 | End: 2025-05-08 | Stop reason: HOSPADM

## 2025-05-05 RX ORDER — CARBAMAZEPINE 200 MG/1
400 TABLET ORAL 2 TIMES DAILY
Status: DISCONTINUED | OUTPATIENT
Start: 2025-05-05 | End: 2025-05-08 | Stop reason: HOSPADM

## 2025-05-05 RX ORDER — ACETAMINOPHEN 650 MG/1
650 SUPPOSITORY RECTAL EVERY 4 HOURS PRN
Status: DISCONTINUED | OUTPATIENT
Start: 2025-05-05 | End: 2025-05-08 | Stop reason: HOSPADM

## 2025-05-05 RX ORDER — NYSTATIN 100000 U/G
CREAM TOPICAL 2 TIMES DAILY
Status: DISCONTINUED | OUTPATIENT
Start: 2025-05-05 | End: 2025-05-07

## 2025-05-05 RX ORDER — PROCHLORPERAZINE MALEATE 5 MG/1
10 TABLET ORAL EVERY 6 HOURS PRN
Status: DISCONTINUED | OUTPATIENT
Start: 2025-05-05 | End: 2025-05-08 | Stop reason: HOSPADM

## 2025-05-05 RX ORDER — ONDANSETRON 2 MG/ML
4 INJECTION INTRAMUSCULAR; INTRAVENOUS EVERY 6 HOURS PRN
Status: DISCONTINUED | OUTPATIENT
Start: 2025-05-05 | End: 2025-05-08 | Stop reason: HOSPADM

## 2025-05-05 RX ORDER — ALBUTEROL SULFATE 0.83 MG/ML
2.5 SOLUTION RESPIRATORY (INHALATION) ONCE
Status: COMPLETED | OUTPATIENT
Start: 2025-05-05 | End: 2025-05-05

## 2025-05-05 RX ORDER — AMOXICILLIN 250 MG
2 CAPSULE ORAL 2 TIMES DAILY PRN
Status: DISCONTINUED | OUTPATIENT
Start: 2025-05-05 | End: 2025-05-08 | Stop reason: HOSPADM

## 2025-05-05 RX ORDER — IPRATROPIUM BROMIDE AND ALBUTEROL SULFATE 2.5; .5 MG/3ML; MG/3ML
3 SOLUTION RESPIRATORY (INHALATION)
Status: COMPLETED | OUTPATIENT
Start: 2025-05-05 | End: 2025-05-05

## 2025-05-05 RX ORDER — CARBAMAZEPINE 200 MG/1
200 TABLET ORAL
Status: DISCONTINUED | OUTPATIENT
Start: 2025-05-05 | End: 2025-05-08 | Stop reason: HOSPADM

## 2025-05-05 RX ADMIN — FLUTICASONE FUROATE AND VILANTEROL TRIFENATATE 1 PUFF: 100; 25 POWDER RESPIRATORY (INHALATION) at 13:29

## 2025-05-05 RX ADMIN — CARBAMAZEPINE 400 MG: 200 TABLET ORAL at 20:57

## 2025-05-05 RX ADMIN — ACETAMINOPHEN 650 MG: 325 TABLET, FILM COATED ORAL at 17:47

## 2025-05-05 RX ADMIN — ENOXAPARIN SODIUM 60 MG: 60 INJECTION SUBCUTANEOUS at 09:06

## 2025-05-05 RX ADMIN — PREDNISONE 60 MG: 20 TABLET ORAL at 13:29

## 2025-05-05 RX ADMIN — IPRATROPIUM BROMIDE AND ALBUTEROL SULFATE 3 ML: .5; 3 SOLUTION RESPIRATORY (INHALATION) at 08:32

## 2025-05-05 RX ADMIN — NYSTATIN: 100000 CREAM TOPICAL at 10:39

## 2025-05-05 RX ADMIN — IPRATROPIUM BROMIDE AND ALBUTEROL SULFATE 3 ML: .5; 3 SOLUTION RESPIRATORY (INHALATION) at 04:02

## 2025-05-05 RX ADMIN — METHYLPREDNISOLONE SODIUM SUCCINATE 125 MG: 125 INJECTION, POWDER, FOR SOLUTION INTRAMUSCULAR; INTRAVENOUS at 04:03

## 2025-05-05 RX ADMIN — IPRATROPIUM BROMIDE AND ALBUTEROL SULFATE 3 ML: .5; 3 SOLUTION RESPIRATORY (INHALATION) at 04:03

## 2025-05-05 RX ADMIN — WARFARIN SODIUM 20 MG: 5 TABLET ORAL at 17:47

## 2025-05-05 RX ADMIN — NYSTATIN: 100000 CREAM TOPICAL at 06:10

## 2025-05-05 RX ADMIN — CARBAMAZEPINE 200 MG: 200 TABLET ORAL at 13:59

## 2025-05-05 RX ADMIN — ENOXAPARIN SODIUM 120 MG: 120 INJECTION SUBCUTANEOUS at 20:56

## 2025-05-05 RX ADMIN — SODIUM CHLORIDE 90 ML: 9 INJECTION, SOLUTION INTRAVENOUS at 05:02

## 2025-05-05 RX ADMIN — LISINOPRIL 20 MG: 20 TABLET ORAL at 13:29

## 2025-05-05 RX ADMIN — ALBUTEROL SULFATE 2.5 MG: 2.5 SOLUTION RESPIRATORY (INHALATION) at 05:50

## 2025-05-05 RX ADMIN — IPRATROPIUM BROMIDE AND ALBUTEROL SULFATE 3 ML: .5; 3 SOLUTION RESPIRATORY (INHALATION) at 19:49

## 2025-05-05 RX ADMIN — PANTOPRAZOLE SODIUM 40 MG: 40 TABLET, DELAYED RELEASE ORAL at 13:29

## 2025-05-05 RX ADMIN — CARBAMAZEPINE 400 MG: 200 TABLET ORAL at 09:06

## 2025-05-05 RX ADMIN — SERTRALINE HYDROCHLORIDE 100 MG: 100 TABLET ORAL at 13:29

## 2025-05-05 RX ADMIN — IOPAMIDOL 77 ML: 755 INJECTION, SOLUTION INTRAVENOUS at 05:02

## 2025-05-05 RX ADMIN — IPRATROPIUM BROMIDE AND ALBUTEROL SULFATE 3 ML: .5; 3 SOLUTION RESPIRATORY (INHALATION) at 15:58

## 2025-05-05 RX ADMIN — MAGNESIUM SULFATE HEPTAHYDRATE 2 G: 40 INJECTION, SOLUTION INTRAVENOUS at 04:03

## 2025-05-05 RX ADMIN — IPRATROPIUM BROMIDE AND ALBUTEROL SULFATE 3 ML: .5; 3 SOLUTION RESPIRATORY (INHALATION) at 12:22

## 2025-05-05 ASSESSMENT — ACTIVITIES OF DAILY LIVING (ADL)
ADLS_ACUITY_SCORE: 47
ADLS_ACUITY_SCORE: 56
ADLS_ACUITY_SCORE: 64
ADLS_ACUITY_SCORE: 56
ADLS_ACUITY_SCORE: 47
DEPENDENT_IADLS:: CLEANING;COOKING;LAUNDRY;SHOPPING;MEAL PREPARATION;MEDICATION MANAGEMENT;MONEY MANAGEMENT;TRANSPORTATION
ADLS_ACUITY_SCORE: 64
ADLS_ACUITY_SCORE: 56
ADLS_ACUITY_SCORE: 64
ADLS_ACUITY_SCORE: 55
ADLS_ACUITY_SCORE: 64
ADLS_ACUITY_SCORE: 56
ADLS_ACUITY_SCORE: 64
ADLS_ACUITY_SCORE: 55
ADLS_ACUITY_SCORE: 56
ADLS_ACUITY_SCORE: 64

## 2025-05-05 NOTE — CONSULTS
Care Management Initial Consult    General Information  Assessment completed with: Parents, guardian Adam  Type of CM/SW Visit: Initial Assessment  Primary Care Provider verified and updated as needed: Yes   Readmission within the last 30 days: previous discharge plan unsuccessful   Return Category: Exacerbation of disease  Reason for Consult: discharge planning  Advance Care Planning: Advance Care Planning Reviewed: present on chart          Communication Assessment  Patient's communication style: spoken language (English or Bilingual)    Hearing Difficulty or Deaf: no   Wear Glasses or Blind: yes    Cognitive  Cognitive/Neuro/Behavioral: .WDL except        Orientation: oriented x 4  Mood/Behavior: other (see comments) (occasional odd comments, repeating of phrases, unsure of exact cognitive abilites)     Speech: clear, spontaneous    Living Environment:   People in home: significant other  Sudhir Brumfield  Current living Arrangements: apartment      Able to return to prior arrangements: yes    Family/Social Support:  Care provided by: self, spouse/significant other  Provides care for: no one, unable/limited ability to care for self  Marital Status: Single  Support system: Parent(s), Guardian, Significant Other       Sudhir  Description of Support System: Supportive, Involved    Support Assessment: Adequate family and caregiver support, Adequate social supports    Current Resources:   Patient receiving home care services: No  Community Resources:    Equipment currently used at home: walker, rolling, shower chair  Supplies currently used at home:      Employment/Financial:  Employment Status: disabled         Lifestyle & Psychosocial Needs:  Social Drivers of Health     Food Insecurity: Low Risk  (5/5/2025)    Food Insecurity     Within the past 12 months, did you worry that your food would run out before you got money to buy more?: No     Within the past 12 months, did the food you bought just not last and you  didn t have money to get more?: No   Depression: Not at risk (1/24/2025)    PHQ-2     PHQ-2 Score: Incomplete   Housing Stability: Low Risk  (5/5/2025)    Housing Stability     Do you have housing? : Yes     Are you worried about losing your housing?: No   Tobacco Use: Low Risk  (1/24/2025)    Patient History     Smoking Tobacco Use: Never     Smokeless Tobacco Use: Never     Passive Exposure: Not on file   Financial Resource Strain: Low Risk  (5/5/2025)    Financial Resource Strain     Within the past 12 months, have you or your family members you live with been unable to get utilities (heat, electricity) when it was really needed?: No   Alcohol Use: Not on file   Transportation Needs: Low Risk  (5/5/2025)    Transportation Needs     Within the past 12 months, has lack of transportation kept you from medical appointments, getting your medicines, non-medical meetings or appointments, work, or from getting things that you need?: No   Physical Activity: Not on file   Interpersonal Safety: Low Risk  (4/2/2025)    Interpersonal Safety     Do you feel physically and emotionally safe where you currently live?: Yes     Within the past 12 months, have you been hit, slapped, kicked or otherwise physically hurt by someone?: No     Within the past 12 months, have you been humiliated or emotionally abused in other ways by your partner or ex-partner?: No   Stress: Not on file   Social Connections: Not on file   Health Literacy: Not on file       Functional Status:  Prior to admission patient needed assistance:   Dependent ADLs:: Ambulation-walker  Dependent IADLs:: Cleaning, Cooking, Laundry, Shopping, Meal Preparation, Medication Management, Money Management, Transportation     Discussed  Partnership in Safe Discharge Planning  document with patient/family: Yes      Additional Information:  Patient was admitted on 5/5/25 under observation status for evaluation of shortness of breath. Care management consulted due to elevated re  admission risk score of 26%.     Writer spoke with patient's father and guardian Adam to complete assessment. Patient is well known to care management team. Patient lives in an apartment with her significant other. Patient was recently discharge from [facility]. At that time, the plan was for patient to move to [facility] in [place]. Adam had apartment secured with deposit paid, they had plans for patient to tour apartment and plans for movers to assist that same week. Per Adam, since discharge, pt's sig other has refused to leave apartment and patient has also changed mind regarding move and is refusing to leave. Adam states that he and his wife have been locked out of apartment and patient hasn't spoken with them for the last month.     Adam is going to reach out to [facility] today to see if apartment is still available. He is still hopeful on patient moving to [facility] soon. Adam is aware that patient is here under observation only and, we would not be able to keep her here for placement. Adam is hoping for a call from MD with medical update. Paged hospitalist with this information.       Next Steps: Continue to follow and assist with discharge planning.         Yoselin Valencia, RN  Care Coordinator  [facility]  [phone]

## 2025-05-05 NOTE — PLAN OF CARE
Goal Outcome Evaluation:      Plan of Care Reviewed With: patient, spouse          Outcome Evaluation: pt is alert and oriented x4. Denies sob or dyspnea. On 1L oxygen. Mild wheezing upper right robes. Redness on abdominal folds, Interdry applied on abdominal folds and under breast. Pt wants nystatin power instead on cream.

## 2025-05-05 NOTE — PLAN OF CARE
"End of Shift Summary  For vital signs and complete assessments, please see documentation flowsheets.     Pertinent assessments: Aox4 with occasional odd comments, legal guardians. VSS on @L NC, no dyspnea at rest. Lung wheezes bilaterally. Morbid obesity. Significant rashes under all folds. Non blanchable redness to posterior thighs. Poor personal hygiene. Incontinent of urine up to external catheter. PIV SL    Major Shift Events: admitted to floor, skin assessment completed. US to lower extremity. Prednisone started    Treatment Plan: wean O2 as able. Prednisone, anticoagulation    Bedside Nurse: Con Mills RN     Goal Outcome Evaluation:      Problem: Adult Inpatient Plan of Care  Goal: Plan of Care Review  Description: The Plan of Care Review/Shift note should be completed every shift.  The Outcome Evaluation is a brief statement about your assessment that the patient is improving, declining, or no change.  This information will be displayed automatically on your shiftnote.  5/5/2025 1446 by Con Mills RN  Outcome: Progressing  Flowsheets (Taken 5/5/2025 1446)  Outcome Evaluation: no signs or reports of dyspnea, wheezes inmprove with nebs. no complaints of pain. moisture management in place. charlie bed ordered.  Plan of Care Reviewed With: patient  Overall Patient Progress: improving  5/5/2025 1446 by Con Mills RN  Outcome: Not Progressing  Flowsheets (Taken 5/5/2025 1446)  Outcome Evaluation: no signs or reports of dyspnea, wheezes inmprove with nebs. no complaints of pain. moisture management in place. charlie bed ordered.  Plan of Care Reviewed With: patient  Overall Patient Progress: improving  Goal: Patient-Specific Goal (Individualized)  Description: You can add care plan individualizations to a care plan. Examples of Individualization might be:  \"Parent requests to be called daily at 9am for status\", \"I have a hard time hearing out of my right ear\", or \"Do not touch me to wake me up " "as it startlesme\".  5/5/2025 1446 by Con Mills RN  Outcome: Progressing  5/5/2025 1446 by Con Mills RN  Outcome: Not Progressing  Goal: Absence of Hospital-Acquired Illness or Injury  5/5/2025 1446 by Con Mills RN  Outcome: Progressing  5/5/2025 1446 by Con Mills RN  Outcome: Not Progressing  Intervention: Identify and Manage Fall Risk  Recent Flowsheet Documentation  Taken 5/5/2025 1000 by Con Mills RN  Safety Promotion/Fall Prevention:   activity supervised   safety round/check completed  Intervention: Prevent and Manage VTE (Venous Thromboembolism) Risk  Recent Flowsheet Documentation  Taken 5/5/2025 1000 by Con Mills RN  VTE Prevention/Management: SCDs off (sequential compression devices)  Intervention: Prevent Infection  Recent Flowsheet Documentation  Taken 5/5/2025 1000 by Con Mills RN  Infection Prevention: rest/sleep promoted  Goal: Optimal Comfort and Wellbeing  5/5/2025 1446 by Con Mills RN  Outcome: Progressing  5/5/2025 1446 by Con Mills RN  Outcome: Not Progressing  Goal: Readiness for Transition of Care  5/5/2025 1446 by Con Mills RN  Outcome: Progressing  5/5/2025 1446 by Con Mills RN  Outcome: Not Progressing  Intervention: Mutually Develop Transition Plan  Recent Flowsheet Documentation  Taken 5/5/2025 0900 by Con Mills RN  Equipment Currently Used at Home: (shower bench)   walker, rolling   other (see comments)         Plan of Care Reviewed With: patient    Overall Patient Progress: improvingOverall Patient Progress: improving    Outcome Evaluation: no signs or reports of dyspnea, wheezes inmprove with nebs. no complaints of pain. moisture management in place. charlie bed ordered.      "

## 2025-05-05 NOTE — CONSULTS
[facility]  WO Nurse Inpatient Assessment     Consulted for: Breasts, pannus, thighs    WO nurse follow-up plan: weekly    Patient History (according to provider note(s):      Brissa Corado is a 59 year old female with a history of  htn, seizure d/o, cushing syndrome, depression, borderline developmental delay, dx'd with left posterior tibial dvt at most recent hospitalization ~ 1 month ago and started on warfarin, obesity with central hypoventilation syndrome, asthma, chronic hypoxic respiratory failure on 2.5 L of O2 admitted on 5/5/2025 with acute asthma exacerbation complicated by acute on chronic hypoxic respiratory failure     Assessment:      Areas visualized during today's visit: Skin folds  and Thigh    Wound location: axilla, breasts, abdomen, buttocks  Last photo: 5/5/25    Left breast      Right pannus      Left pannus      Wound due to: Intertrigo and Moisture Associated Skin Damage (MASD)  Wound history/plan of care: Patient with morbid obesity and some difficulty with cleaning areas.  Reports she has just been spraying with Vashe at home but not drying areas and sometimes putting wash cloths in folds.    Wound base: Extensive erythema with satellite lesions to bilateral axilla, under bilateral breasts and pannus and buttock/perianal area extending up to coccyx.  Scattered areas of dermis under left breast and bilateral pannus.  Scattered dry drainage ranging from 0.5x0.5cm to 1x1.5cm to bilateral posterior and inner thighs.     Palpation of the wound bed: normal      Drainage: small     Description of drainage: serous     Tunneling: N/A     Undermining: N/A  Periwound skin: Erythema- blanchable and Rash      Color: pink      Temperature: normal   Odor: mild  Pain: mild  Pain interventions prior to dressing change: slow and gentle cares   Treatment goal: Heal , Infection control/prevention, and Decrease moisture  STATUS: initial assessment  Supplies ordered: ordered Aubree  AF     Pressure Injury Location: Left posterior thigh  Last photo: 5/5/25    Wound type: Pressure Injury     Pressure Injury Stage: 3, present on admission   Wound history/plan of care:   Healing pressure injury starting over 6 months ago.  Wound base: 100 % Pink     Palpation of the wound bed: normal      Drainage: none     Description of drainage: none     Measurements (length x width x depth, in cm) 0.6  x 0.9  x  0.1 cm      Tunneling N/A     Undermining N/A  Periwound skin: Scar tissue      Color: pink      Temperature: normal   Odor: none  Pain: denies   Pain intervention prior to dressing change: N/A  Treatment goal: Heal  and Protection  STATUS: initial assessment  Supplies ordered: ordered Aubree ALACRAZ    My PI Risk Assessment     Sensory Perception: 3 - Slightly Limited     Moisture: 2 - Very moist      Activity: 3 - Walks occasionally      Mobility: 3 - Slightly limited      Nutrition: 3 - Adequate     Friction/Shear: 2 - Potential problem      TOTAL: 16      Treatment Plan:     Axilla, breasts, pannus wound(s): Daily   Interdry(order#[id number]):   1.  Wash skin gently with Vashe. Pat, do not rub.  2.  With clean scissors, cut enough fabric to cover the affected area, allowing for a minimum of 2 inches to extend beyond the skin fold for moisture evaporation.  3.  Lay a single layer of fabric in the skin fold, placing one edge into the base of the fold. Gently smooth the rest of the fabric over the skin, keeping it flat.  4.  Leave at least 2 inches of fabric exposed outside the skin fold.  5.  Secure the fabric in one of several ways: with the skin fold, with a small amount of skin-friendly tape, or tucked under clothing.  6.  Remove the fabric before bathing and reuse it when finished. When removing, gently separate the skin fold and lift away.  Helpful Hints  1. InterDry  can be written on with a ballpoint pen. It may be helpful to label each piece of InterDry  with the date you started using it.  2.  Each  piece of InterDry  may be used up to 5 days, depending on fabric soiling, odor, amount of moisture and general skin condition. Replace InterDry  if it becomes soiled with blood, urine or stool.  3.  Do not use creams, ointments, or powders with InterDry  as it may interfere with product efficacy.     Buttocks, perianal area, posterior thighs:  BID  Cleanse with Aubree Cleanse and Protect spray and soft dry wipe  Apply Aubree antifungal ([facility] [place]#[id number])    Orders: Written    RECOMMEND PRIMARY TEAM ORDER: None, at this time  Education provided: plan of care, Moisture management, and Hygiene  Discussed plan of care with: Patient and Nurse  Notify WOC if wound(s) deteriorate.  Nursing to notify the Provider(s) and re-consult the WO Nurse if new skin concern.    DATA:     Current support surface: Standard  Standard gel mattress (Isoflex)-RN will order bariatric bed  Containment of urine/stool: Incontinence Protocol  BMI: Body mass index is 60.15 kg/m .   Active diet order: Orders Placed This Encounter      Regular Diet Adult     Output: I/O last 3 completed shifts:  In: -   Out: 150 [Urine:150]     Labs:   Recent Labs   Lab 05/05/25  0847 05/05/25  0347   HGB  --  12.6   INR 1.04 1.03   WBC  --  8.5     Pressure injury risk assessment:   Sensory Perception: 3-->slightly limited  Moisture: 2-->very moist  Activity: 3-->walks occasionally  Mobility: 3-->slightly limited  Nutrition: 3-->adequate  Friction and Shear: 2-->potential problem  Randy Score: 16    Bharat Cooper RN CWOCN  Contact Via Vocera- [facility] Nurse (Kristy)  Dept. Office Number: [phone]

## 2025-05-05 NOTE — ED NOTES
Hospital course: 1/26: primary C/S for NRFS, IUGR  POD#1: Feeling well, denies complaints  POD#2: Patient c/o mild incisional pain, pumping with good effect.   POD#3: patient doing well without unusual complaints. Baby doing well in NICU.   POD#4: c/o constipation    Interval History:     She is doing well this morning. She is tolerating a regular diet without nausea or vomiting. She is voiding spontaneously. She is ambulating. She has passed flatus, and has not a BM. Vaginal bleeding is mild. She denies fever or chills. Abdominal pain is mild and controlled with oral medications. She is breastpumping.    Objective:     Vital Signs (Most Recent):  Temp: 98.1 °F (36.7 °C) (01/30/18 0000)  Pulse: 74 (01/30/18 0000)  Resp: 16 (01/30/18 0000)  BP: 123/70 (01/30/18 0000)  SpO2: 98 % (01/30/18 0000) Vital Signs (24h Range):  Temp:  [97.6 °F (36.4 °C)-98.1 °F (36.7 °C)] 98.1 °F (36.7 °C)  Pulse:  [74] 74  Resp:  [16-18] 16  SpO2:  [98 %-99 %] 98 %  BP: (109-123)/(62-70) 123/70     Weight: 66 kg (145 lb 8.1 oz)  Body mass index is 23.48 kg/m².    No intake or output data in the 24 hours ending 01/30/18 1031    Significant Labs:  Lab Results   Component Value Date    GROUPTRH B POS 01/25/2018    HEPBSAG Negative 07/24/2017     No results for input(s): HGB, HCT in the last 48 hours.    I have personallly reviewed all pertinent lab results from the last 24 hours.    Physical Exam:   Constitutional: She is oriented to person, place, and time. She appears well-developed and well-nourished. No distress.    HENT:   Head: Normocephalic and atraumatic.      Cardiovascular: Normal rate, regular rhythm, normal heart sounds and intact distal pulses.     Pulmonary/Chest: Effort normal. No respiratory distress.        Abdominal: Soft. Bowel sounds are normal. Abdominal incision: c/d/i. There is no guarding.   Firm fundus below umbilicus     Genitourinary: Uterus normal. No vaginal discharge found.           Musculoskeletal: Normal range  [facility]  ED Nurse Handoff Report    ED Chief complaint: Shortness of Breath  . ED Diagnosis:   Final diagnoses:   Supratherapeutic INR   Exacerbation of asthma, unspecified asthma severity, unspecified whether persistent   Acute on chronic respiratory failure, unspecified whether with hypoxia or hypercapnia (H)   Candidiasis of skin       Allergies:   Allergies   Allergen Reactions    Penicillins Anaphylaxis     PEN-FAST - Penicillin Allergy Risk Tool completed by [facility] December 20, 2024    Score: 3  Risk: Moderate  Assessment: Patient has a 20 % chance of a positive penicillin allergy test    Iodine      Iodinated Contrast Media  --- Hives      Tetracyclines & Related Unknown    Azithromycin Rash    Hydrochlorothiazide Palpitations     dehydration    Influenza Vac Split [Influenza Virus Vaccine] Rash     Patient states she is allergic to the vaccine.  Got a rash all over body many years ago after receiving injection.       Code Status: Full Code    Activity level - Baseline/Home:  walker.  Activity Level - Current:   standby.   Lift room needed: No.   Bariatric: Yes   Needed: No   Isolation: No.   Infection: Not Applicable.     Respiratory status: Nasal cannula    Vital Signs (within 30 minutes):   Vitals:    05/05/25 0432 05/05/25 0434 05/05/25 0445 05/05/25 0454   BP:  133/54 (!) 143/64    Pulse:  82 79 83   Resp:  23 30 18   Temp:       TempSrc:       SpO2: 93% 96% 96% 93%       Cardiac Rhythm:  ,      Pain level:    Patient confused: No.   Patient Falls Risk: nonskid shoes/slippers when out of bed, arm band in place, and patient and family education.   Elimination Status: Has voided and Straight cath for urine sample      Patient Report - Initial Complaint: Brissa Corado is a 59 year old female with history of asthma, hypertension, and DVT on Warfarin who presents to the ED via EMS for evaluation of shortness of breath. EMS reports patient woke up with shortness of breath  at 0130 tonight. Patient was out of her Albuterol inhaler and her additional inhaler was not working. Patient is chronically on 2.5 liters of oxygen at baseline. EMS administered 1 duoneb and noted patient was hypoxic on arrival to mid 80s though patient was reportedly off her supplemental nasal cannula. Patient reports taking off her oxygen to use the bathroom. She states this evening she was around marijuana and expresses concerns this may have exacerbated her asthma.  She also endorses chills, cough with phlegm, slight chest pain worsens with coughing. Denies fever, abdominal pain, vomiting, or diarrhea. No known sick contacts or recent antibiotics. Patient uses a walker. .   Focused Assessment: Pt brought in via EMS for difficulty breathing that started at 0130 this morning. Pt was out of albuterol, and her other inhaler was not working. 87% on EMS arrival on room air. Chronically on 2.5 L at baseline. Pt has a history of asthma that is exacerbated by smoke, which she was around this evening. 1 duoneb given en route, bringing her sats to 95%.      Abnormal Results:   Labs Ordered and Resulted from Time of ED Arrival to Time of ED Departure   BASIC METABOLIC PANEL - Abnormal       Result Value    Sodium 139      Potassium 4.4      Chloride 101      Carbon Dioxide (CO2) 27      Anion Gap 11      Urea Nitrogen 15.4      Creatinine 0.68      GFR Estimate >90      Calcium 8.9      Glucose 119 (*)    CBC WITH PLATELETS AND DIFFERENTIAL - Abnormal    WBC Count 8.5      RBC Count 4.56      Hemoglobin 12.6      Hematocrit 40.7      MCV 89      MCH 27.6      MCHC 31.0 (*)     RDW 14.4      Platelet Count 246      % Neutrophils 60      % Lymphocytes 27      % Monocytes 6      % Eosinophils 6      % Basophils 1      % Immature Granulocytes 1      NRBCs per 100 WBC 0      Absolute Neutrophils 5.1      Absolute Lymphocytes 2.3      Absolute Monocytes 0.5      Absolute Eosinophils 0.5      Absolute Basophils 0.1      Absolute  of motion and moves all extremeties.       Neurological: She is alert and oriented to person, place, and time.    Skin: Skin is warm. She is not diaphoretic.    Psychiatric: She has a normal mood and affect. Her behavior is normal. Judgment and thought content normal.      Immature Granulocytes 0.0      Absolute NRBCs 0.0     ISTAT GASES LACTATE VENOUS POCT - Abnormal    Lactic Acid POCT 0.8      Bicarbonate Venous POCT 28      O2 Sat, Venous POCT 44 (*)     pCO2 Venous POCT 54 (*)     pH Venous POCT 7.33      pO2 Venous POCT 27     INR - Normal    INR 1.03      PT 13.6     INFLUENZA A/B, RSV AND SARS-COV2 PCR - Normal    Influenza A PCR Negative      Influenza B PCR Negative      RSV PCR Negative      SARS CoV2 PCR Negative     TROPONIN T, HIGH SENSITIVITY - Normal    Troponin T, High Sensitivity 9     ROUTINE UA WITH MICROSCOPIC - Normal    Color Urine Light Yellow      Appearance Urine Clear      Glucose Urine Negative      Bilirubin Urine Negative      Ketones Urine Negative      Specific Gravity Urine 1.023      Blood Urine Negative      pH Urine 6.0      Protein Albumin Urine Negative      Urobilinogen Urine Normal      Nitrite Urine Negative      Leukocyte Esterase Urine Negative      RBC Urine 1      WBC Urine 1      Squamous Epithelials Urine <1     URINE CULTURE        CT Chest Pulmonary Embolism w Contrast   Final Result   IMPRESSION:   1.  No pulmonary embolism or other acute abnormality identified.   2.  Old T3 vertebral compression deformity.         XR Chest Port 1 View   Final Result   IMPRESSION: Negative chest.          Treatments provided: Nebs, steroids, imaging, labs  Family Comments: Updated  OBS brochure/video discussed/provided to patient:  N/A  ED Medications:   Medications   nystatin (MYCOSTATIN) cream (has no administration in time range)   diphenhydrAMINE (BENADRYL) injection 25 mg (has no administration in time range)   ipratropium - albuterol 0.5 mg/2.5 mg/3 mL (DUONEB) neb solution 3 mL (3 mLs Nebulization $Given 5/5/25 0403)   methylPREDNISolone Na Suc (solu-MEDROL) injection 125 mg (125 mg Intravenous $Given 5/5/25 0403)   magnesium sulfate 2 g in 50 mL sterile water intermittent infusion (2 g Intravenous $New Bag 5/5/25 0403)   iopamidol (ISOVUE-370)  solution 500 mL (77 mLs Intravenous $Given 5/5/25 0502)   sodium chloride 0.9 % bag for CT scan flush (90 mLs Intravenous $Given 5/5/25 0502)   albuterol (PROVENTIL) neb solution 2.5 mg (2.5 mg Nebulization $Given 5/5/25 0550)       Drips infusing:  No  For the majority of the shift this patient was Green.   Interventions performed were NA.    Sepsis treatment initiated: No    Cares/treatment/interventions/medications to be completed following ED care: All inpatient orders.     ED Nurse Name: Sonia Clark RN  6:00 AM

## 2025-05-05 NOTE — PROGRESS NOTES
05/05/25 1000   Skin   Skin WDL X;integrity;moisture;color   Skin Color pale   Skin Moisture dry;moist  (dry extremities, very moist under all adipose folds)   Skin Integrity bruised (ecchymotic);peeling/scaling;rash;other (see comments);abrasion/excoriation   Abrasion / Excoriation Left;Right;Shin  (scratches on lower extremities)   Peeling Right;Left;Heel;Toe;Foot  (dry flaky skin with peeling on bilateral feet)   Rash Right;Left;Abdominal;Chest;Perineum;Buttocks/IT;Thigh;Other (Comment)  (moisture rash under bilateral axilla, breasts, pannus, perineum and upper thigh)   Other (see comments) open areas of skin under breasts, pannus, thighs, and buttocks. non blnachable redness to back of thighs   Device Skin Interventions Taken Skin barrier applied;Other (See comment)  (cleansed, medicated ointment, moisutre wicking fabric needed, mepilex)     Admission/Transfer from: ED  2 RN skin assessment completed. Yes with Michelle Lamb RN   Significant findings include: misture damage and rash under bilateral axilla, breasts, pannus, perineum, anterior and posterior thighs, shear skin injury to buttocks from wiping, non blanchable redness to posterior thighs.   LDA added (if applicable)? YES: for rash and thighs  Requested WOC Nurse Consult from MD? YES: orders asked for from MD and placed

## 2025-05-05 NOTE — ED TRIAGE NOTES
Triage Assessment (Adult)       Row Name 05/05/25 0334          Triage Assessment    Airway WDL WDL        Respiratory WDL    Respiratory WDL X  2.5L- Pt baseline        Skin Circulation/Temperature WDL    Skin Circulation/Temperature WDL WDL        Cardiac WDL    Cardiac WDL WDL        Peripheral/Neurovascular WDL    Peripheral Neurovascular WDL WDL        Cognitive/Neuro/Behavioral WDL    Cognitive/Neuro/Behavioral WDL WDL

## 2025-05-05 NOTE — PLAN OF CARE
Goal Outcome Evaluation:      Plan of Care Reviewed With: guardian          Outcome Evaluation: Likely d/c back to apartment with significant other. Guardian is working on [facility] placement.
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
## 2025-05-05 NOTE — PROVIDER NOTIFICATION
Pt started on BiPAP per MD. Tolerating it well. See settings and parameters below:       05/05/25 0434   CPAP/BiPAP/Settings   IPAP/EPAP (cmH2O) 14/7   Rate (breaths/min) 16   Oxygen (%) 40   Timed Inspiration (sec) 0.9   IPAP RISE  Settings (V60) 2   CPAP/BiPAP Patient Parameters   IPAP (cm H2O) 14 cmH2O   EPAP (cm H2O) 7 cmH2O   Pressure Support (cm H2O) 7 cmH2O   RR Total (breaths/min) 25 breaths/min   Vt (mL) 596 mL   Minute Ventilation (L/min) 15.6 L/min   Peak Inspiratory Pressure (cm H2O) 15 cmH2O   Pt.  Leak (L/min) 2 L/min         /54   Pulse 82   Temp 97.6  F (36.4  C) (Oral)   Resp 23   LMP 08/18/2008   SpO2 96%     Cici Eduardo, RT     Home

## 2025-05-05 NOTE — CONSULTS
Care Management Follow Up    Expected Discharge Date: 05/06/2025     Concerns to be Addressed: discharge planning     Patient plan of care discussed at interdisciplinary rounds: Yes    Anticipated Discharge Disposition: Home      Anticipated Discharge Services:  None  Anticipated Discharge DME:  Electric w/c    Patient/family educated on Medicare website which has current facility and service quality ratings: N/A   Education Provided on the Discharge Plan:  yes  Patient/Family in Agreement with the Plan:  yes    Referrals Placed by CM/SW:  None  Private pay costs discussed: Not applicable    Discussed  Partnership in Safe Discharge Planning  document with patient/family: No     Handoff Completed: No, handoff not indicated or clinically appropriate    Additional Information:  Juliane spoke with the pt's dad/co-guardian regarding her discharge plan. Adam said that the pt called his wife/mom and co-guardian. This is the first that they have talked to each other in a month. The plan is for [place] to discharge back to her current apartment with her significant other Sudhir. Sudhir will provide transportation. Adam and his wife are still working on getting the pt moved into [facility] in [place].  They are agreeable to the pt's return home and Sudhir providing transportation.    Next Steps:   CM will continue with discharge planning and will be available as needed until discharge.    AISSATOU Macdonald, [facility]  Inpatient Care Coordination  [facility]  [phone]
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
## 2025-05-05 NOTE — PHARMACY-ADMISSION MEDICATION HISTORY
Pharmacist Admission Medication History    Admission medication history is complete. The information provided in this note is only as accurate as the sources available at the time of the update.    Information Source(s): Patient and CareEverywhere/SureScripts via in-person    Pertinent Information:     Changes made to PTA medication list:  Added: None  Deleted: enoxaparin  Changed: Updated warfarin doses    Allergies reviewed with patient and updates made in EHR: no    Medication History Completed By: Shahida Alvarez [facility] 5/5/2025 9:05 AM    PTA Med List   Medication Sig Last Dose/Taking    albuterol (PROAIR HFA/PROVENTIL HFA/VENTOLIN HFA) 108 (90 Base) MCG/ACT inhaler Inhale 2 puffs into the lungs every 6 hours as needed for shortness of breath, wheezing or cough. Past Month    albuterol (PROVENTIL) (2.5 MG/3ML) 0.083% neb solution NEBULIZE ONE VIAL EVERY 4 HOURS AS NEEDED FOR SHORTNESS OF BREATH, WHEEZING OR COUGH 5/4/2025    azelastine (ASTELIN) 0.1 % nasal spray Spray 1 spray into both nostrils daily as needed for allergies. Past Month    carBAMazepine (TEGRETOL) 200 MG tablet Take 400 mg by mouth 2 times daily. AM and HS 5/4/2025 Bedtime    carBAMazepine (TEGRETOL) 200 MG tablet Take 200 mg by mouth daily. @1200 5/4/2025 Noon    fluticasone (FLONASE) 50 MCG/ACT nasal spray USE ONE SPRAY INTO BOTH NOSTRILS DAILY AS NEEDED FOR RHINITIS OR ALLERGIES Past Month    Fluticasone Furoate-Vilanterol (BREO ELLIPTA) 50-25 MCG/ACT AEPB Inhale 1 puff into the lungs 2 times daily. 5/4/2025    ibuprofen (ADVIL/MOTRIN) 200 MG tablet Take 400 mg by mouth as needed for pain. Past Month    ipratropium - albuterol 0.5 mg/2.5 mg/3 mL (DUONEB) 0.5-2.5 (3) MG/3ML neb solution NEBULIZE ONE VIAL FOUR TIMES A DAY Past Month    lisinopril (ZESTRIL) 20 MG tablet TAKE 1 TABLET (20 MG) BY MOUTH DAILY 5/4/2025    lovastatin (MEVACOR) 40 MG tablet Take 40 mg by mouth daily. 5/4/2025    miconazole (MICATIN) 2 % external powder Apply topically as  needed for itching or other. Taking As Needed    Multiple Vitamin (MULTI-VITAMIN PO) Take 1 tablet by mouth daily 5/4/2025    nystatin (MYCOSTATIN) 361415 UNIT/GM external cream Apply topically daily as needed Taking As Needed    omeprazole (PRILOSEC) 20 MG DR capsule Take 20 mg by mouth daily. 5/4/2025    sertraline (ZOLOFT) 100 MG tablet TAKE ONE TABLET BY MOUTH DAILY 5/4/2025    warfarin ANTICOAGULANT (COUMADIN) 5 MG tablet Take 20 mg (5 mg x 4) every Mon; 15 mg (5 mg x 3) all other days or as directed. (Patient taking differently: Take 15 mg (5 mg x 3) every Sun, Tues, Fri and then 20 mg (5 mg x 4) all other days or as directed.) Taking Differently

## 2025-05-05 NOTE — PHARMACY-ANTICOAGULATION SERVICE
Clinical Pharmacy - Warfarin Dosing Consult     Pharmacy has been consulted to manage this patient s warfarin therapy.  Indication: DVT/ PE Treatment  Therapy Goal: INR 2-3  Warfarin Prior to Admission: Yes  Warfarin PTA Regimen: 15mg Sun, Tue, Fri and 20mg ROW  Significant drug interactions: Carbamazepine  Recent documented change in oral intake/nutrition: No  Dose Comments: ? Compliance.  Give 20mg today.    INR   Date Value Ref Range Status   05/05/2025 1.03 0.85 - 1.15 Final     INR (External)   Date Value Ref Range Status   04/29/2025 2.1  Final       Recommend warfarin 20mg (twenty) today.  Pharmacy will monitor Brissa Corado daily and order warfarin doses to achieve specified goal.      Please contact pharmacy as soon as possible if the warfarin needs to be held for a procedure or if the warfarin goals change.        Trevor Linares, Pharm.D., BCPS

## 2025-05-05 NOTE — ED TRIAGE NOTES
Pt brought in via EMS for difficulty breathing that started at 0130 this morning. Pt was out of albuterol, and her other inhaler was not working. 87% on EMS arrival on room air. Chronically on 2.5 L at baseline. Pt has a history of asthma that is exacerbated by smoke, which she was around this evening. 1 duoneb given en route, bringing her sats to 95%.

## 2025-05-05 NOTE — PROGRESS NOTES
Patient admitted by my colleague earlier this AM.  She was feeling better when I saw her.   She reported consistent use of meds but recent history and INR suggest she has been missing some of her meds.   Contacted her guardians (parents) and they relayed ongoing concern with medication compliance.  They had arranged for her to move to Assisted Living but she changed her mind after talking with her significant other.       Exam stable with improved resp exam, less wheezing.    A/P:  Asthma exacerbation, acute on chronic resp failure:  -Nebs  -Prednisone daily  -Resume PTA home meds, will need new prescription at discharge as she reports now her recently broke.    LLE DVT:  -Possible DVT noted last month.  Rechecked venous US and no DVT seen today though exam challenging.  Will have on lovenox and warfarin resumed.  Was therapeutic last week but sometime since INR dropped all the way down to 1.  Also no PE noted on CT chest.    Obesity/central hypoventilation syndrome:  -Not on baseline CPAP, not excited to use.     -Obesity complicates cares  -Bariatric bed    HTN:  -Lisinopril    Seizure disorder:  -Resume tegretol, check level given med compliance concerns.    Pressure wound present on admission:  -Wound care consult    Home compliance concerns, 6 admissions in recent past:  -Discussed with guardian.  Again encouraging patient to consider [facility].  SW/CM consulted.

## 2025-05-06 PROBLEM — R79.1 SUBTHERAPEUTIC INTERNATIONAL NORMALIZED RATIO (INR): Status: ACTIVE | Noted: 2025-05-06

## 2025-05-06 LAB
ANION GAP SERPL CALCULATED.3IONS-SCNC: 9 MMOL/L (ref 7–15)
BUN SERPL-MCNC: 14.7 MG/DL (ref 6–20)
CALCIUM SERPL-MCNC: 8.7 MG/DL (ref 8.8–10.4)
CARBAMAZEPINE SERPL-MCNC: 10.6 UG/ML (ref 4–12)
CHLORIDE SERPL-SCNC: 101 MMOL/L (ref 98–107)
CREAT SERPL-MCNC: 0.57 MG/DL (ref 0.51–0.95)
EGFRCR SERPLBLD CKD-EPI 2021: >90 ML/MIN/1.73M2
GLUCOSE SERPL-MCNC: 106 MG/DL (ref 70–99)
HCO3 SERPL-SCNC: 26 MMOL/L (ref 22–29)
HOLD SPECIMEN: NORMAL
INR PPP: 1.11 (ref 0.85–1.15)
POTASSIUM SERPL-SCNC: 4.1 MMOL/L (ref 3.4–5.3)
PROTHROMBIN TIME: 14.4 SECONDS (ref 11.8–14.8)
SODIUM SERPL-SCNC: 136 MMOL/L (ref 135–145)

## 2025-05-06 PROCEDURE — 250N000013 HC RX MED GY IP 250 OP 250 PS 637: Performed by: INTERNAL MEDICINE

## 2025-05-06 PROCEDURE — 250N000011 HC RX IP 250 OP 636: Performed by: INTERNAL MEDICINE

## 2025-05-06 PROCEDURE — 96372 THER/PROPH/DIAG INJ SC/IM: CPT | Performed by: INTERNAL MEDICINE

## 2025-05-06 PROCEDURE — 999N000157 HC STATISTIC RCP TIME EA 10 MIN

## 2025-05-06 PROCEDURE — 250N000009 HC RX 250: Performed by: INTERNAL MEDICINE

## 2025-05-06 PROCEDURE — 250N000012 HC RX MED GY IP 250 OP 636 PS 637: Performed by: INTERNAL MEDICINE

## 2025-05-06 PROCEDURE — G0378 HOSPITAL OBSERVATION PER HR: HCPCS

## 2025-05-06 PROCEDURE — 99232 SBSQ HOSP IP/OBS MODERATE 35: CPT | Performed by: INTERNAL MEDICINE

## 2025-05-06 PROCEDURE — 82310 ASSAY OF CALCIUM: CPT | Performed by: INTERNAL MEDICINE

## 2025-05-06 PROCEDURE — 94640 AIRWAY INHALATION TREATMENT: CPT

## 2025-05-06 PROCEDURE — 94640 AIRWAY INHALATION TREATMENT: CPT | Mod: 76

## 2025-05-06 PROCEDURE — 36415 COLL VENOUS BLD VENIPUNCTURE: CPT | Performed by: INTERNAL MEDICINE

## 2025-05-06 PROCEDURE — 120N000001 HC R&B MED SURG/OB

## 2025-05-06 PROCEDURE — 80156 ASSAY CARBAMAZEPINE TOTAL: CPT | Performed by: INTERNAL MEDICINE

## 2025-05-06 PROCEDURE — 85610 PROTHROMBIN TIME: CPT | Performed by: INTERNAL MEDICINE

## 2025-05-06 RX ORDER — WARFARIN SODIUM 5 MG/1
20 TABLET ORAL
Status: COMPLETED | OUTPATIENT
Start: 2025-05-06 | End: 2025-05-06

## 2025-05-06 RX ADMIN — PANTOPRAZOLE SODIUM 40 MG: 40 TABLET, DELAYED RELEASE ORAL at 09:03

## 2025-05-06 RX ADMIN — CARBAMAZEPINE 400 MG: 200 TABLET ORAL at 20:55

## 2025-05-06 RX ADMIN — IPRATROPIUM BROMIDE AND ALBUTEROL SULFATE 3 ML: .5; 3 SOLUTION RESPIRATORY (INHALATION) at 19:09

## 2025-05-06 RX ADMIN — IPRATROPIUM BROMIDE AND ALBUTEROL SULFATE 3 ML: .5; 3 SOLUTION RESPIRATORY (INHALATION) at 07:56

## 2025-05-06 RX ADMIN — POLYETHYLENE GLYCOL 3350 17 G: 17 POWDER, FOR SOLUTION ORAL at 09:02

## 2025-05-06 RX ADMIN — FLUTICASONE FUROATE AND VILANTEROL TRIFENATATE 1 PUFF: 100; 25 POWDER RESPIRATORY (INHALATION) at 07:58

## 2025-05-06 RX ADMIN — CARBAMAZEPINE 400 MG: 200 TABLET ORAL at 09:03

## 2025-05-06 RX ADMIN — CARBAMAZEPINE 200 MG: 200 TABLET ORAL at 12:13

## 2025-05-06 RX ADMIN — NYSTATIN: 100000 CREAM TOPICAL at 20:56

## 2025-05-06 RX ADMIN — ENOXAPARIN SODIUM 120 MG: 120 INJECTION SUBCUTANEOUS at 20:55

## 2025-05-06 RX ADMIN — ENOXAPARIN SODIUM 120 MG: 120 INJECTION SUBCUTANEOUS at 09:05

## 2025-05-06 RX ADMIN — PREDNISONE 60 MG: 20 TABLET ORAL at 09:03

## 2025-05-06 RX ADMIN — NYSTATIN: 100000 CREAM TOPICAL at 09:05

## 2025-05-06 RX ADMIN — WARFARIN SODIUM 20 MG: 5 TABLET ORAL at 18:58

## 2025-05-06 RX ADMIN — SERTRALINE HYDROCHLORIDE 100 MG: 100 TABLET ORAL at 09:03

## 2025-05-06 RX ADMIN — IPRATROPIUM BROMIDE AND ALBUTEROL SULFATE 3 ML: .5; 3 SOLUTION RESPIRATORY (INHALATION) at 16:14

## 2025-05-06 RX ADMIN — IPRATROPIUM BROMIDE AND ALBUTEROL SULFATE 3 ML: .5; 3 SOLUTION RESPIRATORY (INHALATION) at 11:29

## 2025-05-06 RX ADMIN — LISINOPRIL 20 MG: 20 TABLET ORAL at 09:03

## 2025-05-06 ASSESSMENT — ACTIVITIES OF DAILY LIVING (ADL)
ADLS_ACUITY_SCORE: 51
ADLS_ACUITY_SCORE: 48
ADLS_ACUITY_SCORE: 48
ADLS_ACUITY_SCORE: 51
ADLS_ACUITY_SCORE: 48
ADLS_ACUITY_SCORE: 56
ADLS_ACUITY_SCORE: 48
ADLS_ACUITY_SCORE: 51
ADLS_ACUITY_SCORE: 56
ADLS_ACUITY_SCORE: 56
ADLS_ACUITY_SCORE: 48
ADLS_ACUITY_SCORE: 48
ADLS_ACUITY_SCORE: 51
ADLS_ACUITY_SCORE: 48
ADLS_ACUITY_SCORE: 56
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
## 2025-05-06 NOTE — PLAN OF CARE
"Pertinent assessments: Assumed care of pt from [id number]. VSS on 1L NC. A&O. Bowel sounds active in all quadrants. Tolerating diet. Voiding spontaneously with adequate output. PIV SL. Continue with POC.     Major Shift Events: shower completed, pt up in chair and ambulating to bathroom, external catheter removed.    Treatment Plan: wean O2 as able, encourage OOB activity, hygiene cares        Goal Outcome Evaluation:      Plan of Care Reviewed With: patient    Overall Patient Progress: no changeOverall Patient Progress: no change    Outcome Evaluation: A&O, out of bed, redness in folds      Problem: Adult Inpatient Plan of Care  Goal: Plan of Care Review  Description: The Plan of Care Review/Shift note should be completed every shift.  The Outcome Evaluation is a brief statement about your assessment that the patient is improving, declining, or no change.  This information will be displayed automatically on your shiftnote.  Outcome: Progressing  Flowsheets (Taken 5/6/2025 1440)  Outcome Evaluation: A&O, out of bed, redness in folds  Plan of Care Reviewed With: patient  Overall Patient Progress: no change  Goal: Patient-Specific Goal (Individualized)  Description: You can add care plan individualizations to a care plan. Examples of Individualization might be:  \"Parent requests to be called daily at 9am for status\", \"I have a hard time hearing out of my right ear\", or \"Do not touch me to wake me up as it startlesme\".  Outcome: Progressing  Goal: Absence of Hospital-Acquired Illness or Injury  Outcome: Progressing  Intervention: Identify and Manage Fall Risk  Recent Flowsheet Documentation  Taken 5/6/2025 0900 by Michelle Champagne RN  Safety Promotion/Fall Prevention:   activity supervised   assistive device/personal items within reach   clutter free environment maintained   lighting adjusted   mobility aid in reach   nonskid shoes/slippers when out of bed   room near nurse's station   safety round/check completed   " supervised activity  Intervention: Prevent Skin Injury  Recent Flowsheet Documentation  Taken 5/6/2025 0900 by Michelle Champagne RN  Body Position: position changed independently  Intervention: Prevent and Manage VTE (Venous Thromboembolism) Risk  Recent Flowsheet Documentation  Taken 5/6/2025 0900 by Michelle Champagne RN  VTE Prevention/Management: SCDs off (sequential compression devices)  Intervention: Prevent Infection  Recent Flowsheet Documentation  Taken 5/6/2025 0900 by Michelle Champagne RN  Infection Prevention: hand hygiene promoted  Goal: Optimal Comfort and Wellbeing  Outcome: Progressing  Goal: Readiness for Transition of Care  Outcome: Progressing

## 2025-05-06 NOTE — PLAN OF CARE
A&Ox4, int confusion. VSS. Denies pain and SOB. Lung sounds wheezing. On 2L NC sating in the low 90's. Redness in folds

## 2025-05-07 ENCOUNTER — APPOINTMENT (OUTPATIENT)
Dept: PHYSICAL THERAPY | Facility: CLINIC | Age: 60
DRG: 202 | End: 2025-05-07
Attending: INTERNAL MEDICINE
Payer: MEDICARE

## 2025-05-07 LAB
BACTERIA UR CULT: NO GROWTH
INR PPP: 1.23 (ref 0.85–1.15)
PROTHROMBIN TIME: 15.6 SECONDS (ref 11.8–14.8)

## 2025-05-07 PROCEDURE — 99233 SBSQ HOSP IP/OBS HIGH 50: CPT | Performed by: INTERNAL MEDICINE

## 2025-05-07 PROCEDURE — 250N000013 HC RX MED GY IP 250 OP 250 PS 637: Performed by: INTERNAL MEDICINE

## 2025-05-07 PROCEDURE — 97530 THERAPEUTIC ACTIVITIES: CPT | Mod: GP | Performed by: PHYSICAL THERAPIST

## 2025-05-07 PROCEDURE — 36415 COLL VENOUS BLD VENIPUNCTURE: CPT | Performed by: INTERNAL MEDICINE

## 2025-05-07 PROCEDURE — 94640 AIRWAY INHALATION TREATMENT: CPT

## 2025-05-07 PROCEDURE — 97161 PT EVAL LOW COMPLEX 20 MIN: CPT | Mod: GP | Performed by: PHYSICAL THERAPIST

## 2025-05-07 PROCEDURE — 999N000157 HC STATISTIC RCP TIME EA 10 MIN

## 2025-05-07 PROCEDURE — 94640 AIRWAY INHALATION TREATMENT: CPT | Mod: 76

## 2025-05-07 PROCEDURE — 250N000009 HC RX 250: Performed by: INTERNAL MEDICINE

## 2025-05-07 PROCEDURE — 97116 GAIT TRAINING THERAPY: CPT | Mod: GP | Performed by: PHYSICAL THERAPIST

## 2025-05-07 PROCEDURE — 97110 THERAPEUTIC EXERCISES: CPT | Mod: GP | Performed by: PHYSICAL THERAPIST

## 2025-05-07 PROCEDURE — 85610 PROTHROMBIN TIME: CPT | Performed by: INTERNAL MEDICINE

## 2025-05-07 PROCEDURE — 250N000011 HC RX IP 250 OP 636: Performed by: INTERNAL MEDICINE

## 2025-05-07 PROCEDURE — 250N000012 HC RX MED GY IP 250 OP 636 PS 637: Performed by: INTERNAL MEDICINE

## 2025-05-07 PROCEDURE — 120N000001 HC R&B MED SURG/OB

## 2025-05-07 RX ORDER — PREDNISONE 20 MG/1
20 TABLET ORAL DAILY
Status: DISCONTINUED | OUTPATIENT
Start: 2025-05-13 | End: 2025-05-07

## 2025-05-07 RX ORDER — PREDNISONE 20 MG/1
40 TABLET ORAL DAILY
Status: DISCONTINUED | OUTPATIENT
Start: 2025-05-07 | End: 2025-05-07

## 2025-05-07 RX ORDER — PREDNISONE 20 MG/1
20 TABLET ORAL DAILY
Status: DISCONTINUED | OUTPATIENT
Start: 2025-05-14 | End: 2025-05-08 | Stop reason: HOSPADM

## 2025-05-07 RX ORDER — PREDNISONE 10 MG/1
10 TABLET ORAL DAILY
Status: DISCONTINUED | OUTPATIENT
Start: 2025-05-16 | End: 2025-05-07

## 2025-05-07 RX ORDER — WARFARIN SODIUM 7.5 MG/1
15 TABLET ORAL
Status: COMPLETED | OUTPATIENT
Start: 2025-05-07 | End: 2025-05-07

## 2025-05-07 RX ORDER — ALBUTEROL SULFATE 0.83 MG/ML
SOLUTION RESPIRATORY (INHALATION)
Qty: 75 ML | Refills: 0 | Status: SHIPPED | OUTPATIENT
Start: 2025-05-07 | End: 2025-05-08

## 2025-05-07 RX ORDER — PREDNISONE 10 MG/1
10 TABLET ORAL DAILY
Status: DISCONTINUED | OUTPATIENT
Start: 2025-05-17 | End: 2025-05-08 | Stop reason: HOSPADM

## 2025-05-07 RX ORDER — PREDNISONE 20 MG/1
40 TABLET ORAL DAILY
Status: DISCONTINUED | OUTPATIENT
Start: 2025-05-08 | End: 2025-05-08 | Stop reason: HOSPADM

## 2025-05-07 RX ADMIN — PANTOPRAZOLE SODIUM 40 MG: 40 TABLET, DELAYED RELEASE ORAL at 08:16

## 2025-05-07 RX ADMIN — WARFARIN SODIUM 15 MG: 7.5 TABLET ORAL at 17:31

## 2025-05-07 RX ADMIN — POLYETHYLENE GLYCOL 3350 17 G: 17 POWDER, FOR SOLUTION ORAL at 08:16

## 2025-05-07 RX ADMIN — CARBAMAZEPINE 400 MG: 200 TABLET ORAL at 08:16

## 2025-05-07 RX ADMIN — MICONAZOLE NITRATE: 20 POWDER TOPICAL at 20:14

## 2025-05-07 RX ADMIN — CARBAMAZEPINE 400 MG: 200 TABLET ORAL at 20:13

## 2025-05-07 RX ADMIN — CARBAMAZEPINE 200 MG: 200 TABLET ORAL at 11:17

## 2025-05-07 RX ADMIN — SERTRALINE HYDROCHLORIDE 100 MG: 100 TABLET ORAL at 08:16

## 2025-05-07 RX ADMIN — IPRATROPIUM BROMIDE AND ALBUTEROL SULFATE 3 ML: .5; 3 SOLUTION RESPIRATORY (INHALATION) at 07:24

## 2025-05-07 RX ADMIN — IPRATROPIUM BROMIDE AND ALBUTEROL SULFATE 3 ML: .5; 3 SOLUTION RESPIRATORY (INHALATION) at 15:07

## 2025-05-07 RX ADMIN — NYSTATIN: 100000 CREAM TOPICAL at 08:16

## 2025-05-07 RX ADMIN — IPRATROPIUM BROMIDE AND ALBUTEROL SULFATE 3 ML: .5; 3 SOLUTION RESPIRATORY (INHALATION) at 19:19

## 2025-05-07 RX ADMIN — PREDNISONE 60 MG: 20 TABLET ORAL at 08:16

## 2025-05-07 RX ADMIN — ENOXAPARIN SODIUM 120 MG: 120 INJECTION SUBCUTANEOUS at 20:13

## 2025-05-07 RX ADMIN — MICONAZOLE NITRATE: 20 POWDER TOPICAL at 10:25

## 2025-05-07 RX ADMIN — LISINOPRIL 20 MG: 20 TABLET ORAL at 08:16

## 2025-05-07 RX ADMIN — IPRATROPIUM BROMIDE AND ALBUTEROL SULFATE 3 ML: .5; 3 SOLUTION RESPIRATORY (INHALATION) at 11:05

## 2025-05-07 RX ADMIN — ENOXAPARIN SODIUM 120 MG: 120 INJECTION SUBCUTANEOUS at 08:18

## 2025-05-07 RX ADMIN — FLUTICASONE FUROATE AND VILANTEROL TRIFENATATE 1 PUFF: 100; 25 POWDER RESPIRATORY (INHALATION) at 07:24

## 2025-05-07 ASSESSMENT — ACTIVITIES OF DAILY LIVING (ADL)
ADLS_ACUITY_SCORE: 51
ADLS_ACUITY_SCORE: 48
ADLS_ACUITY_SCORE: 56
ADLS_ACUITY_SCORE: 51
ADLS_ACUITY_SCORE: 56
ADLS_ACUITY_SCORE: 48
ADLS_ACUITY_SCORE: 51
ADLS_ACUITY_SCORE: 51
ADLS_ACUITY_SCORE: 56
ADLS_ACUITY_SCORE: 51
ADLS_ACUITY_SCORE: 48
ADLS_ACUITY_SCORE: 56
ADLS_ACUITY_SCORE: 51
ADLS_ACUITY_SCORE: 48
ADLS_ACUITY_SCORE: 51
ADLS_ACUITY_SCORE: 51
ADLS_ACUITY_SCORE: 56
ADLS_ACUITY_SCORE: 51
ADLS_ACUITY_SCORE: 48
ADLS_ACUITY_SCORE: 51
ADLS_ACUITY_SCORE: 56
ADLS_ACUITY_SCORE: 56
ADLS_ACUITY_SCORE: 48

## 2025-05-07 NOTE — PLAN OF CARE
"For vital signs and complete assessments, please see documentation flowsheets.    [id number]   Pertinent assessments: Pt lethargic during shift. On 1L NC. Not oob during shift. Afebrile. VSS. Denies pain & nausea. PIV saline locked. External cath in place overnight with adequate output.    Major Shift Events: None    Treatment Plan: Symptom management. O2 support. Nebs. Wound care. Lovenox. Discharge TBD.    Bedside Nurse: Sheldon Parson RN     Problem: Adult Inpatient Plan of Care  Goal: Plan of Care Review  Description: The Plan of Care Review/Shift note should be completed every shift.  The Outcome Evaluation is a brief statement about your assessment that the patient is improving, declining, or no change.  This information will be displayed automatically on your shiftnote.  Outcome: Progressing  Flowsheets (Taken 5/7/2025 0602)  Outcome Evaluation: O2 support continued. PIV saline locked. Denies pain. VSS. On 1L NC  Plan of Care Reviewed With: patient  Overall Patient Progress: no change  Goal: Patient-Specific Goal (Individualized)  Description: You can add care plan individualizations to a care plan. Examples of Individualization might be:  \"Parent requests to be called daily at 9am for status\", \"I have a hard time hearing out of my right ear\", or \"Do not touch me to wake me up as it startlesme\".  Outcome: Progressing  Goal: Absence of Hospital-Acquired Illness or Injury  Outcome: Progressing  Intervention: Identify and Manage Fall Risk  Recent Flowsheet Documentation  Taken 5/7/2025 0118 by Sheldon Parson, RN  Safety Promotion/Fall Prevention:   activity supervised   assistive device/personal items within reach   clutter free environment maintained   lighting adjusted   mobility aid in reach   nonskid shoes/slippers when out of bed   room near nurse's station   safety round/check completed   supervised activity   increased rounding and observation   patient and family education   room organization " consistent   treat reversible contributory factors  Intervention: Prevent Skin Injury  Recent Flowsheet Documentation  Taken 5/7/2025 0118 by Sheldon Parson RN  Body Position: position changed independently  Intervention: Prevent and Manage VTE (Venous Thromboembolism) Risk  Recent Flowsheet Documentation  Taken 5/7/2025 0118 by Sheldon Parson RN  VTE Prevention/Management: SCDs off (sequential compression devices)  Intervention: Prevent Infection  Recent Flowsheet Documentation  Taken 5/7/2025 0118 by Sheldon Parson RN  Infection Prevention:   cohorting utilized   hand hygiene promoted   rest/sleep promoted   single patient room provided  Goal: Optimal Comfort and Wellbeing  Outcome: Progressing  Goal: Readiness for Transition of Care  Outcome: Progressing     Problem: Comorbidity Management  Goal: Maintenance of Asthma Control  Outcome: Progressing  Intervention: Maintain Asthma Symptom Control  Recent Flowsheet Documentation  Taken 5/7/2025 0118 by Shelodn Parson RN  Medication Review/Management: medications reviewed     Goal Outcome Evaluation:      Plan of Care Reviewed With: patient    Overall Patient Progress: no changeOverall Patient Progress: no change    Outcome Evaluation: O2 support continued. PIV saline locked. Denies pain. VSS. On 1L NC

## 2025-05-07 NOTE — PLAN OF CARE
"Goal Outcome Evaluation:           Overall Patient Progress: improvingOverall Patient Progress: improving    Outcome Evaluation: A/Ox4 On 1 Lpm NC. Denies pain.  Up to shower today and up in chair most of day. SW/care management following    Pt is alert and orientated, some intermittent confusion. On 1 Lpm NC VSS. Up with assist of 1 walker to bathroom. Voidng via purewick overnight.  Pt has redness under both breasts and pannus, Cream applied., see WOC notes/instructions. Pt had TBI 2002.  120 Lovenox daily.  Here for asthma exacerbation. On regular diet, tolerating fine. Denies Pain.      Problem: Adult Inpatient Plan of Care  Goal: Plan of Care Review  Description: The Plan of Care Review/Shift note should be completed every shift.  The Outcome Evaluation is a brief statement about your assessment that the patient is improving, declining, or no change.  This information will be displayed automatically on your shiftnote.  Outcome: Progressing  Flowsheets (Taken 5/6/2025 4143)  Outcome Evaluation: A/Ox4 On 1 Lpm NC. Denies pain.  Up to shower today and up in chair most of day. SW/care management following  Overall Patient Progress: improving  Goal: Patient-Specific Goal (Individualized)  Description: You can add care plan individualizations to a care plan. Examples of Individualization might be:  \"Parent requests to be called daily at 9am for status\", \"I have a hard time hearing out of my right ear\", or \"Do not touch me to wake me up as it startlesme\".  Outcome: Progressing  Goal: Absence of Hospital-Acquired Illness or Injury  Outcome: Progressing  Intervention: Identify and Manage Fall Risk  Recent Flowsheet Documentation  Taken 5/6/2025 1635 by Joe Hickman, RN  Safety Promotion/Fall Prevention:   activity supervised   assistive device/personal items within reach   clutter free environment maintained   lighting adjusted   mobility aid in reach   nonskid shoes/slippers when out of bed   room near nurse's " station   safety round/check completed   supervised activity  Intervention: Prevent Skin Injury  Recent Flowsheet Documentation  Taken 5/6/2025 1635 by Joe Hickman, RN  Body Position: position changed independently  Intervention: Prevent and Manage VTE (Venous Thromboembolism) Risk  Recent Flowsheet Documentation  Taken 5/6/2025 1635 by Joe Hickman, RN  VTE Prevention/Management: SCDs off (sequential compression devices)  Intervention: Prevent Infection  Recent Flowsheet Documentation  Taken 5/6/2025 1635 by Joe Hickman, RN  Infection Prevention: hand hygiene promoted  Goal: Optimal Comfort and Wellbeing  Outcome: Progressing  Goal: Readiness for Transition of Care  Outcome: Progressing     Problem: Comorbidity Management  Goal: Maintenance of Asthma Control  Outcome: Progressing  Intervention: Maintain Asthma Symptom Control  Recent Flowsheet Documentation  Taken 5/6/2025 1635 by Joe Hickman, RN  Medication Review/Management: medications reviewed

## 2025-05-07 NOTE — PROGRESS NOTES
05/07/25 0700   Appointment Info   Signing Clinician's Name / Credentials (PT) Susan Duron, PT   Living Environment   People in Home spouse   Current Living Arrangements apartment   Home Accessibility no concerns   Transportation Anticipated family or friend will provide   Living Environment Comments Pt lives on 4th floor apt with  with elevator access.   Pt moving to new apt on 1st floor in 1 month.   drives.   Self-Care   Usual Activity Tolerance moderate   Current Activity Tolerance fair   Regular Exercise No   Equipment Currently Used at Home walker, rolling;shower chair;wheelchair, power;commode chair   Fall history within last six months yes   Number of times patient has fallen within last six months 1   Activity/Exercise/Self-Care Comment Patient reports using her 4WW in the apt and electric WC in the community.  Pt reports being ind with all ADLs,  does the cooking, cleaning and laundry.  Pt has a nurse that comes 2x/week from [employer].  Pt reports she can get the agency to send someone to assist her with showering. Pt states that she sleeps in a lift chair.   works 3 days per week for 8 hour shifts.  Pt reports spending her days watching TV and coloring in her lift chair. Pt has commode next to lift chair or walks to the bathroom.  Pt reports using 2- 2.5L O2 at night   General Information   Onset of Illness/Injury or Date of Surgery 05/05/25   Referring Physician Dr. Macario Ballard   Patient/Family Therapy Goals Statement (PT) Pt agreeable to HHPT and HHA, doesn't feel she needs OT   Pertinent History of Current Problem (include personal factors and/or comorbidities that impact the POC) Per medical chart: 59 year old female with a history of  htn, seizure d/o, cushing syndrome, depression, borderline developmental delay, dx'd with left posterior tibial dvt at most recent hospitalization ~ 1 month ago and started on warfarin, obesity with central hypoventilation syndrome,  asthma, chronic hypoxic respiratory failure on 2.5 L of O2 admitted on 5/5/2025 with acute asthma exacerbation complicated by acute on chronic hypoxic respiratory failure   Existing Precautions/Restrictions fall;oxygen therapy device and L/min   General Observations Pt greeted in supine on RA.  Seated /92, HR 84 bpm.   Cognition   Orientation Status (Cognition) oriented to;person;place;time   Follows Commands (Cognition) follows one-step commands;75-90% accuracy   Safety Deficit (Cognition) impulsivity   Cognitive Status Comments pt off on date by one day.   Pain Assessment   Patient Currently in Pain No   Posture    Posture Forward head position;Protracted shoulders   Range of Motion (ROM)   ROM Comment LEs WFL   Strength (Manual Muscle Testing)   Strength (Manual Muscle Testing) Deficits observed during functional mobility   Bed Mobility   Comment, (Bed Mobility) rolling B independently with bedrails   Transfers   Comment, (Transfers) sit <> stand CGA from bed, using momentum   Gait/Stairs (Locomotion)   Comment, (Gait/Stairs) Pt amb 10' with FWW and CGA on RA - pt desaturated to 88%.   Balance   Balance Comments Impaired dynamic standing balance requiring B UE support on FWW and CGA for safety due to pt reports of having bad knees and potential to buckle   Sensory Examination   Sensory Perception patient reports no sensory changes   Coordination   Coordination no deficits were identified   Muscle Tone   Muscle Tone no deficits were identified   Clinical Impression   Criteria for Skilled Therapeutic Intervention Yes, treatment indicated   PT Diagnosis (PT) impaired functional mobility   Influenced by the following impairments decreased activity tolerance, impaired balance, decreased strength   Functional limitations due to impairments impaired gait, unable to amb functional household distances, increased falls risk, need for supplemental O2 with mobility   Clinical Presentation (PT Evaluation Complexity)  evolving   Clinical Presentation Rationale clinical judgement   Clinical Decision Making (Complexity) low complexity   Planned Therapy Interventions (PT) balance training;gait training;home exercise program;patient/family education;ROM (range of motion);strengthening;transfer training;progressive activity/exercise;risk factor education;home program guidelines   Risk & Benefits of therapy have been explained evaluation/treatment results reviewed;care plan/treatment goals reviewed;risks/benefits reviewed;current/potential barriers reviewed;participants voiced agreement with care plan;participants included;patient   PT Total Evaluation Time   PT Eval, Low Complexity Minutes (89125) 10   Physical Therapy Goals   PT Frequency Daily   PT Predicted Duration/Target Date for Goal Attainment 05/08/25   PT Goals Transfers;Gait;Aerobic Activity   PT: Transfers Modified independent;Sit to/from stand;Assistive device   PT: Gait Rolling walker;50 feet;Supervision/stand-by assist   PT: Perform aerobic activity with stable cardiovascular response continuous activity;5 minutes;intermittent activity;10 minutes   PT Discharge Planning   PT Plan Repeated STS, seated LE HEP, progress gait distance with WC follow - trial on RA and monitor O2   PT Discharge Recommendation (DC Rec) home with assist;home with home care physical therapy   PT Rationale for DC Rec Pt below baseline with  mobility.  Pt ambulates short distances with a 4WW and uses an electric WC outside of her apt.  Pt currently only tolerating amb for 12' on 1L O2.  Recommend pt return home with assist of  for ADLs and IADLs as needed with HHPT and HHA for showering to increase strength, activity tolerance and progress independence with gait.  Recommend pt use commode initially until ambulation endurance increases.   PT Brief overview of current status SBA with gait to bathroom with FWW on 1L O2   PT Total Distance Amb During Session (feet) 34   Physical Therapy Time and  Intention   Total Session Time (sum of timed and untimed services) 10

## 2025-05-07 NOTE — PLAN OF CARE
"End of Shift Summary  For vital signs and complete assessments, please see documentation flowsheets.  [id number]  Pertinent assessments: Pt lethargic during shift. On 1L NC. Afebrile. VSS. Denies pain & nausea. PIV saline locked. External cath in place overnight with adequate output. All other system assessments unremarkable.     Major Shift Events: PT consult, up to chair.     Treatment Plan: Symptom management. O2 support. Nebs. Wound care. Lovenox. Discharge hopeful  for tomorrow.     Bedside Nurse: con mills RN     Goal Outcome Evaluation:      Problem: Adult Inpatient Plan of Care  Goal: Plan of Care Review  Description: The Plan of Care Review/Shift note should be completed every shift.  The Outcome Evaluation is a brief statement about your assessment that the patient is improving, declining, or no change.  This information will be displayed automatically on your shiftnote.  Outcome: Progressing  Flowsheets (Taken 5/7/2025 6590)  Outcome Evaluation: weaning o2 as able, denies pain, up to chair today VSS, personal care needs met.  Plan of Care Reviewed With: patient  Overall Patient Progress: improving  Goal: Patient-Specific Goal (Individualized)  Description: You can add care plan individualizations to a care plan. Examples of Individualization might be:  \"Parent requests to be called daily at 9am for status\", \"I have a hard time hearing out of my right ear\", or \"Do not touch me to wake me up as it startlesme\".  Outcome: Progressing  Goal: Absence of Hospital-Acquired Illness or Injury  Outcome: Progressing  Intervention: Identify and Manage Fall Risk  Recent Flowsheet Documentation  Taken 5/7/2025 4354 by Con Mills, RN  Safety Promotion/Fall Prevention:   activity supervised   assistive device/personal items within reach   clutter free environment maintained   lighting adjusted   mobility aid in reach   nonskid shoes/slippers when out of bed   room near nurse's station   safety round/check " completed   supervised activity   increased rounding and observation   patient and family education   room organization consistent   treat reversible contributory factors  Intervention: Prevent Skin Injury  Recent Flowsheet Documentation  Taken 5/7/2025 0746 by Con Mills RN  Body Position: position changed independently  Intervention: Prevent and Manage VTE (Venous Thromboembolism) Risk  Recent Flowsheet Documentation  Taken 5/7/2025 0746 by Con Mills RN  VTE Prevention/Management: SCDs off (sequential compression devices)  Intervention: Prevent Infection  Recent Flowsheet Documentation  Taken 5/7/2025 0746 by Con Mills RN  Infection Prevention:   cohorting utilized   hand hygiene promoted   rest/sleep promoted   single patient room provided  Goal: Optimal Comfort and Wellbeing  Outcome: Progressing  Goal: Readiness for Transition of Care  Outcome: Progressing     Problem: Comorbidity Management  Goal: Maintenance of Asthma Control  Outcome: Progressing  Intervention: Maintain Asthma Symptom Control  Recent Flowsheet Documentation  Taken 5/7/2025 0746 by Con Mills RN  Medication Review/Management: medications reviewed     Problem: Gas Exchange Impaired  Goal: Optimal Gas Exchange  Outcome: Progressing  Intervention: Optimize Oxygenation and Ventilation  Recent Flowsheet Documentation  Taken 5/7/2025 0746 by Con Mills RN  Head of Bed (HOB) Positioning: HOB at 20-30 degrees         Plan of Care Reviewed With: patient    Overall Patient Progress: improvingOverall Patient Progress: improving    Outcome Evaluation: weaning o2 as able, denies pain, up to chair today VSS, personal care needs met.

## 2025-05-07 NOTE — PROGRESS NOTES
[facility]    PROGRESS NOTE - Hospitalist Service    ASSESSMENT AND PLAN     Principal Problem:    Acute on chronic respiratory failure, unspecified whether with hypoxia or hypercapnia (H)  Active Problems:    Exacerbation of asthma, unspecified asthma severity, unspecified whether persistent    Supratherapeutic INR    Subtherapeutic international normalized ratio (INR)    Brissa Corado is a 59 year old female with a history of htn, seizure d/o, cushing syndrome, depression, borderline developmental delay, dx'd with left posterior tibial dvt at most recent hospitalization ~ 1 month ago and started on warfarin, obesity with central hypoventilation syndrome, asthma, chronic hypoxic respiratory failure on 2.5 L of O2 admitted on 5/5/2025 with acute asthma exacerbation complicated by acute on chronic hypoxic respiratory failure and obesity hypoventilation.      She has frequent hospitalizations for same and this is her 6th hospitalization this year.       Asthma exacerbation, acute on chronic resp failure:  -Duonebs, Prednisone 60 mg daily- taper   -Breo Elipta/albuterol meds - need new prescription at discharge   - PT recommending home with home care  -Chronic O2 needed at night 2.5 L.  Occasional O2 need during the day      LLE DVT:  -Possible DVT noted last month.   No PE noted on CT chest.  Rechecked venous US and no DVT seen though exam challenging.  She was on recent bridging lovenox and is agreeable for it again.  Continue therapeutic lovenox and warfarin .  Was therapeutic last week but sometime since INR dropped all the way down to 1.  Ongoing concern with compliance but she is willing to take now. Not on DOAC due to tegretol interaction concern by report.  -Lovenox, needs bridging Rx at discharge  -Warfarin, pharmacy assisting in dosing     Obesity/central hypoventilation syndrome:  -Not on baseline CPAP, not excited to use.     -Obesity complicates cares  -Bariatric bed  -Long term  weight loss encouraged     HTN:  -Lisinopril, BP controlled     Seizure disorder:  -PTA tegretol, total level around 10.  Appears to be taking as stated.     Pressure wound present on admission:  -Wound care consult     Home compliance concerns, 6 admissions in recent past:  Plan for patient to discharge home with significant other     Barriers to discharge: clinical improvement     Anticipated length of stay: one more day     Medically Ready for Discharge: Anticipated Tomorrow    Clinically Significant Risk Factors Present on Admission                # Drug Induced Coagulation Defect: home medication list includes an anticoagulant medication    # Hypertension: Noted on problem list               # Asthma: noted on problem list            Subjective:  Patient resting comfortably in bed.  Notes her breathing is improved.  Ready for discharge tomorrow back with her significant other.  Declining assisted living.    PHYSICAL EXAM  Temp:  [96.5  F (35.8  C)-98.5  F (36.9  C)] 97  F (36.1  C)  Pulse:  [70-93] 81  Resp:  [18-20] 18  BP: (106-126)/(49-66) 124/56  SpO2:  [90 %-94 %] 91 %  Wt Readings from Last 1 Encounters:   05/05/25 (!) 174.2 kg (384 lb 0.7 oz)       Intake/Output Summary (Last 24 hours) at 5/7/2025 1308  Last data filed at 5/7/2025 0616  Gross per 24 hour   Intake --   Output 1250 ml   Net -1250 ml      Body mass index is 60.15 kg/m .    GENRL: Alert and answering questions appropriately. Not in acute distress. Sitting in bed   CHEST: Breathing easily, no wheezes   HEART: Regular rate   ABDMN: Obese  NEURO: No focal neurological deficit. No involuntary movements. Normal mentation  PSYCH: Normal affect and mood.   INTGM: No skin rash      Medical Decision Making       55 MINUTES SPENT BY ME on the date of service doing chart review, history, exam, documentation & further activities per the note.      PERTINENT LABS/IMAGING:  Results for orders placed or performed during the hospital encounter of 05/05/25    XR Chest Port 1 View    Impression    IMPRESSION: Negative chest.   CT Chest Pulmonary Embolism w Contrast    Impression    IMPRESSION:  1.  No pulmonary embolism or other acute abnormality identified.  2.  Old T3 vertebral compression deformity.     US Lower Extremity Venous Duplex Left    Impression    IMPRESSION:  1.  Markedly limited evaluation due to patient's body habitus.  2.  No definite deep venous thrombosis in the left lower extremity.       Most Recent 3 CBC's:  Recent Labs   Lab Test 05/05/25  0347 04/04/25  0900 04/01/25  1625 03/15/25  0608 03/12/25  1415   WBC 8.5  --  7.7  --  7.3   HGB 12.6  --  12.0  --  12.6   MCV 89  --  91  --  91    218 223   < > 191    < > = values in this interval not displayed.     Most Recent 3 BMP's:  Recent Labs   Lab Test 05/06/25  0659 05/05/25  0347 04/04/25  0900 04/03/25  0749    139  --  141   POTASSIUM 4.1 4.4  --  4.4   CHLORIDE 101 101  --  104   CO2 26 27  --  26   BUN 14.7 15.4  --  16.3   CR 0.57 0.68 0.63 0.56   ANIONGAP 9 11  --  11   CESAR 8.7* 8.9  --  8.8   * 119*  --  107*     Most Recent 2 LFT's:  Recent Labs   Lab Test 07/02/24  0717 07/01/24  0015   AST 36 24   ALT 39 29   ALKPHOS 81 106   BILITOTAL 0.3 <0.2       Recent Labs   Lab Test 06/24/24  0408   CHOL 195   HDL 52   *   TRIG 147     Recent Labs   Lab Test 06/24/24  0408 09/14/20  1318 07/24/19  1654   * 130* 131*     Recent Labs   Lab Test 05/06/25  0659      POTASSIUM 4.1   CHLORIDE 101   CO2 26   *   BUN 14.7   CR 0.57   GFRESTIMATED >90   CESAR 8.7*     Recent Labs   Lab Test 06/09/21  1204 04/16/21  0504 04/16/21  0000   A1C 4.7 5.7* 5.7*     Recent Labs   Lab Test 05/05/25  0347 04/01/25  1625 03/12/25  1415   HGB 12.6 12.0 12.6     Recent Labs   Lab Test 04/15/21  2220   TROPONINI 0.01     Recent Labs   Lab Test 04/01/25  1625 06/24/24  0408 05/02/24  0655   NTBNPI <36 <36 <36     Recent Labs   Lab Test 08/28/18  1732   TSH 2.00     Recent  Labs   Lab Test 05/07/25  0829 05/06/25  0659 05/05/25  0847   INR 1.23* 1.11 1.04       Ramya Chahal DO  Hospitalist Service  [facility]

## 2025-05-07 NOTE — PROGRESS NOTES
05/07/25 0700   Appointment Info   Signing Clinician's Name / Credentials (PT) Susan Duron, PT   Living Environment   People in Home spouse   Current Living Arrangements apartment   Home Accessibility no concerns   Transportation Anticipated family or friend will provide   Living Environment Comments Pt lives on 4th floor apt with  with elevator access.   Pt moving to new apt on 1st floor in 1 month.   drives.   Self-Care   Usual Activity Tolerance moderate   Current Activity Tolerance fair   Equipment Currently Used at Home walker, rolling;shower chair;wheelchair, power;commode chair   Fall history within last six months yes   Number of times patient has fallen within last six months 1   Activity/Exercise/Self-Care Comment Patient reports using her 4WW in the apt and electric WC in the community.  Pt reports being ind with all ADLs,  does the cooking, cleaning and laundry.  Pt has a nurse that comes 2x/week from [employer].  Pt reports she can get the agency to send someone to assist her with showering. Pt states that she sleeps in a lift chair.   works 3 days per week for 8 hour shifts.  Pt reports spending her days watching TV and coloring in her lift chair. Pt has commode next to lift chair or walks to the bathroom.  Pt reports using 2- 2.5L O2 at night   General Information   Onset of Illness/Injury or Date of Surgery 05/05/25   Referring Physician Dr. Macario Ballard   Pertinent History of Current Problem (include personal factors and/or comorbidities that impact the POC) Per medical chart: 59 year old female with a history of  htn, seizure d/o, cushing syndrome, depression, borderline developmental delay, dx'd with left posterior tibial dvt at most recent hospitalization ~ 1 month ago and started on warfarin, obesity with central hypoventilation syndrome, asthma, chronic hypoxic respiratory failure on 2.5 L of O2 admitted on 5/5/2025 with acute asthma exacerbation complicated by  acute on chronic hypoxic respiratory failure   Existing Precautions/Restrictions fall;oxygen therapy device and L/min   General Observations Pt greeted in supine on RA.  Seated /92, HR 84 bpm.   Cognition   Orientation Status (Cognition) oriented to;person;place;time   Cognitive Status Comments pt off on date by one day.   Pain Assessment   Patient Currently in Pain No   Posture    Posture Forward head position;Protracted shoulders   Strength (Manual Muscle Testing)   Strength (Manual Muscle Testing) Deficits observed during functional mobility   Bed Mobility   Comment, (Bed Mobility) rolling B independently with bedrails   Transfers   Comment, (Transfers) sit <> stand CGA from bed, using momentum   Gait/Stairs (Locomotion)   Comment, (Gait/Stairs) Pt amb 10' with FWW and CGA on RA - pt desaturated to 88%.   Balance   Balance Comments Impaired dynamic standing balance requiring B UE support on FWW and CGA for safety due to pt reports of having bad knees and potential to buckle   Sensory Examination   Sensory Perception patient reports no sensory changes   Coordination   Coordination no deficits were identified   Muscle Tone   Muscle Tone no deficits were identified   Clinical Impression   Criteria for Skilled Therapeutic Intervention Yes, treatment indicated   PT Diagnosis (PT) impaired functional mobility   Influenced by the following impairments decreased activity tolerance, impaired balance, decreased strength   Functional limitations due to impairments impaired gait, unable to amb functional household distances, increased falls risk, need for supplemental O2 with mobility   Clinical Presentation (PT Evaluation Complexity) evolving   Clinical Presentation Rationale clinical judgement   Clinical Decision Making (Complexity) low complexity   Planned Therapy Interventions (PT) balance training;gait training;home exercise program;patient/family education;ROM (range of motion);strengthening;transfer  training;progressive activity/exercise;risk factor education;home program guidelines   PT Total Evaluation Time   PT Eval, Low Complexity Minutes (28291) 10   Physical Therapy Goals   PT Frequency Daily   PT Predicted Duration/Target Date for Goal Attainment 05/08/25   PT Goals Transfers;Gait;Aerobic Activity   PT: Transfers Modified independent;Sit to/from stand;Assistive device   PT: Gait Rolling walker;50 feet;Supervision/stand-by assist   PT: Perform aerobic activity with stable cardiovascular response continuous activity;5 minutes;intermittent activity;10 minutes   PT Discharge Planning   PT Plan Repeated STS, seated LE HEP, progress gait distance with WC follow - trial on RA and monitor O2   PT Discharge Recommendation (DC Rec) home with assist;home with home care physical therapy   PT Rationale for DC Rec Pt below baseline with  mobility.  Pt ambulates short distances with a 4WW and uses an electric WC outside of her apt.  Pt currently only tolerating amb for 12' on 1L O2.  Recommend pt return home with assist of  for ADLs and IADLs as needed with HHPT and HHA for showering to increase strength, activity tolerance and progress independence with gait.  Recommend pt use commode initially until ambulation endurance increases.   PT Brief overview of current status SBA with gait to bathroom with FWW on 1L O2   PT Total Distance Amb During Session (feet) 34

## 2025-05-07 NOTE — PROGRESS NOTES
Care Management Follow Up    Length of Stay (days): 1    Expected Discharge Date: 05/08/2025     Concerns to be Addressed: discharge planning     Patient plan of care discussed at interdisciplinary rounds: Yes    Anticipated Discharge Disposition:  Home      Anticipated Discharge Services:  Home Care  Anticipated Discharge DME:  None    Patient/family educated on Medicare website which has current facility and service quality ratings:  N/A  Education Provided on the Discharge Plan:  yes  Patient/Family in Agreement with the Plan:  yes    Referrals Placed by CM/SW:  None  Private pay costs discussed: Not applicable    Discussed  Partnership in Safe Discharge Planning  document with patient/family: No     Handoff Completed: No, handoff not indicated or clinically appropriate    Additional Information:  Pt is open to [facility] RN HC services. PT recommends HC PT at discharge. Sw will contact [facility] at time of discharge to let them know the pt will need to resume RN services and add PT services.    Next Steps:   Sw will continue with discharge planning and will be available as needed until discharge.      AISSATOU Macdonald, SW  Inpatient Care Coordination  [facility]  [phone]

## 2025-05-08 ENCOUNTER — MEDICAL CORRESPONDENCE (OUTPATIENT)
Dept: HEALTH INFORMATION MANAGEMENT | Facility: CLINIC | Age: 60
End: 2025-05-08

## 2025-05-08 ENCOUNTER — ANTICOAGULATION THERAPY VISIT (OUTPATIENT)
Dept: ANTICOAGULATION | Facility: CLINIC | Age: 60
End: 2025-05-08
Payer: MEDICARE

## 2025-05-08 VITALS
HEART RATE: 71 BPM | SYSTOLIC BLOOD PRESSURE: 126 MMHG | WEIGHT: 293 LBS | RESPIRATION RATE: 20 BRPM | DIASTOLIC BLOOD PRESSURE: 71 MMHG | OXYGEN SATURATION: 94 % | TEMPERATURE: 98.2 F | BODY MASS INDEX: 60.15 KG/M2

## 2025-05-08 DIAGNOSIS — I82.442 ACUTE DEEP VEIN THROMBOSIS (DVT) OF TIBIAL VEIN OF LEFT LOWER EXTREMITY (H): Primary | ICD-10-CM

## 2025-05-08 LAB
CREAT SERPL-MCNC: 0.65 MG/DL (ref 0.51–0.95)
EGFRCR SERPLBLD CKD-EPI 2021: >90 ML/MIN/1.73M2
INR PPP: 1.41 (ref 0.85–1.15)
PLATELET # BLD AUTO: 261 10E3/UL (ref 150–450)
PROTHROMBIN TIME: 17.2 SECONDS (ref 11.8–14.8)

## 2025-05-08 PROCEDURE — 85049 AUTOMATED PLATELET COUNT: CPT | Performed by: EMERGENCY MEDICINE

## 2025-05-08 PROCEDURE — 999N000157 HC STATISTIC RCP TIME EA 10 MIN

## 2025-05-08 PROCEDURE — 250N000013 HC RX MED GY IP 250 OP 250 PS 637: Performed by: INTERNAL MEDICINE

## 2025-05-08 PROCEDURE — 250N000012 HC RX MED GY IP 250 OP 636 PS 637: Performed by: INTERNAL MEDICINE

## 2025-05-08 PROCEDURE — 36415 COLL VENOUS BLD VENIPUNCTURE: CPT | Performed by: EMERGENCY MEDICINE

## 2025-05-08 PROCEDURE — 250N000011 HC RX IP 250 OP 636: Performed by: INTERNAL MEDICINE

## 2025-05-08 PROCEDURE — 999N000111 HC STATISTIC OT IP EVAL DEFER: Performed by: REHABILITATION PRACTITIONER

## 2025-05-08 PROCEDURE — 250N000009 HC RX 250: Performed by: INTERNAL MEDICINE

## 2025-05-08 PROCEDURE — 85610 PROTHROMBIN TIME: CPT | Performed by: INTERNAL MEDICINE

## 2025-05-08 PROCEDURE — 94640 AIRWAY INHALATION TREATMENT: CPT

## 2025-05-08 PROCEDURE — 82565 ASSAY OF CREATININE: CPT | Performed by: EMERGENCY MEDICINE

## 2025-05-08 PROCEDURE — 99239 HOSP IP/OBS DSCHRG MGMT >30: CPT | Performed by: INTERNAL MEDICINE

## 2025-05-08 RX ORDER — ALBUTEROL SULFATE 0.83 MG/ML
2.5 SOLUTION RESPIRATORY (INHALATION) EVERY 4 HOURS PRN
Qty: 75 ML | Refills: 0 | Status: SHIPPED | OUTPATIENT
Start: 2025-05-08

## 2025-05-08 RX ORDER — ALBUTEROL SULFATE 90 UG/1
2 INHALANT RESPIRATORY (INHALATION) EVERY 6 HOURS PRN
Qty: 18 G | Refills: 0 | Status: SHIPPED | OUTPATIENT
Start: 2025-05-08

## 2025-05-08 RX ORDER — PREDNISONE 10 MG/1
TABLET ORAL
Qty: 26 TABLET | Refills: 0 | Status: SHIPPED | OUTPATIENT
Start: 2025-05-08

## 2025-05-08 RX ORDER — IPRATROPIUM BROMIDE AND ALBUTEROL SULFATE 2.5; .5 MG/3ML; MG/3ML
1 SOLUTION RESPIRATORY (INHALATION) EVERY 4 HOURS PRN
Qty: 90 ML | Refills: 2 | Status: SHIPPED | OUTPATIENT
Start: 2025-05-08

## 2025-05-08 RX ORDER — ENOXAPARIN SODIUM 150 MG/ML
120 INJECTION SUBCUTANEOUS EVERY 12 HOURS
Qty: 16 ML | Refills: 0 | Status: SHIPPED | OUTPATIENT
Start: 2025-05-08 | End: 2025-05-18

## 2025-05-08 RX ADMIN — ENOXAPARIN SODIUM 120 MG: 120 INJECTION SUBCUTANEOUS at 09:35

## 2025-05-08 RX ADMIN — PANTOPRAZOLE SODIUM 40 MG: 40 TABLET, DELAYED RELEASE ORAL at 09:32

## 2025-05-08 RX ADMIN — MICONAZOLE NITRATE: 20 POWDER TOPICAL at 09:33

## 2025-05-08 RX ADMIN — FLUTICASONE FUROATE AND VILANTEROL TRIFENATATE 1 PUFF: 100; 25 POWDER RESPIRATORY (INHALATION) at 07:24

## 2025-05-08 RX ADMIN — PREDNISONE 40 MG: 20 TABLET ORAL at 09:32

## 2025-05-08 RX ADMIN — POLYETHYLENE GLYCOL 3350 17 G: 17 POWDER, FOR SOLUTION ORAL at 09:31

## 2025-05-08 RX ADMIN — LISINOPRIL 20 MG: 20 TABLET ORAL at 09:32

## 2025-05-08 RX ADMIN — IPRATROPIUM BROMIDE AND ALBUTEROL SULFATE 3 ML: .5; 3 SOLUTION RESPIRATORY (INHALATION) at 07:23

## 2025-05-08 RX ADMIN — CARBAMAZEPINE 400 MG: 200 TABLET ORAL at 09:32

## 2025-05-08 RX ADMIN — SERTRALINE HYDROCHLORIDE 100 MG: 100 TABLET ORAL at 09:32

## 2025-05-08 ASSESSMENT — ACTIVITIES OF DAILY LIVING (ADL)
ADLS_ACUITY_SCORE: 51

## 2025-05-08 NOTE — PROGRESS NOTES
ANTICOAGULATION MANAGEMENT     Brissa RAINEY [facility] 59 year old female is on warfarin with subtherapeutic INR result. (Goal INR 2.0-3.0)    Recent labs: (last 7 days)     05/08/25  0708   INR 1.41*       ASSESSMENT     Source(s): Chart Review and Patient/Caregiver Call     Warfarin doses taken: While hospitalized on 5/5/25 - 5/8/25: anticoagulation calendar updated with inpatient dosing.  Discharged on: home regimen continued  Diet: No new diet changes identified  Medication/supplement changes: discharge with lovenox 120 mg (0.8mL) subQ every 12 hours.    Prescribed prednisone 10 mg taper - Sig: Take 4 tabs by mouth daily x 2 days, then 3 tabs daily x 3 days, then 2 tab daily x 3 days, then 1 tab daily x 3 days. (May increase or decrease INR)  New illness, injury, or hospitalization: Yes: admission from 5/5/25 -5/8/25 for Acute asthma exacerbation  Signs or symptoms of bleeding or clotting: No  Previous result: Subtherapeutic while inpatient. INR was 2.1 on 4/29 then dropped down to 1.04 on 5/5/25 at time of admission.  Spoke with spouse who is unsure if patient had missed any doses, as far as he know she is taking her medications. Confirmed with patient, no missed doses.   Additional findings:     Report that home care called and said they will be coming Saturday at 10 am.    PLAN     Recommended plan for temporary change(s) affecting INR     Dosing Instructions: Increase your warfarin dose (12% change) Continue bridging with Enoxaparin with next INR in 4 days       Summary  As of 5/8/2025      Full warfarin instructions:  20 mg every day   Next INR check:  5/12/2025               Telephone call with [place] who agrees to plan and repeated back plan correctly - advised to ask home care to come back early next week for INR check when they come on Sat.     Advise to ask home care to come back early next week for INR when they come this Sat.     Education provided: Please call back if any changes to your diet,  medications or how you've been taking warfarin  Interaction IS anticipated between warfarin and prednisone  Symptom monitoring: monitoring for bleeding signs and symptoms and monitoring for clotting signs and symptoms    Plan made with [facility] Pharmacist Cailin Holly RN  5/8/2025  Anticoagulation Clinic  [facility] for routing messages: yadira ARITA  [facility] patient phone line: [phone]        _______________________________________________________________________     Anticoagulation Episode Summary       Current INR goal:  2.0-3.0   TTR:  22.1% (2.7 wk)   Target end date:  Indefinite   Send INR reminders to:  JAYLAN ARITA    Indications    Acute deep vein thrombosis (DVT) of tibial vein of left lower extremity (H) [I82.442]             Comments:  Return to [facility] when discharged from Home Care             Anticoagulation Care Providers       Provider Role Specialty Phone number    Telly Baum MD Referring Family Medicine [phone]    Aury Vizcaino MD Referring Internal Medicine [phone]    Ramya Chahal DO Referring Internal Medicine [phone]

## 2025-05-08 NOTE — PROGRESS NOTES
Discharge Note    Patient discharged to home via private vehicle  accompanied by significant other .  IV: Discontinued  Prescriptions printed and given to patient/family.   Belongings reviewed and sent with patient.   Home medications returned to patient: NA  Equipment sent with: patient, N/A.   patient and family verbalizes understanding of discharge instructions. AVS given to patient.  Additional education completed? How to administer Lovenox injection.

## 2025-05-08 NOTE — PHARMACY-ANTICOAGULATION SERVICE
Clinical Pharmacy- Warfarin Discharge Note  This patient is currently on warfarin for the treatment of DVT/PE treatment.  INR Goal= 2-3  Expected length of therapy undetermined.    Warfarin PTA Regimen: 15mg Sun, Tue, Fri and 20mg ROW      Anticoagulation Dose History  More data exists         Latest Ref Rng & Units 4/21/2025 4/24/2025 4/29/2025 5/5/2025 5/6/2025 5/7/2025 5/8/2025   Recent Dosing and Labs   warfarin ANTICOAGULANT (COUMADIN) 5 MG tablet - - - - 20 mg, $Given 20 mg, $Given - -   warfarin ANTICOAGULANT (COUMADIN) 7.5 MG tablet - - - - - - 15 mg, $Given -   INR 0.85 - 1.15 2.3  1.5  2.1  1.04  1.03  1.11  1.23  1.41       Details          Multiple values from one day are sorted in reverse-chronological order               Vitamin K doses administered during the last 7 days: No  FFP administered during the last 7 days: No  New drug-drug interaction: warfarin - prednisone taper (Increasing INR).  The patient is discharged on PTA warfarin regimen with Lovenox bridge.  The patient should have an INR checked in 2-3 days.

## 2025-05-08 NOTE — PLAN OF CARE
Patient presented with dizziness on lying down and also standing for the past 2 days  CT of the head and neck in the ED showed focal area of low density in the pounds which is nonspecific but consider acute versus chronic ischemia  Patient later underwent MRI of the brain which showed no evidence of acute infarction  Patient's orthostatic vital signs were negative  Patient will be monitored on telemetry to rule out any arrhythmias  Echo with bubble study showed mild concentric hypertrophy with normal ejection fraction, grade 1 diastolic dysfunction with normal bubble study without any evidence of right-to-left shunt  Hemoglobin A1c was 5 6    Total cholesterol 145 and LDL 87  Neurology was consulted and consult appreciated  Patient was initially aspirin and Lipitor which will be discontinued  Dizziness likely secondary to viral illness with some dehydration  Patient was given 2 liters normal saline bolus today Physical Therapy Discharge Summary    Reason for therapy discharge:    Discharged to home with home therapy.    Progress towards therapy goal(s). See goals on Care Plan in [facility] electronic health record for goal details.  Goals not met.  Barriers to achieving goals:   discharge from facility.    Therapy recommendation(s):    Continued therapy is recommended.  Rationale/Recommendations:  Home PT and assist of family to maximize return to PLOF.

## 2025-05-08 NOTE — PROGRESS NOTES
Care Management Discharge Note    Discharge Date: 05/08/2025       Discharge Disposition:  Home    Discharge Services:  Home Care    Discharge DME:  No new equipment needs    Discharge Transportation: family or friend will provide    Private pay costs discussed: Not applicable    Does the patient's insurance plan have a 3 day qualifying hospital stay waiver?  Yes     Which insurance plan 3 day waiver is available? [employer]    Will the waiver be used for post-acute placement? No    PAS Confirmation Code:  N/A  Patient/family educated on Medicare website which has current facility and service quality ratings:  N/A    Education Provided on the Discharge Plan:  yes  Persons Notified of Discharge Plans: Pt's parents/co-guardians  Patient/Family in Agreement with the Plan:  yes    Handoff Referral Completed: No, handoff not indicated or clinically appropriate    Additional Information:  The pt will discharge home today with her significant other Sudhir. She will [email or web address] and will be starting HC PT services with them as well. Sudhir will provide transport home for the pt.  Juliane spoke with the pt's dad/co-guardian Adam, to update him on the above discharge plan. He is aware and agreeable to the plan.    Juliane updated [facility] on the pt's discharge and faxed them the pt's discharge orders P: [phone] F: [phone].    Juliane will continue to be available as needed for discharge planning.    AISSATOU Macdonald, [facility]  Inpatient Care Coordination  [facility]  [phone]

## 2025-05-08 NOTE — DISCHARGE SUMMARY
[facility]  Hospitalist Discharge Summary      Date of Admission:  5/5/2025  Date of Discharge:  5/8/2025 11:28 AM  Discharging Provider: Ramya Chahal DO  Discharge Service: Hospitalist Service    Discharge Diagnoses   Acute on chronic hypoxic respiratory failure  Acute asthma exacerbation  Left lower extremity DVT  Obesity hypoventilation syndrome    Clinically Significant Risk Factors          Follow-ups Needed After Discharge   Follow-up Appointments       Follow Up      Follow up with pulmonology as planned        Hospital Follow-up with Existing Primary Care Provider (PCP)          Schedule Primary Care visit within: 7 Days               Unresulted Labs Ordered in the Past 30 Days of this Admission       No orders found from 4/5/2025 to 5/6/2025.            Discharge Disposition   Discharged to home  Condition at discharge: Stable    Hospital Course   Patient is a 59-year-old female with borderline developmental delay who presented with acute on chronic hypoxic respiratory failure secondary to asthma exacerbation.  She was treated with appropriate breathing treatments and steroids.  Discharged on prednisone taper and provided with nebulizer medications.  Stable at time of Discharge.    Consultations This Hospital Stay   PHARMACY TO DOSE WARFARIN  CARE MANAGEMENT / SOCIAL WORK IP CONSULT  WOUND OSTOMY CONTINENCE NURSE  IP CONSULT  CARE MANAGEMENT / SOCIAL WORK IP CONSULT  PHYSICAL THERAPY ADULT IP CONSULT  OCCUPATIONAL THERAPY ADULT IP CONSULT  PHYSICAL THERAPY ADULT IP CONSULT    Code Status   Full Code    Time Spent on this Encounter   I, Ramya Chahal DO, personally saw the patient today and spent greater than 30 minutes discharging this patient.       DO JAGDISH [place] [address] MEDICAL SURGICAL  [address]  [facility] [address]  Phone: [phone]  Fax:  [phone]  ______________________________________________________________________    Physical Exam   Vital Signs: Temp: 98.2  F (36.8  C) Temp src: Axillary BP: 126/71 Pulse: 71   Resp: 20 SpO2: 94 % O2 Device: Nasal cannula Oxygen Delivery: 1 LPM  Weight: 384 lbs .66 oz         Primary Care Physician   Aury Vizcaino    Discharge Orders      Home Care Referral      ANTICOAGULATION CLINIC REFERRAL      Reason for your hospital stay    Acute asthma exacerbation     Activity    Your activity upon discharge: activity as tolerated     Follow Up    Follow up with pulmonology as planned     Diet    Follow this diet upon discharge: Current Diet:Orders Placed This Encounter      Regular Diet Adult     Hospital Follow-up with Existing Primary Care Provider (PCP)            Significant Results and Procedures   Most Recent 3 CBC's:  Recent Labs   Lab Test 05/08/25  0708 05/05/25  0347 04/04/25  0900 04/01/25  1625 03/15/25  0608 03/12/25  1415   WBC  --  8.5  --  7.7  --  7.3   HGB  --  12.6  --  12.0  --  12.6   MCV  --  89  --  91  --  91    246 218 223   < > 191    < > = values in this interval not displayed.     Most Recent 3 BMP's:  Recent Labs   Lab Test 05/08/25  0708 05/06/25  0659 05/05/25  0347 04/04/25  0900 04/03/25  0749   NA  --  136 139  --  141   POTASSIUM  --  4.1 4.4  --  4.4   CHLORIDE  --  101 101  --  104   CO2  --  26 27  --  26   BUN  --  14.7 15.4  --  16.3   CR 0.65 0.57 0.68   < > 0.56   ANIONGAP  --  9 11  --  11   CESAR  --  8.7* 8.9  --  8.8   GLC  --  106* 119*  --  107*    < > = values in this interval not displayed.     Most Recent 2 LFT's:  Recent Labs   Lab Test 07/02/24  0717 07/01/24  0015   AST 36 24   ALT 39 29   ALKPHOS 81 106   BILITOTAL 0.3 <0.2   ,   Results for orders placed or performed during the hospital encounter of 05/05/25   XR Chest Port 1 View    Narrative    EXAM: XR CHEST PORT 1 VIEW  LOCATION: [facility]  DATE: 5/5/2025    INDICATION:  sob  COMPARISON: 4/1/2025.      Impression    IMPRESSION: Negative chest.   CT Chest Pulmonary Embolism w Contrast    Narrative    EXAM: CT CHEST PULMONARY EMBOLISM W CONTRAST  LOCATION: [facility]  DATE: 5/5/2025    INDICATION: sob, subtherapeutic inr  COMPARISON: None.  TECHNIQUE: CT chest pulmonary angiogram during arterial phase injection of IV contrast. Multiplanar reformats and MIP reconstructions were performed. Dose reduction techniques were used.   CONTRAST: Isovue 370    FINDINGS:  ANGIOGRAM CHEST: Pulmonary arteries are normal caliber and negative for pulmonary emboli. Thoracic aorta is negative for dissection. No CT evidence of right heart strain.    LUNGS AND PLEURA: No significant pulmonary, pleural, or airway lesion.    MEDIASTINUM/AXILLAE: No lymphadenopathy. Normal appearance of the thoracic esophagus. No pericardial effusion.    CORONARY ARTERY CALCIFICATION: None.    UPPER ABDOMEN: Cholecystectomy. Partially visualized inferior vena cava filter.    MUSCULOSKELETAL: Old moderately severe T3 vertebral compression deformity. 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 There is thickening and sclerosis across the posterior right fourth rib which in retrospect is unchanged   compared to prior chest x-ray from 2021 and may be posttraumatic. There are multiple rib fractures.      Impression    IMPRESSION:  1.  No pulmonary embolism or other acute abnormality identified.  2.  Old T3 vertebral compression deformity.     US Lower Extremity Venous Duplex Left    Narrative    EXAM: US LOWER EXTREMITY VENOUS DUPLEX LEFT  LOCATION: [facility]  DATE: 5/5/2025    INDICATION: Reassess abnormality felt possible DVT 1 month ago  COMPARISON: 4/2/2025  TECHNIQUE: Venous Duplex ultrasound of the left lower extremity with and without compression, augmentation and duplex. Color flow and spectral Doppler with waveform analysis performed.    FINDINGS:  Exam includes the common femoral, femoral, and popliteal veins as well as segmentally visualized deep calf veins and greater saphenous vein. Although, please note that evaluation is markedly limited due to patient's body habitus.    LEFT: No deep vein thrombosis. Visualized portions of the posterior tibial vein appear patent. The peroneal vein is not seen. No superficial thrombophlebitis. No popliteal cyst.      Impression    IMPRESSION:  1.  Markedly limited evaluation due to patient's body habitus.  2.  No definite deep venous thrombosis in the left lower extremity.         Discharge Medications   Discharge Medication List as of 5/8/2025 10:15 AM        START taking these medications    Details   enoxaparin ANTICOAGULANT (LOVENOX) 120 MG/0.8ML syringe Inject 0.8 mLs (120 mg) subcutaneously every 12 hours for 10 days., Disp-16 mL, R-0, E-Prescribe      predniSONE (DELTASONE) 10 MG tablet Take 4 tabs by mouth daily x 2 days, then 3 tabs daily x 3 days, then 2 tab daily x 3 days, then 1 tab daily x 3 days., Disp-26 tablet, R-0, E-Prescribe           CONTINUE these medications which have CHANGED    Details   albuterol (PROAIR HFA/PROVENTIL HFA/VENTOLIN HFA) 108 (90 Base) MCG/ACT inhaler Inhale 2 puffs into the lungs every 6 hours as needed for shortness of breath, wheezing or cough., Disp-18 g, R-0, E-PrescribePharmacy may dispense brand covered by insurance (Proair, or proventil or ventolin or generic albuterol inhaler)      albuterol (PROVENTIL) (2.5 MG/3ML) 0.083% neb solution Take 1 vial (2.5 mg) by nebulization every 4 hours as needed for shortness of breath, wheezing or cough., Disp-75 mL, R-0, E-Prescribe      ipratropium - albuterol 0.5 mg/2.5 mg/3 mL (DUONEB) 0.5-2.5 (3) MG/3ML neb solution Take 1 vial (3 mLs) by nebulization every 4 hours as needed for shortness of breath, wheezing or cough., Disp-90 mL, R-2, GORDON, E-Prescribe           CONTINUE these medications which have NOT CHANGED    Details   azelastine  (ASTELIN) 0.1 % nasal spray Spray 1 spray into both nostrils daily as needed for allergies., Historical      !! carBAMazepine (TEGRETOL) 200 MG tablet Take 400 mg by mouth 2 times daily. AM and HS, Historical      !! carBAMazepine (TEGRETOL) 200 MG tablet Take 200 mg by mouth daily. @1200, Historical      fluticasone (FLONASE) 50 MCG/ACT nasal spray USE ONE SPRAY INTO BOTH NOSTRILS DAILY AS NEEDED FOR RHINITIS OR ALLERGIES, Disp-16 mL, R-1, E-Prescribe      Fluticasone Furoate-Vilanterol (BREO ELLIPTA) 50-25 MCG/ACT AEPB Inhale 1 puff into the lungs 2 times daily., Disp-60 each, R-2, E-Prescribe      lisinopril (ZESTRIL) 20 MG tablet TAKE 1 TABLET (20 MG) BY MOUTH DAILY, Disp-90 tablet, R-2, E-Prescribe      lovastatin (MEVACOR) 40 MG tablet Take 40 mg by mouth daily., Historical      miconazole (MICATIN) 2 % external powder Apply topically as needed for itching or other.Historical      Multiple Vitamin (MULTI-VITAMIN PO) Take 1 tablet by mouth daily, Historical      nystatin (MYCOSTATIN) 116641 UNIT/GM external cream Apply topically daily as neededHistorical      omeprazole (PRILOSEC) 20 MG DR capsule Take 20 mg by mouth daily., Historical      sertraline (ZOLOFT) 100 MG tablet TAKE ONE TABLET BY MOUTH DAILY, Disp-90 tablet, R-1, E-Prescribe      warfarin ANTICOAGULANT (COUMADIN) 5 MG tablet Take 20 mg (5 mg x 4) every Mon; 15 mg (5 mg x 3) all other days or as directed., Disp-290 tablet, R-0, E-Prescribe       !! - Potential duplicate medications found. Please discuss with provider.        STOP taking these medications       ibuprofen (ADVIL/MOTRIN) 200 MG tablet Comments:   Reason for Stopping:             Allergies   Allergies   Allergen Reactions    Penicillins Anaphylaxis     PEN-FAST - Penicillin Allergy Risk Tool completed by [facility] December 20, 2024    Score: 3  Risk: Moderate  Assessment: Patient has a 20 % chance of a positive penicillin allergy test    Iodine      Iodinated Contrast Media  --- Hives       Tetracyclines & Related Unknown    Azithromycin Rash    Hydrochlorothiazide Palpitations     dehydration    Influenza Vac Split [Influenza Virus Vaccine] Rash     Patient states she is allergic to the vaccine.  Got a rash all over body many years ago after receiving injection.

## 2025-05-12 ENCOUNTER — ANTICOAGULATION THERAPY VISIT (OUTPATIENT)
Dept: ANTICOAGULATION | Facility: CLINIC | Age: 60
End: 2025-05-12
Payer: MEDICARE

## 2025-05-12 ENCOUNTER — TELEPHONE (OUTPATIENT)
Dept: INTERNAL MEDICINE | Facility: CLINIC | Age: 60
End: 2025-05-12
Payer: MEDICARE

## 2025-05-12 DIAGNOSIS — I82.442 ACUTE DEEP VEIN THROMBOSIS (DVT) OF TIBIAL VEIN OF LEFT LOWER EXTREMITY (H): Primary | ICD-10-CM

## 2025-05-12 LAB — INR (EXTERNAL): 1 (ref 0.9–1.1)

## 2025-05-12 NOTE — PROGRESS NOTES
ANTICOAGULATION MANAGEMENT     Brissa Corado 59 year old female is on warfarin with subtherapeutic INR result. (Goal INR 2.0-3.0)    Recent labs: (last 7 days)     05/12/25  1401   INR 1.0       ASSESSMENT     Source(s): Chart Review, Patient/Caregiver Call, and Home Care/Facility Nurse     Warfarin doses taken: Missed dose(s) may be affecting INR and patient notes that she was told not to take it.  Diet: No new diet changes identified, ensure drinking more   Medication/supplement changes: None noted, tapering of the prednisone right now.  New illness, injury, or hospitalization: Yes:  admission from 5/5/25 -5/8/25 for Acute asthma exacerbation   Signs or symptoms of bleeding or clotting: No  Previous result: Subtherapeutic  Additional findings: None  Patient notes someone told her to drink ensure to cancel the warfarin.  States that [facility] did not have warfarin on the front page so she did not take. Was on medication list.  Jennifer MITCHELL is going back in to talk with the patient and set up warfarin. She will advise to go back to normal amount of ensure.       PLAN     Recommended plan for temporary change(s) affecting INR     Dosing Instructions: booster dose then continue your current warfarin dose with next INR in 3 days       Summary  As of 5/12/2025      Full warfarin instructions:  5/12: 30 mg; Otherwise 20 mg every day   Next INR check:  5/15/2025               Telephone call with Jennifer MITCHELL home care nurse who agrees to plan and repeated back plan correctly    Orders given to  Homecare nurse/facility to recheck    Education provided: None required    Plan made per [facility] anticoagulation protocol    Sarah Ruiz RN  5/12/2025  Anticoagulation Clinic  [employer] for routing messages: yadira ARITA  [facility] patient phone line: [phone]        _______________________________________________________________________     Anticoagulation Episode Summary       Current INR goal:  2.0-3.0   TTR:  22.1% (2.7 wk)    Target end date:  Indefinite   Send INR reminders to:  JAYLAN ARITA    Indications    Acute deep vein thrombosis (DVT) of tibial vein of left lower extremity (H) [I82.442]             Comments:  Return to [facility] when discharged from Home Care             Anticoagulation Care Providers       Provider Role Specialty Phone number    Telly Baum MD Referring Family Medicine [phone]    Aury Vizcaino MD Referring Internal Medicine [phone]    Ramya Chahal DO Referring Internal Medicine [phone]

## 2025-05-13 ENCOUNTER — MEDICAL CORRESPONDENCE (OUTPATIENT)
Dept: HEALTH INFORMATION MANAGEMENT | Facility: CLINIC | Age: 60
End: 2025-05-13

## 2025-05-13 ENCOUNTER — TELEPHONE (OUTPATIENT)
Dept: INTERNAL MEDICINE | Facility: CLINIC | Age: 60
End: 2025-05-13

## 2025-05-13 NOTE — TELEPHONE ENCOUNTER
Home Care is calling regarding an established patient with [facility].  Requesting orders from: Aury Vizcaino  RN APPROVED: RN able to provide verbal orders.  Home Care will send orders for signature.  RN will close encounter.  Is this a request for a temporary pause in the home care episode?  No    Orders Requested    Physical Therapy  Request for continuation of care with no increase or decrease in frequency  Frequency: Once a week for 4 weeks  RN gave verbal order: Yes          Phone number Home Care can be reached at: [phone]  Okay to leave a detailed message?: Yes    Contacts       Contact Date/Time Type Contact Phone/Fax    05/13/2025 11:07 AM CDT Phone (Incoming) Hannah Beauchamp [phone]          Tamra Richard RN

## 2025-05-14 ENCOUNTER — MEDICAL CORRESPONDENCE (OUTPATIENT)
Dept: HEALTH INFORMATION MANAGEMENT | Facility: CLINIC | Age: 60
End: 2025-05-14

## 2025-05-14 ENCOUNTER — TELEPHONE (OUTPATIENT)
Dept: INTERNAL MEDICINE | Facility: CLINIC | Age: 60
End: 2025-05-14
Payer: MEDICARE

## 2025-05-15 ENCOUNTER — ANTICOAGULATION THERAPY VISIT (OUTPATIENT)
Dept: ANTICOAGULATION | Facility: CLINIC | Age: 60
End: 2025-05-15
Payer: MEDICARE

## 2025-05-15 ENCOUNTER — TELEPHONE (OUTPATIENT)
Dept: INTERNAL MEDICINE | Facility: CLINIC | Age: 60
End: 2025-05-15
Payer: MEDICARE

## 2025-05-15 DIAGNOSIS — I82.442 ACUTE DEEP VEIN THROMBOSIS (DVT) OF TIBIAL VEIN OF LEFT LOWER EXTREMITY (H): Primary | ICD-10-CM

## 2025-05-15 LAB — INR (EXTERNAL): 2 (ref 0.9–1.1)

## 2025-05-15 NOTE — PROGRESS NOTES
ANTICOAGULATION MANAGEMENT     Brissa Corado 59 year old female is on warfarin with therapeutic INR result. (Goal INR 2.0-3.0)    Recent labs: (last 7 days)     05/15/25  1212   INR 2.0*       ASSESSMENT     Source(s): Chart Review, Patient/Caregiver Call, and Home Care/Facility Nurse     Warfarin doses taken: Warfarin taken as instructed  Diet: protein shake once stewart  Medication/supplement changes: None noted  New illness, injury, or hospitalization: No  Signs or symptoms of bleeding or clotting: No  Previous result: Subtherapeutic  Additional findings: Bridging with Enoxaparin until INR >= 2.0 and rapid rise  Home care set up medication       PLAN     Recommended plan for no diet, medication or health factor changes affecting INR     Dosing Instructions: Continue your current warfarin dose Stop bridging with Enoxaparin with next INR in 4 days       Summary  As of 5/15/2025      Full warfarin instructions:  20 mg every day   Next INR check:  5/19/2025               Telephone call with [facility] home care nurse who agrees to plan and repeated back plan correctly    Orders given to  Homecare nurse/facility to recheck    Education provided: Please call back if any changes to your diet, medications or how you've been taking warfarin    Plan made with [facility] Pharmacist Svitlana Chua RN  5/15/2025  Anticoagulation Clinic  [facility] for routing messages: yadira ARITA  [facility] patient phone line: [phone]        _______________________________________________________________________     Anticoagulation Episode Summary       Current INR goal:  2.0-3.0   TTR:  19.2% (3.1 wk)   Target end date:  Indefinite   Send INR reminders to:  JAYLAN ARITA    Indications    Acute deep vein thrombosis (DVT) of tibial vein of left lower extremity (H) [I82.002]             Comments:  Return to [facility] when discharged from Home Care             Anticoagulation Care Providers       Provider Role  Specialty Phone number    Telly Baum MD Referring Family Medicine [phone]    Aury Vizcaino MD Referring Internal Medicine [phone]    Ramya Chahal DO Referring Internal Medicine [phone]

## 2025-05-16 ENCOUNTER — MEDICAL CORRESPONDENCE (OUTPATIENT)
Dept: HEALTH INFORMATION MANAGEMENT | Facility: CLINIC | Age: 60
End: 2025-05-16

## 2025-05-19 ENCOUNTER — ANTICOAGULATION THERAPY VISIT (OUTPATIENT)
Dept: ANTICOAGULATION | Facility: CLINIC | Age: 60
End: 2025-05-19
Payer: MEDICARE

## 2025-05-19 ENCOUNTER — TELEPHONE (OUTPATIENT)
Dept: INTERNAL MEDICINE | Facility: CLINIC | Age: 60
End: 2025-05-19
Payer: MEDICARE

## 2025-05-19 DIAGNOSIS — I82.442 ACUTE DEEP VEIN THROMBOSIS (DVT) OF TIBIAL VEIN OF LEFT LOWER EXTREMITY (H): Primary | ICD-10-CM

## 2025-05-19 LAB — INR (EXTERNAL): 2.5

## 2025-05-19 NOTE — TELEPHONE ENCOUNTER
5/19/25 physician order INR 2.5 on 5/19, warfarin 20mg daily, restart INR 5/28 form recieved via fax. Form in your mailbox for signature.

## 2025-05-19 NOTE — PROGRESS NOTES
ANTICOAGULATION MANAGEMENT     Brissa Corado 59 year old female is on warfarin with therapeutic INR result. (Goal INR 2.0-3.0)    Recent labs: (last 7 days)     05/19/25  0000   INR 2.5       ASSESSMENT     Source(s): Chart Review and Home Care/Facility Nurse     Warfarin doses taken: Warfarin taken as instructed  Diet: Increased greens/vitamin K in diet; plans to resume previous intake- supplementing more greens because she has been out of ensure.   Medication/supplement changes: Out ensure last two days.   New illness, injury, or hospitalization: No  Signs or symptoms of bleeding or clotting: No  Previous result: Therapeutic last visit; previously outside of goal range  Additional findings: None       PLAN     Recommended plan for temporary change(s) affecting INR     Dosing Instructions: Continue your current warfarin dose with next INR in 9 days       Summary  As of 5/19/2025      Full warfarin instructions:  20 mg every day   Next INR check:  5/28/2025               Telephone call with [facility] home care nurse who verbalizes understanding and agrees to plan and who agrees to plan and repeated back plan correctly    Orders given to  Homecare nurse/facility to recheck    Education provided: Please call back if any changes to your diet, medications or how you've been taking warfarin  Contact [phone] with any changes, questions or concerns.     Plan made per [facility] anticoagulation protocol    David Ulrich RN  5/19/2025  Anticoagulation Clinic  [employer] for routing messages: yadira ARITA  [facility] patient phone line: [phone]        _______________________________________________________________________     Anticoagulation Episode Summary       Current INR goal:  2.0-3.0   TTR:  30.2% (3.6 wk)   Target end date:  Indefinite   Send INR reminders to:  JAYLAN ARITA    Indications    Acute deep vein thrombosis (DVT) of tibial vein of left lower extremity (H) [I24.698]             Comments:  Return to  [facility] when discharged from Home Care             Anticoagulation Care Providers       Provider Role Specialty Phone number    Telly Baum MD Referring Family Medicine [phone]    Aury Vizcaino MD Referring Internal Medicine [phone]    Ramya Chahal DO Referring Internal Medicine [phone]

## 2025-05-20 ENCOUNTER — MEDICAL CORRESPONDENCE (OUTPATIENT)
Dept: HEALTH INFORMATION MANAGEMENT | Facility: CLINIC | Age: 60
End: 2025-05-20

## 2025-05-28 ENCOUNTER — ANTICOAGULATION THERAPY VISIT (OUTPATIENT)
Dept: ANTICOAGULATION | Facility: CLINIC | Age: 60
End: 2025-05-28
Payer: MEDICARE

## 2025-05-28 ENCOUNTER — TELEPHONE (OUTPATIENT)
Dept: INTERNAL MEDICINE | Facility: CLINIC | Age: 60
End: 2025-05-28
Payer: MEDICARE

## 2025-05-28 DIAGNOSIS — I82.442 ACUTE DEEP VEIN THROMBOSIS (DVT) OF TIBIAL VEIN OF LEFT LOWER EXTREMITY (H): Primary | ICD-10-CM

## 2025-05-28 LAB — INR (EXTERNAL): 2.7

## 2025-05-28 NOTE — PROGRESS NOTES
ANTICOAGULATION MANAGEMENT     Brissa Corado 60 year old female is on warfarin with therapeutic INR result. (Goal INR 2.0-3.0)    Recent labs: (last 7 days)     05/28/25  0000   INR 2.7       ASSESSMENT     Source(s): Chart Review and Home Care/Facility Nurse - [facility] from [facility]    Warfarin doses taken: Warfarin taken as instructed  Diet: has not purchase Ensure at this time, but eating extra greens while not taking ensure  Medication/supplement changes: patient report has been taking some ibuprofen in the last 4 days for pain as she ran out of tylenol and has not able to go to store to purchase more  New illness, injury, or hospitalization: Yes: fell 4 days ago, some minor bruises - no head injury, no bleeding  Signs or symptoms of bleeding or clotting: No  Previous result: Therapeutic last 2(+) visits  Additional findings: Educate on being consistent with vegetable intake, limit the use of ibuprofen due to bleeding risk.  Monitor for s/s of bleeding.         PLAN     Recommended plan for temporary change(s) affecting INR     Dosing Instructions: Continue your current warfarin dose with next INR in 6 days       Summary  As of 5/28/2025      Full warfarin instructions:  20 mg every day   Next INR check:  6/3/2025               Telephone call with [facility] home care nurse who agrees to plan and repeated back plan correctly    Orders given to  Homecare nurse/facility to recheck    Education provided: Please call back if any changes to your diet, medications or how you've been taking warfarin  Dietary considerations: importance of consistent vitamin K intake  Interaction IS anticipated between warfarin and ibuprofen  Symptom monitoring: monitoring for bleeding signs and symptoms    Plan made per [facility] anticoagulation protocol    Toña Holly RN  5/28/2025  Anticoagulation Clinic  [employer] for routing messages: yadira ARITA  [facility] patient phone line:  [phone]        _______________________________________________________________________     Anticoagulation Episode Summary       Current INR goal:  2.0-3.0   TTR:  48.3% (1.1 mo)   Target end date:  Indefinite   Send INR reminders to:  JAYLAN ARITA    Indications    Acute deep vein thrombosis (DVT) of tibial vein of left lower extremity (H) [I82.442]             Comments:  Return to [facility] when discharged from Home Care             Anticoagulation Care Providers       Provider Role Specialty Phone number    Telly Baum MD Referring Family Medicine [phone]    Aury Vizcaino MD Referring Internal Medicine [phone]    Ramya Chahal DO Referring Internal Medicine [phone]

## 2025-05-29 ENCOUNTER — MEDICAL CORRESPONDENCE (OUTPATIENT)
Dept: HEALTH INFORMATION MANAGEMENT | Facility: CLINIC | Age: 60
End: 2025-05-29

## 2025-05-29 NOTE — TELEPHONE ENCOUNTER
Form was faxed and sent to scanning.      Vandana Barlow, [facility]  [place]

## 2025-05-31 DIAGNOSIS — J30.1 SEASONAL ALLERGIC RHINITIS DUE TO POLLEN: ICD-10-CM

## 2025-06-02 ENCOUNTER — TELEPHONE (OUTPATIENT)
Dept: INTERNAL MEDICINE | Facility: CLINIC | Age: 60
End: 2025-06-02
Payer: MEDICARE

## 2025-06-02 RX ORDER — FLUTICASONE PROPIONATE 50 MCG
SPRAY, SUSPENSION (ML) NASAL
Qty: 15 G | Refills: 6 | Status: SHIPPED | OUTPATIENT
Start: 2025-06-02

## 2025-06-02 NOTE — TELEPHONE ENCOUNTER
5/30/25 physician order for ok to discharge wound care orders and reduce visits to weekly form recieved via fax. Form in your mailbox for signature.

## 2025-06-03 ENCOUNTER — ANTICOAGULATION THERAPY VISIT (OUTPATIENT)
Dept: ANTICOAGULATION | Facility: CLINIC | Age: 60
End: 2025-06-03
Payer: MEDICARE

## 2025-06-03 DIAGNOSIS — I82.442 ACUTE DEEP VEIN THROMBOSIS (DVT) OF TIBIAL VEIN OF LEFT LOWER EXTREMITY (H): Primary | ICD-10-CM

## 2025-06-03 LAB — INR (EXTERNAL): 6.3 (ref 0.9–1.1)

## 2025-06-03 NOTE — PROGRESS NOTES
ANTICOAGULATION MANAGEMENT     Brissa Corado 60 year old female is on warfarin with supratherapeutic INR result. (Goal INR 2.0-3.0)    Recent labs: (last 7 days)     06/03/25  1220   INR 6.3*       ASSESSMENT     Source(s): Chart Review and Home Care/Facility Nurse     Warfarin doses taken: Warfarin taken as instructed  Diet: Decreased greens/vitamin K in diet; plans to resume previous intake. Patient still has not purchased Ensure and has been eating less greens. Patient states she will purchase Ensure today or tomorrow and restart drinking one daily.  Medication/supplement changes:   New illness, injury, or hospitalization: Yes: patient reports she fell on 5/28/25 onto her knee. Patient denies hitting her head and states she did not sustain any injuries.  Signs or symptoms of bleeding or clotting: No  Previous result: Therapeutic last 2(+) visits  Additional findings: None       PLAN     Recommended plan for temporary change(s) affecting INR     Dosing Instructions: hold 2 doses then continue your current warfarin dose with next INR in 2 days       Summary  As of 6/3/2025      Full warfarin instructions:  6/3: Hold; 6/4: Hold; Otherwise 20 mg every day   Next INR check:  6/5/2025               Telephone call with [place] home care nurse who verbalizes understanding and agrees to plan    Orders given to  Homecare nurse/facility to recheck    Education provided: Please call back if any changes to your diet, medications or how you've been taking warfarin  Dietary considerations: importance of consistent vitamin K intake and impact of vitamin K foods on INR  Symptom monitoring: monitoring for bleeding signs and symptoms, monitoring for clotting signs and symptoms, monitoring for stroke signs and symptoms, when to seek medical attention/emergency care, and if you hit your head or have a bad fall seek emergency care    Plan made per [facility] anticoagulation protocol    Edyta Flower RN  6/3/2025  Anticoagulation  Clinic  [employer] for routing messages: yadira ARITA  [facility] patient phone line: [phone]        _______________________________________________________________________     Anticoagulation Episode Summary       Current INR goal:  2.0-3.0   TTR:  42.0% (1.4 mo)   Target end date:  Indefinite   Send INR reminders to:  JAYLAN ARITA    Indications    Acute deep vein thrombosis (DVT) of tibial vein of left lower extremity (H) [I82.797]             Comments:  Return to [facility] when discharged from Home Care             Anticoagulation Care Providers       Provider Role Specialty Phone number    Telly Baum MD Referring Family Medicine [phone]    Aury Vizcaino MD Referring Internal Medicine [phone]    Ramya Chahal DO Referring Internal Medicine [phone]

## 2025-06-04 ENCOUNTER — TELEPHONE (OUTPATIENT)
Dept: INTERNAL MEDICINE | Facility: CLINIC | Age: 60
End: 2025-06-04

## 2025-06-04 ENCOUNTER — MEDICAL CORRESPONDENCE (OUTPATIENT)
Dept: HEALTH INFORMATION MANAGEMENT | Facility: CLINIC | Age: 60
End: 2025-06-04

## 2025-06-05 ENCOUNTER — TELEPHONE (OUTPATIENT)
Dept: INTERNAL MEDICINE | Facility: CLINIC | Age: 60
End: 2025-06-05
Payer: MEDICARE

## 2025-06-05 ENCOUNTER — ANTICOAGULATION THERAPY VISIT (OUTPATIENT)
Dept: ANTICOAGULATION | Facility: CLINIC | Age: 60
End: 2025-06-05
Payer: MEDICARE

## 2025-06-05 DIAGNOSIS — I82.442 ACUTE DEEP VEIN THROMBOSIS (DVT) OF TIBIAL VEIN OF LEFT LOWER EXTREMITY (H): Primary | ICD-10-CM

## 2025-06-05 LAB — INR (EXTERNAL): 2.1 (ref 0.9–1.1)

## 2025-06-05 NOTE — PROGRESS NOTES
ANTICOAGULATION MANAGEMENT     Brissa Corado 60 year old female is on warfarin with therapeutic INR result. (Goal INR 2.0-3.0)    Recent labs: (last 7 days)     06/05/25  1007   INR 2.1*       ASSESSMENT     Source(s): Chart Review and Home Care/Facility Nurse     Warfarin doses taken: Warfarin taken as instructed  Diet: Increased greens/vitamin K in diet; ongoing change. Restarted Ensure on 6/4/25 as discussed on 6/3/25.  Medication/supplement changes: None noted  New illness, injury, or hospitalization: No  Signs or symptoms of bleeding or clotting: No  Previous result: Supratherapeutic  Additional findings: None       PLAN     Recommended plan for ongoing change(s) affecting INR     Dosing Instructions: Continue your current warfarin dose with next INR in 6 days when home care returns       Summary  As of 6/5/2025      Full warfarin instructions:  20 mg every day   Next INR check:  6/12/2025               Telephone call with [facility] home care nurse who verbalizes understanding and agrees to plan    Orders given to  Homecare nurse/facility to recheck    Education provided: Please call back if any changes to your diet, medications or how you've been taking warfarin  Symptom monitoring: monitoring for bleeding signs and symptoms, monitoring for clotting signs and symptoms, and monitoring for stroke signs and symptoms    Plan made per [facility] anticoagulation protocol    dEyta Flower RN  6/5/2025  Anticoagulation Clinic  [facility] for routing messages: yadira ARITA  [facility] patient phone line: [phone]        _______________________________________________________________________     Anticoagulation Episode Summary       Current INR goal:  2.0-3.0   TTR:  41.0% (1.4 mo)   Target end date:  Indefinite   Send INR reminders to:  JAYLAN ARITA    Indications    Acute deep vein thrombosis (DVT) of tibial vein of left lower extremity (H) [I39.817]             Comments:  Return to [facility] when discharged from  Home Care             Anticoagulation Care Providers       Provider Role Specialty Phone number    Telly Baum MD Referring Family Medicine [phone]    Aury Vizcaino MD Referring Internal Medicine [phone]    Ramya Chahal DO Referring Internal Medicine [phone]

## 2025-06-05 NOTE — TELEPHONE ENCOUNTER
Forms/Letter Request    Type of form/letter: OTHER: emotional support letter for cats who are therapy animals  for new apt complex        Do we have the form/letter: No    Who is the form from? PCP needs to put in writing that the pt has cats that are therapy animals for the new apt complex, moving in on 6/20        When is form/letter needed by: 6/20  How would you like the form/letter returned:     Patient Notified form requests are processed in 5-7 business days:Yes    Could we send this information to you in [employer] or would you prefer to receive a phone call?:   Patient would prefer a phone call   Okay to leave a detailed message?: Yes at Cell number on file:    Telephone Information:   Mobile [phone]

## 2025-06-08 ENCOUNTER — MEDICAL CORRESPONDENCE (OUTPATIENT)
Dept: HEALTH INFORMATION MANAGEMENT | Facility: CLINIC | Age: 60
End: 2025-06-08
Payer: MEDICARE

## 2025-06-10 ENCOUNTER — MEDICAL CORRESPONDENCE (OUTPATIENT)
Dept: HEALTH INFORMATION MANAGEMENT | Facility: CLINIC | Age: 60
End: 2025-06-10

## 2025-06-10 NOTE — TELEPHONE ENCOUNTER
"Call to patient who clarified she would like to have a letter stating her cats are for emotional support and therapy animals for . Patient was asked if her cats helped with depression and patient says \"No I'm on an antidepressant for that\".    Routing request to primary care provider.    Thank you,  Marek, Triage RN [facility]    11:00 AM 6/10/2025     "

## 2025-06-11 ENCOUNTER — ANTICOAGULATION THERAPY VISIT (OUTPATIENT)
Dept: ANTICOAGULATION | Facility: CLINIC | Age: 60
End: 2025-06-11
Payer: MEDICARE

## 2025-06-11 DIAGNOSIS — I82.442 ACUTE DEEP VEIN THROMBOSIS (DVT) OF TIBIAL VEIN OF LEFT LOWER EXTREMITY (H): Primary | ICD-10-CM

## 2025-06-11 LAB — INR (EXTERNAL): 2.8 (ref 0.9–1.1)

## 2025-06-11 NOTE — PROGRESS NOTES
"ANTICOAGULATION MANAGEMENT     [place]ia A Mickie 60 year old female is on warfarin with therapeutic INR result. (Goal INR 2.0-3.0)    Recent labs: (last 7 days)     06/11/25  1220   INR 2.8*       ASSESSMENT     Source(s): Chart Review and Home Care/Facility Nurse     Warfarin doses taken: Warfarin taken as instructed  Diet: Patient continues with ensure, 1/day.  Medication/supplement changes: Patient is taking tylenol for knee pain  New illness, injury, or hospitalization: Yes, some swelling in knee from fall but it \"is pretty much gone now\"  Signs or symptoms of bleeding or clotting: No, bruising on knee from fall is resolving.  Previous result: Therapeutic last visit; previously outside of goal range  Additional findings: Due to patient continuing ensure 1/day, knee pain/swelling resolving, and patient overall factors that caused INR to rise being resolved, will continue Maintenance dose but recheck one week to make sure INR does not continue trending upward.       PLAN     Recommended plan for temporary change(s) affecting INR     Dosing Instructions: Continue your current warfarin dose with next INR in 1 week       Summary  As of 6/11/2025      Full warfarin instructions:  20 mg every day   Next INR check:  6/18/2025               Telephone call with [facility] home care nurse who verbalizes understanding and agrees to plan    Orders given to  Homecare nurse/facility to recheck    Education provided: Please call back if any changes to your diet, medications or how you've been taking warfarin    Plan made per [facility] anticoagulation protocol    Rosy Moore RN  6/11/2025  Anticoagulation Clinic  [place] for routing messages: yadira ARITA  [facility] patient phone line: [phone]        _______________________________________________________________________     Anticoagulation Episode Summary       Current INR goal:  2.0-3.0   TTR:  48.3% (1.6 mo)   Target end date:  Indefinite   Send INR reminders to:  JAYLAN" KASOTA    Indications    Acute deep vein thrombosis (DVT) of tibial vein of left lower extremity (H) [I82.442]             Comments:  Return to [facility] when discharged from Home Care             Anticoagulation Care Providers       Provider Role Specialty Phone number    Telly Baum MD Referring Family Medicine [phone]    Aury Vizcaino MD Referring Internal Medicine [phone]    Ramya Chahal DO Referring Internal Medicine [phone]

## 2025-06-14 ENCOUNTER — HEALTH MAINTENANCE LETTER (OUTPATIENT)
Age: 60
End: 2025-06-14

## 2025-06-16 NOTE — TELEPHONE ENCOUNTER
Patient was called and notified that the letter for her support cats, as documented in previous call, can be discussed at her upcoming 7-2-2025 appointment. Patient said they aren't cats they're guinea  pigs. Patient expressed an understanding and had no additional questions.

## 2025-06-16 NOTE — CONFIDENTIAL NOTE
Patient is calling to follow up on form request. They are moving the 20th of June, needs form asap.  [phone], ok to leave a message.

## 2025-06-18 ENCOUNTER — ANTICOAGULATION THERAPY VISIT (OUTPATIENT)
Dept: ANTICOAGULATION | Facility: CLINIC | Age: 60
End: 2025-06-18
Payer: MEDICARE

## 2025-06-18 DIAGNOSIS — I82.442 ACUTE DEEP VEIN THROMBOSIS (DVT) OF TIBIAL VEIN OF LEFT LOWER EXTREMITY (H): Primary | ICD-10-CM

## 2025-06-18 LAB — INR (EXTERNAL): 1.2

## 2025-06-18 NOTE — PROGRESS NOTES
ANTICOAGULATION MANAGEMENT     Brissa Corado 60 year old female is on warfarin with subtherapeutic INR result. (Goal INR 2.0-3.0)    Recent labs: (last 7 days)     06/18/25  0000   INR 1.2       ASSESSMENT     Source(s): Chart Review and Home Care/Facility Nurse     Warfarin doses taken: Warfarin taken as instructed  Diet: No new diet changes identified  Medication/supplement changes: None noted  New illness, injury, or hospitalization: Yes: diarrhea once in past week  Signs or symptoms of bleeding or clotting: No  Previous result: Therapeutic last 2(+) visits  Additional findings: None       PLAN     Recommended plan for no diet, medication or health factor changes affecting INR     Dosing Instructions: booster dose then Increase your warfarin dose (10.7% change) with next INR in 1 week       Summary  As of 6/18/2025      Full warfarin instructions:  6/18: 40 mg; Otherwise 25 mg every Mon, Thu, Sat; 20 mg all other days   Next INR check:  6/25/2025               Telephone call with [facility] home care nurse who verbalizes understanding and agrees to plan and who agrees to plan and repeated back plan correctly    Orders given to  Homecare nurse/facility to recheck    Education provided: Please call back if any changes to your diet, medications or how you've been taking warfarin    Plan made per [facility] anticoagulation protocol    David Ulrich RN  6/18/2025  Anticoagulation Clinic  [facility] for routing messages: yadira ARITA  [facility] patient phone line: [phone]        _______________________________________________________________________     Anticoagulation Episode Summary       Current INR goal:  2.0-3.0   TTR:  48.5% (1.8 mo)   Target end date:  Indefinite   Send INR reminders to:  JAYLAN ARITA    Indications    Acute deep vein thrombosis (DVT) of tibial vein of left lower extremity (H) [I90.954]             Comments:  Return to [facility] when discharged from Home Care              Anticoagulation Care Providers       Provider Role Specialty Phone number    Telly Baum MD Referring Family Medicine [phone]    Aury Vizcaino MD Referring Internal Medicine [phone]    Ramya Chahal DO Referring Internal Medicine [phone]

## 2025-06-24 DIAGNOSIS — J45.40 MODERATE PERSISTENT ASTHMA WITHOUT COMPLICATION: ICD-10-CM

## 2025-06-25 ENCOUNTER — HOSPITAL ENCOUNTER (INPATIENT)
Facility: CLINIC | Age: 60
End: 2025-06-25
Attending: EMERGENCY MEDICINE | Admitting: HOSPITALIST
Payer: MEDICARE

## 2025-06-25 ENCOUNTER — APPOINTMENT (OUTPATIENT)
Dept: CT IMAGING | Facility: CLINIC | Age: 60
DRG: 202 | End: 2025-06-25
Attending: EMERGENCY MEDICINE
Payer: MEDICARE

## 2025-06-25 ENCOUNTER — TELEPHONE (OUTPATIENT)
Dept: ANTICOAGULATION | Facility: CLINIC | Age: 60
End: 2025-06-25

## 2025-06-25 DIAGNOSIS — I82.442 ACUTE DEEP VEIN THROMBOSIS (DVT) OF TIBIAL VEIN OF LEFT LOWER EXTREMITY (H): ICD-10-CM

## 2025-06-25 DIAGNOSIS — Z71.89 OTHER SPECIFIED COUNSELING: Chronic | ICD-10-CM

## 2025-06-25 DIAGNOSIS — J96.20 ACUTE ON CHRONIC RESPIRATORY FAILURE, UNSPECIFIED WHETHER WITH HYPOXIA OR HYPERCAPNIA (H): ICD-10-CM

## 2025-06-25 DIAGNOSIS — J45.41 MODERATE PERSISTENT ASTHMA WITH ACUTE EXACERBATION: ICD-10-CM

## 2025-06-25 DIAGNOSIS — R09.02 HYPOXIA: ICD-10-CM

## 2025-06-25 DIAGNOSIS — S81.812A NONINFECTED SKIN TEAR OF LEFT LEG, INITIAL ENCOUNTER: ICD-10-CM

## 2025-06-25 DIAGNOSIS — J45.901 ASTHMA WITH ACUTE EXACERBATION, UNSPECIFIED ASTHMA SEVERITY, UNSPECIFIED WHETHER PERSISTENT: ICD-10-CM

## 2025-06-25 DIAGNOSIS — J45.41 MODERATE PERSISTENT ASTHMA WITH EXACERBATION: ICD-10-CM

## 2025-06-25 DIAGNOSIS — L30.4 INTERTRIGO: Primary | ICD-10-CM

## 2025-06-25 LAB
ALBUMIN SERPL BCG-MCNC: 4.3 G/DL (ref 3.5–5.2)
ALP SERPL-CCNC: 120 U/L (ref 40–150)
ALT SERPL W P-5'-P-CCNC: 24 U/L (ref 0–50)
ANION GAP SERPL CALCULATED.3IONS-SCNC: 8 MMOL/L (ref 7–15)
AST SERPL W P-5'-P-CCNC: 18 U/L (ref 0–45)
ATRIAL RATE - MUSE: 89 BPM
BASOPHILS # BLD AUTO: 0.1 10E3/UL (ref 0–0.2)
BASOPHILS NFR BLD AUTO: 1 %
BILIRUB DIRECT SERPL-MCNC: 0.09 MG/DL (ref 0–0.3)
BILIRUB SERPL-MCNC: 0.2 MG/DL
BUN SERPL-MCNC: 13 MG/DL (ref 6–20)
CALCIUM SERPL-MCNC: 9 MG/DL (ref 8.8–10.4)
CHLORIDE SERPL-SCNC: 107 MMOL/L (ref 98–107)
CREAT BLD-MCNC: 0.7 MG/DL (ref 0.5–1)
CREAT SERPL-MCNC: 0.69 MG/DL (ref 0.51–0.95)
DIASTOLIC BLOOD PRESSURE - MUSE: NORMAL MMHG
EGFRCR SERPLBLD CKD-EPI 2021: >60 ML/MIN/1.73M2
EGFRCR SERPLBLD CKD-EPI 2021: >90 ML/MIN/1.73M2
EOSINOPHIL # BLD AUTO: 1 10E3/UL (ref 0–0.7)
EOSINOPHIL NFR BLD AUTO: 10 %
ERYTHROCYTE [DISTWIDTH] IN BLOOD BY AUTOMATED COUNT: 14.8 % (ref 10–15)
FLUAV RNA SPEC QL NAA+PROBE: NEGATIVE
FLUBV RNA RESP QL NAA+PROBE: NEGATIVE
GLUCOSE SERPL-MCNC: 126 MG/DL (ref 70–99)
HCO3 BLDV-SCNC: 28 MMOL/L (ref 21–28)
HCO3 SERPL-SCNC: 27 MMOL/L (ref 22–29)
HCT VFR BLD AUTO: 42.3 % (ref 35–47)
HGB BLD-MCNC: 13 G/DL (ref 11.7–15.7)
HOLD SPECIMEN: NORMAL
IMM GRANULOCYTES # BLD: 0 10E3/UL
IMM GRANULOCYTES NFR BLD: 0 %
INR PPP: 1.87 (ref 0.85–1.15)
INTERPRETATION ECG - MUSE: NORMAL
LACTATE BLD-SCNC: 1.5 MMOL/L (ref 0.7–2)
LIPASE SERPL-CCNC: 26 U/L (ref 13–60)
LYMPHOCYTES # BLD AUTO: 1.6 10E3/UL (ref 0.8–5.3)
LYMPHOCYTES NFR BLD AUTO: 16 %
MAGNESIUM SERPL-MCNC: 2.2 MG/DL (ref 1.7–2.3)
MCH RBC QN AUTO: 27.7 PG (ref 26.5–33)
MCHC RBC AUTO-ENTMCNC: 30.7 G/DL (ref 31.5–36.5)
MCV RBC AUTO: 90 FL (ref 78–100)
MONOCYTES # BLD AUTO: 0.5 10E3/UL (ref 0–1.3)
MONOCYTES NFR BLD AUTO: 5 %
NEUTROPHILS # BLD AUTO: 6.9 10E3/UL (ref 1.6–8.3)
NEUTROPHILS NFR BLD AUTO: 68 %
NRBC # BLD AUTO: 0 10E3/UL
NRBC BLD AUTO-RTO: 0 /100
NT-PROBNP SERPL-MCNC: <36 PG/ML (ref 0–226)
P AXIS - MUSE: 54 DEGREES
PCO2 BLDV: 56 MM HG (ref 40–50)
PH BLDV: 7.3 [PH] (ref 7.32–7.43)
PLATELET # BLD AUTO: 237 10E3/UL (ref 150–450)
PO2 BLDV: 23 MM HG (ref 25–47)
POTASSIUM SERPL-SCNC: 4.4 MMOL/L (ref 3.4–5.3)
PR INTERVAL - MUSE: 202 MS
PROT SERPL-MCNC: 7.2 G/DL (ref 6.4–8.3)
PROTHROMBIN TIME: 21.4 SECONDS (ref 11.8–14.8)
QRS DURATION - MUSE: 68 MS
QT - MUSE: 350 MS
QTC - MUSE: 425 MS
R AXIS - MUSE: 29 DEGREES
RBC # BLD AUTO: 4.69 10E6/UL (ref 3.8–5.2)
RSV RNA SPEC NAA+PROBE: NEGATIVE
SAO2 % BLDV: 33 % (ref 70–75)
SARS-COV-2 RNA RESP QL NAA+PROBE: NEGATIVE
SODIUM SERPL-SCNC: 142 MMOL/L (ref 135–145)
SYSTOLIC BLOOD PRESSURE - MUSE: NORMAL MMHG
T AXIS - MUSE: 57 DEGREES
TROPONIN T SERPL HS-MCNC: 10 NG/L
TROPONIN T SERPL HS-MCNC: 9 NG/L
VENTRICULAR RATE- MUSE: 89 BPM
WBC # BLD AUTO: 10.1 10E3/UL (ref 4–11)

## 2025-06-25 PROCEDURE — 94640 AIRWAY INHALATION TREATMENT: CPT | Mod: 76

## 2025-06-25 PROCEDURE — 84484 ASSAY OF TROPONIN QUANT: CPT | Performed by: EMERGENCY MEDICINE

## 2025-06-25 PROCEDURE — 250N000009 HC RX 250: Performed by: EMERGENCY MEDICINE

## 2025-06-25 PROCEDURE — 250N000013 HC RX MED GY IP 250 OP 250 PS 637: Performed by: HOSPITALIST

## 2025-06-25 PROCEDURE — 999N000156 HC STATISTIC RCP CONSULT EA 30 MIN

## 2025-06-25 PROCEDURE — 71275 CT ANGIOGRAPHY CHEST: CPT

## 2025-06-25 PROCEDURE — 82565 ASSAY OF CREATININE: CPT

## 2025-06-25 PROCEDURE — 83880 ASSAY OF NATRIURETIC PEPTIDE: CPT | Performed by: EMERGENCY MEDICINE

## 2025-06-25 PROCEDURE — 83735 ASSAY OF MAGNESIUM: CPT | Performed by: EMERGENCY MEDICINE

## 2025-06-25 PROCEDURE — 250N000011 HC RX IP 250 OP 636: Performed by: EMERGENCY MEDICINE

## 2025-06-25 PROCEDURE — 250N000009 HC RX 250: Performed by: HOSPITALIST

## 2025-06-25 PROCEDURE — 96374 THER/PROPH/DIAG INJ IV PUSH: CPT

## 2025-06-25 PROCEDURE — 83690 ASSAY OF LIPASE: CPT | Performed by: EMERGENCY MEDICINE

## 2025-06-25 PROCEDURE — 85025 COMPLETE CBC W/AUTO DIFF WBC: CPT | Performed by: EMERGENCY MEDICINE

## 2025-06-25 PROCEDURE — 94640 AIRWAY INHALATION TREATMENT: CPT

## 2025-06-25 PROCEDURE — 250N000013 HC RX MED GY IP 250 OP 250 PS 637: Performed by: EMERGENCY MEDICINE

## 2025-06-25 PROCEDURE — 80053 COMPREHEN METABOLIC PANEL: CPT | Performed by: EMERGENCY MEDICINE

## 2025-06-25 PROCEDURE — 36415 COLL VENOUS BLD VENIPUNCTURE: CPT | Performed by: EMERGENCY MEDICINE

## 2025-06-25 PROCEDURE — 999N000157 HC STATISTIC RCP TIME EA 10 MIN

## 2025-06-25 PROCEDURE — 85610 PROTHROMBIN TIME: CPT | Performed by: HOSPITALIST

## 2025-06-25 PROCEDURE — 120N000001 HC R&B MED SURG/OB

## 2025-06-25 PROCEDURE — 82803 BLOOD GASES ANY COMBINATION: CPT

## 2025-06-25 PROCEDURE — 93005 ELECTROCARDIOGRAM TRACING: CPT

## 2025-06-25 PROCEDURE — 99285 EMERGENCY DEPT VISIT HI MDM: CPT | Mod: 25

## 2025-06-25 PROCEDURE — 99222 1ST HOSP IP/OBS MODERATE 55: CPT | Mod: AI | Performed by: HOSPITALIST

## 2025-06-25 PROCEDURE — 87637 SARSCOV2&INF A&B&RSV AMP PRB: CPT | Performed by: EMERGENCY MEDICINE

## 2025-06-25 RX ORDER — CALCIUM CARBONATE 500 MG/1
1000 TABLET, CHEWABLE ORAL 4 TIMES DAILY PRN
Status: DISCONTINUED | OUTPATIENT
Start: 2025-06-25 | End: 2025-07-29 | Stop reason: HOSPADM

## 2025-06-25 RX ORDER — AMOXICILLIN 250 MG
2 CAPSULE ORAL 2 TIMES DAILY PRN
Status: DISCONTINUED | OUTPATIENT
Start: 2025-06-25 | End: 2025-07-12

## 2025-06-25 RX ORDER — ACETAMINOPHEN 650 MG/1
650 SUPPOSITORY RECTAL EVERY 4 HOURS PRN
Status: DISCONTINUED | OUTPATIENT
Start: 2025-06-25 | End: 2025-07-29 | Stop reason: HOSPADM

## 2025-06-25 RX ORDER — DEXAMETHASONE SODIUM PHOSPHATE 10 MG/ML
10 INJECTION, SOLUTION INTRAMUSCULAR; INTRAVENOUS ONCE
Status: COMPLETED | OUTPATIENT
Start: 2025-06-25 | End: 2025-06-25

## 2025-06-25 RX ORDER — ALBUTEROL SULFATE 0.83 MG/ML
2.5 SOLUTION RESPIRATORY (INHALATION) ONCE
Status: COMPLETED | OUTPATIENT
Start: 2025-06-25 | End: 2025-06-25

## 2025-06-25 RX ORDER — PANTOPRAZOLE SODIUM 40 MG/1
40 TABLET, DELAYED RELEASE ORAL
Status: DISCONTINUED | OUTPATIENT
Start: 2025-06-26 | End: 2025-07-29 | Stop reason: HOSPADM

## 2025-06-25 RX ORDER — CARBAMAZEPINE 200 MG/1
200 TABLET ORAL EVERY 24 HOURS
Status: DISCONTINUED | OUTPATIENT
Start: 2025-06-26 | End: 2025-07-29 | Stop reason: HOSPADM

## 2025-06-25 RX ORDER — FLUTICASONE FUROATE AND VILANTEROL 100; 25 UG/1; UG/1
1 POWDER RESPIRATORY (INHALATION) DAILY
Status: DISCONTINUED | OUTPATIENT
Start: 2025-06-26 | End: 2025-07-29 | Stop reason: HOSPADM

## 2025-06-25 RX ORDER — LIDOCAINE HYDROCHLORIDE 20 MG/ML
15 SOLUTION OROPHARYNGEAL ONCE
Status: COMPLETED | OUTPATIENT
Start: 2025-06-25 | End: 2025-06-25

## 2025-06-25 RX ORDER — WARFARIN SODIUM 5 MG/1
20 TABLET ORAL
Status: COMPLETED | OUTPATIENT
Start: 2025-06-25 | End: 2025-06-25

## 2025-06-25 RX ORDER — MAGNESIUM SULFATE HEPTAHYDRATE 40 MG/ML
2 INJECTION, SOLUTION INTRAVENOUS ONCE
Status: COMPLETED | OUTPATIENT
Start: 2025-06-25 | End: 2025-06-25

## 2025-06-25 RX ORDER — ALBUTEROL SULFATE 5 MG/ML
2.5 SOLUTION RESPIRATORY (INHALATION)
Status: DISCONTINUED | OUTPATIENT
Start: 2025-06-25 | End: 2025-07-29 | Stop reason: HOSPADM

## 2025-06-25 RX ORDER — AMOXICILLIN 250 MG
1 CAPSULE ORAL 2 TIMES DAILY PRN
Status: DISCONTINUED | OUTPATIENT
Start: 2025-06-25 | End: 2025-07-12

## 2025-06-25 RX ORDER — ALBUTEROL SULFATE 0.83 MG/ML
6 SOLUTION RESPIRATORY (INHALATION) ONCE
Status: COMPLETED | OUTPATIENT
Start: 2025-06-25 | End: 2025-06-25

## 2025-06-25 RX ORDER — MAGNESIUM HYDROXIDE/ALUMINUM HYDROXICE/SIMETHICONE 120; 1200; 1200 MG/30ML; MG/30ML; MG/30ML
15 SUSPENSION ORAL ONCE
Status: COMPLETED | OUTPATIENT
Start: 2025-06-25 | End: 2025-06-25

## 2025-06-25 RX ORDER — PREDNISONE 20 MG/1
40 TABLET ORAL DAILY
Status: COMPLETED | OUTPATIENT
Start: 2025-06-26 | End: 2025-06-30

## 2025-06-25 RX ORDER — IPRATROPIUM BROMIDE AND ALBUTEROL SULFATE 2.5; .5 MG/3ML; MG/3ML
6 SOLUTION RESPIRATORY (INHALATION) ONCE
Status: COMPLETED | OUTPATIENT
Start: 2025-06-25 | End: 2025-06-25

## 2025-06-25 RX ORDER — LISINOPRIL 20 MG/1
20 TABLET ORAL DAILY
Status: DISCONTINUED | OUTPATIENT
Start: 2025-06-26 | End: 2025-07-29 | Stop reason: HOSPADM

## 2025-06-25 RX ORDER — ACETAMINOPHEN 325 MG/1
650 TABLET ORAL EVERY 4 HOURS PRN
Status: DISCONTINUED | OUTPATIENT
Start: 2025-06-25 | End: 2025-07-29 | Stop reason: HOSPADM

## 2025-06-25 RX ORDER — CARBAMAZEPINE 200 MG/1
400 TABLET ORAL 2 TIMES DAILY
Status: DISCONTINUED | OUTPATIENT
Start: 2025-06-25 | End: 2025-07-29 | Stop reason: HOSPADM

## 2025-06-25 RX ORDER — WARFARIN SODIUM 5 MG/1
TABLET ORAL
COMMUNITY

## 2025-06-25 RX ORDER — ALBUTEROL SULFATE 5 MG/ML
2.5 SOLUTION RESPIRATORY (INHALATION)
Status: DISCONTINUED | OUTPATIENT
Start: 2025-06-25 | End: 2025-06-28

## 2025-06-25 RX ORDER — CODEINE PHOSPHATE AND GUAIFENESIN 10; 100 MG/5ML; MG/5ML
10 SOLUTION ORAL ONCE
Status: COMPLETED | OUTPATIENT
Start: 2025-06-25 | End: 2025-06-25

## 2025-06-25 RX ORDER — ONDANSETRON 2 MG/ML
4 INJECTION INTRAMUSCULAR; INTRAVENOUS EVERY 6 HOURS PRN
Status: DISCONTINUED | OUTPATIENT
Start: 2025-06-25 | End: 2025-07-29 | Stop reason: HOSPADM

## 2025-06-25 RX ORDER — IOPAMIDOL 755 MG/ML
500 INJECTION, SOLUTION INTRAVASCULAR ONCE
Status: COMPLETED | OUTPATIENT
Start: 2025-06-25 | End: 2025-06-25

## 2025-06-25 RX ORDER — ONDANSETRON 4 MG/1
4 TABLET, ORALLY DISINTEGRATING ORAL EVERY 6 HOURS PRN
Status: DISCONTINUED | OUTPATIENT
Start: 2025-06-25 | End: 2025-07-29 | Stop reason: HOSPADM

## 2025-06-25 RX ORDER — CODEINE PHOSPHATE AND GUAIFENESIN 10; 100 MG/5ML; MG/5ML
10 SOLUTION ORAL EVERY 6 HOURS PRN
Status: COMPLETED | OUTPATIENT
Start: 2025-06-25 | End: 2025-06-26

## 2025-06-25 RX ORDER — ATORVASTATIN CALCIUM 10 MG/1
10 TABLET, FILM COATED ORAL AT BEDTIME
Status: DISCONTINUED | OUTPATIENT
Start: 2025-06-26 | End: 2025-07-29 | Stop reason: HOSPADM

## 2025-06-25 RX ORDER — LIDOCAINE 40 MG/G
CREAM TOPICAL
Status: DISCONTINUED | OUTPATIENT
Start: 2025-06-25 | End: 2025-07-29 | Stop reason: HOSPADM

## 2025-06-25 RX ADMIN — MAGNESIUM SULFATE HEPTAHYDRATE 2 G: 40 INJECTION, SOLUTION INTRAVENOUS at 16:20

## 2025-06-25 RX ADMIN — IPRATROPIUM BROMIDE AND ALBUTEROL SULFATE 6 ML: .5; 3 SOLUTION RESPIRATORY (INHALATION) at 13:44

## 2025-06-25 RX ADMIN — SODIUM CHLORIDE 100 ML: 9 INJECTION, SOLUTION INTRAVENOUS at 14:46

## 2025-06-25 RX ADMIN — GUAIFENESIN AND CODEINE PHOSPHATE 10 ML: 100; 10 SOLUTION ORAL at 16:01

## 2025-06-25 RX ADMIN — ALBUTEROL SULFATE 2.5 MG: 2.5 SOLUTION RESPIRATORY (INHALATION) at 23:13

## 2025-06-25 RX ADMIN — LIDOCAINE HYDROCHLORIDE 15 ML: 20 SOLUTION ORAL at 13:42

## 2025-06-25 RX ADMIN — CARBAMAZEPINE 400 MG: 200 TABLET ORAL at 20:38

## 2025-06-25 RX ADMIN — ALUMINUM HYDROXIDE, MAGNESIUM HYDROXIDE, AND DIMETHICONE 15 ML: 200; 20; 200 SUSPENSION ORAL at 13:42

## 2025-06-25 RX ADMIN — DEXAMETHASONE SODIUM PHOSPHATE 10 MG: 10 INJECTION, SOLUTION INTRAMUSCULAR; INTRAVENOUS at 13:42

## 2025-06-25 RX ADMIN — WARFARIN SODIUM 20 MG: 5 TABLET ORAL at 20:36

## 2025-06-25 RX ADMIN — ALBUTEROL SULFATE 2.5 MG: 2.5 SOLUTION RESPIRATORY (INHALATION) at 20:30

## 2025-06-25 RX ADMIN — IOPAMIDOL 90 ML: 755 INJECTION, SOLUTION INTRAVENOUS at 14:46

## 2025-06-25 RX ADMIN — GUAIFENESIN AND CODEINE PHOSPHATE 10 ML: 100; 10 SOLUTION ORAL at 22:58

## 2025-06-25 RX ADMIN — ALBUTEROL SULFATE 6 MG: 2.5 SOLUTION RESPIRATORY (INHALATION) at 15:27

## 2025-06-25 ASSESSMENT — ACTIVITIES OF DAILY LIVING (ADL)
ADLS_ACUITY_SCORE: 63
ADLS_ACUITY_SCORE: 45
ADLS_ACUITY_SCORE: 62
ADLS_ACUITY_SCORE: 63
ADLS_ACUITY_SCORE: 62
ADLS_ACUITY_SCORE: 63

## 2025-06-25 NOTE — ED PROVIDER NOTES
"  Emergency Department Note      History of Present Illness     Chief Complaint   Shortness of Breath      HPI   Brissa Corado is a 60 year old female anticoagulated on Coumadin, who presents with shortness of breath, acid reflux, mild chest pain and mild lightheadedness since approximately one week ago. Patient reports her symptoms began after recently moving from a smoking-permitted apartment to a smoke-free apartment. She currently reports \"wrenching a lot of acid\" and vomiting phlegm. She reports central chest pain that feels similar to her acid reflux. She has leg swelling and worsening redness to her right groin and right breast. Patient says she is still taking her blood thinners and has had no problems with her blood sugar recently. The patient denies recent falls, urinary symptoms, changes in bowel movements, tobacco use or any sick contacts.     Independent Historian   None    Review of External Notes   I reviewed her anticoagulation note from 25. I reviewed 25    Past Medical History     Medical History and Problem List   Asthma  Hypertension  Cushing syndrome  Obesity   Depression  Developmental delay - borderline  GERD  Asthma  Hyperlipidemia  RICK  Small bowel obstruction  Seasonal allergies  DVT  Hepatic steatosis    Medications   albuterol (PROAIR HFA/PROVENTIL HFA/VENTOLIN HFA) 108 (90 Base) MCG/ACT inhaler  azelastine (ASTELIN) 0.1 % nasal spray  carBAMazepine (TEGRETOL) 200 MG tablet  Fluticasone Furoate-Vilanterol (BREO ELLIPTA) 50-25 MCG/ACT AEPB  ipratropium - albuterol 0.5 mg/2.5 mg/3 mL (DUONEB) 0.5-2.5 (3) MG/3ML neb solution  lisinopril (ZESTRIL) 20 MG tablet  lovastatin (MEVACOR) 40 MG tablet  omeprazole (PRILOSEC) 20 MG DR capsule  predniSONE (DELTASONE) 10 MG tablet  sertraline (ZOLOFT) 100 MG tablet  warfarin ANTICOAGULANT (COUMADIN) 5 MG tablet    Surgical History   Adenoidectomy  Colostomy  Elective   Cholecystectomy  Open pelvis surgery with plate  ORIF " "foot  Right wrist arthroscopy  Splenectomy  Tonsillectomy  Tubal ligation     Physical Exam     Patient Vitals for the past 24 hrs:   BP Temp Pulse Resp SpO2 Height   06/25/25 1600 105/80 -- 95 20 93 % --   06/25/25 1500 (!) 150/69 -- 92 -- 94 % --   06/25/25 1419 -- -- -- 25 (!) 88 % --   06/25/25 1418 -- -- -- -- (!) 88 % --   06/25/25 1400 113/60 -- 94 -- (!) 89 % --   06/25/25 1324 134/85 97.5  F (36.4  C) 91 -- 93 % 1.702 m (5' 7\")     Physical Exam  Constitutional: Well developed, nontox appearance  Head: Atraumatic.   Mouth/Throat: Oropharynx is clear and moist.   Neck:  no stridor  Eyes: no scleral icterus  Cardiovascular: RRR, 2+ bilat radial pulses  Pulmonary/Chest: nml resp effort, expiratory wheezing bilaterally  Abdominal: ND, soft, NT, no rebound or guarding   Ext: Warm, well perfused, trace BLE edema  Neurological: A&O, symmetric facies, moves ext x4  Skin: Skin is warm and dry.   Psychiatric: Behavior is normal. Thought content normal.   Nursing note and vitals reviewed.      Diagnostics     Lab Results   Labs Ordered and Resulted from Time of ED Arrival to Time of ED Departure   BASIC METABOLIC PANEL - Abnormal       Result Value    Sodium 142      Potassium 4.4      Chloride 107      Carbon Dioxide (CO2) 27      Anion Gap 8      Urea Nitrogen 13.0      Creatinine 0.69      GFR Estimate >90      Calcium 9.0      Glucose 126 (*)    CBC WITH PLATELETS AND DIFFERENTIAL - Abnormal    WBC Count 10.1      RBC Count 4.69      Hemoglobin 13.0      Hematocrit 42.3      MCV 90      MCH 27.7      MCHC 30.7 (*)     RDW 14.8      Platelet Count 237      % Neutrophils 68      % Lymphocytes 16      % Monocytes 5      % Eosinophils 10      % Basophils 1      % Immature Granulocytes 0      NRBCs per 100 WBC 0      Absolute Neutrophils 6.9      Absolute Lymphocytes 1.6      Absolute Monocytes 0.5      Absolute Eosinophils 1.0 (*)     Absolute Basophils 0.1      Absolute Immature Granulocytes 0.0      Absolute NRBCs " 0.0     ISTAT GASES LACTATE VENOUS POCT - Abnormal    Lactic Acid POCT 1.5      Bicarbonate Venous POCT 28      O2 Sat, Venous POCT 33 (*)     pCO2 Venous POCT 56 (*)     pH Venous POCT 7.30 (*)     pO2 Venous POCT 23 (*)    TROPONIN T, HIGH SENSITIVITY - Normal    Troponin T, High Sensitivity 9     NT-PROBNP - Normal    NT-proBNP <36     HEPATIC FUNCTION PANEL - Normal    Protein Total 7.2      Albumin 4.3      Bilirubin Total 0.2      Alkaline Phosphatase 120      AST 18      ALT 24      Bilirubin Direct 0.09     LIPASE - Normal    Lipase 26     INFLUENZA A/B, RSV AND SARS-COV2 PCR - Normal    Influenza A PCR Negative      Influenza B PCR Negative      RSV PCR Negative      SARS CoV2 PCR Negative     ISTAT CREATININE POCT - Normal    Creatinine POCT 0.7      GFR, ESTIMATED POCT >60     TROPONIN T, HIGH SENSITIVITY   MAGNESIUM   ISTAT CREATININE POCT     Imaging   CT Chest Pulmonary Embolism w Contrast   Final Result   IMPRESSION:   1.  Subtle areas of bronchial wall thickening with areas of air trapping throughout both lungs, suggestive of small airways disease.   2.  No pulmonary embolus.        EKG   ECG taken at 1325, ECG read at 1326  Normal sinus rhythm. Low voltage QRS. Borderline ECG.    No significant change as compared to prior, dated 5/5/25.  Rate 89 bpm. NE interval 202 ms. QRS duration 68 ms. QT/QTc 350/425 ms. P-R-T axes 54 29 57.    Independent Interpretation   EKG as noted above    ED Course      Medications Administered   Medications   albuterol (PROVENTIL) neb solution 2.5 mg (has no administration in time range)   magnesium sulfate 2 g in 50 mL sterile water intermittent infusion (has no administration in time range)   ipratropium - albuterol 0.5 mg/2.5 mg (3mg)/3 mL (DUONEB) neb solution 6 mL (6 mLs Nebulization $Given 6/25/25 1344)   dexAMETHasone PF (DECADRON) injection 10 mg (10 mg Intravenous $Given 6/25/25 1342)   alum & mag hydroxide-simethicone (MAALOX) suspension 15 mL (15 mLs Oral  $Given 6/25/25 1342)   lidocaine (viscous) (XYLOCAINE) 2 % solution 15 mL (15 mLs Mouth/Throat $Given 6/25/25 1342)   iopamidol (ISOVUE-370) solution 500 mL (90 mLs Intravenous $Given 6/25/25 1446)   sodium chloride 0.9 % bag for CT scan flush (100 mLs Intravenous $Given 6/25/25 1446)   guaiFENesin-codeine (ROBITUSSIN AC) 100-10 MG/5ML solution 10 mL (10 mLs Oral $Given 6/25/25 1601)   albuterol (PROVENTIL) neb solution 6 mg (6 mg Nebulization $Given 6/25/25 1527)     Procedures   Procedures     Discussion of Management   Admitting Hospitalist, Dr. Hull    ED Course   ED Course as of 06/25/25 1615   Wed Jun 25, 2025   1329 I obtained history and examined the patient as noted above.       Additional Documentation  None    Medical Decision Making / Diagnosis     CMS Diagnoses: None    MIPS   CT for PE was ordered because the patient is high risk for pulmonary embolism.             DA Corado is a 60 year old female presenting with shortness of breath, wheezing    Differential diagnosis includes PE, pneumonia, asthma exacerbation, electrolyte abnormality, viral illness such as COVID or influenza, bronchospasm, bronchitis.  EKG interpretation as noted above.  Labs significant for mild respiratory acidosis, undetectable BNP, troponin within normal limits, normal white blood cell count.  Given the patient's history of DVTs in context of subtherapeutic INR, CT PE protocol ordered and demonstrated no PE, small airway trapping consistent with presentation.  Patient presentation seems most consistent with an asthma exacerbation.  Given she is still symptomatic in the emergency department, requiring oxygen off of her baseline with persistent wheezing, she is admitted to the hospitalist service for further evaluation and management.  Magnesium given as noted above.  Pt counseled on all results, disposition and diagnosis.  They are understanding and agreeable to plan. Patient admitted in stable condition.         Disposition   The patient was admitted to the hospital.     Diagnosis     ICD-10-CM    1. Moderate persistent asthma with exacerbation  J45.41       2. Hypoxia  R09.02            Discharge Medications   New Prescriptions    No medications on file         Scribe Disclosure:  I, Christy Polanco, am serving as a scribe at 1:22 PM on 6/25/2025 to document services personally performed by Jean Claude Luevano MD based on my observations and the provider's statements to me.        Jean Claude Luevano MD  06/25/25 4709

## 2025-06-25 NOTE — ED TRIAGE NOTES
"Pt arrives from home via EMS for worsening SOB. Pt states she recently moved to a new apartment building that allows smoking and her breathing has gotten significantly worse. Hx of COPD. Also notes redness to groin and under breasts and worsening acid reflux.        /85   Pulse 91   Temp 97.5  F (36.4  C)   Ht 1.702 m (5' 7\")   LMP 08/18/2008   SpO2 93%   BMI 60.15 kg/m       Triage Assessment (Adult)       Row Name 06/25/25 1323          Triage Assessment    Airway WDL WDL        Respiratory WDL    Respiratory WDL X        Cardiac WDL    Cardiac WDL WDL        Cognitive/Neuro/Behavioral WDL    Cognitive/Neuro/Behavioral WDL WDL                     "
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
## 2025-06-25 NOTE — ED NOTES
"[facility]  ED Nurse Handoff Report    ED Chief complaint: Shortness of Breath  . ED Diagnosis:   Final diagnoses:   Moderate persistent asthma with exacerbation   Hypoxia       Allergies:   Allergies   Allergen Reactions    Penicillins Anaphylaxis     PEN-FAST - Penicillin Allergy Risk Tool completed by [facility] December 20, 2024    Score: 3  Risk: Moderate  Assessment: Patient has a 20 % chance of a positive penicillin allergy test    Iodine      Iodinated Contrast Media  --- Hives      Tetracyclines & Related Unknown    Azithromycin Rash    Hydrochlorothiazide Palpitations     dehydration    Influenza Vac Split [Influenza Virus Vaccine] Rash     Patient states she is allergic to the vaccine.  Got a rash all over body many years ago after receiving injection.       Code Status: Full Code    Activity level - Baseline/Home:  in bed.  Activity Level - Current:   in bed.   Lift room needed: No.   Bariatric: Yes   Needed: No   Isolation: No.   Infection: Not Applicable.     Respiratory status: Nasal cannula    Vital Signs (within 30 minutes):   Vitals:    06/25/25 1418 06/25/25 1419 06/25/25 1500 06/25/25 1600   BP:   (!) 150/69 105/80   BP Location:    Left arm   Pulse:   92 95   Resp:  25  20   Temp:       SpO2: (!) 88% (!) 88% 94% 93%   Height:           Cardiac Rhythm:  ,      Pain level:    Patient confused: No.   Patient Falls Risk: activity supervised.   Elimination Status: Has voided     Patient Report - Initial Complaint: SOB.     Focused Assessment: Brissa Corado is a 60 year old female anticoagulated on Coumadin, who presents with shortness of breath, acid reflux, mild chest pain and mild lightheadedness since approximately one week ago. Patient reports her symptoms began after recently moving from a smoking-permitted apartment to a smoke-free apartment. She currently reports \"wrenching a lot of acid\" and vomiting phlegm. She reports central chest pain that feels similar to " her acid reflux. She has leg swelling and worsening redness to her right groin and right breast. Patient says she is still taking her blood thinners and has had no problems with her blood sugar recently. The patient denies recent falls, urinary symptoms, changes in bowel movements, tobacco use or any sick contacts.      Abnormal Results:   Labs Ordered and Resulted from Time of ED Arrival to Time of ED Departure   BASIC METABOLIC PANEL - Abnormal       Result Value    Sodium 142      Potassium 4.4      Chloride 107      Carbon Dioxide (CO2) 27      Anion Gap 8      Urea Nitrogen 13.0      Creatinine 0.69      GFR Estimate >90      Calcium 9.0      Glucose 126 (*)    CBC WITH PLATELETS AND DIFFERENTIAL - Abnormal    WBC Count 10.1      RBC Count 4.69      Hemoglobin 13.0      Hematocrit 42.3      MCV 90      MCH 27.7      MCHC 30.7 (*)     RDW 14.8      Platelet Count 237      % Neutrophils 68      % Lymphocytes 16      % Monocytes 5      % Eosinophils 10      % Basophils 1      % Immature Granulocytes 0      NRBCs per 100 WBC 0      Absolute Neutrophils 6.9      Absolute Lymphocytes 1.6      Absolute Monocytes 0.5      Absolute Eosinophils 1.0 (*)     Absolute Basophils 0.1      Absolute Immature Granulocytes 0.0      Absolute NRBCs 0.0     ISTAT GASES LACTATE VENOUS POCT - Abnormal    Lactic Acid POCT 1.5      Bicarbonate Venous POCT 28      O2 Sat, Venous POCT 33 (*)     pCO2 Venous POCT 56 (*)     pH Venous POCT 7.30 (*)     pO2 Venous POCT 23 (*)    TROPONIN T, HIGH SENSITIVITY - Normal    Troponin T, High Sensitivity 9     NT-PROBNP - Normal    NT-proBNP <36     HEPATIC FUNCTION PANEL - Normal    Protein Total 7.2      Albumin 4.3      Bilirubin Total 0.2      Alkaline Phosphatase 120      AST 18      ALT 24      Bilirubin Direct 0.09     LIPASE - Normal    Lipase 26     INFLUENZA A/B, RSV AND SARS-COV2 PCR - Normal    Influenza A PCR Negative      Influenza B PCR Negative      RSV PCR Negative      SARS CoV2  PCR Negative     ISTAT CREATININE POCT - Normal    Creatinine POCT 0.7      GFR, ESTIMATED POCT >60     TROPONIN T, HIGH SENSITIVITY - Normal    Troponin T, High Sensitivity 10     MAGNESIUM   ISTAT CREATININE POCT        CT Chest Pulmonary Embolism w Contrast   Final Result   IMPRESSION:   1.  Subtle areas of bronchial wall thickening with areas of air trapping throughout both lungs, suggestive of small airways disease.   2.  No pulmonary embolus.          Treatments provided: See MAR  Family Comments: NA  OBS brochure/video discussed/provided to patient:  N/A  ED Medications:   Medications   albuterol (PROVENTIL) neb solution 2.5 mg (has no administration in time range)   magnesium sulfate 2 g in 50 mL sterile water intermittent infusion (2 g Intravenous $New Bag 6/25/25 1620)   ipratropium - albuterol 0.5 mg/2.5 mg (3mg)/3 mL (DUONEB) neb solution 6 mL (6 mLs Nebulization $Given 6/25/25 1344)   dexAMETHasone PF (DECADRON) injection 10 mg (10 mg Intravenous $Given 6/25/25 1342)   alum & mag hydroxide-simethicone (MAALOX) suspension 15 mL (15 mLs Oral $Given 6/25/25 1342)   lidocaine (viscous) (XYLOCAINE) 2 % solution 15 mL (15 mLs Mouth/Throat $Given 6/25/25 1342)   iopamidol (ISOVUE-370) solution 500 mL (90 mLs Intravenous $Given 6/25/25 1446)   sodium chloride 0.9 % bag for CT scan flush (100 mLs Intravenous $Given 6/25/25 1446)   guaiFENesin-codeine (ROBITUSSIN AC) 100-10 MG/5ML solution 10 mL (10 mLs Oral $Given 6/25/25 1601)   albuterol (PROVENTIL) neb solution 6 mg (6 mg Nebulization $Given 6/25/25 1527)       Drips infusing:  No  For the majority of the shift this patient was Green.   Interventions performed were NA.    Sepsis treatment initiated: No    Cares/treatment/interventions/medications to be completed following ED care: Oxygen management     ED Nurse Name: Sharita Alanis RN  4:22 PM     RECEIVING UNIT ED HANDOFF REVIEW    Above ED Nurse Handoff Report was reviewed: Yes  Reviewed by: Dia CAMPA  Yehuda RN on June 25, 2025 at 5:13 PM   I Qiana called the ED to inform them the note was read: Yes

## 2025-06-25 NOTE — TELEPHONE ENCOUNTER
Deana from [facility] home care left voicemail stating home care unable to see patient today for INR check as patient is in the ED.  Reviewed ED note, planning for discharge home today.  No INR was checked in ED.  Left message on identified confidential voicemail asking home care to call back with next INR check date.      Toña Holly RN  [facility].

## 2025-06-26 ENCOUNTER — DOCUMENTATION ONLY (OUTPATIENT)
Dept: ANTICOAGULATION | Facility: CLINIC | Age: 60
End: 2025-06-26
Payer: MEDICARE

## 2025-06-26 VITALS
SYSTOLIC BLOOD PRESSURE: 128 MMHG | HEART RATE: 74 BPM | HEIGHT: 67 IN | WEIGHT: 293 LBS | BODY MASS INDEX: 45.99 KG/M2 | DIASTOLIC BLOOD PRESSURE: 51 MMHG | TEMPERATURE: 98.7 F | RESPIRATION RATE: 16 BRPM | OXYGEN SATURATION: 96 %

## 2025-06-26 LAB
HOLD SPECIMEN: NORMAL
HOLD SPECIMEN: NORMAL
INR PPP: 1.87 (ref 0.85–1.15)
PROTHROMBIN TIME: 21.4 SECONDS (ref 11.8–14.8)

## 2025-06-26 PROCEDURE — 250N000012 HC RX MED GY IP 250 OP 636 PS 637: Performed by: HOSPITALIST

## 2025-06-26 PROCEDURE — 94640 AIRWAY INHALATION TREATMENT: CPT

## 2025-06-26 PROCEDURE — 250N000013 HC RX MED GY IP 250 OP 250 PS 637: Performed by: HOSPITALIST

## 2025-06-26 PROCEDURE — 85610 PROTHROMBIN TIME: CPT | Performed by: HOSPITALIST

## 2025-06-26 PROCEDURE — 36415 COLL VENOUS BLD VENIPUNCTURE: CPT | Performed by: HOSPITALIST

## 2025-06-26 PROCEDURE — 120N000001 HC R&B MED SURG/OB

## 2025-06-26 PROCEDURE — 99232 SBSQ HOSP IP/OBS MODERATE 35: CPT | Performed by: STUDENT IN AN ORGANIZED HEALTH CARE EDUCATION/TRAINING PROGRAM

## 2025-06-26 PROCEDURE — 999N000157 HC STATISTIC RCP TIME EA 10 MIN

## 2025-06-26 PROCEDURE — 94640 AIRWAY INHALATION TREATMENT: CPT | Mod: 76

## 2025-06-26 PROCEDURE — G0463 HOSPITAL OUTPT CLINIC VISIT: HCPCS

## 2025-06-26 PROCEDURE — 250N000013 HC RX MED GY IP 250 OP 250 PS 637: Performed by: STUDENT IN AN ORGANIZED HEALTH CARE EDUCATION/TRAINING PROGRAM

## 2025-06-26 PROCEDURE — 250N000009 HC RX 250: Performed by: HOSPITALIST

## 2025-06-26 RX ORDER — ALBUTEROL SULFATE 90 UG/1
2 INHALANT RESPIRATORY (INHALATION) EVERY 6 HOURS PRN
Qty: 18 G | Refills: 0 | Status: SHIPPED | OUTPATIENT
Start: 2025-06-26

## 2025-06-26 RX ORDER — KETOCONAZOLE 20 MG/G
CREAM TOPICAL 2 TIMES DAILY
Status: DISCONTINUED | OUTPATIENT
Start: 2025-06-26 | End: 2025-06-27

## 2025-06-26 RX ADMIN — LISINOPRIL 20 MG: 20 TABLET ORAL at 09:19

## 2025-06-26 RX ADMIN — CARBAMAZEPINE 200 MG: 200 TABLET ORAL at 12:01

## 2025-06-26 RX ADMIN — KETOCONAZOLE: 20 CREAM TOPICAL at 21:30

## 2025-06-26 RX ADMIN — PREDNISONE 40 MG: 20 TABLET ORAL at 09:18

## 2025-06-26 RX ADMIN — PANTOPRAZOLE SODIUM 40 MG: 40 TABLET, DELAYED RELEASE ORAL at 09:19

## 2025-06-26 RX ADMIN — ALBUTEROL SULFATE 2.5 MG: 2.5 SOLUTION RESPIRATORY (INHALATION) at 07:33

## 2025-06-26 RX ADMIN — CARBAMAZEPINE 400 MG: 200 TABLET ORAL at 21:29

## 2025-06-26 RX ADMIN — ALBUTEROL SULFATE 2.5 MG: 2.5 SOLUTION RESPIRATORY (INHALATION) at 11:30

## 2025-06-26 RX ADMIN — ALBUTEROL SULFATE 2.5 MG: 2.5 SOLUTION RESPIRATORY (INHALATION) at 15:57

## 2025-06-26 RX ADMIN — GUAIFENESIN AND CODEINE PHOSPHATE 10 ML: 100; 10 SOLUTION ORAL at 16:40

## 2025-06-26 RX ADMIN — ALBUTEROL SULFATE 2.5 MG: 2.5 SOLUTION RESPIRATORY (INHALATION) at 04:16

## 2025-06-26 RX ADMIN — ATORVASTATIN CALCIUM 10 MG: 10 TABLET, FILM COATED ORAL at 21:30

## 2025-06-26 RX ADMIN — FLUTICASONE FUROATE AND VILANTEROL TRIFENATATE 1 PUFF: 100; 25 POWDER RESPIRATORY (INHALATION) at 07:33

## 2025-06-26 RX ADMIN — ALBUTEROL SULFATE 2.5 MG: 2.5 SOLUTION RESPIRATORY (INHALATION) at 20:29

## 2025-06-26 RX ADMIN — CARBAMAZEPINE 400 MG: 200 TABLET ORAL at 09:18

## 2025-06-26 RX ADMIN — WARFARIN SODIUM 25 MG: 10 TABLET ORAL at 18:34

## 2025-06-26 ASSESSMENT — ACTIVITIES OF DAILY LIVING (ADL)
ADLS_ACUITY_SCORE: 49
ADLS_ACUITY_SCORE: 45
ADLS_ACUITY_SCORE: 55
ADLS_ACUITY_SCORE: 55
ADLS_ACUITY_SCORE: 49
ADLS_ACUITY_SCORE: 55
ADLS_ACUITY_SCORE: 54
DEPENDENT_IADLS:: CLEANING;COOKING;LAUNDRY;SHOPPING;MEAL PREPARATION;MEDICATION MANAGEMENT;MONEY MANAGEMENT;TRANSPORTATION
ADLS_ACUITY_SCORE: 49
ADLS_ACUITY_SCORE: 49
ADLS_ACUITY_SCORE: 55
ADLS_ACUITY_SCORE: 54
ADLS_ACUITY_SCORE: 54
ADLS_ACUITY_SCORE: 45
ADLS_ACUITY_SCORE: 55
ADLS_ACUITY_SCORE: 49
ADLS_ACUITY_SCORE: 49
ADLS_ACUITY_SCORE: 54
ADLS_ACUITY_SCORE: 45
ADLS_ACUITY_SCORE: 49
ADLS_ACUITY_SCORE: 55
ADLS_ACUITY_SCORE: 49

## 2025-06-26 NOTE — PHARMACY-ADMISSION MEDICATION HISTORY
Pharmacist Admission Medication History    Admission medication history is complete. The information provided in this note is only as accurate as the sources available at the time of the update.    Information Source(s): Patient via in-person    Pertinent Information:     Changes made to PTA medication list:  Added: None  Deleted: Prednisone  Changed: Warfarin    Allergies reviewed with patient and updates made in EHR: no    Medication History Completed By: Jose Rafael Ko RPH 6/25/2025 7:24 PM    PTA Med List   Medication Sig Last Dose/Taking    albuterol (PROAIR HFA/PROVENTIL HFA/VENTOLIN HFA) 108 (90 Base) MCG/ACT inhaler Inhale 2 puffs into the lungs every 6 hours as needed for shortness of breath, wheezing or cough. Taking As Needed    albuterol (PROVENTIL) (2.5 MG/3ML) 0.083% neb solution Take 1 vial (2.5 mg) by nebulization every 4 hours as needed for shortness of breath, wheezing or cough. Taking As Needed    azelastine (ASTELIN) 0.1 % nasal spray Spray 1 spray into both nostrils daily as needed for allergies. Taking As Needed    carBAMazepine (TEGRETOL) 200 MG tablet Take 400 mg by mouth 2 times daily. AM and HS 6/25/2025 Morning    carBAMazepine (TEGRETOL) 200 MG tablet Take 200 mg by mouth daily. @1200 6/24/2025 Noon    fluticasone (FLONASE) 50 MCG/ACT nasal spray USE ONE SPRAY INTO BOTH NOSTRILS DAILY AS NEEDED FOR RHINITIS OR ALLERGIES Taking    Fluticasone Furoate-Vilanterol (BREO ELLIPTA) 50-25 MCG/ACT AEPB Inhale 1 puff into the lungs 2 times daily. 6/25/2025 Morning    ipratropium - albuterol 0.5 mg/2.5 mg/3 mL (DUONEB) 0.5-2.5 (3) MG/3ML neb solution Take 1 vial (3 mLs) by nebulization every 4 hours as needed for shortness of breath, wheezing or cough. Taking As Needed    lisinopril (ZESTRIL) 20 MG tablet TAKE ONE TABLET BY MOUTH DAILY 6/25/2025 Morning    lovastatin (MEVACOR) 40 MG tablet Take 40 mg by mouth at bedtime. 6/24/2025 Bedtime    miconazole (MICATIN) 2 % external powder Apply  topically as needed for itching or other. Taking As Needed    Multiple Vitamin (MULTI-VITAMIN PO) Take 1 tablet by mouth daily 6/25/2025 Morning    nystatin (MYCOSTATIN) 644318 UNIT/GM external cream Apply topically 3 times daily as needed. Taking As Needed    omeprazole (PRILOSEC) 20 MG DR capsule Take 20 mg by mouth daily. 6/25/2025 Morning    sertraline (ZOLOFT) 100 MG tablet TAKE ONE TABLET BY MOUTH DAILY 6/25/2025 Morning    warfarin ANTICOAGULANT (COUMADIN/JANTOVEN) 5 MG tablet 25 mg Mon, Thu, Sat;   20 mg all other days 6/24/2025 Evening

## 2025-06-26 NOTE — PLAN OF CARE
"Shift Summary:    A/O x4. VSS, afebrile. Denies pain, dizziness, nausea. On 2L NC, sating > 90%. LAZAR, ex wheezes, occasional coughs, robintussin AC x1, effective. Assist of 1-2 gb wk pivot. Good oral intake. On scheduled nebs and coumadin. SW and WOC to consult. Pt w/ localized rashes and wound, bariatric bed/mattress in place. Ex cath applied, good UOP.     Goal Outcome Evaluation:      Plan of Care Reviewed With: patient    Overall Patient Progress: improving    Outcome Evaluation: O2 > 90% on 2L NC. Occasional coughs, PRN robintussin AC x1, helpful. Refuses CPAP at bedtime, see RT's notes. Denies pain, dizziness, nausea. Pt w/ localized rashes and wound, new bariatric bed/mattress in place.        Problem: Adult Inpatient Plan of Care  Goal: Plan of Care Review  Description: The Plan of Care Review/Shift note should be completed every shift.  The Outcome Evaluation is a brief statement about your assessment that the patient is improving, declining, or no change.  This information will be displayed automatically on your shift  note.  Outcome: Progressing  Flowsheets (Taken 6/26/2025 0005)  Outcome Evaluation: O2 > 90% on 2L NC. Occasional coughs, PRN robintussin AC x1, helpful. Refuses CPAP at bedtime, see RT's notes. Denies pain, dizziness, nausea. Pt w/ localized rashes and wound, new bariatric bed/mattress in place.  Plan of Care Reviewed With: patient  Overall Patient Progress: improving  Goal: Patient-Specific Goal (Individualized)  Description: You can add care plan individualizations to a care plan. Examples of Individualization might be:  \"Parent requests to be called daily at 9am for status\", \"I have a hard time hearing out of my right ear\", or \"Do not touch me to wake me up as it startles  me\".  Outcome: Progressing  Goal: Absence of Hospital-Acquired Illness or Injury  Outcome: Progressing  Intervention: Identify and Manage Fall Risk  Recent Flowsheet Documentation  Taken 6/25/2025 1740 by Dia Blackman, " RN  Safety Promotion/Fall Prevention:   activity supervised   clutter free environment maintained   nonskid shoes/slippers when out of bed   patient and family education   room door open   room near nurse's station   room organization consistent   safety round/check completed   supervised activity  Intervention: Prevent Skin Injury  Recent Flowsheet Documentation  Taken 6/25/2025 1740 by Dia Blackman RN  Body Position: supine, head elevated  Intervention: Prevent and Manage VTE (Venous Thromboembolism) Risk  Recent Flowsheet Documentation  Taken 6/25/2025 1740 by Dia Blackman RN  VTE Prevention/Management: (on coumadin) patient refused intervention  Intervention: Prevent Infection  Recent Flowsheet Documentation  Taken 6/25/2025 1740 by Dia Blackman RN  Infection Prevention:   single patient room provided   hand hygiene promoted   cohorting utilized  Goal: Optimal Comfort and Wellbeing  Outcome: Progressing  Goal: Readiness for Transition of Care  Outcome: Progressing  Intervention: Mutually Develop Transition Plan  Recent Flowsheet Documentation  Taken 6/25/2025 2159 by Dia Blackman RN  Equipment Currently Used at Home:   walker, rolling   wheelchair, power   commode chair     Problem: Comorbidity Management  Goal: Maintenance of Asthma Control  Outcome: Progressing  Intervention: Maintain Asthma Symptom Control  Recent Flowsheet Documentation  Taken 6/25/2025 1740 by Dia Blackman RN  Medication Review/Management: medications reviewed  Goal: Blood Pressure in Desired Range  Outcome: Progressing  Intervention: Maintain Blood Pressure Management  Recent Flowsheet Documentation  Taken 6/25/2025 1740 by Dia Blackman RN  Medication Review/Management: medications reviewed     Problem: Gas Exchange Impaired  Goal: Optimal Gas Exchange  Outcome: Progressing  Intervention: Optimize Oxygenation and Ventilation  Recent Flowsheet Documentation  Taken 6/25/2025 1740 by Dia Blackman RN  Head of Bed (HOB) Positioning: HOB at  30-45 degrees

## 2025-06-26 NOTE — PLAN OF CARE
"Goal Outcome Evaluation:      Plan of Care Reviewed With: patient    Overall Patient Progress: no changeOverall Patient Progress: no change    Outcome Evaluation: A&Ox4, On 2L NC. Lungs: Expiratory wheezing. Denies of pain.      Non-blanchable redness and rash under skin folds and groin areas, cleaned and  Interdry changed. Left thigh skin tear, covered with foam dressing.     Problem: Adult Inpatient Plan of Care  Goal: Plan of Care Review  Description: The Plan of Care Review/Shift note should be completed every shift.  The Outcome Evaluation is a brief statement about your assessment that the patient is improving, declining, or no change.  This information will be displayed automatically on your shift  note.  Outcome: Progressing  Flowsheets (Taken 6/26/2025 1151)  Outcome Evaluation: A&Ox4, On 2L NC. Lungs: Expiratory wheezing. Denies of pain.  Plan of Care Reviewed With: patient  Overall Patient Progress: no change  Goal: Patient-Specific Goal (Individualized)  Description: You can add care plan individualizations to a care plan. Examples of Individualization might be:  \"Parent requests to be called daily at 9am for status\", \"I have a hard time hearing out of my right ear\", or \"Do not touch me to wake me up as it startles  me\".  Outcome: Progressing  Goal: Absence of Hospital-Acquired Illness or Injury  Outcome: Progressing  Intervention: Identify and Manage Fall Risk  Recent Flowsheet Documentation  Taken 6/26/2025 0916 by Noemi Iqbal RN  Safety Promotion/Fall Prevention:   safety round/check completed   patient and family education   nonskid shoes/slippers when out of bed   lighting adjusted  Intervention: Prevent Skin Injury  Recent Flowsheet Documentation  Taken 6/26/2025 1105 by Noemi Iqbal RN  Body Position: supine, head elevated  Skin Protection:   adhesive use limited   incontinence pads utilized   skin sealant/moisture barrier applied  Taken 6/26/2025 0916 by Noemi Iqbal RN  Body Position: " supine, head elevated  Intervention: Prevent Infection  Recent Flowsheet Documentation  Taken 6/26/2025 0916 by Noemi Iqbal RN  Infection Prevention: hand hygiene promoted  Goal: Optimal Comfort and Wellbeing  Outcome: Progressing  Goal: Readiness for Transition of Care  Outcome: Progressing     Problem: Comorbidity Management  Goal: Maintenance of Asthma Control  Outcome: Progressing  Intervention: Maintain Asthma Symptom Control  Recent Flowsheet Documentation  Taken 6/26/2025 0916 by Noemi Iqbal RN  Medication Review/Management: medications reviewed  Goal: Blood Pressure in Desired Range  Outcome: Progressing  Intervention: Maintain Blood Pressure Management  Recent Flowsheet Documentation  Taken 6/26/2025 0916 by Noemi Iqbal RN  Medication Review/Management: medications reviewed     Problem: Gas Exchange Impaired  Goal: Optimal Gas Exchange  Outcome: Progressing  Intervention: Optimize Oxygenation and Ventilation  Recent Flowsheet Documentation  Taken 6/26/2025 1105 by Noemi Iqbal RN  Head of Bed (HOB) Positioning: HOB at 30-45 degrees  Taken 6/26/2025 0916 by Noemi Iqbal RN  Head of Bed (HOB) Positioning: HOB at 30-45 degrees
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
## 2025-06-26 NOTE — PLAN OF CARE
"Goal Outcome Evaluation:      Plan of Care Reviewed With: patient    Overall Patient Progress: improvingOverall Patient Progress: improving    Outcome Evaluation: A/Ox4. 2 L of O2. stating >92%. C/O SOB 1x neb tx given. pt refused CPAP. safety measured maintained.      Problem: Adult Inpatient Plan of Care  Goal: Plan of Care Review  Description: The Plan of Care Review/Shift note should be completed every shift.  The Outcome Evaluation is a brief statement about your assessment that the patient is improving, declining, or no change.  This information will be displayed automatically on your shift  note.  Outcome: Progressing  Flowsheets (Taken 6/26/2025 0755)  Outcome Evaluation: A/Ox4. 2 L of O2. stating >92%. C/O SOB 1x neb tx given. pt refused CPAP. safety measured maintained.  Plan of Care Reviewed With: patient  Overall Patient Progress: improving  Goal: Patient-Specific Goal (Individualized)  Description: You can add care plan individualizations to a care plan. Examples of Individualization might be:  \"Parent requests to be called daily at 9am for status\", \"I have a hard time hearing out of my right ear\", or \"Do not touch me to wake me up as it startles  me\".  Outcome: Progressing  Goal: Absence of Hospital-Acquired Illness or Injury  Outcome: Progressing  Intervention: Prevent Infection  Recent Flowsheet Documentation  Taken 6/26/2025 0300 by Herber Phillips, RN  Infection Prevention:   single patient room provided   hand hygiene promoted   cohorting utilized  Goal: Optimal Comfort and Wellbeing  Outcome: Progressing  Goal: Readiness for Transition of Care  Outcome: Progressing     Problem: Comorbidity Management  Goal: Maintenance of Asthma Control  Outcome: Progressing  Goal: Blood Pressure in Desired Range  Outcome: Progressing     Problem: Gas Exchange Impaired  Goal: Optimal Gas Exchange  Outcome: Progressing         "

## 2025-06-26 NOTE — PROVIDER NOTIFICATION
"   06/25/25 2312   RCAT Assessment   Reason for Assessment Asthma   Pulmonary Status 4   Surgical Status 0   Chest X-ray 1   Respiratory Pattern 1   Mental Status 0   Breath Sounds 4   Cough Effectiveness 1   Level of Activity 1   O2 Required for SpO2>=92% 1   Acuity Level (points) 13   Acuity Level  3   Re-eval Interval Guideline Every 3 days   Re-evaluation Date 06/29/25   Clinical Indications/Symptoms   Aerosol Therapy RCAT protocol;Physician order   Broncho-pulmonary Hygiene Productive cough   Volume Expansion Prevent atelectasis   Aerosol Therapy Plan   RT Treatment Nebulizer   Anticholinergic/Beta-Andrenergic Agonist   (Albuterol)   Aerosol Treatment Frequency Acuity Level 3: QID/PRN @noc-Mod wheezing/Hx asthma/secretion removal   Aerosol Therapy (SVN)   Respiratory Treatment Status (SVN) given   Patient Position HOB elevated   Posttreatment Assessment (SVN) breath sounds improved   Broncho-Pulmonary Hygiene Plan   Broncho-Pulmonary Hygiene Treatment Coughing techniques   Broncho-Pulm Hygiene Frequency Acuity Level 3: TID-Small amounts secretions/poor cough, hx of secretions   Volume Expansion Plan   Volume Expansion Treatment Incentive Spirometer   Volume Expansion Frequency Acuity Level 3: TID-At risk for developing atelectasis     Education was performed with the patient in regards to indications/benefits and possible side effects of bronchodilators. RT will continue to do education with patient.     BP (!) 153/72   Pulse 92   Temp 98.3  F (36.8  C) (Oral)   Resp 24   Ht 1.702 m (5' 7\")   Wt (!) 175.4 kg (386 lb 11 oz)   LMP 08/18/2008   SpO2 92%   BMI 60.56 kg/m      Sincere Hauser, RT on 6/25/2025 at 11:19 PM    "

## 2025-06-26 NOTE — DISCHARGE INSTRUCTIONS
Interdry:  Location Pannus and breast folds - other folds as needed  Cut 2 inches larger than skin fold, date dressing.  Apply a single layer -flat, not bunched up- of dressing in skin fold so 2 inches are visible (allow moisture to wick out).   Remove dressing with routine cleansing and reapply, do not use powder/ointment in folds if Interdry in use..   Discard if grossly soiled otherwise 1 piece is good up to 5 days.    L posteromedial thigh - Every 3 days  Cleanse with saline; gently dry.  Cover with Mepilex.
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
## 2025-06-26 NOTE — PROGRESS NOTES
ANTICOAGULATION     Brissa Pascualman is overdue for an INR check.     Care Everywhere updated: yes    Patient is currently admitted at [facility]. Will postpone reminder 1 week.    David Ulrich RN  6/26/2025  Anticoagulation Clinic  [facility] for routing messages: yadira ARITA  [facility] patient phone line: [phone]

## 2025-06-27 LAB
INR PPP: 2.34 (ref 0.85–1.15)
PROTHROMBIN TIME: 25.3 SECONDS (ref 11.8–14.8)

## 2025-06-27 PROCEDURE — 99232 SBSQ HOSP IP/OBS MODERATE 35: CPT | Performed by: STUDENT IN AN ORGANIZED HEALTH CARE EDUCATION/TRAINING PROGRAM

## 2025-06-27 PROCEDURE — 250N000009 HC RX 250: Performed by: HOSPITALIST

## 2025-06-27 PROCEDURE — 250N000013 HC RX MED GY IP 250 OP 250 PS 637: Performed by: STUDENT IN AN ORGANIZED HEALTH CARE EDUCATION/TRAINING PROGRAM

## 2025-06-27 PROCEDURE — 999N000157 HC STATISTIC RCP TIME EA 10 MIN

## 2025-06-27 PROCEDURE — 250N000012 HC RX MED GY IP 250 OP 636 PS 637: Performed by: HOSPITALIST

## 2025-06-27 PROCEDURE — 250N000013 HC RX MED GY IP 250 OP 250 PS 637: Performed by: HOSPITALIST

## 2025-06-27 PROCEDURE — 94640 AIRWAY INHALATION TREATMENT: CPT | Mod: 76

## 2025-06-27 PROCEDURE — 36415 COLL VENOUS BLD VENIPUNCTURE: CPT | Performed by: HOSPITALIST

## 2025-06-27 PROCEDURE — 85610 PROTHROMBIN TIME: CPT | Performed by: HOSPITALIST

## 2025-06-27 PROCEDURE — 120N000001 HC R&B MED SURG/OB

## 2025-06-27 PROCEDURE — 94640 AIRWAY INHALATION TREATMENT: CPT

## 2025-06-27 RX ORDER — WARFARIN SODIUM 5 MG/1
20 TABLET ORAL
Status: COMPLETED | OUTPATIENT
Start: 2025-06-27 | End: 2025-06-27

## 2025-06-27 RX ADMIN — MICONAZOLE NITRATE: 20 POWDER TOPICAL at 12:14

## 2025-06-27 RX ADMIN — FLUTICASONE FUROATE AND VILANTEROL TRIFENATATE 1 PUFF: 100; 25 POWDER RESPIRATORY (INHALATION) at 08:00

## 2025-06-27 RX ADMIN — PREDNISONE 40 MG: 20 TABLET ORAL at 09:02

## 2025-06-27 RX ADMIN — CARBAMAZEPINE 200 MG: 200 TABLET ORAL at 12:15

## 2025-06-27 RX ADMIN — LISINOPRIL 20 MG: 20 TABLET ORAL at 09:02

## 2025-06-27 RX ADMIN — PANTOPRAZOLE SODIUM 40 MG: 40 TABLET, DELAYED RELEASE ORAL at 09:03

## 2025-06-27 RX ADMIN — MICONAZOLE NITRATE: 20 POWDER TOPICAL at 20:15

## 2025-06-27 RX ADMIN — CARBAMAZEPINE 400 MG: 200 TABLET ORAL at 20:14

## 2025-06-27 RX ADMIN — ATORVASTATIN CALCIUM 10 MG: 10 TABLET, FILM COATED ORAL at 20:15

## 2025-06-27 RX ADMIN — ALBUTEROL SULFATE 2.5 MG: 2.5 SOLUTION RESPIRATORY (INHALATION) at 08:00

## 2025-06-27 RX ADMIN — ALBUTEROL SULFATE 2.5 MG: 2.5 SOLUTION RESPIRATORY (INHALATION) at 19:15

## 2025-06-27 RX ADMIN — CARBAMAZEPINE 400 MG: 200 TABLET ORAL at 09:02

## 2025-06-27 RX ADMIN — ALBUTEROL SULFATE 2.5 MG: 2.5 SOLUTION RESPIRATORY (INHALATION) at 11:38

## 2025-06-27 RX ADMIN — ALBUTEROL SULFATE 2.5 MG: 2.5 SOLUTION RESPIRATORY (INHALATION) at 15:52

## 2025-06-27 RX ADMIN — WARFARIN SODIUM 20 MG: 5 TABLET ORAL at 17:50

## 2025-06-27 ASSESSMENT — ACTIVITIES OF DAILY LIVING (ADL)
ADLS_ACUITY_SCORE: 54
ADLS_ACUITY_SCORE: 54
ADLS_ACUITY_SCORE: 52
ADLS_ACUITY_SCORE: 54
ADLS_ACUITY_SCORE: 54
ADLS_ACUITY_SCORE: 52
ADLS_ACUITY_SCORE: 53
ADLS_ACUITY_SCORE: 52
ADLS_ACUITY_SCORE: 54
ADLS_ACUITY_SCORE: 53
ADLS_ACUITY_SCORE: 54
ADLS_ACUITY_SCORE: 54
ADLS_ACUITY_SCORE: 52
ADLS_ACUITY_SCORE: 53
ADLS_ACUITY_SCORE: 52
ADLS_ACUITY_SCORE: 54
ADLS_ACUITY_SCORE: 54
ADLS_ACUITY_SCORE: 53
ADLS_ACUITY_SCORE: 52
ADLS_ACUITY_SCORE: 52
ADLS_ACUITY_SCORE: 54
ADLS_ACUITY_SCORE: 53
ADLS_ACUITY_SCORE: 53

## 2025-06-27 NOTE — PLAN OF CARE
"Shift Summary (2415 - 4725):    A/O x4. VSS, afebrile. On 2L O2 NC. LAZAR, LS w/ exp wheezes. Denies pain, dizziness, nausea. On scheduled nebs, inhaler, PO steroid and coumadin. Assist of 1-2 gb wk pivot. Inc of b/b. Ex cath in place. Wound cares done per POC. Possible discharge to halfway. Awaiting placement. SW following.     Goal Outcome Evaluation:      Plan of Care Reviewed With: patient    Overall Patient Progress: no change    Outcome Evaluation: VSS. Denies pain. Continues 2L O2 NC (baseline). Possible discharge to halfway. Awaiting placement.      Problem: Adult Inpatient Plan of Care  Goal: Plan of Care Review  Description: The Plan of Care Review/Shift note should be completed every shift.  The Outcome Evaluation is a brief statement about your assessment that the patient is improving, declining, or no change.  This information will be displayed automatically on your shift  note.  Outcome: Progressing  Flowsheets (Taken 6/26/2025 2212)  Outcome Evaluation: VSS. Denies pain. Continues 2L O2 NC (baseline). Possible discharge to [facility]. Awaiting placement.  Plan of Care Reviewed With: patient  Overall Patient Progress: no change  Goal: Patient-Specific Goal (Individualized)  Description: You can add care plan individualizations to a care plan. Examples of Individualization might be:  \"Parent requests to be called daily at 9am for status\", \"I have a hard time hearing out of my right ear\", or \"Do not touch me to wake me up as it startles  me\".  Outcome: Progressing  Goal: Absence of Hospital-Acquired Illness or Injury  Outcome: Progressing  Intervention: Identify and Manage Fall Risk  Recent Flowsheet Documentation  Taken 6/26/2025 6849 by Dia Blackman, RN  Safety Promotion/Fall Prevention:   activity supervised   assistive device/personal items within reach   clutter free environment maintained   nonskid shoes/slippers when out of bed   patient and family education   room door open   room near nurse's station   room " organization consistent   safety round/check completed   supervised activity  Intervention: Prevent Skin Injury  Recent Flowsheet Documentation  Taken 6/26/2025 1557 by Dia Blackman RN  Body Position:   position changed independently   supine, head elevated  Intervention: Prevent and Manage VTE (Venous Thromboembolism) Risk  Recent Flowsheet Documentation  Taken 6/26/2025 1557 by Dia Blackman RN  VTE Prevention/Management: (on coumadin) patient refused intervention  Intervention: Prevent Infection  Recent Flowsheet Documentation  Taken 6/26/2025 1557 by Dia Blackman RN  Infection Prevention:   single patient room provided   hand hygiene promoted   cohorting utilized  Goal: Optimal Comfort and Wellbeing  Outcome: Progressing  Goal: Readiness for Transition of Care  Outcome: Progressing     Problem: Comorbidity Management  Goal: Maintenance of Asthma Control  Outcome: Progressing  Intervention: Maintain Asthma Symptom Control  Recent Flowsheet Documentation  Taken 6/26/2025 1557 by Dia Blackman RN  Medication Review/Management: medications reviewed  Goal: Blood Pressure in Desired Range  Outcome: Progressing  Intervention: Maintain Blood Pressure Management  Recent Flowsheet Documentation  Taken 6/26/2025 1557 by Dia Blackman RN  Medication Review/Management: medications reviewed     Problem: Gas Exchange Impaired  Goal: Optimal Gas Exchange  Outcome: Progressing  Intervention: Optimize Oxygenation and Ventilation  Recent Flowsheet Documentation  Taken 6/26/2025 1557 by Dia Blackman RN  Head of Bed (HOB) Positioning: HOB at 20-30 degrees

## 2025-06-27 NOTE — PLAN OF CARE
"Goal Outcome Evaluation:      Plan of Care Reviewed With: patient    Overall Patient Progress: no changeOverall Patient Progress: no change    Outcome Evaluation: A/Ox4, afebrile, VSS. denies pain CP, SOB. remains on 2 L of oxygen and stating well. wheezing of the lungs, on LAZAR. pt on coumadin and steriods. currently being followed by SW for placement. pt wound of the skin folds, C/D/I.      Problem: Adult Inpatient Plan of Care  Goal: Plan of Care Review  Description: The Plan of Care Review/Shift note should be completed every shift.  The Outcome Evaluation is a brief statement about your assessment that the patient is improving, declining, or no change.  This information will be displayed automatically on your shift  note.  Outcome: Progressing  Flowsheets (Taken 6/27/2025 4412)  Outcome Evaluation: A/Ox4, afebrile, VSS. denies pain CP, SOB. remains on 2 L of oxygen and stating well. wheezing of the lungs, on LAZAR. pt on coumadin and steriods. currently being followed by SW for placement. pt wound of the skin folds, C/D/I.  Plan of Care Reviewed With: patient  Overall Patient Progress: no change  Goal: Patient-Specific Goal (Individualized)  Description: You can add care plan individualizations to a care plan. Examples of Individualization might be:  \"Parent requests to be called daily at 9am for status\", \"I have a hard time hearing out of my right ear\", or \"Do not touch me to wake me up as it startles  me\".  Outcome: Progressing  Goal: Absence of Hospital-Acquired Illness or Injury  Outcome: Progressing  Intervention: Identify and Manage Fall Risk  Recent Flowsheet Documentation  Taken 6/27/2025 0300 by Hreber Phillips RN  Safety Promotion/Fall Prevention:   activity supervised   assistive device/personal items within reach   clutter free environment maintained   nonskid shoes/slippers when out of bed   patient and family education   room door open   room near nurse's station   room organization consistent   " safety round/check completed   supervised activity  Taken 6/26/2025 2259 by Herber Phillips RN  Safety Promotion/Fall Prevention:   activity supervised   assistive device/personal items within reach   clutter free environment maintained   nonskid shoes/slippers when out of bed   patient and family education   room door open   room near nurse's station   room organization consistent   safety round/check completed   supervised activity  Intervention: Prevent Skin Injury  Recent Flowsheet Documentation  Taken 6/27/2025 0300 by Herber Phillips RN  Body Position:   position changed independently   supine, head elevated  Taken 6/26/2025 2259 by eHrber Phillips RN  Body Position:   position changed independently   supine, head elevated  Intervention: Prevent and Manage VTE (Venous Thromboembolism) Risk  Recent Flowsheet Documentation  Taken 6/27/2025 0300 by Herber Phillips RN  VTE Prevention/Management: (Coumadin--SEE MAR) patient refused intervention  Taken 6/26/2025 2259 by Herber Phillips RN  VTE Prevention/Management: (Coumadin--SEE MAR) patient refused intervention  Intervention: Prevent Infection  Recent Flowsheet Documentation  Taken 6/27/2025 0300 by Herber Phillips RN  Infection Prevention:   single patient room provided   hand hygiene promoted   cohorting utilized  Taken 6/26/2025 2259 by Herber Phillips RN  Infection Prevention:   single patient room provided   hand hygiene promoted   cohorting utilized  Goal: Optimal Comfort and Wellbeing  Outcome: Progressing  Goal: Readiness for Transition of Care  Outcome: Progressing     Problem: Comorbidity Management  Goal: Maintenance of Asthma Control  Outcome: Progressing  Intervention: Maintain Asthma Symptom Control  Recent Flowsheet Documentation  Taken 6/27/2025 0300 by Herber Phillips RN  Medication Review/Management: medications reviewed  Taken 6/26/2025 2259 by Herber Phillips RN  Medication Review/Management: medications reviewed  Goal: Blood  Pressure in Desired Range  Outcome: Progressing  Intervention: Maintain Blood Pressure Management  Recent Flowsheet Documentation  Taken 6/27/2025 0300 by Herber Phillips RN  Medication Review/Management: medications reviewed  Taken 6/26/2025 2259 by Herber Phillips RN  Medication Review/Management: medications reviewed     Problem: Gas Exchange Impaired  Goal: Optimal Gas Exchange  Outcome: Progressing  Intervention: Optimize Oxygenation and Ventilation  Recent Flowsheet Documentation  Taken 6/27/2025 0300 by Herber Phillips RN  Head of Bed (HOB) Positioning: HOB at 20-30 degrees  Taken 6/26/2025 2259 by Herber Phillips RN  Head of Bed (HOB) Positioning: HOB at 20-30 degrees

## 2025-06-27 NOTE — PROGRESS NOTES
[facility]    Medicine Progress Note - Hospitalist Service    Date of Admission:  6/25/2025    Assessment & Plan     Brissa Corado is a 60 year old female admitted on 6/25/2025 with past medical history of asthma on chronic oxygen at night, obstructive sleep apnea, morbid obesity, hypertension, seizure disorder who presents with asthma exacerbation 2/2 poor air quality and not using/having access to her LABA/ICS.     Acute on chronic hypoxic respiratory failure  Asthma exacerbation  RICK   At baseline pt is 2.5L at night and during the day as needed.   CTPA negative for PE.  Patient states her triggers been poor air quality and she has not been using her controller LABA/ICS.  Has not had a sleep apnea machine at home but reports that she has an appointment coming up in the next 2 weeks, even though she is diagnosed is not tolerating it due to claustrophobic feelings.  - Scheduled albuterol nebs while awake Q4 scheduled  - Nebs every 4 hours as needed  - Prednisone 40 mg daily   - CPAP ordered  - Continue Breo Ellipta control inhaler     History of developmental delay  SW Consultation -father who is legal guardian states he received paperwork from the [facility] to [facility] stating that patient is not to be able to return to her own residence and requires to be moved to a care facility to ensure she gets her medications as ordered and prescribed.  He states that his wife is trying to get a hold of our social work team to clarify if this is legal.      - consulted and following.   - Per  note patient will be getting appointed a new legal guardian in July     H/o DVT -recently subtherapeutic on INR.  Pharmacy warfarin panel ordered.  Continue warfarin as inpatient.     Seizure disorder -continue Tegretol     Hypertension -continue lisinopril     Hyperlipidemia - continue statin     Gerd - continue prilosec     Super morbid obesity  Intertriginous excoriations and wounds in multiple  "skin folds  WOC following   - Start patient on topical azole therapy with ketoconazole 2% twice daily  - General Wound cares per bedside RN and WOC cares weekly          Diet: Combination Diet Regular Diet Adult    DVT Prophylaxis: Warfarin  Grajeda Catheter: Not present  Lines: None     Cardiac Monitoring: None  Code Status: Full Code      Clinically Significant Risk Factors                   # Hypertension: Noted on problem list            # Morbid Obesity: Estimated body mass index is 60.56 kg/m  as calculated from the following:    Height as of this encounter: 1.702 m (5' 7\").    Weight as of this encounter: 175.4 kg (386 lb 11 oz)., PRESENT ON ADMISSION     # Asthma: noted on problem list        Social Drivers of Health            Disposition Plan     Medically Ready for Discharge: Anticipated Tomorrow             Andreea Mcdonald MD  Hospitalist Service  [facility]  Securely message with [employer] (more info)  Text page via [employer] Paging/Directory   ______________________________________________________________________    Interval History   Patient was seen and examined at bedside.  Patient reports that she is still wheezing.  Intermittent cough but not productive.  No chest pain.  4 point review of systems otherwise negative    Physical Exam   Vital Signs: Temp: 98.2  F (36.8  C) Temp src: Oral BP: 136/46 Pulse: 89   Resp: 20 SpO2: 96 % O2 Device: Nasal cannula Oxygen Delivery: 3 LPM  Weight: 386 lbs 10.99 oz    Constitutional: awake, alert, cooperative, no apparent distress, and appears stated age  Eyes: Anicteric sclera  ENT: normocephalic, without obvious abnormality  Respiratory: On nasal cannula, lungs clear anteriorly, unable to assess lungs posteriorly given body habitus and difficulty for the patient to move  Cardiovascular: Normal rate, regular rhythm, no murmur  GI: Obese, soft, nontender, nondistended  Musculoskeletal: no lower extremity pitting edema present  Neurologic: Awake, alert, " oriented to self and place  Neuropsychiatric: Appropriate mood and affect    Medical Decision Making       45 MINUTES SPENT BY ME on the date of service doing chart review, history, exam, documentation & further activities per the note.      Data   ------------------------- PAST 24 HR DATA REVIEWED -----------------------------------------------

## 2025-06-27 NOTE — PLAN OF CARE
"VSS. A/OX4. Pt sating 96% on 2.5L NC. Assist of 2 w/ gt belt for getting out of bed. Purewick in place w/ good output. Regular diet w/ good appetite. Bilateral under breasts/groin, abdominal redness-blanchable. Cleaned, powder, interdry applied. PIV saline locked.     Goal Outcome Evaluation:      Plan of Care Reviewed With: patient    Overall Patient Progress: improvingOverall Patient Progress: improving    Outcome Evaluation: Monitor for changes in respiratory.    Problem: Adult Inpatient Plan of Care  Goal: Plan of Care Review  Description: The Plan of Care Review/Shift note should be completed every shift.  The Outcome Evaluation is a brief statement about your assessment that the patient is improving, declining, or no change.  This information will be displayed automatically on your shift  note.  Outcome: Progressing  Flowsheets (Taken 6/27/2025 1300)  Outcome Evaluation: Monitor for changes in respiratory.  Plan of Care Reviewed With: patient  Overall Patient Progress: improving  Goal: Patient-Specific Goal (Individualized)  Description: You can add care plan individualizations to a care plan. Examples of Individualization might be:  \"Parent requests to be called daily at 9am for status\", \"I have a hard time hearing out of my right ear\", or \"Do not touch me to wake me up as it startles  me\".  Outcome: Progressing  Goal: Absence of Hospital-Acquired Illness or Injury  Outcome: Progressing  Intervention: Identify and Manage Fall Risk  Recent Flowsheet Documentation  Taken 6/27/2025 0959 by Ally Farr, RN  Safety Promotion/Fall Prevention: activity supervised  Intervention: Prevent Skin Injury  Recent Flowsheet Documentation  Taken 6/27/2025 0959 by Ally Farr, RN  Body Position: position changed independently  Intervention: Prevent and Manage VTE (Venous Thromboembolism) Risk  Recent Flowsheet Documentation  Taken 6/27/2025 0959 by Ally Farr, RN  VTE Prevention/Management:   SCDs off " (sequential compression devices)   patient refused intervention  Intervention: Prevent Infection  Recent Flowsheet Documentation  Taken 6/27/2025 0959 by Ally Farr RN  Infection Prevention:   hand hygiene promoted   rest/sleep promoted  Goal: Optimal Comfort and Wellbeing  Outcome: Progressing  Goal: Readiness for Transition of Care  Outcome: Progressing     Problem: Comorbidity Management  Goal: Maintenance of Asthma Control  Outcome: Progressing  Intervention: Maintain Asthma Symptom Control  Recent Flowsheet Documentation  Taken 6/27/2025 0959 by Ally Farr RN  Medication Review/Management: medications reviewed  Goal: Blood Pressure in Desired Range  Outcome: Progressing  Intervention: Maintain Blood Pressure Management  Recent Flowsheet Documentation  Taken 6/27/2025 0959 by Ally Farr RN  Medication Review/Management: medications reviewed     Problem: Gas Exchange Impaired  Goal: Optimal Gas Exchange  Outcome: Progressing  Intervention: Optimize Oxygenation and Ventilation  Recent Flowsheet Documentation  Taken 6/27/2025 0959 by Ally Farr, RN  Head of Bed (HOB) Positioning: HOB at 20-30 degrees

## 2025-06-27 NOTE — PROGRESS NOTES
Care Management Follow Up    Length of Stay (days): 2    Expected Discharge Date: 07/02/2025     Concerns to be Addressed: discharge planning     Patient plan of care discussed at interdisciplinary rounds: Yes    Anticipated Discharge Disposition:                Anticipated Discharge Services:    Anticipated Discharge DME:      Patient/family educated on Medicare website which has current facility and service quality ratings:    Education Provided on the Discharge Plan:    Patient/Family in Agreement with the Plan:      Referrals Placed by CM/SW:    Private pay costs discussed: Not applicable    Discussed  Partnership in Safe Discharge Planning  document with patient/family: No     Handoff Completed: Yes, [facility] PCP: Internal handoff referral completed    Additional Information:  LUZ MARIA spoke with Treva, Care Coordinator with [facility] ([phone] ext. [phone]), who was requesting an update on pt. Treva stated that she is in need of completing an in person assessment with Brissa bejarano. Treva also in agreement with the need for pt to discharge to an retirement, as the home environment is not safe.     LUZ MARIA met with pt's mother and guardian to discuss discharge plans. Lola showed LUZ MARIA the letter that she received from Edna with [facility], which stated that pt needs to discharge to an [facility] and can not return home. Lola expressed interest in [facility].     Care Coordinator spoke with Lilliam at [facility] ([phone]) who stated that they did currently have availability. Lilliam requesting records be sent to [facility] for their review. LUZ MARIA faxed records to [facility] (F: [phone]).     Next Steps: SW to follow for discharge to retirement.    AISSATOU Alexander, LGSW  LUZ MARIA Care Coordinator/ Med Surg [address] [place] [address]  [phone]

## 2025-06-27 NOTE — PROGRESS NOTES
[facility]    Medicine Progress Note - Hospitalist Service    Date of Admission:  6/25/2025    Assessment & Plan     Brissa Corado is a 60 year old female admitted on 6/25/2025 with past medical history of asthma on chronic oxygen at night, obstructive sleep apnea, morbid obesity, hypertension, seizure disorder who presents with asthma exacerbation 2/2 poor air quality and not using/having access to her LABA/ICS.     Acute on chronic hypoxic respiratory failure  Asthma exacerbation  RICK   At baseline pt is 2.5L at night and during the day as needed.   CTPA negative for PE.  Patient states her triggers been poor air quality and she has not been using her controller LABA/ICS.  Has not had a sleep apnea machine at home but reports that she has an appointment coming up in the next 2 weeks, even though she is diagnosed is not tolerating it due to claustrophobic feelings.  - Scheduled albuterol nebs while awake Q4 scheduled  - Nebs every 4 hours as needed  - Prednisone 40 mg daily   - CPAP ordered  - Continue Breo Ellipta control inhaler     History of developmental delay  SW Consultation -father who is legal guardian states he received paperwork from the [facility] stating that patient is not to be able to return to her own residence and requires to be moved to a care facility to ensure she gets her medications as ordered and prescribed.  He states that his wife is trying to get a hold of our social work team to clarify if this is legal.      - consulted and following.   - Per  note patient will be getting appointed a new legal guardian in July     H/o DVT -recently subtherapeutic on INR.  Pharmacy warfarin panel ordered.  Continue warfarin as inpatient.     Seizure disorder -continue Tegretol     Hypertension -continue lisinopril     Hyperlipidemia - continue statin     Gerd - continue prilosec     Super morbid obesity  Intertriginous excoriations and wounds in multiple  "skin folds  WOC following   - Start patient on topical azole therapy with ketoconazole 2% twice daily--was switched to miconazole powder per patient's request  - General Wound cares per bedside RN and WOC cares weekly          Diet: Combination Diet Regular Diet Adult    DVT Prophylaxis: Warfarin  Grajeda Catheter: Not present  Lines: None     Cardiac Monitoring: None  Code Status: Full Code      Clinically Significant Risk Factors                   # Hypertension: Noted on problem list            # Morbid Obesity: Estimated body mass index is 60.56 kg/m  as calculated from the following:    Height as of this encounter: 1.702 m (5' 7\").    Weight as of this encounter: 175.4 kg (386 lb 11 oz)., PRESENT ON ADMISSION     # Asthma: noted on problem list        Social Drivers of Health            Disposition Plan     Medically Ready for Discharge: Anticipated Tomorrow             Andreea Mcdonald MD  Hospitalist Service  [facility]  Securely message with [employer] (more info)  Text page via [employer] Paging/Directory   ______________________________________________________________________    Interval History   No acute events overnight.  Patient feels her breathing is now back to her baseline.  No more actively wheezing.  4 point review of systems otherwise negative    Physical Exam   Vital Signs: Temp: 97.7  F (36.5  C) Temp src: Oral BP: 136/58 Pulse: 85   Resp: 18 SpO2: 93 % O2 Device: Nasal cannula Oxygen Delivery: 2 LPM  Weight: 386 lbs 10.99 oz    Constitutional: awake, alert, cooperative, no apparent distress, and appears stated age  Eyes: Anicteric sclera  ENT: normocephalic, without obvious abnormality  Respiratory: On nasal cannula, lungs clear anteriorly, unable to assess lungs posteriorly given body habitus and difficulty for the patient to move  Cardiovascular: Normal rate, regular rhythm, no murmur  GI: Obese, soft, nontender, nondistended  Musculoskeletal: no lower extremity pitting edema " present  Neurologic: Awake, alert, oriented to self and place  Neuropsychiatric: Appropriate mood and affect    Medical Decision Making       44 MINUTES SPENT BY ME on the date of service doing chart review, history, exam, documentation & further activities per the note.      Data   ------------------------- PAST 24 HR DATA REVIEWED -----------------------------------------------

## 2025-06-28 LAB
INR PPP: 2.59 (ref 0.85–1.15)
PROTHROMBIN TIME: 27.4 SECONDS (ref 11.8–14.8)

## 2025-06-28 PROCEDURE — 36415 COLL VENOUS BLD VENIPUNCTURE: CPT | Performed by: HOSPITALIST

## 2025-06-28 PROCEDURE — 250N000012 HC RX MED GY IP 250 OP 636 PS 637: Performed by: HOSPITALIST

## 2025-06-28 PROCEDURE — 94640 AIRWAY INHALATION TREATMENT: CPT | Mod: 76

## 2025-06-28 PROCEDURE — 120N000001 HC R&B MED SURG/OB

## 2025-06-28 PROCEDURE — 250N000009 HC RX 250: Performed by: STUDENT IN AN ORGANIZED HEALTH CARE EDUCATION/TRAINING PROGRAM

## 2025-06-28 PROCEDURE — 85610 PROTHROMBIN TIME: CPT | Performed by: HOSPITALIST

## 2025-06-28 PROCEDURE — 250N000013 HC RX MED GY IP 250 OP 250 PS 637: Performed by: HOSPITALIST

## 2025-06-28 PROCEDURE — 999N000157 HC STATISTIC RCP TIME EA 10 MIN

## 2025-06-28 PROCEDURE — 99232 SBSQ HOSP IP/OBS MODERATE 35: CPT | Performed by: STUDENT IN AN ORGANIZED HEALTH CARE EDUCATION/TRAINING PROGRAM

## 2025-06-28 PROCEDURE — 94640 AIRWAY INHALATION TREATMENT: CPT

## 2025-06-28 PROCEDURE — 250N000009 HC RX 250: Performed by: HOSPITALIST

## 2025-06-28 PROCEDURE — 250N000013 HC RX MED GY IP 250 OP 250 PS 637: Performed by: STUDENT IN AN ORGANIZED HEALTH CARE EDUCATION/TRAINING PROGRAM

## 2025-06-28 RX ORDER — ALBUTEROL SULFATE 5 MG/ML
2.5 SOLUTION RESPIRATORY (INHALATION)
Status: DISCONTINUED | OUTPATIENT
Start: 2025-06-28 | End: 2025-06-28

## 2025-06-28 RX ORDER — IPRATROPIUM BROMIDE AND ALBUTEROL SULFATE 2.5; .5 MG/3ML; MG/3ML
3 SOLUTION RESPIRATORY (INHALATION)
Status: DISCONTINUED | OUTPATIENT
Start: 2025-06-28 | End: 2025-06-28

## 2025-06-28 RX ORDER — IPRATROPIUM BROMIDE AND ALBUTEROL SULFATE 2.5; .5 MG/3ML; MG/3ML
3 SOLUTION RESPIRATORY (INHALATION)
Status: DISCONTINUED | OUTPATIENT
Start: 2025-06-29 | End: 2025-06-30

## 2025-06-28 RX ADMIN — CARBAMAZEPINE 400 MG: 200 TABLET ORAL at 21:14

## 2025-06-28 RX ADMIN — PREDNISONE 40 MG: 20 TABLET ORAL at 08:44

## 2025-06-28 RX ADMIN — ALBUTEROL SULFATE 2.5 MG: 2.5 SOLUTION RESPIRATORY (INHALATION) at 07:34

## 2025-06-28 RX ADMIN — PANTOPRAZOLE SODIUM 40 MG: 40 TABLET, DELAYED RELEASE ORAL at 05:57

## 2025-06-28 RX ADMIN — CARBAMAZEPINE 200 MG: 200 TABLET ORAL at 13:13

## 2025-06-28 RX ADMIN — ATORVASTATIN CALCIUM 10 MG: 10 TABLET, FILM COATED ORAL at 22:22

## 2025-06-28 RX ADMIN — IPRATROPIUM BROMIDE AND ALBUTEROL SULFATE 3 ML: .5; 3 SOLUTION RESPIRATORY (INHALATION) at 15:26

## 2025-06-28 RX ADMIN — LISINOPRIL 20 MG: 20 TABLET ORAL at 08:44

## 2025-06-28 RX ADMIN — IPRATROPIUM BROMIDE AND ALBUTEROL SULFATE 3 ML: .5; 3 SOLUTION RESPIRATORY (INHALATION) at 19:45

## 2025-06-28 RX ADMIN — MICONAZOLE NITRATE: 20 POWDER TOPICAL at 08:45

## 2025-06-28 RX ADMIN — FLUTICASONE FUROATE AND VILANTEROL TRIFENATATE 1 PUFF: 100; 25 POWDER RESPIRATORY (INHALATION) at 07:34

## 2025-06-28 RX ADMIN — WARFARIN SODIUM 17.5 MG: 10 TABLET ORAL at 17:42

## 2025-06-28 RX ADMIN — CARBAMAZEPINE 400 MG: 200 TABLET ORAL at 08:44

## 2025-06-28 ASSESSMENT — ACTIVITIES OF DAILY LIVING (ADL)
ADLS_ACUITY_SCORE: 52

## 2025-06-28 NOTE — PROGRESS NOTES
[facility]    Medicine Progress Note - Hospitalist Service    Date of Admission:  6/25/2025    Assessment & Plan     Brissa Corado is a 60 year old female admitted on 6/25/2025 with past medical history of asthma on chronic oxygen at night, obstructive sleep apnea, morbid obesity, hypertension, seizure disorder who presents with asthma exacerbation 2/2 poor air quality and not using/having access to her LABA/ICS.     Acute on chronic hypoxic respiratory failure  Asthma exacerbation  RICK   At baseline pt is 2.5L at night and during the day as needed.   CTPA negative for PE.  Patient states her triggers been poor air quality and she has not been using her controller LABA/ICS.  Has not had a sleep apnea machine at home but reports that she has an appointment coming up in the next 2 weeks, even though she is diagnosed is not tolerating it due to claustrophobic feelings.  - Continues to have wheezing.  Will stop albuterol nebulizer.  Will switch with DuoNebs  - Prednisone 40 mg daily, planning for 5-day course  - CPAP ordered  - Continue Breo Ellipta control inhaler     History of developmental delay  SW Consultation -father who is legal guardian states he received paperwork from the [facility] stating that patient is not to be able to return to her own residence and requires to be moved to a care facility to ensure she gets her medications as ordered and prescribed.      - consulted and following.   - Per  note patient will be getting appointed a new legal guardian in July.   - Discharge patient will go to a new [facility].  His significant other cannot go with her.     H/o DVT -recently subtherapeutic on INR.  Pharmacy warfarin panel ordered.  Continue warfarin as inpatient.     Seizure disorder -continue Tegretol     Hypertension -continue lisinopril     Hyperlipidemia - continue statin     Gerd - continue prilosec     Super morbid obesity  Intertriginous excoriations and  "wounds in multiple skin folds  WOC following   - Start patient on topical azole therapy with ketoconazole 2% twice daily--was switched to miconazole powder per patient's request  - General Wound cares per bedside RN and WOC cares weekly          Diet: Combination Diet Regular Diet Adult    DVT Prophylaxis: Warfarin  Grajeda Catheter: Not present  Lines: None     Cardiac Monitoring: None  Code Status: Full Code      Clinically Significant Risk Factors                   # Hypertension: Noted on problem list            # Morbid Obesity: Estimated body mass index is 60.56 kg/m  as calculated from the following:    Height as of this encounter: 1.702 m (5' 7\").    Weight as of this encounter: 175.4 kg (386 lb 11 oz)., PRESENT ON ADMISSION     # Asthma: noted on problem list        Social Drivers of Health            Disposition Plan     Medically Ready for Discharge: Anticipated Tomorrow             Andreea Mcdonald MD  Hospitalist Service  [facility]  Securely message with [employer] (more info)  Text page via [employer] Paging/Directory   ______________________________________________________________________    Interval History   No acute events overnight.  States breathing could be better.  Ongoing cough.  Ongoing wheezing.  No chest pain no pleurisy  4 point review of systems otherwise negative    Physical Exam   Vital Signs: Temp: 98.1  F (36.7  C) Temp src: Oral BP: 137/51 Pulse: 84   Resp: 20 SpO2: 95 % O2 Device: Nasal cannula Oxygen Delivery: 2 LPM  Weight: 386 lbs 10.99 oz    Constitutional: awake, alert, cooperative, no apparent distress, and appears stated age  Eyes: Anicteric sclera  ENT: normocephalic, without obvious abnormality  Respiratory: On nasal cannula, wheezing anteriorly, unable to hear lungs posteriorly and body habitus and difficulty for the patient to move  Cardiovascular: Normal rate, regular rhythm, no murmur  GI: Obese, soft, nontender, nondistended  Musculoskeletal: no lower extremity " pitting edema present  Neurologic: Awake, alert, oriented to self and place  Neuropsychiatric: Appropriate mood and affect    Medical Decision Making       45 MINUTES SPENT BY ME on the date of service doing chart review, history, exam, documentation & further activities per the note.      Data   ------------------------- PAST 24 HR DATA REVIEWED -----------------------------------------------

## 2025-06-28 NOTE — PLAN OF CARE
"VSS. No reports of pain/SOB. LS-diminished. A/OX4. Regular diet w/ good appetite. Assist of 1 w/ gt belt and walker. Wounds clean w/ soap and water. Miconazole powder applied. Purewick in place w/ good output. Large continent stool. Social work following.     Goal Outcome Evaluation:      Plan of Care Reviewed With: patient    Overall Patient Progress: improvingOverall Patient Progress: improving    Outcome Evaluation: Monitor for changes in respiratory.    Problem: Adult Inpatient Plan of Care  Goal: Plan of Care Review  Description: The Plan of Care Review/Shift note should be completed every shift.  The Outcome Evaluation is a brief statement about your assessment that the patient is improving, declining, or no change.  This information will be displayed automatically on your shift  note.  Outcome: Progressing  Flowsheets (Taken 6/28/2025 1221)  Outcome Evaluation: Monitor for changes in respiratory.  Plan of Care Reviewed With: patient  Overall Patient Progress: improving  Goal: Patient-Specific Goal (Individualized)  Description: You can add care plan individualizations to a care plan. Examples of Individualization might be:  \"Parent requests to be called daily at 9am for status\", \"I have a hard time hearing out of my right ear\", or \"Do not touch me to wake me up as it startles  me\".  Outcome: Progressing  Goal: Absence of Hospital-Acquired Illness or Injury  Outcome: Progressing  Intervention: Identify and Manage Fall Risk  Recent Flowsheet Documentation  Taken 6/28/2025 1013 by Ally Farr, RN  Safety Promotion/Fall Prevention:   activity supervised   nonskid shoes/slippers when out of bed   safety round/check completed  Intervention: Prevent Skin Injury  Recent Flowsheet Documentation  Taken 6/28/2025 1013 by Ally Farr, RN  Body Position: position changed independently  Intervention: Prevent and Manage VTE (Venous Thromboembolism) Risk  Recent Flowsheet Documentation  Taken 6/28/2025 1013 by " Ally Farr, RN  VTE Prevention/Management:   SCDs off (sequential compression devices)   patient refused intervention  Intervention: Prevent Infection  Recent Flowsheet Documentation  Taken 6/28/2025 1013 by Ally Farr RN  Infection Prevention:   equipment surfaces disinfected   personal protective equipment utilized  Goal: Optimal Comfort and Wellbeing  Outcome: Progressing  Goal: Readiness for Transition of Care  Outcome: Progressing     Problem: Comorbidity Management  Goal: Maintenance of Asthma Control  Outcome: Progressing  Intervention: Maintain Asthma Symptom Control  Recent Flowsheet Documentation  Taken 6/28/2025 1013 by Ally Farr, RN  Medication Review/Management: medications reviewed  Goal: Blood Pressure in Desired Range  Outcome: Progressing  Intervention: Maintain Blood Pressure Management  Recent Flowsheet Documentation  Taken 6/28/2025 1013 by Ally Farr RN  Medication Review/Management: medications reviewed     Problem: Gas Exchange Impaired  Goal: Optimal Gas Exchange  Outcome: Progressing

## 2025-06-28 NOTE — PLAN OF CARE
"  A/Ox4. Pt has cognitive impairment. SW consulted to assist w/ legal guardian. 2L NC at HS. Wounds under bilateral breast and abd folds, miconazole powder applied. Dressing changed on wound on back of L thigh. Ax1. Purewick in place. Reg diet. Pt might need outpt sleep study.      Goal Outcome Evaluation:      Plan of Care Reviewed With: patient    Overall Patient Progress: improvingOverall Patient Progress: improving    Outcome Evaluation: A/Ox4. Pt has cognitive impairment. SW consulted to assist w/ legal guardian. 2L NC at HS. Wounds under bilateral breast and abd folds, miconazole powder applied. Dressing changed on wound on back of L thigh. Ax1. Purewick in place. Reg diet. Pt might need outpt sleep study.      Problem: Adult Inpatient Plan of Care  Goal: Plan of Care Review  Description: The Plan of Care Review/Shift note should be completed every shift.  The Outcome Evaluation is a brief statement about your assessment that the patient is improving, declining, or no change.  This information will be displayed automatically on your shift  note.  Outcome: Progressing  Flowsheets (Taken 6/28/2025 0602)  Outcome Evaluation: A/Ox4. Pt has cognitive impairment. SW consulted to assist w/ legal guardian. 2L NC at HS. Wounds under bilateral breast and abd folds, miconazole powder applied. Dressing changed on wound on back of L thigh. Ax1. Purewick in place. Reg diet. Pt might need outpt sleep study.  Plan of Care Reviewed With: patient  Overall Patient Progress: improving  Goal: Patient-Specific Goal (Individualized)  Description: You can add care plan individualizations to a care plan. Examples of Individualization might be:  \"Parent requests to be called daily at 9am for status\", \"I have a hard time hearing out of my right ear\", or \"Do not touch me to wake me up as it startles  me\".  Outcome: Progressing  Goal: Absence of Hospital-Acquired Illness or Injury  Outcome: Progressing  Intervention: Identify and Manage " Fall Risk  Recent Flowsheet Documentation  Taken 6/27/2025 2030 by Gunjan Blanco RN  Safety Promotion/Fall Prevention:   activity supervised   nonskid shoes/slippers when out of bed   patient and family education   safety round/check completed  Intervention: Prevent Skin Injury  Recent Flowsheet Documentation  Taken 6/27/2025 2231 by Gunjan Blanco RN  Body Position: position changed independently  Taken 6/27/2025 2030 by Gunjan Blanco RN  Body Position: position changed independently  Skin Protection:   adhesive use limited   incontinence pads utilized   skin sealant/moisture barrier applied  Intervention: Prevent and Manage VTE (Venous Thromboembolism) Risk  Recent Flowsheet Documentation  Taken 6/27/2025 2030 by Gunjan Blanco RN  VTE Prevention/Management:   SCDs off (sequential compression devices)   patient refused intervention  Intervention: Prevent Infection  Recent Flowsheet Documentation  Taken 6/27/2025 2030 by Gunjan Blanco RN  Infection Prevention:   hand hygiene promoted   rest/sleep promoted  Goal: Optimal Comfort and Wellbeing  Outcome: Progressing  Goal: Readiness for Transition of Care  Outcome: Progressing     Problem: Comorbidity Management  Goal: Maintenance of Asthma Control  Outcome: Progressing  Intervention: Maintain Asthma Symptom Control  Recent Flowsheet Documentation  Taken 6/27/2025 2030 by Gunjan Blanco RN  Medication Review/Management: medications reviewed  Goal: Blood Pressure in Desired Range  Outcome: Progressing  Intervention: Maintain Blood Pressure Management  Recent Flowsheet Documentation  Taken 6/27/2025 2030 by Gunjan Blanco RN  Medication Review/Management: medications reviewed     Problem: Gas Exchange Impaired  Goal: Optimal Gas Exchange  Outcome: Progressing  Intervention: Optimize Oxygenation and Ventilation  Recent Flowsheet Documentation  Taken 6/27/2025 2231 by Gunjan Blanco RN  Head of Bed (HOB) Positioning: HOB at 20-30 degrees  Taken  6/27/2025 2030 by Gunjan Blanco, RN  Head of Bed (HOB) Positioning: HOB at 20-30 degrees

## 2025-06-29 LAB
INR PPP: 3.31 (ref 0.85–1.15)
PROTHROMBIN TIME: 32.9 SECONDS (ref 11.8–14.8)

## 2025-06-29 PROCEDURE — 99232 SBSQ HOSP IP/OBS MODERATE 35: CPT | Performed by: STUDENT IN AN ORGANIZED HEALTH CARE EDUCATION/TRAINING PROGRAM

## 2025-06-29 PROCEDURE — 250N000012 HC RX MED GY IP 250 OP 636 PS 637: Performed by: HOSPITALIST

## 2025-06-29 PROCEDURE — 94640 AIRWAY INHALATION TREATMENT: CPT | Mod: 76

## 2025-06-29 PROCEDURE — 250N000013 HC RX MED GY IP 250 OP 250 PS 637: Performed by: STUDENT IN AN ORGANIZED HEALTH CARE EDUCATION/TRAINING PROGRAM

## 2025-06-29 PROCEDURE — 120N000001 HC R&B MED SURG/OB

## 2025-06-29 PROCEDURE — 94640 AIRWAY INHALATION TREATMENT: CPT

## 2025-06-29 PROCEDURE — 36415 COLL VENOUS BLD VENIPUNCTURE: CPT | Performed by: HOSPITALIST

## 2025-06-29 PROCEDURE — 250N000013 HC RX MED GY IP 250 OP 250 PS 637: Performed by: HOSPITALIST

## 2025-06-29 PROCEDURE — 85610 PROTHROMBIN TIME: CPT | Performed by: HOSPITALIST

## 2025-06-29 PROCEDURE — 250N000009 HC RX 250

## 2025-06-29 PROCEDURE — 999N000157 HC STATISTIC RCP TIME EA 10 MIN

## 2025-06-29 RX ADMIN — IPRATROPIUM BROMIDE AND ALBUTEROL SULFATE 3 ML: .5; 3 SOLUTION RESPIRATORY (INHALATION) at 12:30

## 2025-06-29 RX ADMIN — MICONAZOLE NITRATE: 20 POWDER TOPICAL at 08:26

## 2025-06-29 RX ADMIN — PANTOPRAZOLE SODIUM 40 MG: 40 TABLET, DELAYED RELEASE ORAL at 08:25

## 2025-06-29 RX ADMIN — PREDNISONE 40 MG: 20 TABLET ORAL at 08:25

## 2025-06-29 RX ADMIN — ATORVASTATIN CALCIUM 10 MG: 10 TABLET, FILM COATED ORAL at 22:02

## 2025-06-29 RX ADMIN — IPRATROPIUM BROMIDE AND ALBUTEROL SULFATE 3 ML: .5; 3 SOLUTION RESPIRATORY (INHALATION) at 08:13

## 2025-06-29 RX ADMIN — FLUTICASONE FUROATE AND VILANTEROL TRIFENATATE 1 PUFF: 100; 25 POWDER RESPIRATORY (INHALATION) at 08:13

## 2025-06-29 RX ADMIN — CARBAMAZEPINE 400 MG: 200 TABLET ORAL at 08:25

## 2025-06-29 RX ADMIN — LISINOPRIL 20 MG: 20 TABLET ORAL at 08:25

## 2025-06-29 RX ADMIN — WARFARIN SODIUM 7.5 MG: 5 TABLET ORAL at 18:07

## 2025-06-29 RX ADMIN — CARBAMAZEPINE 200 MG: 200 TABLET ORAL at 11:42

## 2025-06-29 RX ADMIN — IPRATROPIUM BROMIDE AND ALBUTEROL SULFATE 3 ML: .5; 3 SOLUTION RESPIRATORY (INHALATION) at 20:26

## 2025-06-29 RX ADMIN — MICONAZOLE NITRATE: 20 POWDER TOPICAL at 22:03

## 2025-06-29 RX ADMIN — CARBAMAZEPINE 400 MG: 200 TABLET ORAL at 22:02

## 2025-06-29 ASSESSMENT — ACTIVITIES OF DAILY LIVING (ADL)
ADLS_ACUITY_SCORE: 52

## 2025-06-29 NOTE — PROGRESS NOTES
[facility]    Medicine Progress Note - Hospitalist Service    Date of Admission:  6/25/2025    Assessment & Plan     Brissa Corado is a 60 year old female admitted on 6/25/2025 with past medical history of asthma on chronic oxygen at night, obstructive sleep apnea, morbid obesity, hypertension, seizure disorder who presents with asthma exacerbation 2/2 poor air quality and not using/having access to her LABA/ICS.     Acute on chronic hypoxic respiratory failure-resolved  Asthma exacerbation  RICK   At baseline pt is 2.5L at night and during the day as needed.  Now on room air during the daytime.  CTPA negative for PE.  Patient states her triggers been poor air quality and she has not been using her controller LABA/ICS.  Has not had a sleep apnea machine at home but reports that she has an appointment coming up in the next 2 weeks, even though she is diagnosed is not tolerating it due to claustrophobic feelings.  - Wheezing improved, continue DuoNebs every 6 hours.  Likely can transition to DuoNeb as needed from tomorrow  - Prednisone 40 mg daily, planning for 5-day course  - CPAP ordered  - Continue Breo Ellipta control inhaler  - Medically stable and can be discharged whenever placement is available     History of developmental delay  SW Consultation -father who is legal guardian states he received paperwork from the [facility] stating that patient is not to be able to return to her own residence and requires to be moved to a care facility to ensure she gets her medications as ordered and prescribed.      - consulted and following.   - Per  note patient will be getting appointed a new legal guardian in July.   - Discharge patient will go to a new [facility].  His significant other cannot go with her.     H/o DVT -recently subtherapeutic on INR.  Pharmacy warfarin panel ordered.  Continue warfarin as inpatient.     Seizure disorder -continue Tegretol     Hypertension  "-continue lisinopril     Hyperlipidemia - continue statin     Gerd - continue prilosec     Super morbid obesity  Intertriginous excoriations and wounds in multiple skin folds  WOC following   - Start patient on topical azole therapy with ketoconazole 2% twice daily--was switched to miconazole powder per patient's request  - General Wound cares per bedside RN and WOC cares weekly          Diet: Combination Diet Regular Diet Adult    DVT Prophylaxis: Warfarin  Grajeda Catheter: Not present  Lines: None     Cardiac Monitoring: None  Code Status: Full Code      Clinically Significant Risk Factors                   # Hypertension: Noted on problem list            # Morbid Obesity: Estimated body mass index is 60.56 kg/m  as calculated from the following:    Height as of this encounter: 1.702 m (5' 7\").    Weight as of this encounter: 175.4 kg (386 lb 11 oz)., PRESENT ON ADMISSION     # Asthma: noted on problem list        Social Drivers of Health            Disposition Plan     Medically Ready for Discharge: Ready Now             Andreea Mcdonald MD  Hospitalist Service  [facility]  Securely message with [employer] (more info)  Text page via [employer] Paging/Directory   ______________________________________________________________________    Interval History   No acute events overnight.  Patient is off of oxygen and happy about it.  Patient was counseled about letting RN doing miconazole powder on her folds.    4 point review of systems otherwise negative    Physical Exam   Vital Signs: Temp: 98  F (36.7  C) Temp src: Oral BP: 121/55 Pulse: 65   Resp: 18 SpO2: 93 % O2 Device: None (Room air) Oxygen Delivery: 2 LPM  Weight: 386 lbs 10.99 oz    Constitutional: awake, alert, cooperative, no apparent distress, and appears stated age  Eyes: Anicteric sclera  ENT: normocephalic, without obvious abnormality  Respiratory: On room air, lungs clear anteriorly, no wheezing  Cardiovascular: Normal rate, regular rhythm, no " murmur  GI: Obese, soft, nontender, nondistended  Musculoskeletal: no lower extremity pitting edema present  Neurologic: Awake, alert, oriented to self and place  Neuropsychiatric: Appropriate mood and affect    Medical Decision Making       44 MINUTES SPENT BY ME on the date of service doing chart review, history, exam, documentation & further activities per the note.      Data   ------------------------- PAST 24 HR DATA REVIEWED -----------------------------------------------

## 2025-06-29 NOTE — PROGRESS NOTES
VSS. No reports of pain/SOB. A/OX4. Regular diet w/ good appetite.Assist of 1 w/ gt belt and walker. Purewick in place w/ good output. No PIV. Wound care done today. Discharge TBD.

## 2025-06-29 NOTE — PLAN OF CARE
"Goal Outcome Evaluation:      Plan of Care Reviewed With: patient    Overall Patient Progress: no changeOverall Patient Progress: no change    Outcome Evaluation: A&Ox4. VSS on 1 1/2 L via nasal cannula. Refused night CPAP. Purewick in place. Ax1. Refused micanazole powder.        Problem: Adult Inpatient Plan of Care  Goal: Plan of Care Review  Description: The Plan of Care Review/Shift note should be completed every shift.  The Outcome Evaluation is a brief statement about your assessment that the patient is improving, declining, or no change.  This information will be displayed automatically on your shift  note.  Outcome: Adequate for Care Transition  Flowsheets (Taken 6/29/2025 0636)  Outcome Evaluation: A&Ox4. VSS on 1 1/2 L via nasal cannula. Refused night CPAP. Purewick in place. Ax1. Refused micanazole powder.  Plan of Care Reviewed With: patient  Overall Patient Progress: no change  Goal: Patient-Specific Goal (Individualized)  Description: You can add care plan individualizations to a care plan. Examples of Individualization might be:  \"Parent requests to be called daily at 9am for status\", \"I have a hard time hearing out of my right ear\", or \"Do not touch me to wake me up as it startles  me\".  Outcome: Adequate for Care Transition  Goal: Absence of Hospital-Acquired Illness or Injury  Outcome: Adequate for Care Transition  Intervention: Identify and Manage Fall Risk  Recent Flowsheet Documentation  Taken 6/28/2025 2012 by Samantha Thompson RN  Safety Promotion/Fall Prevention:   activity supervised   safety round/check completed   supervised activity  Intervention: Prevent and Manage VTE (Venous Thromboembolism) Risk  Recent Flowsheet Documentation  Taken 6/28/2025 2012 by Samantha Thompson RN  VTE Prevention/Management: SCDs off (sequential compression devices)  Intervention: Prevent Infection  Recent Flowsheet Documentation  Taken 6/28/2025 2012 by Samantha Thompson RN  Infection Prevention:   " hand hygiene promoted   rest/sleep promoted   single patient room provided  Goal: Optimal Comfort and Wellbeing  Outcome: Adequate for Care Transition  Goal: Readiness for Transition of Care  Outcome: Adequate for Care Transition     Problem: Comorbidity Management  Goal: Maintenance of Asthma Control  Outcome: Adequate for Care Transition  Intervention: Maintain Asthma Symptom Control  Recent Flowsheet Documentation  Taken 6/28/2025 2012 by Samantha Thompson RN  Medication Review/Management: medications reviewed  Goal: Blood Pressure in Desired Range  Outcome: Adequate for Care Transition  Intervention: Maintain Blood Pressure Management  Recent Flowsheet Documentation  Taken 6/28/2025 2012 by Samantha Thompson RN  Medication Review/Management: medications reviewed     Problem: Gas Exchange Impaired  Goal: Optimal Gas Exchange  Outcome: Adequate for Care Transition

## 2025-06-30 ENCOUNTER — TELEPHONE (OUTPATIENT)
Dept: INTERNAL MEDICINE | Facility: CLINIC | Age: 60
End: 2025-06-30

## 2025-06-30 LAB
INR PPP: 2.88 (ref 0.85–1.15)
PROTHROMBIN TIME: 29.7 SECONDS (ref 11.8–14.8)

## 2025-06-30 PROCEDURE — 94640 AIRWAY INHALATION TREATMENT: CPT

## 2025-06-30 PROCEDURE — 250N000012 HC RX MED GY IP 250 OP 636 PS 637: Performed by: HOSPITALIST

## 2025-06-30 PROCEDURE — 36415 COLL VENOUS BLD VENIPUNCTURE: CPT | Performed by: HOSPITALIST

## 2025-06-30 PROCEDURE — 94640 AIRWAY INHALATION TREATMENT: CPT | Mod: 76

## 2025-06-30 PROCEDURE — 250N000009 HC RX 250

## 2025-06-30 PROCEDURE — 250N000013 HC RX MED GY IP 250 OP 250 PS 637: Performed by: HOSPITALIST

## 2025-06-30 PROCEDURE — 99232 SBSQ HOSP IP/OBS MODERATE 35: CPT | Performed by: STUDENT IN AN ORGANIZED HEALTH CARE EDUCATION/TRAINING PROGRAM

## 2025-06-30 PROCEDURE — 250N000013 HC RX MED GY IP 250 OP 250 PS 637: Performed by: STUDENT IN AN ORGANIZED HEALTH CARE EDUCATION/TRAINING PROGRAM

## 2025-06-30 PROCEDURE — 120N000001 HC R&B MED SURG/OB

## 2025-06-30 PROCEDURE — 85610 PROTHROMBIN TIME: CPT | Performed by: HOSPITALIST

## 2025-06-30 PROCEDURE — 999N000157 HC STATISTIC RCP TIME EA 10 MIN

## 2025-06-30 RX ORDER — IPRATROPIUM BROMIDE AND ALBUTEROL SULFATE 2.5; .5 MG/3ML; MG/3ML
3 SOLUTION RESPIRATORY (INHALATION) EVERY 4 HOURS PRN
Status: DISCONTINUED | OUTPATIENT
Start: 2025-06-30 | End: 2025-07-29 | Stop reason: HOSPADM

## 2025-06-30 RX ADMIN — CARBAMAZEPINE 200 MG: 200 TABLET ORAL at 12:06

## 2025-06-30 RX ADMIN — MICONAZOLE NITRATE: 20 POWDER TOPICAL at 21:39

## 2025-06-30 RX ADMIN — PREDNISONE 40 MG: 20 TABLET ORAL at 08:47

## 2025-06-30 RX ADMIN — WARFARIN SODIUM 25 MG: 10 TABLET ORAL at 18:40

## 2025-06-30 RX ADMIN — FLUTICASONE FUROATE AND VILANTEROL TRIFENATATE 1 PUFF: 100; 25 POWDER RESPIRATORY (INHALATION) at 07:41

## 2025-06-30 RX ADMIN — CARBAMAZEPINE 400 MG: 200 TABLET ORAL at 08:47

## 2025-06-30 RX ADMIN — PANTOPRAZOLE SODIUM 40 MG: 40 TABLET, DELAYED RELEASE ORAL at 06:35

## 2025-06-30 RX ADMIN — IPRATROPIUM BROMIDE AND ALBUTEROL SULFATE 3 ML: .5; 3 SOLUTION RESPIRATORY (INHALATION) at 07:41

## 2025-06-30 RX ADMIN — IPRATROPIUM BROMIDE AND ALBUTEROL SULFATE 3 ML: .5; 3 SOLUTION RESPIRATORY (INHALATION) at 11:18

## 2025-06-30 RX ADMIN — LISINOPRIL 20 MG: 20 TABLET ORAL at 08:47

## 2025-06-30 RX ADMIN — CARBAMAZEPINE 400 MG: 200 TABLET ORAL at 21:39

## 2025-06-30 RX ADMIN — ATORVASTATIN CALCIUM 10 MG: 10 TABLET, FILM COATED ORAL at 21:39

## 2025-06-30 RX ADMIN — MICONAZOLE NITRATE: 20 POWDER TOPICAL at 08:48

## 2025-06-30 ASSESSMENT — ACTIVITIES OF DAILY LIVING (ADL)
ADLS_ACUITY_SCORE: 52

## 2025-06-30 NOTE — PROGRESS NOTES
[facility]    Medicine Progress Note - Hospitalist Service    Date of Admission:  6/25/2025    Assessment & Plan     Brissa Corado is a 60 year old female admitted on 6/25/2025 with past medical history of asthma on chronic oxygen at night, obstructive sleep apnea, morbid obesity, hypertension, seizure disorder who presents with asthma exacerbation 2/2 poor air quality and not using/having access to her LABA/ICS.     Acute on chronic hypoxic respiratory failure-resolved  Asthma exacerbation  RICK   At baseline pt is 2.5L at night and during the day as needed.  Now on room air during the daytime.  CTPA negative for PE.  Patient states her triggers been poor air quality and she has not been using her controller LABA/ICS.  Has not had a sleep apnea machine at home but reports that she has an appointment coming up in the next 2 weeks, even though she is diagnosed is not tolerating it due to claustrophobic feelings.  - Wheezing resolved, DuoNeb every 4 hours as needed  - Completed 5-day course of CPAP  - CPAP ordered  - Continue Breo Ellipta control inhaler  - Medically stable and can be discharged whenever placement is available     History of developmental delay  SW Consultation -father who is legal guardian states he received paperwork from the [facility] stating that patient is not to be able to return to her own residence and requires to be moved to a care facility to ensure she gets her medications as ordered and prescribed.      - consulted and following.   - Per  note patient will be getting appointed a new legal guardian in July.   - Discharge patient will go to a new [facility].  His significant other cannot go with her.     H/o DVT -recently subtherapeutic on INR.  Pharmacy warfarin panel ordered.  Continue warfarin as inpatient.     Seizure disorder -continue Tegretol     Hypertension -continue lisinopril     Hyperlipidemia - continue statin     Gerd - continue  "prilosec     Super morbid obesity  Intertriginous excoriations and wounds in multiple skin folds  WOC following   - Start patient on topical azole therapy with ketoconazole 2% twice daily--was switched to miconazole powder per patient's request  - General Wound cares per bedside RN and WOC cares weekly          Diet: Combination Diet Regular Diet Adult    DVT Prophylaxis: Warfarin  Grajeda Catheter: Not present  Lines: None     Cardiac Monitoring: None  Code Status: Full Code      Clinically Significant Risk Factors                   # Hypertension: Noted on problem list            # Morbid Obesity: Estimated body mass index is 60.56 kg/m  as calculated from the following:    Height as of this encounter: 1.702 m (5' 7\").    Weight as of this encounter: 175.4 kg (386 lb 11 oz).      # Asthma: noted on problem list        Social Drivers of Health            Disposition Plan     Medically Ready for Discharge: Ready Now             Andreea Mcdonald MD  Hospitalist Service  [facility]  Securely message with [employer] (more info)  Text page via [employer] Paging/Directory   ______________________________________________________________________    Interval History   No acute events overnight.  Patient is off of oxygen and happy about it.  No issues or complaint at this time    4 point review of systems otherwise negative    Physical Exam   Vital Signs: Temp: 97.9  F (36.6  C) Temp src: Oral BP: 121/45 Pulse: 68   Resp: 18 SpO2: 92 % O2 Device: None (Room air) Oxygen Delivery: 2 LPM  Weight: 386 lbs 10.99 oz    Constitutional: awake, alert, cooperative, no apparent distress, and appears stated age  Eyes: Anicteric sclera  ENT: normocephalic, without obvious abnormality  Respiratory: On room air, lungs clear anteriorly, no wheezing  Cardiovascular: Normal rate, regular rhythm, no murmur  GI: Obese, soft, nontender, nondistended  Musculoskeletal: no lower extremity pitting edema present  Neurologic: Awake, alert, " oriented to self and place  Neuropsychiatric: Appropriate mood and affect    Medical Decision Making       46 MINUTES SPENT BY ME on the date of service doing chart review, history, exam, documentation & further activities per the note.      Data   ------------------------- PAST 24 HR DATA REVIEWED -----------------------------------------------

## 2025-06-30 NOTE — PLAN OF CARE
End OF Shift Summary :    Neuro:  A&O4         Cardiac: WDL         Respiratory : dyspnea upon exertion with infrequent nonproductive cough.lung sounds dinished.On 2L NC       GI &  : GI obese. pure wick in place       Skin : redness blanchable on the thigh cleaned and meplix applied ,sacrum/coccyx buttocks area.rash on chest abdominal,perineum,buttcoks ,sacrum and thigh area micatin powder applied.      Mobility : generalized weakness walker and gait belt in use.independently can move in bed.      IV access : no IV in place MD aware and is fine with it aslong as no IV meds are given.No iv meds given on shift.                       Goal Outcome Evaluation:      Plan of Care Reviewed With: patient    Overall Patient Progress: no changeOverall Patient Progress: no change         Problem: Adult Inpatient Plan of Care  Goal: Plan of Care Review  Description: The Plan of Care Review/Shift note should be completed every shift.  The Outcome Evaluation is a brief statement about your assessment that the patient is improving, declining, or no change.  This information will be displayed automatically on your shift  note.  Recent Flowsheet Documentation  Taken 6/30/2025 0417 by Piper Yao RN  Plan of Care Reviewed With: patient  Overall Patient Progress: no change  Goal: Absence of Hospital-Acquired Illness or Injury  Intervention: Prevent and Manage VTE (Venous Thromboembolism) Risk  Recent Flowsheet Documentation  Taken 6/29/2025 2206 by Piper Yao RN  VTE Prevention/Management: SCDs off (sequential compression devices)     Problem: Adult Inpatient Plan of Care  Goal: Plan of Care Review  Description: The Plan of Care Review/Shift note should be completed every shift.  The Outcome Evaluation is a brief statement about your assessment that the patient is improving, declining, or no change.  This information will be displayed automatically on your shift  note.  Outcome: Progressing  Flowsheets (Taken  "6/30/2025 0417)  Plan of Care Reviewed With: patient  Overall Patient Progress: no change  Goal: Patient-Specific Goal (Individualized)  Description: You can add care plan individualizations to a care plan. Examples of Individualization might be:  \"Parent requests to be called daily at 9am for status\", \"I have a hard time hearing out of my right ear\", or \"Do not touch me to wake me up as it startles  me\".  Outcome: Progressing  Goal: Absence of Hospital-Acquired Illness or Injury  Outcome: Progressing  Intervention: Prevent and Manage VTE (Venous Thromboembolism) Risk  Recent Flowsheet Documentation  Taken 6/29/2025 2206 by Piper Yao RN  VTE Prevention/Management: SCDs off (sequential compression devices)  Goal: Optimal Comfort and Wellbeing  Outcome: Progressing  Goal: Readiness for Transition of Care  Outcome: Progressing     Problem: Comorbidity Management  Goal: Maintenance of Asthma Control  Outcome: Progressing  Goal: Maintenance of Behavioral Health Symptom Control  Outcome: Progressing  Goal: Maintenance of COPD Symptom Control  Outcome: Progressing  Goal: Blood Glucose Levels Within Targeted Range  Outcome: Progressing  Goal: Maintenance of Heart Failure Symptom Control  Outcome: Progressing  Goal: Blood Pressure in Desired Range  Outcome: Progressing  Goal: Maintenance of Osteoarthritis Symptom Control  Outcome: Progressing  Goal: Bariatric Home Regimen Maintained  Outcome: Progressing  Goal: Maintenance of Seizure Control  Outcome: Progressing     Problem: Gas Exchange Impaired  Goal: Optimal Gas Exchange  Outcome: Progressing           "

## 2025-06-30 NOTE — PROGRESS NOTES
Care Management Follow Up    Length of Stay (days): 5    Expected Discharge Date: 07/09/2025     Concerns to be Addressed: discharge planning     Patient plan of care discussed at interdisciplinary rounds: Yes    Anticipated Discharge Disposition:                Anticipated Discharge Services:    Anticipated Discharge DME:      Patient/family educated on Medicare website which has current facility and service quality ratings:    Education Provided on the Discharge Plan:    Patient/Family in Agreement with the Plan:      Referrals Placed by CM/SW:    Private pay costs discussed: Not applicable    Discussed  Partnership in Safe Discharge Planning  document with patient/family: No     Handoff Completed: Yes, [facility] PCP: Internal handoff referral completed    Additional Information:  LUZ MARIA called and left a VM for Lilliam at [facility] ([phone]) looking for an update on pt's application. SW encouraged a return call.     LUZ MARIA called and left a message for pt's guardians, encouraging a return phone call if any questions.     Next Steps: SW to follow for discharge planning.     ADDENDUM 1:20pm  SW spoke with pt's father and guardian, Adam, who stated that pt is now not speaking with pt due to her being upset with the family. Pt is not receptive to moving to an [facility]. LUZM ARIA confirmed with pt that she did not want to see Adam. Father and Guardian to be heading to [facility] to speak with them there regarding pt moving in.     Gunjan Ha, AISSATOU, LGSW  LUZ MARIA Care Coordinator/ Med Surg [address] [place] [address]  [phone]

## 2025-06-30 NOTE — PLAN OF CARE
"Goal Outcome Evaluation:      Plan of Care Reviewed With: patient      /45 (BP Location: Left arm)   Pulse 68   Temp 97.9  F (36.6  C) (Oral)   Resp 18   Ht 1.702 m (5' 7\")   Wt (!) 175.4 kg (386 lb 11 oz)   LMP 08/18/2008   SpO2 92%   BMI 60.56 kg/m     Problem: Gas Exchange Impaired  Goal: Optimal Gas Exchange  Outcome: Not Progressing  Intervention: Optimize Oxygenation and Ventilation  Recent Flowsheet Documentation  Taken 6/30/2025 1118 by Lyudmila Singh RN  Head of Bed (HOB) Positioning: HOB at 30-45 degrees     Problem: Comorbidity Management  Goal: Maintenance of Asthma Control  Outcome: Not Progressing  Intervention: Maintain Asthma Symptom Control  Recent Flowsheet Documentation  Taken 6/30/2025 1118 by Lyudmila Singh RN  Medication Review/Management: medications reviewed  Goal: Maintenance of Behavioral Health Symptom Control  Outcome: Not Progressing  Intervention: Maintain Behavioral Health Symptom Control  Recent Flowsheet Documentation  Taken 6/30/2025 1118 by Lyudmila iSngh RN  Medication Review/Management: medications reviewed  Goal: Maintenance of COPD Symptom Control  Outcome: Not Progressing  Intervention: Maintain COPD Symptom Control  Recent Flowsheet Documentation  Taken 6/30/2025 1118 by Lyudmila Singh RN  Medication Review/Management: medications reviewed  Goal: Blood Glucose Levels Within Targeted Range  Outcome: Not Progressing  Intervention: Monitor and Manage Glycemia  Recent Flowsheet Documentation  Taken 6/30/2025 1118 by Lyudmila Singh RN  Medication Review/Management: medications reviewed  Goal: Maintenance of Heart Failure Symptom Control  Outcome: Not Progressing  Intervention: Maintain Heart Failure Management  Recent Flowsheet Documentation  Taken 6/30/2025 1118 by Lyudmila Singh RN  Medication Review/Management: medications reviewed  Goal: Blood Pressure in Desired Range  Outcome: Not Progressing  Intervention: Maintain Blood Pressure " "Management  Recent Flowsheet Documentation  Taken 6/30/2025 1118 by Lyudmila Singh RN  Medication Review/Management: medications reviewed  Goal: Maintenance of Osteoarthritis Symptom Control  Outcome: Not Progressing  Intervention: Maintain Osteoarthritis Symptom Control  Recent Flowsheet Documentation  Taken 6/30/2025 1118 by Lyudmila Singh RN  Assistive Device Utilized: walker  Activity Management:   ambulated to bathroom   back to bed  Medication Review/Management: medications reviewed  Goal: Bariatric Home Regimen Maintained  Outcome: Not Progressing  Intervention: Maintain and Manage Postbariatric Surgery Care  Recent Flowsheet Documentation  Taken 6/30/2025 1118 by Lyudmila Singh RN  Medication Review/Management: medications reviewed  Goal: Maintenance of Seizure Control  Outcome: Not Progressing  Intervention: Maintain Seizure Symptom Control  Recent Flowsheet Documentation  Taken 6/30/2025 1118 by Lyudmila Singh RN  Medication Review/Management: medications reviewed     Problem: Adult Inpatient Plan of Care  Goal: Plan of Care Review  Description: The Plan of Care Review/Shift note should be completed every shift.  The Outcome Evaluation is a brief statement about your assessment that the patient is improving, declining, or no change.  This information will be displayed automatically on your shift  note.  Outcome: Not Progressing  Flowsheets (Taken 6/30/2025 1141)  Outcome Evaluation: Pt A&O, on 1l oxygen nasal canula, SBA for mobility, denied pain, voided adequately, ate 100% of her meal.  Plan of Care Reviewed With: patient  Goal: Patient-Specific Goal (Individualized)  Description: You can add care plan individualizations to a care plan. Examples of Individualization might be:  \"Parent requests to be called daily at 9am for status\", \"I have a hard time hearing out of my right ear\", or \"Do not touch me to wake me up as it startles  me\".  Outcome: Not Progressing  Goal: Absence of " Hospital-Acquired Illness or Injury  Outcome: Not Progressing  Intervention: Identify and Manage Fall Risk  Recent Flowsheet Documentation  Taken 6/30/2025 1118 by Lyudmila Singh RN  Safety Promotion/Fall Prevention:   safety round/check completed   room organization consistent   room near nurse's station   room door open   nonskid shoes/slippers when out of bed   mobility aid in reach   lighting adjusted   clutter free environment maintained   assistive device/personal items within reach   activity supervised  Intervention: Prevent Skin Injury  Recent Flowsheet Documentation  Taken 6/30/2025 1118 by Lyudmila Singh RN  Body Position: position changed independently  Skin Protection:   adhesive use limited   incontinence pads utilized   skin sealant/moisture barrier applied  Intervention: Prevent and Manage VTE (Venous Thromboembolism) Risk  Recent Flowsheet Documentation  Taken 6/30/2025 1118 by Lyudmila Singh RN  VTE Prevention/Management: SCDs off (sequential compression devices)  Intervention: Prevent Infection  Recent Flowsheet Documentation  Taken 6/30/2025 1118 by Lyudmila Singh RN  Infection Prevention:   hand hygiene promoted   rest/sleep promoted  Goal: Optimal Comfort and Wellbeing  Outcome: Not Progressing  Intervention: Monitor Pain and Promote Comfort  Recent Flowsheet Documentation  Taken 6/30/2025 1118 by Lyudmila Singh RN  Pain Management Interventions: medication (see MAR)  Goal: Readiness for Transition of Care  Outcome: Not Progressing       Outcome Evaluation: Pt A&O, on 1l oxygen nasal canula, SBA for mobility, denied pain, voided adequately, ate 100% of her meal.

## 2025-06-30 NOTE — TELEPHONE ENCOUNTER
Forms/Letter Request    Type of form/letter: Home Health Certification      Do we have the form/letter: Yes: placed in provider mailbox    Who is the form from? Home care    Where did/will the form come from? form was faxed in    When is form/letter needed by: 5-7    How would you like the form/letter returned: Fax : [phone]    Patient Notified form requests are processed in 5-7 business days:Yes    Could we send this information to you in [facility] or would you prefer to receive a phone call?:   NA

## 2025-07-01 ENCOUNTER — MEDICAL CORRESPONDENCE (OUTPATIENT)
Dept: HEALTH INFORMATION MANAGEMENT | Facility: CLINIC | Age: 60
End: 2025-07-01

## 2025-07-01 DIAGNOSIS — Z53.9 DIAGNOSIS NOT YET DEFINED: Primary | ICD-10-CM

## 2025-07-01 LAB
INR PPP: 2.01 (ref 0.85–1.15)
PROTHROMBIN TIME: 22.6 SECONDS (ref 11.8–14.8)

## 2025-07-01 PROCEDURE — 99232 SBSQ HOSP IP/OBS MODERATE 35: CPT | Performed by: INTERNAL MEDICINE

## 2025-07-01 PROCEDURE — 120N000001 HC R&B MED SURG/OB

## 2025-07-01 PROCEDURE — G0179 MD RECERTIFICATION HHA PT: HCPCS | Performed by: INTERNAL MEDICINE

## 2025-07-01 PROCEDURE — 36415 COLL VENOUS BLD VENIPUNCTURE: CPT | Performed by: HOSPITALIST

## 2025-07-01 PROCEDURE — 250N000013 HC RX MED GY IP 250 OP 250 PS 637: Performed by: HOSPITALIST

## 2025-07-01 PROCEDURE — 85610 PROTHROMBIN TIME: CPT | Performed by: HOSPITALIST

## 2025-07-01 RX ORDER — SIMETHICONE 80 MG
80 TABLET,CHEWABLE ORAL EVERY 6 HOURS PRN
Status: DISCONTINUED | OUTPATIENT
Start: 2025-07-01 | End: 2025-07-29 | Stop reason: HOSPADM

## 2025-07-01 RX ADMIN — MICONAZOLE NITRATE: 20 POWDER TOPICAL at 08:29

## 2025-07-01 RX ADMIN — FLUTICASONE FUROATE AND VILANTEROL TRIFENATATE 1 PUFF: 100; 25 POWDER RESPIRATORY (INHALATION) at 08:29

## 2025-07-01 RX ADMIN — SIMETHICONE 80 MG: 80 TABLET, CHEWABLE ORAL at 18:48

## 2025-07-01 RX ADMIN — CARBAMAZEPINE 400 MG: 200 TABLET ORAL at 08:28

## 2025-07-01 RX ADMIN — LISINOPRIL 20 MG: 20 TABLET ORAL at 08:29

## 2025-07-01 RX ADMIN — CARBAMAZEPINE 200 MG: 200 TABLET ORAL at 12:49

## 2025-07-01 RX ADMIN — MICONAZOLE NITRATE: 20 POWDER TOPICAL at 21:50

## 2025-07-01 RX ADMIN — WARFARIN SODIUM 25 MG: 10 TABLET ORAL at 17:55

## 2025-07-01 RX ADMIN — PANTOPRAZOLE SODIUM 40 MG: 40 TABLET, DELAYED RELEASE ORAL at 07:05

## 2025-07-01 RX ADMIN — CARBAMAZEPINE 400 MG: 200 TABLET ORAL at 21:46

## 2025-07-01 RX ADMIN — ACETAMINOPHEN 650 MG: 325 TABLET ORAL at 17:30

## 2025-07-01 RX ADMIN — ATORVASTATIN CALCIUM 10 MG: 10 TABLET, FILM COATED ORAL at 21:46

## 2025-07-01 ASSESSMENT — ACTIVITIES OF DAILY LIVING (ADL)
ADLS_ACUITY_SCORE: 52
ADLS_ACUITY_SCORE: 52
ADLS_ACUITY_SCORE: 56
ADLS_ACUITY_SCORE: 56
ADLS_ACUITY_SCORE: 52
ADLS_ACUITY_SCORE: 56
ADLS_ACUITY_SCORE: 56
ADLS_ACUITY_SCORE: 52
ADLS_ACUITY_SCORE: 56
ADLS_ACUITY_SCORE: 52
ADLS_ACUITY_SCORE: 56
ADLS_ACUITY_SCORE: 52
ADLS_ACUITY_SCORE: 56
ADLS_ACUITY_SCORE: 52
ADLS_ACUITY_SCORE: 56
ADLS_ACUITY_SCORE: 52
ADLS_ACUITY_SCORE: 56

## 2025-07-01 NOTE — PLAN OF CARE
Shift [id number]  VSS.  A&Ox4.   On 1.5L NC, 2.5L baseline, nonproductive cough.  Pt denies pain.  On warfarin and inhaler.  Miconazole powder for chest and ab rash.  L thigh wound CDI, q3d wound care due 7/3.   No PIV.  Reg diet.  Parents legal guardians.  SBA, gb, walker.  SW following.  Plan for discharge pending [facility] placement.        Goal Outcome Evaluation:      Plan of Care Reviewed With: patient    Overall Patient Progress: no changeOverall Patient Progress: no change    Outcome Evaluation: 1.5L NC, 2.5L baseline. On warfarin and inhaler. Powder for chest and ab rash. L thigh wound CDI, q3d wound care due 7/3. Plan for discharge pending [facility] placement.        Problem: Adult Inpatient Plan of Care  Goal: Plan of Care Review  Description: The Plan of Care Review/Shift note should be completed every shift.  The Outcome Evaluation is a brief statement about your assessment that the patient is improving, declining, or no change.  This information will be displayed automatically on your shift  note.  Recent Flowsheet Documentation  Taken 7/1/2025 1352 by Marge Don, RN  Outcome Evaluation: 1.5L NC, 2.5L baseline. On warfarin and inhaler. Powder for chest and ab rash. L thigh wound CDI, q3d wound care due 7/3. Plan for discharge pending [facility] placement.  Plan of Care Reviewed With: patient  Overall Patient Progress: no change  Goal: Absence of Hospital-Acquired Illness or Injury  Intervention: Identify and Manage Fall Risk  Recent Flowsheet Documentation  Taken 7/1/2025 0830 by Marge Don, RN  Safety Promotion/Fall Prevention:   activity supervised   assistive device/personal items within reach   clutter free environment maintained   safety round/check completed  Taken 7/1/2025 0725 by Marge Don, RN  Safety Promotion/Fall Prevention: safety round/check completed  Intervention: Prevent Skin Injury  Recent Flowsheet Documentation  Taken 7/1/2025 0830 by Marge Don, RN  Body Position: weight  shifting  Intervention: Prevent Infection  Recent Flowsheet Documentation  Taken 7/1/2025 0830 by Marge Don, RN  Infection Prevention:   cohorting utilized   environmental surveillance performed   equipment surfaces disinfected   hand hygiene promoted   personal protective equipment utilized   rest/sleep promoted   single patient room provided

## 2025-07-01 NOTE — PROGRESS NOTES
[facility]  Hospitalist Progress Note  Neeraj Alvarado MD 07/01/2025    Reason for Stay (Diagnosis): Acute on chronic hypoxic respiratory failure.  Await placement         Assessment and Plan:      Summary of Stay: Brissa Corado is a 60 year old female admitted on 6/25/2025 with past medical history of asthma on chronic oxygen at night, obstructive sleep apnea, morbid obesity, hypertension, seizure disorder who presents with asthma exacerbation 2/2 poor air quality and not using/having access to her LABA/ICS.    Patient's acute on chronic hypoxic respiratory failure has resolved.  At this point patient remains in the hospital for appropriate disposition and placement    Plans today:  -No significant changes made to care plan today  -Await appropriate disposition     Acute on chronic hypoxic respiratory failure-resolved  Asthma exacerbation  RICK   At baseline pt is 2.5L at night and during the day as needed.  Now on room air during the daytime.  CTPA negative for PE.  Patient states her triggers been poor air quality and she has not been using her controller LABA/ICS.  Has not had a sleep apnea machine at home but reports that she has an appointment coming up in the next 2 weeks, even though she is diagnosed is not tolerating it due to claustrophobic feelings.  - Wheezing resolved, DuoNeb every 4 hours as needed  - Completed 5-day course of CPAP  - CPAP ordered  - Continue Breo Ellipta control inhaler  - Medically stable and can be discharged whenever placement is available     History of developmental delay  SW Consultation   -father who is legal guardian states he received paperwork from the [facility] stating that patient is not to be able to return to her own residence and requires to be moved to a care facility to ensure she gets her medications as ordered and prescribed.    - consulted and following.   - Per  note patient will be getting appointed a new legal  "guardian in July.   - Discharge patient will go to a new [facility].  His significant other cannot go with her.     H/o DVT -recently subtherapeutic on INR.  Pharmacy warfarin panel ordered.  Continue warfarin as inpatient.     Seizure disorder -continue Tegretol     Hypertension -continue lisinopril     Hyperlipidemia - continue statin     Gerd - continue prilosec     Super morbid obesity  Intertriginous excoriations and wounds in multiple skin folds  WOC following   - Start patient on topical azole therapy with ketoconazole 2% twice daily--was switched to miconazole powder per patient's request  - General Wound cares per bedside RN and WOC cares weekly        Diet: Combination Diet Regular Diet Adult    DVT Prophylaxis: Warfarin  Grajeda Catheter: Not present  Lines: None     Cardiac Monitoring: None  Code Status: Full Code    Discharge Dispo: [facility] when facility is found  Medically Ready for Discharge: Ready Now           Interval History (Subjective):      Patient has no concerns or complaints today.  Breathing at baseline.  Await disposition                  Physical Exam:      Last Vital Signs:  /53 (BP Location: Left arm)   Pulse 74   Temp 97.8  F (36.6  C) (Oral)   Resp 16   Ht 1.702 m (5' 7\")   Wt (!) 175.4 kg (386 lb 11 oz)   LMP 08/18/2008   SpO2 97%   BMI 60.56 kg/m        Intake/Output Summary (Last 24 hours) at 7/1/2025 1107  Last data filed at 7/1/2025 0656  Gross per 24 hour   Intake 1700 ml   Output 4450 ml   Net -2750 ml       Constitutional: Awake, alert, cooperative, no apparent distress     Respiratory: Clear to auscultation bilaterally, no crackles or wheezing   Cardiovascular: Regular rate and rhythm, normal S1 and S2, and no murmur noted   Abdomen: Normal bowel sounds, soft, non-distended, non-tender   Skin: No rashes, no cyanosis, dry to touch   Neuro: Alert and oriented x3, no weakness, numbness, memory loss   Extremities: No edema, normal range of motion   Other(s):        All other " systems: Negative          Medications:      All current medications were reviewed with changes reflected in problem list.         Data:      All new lab and imaging data was reviewed.   Labs:       Lab Results   Component Value Date     06/25/2025     05/06/2025     05/05/2025     06/09/2021     05/10/2021     05/03/2021    Lab Results   Component Value Date    CHLORIDE 107 06/25/2025    CHLORIDE 101 05/06/2025    CHLORIDE 101 05/05/2025    CHLORIDE 108 05/07/2022    CHLORIDE 106 05/05/2022    CHLORIDE 107 06/09/2021    CHLORIDE 108 05/10/2021    CHLORIDE 107 05/03/2021    Lab Results   Component Value Date    BUN 13.0 06/25/2025    BUN 14.7 05/06/2025    BUN 15.4 05/05/2025    BUN 13 05/07/2022    BUN 18 05/05/2022    BUN 15 06/09/2021    BUN 13 05/10/2021    BUN 17 05/03/2021      Lab Results   Component Value Date    POTASSIUM 4.4 06/25/2025    POTASSIUM 4.1 05/06/2025    POTASSIUM 4.4 05/05/2025    POTASSIUM 3.7 05/07/2022    POTASSIUM 4.2 05/05/2022    POTASSIUM 4.2 06/09/2021    POTASSIUM 3.6 05/10/2021    POTASSIUM 3.7 05/03/2021    Lab Results   Component Value Date    CO2 27 06/25/2025    CO2 26 05/06/2025    CO2 27 05/05/2025    CO2 27 05/07/2022    CO2 29 05/05/2022    CO2 28 06/09/2021    CO2 28 05/10/2021    CO2 23 05/03/2021    Lab Results   Component Value Date    CR 0.7 06/25/2025    CR 0.69 06/25/2025    CR 0.65 05/08/2025    CR 0.57 05/06/2025    CR 0.69 06/09/2021    CR 0.55 05/10/2021    CR 0.56 05/03/2021        Recent Labs   Lab 06/25/25  1330   WBC 10.1   HGB 13.0   HCT 42.3   MCV 90         Imaging:   No results found for this or any previous visit (from the past 24 hours).

## 2025-07-01 NOTE — PLAN OF CARE
End OF Shift Summary :    Neuro:    A&O4 refused PCDs reeducated the importance on it pt still refused.        Cardiac:  WDL         Respiratory : pt was on RA  started to satting 88-90% currently on  2L NC currently satting at 93%      GI &  :bowel sounds audible in all quadrant.pt is obese.pure wick in place      Skin : rash on chest,abdominal,perineum,sacrum micatin powder applied       Mobility : generalized weakness gait belt and walker with assist of one.      IV access :  No IV in place MD aware.              Goal Outcome Evaluation:      Plan of Care Reviewed With: patient    Overall Patient Progress: no changeOverall Patient Progress: no change    Problem: Adult Inpatient Plan of Care  Goal: Plan of Care Review  Description: The Plan of Care Review/Shift note should be completed every shift.  The Outcome Evaluation is a brief statement about your assessment that the patient is improving, declining, or no change.  This information will be displayed automatically on your shift  note.  Recent Flowsheet Documentation  Taken 7/1/2025 0454 by Piper Yao RN  Plan of Care Reviewed With: patient  Overall Patient Progress: no change  Goal: Absence of Hospital-Acquired Illness or Injury  Intervention: Identify and Manage Fall Risk  Recent Flowsheet Documentation  Taken 6/30/2025 2140 by Piper Yao RN  Safety Promotion/Fall Prevention:   clutter free environment maintained   safety round/check completed  Intervention: Prevent and Manage VTE (Venous Thromboembolism) Risk  Recent Flowsheet Documentation  Taken 6/30/2025 2140 by Piper Yao RN  VTE Prevention/Management: (pt refused) SCDs off (sequential compression devices)     Problem: Adult Inpatient Plan of Care  Goal: Plan of Care Review  Description: The Plan of Care Review/Shift note should be completed every shift.  The Outcome Evaluation is a brief statement about your assessment that the patient is improving, declining, or no change.   "This information will be displayed automatically on your shift  note.  Outcome: Progressing  Flowsheets (Taken 7/1/2025 8773)  Plan of Care Reviewed With: patient  Overall Patient Progress: no change  Goal: Patient-Specific Goal (Individualized)  Description: You can add care plan individualizations to a care plan. Examples of Individualization might be:  \"Parent requests to be called daily at 9am for status\", \"I have a hard time hearing out of my right ear\", or \"Do not touch me to wake me up as it startles  me\".  Outcome: Progressing  Goal: Absence of Hospital-Acquired Illness or Injury  Outcome: Progressing  Intervention: Identify and Manage Fall Risk  Recent Flowsheet Documentation  Taken 6/30/2025 2140 by Piper Yao RN  Safety Promotion/Fall Prevention:   clutter free environment maintained   safety round/check completed  Intervention: Prevent and Manage VTE (Venous Thromboembolism) Risk  Recent Flowsheet Documentation  Taken 6/30/2025 2140 by Piper Yao RN  VTE Prevention/Management: (pt refused) SCDs off (sequential compression devices)  Goal: Optimal Comfort and Wellbeing  Outcome: Progressing  Goal: Readiness for Transition of Care  Outcome: Progressing     Problem: Comorbidity Management  Goal: Maintenance of Asthma Control  Outcome: Progressing  Goal: Maintenance of Behavioral Health Symptom Control  Outcome: Progressing  Goal: Maintenance of COPD Symptom Control  Outcome: Progressing  Goal: Blood Glucose Levels Within Targeted Range  Outcome: Progressing  Goal: Maintenance of Heart Failure Symptom Control  Outcome: Progressing  Goal: Blood Pressure in Desired Range  Outcome: Progressing  Goal: Maintenance of Osteoarthritis Symptom Control  Outcome: Progressing  Goal: Bariatric Home Regimen Maintained  Outcome: Progressing  Goal: Maintenance of Seizure Control  Outcome: Progressing     Problem: Gas Exchange Impaired  Goal: Optimal Gas Exchange  Outcome: Progressing                "

## 2025-07-02 LAB
INR PPP: 1.67 (ref 0.85–1.15)
PROTHROMBIN TIME: 19.6 SECONDS (ref 11.8–14.8)

## 2025-07-02 PROCEDURE — 120N000001 HC R&B MED SURG/OB

## 2025-07-02 PROCEDURE — 250N000013 HC RX MED GY IP 250 OP 250 PS 637: Performed by: HOSPITALIST

## 2025-07-02 PROCEDURE — 999N000157 HC STATISTIC RCP TIME EA 10 MIN

## 2025-07-02 PROCEDURE — 94640 AIRWAY INHALATION TREATMENT: CPT

## 2025-07-02 PROCEDURE — 36415 COLL VENOUS BLD VENIPUNCTURE: CPT | Performed by: HOSPITALIST

## 2025-07-02 PROCEDURE — 85610 PROTHROMBIN TIME: CPT | Performed by: HOSPITALIST

## 2025-07-02 PROCEDURE — 99232 SBSQ HOSP IP/OBS MODERATE 35: CPT | Performed by: INTERNAL MEDICINE

## 2025-07-02 RX ORDER — WARFARIN SODIUM 5 MG/1
20 TABLET ORAL
Status: COMPLETED | OUTPATIENT
Start: 2025-07-02 | End: 2025-07-02

## 2025-07-02 RX ADMIN — CARBAMAZEPINE 200 MG: 200 TABLET ORAL at 11:24

## 2025-07-02 RX ADMIN — LISINOPRIL 20 MG: 20 TABLET ORAL at 09:00

## 2025-07-02 RX ADMIN — MICONAZOLE NITRATE: 20 POWDER TOPICAL at 20:05

## 2025-07-02 RX ADMIN — ACETAMINOPHEN 650 MG: 325 TABLET ORAL at 21:57

## 2025-07-02 RX ADMIN — CARBAMAZEPINE 400 MG: 200 TABLET ORAL at 09:00

## 2025-07-02 RX ADMIN — FLUTICASONE FUROATE AND VILANTEROL TRIFENATATE 1 PUFF: 100; 25 POWDER RESPIRATORY (INHALATION) at 07:21

## 2025-07-02 RX ADMIN — WARFARIN SODIUM 20 MG: 5 TABLET ORAL at 17:09

## 2025-07-02 RX ADMIN — CARBAMAZEPINE 400 MG: 200 TABLET ORAL at 20:05

## 2025-07-02 RX ADMIN — PANTOPRAZOLE SODIUM 40 MG: 40 TABLET, DELAYED RELEASE ORAL at 06:32

## 2025-07-02 RX ADMIN — ATORVASTATIN CALCIUM 10 MG: 10 TABLET, FILM COATED ORAL at 21:55

## 2025-07-02 RX ADMIN — SIMETHICONE 80 MG: 80 TABLET, CHEWABLE ORAL at 14:35

## 2025-07-02 RX ADMIN — ACETAMINOPHEN 650 MG: 325 TABLET ORAL at 16:50

## 2025-07-02 ASSESSMENT — ACTIVITIES OF DAILY LIVING (ADL)
ADLS_ACUITY_SCORE: 56
ADLS_ACUITY_SCORE: 61
ADLS_ACUITY_SCORE: 61
ADLS_ACUITY_SCORE: 56
ADLS_ACUITY_SCORE: 61
ADLS_ACUITY_SCORE: 56
ADLS_ACUITY_SCORE: 61
ADLS_ACUITY_SCORE: 61
ADLS_ACUITY_SCORE: 56
ADLS_ACUITY_SCORE: 61
ADLS_ACUITY_SCORE: 56
ADLS_ACUITY_SCORE: 56

## 2025-07-02 NOTE — PLAN OF CARE
Shift [id number]  VSS.  A&Ox4.   On 1L NC, 2.5L baseline, nonproductive cough.  Pt denies pain.  On warfarin and inhaler.  PRN simethicone given for gas per pt request.  Miconazole powder for chest and ab rash.  L thigh wound CDI, q3d wound care due 7/3.  No PIV.  Reg diet.  Parents legal guardians.  A1, gb, walker, up to bathroom.  SW following.  Plan for discharge pending [facility] placement.        Goal Outcome Evaluation:      Plan of Care Reviewed With: patient    Overall Patient Progress: no changeOverall Patient Progress: no change    Outcome Evaluation: On 1L NC, 2.5L baseline, nonproductive cough. On warfarin and inhaler. L thigh wound CDI, q3d wound care due 7/3. Plan for discharge pending [facility] placement.        Problem: Adult Inpatient Plan of Care  Goal: Plan of Care Review  Description: The Plan of Care Review/Shift note should be completed every shift.  The Outcome Evaluation is a brief statement about your assessment that the patient is improving, declining, or no change.  This information will be displayed automatically on your shift  note.  Recent Flowsheet Documentation  Taken 7/2/2025 1357 by Marge Don, RN  Outcome Evaluation: On 1L NC, 2.5L baseline, nonproductive cough. On warfarin and inhaler. L thigh wound CDI, q3d wound care due 7/3. Plan for discharge pending [facility] placement.  Plan of Care Reviewed With: patient  Overall Patient Progress: no change  Goal: Absence of Hospital-Acquired Illness or Injury  Intervention: Identify and Manage Fall Risk  Recent Flowsheet Documentation  Taken 7/2/2025 1350 by Marge Don, RN  Safety Promotion/Fall Prevention: safety round/check completed  Taken 7/2/2025 1125 by Marge Don, RN  Safety Promotion/Fall Prevention: safety round/check completed  Taken 7/2/2025 0900 by Marge Don, RN  Safety Promotion/Fall Prevention:   activity supervised   assistive device/personal items within reach   clutter free environment maintained   safety round/check  completed  Taken 7/2/2025 0730 by Marge Don, RN  Safety Promotion/Fall Prevention: safety round/check completed  Intervention: Prevent Skin Injury  Recent Flowsheet Documentation  Taken 7/2/2025 0900 by Marge Don RN  Body Position: weight shifting  Intervention: Prevent Infection  Recent Flowsheet Documentation  Taken 7/2/2025 0900 by Marge Don, RN  Infection Prevention:   cohorting utilized   environmental surveillance performed   equipment surfaces disinfected   hand hygiene promoted   personal protective equipment utilized   rest/sleep promoted   single patient room provided

## 2025-07-02 NOTE — PROGRESS NOTES
"CLINICAL NUTRITION SERVICES - BRIEF NOTE     Chart reviewed for LOS.  Weights, I/Os, and skin reviewed.  Documented good appetite and 100% intakes. Pt typically ordering well portioned meals TID.    Wt Readings from Last 10 Encounters:   06/25/25 : (!) 175.4 kg (386 lb 11 oz)   05/05/25 : (!) 174.2 kg (384 lb 0.7 oz)   04/02/25 : (!) 177.9 kg (392 lb 3.2 oz)   03/12/25 : (!) 178.4 kg (393 lb 4.8 oz)   01/15/25 : (!) 180.5 kg (398 lb)   12/20/24 : (!) 180.8 kg (398 lb 8 oz)   11/12/24 : (!) 180.4 kg (397 lb 11.4 oz)   10/30/24 : (!) 182.7 kg (402 lb 12.5 oz)   10/10/24 : (!) 186.3 kg (410 lb 11.5 oz)   07/05/24 : (!) 184.9 kg (407 lb 9.6 oz)     -2.83% BW x 6 mo   -5.14% BW x 12 mo     Wt appears stable.     No pressure injuries or significant edema documented.    LBM 7/1 (x2)     Per hospitalist note, pt remains admitted for placement. Documented medically ready for discharge.  RD will continue to stay signed off at this time. RD will chart review patient in 7-10 days per policy guidelines, unless consulted sooner.      May Kaur RD, LD  Clinical Dietitian  [place] Message Group: \"Dietitian [Kristy]\"  Office Phone: [phone]    "

## 2025-07-02 NOTE — PROGRESS NOTES
[facility]  Hospitalist Progress Note  Neeraj Alvarado MD 07/02/2025    Reason for Stay (Diagnosis): await placement         Assessment and Plan:      Summary of Stay: Brissa Corado is a 60 year old female admitted on 6/25/2025 with past medical history of asthma on chronic oxygen at night, obstructive sleep apnea, morbid obesity, hypertension, seizure disorder who presents with asthma exacerbation 2/2 poor air quality and not using/having access to her LABA/ICS.     Patient's acute on chronic hypoxic respiratory failure has resolved.  At this point patient remains in the hospital for appropriate disposition and placement     Plans today:  -No significant changes made to care plan today  -Await appropriate disposition     Acute on chronic hypoxic respiratory failure-resolved  Asthma exacerbation  RICK   At baseline pt is 2.5L at night and during the day as needed.  Now on room air during the daytime.  CTPA negative for PE.  Patient states her triggers been poor air quality and she has not been using her controller LABA/ICS.  Has not had a sleep apnea machine at home but reports that she has an appointment coming up in the next 2 weeks, even though she is diagnosed is not tolerating it due to claustrophobic feelings.  - Wheezing resolved, DuoNeb every 4 hours as needed  - Completed 5-day course of CPAP  - CPAP ordered  - Continue Breo Ellipta control inhaler  - Medically stable and can be discharged whenever placement is available     History of developmental delay  SW Consultation   -father who is legal guardian states he received paperwork from the [facility] beds at the [facility] stating that patient is not to be able to return to her own residence and requires to be moved to a care facility to ensure she gets her medications as ordered and prescribed.    - consulted and following.   - Per  note patient will be getting appointed a new legal guardian in July.   - Discharge patient will go  "to a new [facility].  His significant other cannot go with her.     H/o DVT -recently subtherapeutic on INR.  Pharmacy warfarin panel ordered.  Continue warfarin as inpatient.     Seizure disorder -continue Tegretol     Hypertension -continue lisinopril     Hyperlipidemia - continue statin     Gerd - continue prilosec     Super morbid obesity  Intertriginous excoriations and wounds in multiple skin folds  WOC following   - Start patient on topical azole therapy with ketoconazole 2% twice daily--was switched to miconazole powder per patient's request  - General Wound cares per bedside RN and WOC cares weekly        Diet: Combination Diet Regular Diet Adult    DVT Prophylaxis: Warfarin  Grajeda Catheter: Not present  Lines: None     Cardiac Monitoring: None  Code Status: Full Code    Discharge Dispo: half-way when facility is found  Medically Ready for Discharge: Ready Now           Interval History (Subjective):      No complaints this morning.  Patient feels well.  Await placement.  Resp status at baseline.  No SOB or wheezing                  Physical Exam:      Last Vital Signs:  /44 (BP Location: Left arm)   Pulse 74   Temp 98.2  F (36.8  C) (Oral)   Resp 18   Ht 1.702 m (5' 7\")   Wt (!) 175.4 kg (386 lb 11 oz)   LMP 08/18/2008   SpO2 96%   BMI 60.56 kg/m        Intake/Output Summary (Last 24 hours) at 7/2/2025 1033  Last data filed at 7/2/2025 0930  Gross per 24 hour   Intake 1000 ml   Output 2575 ml   Net -1575 ml       Constitutional: Awake, alert, cooperative, no apparent distress     Respiratory: Clear to auscultation bilaterally, no crackles or wheezing   Cardiovascular: Regular rate and rhythm, normal S1 and S2, and no murmur noted   Abdomen: Normal bowel sounds, soft, non-distended, non-tender   Skin: No rashes, no cyanosis, dry to touch   Neuro: Alert and oriented x3, no weakness, numbness, memory loss   Extremities: No edema, normal range of motion   Other(s):        All other systems: Negative          " Medications:      All current medications were reviewed with changes reflected in problem list.         Data:      All new lab and imaging data was reviewed.   Labs:       Lab Results   Component Value Date     06/25/2025     05/06/2025     05/05/2025     06/09/2021     05/10/2021     05/03/2021    Lab Results   Component Value Date    CHLORIDE 107 06/25/2025    CHLORIDE 101 05/06/2025    CHLORIDE 101 05/05/2025    CHLORIDE 108 05/07/2022    CHLORIDE 106 05/05/2022    CHLORIDE 107 06/09/2021    CHLORIDE 108 05/10/2021    CHLORIDE 107 05/03/2021    Lab Results   Component Value Date    BUN 13.0 06/25/2025    BUN 14.7 05/06/2025    BUN 15.4 05/05/2025    BUN 13 05/07/2022    BUN 18 05/05/2022    BUN 15 06/09/2021    BUN 13 05/10/2021    BUN 17 05/03/2021      Lab Results   Component Value Date    POTASSIUM 4.4 06/25/2025    POTASSIUM 4.1 05/06/2025    POTASSIUM 4.4 05/05/2025    POTASSIUM 3.7 05/07/2022    POTASSIUM 4.2 05/05/2022    POTASSIUM 4.2 06/09/2021    POTASSIUM 3.6 05/10/2021    POTASSIUM 3.7 05/03/2021    Lab Results   Component Value Date    CO2 27 06/25/2025    CO2 26 05/06/2025    CO2 27 05/05/2025    CO2 27 05/07/2022    CO2 29 05/05/2022    CO2 28 06/09/2021    CO2 28 05/10/2021    CO2 23 05/03/2021    Lab Results   Component Value Date    CR 0.7 06/25/2025    CR 0.69 06/25/2025    CR 0.65 05/08/2025    CR 0.57 05/06/2025    CR 0.69 06/09/2021    CR 0.55 05/10/2021    CR 0.56 05/03/2021        Recent Labs   Lab 06/25/25  1330   WBC 10.1   HGB 13.0   HCT 42.3   MCV 90         Imaging:   No results found for this or any previous visit (from the past 24 hours).

## 2025-07-02 NOTE — PROGRESS NOTES
Care Management Follow Up    Length of Stay (days): 7    Expected Discharge Date: 07/14/2025     Concerns to be Addressed: discharge planning     Patient plan of care discussed at interdisciplinary rounds: Yes    Anticipated Discharge Disposition:                Anticipated Discharge Services:    Anticipated Discharge DME:      Patient/family educated on Medicare website which has current facility and service quality ratings:    Education Provided on the Discharge Plan:    Patient/Family in Agreement with the Plan:      Referrals Placed by CM/SW:    Private pay costs discussed: Not applicable    Discussed  Partnership in Safe Discharge Planning  document with patient/family: No     Handoff Completed: Yes, [facility] PCP: Internal handoff referral completed    Additional Information:  SW connected with Lilliam at [facility] and confirmed they have received pt's records that have been sent. [facility] to review records.     Next Steps: SW to follow for discharge.    AISSATOU Alexander, LGSW  [facility] Care Coordinator/ Med Surg [address] [place] [address]  [phone]

## 2025-07-02 NOTE — PLAN OF CARE
[id number]  Pt is alert and oriented x4 on 1.5 L O2. VSS. Pt is a 1 person assist with walker. Pt is on regular diet. NO PIV; providers made aware. External catheters in place. No complaints of pain. Pending discharge plan for placement; still in progress . Plan of care is continuing.     Goal Outcome Evaluation:      Plan of Care Reviewed With: patient    Overall Patient Progress: no change    Outcome Evaluation: plan of care is continuing      Problem: Gas Exchange Impaired  Goal: Optimal Gas Exchange  Outcome: Progressing  Intervention: Optimize Oxygenation and Ventilation  Recent Flowsheet Documentation  Taken 7/2/2025 0033 by Deidre Huff, RN  Head of Bed (HOB) Positioning: HOB at 30-45 degrees     Problem: Comorbidity Management  Goal: Maintenance of Asthma Control  Outcome: Progressing  Goal: Maintenance of Behavioral Health Symptom Control  Outcome: Progressing  Goal: Maintenance of COPD Symptom Control  Outcome: Progressing  Goal: Blood Glucose Levels Within Targeted Range  Outcome: Progressing  Goal: Maintenance of Heart Failure Symptom Control  Outcome: Progressing  Goal: Blood Pressure in Desired Range  Outcome: Progressing  Goal: Maintenance of Osteoarthritis Symptom Control  Outcome: Progressing  Goal: Bariatric Home Regimen Maintained  Outcome: Progressing  Goal: Maintenance of Seizure Control  Outcome: Progressing     Problem: Adult Inpatient Plan of Care  Goal: Plan of Care Review  Description: The Plan of Care Review/Shift note should be completed every shift.  The Outcome Evaluation is a brief statement about your assessment that the patient is improving, declining, or no change.  This information will be displayed automatically on your shift  note.  Outcome: Progressing  Flowsheets (Taken 7/2/2025 0672)  Outcome Evaluation: plan of care is continuing  Plan of Care Reviewed With: patient  Overall Patient Progress: no change  Goal: Patient-Specific Goal (Individualized)  Description: You can add care  "plan individualizations to a care plan. Examples of Individualization might be:  \"Parent requests to be called daily at 9am for status\", \"I have a hard time hearing out of my right ear\", or \"Do not touch me to wake me up as it startles  me\".  Outcome: Progressing  Goal: Absence of Hospital-Acquired Illness or Injury  Outcome: Progressing  Intervention: Identify and Manage Fall Risk  Recent Flowsheet Documentation  Taken 7/2/2025 0033 by Deidre Huff, RN  Safety Promotion/Fall Prevention:   supervised activity   safety round/check completed   room near nurse's station   nonskid shoes/slippers when out of bed  Intervention: Prevent Skin Injury  Recent Flowsheet Documentation  Taken 7/2/2025 0033 by Deidre Huff, RN  Body Position: position changed independently  Goal: Optimal Comfort and Wellbeing  Outcome: Progressing  Goal: Readiness for Transition of Care  Outcome: Progressing         "

## 2025-07-02 NOTE — PLAN OF CARE
Goal Outcome Evaluation:      Plan of Care Reviewed With: patient    Overall Patient Progress: no changeOverall Patient Progress: no change    Outcome Evaluation: Pt A/O, open wound on back of left thigh, mepilex changed.  CDI. Pt is red in folds and groin area, powder applied    Pt is A/Ox4. Up with assist of 1 walker to bathroom for BM's  c/o gas pain, paged crosscover to get something to help with gas pain. No IV MD aware.  On 1.5 Lpm NC. 2.5 is her baseline. Pt has open wound on back of left thigh with Mepilex covering. Pt has hx of asthma, seizures.  On regular diet, tolerating fine. Rash/redness under breasts, perineum and abdominal folds, anti-fungal powder applied. Cleaned up  pt and put interdry in folds instead of the powder.    Problem: Gas Exchange Impaired  Goal: Optimal Gas Exchange  Outcome: Progressing     Problem: Comorbidity Management  Goal: Maintenance of Asthma Control  Outcome: Progressing  Intervention: Maintain Asthma Symptom Control  Recent Flowsheet Documentation  Taken 7/1/2025 1613 by Joe Hickman, RN  Medication Review/Management: medications reviewed  Goal: Maintenance of Behavioral Health Symptom Control  Outcome: Progressing  Intervention: Maintain Behavioral Health Symptom Control  Recent Flowsheet Documentation  Taken 7/1/2025 1613 by Joe Hickman, RN  Medication Review/Management: medications reviewed  Goal: Maintenance of COPD Symptom Control  Outcome: Progressing  Intervention: Maintain COPD Symptom Control  Recent Flowsheet Documentation  Taken 7/1/2025 1613 by Joe Hickman, RN  Medication Review/Management: medications reviewed  Goal: Blood Glucose Levels Within Targeted Range  Outcome: Progressing  Intervention: Monitor and Manage Glycemia  Recent Flowsheet Documentation  Taken 7/1/2025 1613 by Joe Hikcman, RN  Medication Review/Management: medications reviewed  Goal: Maintenance of Heart Failure Symptom Control  Outcome: Progressing  Intervention: Maintain Heart  Failure Management  Recent Flowsheet Documentation  Taken 7/1/2025 1613 by Joe Hickman, RN  Medication Review/Management: medications reviewed  Goal: Blood Pressure in Desired Range  Outcome: Progressing  Intervention: Maintain Blood Pressure Management  Recent Flowsheet Documentation  Taken 7/1/2025 1613 by Joe Hickman, RN  Medication Review/Management: medications reviewed  Goal: Maintenance of Osteoarthritis Symptom Control  Outcome: Progressing  Intervention: Maintain Osteoarthritis Symptom Control  Recent Flowsheet Documentation  Taken 7/1/2025 1613 by Joe Hickman, RN  Assistive Device Utilized: walker  Activity Management:   activity adjusted per tolerance   ambulated to bathroom  Medication Review/Management: medications reviewed  Goal: Bariatric Home Regimen Maintained  Outcome: Progressing  Intervention: Maintain and Manage Postbariatric Surgery Care  Recent Flowsheet Documentation  Taken 7/1/2025 1613 by Joe Hickman, RN  Medication Review/Management: medications reviewed  Goal: Maintenance of Seizure Control  Outcome: Progressing  Intervention: Maintain Seizure Symptom Control  Recent Flowsheet Documentation  Taken 7/1/2025 1613 by Joe Hickman RN  Medication Review/Management: medications reviewed     Problem: Adult Inpatient Plan of Care  Goal: Plan of Care Review  Description: The Plan of Care Review/Shift note should be completed every shift.  The Outcome Evaluation is a brief statement about your assessment that the patient is improving, declining, or no change.  This information will be displayed automatically on your shift  note.  Outcome: Progressing  Flowsheets (Taken 7/1/2025 1943)  Outcome Evaluation: Pt A/O, open wound on back of left thigh, mepilex changed.  CDI. Pt is red in folds and groin area, powder applied  Plan of Care Reviewed With: patient  Overall Patient Progress: no change  Goal: Patient-Specific Goal (Individualized)  Description: You can add care plan  "individualizations to a care plan. Examples of Individualization might be:  \"Parent requests to be called daily at 9am for status\", \"I have a hard time hearing out of my right ear\", or \"Do not touch me to wake me up as it startles  me\".  Outcome: Progressing  Goal: Absence of Hospital-Acquired Illness or Injury  Outcome: Progressing  Intervention: Identify and Manage Fall Risk  Recent Flowsheet Documentation  Taken 7/1/2025 1613 by Joe Hickman, RN  Safety Promotion/Fall Prevention:   activity supervised   assistive device/personal items within reach   clutter free environment maintained   safety round/check completed  Intervention: Prevent Skin Injury  Recent Flowsheet Documentation  Taken 7/1/2025 1613 by Joe Hickman, RN  Body Position: weight shifting  Intervention: Prevent and Manage VTE (Venous Thromboembolism) Risk  Recent Flowsheet Documentation  Taken 7/1/2025 1613 by Joe Hickman, RN  VTE Prevention/Management:   SCDs off (sequential compression devices)   patient refused intervention  Goal: Optimal Comfort and Wellbeing  Outcome: Progressing  Goal: Readiness for Transition of Care  Outcome: Progressing       "

## 2025-07-03 VITALS
DIASTOLIC BLOOD PRESSURE: 46 MMHG | HEIGHT: 67 IN | SYSTOLIC BLOOD PRESSURE: 108 MMHG | OXYGEN SATURATION: 93 % | RESPIRATION RATE: 20 BRPM | TEMPERATURE: 98 F | BODY MASS INDEX: 45.99 KG/M2 | HEART RATE: 76 BPM | WEIGHT: 293 LBS

## 2025-07-03 LAB
INR PPP: 2.29 (ref 0.85–1.15)
PROTHROMBIN TIME: 24.9 SECONDS (ref 11.8–14.8)

## 2025-07-03 PROCEDURE — G0463 HOSPITAL OUTPT CLINIC VISIT: HCPCS

## 2025-07-03 PROCEDURE — 36415 COLL VENOUS BLD VENIPUNCTURE: CPT | Performed by: HOSPITALIST

## 2025-07-03 PROCEDURE — 250N000013 HC RX MED GY IP 250 OP 250 PS 637: Performed by: HOSPITALIST

## 2025-07-03 PROCEDURE — 120N000001 HC R&B MED SURG/OB

## 2025-07-03 PROCEDURE — 99232 SBSQ HOSP IP/OBS MODERATE 35: CPT | Performed by: INTERNAL MEDICINE

## 2025-07-03 PROCEDURE — 85610 PROTHROMBIN TIME: CPT | Performed by: HOSPITALIST

## 2025-07-03 RX ADMIN — SENNOSIDES AND DOCUSATE SODIUM 1 TABLET: 50; 8.6 TABLET ORAL at 14:06

## 2025-07-03 RX ADMIN — ACETAMINOPHEN 650 MG: 325 TABLET ORAL at 15:23

## 2025-07-03 RX ADMIN — MICONAZOLE NITRATE: 20 POWDER TOPICAL at 20:30

## 2025-07-03 RX ADMIN — ATORVASTATIN CALCIUM 10 MG: 10 TABLET, FILM COATED ORAL at 22:25

## 2025-07-03 RX ADMIN — CARBAMAZEPINE 200 MG: 200 TABLET ORAL at 11:43

## 2025-07-03 RX ADMIN — CARBAMAZEPINE 400 MG: 200 TABLET ORAL at 20:29

## 2025-07-03 RX ADMIN — FLUTICASONE FUROATE AND VILANTEROL TRIFENATATE 1 PUFF: 100; 25 POWDER RESPIRATORY (INHALATION) at 07:45

## 2025-07-03 RX ADMIN — MICONAZOLE NITRATE: 20 POWDER TOPICAL at 09:58

## 2025-07-03 RX ADMIN — CARBAMAZEPINE 400 MG: 200 TABLET ORAL at 09:57

## 2025-07-03 RX ADMIN — PANTOPRAZOLE SODIUM 40 MG: 40 TABLET, DELAYED RELEASE ORAL at 06:33

## 2025-07-03 RX ADMIN — SIMETHICONE 80 MG: 80 TABLET, CHEWABLE ORAL at 14:37

## 2025-07-03 RX ADMIN — WARFARIN SODIUM 25 MG: 10 TABLET ORAL at 18:49

## 2025-07-03 RX ADMIN — LISINOPRIL 20 MG: 20 TABLET ORAL at 09:57

## 2025-07-03 ASSESSMENT — ACTIVITIES OF DAILY LIVING (ADL)
ADLS_ACUITY_SCORE: 60
ADLS_ACUITY_SCORE: 56
ADLS_ACUITY_SCORE: 64
ADLS_ACUITY_SCORE: 60
ADLS_ACUITY_SCORE: 60
ADLS_ACUITY_SCORE: 64
ADLS_ACUITY_SCORE: 56
ADLS_ACUITY_SCORE: 60
ADLS_ACUITY_SCORE: 64
ADLS_ACUITY_SCORE: 60
ADLS_ACUITY_SCORE: 56
ADLS_ACUITY_SCORE: 64
ADLS_ACUITY_SCORE: 64
ADLS_ACUITY_SCORE: 56
ADLS_ACUITY_SCORE: 64
ADLS_ACUITY_SCORE: 60
ADLS_ACUITY_SCORE: 56
ADLS_ACUITY_SCORE: 56
ADLS_ACUITY_SCORE: 60
ADLS_ACUITY_SCORE: 60
ADLS_ACUITY_SCORE: 56

## 2025-07-03 NOTE — PLAN OF CARE
Shift [id number]  VSS.  A&Ox4.   On 1L NC, 2.5L baseline, nonproductive cough.  On warfarin and inhaler.  PRN senna given for constipation per pt request.  PRN simethicone given for ab cramping.  PRN ty given for LL ab pain.  Miconazole powder for chest and ab rash.  L thigh wound CDI, q3d wound care complete.  No PIV.  Reg diet.  Parents legal guardians.  A1, gb, walker, up to bathroom.  SW following.  Plan for discharge pending [facility] placement.        Goal Outcome Evaluation:      Plan of Care Reviewed With: patient    Overall Patient Progress: improvingOverall Patient Progress: improving    Outcome Evaluation: On 1L NC, 2.5L baseline, nonproductive cough. PRN senna given for constipation per pt request. PRN simethicone given for ab cramping. Miconazole powder for chest and ab rash. L thigh wound CDI, q3d wound care complete. Plan for discharge pending [facility] placement.        Problem: Adult Inpatient Plan of Care  Goal: Plan of Care Review  Description: The Plan of Care Review/Shift note should be completed every shift.  The Outcome Evaluation is a brief statement about your assessment that the patient is improving, declining, or no change.  This information will be displayed automatically on your shift  note.  Recent Flowsheet Documentation  Taken 7/3/2025 1443 by Marge Don, RN  Outcome Evaluation: On 1L NC, 2.5L baseline, nonproductive cough. PRN senna given for constipation per pt request. PRN simethicone given for ab cramping. Miconazole powder for chest and ab rash. L thigh wound CDI, q3d wound care complete. Plan for discharge pending jail placement.  Plan of Care Reviewed With: patient  Overall Patient Progress: improving  Goal: Absence of Hospital-Acquired Illness or Injury  Intervention: Identify and Manage Fall Risk  Recent Flowsheet Documentation  Taken 7/3/2025 1440 by Marge Don, RN  Safety Promotion/Fall Prevention: safety round/check completed  Taken 7/3/2025 1400 by Marge Don, RN  Safety  Promotion/Fall Prevention: safety round/check completed  Taken 7/3/2025 1300 by Marge Don RN  Safety Promotion/Fall Prevention: safety round/check completed  Taken 7/3/2025 1140 by Marge Don RN  Safety Promotion/Fall Prevention: safety round/check completed  Taken 7/3/2025 1040 by Marge Don RN  Safety Promotion/Fall Prevention: safety round/check completed  Taken 7/3/2025 0740 by Marge Don RN  Safety Promotion/Fall Prevention:   activity supervised   assistive device/personal items within reach   clutter free environment maintained   safety round/check completed  Taken 7/3/2025 0730 by Marge Don RN  Safety Promotion/Fall Prevention: safety round/check completed  Intervention: Prevent Skin Injury  Recent Flowsheet Documentation  Taken 7/3/2025 0740 by Marge Don RN  Body Position: weight shifting  Intervention: Prevent Infection  Recent Flowsheet Documentation  Taken 7/3/2025 0740 by Mareg Don RN  Infection Prevention:   cohorting utilized   environmental surveillance performed   equipment surfaces disinfected   hand hygiene promoted   personal protective equipment utilized   rest/sleep promoted   single patient room provided

## 2025-07-03 NOTE — PLAN OF CARE
A&O x4. Assist of 1, GB, walker. Purewick in place. No PIV. On scheduled breo for asthma. On tegretol for seizure disorder. Denies pain. Plan to discharge to detention when placement is found.     Goal Outcome Evaluation:      Plan of Care Reviewed With: patient    Overall Patient Progress: improvingOverall Patient Progress: improving    Outcome Evaluation: BP stable. On tegretol for seizures. On 1 L NC, on 2.5 L at baseline. Denies pain.        Problem: Gas Exchange Impaired  Goal: Optimal Gas Exchange  Outcome: Progressing  Intervention: Optimize Oxygenation and Ventilation  Recent Flowsheet Documentation  Taken 7/3/2025 0026 by Gunjan Flood RN  Head of Bed (HOB) Positioning: HOB at 20-30 degrees     Problem: Comorbidity Management  Goal: Maintenance of Asthma Control  Outcome: Progressing  Intervention: Maintain Asthma Symptom Control  Recent Flowsheet Documentation  Taken 7/3/2025 0026 by Gunjan Flood RN  Medication Review/Management: medications reviewed  Goal: Maintenance of Behavioral Health Symptom Control  Outcome: Progressing  Intervention: Maintain Behavioral Health Symptom Control  Recent Flowsheet Documentation  Taken 7/3/2025 0026 by Gunjan Flood RN  Medication Review/Management: medications reviewed  Goal: Maintenance of COPD Symptom Control  Outcome: Progressing  Intervention: Maintain COPD Symptom Control  Recent Flowsheet Documentation  Taken 7/3/2025 0026 by Gunjan Flood RN  Medication Review/Management: medications reviewed  Goal: Blood Glucose Levels Within Targeted Range  Outcome: Progressing  Intervention: Monitor and Manage Glycemia  Recent Flowsheet Documentation  Taken 7/3/2025 0026 by Gunjan Flood RN  Medication Review/Management: medications reviewed  Goal: Maintenance of Heart Failure Symptom Control  Outcome: Progressing  Intervention: Maintain Heart Failure Management  Recent Flowsheet Documentation  Taken 7/3/2025 0026 by Gunjan Flood RN  Medication Review/Management: medications  "reviewed  Goal: Blood Pressure in Desired Range  Outcome: Progressing  Intervention: Maintain Blood Pressure Management  Recent Flowsheet Documentation  Taken 7/3/2025 0026 by Gunjan Flood RN  Medication Review/Management: medications reviewed  Goal: Maintenance of Osteoarthritis Symptom Control  Outcome: Progressing  Intervention: Maintain Osteoarthritis Symptom Control  Recent Flowsheet Documentation  Taken 7/3/2025 0026 by Gunjan Flood RN  Assistive Device Utilized:   walker   gait belt  Activity Management: activity adjusted per tolerance  Medication Review/Management: medications reviewed  Goal: Bariatric Home Regimen Maintained  Outcome: Progressing  Intervention: Maintain and Manage Postbariatric Surgery Care  Recent Flowsheet Documentation  Taken 7/3/2025 0026 by Gunjan Flood RN  Medication Review/Management: medications reviewed  Goal: Maintenance of Seizure Control  Outcome: Progressing  Intervention: Maintain Seizure Symptom Control  Recent Flowsheet Documentation  Taken 7/3/2025 0026 by Gunjan Flood RN  Medication Review/Management: medications reviewed     Problem: Adult Inpatient Plan of Care  Goal: Plan of Care Review  Description: The Plan of Care Review/Shift note should be completed every shift.  The Outcome Evaluation is a brief statement about your assessment that the patient is improving, declining, or no change.  This information will be displayed automatically on your shift  note.  Outcome: Progressing  Flowsheets (Taken 7/3/2025 0427)  Outcome Evaluation: BP stable. On tegretol for seizures. On 1 L NC, on 2.5 L at baseline. Denies pain.  Plan of Care Reviewed With: patient  Overall Patient Progress: improving  Goal: Patient-Specific Goal (Individualized)  Description: You can add care plan individualizations to a care plan. Examples of Individualization might be:  \"Parent requests to be called daily at 9am for status\", \"I have a hard time hearing out of my right ear\", or \"Do not touch me " "to wake me up as it startles  me\".  Outcome: Progressing  Goal: Absence of Hospital-Acquired Illness or Injury  Outcome: Progressing  Intervention: Identify and Manage Fall Risk  Recent Flowsheet Documentation  Taken 7/3/2025 0026 by Gunjan Flood RN  Safety Promotion/Fall Prevention: safety round/check completed  Intervention: Prevent Skin Injury  Recent Flowsheet Documentation  Taken 7/3/2025 0026 by Gunjan Flood RN  Body Position: weight shifting  Intervention: Prevent and Manage VTE (Venous Thromboembolism) Risk  Recent Flowsheet Documentation  Taken 7/3/2025 0026 by Gunjan Flood RN  VTE Prevention/Management: patient refused intervention  Goal: Optimal Comfort and Wellbeing  Outcome: Progressing  Goal: Readiness for Transition of Care  Outcome: Progressing       "

## 2025-07-03 NOTE — PROGRESS NOTES
[facility] Nurse Inpatient Assessment     Consulted for: Groin    Summary: Folds are significantly improved.      [facility] nurse follow-up plan: weekly    Patient History (according to provider note(s):    Assessment & Plan  Brissa Corado is a 60 year old female admitted on 6/25/2025 with past medical history of asthma on chronic oxygen at night, obstructive sleep apnea, morbid obesity, hypertension, seizure disorder who presents with asthma exacerbation 2/2 poor air quality and not using/having access to her LABA/ICS.     Acute on chronic hypoxic respiratory failure  Asthma exacerbation  RICK -patient improving with treatments (DuoNeb, dexamethasone, magnesium in ED in ED) of clinical asthma exacerbation.  CTPA negative for PE.  Patient states her triggers been poor air quality and she has not been using her controller LABA/ICS.  Has not had a sleep apnea machine at home but reports that she has an appointment coming up in the next 2 weeks, even though she is diagnosed is not tolerating it due to claustrophobic feelings.  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 No drainage or odor noted. Periwound skin intact.    Pain: denies   Pain interventions prior to dressing change: patient tolerated  well and slow and gentle cares   Treatment goal: Heal , Maintain (prevention of deterioration), and Protection  STATUS: improving  Supplies ordered: at bedside    Treatment Plan:   Interdry:  Location Pannus and breast folds - other folds as needed  Cleanse skin with Vashe and dry well.  Cut Interdry 2 inches larger than skin fold, date dressing.  Apply a single layer -flat, not bunched up- of dressing in skin fold so 2 inches are visible (allow moisture to wick out).   Remove dressing with routine cleansing and reapply, do not use powder/ointment in folds if Interdry in use..   Discard if grossly soiled otherwise 1 piece is good up to 5 days.    L posteromedial thigh - Every 3 days  Cleanse with saline; gently dry.  Cover with Mepilex.    Orders: Updated    RECOMMEND PRIMARY TEAM ORDER: None, at this time  Education provided: plan of care  Discussed plan of care with: Patient and Nurse  Notify WOC if wound(s) deteriorate.  Nursing to notify the Provider(s) and re-consult the WOC Nurse if new skin concern.    DATA:     Current support surface: Standard  Standard gel mattress (Isoflex)  Containment of urine/stool: Continent of bowel, Incontinence Protocol, and Suction based external urinary catheter   BMI: Body mass index is 60.56 kg/m .   Active diet order: Orders Placed This Encounter      Combination Diet Regular Diet Adult     Output: I/O last 3 completed shifts:  In: 1300 [P.O.:1300]  Out: 3500 [Urine:3500]     Labs:   Recent Labs   Lab 07/03/25  0731   INR 2.29*     Pressure injury risk assessment:   Sensory Perception: 3-->slightly limited  Moisture: 2-->very moist  Activity: 3-->walks occasionally  Mobility: 3-->slightly limited  Nutrition: 3-->adequate  Friction and Shear: 1-->problem  Randy Score: 15    Bharat Cooper RN CWOCN  Contact [facility] Nurse (Kristy)  Dept. Office Number: [phone]

## 2025-07-03 NOTE — PLAN OF CARE
Goal Outcome Evaluation:      Plan of Care Reviewed With: patient    Overall Patient Progress: improvingOverall Patient Progress: improving    Outcome Evaluation: on 1.5 L NC. L thigh wound CDI.  A & O x 4, VSS, LS clear, Regular diet, no PIV/MD aware, c/o back pain, prn Tylenol x 2 given, up A x 1/gb/w, pur wick in place, left thigh wound dressing CDI. Plan:  residential when place found.  Will continue POC and monitoring.   Problem: Gas Exchange Impaired  Goal: Optimal Gas Exchange  Outcome: Progressing  Intervention: Optimize Oxygenation and Ventilation  Recent Flowsheet Documentation  Taken 7/2/2025 1625 by Quirino Montiel RN  Head of Bed (HOB) Positioning: HOB at 20-30 degrees     Problem: Comorbidity Management  Goal: Maintenance of Asthma Control  Outcome: Progressing  Intervention: Maintain Asthma Symptom Control  Recent Flowsheet Documentation  Taken 7/2/2025 1625 by Quirino Montiel RN  Medication Review/Management: medications reviewed  Goal: Maintenance of Behavioral Health Symptom Control  Outcome: Progressing  Intervention: Maintain Behavioral Health Symptom Control  Recent Flowsheet Documentation  Taken 7/2/2025 1625 by Quirino Montiel RN  Medication Review/Management: medications reviewed  Goal: Maintenance of COPD Symptom Control  Outcome: Progressing  Intervention: Maintain COPD Symptom Control  Recent Flowsheet Documentation  Taken 7/2/2025 1625 by Quirino Montiel RN  Medication Review/Management: medications reviewed  Goal: Blood Glucose Levels Within Targeted Range  Outcome: Progressing  Intervention: Monitor and Manage Glycemia  Recent Flowsheet Documentation  Taken 7/2/2025 1625 by Quirino Montiel RN  Medication Review/Management: medications reviewed  Goal: Maintenance of Heart Failure Symptom Control  Outcome: Progressing  Intervention: Maintain Heart Failure Management  Recent Flowsheet Documentation  Taken 7/2/2025 1625 by Quirino Montiel RN  Medication Review/Management: medications  "reviewed  Goal: Blood Pressure in Desired Range  Outcome: Progressing  Intervention: Maintain Blood Pressure Management  Recent Flowsheet Documentation  Taken 7/2/2025 1625 by Quirino Montiel RN  Medication Review/Management: medications reviewed  Goal: Maintenance of Osteoarthritis Symptom Control  Outcome: Progressing  Intervention: Maintain Osteoarthritis Symptom Control  Recent Flowsheet Documentation  Taken 7/2/2025 1625 by Quirino Montiel RN  Assistive Device Utilized:   walker   gait belt  Activity Management: activity adjusted per tolerance  Medication Review/Management: medications reviewed  Goal: Bariatric Home Regimen Maintained  Outcome: Progressing  Intervention: Maintain and Manage Postbariatric Surgery Care  Recent Flowsheet Documentation  Taken 7/2/2025 1625 by Quirino Montiel RN  Medication Review/Management: medications reviewed  Goal: Maintenance of Seizure Control  Outcome: Progressing  Intervention: Maintain Seizure Symptom Control  Recent Flowsheet Documentation  Taken 7/2/2025 1625 by Quirino Montiel RN  Sensory Stimulation Regulation:   quiet environment promoted   lighting decreased  Seizure Precautions: clutter-free environment maintained  Medication Review/Management: medications reviewed     Problem: Adult Inpatient Plan of Care  Goal: Plan of Care Review  Description: The Plan of Care Review/Shift note should be completed every shift.  The Outcome Evaluation is a brief statement about your assessment that the patient is improving, declining, or no change.  This information will be displayed automatically on your shift  note.  Outcome: Progressing  Flowsheets (Taken 7/2/2025 1027)  Outcome Evaluation: on 1.5 L NC. L thigh wound CDI.  Plan of Care Reviewed With: patient  Overall Patient Progress: improving  Goal: Patient-Specific Goal (Individualized)  Description: You can add care plan individualizations to a care plan. Examples of Individualization might be:  \"Parent requests to " "be called daily at 9am for status\", \"I have a hard time hearing out of my right ear\", or \"Do not touch me to wake me up as it startles  me\".  Outcome: Progressing  Goal: Absence of Hospital-Acquired Illness or Injury  Outcome: Progressing  Intervention: Identify and Manage Fall Risk  Recent Flowsheet Documentation  Taken 7/2/2025 1625 by Quirino Montiel RN  Safety Promotion/Fall Prevention:   activity supervised   nonskid shoes/slippers when out of bed  Intervention: Prevent Skin Injury  Recent Flowsheet Documentation  Taken 7/2/2025 1625 by Quirino Montiel RN  Body Position: weight shifting  Skin Protection: adhesive use limited  Intervention: Prevent and Manage VTE (Venous Thromboembolism) Risk  Recent Flowsheet Documentation  Taken 7/2/2025 1625 by Quirino Montiel RN  VTE Prevention/Management: (no order at this time) --  Intervention: Prevent Infection  Recent Flowsheet Documentation  Taken 7/2/2025 1625 by Quirino Montiel RN  Infection Prevention: cohorting utilized  Goal: Optimal Comfort and Wellbeing  Outcome: Progressing  Intervention: Monitor Pain and Promote Comfort  Recent Flowsheet Documentation  Taken 7/2/2025 2157 by Quirino Montiel RN  Pain Management Interventions: medication (see MAR)  Taken 7/2/2025 1650 by Quirino Montiel RN  Pain Management Interventions: medication (see MAR)  Goal: Readiness for Transition of Care  Outcome: Progressing           "

## 2025-07-03 NOTE — PROGRESS NOTES
[facility]  Hospitalist Progress Note  Neeraj Alvarado MD 07/03/2025    Reason for Stay (Diagnosis): placement         Assessment and Plan:      Summary of Stay: Brissa Corado is a 60 year old female admitted on 6/25/2025 with past medical history of asthma on chronic oxygen at night, obstructive sleep apnea, morbid obesity, hypertension, seizure disorder who presents with asthma exacerbation 2/2 poor air quality and not using/having access to her LABA/ICS.     Patient's acute on chronic hypoxic respiratory failure has resolved.  At this point patient remains in the hospital for appropriate disposition and placement     Plans today:  -No significant changes made to care plan today  -Await appropriate disposition     Acute on chronic hypoxic respiratory failure-resolved  Asthma exacerbation  RICK   At baseline pt is 2.5L at night and during the day as needed.  Now on room air during the daytime.  CTPA negative for PE.  Patient states her triggers been poor air quality and she has not been using her controller LABA/ICS.  Has not had a sleep apnea machine at home but reports that she has an appointment coming up in the next 2 weeks, even though she is diagnosed is not tolerating it due to claustrophobic feelings.  - Wheezing resolved, DuoNeb every 4 hours as needed  - Completed 5-day course of CPAP  - CPAP ordered  - Continue Breo Ellipta control inhaler  - Medically stable and can be discharged whenever placement is available     History of developmental delay  SW Consultation   -father who is legal guardian states he received paperwork from the [facility] stating that patient is not to be able to return to her own residence and requires to be moved to a care facility to ensure she gets her medications as ordered and prescribed.    - consulted and following.   - Per  note patient will be getting appointed a new legal guardian in July.   - Discharge patient will go to a  "new [facility].  His significant other cannot go with her.     H/o DVT -recently subtherapeutic on INR.  Pharmacy warfarin panel ordered.  Continue warfarin as inpatient.     Seizure disorder -continue Tegretol     Hypertension -continue lisinopril     Hyperlipidemia - continue statin     Gerd - continue prilosec     Super morbid obesity  Intertriginous excoriations and wounds in multiple skin folds  WOC following   - Start patient on topical azole therapy with ketoconazole 2% twice daily--was switched to miconazole powder per patient's request  - General Wound cares per bedside RN and WOC cares weekly        Diet: Combination Diet Regular Diet Adult    DVT Prophylaxis: Warfarin  Grajeda Catheter: Not present  Lines: None     Cardiac Monitoring: None  Code Status: Full Code    Discharge Dispo: [facility] when facility is found  Medically Ready for Discharge: [facility]           Interval History (Subjective):      No concerns or complaints today.  Respiratory status near baseline.  No wheezing.  No shortness of breath.  Overall feels well.  Await placement                  Physical Exam:      Last Vital Signs:  /46 (BP Location: Left arm)   Pulse 75   Temp 98  F (36.7  C) (Oral)   Resp 20   Ht 1.702 m (5' 7\")   Wt (!) 175.4 kg (386 lb 11 oz)   LMP 08/18/2008   SpO2 94%   BMI 60.56 kg/m        Intake/Output Summary (Last 24 hours) at 7/3/2025 1044  Last data filed at 7/3/2025 0623  Gross per 24 hour   Intake 800 ml   Output 2750 ml   Net -1950 ml       Constitutional: Awake, alert, cooperative, no apparent distress     Respiratory: Clear to auscultation bilaterally, no crackles or wheezing   Cardiovascular: Regular rate and rhythm, normal S1 and S2, and no murmur noted   Abdomen: Normal bowel sounds, soft, non-distended, non-tender   Skin: No rashes, no cyanosis, dry to touch   Neuro: Alert and oriented x3, no weakness, numbness, memory loss   Extremities: No edema, normal range of motion   Other(s):        All other " "systems: Negative          Medications:      All current medications were reviewed with changes reflected in problem list.         Data:      All new lab and imaging data was reviewed.   Labs:       Lab Results   Component Value Date     06/25/2025     05/06/2025     05/05/2025     06/09/2021     05/10/2021     05/03/2021    Lab Results   Component Value Date    CHLORIDE 107 06/25/2025    CHLORIDE 101 05/06/2025    CHLORIDE 101 05/05/2025    CHLORIDE 108 05/07/2022    CHLORIDE 106 05/05/2022    CHLORIDE 107 06/09/2021    CHLORIDE 108 05/10/2021    CHLORIDE 107 05/03/2021    Lab Results   Component Value Date    BUN 13.0 06/25/2025    BUN 14.7 05/06/2025    BUN 15.4 05/05/2025    BUN 13 05/07/2022    BUN 18 05/05/2022    BUN 15 06/09/2021    BUN 13 05/10/2021    BUN 17 05/03/2021      Lab Results   Component Value Date    POTASSIUM 4.4 06/25/2025    POTASSIUM 4.1 05/06/2025    POTASSIUM 4.4 05/05/2025    POTASSIUM 3.7 05/07/2022    POTASSIUM 4.2 05/05/2022    POTASSIUM 4.2 06/09/2021    POTASSIUM 3.6 05/10/2021    POTASSIUM 3.7 05/03/2021    Lab Results   Component Value Date    CO2 27 06/25/2025    CO2 26 05/06/2025    CO2 27 05/05/2025    CO2 27 05/07/2022    CO2 29 05/05/2022    CO2 28 06/09/2021    CO2 28 05/10/2021    CO2 23 05/03/2021    Lab Results   Component Value Date    CR 0.7 06/25/2025    CR 0.69 06/25/2025    CR 0.65 05/08/2025    CR 0.57 05/06/2025    CR 0.69 06/09/2021    CR 0.55 05/10/2021    CR 0.56 05/03/2021        No results for input(s): \"WBC\", \"HGB\", \"HCT\", \"MCV\", \"PLT\" in the last 168 hours.   Imaging:   No results found for this or any previous visit (from the past 24 hours).   "

## 2025-07-04 LAB
INR PPP: 2.66 (ref 0.85–1.15)
PROTHROMBIN TIME: 27.9 SECONDS (ref 11.8–14.8)

## 2025-07-04 PROCEDURE — 999N000157 HC STATISTIC RCP TIME EA 10 MIN

## 2025-07-04 PROCEDURE — 94640 AIRWAY INHALATION TREATMENT: CPT

## 2025-07-04 PROCEDURE — 99232 SBSQ HOSP IP/OBS MODERATE 35: CPT | Performed by: INTERNAL MEDICINE

## 2025-07-04 PROCEDURE — 85610 PROTHROMBIN TIME: CPT | Performed by: HOSPITALIST

## 2025-07-04 PROCEDURE — 250N000013 HC RX MED GY IP 250 OP 250 PS 637: Performed by: HOSPITALIST

## 2025-07-04 PROCEDURE — 36415 COLL VENOUS BLD VENIPUNCTURE: CPT | Performed by: HOSPITALIST

## 2025-07-04 PROCEDURE — 120N000001 HC R&B MED SURG/OB

## 2025-07-04 RX ORDER — WARFARIN SODIUM 10 MG/1
20 TABLET ORAL
Status: COMPLETED | OUTPATIENT
Start: 2025-07-04 | End: 2025-07-04

## 2025-07-04 RX ADMIN — FLUTICASONE FUROATE AND VILANTEROL TRIFENATATE 1 PUFF: 100; 25 POWDER RESPIRATORY (INHALATION) at 08:46

## 2025-07-04 RX ADMIN — ATORVASTATIN CALCIUM 10 MG: 10 TABLET, FILM COATED ORAL at 21:07

## 2025-07-04 RX ADMIN — ACETAMINOPHEN 650 MG: 325 TABLET ORAL at 13:21

## 2025-07-04 RX ADMIN — WARFARIN SODIUM 20 MG: 10 TABLET ORAL at 18:51

## 2025-07-04 RX ADMIN — MICONAZOLE NITRATE: 20 POWDER TOPICAL at 08:45

## 2025-07-04 RX ADMIN — MICONAZOLE NITRATE: 20 POWDER TOPICAL at 21:09

## 2025-07-04 RX ADMIN — CARBAMAZEPINE 400 MG: 200 TABLET ORAL at 08:44

## 2025-07-04 RX ADMIN — CALCIUM CARBONATE (ANTACID) CHEW TAB 500 MG 1000 MG: 500 CHEW TAB at 16:16

## 2025-07-04 RX ADMIN — CARBAMAZEPINE 400 MG: 200 TABLET ORAL at 21:07

## 2025-07-04 RX ADMIN — LISINOPRIL 20 MG: 20 TABLET ORAL at 08:44

## 2025-07-04 RX ADMIN — SIMETHICONE 80 MG: 80 TABLET, CHEWABLE ORAL at 09:57

## 2025-07-04 RX ADMIN — CARBAMAZEPINE 200 MG: 200 TABLET ORAL at 11:30

## 2025-07-04 RX ADMIN — PANTOPRAZOLE SODIUM 40 MG: 40 TABLET, DELAYED RELEASE ORAL at 06:52

## 2025-07-04 ASSESSMENT — ACTIVITIES OF DAILY LIVING (ADL)
ADLS_ACUITY_SCORE: 56
ADLS_ACUITY_SCORE: 56
ADLS_ACUITY_SCORE: 60
ADLS_ACUITY_SCORE: 60
ADLS_ACUITY_SCORE: 56
ADLS_ACUITY_SCORE: 60
ADLS_ACUITY_SCORE: 56
ADLS_ACUITY_SCORE: 56
ADLS_ACUITY_SCORE: 60
ADLS_ACUITY_SCORE: 56
ADLS_ACUITY_SCORE: 60
ADLS_ACUITY_SCORE: 60
ADLS_ACUITY_SCORE: 56
ADLS_ACUITY_SCORE: 60
ADLS_ACUITY_SCORE: 56
ADLS_ACUITY_SCORE: 56
ADLS_ACUITY_SCORE: 60
ADLS_ACUITY_SCORE: 56

## 2025-07-04 NOTE — PROGRESS NOTES
[facility]  Hospitalist Progress Note  Neeraj Alvarado MD 07/04/2025    Reason for Stay (Diagnosis): placement         Assessment and Plan:      Summary of Stay: Brissa Corado is a 60 year old female admitted on 6/25/2025 with past medical history of asthma on chronic oxygen at night, obstructive sleep apnea, morbid obesity, hypertension, seizure disorder who presents with asthma exacerbation 2/2 poor air quality and not using/having access to her LABA/ICS.     Patient's acute on chronic hypoxic respiratory failure has resolved.  At this point patient remains in the hospital for appropriate disposition and placement     Plans today:  -No significant changes made to care plan today  -Await appropriate disposition     Acute on chronic hypoxic respiratory failure-resolved  Asthma exacerbation  RICK   At baseline pt is 2.5L at night and during the day as needed.  Now on room air during the daytime.  CTPA negative for PE.  Patient states her triggers been poor air quality and she has not been using her controller LABA/ICS.  Has not had a sleep apnea machine at home but reports that she has an appointment coming up in the next 2 weeks, even though she is diagnosed is not tolerating it due to claustrophobic feelings.  - Wheezing resolved, DuoNeb every 4 hours as needed  - Completed 5-day course of CPAP  - CPAP ordered  - Continue Breo Ellipta control inhaler  - Medically stable and can be discharged whenever placement is available     History of developmental delay  SW Consultation   -father who is legal guardian states he received paperwork from the [facility] stating that patient is not to be able to return to her own residence and requires to be moved to a care facility to ensure she gets her medications as ordered and prescribed.    - consulted and following.   - Per  note patient will be getting appointed a new legal guardian in July.   - Discharge patient will go to a  "new [facility].  His significant other cannot go with her.     H/o DVT -recently subtherapeutic on INR.  Pharmacy warfarin panel ordered.  Continue warfarin as inpatient.     Seizure disorder -continue Tegretol     Hypertension -continue lisinopril     Hyperlipidemia - continue statin     Gerd - continue prilosec     Super morbid obesity  Intertriginous excoriations and wounds in multiple skin folds  WOC following   - Start patient on topical azole therapy with ketoconazole 2% twice daily--was switched to miconazole powder per patient's request  - General Wound cares per bedside RN and WOC cares weekly        Diet: Combination Diet Regular Diet Adult    DVT Prophylaxis: Warfarin  Grajeda Catheter: Not present  Lines: None     Cardiac Monitoring: None  Code Status: Full Code    Discharge Dispo: shelter when facility is found  Medically Ready for Discharge: [facility]           Interval History (Subjective):      No concerns.  Respiratory status at baseline.  No shortness of breath.  No wheezing.  Await disposition                  Physical Exam:      Last Vital Signs:  /45 (BP Location: Left arm)   Pulse 79   Temp 98.4  F (36.9  C) (Oral)   Resp 20   Ht 1.702 m (5' 7\")   Wt (!) 175.4 kg (386 lb 11 oz)   LMP 08/18/2008   SpO2 96%   BMI 60.56 kg/m        Intake/Output Summary (Last 24 hours) at 7/4/2025 0936  Last data filed at 7/4/2025 0821  Gross per 24 hour   Intake 1520 ml   Output 3450 ml   Net -1930 ml       Constitutional: Awake, alert, cooperative, no apparent distress     Respiratory: Clear to auscultation bilaterally, no crackles or wheezing   Cardiovascular: Regular rate and rhythm, normal S1 and S2, and no murmur noted   Abdomen: Normal bowel sounds, soft, non-distended, non-tender   Skin: No rashes, no cyanosis, dry to touch   Neuro: Alert and oriented x3, no weakness, numbness, memory loss   Extremities: No edema, normal range of motion   Other(s):        All other systems: Negative          Medications:  " "    All current medications were reviewed with changes reflected in problem list.         Data:      All new lab and imaging data was reviewed.   Labs:       Lab Results   Component Value Date     06/25/2025     05/06/2025     05/05/2025     06/09/2021     05/10/2021     05/03/2021    Lab Results   Component Value Date    CHLORIDE 107 06/25/2025    CHLORIDE 101 05/06/2025    CHLORIDE 101 05/05/2025    CHLORIDE 108 05/07/2022    CHLORIDE 106 05/05/2022    CHLORIDE 107 06/09/2021    CHLORIDE 108 05/10/2021    CHLORIDE 107 05/03/2021    Lab Results   Component Value Date    BUN 13.0 06/25/2025    BUN 14.7 05/06/2025    BUN 15.4 05/05/2025    BUN 13 05/07/2022    BUN 18 05/05/2022    BUN 15 06/09/2021    BUN 13 05/10/2021    BUN 17 05/03/2021      Lab Results   Component Value Date    POTASSIUM 4.4 06/25/2025    POTASSIUM 4.1 05/06/2025    POTASSIUM 4.4 05/05/2025    POTASSIUM 3.7 05/07/2022    POTASSIUM 4.2 05/05/2022    POTASSIUM 4.2 06/09/2021    POTASSIUM 3.6 05/10/2021    POTASSIUM 3.7 05/03/2021    Lab Results   Component Value Date    CO2 27 06/25/2025    CO2 26 05/06/2025    CO2 27 05/05/2025    CO2 27 05/07/2022    CO2 29 05/05/2022    CO2 28 06/09/2021    CO2 28 05/10/2021    CO2 23 05/03/2021    Lab Results   Component Value Date    CR 0.7 06/25/2025    CR 0.69 06/25/2025    CR 0.65 05/08/2025    CR 0.57 05/06/2025    CR 0.69 06/09/2021    CR 0.55 05/10/2021    CR 0.56 05/03/2021        No results for input(s): \"WBC\", \"HGB\", \"HCT\", \"MCV\", \"PLT\" in the last 168 hours.   Imaging:   No results found for this or any previous visit (from the past 24 hours).   "

## 2025-07-04 NOTE — PLAN OF CARE
Goal Outcome Evaluation:      Plan of Care Reviewed With: patient    Outcome Evaluation: Pt A&O. Denies pain. Denies N/V. Simethicone given x1. Tolerating reg diet. Transfers with Ax1. External catheter in place, AUO. Foam dressing  CDI. Awaiting placement.    Problem: Adult Inpatient Plan of Care  Goal: Plan of Care Review  Description: The Plan of Care Review/Shift note should be completed every shift.  The Outcome Evaluation is a brief statement about your assessment that the patient is improving, declining, or no change.  This information will be displayed automatically on your shift  note.  Outcome: Not Progressing  Flowsheets (Taken 7/4/2025 6340)  Outcome Evaluation: Pt A&O. Denies pain. Denies N/V. Simethicone given x1. Tolerating reg diet. Transfers with Ax1. External catheter in place, AUO. Foam dressing  CDI. Awaiting placement.  Plan of Care Reviewed With: patient

## 2025-07-04 NOTE — PLAN OF CARE
A&O x4. Assist of 1, GB, walker. External catheter in place. On tegretol for management of seizure disorder. Denies pain, SOB, or chest pain. Plan to discharge to [facility] when placement is found.     Goal Outcome Evaluation:      Plan of Care Reviewed With: patient    Overall Patient Progress: improvingOverall Patient Progress: improving    Outcome Evaluation: On 1 L NC, on 2.5 L at baseline. Denies pain. BP stable. On tegretol.      Problem: Gas Exchange Impaired  Goal: Optimal Gas Exchange  Outcome: Progressing     Problem: Comorbidity Management  Goal: Maintenance of Asthma Control  Outcome: Progressing  Intervention: Maintain Asthma Symptom Control  Recent Flowsheet Documentation  Taken 7/3/2025 2032 by Gunjan Flood RN  Medication Review/Management: medications reviewed  Goal: Maintenance of Behavioral Health Symptom Control  Outcome: Progressing  Intervention: Maintain Behavioral Health Symptom Control  Recent Flowsheet Documentation  Taken 7/3/2025 2032 by Gunjan Flood RN  Medication Review/Management: medications reviewed  Goal: Maintenance of COPD Symptom Control  Outcome: Progressing  Intervention: Maintain COPD Symptom Control  Recent Flowsheet Documentation  Taken 7/3/2025 2032 by Gunjan Flood RN  Medication Review/Management: medications reviewed  Goal: Blood Glucose Levels Within Targeted Range  Outcome: Progressing  Intervention: Monitor and Manage Glycemia  Recent Flowsheet Documentation  Taken 7/3/2025 2032 by Gunjan Flood RN  Medication Review/Management: medications reviewed  Goal: Maintenance of Heart Failure Symptom Control  Outcome: Progressing  Intervention: Maintain Heart Failure Management  Recent Flowsheet Documentation  Taken 7/3/2025 2032 by Gunjan Flood RN  Medication Review/Management: medications reviewed  Goal: Blood Pressure in Desired Range  Outcome: Progressing  Intervention: Maintain Blood Pressure Management  Recent Flowsheet Documentation  Taken 7/3/2025 2032 by Gunjan Flood  "RN  Medication Review/Management: medications reviewed  Goal: Maintenance of Osteoarthritis Symptom Control  Outcome: Progressing  Intervention: Maintain Osteoarthritis Symptom Control  Recent Flowsheet Documentation  Taken 7/3/2025 2032 by Gunjan Flood RN  Assistive Device Utilized:   gait belt   walker  Activity Management: activity adjusted per tolerance  Medication Review/Management: medications reviewed  Goal: Bariatric Home Regimen Maintained  Outcome: Progressing  Intervention: Maintain and Manage Postbariatric Surgery Care  Recent Flowsheet Documentation  Taken 7/3/2025 2032 by Gunjan Flood RN  Medication Review/Management: medications reviewed  Goal: Maintenance of Seizure Control  Outcome: Progressing  Intervention: Maintain Seizure Symptom Control  Recent Flowsheet Documentation  Taken 7/3/2025 2032 by Gunjan Flood RN  Medication Review/Management: medications reviewed     Problem: Adult Inpatient Plan of Care  Goal: Plan of Care Review  Description: The Plan of Care Review/Shift note should be completed every shift.  The Outcome Evaluation is a brief statement about your assessment that the patient is improving, declining, or no change.  This information will be displayed automatically on your shift  note.  Outcome: Progressing  Flowsheets (Taken 7/4/2025 0621)  Outcome Evaluation: On 1 L NC, on 2.5 L at baseline. Denies pain. BP stable. On tegretol.  Plan of Care Reviewed With: patient  Overall Patient Progress: improving  Goal: Patient-Specific Goal (Individualized)  Description: You can add care plan individualizations to a care plan. Examples of Individualization might be:  \"Parent requests to be called daily at 9am for status\", \"I have a hard time hearing out of my right ear\", or \"Do not touch me to wake me up as it startles  me\".  Outcome: Progressing  Goal: Absence of Hospital-Acquired Illness or Injury  Outcome: Progressing  Intervention: Identify and Manage Fall Risk  Recent Flowsheet " Documentation  Taken 7/4/2025 0300 by Gunjan Flood, RN  Safety Promotion/Fall Prevention: safety round/check completed  Taken 7/3/2025 2032 by Gunjan Flood, RN  Safety Promotion/Fall Prevention: safety round/check completed  Intervention: Prevent and Manage VTE (Venous Thromboembolism) Risk  Recent Flowsheet Documentation  Taken 7/3/2025 2032 by Gunjan Flood, RN  VTE Prevention/Management: patient refused intervention  Goal: Optimal Comfort and Wellbeing  Outcome: Progressing  Goal: Readiness for Transition of Care  Outcome: Progressing

## 2025-07-04 NOTE — PROGRESS NOTES
Summary: Acute on chronic hypoxic respiratory failure  Asthma exacerbation; RICK   PMHx: RICK, morbid obesity, hypertension, seizure disorder   NOTE: LEGAL GUARDIANSHIP - SEE CHART  Orientation: A&O x 4  Vitals/Tele: VSS on 1L NC  IV Access/drains: N/A  Diet: Regular  Mobility: A1-2GBW  GI/: Continent of B&B; external cath in place  Wound/Skin: See Avatar; wound to right thigh cleansed and mepi reapplied; extensive redness/rash to pannus and breast folds (interdry in place). See WOC orders.  Discharge Plan: Pending placement; SW following    See Flow sheets for assessment

## 2025-07-04 NOTE — PROGRESS NOTES
Care Management Follow Up    Length of Stay (days): 9    Expected Discharge Date: 07/14/2025     Concerns to be Addressed: discharge planning     Patient plan of care discussed at interdisciplinary rounds: Yes    Anticipated Discharge Disposition:                Anticipated Discharge Services:    Anticipated Discharge DME:      Patient/family educated on Medicare website which has current facility and service quality ratings:    Education Provided on the Discharge Plan:    Patient/Family in Agreement with the Plan:      Referrals Placed by CM/SW:    Private pay costs discussed: Not applicable    Discussed  Partnership in Safe Discharge Planning  document with patient/family: No     Handoff Completed: Yes, [facility] PCP: Internal handoff referral completed    Additional Information:  SW spoke with pt's parents/guardians regarding pt's discharge plan. Pt's father stated that they have a verbal acceptance from [facility] with a restriction in place. Pt's parents plan to come to the hospital today to speak with pt about this today.     Next Steps: SW to follow for discharge plans.     AISSATOU Alexander, LGSW  [facility] Care Coordinator/ Med Surg [address] [place] [address]  [phone]

## 2025-07-05 ENCOUNTER — HEALTH MAINTENANCE LETTER (OUTPATIENT)
Age: 60
End: 2025-07-05

## 2025-07-05 LAB
INR PPP: 3.08 (ref 0.85–1.15)
PROTHROMBIN TIME: 31.2 SECONDS (ref 11.8–14.8)

## 2025-07-05 PROCEDURE — 36415 COLL VENOUS BLD VENIPUNCTURE: CPT | Performed by: HOSPITALIST

## 2025-07-05 PROCEDURE — 120N000001 HC R&B MED SURG/OB

## 2025-07-05 PROCEDURE — 250N000013 HC RX MED GY IP 250 OP 250 PS 637: Performed by: HOSPITALIST

## 2025-07-05 PROCEDURE — 94640 AIRWAY INHALATION TREATMENT: CPT

## 2025-07-05 PROCEDURE — 250N000013 HC RX MED GY IP 250 OP 250 PS 637: Performed by: INTERNAL MEDICINE

## 2025-07-05 PROCEDURE — 999N000157 HC STATISTIC RCP TIME EA 10 MIN

## 2025-07-05 PROCEDURE — 85610 PROTHROMBIN TIME: CPT | Performed by: HOSPITALIST

## 2025-07-05 PROCEDURE — 99232 SBSQ HOSP IP/OBS MODERATE 35: CPT | Performed by: INTERNAL MEDICINE

## 2025-07-05 RX ORDER — WARFARIN SODIUM 5 MG/1
20 TABLET ORAL
Status: COMPLETED | OUTPATIENT
Start: 2025-07-05 | End: 2025-07-05

## 2025-07-05 RX ADMIN — FLUTICASONE FUROATE AND VILANTEROL TRIFENATATE 1 PUFF: 100; 25 POWDER RESPIRATORY (INHALATION) at 08:28

## 2025-07-05 RX ADMIN — PANTOPRAZOLE SODIUM 40 MG: 40 TABLET, DELAYED RELEASE ORAL at 06:46

## 2025-07-05 RX ADMIN — LISINOPRIL 20 MG: 20 TABLET ORAL at 10:09

## 2025-07-05 RX ADMIN — SIMETHICONE 80 MG: 80 TABLET, CHEWABLE ORAL at 12:24

## 2025-07-05 RX ADMIN — WARFARIN SODIUM 20 MG: 5 TABLET ORAL at 17:27

## 2025-07-05 RX ADMIN — CARBAMAZEPINE 200 MG: 200 TABLET ORAL at 12:24

## 2025-07-05 RX ADMIN — MICONAZOLE NITRATE: 20 POWDER TOPICAL at 10:10

## 2025-07-05 RX ADMIN — CARBAMAZEPINE 400 MG: 200 TABLET ORAL at 10:09

## 2025-07-05 RX ADMIN — SENNOSIDES AND DOCUSATE SODIUM 1 TABLET: 50; 8.6 TABLET ORAL at 14:26

## 2025-07-05 RX ADMIN — CARBAMAZEPINE 400 MG: 200 TABLET ORAL at 22:26

## 2025-07-05 RX ADMIN — ATORVASTATIN CALCIUM 10 MG: 10 TABLET, FILM COATED ORAL at 22:26

## 2025-07-05 RX ADMIN — ACETAMINOPHEN 650 MG: 325 TABLET ORAL at 17:27

## 2025-07-05 RX ADMIN — MICONAZOLE NITRATE: 20 POWDER TOPICAL at 22:26

## 2025-07-05 ASSESSMENT — ACTIVITIES OF DAILY LIVING (ADL)
ADLS_ACUITY_SCORE: 56
ADLS_ACUITY_SCORE: 53
ADLS_ACUITY_SCORE: 56
ADLS_ACUITY_SCORE: 56
ADLS_ACUITY_SCORE: 53
ADLS_ACUITY_SCORE: 56

## 2025-07-05 NOTE — PROGRESS NOTES
[facility]  Hospitalist Progress Note  Neeraj Alvarado MD 07/05/2025    Reason for Stay (Diagnosis): placement         Assessment and Plan:      Summary of Stay: Brissa Corado is a 60 year old female admitted on 6/25/2025 with past medical history of asthma on chronic oxygen at night, obstructive sleep apnea, morbid obesity, hypertension, seizure disorder who presents with asthma exacerbation 2/2 poor air quality and not using/having access to her LABA/ICS.     Patient's acute on chronic hypoxic respiratory failure has resolved.  At this point patient remains in the hospital for appropriate disposition and placement     Plans today:  -No significant changes made to care plan today  -Await appropriate disposition     Acute on chronic hypoxic respiratory failure-resolved  Asthma exacerbation  RICK   At baseline pt is 2.5L at night and during the day as needed.    CTPA negative for PE.  Patient states her triggers been poor air quality and she has not been using her controller LABA/ICS.  Has not had a sleep apnea machine at home but reports that she has an appointment coming up in the next 2 weeks, even though she is diagnosed is not tolerating it due to claustrophobic feelings.  - Wheezing resolved, DuoNeb every 4 hours as needed  - CPAP ordered  - Continue Breo Ellipta control inhaler  - Medically stable and can be discharged whenever placement is available     History of developmental delay  SW Consultation   -father who is legal guardian states he received paperwork from the [facility] stating that patient is not to be able to return to her own residence and requires to be moved to a care facility to ensure she gets her medications as ordered and prescribed.    - consulted and following.   - Per  note patient will be getting appointed a new legal guardian in July.   - Discharge patient will go to a new [facility].  His significant other cannot go with her.     H/o DVT   -  "Pharmacy warfarin panel ordered.  Continue warfarin as inpatient.     Seizure disorder -continue Tegretol     Hypertension -continue lisinopril     Hyperlipidemia - continue statin     Gerd - continue prilosec     Super morbid obesity  Intertriginous excoriations and wounds in multiple skin folds  WOC following   - Start patient on topical azole therapy with ketoconazole 2% twice daily--was switched to miconazole powder per patient's request  - General Wound cares per bedside RN and WOC cares weekly        Diet: Combination Diet Regular Diet Adult    DVT Prophylaxis: Warfarin  Grajeda Catheter: Not present  Lines: None     Cardiac Monitoring: None  Code Status: Full Code    Discharge Dispo: [facility] when facility is found  Medically Ready for Discharge: [facility]             Interval History (Subjective):      No new concerns or complaints.  Breathing at baseline.  No wheezing.  No shortness of breath.  Await placement                  Physical Exam:      Last Vital Signs:  /55 (BP Location: Left arm)   Pulse 74   Temp 98.1  F (36.7  C) (Oral)   Resp 18   Ht 1.702 m (5' 7\")   Wt (!) 175.4 kg (386 lb 11 oz)   LMP 08/18/2008   SpO2 96%   BMI 60.56 kg/m        Intake/Output Summary (Last 24 hours) at 7/5/2025 0933  Last data filed at 7/5/2025 0623  Gross per 24 hour   Intake 1760 ml   Output 4300 ml   Net -2540 ml       Constitutional: Awake, alert, cooperative, no apparent distress. Lying in bed watching TV     Respiratory: Clear to auscultation bilaterally, no crackles or wheezing   Cardiovascular: Regular rate and rhythm, normal S1 and S2, and no murmur noted   Abdomen: Normal bowel sounds, soft, non-distended, non-tender   Skin: No rashes, no cyanosis, dry to touch   Neuro: Alert and oriented x3, no weakness, numbness, memory loss   Extremities: No edema, normal range of motion   Other(s):        All other systems: Negative          Medications:      All current medications were reviewed with changes " "reflected in problem list.         Data:      All new lab and imaging data was reviewed.   Labs:       Lab Results   Component Value Date     06/25/2025     05/06/2025     05/05/2025     06/09/2021     05/10/2021     05/03/2021    Lab Results   Component Value Date    CHLORIDE 107 06/25/2025    CHLORIDE 101 05/06/2025    CHLORIDE 101 05/05/2025    CHLORIDE 108 05/07/2022    CHLORIDE 106 05/05/2022    CHLORIDE 107 06/09/2021    CHLORIDE 108 05/10/2021    CHLORIDE 107 05/03/2021    Lab Results   Component Value Date    BUN 13.0 06/25/2025    BUN 14.7 05/06/2025    BUN 15.4 05/05/2025    BUN 13 05/07/2022    BUN 18 05/05/2022    BUN 15 06/09/2021    BUN 13 05/10/2021    BUN 17 05/03/2021      Lab Results   Component Value Date    POTASSIUM 4.4 06/25/2025    POTASSIUM 4.1 05/06/2025    POTASSIUM 4.4 05/05/2025    POTASSIUM 3.7 05/07/2022    POTASSIUM 4.2 05/05/2022    POTASSIUM 4.2 06/09/2021    POTASSIUM 3.6 05/10/2021    POTASSIUM 3.7 05/03/2021    Lab Results   Component Value Date    CO2 27 06/25/2025    CO2 26 05/06/2025    CO2 27 05/05/2025    CO2 27 05/07/2022    CO2 29 05/05/2022    CO2 28 06/09/2021    CO2 28 05/10/2021    CO2 23 05/03/2021    Lab Results   Component Value Date    CR 0.7 06/25/2025    CR 0.69 06/25/2025    CR 0.65 05/08/2025    CR 0.57 05/06/2025    CR 0.69 06/09/2021    CR 0.55 05/10/2021    CR 0.56 05/03/2021        No results for input(s): \"WBC\", \"HGB\", \"HCT\", \"MCV\", \"PLT\" in the last 168 hours.   Imaging:   No results found for this or any previous visit (from the past 24 hours).   "

## 2025-07-05 NOTE — PLAN OF CARE
A&O x4. Assist of 1, GB, walker. External catheter in place. On tegretol for management of seizure disorder. Denies pain, SOB, or chest pain. Plan to discharge to [facility] when placement is found. Referrals have been sent to [facility].       Goal Outcome Evaluation:      Plan of Care Reviewed With: patient    Overall Patient Progress: improvingOverall Patient Progress: improving    Outcome Evaluation: Denies pain. Remains on 1L NC, on 2 L at baseline. On tegretol for seizure management.      Problem: Gas Exchange Impaired  Goal: Optimal Gas Exchange  Outcome: Progressing  Intervention: Optimize Oxygenation and Ventilation  Recent Flowsheet Documentation  Taken 7/5/2025 0300 by Gunjan Flood RN  Head of Bed (HOB) Positioning: HOB at 20-30 degrees  Taken 7/4/2025 2100 by Gunjan Flood RN  Head of Bed (HOB) Positioning: HOB at 30 degrees     Problem: Comorbidity Management  Goal: Maintenance of Asthma Control  Outcome: Progressing  Intervention: Maintain Asthma Symptom Control  Recent Flowsheet Documentation  Taken 7/5/2025 0300 by Gunjan Flood RN  Medication Review/Management: medications reviewed  Taken 7/4/2025 2100 by Gunjan Flood RN  Medication Review/Management: medications reviewed  Goal: Maintenance of Behavioral Health Symptom Control  Outcome: Progressing  Intervention: Maintain Behavioral Health Symptom Control  Recent Flowsheet Documentation  Taken 7/5/2025 0300 by Gunjan Flood RN  Medication Review/Management: medications reviewed  Taken 7/4/2025 2100 by Gunjan Flood RN  Medication Review/Management: medications reviewed  Goal: Maintenance of COPD Symptom Control  Outcome: Progressing  Intervention: Maintain COPD Symptom Control  Recent Flowsheet Documentation  Taken 7/5/2025 0300 by Gunjan Flood RN  Medication Review/Management: medications reviewed  Taken 7/4/2025 2100 by Gunjan Flood RN  Medication Review/Management: medications reviewed  Goal: Blood Glucose Levels Within Targeted Range  Outcome:  Progressing  Intervention: Monitor and Manage Glycemia  Recent Flowsheet Documentation  Taken 7/5/2025 0300 by Gunjan Flood RN  Medication Review/Management: medications reviewed  Taken 7/4/2025 2100 by Gunjan Flood RN  Medication Review/Management: medications reviewed  Goal: Maintenance of Heart Failure Symptom Control  Outcome: Progressing  Intervention: Maintain Heart Failure Management  Recent Flowsheet Documentation  Taken 7/5/2025 0300 by Gunjan Flood RN  Medication Review/Management: medications reviewed  Taken 7/4/2025 2100 by Gunjan Flood RN  Medication Review/Management: medications reviewed  Goal: Blood Pressure in Desired Range  Outcome: Progressing  Intervention: Maintain Blood Pressure Management  Recent Flowsheet Documentation  Taken 7/5/2025 0300 by Gunjan Flood RN  Medication Review/Management: medications reviewed  Taken 7/4/2025 2100 by Gunjan Flood RN  Medication Review/Management: medications reviewed  Goal: Maintenance of Osteoarthritis Symptom Control  Outcome: Progressing  Intervention: Maintain Osteoarthritis Symptom Control  Recent Flowsheet Documentation  Taken 7/5/2025 0300 by Gunjan Flood RN  Assistive Device Utilized:   gait belt   walker  Activity Management: activity adjusted per tolerance  Medication Review/Management: medications reviewed  Taken 7/4/2025 2100 by Gunjan Flood RN  Assistive Device Utilized:   gait belt   walker  Activity Management: activity adjusted per tolerance  Medication Review/Management: medications reviewed  Goal: Bariatric Home Regimen Maintained  Outcome: Progressing  Intervention: Maintain and Manage Postbariatric Surgery Care  Recent Flowsheet Documentation  Taken 7/5/2025 0300 by Gunjan Flood RN  Medication Review/Management: medications reviewed  Taken 7/4/2025 2100 by Gunjan Flood RN  Medication Review/Management: medications reviewed  Goal: Maintenance of Seizure Control  Outcome: Progressing  Intervention: Maintain Seizure Symptom  "Control  Recent Flowsheet Documentation  Taken 7/5/2025 0300 by Gunjan Flood RN  Medication Review/Management: medications reviewed  Taken 7/4/2025 2100 by Gunjan Flood RN  Medication Review/Management: medications reviewed     Problem: Adult Inpatient Plan of Care  Goal: Plan of Care Review  Description: The Plan of Care Review/Shift note should be completed every shift.  The Outcome Evaluation is a brief statement about your assessment that the patient is improving, declining, or no change.  This information will be displayed automatically on your shift  note.  Outcome: Progressing  Flowsheets (Taken 7/5/2025 0755)  Outcome Evaluation: Denies pain. Remains on 1L NC, on 2 L at baseline. On tegretol for seizure management.  Plan of Care Reviewed With: patient  Overall Patient Progress: improving  Goal: Patient-Specific Goal (Individualized)  Description: You can add care plan individualizations to a care plan. Examples of Individualization might be:  \"Parent requests to be called daily at 9am for status\", \"I have a hard time hearing out of my right ear\", or \"Do not touch me to wake me up as it startles  me\".  Outcome: Progressing  Goal: Absence of Hospital-Acquired Illness or Injury  Outcome: Progressing  Intervention: Identify and Manage Fall Risk  Recent Flowsheet Documentation  Taken 7/5/2025 0300 by Gunjan Flood RN  Safety Promotion/Fall Prevention: safety round/check completed  Taken 7/4/2025 2100 by Gunjan Flood RN  Safety Promotion/Fall Prevention: safety round/check completed  Intervention: Prevent Skin Injury  Recent Flowsheet Documentation  Taken 7/5/2025 0300 by Gunjan Flood RN  Body Position: position changed independently  Taken 7/4/2025 2100 by Gunjan Flood RN  Body Position: position changed independently  Intervention: Prevent and Manage VTE (Venous Thromboembolism) Risk  Recent Flowsheet Documentation  Taken 7/5/2025 0300 by Gunjan Flood RN  VTE Prevention/Management: patient refused " intervention  Taken 7/4/2025 2100 by Gunjan Flood, RN  VTE Prevention/Management: patient refused intervention  Goal: Optimal Comfort and Wellbeing  Outcome: Progressing  Intervention: Monitor Pain and Promote Comfort  Recent Flowsheet Documentation  Taken 7/4/2025 2110 by Gunjan Flood, RN  Pain Management Interventions: care clustered  Goal: Readiness for Transition of Care  Outcome: Progressing

## 2025-07-06 LAB
HGB BLD-MCNC: 12.3 G/DL (ref 11.7–15.7)
INR PPP: 3.58 (ref 0.85–1.15)
MCV RBC AUTO: 86 FL (ref 78–100)
PROTHROMBIN TIME: 34.9 SECONDS (ref 11.8–14.8)

## 2025-07-06 PROCEDURE — 36415 COLL VENOUS BLD VENIPUNCTURE: CPT | Performed by: HOSPITALIST

## 2025-07-06 PROCEDURE — 99232 SBSQ HOSP IP/OBS MODERATE 35: CPT | Performed by: INTERNAL MEDICINE

## 2025-07-06 PROCEDURE — 120N000001 HC R&B MED SURG/OB

## 2025-07-06 PROCEDURE — 85610 PROTHROMBIN TIME: CPT | Performed by: HOSPITALIST

## 2025-07-06 PROCEDURE — 250N000013 HC RX MED GY IP 250 OP 250 PS 637: Performed by: HOSPITALIST

## 2025-07-06 PROCEDURE — 36415 COLL VENOUS BLD VENIPUNCTURE: CPT | Performed by: STUDENT IN AN ORGANIZED HEALTH CARE EDUCATION/TRAINING PROGRAM

## 2025-07-06 PROCEDURE — 85018 HEMOGLOBIN: CPT | Performed by: STUDENT IN AN ORGANIZED HEALTH CARE EDUCATION/TRAINING PROGRAM

## 2025-07-06 RX ORDER — WARFARIN SODIUM 2.5 MG/1
2.5 TABLET ORAL
Status: COMPLETED | OUTPATIENT
Start: 2025-07-06 | End: 2025-07-06

## 2025-07-06 RX ADMIN — LISINOPRIL 20 MG: 20 TABLET ORAL at 08:00

## 2025-07-06 RX ADMIN — CARBAMAZEPINE 200 MG: 200 TABLET ORAL at 13:20

## 2025-07-06 RX ADMIN — PANTOPRAZOLE SODIUM 40 MG: 40 TABLET, DELAYED RELEASE ORAL at 06:39

## 2025-07-06 RX ADMIN — FLUTICASONE FUROATE AND VILANTEROL TRIFENATATE 1 PUFF: 100; 25 POWDER RESPIRATORY (INHALATION) at 07:58

## 2025-07-06 RX ADMIN — ATORVASTATIN CALCIUM 10 MG: 10 TABLET, FILM COATED ORAL at 21:53

## 2025-07-06 RX ADMIN — CARBAMAZEPINE 400 MG: 200 TABLET ORAL at 08:00

## 2025-07-06 RX ADMIN — SIMETHICONE 80 MG: 80 TABLET, CHEWABLE ORAL at 14:16

## 2025-07-06 RX ADMIN — MICONAZOLE NITRATE: 20 POWDER TOPICAL at 08:00

## 2025-07-06 RX ADMIN — CARBAMAZEPINE 400 MG: 200 TABLET ORAL at 21:53

## 2025-07-06 RX ADMIN — ACETAMINOPHEN 650 MG: 325 TABLET ORAL at 15:36

## 2025-07-06 RX ADMIN — MICONAZOLE NITRATE: 20 POWDER TOPICAL at 21:54

## 2025-07-06 ASSESSMENT — ACTIVITIES OF DAILY LIVING (ADL)
ADLS_ACUITY_SCORE: 56

## 2025-07-06 NOTE — PROGRESS NOTES
"Cross cover:    Patient has developed pink to red-tinged urine.  No reported clots.  Vitals are stable.  She is on Coumadin for a possible DVT that was diagnosed in 4/2025.  The ultrasound duplex at that time reads \"suspect left leg proximal posterior tibialis deep vein thrombosis.\"  Of note, she has had a repeat ultrasound on 5/5/2025 that does not show any definite DVT but is limited due to patient's body habitus.    She is supratherapeutic with her INR at 3.5.  She is due for a dose of warfarin now.  Vitals are stable.    Plan:  -Hold current dose of warfarin  -Will not reverse at this time given recent DVT  -Will consider reversal if significant change to hemoglobin and/or patient has vital changes related to bleeding  -STAT Hgb    Con Davis, DO    "

## 2025-07-06 NOTE — PROGRESS NOTES
[facility]  Hospitalist Progress Note  Neeraj Alvarado MD 07/06/2025    Reason for Stay (Diagnosis): placement         Assessment and Plan:      Summary of Stay: Brissa Corado is a 60 year old female admitted on 6/25/2025 with past medical history of asthma on chronic oxygen at night, obstructive sleep apnea, morbid obesity, hypertension, seizure disorder who presents with asthma exacerbation 2/2 poor air quality and not using/having access to her LABA/ICS.     Patient's acute on chronic hypoxic respiratory failure has resolved.  At this point patient remains in the hospital for appropriate disposition and placement     Plans today:  -No significant changes made to care plan today  -Await appropriate disposition     Acute on chronic hypoxic respiratory failure-resolved  Asthma exacerbation  RICK   At baseline pt is 2.5L at night and during the day as needed.    CTPA negative for PE.  Patient states her triggers been poor air quality and she has not been using her controller LABA/ICS.  Has not had a sleep apnea machine at home but reports that she has an appointment coming up in the next 2 weeks, even though she is diagnosed is not tolerating it due to claustrophobic feelings.  - Wheezing resolved, DuoNeb every 4 hours as needed  - CPAP ordered  - Continue Breo Ellipta control inhaler  - Medically stable and can be discharged whenever placement is available     History of developmental delay  SW Consultation   -father who is legal guardian states he received paperwork from the [facility] stating that patient is not to be able to return to her own residence and requires to be moved to a care facility to ensure she gets her medications as ordered and prescribed.    - consulted and following.   - Per  note patient will be getting appointed a new legal guardian in July.   - Discharge patient will go to a new [facility].  His significant other cannot go with her.     H/o DVT   -  "Pharmacy warfarin panel ordered.  Continue warfarin as inpatient.     Seizure disorder -continue Tegretol     Hypertension -continue lisinopril     Hyperlipidemia - continue statin     Gerd - continue prilosec     Super morbid obesity  Intertriginous excoriations and wounds in multiple skin folds  WOC following   - Start patient on topical azole therapy with ketoconazole 2% twice daily--was switched to miconazole powder per patient's request  - General Wound cares per bedside RN and WOC cares weekly        Diet: Combination Diet Regular Diet Adult    DVT Prophylaxis: Warfarin  Grajeda Catheter: Not present  Lines: None     Cardiac Monitoring: None  Code Status: Full Code    Discharge Dispo: retirement when facility is found  Medically Ready for Discharge: Ready Now           Interval History (Subjective):      Feels well.  No complaints or concerns.  Await placement.  No shortness of breath.  No wheezing                  Physical Exam:      Last Vital Signs:  BP (!) 115/37 (BP Location: Right arm)   Pulse 69   Temp 98.1  F (36.7  C) (Oral)   Resp 16   Ht 1.702 m (5' 7\")   Wt (!) 175.4 kg (386 lb 11 oz)   LMP 08/18/2008   SpO2 94%   BMI 60.56 kg/m        Intake/Output Summary (Last 24 hours) at 7/6/2025 0941  Last data filed at 7/6/2025 0559  Gross per 24 hour   Intake 460 ml   Output 4050 ml   Net -3590 ml       Constitutional: Awake, alert, cooperative, no apparent distress     Respiratory: Clear to auscultation bilaterally, no crackles or wheezing   Cardiovascular: Regular rate and rhythm, normal S1 and S2, and no murmur noted   Abdomen: Normal bowel sounds, soft, non-distended, non-tender   Skin: No rashes, no cyanosis, dry to touch   Neuro: Alert and oriented x3, no weakness, numbness, memory loss   Extremities: No edema, normal range of motion   Other(s):        All other systems: Negative          Medications:      All current medications were reviewed with changes reflected in problem list.         Data:    " "  All new lab and imaging data was reviewed.   Labs:       Lab Results   Component Value Date     06/25/2025     05/06/2025     05/05/2025     06/09/2021     05/10/2021     05/03/2021    Lab Results   Component Value Date    CHLORIDE 107 06/25/2025    CHLORIDE 101 05/06/2025    CHLORIDE 101 05/05/2025    CHLORIDE 108 05/07/2022    CHLORIDE 106 05/05/2022    CHLORIDE 107 06/09/2021    CHLORIDE 108 05/10/2021    CHLORIDE 107 05/03/2021    Lab Results   Component Value Date    BUN 13.0 06/25/2025    BUN 14.7 05/06/2025    BUN 15.4 05/05/2025    BUN 13 05/07/2022    BUN 18 05/05/2022    BUN 15 06/09/2021    BUN 13 05/10/2021    BUN 17 05/03/2021      Lab Results   Component Value Date    POTASSIUM 4.4 06/25/2025    POTASSIUM 4.1 05/06/2025    POTASSIUM 4.4 05/05/2025    POTASSIUM 3.7 05/07/2022    POTASSIUM 4.2 05/05/2022    POTASSIUM 4.2 06/09/2021    POTASSIUM 3.6 05/10/2021    POTASSIUM 3.7 05/03/2021    Lab Results   Component Value Date    CO2 27 06/25/2025    CO2 26 05/06/2025    CO2 27 05/05/2025    CO2 27 05/07/2022    CO2 29 05/05/2022    CO2 28 06/09/2021    CO2 28 05/10/2021    CO2 23 05/03/2021    Lab Results   Component Value Date    CR 0.7 06/25/2025    CR 0.69 06/25/2025    CR 0.65 05/08/2025    CR 0.57 05/06/2025    CR 0.69 06/09/2021    CR 0.55 05/10/2021    CR 0.56 05/03/2021        No results for input(s): \"WBC\", \"HGB\", \"HCT\", \"MCV\", \"PLT\" in the last 168 hours.   Imaging:   No results found for this or any previous visit (from the past 24 hours).   "

## 2025-07-06 NOTE — PLAN OF CARE
Temp: 97.4  F (36.3  C) Temp src: Oral BP: 127/54 Pulse: 89   Resp: 20 SpO2: 95 % O2 Device: None (Room air) Oxygen Delivery: 1 LPM     A&Ox4. VSS on room air during day, 1L overnight. Up 1A GB walker. Some gas pains today, received PRN senna, simethicone, and tylenol. Discomfort now resolved. Patient is medically stable, waiting placement to [facility]. SW [facility]. Continue plan of care.      Goal Outcome Evaluation:      Plan of Care Reviewed With: patient    Overall Patient Progress: no changeOverall Patient Progress: no change    Outcome Evaluation: PRN tylenol, simethicone, senna. Weaned to RA during day. Tegretol for seizure management. Medically stable, waiting placement.      Problem: Gas Exchange Impaired  Goal: Optimal Gas Exchange  Outcome: Adequate for Care Transition  Intervention: Optimize Oxygenation and Ventilation  Recent Flowsheet Documentation  Taken 7/5/2025 1730 by Va Hunter RN  Head of Bed (HOB) Positioning: HOB at 20-30 degrees  Taken 7/5/2025 1000 by Va Hunter RN  Head of Bed (HOB) Positioning: HOB at 20-30 degrees     Problem: Comorbidity Management  Goal: Maintenance of Asthma Control  Outcome: Adequate for Care Transition  Intervention: Maintain Asthma Symptom Control  Recent Flowsheet Documentation  Taken 7/5/2025 1730 by Va Hunter RN  Medication Review/Management: medications reviewed  Taken 7/5/2025 1000 by Va Hunter RN  Medication Review/Management: medications reviewed  Goal: Maintenance of Behavioral Health Symptom Control  Outcome: Adequate for Care Transition  Intervention: Maintain Behavioral Health Symptom Control  Recent Flowsheet Documentation  Taken 7/5/2025 1730 by Va Hunter RN  Medication Review/Management: medications reviewed  Taken 7/5/2025 1000 by Va Hunter RN  Medication Review/Management: medications reviewed  Goal: Maintenance of COPD Symptom Control  Outcome: Adequate for Care Transition  Intervention: Maintain COPD Symptom  Control  Recent Flowsheet Documentation  Taken 7/5/2025 1730 by Va Hunter RN  Medication Review/Management: medications reviewed  Taken 7/5/2025 1000 by Va Hunter RN  Medication Review/Management: medications reviewed  Goal: Blood Glucose Levels Within Targeted Range  Outcome: Adequate for Care Transition  Intervention: Monitor and Manage Glycemia  Recent Flowsheet Documentation  Taken 7/5/2025 1730 by Va Hunter RN  Medication Review/Management: medications reviewed  Taken 7/5/2025 1000 by Va Hunter RN  Medication Review/Management: medications reviewed  Goal: Maintenance of Heart Failure Symptom Control  Outcome: Adequate for Care Transition  Intervention: Maintain Heart Failure Management  Recent Flowsheet Documentation  Taken 7/5/2025 1730 by Va Hunter RN  Medication Review/Management: medications reviewed  Taken 7/5/2025 1000 by Va Hunter RN  Medication Review/Management: medications reviewed  Goal: Blood Pressure in Desired Range  Outcome: Adequate for Care Transition  Intervention: Maintain Blood Pressure Management  Recent Flowsheet Documentation  Taken 7/5/2025 1730 by Va Hunter RN  Medication Review/Management: medications reviewed  Taken 7/5/2025 1000 by Va Hunter RN  Medication Review/Management: medications reviewed  Goal: Maintenance of Osteoarthritis Symptom Control  Outcome: Adequate for Care Transition  Intervention: Maintain Osteoarthritis Symptom Control  Recent Flowsheet Documentation  Taken 7/5/2025 1730 by Va Hunter RN  Assistive Device Utilized:   walker   gait belt  Activity Management: activity adjusted per tolerance  Medication Review/Management: medications reviewed  Taken 7/5/2025 1000 by Va Hunter RN  Assistive Device Utilized:   gait belt   walker  Activity Management:   activity adjusted per tolerance   activity encouraged  Medication Review/Management: medications reviewed  Goal: Bariatric Home Regimen Maintained  Outcome:  "Adequate for Care Transition  Intervention: Maintain and Manage Postbariatric Surgery Care  Recent Flowsheet Documentation  Taken 7/5/2025 1730 by Va Hunter RN  Medication Review/Management: medications reviewed  Taken 7/5/2025 1000 by Va Hunter RN  Medication Review/Management: medications reviewed  Goal: Maintenance of Seizure Control  Outcome: Adequate for Care Transition  Intervention: Maintain Seizure Symptom Control  Recent Flowsheet Documentation  Taken 7/5/2025 1730 by Va Hunter RN  Medication Review/Management: medications reviewed  Taken 7/5/2025 1000 by Va Hunter RN  Medication Review/Management: medications reviewed     Problem: Adult Inpatient Plan of Care  Goal: Plan of Care Review  Description: The Plan of Care Review/Shift note should be completed every shift.  The Outcome Evaluation is a brief statement about your assessment that the patient is improving, declining, or no change.  This information will be displayed automatically on your shift  note.  Outcome: Adequate for Care Transition  Flowsheets (Taken 7/5/2025 2017)  Outcome Evaluation: PRN tylenol, simethicone, senna. Weaned to RA during day. Tegretol for seizure management. Medically stable, waiting placement.  Plan of Care Reviewed With: patient  Overall Patient Progress: no change  Goal: Patient-Specific Goal (Individualized)  Description: You can add care plan individualizations to a care plan. Examples of Individualization might be:  \"Parent requests to be called daily at 9am for status\", \"I have a hard time hearing out of my right ear\", or \"Do not touch me to wake me up as it startles  me\".  Outcome: Adequate for Care Transition  Goal: Absence of Hospital-Acquired Illness or Injury  Outcome: Adequate for Care Transition  Intervention: Identify and Manage Fall Risk  Recent Flowsheet Documentation  Taken 7/5/2025 1730 by Va Hunter RN  Safety Promotion/Fall Prevention: safety round/check completed  Taken " 7/5/2025 1000 by Va Hunter RN  Safety Promotion/Fall Prevention:   safety round/check completed   room organization consistent   room near nurse's station   room door open   nonskid shoes/slippers when out of bed   mobility aid in reach   lighting adjusted   clutter free environment maintained   activity supervised  Intervention: Prevent Skin Injury  Recent Flowsheet Documentation  Taken 7/5/2025 1730 by Va Hunter RN  Body Position: position changed independently  Taken 7/5/2025 1000 by Va Hunter RN  Body Position: position changed independently  Skin Protection:   adhesive use limited   transparent dressing maintained   tubing/devices free from skin contact  Intervention: Prevent and Manage VTE (Venous Thromboembolism) Risk  Recent Flowsheet Documentation  Taken 7/5/2025 1730 by Va Hunter RN  VTE Prevention/Management: (on warfarin)   patient refused intervention   other (see comments)  Taken 7/5/2025 1000 by Va Hunter RN  VTE Prevention/Management: (on warfarin)   patient refused intervention   other (see comments)  Intervention: Prevent Infection  Recent Flowsheet Documentation  Taken 7/5/2025 1730 by Va Hunter RN  Infection Prevention:   rest/sleep promoted   single patient room provided  Taken 7/5/2025 1000 by Va Hunter RN  Infection Prevention:   rest/sleep promoted   single patient room provided  Goal: Optimal Comfort and Wellbeing  Outcome: Adequate for Care Transition  Intervention: Monitor Pain and Promote Comfort  Recent Flowsheet Documentation  Taken 7/5/2025 1727 by Va Hunter RN  Pain Management Interventions: medication (see MAR)  Goal: Readiness for Transition of Care  Outcome: Adequate for Care Transition

## 2025-07-06 NOTE — PLAN OF CARE
Shift Summary 2300 - 0700  Temp: 97.7  F (36.5  C) Temp src: Oral BP: 108/53 Pulse: 71   Resp: 18 SpO2: 94 % O2 Device: Nasal cannula Oxygen Delivery: 1 LPM  Pt AOX4, has cognitive delays. VSS on room air during day, 1L - 2L overnight. Up 1A GB walker. Denies pain. Patient is medically stable, waiting placement to [facility]. SW spoke with pt's parents/guardians regarding pt's discharge plan. Pt's father stated that they have a verbal acceptance from [place] with a restriction in place. Discharge to be determined.    Problem: Adult Inpatient Plan of Care  Goal: Absence of Hospital-Acquired Illness or Injury  Intervention: Identify and Manage Fall Risk  Recent Flowsheet Documentation  Taken 7/5/2025 2329 by Alison Church RN  Safety Promotion/Fall Prevention:   safety round/check completed   supervised activity   room near nurse's station   mobility aid in reach   lighting adjusted   clutter free environment maintained  Intervention: Prevent Skin Injury  Recent Flowsheet Documentation  Taken 7/5/2025 2329 by Alison Church RN  Skin Protection:   adhesive use limited   incontinence pads utilized  Intervention: Prevent and Manage VTE (Venous Thromboembolism) Risk  Recent Flowsheet Documentation  Taken 7/5/2025 2329 by Alison Church RN  VTE Prevention/Management: (On Warfarin) patient refused intervention  Intervention: Prevent Infection  Recent Flowsheet Documentation  Taken 7/5/2025 2329 by Alison Church RN  Infection Prevention: rest/sleep promoted     Problem: Comorbidity Management  Goal: Maintenance of Asthma Control  Intervention: Maintain Asthma Symptom Control  Recent Flowsheet Documentation  Taken 7/5/2025 2329 by Alison Church RN  Medication Review/Management: medications reviewed  Goal: Blood Pressure in Desired Range  Intervention: Maintain Blood Pressure Management  Recent Flowsheet Documentation  Taken 7/5/2025 2329 by Alison Church RN  Medication Review/Management: medications  reviewed     Problem: Comorbidity Management  Goal: Maintenance of Asthma Control  Intervention: Maintain Asthma Symptom Control  Recent Flowsheet Documentation  Taken 7/5/2025 2329 by Alison Church RN  Medication Review/Management: medications reviewed  Goal: Maintenance of Behavioral Health Symptom Control  Intervention: Maintain Behavioral Health Symptom Control  Recent Flowsheet Documentation  Taken 7/5/2025 2329 by Alison Church RN  Medication Review/Management: medications reviewed  Goal: Maintenance of COPD Symptom Control  Intervention: Maintain COPD Symptom Control  Recent Flowsheet Documentation  Taken 7/5/2025 2329 by Alison Church RN  Medication Review/Management: medications reviewed  Goal: Blood Glucose Levels Within Targeted Range  Intervention: Monitor and Manage Glycemia  Recent Flowsheet Documentation  Taken 7/5/2025 2329 by Alison Church RN  Medication Review/Management: medications reviewed  Goal: Maintenance of Heart Failure Symptom Control  Intervention: Maintain Heart Failure Management  Recent Flowsheet Documentation  Taken 7/5/2025 2329 by Alison Church RN  Medication Review/Management: medications reviewed  Goal: Blood Pressure in Desired Range  Intervention: Maintain Blood Pressure Management  Recent Flowsheet Documentation  Taken 7/5/2025 2329 by Alison Church RN  Medication Review/Management: medications reviewed  Goal: Maintenance of Osteoarthritis Symptom Control  Intervention: Maintain Osteoarthritis Symptom Control  Recent Flowsheet Documentation  Taken 7/5/2025 2329 by Alison Church RN  Medication Review/Management: medications reviewed  Goal: Bariatric Home Regimen Maintained  Intervention: Maintain and Manage Postbariatric Surgery Care  Recent Flowsheet Documentation  Taken 7/5/2025 2329 by Alison Church RN  Medication Review/Management: medications reviewed  Goal: Maintenance of Seizure Control  Intervention: Maintain Seizure Symptom  Control  Recent Flowsheet Documentation  Taken 7/5/2025 2329 by Alison Church RN  Sensory Stimulation Regulation:   care clustered   lighting decreased   quiet environment promoted  Medication Review/Management: medications reviewed     Problem: Adult Inpatient Plan of Care  Goal: Absence of Hospital-Acquired Illness or Injury  Intervention: Identify and Manage Fall Risk  Recent Flowsheet Documentation  Taken 7/5/2025 2329 by Alison Church RN  Safety Promotion/Fall Prevention:   safety round/check completed   supervised activity   room near nurse's station   mobility aid in reach   lighting adjusted   clutter free environment maintained  Intervention: Prevent Skin Injury  Recent Flowsheet Documentation  Taken 7/5/2025 2329 by Alison Church RN  Skin Protection:   adhesive use limited   incontinence pads utilized  Intervention: Prevent and Manage VTE (Venous Thromboembolism) Risk  Recent Flowsheet Documentation  Taken 7/5/2025 2329 by Ailson Church RN  VTE Prevention/Management: (On Warfarin) patient refused intervention  Intervention: Prevent Infection  Recent Flowsheet Documentation  Taken 7/5/2025 2329 by Alison Church RN  Infection Prevention: rest/sleep promoted     Problem: Comorbidity Management  Goal: Maintenance of Asthma Control  Intervention: Maintain Asthma Symptom Control  Recent Flowsheet Documentation  Taken 7/5/2025 2329 by Alison Church RN  Medication Review/Management: medications reviewed  Goal: Maintenance of Behavioral Health Symptom Control  Intervention: Maintain Behavioral Health Symptom Control  Recent Flowsheet Documentation  Taken 7/5/2025 2329 by Alison Church RN  Medication Review/Management: medications reviewed  Goal: Maintenance of COPD Symptom Control  Intervention: Maintain COPD Symptom Control  Recent Flowsheet Documentation  Taken 7/5/2025 2329 by Alison Church RN  Medication Review/Management: medications reviewed  Goal: Blood Glucose Levels  Within Targeted Range  Intervention: Monitor and Manage Glycemia  Recent Flowsheet Documentation  Taken 7/5/2025 2329 by Alison Church RN  Medication Review/Management: medications reviewed  Goal: Maintenance of Heart Failure Symptom Control  Intervention: Maintain Heart Failure Management  Recent Flowsheet Documentation  Taken 7/5/2025 2329 by Alison Church RN  Medication Review/Management: medications reviewed  Goal: Blood Pressure in Desired Range  Intervention: Maintain Blood Pressure Management  Recent Flowsheet Documentation  Taken 7/5/2025 2329 by Alison Church RN  Medication Review/Management: medications reviewed  Goal: Maintenance of Osteoarthritis Symptom Control  Intervention: Maintain Osteoarthritis Symptom Control  Recent Flowsheet Documentation  Taken 7/5/2025 2329 by Alison Church RN  Medication Review/Management: medications reviewed  Goal: Bariatric Home Regimen Maintained  Intervention: Maintain and Manage Postbariatric Surgery Care  Recent Flowsheet Documentation  Taken 7/5/2025 2329 by Alison Church RN  Medication Review/Management: medications reviewed  Goal: Maintenance of Seizure Control  Intervention: Maintain Seizure Symptom Control  Recent Flowsheet Documentation  Taken 7/5/2025 2329 by Alison Church RN  Sensory Stimulation Regulation:   care clustered   lighting decreased   quiet environment promoted  Medication Review/Management: medications reviewed   Goal Outcome Evaluation:

## 2025-07-06 NOTE — PROVIDER NOTIFICATION
Noreen HAHN notified for blood in urine. Purewick in place, urine red in color. VSS at this time. Warfarin 2.5 mg due. INR 3.58 today.    Orders obtained to hold warfarin tonight and monitor for clots in urine, VS/symptoms for bleeding.

## 2025-07-06 NOTE — PLAN OF CARE
Temp: 97.5  F (36.4  C) Temp src: Oral BP: 124/57 Pulse: 75   Resp: 18 SpO2: 95 % O2 Device: None (Room air) Oxygen Delivery: 2 LPM     A&Ox4. VSS, weaned to RA during the day. Up 1A GB walker. Miconazole powder/interdry to skin folds. Additional rashes noted to L flank, L axilla, sacral fold. Open slit noted to sacral fold, skin cleansed and mepilex placed. OOB/walk encouraged throughout the day, patient refused. Daily inhaler use. Medically stable for discharge to [facility] when bed is available.    Addendum 1830: Blood noted in urine. Red in color. Orders to hold warfarin tonight and monitor for clots, s/s bleeding.  1900: Hgb 12.3    Problem: Comorbidity Management  Goal: Maintenance of Asthma Control  Outcome: Adequate for Care Transition  Intervention: Maintain Asthma Symptom Control  Recent Flowsheet Documentation  Taken 7/6/2025 1536 by Va Hunter RN  Medication Review/Management: medications reviewed  Taken 7/6/2025 0802 by Va Hunter RN  Medication Review/Management: medications reviewed  Goal: Maintenance of Behavioral Health Symptom Control  Outcome: Adequate for Care Transition  Intervention: Maintain Behavioral Health Symptom Control  Recent Flowsheet Documentation  Taken 7/6/2025 1536 by Va Hunter RN  Medication Review/Management: medications reviewed  Taken 7/6/2025 0802 by Va Hunter RN  Medication Review/Management: medications reviewed  Goal: Maintenance of COPD Symptom Control  Outcome: Adequate for Care Transition  Intervention: Maintain COPD Symptom Control  Recent Flowsheet Documentation  Taken 7/6/2025 1536 by Va Hunter RN  Medication Review/Management: medications reviewed  Taken 7/6/2025 0802 by Va Hunter RN  Medication Review/Management: medications reviewed  Goal: Blood Glucose Levels Within Targeted Range  Outcome: Adequate for Care Transition  Intervention: Monitor and Manage Glycemia  Recent Flowsheet Documentation  Taken 7/6/2025 1536 by Va Hunter  RN  Medication Review/Management: medications reviewed  Taken 7/6/2025 0802 by Va Hunter RN  Medication Review/Management: medications reviewed  Goal: Maintenance of Heart Failure Symptom Control  Outcome: Adequate for Care Transition  Intervention: Maintain Heart Failure Management  Recent Flowsheet Documentation  Taken 7/6/2025 1536 by Va Hunter RN  Medication Review/Management: medications reviewed  Taken 7/6/2025 0802 by Va Hunter RN  Medication Review/Management: medications reviewed  Goal: Blood Pressure in Desired Range  Outcome: Adequate for Care Transition  Intervention: Maintain Blood Pressure Management  Recent Flowsheet Documentation  Taken 7/6/2025 1536 by Va Hunter RN  Medication Review/Management: medications reviewed  Taken 7/6/2025 0802 by Va Hunter RN  Medication Review/Management: medications reviewed  Goal: Maintenance of Osteoarthritis Symptom Control  Outcome: Adequate for Care Transition  Intervention: Maintain Osteoarthritis Symptom Control  Recent Flowsheet Documentation  Taken 7/6/2025 1536 by Va Hunter RN  Assistive Device Utilized:   walker   gait belt  Activity Management: activity adjusted per tolerance  Medication Review/Management: medications reviewed  Taken 7/6/2025 1429 by Va Hunter RN  Activity Management: activity adjusted per tolerance  Taken 7/6/2025 0802 by Va Hunter RN  Assistive Device Utilized:   walker   gait belt  Activity Management: activity adjusted per tolerance  Medication Review/Management: medications reviewed  Goal: Bariatric Home Regimen Maintained  Outcome: Adequate for Care Transition  Intervention: Maintain and Manage Postbariatric Surgery Care  Recent Flowsheet Documentation  Taken 7/6/2025 1536 by Va Hunter RN  Medication Review/Management: medications reviewed  Taken 7/6/2025 0802 by Va Hunter RN  Medication Review/Management: medications reviewed  Goal: Maintenance of Seizure Control  Outcome:  Adequate for Care Transition  Intervention: Maintain Seizure Symptom Control  Recent Flowsheet Documentation  Taken 7/6/2025 1536 by Va Hunter RN  Medication Review/Management: medications reviewed  Taken 7/6/2025 0802 by Va Hunter RN  Medication Review/Management: medications reviewed     Problem: Gas Exchange Impaired  Goal: Optimal Gas Exchange  Outcome: Adequate for Care Transition  Intervention: Optimize Oxygenation and Ventilation  Recent Flowsheet Documentation  Taken 7/6/2025 1536 by Va Hunter RN  Head of Bed (HOB) Positioning: HOB at 30-45 degrees  Taken 7/6/2025 1429 by Va Hunter RN  Head of Bed (HOB) Positioning: HOB at 20-30 degrees  Taken 7/6/2025 0802 by Va Hunter RN  Head of Bed (HOB) Positioning: HOB at 20-30 degrees

## 2025-07-06 NOTE — PLAN OF CARE
A & O x 4, VSS, S diminished, O2 sat 95%  RA, 2L  NC at night, Regular diet, up A X1/gb/w, no PIV. Left thigh external catheter in place, dressing changed, Plan: waiting placement.  Will continue POC and monitoring.   Problem: Comorbidity Management  Goal: Maintenance of Asthma Control  7/5/2025 2322 by Quirino Montiel RN  Outcome: Progressing  7/5/2025 2322 by Quirino Montiel RN  Outcome: Progressing  7/5/2025 2321 by Quirino Montiel RN  Outcome: Progressing  7/5/2025 2321 by Quirino Montiel RN  Outcome: Progressing  Intervention: Maintain Asthma Symptom Control  Recent Flowsheet Documentation  Taken 7/5/2025 1915 by Quirino Montiel RN  Medication Review/Management: medications reviewed  Goal: Maintenance of Behavioral Health Symptom Control  7/5/2025 2322 by Quirino Montiel RN  Outcome: Progressing  7/5/2025 2322 by Quirino Montiel RN  Outcome: Progressing  7/5/2025 2321 by Quirino Montiel RN  Outcome: Progressing  7/5/2025 2321 by Quirino Montiel RN  Outcome: Progressing  Intervention: Maintain Behavioral Health Symptom Control  Recent Flowsheet Documentation  Taken 7/5/2025 1915 by Quirino Montiel RN  Medication Review/Management: medications reviewed  Goal: Maintenance of COPD Symptom Control  7/5/2025 2322 by Quirino Montiel RN  Outcome: Progressing  7/5/2025 2322 by Quirino Montiel RN  Outcome: Progressing  7/5/2025 2321 by Quirino Montiel RN  Outcome: Progressing  7/5/2025 2321 by Quirino Montiel RN  Outcome: Progressing  Intervention: Maintain COPD Symptom Control  Recent Flowsheet Documentation  Taken 7/5/2025 1915 by Quirino Montiel RN  Breathing Techniques/Airway Clearance: deep/controlled cough encouraged  Medication Review/Management: medications reviewed  Goal: Blood Glucose Levels Within Targeted Range  7/5/2025 2322 by Quirino Montiel RN  Outcome: Progressing  7/5/2025 2322 by Senay, Hawltesim, RN  Outcome: Progressing  7/5/2025 2321 by Quirino Montiel RN  Outcome:  Progressing  7/5/2025 2321 by Quirino Montiel RN  Outcome: Progressing  Intervention: Monitor and Manage Glycemia  Recent Flowsheet Documentation  Taken 7/5/2025 1915 by Quirino Montiel RN  Medication Review/Management: medications reviewed  Goal: Maintenance of Heart Failure Symptom Control  7/5/2025 2322 by Quirino Montiel RN  Outcome: Progressing  7/5/2025 2322 by Quirino Montiel RN  Outcome: Progressing  7/5/2025 2321 by Quirino Montiel RN  Outcome: Progressing  7/5/2025 2321 by Quirino Montiel RN  Outcome: Progressing  Intervention: Maintain Heart Failure Management  Recent Flowsheet Documentation  Taken 7/5/2025 1915 by Quirino Montiel RN  Medication Review/Management: medications reviewed  Goal: Blood Pressure in Desired Range  7/5/2025 2322 by Quirino Montiel RN  Outcome: Progressing  7/5/2025 2322 by Quirino Montiel RN  Outcome: Progressing  7/5/2025 2321 by Quirino Montiel RN  Outcome: Progressing  7/5/2025 2321 by Quirino Montiel RN  Outcome: Progressing  Intervention: Maintain Blood Pressure Management  Recent Flowsheet Documentation  Taken 7/5/2025 1915 by Quirino Montiel RN  Medication Review/Management: medications reviewed  Goal: Maintenance of Osteoarthritis Symptom Control  7/5/2025 2322 by Quirino Montiel RN  Outcome: Progressing  7/5/2025 2322 by Quirino Montiel RN  Outcome: Progressing  7/5/2025 2321 by Quirino Montiel RN  Outcome: Progressing  7/5/2025 2321 by Quirino Montiel RN  Outcome: Progressing  Intervention: Maintain Osteoarthritis Symptom Control  Recent Flowsheet Documentation  Taken 7/5/2025 1915 by Quirino Montiel RN  Assistive Device Utilized:   walker   gait belt  Activity Management: activity adjusted per tolerance  Medication Review/Management: medications reviewed  Goal: Bariatric Home Regimen Maintained  7/5/2025 2322 by Quirino Montiel RN  Outcome: Progressing  7/5/2025 2322 by Senay, Hawltesim, RN  Outcome: Progressing  7/5/2025 2321 by  Quirino Montiel RN  Outcome: Progressing  7/5/2025 2321 by Quirino Montiel RN  Outcome: Progressing  Intervention: Maintain and Manage Postbariatric Surgery Care  Recent Flowsheet Documentation  Taken 7/5/2025 1915 by Quirino Montiel RN  Medication Review/Management: medications reviewed  Goal: Maintenance of Seizure Control  7/5/2025 2322 by Quirino Montiel RN  Outcome: Progressing  7/5/2025 2322 by Quirino Montiel RN  Outcome: Progressing  7/5/2025 2321 by Quirino Montiel RN  Outcome: Progressing  7/5/2025 2321 by Quirino Montiel RN  Outcome: Progressing  Intervention: Maintain Seizure Symptom Control  Recent Flowsheet Documentation  Taken 7/5/2025 1915 by Quirino Montiel RN  Sensory Stimulation Regulation: care clustered  Seizure Precautions: activity supervised  Medication Review/Management: medications reviewed     Problem: Gas Exchange Impaired  Goal: Optimal Gas Exchange  7/5/2025 2322 by Quirino Montiel RN  Outcome: Progressing  7/5/2025 2322 by Quirino Montiel RN  Outcome: Progressing  Intervention: Optimize Oxygenation and Ventilation  Recent Flowsheet Documentation  Taken 7/5/2025 1915 by Quirino Montiel RN  Airway/Ventilation Management: airway patency maintained  Head of Bed (HOB) Positioning: HOB at 20-30 degrees   Goal Outcome Evaluation:

## 2025-07-07 ENCOUNTER — APPOINTMENT (OUTPATIENT)
Dept: CT IMAGING | Facility: CLINIC | Age: 60
DRG: 202 | End: 2025-07-07
Attending: STUDENT IN AN ORGANIZED HEALTH CARE EDUCATION/TRAINING PROGRAM
Payer: MEDICARE

## 2025-07-07 LAB
ALBUMIN UR-MCNC: ABNORMAL G/DL
APPEARANCE UR: ABNORMAL
BILIRUB UR QL STRIP: ABNORMAL
CAOX CRY #/AREA URNS HPF: ABNORMAL /HPF
COLOR UR AUTO: ABNORMAL
GLUCOSE UR STRIP-MCNC: ABNORMAL MG/DL
HGB UR QL STRIP: ABNORMAL
INR PPP: 2.51 (ref 0.85–1.15)
KETONES UR STRIP-MCNC: ABNORMAL MG/DL
LEUKOCYTE ESTERASE UR QL STRIP: ABNORMAL
NITRATE UR QL: ABNORMAL
PH UR STRIP: ABNORMAL [PH]
PROTHROMBIN TIME: 26.7 SECONDS (ref 11.8–14.8)
RBC URINE: >182 /HPF
SP GR UR STRIP: ABNORMAL
UROBILINOGEN UR STRIP-MCNC: ABNORMAL MG/DL
WBC URINE: >182 /HPF

## 2025-07-07 PROCEDURE — 250N000013 HC RX MED GY IP 250 OP 250 PS 637: Performed by: STUDENT IN AN ORGANIZED HEALTH CARE EDUCATION/TRAINING PROGRAM

## 2025-07-07 PROCEDURE — 81001 URINALYSIS AUTO W/SCOPE: CPT | Performed by: INTERNAL MEDICINE

## 2025-07-07 PROCEDURE — 999N000127 HC STATISTIC PERIPHERAL IV START W US GUIDANCE

## 2025-07-07 PROCEDURE — 36415 COLL VENOUS BLD VENIPUNCTURE: CPT | Performed by: HOSPITALIST

## 2025-07-07 PROCEDURE — 74176 CT ABD & PELVIS W/O CONTRAST: CPT

## 2025-07-07 PROCEDURE — 99232 SBSQ HOSP IP/OBS MODERATE 35: CPT | Performed by: INTERNAL MEDICINE

## 2025-07-07 PROCEDURE — 250N000013 HC RX MED GY IP 250 OP 250 PS 637: Performed by: INTERNAL MEDICINE

## 2025-07-07 PROCEDURE — 250N000011 HC RX IP 250 OP 636: Performed by: INTERNAL MEDICINE

## 2025-07-07 PROCEDURE — 120N000001 HC R&B MED SURG/OB

## 2025-07-07 PROCEDURE — 250N000011 HC RX IP 250 OP 636: Performed by: HOSPITALIST

## 2025-07-07 PROCEDURE — 85610 PROTHROMBIN TIME: CPT | Performed by: HOSPITALIST

## 2025-07-07 PROCEDURE — 87086 URINE CULTURE/COLONY COUNT: CPT | Performed by: INTERNAL MEDICINE

## 2025-07-07 PROCEDURE — 250N000013 HC RX MED GY IP 250 OP 250 PS 637: Performed by: HOSPITALIST

## 2025-07-07 RX ORDER — NALOXONE HYDROCHLORIDE 0.4 MG/ML
0.2 INJECTION, SOLUTION INTRAMUSCULAR; INTRAVENOUS; SUBCUTANEOUS
Status: DISCONTINUED | OUTPATIENT
Start: 2025-07-07 | End: 2025-07-29 | Stop reason: HOSPADM

## 2025-07-07 RX ORDER — NALOXONE HYDROCHLORIDE 0.4 MG/ML
0.4 INJECTION, SOLUTION INTRAMUSCULAR; INTRAVENOUS; SUBCUTANEOUS
Status: DISCONTINUED | OUTPATIENT
Start: 2025-07-07 | End: 2025-07-29 | Stop reason: HOSPADM

## 2025-07-07 RX ORDER — ATORVASTATIN CALCIUM 10 MG/1
10 TABLET, FILM COATED ORAL DAILY
Status: DISCONTINUED | OUTPATIENT
Start: 2025-07-07 | End: 2025-07-07

## 2025-07-07 RX ORDER — CIPROFLOXACIN 2 MG/ML
400 INJECTION, SOLUTION INTRAVENOUS EVERY 12 HOURS
Status: DISCONTINUED | OUTPATIENT
Start: 2025-07-07 | End: 2025-07-07

## 2025-07-07 RX ORDER — FLUTICASONE PROPIONATE 50 MCG
1 SPRAY, SUSPENSION (ML) NASAL DAILY
Status: DISCONTINUED | OUTPATIENT
Start: 2025-07-07 | End: 2025-07-29 | Stop reason: HOSPADM

## 2025-07-07 RX ORDER — ALBUTEROL SULFATE 90 UG/1
2 INHALANT RESPIRATORY (INHALATION) EVERY 6 HOURS PRN
Status: DISCONTINUED | OUTPATIENT
Start: 2025-07-07 | End: 2025-07-29 | Stop reason: HOSPADM

## 2025-07-07 RX ORDER — SERTRALINE HYDROCHLORIDE 100 MG/1
100 TABLET, FILM COATED ORAL DAILY
Status: DISCONTINUED | OUTPATIENT
Start: 2025-07-07 | End: 2025-07-29 | Stop reason: HOSPADM

## 2025-07-07 RX ORDER — CIPROFLOXACIN 500 MG/1
500 TABLET, FILM COATED ORAL 2 TIMES DAILY
Status: DISCONTINUED | OUTPATIENT
Start: 2025-07-07 | End: 2025-07-13

## 2025-07-07 RX ADMIN — CARBAMAZEPINE 400 MG: 200 TABLET ORAL at 21:17

## 2025-07-07 RX ADMIN — ATORVASTATIN CALCIUM 10 MG: 10 TABLET, FILM COATED ORAL at 21:48

## 2025-07-07 RX ADMIN — ONDANSETRON 4 MG: 2 INJECTION, SOLUTION INTRAMUSCULAR; INTRAVENOUS at 14:55

## 2025-07-07 RX ADMIN — SENNOSIDES AND DOCUSATE SODIUM 2 TABLET: 50; 8.6 TABLET ORAL at 09:28

## 2025-07-07 RX ADMIN — ACETAMINOPHEN 650 MG: 325 TABLET ORAL at 03:19

## 2025-07-07 RX ADMIN — CARBAMAZEPINE 200 MG: 200 TABLET ORAL at 12:11

## 2025-07-07 RX ADMIN — LISINOPRIL 20 MG: 20 TABLET ORAL at 09:27

## 2025-07-07 RX ADMIN — SERTRALINE 100 MG: 100 TABLET, FILM COATED ORAL at 12:11

## 2025-07-07 RX ADMIN — CIPROFLOXACIN 400 MG: 400 INJECTION, SOLUTION INTRAVENOUS at 13:53

## 2025-07-07 RX ADMIN — MICONAZOLE NITRATE: 20 POWDER TOPICAL at 21:18

## 2025-07-07 RX ADMIN — ACETAMINOPHEN 650 MG: 325 TABLET ORAL at 14:54

## 2025-07-07 RX ADMIN — PANTOPRAZOLE SODIUM 40 MG: 40 TABLET, DELAYED RELEASE ORAL at 06:44

## 2025-07-07 RX ADMIN — ONDANSETRON 4 MG: 4 TABLET, ORALLY DISINTEGRATING ORAL at 09:30

## 2025-07-07 RX ADMIN — FLUTICASONE PROPIONATE 1 SPRAY: 50 SPRAY, METERED NASAL at 12:10

## 2025-07-07 RX ADMIN — ACETAMINOPHEN 650 MG: 325 TABLET ORAL at 21:48

## 2025-07-07 RX ADMIN — OXYCODONE HYDROCHLORIDE 2.5 MG: 5 TABLET ORAL at 22:29

## 2025-07-07 RX ADMIN — FLUTICASONE FUROATE AND VILANTEROL TRIFENATATE 1 PUFF: 100; 25 POWDER RESPIRATORY (INHALATION) at 09:28

## 2025-07-07 RX ADMIN — SIMETHICONE 80 MG: 80 TABLET, CHEWABLE ORAL at 14:55

## 2025-07-07 RX ADMIN — CARBAMAZEPINE 400 MG: 200 TABLET ORAL at 09:28

## 2025-07-07 RX ADMIN — OXYCODONE HYDROCHLORIDE 2.5 MG: 5 TABLET ORAL at 18:15

## 2025-07-07 RX ADMIN — SIMETHICONE 80 MG: 80 TABLET, CHEWABLE ORAL at 02:28

## 2025-07-07 RX ADMIN — CIPROFLOXACIN 500 MG: 500 TABLET ORAL at 21:17

## 2025-07-07 RX ADMIN — MICONAZOLE NITRATE: 20 POWDER TOPICAL at 09:28

## 2025-07-07 RX ADMIN — ACETAMINOPHEN 650 MG: 325 TABLET ORAL at 09:28

## 2025-07-07 ASSESSMENT — ACTIVITIES OF DAILY LIVING (ADL)
ADLS_ACUITY_SCORE: 56
ADLS_ACUITY_SCORE: 56
ADLS_ACUITY_SCORE: 52
ADLS_ACUITY_SCORE: 56
ADLS_ACUITY_SCORE: 52
ADLS_ACUITY_SCORE: 56
ADLS_ACUITY_SCORE: 56
ADLS_ACUITY_SCORE: 52
ADLS_ACUITY_SCORE: 56
ADLS_ACUITY_SCORE: 52
ADLS_ACUITY_SCORE: 56
ADLS_ACUITY_SCORE: 56
ADLS_ACUITY_SCORE: 52
ADLS_ACUITY_SCORE: 56
ADLS_ACUITY_SCORE: 56

## 2025-07-07 NOTE — PLAN OF CARE
Goal Outcome Evaluation:       A & O x 4, denies pain,  VSS, LS diminished,  o2 sat  95% RA, 2L NC at bedtime, no PIV/ regular diet, up A x 1/gb/w.  Warfarin held by provider, pur wick in place. Plan: waiting placement.  Will continue POC and monitoring.     Problem: Gas Exchange Impaired  Goal: Optimal Gas Exchange  7/6/2025 2252 by Quirino Montiel RN  Outcome: Progressing  7/6/2025 2251 by Quirino Montiel RN  Outcome: Progressing  7/6/2025 2251 by Quirino Montiel RN  Outcome: Progressing  7/6/2025 2251 by Quirino Montiel RN  Outcome: Progressing  Intervention: Optimize Oxygenation and Ventilation  Recent Flowsheet Documentation  Taken 7/6/2025 1931 by Quirino Montiel RN  Airway/Ventilation Management: airway patency maintained  Head of Bed (HOB) Positioning: HOB at 20-30 degrees     Problem: Comorbidity Management  Goal: Maintenance of Asthma Control  7/6/2025 2252 by Quirino Montiel RN  Outcome: Progressing  7/6/2025 2251 by Quirino Montiel RN  Outcome: Progressing  Intervention: Maintain Asthma Symptom Control  Recent Flowsheet Documentation  Taken 7/6/2025 1931 by Quirino Montiel RN  Medication Review/Management: medications reviewed  Goal: Maintenance of Behavioral Health Symptom Control  7/6/2025 2252 by Quirino Montiel RN  Outcome: Progressing  7/6/2025 2251 by Quirino Montiel RN  Outcome: Progressing  Intervention: Maintain Behavioral Health Symptom Control  Recent Flowsheet Documentation  Taken 7/6/2025 1931 by Quirino Montiel RN  Medication Review/Management: medications reviewed  Goal: Maintenance of COPD Symptom Control  7/6/2025 2252 by Quirino Montiel RN  Outcome: Progressing  7/6/2025 2251 by Quirino Montiel RN  Outcome: Progressing  Intervention: Maintain COPD Symptom Control  Recent Flowsheet Documentation  Taken 7/6/2025 1931 by Quirino Montiel RN  Breathing Techniques/Airway Clearance: deep/controlled cough encouraged  Medication Review/Management: medications  reviewed  Goal: Blood Glucose Levels Within Targeted Range  7/6/2025 2252 by Quirino Montiel RN  Outcome: Progressing  7/6/2025 2251 by Quirino Montiel RN  Outcome: Progressing  Intervention: Monitor and Manage Glycemia  Recent Flowsheet Documentation  Taken 7/6/2025 1931 by Quirino Montiel RN  Medication Review/Management: medications reviewed  Goal: Maintenance of Heart Failure Symptom Control  7/6/2025 2252 by Quirino Montiel RN  Outcome: Progressing  7/6/2025 2251 by Quirino Montiel RN  Outcome: Progressing  Intervention: Maintain Heart Failure Management  Recent Flowsheet Documentation  Taken 7/6/2025 1931 by Quirino Montiel RN  Medication Review/Management: medications reviewed  Goal: Blood Pressure in Desired Range  7/6/2025 2252 by Quirino Montiel RN  Outcome: Progressing  7/6/2025 2251 by Quirino Montiel RN  Outcome: Progressing  Intervention: Maintain Blood Pressure Management  Recent Flowsheet Documentation  Taken 7/6/2025 1931 by Quirino Montiel RN  Medication Review/Management: medications reviewed  Goal: Maintenance of Osteoarthritis Symptom Control  7/6/2025 2252 by Quirino Montiel RN  Outcome: Progressing  7/6/2025 2251 by Quirino Montiel RN  Outcome: Progressing  Intervention: Maintain Osteoarthritis Symptom Control  Recent Flowsheet Documentation  Taken 7/6/2025 1931 by Quirino Montiel RN  Assistive Device Utilized:   walker   gait belt  Activity Management: activity adjusted per tolerance  Medication Review/Management: medications reviewed  Goal: Bariatric Home Regimen Maintained  7/6/2025 2252 by Quirino Montiel RN  Outcome: Progressing  7/6/2025 2251 by Quirino Montiel RN  Outcome: Progressing  Intervention: Maintain and Manage Postbariatric Surgery Care  Recent Flowsheet Documentation  Taken 7/6/2025 1931 by Quirino Montiel RN  Medication Review/Management: medications reviewed  Goal: Maintenance of Seizure Control  7/6/2025 2252 by Quirino Montiel RN  Outcome:  Progressing  7/6/2025 2251 by Quirino Montiel, RN  Outcome: Progressing  Intervention: Maintain Seizure Symptom Control  Recent Flowsheet Documentation  Taken 7/6/2025 1931 by Quirino Montiel, RN  Sensory Stimulation Regulation:   care clustered   lighting decreased  Medication Review/Management: medications reviewed

## 2025-07-07 NOTE — PLAN OF CARE
"  Shift Summary 2300 - 0700  Vital signs:  Temp: 98.1  F (36.7  C) Temp src: Oral BP: 131/50 Pulse: 74   Resp: 16 SpO2: 98 % O2 Device: Nasal cannula Oxygen Delivery: 2 LPM Height: 170.2 cm (5' 7\")     Pt is alert and oriented, VSS on 2LNC at night - pt's baseline. Purewick in situ draining Red urine - provider aware, Evening dose of warfarin held; pt monitored for signs of bleeding and blood clots. Tylenol administered for abdominal and generalized pain. Miconazole powder and interdry applied to the skin folds.Had simethicone for gas pain. Awaiting placement to [facility].     Problem: Gas Exchange Impaired  Goal: Optimal Gas Exchange  7/7/2025 0313 by Alison Church RN  Outcome: Progressing  7/7/2025 0313 by Alison Church RN  Outcome: Progressing     Problem: Comorbidity Management  Goal: Maintenance of Asthma Control  7/7/2025 0313 by Alison Church RN  Outcome: Progressing  7/7/2025 0313 by Alison Church RN  Outcome: Progressing  Intervention: Maintain Asthma Symptom Control  Recent Flowsheet Documentation  Taken 7/6/2025 2320 by Alison Church RN  Medication Review/Management: medications reviewed  Goal: Maintenance of Behavioral Health Symptom Control  7/7/2025 0313 by Alison Church RN  Outcome: Progressing  7/7/2025 0313 by Alison Church RN  Outcome: Progressing  Intervention: Maintain Behavioral Health Symptom Control  Recent Flowsheet Documentation  Taken 7/6/2025 2320 by Alison Church RN  Medication Review/Management: medications reviewed  Goal: Maintenance of COPD Symptom Control  7/7/2025 0313 by Alison Church RN  Outcome: Progressing  7/7/2025 0313 by Alison Church RN  Outcome: Progressing  Intervention: Maintain COPD Symptom Control  Recent Flowsheet Documentation  Taken 7/6/2025 2320 by Alison Church RN  Medication Review/Management: medications reviewed  Goal: Blood Glucose Levels Within Targeted Range  7/7/2025 0313 by Alison Church" RN  Outcome: Progressing  7/7/2025 0313 by Alison Church RN  Outcome: Progressing  Intervention: Monitor and Manage Glycemia  Recent Flowsheet Documentation  Taken 7/6/2025 2320 by Alison Church RN  Medication Review/Management: medications reviewed  Goal: Maintenance of Heart Failure Symptom Control  7/7/2025 0313 by Alison Church RN  Outcome: Progressing  7/7/2025 0313 by Alison Church RN  Outcome: Progressing  Intervention: Maintain Heart Failure Management  Recent Flowsheet Documentation  Taken 7/6/2025 2320 by Alison Church RN  Medication Review/Management: medications reviewed  Goal: Blood Pressure in Desired Range  7/7/2025 0313 by Alison Church RN  Outcome: Progressing  7/7/2025 0313 by Alison Church RN  Outcome: Progressing  Intervention: Maintain Blood Pressure Management  Recent Flowsheet Documentation  Taken 7/6/2025 2320 by Alison Church RN  Medication Review/Management: medications reviewed  Goal: Maintenance of Osteoarthritis Symptom Control  7/7/2025 0313 by Alison Church RN  Outcome: Progressing  7/7/2025 0313 by Alison Church RN  Outcome: Progressing  Intervention: Maintain Osteoarthritis Symptom Control  Recent Flowsheet Documentation  Taken 7/6/2025 2320 by Alison Church RN  Medication Review/Management: medications reviewed  Goal: Bariatric Home Regimen Maintained  7/7/2025 0313 by Alison Church RN  Outcome: Progressing  7/7/2025 0313 by Alison Church RN  Outcome: Progressing  Intervention: Maintain and Manage Postbariatric Surgery Care  Recent Flowsheet Documentation  Taken 7/6/2025 2320 by Alison Church RN  Medication Review/Management: medications reviewed  Goal: Maintenance of Seizure Control  7/7/2025 0313 by Alison Church RN  Outcome: Progressing  7/7/2025 0313 by Alison Church RN  Outcome: Progressing  Intervention: Maintain Seizure Symptom Control  Recent Flowsheet Documentation  Taken 7/6/2025 2320 by Ranjit  Alison GRANT RN  Sensory Stimulation Regulation:   care clustered   lighting decreased   quiet environment promoted  Medication Review/Management: medications reviewed

## 2025-07-07 NOTE — PROGRESS NOTES
[facility]  Hospitalist Progress Note  Neeraj Alvarado MD 07/07/2025    Reason for Stay (Diagnosis):  placement         Assessment and Plan:      Summary of Stay: Brissa Corado is a 60 year old female admitted on 6/25/2025 with past medical history of asthma on chronic oxygen at night, obstructive sleep apnea, morbid obesity, hypertension, seizure disorder who presents with asthma exacerbation 2/2 poor air quality and not using/having access to her LABA/ICS.     Patient's acute on chronic hypoxic respiratory failure has resolved.  Patient has remained in the hospital for appropriate disposition and placement    Update 7/7: Patient had episode of gross hematuria last night.  Has never had this before.  She denies any known hx of kidney stones.  Also describes some lower abdominal pain.  Benign appearing exam.  Is on warfarin.  Dose held last night due to hematuria.  Hemoglobin checked and is normal.  Cause of hematuria unclear; no recent Grajeda catheter or straight cath     Plans today:  - UA/UC  - if UA bland, consider NC CT of abdomen/pelvis to eval for stone as a cause of hematuria  - hold warfarin dose again today given ongoing hematuria.      Addendum:  UA shows pyuria.  Cx pending.  Start cipro (PCN allergy noted)    Addendum:  chart further reviewed; pt had a abd/pelvis CT in 7/2024 showing no nephrolithiasis    Gross hematuria  - developed spontaneously 7/7  - hgb normal  - check UA  - if UA bland, consider NC CT to r/o stone     Acute on chronic hypoxic respiratory failure-resolved  Asthma exacerbation  RICK   At baseline pt is 2.5L at night and during the day as needed.    CTPA negative for PE.  Patient states her triggers been poor air quality and she has not been using her controller LABA/ICS.  Has not had a sleep apnea machine at home but reports that she has an appointment coming up in the next 2 weeks, even though she is diagnosed is not tolerating it due to claustrophobic  "feelings.  - Wheezing resolved, DuoNeb every 4 hours as needed  - CPAP ordered  - Continue Breo Ellipta control inhaler  - Medically stable and can be discharged whenever placement is available     History of developmental delay  SW Consultation   -father who is legal guardian states he received paperwork from the [facility] stating that patient is not to be able to return to her own residence and requires to be moved to a care facility to ensure she gets her medications as ordered and prescribed.    - consulted and following.   - Per  note patient will be getting appointed a new legal guardian in July.   - Discharge patient will go to a new [facility].  His significant other cannot go with her.     H/o DVT   - Pharmacy warfarin panel ordered.    - dose held 7/6 and 7/7 due to hematuria     Seizure disorder -continue Tegretol     Hypertension -continue lisinopril     Hyperlipidemia - continue statin     Gerd - continue prilosec     Super morbid obesity  Intertriginous excoriations and wounds in multiple skin folds  WOC following   - Start patient on topical azole therapy with ketoconazole 2% twice daily--was switched to miconazole powder per patient's request  - General Wound cares per bedside RN and WOC cares weekly        Diet: Combination Diet Regular Diet Adult    DVT Prophylaxis: Warfarin  Grajeda Catheter: Not present  Lines: None     Cardiac Monitoring: None  Code Status: Full Code    Discharge Dispo: [facility] when facility is found  Medically Ready for Discharge: Ready Now             Interval History (Subjective):      Gross hematuria reported overnight.  Patient also describes of lower abdominal pain.  No Grajeda catheter or straight cath need reported.                  Physical Exam:      Last Vital Signs:  BP (!) 142/57 (BP Location: Left arm)   Pulse 80   Temp 97.8  F (36.6  C) (Oral)   Resp 17   Ht 1.702 m (5' 7\")   Wt (!) 175.4 kg (386 lb 11 oz)   LMP 08/18/2008   SpO2 94%   BMI 60.56 " premature/compromised infant/pump request/25 weeks in NICU for prematurity/multiparous mom "kg/m        Intake/Output Summary (Last 24 hours) at 7/7/2025 1024  Last data filed at 7/7/2025 0931  Gross per 24 hour   Intake 1840 ml   Output 3600 ml   Net -1760 ml       Constitutional: Awake, alert, cooperative, no apparent distress     Respiratory: Clear to auscultation bilaterally, no crackles or wheezing   Cardiovascular: Regular rate and rhythm, normal S1 and S2, and no murmur noted   Abdomen: Normal bowel sounds, soft, non-distended, non-tender   Skin: No rashes, no cyanosis, dry to touch   Neuro: Alert and oriented x3, no weakness, numbness, memory loss   Extremities: No edema, normal range of motion   Other(s):        All other systems: Negative          Medications:      All current medications were reviewed with changes reflected in problem list.         Data:      All new lab and imaging data was reviewed.   Labs:  No results for input(s): \"NA\", \"POTASSIUM\", \"CHLORIDE\", \"CO2\", \"ANIONGAP\", \"GLC\", \"BUN\", \"CR\", \"GFRESTIMATED\", \"GFRESTBLACK\", \"CESAR\" in the last 168 hours.  Recent Labs   Lab 07/06/25  1856   HGB 12.3   MCV 86      Imaging:   No results found for this or any previous visit (from the past 24 hours).   "

## 2025-07-07 NOTE — PLAN OF CARE
VSS. PO oxy/tylenol given for abdominal/back pain. A/Ox4. LS-diminished. Voided >300cc. Red in color/clots. Bladder scanned for 134. CT to be done on abdomen. Zofran given x2. Regular diet w/ good appetite. Left PIV saline locked. Miconazole powder applied to abdominal folds and groin. Incontinent of urine at times. Assist of 1 w/ gt belt and walker. WOC done as ordered.    Problem: Gas Exchange Impaired  Goal: Optimal Gas Exchange  Outcome: Progressing     Problem: Comorbidity Management  Goal: Maintenance of Asthma Control  Outcome: Progressing  Intervention: Maintain Asthma Symptom Control  Recent Flowsheet Documentation  Taken 7/7/2025 1356 by Ally Farr RN  Medication Review/Management: medications reviewed  Goal: Maintenance of Behavioral Health Symptom Control  Outcome: Progressing  Intervention: Maintain Behavioral Health Symptom Control  Recent Flowsheet Documentation  Taken 7/7/2025 1356 by Ally Farr RN  Medication Review/Management: medications reviewed  Goal: Maintenance of COPD Symptom Control  Outcome: Progressing  Intervention: Maintain COPD Symptom Control  Recent Flowsheet Documentation  Taken 7/7/2025 1356 by Ally Farr RN  Medication Review/Management: medications reviewed  Goal: Blood Glucose Levels Within Targeted Range  Outcome: Progressing  Intervention: Monitor and Manage Glycemia  Recent Flowsheet Documentation  Taken 7/7/2025 1356 by Ally Farr RN  Medication Review/Management: medications reviewed  Goal: Maintenance of Heart Failure Symptom Control  Outcome: Progressing  Intervention: Maintain Heart Failure Management  Recent Flowsheet Documentation  Taken 7/7/2025 1356 by Ally Farr, RN  Medication Review/Management: medications reviewed  Goal: Blood Pressure in Desired Range  Outcome: Progressing  Intervention: Maintain Blood Pressure Management  Recent Flowsheet Documentation  Taken 7/7/2025 1356 by Ally Farr, RN  Medication Review/Management:  medications reviewed  Goal: Maintenance of Osteoarthritis Symptom Control  Outcome: Progressing  Intervention: Maintain Osteoarthritis Symptom Control  Recent Flowsheet Documentation  Taken 7/7/2025 1356 by Ally Farr RN  Medication Review/Management: medications reviewed  Goal: Bariatric Home Regimen Maintained  Outcome: Progressing  Intervention: Maintain and Manage Postbariatric Surgery Care  Recent Flowsheet Documentation  Taken 7/7/2025 1356 by Ally Farr, RN  Medication Review/Management: medications reviewed  Goal: Maintenance of Seizure Control  Outcome: Progressing  Intervention: Maintain Seizure Symptom Control  Recent Flowsheet Documentation  Taken 7/7/2025 1356 by Ally Farr RN  Medication Review/Management: medications reviewed

## 2025-07-07 NOTE — SIGNIFICANT EVENT
Paged regarding abdominal pain not responding to tylenol and simethicone. Patient appears comfortable with benign abdominal exam. Has been urinating but passing clots. Bladder scan difficult but no obvious retention.   Pain is in back and lower abdomen. Given the pain and new/ongoing hematuria, will order CT to evaluate for kidney stone.   - PRN oxycodone  - CT AP    Kaia Evans, DO

## 2025-07-07 NOTE — PROGRESS NOTES
Care Management Follow Up    Length of Stay (days): 12    Expected Discharge Date: 07/14/2025     Concerns to be Addressed: discharge planning     Patient plan of care discussed at interdisciplinary rounds: Yes    Anticipated Discharge Disposition:                Anticipated Discharge Services:    Anticipated Discharge DME:      Patient/family educated on Medicare website which has current facility and service quality ratings:    Education Provided on the Discharge Plan:    Patient/Family in Agreement with the Plan:      Referrals Placed by CM/SW:    Private pay costs discussed: Not applicable    Discussed  Partnership in Safe Discharge Planning  document with patient/family: No     Handoff Completed: Yes, [facility] PCP: Internal handoff referral completed    Additional Information:  LUZ MRAIA provided outreach [facility], to connect regarding pt's discharge planning. VM left for Edna.     SW spoke with pt's parents/guardians regarding discharge planning. SW shared about message left for Edna. Pt's parents stated that they also have been calling Edna and have not been successful in connecting with her. Pt's parents shared that pt can move into [facility] when certain restrictions are in place. Pt's parents advised by AP to not put those restrictions in place themselves and to wait for the guardian who is taking over to do that. New guardian takes over on July 10th.     Next Steps: LUZ MARIA to follow for discharge.     AISSATOU Alexander, LGSW  LUZ MARIA Care Coordinator/ Med Surg [address] [place] [address]  [phone]

## 2025-07-08 LAB
ANION GAP SERPL CALCULATED.3IONS-SCNC: 11 MMOL/L (ref 7–15)
BACTERIA UR CULT: NORMAL
BUN SERPL-MCNC: 13.5 MG/DL (ref 6–20)
CALCIUM SERPL-MCNC: 8.9 MG/DL (ref 8.8–10.4)
CHLORIDE SERPL-SCNC: 98 MMOL/L (ref 98–107)
CREAT SERPL-MCNC: 0.81 MG/DL (ref 0.51–0.95)
EGFRCR SERPLBLD CKD-EPI 2021: 84 ML/MIN/1.73M2
GLUCOSE SERPL-MCNC: 113 MG/DL (ref 70–99)
HCO3 SERPL-SCNC: 25 MMOL/L (ref 22–29)
HGB BLD-MCNC: 12.2 G/DL (ref 11.7–15.7)
HGB BLD-MCNC: 12.6 G/DL (ref 11.7–15.7)
INR PPP: 1.57 (ref 0.85–1.15)
MCV RBC AUTO: 87 FL (ref 78–100)
MCV RBC AUTO: 88 FL (ref 78–100)
POTASSIUM SERPL-SCNC: 3.9 MMOL/L (ref 3.4–5.3)
PROTHROMBIN TIME: 18.7 SECONDS (ref 11.8–14.8)
SODIUM SERPL-SCNC: 134 MMOL/L (ref 135–145)

## 2025-07-08 PROCEDURE — 250N000013 HC RX MED GY IP 250 OP 250 PS 637: Performed by: HOSPITALIST

## 2025-07-08 PROCEDURE — 36415 COLL VENOUS BLD VENIPUNCTURE: CPT | Performed by: INTERNAL MEDICINE

## 2025-07-08 PROCEDURE — 99232 SBSQ HOSP IP/OBS MODERATE 35: CPT | Performed by: HOSPITALIST

## 2025-07-08 PROCEDURE — 85018 HEMOGLOBIN: CPT | Performed by: INTERNAL MEDICINE

## 2025-07-08 PROCEDURE — 80048 BASIC METABOLIC PNL TOTAL CA: CPT | Performed by: INTERNAL MEDICINE

## 2025-07-08 PROCEDURE — 85018 HEMOGLOBIN: CPT | Performed by: HOSPITALIST

## 2025-07-08 PROCEDURE — 250N000013 HC RX MED GY IP 250 OP 250 PS 637: Performed by: STUDENT IN AN ORGANIZED HEALTH CARE EDUCATION/TRAINING PROGRAM

## 2025-07-08 PROCEDURE — 250N000013 HC RX MED GY IP 250 OP 250 PS 637: Performed by: INTERNAL MEDICINE

## 2025-07-08 PROCEDURE — 36415 COLL VENOUS BLD VENIPUNCTURE: CPT | Performed by: HOSPITALIST

## 2025-07-08 PROCEDURE — 94640 AIRWAY INHALATION TREATMENT: CPT

## 2025-07-08 PROCEDURE — 85610 PROTHROMBIN TIME: CPT | Performed by: HOSPITALIST

## 2025-07-08 PROCEDURE — 120N000001 HC R&B MED SURG/OB

## 2025-07-08 PROCEDURE — 999N000157 HC STATISTIC RCP TIME EA 10 MIN

## 2025-07-08 RX ADMIN — CARBAMAZEPINE 400 MG: 200 TABLET ORAL at 20:49

## 2025-07-08 RX ADMIN — MICONAZOLE NITRATE: 20 POWDER TOPICAL at 08:22

## 2025-07-08 RX ADMIN — ATORVASTATIN CALCIUM 10 MG: 10 TABLET, FILM COATED ORAL at 21:19

## 2025-07-08 RX ADMIN — SENNOSIDES AND DOCUSATE SODIUM 2 TABLET: 50; 8.6 TABLET ORAL at 08:29

## 2025-07-08 RX ADMIN — ACETAMINOPHEN 650 MG: 325 TABLET ORAL at 08:21

## 2025-07-08 RX ADMIN — CARBAMAZEPINE 200 MG: 200 TABLET ORAL at 13:14

## 2025-07-08 RX ADMIN — CARBAMAZEPINE 400 MG: 200 TABLET ORAL at 08:21

## 2025-07-08 RX ADMIN — ACETAMINOPHEN 650 MG: 325 TABLET ORAL at 15:54

## 2025-07-08 RX ADMIN — LISINOPRIL 20 MG: 20 TABLET ORAL at 08:21

## 2025-07-08 RX ADMIN — PANTOPRAZOLE SODIUM 40 MG: 40 TABLET, DELAYED RELEASE ORAL at 05:31

## 2025-07-08 RX ADMIN — CIPROFLOXACIN 500 MG: 500 TABLET ORAL at 20:50

## 2025-07-08 RX ADMIN — MICONAZOLE NITRATE: 20 POWDER TOPICAL at 20:48

## 2025-07-08 RX ADMIN — CIPROFLOXACIN 500 MG: 500 TABLET ORAL at 08:21

## 2025-07-08 RX ADMIN — SIMETHICONE 80 MG: 80 TABLET, CHEWABLE ORAL at 16:56

## 2025-07-08 RX ADMIN — OXYCODONE HYDROCHLORIDE 2.5 MG: 5 TABLET ORAL at 15:54

## 2025-07-08 RX ADMIN — SERTRALINE 100 MG: 100 TABLET, FILM COATED ORAL at 08:21

## 2025-07-08 RX ADMIN — OXYCODONE HYDROCHLORIDE 2.5 MG: 5 TABLET ORAL at 08:21

## 2025-07-08 ASSESSMENT — ACTIVITIES OF DAILY LIVING (ADL)
ADLS_ACUITY_SCORE: 56
ADLS_ACUITY_SCORE: 52
ADLS_ACUITY_SCORE: 56
ADLS_ACUITY_SCORE: 56
ADLS_ACUITY_SCORE: 52
ADLS_ACUITY_SCORE: 56
ADLS_ACUITY_SCORE: 52
ADLS_ACUITY_SCORE: 56
ADLS_ACUITY_SCORE: 52

## 2025-07-08 NOTE — PLAN OF CARE
Aox4, VSS, on RA during the day and 2.5L NC at night, no tele.  PT started to have blood tinged urine yesterday, uro consulted.  PT will be NPO at midnight per uro in case of intervention in the AM.  PT pain treated with prn meds.  U/A pending.  Discharge TBD.    Goal Outcome Evaluation:      Plan of Care Reviewed With: patient    Overall Patient Progress: no changeOverall Patient Progress: no change    Outcome Evaluation: Hematuria cont, NPO at midnight for poss uro intervention, pain treated with prn medications, U/A pend      Problem: Adult Inpatient Plan of Care  Goal: Plan of Care Review  Description: The Plan of Care Review/Shift note should be completed every shift.  The Outcome Evaluation is a brief statement about your assessment that the patient is improving, declining, or no change.  This information will be displayed automatically on your shift  note.  Recent Flowsheet Documentation  Taken 7/8/2025 1755 by Verna Tracy RN  Outcome Evaluation: Hematuria cont, NPO at midnight for poss uro intervention, pain treated with prn medications, U/A pend  Plan of Care Reviewed With: patient  Overall Patient Progress: no change  Goal: Absence of Hospital-Acquired Illness or Injury  Intervention: Identify and Manage Fall Risk  Recent Flowsheet Documentation  Taken 7/8/2025 1620 by Verna Tarcy RN  Safety Promotion/Fall Prevention:   activity supervised   assistive device/personal items within reach   clutter free environment maintained   increased rounding and observation   increase visualization of patient   lighting adjusted  Intervention: Prevent Skin Injury  Recent Flowsheet Documentation  Taken 7/8/2025 1620 by Verna Tracy RN  Body Position: weight shifting     Problem: Adult Inpatient Plan of Care  Goal: Plan of Care Review  Description: The Plan of Care Review/Shift note should be completed every shift.  The Outcome Evaluation is a brief statement about your assessment that the patient is  "improving, declining, or no change.  This information will be displayed automatically on your shift  note.  Outcome: Progressing  Flowsheets (Taken 7/8/2025 1755)  Outcome Evaluation: Hematuria cont, NPO at midnight for poss uro intervention, pain treated with prn medications, U/A pend  Plan of Care Reviewed With: patient  Overall Patient Progress: no change  Goal: Patient-Specific Goal (Individualized)  Description: You can add care plan individualizations to a care plan. Examples of Individualization might be:  \"Parent requests to be called daily at 9am for status\", \"I have a hard time hearing out of my right ear\", or \"Do not touch me to wake me up as it startles  me\".  Outcome: Progressing  Goal: Absence of Hospital-Acquired Illness or Injury  Outcome: Progressing  Intervention: Identify and Manage Fall Risk  Recent Flowsheet Documentation  Taken 7/8/2025 1620 by Verna Tracy RN  Safety Promotion/Fall Prevention:   activity supervised   assistive device/personal items within reach   clutter free environment maintained   increased rounding and observation   increase visualization of patient   lighting adjusted  Intervention: Prevent Skin Injury  Recent Flowsheet Documentation  Taken 7/8/2025 1620 by Verna Tracy RN  Body Position: weight shifting  Goal: Optimal Comfort and Wellbeing  Outcome: Progressing  Goal: Readiness for Transition of Care  Outcome: Progressing     Problem: Comorbidity Management  Goal: Maintenance of Osteoarthritis Symptom Control  Intervention: Maintain Osteoarthritis Symptom Control  Recent Flowsheet Documentation  Taken 7/8/2025 1620 by Verna Tracy RN  Activity Management: activity adjusted per tolerance     Problem: Comorbidity Management  Goal: Maintenance of Osteoarthritis Symptom Control  Intervention: Maintain Osteoarthritis Symptom Control  Recent Flowsheet Documentation  Taken 7/8/2025 1620 by Verna Tracy RN  Activity Management: activity adjusted per " tolerance

## 2025-07-08 NOTE — PLAN OF CARE
Goal Outcome Evaluation:       A&Ox4. VSS. 2 L of oxygen via NC. Urine is bloody. Purewick in place. SBA. CT abdomen results pending.             Problem: Gas Exchange Impaired  Goal: Optimal Gas Exchange  Outcome: Adequate for Care Transition     Problem: Comorbidity Management  Goal: Maintenance of Asthma Control  Outcome: Adequate for Care Transition  Intervention: Maintain Asthma Symptom Control  Recent Flowsheet Documentation  Taken 7/7/2025 2125 by Samantha Thompson RN  Medication Review/Management: medications reviewed  Goal: Maintenance of Behavioral Health Symptom Control  Outcome: Adequate for Care Transition  Intervention: Maintain Behavioral Health Symptom Control  Recent Flowsheet Documentation  Taken 7/7/2025 2125 by Samantha Thompson RN  Medication Review/Management: medications reviewed  Goal: Maintenance of COPD Symptom Control  Outcome: Adequate for Care Transition  Intervention: Maintain COPD Symptom Control  Recent Flowsheet Documentation  Taken 7/7/2025 2125 by Samantha Thompson RN  Medication Review/Management: medications reviewed  Goal: Blood Glucose Levels Within Targeted Range  Outcome: Adequate for Care Transition  Intervention: Monitor and Manage Glycemia  Recent Flowsheet Documentation  Taken 7/7/2025 2125 by Samantha Thompson RN  Medication Review/Management: medications reviewed  Goal: Maintenance of Heart Failure Symptom Control  Outcome: Adequate for Care Transition  Intervention: Maintain Heart Failure Management  Recent Flowsheet Documentation  Taken 7/7/2025 2125 by Samantha Thompson RN  Medication Review/Management: medications reviewed  Goal: Blood Pressure in Desired Range  Outcome: Adequate for Care Transition  Intervention: Maintain Blood Pressure Management  Recent Flowsheet Documentation  Taken 7/7/2025 2125 by Samantha Thompson RN  Medication Review/Management: medications reviewed  Goal: Maintenance of Osteoarthritis Symptom Control  Outcome: Adequate for Care  Transition  Intervention: Maintain Osteoarthritis Symptom Control  Recent Flowsheet Documentation  Taken 7/7/2025 2125 by Samantha Thompson RN  Medication Review/Management: medications reviewed  Goal: Bariatric Home Regimen Maintained  Outcome: Adequate for Care Transition  Intervention: Maintain and Manage Postbariatric Surgery Care  Recent Flowsheet Documentation  Taken 7/7/2025 2125 by Samantha Thompson RN  Medication Review/Management: medications reviewed  Goal: Maintenance of Seizure Control  Outcome: Adequate for Care Transition  Intervention: Maintain Seizure Symptom Control  Recent Flowsheet Documentation  Taken 7/7/2025 2125 by Samantha Thompson RN  Medication Review/Management: medications reviewed
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
## 2025-07-08 NOTE — PLAN OF CARE
A/Ox4. Denied pain, N/V, SOB. Urine red due to hematuria. UC pending. CT abd completed. Purewick in place. 2.5L NC at HS. Reg diet. Redness on folds. SBA. Pending guarding guardian assign and placement.      Problem: Adult Inpatient Plan of Care  Goal: Absence of Hospital-Acquired Illness or Injury  Intervention: Identify and Manage Fall Risk  Recent Flowsheet Documentation  Taken 7/7/2025 2326 by Gunjan Blanco RN  Safety Promotion/Fall Prevention:   activity supervised   safety round/check completed   supervised activity   nonskid shoes/slippers when out of bed   patient and family education  Intervention: Prevent Skin Injury  Recent Flowsheet Documentation  Taken 7/8/2025 0530 by Gunjan Blanco RN  Body Position: position changed independently  Taken 7/7/2025 2326 by Gunjan Blanco RN  Body Position: position changed independently  Skin Protection:   adhesive use limited   tubing/devices free from skin contact   incontinence pads utilized  Intervention: Prevent and Manage VTE (Venous Thromboembolism) Risk  Recent Flowsheet Documentation  Taken 7/7/2025 2326 by Gunjan Blanco RN  VTE Prevention/Management:   SCDs off (sequential compression devices)   patient refused intervention  Intervention: Prevent Infection  Recent Flowsheet Documentation  Taken 7/7/2025 2326 by Gunjan Blanco RN  Infection Prevention:   hand hygiene promoted   rest/sleep promoted   single patient room provided  Goal: Optimal Comfort and Wellbeing  Intervention: Monitor Pain and Promote Comfort  Recent Flowsheet Documentation  Taken 7/7/2025 2326 by Gunjan Blanco RN  Pain Management Interventions: pain management plan reviewed with patient/caregiver     Problem: Comorbidity Management  Goal: Maintenance of Asthma Control  Intervention: Maintain Asthma Symptom Control  Recent Flowsheet Documentation  Taken 7/7/2025 2326 by Gunjan Blanco RN  Medication Review/Management: medications reviewed  Goal: Blood Pressure in Desired  Range  Intervention: Maintain Blood Pressure Management  Recent Flowsheet Documentation  Taken 7/7/2025 2326 by Gunjan Blanco RN  Medication Review/Management: medications reviewed     Problem: Gas Exchange Impaired  Goal: Optimal Gas Exchange  Intervention: Optimize Oxygenation and Ventilation  Recent Flowsheet Documentation  Taken 7/8/2025 0530 by Gunjan Blanco RN  Head of Bed (HOB) Positioning: HOB at 20-30 degrees  Taken 7/7/2025 2326 by Gunjan Blanco RN  Airway/Ventilation Management: airway patency maintained  Head of Bed (HOB) Positioning: HOB at 20-30 degrees     Problem: Gas Exchange Impaired  Goal: Optimal Gas Exchange  Intervention: Optimize Oxygenation and Ventilation  Recent Flowsheet Documentation  Taken 7/8/2025 0530 by Gunjan Blanco RN  Head of Bed (HOB) Positioning: HOB at 20-30 degrees  Taken 7/7/2025 2326 by Gunjan Blanco RN  Airway/Ventilation Management: airway patency maintained  Head of Bed (HOB) Positioning: HOB at 20-30 degrees     Problem: Comorbidity Management  Goal: Maintenance of Asthma Control  Intervention: Maintain Asthma Symptom Control  Recent Flowsheet Documentation  Taken 7/7/2025 2326 by Gunjan Blanco RN  Medication Review/Management: medications reviewed  Goal: Maintenance of Behavioral Health Symptom Control  Intervention: Maintain Behavioral Health Symptom Control  Recent Flowsheet Documentation  Taken 7/7/2025 2326 by Gunjan Blanco RN  Medication Review/Management: medications reviewed  Goal: Maintenance of COPD Symptom Control  Intervention: Maintain COPD Symptom Control  Recent Flowsheet Documentation  Taken 7/7/2025 2326 by Gunjan Blanco RN  Breathing Techniques/Airway Clearance: deep/controlled cough encouraged  Medication Review/Management: medications reviewed  Goal: Blood Glucose Levels Within Targeted Range  Intervention: Monitor and Manage Glycemia  Recent Flowsheet Documentation  Taken 7/7/2025 2326 by Gunjan Blanco RN  Medication  Review/Management: medications reviewed  Goal: Maintenance of Heart Failure Symptom Control  Intervention: Maintain Heart Failure Management  Recent Flowsheet Documentation  Taken 7/7/2025 2326 by Gunjan Blanco RN  Medication Review/Management: medications reviewed  Goal: Blood Pressure in Desired Range  Intervention: Maintain Blood Pressure Management  Recent Flowsheet Documentation  Taken 7/7/2025 2326 by Gunjan Blanco RN  Medication Review/Management: medications reviewed  Goal: Maintenance of Osteoarthritis Symptom Control  Intervention: Maintain Osteoarthritis Symptom Control  Recent Flowsheet Documentation  Taken 7/8/2025 0530 by Gunjan Blanco RN  Assistive Device Utilized:   walker   gait belt  Activity Management: activity adjusted per tolerance  Taken 7/7/2025 2326 by Gunjan Blanco RN  Assistive Device Utilized:   walker   gait belt  Activity Management: activity adjusted per tolerance  Medication Review/Management: medications reviewed  Goal: Bariatric Home Regimen Maintained  Intervention: Maintain and Manage Postbariatric Surgery Care  Recent Flowsheet Documentation  Taken 7/7/2025 2326 by Gunjan Blanco RN  Medication Review/Management: medications reviewed  Goal: Maintenance of Seizure Control  Intervention: Maintain Seizure Symptom Control  Recent Flowsheet Documentation  Taken 7/7/2025 2326 by Gunjan Blanco RN  Medication Review/Management: medications reviewed     Problem: Adult Inpatient Plan of Care  Goal: Absence of Hospital-Acquired Illness or Injury  Intervention: Identify and Manage Fall Risk  Recent Flowsheet Documentation  Taken 7/7/2025 2326 by Gunjan Blanco RN  Safety Promotion/Fall Prevention:   activity supervised   safety round/check completed   supervised activity   nonskid shoes/slippers when out of bed   patient and family education  Intervention: Prevent Skin Injury  Recent Flowsheet Documentation  Taken 7/8/2025 0530 by Gunjan Blanco RN  Body Position:  position changed independently  Taken 7/7/2025 2326 by Gunjan Blanco RN  Body Position: position changed independently  Skin Protection:   adhesive use limited   tubing/devices free from skin contact   incontinence pads utilized  Intervention: Prevent and Manage VTE (Venous Thromboembolism) Risk  Recent Flowsheet Documentation  Taken 7/7/2025 2326 by Gunjan Blanco RN  VTE Prevention/Management:   SCDs off (sequential compression devices)   patient refused intervention  Intervention: Prevent Infection  Recent Flowsheet Documentation  Taken 7/7/2025 2326 by Gunjan Blanco RN  Infection Prevention:   hand hygiene promoted   rest/sleep promoted   single patient room provided  Goal: Optimal Comfort and Wellbeing  Intervention: Monitor Pain and Promote Comfort  Recent Flowsheet Documentation  Taken 7/7/2025 2326 by Gunjan Blanco RN  Pain Management Interventions: pain management plan reviewed with patient/caregiver     Problem: Comorbidity Management  Goal: Maintenance of Asthma Control  Intervention: Maintain Asthma Symptom Control  Recent Flowsheet Documentation  Taken 7/7/2025 2326 by Gunjan Blanco RN  Medication Review/Management: medications reviewed  Goal: Maintenance of Behavioral Health Symptom Control  Intervention: Maintain Behavioral Health Symptom Control  Recent Flowsheet Documentation  Taken 7/7/2025 2326 by Gunjan Blanco RN  Medication Review/Management: medications reviewed  Goal: Maintenance of COPD Symptom Control  Intervention: Maintain COPD Symptom Control  Recent Flowsheet Documentation  Taken 7/7/2025 2326 by Gunjan Blanco RN  Breathing Techniques/Airway Clearance: deep/controlled cough encouraged  Medication Review/Management: medications reviewed  Goal: Blood Glucose Levels Within Targeted Range  Intervention: Monitor and Manage Glycemia  Recent Flowsheet Documentation  Taken 7/7/2025 2326 by Gunjan Blanco RN  Medication Review/Management: medications reviewed  Goal:  Maintenance of Heart Failure Symptom Control  Intervention: Maintain Heart Failure Management  Recent Flowsheet Documentation  Taken 7/7/2025 2326 by Gunjan Blanco RN  Medication Review/Management: medications reviewed  Goal: Blood Pressure in Desired Range  Intervention: Maintain Blood Pressure Management  Recent Flowsheet Documentation  Taken 7/7/2025 2326 by Gunjan Blanco RN  Medication Review/Management: medications reviewed  Goal: Maintenance of Osteoarthritis Symptom Control  Intervention: Maintain Osteoarthritis Symptom Control  Recent Flowsheet Documentation  Taken 7/8/2025 0530 by Gunjan Blanco RN  Assistive Device Utilized:   walker   gait belt  Activity Management: activity adjusted per tolerance  Taken 7/7/2025 2326 by Gunjan Blanco RN  Assistive Device Utilized:   walker   gait belt  Activity Management: activity adjusted per tolerance  Medication Review/Management: medications reviewed  Goal: Bariatric Home Regimen Maintained  Intervention: Maintain and Manage Postbariatric Surgery Care  Recent Flowsheet Documentation  Taken 7/7/2025 2326 by Gunjan Blanco RN  Medication Review/Management: medications reviewed  Goal: Maintenance of Seizure Control  Intervention: Maintain Seizure Symptom Control  Recent Flowsheet Documentation  Taken 7/7/2025 2326 by Gunjan Blanco RN  Medication Review/Management: medications reviewed     Problem: Gas Exchange Impaired  Goal: Optimal Gas Exchange  Intervention: Optimize Oxygenation and Ventilation  Recent Flowsheet Documentation  Taken 7/8/2025 0530 by Gunjan Blanco RN  Head of Bed (HOB) Positioning: HOB at 20-30 degrees  Taken 7/7/2025 2326 by Gunjan Blanco RN  Airway/Ventilation Management: airway patency maintained  Head of Bed (HOB) Positioning: HOB at 20-30 degrees     Problem: Gas Exchange Impaired  Goal: Optimal Gas Exchange  Intervention: Optimize Oxygenation and Ventilation  Recent Flowsheet Documentation  Taken 7/8/2025 0530 by Bella  PAULA Hollins  Head of Bed (HOB) Positioning: HOB at 20-30 degrees  Taken 7/7/2025 2326 by Gunjan Blanco RN  Airway/Ventilation Management: airway patency maintained  Head of Bed (HOB) Positioning: HOB at 20-30 degrees     Problem: Comorbidity Management  Goal: Maintenance of Asthma Control  Intervention: Maintain Asthma Symptom Control  Recent Flowsheet Documentation  Taken 7/7/2025 2326 by Gunjan Blanco RN  Medication Review/Management: medications reviewed  Goal: Maintenance of Behavioral Health Symptom Control  Intervention: Maintain Behavioral Health Symptom Control  Recent Flowsheet Documentation  Taken 7/7/2025 2326 by Gunjan Blanco RN  Medication Review/Management: medications reviewed  Goal: Maintenance of COPD Symptom Control  Intervention: Maintain COPD Symptom Control  Recent Flowsheet Documentation  Taken 7/7/2025 2326 by Gunjan Blanco RN  Breathing Techniques/Airway Clearance: deep/controlled cough encouraged  Medication Review/Management: medications reviewed  Goal: Blood Glucose Levels Within Targeted Range  Intervention: Monitor and Manage Glycemia  Recent Flowsheet Documentation  Taken 7/7/2025 2326 by Gunjan Blanco RN  Medication Review/Management: medications reviewed  Goal: Maintenance of Heart Failure Symptom Control  Intervention: Maintain Heart Failure Management  Recent Flowsheet Documentation  Taken 7/7/2025 2326 by Gunjan Blanco RN  Medication Review/Management: medications reviewed  Goal: Blood Pressure in Desired Range  Intervention: Maintain Blood Pressure Management  Recent Flowsheet Documentation  Taken 7/7/2025 2326 by Gunjan Blanco RN  Medication Review/Management: medications reviewed  Goal: Maintenance of Osteoarthritis Symptom Control  Intervention: Maintain Osteoarthritis Symptom Control  Recent Flowsheet Documentation  Taken 7/8/2025 0530 by Gunjan Blanco RN  Assistive Device Utilized:   walker   gait belt  Activity Management: activity adjusted per  tolerance  Taken 7/7/2025 2326 by Gunjan Blnaco RN  Assistive Device Utilized:   walker   gait belt  Activity Management: activity adjusted per tolerance  Medication Review/Management: medications reviewed  Goal: Bariatric Home Regimen Maintained  Intervention: Maintain and Manage Postbariatric Surgery Care  Recent Flowsheet Documentation  Taken 7/7/2025 2326 by Gunjan Blanco RN  Medication Review/Management: medications reviewed  Goal: Maintenance of Seizure Control  Intervention: Maintain Seizure Symptom Control  Recent Flowsheet Documentation  Taken 7/7/2025 2326 by Gunjan Blanco RN  Medication Review/Management: medications reviewed

## 2025-07-08 NOTE — PROGRESS NOTES
[facility]    Hospitalist Progress Note             Date of Admission:  6/25/2025                   Day of hospitalization: 13    Assessment and Plan:   Summary of Stay: Brissa Corado is a 60 year old female admitted on 6/25/2025 with past medical history of asthma on chronic oxygen at night, obstructive sleep apnea, morbid obesity, hypertension, seizure disorder who presents with asthma exacerbation 2/2 poor air quality and not using/having access to her LABA/ICS.     Patient's acute on chronic hypoxic respiratory failure has resolved.  Patient has remained in the hospital for appropriate disposition and placement     7/7: Patient developed hematuria CT abdomen pelvis noncontrast obtained no evidence of nephrolithiasis    Plan for today:  Urology consult in place  N.p.o. for midnight case procedure needed in the morning, if hematuria progressively worsened  Hold Coumadin     Gross hematuria  Possible UTI  - developed spontaneously 7/7  - hgb normal  - Possible UTI started on ciprofloxacin     Acute on chronic hypoxic respiratory failure-resolved  Asthma exacerbation  RICK   At baseline pt is 2.5L at night and during the day as needed.    CTPA negative for PE.  Patient states her triggers been poor air quality and she has not been using her controller LABA/ICS.  Has not had a sleep apnea machine at home but reports that she has an appointment coming up in the next 2 weeks, even though she is diagnosed is not tolerating it due to claustrophobic feelings.  - Wheezing resolved, DuoNeb every 4 hours as needed  - CPAP ordered  - Continue Breo Ellipta control inhaler     History of developmental delay  SW Consultation   -father who is legal guardian states he received paperwork from the [facility] stating that patient is not to be able to return to her own residence and requires to be moved to a care facility to ensure she gets her medications as ordered and prescribed.    - consulted  "and following.   - Per  note patient will be getting appointed a new legal guardian in July.   - Discharge patient will go to a new jail.  His significant other cannot go with her.     H/o DVT   - Pharmacy warfarin panel ordered.    - dose held 7/6 and 7/7 due to hematuria     Seizure disorder -continue Tegretol     Hypertension -continue lisinopril     Hyperlipidemia - continue statin     Gerd - continue prilosec     Super morbid obesity  Intertriginous excoriations and wounds in multiple skin folds  WOC following   - Start patient on topical azole therapy with ketoconazole 2% twice daily--was switched to miconazole powder per patient's request  - General Wound cares per bedside RN and WOC cares weekly        # Code status: Full  # DVT: SCD  # IVF:  none          Medically Ready for Discharge: Anticipated Tomorrow                    Aimee Cyr MD    Subjective   Chief Complaint:  Abdominal pain, hematuria  Subjective: Continues to have hematuria abdominal pain resolved otherwise no other complaint of nausea vomiting or shortness of breath       Objective   /73 (BP Location: Right arm)   Pulse 69   Temp 97.7  F (36.5  C) (Oral)   Resp 16   Ht 1.702 m (5' 7\")   Wt (!) 175.4 kg (386 lb 11 oz)   LMP 08/18/2008   SpO2 99%   BMI 60.56 kg/m       Physical Exam  General: Pt in NAD, normal appearance  HEENT: OP clear MMM, no JVD  Lungs: Clear to Auscultation Bilateral, normal breathing  without accessory muscle usage, no wheezing, rhonchi or crackles  Cardiac: +S1, S2, RRR, no MRG, no edema  Abdominal: normal bowel sounds, NT/ND  Skin: warm, dry, normal turgor, no rash  Psyche: A& O x3, appropriate affect             Intake/Output Summary (Last 24 hours) at 7/8/2025 1315  Last data filed at 7/8/2025 0533  Gross per 24 hour   Intake 960 ml   Output 900 ml   Net 60 ml           Labs and Imaging Results:      Recent Labs   Lab 07/08/25  0700 07/06/25  1856   HGB 12.6 12.3        Recent Labs   Lab " "07/08/25  0700   *   CO2 25   BUN 13.5        Recent Labs   Lab 07/08/25  0837 07/07/25  0805   INR 1.57* 2.51*      No results for input(s): \"CKMB\" in the last 168 hours.    Invalid input(s): \"TROPONINT\"   No results for input(s): \"ALBUMIN\", \"AST\", \"ALT\", \"ALKPHOS\", \"BILITOT\" in the last 168 hours.     Micro:     Radio:  CT Abdomen Pelvis w/o Contrast   Final Result   IMPRESSION:    1.  No urinary tract calculi or obstruction.      2.  Interval cholecystectomy. Hepatosplenomegaly without discrete lesion. No abdominopelvic suspicious adenopathy or free fluid.      3.  IVC filter, unchanged.      4.  Postoperative changes of plate and screw fixation at the symphysis pubis. Screw fixation left SI joint. Mild left convex thoracolumbar curve. Mild degenerative changes involving the spine and joints of the pelvis. Resolved inflammatory changes    involving the pannus seen previously. Small fat-containing ventral umbilical hernia.         CT Chest Pulmonary Embolism w Contrast   Final Result   IMPRESSION:   1.  Subtle areas of bronchial wall thickening with areas of air trapping throughout both lungs, suggestive of small airways disease.   2.  No pulmonary embolus.              Medications:      Scheduled Meds:    Current Facility-Administered Medications   Medication Dose Route Frequency Provider Last Rate Last Admin    atorvastatin (LIPITOR) tablet 10 mg  10 mg Oral At Bedtime David Hull MD   10 mg at 07/07/25 2148    carBAMazepine (TEGretol) tablet 200 mg  200 mg Oral Q24H David Hull MD   200 mg at 07/08/25 1314    carBAMazepine (TEGretol) tablet 400 mg  400 mg Oral BID David Hull MD   400 mg at 07/08/25 0821    ciprofloxacin (CIPRO) tablet 500 mg  500 mg Oral BID Neeraj Alvarado MD   500 mg at 07/08/25 0821    fluticasone (FLONASE) 50 MCG/ACT spray 1 spray  1 spray Both Nostrils Daily Neeraj Alvarado MD   1 spray at 07/07/25 1210    fluticasone-vilanterol (BREO ELLIPTA) 100-25 " MCG/ACT inhaler 1 puff  1 puff Inhalation Daily David Hull MD   1 puff at 07/07/25 0928    lisinopril (ZESTRIL) tablet 20 mg  20 mg Oral Daily David Hull MD   20 mg at 07/08/25 0821    miconazole (MICATIN) 2 % powder   Topical BID Andreea Mcdonald MD   Given at 07/08/25 0822    pantoprazole (PROTONIX) EC tablet 40 mg  40 mg Oral QAM AC David Hull MD   40 mg at 07/08/25 0531    sertraline (ZOLOFT) tablet 100 mg  100 mg Oral Daily Neeraj Alvarado MD   100 mg at 07/08/25 0821    sodium chloride (PF) 0.9% PF flush 3 mL  3 mL Intracatheter Q8H NIC David Hull MD   3 mL at 07/08/25 0531    Warfarin Dose Required Daily - Pharmacist Managed  1 each Oral See Admin Instructions David Hull MD         Continuous Infusions:    Current Facility-Administered Medications   Medication Dose Route Frequency Provider Last Rate Last Admin    Patient is already receiving anticoagulation with heparin, enoxaparin (LOVENOX), warfarin (COUMADIN)  or other anticoagulant medication   Does not apply Continuous PRN David Hull MD         PRN Meds:    Current Facility-Administered Medications   Medication Dose Route Frequency Provider Last Rate Last Admin    acetaminophen (TYLENOL) tablet 650 mg  650 mg Oral Q4H PRN David Hull MD   650 mg at 07/08/25 0821    Or    acetaminophen (TYLENOL) Suppository 650 mg  650 mg Rectal Q4H PRN David Hull MD        albuterol (PROVENTIL HFA/VENTOLIN HFA) inhaler  2 puff Inhalation Q6H PRN Neeraj Alvarado MD        albuterol (PROVENTIL) neb solution 2.5 mg  2.5 mg Nebulization Q2H PRN David Hull MD   2.5 mg at 06/26/25 0416    calcium carbonate (TUMS) chewable tablet 1,000 mg  1,000 mg Oral 4x Daily PRN David Hull MD   1,000 mg at 07/04/25 1616    ipratropium - albuterol 0.5 mg/2.5 mg (3mg)/3 mL (DUONEB) neb solution 3 mL  3 mL Nebulization Q4H PRN Andreea Mcdonald MD        lidocaine (LMX4) cream   Topical Q1H PRN David Hull,  MD        lidocaine 1 % 0.1-1 mL  0.1-1 mL Other Q1H PRN David Hull MD        melatonin tablet 5 mg  5 mg Oral At Bedtime PRN David Hull MD        naloxone (NARCAN) injection 0.2 mg  0.2 mg Intravenous Q2 Min PRN David Hull MD        Or    naloxone (NARCAN) injection 0.4 mg  0.4 mg Intravenous Q2 Min PRN David Hull MD        Or    naloxone (NARCAN) injection 0.2 mg  0.2 mg Intramuscular Q2 Min PRN David Hull MD        Or    naloxone (NARCAN) injection 0.4 mg  0.4 mg Intramuscular Q2 Min PRN David Hull MD        ondansetron (ZOFRAN ODT) ODT tab 4 mg  4 mg Oral Q6H PRN David Hull MD   4 mg at 07/07/25 0930    Or    ondansetron (ZOFRAN) injection 4 mg  4 mg Intravenous Q6H PRN David Hull MD   4 mg at 07/07/25 1455    oxyCODONE IR (ROXICODONE) half-tab 2.5 mg  2.5 mg Oral Q4H PRN Kaia Evans DO   2.5 mg at 07/08/25 0821    Patient is already receiving anticoagulation with heparin, enoxaparin (LOVENOX), warfarin (COUMADIN)  or other anticoagulant medication   Does not apply Continuous PRN David Hull MD        senna-docusate (SENOKOT-S/PERICOLACE) 8.6-50 MG per tablet 1 tablet  1 tablet Oral BID PRN David Hull MD   1 tablet at 07/05/25 1426    Or    senna-docusate (SENOKOT-S/PERICOLACE) 8.6-50 MG per tablet 2 tablet  2 tablet Oral BID PRN David Hull MD   2 tablet at 07/08/25 0829    simethicone (MYLICON) chewable tablet 80 mg  80 mg Oral Q6H PRN Neeraj Reed MD   80 mg at 07/07/25 1455    sodium chloride (PF) 0.9% PF flush 3 mL  3 mL Intracatheter q1 min prn David Hull MD

## 2025-07-08 NOTE — PROGRESS NOTES
SPIRITUAL HEALTH SERVICES - Progress Note  [facility] Med/Surg 3    Referral Source: Assess pt's emotional/spiritual resources and needs per her length of stay.     Oriented pt [place] to [facility].  She expressed no needs at this time.  [place] named her spouse and her adopted father as being core to her support network.  Hindu or spirituality does not play a significant role in her life.  [place] enjoys coloring on her phone and playing with her two guinea pigs.      Plan: Informed pt how she can request further  support.  This author and other chaplains remain available per pt/family request.     Gee Bender M.Div., [facility]  Staff     SHS available 24/7 for emergent requests/referrals, either by paging the on-call  or by entering an ASAP/STAT consult in [facility], which will also page the on-call .

## 2025-07-08 NOTE — PROGRESS NOTES
Care Management Follow Up    Length of Stay (days): 13    Expected Discharge Date: 07/14/2025     Concerns to be Addressed: discharge planning     Patient plan of care discussed at interdisciplinary rounds: Yes    Anticipated Discharge Disposition:                Anticipated Discharge Services:    Anticipated Discharge DME:      Patient/family educated on Medicare website which has current facility and service quality ratings:    Education Provided on the Discharge Plan:    Patient/Family in Agreement with the Plan:      Referrals Placed by CM/SW:    Private pay costs discussed: Not applicable    Discussed  Partnership in Safe Discharge Planning  document with patient/family: No     Handoff Completed: Yes, [facility] PCP: Internal handoff referral completed    Additional Information:  LUZ MARIA received phone call from pt's , Treva ([phone]), who was wanting an update on discharge plan and how pt is currently doing. Update provided to Treva.     LUZ MARIA spoke with Edna with [facility] ([phone]) regarding pt. Edna stated that the pt's parents are the ones responsible for getting pt set up and to a safe residence.     LUZ MARIA spoke with pt's parents/guardians and provided update on conversation with Edna. Pt's parents receptive.     Next Steps: LUZ MARIA to follow for discharge.     AISSATOU Alexander, LGSW  [facility] Care Coordinator/ Med Surg [address] [place] [address]  [phone]

## 2025-07-08 NOTE — PLAN OF CARE
"Shift: 2226-5561  A&Ox4  VSS on RA  Takes pills whole with water   Complains of pain in the lower abdomen. Tylenol and oxycodone given, see MAR  Activity: SBA, rolls in bed well. Denied offer to get in chair  Elimination: purewick in place (urine red)  Consults: urology for hematuria, NPO at midnight        Goal Outcome Evaluation:      Plan of Care Reviewed With: patient, parent    Overall Patient Progress: no changeOverall Patient Progress: no change    Outcome Evaluation: hematuria continues, urology consult, NPO at midnight          Problem: Adult Inpatient Plan of Care  Goal: Plan of Care Review  Description: The Plan of Care Review/Shift note should be completed every shift.  The Outcome Evaluation is a brief statement about your assessment that the patient is improving, declining, or no change.  This information will be displayed automatically on your shift  note.  Outcome: Progressing  Flowsheets (Taken 7/8/2025 9404)  Outcome Evaluation: hematuria continues, urology consult, NPO at midnight  Plan of Care Reviewed With:   patient   parent  Overall Patient Progress: no change  Goal: Patient-Specific Goal (Individualized)  Description: You can add care plan individualizations to a care plan. Examples of Individualization might be:  \"Parent requests to be called daily at 9am for status\", \"I have a hard time hearing out of my right ear\", or \"Do not touch me to wake me up as it startles  me\".  Outcome: Progressing  Goal: Absence of Hospital-Acquired Illness or Injury  Outcome: Progressing  Intervention: Identify and Manage Fall Risk  Recent Flowsheet Documentation  Taken 7/8/2025 0817 by Rafia Ponce, RN  Safety Promotion/Fall Prevention:   activity supervised   assistive device/personal items within reach   clutter free environment maintained   increased rounding and observation   increase visualization of patient   lighting adjusted  Taken 7/8/2025 0740 by Rafia Ponce, RN  Safety Promotion/Fall " Prevention: safety round/check completed  Intervention: Prevent Skin Injury  Recent Flowsheet Documentation  Taken 7/8/2025 1318 by Rafia Ponce, RN  Body Position:   log-rolled   weight shifting   side-lying   right  Taken 7/8/2025 0817 by Rafia Ponce, RN  Body Position: (boosted self) weight shifting  Goal: Optimal Comfort and Wellbeing  Outcome: Progressing  Intervention: Monitor Pain and Promote Comfort  Recent Flowsheet Documentation  Taken 7/8/2025 0817 by Rafia Ponce RN  Pain Management Interventions: medication (see MAR)  Goal: Readiness for Transition of Care  Outcome: Progressing     Problem: Gas Exchange Impaired  Goal: Optimal Gas Exchange  Outcome: Progressing  Intervention: Optimize Oxygenation and Ventilation  Recent Flowsheet Documentation  Taken 7/8/2025 1318 by Rafia Ponce, RN  Head of Bed (HOB) Positioning: HOB flat  Taken 7/8/2025 0740 by Rafia Ponce, RN  Head of Bed (HOB) Positioning: HOB at 45 degrees

## 2025-07-09 LAB
HGB BLD-MCNC: 11.6 G/DL (ref 11.7–15.7)
HGB BLD-MCNC: 11.9 G/DL (ref 11.7–15.7)
INR PPP: 1.33 (ref 0.85–1.15)
MCV RBC AUTO: 87 FL (ref 78–100)
MCV RBC AUTO: 87 FL (ref 78–100)
PROTHROMBIN TIME: 16.5 SECONDS (ref 11.8–14.8)

## 2025-07-09 PROCEDURE — 250N000013 HC RX MED GY IP 250 OP 250 PS 637: Performed by: INTERNAL MEDICINE

## 2025-07-09 PROCEDURE — 99232 SBSQ HOSP IP/OBS MODERATE 35: CPT | Performed by: HOSPITALIST

## 2025-07-09 PROCEDURE — 250N000013 HC RX MED GY IP 250 OP 250 PS 637: Performed by: HOSPITALIST

## 2025-07-09 PROCEDURE — 85610 PROTHROMBIN TIME: CPT | Performed by: HOSPITALIST

## 2025-07-09 PROCEDURE — G0463 HOSPITAL OUTPT CLINIC VISIT: HCPCS

## 2025-07-09 PROCEDURE — 250N000013 HC RX MED GY IP 250 OP 250 PS 637: Performed by: STUDENT IN AN ORGANIZED HEALTH CARE EDUCATION/TRAINING PROGRAM

## 2025-07-09 PROCEDURE — 36415 COLL VENOUS BLD VENIPUNCTURE: CPT | Performed by: HOSPITALIST

## 2025-07-09 PROCEDURE — 99222 1ST HOSP IP/OBS MODERATE 55: CPT | Mod: FS | Performed by: UROLOGY

## 2025-07-09 PROCEDURE — 85018 HEMOGLOBIN: CPT | Performed by: HOSPITALIST

## 2025-07-09 PROCEDURE — 120N000001 HC R&B MED SURG/OB

## 2025-07-09 RX ADMIN — CIPROFLOXACIN 500 MG: 500 TABLET ORAL at 21:51

## 2025-07-09 RX ADMIN — ATORVASTATIN CALCIUM 10 MG: 10 TABLET, FILM COATED ORAL at 21:51

## 2025-07-09 RX ADMIN — OXYCODONE HYDROCHLORIDE 2.5 MG: 5 TABLET ORAL at 11:47

## 2025-07-09 RX ADMIN — CARBAMAZEPINE 400 MG: 200 TABLET ORAL at 08:13

## 2025-07-09 RX ADMIN — MICONAZOLE NITRATE: 20 POWDER TOPICAL at 21:59

## 2025-07-09 RX ADMIN — ACETAMINOPHEN 650 MG: 325 TABLET ORAL at 11:25

## 2025-07-09 RX ADMIN — MICONAZOLE NITRATE: 20 POWDER TOPICAL at 08:17

## 2025-07-09 RX ADMIN — FLUTICASONE PROPIONATE 1 SPRAY: 50 SPRAY, METERED NASAL at 08:15

## 2025-07-09 RX ADMIN — LISINOPRIL 20 MG: 20 TABLET ORAL at 08:13

## 2025-07-09 RX ADMIN — OXYCODONE HYDROCHLORIDE 2.5 MG: 5 TABLET ORAL at 22:46

## 2025-07-09 RX ADMIN — CARBAMAZEPINE 200 MG: 200 TABLET ORAL at 11:28

## 2025-07-09 RX ADMIN — SERTRALINE 100 MG: 100 TABLET, FILM COATED ORAL at 08:13

## 2025-07-09 RX ADMIN — FLUTICASONE FUROATE AND VILANTEROL TRIFENATATE 1 PUFF: 100; 25 POWDER RESPIRATORY (INHALATION) at 08:14

## 2025-07-09 RX ADMIN — CIPROFLOXACIN 500 MG: 500 TABLET ORAL at 08:12

## 2025-07-09 RX ADMIN — CARBAMAZEPINE 400 MG: 200 TABLET ORAL at 21:51

## 2025-07-09 RX ADMIN — PANTOPRAZOLE SODIUM 40 MG: 40 TABLET, DELAYED RELEASE ORAL at 06:46

## 2025-07-09 RX ADMIN — ACETAMINOPHEN 650 MG: 325 TABLET ORAL at 17:26

## 2025-07-09 ASSESSMENT — ACTIVITIES OF DAILY LIVING (ADL)
ADLS_ACUITY_SCORE: 52

## 2025-07-09 NOTE — PLAN OF CARE
"Shift Summary 1900 - 0700  Pt is alert and oriented x4, has some cognitive delay. POA - Parents. Up w assist of 1 w GB and a walker. Uses 2LPM o2 at night - baseline. NPO at midnight for a possible Urology intervention; for hematuria. Bladder scans negative overnight. On a Purewick - urine is red, UA sent yesterday. On PO Ciprofloxacin for a possible UTI. CT abdo pelvis -ve for hydronephosis. Miconazole Powder applied to skin folds - under the breasts and abdominal folds. Has an open area at the pannus. WOC following. For BENJAMIN placement on discharge.    Problem: Adult Inpatient Plan of Care  Goal: Plan of Care Review  Description: The Plan of Care Review/Shift note should be completed every shift.  The Outcome Evaluation is a brief statement about your assessment that the patient is improving, declining, or no change.  This information will be displayed automatically on your shift  note.  Outcome: Progressing  Flowsheets (Taken 7/9/2025 0324)  Outcome Evaluation: Pt ius alert and oriented x 4, still has hematuria w clots, Urology Following. NPO at midnight for possible Urology intervention today  Plan of Care Reviewed With: patient  Overall Patient Progress: no change  Goal: Patient-Specific Goal (Individualized)  Description: You can add care plan individualizations to a care plan. Examples of Individualization might be:  \"Parent requests to be called daily at 9am for status\", \"I have a hard time hearing out of my right ear\", or \"Do not touch me to wake me up as it startles  me\".  Outcome: Progressing  Goal: Absence of Hospital-Acquired Illness or Injury  Outcome: Progressing  Intervention: Identify and Manage Fall Risk  Recent Flowsheet Documentation  Taken 7/8/2025 2053 by Alison Church RN  Safety Promotion/Fall Prevention:   safety round/check completed   room near nurse's station   mobility aid in reach   nonskid shoes/slippers when out of bed   lighting adjusted   clutter free environment " maintained  Intervention: Prevent Skin Injury  Recent Flowsheet Documentation  Taken 7/8/2025 2053 by Alison Church RN  Body Position: weight shifting  Skin Protection:   adhesive use limited   tubing/devices free from skin contact   pulse oximeter probe site changed  Intervention: Prevent and Manage VTE (Venous Thromboembolism) Risk  Recent Flowsheet Documentation  Taken 7/8/2025 2053 by Alison Church RN  VTE Prevention/Management: patient refused intervention  Intervention: Prevent Infection  Recent Flowsheet Documentation  Taken 7/8/2025 2053 by Alison Church RN  Infection Prevention:   rest/sleep promoted   single patient room provided  Goal: Optimal Comfort and Wellbeing  Outcome: Progressing  Goal: Readiness for Transition of Care  Outcome: Progressing     Problem: Gas Exchange Impaired  Goal: Optimal Gas Exchange  Outcome: Progressing  Intervention: Optimize Oxygenation and Ventilation  Recent Flowsheet Documentation  Taken 7/8/2025 2053 by Alison Church RN  Airway/Ventilation Management: (uses 2.5LPM at night) oxygen therapy provided  Head of Bed (HOB) Positioning: HOB at 30-45 degrees     Problem: Infection  Goal: Absence of Infection Signs and Symptoms  Outcome: Progressing       Goal Outcome Evaluation:      Plan of Care Reviewed With: patient    Overall Patient Progress: no changeOverall Patient Progress: no change    Outcome Evaluation: Pt ius alert and oriented x 4, still has hematuria w clots, Urology Following. NPO at midnight for possible Urology intervention today

## 2025-07-09 NOTE — PROGRESS NOTES
[facility] Nurse Inpatient Assessment     Consulted for: Groin    Summary: Folds are significantly improved.      [facility] nurse follow-up plan: weekly    Patient History (according to provider note(s):    Assessment & Plan  Brissa Corado is a 60 year old female admitted on 6/25/2025 with past medical history of asthma on chronic oxygen at night, obstructive sleep apnea, morbid obesity, hypertension, seizure disorder who presents with asthma exacerbation 2/2 poor air quality and not using/having access to her LABA/ICS.     Acute on chronic hypoxic respiratory failure  Asthma exacerbation  RICK -patient improving with treatments (DuoNeb, dexamethasone, magnesium in ED in ED) of clinical asthma exacerbation.  CTPA negative for PE.  Patient states her triggers been poor air quality and she has not been using her controller LABA/ICS.  Has not had a sleep apnea machine at home but reports that she has an appointment coming up in the next 2 weeks, even though she is diagnosed is not tolerating it due to claustrophobic feelings.  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 No drainage or odor noted. Periwound skin intact.    Pain: denies   Pain interventions prior to dressing change: patient  tolerated well and slow and gentle cares   Treatment goal: Heal , Maintain (prevention of deterioration), and Protection  STATUS: improving  Supplies ordered: at bedside    Treatment Plan:   Interdry:  Location Pannus and breast folds - other folds as needed  Cleanse skin with Vashe and dry well.  Cut Interdry 2 inches larger than skin fold, date dressing.  Apply a single layer -flat, not bunched up- of dressing in skin fold so 2 inches are visible (allow moisture to wick out).   Remove dressing with routine cleansing and reapply, do not use powder/ointment in folds if Interdry in use..   Discard if grossly soiled otherwise 1 piece is good up to 5 days.    L posteromedial thigh - Every 3 days  Cleanse with saline; gently dry.  Cover with Mepilex.    Orders: Reviewed    RECOMMEND PRIMARY TEAM ORDER: None, at this time  Education provided: plan of care  Discussed plan of care with: Patient and Nurse  Notify WOC if wound(s) deteriorate.  Nursing to notify the Provider(s) and re-consult the WO Nurse if new skin concern.    DATA:     Current support surface: Standard  Standard gel mattress (Isoflex)  Containment of urine/stool: Continent of bowel, Incontinence Protocol, and Suction based external urinary catheter   BMI: Body mass index is 60.56 kg/m .   Active diet order: Orders Placed This Encounter      NPO for Medical/Clinical Reasons Except for: Meds, Ice Chips     Output: I/O last 3 completed shifts:  In: -   Out: 1300 [Urine:1300]     Labs:   Recent Labs   Lab 07/09/25  0604   HGB 11.9   INR 1.33*     Pressure injury risk assessment:   Sensory Perception: 3-->slightly limited  Moisture: 3-->occasionally moist  Activity: 3-->walks occasionally  Mobility: 3-->slightly limited  Nutrition: 3-->adequate  Friction and Shear: 2-->potential problem  Randy Score: 17      Contact [facility]- [facility] Nurse (Kristy)  Dept. Office Number: [phone]

## 2025-07-09 NOTE — PROGRESS NOTES
[facility]    Hospitalist Progress Note             Date of Admission:  6/25/2025                   Day of hospitalization: 14    Assessment and Plan:   Summary of Stay: Brissa Corado is a 60 year old female admitted on 6/25/2025 with past medical history of asthma on chronic oxygen at night, obstructive sleep apnea, morbid obesity, hypertension, seizure disorder who presents with asthma exacerbation 2/2 poor air quality and not using/having access to her LABA/ICS.     Patient's acute on chronic hypoxic respiratory failure has resolved.  Patient has remained in the hospital for appropriate disposition and placement     7/7: Patient developed hematuria CT abdomen pelvis noncontrast obtained no evidence of nephrolithiasis    Plan for today:  Urology consult in place  N.p.o. for midnight case procedure needed in the morning, if hematuria progressively worsened  Hold Coumadin     Gross hematuria  Possible UTI  - developed spontaneously 7/7  - hgb normal, improving   - Possible UTI started on ciprofloxacin  -Can likely restart warfarin tomorrow.  -Will need outpatient workup for gross hematuria including cystoscopy and CT urogram.  Will need to schedule cystoscopy in our [place] office.     Acute on chronic hypoxic respiratory failure-resolved  Asthma exacerbation  RICK   At baseline pt is 2.5L at night and during the day as needed.    CTPA negative for PE.  Patient states her triggers been poor air quality and she has not been using her controller LABA/ICS.  Has not had a sleep apnea machine at home but reports that she has an appointment coming up in the next 2 weeks, even though she is diagnosed is not tolerating it due to claustrophobic feelings.  - Wheezing resolved, DuoNeb every 4 hours as needed  - CPAP ordered  - Continue Breo Ellipta control inhaler     History of developmental delay  SW Consultation   -father who is legal guardian states he received paperwork from the [facility] stating  "that patient is not to be able to return to her own residence and requires to be moved to a care facility to ensure she gets her medications as ordered and prescribed.    - consulted and following.   - Per  note patient will be getting appointed a new legal guardian in July.   - Discharge patient will go to a new snf.  His significant other cannot go with her.     H/o DVT   - Pharmacy warfarin panel ordered.    - dose held 7/6 and 7/7 due to hematuria     Seizure disorder -continue Tegretol     Hypertension -continue lisinopril     Hyperlipidemia - continue statin     Gerd - continue prilosec     Super morbid obesity  Intertriginous excoriations and wounds in multiple skin folds  WOC following   - Start patient on topical azole therapy with ketoconazole 2% twice daily--was switched to miconazole powder per patient's request  - General Wound cares per bedside RN and WOC cares weekly        # Code status: Full  # DVT: SCD  # IVF:  none          Medically Ready for Discharge: Anticipated Tomorrow                    Aimee Cyr MD    Subjective   Chief Complaint:  Abdominal pain, hematuria  Subjective: hematuria improving, otherwise, no other complaints        Objective   /63 (BP Location: Left arm)   Pulse 69   Temp 98.1  F (36.7  C) (Oral)   Resp 20   Ht 1.702 m (5' 7\")   Wt (!) 175.4 kg (386 lb 11 oz)   LMP 08/18/2008   SpO2 97%   BMI 60.56 kg/m       Physical Exam  General: Pt in NAD, normal appearance  HEENT: OP clear MMM, no JVD  Lungs: Clear to Auscultation Bilateral, normal breathing  without accessory muscle usage, no wheezing, rhonchi or crackles  Cardiac: +S1, S2, RRR, no MRG, no edema  Abdominal: normal bowel sounds, NT/ND  Skin: warm, dry, normal turgor, no rash  Psyche: A& O x3, appropriate affect             Intake/Output Summary (Last 24 hours) at 7/8/2025 1315  Last data filed at 7/8/2025 0533  Gross per 24 hour   Intake 960 ml   Output 900 ml   Net 60 ml      " "     Labs and Imaging Results:      Recent Labs   Lab 07/09/25  0604 07/08/25  1814   HGB 11.9 12.2        Recent Labs   Lab 07/08/25  0700   *   CO2 25   BUN 13.5        Recent Labs   Lab 07/09/25  0604 07/08/25  0837   INR 1.33* 1.57*      No results for input(s): \"CKMB\" in the last 168 hours.    Invalid input(s): \"TROPONINT\"   No results for input(s): \"ALBUMIN\", \"AST\", \"ALT\", \"ALKPHOS\", \"BILITOT\" in the last 168 hours.     Micro:     Radio:  CT Abdomen Pelvis w/o Contrast   Final Result   IMPRESSION:    1.  No urinary tract calculi or obstruction.      2.  Interval cholecystectomy. Hepatosplenomegaly without discrete lesion. No abdominopelvic suspicious adenopathy or free fluid.      3.  IVC filter, unchanged.      4.  Postoperative changes of plate and screw fixation at the symphysis pubis. Screw fixation left SI joint. Mild left convex thoracolumbar curve. Mild degenerative changes involving the spine and joints of the pelvis. Resolved inflammatory changes    involving the pannus seen previously. Small fat-containing ventral umbilical hernia.         CT Chest Pulmonary Embolism w Contrast   Final Result   IMPRESSION:   1.  Subtle areas of bronchial wall thickening with areas of air trapping throughout both lungs, suggestive of small airways disease.   2.  No pulmonary embolus.              Medications:      Scheduled Meds:    Current Facility-Administered Medications   Medication Dose Route Frequency Provider Last Rate Last Admin    atorvastatin (LIPITOR) tablet 10 mg  10 mg Oral At Bedtime David Hull MD   10 mg at 07/08/25 2119    carBAMazepine (TEGretol) tablet 200 mg  200 mg Oral Q24H David Hull MD   200 mg at 07/09/25 1128    carBAMazepine (TEGretol) tablet 400 mg  400 mg Oral BID David Hull MD   400 mg at 07/09/25 0813    ciprofloxacin (CIPRO) tablet 500 mg  500 mg Oral BID Neeraj Alvarado MD   500 mg at 07/09/25 0812    fluticasone (FLONASE) 50 MCG/ACT spray 1 spray  1 " spray Both Nostrils Daily Neeraj Alvarado MD   1 spray at 07/09/25 0815    fluticasone-vilanterol (BREO ELLIPTA) 100-25 MCG/ACT inhaler 1 puff  1 puff Inhalation Daily David Hull MD   1 puff at 07/09/25 0814    lisinopril (ZESTRIL) tablet 20 mg  20 mg Oral Daily David Hull MD   20 mg at 07/09/25 0813    miconazole (MICATIN) 2 % powder   Topical BID Andreea Mcdonald MD   Given at 07/09/25 0817    pantoprazole (PROTONIX) EC tablet 40 mg  40 mg Oral QAM AC David Hull MD   40 mg at 07/09/25 0646    sertraline (ZOLOFT) tablet 100 mg  100 mg Oral Daily Neeraj Alvarado MD   100 mg at 07/09/25 0813    sodium chloride (PF) 0.9% PF flush 3 mL  3 mL Intracatheter Q8H NIC David Hull MD   3 mL at 07/09/25 0646    Warfarin Dose Required Daily - Pharmacist Managed  1 each Oral See Admin Instructions David Hull MD         Continuous Infusions:    Current Facility-Administered Medications   Medication Dose Route Frequency Provider Last Rate Last Admin    Patient is already receiving anticoagulation with heparin, enoxaparin (LOVENOX), warfarin (COUMADIN)  or other anticoagulant medication   Does not apply Continuous PRN David Hull MD         PRN Meds:    Current Facility-Administered Medications   Medication Dose Route Frequency Provider Last Rate Last Admin    acetaminophen (TYLENOL) tablet 650 mg  650 mg Oral Q4H PRN David Hull MD   650 mg at 07/09/25 1125    Or    acetaminophen (TYLENOL) Suppository 650 mg  650 mg Rectal Q4H PRN David Hull MD        albuterol (PROVENTIL HFA/VENTOLIN HFA) inhaler  2 puff Inhalation Q6H PRN Neeraj Alvarado MD        albuterol (PROVENTIL) neb solution 2.5 mg  2.5 mg Nebulization Q2H PRN David Hull MD   2.5 mg at 06/26/25 0416    calcium carbonate (TUMS) chewable tablet 1,000 mg  1,000 mg Oral 4x Daily PRN David Hull MD   1,000 mg at 07/04/25 1616    ipratropium - albuterol 0.5 mg/2.5 mg (3mg)/3 mL (DUONEB)  neb solution 3 mL  3 mL Nebulization Q4H PRN Andreea Mcdonald MD        lidocaine (LMX4) cream   Topical Q1H PRN David Hull MD        lidocaine 1 % 0.1-1 mL  0.1-1 mL Other Q1H PRN David Hull MD        melatonin tablet 5 mg  5 mg Oral At Bedtime PRN David Hull MD        naloxone (NARCAN) injection 0.2 mg  0.2 mg Intravenous Q2 Min PRN David Hull MD        Or    naloxone (NARCAN) injection 0.4 mg  0.4 mg Intravenous Q2 Min PRN David Hull MD        Or    naloxone (NARCAN) injection 0.2 mg  0.2 mg Intramuscular Q2 Min PRN David Hull MD        Or    naloxone (NARCAN) injection 0.4 mg  0.4 mg Intramuscular Q2 Min PRN David Hull MD        ondansetron (ZOFRAN ODT) ODT tab 4 mg  4 mg Oral Q6H PRN David Hull MD   4 mg at 07/07/25 0930    Or    ondansetron (ZOFRAN) injection 4 mg  4 mg Intravenous Q6H PRN David Hull MD   4 mg at 07/07/25 1455    oxyCODONE IR (ROXICODONE) half-tab 2.5 mg  2.5 mg Oral Q4H PRN Kaia Evans DO   2.5 mg at 07/09/25 1147    Patient is already receiving anticoagulation with heparin, enoxaparin (LOVENOX), warfarin (COUMADIN)  or other anticoagulant medication   Does not apply Continuous PRN David Hull MD        senna-docusate (SENOKOT-S/PERICOLACE) 8.6-50 MG per tablet 1 tablet  1 tablet Oral BID PRN David Hull MD   1 tablet at 07/05/25 1426    Or    senna-docusate (SENOKOT-S/PERICOLACE) 8.6-50 MG per tablet 2 tablet  2 tablet Oral BID PRN David Hull MD   2 tablet at 07/08/25 0829    simethicone (MYLICON) chewable tablet 80 mg  80 mg Oral Q6H PRN Neeraj Reed MD   80 mg at 07/08/25 1656    sodium chloride (PF) 0.9% PF flush 3 mL  3 mL Intracatheter q1 min David Stratton MD

## 2025-07-09 NOTE — PLAN OF CARE
Goal Outcome Evaluation:      Plan of Care Reviewed With: patient    Overall Patient Progress: improvingOverall Patient Progress: improving    ICU End of Shift Summary.  For vital signs and complete assessments, please see documentation flowsheets.      Pertinent assessments: Patient's hematuria subsided today and an endoscopy was canceled. She continues on O2 although she only uses oxygen at night at home  Major Shift Events: Patient's diet has been resumed and endoscopy canceled  Plan (Upcoming Events): Urology to monitor for blood in urine and interventions as appropriate  Discharge/Transfer Needs: Resolution of hematuria and asthma exacerbation     Bedside Shift Report Completed : Yes  Bedside Safety Check Completed: Yes        Problem: Adult Inpatient Plan of Care  Goal: Plan of Care Review  Description: The Plan of Care Review/Shift note should be completed every shift.  The Outcome Evaluation is a brief statement about your assessment that the patient is improving, declining, or no change.  This information will be displayed automatically on your shift  note.  Recent Flowsheet Documentation  Taken 7/9/2025 1502 by Marcelino Jama, RN  Plan of Care Reviewed With: patient  Overall Patient Progress: improving  Goal: Absence of Hospital-Acquired Illness or Injury  Intervention: Identify and Manage Fall Risk  Recent Flowsheet Documentation  Taken 7/9/2025 0930 by Marcelino Jama, RN  Safety Promotion/Fall Prevention:   safety round/check completed   room organization consistent   room near nurse's station   room door open   nonskid shoes/slippers when out of bed   mobility aid in reach   lighting adjusted   clutter free environment maintained  Intervention: Prevent Skin Injury  Recent Flowsheet Documentation  Taken 7/9/2025 1349 by Marcelino Jama, RN  Body Position: weight shifting  Taken 7/9/2025 1229 by Marcelino Jama RN  Body Position: weight shifting  Taken 7/9/2025 1147 by Marcelino Jama, RN  Body Position:  weight shifting  Taken 7/9/2025 0930 by Marcelino Jama RN  Body Position: weight shifting  Skin Protection:   adhesive use limited   tubing/devices free from skin contact   pulse oximeter probe site changed  Taken 7/9/2025 0801 by Marcelino Jama RN  Body Position: weight shifting  Intervention: Prevent and Manage VTE (Venous Thromboembolism) Risk  Recent Flowsheet Documentation  Taken 7/9/2025 0930 by Marcelino Jama RN  VTE Prevention/Management: patient refused intervention  Intervention: Prevent Infection  Recent Flowsheet Documentation  Taken 7/9/2025 0930 by Marcelino Jama RN  Infection Prevention:   environmental surveillance performed   hand hygiene promoted   rest/sleep promoted   single patient room provided     Problem: Adult Inpatient Plan of Care  Goal: Plan of Care Review  Description: The Plan of Care Review/Shift note should be completed every shift.  The Outcome Evaluation is a brief statement about your assessment that the patient is improving, declining, or no change.  This information will be displayed automatically on your shift  note.  Recent Flowsheet Documentation  Taken 7/9/2025 1502 by Marcelino Jama RN  Plan of Care Reviewed With: patient  Overall Patient Progress: improving  Goal: Absence of Hospital-Acquired Illness or Injury  Intervention: Identify and Manage Fall Risk  Recent Flowsheet Documentation  Taken 7/9/2025 0930 by Marcelino Jama RN  Safety Promotion/Fall Prevention:   safety round/check completed   room organization consistent   room near nurse's station   room door open   nonskid shoes/slippers when out of bed   mobility aid in reach   lighting adjusted   clutter free environment maintained  Intervention: Prevent Skin Injury  Recent Flowsheet Documentation  Taken 7/9/2025 1349 by Marcelino Jama RN  Body Position: weight shifting  Taken 7/9/2025 1229 by Marcelino Jama RN  Body Position: weight shifting  Taken 7/9/2025 1147 by Marcelino Jama RN  Body Position: weight  "shifting  Taken 7/9/2025 0930 by Marcelino Jama, RN  Body Position: weight shifting  Skin Protection:   adhesive use limited   tubing/devices free from skin contact   pulse oximeter probe site changed  Taken 7/9/2025 0801 by Marcelino Jama, RN  Body Position: weight shifting  Intervention: Prevent and Manage VTE (Venous Thromboembolism) Risk  Recent Flowsheet Documentation  Taken 7/9/2025 0930 by Marcelino Jama, RN  VTE Prevention/Management: patient refused intervention  Intervention: Prevent Infection  Recent Flowsheet Documentation  Taken 7/9/2025 0930 by Marcelino Jama, RN  Infection Prevention:   environmental surveillance performed   hand hygiene promoted   rest/sleep promoted   single patient room provided     Problem: Adult Inpatient Plan of Care  Goal: Plan of Care Review  Description: The Plan of Care Review/Shift note should be completed every shift.  The Outcome Evaluation is a brief statement about your assessment that the patient is improving, declining, or no change.  This information will be displayed automatically on your shift  note.  Outcome: Progressing  Flowsheets (Taken 7/9/2025 1502)  Plan of Care Reviewed With: patient  Overall Patient Progress: improving  Goal: Patient-Specific Goal (Individualized)  Description: You can add care plan individualizations to a care plan. Examples of Individualization might be:  \"Parent requests to be called daily at 9am for status\", \"I have a hard time hearing out of my right ear\", or \"Do not touch me to wake me up as it startles  me\".  Outcome: Progressing  Goal: Absence of Hospital-Acquired Illness or Injury  Outcome: Progressing  Intervention: Identify and Manage Fall Risk  Recent Flowsheet Documentation  Taken 7/9/2025 0930 by Marcelino Jama, RN  Safety Promotion/Fall Prevention:   safety round/check completed   room organization consistent   room near nurse's station   room door open   nonskid shoes/slippers when out of bed   mobility aid in reach   " lighting adjusted   clutter free environment maintained  Intervention: Prevent Skin Injury  Recent Flowsheet Documentation  Taken 7/9/2025 1349 by Marcelino Jama RN  Body Position: weight shifting  Taken 7/9/2025 1229 by Marcelino Jama RN  Body Position: weight shifting  Taken 7/9/2025 1147 by Marcelino Jama RN  Body Position: weight shifting  Taken 7/9/2025 0930 by Marcelino Jama RN  Body Position: weight shifting  Skin Protection:   adhesive use limited   tubing/devices free from skin contact   pulse oximeter probe site changed  Taken 7/9/2025 0801 by Marcelino Jama RN  Body Position: weight shifting  Intervention: Prevent and Manage VTE (Venous Thromboembolism) Risk  Recent Flowsheet Documentation  Taken 7/9/2025 0930 by Marcelino Jama RN  VTE Prevention/Management: patient refused intervention  Intervention: Prevent Infection  Recent Flowsheet Documentation  Taken 7/9/2025 0930 by Marcelino Jama RN  Infection Prevention:   environmental surveillance performed   hand hygiene promoted   rest/sleep promoted   single patient room provided  Goal: Optimal Comfort and Wellbeing  Outcome: Progressing  Goal: Readiness for Transition of Care  Outcome: Progressing     Problem: Comorbidity Management  Goal: Maintenance of Asthma Control  Intervention: Maintain Asthma Symptom Control  Recent Flowsheet Documentation  Taken 7/9/2025 0930 by Marcelino Jama RN  Medication Review/Management: medications reviewed  Goal: Blood Pressure in Desired Range  Intervention: Maintain Blood Pressure Management  Recent Flowsheet Documentation  Taken 7/9/2025 0930 by Marcelino Jama RN  Medication Review/Management: medications reviewed     Problem: Comorbidity Management  Goal: Maintenance of Asthma Control  Intervention: Maintain Asthma Symptom Control  Recent Flowsheet Documentation  Taken 7/9/2025 0930 by Marcelino Jama RN  Medication Review/Management: medications reviewed  Goal: Maintenance of Behavioral Health Symptom  Control  Intervention: Maintain Behavioral Health Symptom Control  Recent Flowsheet Documentation  Taken 7/9/2025 0930 by Marcelino Jama, RN  Medication Review/Management: medications reviewed  Goal: Maintenance of COPD Symptom Control  Intervention: Maintain COPD Symptom Control  Recent Flowsheet Documentation  Taken 7/9/2025 0930 by Marcelino Jama RN  Medication Review/Management: medications reviewed  Goal: Blood Glucose Levels Within Targeted Range  Intervention: Monitor and Manage Glycemia  Recent Flowsheet Documentation  Taken 7/9/2025 0930 by Marcelino Jama, RN  Medication Review/Management: medications reviewed  Goal: Maintenance of Heart Failure Symptom Control  Intervention: Maintain Heart Failure Management  Recent Flowsheet Documentation  Taken 7/9/2025 0930 by Marcelino Jama, RN  Medication Review/Management: medications reviewed  Goal: Blood Pressure in Desired Range  Intervention: Maintain Blood Pressure Management  Recent Flowsheet Documentation  Taken 7/9/2025 0930 by Marcelino Jama, RN  Medication Review/Management: medications reviewed  Goal: Maintenance of Osteoarthritis Symptom Control  Intervention: Maintain Osteoarthritis Symptom Control  Recent Flowsheet Documentation  Taken 7/9/2025 1349 by Marcelino Jama, RN  Assistive Device Utilized:   walker   gait belt  Activity Management:   activity adjusted per tolerance   activity encouraged  Taken 7/9/2025 0930 by Marcelino Jama, RN  Activity Management:   activity adjusted per tolerance   activity encouraged  Medication Review/Management: medications reviewed  Taken 7/9/2025 0801 by Marcelino Jama, RN  Activity Management:   activity adjusted per tolerance   activity encouraged  Goal: Bariatric Home Regimen Maintained  Intervention: Maintain and Manage Postbariatric Surgery Care  Recent Flowsheet Documentation  Taken 7/9/2025 0930 by Marcelino Jama, RN  Medication Review/Management: medications reviewed  Goal: Maintenance of Seizure  Control  Intervention: Maintain Seizure Symptom Control  Recent Flowsheet Documentation  Taken 7/9/2025 0930 by Marcelino Jama, RN  Sensory Stimulation Regulation:   care clustered   lighting decreased   quiet environment promoted   television on  Medication Review/Management: medications reviewed     Problem: Comorbidity Management  Goal: Maintenance of Asthma Control  Intervention: Maintain Asthma Symptom Control  Recent Flowsheet Documentation  Taken 7/9/2025 0930 by Marcelino Jama, RN  Medication Review/Management: medications reviewed  Goal: Maintenance of Behavioral Health Symptom Control  Intervention: Maintain Behavioral Health Symptom Control  Recent Flowsheet Documentation  Taken 7/9/2025 0930 by Marcelino Jama, RN  Medication Review/Management: medications reviewed  Goal: Maintenance of COPD Symptom Control  Intervention: Maintain COPD Symptom Control  Recent Flowsheet Documentation  Taken 7/9/2025 0930 by Marcelino Jama, RN  Medication Review/Management: medications reviewed  Goal: Blood Glucose Levels Within Targeted Range  Intervention: Monitor and Manage Glycemia  Recent Flowsheet Documentation  Taken 7/9/2025 0930 by Marcelino Jama, RN  Medication Review/Management: medications reviewed  Goal: Maintenance of Heart Failure Symptom Control  Intervention: Maintain Heart Failure Management  Recent Flowsheet Documentation  Taken 7/9/2025 0930 by Marcelino Jama, RN  Medication Review/Management: medications reviewed  Goal: Blood Pressure in Desired Range  Intervention: Maintain Blood Pressure Management  Recent Flowsheet Documentation  Taken 7/9/2025 0930 by Marcelino Jama, RN  Medication Review/Management: medications reviewed  Goal: Maintenance of Osteoarthritis Symptom Control  Intervention: Maintain Osteoarthritis Symptom Control  Recent Flowsheet Documentation  Taken 7/9/2025 1349 by Marcelino Jama, RN  Assistive Device Utilized:   walker   gait belt  Activity Management:   activity adjusted per  tolerance   activity encouraged  Taken 7/9/2025 0930 by Marcelino Jama RN  Activity Management:   activity adjusted per tolerance   activity encouraged  Medication Review/Management: medications reviewed  Taken 7/9/2025 0801 by Marcelino Jama RN  Activity Management:   activity adjusted per tolerance   activity encouraged  Goal: Bariatric Home Regimen Maintained  Intervention: Maintain and Manage Postbariatric Surgery Care  Recent Flowsheet Documentation  Taken 7/9/2025 0930 by Marcelino Jama RN  Medication Review/Management: medications reviewed  Goal: Maintenance of Seizure Control  Intervention: Maintain Seizure Symptom Control  Recent Flowsheet Documentation  Taken 7/9/2025 0930 by Marcelino Jama RN  Sensory Stimulation Regulation:   care clustered   lighting decreased   quiet environment promoted   television on  Medication Review/Management: medications reviewed     Problem: Comorbidity Management  Goal: Maintenance of Asthma Control  Intervention: Maintain Asthma Symptom Control  Recent Flowsheet Documentation  Taken 7/9/2025 0930 by Marcelino Jama RN  Medication Review/Management: medications reviewed  Goal: Maintenance of Behavioral Health Symptom Control  Intervention: Maintain Behavioral Health Symptom Control  Recent Flowsheet Documentation  Taken 7/9/2025 0930 by Marcelino Jama RN  Medication Review/Management: medications reviewed  Goal: Maintenance of COPD Symptom Control  Intervention: Maintain COPD Symptom Control  Recent Flowsheet Documentation  Taken 7/9/2025 0930 by Marcelino Jama RN  Medication Review/Management: medications reviewed  Goal: Blood Glucose Levels Within Targeted Range  Intervention: Monitor and Manage Glycemia  Recent Flowsheet Documentation  Taken 7/9/2025 0930 by Marcelino Jama RN  Medication Review/Management: medications reviewed  Goal: Maintenance of Heart Failure Symptom Control  Intervention: Maintain Heart Failure Management  Recent Flowsheet Documentation  Taken  7/9/2025 0930 by Marcelino Jama, RN  Medication Review/Management: medications reviewed  Goal: Blood Pressure in Desired Range  Intervention: Maintain Blood Pressure Management  Recent Flowsheet Documentation  Taken 7/9/2025 0930 by Marcelino Jama RN  Medication Review/Management: medications reviewed  Goal: Maintenance of Osteoarthritis Symptom Control  Intervention: Maintain Osteoarthritis Symptom Control  Recent Flowsheet Documentation  Taken 7/9/2025 1349 by Marcelino Jama, RN  Assistive Device Utilized:   walker   gait belt  Activity Management:   activity adjusted per tolerance   activity encouraged  Taken 7/9/2025 0930 by Marcelino Jama, RN  Activity Management:   activity adjusted per tolerance   activity encouraged  Medication Review/Management: medications reviewed  Taken 7/9/2025 0801 by Marcelino Jama, RN  Activity Management:   activity adjusted per tolerance   activity encouraged  Goal: Bariatric Home Regimen Maintained  Intervention: Maintain and Manage Postbariatric Surgery Care  Recent Flowsheet Documentation  Taken 7/9/2025 0930 by Marcelino Jama, RN  Medication Review/Management: medications reviewed  Goal: Maintenance of Seizure Control  Intervention: Maintain Seizure Symptom Control  Recent Flowsheet Documentation  Taken 7/9/2025 0930 by Marcelino Jama, RN  Sensory Stimulation Regulation:   care clustered   lighting decreased   quiet environment promoted   television on  Medication Review/Management: medications reviewed     Problem: Gas Exchange Impaired  Goal: Optimal Gas Exchange  Intervention: Optimize Oxygenation and Ventilation  Recent Flowsheet Documentation  Taken 7/9/2025 1349 by Marcelino Jama, RN  Head of Bed (\[facility]\"") Positioning: HOB at 30 degrees  Taken 7/9/2025 1229 by Marcelino Jama RN  Head of Bed (\[facility]\"") Positioning: HOB at 30 degrees  Taken 7/9/2025 1147 by Marcelino Jama, RN  Head of Bed (\[facility]\"") Positioning: HOB at 30 degrees  Taken 7/9/2025 0930 by Marcelino Jama, RN  Head of Bed  (HOB) Positioning: HOB at 30 degrees  Taken 7/9/2025 0801 by Marcelino Jama RN  Head of Bed (HOB) Positioning: HOB at 30-45 degrees     Problem: Gas Exchange Impaired  Goal: Optimal Gas Exchange  Intervention: Optimize Oxygenation and Ventilation  Recent Flowsheet Documentation  Taken 7/9/2025 1349 by Marcelino Jama RN  Head of Bed (HOB) Positioning: HOB at 30 degrees  Taken 7/9/2025 1229 by Marcelino Jama RN  Head of Bed (HOB) Positioning: HOB at 30 degrees  Taken 7/9/2025 1147 by Marcelino Jama, RN  Head of Bed (HOB) Positioning: HOB at 30 degrees  Taken 7/9/2025 0930 by Marcelino Jama RN  Head of Bed (HOB) Positioning: HOB at 30 degrees  Taken 7/9/2025 0801 by Marcelino Jama RN  Head of Bed (HOB) Positioning: HOB at 30-45 degrees     Problem: Gas Exchange Impaired  Goal: Optimal Gas Exchange  Intervention: Optimize Oxygenation and Ventilation  Recent Flowsheet Documentation  Taken 7/9/2025 1349 by Marcelino Jama RN  Head of Bed (HOB) Positioning: HOB at 30 degrees  Taken 7/9/2025 1229 by Marcelino Jama RN  Head of Bed (HOB) Positioning: HOB at 30 degrees  Taken 7/9/2025 1147 by Marcelino Jama RN  Head of Bed (HOB) Positioning: HOB at 30 degrees  Taken 7/9/2025 0930 by Marcelino Jama RN  Head of Bed (HOB) Positioning: HOB at 30 degrees  Taken 7/9/2025 0801 by Marcelino Jama, RN  Head of Bed (HOB) Positioning: HOB at 30-45 degrees     Problem: Gas Exchange Impaired  Goal: Optimal Gas Exchange  Intervention: Optimize Oxygenation and Ventilation  Recent Flowsheet Documentation  Taken 7/9/2025 1349 by Marcelino Jama RN  Head of Bed (HOB) Positioning: HOB at 30 degrees  Taken 7/9/2025 1229 by Marcelino Jama RN  Head of Bed (HOB) Positioning: HOB at 30 degrees  Taken 7/9/2025 1147 by Marcelino Jama RN  Head of Bed (HOB) Positioning: HOB at 30 degrees  Taken 7/9/2025 0930 by Marcelino Jama, RN  Head of Bed ([facility] [place] Positioning: HOB at 30 degrees  Taken 7/9/2025 0801 by Marcelino Jama, RN  Head of Bed ([facility] [place])  Positioning: HOB at 30-45 degrees     Problem: Gas Exchange Impaired  Goal: Optimal Gas Exchange  Outcome: Progressing  Intervention: Optimize Oxygenation and Ventilation  Recent Flowsheet Documentation  Taken 7/9/2025 1349 by Marcelino Jama, RN  Head of Bed (HOB) Positioning: HOB at 30 degrees  Taken 7/9/2025 1229 by Marcelino Jama, RN  Head of Bed (HOB) Positioning: HOB at 30 degrees  Taken 7/9/2025 1147 by Marcelino Jama, RN  Head of Bed (HOB) Positioning: HOB at 30 degrees  Taken 7/9/2025 0930 by Marcelino Jama, RN  Head of Bed (HOB) Positioning: HOB at 30 degrees  Taken 7/9/2025 0801 by Marcelino Jama, RN  Head of Bed (HOB) Positioning: HOB at 30-45 degrees     Problem: Infection  Goal: Absence of Infection Signs and Symptoms  Outcome: Progressing  Intervention: Prevent or Manage Infection  Recent Flowsheet Documentation  Taken 7/9/2025 0930 by Marcelino Jama, RN  Infection Management: aseptic technique maintained

## 2025-07-09 NOTE — PROGRESS NOTES
[facility]rology Progress Note          Assessment and Plan:     Assessment:    Gross hematuria    Possible urinary tract infection    Recent DVT on warfarin, which is currently being held, history of IVC filter    Asthma exacerbation    Developmental delay with guardian    Morbid obesity    Seizure disorder    Hypertension      Plan:   - Okay for diet.  Hematuria is improving, and postvoid residuals have been low.  -If patient continuing to void and adequately emptying, avoid Grajeda catheter.  -Would recommend sending urine cytology.  -Urine culture shows less than 10,000 CFU per mL of mixed jonathan, but given improvement in hematuria and some difficulty with collection, would recommend continuing with antibiotics for presumed urinary tract infection.  -Hemoglobin relatively stable.  - Can likely restart warfarin tomorrow.  -Will need outpatient workup for gross hematuria including cystoscopy and CT urogram.  Will need to schedule cystoscopy in our [place] office.  - Anticipate no acute urological intervention.  Will follow peripherally.    Renée Bautista PA-C   [facility]  [phone]               Interval History:     Doing well.  PureWick in place with clear pinkish tea colored urine.  Denies nausea, vomiting, fevers, chills.  PureWick tubing had to be changed last night due to blood.  Hemoglobin stable, 11.9 (12.2 (12.6)).  INR 1.33.  PVRs were low overnight.  Urine culture showed less than 10,000 CFU per mL of mixed jonathan.  Urine cytology has not been obtained yet.  Continues on ciprofloxacin.              Review of Systems:     The 5 point Review of Systems is negative other than noted in the HPI             Medications:     Current Facility-Administered Medications   Medication Dose Route Frequency Provider Last Rate Last Admin    acetaminophen (TYLENOL) tablet 650 mg  650 mg Oral Q4H PRN David Hull MD   650 mg at 07/08/25 8384    Or    acetaminophen (TYLENOL) Suppository 650  mg  650 mg Rectal Q4H PRN David Hull MD        albuterol (PROVENTIL HFA/VENTOLIN HFA) inhaler  2 puff Inhalation Q6H PRN Neeraj Alvarado MD        albuterol (PROVENTIL) neb solution 2.5 mg  2.5 mg Nebulization Q2H PRN David Hull MD   2.5 mg at 06/26/25 0416    atorvastatin (LIPITOR) tablet 10 mg  10 mg Oral At Bedtime David Hull MD   10 mg at 07/08/25 2119    calcium carbonate (TUMS) chewable tablet 1,000 mg  1,000 mg Oral 4x Daily PRN David Hull MD   1,000 mg at 07/04/25 1616    carBAMazepine (TEGretol) tablet 200 mg  200 mg Oral Q24H David Hull MD   200 mg at 07/08/25 1314    carBAMazepine (TEGretol) tablet 400 mg  400 mg Oral BID David Hull MD   400 mg at 07/09/25 0813    ciprofloxacin (CIPRO) tablet 500 mg  500 mg Oral BID Neeraj Alvarado MD   500 mg at 07/09/25 0812    fluticasone (FLONASE) 50 MCG/ACT spray 1 spray  1 spray Both Nostrils Daily Neeraj Alvarado MD   1 spray at 07/09/25 0815    fluticasone-vilanterol (BREO ELLIPTA) 100-25 MCG/ACT inhaler 1 puff  1 puff Inhalation Daily David Hull MD   1 puff at 07/09/25 0814    ipratropium - albuterol 0.5 mg/2.5 mg (3mg)/3 mL (DUONEB) neb solution 3 mL  3 mL Nebulization Q4H PRN Andreea Mcdonald MD        lidocaine (LMX4) cream   Topical Q1H PRN David Hull MD        lidocaine 1 % 0.1-1 mL  0.1-1 mL Other Q1H PRN David Hull MD        lisinopril (ZESTRIL) tablet 20 mg  20 mg Oral Daily David Hull MD   20 mg at 07/09/25 0813    melatonin tablet 5 mg  5 mg Oral At Bedtime PRN David Hull MD        miconazole (MICATIN) 2 % powder   Topical BID Andreea Mcdonald MD   Given at 07/09/25 0817    naloxone (NARCAN) injection 0.2 mg  0.2 mg Intravenous Q2 Min PRN David Hull MD        Or    naloxone (NARCAN) injection 0.4 mg  0.4 mg Intravenous Q2 Min PRN David Hull MD        Or    naloxone (NARCAN) injection 0.2 mg  0.2 mg Intramuscular Q2 Min PRN David Hull,  MD        Or    naloxone (NARCAN) injection 0.4 mg  0.4 mg Intramuscular Q2 Min PRN David Hull MD        ondansetron (ZOFRAN ODT) ODT tab 4 mg  4 mg Oral Q6H PRN David Hull MD   4 mg at 07/07/25 0930    Or    ondansetron (ZOFRAN) injection 4 mg  4 mg Intravenous Q6H PRN David Hull MD   4 mg at 07/07/25 1455    oxyCODONE IR (ROXICODONE) half-tab 2.5 mg  2.5 mg Oral Q4H PRN Kaia Evans DO   2.5 mg at 07/08/25 1554    pantoprazole (PROTONIX) EC tablet 40 mg  40 mg Oral QAM AC David Hull MD   40 mg at 07/09/25 0646    Patient is already receiving anticoagulation with heparin, enoxaparin (LOVENOX), warfarin (COUMADIN)  or other anticoagulant medication   Does not apply Continuous PRN David Hull MD        senna-docusate (SENOKOT-S/PERICOLACE) 8.6-50 MG per tablet 1 tablet  1 tablet Oral BID PRN David Hull MD   1 tablet at 07/05/25 1426    Or    senna-docusate (SENOKOT-S/PERICOLACE) 8.6-50 MG per tablet 2 tablet  2 tablet Oral BID PRN David Hull MD   2 tablet at 07/08/25 0829    sertraline (ZOLOFT) tablet 100 mg  100 mg Oral Daily Neeraj Alvarado MD   100 mg at 07/09/25 0813    simethicone (MYLICON) chewable tablet 80 mg  80 mg Oral Q6H PRN Neeraj Reed MD   80 mg at 07/08/25 1656    sodium chloride (PF) 0.9% PF flush 3 mL  3 mL Intracatheter Q8H NIC David Hull MD   3 mL at 07/09/25 0646    sodium chloride (PF) 0.9% PF flush 3 mL  3 mL Intracatheter q1 min prn David Hull MD        Warfarin Dose Required Daily - Pharmacist Managed  1 each Oral See Admin Instructions David Hull MD                      Physical Exam:   Vitals were reviewed  Patient Vitals for the past 8 hrs:   BP Temp Temp src Pulse Resp SpO2   07/09/25 0801 122/63 98.1  F (36.7  C) Oral 69 20 97 %     GEN: NAD, lying in bed  EYES: EOMI  MOUTH: MMM  NECK: Supple  RESP: Unlabored breathing  SKIN: Warm  ABD: soft  NEURO: AAO  URO: PureWick in place with clear pinkish  tea colored urine           Data:     Lab Results   Component Value Date    NTBNPI <36 04/01/2025    NTBNPI <36 06/24/2024    NTBNPI <36 05/02/2024    NTBNP <36 06/25/2025     Lab Results   Component Value Date    WBC 10.1 06/25/2025    WBC 8.5 05/05/2025    WBC 7.7 04/01/2025    HGB 11.9 07/09/2025    HGB 12.2 07/08/2025    HGB 12.6 07/08/2025    HCT 42.3 06/25/2025    HCT 40.7 05/05/2025    HCT 39.8 04/01/2025    MCV 87 07/09/2025    MCV 88 07/08/2025    MCV 87 07/08/2025     06/25/2025     05/08/2025     05/05/2025     Lab Results   Component Value Date    INR 1.33 (H) 07/09/2025    INR 1.57 (H) 07/08/2025    INR 2.51 (H) 07/07/2025      All cultures:  Recent Labs   Lab 07/07/25  1041   CULTURE <10,000 CFU/mL Mixture of Urogenital Nevaeh

## 2025-07-09 NOTE — PROGRESS NOTES
"CLINICAL NUTRITION SERVICES - BRIEF NOTE     Chart reviewed for LOS.  Weights, I/Os, and skin reviewed.  Documented good appetite and % intakes. One instance of 25% intake on 7/7.  Per [facility] review, pt has been ordering meals typically TID with the exception of brief periods of NPO that were happening pending urology interventions.  Meal ordering values as follows:  7/8: 1243 kcal, 79 g protein (2 meals - NPO in AM)  7/7: 1886 kcal, 71 g protein  7/6: 2282 kcal, 72 g protein  7/5: 3437 kcal, 161 g protein  7/4: 3487 kcal, 160 g protein    No weights since admission. However, no weight loss noted on admission.   No pressure injuries or edema documented.    LBM 7/5 - senokot being given    Pt remains admitted for appropriate disposition and placement.     RD will continue to stay signed off at this time. Will complete brief chart review in 7-10 days but will not enter chart note or formally unless status changes or formally consulted.     May Kaur RD, LD  Clinical Dietitian  [place] Message Group: \"Dietitian [Kristy]\"  Office Phone: [phone]    "

## 2025-07-10 VITALS
OXYGEN SATURATION: 92 % | TEMPERATURE: 98 F | HEIGHT: 67 IN | HEART RATE: 72 BPM | RESPIRATION RATE: 20 BRPM | DIASTOLIC BLOOD PRESSURE: 47 MMHG | BODY MASS INDEX: 45.99 KG/M2 | WEIGHT: 293 LBS | SYSTOLIC BLOOD PRESSURE: 117 MMHG

## 2025-07-10 LAB
HGB BLD-MCNC: 11.7 G/DL (ref 11.7–15.7)
HGB BLD-MCNC: 11.8 G/DL (ref 11.7–15.7)
INR PPP: 1.09 (ref 0.85–1.15)
INR PPP: 1.17 (ref 0.85–1.15)
MCV RBC AUTO: 85 FL (ref 78–100)
MCV RBC AUTO: 87 FL (ref 78–100)
PROTHROMBIN TIME: 14.2 SECONDS (ref 11.8–14.8)
PROTHROMBIN TIME: 15 SECONDS (ref 11.8–14.8)

## 2025-07-10 PROCEDURE — 36416 COLLJ CAPILLARY BLOOD SPEC: CPT | Performed by: HOSPITALIST

## 2025-07-10 PROCEDURE — 36415 COLL VENOUS BLD VENIPUNCTURE: CPT | Performed by: HOSPITALIST

## 2025-07-10 PROCEDURE — 85018 HEMOGLOBIN: CPT | Performed by: HOSPITALIST

## 2025-07-10 PROCEDURE — 85610 PROTHROMBIN TIME: CPT | Performed by: HOSPITALIST

## 2025-07-10 PROCEDURE — 250N000013 HC RX MED GY IP 250 OP 250 PS 637: Performed by: STUDENT IN AN ORGANIZED HEALTH CARE EDUCATION/TRAINING PROGRAM

## 2025-07-10 PROCEDURE — 99231 SBSQ HOSP IP/OBS SF/LOW 25: CPT | Performed by: PHYSICIAN ASSISTANT

## 2025-07-10 PROCEDURE — 99232 SBSQ HOSP IP/OBS MODERATE 35: CPT | Performed by: HOSPITALIST

## 2025-07-10 PROCEDURE — 250N000013 HC RX MED GY IP 250 OP 250 PS 637: Performed by: INTERNAL MEDICINE

## 2025-07-10 PROCEDURE — 250N000013 HC RX MED GY IP 250 OP 250 PS 637: Performed by: HOSPITALIST

## 2025-07-10 PROCEDURE — 94640 AIRWAY INHALATION TREATMENT: CPT

## 2025-07-10 PROCEDURE — 88112 CYTOPATH CELL ENHANCE TECH: CPT | Mod: TC | Performed by: PHYSICIAN ASSISTANT

## 2025-07-10 PROCEDURE — 88112 CYTOPATH CELL ENHANCE TECH: CPT | Mod: 26 | Performed by: PATHOLOGY

## 2025-07-10 PROCEDURE — 120N000001 HC R&B MED SURG/OB

## 2025-07-10 RX ORDER — POLYETHYLENE GLYCOL 3350 17 G/17G
17 POWDER, FOR SOLUTION ORAL DAILY
Status: DISCONTINUED | OUTPATIENT
Start: 2025-07-10 | End: 2025-07-29 | Stop reason: HOSPADM

## 2025-07-10 RX ORDER — BISACODYL 10 MG
10 SUPPOSITORY, RECTAL RECTAL DAILY PRN
Status: DISCONTINUED | OUTPATIENT
Start: 2025-07-10 | End: 2025-07-29 | Stop reason: HOSPADM

## 2025-07-10 RX ORDER — AMOXICILLIN 250 MG
1 CAPSULE ORAL 2 TIMES DAILY
Status: DISCONTINUED | OUTPATIENT
Start: 2025-07-10 | End: 2025-07-29 | Stop reason: HOSPADM

## 2025-07-10 RX ORDER — WARFARIN SODIUM 5 MG/1
20 TABLET ORAL
Status: COMPLETED | OUTPATIENT
Start: 2025-07-10 | End: 2025-07-10

## 2025-07-10 RX ADMIN — ATORVASTATIN CALCIUM 10 MG: 10 TABLET, FILM COATED ORAL at 21:48

## 2025-07-10 RX ADMIN — SENNOSIDES AND DOCUSATE SODIUM 1 TABLET: 50; 8.6 TABLET ORAL at 21:47

## 2025-07-10 RX ADMIN — SENNOSIDES AND DOCUSATE SODIUM 2 TABLET: 50; 8.6 TABLET ORAL at 09:34

## 2025-07-10 RX ADMIN — PANTOPRAZOLE SODIUM 40 MG: 40 TABLET, DELAYED RELEASE ORAL at 06:40

## 2025-07-10 RX ADMIN — Medication 5 MG: at 00:38

## 2025-07-10 RX ADMIN — CARBAMAZEPINE 200 MG: 200 TABLET ORAL at 12:43

## 2025-07-10 RX ADMIN — SIMETHICONE 80 MG: 80 TABLET, CHEWABLE ORAL at 14:55

## 2025-07-10 RX ADMIN — CIPROFLOXACIN 500 MG: 500 TABLET ORAL at 21:48

## 2025-07-10 RX ADMIN — MICONAZOLE NITRATE: 20 POWDER TOPICAL at 09:27

## 2025-07-10 RX ADMIN — OXYCODONE HYDROCHLORIDE 2.5 MG: 5 TABLET ORAL at 12:43

## 2025-07-10 RX ADMIN — FLUTICASONE FUROATE AND VILANTEROL TRIFENATATE 1 PUFF: 100; 25 POWDER RESPIRATORY (INHALATION) at 08:48

## 2025-07-10 RX ADMIN — ACETAMINOPHEN 650 MG: 325 TABLET ORAL at 12:42

## 2025-07-10 RX ADMIN — CARBAMAZEPINE 400 MG: 200 TABLET ORAL at 09:27

## 2025-07-10 RX ADMIN — POLYETHYLENE GLYCOL 3350 17 G: 17 POWDER, FOR SOLUTION ORAL at 14:55

## 2025-07-10 RX ADMIN — MICONAZOLE NITRATE: 20 POWDER TOPICAL at 21:51

## 2025-07-10 RX ADMIN — CARBAMAZEPINE 400 MG: 200 TABLET ORAL at 21:47

## 2025-07-10 RX ADMIN — ACETAMINOPHEN 650 MG: 325 TABLET ORAL at 00:38

## 2025-07-10 RX ADMIN — WARFARIN SODIUM 20 MG: 5 TABLET ORAL at 17:08

## 2025-07-10 RX ADMIN — FLUTICASONE PROPIONATE 1 SPRAY: 50 SPRAY, METERED NASAL at 09:28

## 2025-07-10 RX ADMIN — LISINOPRIL 20 MG: 20 TABLET ORAL at 09:27

## 2025-07-10 RX ADMIN — ACETAMINOPHEN 650 MG: 325 TABLET ORAL at 17:16

## 2025-07-10 RX ADMIN — CIPROFLOXACIN 500 MG: 500 TABLET ORAL at 09:27

## 2025-07-10 RX ADMIN — SERTRALINE 100 MG: 100 TABLET, FILM COATED ORAL at 09:27

## 2025-07-10 ASSESSMENT — ACTIVITIES OF DAILY LIVING (ADL)
ADLS_ACUITY_SCORE: 56
ADLS_ACUITY_SCORE: 52
ADLS_ACUITY_SCORE: 52
ADLS_ACUITY_SCORE: 56
ADLS_ACUITY_SCORE: 56
ADLS_ACUITY_SCORE: 52
ADLS_ACUITY_SCORE: 56
ADLS_ACUITY_SCORE: 52
ADLS_ACUITY_SCORE: 56
ADLS_ACUITY_SCORE: 56
ADLS_ACUITY_SCORE: 52
ADLS_ACUITY_SCORE: 56
ADLS_ACUITY_SCORE: 52
ADLS_ACUITY_SCORE: 52
ADLS_ACUITY_SCORE: 56
ADLS_ACUITY_SCORE: 56
ADLS_ACUITY_SCORE: 52
ADLS_ACUITY_SCORE: 56

## 2025-07-10 NOTE — PLAN OF CARE
"Goal Outcome Evaluation:      Plan of Care Reviewed With: patient    Overall Patient Progress: no changeOverall Patient Progress: no change    Outcome Evaluation: Placement/guardianship pending.      /73 (BP Location: Left arm)   Pulse 73   Temp 98.5  F (36.9  C) (Oral)   Resp 18   Ht 1.702 m (5' 7\")   Wt (!) 175.4 kg (386 lb 11 oz)   LMP 08/18/2008   SpO2 94%   BMI 60.56 kg/m      On RA.      URINE SAMPLE NEEDED.    Pertinent Assessments: A/Ox4. Endorses LAZAR. 8-9/10 abd/pelvic pain endorsed. ?Constipation since unrelieved with PO pain meds. Voiding via BR. Hematruia improved. Multiple rashy areas. Mepliexes intact (pt reported all changed yesterday). Up with 1A gb/w. Repositions indepedently.   Major Shift Events: PRN tylenol and oxycodone. Scheduled miralax and prn supp added.   Treatment Plan: Pending placement. Pending guardianship. F/U outpt with urology.         Problem: Adult Inpatient Plan of Care  Goal: Plan of Care Review  Description: The Plan of Care Review/Shift note should be completed every shift.  The Outcome Evaluation is a brief statement about your assessment that the patient is improving, declining, or no change.  This information will be displayed automatically on your shift  note.  Recent Flowsheet Documentation  Taken 7/10/2025 1446 by Erin Rose, RN  Outcome Evaluation: Placement/guardianship pending.  Plan of Care Reviewed With: patient  Overall Patient Progress: no change  Goal: Absence of Hospital-Acquired Illness or Injury  Intervention: Identify and Manage Fall Risk  Recent Flowsheet Documentation  Taken 7/10/2025 1128 by Erin Rose, RN  Safety Promotion/Fall Prevention: safety round/check completed  Taken 7/10/2025 0925 by Erin Rose, RN  Safety Promotion/Fall Prevention:   activity supervised   clutter free environment maintained   nonskid shoes/slippers when out of bed   patient and family education   safety round/check " completed  Intervention: Prevent Skin Injury  Recent Flowsheet Documentation  Taken 7/10/2025 0927 by Erin Rose RN  Body Position:   log-rolled   position changed independently  Intervention: Prevent Infection  Recent Flowsheet Documentation  Taken 7/10/2025 0927 by Erin Rose RN  Infection Prevention:   rest/sleep promoted   single patient room provided   hand hygiene promoted     Problem: Adult Inpatient Plan of Care  Goal: Plan of Care Review  Description: The Plan of Care Review/Shift note should be completed every shift.  The Outcome Evaluation is a brief statement about your assessment that the patient is improving, declining, or no change.  This information will be displayed automatically on your shift  note.  Recent Flowsheet Documentation  Taken 7/10/2025 1446 by Erin Rose RN  Outcome Evaluation: Placement/guardianship pending.  Plan of Care Reviewed With: patient  Overall Patient Progress: no change  Goal: Absence of Hospital-Acquired Illness or Injury  Intervention: Identify and Manage Fall Risk  Recent Flowsheet Documentation  Taken 7/10/2025 1128 by Erin Rose RN  Safety Promotion/Fall Prevention: safety round/check completed  Taken 7/10/2025 0927 by Erin Rose RN  Safety Promotion/Fall Prevention:   activity supervised   clutter free environment maintained   nonskid shoes/slippers when out of bed   patient and family education   safety round/check completed  Intervention: Prevent Skin Injury  Recent Flowsheet Documentation  Taken 7/10/2025 0927 by Erin Rose RN  Body Position:   log-rolled   position changed independently  Intervention: Prevent Infection  Recent Flowsheet Documentation  Taken 7/10/2025 0927 by Erin Rose RN  Infection Prevention:   rest/sleep promoted   single patient room provided   hand hygiene promoted     Problem: Adult Inpatient Plan of Care  Goal: Plan of Care Review  Description: The Plan of Care  "Review/Shift note should be completed every shift.  The Outcome Evaluation is a brief statement about your assessment that the patient is improving, declining, or no change.  This information will be displayed automatically on your shift  note.  Outcome: Progressing  Flowsheets (Taken 7/10/2025 1446)  Outcome Evaluation: Placement/guardianship pending.  Plan of Care Reviewed With: patient  Overall Patient Progress: no change  Goal: Patient-Specific Goal (Individualized)  Description: You can add care plan individualizations to a care plan. Examples of Individualization might be:  \"Parent requests to be called daily at 9am for status\", \"I have a hard time hearing out of my right ear\", or \"Do not touch me to wake me up as it startles  me\".  Outcome: Progressing  Goal: Absence of Hospital-Acquired Illness or Injury  Outcome: Progressing  Intervention: Identify and Manage Fall Risk  Recent Flowsheet Documentation  Taken 7/10/2025 1128 by Erin Rose RN  Safety Promotion/Fall Prevention: safety round/check completed  Taken 7/10/2025 0927 by Erin Rose RN  Safety Promotion/Fall Prevention:   activity supervised   clutter free environment maintained   nonskid shoes/slippers when out of bed   patient and family education   safety round/check completed  Intervention: Prevent Skin Injury  Recent Flowsheet Documentation  Taken 7/10/2025 0927 by Erin Rose RN  Body Position:   log-rolled   position changed independently  Intervention: Prevent Infection  Recent Flowsheet Documentation  Taken 7/10/2025 0927 by Erin Rose, RN  Infection Prevention:   rest/sleep promoted   single patient room provided   hand hygiene promoted  Goal: Optimal Comfort and Wellbeing  Outcome: Progressing  Goal: Readiness for Transition of Care  Outcome: Progressing     Problem: Comorbidity Management  Goal: Maintenance of Asthma Control  Intervention: Maintain Asthma Symptom Control  Recent Flowsheet " Documentation  Taken 7/10/2025 0927 by Erin Rose RN  Medication Review/Management: medications reviewed  Goal: Blood Pressure in Desired Range  Intervention: Maintain Blood Pressure Management  Recent Flowsheet Documentation  Taken 7/10/2025 0927 by Erin Rose RN  Medication Review/Management: medications reviewed     Problem: Comorbidity Management  Goal: Maintenance of Asthma Control  Intervention: Maintain Asthma Symptom Control  Recent Flowsheet Documentation  Taken 7/10/2025 0927 by Erin Rose RN  Medication Review/Management: medications reviewed  Goal: Maintenance of Behavioral Health Symptom Control  Intervention: Maintain Behavioral Health Symptom Control  Recent Flowsheet Documentation  Taken 7/10/2025 0927 by Erin Rose RN  Medication Review/Management: medications reviewed  Goal: Maintenance of COPD Symptom Control  Intervention: Maintain COPD Symptom Control  Recent Flowsheet Documentation  Taken 7/10/2025 0927 by Erin Rose RN  Medication Review/Management: medications reviewed  Goal: Blood Glucose Levels Within Targeted Range  Intervention: Monitor and Manage Glycemia  Recent Flowsheet Documentation  Taken 7/10/2025 0927 by Erin Rose RN  Medication Review/Management: medications reviewed  Goal: Maintenance of Heart Failure Symptom Control  Intervention: Maintain Heart Failure Management  Recent Flowsheet Documentation  Taken 7/10/2025 0927 by Erin Rose RN  Medication Review/Management: medications reviewed  Goal: Blood Pressure in Desired Range  Intervention: Maintain Blood Pressure Management  Recent Flowsheet Documentation  Taken 7/10/2025 0927 by Erin Rose RN  Medication Review/Management: medications reviewed  Goal: Maintenance of Osteoarthritis Symptom Control  Intervention: Maintain Osteoarthritis Symptom Control  Recent Flowsheet Documentation  Taken 7/10/2025 0927 by Erin Rose RN  Activity  Management: activity adjusted per tolerance  Medication Review/Management: medications reviewed  Goal: Bariatric Home Regimen Maintained  Intervention: Maintain and Manage Postbariatric Surgery Care  Recent Flowsheet Documentation  Taken 7/10/2025 0927 by Erin Rose RN  Medication Review/Management: medications reviewed  Goal: Maintenance of Seizure Control  Intervention: Maintain Seizure Symptom Control  Recent Flowsheet Documentation  Taken 7/10/2025 0927 by Erin Rose RN  Medication Review/Management: medications reviewed     Problem: Comorbidity Management  Goal: Maintenance of Asthma Control  Intervention: Maintain Asthma Symptom Control  Recent Flowsheet Documentation  Taken 7/10/2025 0927 by Erin Rose RN  Medication Review/Management: medications reviewed  Goal: Maintenance of Behavioral Health Symptom Control  Intervention: Maintain Behavioral Health Symptom Control  Recent Flowsheet Documentation  Taken 7/10/2025 0927 by Erin Rose RN  Medication Review/Management: medications reviewed  Goal: Maintenance of COPD Symptom Control  Intervention: Maintain COPD Symptom Control  Recent Flowsheet Documentation  Taken 7/10/2025 0927 by Erin Rose RN  Medication Review/Management: medications reviewed  Goal: Blood Glucose Levels Within Targeted Range  Intervention: Monitor and Manage Glycemia  Recent Flowsheet Documentation  Taken 7/10/2025 0927 by Erin Rose RN  Medication Review/Management: medications reviewed  Goal: Maintenance of Heart Failure Symptom Control  Intervention: Maintain Heart Failure Management  Recent Flowsheet Documentation  Taken 7/10/2025 0927 by Erin Rose RN  Medication Review/Management: medications reviewed  Goal: Blood Pressure in Desired Range  Intervention: Maintain Blood Pressure Management  Recent Flowsheet Documentation  Taken 7/10/2025 0927 by Erin Rose RN  Medication Review/Management:  medications reviewed  Goal: Maintenance of Osteoarthritis Symptom Control  Intervention: Maintain Osteoarthritis Symptom Control  Recent Flowsheet Documentation  Taken 7/10/2025 0927 by Erin Rose RN  Activity Management: activity adjusted per tolerance  Medication Review/Management: medications reviewed  Goal: Bariatric Home Regimen Maintained  Intervention: Maintain and Manage Postbariatric Surgery Care  Recent Flowsheet Documentation  Taken 7/10/2025 0927 by Erin Rose RN  Medication Review/Management: medications reviewed  Goal: Maintenance of Seizure Control  Intervention: Maintain Seizure Symptom Control  Recent Flowsheet Documentation  Taken 7/10/2025 0927 by Erin Rose RN  Medication Review/Management: medications reviewed     Problem: Gas Exchange Impaired  Goal: Optimal Gas Exchange  Outcome: Progressing     Problem: Infection  Goal: Absence of Infection Signs and Symptoms  Outcome: Progressing

## 2025-07-10 NOTE — PLAN OF CARE
"A&O x4, w/ cognitive delay at baseline. On 2 L NC, baseline of 2.5 L overnight & PRN during day. Purewick in place w/ adequate output. Urine still slightly pink tinged but no visible blood present. Voiding well. Pain in lower abdomen overnight, PRN Tylenol given. Pt denies SOB/CP/Dizziness. Plan to discharge to [facility], awaiting placement and new legal guardian to be changed today.    B/P: 126/73, T: 98.5, P: 73, R: 18     Problem: Adult Inpatient Plan of Care  Goal: Plan of Care Review  Description: The Plan of Care Review/Shift note should be completed every shift.  The Outcome Evaluation is a brief statement about your assessment that the patient is improving, declining, or no change.  This information will be displayed automatically on your shift  note.  Outcome: Progressing  Flowsheets (Taken 7/10/2025 0800)  Plan of Care Reviewed With: patient  Overall Patient Progress: no change  Goal: Patient-Specific Goal (Individualized)  Description: You can add care plan individualizations to a care plan. Examples of Individualization might be:  \"Parent requests to be called daily at 9am for status\", \"I have a hard time hearing out of my right ear\", or \"Do not touch me to wake me up as it startles  me\".  Outcome: Progressing  Goal: Absence of Hospital-Acquired Illness or Injury  Outcome: Progressing  Intervention: Identify and Manage Fall Risk  Recent Flowsheet Documentation  Taken 7/10/2025 0045 by Gloria Chin RN  Safety Promotion/Fall Prevention: safety round/check completed  Intervention: Prevent and Manage VTE (Venous Thromboembolism) Risk  Recent Flowsheet Documentation  Taken 7/10/2025 0045 by Gloria Chin RN  VTE Prevention/Management: (on warfarin) patient refused intervention  Intervention: Prevent Infection  Recent Flowsheet Documentation  Taken 7/10/2025 0045 by Gloria Chin RN  Infection Prevention:   rest/sleep promoted   single patient room provided   hand hygiene promoted  Goal: Optimal " Comfort and Wellbeing  Outcome: Progressing  Intervention: Monitor Pain and Promote Comfort  Recent Flowsheet Documentation  Taken 7/10/2025 0125 by Gloria Chin, RN  Pain Management Interventions:   care clustered   declines   rest  Taken 7/10/2025 0038 by Gloria Chin, RN  Pain Management Interventions: medication (see MAR)  Goal: Readiness for Transition of Care  Outcome: Progressing     Problem: Gas Exchange Impaired  Goal: Optimal Gas Exchange  Outcome: Progressing     Problem: Infection  Goal: Absence of Infection Signs and Symptoms  Outcome: Progressing   Goal Outcome Evaluation:      Plan of Care Reviewed With: patient    Overall Patient Progress: no changeOverall Patient Progress: no change

## 2025-07-10 NOTE — PLAN OF CARE
"Goal Outcome Evaluation:      Plan of Care Reviewed With: patient    Overall Patient Progress: improvingOverall Patient Progress: improving    Outcome Evaluation: c/o abd and back pain, prn oxy & Tylenol given    A & O x 4, LS, clear, O2 sat 96% 2L NC at bedtime,  Regular diet, PIV/SL, pur wick in place. Left thigh dressing changed. Up A x 1/gb/w.  Plan: possible discharge tomorrow to [facility].  Will continue POC and monitoring.   Problem: Adult Inpatient Plan of Care  Goal: Plan of Care Review  Description: The Plan of Care Review/Shift note should be completed every shift.  The Outcome Evaluation is a brief statement about your assessment that the patient is improving, declining, or no change.  This information will be displayed automatically on your shift  note.  Outcome: Progressing  Flowsheets (Taken 7/9/2025 2301)  Outcome Evaluation: c/o abd and back pain, prn oxy & Tylenol given  Plan of Care Reviewed With: patient  Overall Patient Progress: improving  Goal: Patient-Specific Goal (Individualized)  Description: You can add care plan individualizations to a care plan. Examples of Individualization might be:  \"Parent requests to be called daily at 9am for status\", \"I have a hard time hearing out of my right ear\", or \"Do not touch me to wake me up as it startles  me\".  Outcome: Progressing  Goal: Absence of Hospital-Acquired Illness or Injury  Outcome: Progressing  Intervention: Identify and Manage Fall Risk  Recent Flowsheet Documentation  Taken 7/9/2025 1614 by Quirino Montiel RN  Safety Promotion/Fall Prevention:   activity supervised   nonskid shoes/slippers when out of bed  Intervention: Prevent Skin Injury  Recent Flowsheet Documentation  Taken 7/9/2025 1614 by Quirino Montiel, RN  Body Position: weight shifting  Skin Protection: adhesive use limited  Intervention: Prevent and Manage VTE (Venous Thromboembolism) Risk  Recent Flowsheet Documentation  Taken 7/9/2025 1614 by Quirino Montiel, RN  VTE " Prevention/Management: patient refused intervention  Intervention: Prevent Infection  Recent Flowsheet Documentation  Taken 7/9/2025 1614 by Quirino Montiel RN  Infection Prevention:   environmental surveillance performed   cohorting utilized  Goal: Optimal Comfort and Wellbeing  Outcome: Progressing  Intervention: Monitor Pain and Promote Comfort  Recent Flowsheet Documentation  Taken 7/9/2025 2246 by Quirino Montiel RN  Pain Management Interventions: medication (see MAR)  Taken 7/9/2025 1726 by Quirino Montiel RN  Pain Management Interventions: medication (see MAR)  Goal: Readiness for Transition of Care  Outcome: Progressing     Problem: Gas Exchange Impaired  Goal: Optimal Gas Exchange  Outcome: Progressing  Intervention: Optimize Oxygenation and Ventilation  Recent Flowsheet Documentation  Taken 7/9/2025 1614 by Quirino Montiel RN  Airway/Ventilation Management: airway patency maintained  Head of Bed (HOB) Positioning: HOB at 20-30 degrees     Problem: Infection  Goal: Absence of Infection Signs and Symptoms  Outcome: Progressing  Intervention: Prevent or Manage Infection  Recent Flowsheet Documentation  Taken 7/9/2025 1614 by Quirino Montiel RN  Infection Management: aseptic technique maintained

## 2025-07-10 NOTE — CARE PLAN
Patient voiding via purewick. Discussed with patient alternatives to purewick due to risk of skin breakdown and not having at home. Pt endorsed using a commode at home when asked. Writer offered both bathroom use and commode, pt refused insisting on continued use of purewick.

## 2025-07-10 NOTE — PROGRESS NOTES
Care Management Follow Up    Length of Stay (days): 15    Expected Discharge Date: 07/15/2025     Concerns to be Addressed: discharge planning     Patient plan of care discussed at interdisciplinary rounds: Yes    Anticipated Discharge Disposition:                Anticipated Discharge Services:    Anticipated Discharge DME:      Patient/family educated on Medicare website which has current facility and service quality ratings:    Education Provided on the Discharge Plan:    Patient/Family in Agreement with the Plan:      Referrals Placed by CM/SW:    Private pay costs discussed: Not applicable    Discussed  Partnership in Safe Discharge Planning  document with patient/family: No     Handoff Completed: Yes, [facility] PCP: Internal handoff referral completed    Additional Information:  LUZ MARIA spoke with pt's mother and guardian, Lola. Lola stated that the court hearing regarding a new guardian went well. Lola has tried to contact Gunjan at [facility] to continue moving forward with them. Lola is waiting to hear back from [facility].     Next Steps: LUZ MARIA to follow for discharge planning.    AISSATOU Alexander, CELSA LOERA Care Coordinator/ Med Surg [address] [place] [address]  [phone]

## 2025-07-10 NOTE — PROGRESS NOTES
[facility] Urology Progress Note          Assessment and Plan:     Assessment:    Gross hematuria    Possible urinary tract infection    Recent DVT on warfarin, which is currently being held, history of IVC filter    Asthma exacerbation    Developmental delay with guardian    Morbid obesity    Seizure disorder    Hypertension      Plan:   -If patient continuing to void and adequately emptying, avoid Grajeda catheter.  -Would recommend sending urine cytology.  -Urine culture shows less than 10,000 CFU per mL of mixed jonathan, but given improvement in hematuria and some difficulty with collection, would recommend continuing with antibiotics for presumed urinary tract infection.  -Hemoglobin relatively stable.  - Okay to restart warfarin.  -Will need outpatient workup for gross hematuria with cystoscopy outpatient in our [place] office.    - Anticipate no acute urological intervention.  Will plan on signing off.  Please contact us with any urological concerns.    Renée Bautista PA-C   [facility]  [phone]               Interval History:     Doing well.  PureWick in place with clear peach tea colored urine.  INR 1.17.  Hemoglobin 11.7.  Afebrile without tachycardia.  Denies nausea, vomiting, fevers, chills. On Cipro.              Review of Systems:     The 5 point Review of Systems is negative other than noted in the HPI             Medications:     Current Facility-Administered Medications   Medication Dose Route Frequency Provider Last Rate Last Admin    acetaminophen (TYLENOL) tablet 650 mg  650 mg Oral Q4H PRN David Hull MD   650 mg at 07/10/25 0038    Or    acetaminophen (TYLENOL) Suppository 650 mg  650 mg Rectal Q4H PRN David Hull MD        albuterol (PROVENTIL HFA/VENTOLIN HFA) inhaler  2 puff Inhalation Q6H PRN Neeraj Alvarado MD        albuterol (PROVENTIL) neb solution 2.5 mg  2.5 mg Nebulization Q2H PRN David Hull MD   2.5 mg at 06/26/25 7712     atorvastatin (LIPITOR) tablet 10 mg  10 mg Oral At Bedtime David Hull MD   10 mg at 07/09/25 2151    calcium carbonate (TUMS) chewable tablet 1,000 mg  1,000 mg Oral 4x Daily PRN David Hull MD   1,000 mg at 07/04/25 1616    carBAMazepine (TEGretol) tablet 200 mg  200 mg Oral Q24H David Hull MD   200 mg at 07/09/25 1128    carBAMazepine (TEGretol) tablet 400 mg  400 mg Oral BID David Hull MD   400 mg at 07/10/25 0927    ciprofloxacin (CIPRO) tablet 500 mg  500 mg Oral BID Neeraj Alvarado MD   500 mg at 07/10/25 0927    fluticasone (FLONASE) 50 MCG/ACT spray 1 spray  1 spray Both Nostrils Daily Neeraj Alvarado MD   1 spray at 07/10/25 0928    fluticasone-vilanterol (BREO ELLIPTA) 100-25 MCG/ACT inhaler 1 puff  1 puff Inhalation Daily David Hull MD   1 puff at 07/10/25 0848    ipratropium - albuterol 0.5 mg/2.5 mg (3mg)/3 mL (DUONEB) neb solution 3 mL  3 mL Nebulization Q4H PRN Andreea Mcdonald MD        lidocaine (LMX4) cream   Topical Q1H PRN David Hull MD        lidocaine 1 % 0.1-1 mL  0.1-1 mL Other Q1H PRN David Hull MD        lisinopril (ZESTRIL) tablet 20 mg  20 mg Oral Daily David Hull MD   20 mg at 07/10/25 0927    melatonin tablet 5 mg  5 mg Oral At Bedtime PRN David Hull MD   5 mg at 07/10/25 0038    miconazole (MICATIN) 2 % powder   Topical BID Andreea Mcdonald MD   Given at 07/10/25 0927    naloxone (NARCAN) injection 0.2 mg  0.2 mg Intravenous Q2 Min PRN David Hull MD        Or    naloxone (NARCAN) injection 0.4 mg  0.4 mg Intravenous Q2 Min PRN David Hull MD        Or    naloxone (NARCAN) injection 0.2 mg  0.2 mg Intramuscular Q2 Min PRN David Hull MD        Or    naloxone (NARCAN) injection 0.4 mg  0.4 mg Intramuscular Q2 Min PRN David Hull MD        ondansetron (ZOFRAN ODT) ODT tab 4 mg  4 mg Oral Q6H PRN David Hull MD   4 mg at 07/07/25 0930    Or    ondansetron (ZOFRAN) injection 4 mg  4  mg Intravenous Q6H PRN David Hull MD   4 mg at 07/07/25 1455    oxyCODONE IR (ROXICODONE) half-tab 2.5 mg  2.5 mg Oral Q4H PRN Kaia Evans DO   2.5 mg at 07/09/25 2246    pantoprazole (PROTONIX) EC tablet 40 mg  40 mg Oral QAM AC David Hull MD   40 mg at 07/10/25 0640    Patient is already receiving anticoagulation with heparin, enoxaparin (LOVENOX), warfarin (COUMADIN)  or other anticoagulant medication   Does not apply Continuous PRN David Hull MD        senna-docusate (SENOKOT-S/PERICOLACE) 8.6-50 MG per tablet 1 tablet  1 tablet Oral BID PRN David Hull MD   1 tablet at 07/05/25 1426    Or    senna-docusate (SENOKOT-S/PERICOLACE) 8.6-50 MG per tablet 2 tablet  2 tablet Oral BID PRN David Hull MD   2 tablet at 07/10/25 0934    sertraline (ZOLOFT) tablet 100 mg  100 mg Oral Daily Neeraj Alvarado MD   100 mg at 07/10/25 0927    simethicone (MYLICON) chewable tablet 80 mg  80 mg Oral Q6H PRN Neeraj Reed MD   80 mg at 07/08/25 1656    sodium chloride (PF) 0.9% PF flush 3 mL  3 mL Intracatheter Q8H NIC David Hull MD   3 mL at 07/09/25 2200    sodium chloride (PF) 0.9% PF flush 3 mL  3 mL Intracatheter q1 min prn David Hull MD        Warfarin Dose Required Daily - Pharmacist Managed  1 each Oral See Admin Instructions David Hull MD                      Physical Exam:   Vitals were reviewed  Patient Vitals for the past 8 hrs:   BP Temp Temp src Pulse Resp SpO2   07/10/25 0929 -- -- -- -- -- 94 %   07/10/25 0751 126/73 98.5  F (36.9  C) Oral 73 18 99 %     GEN: NAD, lying in bed  EYES: EOMI  MOUTH: MMM  NECK: Supple  RESP: Unlabored breathing  SKIN: Warm  ABD: soft  NEURO: AAO  URO:PureWick in place with clear peach tea colored urine.           Data:     Lab Results   Component Value Date    NTBNPI <36 04/01/2025    NTBNPI <36 06/24/2024    NTBNPI <36 05/02/2024    NTBNP <36 06/25/2025     Lab Results   Component Value Date    WBC 10.1  06/25/2025    WBC 8.5 05/05/2025    WBC 7.7 04/01/2025    HGB 11.7 07/10/2025    HGB 11.6 (L) 07/09/2025    HGB 11.9 07/09/2025    HCT 42.3 06/25/2025    HCT 40.7 05/05/2025    HCT 39.8 04/01/2025    MCV 87 07/10/2025    MCV 87 07/09/2025    MCV 87 07/09/2025     06/25/2025     05/08/2025     05/05/2025     Lab Results   Component Value Date    INR 1.17 (H) 07/10/2025    INR 1.33 (H) 07/09/2025    INR 1.57 (H) 07/08/2025      All cultures:  Recent Labs   Lab 07/07/25  1041   CULTURE <10,000 CFU/mL Mixture of Urogenital Nevaeh

## 2025-07-10 NOTE — PROGRESS NOTES
[facility]    Hospitalist Progress Note             Date of Admission:  6/25/2025                   Day of hospitalization: 15    Assessment and Plan:   Summary of Stay: Brissa Corado is a 60 year old female admitted on 6/25/2025 with past medical history of asthma on chronic oxygen at night, obstructive sleep apnea, morbid obesity, hypertension, seizure disorder who presents with asthma exacerbation 2/2 poor air quality and not using/having access to her LABA/ICS.     Patient's acute on chronic hypoxic respiratory failure has resolved.  Patient has remained in the hospital for appropriate disposition and placement     7/7: Patient developed hematuria CT abdomen pelvis noncontrast obtained no evidence of nephrolithiasis    Plan for today:  Restart coumadin today     Gross hematuria  Possible UTI  - developed spontaneously 7/7  - hgb normal, improving   - Possible UTI started on ciprofloxacin  - restart coumadin today  -Will need outpatient workup for gross hematuria including cystoscopy and CT urogram.  Will need to schedule cystoscopy in our [place] office.  - Discussed with nursing staff to obtain urine cytology    Acute on chronic hypoxic respiratory failure-resolved  Asthma exacerbation  RICK   At baseline pt is 2.5L at night and during the day as needed.    CTPA negative for PE.  Patient states her triggers been poor air quality and she has not been using her controller LABA/ICS.  Has not had a sleep apnea machine at home but reports that she has an appointment coming up in the next 2 weeks, even though she is diagnosed is not tolerating it due to claustrophobic feelings.  - Wheezing resolved, DuoNeb every 4 hours as needed  - CPAP ordered  - Continue Breo Ellipta control inhaler     History of developmental delay  SW Consultation   -father who is legal guardian states he received paperwork from the [facility] stating that patient is not to be able to return to her own residence and  "requires to be moved to a care facility to ensure she gets her medications as ordered and prescribed.    - consulted and following.   - Per  note patient will be getting appointed a new legal guardian in July.   - Discharge patient will go to a new [facility].  His significant other cannot go with her.     H/o DVT   - Pharmacy warfarin panel ordered.    - dose held 7/6 and 7/7 due to hematuria     Seizure disorder -continue Tegretol     Hypertension -continue lisinopril     Hyperlipidemia - continue statin     Gerd - continue prilosec     Super morbid obesity  Intertriginous excoriations and wounds in multiple skin folds  WOC following   - Start patient on topical azole therapy with ketoconazole 2% twice daily--was switched to miconazole powder per patient's request  - General Wound cares per bedside RN and WOC cares weekly        # Code status: Full  # DVT: SCD  # IVF:  none          Medically Ready for Discharge: Ready Now                    Aimee Cyr MD    Subjective   Chief Complaint:  Abdominal pain, hematuria  Subjective: hematuria improving, otherwise, no other complaints        Objective   /73 (BP Location: Left arm)   Pulse 73   Temp 98.5  F (36.9  C) (Oral)   Resp 18   Ht 1.702 m (5' 7\")   Wt (!) 175.4 kg (386 lb 11 oz)   LMP 08/18/2008   SpO2 94%   BMI 60.56 kg/m       Physical Exam  General: Pt in NAD, normal appearance  HEENT: OP clear MMM, no JVD  Lungs: Clear to Auscultation Bilateral, normal breathing  without accessory muscle usage, no wheezing, rhonchi or crackles  Cardiac: +S1, S2, RRR, no MRG, no edema  Abdominal: normal bowel sounds, NT/ND  Skin: warm, dry, normal turgor, no rash  Psyche: A& O x3, appropriate affect             Intake/Output Summary (Last 24 hours) at 7/8/2025 1315  Last data filed at 7/8/2025 0533  Gross per 24 hour   Intake 960 ml   Output 900 ml   Net 60 ml           Labs and Imaging Results:      Recent Labs   Lab 07/10/25  0607 " "07/09/25  1835   HGB 11.7 11.6*        Recent Labs   Lab 07/08/25  0700   *   CO2 25   BUN 13.5        Recent Labs   Lab 07/10/25  0607 07/09/25  0604   INR 1.17* 1.33*      No results for input(s): \"CKMB\" in the last 168 hours.    Invalid input(s): \"TROPONINT\"   No results for input(s): \"ALBUMIN\", \"AST\", \"ALT\", \"ALKPHOS\", \"BILITOT\" in the last 168 hours.     Micro:     Radio:  CT Abdomen Pelvis w/o Contrast   Final Result   IMPRESSION:    1.  No urinary tract calculi or obstruction.      2.  Interval cholecystectomy. Hepatosplenomegaly without discrete lesion. No abdominopelvic suspicious adenopathy or free fluid.      3.  IVC filter, unchanged.      4.  Postoperative changes of plate and screw fixation at the symphysis pubis. Screw fixation left SI joint. Mild left convex thoracolumbar curve. Mild degenerative changes involving the spine and joints of the pelvis. Resolved inflammatory changes    involving the pannus seen previously. Small fat-containing ventral umbilical hernia.         CT Chest Pulmonary Embolism w Contrast   Final Result   IMPRESSION:   1.  Subtle areas of bronchial wall thickening with areas of air trapping throughout both lungs, suggestive of small airways disease.   2.  No pulmonary embolus.              Medications:      Scheduled Meds:    Current Facility-Administered Medications   Medication Dose Route Frequency Provider Last Rate Last Admin    atorvastatin (LIPITOR) tablet 10 mg  10 mg Oral At Bedtime David Hull MD   10 mg at 07/09/25 2151    carBAMazepine (TEGretol) tablet 200 mg  200 mg Oral Q24H David Hull MD   200 mg at 07/09/25 1128    carBAMazepine (TEGretol) tablet 400 mg  400 mg Oral BID David Hull MD   400 mg at 07/10/25 0927    ciprofloxacin (CIPRO) tablet 500 mg  500 mg Oral BID Neeraj Alvarado MD   500 mg at 07/10/25 0927    fluticasone (FLONASE) 50 MCG/ACT spray 1 spray  1 spray Both Nostrils Daily Neeraj Alvarado MD   1 spray at " 07/10/25 0928    fluticasone-vilanterol (BREO ELLIPTA) 100-25 MCG/ACT inhaler 1 puff  1 puff Inhalation Daily David Hull MD   1 puff at 07/10/25 0848    lisinopril (ZESTRIL) tablet 20 mg  20 mg Oral Daily David Hull MD   20 mg at 07/10/25 0927    miconazole (MICATIN) 2 % powder   Topical BID Andreea Mcdonald MD   Given at 07/10/25 0927    pantoprazole (PROTONIX) EC tablet 40 mg  40 mg Oral QAM AC David Hull MD   40 mg at 07/10/25 0640    sertraline (ZOLOFT) tablet 100 mg  100 mg Oral Daily Neeraj Alvarado MD   100 mg at 07/10/25 0927    sodium chloride (PF) 0.9% PF flush 3 mL  3 mL Intracatheter Q8H NIC David Hull MD   3 mL at 07/09/25 2200    Warfarin Dose Required Daily - Pharmacist Managed  1 each Oral See Admin Instructions Aimee Cyr MD        Warfarin Dose Required Daily - Pharmacist Managed  1 each Oral See Admin Instructions David Hull MD         Continuous Infusions:    Current Facility-Administered Medications   Medication Dose Route Frequency Provider Last Rate Last Admin    Patient is already receiving anticoagulation with heparin, enoxaparin (LOVENOX), warfarin (COUMADIN)  or other anticoagulant medication   Does not apply Continuous PRN David Hull MD         PRN Meds:    Current Facility-Administered Medications   Medication Dose Route Frequency Provider Last Rate Last Admin    acetaminophen (TYLENOL) tablet 650 mg  650 mg Oral Q4H PRN David Hull MD   650 mg at 07/10/25 0038    Or    acetaminophen (TYLENOL) Suppository 650 mg  650 mg Rectal Q4H PRN David Hull MD        albuterol (PROVENTIL HFA/VENTOLIN HFA) inhaler  2 puff Inhalation Q6H PRN Neeraj Alvarado MD        albuterol (PROVENTIL) neb solution 2.5 mg  2.5 mg Nebulization Q2H PRN David Hull MD   2.5 mg at 06/26/25 0416    calcium carbonate (TUMS) chewable tablet 1,000 mg  1,000 mg Oral 4x Daily PRN David Hull MD   1,000 mg at 07/04/25 0959     ipratropium - albuterol 0.5 mg/2.5 mg (3mg)/3 mL (DUONEB) neb solution 3 mL  3 mL Nebulization Q4H PRN Andreea Mcdonald MD        lidocaine (LMX4) cream   Topical Q1H PRN David Hull MD        lidocaine 1 % 0.1-1 mL  0.1-1 mL Other Q1H PRN David Hull MD        melatonin tablet 5 mg  5 mg Oral At Bedtime PRN David Hull MD   5 mg at 07/10/25 0038    naloxone (NARCAN) injection 0.2 mg  0.2 mg Intravenous Q2 Min PRN David Hull MD        Or    naloxone (NARCAN) injection 0.4 mg  0.4 mg Intravenous Q2 Min PRN David Hull MD        Or    naloxone (NARCAN) injection 0.2 mg  0.2 mg Intramuscular Q2 Min PRN David Hull MD        Or    naloxone (NARCAN) injection 0.4 mg  0.4 mg Intramuscular Q2 Min PRN David Hull MD        ondansetron (ZOFRAN ODT) ODT tab 4 mg  4 mg Oral Q6H PRN David Hull MD   4 mg at 07/07/25 0930    Or    ondansetron (ZOFRAN) injection 4 mg  4 mg Intravenous Q6H PRN David Hull MD   4 mg at 07/07/25 1455    oxyCODONE IR (ROXICODONE) half-tab 2.5 mg  2.5 mg Oral Q4H PRN Kaia Evans DO   2.5 mg at 07/09/25 2246    Patient is already receiving anticoagulation with heparin, enoxaparin (LOVENOX), warfarin (COUMADIN)  or other anticoagulant medication   Does not apply Continuous PRN David Hull MD        senna-docusate (SENOKOT-S/PERICOLACE) 8.6-50 MG per tablet 1 tablet  1 tablet Oral BID PRN David Hull MD   1 tablet at 07/05/25 1426    Or    senna-docusate (SENOKOT-S/PERICOLACE) 8.6-50 MG per tablet 2 tablet  2 tablet Oral BID PRN David Hull MD   2 tablet at 07/10/25 0934    simethicone (MYLICON) chewable tablet 80 mg  80 mg Oral Q6H PRN Neeraj Reed MD   80 mg at 07/08/25 1653    sodium chloride (PF) 0.9% PF flush 3 mL  3 mL Intracatheter q1 min prn David Hull MD

## 2025-07-11 LAB
HGB BLD-MCNC: 11.6 G/DL (ref 11.7–15.7)
HGB BLD-MCNC: 11.8 G/DL (ref 11.7–15.7)
INR PPP: 1.09 (ref 0.85–1.15)
MCV RBC AUTO: 87 FL (ref 78–100)
MCV RBC AUTO: 88 FL (ref 78–100)
PROTHROMBIN TIME: 14.2 SECONDS (ref 11.8–14.8)

## 2025-07-11 PROCEDURE — 250N000013 HC RX MED GY IP 250 OP 250 PS 637: Performed by: HOSPITALIST

## 2025-07-11 PROCEDURE — 99232 SBSQ HOSP IP/OBS MODERATE 35: CPT | Performed by: HOSPITALIST

## 2025-07-11 PROCEDURE — 85610 PROTHROMBIN TIME: CPT | Performed by: HOSPITALIST

## 2025-07-11 PROCEDURE — 250N000013 HC RX MED GY IP 250 OP 250 PS 637: Performed by: STUDENT IN AN ORGANIZED HEALTH CARE EDUCATION/TRAINING PROGRAM

## 2025-07-11 PROCEDURE — 120N000001 HC R&B MED SURG/OB

## 2025-07-11 PROCEDURE — 36415 COLL VENOUS BLD VENIPUNCTURE: CPT | Performed by: HOSPITALIST

## 2025-07-11 PROCEDURE — 250N000013 HC RX MED GY IP 250 OP 250 PS 637: Performed by: INTERNAL MEDICINE

## 2025-07-11 PROCEDURE — 250N000011 HC RX IP 250 OP 636: Performed by: HOSPITALIST

## 2025-07-11 PROCEDURE — 85018 HEMOGLOBIN: CPT | Performed by: HOSPITALIST

## 2025-07-11 PROCEDURE — 999N000157 HC STATISTIC RCP TIME EA 10 MIN

## 2025-07-11 PROCEDURE — 94640 AIRWAY INHALATION TREATMENT: CPT

## 2025-07-11 RX ORDER — ENOXAPARIN SODIUM 100 MG/ML
40 INJECTION SUBCUTANEOUS EVERY 12 HOURS
Status: DISCONTINUED | OUTPATIENT
Start: 2025-07-11 | End: 2025-07-12

## 2025-07-11 RX ADMIN — OXYCODONE HYDROCHLORIDE 2.5 MG: 5 TABLET ORAL at 21:36

## 2025-07-11 RX ADMIN — CARBAMAZEPINE 200 MG: 200 TABLET ORAL at 12:18

## 2025-07-11 RX ADMIN — ACETAMINOPHEN 650 MG: 325 TABLET ORAL at 12:32

## 2025-07-11 RX ADMIN — ENOXAPARIN SODIUM 40 MG: 40 INJECTION SUBCUTANEOUS at 10:16

## 2025-07-11 RX ADMIN — SENNOSIDES AND DOCUSATE SODIUM 1 TABLET: 50; 8.6 TABLET ORAL at 21:36

## 2025-07-11 RX ADMIN — ATORVASTATIN CALCIUM 10 MG: 10 TABLET, FILM COATED ORAL at 21:37

## 2025-07-11 RX ADMIN — MICONAZOLE NITRATE: 20 POWDER TOPICAL at 10:15

## 2025-07-11 RX ADMIN — FLUTICASONE FUROATE AND VILANTEROL TRIFENATATE 1 PUFF: 100; 25 POWDER RESPIRATORY (INHALATION) at 07:44

## 2025-07-11 RX ADMIN — CIPROFLOXACIN 500 MG: 500 TABLET ORAL at 21:38

## 2025-07-11 RX ADMIN — CARBAMAZEPINE 400 MG: 200 TABLET ORAL at 08:30

## 2025-07-11 RX ADMIN — ACETAMINOPHEN 650 MG: 325 TABLET ORAL at 08:29

## 2025-07-11 RX ADMIN — PANTOPRAZOLE SODIUM 40 MG: 40 TABLET, DELAYED RELEASE ORAL at 06:01

## 2025-07-11 RX ADMIN — WARFARIN SODIUM 25 MG: 10 TABLET ORAL at 18:03

## 2025-07-11 RX ADMIN — CARBAMAZEPINE 400 MG: 200 TABLET ORAL at 21:37

## 2025-07-11 RX ADMIN — FLUTICASONE PROPIONATE 1 SPRAY: 50 SPRAY, METERED NASAL at 08:33

## 2025-07-11 RX ADMIN — SERTRALINE 100 MG: 100 TABLET, FILM COATED ORAL at 08:30

## 2025-07-11 RX ADMIN — ACETAMINOPHEN 650 MG: 325 TABLET ORAL at 21:36

## 2025-07-11 RX ADMIN — ENOXAPARIN SODIUM 40 MG: 40 INJECTION SUBCUTANEOUS at 21:40

## 2025-07-11 RX ADMIN — MICONAZOLE NITRATE: 20 POWDER TOPICAL at 21:40

## 2025-07-11 RX ADMIN — CIPROFLOXACIN 500 MG: 500 TABLET ORAL at 08:30

## 2025-07-11 RX ADMIN — LISINOPRIL 20 MG: 20 TABLET ORAL at 08:30

## 2025-07-11 ASSESSMENT — ACTIVITIES OF DAILY LIVING (ADL)
ADLS_ACUITY_SCORE: 52

## 2025-07-11 NOTE — PLAN OF CARE
"[phone]   Pt alerty and oriented.  denied pain. Scheduled medications given. Purwick in place. Assist of x1 with GB and walker. Awaiting placement.  Problem: Adult Inpatient Plan of Care  Goal: Plan of Care Review  Description: The Plan of Care Review/Shift note should be completed every shift.  The Outcome Evaluation is a brief statement about your assessment that the patient is improving, declining, or no change.  This information will be displayed automatically on your shift  note.  Outcome: Progressing  Flowsheets (Taken 7/10/2025 2239)  Outcome Evaluation: Denied pain.  Plan of Care Reviewed With: patient  Overall Patient Progress: improving  Goal: Patient-Specific Goal (Individualized)  Description: You can add care plan individualizations to a care plan. Examples of Individualization might be:  \"Parent requests to be called daily at 9am for status\", \"I have a hard time hearing out of my right ear\", or \"Do not touch me to wake me up as it startles  me\".  Outcome: Progressing  Goal: Absence of Hospital-Acquired Illness or Injury  Outcome: Progressing  Intervention: Identify and Manage Fall Risk  Recent Flowsheet Documentation  Taken 7/10/2025 2140 by Lola Ellsworth, PAULA  Safety Promotion/Fall Prevention:   activity supervised   clutter free environment maintained  Goal: Optimal Comfort and Wellbeing  Outcome: Progressing  Goal: Readiness for Transition of Care  Outcome: Progressing     Problem: Gas Exchange Impaired  Goal: Optimal Gas Exchange  Outcome: Progressing     Problem: Infection  Goal: Absence of Infection Signs and Symptoms  Outcome: Progressing   Goal Outcome Evaluation:      Plan of Care Reviewed With: patient    Overall Patient Progress: improvingOverall Patient Progress: improving    Outcome Evaluation: Denied pain.          "

## 2025-07-11 NOTE — PLAN OF CARE
"A&O x4, cognitive delay at baseline. On 2 L NC overnight, which is pt baseline. Pt denies pain overnight, no PRNs needed. Denies SOB/CP. Purewick in place per pt request, adequate output. [facility]. Discharge TBD.     B/P: 117/47, T: 98, P: 72, R: 20     Problem: Adult Inpatient Plan of Care  Goal: Plan of Care Review  Description: The Plan of Care Review/Shift note should be completed every shift.  The Outcome Evaluation is a brief statement about your assessment that the patient is improving, declining, or no change.  This information will be displayed automatically on your shift  note.  Outcome: Progressing  Flowsheets (Taken 7/11/2025 0419)  Plan of Care Reviewed With: patient  Overall Patient Progress: no change  Goal: Patient-Specific Goal (Individualized)  Description: You can add care plan individualizations to a care plan. Examples of Individualization might be:  \"Parent requests to be called daily at 9am for status\", \"I have a hard time hearing out of my right ear\", or \"Do not touch me to wake me up as it startles  me\".  Outcome: Progressing  Goal: Absence of Hospital-Acquired Illness or Injury  Outcome: Progressing  Intervention: Identify and Manage Fall Risk  Recent Flowsheet Documentation  Taken 7/11/2025 0003 by Gloria Chin RN  Safety Promotion/Fall Prevention: safety round/check completed  Intervention: Prevent and Manage VTE (Venous Thromboembolism) Risk  Recent Flowsheet Documentation  Taken 7/11/2025 0003 by Gloria Chin RN  VTE Prevention/Management: SCDs on (sequential compression devices)  Intervention: Prevent Infection  Recent Flowsheet Documentation  Taken 7/11/2025 0003 by Gloria Chin RN  Infection Prevention:   rest/sleep promoted   single patient room provided   hand hygiene promoted  Goal: Optimal Comfort and Wellbeing  Outcome: Progressing  Intervention: Monitor Pain and Promote Comfort  Recent Flowsheet Documentation  Taken 7/11/2025 " 0003 by Gloria Chin RN  Pain Management Interventions:   care clustered   declines   rest  Goal: Readiness for Transition of Care  Outcome: Progressing     Problem: Gas Exchange Impaired  Goal: Optimal Gas Exchange  Outcome: Progressing     Problem: Infection  Goal: Absence of Infection Signs and Symptoms  Outcome: Progressing   Goal Outcome Evaluation:      Plan of Care Reviewed With: patient    Overall Patient Progress: no changeOverall Patient Progress: no change

## 2025-07-11 NOTE — PLAN OF CARE
Shift Summary [id number]  VSS, afebrile, on room air during awake hours  PRN tylenol  Refusing a lot of ambulation, refused shower and/or shower cap  Folds cleaned and wound care completed  Monitoring INR and bridging with lovenox      Goal Outcome Evaluation:      Plan of Care Reviewed With: patient    Overall Patient Progress: improving    Outcome Evaluation: PRN tylenol, VSS, on room air during awake hours          Problem: Gas Exchange Impaired  Goal: Optimal Gas Exchange  7/11/2025 1631 by Bridgette Morales RN  Outcome: Progressing    Problem: Infection  Goal: Absence of Infection Signs and Symptoms  7/11/2025 1631 by Bridgette Morales RN  Outcome: Progressing  Problem: Adult Inpatient Plan of Care  Goal: Plan of Care Review    Recent Flowsheet Documentation  Taken 7/11/2025 1631 by Bridgette Morales RN  Outcome Evaluation: PRN tylenol, VSS, on room air during awake hours  Plan of Care Reviewed With: patient  Overall Patient Progress: improving     Bridgette Morales RN on 7/11/2025 at 6:16 PM

## 2025-07-11 NOTE — PROGRESS NOTES
[facility]    Hospitalist Progress Note             Date of Admission:  6/25/2025                   Day of hospitalization: 16    Assessment and Plan:   Summary of Stay: Brissa Corado is a 60 year old female admitted on 6/25/2025 with past medical history of asthma on chronic oxygen at night, obstructive sleep apnea, morbid obesity, hypertension, seizure disorder who presents with asthma exacerbation 2/2 poor air quality and not using/having access to her LABA/ICS.     Patient's acute on chronic hypoxic respiratory failure has resolved.  Patient has remained in the hospital for appropriate disposition and placement     7/7: Patient developed hematuria CT abdomen pelvis noncontrast obtained no evidence of nephrolithiasis    Plan for today:  Added lovenox     Gross hematuria  Possible UTI  - developed spontaneously 7/7  - hgb normal, improving   - Possible UTI started on ciprofloxacin  - restarted coumadin   -Will need outpatient workup for gross hematuria including cystoscopy and CT urogram.  Will need to schedule cystoscopy in our [place] office.  - urine cytology pending    Acute on chronic hypoxic respiratory failure-resolved  Asthma exacerbation  RICK   At baseline pt is 2.5L at night and during the day as needed.    CTPA negative for PE.  Patient states her triggers been poor air quality and she has not been using her controller LABA/ICS.  Has not had a sleep apnea machine at home but reports that she has an appointment coming up in the next 2 weeks, even though she is diagnosed is not tolerating it due to claustrophobic feelings.  - Wheezing resolved, DuoNeb every 4 hours as needed  - CPAP ordered  - Continue Breo Ellipta control inhaler     History of developmental delay  SW Consultation   -father who is legal guardian states he received paperwork from the [facility] stating that patient is not to be able to return to her own residence and requires to be moved to a care facility to ensure  "she gets her medications as ordered and prescribed.    - consulted and following.   - Per  note patient will be getting appointed a new legal guardian in July.   - Discharge patient will go to a new [facility].  His significant other cannot go with her.     H/o DVT   - Pharmacy warfarin panel ordered.    - dose held 7/6 and 7/7 due to hematuria  -resumed coumadin, will start low dose Lovenox, if hemoglobin and hematuria remain stable will treat with higher dose Lovenox     Seizure disorder -continue Tegretol     Hypertension -continue lisinopril     Hyperlipidemia - continue statin     Gerd - continue prilosec     Super morbid obesity  Intertriginous excoriations and wounds in multiple skin folds  WOC following   - Start patient on topical azole therapy with ketoconazole 2% twice daily--was switched to miconazole powder per patient's request  - General Wound cares per bedside RN and WOC cares weekly        # Code status: Full  # DVT: SCD  # IVF:  none          Medically Ready for Discharge: Ready Now                    Aimee Cyr MD    Subjective   Chief Complaint:  Abdominal pain, hematuria  Subjective: hematuria improving, otherwise, no other complaints        Objective   /58 (BP Location: Left arm)   Pulse 71   Temp 98.1  F (36.7  C) (Oral)   Resp 21   Ht 1.702 m (5' 7\")   Wt (!) 175.4 kg (386 lb 11 oz)   LMP 08/18/2008   SpO2 98%   BMI 60.56 kg/m       Physical Exam  General: Pt in NAD, normal appearance  HEENT: OP clear MMM, no JVD  Lungs: Clear to Auscultation Bilateral, normal breathing  without accessory muscle usage, no wheezing, rhonchi or crackles  Cardiac: +S1, S2, RRR, no MRG, no edema  Abdominal: normal bowel sounds, NT/ND  Skin: warm, dry, normal turgor, no rash  Psyche: A& O x3, appropriate affect             Intake/Output Summary (Last 24 hours) at 7/8/2025 1315  Last data filed at 7/8/2025 0533  Gross per 24 hour   Intake 960 ml   Output 900 ml   Net 60 ml      " "     Labs and Imaging Results:      Recent Labs   Lab 07/11/25  0703 07/10/25  1812   HGB 11.8 11.8        Recent Labs   Lab 07/08/25  0700   *   CO2 25   BUN 13.5        Recent Labs   Lab 07/11/25  0703 07/10/25  1219   INR 1.09 1.09      No results for input(s): \"CKMB\" in the last 168 hours.    Invalid input(s): \"TROPONINT\"   No results for input(s): \"ALBUMIN\", \"AST\", \"ALT\", \"ALKPHOS\", \"BILITOT\" in the last 168 hours.     Micro:     Radio:  CT Abdomen Pelvis w/o Contrast   Final Result   IMPRESSION:    1.  No urinary tract calculi or obstruction.      2.  Interval cholecystectomy. Hepatosplenomegaly without discrete lesion. No abdominopelvic suspicious adenopathy or free fluid.      3.  IVC filter, unchanged.      4.  Postoperative changes of plate and screw fixation at the symphysis pubis. Screw fixation left SI joint. Mild left convex thoracolumbar curve. Mild degenerative changes involving the spine and joints of the pelvis. Resolved inflammatory changes    involving the pannus seen previously. Small fat-containing ventral umbilical hernia.         CT Chest Pulmonary Embolism w Contrast   Final Result   IMPRESSION:   1.  Subtle areas of bronchial wall thickening with areas of air trapping throughout both lungs, suggestive of small airways disease.   2.  No pulmonary embolus.              Medications:      Scheduled Meds:    Current Facility-Administered Medications   Medication Dose Route Frequency Provider Last Rate Last Admin    atorvastatin (LIPITOR) tablet 10 mg  10 mg Oral At Bedtime David Hull MD   10 mg at 07/10/25 2148    carBAMazepine (TEGretol) tablet 200 mg  200 mg Oral Q24H David Hull MD   200 mg at 07/11/25 1218    carBAMazepine (TEGretol) tablet 400 mg  400 mg Oral BID David Hull MD   400 mg at 07/11/25 0830    ciprofloxacin (CIPRO) tablet 500 mg  500 mg Oral BID Neeraj Alvarado MD   500 mg at 07/11/25 0830    enoxaparin ANTICOAGULANT (LOVENOX) injection 40 mg  " 40 mg Subcutaneous Q12H Aimee Cyr MD   40 mg at 07/11/25 1016    fluticasone (FLONASE) 50 MCG/ACT spray 1 spray  1 spray Both Nostrils Daily Neeraj Alvarado MD   1 spray at 07/11/25 0833    fluticasone-vilanterol (BREO ELLIPTA) 100-25 MCG/ACT inhaler 1 puff  1 puff Inhalation Daily David Hull MD   1 puff at 07/11/25 0744    lisinopril (ZESTRIL) tablet 20 mg  20 mg Oral Daily David Hull MD   20 mg at 07/11/25 0830    miconazole (MICATIN) 2 % powder   Topical BID Andreea Mcdonald MD   Given at 07/11/25 1015    pantoprazole (PROTONIX) EC tablet 40 mg  40 mg Oral QAM AC David Hull MD   40 mg at 07/11/25 0601    polyethylene glycol (MIRALAX) Packet 17 g  17 g Oral Daily Aimee Cyr MD   17 g at 07/10/25 1455    senna-docusate (SENOKOT-S/PERICOLACE) 8.6-50 MG per tablet 1 tablet  1 tablet Oral BID Aimee Cyr MD   1 tablet at 07/10/25 2147    sertraline (ZOLOFT) tablet 100 mg  100 mg Oral Daily Neeraj Alvarado MD   100 mg at 07/11/25 0830    sodium chloride (PF) 0.9% PF flush 3 mL  3 mL Intracatheter Q8H NIC David Hull MD   3 mL at 07/10/25 2150    warfarin ANTICOAGULANT (COUMADIN/JANTOVEN) tablet 25 mg  25 mg Oral ONCE at 18:00 Aimee Cyr MD        Warfarin Dose Required Daily - Pharmacist Managed  1 each Oral See Admin Instructions Aimee Cyr MD        Warfarin Dose Required Daily - Pharmacist Managed  1 each Oral See Admin Instructions David Hull MD         Continuous Infusions:    Current Facility-Administered Medications   Medication Dose Route Frequency Provider Last Rate Last Admin    Patient is already receiving anticoagulation with heparin, enoxaparin (LOVENOX), warfarin (COUMADIN)  or other anticoagulant medication   Does not apply Continuous PRN David Hull MD         PRN Meds:    Current Facility-Administered Medications   Medication Dose Route Frequency Provider Last Rate Last Admin    acetaminophen (TYLENOL)  tablet 650 mg  650 mg Oral Q4H PRN David Hull MD   650 mg at 07/11/25 1232    Or    acetaminophen (TYLENOL) Suppository 650 mg  650 mg Rectal Q4H PRN David Hull MD        albuterol (PROVENTIL HFA/VENTOLIN HFA) inhaler  2 puff Inhalation Q6H PRN Neeraj Alvarado MD        albuterol (PROVENTIL) neb solution 2.5 mg  2.5 mg Nebulization Q2H PRN David Hull MD   2.5 mg at 06/26/25 0416    bisacodyl (DULCOLAX) suppository 10 mg  10 mg Rectal Daily PRN Aimee Cyr MD        calcium carbonate (TUMS) chewable tablet 1,000 mg  1,000 mg Oral 4x Daily PRN David Hull MD   1,000 mg at 07/04/25 1616    ipratropium - albuterol 0.5 mg/2.5 mg (3mg)/3 mL (DUONEB) neb solution 3 mL  3 mL Nebulization Q4H PRN Andreea Mcdonald MD        lidocaine (LMX4) cream   Topical Q1H PRN David Hull MD        lidocaine 1 % 0.1-1 mL  0.1-1 mL Other Q1H PRN David Hull MD        melatonin tablet 5 mg  5 mg Oral At Bedtime PRN David Hull MD   5 mg at 07/10/25 0038    naloxone (NARCAN) injection 0.2 mg  0.2 mg Intravenous Q2 Min PRN David Hull MD        Or    naloxone (NARCAN) injection 0.4 mg  0.4 mg Intravenous Q2 Min PRN David Hull MD        Or    naloxone (NARCAN) injection 0.2 mg  0.2 mg Intramuscular Q2 Min PRN David Hull MD        Or    naloxone (NARCAN) injection 0.4 mg  0.4 mg Intramuscular Q2 Min PRN David Hull MD        ondansetron (ZOFRAN ODT) ODT tab 4 mg  4 mg Oral Q6H PRN David Hull MD   4 mg at 07/07/25 0930    Or    ondansetron (ZOFRAN) injection 4 mg  4 mg Intravenous Q6H PRN David Hull MD   4 mg at 07/07/25 1455    oxyCODONE IR (ROXICODONE) half-tab 2.5 mg  2.5 mg Oral Q4H PRN Kaia Evans DO   2.5 mg at 07/10/25 1243    Patient is already receiving anticoagulation with heparin, enoxaparin (LOVENOX), warfarin (COUMADIN)  or other anticoagulant medication   Does not apply Continuous PRN David Hull MD         senna-docusate (SENOKOT-S/PERICOLACE) 8.6-50 MG per tablet 1 tablet  1 tablet Oral BID PRN aDvid Hull MD   1 tablet at 07/05/25 1426    Or    senna-docusate (SENOKOT-S/PERICOLACE) 8.6-50 MG per tablet 2 tablet  2 tablet Oral BID PRN David Hull MD   2 tablet at 07/10/25 0934    simethicone (MYLICON) chewable tablet 80 mg  80 mg Oral Q6H PRN Neeraj Reed MD   80 mg at 07/10/25 1455    sodium chloride (PF) 0.9% PF flush 3 mL  3 mL Intracatheter q1 min prn David Hull MD

## 2025-07-12 LAB
HGB BLD-MCNC: 11.7 G/DL (ref 11.7–15.7)
HGB BLD-MCNC: 12.2 G/DL (ref 11.7–15.7)
INR PPP: 1.26 (ref 0.85–1.15)
MCV RBC AUTO: 86 FL (ref 78–100)
PLATELET # BLD AUTO: 209 10E3/UL (ref 150–450)
PROTHROMBIN TIME: 15.8 SECONDS (ref 11.8–14.8)

## 2025-07-12 PROCEDURE — 99232 SBSQ HOSP IP/OBS MODERATE 35: CPT | Performed by: HOSPITALIST

## 2025-07-12 PROCEDURE — 250N000013 HC RX MED GY IP 250 OP 250 PS 637: Performed by: HOSPITALIST

## 2025-07-12 PROCEDURE — 250N000011 HC RX IP 250 OP 636: Performed by: HOSPITALIST

## 2025-07-12 PROCEDURE — 250N000013 HC RX MED GY IP 250 OP 250 PS 637: Performed by: STUDENT IN AN ORGANIZED HEALTH CARE EDUCATION/TRAINING PROGRAM

## 2025-07-12 PROCEDURE — 36415 COLL VENOUS BLD VENIPUNCTURE: CPT | Performed by: HOSPITALIST

## 2025-07-12 PROCEDURE — 85049 AUTOMATED PLATELET COUNT: CPT | Performed by: HOSPITALIST

## 2025-07-12 PROCEDURE — 85018 HEMOGLOBIN: CPT | Performed by: HOSPITALIST

## 2025-07-12 PROCEDURE — 120N000001 HC R&B MED SURG/OB

## 2025-07-12 PROCEDURE — 250N000013 HC RX MED GY IP 250 OP 250 PS 637: Performed by: INTERNAL MEDICINE

## 2025-07-12 PROCEDURE — 85610 PROTHROMBIN TIME: CPT | Performed by: HOSPITALIST

## 2025-07-12 PROCEDURE — 94640 AIRWAY INHALATION TREATMENT: CPT

## 2025-07-12 RX ORDER — ENOXAPARIN SODIUM 150 MG/ML
1 INJECTION SUBCUTANEOUS EVERY 12 HOURS
Status: DISCONTINUED | OUTPATIENT
Start: 2025-07-12 | End: 2025-07-14

## 2025-07-12 RX ADMIN — CIPROFLOXACIN 500 MG: 500 TABLET ORAL at 21:49

## 2025-07-12 RX ADMIN — MICONAZOLE NITRATE: 20 POWDER TOPICAL at 08:47

## 2025-07-12 RX ADMIN — SERTRALINE 100 MG: 100 TABLET, FILM COATED ORAL at 08:33

## 2025-07-12 RX ADMIN — CARBAMAZEPINE 400 MG: 200 TABLET ORAL at 08:33

## 2025-07-12 RX ADMIN — ACETAMINOPHEN 650 MG: 325 TABLET ORAL at 23:28

## 2025-07-12 RX ADMIN — CARBAMAZEPINE 400 MG: 200 TABLET ORAL at 21:49

## 2025-07-12 RX ADMIN — POLYETHYLENE GLYCOL 3350 17 G: 17 POWDER, FOR SOLUTION ORAL at 08:32

## 2025-07-12 RX ADMIN — FLUTICASONE PROPIONATE 1 SPRAY: 50 SPRAY, METERED NASAL at 08:36

## 2025-07-12 RX ADMIN — MICONAZOLE NITRATE: 20 POWDER TOPICAL at 21:50

## 2025-07-12 RX ADMIN — LISINOPRIL 20 MG: 20 TABLET ORAL at 08:33

## 2025-07-12 RX ADMIN — WARFARIN SODIUM 25 MG: 10 TABLET ORAL at 17:52

## 2025-07-12 RX ADMIN — CARBAMAZEPINE 200 MG: 200 TABLET ORAL at 13:20

## 2025-07-12 RX ADMIN — OXYCODONE HYDROCHLORIDE 2.5 MG: 5 TABLET ORAL at 21:49

## 2025-07-12 RX ADMIN — ENOXAPARIN SODIUM 150 MG: 150 INJECTION SUBCUTANEOUS at 21:55

## 2025-07-12 RX ADMIN — CIPROFLOXACIN 500 MG: 500 TABLET ORAL at 08:33

## 2025-07-12 RX ADMIN — FLUTICASONE FUROATE AND VILANTEROL TRIFENATATE 1 PUFF: 100; 25 POWDER RESPIRATORY (INHALATION) at 08:30

## 2025-07-12 RX ADMIN — PANTOPRAZOLE SODIUM 40 MG: 40 TABLET, DELAYED RELEASE ORAL at 06:56

## 2025-07-12 RX ADMIN — ACETAMINOPHEN 650 MG: 325 TABLET ORAL at 13:20

## 2025-07-12 RX ADMIN — SENNOSIDES AND DOCUSATE SODIUM 1 TABLET: 50; 8.6 TABLET ORAL at 08:33

## 2025-07-12 RX ADMIN — ENOXAPARIN SODIUM 40 MG: 40 INJECTION SUBCUTANEOUS at 08:33

## 2025-07-12 RX ADMIN — ATORVASTATIN CALCIUM 10 MG: 10 TABLET, FILM COATED ORAL at 21:49

## 2025-07-12 ASSESSMENT — ACTIVITIES OF DAILY LIVING (ADL)
ADLS_ACUITY_SCORE: 52
ADLS_ACUITY_SCORE: 48
ADLS_ACUITY_SCORE: 52

## 2025-07-12 NOTE — PLAN OF CARE
Shift Summary [id number]  VSS, on room air during awake hours  Remains on oral abx  Urine output adequate  Wound care completed      Goal Outcome Evaluation:      Plan of Care Reviewed With: patient    Overall Patient Progress: improving    Outcome Evaluation: VSS, PRN tylenol, wound care completed          Problem: Adult Inpatient Plan of Care  Goal: Plan of Care Review  Description: The Plan of Care Review/Shift note should be completed every shift.  The Outcome Evaluation is a brief statement about your assessment that the patient is improving, declining, or no change.  This information will be displayed automatically on your shift  note.  Recent Flowsheet Documentation  Taken 7/12/2025 1813 by Bridgette Morales RN  Outcome Evaluation: VSS, PRN tylenol, wound care completed  Plan of Care Reviewed With: patient  Overall Patient Progress: improving     Problem: Gas Exchange Impaired  Goal: Optimal Gas Exchange  7/12/2025 1813 by Bridgette Morales, RN  Outcome: Progressing  7/12/2025 1229 by Bridgette Morales RN  Outcome: Progressing     Problem: Infection  Goal: Absence of Infection Signs and Symptoms  7/12/2025 1813 by Bridgette Morales RN  Outcome: Progressing  7/12/2025 1229 by Bridgette Morales RN  Outcome: Progressing     Bridgette Morales RN on 7/12/2025 at 6:14 PM

## 2025-07-12 NOTE — PROGRESS NOTES
[facility]    Hospitalist Progress Note             Date of Admission:  6/25/2025                   Day of hospitalization: 17    Assessment and Plan:   Summary of Stay: Brissa Corado is a 60 year old female admitted on 6/25/2025 with past medical history of asthma on chronic oxygen at night, obstructive sleep apnea, morbid obesity, hypertension, seizure disorder who presents with asthma exacerbation 2/2 poor air quality and not using/having access to her LABA/ICS.     Patient's acute on chronic hypoxic respiratory failure has resolved.  Patient has remained in the hospital for appropriate disposition and placement     7/7: Patient developed hematuria CT abdomen pelvis noncontrast obtained no evidence of nephrolithiasis    Plan for today:  Added lovenox full dose for bridging while on Coumadin     Gross hematuria  Possible UTI  - developed spontaneously 7/7  - hgb normal, improving   - Possible UTI started on ciprofloxacin, urine cultures no growth, stopping ciprofloxacin tomorrow for total day 7 of therapy  - restarted coumadin   -Will need outpatient workup for gross hematuria including cystoscopy and CT urogram.  Will need to schedule cystoscopy in [place] office.  - urine cytology pending    Acute on chronic hypoxic respiratory failure-resolved  Asthma exacerbation  RICK   At baseline pt is 2.5L at night and during the day as needed.    CTPA negative for PE.  Patient states her triggers been poor air quality and she has not been using her controller LABA/ICS.  Has not had a sleep apnea machine at home but reports that she has an appointment coming up in the next 2 weeks, even though she is diagnosed is not tolerating it due to claustrophobic feelings.  - Wheezing resolved, DuoNeb every 4 hours as needed  - CPAP ordered  - Continue Breo Ellipta control inhaler     History of developmental delay  SW Consultation   -father who is legal guardian states he received paperwork from the [facility] the [facility]  "stating that patient is not to be able to return to her own residence and requires to be moved to a care facility to ensure she gets her medications as ordered and prescribed.    - consulted and following.   - Per  note patient will be getting appointed a new legal guardian in July.   - Discharge patient will go to a new [facility].  His significant other cannot go with her.     H/o DVT   - Pharmacy warfarin panel ordered.    - dose held 7/6 and 7/7 due to hematuria  -resumed coumadin, with Lovenox bridging     Seizure disorder -continue Tegretol     Hypertension -continue lisinopril     Hyperlipidemia - continue statin     Gerd - continue prilosec     Super morbid obesity  Intertriginous excoriations and wounds in multiple skin folds  WOC following   - Start patient on topical azole therapy with ketoconazole 2% twice daily--was switched to miconazole powder per patient's request  - General Wound cares per bedside RN and WOC cares weekly        # Code status: Full  # DVT: SCD  # IVF:  none          Medically Ready for Discharge: Ready Now                    Aimee Cyr MD    Subjective   Chief Complaint:  Abdominal pain, hematuria  Subjective: hematuria improving, otherwise, no other complaints        Objective   BP (!) 142/55 (BP Location: Left arm)   Pulse 74   Temp 98.1  F (36.7  C) (Oral)   Resp 19   Ht 1.702 m (5' 7\")   Wt (!) 175.4 kg (386 lb 11 oz)   LMP 08/18/2008   SpO2 96%   BMI 60.56 kg/m       Physical Exam  General: Pt in NAD, normal appearance  HEENT: OP clear MMM, no JVD  Lungs: Clear to Auscultation Bilateral, normal breathing  without accessory muscle usage, no wheezing, rhonchi or crackles  Cardiac: +S1, S2, RRR, no MRG, no edema  Abdominal: normal bowel sounds, NT/ND  Skin: warm, dry, normal turgor, no rash  Psyche: A& O x3, appropriate affect             Intake/Output Summary (Last 24 hours) at 7/8/2025 1315  Last data filed at 7/8/2025 0533  Gross per 24 hour " "  Intake 960 ml   Output 900 ml   Net 60 ml           Labs and Imaging Results:      Recent Labs   Lab 07/12/25  0620 07/11/25  1850   HGB 11.7 11.6*     --         Recent Labs   Lab 07/08/25  0700   *   CO2 25   BUN 13.5        Recent Labs   Lab 07/12/25  0620 07/11/25  0703   INR 1.26* 1.09      No results for input(s): \"CKMB\" in the last 168 hours.    Invalid input(s): \"TROPONINT\"   No results for input(s): \"ALBUMIN\", \"AST\", \"ALT\", \"ALKPHOS\", \"BILITOT\" in the last 168 hours.     Micro:     Radio:  CT Abdomen Pelvis w/o Contrast   Final Result   IMPRESSION:    1.  No urinary tract calculi or obstruction.      2.  Interval cholecystectomy. Hepatosplenomegaly without discrete lesion. No abdominopelvic suspicious adenopathy or free fluid.      3.  IVC filter, unchanged.      4.  Postoperative changes of plate and screw fixation at the symphysis pubis. Screw fixation left SI joint. Mild left convex thoracolumbar curve. Mild degenerative changes involving the spine and joints of the pelvis. Resolved inflammatory changes    involving the pannus seen previously. Small fat-containing ventral umbilical hernia.         CT Chest Pulmonary Embolism w Contrast   Final Result   IMPRESSION:   1.  Subtle areas of bronchial wall thickening with areas of air trapping throughout both lungs, suggestive of small airways disease.   2.  No pulmonary embolus.              Medications:      Scheduled Meds:    Current Facility-Administered Medications   Medication Dose Route Frequency Provider Last Rate Last Admin    atorvastatin (LIPITOR) tablet 10 mg  10 mg Oral At Bedtime David Hull MD   10 mg at 07/11/25 2137    carBAMazepine (TEGretol) tablet 200 mg  200 mg Oral Q24H David Hull MD   200 mg at 07/11/25 1218    carBAMazepine (TEGretol) tablet 400 mg  400 mg Oral BID David Hull MD   400 mg at 07/12/25 0833    ciprofloxacin (CIPRO) tablet 500 mg  500 mg Oral BID Neeraj Alvarado MD   500 mg at " 07/12/25 0833    enoxaparin ANTICOAGULANT (LOVENOX) injection 150 mg  1 mg/kg Subcutaneous Q12H Aimee Cyr MD        fluticasone (FLONASE) 50 MCG/ACT spray 1 spray  1 spray Both Nostrils Daily Neeraj Alvarado MD   1 spray at 07/12/25 0836    fluticasone-vilanterol (BREO ELLIPTA) 100-25 MCG/ACT inhaler 1 puff  1 puff Inhalation Daily David Hull MD   1 puff at 07/12/25 0830    lisinopril (ZESTRIL) tablet 20 mg  20 mg Oral Daily David Hull MD   20 mg at 07/12/25 0833    miconazole (MICATIN) 2 % powder   Topical BID Andreea Mcdonald MD   Given at 07/12/25 0847    pantoprazole (PROTONIX) EC tablet 40 mg  40 mg Oral QAM AC David Hull MD   40 mg at 07/12/25 0656    polyethylene glycol (MIRALAX) Packet 17 g  17 g Oral Daily Aimee Cyr MD   17 g at 07/12/25 0832    senna-docusate (SENOKOT-S/PERICOLACE) 8.6-50 MG per tablet 1 tablet  1 tablet Oral BID Aimee Cyr MD   1 tablet at 07/12/25 0833    sertraline (ZOLOFT) tablet 100 mg  100 mg Oral Daily Neeraj Alvarado MD   100 mg at 07/12/25 0833    sodium chloride (PF) 0.9% PF flush 3 mL  3 mL Intracatheter Q8H [facility] David Hull MD   3 mL at 07/11/25 2141    warfarin ANTICOAGULANT (COUMADIN/JANTOVEN) tablet 25 mg  25 mg Oral ONCE at 18:00 David Hull MD        Warfarin Dose Required Daily - Pharmacist Managed  1 each Oral See Admin Instructions Aimee Cyr MD         Continuous Infusions:    Current Facility-Administered Medications   Medication Dose Route Frequency Provider Last Rate Last Admin    Patient is already receiving anticoagulation with heparin, enoxaparin (LOVENOX), warfarin (COUMADIN)  or other anticoagulant medication   Does not apply Continuous PRN David Hull MD         PRN Meds:    Current Facility-Administered Medications   Medication Dose Route Frequency Provider Last Rate Last Admin    acetaminophen (TYLENOL) tablet 650 mg  650 mg Oral Q4H PRN David Hull MD   650 mg at  07/11/25 2136    Or    acetaminophen (TYLENOL) Suppository 650 mg  650 mg Rectal Q4H PRN David Hull MD        albuterol (PROVENTIL HFA/VENTOLIN HFA) inhaler  2 puff Inhalation Q6H PRN Neeraj Alvarado MD        albuterol (PROVENTIL) neb solution 2.5 mg  2.5 mg Nebulization Q2H PRN David Hull MD   2.5 mg at 06/26/25 0416    bisacodyl (DULCOLAX) suppository 10 mg  10 mg Rectal Daily PRN Aimee Cyr MD        calcium carbonate (TUMS) chewable tablet 1,000 mg  1,000 mg Oral 4x Daily PRN David Hull MD   1,000 mg at 07/04/25 1616    ipratropium - albuterol 0.5 mg/2.5 mg (3mg)/3 mL (DUONEB) neb solution 3 mL  3 mL Nebulization Q4H PRN Andreea Mcdonald MD        lidocaine (LMX4) cream   Topical Q1H PRN David Hull MD        lidocaine 1 % 0.1-1 mL  0.1-1 mL Other Q1H PRN David Hull MD        melatonin tablet 5 mg  5 mg Oral At Bedtime PRN David Hull MD   5 mg at 07/10/25 0038    naloxone (NARCAN) injection 0.2 mg  0.2 mg Intravenous Q2 Min PRN David Hull MD        Or    naloxone (NARCAN) injection 0.4 mg  0.4 mg Intravenous Q2 Min PRN David Hull MD        Or    naloxone (NARCAN) injection 0.2 mg  0.2 mg Intramuscular Q2 Min PRN David Hull MD        Or    naloxone (NARCAN) injection 0.4 mg  0.4 mg Intramuscular Q2 Min PRN David Hull MD        ondansetron (ZOFRAN ODT) ODT tab 4 mg  4 mg Oral Q6H PRN David Hull MD   4 mg at 07/07/25 0930    Or    ondansetron (ZOFRAN) injection 4 mg  4 mg Intravenous Q6H PRN David Hull MD   4 mg at 07/07/25 1455    oxyCODONE IR (ROXICODONE) half-tab 2.5 mg  2.5 mg Oral Q4H PRN Kaia Evans DO   2.5 mg at 07/11/25 2139    Patient is already receiving anticoagulation with heparin, enoxaparin (LOVENOX), warfarin (COUMADIN)  or other anticoagulant medication   Does not apply Continuous PRN David Hull MD        simethicone (MYLICON) chewable tablet 80 mg  80 mg Oral Q6H PRN Derek,  MD Neeraj   80 mg at 07/10/25 1455    sodium chloride (PF) 0.9% PF flush 3 mL  3 mL Intracatheter q1 min prn David Hull MD

## 2025-07-12 NOTE — PLAN OF CARE
End OF Shift Summary :    Neuro:  A&O4 denied chest pain.        Cardiac:  WDL         Respiratory : lung sounds clear on 2L NC satting at 95%      GI &  : tender with complaint of lower abdominal obese.pure wick in place      Skin : pale posterior thigh new meplix applied genralized rash.        Mobility : belt gait with walker assist of 1 generalized weakness      IV access :  patent flushed and cap changed        Goal Outcome Evaluation:      Plan of Care Reviewed With: patient    Overall Patient Progress: improvingOverall Patient Progress: improving       Problem: Adult Inpatient Plan of Care  Goal: Plan of Care Review  Description: The Plan of Care Review/Shift note should be completed every shift.  The Outcome Evaluation is a brief statement about your assessment that the patient is improving, declining, or no change.  This information will be displayed automatically on your shift  note.  Recent Flowsheet Documentation  Taken 7/12/2025 0333 by Piper Yao RN  Plan of Care Reviewed With: patient  Overall Patient Progress: improving  Goal: Absence of Hospital-Acquired Illness or Injury  Intervention: Prevent and Manage VTE (Venous Thromboembolism) Risk  Recent Flowsheet Documentation  Taken 7/11/2025 2140 by Piper Yao RN  VTE Prevention/Management: SCDs on (sequential compression devices)  Goal: Optimal Comfort and Wellbeing  Intervention: Monitor Pain and Promote Comfort  Recent Flowsheet Documentation  Taken 7/11/2025 2200 by Piper Yao RN  Pain Management Interventions: declines  Taken 7/11/2025 2136 by Piper Yao RN  Pain Management Interventions: medication (see MAR)     Problem: Adult Inpatient Plan of Care  Goal: Plan of Care Review  Description: The Plan of Care Review/Shift note should be completed every shift.  The Outcome Evaluation is a brief statement about your assessment that the patient is improving, declining, or no change.  This information will be displayed  "automatically on your shift  note.  Recent Flowsheet Documentation  Taken 7/12/2025 0333 by Piper Yao RN  Plan of Care Reviewed With: patient  Overall Patient Progress: improving  Goal: Absence of Hospital-Acquired Illness or Injury  Intervention: Prevent and Manage VTE (Venous Thromboembolism) Risk  Recent Flowsheet Documentation  Taken 7/11/2025 2140 by Piper Yao RN  VTE Prevention/Management: SCDs on (sequential compression devices)  Goal: Optimal Comfort and Wellbeing  Intervention: Monitor Pain and Promote Comfort  Recent Flowsheet Documentation  Taken 7/11/2025 2200 by Piper Yao RN  Pain Management Interventions: declines  Taken 7/11/2025 2136 by Piper Yao RN  Pain Management Interventions: medication (see MAR)     Problem: Adult Inpatient Plan of Care  Goal: Plan of Care Review  Description: The Plan of Care Review/Shift note should be completed every shift.  The Outcome Evaluation is a brief statement about your assessment that the patient is improving, declining, or no change.  This information will be displayed automatically on your shift  note.  Outcome: Progressing  Flowsheets (Taken 7/12/2025 0333)  Plan of Care Reviewed With: patient  Overall Patient Progress: improving  Goal: Patient-Specific Goal (Individualized)  Description: You can add care plan individualizations to a care plan. Examples of Individualization might be:  \"Parent requests to be called daily at 9am for status\", \"I have a hard time hearing out of my right ear\", or \"Do not touch me to wake me up as it startles  me\".  Outcome: Progressing  Goal: Absence of Hospital-Acquired Illness or Injury  Outcome: Progressing  Intervention: Prevent and Manage VTE (Venous Thromboembolism) Risk  Recent Flowsheet Documentation  Taken 7/11/2025 2140 by Piper Yao RN  VTE Prevention/Management: SCDs on (sequential compression devices)  Goal: Optimal Comfort and Wellbeing  Outcome: Progressing  Intervention: Monitor " Pain and Promote Comfort  Recent Flowsheet Documentation  Taken 7/11/2025 2200 by Piper Yao, RN  Pain Management Interventions: declines  Taken 7/11/2025 2136 by Piper Yao, RN  Pain Management Interventions: medication (see MAR)  Goal: Readiness for Transition of Care  Outcome: Progressing     Problem: Gas Exchange Impaired  Goal: Optimal Gas Exchange  Outcome: Progressing     Problem: Infection  Goal: Absence of Infection Signs and Symptoms  Outcome: Progressing

## 2025-07-13 LAB
HGB BLD-MCNC: 12 G/DL (ref 11.7–15.7)
HGB BLD-MCNC: 12.4 G/DL (ref 11.7–15.7)
INR PPP: 1.85 (ref 0.85–1.15)
MCV RBC AUTO: 86 FL (ref 78–100)
MCV RBC AUTO: 87 FL (ref 78–100)
PROTHROMBIN TIME: 21.2 SECONDS (ref 11.8–14.8)

## 2025-07-13 PROCEDURE — 85610 PROTHROMBIN TIME: CPT | Performed by: HOSPITALIST

## 2025-07-13 PROCEDURE — 250N000013 HC RX MED GY IP 250 OP 250 PS 637: Performed by: HOSPITALIST

## 2025-07-13 PROCEDURE — 85018 HEMOGLOBIN: CPT | Performed by: HOSPITALIST

## 2025-07-13 PROCEDURE — 250N000011 HC RX IP 250 OP 636: Performed by: HOSPITALIST

## 2025-07-13 PROCEDURE — 250N000013 HC RX MED GY IP 250 OP 250 PS 637: Performed by: INTERNAL MEDICINE

## 2025-07-13 PROCEDURE — 120N000001 HC R&B MED SURG/OB

## 2025-07-13 PROCEDURE — 36415 COLL VENOUS BLD VENIPUNCTURE: CPT | Performed by: HOSPITALIST

## 2025-07-13 PROCEDURE — 99232 SBSQ HOSP IP/OBS MODERATE 35: CPT | Performed by: INTERNAL MEDICINE

## 2025-07-13 RX ORDER — WARFARIN SODIUM 5 MG/1
10 TABLET ORAL
Status: COMPLETED | OUTPATIENT
Start: 2025-07-13 | End: 2025-07-13

## 2025-07-13 RX ADMIN — LISINOPRIL 20 MG: 20 TABLET ORAL at 09:02

## 2025-07-13 RX ADMIN — WARFARIN SODIUM 10 MG: 5 TABLET ORAL at 17:24

## 2025-07-13 RX ADMIN — CIPROFLOXACIN 500 MG: 500 TABLET ORAL at 09:02

## 2025-07-13 RX ADMIN — ENOXAPARIN SODIUM 150 MG: 150 INJECTION SUBCUTANEOUS at 09:03

## 2025-07-13 RX ADMIN — CARBAMAZEPINE 400 MG: 200 TABLET ORAL at 21:15

## 2025-07-13 RX ADMIN — ENOXAPARIN SODIUM 150 MG: 150 INJECTION SUBCUTANEOUS at 21:19

## 2025-07-13 RX ADMIN — CALCIUM CARBONATE (ANTACID) CHEW TAB 500 MG 1000 MG: 500 CHEW TAB at 03:03

## 2025-07-13 RX ADMIN — SERTRALINE 100 MG: 100 TABLET, FILM COATED ORAL at 09:02

## 2025-07-13 RX ADMIN — CARBAMAZEPINE 200 MG: 200 TABLET ORAL at 12:14

## 2025-07-13 RX ADMIN — MICONAZOLE NITRATE: 20 POWDER TOPICAL at 09:06

## 2025-07-13 RX ADMIN — SENNOSIDES AND DOCUSATE SODIUM 1 TABLET: 50; 8.6 TABLET ORAL at 21:16

## 2025-07-13 RX ADMIN — PANTOPRAZOLE SODIUM 40 MG: 40 TABLET, DELAYED RELEASE ORAL at 06:25

## 2025-07-13 RX ADMIN — CARBAMAZEPINE 400 MG: 200 TABLET ORAL at 09:02

## 2025-07-13 RX ADMIN — ATORVASTATIN CALCIUM 10 MG: 10 TABLET, FILM COATED ORAL at 21:15

## 2025-07-13 RX ADMIN — ACETAMINOPHEN 650 MG: 325 TABLET ORAL at 13:37

## 2025-07-13 RX ADMIN — ACETAMINOPHEN 650 MG: 325 TABLET ORAL at 21:19

## 2025-07-13 RX ADMIN — SENNOSIDES AND DOCUSATE SODIUM 1 TABLET: 50; 8.6 TABLET ORAL at 09:02

## 2025-07-13 RX ADMIN — MICONAZOLE NITRATE: 20 POWDER TOPICAL at 21:20

## 2025-07-13 RX ADMIN — FLUTICASONE PROPIONATE 1 SPRAY: 50 SPRAY, METERED NASAL at 09:06

## 2025-07-13 ASSESSMENT — ACTIVITIES OF DAILY LIVING (ADL)
ADLS_ACUITY_SCORE: 48

## 2025-07-13 NOTE — PROGRESS NOTES
[facility]    Hospitalist Progress Note             Date of Admission:  6/25/2025                   Day of hospitalization: 18    Assessment and Plan:   Summary of Stay: Brissa Corado is a 60 year old female admitted on 6/25/2025 with past medical history of asthma on chronic oxygen at night, obstructive sleep apnea, morbid obesity, hypertension, seizure disorder who presents with asthma exacerbation 2/2 poor air quality and not using/having access to her LABA/ICS.     Patient's acute on chronic hypoxic respiratory failure has resolved.  Patient has remained in the hospital for appropriate disposition and placement     7/7: Patient developed hematuria CT abdomen pelvis noncontrast obtained no evidence of nephrolithiasis    Plan for today:  Continue Lovenox full dose for warfarin bridge .  Monitor for any sign of bleed .  Urine seem to be clean.  INR is 1.85, warfarin dosing per pharmacy     Gross hematuria  Possible UTI  - Patient developed gross hematuria spontaneous on 7/7  - Hemoglobin has been more or less at baseline, no sign of significant bleeding .  - Hematuria seem to be subsided   -Please been on ciprofloxacin for possible UTI, treated for total of 7 days and antibiotic discontinued 7/13.   - Warfarin restarted, goal INR 2-3, closely monitor for signs of bleeding.  - Urology consulted, input appreciated, will need follow-up as an outpatient for cystoscopy and possible CT urogram at [place] office.  - urine cytology pending    Acute on chronic hypoxic respiratory failure-resolved  Asthma exacerbation  RICK   -At baseline pt is 2.5L at night and during the day as needed.    -CTPA negative for PE.  Patient states her triggers been poor air quality and she has not been using her controller LABA/ICS.  Has not had a sleep apnea machine at home but reports that she has an appointment coming up in the next 2 weeks, even though she is diagnosed is not tolerating it due to claustrophobic  "feelings.  - Wheezing resolved, DuoNeb every 4 hours as needed  - CPAP ordered  - Continue Breo Ellipta control inhaler     History of developmental delay  SW Consultation   -father who is legal guardian states he received paperwork from the [facility] stating that patient is not to be able to return to her own residence and requires to be moved to a care facility to ensure she gets her medications as ordered and prescribed.    - consulted and following.   - Per  note patient will be getting appointed a new legal guardian in July.   - Discharge patient will go to a new [facility].  His significant other cannot go with her.     H/o DVT   - Pharmacy warfarin panel ordered.    - dose held 7/6 and 7/7 due to hematuria  -resumed coumadin, with Lovenox bridging     Seizure disorder -continue Tegretol     Hypertension -continue lisinopril     Hyperlipidemia - continue statin     Gerd - continue prilosec     Super morbid obesity  Intertriginous excoriations and wounds in multiple skin folds  WOC following   - Start patient on topical azole therapy with ketoconazole 2% twice daily--was switched to miconazole powder per patient's request  - General Wound cares per bedside RN and WOC cares weekly        # Code status: Full  # DVT: SCD  # IVF:  none          Medically Ready for Discharge: Anticipated Tomorrow, Patient can be discharged likely tomorrow, awaiting safe discharge plan.  She will likely be discharged home, hopefully she will be off Lovenox injection at the time of discharge.                    Manav Mehta MD    Subjective   Patient seen and examined, assumed care today, chart, medications reviewed.  Patient stated that probably she will be discharged tomorrow per previous discussions.  She denied any pain, feels better, overall improving.  INR is subtherapeutic-    Objective   /71 (BP Location: Left arm)   Pulse 71   Temp 97.2  F (36.2  C) (Oral)   Resp 16   Ht 1.702 m (5' 7\")   " "Wt (!) 175.4 kg (386 lb 11 oz)   LMP 08/18/2008   SpO2 96%   BMI 60.56 kg/m       Physical Exam  General: Pt in NAD, normal appearance  HEENT: OP clear MMM, no JVD  Lungs: Clear to Auscultation Bilateral, normal breathing  without accessory muscle usage, no wheezing, rhonchi or crackles  Cardiac: +S1, S2, RRR, no MRG, no edema  Abdominal: normal bowel sounds, NT/ND  Skin: warm, dry, normal turgor, no rash  Psyche: A& O x3, appropriate affect             Intake/Output Summary (Last 24 hours) at 7/8/2025 1315  Last data filed at 7/8/2025 0533  Gross per 24 hour   Intake 960 ml   Output 900 ml   Net 60 ml           Labs and Imaging Results:      Recent Labs   Lab 07/13/25  0616 07/12/25  1833 07/12/25  0620   HGB 12.0 12.2 11.7   PLT  --   --  209        Recent Labs   Lab 07/08/25  0700   *   CO2 25   BUN 13.5        Recent Labs   Lab 07/13/25  0616 07/12/25  0620   INR 1.85* 1.26*      No results for input(s): \"CKMB\" in the last 168 hours.    Invalid input(s): \"TROPONINT\"   No results for input(s): \"ALBUMIN\", \"AST\", \"ALT\", \"ALKPHOS\", \"BILITOT\" in the last 168 hours.     Micro:     Radio:  CT Abdomen Pelvis w/o Contrast   Final Result   IMPRESSION:    1.  No urinary tract calculi or obstruction.      2.  Interval cholecystectomy. Hepatosplenomegaly without discrete lesion. No abdominopelvic suspicious adenopathy or free fluid.      3.  IVC filter, unchanged.      4.  Postoperative changes of plate and screw fixation at the symphysis pubis. Screw fixation left SI joint. Mild left convex thoracolumbar curve. Mild degenerative changes involving the spine and joints of the pelvis. Resolved inflammatory changes    involving the pannus seen previously. Small fat-containing ventral umbilical hernia.         CT Chest Pulmonary Embolism w Contrast   Final Result   IMPRESSION:   1.  Subtle areas of bronchial wall thickening with areas of air trapping throughout both lungs, suggestive of small airways disease.   2.  No " pulmonary embolus.              Medications:      Scheduled Meds:    Current Facility-Administered Medications   Medication Dose Route Frequency Provider Last Rate Last Admin    atorvastatin (LIPITOR) tablet 10 mg  10 mg Oral At Bedtime David Hull MD   10 mg at 07/12/25 2149    carBAMazepine (TEGretol) tablet 200 mg  200 mg Oral Q24H David Hull MD   200 mg at 07/13/25 1214    carBAMazepine (TEGretol) tablet 400 mg  400 mg Oral BID David Hull MD   400 mg at 07/13/25 0902    ciprofloxacin (CIPRO) tablet 500 mg  500 mg Oral BID Neeraj Alvarado MD   500 mg at 07/13/25 0902    enoxaparin ANTICOAGULANT (LOVENOX) injection 150 mg  1 mg/kg Subcutaneous Q12H Aimee Cyr MD   150 mg at 07/13/25 0903    fluticasone (FLONASE) 50 MCG/ACT spray 1 spray  1 spray Both Nostrils Daily Neeraj Alvarado MD   1 spray at 07/13/25 0906    fluticasone-vilanterol (BREO ELLIPTA) 100-25 MCG/ACT inhaler 1 puff  1 puff Inhalation Daily David Hull MD   1 puff at 07/12/25 0830    lisinopril (ZESTRIL) tablet 20 mg  20 mg Oral Daily David Hull MD   20 mg at 07/13/25 0902    miconazole (MICATIN) 2 % powder   Topical BID Andreea Mcdonald MD   Given at 07/13/25 0906    pantoprazole (PROTONIX) EC tablet 40 mg  40 mg Oral QAM AC David Hull MD   40 mg at 07/13/25 0625    polyethylene glycol (MIRALAX) Packet 17 g  17 g Oral Daily Amiee Cyr MD   17 g at 07/12/25 0832    senna-docusate (SENOKOT-S/PERICOLACE) 8.6-50 MG per tablet 1 tablet  1 tablet Oral BID Aimee Cyr MD   1 tablet at 07/13/25 0902    sertraline (ZOLOFT) tablet 100 mg  100 mg Oral Daily Neeraj Alvarado MD   100 mg at 07/13/25 0902    sodium chloride (PF) 0.9% PF flush 3 mL  3 mL Intracatheter Q8H [facility] David Hull MD   3 mL at 07/12/25 4919    warfarin ANTICOAGULANT (COUMADIN/JANTOVEN) tablet 10 mg  10 mg Oral ONCE at 18:00 David Hull MD        Warfarin Dose Required Daily - Pharmacist Managed   1 each Oral See Admin Instructions Aimee Cyr MD         Continuous Infusions:    Current Facility-Administered Medications   Medication Dose Route Frequency Provider Last Rate Last Admin    Patient is already receiving anticoagulation with heparin, enoxaparin (LOVENOX), warfarin (COUMADIN)  or other anticoagulant medication   Does not apply Continuous PRN David Hull MD         PRN Meds:    Current Facility-Administered Medications   Medication Dose Route Frequency Provider Last Rate Last Admin    acetaminophen (TYLENOL) tablet 650 mg  650 mg Oral Q4H PRN David Hull MD   650 mg at 07/12/25 2328    Or    acetaminophen (TYLENOL) Suppository 650 mg  650 mg Rectal Q4H PRN David Hull MD        albuterol (PROVENTIL HFA/VENTOLIN HFA) inhaler  2 puff Inhalation Q6H PRN Neeraj Alvarado MD        albuterol (PROVENTIL) neb solution 2.5 mg  2.5 mg Nebulization Q2H PRN David Hull MD   2.5 mg at 06/26/25 0416    bisacodyl (DULCOLAX) suppository 10 mg  10 mg Rectal Daily PRN Aimee Cyr MD        calcium carbonate (TUMS) chewable tablet 1,000 mg  1,000 mg Oral 4x Daily PRN David Hull MD   1,000 mg at 07/13/25 0303    ipratropium - albuterol 0.5 mg/2.5 mg (3mg)/3 mL (DUONEB) neb solution 3 mL  3 mL Nebulization Q4H PRN Andreea Mcdonald MD        lidocaine (LMX4) cream   Topical Q1H PRN David Hull MD        lidocaine 1 % 0.1-1 mL  0.1-1 mL Other Q1H PRN David Hull MD        melatonin tablet 5 mg  5 mg Oral At Bedtime PRN David Hull MD   5 mg at 07/10/25 0038    naloxone (NARCAN) injection 0.2 mg  0.2 mg Intravenous Q2 Min PRN David Hull MD        Or    naloxone (NARCAN) injection 0.4 mg  0.4 mg Intravenous Q2 Min PRN David Hull MD        Or    naloxone (NARCAN) injection 0.2 mg  0.2 mg Intramuscular Q2 Min PRN David Hull MD        Or    naloxone (NARCAN) injection 0.4 mg  0.4 mg Intramuscular Q2 Min David Murrell MD         ondansetron (ZOFRAN ODT) ODT tab 4 mg  4 mg Oral Q6H PRN David Hull MD   4 mg at 07/07/25 0930    Or    ondansetron (ZOFRAN) injection 4 mg  4 mg Intravenous Q6H PRN David Hull MD   4 mg at 07/07/25 1455    oxyCODONE IR (ROXICODONE) half-tab 2.5 mg  2.5 mg Oral Q4H PRN Kaia Evans DO   2.5 mg at 07/12/25 7538    Patient is already receiving anticoagulation with heparin, enoxaparin (LOVENOX), warfarin (COUMADIN)  or other anticoagulant medication   Does not apply Continuous PRN David Hull MD        simethicone (MYLICON) chewable tablet 80 mg  80 mg Oral Q6H PRN Neeraj Reed MD   80 mg at 07/10/25 1455    sodium chloride (PF) 0.9% PF flush 3 mL  3 mL Intracatheter q1 min prn David Hull MD

## 2025-07-13 NOTE — PLAN OF CARE
A&O 4 VSS on 2L NC satting at 94% PRN tylenol and oxycodine given for lower abdominal pain.PRN tums given for heart burn.      Goal Outcome Evaluation:      Plan of Care Reviewed With: patient    Overall Patient Progress: improvingOverall Patient Progress: improving           Problem: Adult Inpatient Plan of Care  Goal: Plan of Care Review  Description: The Plan of Care Review/Shift note should be completed every shift.  The Outcome Evaluation is a brief statement about your assessment that the patient is improving, declining, or no change.  This information will be displayed automatically on your shift  note.  Recent Flowsheet Documentation  Taken 7/13/2025 0524 by Piper Yao RN  Plan of Care Reviewed With: patient  Overall Patient Progress: improving  Goal: Absence of Hospital-Acquired Illness or Injury  Intervention: Identify and Manage Fall Risk  Recent Flowsheet Documentation  Taken 7/12/2025 2208 by Piper Yao RN  Safety Promotion/Fall Prevention: activity supervised  Intervention: Prevent and Manage VTE (Venous Thromboembolism) Risk  Recent Flowsheet Documentation  Taken 7/12/2025 2208 by Piper Yao RN  VTE Prevention/Management: SCDs on (sequential compression devices)     Problem: Adult Inpatient Plan of Care  Goal: Plan of Care Review  Description: The Plan of Care Review/Shift note should be completed every shift.  The Outcome Evaluation is a brief statement about your assessment that the patient is improving, declining, or no change.  This information will be displayed automatically on your shift  note.  Recent Flowsheet Documentation  Taken 7/13/2025 0524 by Piper Yao RN  Plan of Care Reviewed With: patient  Overall Patient Progress: improving  Goal: Absence of Hospital-Acquired Illness or Injury  Intervention: Identify and Manage Fall Risk  Recent Flowsheet Documentation  Taken 7/12/2025 2208 by Piper Yao RN  Safety Promotion/Fall Prevention: activity  Patient Name: Chikis Cuellar  : 1942    MRN: [id number]                              Today's Date: 2022       Admit Date: 2022    Visit Dx:     ICD-10-CM ICD-9-CM   1. Acute respiratory failure with hypoxia (HCC)  J96.01 518.81   2. Shortness of breath  R06.02 786.05   3. Atypical pneumonia  J18.9 486     Patient Active Problem List   Diagnosis   • Type 2 diabetes mellitus, with long-term current use of insulin (HCC)   • Essential hypertension   • Hyperlipemia   • GERD without esophagitis   • Mood disorder (HCC)   • Acute respiratory failure with hypoxia (HCC)   • Leukocytosis   • Chest pain   • Fibromyalgia   • Hypomagnesemia     Past Medical History:   Diagnosis Date   • Arthritis    • Diabetes mellitus (HCC)    • Fibromyalgia    • Hyperlipidemia    • Hypertension      Past Surgical History:   Procedure Laterality Date   • BACK SURGERY     •  SECTION     • CHOLECYSTECTOMY     • HYSTERECTOMY     • REPLACEMENT TOTAL KNEE        General Information     Row Name 22 1342          Physical Therapy Time and Intention    Document Type therapy note (daily note)  -KG     Mode of Treatment physical therapy  -KG     Row Name 22 1342          General Information    Patient Profile Reviewed yes  -KG     Existing Precautions/Restrictions fall;oxygen therapy device and L/min;other (see comments)  HFNC., monitor O2 sats  -KG     Row Name 22 1342          Cognition    Orientation Status (Cognition) oriented x 4  -KG     Row Name 22 134          Safety Issues, Functional Mobility    Impairments Affecting Function (Mobility) balance;coordination;endurance/activity tolerance;postural/trunk control;shortness of breath;strength  -KG           User Key  (r) = Recorded By, (t) = Taken By, (c) = Cosigned By    Initials Name Provider Type    KG Tish العلي Physical Therapist               Mobility     Row Name 22 1345          Bed Mobility    Rolling Right [place] (Bed  "supervised  Intervention: Prevent and Manage VTE (Venous Thromboembolism) Risk  Recent Flowsheet Documentation  Taken 7/12/2025 2208 by Piper Yao RN  VTE Prevention/Management: SCDs on (sequential compression devices)     Problem: Adult Inpatient Plan of Care  Goal: Plan of Care Review  Description: The Plan of Care Review/Shift note should be completed every shift.  The Outcome Evaluation is a brief statement about your assessment that the patient is improving, declining, or no change.  This information will be displayed automatically on your shift  note.  Outcome: Progressing  Flowsheets (Taken 7/13/2025 0524)  Plan of Care Reviewed With: patient  Overall Patient Progress: improving  Goal: Patient-Specific Goal (Individualized)  Description: You can add care plan individualizations to a care plan. Examples of Individualization might be:  \"Parent requests to be called daily at 9am for status\", \"I have a hard time hearing out of my right ear\", or \"Do not touch me to wake me up as it startles  me\".  Outcome: Progressing  Goal: Absence of Hospital-Acquired Illness or Injury  Outcome: Progressing  Intervention: Identify and Manage Fall Risk  Recent Flowsheet Documentation  Taken 7/12/2025 2208 by Piper Yao RN  Safety Promotion/Fall Prevention: activity supervised  Intervention: Prevent and Manage VTE (Venous Thromboembolism) Risk  Recent Flowsheet Documentation  Taken 7/12/2025 2208 by Piper Yao RN  VTE Prevention/Management: SCDs on (sequential compression devices)  Goal: Optimal Comfort and Wellbeing  Outcome: Progressing  Goal: Readiness for Transition of Care  Outcome: Progressing     Problem: Gas Exchange Impaired  Goal: Optimal Gas Exchange  Outcome: Progressing     Problem: Infection  Goal: Absence of Infection Signs and Symptoms  Outcome: Progressing         " Mobility) verbal cues;minimum assist (75% patient effort)  -KG     Supine-Sit [place] (Bed Mobility) moderate assist (50% patient effort);1 person assist  -KG     Assistive Device (Bed Mobility) bed rails;draw sheet;head of bed elevated  -KG     Comment, (Bed Mobility) 2 rounds of sitting EOB, each attempt pt performed diaphramatic breathing and O2 sats first attempt dropped to 85% quickly, 2nd attempt able to sit for 2 minutes before O2 sats dropped to 85%, quickly recovered to mid-90s once lying back down  -KG     Row Name 07/05/22 1343          Transfers    Comment, (Transfers) deferred due to O2 sats  -KG     Row Name 07/05/22 1343          Bed-Chair Transfer    Bed-Chair [place] (Transfers) unable to assess  -KG           User Key  (r) = Recorded By, (t) = Taken By, (c) = Cosigned By    Initials Name Provider Type    KG Tish العلي Physical Therapist               Obj/Interventions    No documentation.                Goals/Plan    No documentation.                Clinical Impression     Row Name 07/05/22 1353          Pain    Pretreatment Pain Rating 0/10 - no pain  -KG     Posttreatment Pain Rating 0/10 - no pain  -KG     Row Name 07/05/22 1353          Plan of Care Review    Plan of Care Reviewed With patient  -KG     Progress no change  -KG     Outcome Evaluation 2 rounds of sitting EOB, each attempt pt performed diaphramatic breathing and O2 sats first attempt dropped to 85% quickly, 2nd attempt pt did better and able to sit for 2 minutes working on posture and scapular retraction before O2 sats dropped to 85%, quickly recovered to mid-90s once lying back down  -KG     Row Name 07/05/22 1353          Vital Signs    Pre Systolic BP Rehab 143  -KG     Pre Treatment Diastolic BP 77  -KG     Post Systolic BP Rehab 141  -KG     Post Treatment Diastolic BP 66  -KG     Pre SpO2 (%) 96  -KG     O2 Delivery Pre Treatment hi-flow  -KG     Intra SpO2 (%) 85  -KG     O2 Delivery Intra Treatment hi-flow   -KG     Post SpO2 (%) 95  -KG     O2 Delivery Post Treatment hi-flow  -KG     Row Name 07/05/22 1353          Positioning and Restraints    Pre-Treatment Position in bed  -KG     Post Treatment Position bed  -KG     In Bed notified nsg;fowlers;call light within reach;encouraged to call for assist;exit alarm on  -KG           User Key  (r) = Recorded By, (t) = Taken By, (c) = Cosigned By    Initials Name Provider Type    Tish Bar Physical Therapist               Outcome Measures     Row Name 07/05/22 1356          How much help from another person do you currently need...    Turning from your back to your side while in flat bed without using bedrails? 3  -KG     Moving from lying on back to sitting on the side of a flat bed without bedrails? 2  -KG     Moving to and from a bed to a chair (including a wheelchair)? 2  -KG     Standing up from a chair using your arms (e.g., wheelchair, bedside chair)? 2  -KG     Climbing 3-5 steps with a railing? 1  -KG     To walk in hospital room? 1  -KG     AM-PAC 6 Clicks Score (PT) 11  -KG     Highest level of mobility 4 --> Transferred to chair/commode  -KG     Row Name 07/05/22 1356          Functional Assessment    Outcome Measure Options AM-PAC 6 Clicks Basic Mobility (PT)  -KG           User Key  (r) = Recorded By, (t) = Taken By, (c) = Cosigned By    Initials Name Provider Type    Tish Bar Physical Therapist                             Physical Therapy Education                 Title: PT OT SLP Therapies (Done)     Topic: Physical Therapy (Done)     Point: Mobility training (Done)     Learning Progress Summary           Patient Acceptance, E,TB, VU,NR by KG at 7/5/2022 1357    Acceptance, E, NR by KG1 at 7/1/2022 1453    Acceptance, E, NR by KG1 at 6/28/2022 1438                   Point: Home exercise program (Done)     Learning Progress Summary           Patient Acceptance, E,TB, VU,NR by KG at 7/5/2022 1357    Acceptance, E, NR by KG1 at 7/1/2022  1453                   Point: Body mechanics (Done)     Learning Progress Summary           Patient Acceptance, E,TB, VU,NR by KG at 7/5/2022 1357    Acceptance, E, NR by KG1 at 7/1/2022 1453    Acceptance, E, NR by KG1 at 6/28/2022 1438                   Point: Precautions (Done)     Learning Progress Summary           Patient Acceptance, E,TB, VU,NR by KG at 7/5/2022 1357    Acceptance, E, NR by KG1 at 7/1/2022 1453    Acceptance, E, NR by KG1 at 6/28/2022 1438                               User Key     Initials Effective Dates Name Provider Type Discipline    KG1 05/22/20 -  Francia Curiel, PT Physical Therapist PT    KG 06/16/21 -  Tish العلي Physical Therapist PT              PT Recommendation and Plan     Plan of Care Reviewed With: patient  Progress: no change  Outcome Evaluation: 2 rounds of sitting EOB, each attempt pt performed diaphramatic breathing and O2 sats first attempt dropped to 85% quickly, 2nd attempt pt did better and able to sit for 2 minutes working on posture and scapular retraction before O2 sats dropped to 85%, quickly recovered to mid-90s once lying back down     Time Calculation:    PT Charges     Row Name 07/05/22 1358             Time Calculation    Start Time 1240  -KG      PT Received On 07/05/22  -KG      PT Goal Re-Cert Due Date 07/08/22  -KG              Time Calculation- PT    Total Timed Code Minutes- PT 23 minute(s)  -KG              Timed Charges    07799 - PT Therapeutic Activity Minutes 23  -KG              Total Minutes    Timed Charges Total Minutes 23  -KG       Total Minutes 23  -KG            User Key  (r) = Recorded By, (t) = Taken By, (c) = Cosigned By    Initials Name Provider Type    KG Tish العلي Physical Therapist              Therapy Charges for Today     Code Description Service Date Service Provider Modifiers Qty    12780021661 HC PT THERAPEUTIC ACT EA 15 MIN 7/5/2022 Tish العلي GP 2          PT G-Codes  Outcome Measure Options:  AM-PAC 6 Clicks Basic Mobility (PT)  AM-PAC 6 Clicks Score (PT): 11  AM-PAC 6 Clicks Score (OT): 14    Tish العلي  7/5/2022

## 2025-07-13 NOTE — PLAN OF CARE
Shift Summary [id number]  VSS  On room air during awake hours  Wound care and linens changed, washed hair, refused bath  Tolerating meals   PRN tylenol x1  Hematuria noted this evening,              provider notified    Goal Outcome Evaluation:      Plan of Care Reviewed With: patient    Overall Patient Progress: improving    Outcome Evaluation: VSS, PRN tylenol, wound care completed          Problem: Adult Inpatient Plan of Care  Goal: Plan of Care Review  Description: The Plan of Care Review/Shift note should be completed every shift.  The Outcome Evaluation is a brief statement about your assessment that the patient is improving, declining, or no change.  This information will be displayed automatically on your shift  note.  Recent Flowsheet Documentation  Taken 7/13/2025 1457 by Bridgette Morales RN  Outcome Evaluation: VSS, PRN tylenol, wound care completed  Plan of Care Reviewed With: patient  Overall Patient Progress: improving     Problem: Gas Exchange Impaired  Goal: Optimal Gas Exchange  Outcome: Progressing     Problem: Infection  Goal: Absence of Infection Signs and Symptoms  Outcome: Progressing     Bridgette Morales RN on 7/13/2025 at 5:41 PM

## 2025-07-14 LAB
HGB BLD-MCNC: 11.5 G/DL (ref 11.7–15.7)
INR PPP: 2 (ref 0.85–1.15)
MCV RBC AUTO: 88 FL (ref 78–100)
PATH REPORT.COMMENTS IMP SPEC: NORMAL
PATH REPORT.FINAL DX SPEC: NORMAL
PATH REPORT.GROSS SPEC: NORMAL
PATH REPORT.MICROSCOPIC SPEC OTHER STN: NORMAL
PROTHROMBIN TIME: 22.5 SECONDS (ref 11.8–14.8)

## 2025-07-14 PROCEDURE — 85018 HEMOGLOBIN: CPT | Performed by: HOSPITALIST

## 2025-07-14 PROCEDURE — 250N000011 HC RX IP 250 OP 636: Performed by: HOSPITALIST

## 2025-07-14 PROCEDURE — 250N000013 HC RX MED GY IP 250 OP 250 PS 637: Performed by: INTERNAL MEDICINE

## 2025-07-14 PROCEDURE — 99232 SBSQ HOSP IP/OBS MODERATE 35: CPT | Performed by: INTERNAL MEDICINE

## 2025-07-14 PROCEDURE — 250N000013 HC RX MED GY IP 250 OP 250 PS 637: Performed by: HOSPITALIST

## 2025-07-14 PROCEDURE — 999N000157 HC STATISTIC RCP TIME EA 10 MIN

## 2025-07-14 PROCEDURE — 94640 AIRWAY INHALATION TREATMENT: CPT

## 2025-07-14 PROCEDURE — 36415 COLL VENOUS BLD VENIPUNCTURE: CPT | Performed by: HOSPITALIST

## 2025-07-14 PROCEDURE — 120N000001 HC R&B MED SURG/OB

## 2025-07-14 PROCEDURE — 85610 PROTHROMBIN TIME: CPT | Performed by: HOSPITALIST

## 2025-07-14 RX ORDER — WARFARIN SODIUM 5 MG/1
15 TABLET ORAL
Status: COMPLETED | OUTPATIENT
Start: 2025-07-14 | End: 2025-07-14

## 2025-07-14 RX ADMIN — CARBAMAZEPINE 400 MG: 200 TABLET ORAL at 09:48

## 2025-07-14 RX ADMIN — MICONAZOLE NITRATE: 20 POWDER TOPICAL at 20:52

## 2025-07-14 RX ADMIN — FLUTICASONE FUROATE AND VILANTEROL TRIFENATATE 1 PUFF: 100; 25 POWDER RESPIRATORY (INHALATION) at 08:44

## 2025-07-14 RX ADMIN — LISINOPRIL 20 MG: 20 TABLET ORAL at 09:48

## 2025-07-14 RX ADMIN — ACETAMINOPHEN 650 MG: 325 TABLET ORAL at 12:35

## 2025-07-14 RX ADMIN — PANTOPRAZOLE SODIUM 40 MG: 40 TABLET, DELAYED RELEASE ORAL at 06:23

## 2025-07-14 RX ADMIN — ACETAMINOPHEN 650 MG: 325 TABLET ORAL at 20:51

## 2025-07-14 RX ADMIN — ACETAMINOPHEN 650 MG: 325 TABLET ORAL at 01:31

## 2025-07-14 RX ADMIN — CARBAMAZEPINE 200 MG: 200 TABLET ORAL at 12:34

## 2025-07-14 RX ADMIN — WARFARIN SODIUM 15 MG: 5 TABLET ORAL at 17:23

## 2025-07-14 RX ADMIN — ATORVASTATIN CALCIUM 10 MG: 10 TABLET, FILM COATED ORAL at 20:51

## 2025-07-14 RX ADMIN — SERTRALINE 100 MG: 100 TABLET, FILM COATED ORAL at 09:48

## 2025-07-14 RX ADMIN — SENNOSIDES AND DOCUSATE SODIUM 1 TABLET: 50; 8.6 TABLET ORAL at 09:48

## 2025-07-14 RX ADMIN — ENOXAPARIN SODIUM 150 MG: 150 INJECTION SUBCUTANEOUS at 09:51

## 2025-07-14 RX ADMIN — CARBAMAZEPINE 400 MG: 200 TABLET ORAL at 20:50

## 2025-07-14 ASSESSMENT — ACTIVITIES OF DAILY LIVING (ADL)
ADLS_ACUITY_SCORE: 48
ADLS_ACUITY_SCORE: 51
ADLS_ACUITY_SCORE: 51
ADLS_ACUITY_SCORE: 48
ADLS_ACUITY_SCORE: 48
ADLS_ACUITY_SCORE: 47
ADLS_ACUITY_SCORE: 51
ADLS_ACUITY_SCORE: 48
ADLS_ACUITY_SCORE: 51
ADLS_ACUITY_SCORE: 51
ADLS_ACUITY_SCORE: 48
ADLS_ACUITY_SCORE: 48
ADLS_ACUITY_SCORE: 47
ADLS_ACUITY_SCORE: 48
ADLS_ACUITY_SCORE: 47
ADLS_ACUITY_SCORE: 48
ADLS_ACUITY_SCORE: 47
ADLS_ACUITY_SCORE: 48
ADLS_ACUITY_SCORE: 51

## 2025-07-14 NOTE — PROGRESS NOTES
[facility]    Hospitalist Progress Note             Date of Admission:  6/25/2025                   Day of hospitalization: 19    Assessment and Plan:   Summary of Stay: Brissa Corado is a 60 year old female admitted on 6/25/2025 with past medical history of asthma on chronic oxygen at night, obstructive sleep apnea, morbid obesity, hypertension, seizure disorder who presents with asthma exacerbation 2/2 poor air quality and not using/having access to her LABA/ICS.     Patient's acute on chronic hypoxic respiratory failure has resolved.  Patient has remained in the hospital for appropriate disposition and placement     7/7: Patient developed hematuria CT abdomen pelvis noncontrast obtained no evidence of nephrolithiasis    Plan for today:  -Discontinue Lovenox full dose, INR is 2 and therapeutic .  -Warfarin dosing per pharmacy.  -Monitor for any sign of bleed .  -Urine seem to be pinkish, but the urine in the tube is clear.     Gross hematuria  Possible UTI  - Patient developed gross hematuria spontaneous on 7/7  - Hemoglobin has been more or less at baseline, no sign of significant bleeding .  - Hematuria seem to be subsided   -Please been on ciprofloxacin for possible UTI, treated for total of 7 days and antibiotic discontinued 7/13.   - Warfarin restarted, goal INR 2-3, closely monitor for signs of bleeding.  - Urology consulted, input appreciated, will need follow-up as an outpatient for cystoscopy and possible CT urogram at [facility].  - urine cytology pending    Acute on chronic hypoxic respiratory failure-resolved  Asthma exacerbation  RICK   -At baseline she was on 2.5L at night and during the day as needed.    -CTPA negative for PE.  Patient states her triggers been poor air quality and she has not been using her controller LABA/ICS.  Has not had a sleep apnea machine at home but reports that she has an appointment coming up in the next 2 weeks, even though she is diagnosed is  "not tolerating it due to claustrophobic feelings.  - Wheezing resolved, DuoNeb every 4 hours as needed  - CPAP ordered  - Continue Breo Ellipta inhaler     History of developmental delay  SW Consultation   -father who is legal guardian states he received paperwork from the [facility] stating that patient is not to be able to return to her own residence and requires to be moved to a care facility to ensure she gets her medications as ordered and prescribed.    - consulted and following.   - Per  note patient will be getting appointed a new legal guardian in July.   - Discharge patient will go to a new [facility].  His significant other cannot go with her.     H/o DVT   - Pharmacy warfarin panel ordered, INR is 2.    - She has been on Lovenox bridge, discontinued.    Seizure disorder -continue Tegretol     Hypertension -continue lisinopril     Hyperlipidemia - continue statin     Gerd - continue prilosec     Super morbid obesity  Intertriginous excoriations and wounds in multiple skin folds  WOC following   - Start patient on topical azole therapy with ketoconazole 2% twice daily--was switched to miconazole powder per patient's request  - General Wound cares per bedside RN and WOC cares weekly     Clinically Significant Risk Factors                   # Hypertension: Noted on problem list            # Morbid Obesity: Estimated body mass index is 60.56 kg/m  as calculated from the following:    Height as of this encounter: 1.702 m (5' 7\").    Weight as of this encounter: 175.4 kg (386 lb 11 oz).      # Asthma: noted on problem list           # Code status: Full  # DVT: SCD, warfarin  # IVF:  none          Medically Ready for Discharge: Ready Now, Patient can be discharged anytime, awaiting safe discharge plan.  She will likely be discharged to group home.    I discussed with patient, also discussed with social service regarding discharge plan.            Manav Mehta MD    Subjective   Patient " "seen and examined, no new overnight issues, feels better, chart, medications reviewed.  She denied any pain overall doing well, INR is therapeutic, Lovenox discontinued    Objective   /45 (BP Location: Left arm)   Pulse 75   Temp 98  F (36.7  C) (Oral)   Resp 16   Ht 1.702 m (5' 7\")   Wt (!) 175.4 kg (386 lb 11 oz)   LMP 08/18/2008   SpO2 96%   BMI 60.56 kg/m       Physical Exam  General: Awake and alert, comfortable, not in distress, calm and cooperative  HEENT: OP clear MMM, no JVD  Lungs: Clear to Auscultation Bilateral, normal breathing  without accessory muscle usage, no wheezing, rhonchi or crackles  Cardiac: +S1, S2, RRR, no MRG, no edema  Abdominal: Morbidly obese, nondistended, positive bowel sounds, nontender.  Skin: warm, dry, normal turgor, no rash  Psyche: A& O x3, appropriate affect             Intake/Output Summary (Last 24 hours) at 7/14/2025 1252  Last data filed at 7/14/2025 1236  Gross per 24 hour   Intake 1280 ml   Output 2350 ml   Net -1070 ml          Labs and Imaging Results:      Recent Labs   Lab 07/14/25  0647 07/13/25  1810 07/12/25  1833 07/12/25  0620   HGB 11.5* 12.4   < > 11.7   PLT  --   --   --  209    < > = values in this interval not displayed.        Recent Labs   Lab 07/08/25  0700   *   CO2 25   BUN 13.5        Recent Labs   Lab 07/14/25  0647 07/13/25  0616   INR 2.00* 1.85*      No results for input(s): \"CKMB\" in the last 168 hours.    Invalid input(s): \"TROPONINT\"   No results for input(s): \"ALBUMIN\", \"AST\", \"ALT\", \"ALKPHOS\", \"BILITOT\" in the last 168 hours.     Micro:     Radio:  CT Abdomen Pelvis w/o Contrast   Final Result   IMPRESSION:    1.  No urinary tract calculi or obstruction.      2.  Interval cholecystectomy. Hepatosplenomegaly without discrete lesion. No abdominopelvic suspicious adenopathy or free fluid.      3.  IVC filter, unchanged.      4.  Postoperative changes of plate and screw fixation at the symphysis pubis. Screw fixation left SI " joint. Mild left convex thoracolumbar curve. Mild degenerative changes involving the spine and joints of the pelvis. Resolved inflammatory changes    involving the pannus seen previously. Small fat-containing ventral umbilical hernia.         CT Chest Pulmonary Embolism w Contrast   Final Result   IMPRESSION:   1.  Subtle areas of bronchial wall thickening with areas of air trapping throughout both lungs, suggestive of small airways disease.   2.  No pulmonary embolus.              Medications:      Scheduled Meds:    Current Facility-Administered Medications   Medication Dose Route Frequency Provider Last Rate Last Admin    atorvastatin (LIPITOR) tablet 10 mg  10 mg Oral At Bedtime David Hull MD   10 mg at 07/13/25 2115    carBAMazepine (TEGretol) tablet 200 mg  200 mg Oral Q24H David Hull MD   200 mg at 07/14/25 1234    carBAMazepine (TEGretol) tablet 400 mg  400 mg Oral BID David Hull MD   400 mg at 07/14/25 0948    fluticasone (FLONASE) 50 MCG/ACT spray 1 spray  1 spray Both Nostrils Daily Neeraj Alvarado MD   1 spray at 07/13/25 0906    fluticasone-vilanterol (BREO ELLIPTA) 100-25 MCG/ACT inhaler 1 puff  1 puff Inhalation Daily David Hull MD   1 puff at 07/14/25 0844    lisinopril (ZESTRIL) tablet 20 mg  20 mg Oral Daily David Hull MD   20 mg at 07/14/25 0948    miconazole (MICATIN) 2 % powder   Topical BID Andreea Mcdonald MD   Given at 07/13/25 2120    pantoprazole (PROTONIX) EC tablet 40 mg  40 mg Oral QAM AC David Hull MD   40 mg at 07/14/25 0623    polyethylene glycol (MIRALAX) Packet 17 g  17 g Oral Daily Aimee Cyr MD   17 g at 07/12/25 0832    senna-docusate (SENOKOT-S/PERICOLACE) 8.6-50 MG per tablet 1 tablet  1 tablet Oral BID Aimee Cyr MD   1 tablet at 07/14/25 0948    sertraline (ZOLOFT) tablet 100 mg  100 mg Oral Daily Neeraj Alvarado MD   100 mg at 07/14/25 0948    sodium chloride (PF) 0.9% PF flush 3 mL  3 mL Intracatheter  Q8H NIC David Hull MD   3 mL at 07/13/25 2120    warfarin ANTICOAGULANT (COUMADIN/JANTOVEN) tablet 15 mg  15 mg Oral ONCE at 18:00 David Hull MD        Warfarin Dose Required Daily - Pharmacist Managed  1 each Oral See Admin Instructions Aimee Cyr MD         Continuous Infusions:    Current Facility-Administered Medications   Medication Dose Route Frequency Provider Last Rate Last Admin    Patient is already receiving anticoagulation with heparin, enoxaparin (LOVENOX), warfarin (COUMADIN)  or other anticoagulant medication   Does not apply Continuous PRN David Hull MD         PRN Meds:    Current Facility-Administered Medications   Medication Dose Route Frequency Provider Last Rate Last Admin    acetaminophen (TYLENOL) tablet 650 mg  650 mg Oral Q4H PRN David Hull MD   650 mg at 07/14/25 1235    Or    acetaminophen (TYLENOL) Suppository 650 mg  650 mg Rectal Q4H PRN David Hull MD        albuterol (PROVENTIL HFA/VENTOLIN HFA) inhaler  2 puff Inhalation Q6H PRN Neeraj Alvarado MD        albuterol (PROVENTIL) neb solution 2.5 mg  2.5 mg Nebulization Q2H PRN David Hull MD   2.5 mg at 06/26/25 0416    bisacodyl (DULCOLAX) suppository 10 mg  10 mg Rectal Daily PRN Amiee Cyr MD        calcium carbonate (TUMS) chewable tablet 1,000 mg  1,000 mg Oral 4x Daily PRN David Hull MD   1,000 mg at 07/13/25 0303    ipratropium - albuterol 0.5 mg/2.5 mg (3mg)/3 mL (DUONEB) neb solution 3 mL  3 mL Nebulization Q4H PRN Andreea Mcdonald MD        lidocaine (LMX4) cream   Topical Q1H PRN David Hull MD        lidocaine 1 % 0.1-1 mL  0.1-1 mL Other Q1H PRN David Hull MD        melatonin tablet 5 mg  5 mg Oral At Bedtime PRN David Hull MD   5 mg at 07/10/25 0038    naloxone (NARCAN) injection 0.2 mg  0.2 mg Intravenous Q2 Min PRN David Hull MD        Or    naloxone (NARCAN) injection 0.4 mg  0.4 mg Intravenous Q2 Min PRN Kurtis,  David GRANT MD        Or    naloxone (NARCAN) injection 0.2 mg  0.2 mg Intramuscular Q2 Min PRN David Hull MD        Or    naloxone (NARCAN) injection 0.4 mg  0.4 mg Intramuscular Q2 Min PRN David Hull MD        ondansetron (ZOFRAN ODT) ODT tab 4 mg  4 mg Oral Q6H PRN David Hull MD   4 mg at 07/07/25 0930    Or    ondansetron (ZOFRAN) injection 4 mg  4 mg Intravenous Q6H PRN David Hull MD   4 mg at 07/07/25 1455    oxyCODONE IR (ROXICODONE) half-tab 2.5 mg  2.5 mg Oral Q4H PRN Kaia Evans DO   2.5 mg at 07/12/25 1603    Patient is already receiving anticoagulation with heparin, enoxaparin (LOVENOX), warfarin (COUMADIN)  or other anticoagulant medication   Does not apply Continuous PRN David Hull MD        simethicone (MYLICON) chewable tablet 80 mg  80 mg Oral Q6H PRN Neeraj Reed MD   80 mg at 07/10/25 1455    sodium chloride (PF) 0.9% PF flush 3 mL  3 mL Intracatheter q1 min prn David Hull MD

## 2025-07-14 NOTE — PROGRESS NOTES
Care Management Follow Up    Length of Stay (days): 19    Expected Discharge Date: 07/18/2025     Concerns to be Addressed: discharge planning     Patient plan of care discussed at interdisciplinary rounds: Yes    Anticipated Discharge Disposition:                Anticipated Discharge Services:    Anticipated Discharge DME:      Patient/family educated on Medicare website which has current facility and service quality ratings:    Education Provided on the Discharge Plan:    Patient/Family in Agreement with the Plan:      Referrals Placed by CM/SW:    Private pay costs discussed: Not applicable    Discussed  Partnership in Safe Discharge Planning  document with patient/family: No     Handoff Completed: Yes, [facility] PCP: Internal handoff referral completed    Additional Information:  LUZ MARIA spoke with pt's mother/guardian who shared that pt has been connected to another guardian following the court hearing last week. Lola knew the guardian's name, but did not have any contact information for her.     LUZ MARIA spoke with AP workerLeyda, who provided the new guardian's name and contact information:     Yehudabalbirpaty Becerra  [employer]  [phone]  [email or web address]    LUZ MARIA called and left a voicemail for Tico requesting a call back to discuss pt's plan of care and discharge.     There is no signed orders that appoint Tico as of yet from the court.    Next Steps: LUZ MARIA to follow for discharge plans.    AISSATOU Alexander, CONCHISSW  LUZ MARIA Care Coordinator/ Med Surg [address] [place] [address]  [phone]

## 2025-07-14 NOTE — PLAN OF CARE
End OF Shift Summary :    Neuro:   A&O 4         Cardiac:   WDL         Respiratory : no shortness of breath lung sounds clear.      GI &  : hematuria.crosscover paged (see progress note)      Skin : Rash Right; Left; Abdominal; Perineum; Sacrum/Coccyx  cleansed with; skin cleanser; antimicrobial agent applied       Mobility : bedfest      IV access :   flushed patent cap changed    PRN TYLENOL given  lower abdnominal pain     Goal Outcome Evaluation:      Plan of Care Reviewed With: patient    Overall Patient Progress: no changeOverall Patient Progress: no change         Problem: Adult Inpatient Plan of Care  Goal: Plan of Care Review  Description: The Plan of Care Review/Shift note should be completed every shift.  The Outcome Evaluation is a brief statement about your assessment that the patient is improving, declining, or no change.  This information will be displayed automatically on your shift  note.  Recent Flowsheet Documentation  Taken 7/14/2025 0523 by Piper Yao RN  Plan of Care Reviewed With: patient  Overall Patient Progress: no change  Goal: Absence of Hospital-Acquired Illness or Injury  Intervention: Prevent and Manage VTE (Venous Thromboembolism) Risk  Recent Flowsheet Documentation  Taken 7/13/2025 2125 by Piper Yao RN  VTE Prevention/Management: SCDs off (sequential compression devices)  Goal: Optimal Comfort and Wellbeing  Intervention: Monitor Pain and Promote Comfort  Recent Flowsheet Documentation  Taken 7/14/2025 0131 by Piper Yao RN  Pain Management Interventions: medication (see MAR)  Taken 7/13/2025 2303 by Piper Yao RN  Pain Management Interventions: care clustered  Taken 7/13/2025 2119 by Piper Yao RN  Pain Management Interventions: medication (see MAR)     Problem: Adult Inpatient Plan of Care  Goal: Plan of Care Review  Description: The Plan of Care Review/Shift note should be completed every shift.  The Outcome Evaluation is a brief statement  "about your assessment that the patient is improving, declining, or no change.  This information will be displayed automatically on your shift  note.  Recent Flowsheet Documentation  Taken 7/14/2025 0523 by Piper Yao RN  Plan of Care Reviewed With: patient  Overall Patient Progress: no change  Goal: Absence of Hospital-Acquired Illness or Injury  Intervention: Prevent and Manage VTE (Venous Thromboembolism) Risk  Recent Flowsheet Documentation  Taken 7/13/2025 2125 by Piper Yao RN  VTE Prevention/Management: SCDs off (sequential compression devices)  Goal: Optimal Comfort and Wellbeing  Intervention: Monitor Pain and Promote Comfort  Recent Flowsheet Documentation  Taken 7/14/2025 0131 by Piper Yao RN  Pain Management Interventions: medication (see MAR)  Taken 7/13/2025 2303 by Piper Yao RN  Pain Management Interventions: care clustered  Taken 7/13/2025 2119 by Piper Yao RN  Pain Management Interventions: medication (see MAR)     Problem: Adult Inpatient Plan of Care  Goal: Plan of Care Review  Description: The Plan of Care Review/Shift note should be completed every shift.  The Outcome Evaluation is a brief statement about your assessment that the patient is improving, declining, or no change.  This information will be displayed automatically on your shift  note.  7/14/2025 0523 by Piper Yao RN  Outcome: Progressing  7/14/2025 0523 by Piper Yao RN  Outcome: Progressing  Flowsheets (Taken 7/14/2025 0523)  Plan of Care Reviewed With: patient  Overall Patient Progress: no change  Goal: Patient-Specific Goal (Individualized)  Description: You can add care plan individualizations to a care plan. Examples of Individualization might be:  \"Parent requests to be called daily at 9am for status\", \"I have a hard time hearing out of my right ear\", or \"Do not touch me to wake me up as it startles  me\".  7/14/2025 0523 by Piper Yao RN  Outcome: Progressing  7/14/2025 " 0523 by Piper Yao RN  Outcome: Progressing  Goal: Absence of Hospital-Acquired Illness or Injury  7/14/2025 0523 by Piper Yao RN  Outcome: Progressing  7/14/2025 0523 by Piper Yao RN  Outcome: Progressing  Intervention: Prevent and Manage VTE (Venous Thromboembolism) Risk  Recent Flowsheet Documentation  Taken 7/13/2025 2125 by Piper Yao RN  VTE Prevention/Management: SCDs off (sequential compression devices)  Goal: Optimal Comfort and Wellbeing  7/14/2025 0523 by Piper Yao RN  Outcome: Progressing  7/14/2025 0523 by Piper Yao RN  Outcome: Progressing  Intervention: Monitor Pain and Promote Comfort  Recent Flowsheet Documentation  Taken 7/14/2025 0131 by Piper Yao RN  Pain Management Interventions: medication (see MAR)  Taken 7/13/2025 2303 by Piper Yao RN  Pain Management Interventions: care clustered  Taken 7/13/2025 2119 by Piper Yao RN  Pain Management Interventions: medication (see MAR)  Goal: Readiness for Transition of Care  7/14/2025 0523 by Piper Yao RN  Outcome: Progressing  7/14/2025 0523 by Piper Yao RN  Outcome: Progressing     Problem: Gas Exchange Impaired  Goal: Optimal Gas Exchange  7/14/2025 0523 by Piper Yao RN  Outcome: Progressing  7/14/2025 0523 by Piper Yao RN  Outcome: Progressing     Problem: Infection  Goal: Absence of Infection Signs and Symptoms  7/14/2025 0523 by Piper Yao RN  Outcome: Progressing  7/14/2025 0523 by Piper Yao RN  Outcome: Progressing

## 2025-07-14 NOTE — PLAN OF CARE
"A&Ox4. Pain in lower abd, gave Tylenol x1. A1 w/ gb. 2L NC HS (baseline), LS: dim. No tele. Lovenox discontinued d/t hematuria, on Warfarin. See SW's note for details about discharge.    Goal Outcome Evaluation:      Plan of Care Reviewed With: patient    Overall Patient Progress: no changeOverall Patient Progress: no change    Outcome Evaluation: Still having hematuria, lovenox discontinued.            Problem: Adult Inpatient Plan of Care  Goal: Plan of Care Review  Description: The Plan of Care Review/Shift note should be completed every shift.  The Outcome Evaluation is a brief statement about your assessment that the patient is improving, declining, or no change.  This information will be displayed automatically on your shift  note.  Outcome: Progressing  Flowsheets (Taken 7/14/2025 6948)  Outcome Evaluation: Still having hematuria, lovenox discontinued.  Plan of Care Reviewed With: patient  Overall Patient Progress: no change  Goal: Patient-Specific Goal (Individualized)  Description: You can add care plan individualizations to a care plan. Examples of Individualization might be:  \"Parent requests to be called daily at 9am for status\", \"I have a hard time hearing out of my right ear\", or \"Do not touch me to wake me up as it startles  me\".  Outcome: Progressing  Goal: Absence of Hospital-Acquired Illness or Injury  Outcome: Progressing  Intervention: Identify and Manage Fall Risk  Recent Flowsheet Documentation  Taken 7/14/2025 0952 by Noemy Shaver RN  Safety Promotion/Fall Prevention:   activity supervised   assistive device/personal items within reach   clutter free environment maintained   increased rounding and observation   increase visualization of patient   lighting adjusted   nonskid shoes/slippers when out of bed   patient and family education   safety round/check completed   supervised activity   treat underlying cause  Intervention: Prevent Skin Injury  Recent Flowsheet Documentation  Taken " 7/14/2025 0952 by Noemy Shaver, RN  Body Position: position changed independently  Intervention: Prevent and Manage VTE (Venous Thromboembolism) Risk  Recent Flowsheet Documentation  Taken 7/14/2025 0952 by Noemy Shaver RN  VTE Prevention/Management: SCDs on (sequential compression devices)  Goal: Optimal Comfort and Wellbeing  Outcome: Progressing  Intervention: Monitor Pain and Promote Comfort  Recent Flowsheet Documentation  Taken 7/14/2025 1235 by Noemy Shaver, RN  Pain Management Interventions: medication (see MAR)  Goal: Readiness for Transition of Care  Outcome: Progressing     Problem: Gas Exchange Impaired  Goal: Optimal Gas Exchange  Outcome: Progressing     Problem: Infection  Goal: Absence of Infection Signs and Symptoms  Outcome: Progressing

## 2025-07-14 NOTE — PROGRESS NOTES
Noreen paged at 1955 for a follow up on pt hematuria previous nurse paged about it but in report she hadn't heard back anything.MD Kaia Evans aware.Nothing additional to do. Hemoglobin stable. If it persists until morning, possible consult of urology.

## 2025-07-15 LAB
ANION GAP SERPL CALCULATED.3IONS-SCNC: 11 MMOL/L (ref 7–15)
BUN SERPL-MCNC: 23.8 MG/DL (ref 6–20)
CALCIUM SERPL-MCNC: 8.4 MG/DL (ref 8.8–10.4)
CHLORIDE SERPL-SCNC: 100 MMOL/L (ref 98–107)
CREAT SERPL-MCNC: 0.75 MG/DL (ref 0.51–0.95)
EGFRCR SERPLBLD CKD-EPI 2021: >90 ML/MIN/1.73M2
GLUCOSE SERPL-MCNC: 107 MG/DL (ref 70–99)
HCO3 SERPL-SCNC: 24 MMOL/L (ref 22–29)
HGB BLD-MCNC: 12 G/DL (ref 11.7–15.7)
INR PPP: 1.88 (ref 0.85–1.15)
MCV RBC AUTO: 89 FL (ref 78–100)
POTASSIUM SERPL-SCNC: 4.4 MMOL/L (ref 3.4–5.3)
PROTHROMBIN TIME: 21.5 SECONDS (ref 11.8–14.8)
SODIUM SERPL-SCNC: 135 MMOL/L (ref 135–145)

## 2025-07-15 PROCEDURE — 36415 COLL VENOUS BLD VENIPUNCTURE: CPT | Performed by: INTERNAL MEDICINE

## 2025-07-15 PROCEDURE — 80048 BASIC METABOLIC PNL TOTAL CA: CPT | Performed by: INTERNAL MEDICINE

## 2025-07-15 PROCEDURE — 99232 SBSQ HOSP IP/OBS MODERATE 35: CPT | Performed by: INTERNAL MEDICINE

## 2025-07-15 PROCEDURE — 250N000013 HC RX MED GY IP 250 OP 250 PS 637: Performed by: HOSPITALIST

## 2025-07-15 PROCEDURE — 250N000013 HC RX MED GY IP 250 OP 250 PS 637: Performed by: INTERNAL MEDICINE

## 2025-07-15 PROCEDURE — 120N000001 HC R&B MED SURG/OB

## 2025-07-15 PROCEDURE — 85610 PROTHROMBIN TIME: CPT | Performed by: HOSPITALIST

## 2025-07-15 PROCEDURE — 85018 HEMOGLOBIN: CPT | Performed by: INTERNAL MEDICINE

## 2025-07-15 RX ORDER — WARFARIN SODIUM 10 MG/1
20 TABLET ORAL
Status: COMPLETED | OUTPATIENT
Start: 2025-07-15 | End: 2025-07-15

## 2025-07-15 RX ADMIN — CARBAMAZEPINE 400 MG: 200 TABLET ORAL at 08:30

## 2025-07-15 RX ADMIN — FLUTICASONE PROPIONATE 1 SPRAY: 50 SPRAY, METERED NASAL at 08:33

## 2025-07-15 RX ADMIN — SENNOSIDES AND DOCUSATE SODIUM 1 TABLET: 50; 8.6 TABLET ORAL at 21:19

## 2025-07-15 RX ADMIN — CARBAMAZEPINE 200 MG: 200 TABLET ORAL at 11:39

## 2025-07-15 RX ADMIN — WARFARIN SODIUM 20 MG: 10 TABLET ORAL at 17:15

## 2025-07-15 RX ADMIN — FLUTICASONE FUROATE AND VILANTEROL TRIFENATATE 1 PUFF: 100; 25 POWDER RESPIRATORY (INHALATION) at 08:33

## 2025-07-15 RX ADMIN — MICONAZOLE NITRATE: 20 POWDER TOPICAL at 08:34

## 2025-07-15 RX ADMIN — CARBAMAZEPINE 400 MG: 200 TABLET ORAL at 21:19

## 2025-07-15 RX ADMIN — ACETAMINOPHEN 650 MG: 325 TABLET ORAL at 20:15

## 2025-07-15 RX ADMIN — LISINOPRIL 20 MG: 20 TABLET ORAL at 10:18

## 2025-07-15 RX ADMIN — ATORVASTATIN CALCIUM 10 MG: 10 TABLET, FILM COATED ORAL at 21:19

## 2025-07-15 RX ADMIN — PANTOPRAZOLE SODIUM 40 MG: 40 TABLET, DELAYED RELEASE ORAL at 06:12

## 2025-07-15 RX ADMIN — ACETAMINOPHEN 650 MG: 325 TABLET ORAL at 11:39

## 2025-07-15 RX ADMIN — SERTRALINE 100 MG: 100 TABLET, FILM COATED ORAL at 08:31

## 2025-07-15 RX ADMIN — MICONAZOLE NITRATE: 20 POWDER TOPICAL at 21:21

## 2025-07-15 ASSESSMENT — ACTIVITIES OF DAILY LIVING (ADL)
ADLS_ACUITY_SCORE: 51
ADLS_ACUITY_SCORE: 48
ADLS_ACUITY_SCORE: 51
ADLS_ACUITY_SCORE: 48
ADLS_ACUITY_SCORE: 51
ADLS_ACUITY_SCORE: 48
ADLS_ACUITY_SCORE: 48
ADLS_ACUITY_SCORE: 51

## 2025-07-15 NOTE — PLAN OF CARE
"Goal Outcome Evaluation:      Pt is A & O x 4, plan is to have new legal guardian and then discharge to new [facility].          Outcome Evaluation: medically cleared for discharged.      Problem: Adult Inpatient Plan of Care  Goal: Plan of Care Review  Description: The Plan of Care Review/Shift note should be completed every shift.  The Outcome Evaluation is a brief statement about your assessment that the patient is improving, declining, or no change.  This information will be displayed automatically on your shift  note.  Outcome: Progressing  Flowsheets (Taken 7/15/2025 0220)  Outcome Evaluation: medically cleared for discharged.  Goal: Patient-Specific Goal (Individualized)  Description: You can add care plan individualizations to a care plan. Examples of Individualization might be:  \"Parent requests to be called daily at 9am for status\", \"I have a hard time hearing out of my right ear\", or \"Do not touch me to wake me up as it startles  me\".  Outcome: Progressing  Goal: Absence of Hospital-Acquired Illness or Injury  Outcome: Progressing  Intervention: Identify and Manage Fall Risk  Recent Flowsheet Documentation  Taken 7/15/2025 0219 by Ajit Hyatt RN  Safety Promotion/Fall Prevention:   activity supervised   assistive device/personal items within reach   clutter free environment maintained   increased rounding and observation   increase visualization of patient   lighting adjusted   nonskid shoes/slippers when out of bed   patient and family education   safety round/check completed   supervised activity   treat underlying cause  Intervention: Prevent Skin Injury  Recent Flowsheet Documentation  Taken 7/15/2025 0219 by Ajit Hyatt RN  Body Position: position changed independently  Intervention: Prevent and Manage VTE (Venous Thromboembolism) Risk  Recent Flowsheet Documentation  Taken 7/15/2025 0219 by Ajit Hyatt RN  VTE Prevention/Management: SCDs on (sequential compression devices)  Goal: Optimal " Comfort and Wellbeing  Outcome: Progressing  Goal: Readiness for Transition of Care  Outcome: Progressing     Problem: Gas Exchange Impaired  Goal: Optimal Gas Exchange  Outcome: Progressing  Intervention: Optimize Oxygenation and Ventilation  Recent Flowsheet Documentation  Taken 7/15/2025 0219 by Ajit Hyatt, RN  Head of Bed (HOB) Positioning: HOB at 20-30 degrees     Problem: Infection  Goal: Absence of Infection Signs and Symptoms  Outcome: Progressing

## 2025-07-15 NOTE — PROGRESS NOTES
[facility]    Hospitalist Progress Note             Date of Admission:  6/25/2025                   Day of hospitalization: 20    Assessment and Plan:   Summary of Stay: Brissa Corado is a 60 year old female admitted on 6/25/2025 with past medical history of asthma on chronic oxygen at night, obstructive sleep apnea, morbid obesity, hypertension, seizure disorder who presents with asthma exacerbation 2/2 poor air quality and not using/having access to her LABA/ICS.     Patient's acute on chronic hypoxic respiratory failure has resolved.  Patient has remained in the hospital for appropriate disposition and placement     On 7/7: Patient developed hematuria CT abdomen pelvis noncontrast obtained no evidence of nephrolithiasis    Plan for today:  -Continue warfarin, goal INR 2-3 .  -Warfarin dosing per pharmacy.  -Monitor for any sign of bleed .  -Urine is clean, without any sign of bleeding.  - Awaiting placement for discharge     Gross hematuria  Possible UTI  - Patient developed gross hematuria spontaneous on 7/7  - Hemoglobin has been more or less at baseline, no sign of significant bleeding .  - Hematuria seem to be subsided   -Please been on ciprofloxacin for possible UTI, treated for total of 7 days and antibiotic discontinued 7/13.   - Warfarin restarted, goal INR 2-3, closely monitor for signs of bleeding.  - Urology consulted, input appreciated, will need follow-up as an outpatient for cystoscopy and possible CT urogram at [facility].  - urine cytology pending    Acute on chronic hypoxic respiratory failure-resolved  Asthma exacerbation  RICK   - At baseline she was on 2.5L at night and during the day as needed.    - CTPA negative for PE.  Patient states her triggers been poor air quality and she has not been using her controller LABA/ICS.  Has not had a sleep apnea machine at home but reports that she has an appointment coming up in the next 2 weeks, even though she is diagnosed  "is not tolerating it due to claustrophobic feelings.  - Wheezing resolved, DuoNeb every 4 hours as needed  - CPAP ordered  - Continue Breo Ellipta inhaler.     History of developmental delay  SW Consultation   -Father who is legal guardian states he received paperwork from the [facility] stating that patient is not to be able to return to her own residence and requires to be moved to a care facility to ensure she gets her medications as ordered and prescribed.    - consulted and following.   -Per  note patient will be getting appointed a new legal guardian in July.   -Discharge patient will go to a new [facility].  His significant other cannot go with her.     H/o DVT   - Pharmacy warfarin panel ordered, INR is 2.    - She has been on Lovenox bridge, discontinued.    Seizure disorder -Continue Tegretol     Hypertension -Continue lisinopril     Hyperlipidemia - Continue statin     Gerd - Continue prilosec     Super morbid obesity  Intertriginous excoriations and wounds in multiple skin folds  WOC following   - Start patient on topical azole therapy with ketoconazole 2% twice daily--was switched to miconazole powder per patient's request  - General Wound cares per bedside RN and WOC cares weekly     Clinically Significant Risk Factors                   # Hypertension: Noted on problem list            # Morbid Obesity: Estimated body mass index is 60.56 kg/m  as calculated from the following:    Height as of this encounter: 1.702 m (5' 7\").    Weight as of this encounter: 175.4 kg (386 lb 11 oz).      # Asthma: noted on problem list           # Code status: Full  # DVT: SCD, warfarin  # IVF:  none          Medically Ready for Discharge: Ready Now, Patient can be discharged anytime, awaiting safe discharge plan.  She will likely be discharged to group home.    I discussed with patient,, also with her mother who is her legal guardian.  All their questions answered.            Manav Mehta, " "MD    Subjective   Patient seen and examined, no new overnight issues, feels better, chart, medications reviewed.  She denied any pain overall doing well, INR is therapeutic, Lovenox discontinued    Objective   /50 (BP Location: Right arm)   Pulse 70   Temp 98.1  F (36.7  C) (Oral)   Resp 16   Ht 1.702 m (5' 7\")   Wt (!) 175.4 kg (386 lb 11 oz)   LMP 08/18/2008   SpO2 95%   BMI 60.56 kg/m       Physical Exam  General: Awake and alert, comfortable, not in distress, calm and cooperative  HEENT: OP clear MMM, no JVD  Lungs: Clear to Auscultation Bilateral, normal breathing  without accessory muscle usage, no wheezing, rhonchi or crackles  Cardiac: +S1, S2, RRR, no MRG, no edema  Abdominal: Morbidly obese, nondistended, positive bowel sounds, nontender.  Skin: warm, dry, normal turgor, no rash  Psyche: A& O x3, appropriate affect        Labs and Imaging Results:      Recent Labs   Lab 07/15/25  0842 07/14/25  0647 07/12/25  1833 07/12/25  0620   HGB 12.0 11.5*   < > 11.7   PLT  --   --   --  209    < > = values in this interval not displayed.         Recent Labs   Lab 07/15/25  0842 07/14/25  0647   INR 1.88* 2.00*      Radio:  CT Abdomen Pelvis w/o Contrast   Final Result   IMPRESSION:    1.  No urinary tract calculi or obstruction.      2.  Interval cholecystectomy. Hepatosplenomegaly without discrete lesion. No abdominopelvic suspicious adenopathy or free fluid.      3.  IVC filter, unchanged.      4.  Postoperative changes of plate and screw fixation at the symphysis pubis. Screw fixation left SI joint. Mild left convex thoracolumbar curve. Mild degenerative changes involving the spine and joints of the pelvis. Resolved inflammatory changes    involving the pannus seen previously. Small fat-containing ventral umbilical hernia.         CT Chest Pulmonary Embolism w Contrast   Final Result   IMPRESSION:   1.  Subtle areas of bronchial wall thickening with areas of air trapping throughout both lungs, " suggestive of small airways disease.   2.  No pulmonary embolus.              Medications:      Scheduled Meds:    Current Facility-Administered Medications   Medication Dose Route Frequency Provider Last Rate Last Admin    atorvastatin (LIPITOR) tablet 10 mg  10 mg Oral At Bedtime David Hull MD   10 mg at 07/14/25 2051    carBAMazepine (TEGretol) tablet 200 mg  200 mg Oral Q24H David Hull MD   200 mg at 07/15/25 1139    carBAMazepine (TEGretol) tablet 400 mg  400 mg Oral BID David Hull MD   400 mg at 07/15/25 0830    fluticasone (FLONASE) 50 MCG/ACT spray 1 spray  1 spray Both Nostrils Daily Neeraj Alvarado MD   1 spray at 07/15/25 0833    fluticasone-vilanterol (BREO ELLIPTA) 100-25 MCG/ACT inhaler 1 puff  1 puff Inhalation Daily David Hull MD   1 puff at 07/15/25 0833    lisinopril (ZESTRIL) tablet 20 mg  20 mg Oral Daily David Hull MD   20 mg at 07/15/25 1018    miconazole (MICATIN) 2 % powder   Topical BID Andreea Mcdonald MD   Given at 07/15/25 0834    pantoprazole (PROTONIX) EC tablet 40 mg  40 mg Oral QAM AC David Hull MD   40 mg at 07/15/25 0612    polyethylene glycol (MIRALAX) Packet 17 g  17 g Oral Daily Aimee Cyr MD   17 g at 07/12/25 0832    senna-docusate (SENOKOT-S/PERICOLACE) 8.6-50 MG per tablet 1 tablet  1 tablet Oral BID Aimee Cyr MD   1 tablet at 07/14/25 0948    sertraline (ZOLOFT) tablet 100 mg  100 mg Oral Daily Neeraj Alvarado MD   100 mg at 07/15/25 0831    sodium chloride (PF) 0.9% PF flush 3 mL  3 mL Intracatheter Q8H NIC David Hull MD   3 mL at 07/15/25 0613    warfarin ANTICOAGULANT (COUMADIN/JANTOVEN) tablet 20 mg  20 mg Oral ONCE at 18:00 David Hull MD        Warfarin Dose Required Daily - Pharmacist Managed  1 each Oral See Admin Instructions Aimee Cyr MD         Continuous Infusions:    Current Facility-Administered Medications   Medication Dose Route Frequency Provider Last Rate Last  Admin    Patient is already receiving anticoagulation with heparin, enoxaparin (LOVENOX), warfarin (COUMADIN)  or other anticoagulant medication   Does not apply Continuous PRN David Hull MD         PRN Meds:    Current Facility-Administered Medications   Medication Dose Route Frequency Provider Last Rate Last Admin    acetaminophen (TYLENOL) tablet 650 mg  650 mg Oral Q4H PRN David Hull MD   650 mg at 07/15/25 1139    Or    acetaminophen (TYLENOL) Suppository 650 mg  650 mg Rectal Q4H PRN David Hull MD        albuterol (PROVENTIL HFA/VENTOLIN HFA) inhaler  2 puff Inhalation Q6H PRN Neeraj Alvarado MD        albuterol (PROVENTIL) neb solution 2.5 mg  2.5 mg Nebulization Q2H PRN David Hull MD   2.5 mg at 06/26/25 0416    bisacodyl (DULCOLAX) suppository 10 mg  10 mg Rectal Daily PRN Aimee Cyr MD        calcium carbonate (TUMS) chewable tablet 1,000 mg  1,000 mg Oral 4x Daily PRN David Hull MD   1,000 mg at 07/13/25 0303    ipratropium - albuterol 0.5 mg/2.5 mg (3mg)/3 mL (DUONEB) neb solution 3 mL  3 mL Nebulization Q4H PRN Andreea Mcdonald MD        lidocaine (LMX4) cream   Topical Q1H PRN David Hull MD        lidocaine 1 % 0.1-1 mL  0.1-1 mL Other Q1H PRN David Hull MD        melatonin tablet 5 mg  5 mg Oral At Bedtime PRN David Hull MD   5 mg at 07/10/25 0038    naloxone (NARCAN) injection 0.2 mg  0.2 mg Intravenous Q2 Min PRN David Hull MD        Or    naloxone (NARCAN) injection 0.4 mg  0.4 mg Intravenous Q2 Min PRN David Hull MD        Or    naloxone (NARCAN) injection 0.2 mg  0.2 mg Intramuscular Q2 Min PRN David Hull MD        Or    naloxone (NARCAN) injection 0.4 mg  0.4 mg Intramuscular Q2 Min PRN David Hull MD        ondansetron (ZOFRAN ODT) ODT tab 4 mg  4 mg Oral Q6H PRN David Hull MD   4 mg at 07/07/25 0930    Or    ondansetron (ZOFRAN) injection 4 mg  4 mg Intravenous Q6H PRN Kurtis  David GRANT MD   4 mg at 07/07/25 1455    oxyCODONE IR (ROXICODONE) half-tab 2.5 mg  2.5 mg Oral Q4H PRN Kaia Evans DO   2.5 mg at 07/12/25 9256    Patient is already receiving anticoagulation with heparin, enoxaparin (LOVENOX), warfarin (COUMADIN)  or other anticoagulant medication   Does not apply Continuous PRN David Hull MD        simethicone (MYLICON) chewable tablet 80 mg  80 mg Oral Q6H PRN Neeraj Reed MD   80 mg at 07/10/25 1455    sodium chloride (PF) 0.9% PF flush 3 mL  3 mL Intracatheter q1 min prn David Hull MD

## 2025-07-15 NOTE — PROGRESS NOTES
Care Management Follow Up    Length of Stay (days): 20    Expected Discharge Date: 07/18/2025     Concerns to be Addressed: discharge planning     Patient plan of care discussed at interdisciplinary rounds: Yes    Anticipated Discharge Disposition:  [facility]              Anticipated Discharge Services:    Anticipated Discharge DME:      Patient/family educated on Medicare website which has current facility and service quality ratings:    Education Provided on the Discharge Plan:    Patient/Family in Agreement with the Plan:      Referrals Placed by CM/SW:    Private pay costs discussed: Not applicable    Discussed  Partnership in Safe Discharge Planning  document with patient/family: No     Handoff Completed: Yes, [facility] PCP: Internal handoff referral completed    Additional Information:  LUZ MARIA spoke with pt's mother and guardian, who was looking for an update on where things are in the process for pt. LUZ MARIA informed Diomedes that pt is medically ready for discharge and we are needing the group home to be in place for pt to discharge to.     The pt's next guardian, Tico, is not able to fully take over yet as the orders from the court have not been signed yet.     Hospital bed has been ordered by pt's mother from [facility], but are still waiting on its delivery.     LUZ MARIA provided update to Pauline Hoang, pt's Lakshmi  ([phone] [email or web address]).     Next Steps: LUZ MARIA to continue following for discharge plans.    AISSATOU Alexander, LGSW  [facility] Care Coordinator/ Med Surg [address] [place] [address]  [phone]

## 2025-07-15 NOTE — PLAN OF CARE
"  Problem: Adult Inpatient Plan of Care  Goal: Plan of Care Review  Description: The Plan of Care Review/Shift note should be completed every shift.  The Outcome Evaluation is a brief statement about your assessment that the patient is improving, declining, or no change.  This information will be displayed automatically on your shift  note.  Outcome: Progressing  Flowsheets (Taken 7/14/2025 9274)  Outcome Evaluation: Hematuria improved this shift, no blood seen in urine, color is dark yellow. Pt A & O x3, denies pain. Good appetite this shift. Medically ready to go back to her AL facility.  Overall Patient Progress: improving  Goal: Patient-Specific Goal (Individualized)  Description: You can add care plan individualizations to a care plan. Examples of Individualization might be:  \"Parent requests to be called daily at 9am for status\", \"I have a hard time hearing out of my right ear\", or \"Do not touch me to wake me up as it startles  me\".  Outcome: Progressing  Goal: Absence of Hospital-Acquired Illness or Injury  Outcome: Progressing  Intervention: Identify and Manage Fall Risk  Recent Flowsheet Documentation  Taken 7/14/2025 1711 by Sarah Mondragon RN  Safety Promotion/Fall Prevention:   activity supervised   assistive device/personal items within reach   clutter free environment maintained   increased rounding and observation   increase visualization of patient   lighting adjusted   nonskid shoes/slippers when out of bed   patient and family education   safety round/check completed   supervised activity   treat underlying cause  Intervention: Prevent Skin Injury  Recent Flowsheet Documentation  Taken 7/14/2025 1711 by Sarah Mondragon RN  Body Position: position changed independently  Intervention: Prevent and Manage VTE (Venous Thromboembolism) Risk  Recent Flowsheet Documentation  Taken 7/14/2025 1711 by Sarah Mondragon RN  VTE Prevention/Management: SCDs on (sequential compression devices)  Goal: " Optimal Comfort and Wellbeing  Outcome: Progressing  Goal: Readiness for Transition of Care  Outcome: Progressing     Problem: Gas Exchange Impaired  Goal: Optimal Gas Exchange  Outcome: Progressing  Intervention: Optimize Oxygenation and Ventilation  Recent Flowsheet Documentation  Taken 7/14/2025 1711 by Sarah Mondragon RN  Head of Bed (HOB) Positioning: HOB at 20-30 degrees     Problem: Infection  Goal: Absence of Infection Signs and Symptoms  Outcome: Progressing   Goal Outcome Evaluation:           Overall Patient Progress: improvingOverall Patient Progress: improving    Outcome Evaluation: Hematuria improved this shift, no blood seen in urine, color is dark yellow. Pt A & O x3, denies pain. Good appetite this shift. Medically ready to go back to her AL facility.

## 2025-07-15 NOTE — PLAN OF CARE
Goal Outcome Evaluation:      Plan of Care Reviewed With: patient    Overall Patient Progress: improvingOverall Patient Progress: improving    Outcome Evaluation: no significant change. Wounds cleansed/dressed per POC. Await guardianship finalization        Problem: Adult Inpatient Plan of Care  Goal: Plan of Care Review  Description: The Plan of Care Review/Shift note should be completed every shift.  The Outcome Evaluation is a brief statement about your assessment that the patient is improving, declining, or no change.  This information will be displayed automatically on your shift  note.  Recent Flowsheet Documentation  Taken 7/15/2025 1609 by Gia Cunningham RN  Outcome Evaluation: no significant change. Wounds cleansed/dressed per POC. Await guardianship finalization  Plan of Care Reviewed With: patient  Overall Patient Progress: improving  Goal: Absence of Hospital-Acquired Illness or Injury  Intervention: Identify and Manage Fall Risk  Recent Flowsheet Documentation  Taken 7/15/2025 1116 by Gia Cunningham RN  Safety Promotion/Fall Prevention:   activity supervised   assistive device/personal items within reach   clutter free environment maintained   increased rounding and observation   increase visualization of patient   lighting adjusted   nonskid shoes/slippers when out of bed   patient and family education   safety round/check completed   supervised activity   treat underlying cause  Intervention: Prevent Skin Injury  Recent Flowsheet Documentation  Taken 7/15/2025 1116 by Gia Cunningham RN  Body Position: position changed independently  Intervention: Prevent and Manage VTE (Venous Thromboembolism) Risk  Recent Flowsheet Documentation  Taken 7/15/2025 1116 by Gia Cunningham RN  VTE Prevention/Management: patient refused intervention  Goal: Optimal Comfort and Wellbeing  Intervention: Monitor Pain and Promote Comfort  Recent Flowsheet Documentation  Taken 7/15/2025 1215 by Gia Cunningham RN  Pain  Management Interventions: declines  Taken 7/15/2025 1138 by Gia Cunningham, RN  Pain Management Interventions: medication (see MAR)

## 2025-07-16 LAB
HGB BLD-MCNC: 11.5 G/DL (ref 11.7–15.7)
INR PPP: 2.21 (ref 0.85–1.15)
MCV RBC AUTO: 87 FL (ref 78–100)
PROTHROMBIN TIME: 24.3 SECONDS (ref 11.8–14.8)

## 2025-07-16 PROCEDURE — 999N000157 HC STATISTIC RCP TIME EA 10 MIN

## 2025-07-16 PROCEDURE — G0463 HOSPITAL OUTPT CLINIC VISIT: HCPCS

## 2025-07-16 PROCEDURE — 36415 COLL VENOUS BLD VENIPUNCTURE: CPT | Performed by: INTERNAL MEDICINE

## 2025-07-16 PROCEDURE — 250N000013 HC RX MED GY IP 250 OP 250 PS 637: Performed by: INTERNAL MEDICINE

## 2025-07-16 PROCEDURE — 120N000001 HC R&B MED SURG/OB

## 2025-07-16 PROCEDURE — 99232 SBSQ HOSP IP/OBS MODERATE 35: CPT | Performed by: INTERNAL MEDICINE

## 2025-07-16 PROCEDURE — 85018 HEMOGLOBIN: CPT | Performed by: INTERNAL MEDICINE

## 2025-07-16 PROCEDURE — 250N000013 HC RX MED GY IP 250 OP 250 PS 637: Performed by: HOSPITALIST

## 2025-07-16 PROCEDURE — 94640 AIRWAY INHALATION TREATMENT: CPT

## 2025-07-16 PROCEDURE — 85610 PROTHROMBIN TIME: CPT | Performed by: HOSPITALIST

## 2025-07-16 RX ORDER — WARFARIN SODIUM 10 MG/1
20 TABLET ORAL
Status: COMPLETED | OUTPATIENT
Start: 2025-07-16 | End: 2025-07-16

## 2025-07-16 RX ADMIN — SERTRALINE 100 MG: 100 TABLET, FILM COATED ORAL at 09:39

## 2025-07-16 RX ADMIN — WARFARIN SODIUM 20 MG: 10 TABLET ORAL at 17:39

## 2025-07-16 RX ADMIN — SENNOSIDES AND DOCUSATE SODIUM 1 TABLET: 50; 8.6 TABLET ORAL at 09:39

## 2025-07-16 RX ADMIN — FLUTICASONE PROPIONATE 1 SPRAY: 50 SPRAY, METERED NASAL at 09:39

## 2025-07-16 RX ADMIN — PANTOPRAZOLE SODIUM 40 MG: 40 TABLET, DELAYED RELEASE ORAL at 09:44

## 2025-07-16 RX ADMIN — CARBAMAZEPINE 200 MG: 200 TABLET ORAL at 11:37

## 2025-07-16 RX ADMIN — FLUTICASONE FUROATE AND VILANTEROL TRIFENATATE 1 PUFF: 100; 25 POWDER RESPIRATORY (INHALATION) at 08:24

## 2025-07-16 RX ADMIN — CARBAMAZEPINE 400 MG: 200 TABLET ORAL at 21:25

## 2025-07-16 RX ADMIN — ACETAMINOPHEN 650 MG: 325 TABLET ORAL at 12:32

## 2025-07-16 RX ADMIN — ACETAMINOPHEN 650 MG: 325 TABLET ORAL at 21:25

## 2025-07-16 RX ADMIN — ATORVASTATIN CALCIUM 10 MG: 10 TABLET, FILM COATED ORAL at 21:24

## 2025-07-16 RX ADMIN — SENNOSIDES AND DOCUSATE SODIUM 1 TABLET: 50; 8.6 TABLET ORAL at 21:25

## 2025-07-16 RX ADMIN — MICONAZOLE NITRATE: 20 POWDER TOPICAL at 21:27

## 2025-07-16 RX ADMIN — CARBAMAZEPINE 400 MG: 200 TABLET ORAL at 09:39

## 2025-07-16 RX ADMIN — LISINOPRIL 20 MG: 20 TABLET ORAL at 09:39

## 2025-07-16 RX ADMIN — MICONAZOLE NITRATE: 20 POWDER TOPICAL at 09:40

## 2025-07-16 ASSESSMENT — ACTIVITIES OF DAILY LIVING (ADL)
ADLS_ACUITY_SCORE: 48
ADLS_ACUITY_SCORE: 51
ADLS_ACUITY_SCORE: 49
ADLS_ACUITY_SCORE: 49
ADLS_ACUITY_SCORE: 51
ADLS_ACUITY_SCORE: 44
ADLS_ACUITY_SCORE: 48
ADLS_ACUITY_SCORE: 44
ADLS_ACUITY_SCORE: 51
ADLS_ACUITY_SCORE: 48
ADLS_ACUITY_SCORE: 49
ADLS_ACUITY_SCORE: 44
ADLS_ACUITY_SCORE: 44
ADLS_ACUITY_SCORE: 49
ADLS_ACUITY_SCORE: 49
ADLS_ACUITY_SCORE: 44
ADLS_ACUITY_SCORE: 49
ADLS_ACUITY_SCORE: 44
ADLS_ACUITY_SCORE: 48

## 2025-07-16 NOTE — PROGRESS NOTES
[facility] Nurse Inpatient Assessment     Consulted for: Groin    Summary: Folds are significantly improved.      [facility] nurse follow-up plan: weekly    Patient History (according to provider note(s):    Assessment & Plan  Brissa Corado is a 60 year old female admitted on 6/25/2025 with past medical history of asthma on chronic oxygen at night, obstructive sleep apnea, morbid obesity, hypertension, seizure disorder who presents with asthma exacerbation 2/2 poor air quality and not using/having access to her LABA/ICS.     Acute on chronic hypoxic respiratory failure  Asthma exacerbation  RICK -patient improving with treatments (DuoNeb, dexamethasone, magnesium in ED in ED) of clinical asthma exacerbation.  CTPA negative for PE.  Patient states her triggers been poor air quality and she has not been using her controller LABA/ICS.  Has not had a sleep apnea machine at home but reports that she has an appointment coming up in the next 2 weeks, even though she is diagnosed is not tolerating it due to claustrophobic feelings.  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 No drainage or odor noted. Periwound skin intact.    Pain: denies   Pain interventions prior to dressing change: patient tolerated well and slow and  gentle cares   Treatment goal: Heal , Maintain (prevention of deterioration), and Protection  STATUS: improving  Supplies ordered: at bedside    Treatment Plan:   Interdry:  Location Pannus and breast folds - other folds as needed  Cleanse skin with Vashe and dry well.  Cut Interdry 2 inches larger than skin fold, date dressing.  Apply a single layer -flat, not bunched up- of dressing in skin fold so 2 inches are visible (allow moisture to wick out).   Remove dressing with routine cleansing and reapply, do not use powder/ointment in folds if Interdry in use..   Discard if grossly soiled otherwise 1 piece is good up to 5 days.    L posteromedial thigh - Every 3 days  Cleanse with saline; gently dry.  Cover with Mepilex.    Orders: Reviewed    RECOMMEND PRIMARY TEAM ORDER: None, at this time  Education provided: plan of care  Discussed plan of care with: Patient  Notify WOC if wound(s) deteriorate.  Nursing to notify the Provider(s) and re-consult the WOC Nurse if new skin concern.    DATA:     Current support surface: Standard  Standard gel mattress (Isoflex)  Containment of urine/stool: Continent of bowel, Incontinence Protocol, and Suction based external urinary catheter   BMI: Body mass index is 60.56 kg/m .   Active diet order: Orders Placed This Encounter      Combination Diet Regular Diet     Output: I/O last 3 completed shifts:  In: 1240 [P.O.:1240]  Out: 1000 [Urine:1000]     Labs:   Recent Labs   Lab 07/16/25  0641   HGB 11.5*   INR 2.21*     Pressure injury risk assessment:   Sensory Perception: 3-->slightly limited  Moisture: 3-->occasionally moist  Activity: 3-->walks occasionally  Mobility: 3-->slightly limited  Nutrition: 3-->adequate  Friction and Shear: 1-->problem  Randy Score: 16      Bharat Cooper RN CWOCN  Contact [facility] Nurse (Kristy)  Dept. Office Number: [phone]

## 2025-07-16 NOTE — PLAN OF CARE
End OF Shift Summary :    Neuro:   A&O4         Cardiac:  WDL         Respiratory : lung sounds diminshed.denies shortnss of breath      GI &  :bowel sounds audible.tender when palpated pure wick in place.       Skin : left thigh abrasion meplix applied on posterior and on buttocks          Mobility : generalized weakness gaitbelt and walker in use with a assist of one      IV access :  patent flushed cap changed    Tylenol given for lower abdominal pain.        Goal Outcome Evaluation:      Plan of Care Reviewed With: patient    Overall Patient Progress: improvingOverall Patient Progress: improving    Problem: Adult Inpatient Plan of Care  Goal: Plan of Care Review  Description: The Plan of Care Review/Shift note should be completed every shift.  The Outcome Evaluation is a brief statement about your assessment that the patient is improving, declining, or no change.  This information will be displayed automatically on your shift  note.  Recent Flowsheet Documentation  Taken 7/16/2025 0431 by Piper Yao RN  Plan of Care Reviewed With: patient  Overall Patient Progress: improving  Goal: Optimal Comfort and Wellbeing  Intervention: Monitor Pain and Promote Comfort  Recent Flowsheet Documentation  Taken 7/15/2025 2124 by Piper Yao RN  Pain Management Interventions: medication (see MAR)  Taken 7/15/2025 2015 by Piper Yao RN  Pain Management Interventions: medication (see MAR)     Problem: Adult Inpatient Plan of Care  Goal: Plan of Care Review  Description: The Plan of Care Review/Shift note should be completed every shift.  The Outcome Evaluation is a brief statement about your assessment that the patient is improving, declining, or no change.  This information will be displayed automatically on your shift  note.  Recent Flowsheet Documentation  Taken 7/16/2025 0431 by Piper Yao RN  Plan of Care Reviewed With: patient  Overall Patient Progress: improving  Goal: Optimal Comfort and  "Wellbeing  Intervention: Monitor Pain and Promote Comfort  Recent Flowsheet Documentation  Taken 7/15/2025 2124 by Piper Yao RN  Pain Management Interventions: medication (see MAR)  Taken 7/15/2025 2015 by Piper Yao RN  Pain Management Interventions: medication (see MAR)     Problem: Adult Inpatient Plan of Care  Goal: Plan of Care Review  Description: The Plan of Care Review/Shift note should be completed every shift.  The Outcome Evaluation is a brief statement about your assessment that the patient is improving, declining, or no change.  This information will be displayed automatically on your shift  note.  Outcome: Progressing  Flowsheets (Taken 7/16/2025 0431)  Plan of Care Reviewed With: patient  Overall Patient Progress: improving  Goal: Patient-Specific Goal (Individualized)  Description: You can add care plan individualizations to a care plan. Examples of Individualization might be:  \"Parent requests to be called daily at 9am for status\", \"I have a hard time hearing out of my right ear\", or \"Do not touch me to wake me up as it startles  me\".  Outcome: Progressing  Goal: Absence of Hospital-Acquired Illness or Injury  Outcome: Progressing  Goal: Optimal Comfort and Wellbeing  Outcome: Progressing  Intervention: Monitor Pain and Promote Comfort  Recent Flowsheet Documentation  Taken 7/15/2025 2124 by Piper Yao RN  Pain Management Interventions: medication (see MAR)  Taken 7/15/2025 2015 by Piper Yao RN  Pain Management Interventions: medication (see MAR)  Goal: Readiness for Transition of Care  Outcome: Progressing     Problem: Gas Exchange Impaired  Goal: Optimal Gas Exchange  Outcome: Progressing     Problem: Infection  Goal: Absence of Infection Signs and Symptoms  Outcome: Progressing                "

## 2025-07-16 NOTE — PROGRESS NOTES
[facility]    Hospitalist Progress Note             Date of Admission:  6/25/2025                   Day of hospitalization: 21    Assessment and Plan:   Summary of Stay: Brissa Corado is a 60 year old female admitted on 6/25/2025 with past medical history of asthma on chronic oxygen at night, obstructive sleep apnea, morbid obesity, hypertension, seizure disorder who presents with asthma exacerbation 2/2 poor air quality and not using/having access to her LABA/ICS.     Patient's acute on chronic hypoxic respiratory failure has resolved.  Patient has remained in the hospital for appropriate disposition and placement     On 7/7: Patient developed hematuria CT abdomen pelvis noncontrast obtained no evidence of nephrolithiasis    Plan for today:  -Continue warfarin, INR is 2.1, goal INR 2-3 .  -Warfarin dosing per pharmacy.  -Monitor for any sign of bleed .  -Urine remained clean.  - Patient is ready for discharge once placement is available for her.  - Discussed with social service     Gross hematuria, resolved  Possible UTI, treated  - Patient developed gross hematuria spontaneous on 7/7  - Hemoglobin has been more or less at baseline, no sign of significant bleeding .  - Hematuria seem to be subsided   - Treated for total of 7 days and antibiotic discontinued 7/13.   - Warfarin restarted, goal INR 2-3, closely monitor for signs of bleeding.  - Urology consulted, input appreciated, will need follow-up as an outpatient for cystoscopy and possible CT urogram at [place] [facility].  - Urine cytology pending.    Acute on chronic hypoxic respiratory failure-resolved  Asthma exacerbation  RICK   - At baseline she was on 2.5L at night and during the day as needed.    - CTPA negative for PE.  Patient states her triggers been poor air quality and she has not been using her controller LABA/ICS.  Has not had a sleep apnea machine at home but reports that she has an appointment coming up in the next 2 weeks,  "even though she is diagnosed is not tolerating it due to claustrophobic feelings.  - Wheezing resolved, DuoNeb every 4 hours as needed  - CPAP ordered  - Continue Breo Ellipta inhaler.     History of developmental delay  SW Consultation   -Father who is legal guardian states he received paperwork from the [facility] stating that patient is not to be able to return to her own residence and requires to be moved to a care facility to ensure she gets her medications as ordered and prescribed.    - consulted and following.   -Per  note patient will be getting appointed a new legal guardian in July.   -Discharge patient will go to a new [facility].  His significant other cannot go with her.     H/o DVT   - Pharmacy warfarin panel ordered, INR is 2.    - She has been on Lovenox bridge, discontinued.    Seizure disorder -Continue Tegretol     Hypertension -Continue lisinopril     Hyperlipidemia - Continue statin     Gerd - Continue prilosec     Super morbid obesity  Intertriginous excoriations and wounds in multiple skin folds  WOC following   - Start patient on topical azole therapy with ketoconazole 2% twice daily--was switched to miconazole powder per patient's request  - General Wound cares per bedside RN and WOC cares weekly     Clinically Significant Risk Factors                   # Hypertension: Noted on problem list            # Morbid Obesity: Estimated body mass index is 60.56 kg/m  as calculated from the following:    Height as of this encounter: 1.702 m (5' 7\").    Weight as of this encounter: 175.4 kg (386 lb 11 oz).      # Asthma: noted on problem list           Code status: Full  DVT: SCD, warfarin      Medically Ready for Discharge: Ready Now, Patient can be discharged anytime, awaiting safe discharge plan.  She will likely be discharged to group home.    I discussed with patient the plan of care and discharge.   I discussed with her mother who is currently her legal guardian, but on the " "process of transitioning to court appointed legal guardian.  Orders questions and concerns addressed.         Total time: 25 minutes    Manav Mehta MD    Subjective   Patient seen and examined, no new overnight issues, comfortably laying in bed, not in distress,  chart, medications reviewed.  She denied any pain overall doing well, INR is therapeutic, Lovenox discontinued    Objective   /48 (BP Location: Left arm, Patient Position: Semi-South's, Cuff Size: Adult Large)   Pulse 76   Temp 98.1  F (36.7  C) (Oral)   Resp 20   Ht 1.702 m (5' 7\")   Wt (!) 175.4 kg (386 lb 11 oz)   LMP 08/18/2008   SpO2 97%   BMI 60.56 kg/m       Physical Exam  General: Awake and alert, comfortable, not in distress, calm and cooperative, morbidly obese  HEENT: Pink, anicteric, moist oral mucosa   Lungs: Clear to Auscultation Bilateral, normal breathing  without accessory muscle usage, no wheezing, rhonchi or crackles  Cardiac: +S1, S2, RRR, no MRG, no edema  Abdominal: Morbidly obese, nondistended, positive bowel sounds, nontender.  Skin: warm, dry, normal turgor, no rash  Psyche: A& O x3, appropriate affect        Labs and Imaging Results:      Recent Labs   Lab 07/16/25  0641 07/15/25  0842 07/12/25  1833 07/12/25  0620   HGB 11.5* 12.0   < > 11.7   PLT  --   --   --  209    < > = values in this interval not displayed.         Recent Labs   Lab 07/16/25  0641 07/15/25  0842   INR 2.21* 1.88*      Radio:  CT Abdomen Pelvis w/o Contrast   Final Result   IMPRESSION:    1.  No urinary tract calculi or obstruction.      2.  Interval cholecystectomy. Hepatosplenomegaly without discrete lesion. No abdominopelvic suspicious adenopathy or free fluid.      3.  IVC filter, unchanged.      4.  Postoperative changes of plate and screw fixation at the symphysis pubis. Screw fixation left SI joint. Mild left convex thoracolumbar curve. Mild degenerative changes involving the spine and joints of the pelvis. Resolved " inflammatory changes    involving the pannus seen previously. Small fat-containing ventral umbilical hernia.         CT Chest Pulmonary Embolism w Contrast   Final Result   IMPRESSION:   1.  Subtle areas of bronchial wall thickening with areas of air trapping throughout both lungs, suggestive of small airways disease.   2.  No pulmonary embolus.              Medications:      Scheduled Meds:    Current Facility-Administered Medications   Medication Dose Route Frequency Provider Last Rate Last Admin    atorvastatin (LIPITOR) tablet 10 mg  10 mg Oral At Bedtime David Hull MD   10 mg at 07/15/25 2119    carBAMazepine (TEGretol) tablet 200 mg  200 mg Oral Q24H David Hull MD   200 mg at 07/16/25 1137    carBAMazepine (TEGretol) tablet 400 mg  400 mg Oral BID David Hull MD   400 mg at 07/16/25 0939    fluticasone (FLONASE) 50 MCG/ACT spray 1 spray  1 spray Both Nostrils Daily Neeraj Alvarado MD   1 spray at 07/16/25 0939    fluticasone-vilanterol (BREO ELLIPTA) 100-25 MCG/ACT inhaler 1 puff  1 puff Inhalation Daily David Hull MD   1 puff at 07/16/25 0824    lisinopril (ZESTRIL) tablet 20 mg  20 mg Oral Daily David Hull MD   20 mg at 07/16/25 0939    miconazole (MICATIN) 2 % powder   Topical BID Andreea Mcdonald MD   Given at 07/16/25 0940    pantoprazole (PROTONIX) EC tablet 40 mg  40 mg Oral QAM AC David Hull MD   40 mg at 07/16/25 0944    polyethylene glycol (MIRALAX) Packet 17 g  17 g Oral Daily Aimee Cyr MD   17 g at 07/12/25 0832    senna-docusate (SENOKOT-S/PERICOLACE) 8.6-50 MG per tablet 1 tablet  1 tablet Oral BID Aimee Cyr MD   1 tablet at 07/16/25 0939    sertraline (ZOLOFT) tablet 100 mg  100 mg Oral Daily Neeraj Alvarado MD   100 mg at 07/16/25 0939    sodium chloride (PF) 0.9% PF flush 3 mL  3 mL Intracatheter Q8H NIC David Hull MD   3 mL at 07/15/25 4557    Warfarin Dose Required Daily - Pharmacist Managed  1 each Oral See  Admin Instructions Aimee Cyr MD         Continuous Infusions:    Current Facility-Administered Medications   Medication Dose Route Frequency Provider Last Rate Last Admin    Patient is already receiving anticoagulation with heparin, enoxaparin (LOVENOX), warfarin (COUMADIN)  or other anticoagulant medication   Does not apply Continuous PRN David Hull MD         PRN Meds:    Current Facility-Administered Medications   Medication Dose Route Frequency Provider Last Rate Last Admin    acetaminophen (TYLENOL) tablet 650 mg  650 mg Oral Q4H PRN David Hull MD   650 mg at 07/16/25 1232    Or    acetaminophen (TYLENOL) Suppository 650 mg  650 mg Rectal Q4H PRN David Hull MD        albuterol (PROVENTIL HFA/VENTOLIN HFA) inhaler  2 puff Inhalation Q6H PRN Neeraj Alvarado MD        albuterol (PROVENTIL) neb solution 2.5 mg  2.5 mg Nebulization Q2H PRN David Hull MD   2.5 mg at 06/26/25 0416    bisacodyl (DULCOLAX) suppository 10 mg  10 mg Rectal Daily PRN Aimee Cyr MD        calcium carbonate (TUMS) chewable tablet 1,000 mg  1,000 mg Oral 4x Daily PRN David Hull MD   1,000 mg at 07/13/25 0303    ipratropium - albuterol 0.5 mg/2.5 mg (3mg)/3 mL (DUONEB) neb solution 3 mL  3 mL Nebulization Q4H PRN Andreea Mcdonald MD        lidocaine (LMX4) cream   Topical Q1H PRN David Hull MD        lidocaine 1 % 0.1-1 mL  0.1-1 mL Other Q1H PRN David Hull MD        melatonin tablet 5 mg  5 mg Oral At Bedtime PRN David Hull MD   5 mg at 07/10/25 0038    naloxone (NARCAN) injection 0.2 mg  0.2 mg Intravenous Q2 Min PRN David Hull MD        Or    naloxone (NARCAN) injection 0.4 mg  0.4 mg Intravenous Q2 Min PRN David Hull MD        Or    naloxone (NARCAN) injection 0.2 mg  0.2 mg Intramuscular Q2 Min PRN David Hull MD        Or    naloxone (NARCAN) injection 0.4 mg  0.4 mg Intramuscular Q2 Min PRN David Hull MD        ondansetron  (ZOFRAN ODT) ODT tab 4 mg  4 mg Oral Q6H PRN David Hull MD   4 mg at 07/07/25 0930    Or    ondansetron (ZOFRAN) injection 4 mg  4 mg Intravenous Q6H PRN David Hull MD   4 mg at 07/07/25 1455    oxyCODONE IR (ROXICODONE) half-tab 2.5 mg  2.5 mg Oral Q4H PRN Kaia Evans DO   2.5 mg at 07/12/25 3098    Patient is already receiving anticoagulation with heparin, enoxaparin (LOVENOX), warfarin (COUMADIN)  or other anticoagulant medication   Does not apply Continuous PRN David Hull MD        simethicone (MYLICON) chewable tablet 80 mg  80 mg Oral Q6H PRN Neeraj Reed MD   80 mg at 07/10/25 1455    sodium chloride (PF) 0.9% PF flush 3 mL  3 mL Intracatheter q1 min prn David Hull MD

## 2025-07-16 NOTE — PLAN OF CARE
Goal Outcome Evaluation:      Plan of Care Reviewed With: patient    Overall Patient Progress: no change  Outcome Evaluation: No change. INR 2.21 - coumadin dose this evening. Up ambulating to BR.    Problem: Adult Inpatient Plan of Care  Goal: Plan of Care Review  Description: The Plan of Care Review/Shift note should be completed every shift.  The Outcome Evaluation is a brief statement about your assessment that the patient is improving, declining, or no change.  This information will be displayed automatically on your shift  note.  Recent Flowsheet Documentation  Taken 7/16/2025 1429 by Gia Cunningham RN  Outcome Evaluation: No change. INR 2.21 - coumadin dose this evening. Up ambulating to BR.  Plan of Care Reviewed With: patient  Overall Patient Progress: no change  Goal: Absence of Hospital-Acquired Illness or Injury  Intervention: Identify and Manage Fall Risk  Recent Flowsheet Documentation  Taken 7/16/2025 1200 by Gia Cunningham RN  Safety Promotion/Fall Prevention:   activity supervised   assistive device/personal items within reach   clutter free environment maintained   increased rounding and observation   increase visualization of patient   lighting adjusted   nonskid shoes/slippers when out of bed   patient and family education   safety round/check completed   supervised activity   treat underlying cause  Intervention: Prevent Skin Injury  Recent Flowsheet Documentation  Taken 7/16/2025 1200 by Gia Cunningham RN  Body Position: position changed independently  Intervention: Prevent and Manage VTE (Venous Thromboembolism) Risk  Recent Flowsheet Documentation  Taken 7/16/2025 1200 by Gia Cunningham RN  VTE Prevention/Management: patient refused intervention

## 2025-07-17 VITALS
WEIGHT: 293 LBS | SYSTOLIC BLOOD PRESSURE: 106 MMHG | OXYGEN SATURATION: 97 % | DIASTOLIC BLOOD PRESSURE: 31 MMHG | TEMPERATURE: 97.7 F | HEIGHT: 67 IN | RESPIRATION RATE: 20 BRPM | HEART RATE: 74 BPM | BODY MASS INDEX: 45.99 KG/M2

## 2025-07-17 LAB
INR PPP: 2.66 (ref 0.85–1.15)
PROTHROMBIN TIME: 27.9 SECONDS (ref 11.8–14.8)

## 2025-07-17 PROCEDURE — 99232 SBSQ HOSP IP/OBS MODERATE 35: CPT | Performed by: INTERNAL MEDICINE

## 2025-07-17 PROCEDURE — 250N000013 HC RX MED GY IP 250 OP 250 PS 637: Performed by: HOSPITALIST

## 2025-07-17 PROCEDURE — 250N000013 HC RX MED GY IP 250 OP 250 PS 637: Performed by: INTERNAL MEDICINE

## 2025-07-17 PROCEDURE — 36415 COLL VENOUS BLD VENIPUNCTURE: CPT | Performed by: HOSPITALIST

## 2025-07-17 PROCEDURE — 85610 PROTHROMBIN TIME: CPT | Performed by: HOSPITALIST

## 2025-07-17 PROCEDURE — 250N000009 HC RX 250: Performed by: STUDENT IN AN ORGANIZED HEALTH CARE EDUCATION/TRAINING PROGRAM

## 2025-07-17 PROCEDURE — 94640 AIRWAY INHALATION TREATMENT: CPT

## 2025-07-17 PROCEDURE — 120N000001 HC R&B MED SURG/OB

## 2025-07-17 RX ADMIN — SENNOSIDES AND DOCUSATE SODIUM 1 TABLET: 50; 8.6 TABLET ORAL at 21:36

## 2025-07-17 RX ADMIN — CARBAMAZEPINE 400 MG: 200 TABLET ORAL at 21:36

## 2025-07-17 RX ADMIN — PANTOPRAZOLE SODIUM 40 MG: 40 TABLET, DELAYED RELEASE ORAL at 06:27

## 2025-07-17 RX ADMIN — LISINOPRIL 20 MG: 20 TABLET ORAL at 09:36

## 2025-07-17 RX ADMIN — IPRATROPIUM BROMIDE AND ALBUTEROL SULFATE 3 ML: .5; 3 SOLUTION RESPIRATORY (INHALATION) at 07:51

## 2025-07-17 RX ADMIN — ACETAMINOPHEN 650 MG: 325 TABLET ORAL at 17:51

## 2025-07-17 RX ADMIN — FLUTICASONE PROPIONATE 1 SPRAY: 50 SPRAY, METERED NASAL at 09:37

## 2025-07-17 RX ADMIN — FLUTICASONE FUROATE AND VILANTEROL TRIFENATATE 1 PUFF: 100; 25 POWDER RESPIRATORY (INHALATION) at 09:37

## 2025-07-17 RX ADMIN — ATORVASTATIN CALCIUM 10 MG: 10 TABLET, FILM COATED ORAL at 21:36

## 2025-07-17 RX ADMIN — SERTRALINE 100 MG: 100 TABLET, FILM COATED ORAL at 09:37

## 2025-07-17 RX ADMIN — ACETAMINOPHEN 650 MG: 325 TABLET ORAL at 23:43

## 2025-07-17 RX ADMIN — MICONAZOLE NITRATE: 20 POWDER TOPICAL at 09:39

## 2025-07-17 RX ADMIN — WARFARIN SODIUM 15 MG: 10 TABLET ORAL at 17:51

## 2025-07-17 RX ADMIN — ACETAMINOPHEN 650 MG: 325 TABLET ORAL at 12:22

## 2025-07-17 RX ADMIN — CARBAMAZEPINE 400 MG: 200 TABLET ORAL at 09:36

## 2025-07-17 RX ADMIN — MICONAZOLE NITRATE: 20 POWDER TOPICAL at 21:38

## 2025-07-17 RX ADMIN — CARBAMAZEPINE 200 MG: 200 TABLET ORAL at 12:17

## 2025-07-17 ASSESSMENT — ACTIVITIES OF DAILY LIVING (ADL)
ADLS_ACUITY_SCORE: 57
ADLS_ACUITY_SCORE: 45
ADLS_ACUITY_SCORE: 45
ADLS_ACUITY_SCORE: 57
ADLS_ACUITY_SCORE: 56
ADLS_ACUITY_SCORE: 45
ADLS_ACUITY_SCORE: 56
ADLS_ACUITY_SCORE: 45
ADLS_ACUITY_SCORE: 45
ADLS_ACUITY_SCORE: 56
ADLS_ACUITY_SCORE: 57
ADLS_ACUITY_SCORE: 57
ADLS_ACUITY_SCORE: 45
ADLS_ACUITY_SCORE: 56
ADLS_ACUITY_SCORE: 56
ADLS_ACUITY_SCORE: 57
ADLS_ACUITY_SCORE: 57
ADLS_ACUITY_SCORE: 45
ADLS_ACUITY_SCORE: 56
ADLS_ACUITY_SCORE: 57

## 2025-07-17 NOTE — PLAN OF CARE
"Goal Outcome Evaluation:      Plan of Care Reviewed With: patient      The patient remains alert and oriented x4 and call light appropriate. Ambulates SBA to bathroom. Purewick in place. PRN tylenol given for abdominal discomfort. PIV SL. Vitally stable on room air.     Problem: Adult Inpatient Plan of Care  Goal: Plan of Care Review  Description: The Plan of Care Review/Shift note should be completed every shift.  The Outcome Evaluation is a brief statement about your assessment that the patient is improving, declining, or no change.  This information will be displayed automatically on your shift  note.  7/17/2025 1119 by Cinthia Montanez, RN  Outcome: Progressing  Flowsheets (Taken 7/17/2025 1119)  Plan of Care Reviewed With: patient  7/17/2025 1119 by Cinthia Montanez RN  Outcome: Progressing  Flowsheets (Taken 7/17/2025 1119)  Plan of Care Reviewed With: patient  Goal: Patient-Specific Goal (Individualized)  Description: You can add care plan individualizations to a care plan. Examples of Individualization might be:  \"Parent requests to be called daily at 9am for status\", \"I have a hard time hearing out of my right ear\", or \"Do not touch me to wake me up as it startles  me\".  Outcome: Progressing  Goal: Absence of Hospital-Acquired Illness or Injury  Outcome: Progressing  Goal: Optimal Comfort and Wellbeing  Outcome: Progressing  Goal: Readiness for Transition of Care  Outcome: Progressing     Problem: Gas Exchange Impaired  Goal: Optimal Gas Exchange  Outcome: Progressing     Problem: Infection  Goal: Absence of Infection Signs and Symptoms  Outcome: Progressing                  "

## 2025-07-17 NOTE — PROGRESS NOTES
[facility]    Hospitalist Progress Note             Date of Admission:  6/25/2025                   Day of hospitalization: 22    Assessment and Plan:   Summary of Stay: Brissa Corado is a 60 year old female admitted on 6/25/2025 with past medical history of asthma on chronic oxygen at night, obstructive sleep apnea, morbid obesity, hypertension, seizure disorder who presents with asthma exacerbation 2/2 poor air quality and not using/having access to her LABA/ICS.     Patient's acute on chronic hypoxic respiratory failure has resolved.  Patient has remained in the hospital for appropriate disposition and placement     On 7/7: Patient developed hematuria CT abdomen pelvis noncontrast obtained no evidence of nephrolithiasis    Plan for today:  -Continue warfarin, INR is 2.66 today, goal INR 2-3 .  -Warfarin dosing per pharmacy.  -Monitor for any sign of bleed .  -Urine remained clean.  - Patient is ready for discharge once placement is available for her.  - Discussed with social service/case management.  - I also discussed with patient at length the plan of.     Gross hematuria, resolved  Possible UTI, treated  - Patient developed gross hematuria, spontaneous on 7/7  - Hemoglobin has been more or less at baseline, no sign of significant bleeding .  - Hematuria seem to be subsided   - Treated for total of 7 days and antibiotic discontinued 7/13.   - Warfarin restarted, goal INR 2-3, closely monitor for signs of bleeding.  - Urology consulted, input appreciated, will need follow-up as an outpatient for cystoscopy and possible CT urogram at [facility].  - Urine cytology pending.    Acute on chronic hypoxic respiratory failure-resolved  Asthma exacerbation  RICK   - At baseline she was on 2.5L at night and during the day as needed.    - CTPA negative for PE.  Patient states her triggers been poor air quality and she has not been using her controller LABA/ICS.  Has not had a sleep apnea  "machine at home but reports that she has an appointment coming up in the next 2 weeks, even though she is diagnosed is not tolerating it due to claustrophobic feelings.  - Wheezing resolved, DuoNeb every 4 hours as needed  - CPAP ordered  - Continue Breo Ellipta inhaler.     History of developmental delay  SW Consultation   -Father who is legal guardian states he received paperwork from the [facility] stating that patient is not to be able to return to her own residence and requires to be moved to a care facility to ensure she gets her medications as ordered and prescribed.    - consulted and following.   -Per  note patient will be getting appointed a new legal guardian in July.   -Discharge patient will go to a new [facility].  His significant other cannot go with her.     H/o DVT   - Pharmacy warfarin panel ordered, INR is 2.    - She has been on Lovenox bridge, discontinued.    Seizure disorder -Continue Tegretol     Hypertension -Continue lisinopril     Hyperlipidemia - Continue statin     Gerd - Continue prilosec     Super morbid obesity  Intertriginous excoriations and wounds in multiple skin folds  WOC following   - Start patient on topical azole therapy with ketoconazole 2% twice daily--was switched to miconazole powder per patient's request  - General Wound cares per bedside RN and WOC cares weekly     Clinically Significant Risk Factors                   # Hypertension: Noted on problem list            # Morbid Obesity: Estimated body mass index is 60.56 kg/m  as calculated from the following:    Height as of this encounter: 1.702 m (5' 7\").    Weight as of this encounter: 175.4 kg (386 lb 11 oz).      # Asthma: noted on problem list           Code status: Full  DVT: SCD, warfarin      Medically Ready for Discharge: Ready Now, Patient can be discharged anytime, awaiting safe discharge plan.  She will likely be discharged to group home.    I discussed with patient the plan of care and " "discharge.   I discussed with her mother who is currently her legal guardian, but on the process of transitioning to court appointed legal guardian.  Orders questions and concerns addressed.         Total time: 25 minutes    Manav Mehta MD    Subjective   Patient seen and examined, no new overnight issues, comfortably laying in bed, not in distress, able to get out of bed with help to the bathroom, denied any pain, chart, medications reviewed.  She overall doing well.    Objective   /53 (BP Location: Left arm)   Pulse 77   Temp 98.1  F (36.7  C) (Oral)   Resp 20   Ht 1.702 m (5' 7\")   Wt (!) 175.4 kg (386 lb 11 oz)   LMP 08/18/2008   SpO2 93%   BMI 60.56 kg/m       Physical Exam  General: Awake and alert, comfortable, not in distress, calm and cooperative, morbidly obese  HEENT: Pink, anicteric, moist oral mucosa   Lungs: Clear to Auscultation Bilateral, normal breathing  without accessory muscle usage, no wheezing, rhonchi or crackles  Cardiac: +S1, S2, RRR, no MRG, no edema  Abdominal: Morbidly obese, nondistended, positive bowel sounds, nontender.  Skin: warm, dry, normal turgor, no rash  Psyche: A& O x3, appropriate affect        Labs and Imaging Results:      Recent Labs   Lab 07/16/25  0641 07/15/25  0842 07/12/25  1833 07/12/25  0620   HGB 11.5* 12.0   < > 11.7   PLT  --   --   --  209    < > = values in this interval not displayed.         Recent Labs   Lab 07/17/25  0632 07/16/25  0641   INR 2.66* 2.21*      Radio:  CT Abdomen Pelvis w/o Contrast   Final Result   IMPRESSION:    1.  No urinary tract calculi or obstruction.      2.  Interval cholecystectomy. Hepatosplenomegaly without discrete lesion. No abdominopelvic suspicious adenopathy or free fluid.      3.  IVC filter, unchanged.      4.  Postoperative changes of plate and screw fixation at the symphysis pubis. Screw fixation left SI joint. Mild left convex thoracolumbar curve. Mild degenerative changes involving the spine and " joints of the pelvis. Resolved inflammatory changes    involving the pannus seen previously. Small fat-containing ventral umbilical hernia.         CT Chest Pulmonary Embolism w Contrast   Final Result   IMPRESSION:   1.  Subtle areas of bronchial wall thickening with areas of air trapping throughout both lungs, suggestive of small airways disease.   2.  No pulmonary embolus.              Medications:      Scheduled Meds:    Current Facility-Administered Medications   Medication Dose Route Frequency Provider Last Rate Last Admin    atorvastatin (LIPITOR) tablet 10 mg  10 mg Oral At Bedtime David Hull MD   10 mg at 07/16/25 2124    carBAMazepine (TEGretol) tablet 200 mg  200 mg Oral Q24H David Hull MD   200 mg at 07/16/25 1137    carBAMazepine (TEGretol) tablet 400 mg  400 mg Oral BID David Hull MD   400 mg at 07/17/25 0936    fluticasone (FLONASE) 50 MCG/ACT spray 1 spray  1 spray Both Nostrils Daily Neeraj Alvarado MD   1 spray at 07/17/25 0937    fluticasone-vilanterol (BREO ELLIPTA) 100-25 MCG/ACT inhaler 1 puff  1 puff Inhalation Daily David Hull MD   1 puff at 07/17/25 0937    lisinopril (ZESTRIL) tablet 20 mg  20 mg Oral Daily David Hull MD   20 mg at 07/17/25 0936    miconazole (MICATIN) 2 % powder   Topical BID Andreea Mcdonald MD   Given at 07/17/25 0939    pantoprazole (PROTONIX) EC tablet 40 mg  40 mg Oral QAM AC David Hull MD   40 mg at 07/17/25 0627    polyethylene glycol (MIRALAX) Packet 17 g  17 g Oral Daily Aimee Cyr MD   17 g at 07/12/25 0832    senna-docusate (SENOKOT-S/PERICOLACE) 8.6-50 MG per tablet 1 tablet  1 tablet Oral BID Aimee Cyr MD   1 tablet at 07/16/25 2125    sertraline (ZOLOFT) tablet 100 mg  100 mg Oral Daily Neeraj Alvarado MD   100 mg at 07/17/25 0937    sodium chloride (PF) 0.9% PF flush 3 mL  3 mL Intracatheter Q8H NIC David Hull MD   3 mL at 07/16/25 2127    warfarin ANTICOAGULANT  (COUMADIN/JANTOVEN) tablet 15 mg  15 mg Oral ONCE at 18:00 Manav Mehta MD        Warfarin Dose Required Daily - Pharmacist Managed  1 each Oral See Admin Instructions Aimee Cyr MD         Continuous Infusions:    Current Facility-Administered Medications   Medication Dose Route Frequency Provider Last Rate Last Admin    Patient is already receiving anticoagulation with heparin, enoxaparin (LOVENOX), warfarin (COUMADIN)  or other anticoagulant medication   Does not apply Continuous PRN David Hull MD         PRN Meds:    Current Facility-Administered Medications   Medication Dose Route Frequency Provider Last Rate Last Admin    acetaminophen (TYLENOL) tablet 650 mg  650 mg Oral Q4H PRN David Hull MD   650 mg at 07/16/25 2125    Or    acetaminophen (TYLENOL) Suppository 650 mg  650 mg Rectal Q4H PRN David Hull MD        albuterol (PROVENTIL HFA/VENTOLIN HFA) inhaler  2 puff Inhalation Q6H PRN Neeraj Alvarado MD        albuterol (PROVENTIL) neb solution 2.5 mg  2.5 mg Nebulization Q2H PRN David Hull MD   2.5 mg at 06/26/25 0416    bisacodyl (DULCOLAX) suppository 10 mg  10 mg Rectal Daily PRN Aimee Cyr MD        calcium carbonate (TUMS) chewable tablet 1,000 mg  1,000 mg Oral 4x Daily PRN David Hull MD   1,000 mg at 07/13/25 0303    ipratropium - albuterol 0.5 mg/2.5 mg (3mg)/3 mL (DUONEB) neb solution 3 mL  3 mL Nebulization Q4H PRN Andreea Mcdonald MD   3 mL at 07/17/25 0751    lidocaine (LMX4) cream   Topical Q1H PRN David Hull MD        lidocaine 1 % 0.1-1 mL  0.1-1 mL Other Q1H PRN David Hull MD        melatonin tablet 5 mg  5 mg Oral At Bedtime PRN David Hull MD   5 mg at 07/10/25 0038    naloxone (NARCAN) injection 0.2 mg  0.2 mg Intravenous Q2 Min PRN David Hull MD        Or    naloxone (NARCAN) injection 0.4 mg  0.4 mg Intravenous Q2 Min PRN David Hull MD        Or    naloxone (NARCAN) injection 0.2  mg  0.2 mg Intramuscular Q2 Min PRN David Hull MD        Or    naloxone (NARCAN) injection 0.4 mg  0.4 mg Intramuscular Q2 Min PRN David Hull MD        ondansetron (ZOFRAN ODT) ODT tab 4 mg  4 mg Oral Q6H PRN David Hull MD   4 mg at 07/07/25 0930    Or    ondansetron (ZOFRAN) injection 4 mg  4 mg Intravenous Q6H PRN David Hull MD   4 mg at 07/07/25 1455    oxyCODONE IR (ROXICODONE) half-tab 2.5 mg  2.5 mg Oral Q4H PRN Kaia Evans DO   2.5 mg at 07/12/25 1474    Patient is already receiving anticoagulation with heparin, enoxaparin (LOVENOX), warfarin (COUMADIN)  or other anticoagulant medication   Does not apply Continuous PRN David uHll MD        simethicone (MYLICON) chewable tablet 80 mg  80 mg Oral Q6H PRN Neeraj Reed MD   80 mg at 07/10/25 1455    sodium chloride (PF) 0.9% PF flush 3 mL  3 mL Intracatheter q1 min prn David Hull MD

## 2025-07-17 NOTE — PLAN OF CARE
A&O4 VSS on 1L NC satting 94%.Excorated creases abdominal folds and under breast.abdominal and breast folds cleaned and antifungal powser applied.new meplix 2 by 2 applied middle abdominal fold and left thigh anterior folds.      Goal Outcome Evaluation:      Plan of Care Reviewed With: patient    Overall Patient Progress: no changeOverall Patient Progress: no change       Problem: Adult Inpatient Plan of Care  Goal: Plan of Care Review  Description: The Plan of Care Review/Shift note should be completed every shift.  The Outcome Evaluation is a brief statement about your assessment that the patient is improving, declining, or no change.  This information will be displayed automatically on your shift  note.  Recent Flowsheet Documentation  Taken 7/17/2025 0428 by Piper Yao RN  Plan of Care Reviewed With: patient  Overall Patient Progress: no change  Goal: Absence of Hospital-Acquired Illness or Injury  Intervention: Identify and Manage Fall Risk  Recent Flowsheet Documentation  Taken 7/16/2025 2119 by Piper Yao RN  Safety Promotion/Fall Prevention: safety round/check completed  Goal: Optimal Comfort and Wellbeing  Intervention: Monitor Pain and Promote Comfort  Recent Flowsheet Documentation  Taken 7/16/2025 2125 by Piper Yao RN  Pain Management Interventions: medication (see MAR)     Problem: Adult Inpatient Plan of Care  Goal: Plan of Care Review  Description: The Plan of Care Review/Shift note should be completed every shift.  The Outcome Evaluation is a brief statement about your assessment that the patient is improving, declining, or no change.  This information will be displayed automatically on your shift  note.  Recent Flowsheet Documentation  Taken 7/17/2025 0428 by Piper Yao RN  Plan of Care Reviewed With: patient  Overall Patient Progress: no change  Goal: Absence of Hospital-Acquired Illness or Injury  Intervention: Identify and Manage Fall Risk  Recent Flowsheet  "Documentation  Taken 7/16/2025 2119 by Piper Yao RN  Safety Promotion/Fall Prevention: safety round/check completed  Goal: Optimal Comfort and Wellbeing  Intervention: Monitor Pain and Promote Comfort  Recent Flowsheet Documentation  Taken 7/16/2025 2125 by Piper Yao RN  Pain Management Interventions: medication (see MAR)     Problem: Adult Inpatient Plan of Care  Goal: Plan of Care Review  Description: The Plan of Care Review/Shift note should be completed every shift.  The Outcome Evaluation is a brief statement about your assessment that the patient is improving, declining, or no change.  This information will be displayed automatically on your shift  note.  Outcome: Progressing  Flowsheets (Taken 7/17/2025 0428)  Plan of Care Reviewed With: patient  Overall Patient Progress: no change  Goal: Patient-Specific Goal (Individualized)  Description: You can add care plan individualizations to a care plan. Examples of Individualization might be:  \"Parent requests to be called daily at 9am for status\", \"I have a hard time hearing out of my right ear\", or \"Do not touch me to wake me up as it startles  me\".  Outcome: Progressing  Goal: Absence of Hospital-Acquired Illness or Injury  Outcome: Progressing  Intervention: Identify and Manage Fall Risk  Recent Flowsheet Documentation  Taken 7/16/2025 2119 by Piper Yao RN  Safety Promotion/Fall Prevention: safety round/check completed  Goal: Optimal Comfort and Wellbeing  Outcome: Progressing  Intervention: Monitor Pain and Promote Comfort  Recent Flowsheet Documentation  Taken 7/16/2025 2125 by Piper Yao RN  Pain Management Interventions: medication (see MAR)  Goal: Readiness for Transition of Care  Outcome: Progressing     Problem: Gas Exchange Impaired  Goal: Optimal Gas Exchange  Outcome: Progressing     Problem: Infection  Goal: Absence of Infection Signs and Symptoms  Outcome: Progressing             "

## 2025-07-17 NOTE — PROGRESS NOTES
Care Management Follow Up    Length of Stay (days): 22    Expected Discharge Date: 07/21/2025     Concerns to be Addressed: discharge planning     Patient plan of care discussed at interdisciplinary rounds: Yes    Anticipated Discharge Disposition:                Anticipated Discharge Services:    Anticipated Discharge DME:      Patient/family educated on Medicare website which has current facility and service quality ratings:    Education Provided on the Discharge Plan:    Patient/Family in Agreement with the Plan:      Referrals Placed by CM/SW:    Private pay costs discussed: Not applicable    Discussed  Partnership in Safe Discharge Planning  document with patient/family: No     Handoff Completed: Yes, [facility] PCP: Internal handoff referral completed    Additional Information:  LUZ MARIA connected with Tico Becerra, with [employer], who is now court appointed guardian of pt. Tico provided guardianship document, which SW sent to Emeli Doshi to support in a safe discharge plan for pt.     Next Steps: SW to follow for discharge.     AISSATOU Alexander, LGSW  LUZ MARIA Care Coordinator/ Med Surg [address] [place] [address]  [phone]

## 2025-07-18 LAB
HOLD SPECIMEN: NORMAL
HOLD SPECIMEN: NORMAL
INR PPP: 2.91 (ref 0.85–1.15)
PROTHROMBIN TIME: 29.9 SECONDS (ref 11.8–14.8)

## 2025-07-18 PROCEDURE — 250N000013 HC RX MED GY IP 250 OP 250 PS 637: Performed by: HOSPITALIST

## 2025-07-18 PROCEDURE — 120N000001 HC R&B MED SURG/OB

## 2025-07-18 PROCEDURE — 85610 PROTHROMBIN TIME: CPT | Performed by: HOSPITALIST

## 2025-07-18 PROCEDURE — 99232 SBSQ HOSP IP/OBS MODERATE 35: CPT | Performed by: INTERNAL MEDICINE

## 2025-07-18 PROCEDURE — 250N000013 HC RX MED GY IP 250 OP 250 PS 637: Performed by: INTERNAL MEDICINE

## 2025-07-18 PROCEDURE — 250N000013 HC RX MED GY IP 250 OP 250 PS 637: Performed by: STUDENT IN AN ORGANIZED HEALTH CARE EDUCATION/TRAINING PROGRAM

## 2025-07-18 PROCEDURE — 36415 COLL VENOUS BLD VENIPUNCTURE: CPT | Performed by: HOSPITALIST

## 2025-07-18 RX ORDER — WARFARIN SODIUM 5 MG/1
10 TABLET ORAL
Status: COMPLETED | OUTPATIENT
Start: 2025-07-18 | End: 2025-07-18

## 2025-07-18 RX ADMIN — LISINOPRIL 20 MG: 20 TABLET ORAL at 08:39

## 2025-07-18 RX ADMIN — OXYCODONE HYDROCHLORIDE 2.5 MG: 5 TABLET ORAL at 14:52

## 2025-07-18 RX ADMIN — WARFARIN SODIUM 10 MG: 5 TABLET ORAL at 18:17

## 2025-07-18 RX ADMIN — PANTOPRAZOLE SODIUM 40 MG: 40 TABLET, DELAYED RELEASE ORAL at 08:39

## 2025-07-18 RX ADMIN — ATORVASTATIN CALCIUM 10 MG: 10 TABLET, FILM COATED ORAL at 20:29

## 2025-07-18 RX ADMIN — ACETAMINOPHEN 650 MG: 325 TABLET ORAL at 15:52

## 2025-07-18 RX ADMIN — ACETAMINOPHEN 650 MG: 325 TABLET ORAL at 12:34

## 2025-07-18 RX ADMIN — FLUTICASONE PROPIONATE 1 SPRAY: 50 SPRAY, METERED NASAL at 08:42

## 2025-07-18 RX ADMIN — SENNOSIDES AND DOCUSATE SODIUM 1 TABLET: 50; 8.6 TABLET ORAL at 08:40

## 2025-07-18 RX ADMIN — FLUTICASONE FUROATE AND VILANTEROL TRIFENATATE 1 PUFF: 100; 25 POWDER RESPIRATORY (INHALATION) at 08:42

## 2025-07-18 RX ADMIN — MICONAZOLE NITRATE: 20 POWDER TOPICAL at 20:31

## 2025-07-18 RX ADMIN — CARBAMAZEPINE 400 MG: 200 TABLET ORAL at 08:39

## 2025-07-18 RX ADMIN — CARBAMAZEPINE 400 MG: 200 TABLET ORAL at 20:28

## 2025-07-18 RX ADMIN — CARBAMAZEPINE 200 MG: 200 TABLET ORAL at 12:34

## 2025-07-18 RX ADMIN — SERTRALINE 100 MG: 100 TABLET, FILM COATED ORAL at 08:39

## 2025-07-18 RX ADMIN — POLYETHYLENE GLYCOL 3350 17 G: 17 POWDER, FOR SOLUTION ORAL at 08:39

## 2025-07-18 RX ADMIN — SENNOSIDES AND DOCUSATE SODIUM 1 TABLET: 50; 8.6 TABLET ORAL at 20:28

## 2025-07-18 RX ADMIN — MICONAZOLE NITRATE: 20 POWDER TOPICAL at 08:41

## 2025-07-18 ASSESSMENT — ACTIVITIES OF DAILY LIVING (ADL)
ADLS_ACUITY_SCORE: 56
ADLS_ACUITY_SCORE: 48
ADLS_ACUITY_SCORE: 48
ADLS_ACUITY_SCORE: 56
ADLS_ACUITY_SCORE: 48
ADLS_ACUITY_SCORE: 48
ADLS_ACUITY_SCORE: 56
ADLS_ACUITY_SCORE: 51
ADLS_ACUITY_SCORE: 48
ADLS_ACUITY_SCORE: 48
ADLS_ACUITY_SCORE: 56
ADLS_ACUITY_SCORE: 48
ADLS_ACUITY_SCORE: 48
ADLS_ACUITY_SCORE: 56
ADLS_ACUITY_SCORE: 48
ADLS_ACUITY_SCORE: 56
ADLS_ACUITY_SCORE: 58

## 2025-07-18 NOTE — PLAN OF CARE
Pt transferred from [facility] ~2200. Multiple wounds noted with dressings intact per WOC. Also has red rash to left abdominal fold as well as perineal area and JOSSIE breasts. A/O, able to use call light. Up SBA/walker. Baribed. Working on discharge between SW/Court appointed guardian. Saline locked. Raji diet. Lungs clear/dim. C/O lower abdominal pain from UTI-requested tylenol. Purewick at HS.     Shift events:   Tx to [facility]5 from 3rd. Oriented to room. Vitally stable.     Treatment plan:   Completed course of treatment for UTI/Asthma exac. Working on discharge plan with SW/Guardian. Skin care per WOC.                                   Problem: Adult Inpatient Plan of Care  Goal: Plan of Care Review  Description: The Plan of Care Review/Shift note should be completed every shift.  The Outcome Evaluation is a brief statement about your assessment that the patient is improving, declining, or no change.  This information will be displayed automatically on your shift  note.  Recent Flowsheet Documentation  Taken 7/18/2025 0704 by Elissa Santillan RN  Plan of Care Reviewed With: patient  Goal: Absence of Hospital-Acquired Illness or Injury  Intervention: Identify and Manage Fall Risk  Recent Flowsheet Documentation  Taken 7/18/2025 0000 by Elissa Santillan, RN  Safety Promotion/Fall Prevention: safety round/check completed  Intervention: Prevent Skin Injury  Recent Flowsheet Documentation  Taken 7/18/2025 0000 by Elissa Santillan RN  Body Position: position changed independently   Goal Outcome Evaluation:      Plan of Care Reviewed With: patient

## 2025-07-18 NOTE — PROGRESS NOTES
[facility]    Hospitalist Progress Note             Date of Admission:  6/25/2025                   Day of hospitalization: 23    Assessment and Plan:   Summary of Stay: Brissa Corado is a 60 year old female admitted on 6/25/2025 with past medical history of asthma on chronic oxygen at night, obstructive sleep apnea, morbid obesity, hypertension, seizure disorder who presents with asthma exacerbation 2/2 poor air quality and not using/having access to her LABA/ICS.     Patient's acute on chronic hypoxic respiratory failure has resolved.  Patient has remained in the hospital for appropriate disposition and placement     On 7/7: Patient developed hematuria CT abdomen pelvis noncontrast obtained no evidence of nephrolithiasis    Plan for today:  -Continue warfarin, INR is 2.99 today, goal INR 2-3 .  -Warfarin dosing per pharmacy.  - Reports that is no sign of bleeding, urine remained clean without any gross hematuria .  -I also discussed with patient at length the plan of care.  -At this time her parents are her guardian but are in the process of removing themselves [place] and there is a court appointed guardian on the process.  - Social service on board and following.  - Patient can be discharged anytime placement is available for her.    Gross hematuria, resolved  Possible UTI, treated  - Patient developed gross hematuria, spontaneous on 7/7  - Hemoglobin has been more or less at baseline, no sign of significant bleeding .  - Hematuria seem to be subsided   - Treated for total of 7 days and antibiotic discontinued 7/13.   - Warfarin restarted, goal INR 2-3, closely monitor for signs of bleeding.  - Urology consulted, input appreciated, will need follow-up as an outpatient for cystoscopy and possible CT urogram at [facility].  - Urine cytology negative for high-grade urothelial carcinoma.    Acute on chronic hypoxic respiratory failure-resolved  Asthma exacerbation-resolved  RICK   -  "At baseline she was on 2.5L at night and as needed oxygen during daytime.    - CTPA negative for PE.  Patient states her triggers been poor air quality and she has not been using her controller LABA/ICS.  Has not had a sleep apnea machine at home but reports that she has an appointment coming up in the next 2 weeks, even though she is diagnosed is not tolerating it due to claustrophobic feelings.  - Wheezing resolved, DuoNeb every 4 hours as needed  - CPAP ordered  - Continue Breo Ellipta inhaler.     History of developmental delay  SW Consultation   -Father who is legal guardian states he received paperwork from the [facility] stating that patient is not to be able to return to her own residence and requires to be moved to a care facility to ensure she gets her medications as ordered and prescribed.    - consulted and following.   -Per  note patient will be getting appointed a new legal guardian in July.   -Discharge patient will go to a new [facility].  His significant other cannot go with her.     H/o DVT   - Pharmacy warfarin panel ordered, INR is 2.    - She has been on Lovenox bridge, discontinued.    Seizure disorder -Continue Tegretol     Hypertension -Continue lisinopril     Hyperlipidemia - Continue statin     Gerd - Continue prilosec     Super morbid obesity  Intertriginous excoriations and wounds in multiple skin folds  WOC following   -Start patient on topical azole therapy with ketoconazole 2% twice daily--was switched to miconazole powder per patient's request  -General Wound cares per bedside RN and WOC cares weekly     Clinically Significant Risk Factors                   # Hypertension: Noted on problem list            # Morbid Obesity: Estimated body mass index is 60.56 kg/m  as calculated from the following:    Height as of this encounter: 1.702 m (5' 7\").    Weight as of this encounter: 175.4 kg (386 lb 11 oz).      # Asthma: noted on problem list           Code status: " "Full.  DVT: SCD, warfarin.    Medically Ready for Discharge: Ready Now. Patient can be discharged anytime, awaiting safe discharge plan.    I discussed with patient the plan of care and discharge.  I discussed with her mother who is currently listed as a legal guardian, discussed with her and she stated she is no more her legal guardian and the court appointed legal guardian will take over.        Total time: 30 minutes.    Manav Mehta MD    Subjective   Patient seen and examined, no new overnight issues, comfortably laying in bed, not in distress, able to get out of bed with help to the bathroom, denied any pain, chart, medications reviewed.  She overall doing well.    Objective   /45 (BP Location: Right arm)   Pulse 62   Temp 98.1  F (36.7  C) (Oral)   Resp 16   Ht 1.702 m (5' 7\")   Wt (!) 175.4 kg (386 lb 11 oz)   LMP 08/18/2008   SpO2 97%   BMI 60.56 kg/m       Physical Exam  General: Awake and alert, comfortable, not in distress, calm and cooperative, morbidly obese  HEENT: Pink, anicteric, moist oral mucosa   Lungs: Clear to Auscultation Bilateral, normal breathing  without accessory muscle usage, no wheezing, rhonchi or crackles  Cardiac: +S1, S2, RRR, no MRG, no edema  Abdominal: Morbidly obese, nondistended, positive bowel sounds, nontender.  Skin: warm, dry, normal turgor, no rash.  Psyche: A& O x3, appropriate affect.        Labs and Imaging Results:      Recent Labs   Lab 07/16/25  0641 07/15/25  0842 07/12/25  1833 07/12/25  0620   HGB 11.5* 12.0   < > 11.7   PLT  --   --   --  209    < > = values in this interval not displayed.         Recent Labs   Lab 07/18/25  0754 07/17/25  0632   INR 2.91* 2.66*      Radio:  CT Abdomen Pelvis w/o Contrast   Final Result   IMPRESSION:    1.  No urinary tract calculi or obstruction.      2.  Interval cholecystectomy. Hepatosplenomegaly without discrete lesion. No abdominopelvic suspicious adenopathy or free fluid.      3.  IVC filter, " unchanged.      4.  Postoperative changes of plate and screw fixation at the symphysis pubis. Screw fixation left SI joint. Mild left convex thoracolumbar curve. Mild degenerative changes involving the spine and joints of the pelvis. Resolved inflammatory changes    involving the pannus seen previously. Small fat-containing ventral umbilical hernia.         CT Chest Pulmonary Embolism w Contrast   Final Result   IMPRESSION:   1.  Subtle areas of bronchial wall thickening with areas of air trapping throughout both lungs, suggestive of small airways disease.   2.  No pulmonary embolus.              Medications:      Scheduled Meds:    Current Facility-Administered Medications   Medication Dose Route Frequency Provider Last Rate Last Admin    atorvastatin (LIPITOR) tablet 10 mg  10 mg Oral At Bedtime David Hull MD   10 mg at 07/17/25 2136    carBAMazepine (TEGretol) tablet 200 mg  200 mg Oral Q24H David Hull MD   200 mg at 07/18/25 1234    carBAMazepine (TEGretol) tablet 400 mg  400 mg Oral BID David Hull MD   400 mg at 07/18/25 0839    fluticasone (FLONASE) 50 MCG/ACT spray 1 spray  1 spray Both Nostrils Daily Neeraj Alvarado MD   1 spray at 07/18/25 0842    fluticasone-vilanterol (BREO ELLIPTA) 100-25 MCG/ACT inhaler 1 puff  1 puff Inhalation Daily David Hull MD   1 puff at 07/18/25 0842    lisinopril (ZESTRIL) tablet 20 mg  20 mg Oral Daily David Hull MD   20 mg at 07/18/25 0839    miconazole (MICATIN) 2 % powder   Topical BID Andreea Mcdonald MD   Given at 07/18/25 0841    pantoprazole (PROTONIX) EC tablet 40 mg  40 mg Oral QAM AC David Hull MD   40 mg at 07/18/25 0839    polyethylene glycol (MIRALAX) Packet 17 g  17 g Oral Daily Aimee Cyr MD   17 g at 07/18/25 0839    senna-docusate (SENOKOT-S/PERICOLACE) 8.6-50 MG per tablet 1 tablet  1 tablet Oral BID Aimee Cyr MD   1 tablet at 07/18/25 0840    sertraline (ZOLOFT) tablet 100 mg  100 mg Oral  Daily Neeraj Alvarado MD   100 mg at 07/18/25 0839    sodium chloride (PF) 0.9% PF flush 3 mL  3 mL Intracatheter Q8H NIC David Hull MD   3 mL at 07/18/25 0847    Warfarin Dose Required Daily - Pharmacist Managed  1 each Oral See Admin Instructions Aimee Cyr MD         Continuous Infusions:    Current Facility-Administered Medications   Medication Dose Route Frequency Provider Last Rate Last Admin    Patient is already receiving anticoagulation with heparin, enoxaparin (LOVENOX), warfarin (COUMADIN)  or other anticoagulant medication   Does not apply Continuous PRN David Hull MD         PRN Meds:    Current Facility-Administered Medications   Medication Dose Route Frequency Provider Last Rate Last Admin    acetaminophen (TYLENOL) tablet 650 mg  650 mg Oral Q4H PRN David Hull MD   650 mg at 07/18/25 1234    Or    acetaminophen (TYLENOL) Suppository 650 mg  650 mg Rectal Q4H PRN David Hull MD        albuterol (PROVENTIL HFA/VENTOLIN HFA) inhaler  2 puff Inhalation Q6H PRN Neeraj Alvarado MD        albuterol (PROVENTIL) neb solution 2.5 mg  2.5 mg Nebulization Q2H PRN David Hull MD   2.5 mg at 06/26/25 0416    bisacodyl (DULCOLAX) suppository 10 mg  10 mg Rectal Daily PRN Aimee Cyr MD        calcium carbonate (TUMS) chewable tablet 1,000 mg  1,000 mg Oral 4x Daily PRN David Hull MD   1,000 mg at 07/13/25 0303    ipratropium - albuterol 0.5 mg/2.5 mg (3mg)/3 mL (DUONEB) neb solution 3 mL  3 mL Nebulization Q4H PRN Andreea Mcdonald MD   3 mL at 07/17/25 0751    lidocaine (LMX4) cream   Topical Q1H PRN David Hull MD        lidocaine 1 % 0.1-1 mL  0.1-1 mL Other Q1H PRN David Hull MD        melatonin tablet 5 mg  5 mg Oral At Bedtime PRN David Hull MD   5 mg at 07/10/25 0038    naloxone (NARCAN) injection 0.2 mg  0.2 mg Intravenous Q2 Min PRN David Hull MD        Or    naloxone (NARCAN) injection 0.4 mg  0.4 mg  Intravenous Q2 Min PRN David Hull MD        Or    naloxone (NARCAN) injection 0.2 mg  0.2 mg Intramuscular Q2 Min PRN David Hull MD        Or    naloxone (NARCAN) injection 0.4 mg  0.4 mg Intramuscular Q2 Min PRN David Hull MD        ondansetron (ZOFRAN ODT) ODT tab 4 mg  4 mg Oral Q6H PRN David Hull MD   4 mg at 07/07/25 0930    Or    ondansetron (ZOFRAN) injection 4 mg  4 mg Intravenous Q6H PRN David Hull MD   4 mg at 07/07/25 1455    oxyCODONE IR (ROXICODONE) half-tab 2.5 mg  2.5 mg Oral Q4H PRN Kaia Evans DO   2.5 mg at 07/12/25 8731    Patient is already receiving anticoagulation with heparin, enoxaparin (LOVENOX), warfarin (COUMADIN)  or other anticoagulant medication   Does not apply Continuous PRN David Hull MD        simethicone (MYLICON) chewable tablet 80 mg  80 mg Oral Q6H PRN Neeraj Reed MD   80 mg at 07/10/25 1455    sodium chloride (PF) 0.9% PF flush 3 mL  3 mL Intracatheter q1 min prn David Hull MD

## 2025-07-18 NOTE — PROGRESS NOTES
Care Management Follow Up    Length of Stay (days): 23    Concerns to be Addressed: discharge planning     Patient plan of care discussed at interdisciplinary rounds: Yes    Anticipated Discharge Disposition:  [facility]      Anticipated Discharge Services:  Home Care  Anticipated Discharge DME:  Hospital bed, walker    Patient/family educated on Medicare website which has current facility and service quality ratings:  N/A  Education Provided on the Discharge Plan:  yes  Patient/Family in Agreement with the Plan:  yes    Referrals Placed by CM/SW:  post acute facilities    Discussed  Partnership in Safe Discharge Planning  document with patient/family: No     Handoff Completed: Yes, [facility] PCP: Internal handoff referral completed    Additional Information:  Juliane received a call from [employer] with [employer] , P: [phone] [employer]. He was looking for an update on the pt's discharge status. Juliane provided him with an update.  Per the pt's guardian, Tico Becerra P: [phone], the pt will likely be able to move into [facility] early to mid next week.  Pt's mother reported that there is a bariatric hospital bed being delivered to the facility in anticipation of the pt moving in.    Next Steps:   Sw will continue with discharge planning and will be available as needed until discharge.    AISSATOU Macdonald, [facility]  Inpatient Care Coordination  [facility]  [phone]

## 2025-07-18 NOTE — PROGRESS NOTES
Cared for pt from [id number]    A/O x 4.  VSS on RA.  Pt medically cleared for discharge; awaiting placement.  Denies pain, nausea, vomiting, or shortness of breath.  Multiple wounds--WOC following and rash to pannus/groin/bilateral under breasts. Up with SBA gait belt and walker.  External cath removed prior to transfer.  Continent of bowel and bladder. Grant reassigned to court appointed.  LUZ MARIA working with guardian for placement.

## 2025-07-18 NOTE — PLAN OF CARE
Pertinent assessments: Alert and oriented X 4, C/O pain PRN tylenol, oxy given, PIV SL, assist of 1 with walker and gait belt, up in a chair, skin folding cleansed powder applied, regular diet.  Major Shift Events none  Treatment Plan: SW, discharge plan TCU  Bedside Nurse: Sony Henley RN     Problem: Adult Inpatient Plan of Care  Goal: Plan of Care Review  Description: The Plan of Care Review/Shift note should be completed every shift.  The Outcome Evaluation is a brief statement about your assessment that the patient is improving, declining, or no change.  This information will be displayed automatically on your shift  note.  Recent Flowsheet Documentation  Taken 7/18/2025 1601 by Sony Henley RN  Outcome Evaluation: C/O pain prd tylneol, oxy given  Plan of Care Reviewed With: patient  Overall Patient Progress: no change  Goal: Absence of Hospital-Acquired Illness or Injury  Intervention: Identify and Manage Fall Risk  Recent Flowsheet Documentation  Taken 7/18/2025 0837 by Sony Henley RN  Safety Promotion/Fall Prevention:   activity supervised   clutter free environment maintained   increased rounding and observation   increase visualization of patient   lighting adjusted   mobility aid in reach   nonskid shoes/slippers when out of bed   patient and family education   safety round/check completed  Intervention: Prevent Skin Injury  Recent Flowsheet Documentation  Taken 7/18/2025 0837 by Sony Henley RN  Body Position: position changed independently  Intervention: Prevent and Manage VTE (Venous Thromboembolism) Risk  Recent Flowsheet Documentation  Taken 7/18/2025 0837 by Sony Henley RN  VTE Prevention/Management: SCDs off (sequential compression devices)  Intervention: Prevent Infection  Recent Flowsheet Documentation  Taken 7/18/2025 0837 by Sony Henley RN  Infection Prevention: hand hygiene promoted  Goal: Optimal Comfort and Wellbeing  Intervention:  Monitor Pain and Promote Comfort  Recent Flowsheet Documentation  Taken 7/18/2025 1452 by Sony Henley, RN  Pain Management Interventions: medication (see MAR)  Taken 7/18/2025 1232 by Sony Henley, RN  Pain Management Interventions: medication (see MAR)   Goal Outcome Evaluation:      Plan of Care Reviewed With: patient    Overall Patient Progress: no changeOverall Patient Progress: no change    Outcome Evaluation: C/O pain prd tylneol, oxy given

## 2025-07-19 LAB
INR PPP: 2.86 (ref 0.85–1.15)
PROTHROMBIN TIME: 29.5 SECONDS (ref 11.8–14.8)

## 2025-07-19 PROCEDURE — 250N000013 HC RX MED GY IP 250 OP 250 PS 637: Performed by: INTERNAL MEDICINE

## 2025-07-19 PROCEDURE — 250N000013 HC RX MED GY IP 250 OP 250 PS 637: Performed by: HOSPITALIST

## 2025-07-19 PROCEDURE — 94640 AIRWAY INHALATION TREATMENT: CPT

## 2025-07-19 PROCEDURE — 999N000157 HC STATISTIC RCP TIME EA 10 MIN

## 2025-07-19 PROCEDURE — 120N000001 HC R&B MED SURG/OB

## 2025-07-19 PROCEDURE — 99232 SBSQ HOSP IP/OBS MODERATE 35: CPT | Performed by: INTERNAL MEDICINE

## 2025-07-19 PROCEDURE — 36415 COLL VENOUS BLD VENIPUNCTURE: CPT | Performed by: HOSPITALIST

## 2025-07-19 PROCEDURE — 85610 PROTHROMBIN TIME: CPT | Performed by: HOSPITALIST

## 2025-07-19 RX ORDER — WARFARIN SODIUM 5 MG/1
15 TABLET ORAL
Status: COMPLETED | OUTPATIENT
Start: 2025-07-19 | End: 2025-07-19

## 2025-07-19 RX ADMIN — SENNOSIDES AND DOCUSATE SODIUM 1 TABLET: 50; 8.6 TABLET ORAL at 09:04

## 2025-07-19 RX ADMIN — LISINOPRIL 20 MG: 20 TABLET ORAL at 09:04

## 2025-07-19 RX ADMIN — FLUTICASONE FUROATE AND VILANTEROL TRIFENATATE 1 PUFF: 100; 25 POWDER RESPIRATORY (INHALATION) at 08:42

## 2025-07-19 RX ADMIN — CARBAMAZEPINE 200 MG: 200 TABLET ORAL at 13:41

## 2025-07-19 RX ADMIN — Medication 5 MG: at 21:27

## 2025-07-19 RX ADMIN — ACETAMINOPHEN 650 MG: 325 TABLET ORAL at 13:41

## 2025-07-19 RX ADMIN — CARBAMAZEPINE 400 MG: 200 TABLET ORAL at 09:04

## 2025-07-19 RX ADMIN — ATORVASTATIN CALCIUM 10 MG: 10 TABLET, FILM COATED ORAL at 21:27

## 2025-07-19 RX ADMIN — MICONAZOLE NITRATE: 20 POWDER TOPICAL at 09:05

## 2025-07-19 RX ADMIN — CARBAMAZEPINE 400 MG: 200 TABLET ORAL at 21:26

## 2025-07-19 RX ADMIN — FLUTICASONE PROPIONATE 1 SPRAY: 50 SPRAY, METERED NASAL at 09:03

## 2025-07-19 RX ADMIN — SERTRALINE 100 MG: 100 TABLET, FILM COATED ORAL at 09:04

## 2025-07-19 RX ADMIN — SENNOSIDES AND DOCUSATE SODIUM 1 TABLET: 50; 8.6 TABLET ORAL at 21:26

## 2025-07-19 RX ADMIN — ACETAMINOPHEN 650 MG: 325 TABLET ORAL at 21:30

## 2025-07-19 RX ADMIN — WARFARIN SODIUM 15 MG: 5 TABLET ORAL at 18:25

## 2025-07-19 RX ADMIN — PANTOPRAZOLE SODIUM 40 MG: 40 TABLET, DELAYED RELEASE ORAL at 09:04

## 2025-07-19 ASSESSMENT — ACTIVITIES OF DAILY LIVING (ADL)
ADLS_ACUITY_SCORE: 48

## 2025-07-19 NOTE — PLAN OF CARE
"Goal Outcome Evaluation:    End of Shift Summary  For vital signs and complete assessments, please see documentation flowsheets.     Pertinent assessments: VSS on RA. Denies pain or SOB. Slept well. Good urine output from [facility].     Treatment Plan: awaiting discharge to [facility], skin care per [facility]      Plan of Care Reviewed With: patient    Overall Patient Progress: improvingOverall Patient Progress: improving    Outcome Evaluation: VSS on RA. Denies pain and SOB.      Problem: Adult Inpatient Plan of Care  Goal: Plan of Care Review  Description: The Plan of Care Review/Shift note should be completed every shift.  The Outcome Evaluation is a brief statement about your assessment that the patient is improving, declining, or no change.  This information will be displayed automatically on your shift  note.  Outcome: Progressing  Flowsheets (Taken 7/19/2025 0754)  Outcome Evaluation: VSS on RA. Denies pain and SOB.  Plan of Care Reviewed With: patient  Overall Patient Progress: improving  Goal: Patient-Specific Goal (Individualized)  Description: You can add care plan individualizations to a care plan. Examples of Individualization might be:  \"Parent requests to be called daily at 9am for status\", \"I have a hard time hearing out of my right ear\", or \"Do not touch me to wake me up as it startles  me\".  Outcome: Progressing  Goal: Absence of Hospital-Acquired Illness or Injury  Outcome: Progressing  Intervention: Identify and Manage Fall Risk  Recent Flowsheet Documentation  Taken 7/19/2025 0200 by Mary Ann Westbrook, RN  Safety Promotion/Fall Prevention: safety round/check completed  Intervention: Prevent Skin Injury  Recent Flowsheet Documentation  Taken 7/19/2025 0200 by Mary Ann Westbrook RN  Body Position: position changed independently  Goal: Optimal Comfort and Wellbeing  Outcome: Progressing  Goal: Readiness for Transition of Care  Outcome: Progressing     Problem: Gas Exchange Impaired  Goal: Optimal Gas Exchange  Outcome: " Progressing     Problem: Infection  Goal: Absence of Infection Signs and Symptoms  Outcome: Progressing

## 2025-07-19 NOTE — PROGRESS NOTES
07/18/25 0000   Skin   Skin WDL X   Skin Color redness blanchable   Additional Documentation Wound (LDA)         Wounds noted to Left inner thigh, Coccyx, JOSSIE breasts red with yeast, JOSSIE abdominal/groin folds also red. Right skin fold tiny open cracked area.     Admitted/transferred from: [facility]  2 RN full   skin assessment completed by Elissa Santillan, RN and samm Juarez RN.  Skin assessment finding: issues found See note above   Interventions/actions: skin interventions Cleaned & dressed per WOC     Will continue to monitor.

## 2025-07-19 NOTE — PLAN OF CARE
"Assessments:   A/O x4, VSS, on room air. PRN Tylenol given for back pain, Up Ax1 with belt and walker. Voiding adequately. Abdominal folds and under breast folds with redness, miconazole cream applied.     Treatment Plan: awaiting discharge to [facility]    Bedside Nurse: Noe Trejo RN       Problem: Adult Inpatient Plan of Care  Goal: Plan of Care Review  Description: The Plan of Care Review/Shift note should be completed every shift.  The Outcome Evaluation is a brief statement about your assessment that the patient is improving, declining, or no change.  This information will be displayed automatically on your shift  note.  Outcome: Progressing  Flowsheets (Taken 7/18/2025 2224)  Outcome Evaluation: vitallly stable  Plan of Care Reviewed With: patient  Overall Patient Progress: improving  Goal: Patient-Specific Goal (Individualized)  Description: You can add care plan individualizations to a care plan. Examples of Individualization might be:  \"Parent requests to be called daily at 9am for status\", \"I have a hard time hearing out of my right ear\", or \"Do not touch me to wake me up as it startles  me\".  Outcome: Progressing  Goal: Absence of Hospital-Acquired Illness or Injury  Outcome: Progressing  Intervention: Identify and Manage Fall Risk  Recent Flowsheet Documentation  Taken 7/18/2025 1600 by Noe Trejo RN  Safety Promotion/Fall Prevention: activity supervised  Intervention: Prevent Skin Injury  Recent Flowsheet Documentation  Taken 7/18/2025 1600 by Noe Trejo RN  Body Position:   position changed independently   supine, head elevated  Intervention: Prevent and Manage VTE (Venous Thromboembolism) Risk  Recent Flowsheet Documentation  Taken 7/18/2025 1600 by Noe Trejo RN  VTE Prevention/Management: SCDs off (sequential compression devices)  Intervention: Prevent Infection  Recent Flowsheet Documentation  Taken 7/18/2025 1600 by Noe Trejo RN  Infection " Prevention: rest/sleep promoted  Goal: Optimal Comfort and Wellbeing  Outcome: Progressing  Goal: Readiness for Transition of Care  Outcome: Progressing     Problem: Gas Exchange Impaired  Goal: Optimal Gas Exchange  Outcome: Progressing  Intervention: Optimize Oxygenation and Ventilation  Recent Flowsheet Documentation  Taken 7/18/2025 1600 by Noe Trejo RN  Head of Bed (HOB) Positioning: HOB at 20-30 degrees     Problem: Infection  Goal: Absence of Infection Signs and Symptoms  Outcome: Progressing     Goal Outcome Evaluation:      Plan of Care Reviewed With: patient    Overall Patient Progress: improving    Outcome Evaluation: vitallly stable

## 2025-07-19 NOTE — PROGRESS NOTES
[facility]  Hospitalist Progress Note  Admit 6/25/2025  1:20 PM    Name: Brissa Corado    MRN: [id number]  Provider:  Salbador Mattson MD, [facility]    Date of Service: 07/19/2025     Reason for Stay (Diagnosis): Resolved asthma exacerbation with acute on chronic hypoxic respiratory failure         Summary of hospital stay & Assessment/Plan:   Summary of Stay: Brisas Corado is a 60 year old female who was admitted on 6/25/2025    60-year-old female with developmental delay, asthma on home oxygen, RICK, seizure disorder, and super morbid obesity was admitted on 6/25/2025 with acute on chronic hypoxic respiratory failure secondary to asthma exacerbation triggered by poor air quality and lack of access to her LABA/ICS. Respiratory status has since stabilized with resolution of wheezing and restoration of baseline oxygen needs. During hospitalization, she developed gross hematuria on 7/7 without significant bleeding or anemia. Treated empirically for UTI with 7 days of antibiotics, now completed. Hematuria resolved; urine cytology was negative and outpatient cystoscopy is planned. She remains hospitalized awaiting placement due to guardianship transition and inability to return to prior living environment. INR is therapeutic; warfarin resumed for history of DVT.  Resolved asthma exacerbation with acute on chronic hypoxic respiratory failure  - Now off continuous oxygen, baseline 2.5 L nocturnal O2  - Wheezing resolved; DuoNeb PRN  - Breo Ellipta resumed  - CPAP ordered; patient reports intolerance due to claustrophobia but has f/u sleep study  Gross hematuria, resolved; possible UTI, treated  - Resolved spontaneous gross hematuria (7/7)  - Treated empirically x7 days; abx discontinued 7/13  - Urine cytology negative  - Urology consulted; outpatient cystoscopy and possible CT urogram needed  - Monitor for recurrence  History of DVT  - Warfarin resumed, goal INR 2-3 (INR 2.99 today)  - Lovenox bridge  "discontinued  - Warfarin per pharmacy panel  Developmental delay; guardianship transition  - Current guardian (parents) withdrawing  - New court-appointed guardian pending  - Discharge to assisted living facility planned; cannot return to prior home  - Social work involved  Super morbid obesity with intertriginous skin breakdown  - WOC following  - Miconazole powder BID (per patient preference)  - Routine wound and hygiene care  Seizure disorder  - Continue Tegretol  Hypertension  - Continue lisinopril  Hyperlipidemia  - Continue statin  GERD  - Continue omeprazole  RICK  - Diagnosed but not on home CPAP  - Follow-up sleep study pending; CPAP trial in hospital ordered      Clinically Significant Risk Factors                   # Hypertension: Noted on problem list            # Morbid Obesity: Estimated body mass index is 60.56 kg/m  as calculated from the following:    Height as of this encounter: 1.702 m (5' 7\").    Weight as of this encounter: 175.4 kg (386 lb 11 oz).      # Asthma: noted on problem list          DVT Prophylaxis: Warfarin  Code Status:  Full Code    Disposition Plan     Medically Ready for Discharge: Ready Now   [facility] once a bed is available.     Entered: Salbador Mattson MD 07/19/2025, 8:01 AM                 Interval History:       Denies complaints doing well  Today's plan detailed above discussed with nursing               Physical Exam:   Physical Exam   Temp: 97.9  F (36.6  C) Temp src: Oral BP: 113/57 Pulse: 66   Resp: 16 SpO2: 94 % O2 Device: None (Room air)    Vitals:    06/25/25 1740   Weight: (!) 175.4 kg (386 lb 11 oz)     I/O last 3 completed shifts:  In: 240 [P.O.:240]  Out: 2800 [Urine:2800]        GENERAL:  Comfortable.  Morbidly obese  PSYCH: pleasant, oriented, No acute distress.  EYES: PERRLA, Normal conjunctiva.  HEART:  Normal S1, S2   LUNGS:  Clear to auscultation, normal Respiratory effort.  ABDOMEN:  Soft, no hepatosplenomegaly, normal bowel " sounds.  SKIN:  Dry to touch, No rash.      Medications   Current Facility-Administered Medications   Medication Dose Route Frequency Provider Last Rate Last Admin    Patient is already receiving anticoagulation with heparin, enoxaparin (LOVENOX), warfarin (COUMADIN)  or other anticoagulant medication   Does not apply Continuous PRN David Hull MD         Current Facility-Administered Medications   Medication Dose Route Frequency Provider Last Rate Last Admin    atorvastatin (LIPITOR) tablet 10 mg  10 mg Oral At Bedtime David Hull MD   10 mg at 07/18/25 2029    carBAMazepine (TEGretol) tablet 200 mg  200 mg Oral Q24H David Hull MD   200 mg at 07/18/25 1234    carBAMazepine (TEGretol) tablet 400 mg  400 mg Oral BID David Hull MD   400 mg at 07/18/25 2028    fluticasone (FLONASE) 50 MCG/ACT spray 1 spray  1 spray Both Nostrils Daily Neeraj Alvarado MD   1 spray at 07/18/25 0842    fluticasone-vilanterol (BREO ELLIPTA) 100-25 MCG/ACT inhaler 1 puff  1 puff Inhalation Daily David Hull MD   1 puff at 07/18/25 0842    lisinopril (ZESTRIL) tablet 20 mg  20 mg Oral Daily David Hull MD   20 mg at 07/18/25 0839    miconazole (MICATIN) 2 % powder   Topical BID Andreea Mcdonald MD   Given at 07/18/25 2031    pantoprazole (PROTONIX) EC tablet 40 mg  40 mg Oral QAM AC David Hull MD   40 mg at 07/18/25 0839    polyethylene glycol (MIRALAX) Packet 17 g  17 g Oral Daily Aimee Cyr MD   17 g at 07/18/25 0839    senna-docusate (SENOKOT-S/PERICOLACE) 8.6-50 MG per tablet 1 tablet  1 tablet Oral BID Aimee Cyr MD   1 tablet at 07/18/25 2028    sertraline (ZOLOFT) tablet 100 mg  100 mg Oral Daily Neeraj Alvarado MD   100 mg at 07/18/25 0839    sodium chloride (PF) 0.9% PF flush 3 mL  3 mL Intracatheter Q8H NIC David Hull MD   3 mL at 07/18/25 2033    Warfarin Dose Required Daily - Pharmacist Managed  1 each Oral See Admin Instructions Aimee Cyr  MD Irene         Data     -Data reviewed today:  I personally reviewed  all new labs and imaging results over the last 24 hours.    Recent Labs   Lab 07/16/25  0641 07/15/25  0842 07/14/25  0647   HGB 11.5* 12.0 11.5*   MCV 87 89 88     Recent Labs   Lab 07/15/25  1404      POTASSIUM 4.4   CHLORIDE 100   CO2 24   ANIONGAP 11   *   BUN 23.8*   CR 0.75   GFRESTIMATED >90   CESAR 8.4*       No results found for this or any previous visit (from the past 24 hours).    This document was produced using voice recognition software

## 2025-07-19 NOTE — PLAN OF CARE
Pertinent assessments: A&O X 4, C/O pain, tylenol given VSS, PIV SL, regular diet, assist of 1, had a shower, up in a chair, skin foldings washed, powder applied.  Major Shift Events Shower  Treatment Plan: discharge  Bedside Nurse: Sony Henley RN       Problem: Adult Inpatient Plan of Care  Goal: Plan of Care Review  Description: The Plan of Care Review/Shift note should be completed every shift.  The Outcome Evaluation is a brief statement about your assessment that the patient is improving, declining, or no change.  This information will be displayed automatically on your shift  note.  Recent Flowsheet Documentation  Taken 7/19/2025 1609 by Sony Henley RN  Outcome Evaluation: VSS, on RA, had a shower  Plan of Care Reviewed With: patient  Overall Patient Progress: improving  Goal: Absence of Hospital-Acquired Illness or Injury  Intervention: Identify and Manage Fall Risk  Recent Flowsheet Documentation  Taken 7/19/2025 0901 by Sony Henley RN  Safety Promotion/Fall Prevention:   activity supervised   clutter free environment maintained   lighting adjusted   mobility aid in reach   nonskid shoes/slippers when out of bed   patient and family education   safety round/check completed  Intervention: Prevent Skin Injury  Recent Flowsheet Documentation  Taken 7/19/2025 0901 by Snoy Henley RN  Body Position: position changed independently  Intervention: Prevent and Manage VTE (Venous Thromboembolism) Risk  Recent Flowsheet Documentation  Taken 7/19/2025 0901 by Sony Henley RN  VTE Prevention/Management: SCDs off (sequential compression devices)  Intervention: Prevent Infection  Recent Flowsheet Documentation  Taken 7/19/2025 0901 by Sony Henley RN  Infection Prevention:   rest/sleep promoted   hand hygiene promoted  Goal: Optimal Comfort and Wellbeing  Intervention: Monitor Pain and Promote Comfort  Recent Flowsheet Documentation  Taken 7/19/2025 1340 by  Sony Henley, RN  Pain Management Interventions: medication (see MAR)   Goal Outcome Evaluation:      Plan of Care Reviewed With: patient    Overall Patient Progress: improvingOverall Patient Progress: improving    Outcome Evaluation: VSS, on RA, had a shower

## 2025-07-20 LAB
INR PPP: 2.64 (ref 0.85–1.15)
PROTHROMBIN TIME: 27.7 SECONDS (ref 11.8–14.8)

## 2025-07-20 PROCEDURE — 250N000013 HC RX MED GY IP 250 OP 250 PS 637: Performed by: HOSPITALIST

## 2025-07-20 PROCEDURE — 250N000013 HC RX MED GY IP 250 OP 250 PS 637: Performed by: STUDENT IN AN ORGANIZED HEALTH CARE EDUCATION/TRAINING PROGRAM

## 2025-07-20 PROCEDURE — 250N000013 HC RX MED GY IP 250 OP 250 PS 637: Performed by: INTERNAL MEDICINE

## 2025-07-20 PROCEDURE — 85610 PROTHROMBIN TIME: CPT | Performed by: HOSPITALIST

## 2025-07-20 PROCEDURE — 36415 COLL VENOUS BLD VENIPUNCTURE: CPT | Performed by: HOSPITALIST

## 2025-07-20 PROCEDURE — 120N000001 HC R&B MED SURG/OB

## 2025-07-20 PROCEDURE — 99239 HOSP IP/OBS DSCHRG MGMT >30: CPT | Performed by: INTERNAL MEDICINE

## 2025-07-20 RX ORDER — ALBUTEROL SULFATE 5 MG/ML
2.5 SOLUTION RESPIRATORY (INHALATION)
Qty: 20 ML | Refills: 0 | Status: SHIPPED | OUTPATIENT
Start: 2025-07-20 | End: 2025-07-20

## 2025-07-20 RX ORDER — WARFARIN SODIUM 5 MG/1
20 TABLET ORAL
Status: COMPLETED | OUTPATIENT
Start: 2025-07-20 | End: 2025-07-20

## 2025-07-20 RX ORDER — ALBUTEROL SULFATE 0.83 MG/ML
2.5 SOLUTION RESPIRATORY (INHALATION) EVERY 4 HOURS PRN
Qty: 90 ML | Refills: 0 | Status: SHIPPED | OUTPATIENT
Start: 2025-07-20

## 2025-07-20 RX ADMIN — CARBAMAZEPINE 400 MG: 200 TABLET ORAL at 08:22

## 2025-07-20 RX ADMIN — SENNOSIDES AND DOCUSATE SODIUM 1 TABLET: 50; 8.6 TABLET ORAL at 08:22

## 2025-07-20 RX ADMIN — ATORVASTATIN CALCIUM 10 MG: 10 TABLET, FILM COATED ORAL at 20:45

## 2025-07-20 RX ADMIN — ACETAMINOPHEN 650 MG: 325 TABLET ORAL at 01:26

## 2025-07-20 RX ADMIN — FLUTICASONE PROPIONATE 1 SPRAY: 50 SPRAY, METERED NASAL at 08:22

## 2025-07-20 RX ADMIN — CARBAMAZEPINE 400 MG: 200 TABLET ORAL at 20:45

## 2025-07-20 RX ADMIN — PANTOPRAZOLE SODIUM 40 MG: 40 TABLET, DELAYED RELEASE ORAL at 08:23

## 2025-07-20 RX ADMIN — ACETAMINOPHEN 650 MG: 325 TABLET ORAL at 17:02

## 2025-07-20 RX ADMIN — LISINOPRIL 20 MG: 20 TABLET ORAL at 08:23

## 2025-07-20 RX ADMIN — OXYCODONE HYDROCHLORIDE 2.5 MG: 5 TABLET ORAL at 00:33

## 2025-07-20 RX ADMIN — ACETAMINOPHEN 650 MG: 325 TABLET ORAL at 13:01

## 2025-07-20 RX ADMIN — MICONAZOLE NITRATE: 20 POWDER TOPICAL at 08:54

## 2025-07-20 RX ADMIN — MICONAZOLE NITRATE: 20 POWDER TOPICAL at 20:46

## 2025-07-20 RX ADMIN — SERTRALINE 100 MG: 100 TABLET, FILM COATED ORAL at 08:23

## 2025-07-20 RX ADMIN — FLUTICASONE FUROATE AND VILANTEROL TRIFENATATE 1 PUFF: 100; 25 POWDER RESPIRATORY (INHALATION) at 08:22

## 2025-07-20 RX ADMIN — POLYETHYLENE GLYCOL 3350 17 G: 17 POWDER, FOR SOLUTION ORAL at 08:22

## 2025-07-20 RX ADMIN — SENNOSIDES AND DOCUSATE SODIUM 1 TABLET: 50; 8.6 TABLET ORAL at 20:45

## 2025-07-20 RX ADMIN — ACETAMINOPHEN 650 MG: 325 TABLET ORAL at 23:56

## 2025-07-20 RX ADMIN — CARBAMAZEPINE 200 MG: 200 TABLET ORAL at 13:01

## 2025-07-20 RX ADMIN — WARFARIN SODIUM 20 MG: 5 TABLET ORAL at 17:55

## 2025-07-20 ASSESSMENT — ACTIVITIES OF DAILY LIVING (ADL)
ADLS_ACUITY_SCORE: 48
ADLS_ACUITY_SCORE: 59
ADLS_ACUITY_SCORE: 48
ADLS_ACUITY_SCORE: 59
ADLS_ACUITY_SCORE: 48
ADLS_ACUITY_SCORE: 48
ADLS_ACUITY_SCORE: 59
ADLS_ACUITY_SCORE: 59
ADLS_ACUITY_SCORE: 48

## 2025-07-20 NOTE — PLAN OF CARE
Pertinent assessments: A&O X 4, VSS ,on RA. Tylenol, given for lower abdominal pain, up in a chair walked to the bathroom had BM, skin foldings washed, powder applied, Assist of 1 with walker and gait belt.     Major Shift Events: None  Treatment Plan: Discharge plan  Bedside Nurse: Sony Henley RN      Problem: Adult Inpatient Plan of Care  Goal: Plan of Care Review    Description: The Plan of Care Review/Shift note should be completed every shift.  The Outcome Evaluation is a brief statement about your assessment that the patient is improving, declining, or no change.  This information will be displayed automatically on your shift  note.  Recent Flowsheet Documentation  Taken 7/20/2025 1637 by Sony Henley RN  Outcome Evaluation: waiting for discharge plan  Plan of Care Reviewed With: patient  Overall Patient Progress: improving  Goal: Absence of Hospital-Acquired Illness or Injury  Intervention: Identify and Manage Fall Risk  Recent Flowsheet Documentation  Taken 7/20/2025 0824 by Sony Henley RN  Safety Promotion/Fall Prevention:   activity supervised   clutter free environment maintained   lighting adjusted   mobility aid in reach   nonskid shoes/slippers when out of bed   patient and family education   room near nurse's station   safety round/check completed  Intervention: Prevent Skin Injury  Recent Flowsheet Documentation  Taken 7/20/2025 0824 by Sony Henley RN  Body Position: position changed independently  Intervention: Prevent and Manage VTE (Venous Thromboembolism) Risk  Recent Flowsheet Documentation  Taken 7/20/2025 0824 by Sony Henley RN  VTE Prevention/Management: SCDs off (sequential compression devices)  Intervention: Prevent Infection  Recent Flowsheet Documentation  Taken 7/20/2025 0824 by Sony Henley RN  Infection Prevention:   hand hygiene promoted   rest/sleep promoted  Goal: Optimal Comfort and Wellbeing  Intervention: Monitor Pain and  Promote Comfort  Recent Flowsheet Documentation  Taken 7/20/2025 1259 by Sony Henley, RN  Pain Management Interventions: medication (see MAR)   Goal Outcome Evaluation:      Plan of Care Reviewed With: patient    Overall Patient Progress: improvingOverall Patient Progress: improving    Outcome Evaluation: waiting for discharge plan
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
## 2025-07-20 NOTE — PLAN OF CARE
"Goal Outcome Evaluation:           Overall Patient Progress: improvingOverall Patient Progress: improving    Outcome Evaluation: Pt A/O, on RA. VSS   voiding via purewick    Pt is alert and orientated. Pt up with assist of 1, gb/walker. Pt is medically stable, waiting on placement or to go back home. SW is involved. Pt has redness in folds, under breasts and groin area.  Voiding via purewick at night time. On regular diet. Has a wound on inner left thigh - covered in mepilex.  Pt did have a shower today.        Problem: Adult Inpatient Plan of Care  Goal: Plan of Care Review  Description: The Plan of Care Review/Shift note should be completed every shift.  The Outcome Evaluation is a brief statement about your assessment that the patient is improving, declining, or no change.  This information will be displayed automatically on your shift  note.  Outcome: Progressing  Flowsheets (Taken 7/19/2025 9755)  Outcome Evaluation: Pt A/O, on RA. VSS   voiding via purewick  Overall Patient Progress: improving  Goal: Patient-Specific Goal (Individualized)  Description: You can add care plan individualizations to a care plan. Examples of Individualization might be:  \"Parent requests to be called daily at 9am for status\", \"I have a hard time hearing out of my right ear\", or \"Do not touch me to wake me up as it startles  me\".  Outcome: Progressing  Goal: Absence of Hospital-Acquired Illness or Injury  Outcome: Progressing  Intervention: Identify and Manage Fall Risk  Recent Flowsheet Documentation  Taken 7/19/2025 1615 by Joe Hickman, RN  Safety Promotion/Fall Prevention:   activity supervised   clutter free environment maintained   lighting adjusted   mobility aid in reach   nonskid shoes/slippers when out of bed   patient and family education   safety round/check completed  Intervention: Prevent Skin Injury  Recent Flowsheet Documentation  Taken 7/19/2025 1615 by Joe Hickman, RN  Body Position:   position changed " independently   supine, head elevated  Intervention: Prevent and Manage VTE (Venous Thromboembolism) Risk  Recent Flowsheet Documentation  Taken 7/19/2025 1615 by Joe Hickman, RN  VTE Prevention/Management:   SCDs off (sequential compression devices)   patient refused intervention  Goal: Optimal Comfort and Wellbeing  Outcome: Progressing  Goal: Readiness for Transition of Care  Outcome: Progressing     Problem: Gas Exchange Impaired  Goal: Optimal Gas Exchange  Outcome: Progressing     Problem: Infection  Goal: Absence of Infection Signs and Symptoms  Outcome: Progressing

## 2025-07-20 NOTE — PLAN OF CARE
"Goal Outcome Evaluation:    End of Shift Summary  For vital signs and complete assessments, please see documentation flowsheets.     Pertinent assessments: VSS on RA. Tylenol and oxy given for lower abdominal pain. Pure-wick overnight with good urine output.    Treatment Plan: await discharge placement      Plan of Care Reviewed With: patient    Overall Patient Progress: improvingOverall Patient Progress: improving    Outcome Evaluation: Awaiting nursing home placement.      Problem: Adult Inpatient Plan of Care  Goal: Plan of Care Review  Description: The Plan of Care Review/Shift note should be completed every shift.  The Outcome Evaluation is a brief statement about your assessment that the patient is improving, declining, or no change.  This information will be displayed automatically on your shift  note.  Outcome: Progressing  Flowsheets (Taken 7/20/2025 0635)  Outcome Evaluation: Awaiting nursing home placement.  Plan of Care Reviewed With: patient  Overall Patient Progress: improving  Goal: Patient-Specific Goal (Individualized)  Description: You can add care plan individualizations to a care plan. Examples of Individualization might be:  \"Parent requests to be called daily at 9am for status\", \"I have a hard time hearing out of my right ear\", or \"Do not touch me to wake me up as it startles  me\".  Outcome: Progressing  Goal: Absence of Hospital-Acquired Illness or Injury  Outcome: Progressing  Goal: Optimal Comfort and Wellbeing  Outcome: Progressing  Intervention: Monitor Pain and Promote Comfort  Recent Flowsheet Documentation  Taken 7/20/2025 0033 by Mary Ann Westbrook RN  Pain Management Interventions: medication (see MAR)  Goal: Readiness for Transition of Care  Outcome: Progressing     Problem: Gas Exchange Impaired  Goal: Optimal Gas Exchange  Outcome: Progressing     Problem: Infection  Goal: Absence of Infection Signs and Symptoms  Outcome: Progressing         "

## 2025-07-21 ENCOUNTER — DOCUMENTATION ONLY (OUTPATIENT)
Dept: OTHER | Facility: CLINIC | Age: 60
End: 2025-07-21
Payer: COMMERCIAL

## 2025-07-21 LAB
INR PPP: 3.35 (ref 0.85–1.15)
PROTHROMBIN TIME: 33.2 SECONDS (ref 11.8–14.8)

## 2025-07-21 PROCEDURE — 999N000157 HC STATISTIC RCP TIME EA 10 MIN

## 2025-07-21 PROCEDURE — 85610 PROTHROMBIN TIME: CPT | Performed by: HOSPITALIST

## 2025-07-21 PROCEDURE — 36415 COLL VENOUS BLD VENIPUNCTURE: CPT | Performed by: HOSPITALIST

## 2025-07-21 PROCEDURE — 120N000001 HC R&B MED SURG/OB

## 2025-07-21 PROCEDURE — 99232 SBSQ HOSP IP/OBS MODERATE 35: CPT | Performed by: STUDENT IN AN ORGANIZED HEALTH CARE EDUCATION/TRAINING PROGRAM

## 2025-07-21 PROCEDURE — 250N000013 HC RX MED GY IP 250 OP 250 PS 637: Performed by: INTERNAL MEDICINE

## 2025-07-21 PROCEDURE — 250N000013 HC RX MED GY IP 250 OP 250 PS 637: Performed by: HOSPITALIST

## 2025-07-21 PROCEDURE — 250N000013 HC RX MED GY IP 250 OP 250 PS 637: Performed by: STUDENT IN AN ORGANIZED HEALTH CARE EDUCATION/TRAINING PROGRAM

## 2025-07-21 PROCEDURE — 94640 AIRWAY INHALATION TREATMENT: CPT

## 2025-07-21 RX ORDER — WARFARIN SODIUM 7.5 MG/1
7.5 TABLET ORAL
Status: COMPLETED | OUTPATIENT
Start: 2025-07-21 | End: 2025-07-21

## 2025-07-21 RX ADMIN — SERTRALINE 100 MG: 100 TABLET, FILM COATED ORAL at 09:57

## 2025-07-21 RX ADMIN — WARFARIN 7.5 MG: 7.5 TABLET ORAL at 18:27

## 2025-07-21 RX ADMIN — PANTOPRAZOLE SODIUM 40 MG: 40 TABLET, DELAYED RELEASE ORAL at 09:57

## 2025-07-21 RX ADMIN — ATORVASTATIN CALCIUM 10 MG: 10 TABLET, FILM COATED ORAL at 20:34

## 2025-07-21 RX ADMIN — CARBAMAZEPINE 400 MG: 200 TABLET ORAL at 20:34

## 2025-07-21 RX ADMIN — FLUTICASONE FUROATE AND VILANTEROL TRIFENATATE 1 PUFF: 100; 25 POWDER RESPIRATORY (INHALATION) at 08:06

## 2025-07-21 RX ADMIN — SENNOSIDES AND DOCUSATE SODIUM 1 TABLET: 50; 8.6 TABLET ORAL at 09:57

## 2025-07-21 RX ADMIN — CARBAMAZEPINE 200 MG: 200 TABLET ORAL at 12:28

## 2025-07-21 RX ADMIN — SENNOSIDES AND DOCUSATE SODIUM 1 TABLET: 50; 8.6 TABLET ORAL at 20:34

## 2025-07-21 RX ADMIN — LISINOPRIL 20 MG: 20 TABLET ORAL at 09:57

## 2025-07-21 RX ADMIN — CARBAMAZEPINE 400 MG: 200 TABLET ORAL at 09:57

## 2025-07-21 RX ADMIN — OXYCODONE HYDROCHLORIDE 2.5 MG: 5 TABLET ORAL at 16:18

## 2025-07-21 RX ADMIN — MICONAZOLE NITRATE: 20 POWDER TOPICAL at 20:35

## 2025-07-21 RX ADMIN — ACETAMINOPHEN 650 MG: 325 TABLET ORAL at 12:29

## 2025-07-21 RX ADMIN — FLUTICASONE PROPIONATE 1 SPRAY: 50 SPRAY, METERED NASAL at 10:00

## 2025-07-21 RX ADMIN — MICONAZOLE NITRATE: 20 POWDER TOPICAL at 10:00

## 2025-07-21 ASSESSMENT — ACTIVITIES OF DAILY LIVING (ADL)
ADLS_ACUITY_SCORE: 60
ADLS_ACUITY_SCORE: 55
ADLS_ACUITY_SCORE: 59
ADLS_ACUITY_SCORE: 55
ADLS_ACUITY_SCORE: 60
ADLS_ACUITY_SCORE: 59
ADLS_ACUITY_SCORE: 55
ADLS_ACUITY_SCORE: 59
ADLS_ACUITY_SCORE: 55
ADLS_ACUITY_SCORE: 59
ADLS_ACUITY_SCORE: 59
ADLS_ACUITY_SCORE: 55
ADLS_ACUITY_SCORE: 59
ADLS_ACUITY_SCORE: 59
ADLS_ACUITY_SCORE: 55
ADLS_ACUITY_SCORE: 55
ADLS_ACUITY_SCORE: 60
ADLS_ACUITY_SCORE: 59
ADLS_ACUITY_SCORE: 55
ADLS_ACUITY_SCORE: 59

## 2025-07-21 NOTE — PLAN OF CARE
"Pertinent assessments: A&Ox4, VSS.  On RA. Denies SOB, or nausea. PRN Tylenol given for lower back & extremities pain. Pure-wick removed. Encouraged to use the toilet. Up in chair. Assist x1 with walker & gait belt. On regualr diet & tolerating well.  Dressing changed to Left inner thigh. Miconazole powder to abdominal folds & under breast.     Major Shift: PRN Tyelenol for lower back & extremities pain     Treatment Plan: Discharging to [facility] pending, possibly this week.     Goal Outcome Evaluation:  Plan of Care Reviewed With: patient  Overall Patient Progress: no changeOverall Patient Progress: no change  Outcome Evaluation: VSS. Await for placement  Problem: Adult Inpatient Plan of Care  Goal: Plan of Care Review  Description: The Plan of Care Review/Shift note should be completed every shift.  The Outcome Evaluation is a brief statement about your assessment that the patient is improving, declining, or no change.  This information will be displayed automatically on your shift  note.  7/21/2025 1508 by Aury Brewster RN  Outcome: Progressing  Flowsheets (Taken 7/21/2025 1508)  Plan of Care Reviewed With: patient  Overall Patient Progress: no change  7/21/2025 1507 by Aury Brewster RN  Outcome: Progressing  Flowsheets (Taken 7/21/2025 1507)  Outcome Evaluation: VSS. Await for placement  Plan of Care Reviewed With: patient  Goal: Patient-Specific Goal (Individualized)  Description: You can add care plan individualizations to a care plan. Examples of Individualization might be:  \"Parent requests to be called daily at 9am for status\", \"I have a hard time hearing out of my right ear\", or \"Do not touch me to wake me up as it startles  me\".  7/21/2025 1508 by Aury Brewster RN  Outcome: Progressing  7/21/2025 1507 by Aury Brewster RN  Outcome: Progressing  Goal: Absence of Hospital-Acquired Illness or Injury  7/21/2025 1508 by Aury Brewster, PAULA  Outcome: Progressing  7/21/2025 1507 by Aury Brewster, PAULA  Outcome: " Progressing  Intervention: Identify and Manage Fall Risk  Recent Flowsheet Documentation  Taken 7/21/2025 0900 by Aury Brewster RN  Safety Promotion/Fall Prevention:   activity supervised   clutter free environment maintained   lighting adjusted   mobility aid in reach   nonskid shoes/slippers when out of bed   patient and family education   room near nurse's station   safety round/check completed  Intervention: Prevent Skin Injury  Recent Flowsheet Documentation  Taken 7/21/2025 0900 by Aury Brewster RN  Body Position: position changed independently  Intervention: Prevent Infection  Recent Flowsheet Documentation  Taken 7/21/2025 0900 by Aury Brewster RN  Infection Prevention: rest/sleep promoted  Goal: Optimal Comfort and Wellbeing  7/21/2025 1508 by Aury Brewster RN  Outcome: Progressing  7/21/2025 1507 by Aury Brewster RN  Outcome: Progressing  Intervention: Monitor Pain and Promote Comfort  Recent Flowsheet Documentation  Taken 7/21/2025 1229 by Aury Brewster RN  Pain Management Interventions: medication (see MAR)  Taken 7/21/2025 0900 by Aury Brewster RN  Pain Management Interventions:   care clustered   rest  Goal: Readiness for Transition of Care  7/21/2025 1508 by Aury Brewster RN  Outcome: Progressing  7/21/2025 1507 by Aury Brewster RN  Outcome: Progressing     Problem: Gas Exchange Impaired  Goal: Optimal Gas Exchange  7/21/2025 1508 by Aury Brewster RN  Outcome: Progressing  7/21/2025 1507 by Aury Brewster RN  Outcome: Progressing  Intervention: Optimize Oxygenation and Ventilation  Recent Flowsheet Documentation  Taken 7/21/2025 0900 by Aury Brewster RN  Head of Bed (HOB) Positioning: HOB at 20-30 degrees     Problem: Infection  Goal: Absence of Infection Signs and Symptoms  7/21/2025 1508 by Aury Brewster RN  Outcome: Progressing  7/21/2025 1507 by Aury Brewster RN  Outcome: Progressing

## 2025-07-21 NOTE — PLAN OF CARE
Goal Outcome Evaluation:  End of Shift Summary  For vital signs and complete assessments, please see documentation flowsheets.     Pertinent assessments: VSS on RA. Denies SOB, or nausea. Tylenol given for lower abdominal pain. Voiding adequate amounts, Pure-wick in place overnight.     Treatment Plan: discharge to [facility] pending, possibly early to mid-week, SW following        Plan of Care Reviewed With: patient    Overall Patient Progress: improvingOverall Patient Progress: improving    Outcome Evaluation: Awaiting long-term placement.      Problem: Adult Inpatient Plan of Care  Goal: Absence of Hospital-Acquired Illness or Injury  Intervention: Identify and Manage Fall Risk  Recent Flowsheet Documentation  Taken 7/20/2025 2357 by Mary Ann Westbrook RN  Safety Promotion/Fall Prevention: safety round/check completed  Intervention: Prevent Skin Injury  Recent Flowsheet Documentation  Taken 7/20/2025 2357 by Mary Ann Westbrook RN  Body Position: position changed independently  Goal: Optimal Comfort and Wellbeing  Intervention: Monitor Pain and Promote Comfort  Recent Flowsheet Documentation  Taken 7/20/2025 2356 by Mary Ann Westbrook RN  Pain Management Interventions: medication (see MAR)     Problem: Comorbidity Management  Goal: Maintenance of Asthma Control  Intervention: Maintain Asthma Symptom Control  Recent Flowsheet Documentation  Taken 7/20/2025 2357 by Mary Ann Westbrook RN  Medication Review/Management: medications reviewed  Goal: Blood Pressure in Desired Range  Intervention: Maintain Blood Pressure Management  Recent Flowsheet Documentation  Taken 7/20/2025 2357 by Mary Ann Westbrook RN  Medication Review/Management: medications reviewed     Problem: Comorbidity Management  Goal: Maintenance of Asthma Control  Intervention: Maintain Asthma Symptom Control  Recent Flowsheet Documentation  Taken 7/20/2025 2357 by Mary Ann Westbrook, RN  Medication Review/Management: medications reviewed  Goal: Maintenance of Behavioral Health Symptom  Control  Intervention: Maintain Behavioral Health Symptom Control  Recent Flowsheet Documentation  Taken 7/20/2025 2357 by Mary Ann Westbrook RN  Medication Review/Management: medications reviewed  Goal: Maintenance of COPD Symptom Control  Intervention: Maintain COPD Symptom Control  Recent Flowsheet Documentation  Taken 7/20/2025 2357 by Mary Ann Westbrook RN  Medication Review/Management: medications reviewed  Goal: Blood Glucose Levels Within Targeted Range  Intervention: Monitor and Manage Glycemia  Recent Flowsheet Documentation  Taken 7/20/2025 2357 by Mary Ann Westbrook RN  Medication Review/Management: medications reviewed  Goal: Maintenance of Heart Failure Symptom Control  Intervention: Maintain Heart Failure Management  Recent Flowsheet Documentation  Taken 7/20/2025 2357 by Mary Ann Westbrook RN  Medication Review/Management: medications reviewed  Goal: Blood Pressure in Desired Range  Intervention: Maintain Blood Pressure Management  Recent Flowsheet Documentation  Taken 7/20/2025 2357 by Mary Ann Westbrook RN  Medication Review/Management: medications reviewed  Goal: Maintenance of Osteoarthritis Symptom Control  Intervention: Maintain Osteoarthritis Symptom Control  Recent Flowsheet Documentation  Taken 7/20/2025 2357 by Mary Ann Westbrook RN  Activity Management: activity adjusted per tolerance  Medication Review/Management: medications reviewed  Goal: Bariatric Home Regimen Maintained  Intervention: Maintain and Manage Postbariatric Surgery Care  Recent Flowsheet Documentation  Taken 7/20/2025 2357 by Mary Ann Westbrook RN  Medication Review/Management: medications reviewed  Goal: Maintenance of Seizure Control  Intervention: Maintain Seizure Symptom Control  Recent Flowsheet Documentation  Taken 7/20/2025 2357 by Mary Ann Westbrook RN  Medication Review/Management: medications reviewed     Problem: Adult Inpatient Plan of Care  Goal: Absence of Hospital-Acquired Illness or Injury  Intervention: Identify and Manage Fall  Risk  Recent Flowsheet Documentation  Taken 7/20/2025 2357 by Mary Ann Westbrook RN  Safety Promotion/Fall Prevention: safety round/check completed  Intervention: Prevent Skin Injury  Recent Flowsheet Documentation  Taken 7/20/2025 2357 by Mary Ann Westbrook RN  Body Position: position changed independently  Goal: Optimal Comfort and Wellbeing  Intervention: Monitor Pain and Promote Comfort  Recent Flowsheet Documentation  Taken 7/20/2025 2356 by Mary Ann Westbrook RN  Pain Management Interventions: medication (see MAR)     Problem: Comorbidity Management  Goal: Maintenance of Asthma Control  Intervention: Maintain Asthma Symptom Control  Recent Flowsheet Documentation  Taken 7/20/2025 2357 by Mary Ann Westbrook RN  Medication Review/Management: medications reviewed  Goal: Maintenance of Behavioral Health Symptom Control  Intervention: Maintain Behavioral Health Symptom Control  Recent Flowsheet Documentation  Taken 7/20/2025 2357 by Mary Ann Westbrook RN  Medication Review/Management: medications reviewed  Goal: Maintenance of COPD Symptom Control  Intervention: Maintain COPD Symptom Control  Recent Flowsheet Documentation  Taken 7/20/2025 2357 by Mary Ann Westbrook RN  Medication Review/Management: medications reviewed  Goal: Blood Glucose Levels Within Targeted Range  Intervention: Monitor and Manage Glycemia  Recent Flowsheet Documentation  Taken 7/20/2025 2357 by Mary Ann Westbrook RN  Medication Review/Management: medications reviewed  Goal: Maintenance of Heart Failure Symptom Control  Intervention: Maintain Heart Failure Management  Recent Flowsheet Documentation  Taken 7/20/2025 2357 by Mary Ann Westbrook RN  Medication Review/Management: medications reviewed  Goal: Blood Pressure in Desired Range  Intervention: Maintain Blood Pressure Management  Recent Flowsheet Documentation  Taken 7/20/2025 2357 by Mary Ann Westbrook RN  Medication Review/Management: medications reviewed  Goal: Maintenance of Osteoarthritis Symptom Control  Intervention:  Maintain Osteoarthritis Symptom Control  Recent Flowsheet Documentation  Taken 7/20/2025 2357 by Mary Ann Westbrook RN  Activity Management: activity adjusted per tolerance  Medication Review/Management: medications reviewed  Goal: Bariatric Home Regimen Maintained  Intervention: Maintain and Manage Postbariatric Surgery Care  Recent Flowsheet Documentation  Taken 7/20/2025 2357 by Mary Ann Westbrook RN  Medication Review/Management: medications reviewed  Goal: Maintenance of Seizure Control  Intervention: Maintain Seizure Symptom Control  Recent Flowsheet Documentation  Taken 7/20/2025 2357 by Mary Ann Westbrook RN  Medication Review/Management: medications reviewed     Problem: Comorbidity Management  Goal: Maintenance of Asthma Control  Intervention: Maintain Asthma Symptom Control  Recent Flowsheet Documentation  Taken 7/20/2025 2357 by Mary Ann Westbrook RN  Medication Review/Management: medications reviewed  Goal: Maintenance of Behavioral Health Symptom Control  Intervention: Maintain Behavioral Health Symptom Control  Recent Flowsheet Documentation  Taken 7/20/2025 2357 by Mary Ann Westbrook RN  Medication Review/Management: medications reviewed  Goal: Maintenance of COPD Symptom Control  Intervention: Maintain COPD Symptom Control  Recent Flowsheet Documentation  Taken 7/20/2025 2357 by Mary Ann Westbrook RN  Medication Review/Management: medications reviewed  Goal: Blood Glucose Levels Within Targeted Range  Intervention: Monitor and Manage Glycemia  Recent Flowsheet Documentation  Taken 7/20/2025 2357 by Mary Ann Westbrook RN  Medication Review/Management: medications reviewed  Goal: Maintenance of Heart Failure Symptom Control  Intervention: Maintain Heart Failure Management  Recent Flowsheet Documentation  Taken 7/20/2025 2357 by Mary Ann Westbrook RN  Medication Review/Management: medications reviewed  Goal: Blood Pressure in Desired Range  Intervention: Maintain Blood Pressure Management  Recent Flowsheet Documentation  Taken  7/20/2025 2357 by Mary Ann Westbrook RN  Medication Review/Management: medications reviewed  Goal: Maintenance of Osteoarthritis Symptom Control  Intervention: Maintain Osteoarthritis Symptom Control  Recent Flowsheet Documentation  Taken 7/20/2025 2357 by Mary Ann Westbrook RN  Activity Management: activity adjusted per tolerance  Medication Review/Management: medications reviewed  Goal: Bariatric Home Regimen Maintained  Intervention: Maintain and Manage Postbariatric Surgery Care  Recent Flowsheet Documentation  Taken 7/20/2025 2357 by Mary Ann Westbrook RN  Medication Review/Management: medications reviewed  Goal: Maintenance of Seizure Control  Intervention: Maintain Seizure Symptom Control  Recent Flowsheet Documentation  Taken 7/20/2025 2357 by Mary Ann Westbrook RN  Medication Review/Management: medications reviewed     Problem: Adult Inpatient Plan of Care  Goal: Absence of Hospital-Acquired Illness or Injury  Intervention: Identify and Manage Fall Risk  Recent Flowsheet Documentation  Taken 7/20/2025 2357 by Mary Ann Westbrook RN  Safety Promotion/Fall Prevention: safety round/check completed  Intervention: Prevent Skin Injury  Recent Flowsheet Documentation  Taken 7/20/2025 2357 by Mary Ann Westbrook RN  Body Position: position changed independently  Goal: Optimal Comfort and Wellbeing  Intervention: Monitor Pain and Promote Comfort  Recent Flowsheet Documentation  Taken 7/20/2025 2356 by Mary Ann Westbrook RN  Pain Management Interventions: medication (see MAR)

## 2025-07-21 NOTE — PROGRESS NOTES
Care Management Follow Up    Length of Stay (days): 26    Expected Discharge Date: 07/23/2025     Concerns to be Addressed: discharge planning     Patient plan of care discussed at interdisciplinary rounds: Yes    Anticipated Discharge Disposition:  [facility]     Anticipated Discharge Services:  None  Anticipated Discharge DME:  [facility]    Patient/family educated on Medicare website which has current facility and service quality ratings:  N/A  Education Provided on the Discharge Plan:  yes  Patient/Family in Agreement with the Plan:  yes    Referrals Placed by CM/SW:  Post acute facilities    Discussed  Partnership in Safe Discharge Planning  document with patient/family: No     Handoff Completed: Yes, [facility] PCP: Internal handoff referral completed    Additional Information:  Sw resent the pt's guardianship paperwork to [employer] via email. The pt's parents are no longer her guardian.    Sw reached out to the pt's new guardian, Tico Becerra through [employer] P: [phone], to see if they have the paperwork that the new [facility] is requesting. Once the paperwork is completed and given to [facility], they are able to admit her ([facility] P: [phone]).  Sw will await a response to proceed with pt's discharge plan.    Next Steps:   Sw will continue with discharge planning and will be available as needed until discharge.    AISSATOU Macdonald, [facility]  Inpatient Care Coordination  [facility]  [phone]

## 2025-07-21 NOTE — PLAN OF CARE
"Assessments:   A&Ox4, VSS ,on RA. Tylenol, given for lower abdominal pain,. Up with Ax1 belt and wlker to bathroom. Miconazole powder applied to skin folds.     Treatment Plan: discharge next week to [facility]    Bedside Nurse: Noe Trejo RN      Problem: Adult Inpatient Plan of Care  Goal: Plan of Care Review  Description: The Plan of Care Review/Shift note should be completed every shift.  The Outcome Evaluation is a brief statement about your assessment that the patient is improving, declining, or no change.  This information will be displayed automatically on your shift  note.  Outcome: Progressing  Flowsheets (Taken 7/20/2025 1958)  Outcome Evaluation: awaiting placement  Plan of Care Reviewed With: patient  Overall Patient Progress: improving  Goal: Patient-Specific Goal (Individualized)  Description: You can add care plan individualizations to a care plan. Examples of Individualization might be:  \"Parent requests to be called daily at 9am for status\", \"I have a hard time hearing out of my right ear\", or \"Do not touch me to wake me up as it startles  me\".  Outcome: Progressing  Goal: Absence of Hospital-Acquired Illness or Injury  Outcome: Progressing  Intervention: Identify and Manage Fall Risk  Recent Flowsheet Documentation  Taken 7/20/2025 1700 by Noe Trejo RN  Safety Promotion/Fall Prevention:   activity supervised   clutter free environment maintained   lighting adjusted   mobility aid in reach   nonskid shoes/slippers when out of bed   patient and family education   room near nurse's station   safety round/check completed  Intervention: Prevent Skin Injury  Recent Flowsheet Documentation  Taken 7/20/2025 1700 by Noe Trejo RN  Body Position: position changed independently  Intervention: Prevent Infection  Recent Flowsheet Documentation  Taken 7/20/2025 1700 by Noe Trejo RN  Infection Prevention: rest/sleep promoted  Goal: Optimal Comfort and " Wellbeing  Outcome: Progressing  Goal: Readiness for Transition of Care  Outcome: Progressing     Problem: Gas Exchange Impaired  Goal: Optimal Gas Exchange  Outcome: Progressing  Intervention: Optimize Oxygenation and Ventilation  Recent Flowsheet Documentation  Taken 7/20/2025 1700 by Noe Trejo RN  Head of Bed (HOB) Positioning: HOB at 20-30 degrees     Problem: Infection  Goal: Absence of Infection Signs and Symptoms  Outcome: Progressing       Goal Outcome Evaluation:      Plan of Care Reviewed With: patient    Overall Patient Progress: improving    Outcome Evaluation: awaiting placement

## 2025-07-21 NOTE — PROGRESS NOTES
[facility]    Medicine Progress Note - Hospitalist Service    Date of Admission:  6/25/2025    Assessment & Plan   60-year-old female with developmental delay, asthma on home oxygen, RICK, seizure disorder, and super morbid obesity was admitted on 6/25/2025 with acute on chronic hypoxic respiratory failure secondary to asthma exacerbation triggered by poor air quality and lack of access to her LABA/ICS. Respiratory status has since stabilized with resolution of wheezing and restoration of baseline oxygen needs. During hospitalization, she developed gross hematuria on 7/7 without significant bleeding or anemia. Treated empirically for UTI with 7 days of antibiotics, now completed. Hematuria resolved; urine cytology was negative and outpatient cystoscopy is planned. She remains hospitalized awaiting placement due to guardianship transition and inability to return to prior living environment. INR is therapeutic; warfarin resumed for history of DVT.    Resolved asthma exacerbation with acute on chronic hypoxic respiratory failure  - Now off continuous oxygen, baseline 2.5 L nocturnal O2  - Wheezing resolved; DuoNeb PRN  - Breo Ellipta resumed  - CPAP ordered; patient reports intolerance due to claustrophobia but has f/u sleep study    Gross hematuria, resolved; possible UTI, treated  - Resolved spontaneous gross hematuria (7/7)  - Treated empirically x7 days; abx discontinued 7/13  - Urine cytology negative  - Urology consulted; outpatient cystoscopy and possible CT urogram needed  - Monitor for recurrence    History of DVT  - Warfarin resumed, goal INR 2-3 (INR 2.99 today)  - Lovenox bridge discontinued  - Warfarin per pharmacy panel    Developmental delay; guardianship transition  - Current guardian (parents) withdrawing  - New court-appointed guardian pending  - Discharge to assisted living facility planned; cannot return to prior home  - Social work involved    Super morbid obesity with  "intertriginous skin breakdown  - WOC following  - Miconazole powder BID (per patient preference)  - Routine wound and hygiene care    Seizure disorder  - Continue Tegretol    Hypertension  - Continue lisinopril    Hyperlipidemia  - Continue statin    GERD  - Continue omeprazole    RICK  - Diagnosed but not on home CPAP  - Follow-up sleep study pending; CPAP trial in hospital ordered          Diet: Combination Diet Regular Diet  Diet    DVT Prophylaxis: Warfarin  Grajeda Catheter: Not present  Lines: None     Cardiac Monitoring: None  Code Status: Full Code      Clinically Significant Risk Factors                   # Hypertension: Noted on problem list            # Morbid Obesity: Estimated body mass index is 60.56 kg/m  as calculated from the following:    Height as of this encounter: 1.702 m (5' 7\").    Weight as of this encounter: 175.4 kg (386 lb 11 oz).      # Asthma: noted on problem list        Social Drivers of Health            Disposition Plan     Medically Ready for Discharge: Ready Now             Mckinley Rosenbaum MD  Hospitalist Service  [facility]  Securely message with [email or web address] (more info)  Text page via [employer] Paging/Directory   ______________________________________________________________________    Interval History   NAEO. Medically ready for discharge, awaiting confirmation of returning to assisted living facility.     Physical Exam   Vital Signs: Temp: 97.7  F (36.5  C) Temp src: Oral BP: 113/43 Pulse: 71   Resp: 20 SpO2: 94 % O2 Device: None (Room air)    Weight: 386 lbs 10.99 oz    Constitutional:  alert, cooperative, no apparent distress  Respiratory: No increased work of breathing, good air exchange, no crackles or wheezing.  Cardiovascular: apical impulse,normal S1 and S2  GI: bowel sounds present, soft, non-distended, non-tender    Medical Decision Making       40 MINUTES SPENT BY ME on the date of service doing chart review, history, exam, documentation & further activities " per the note.      Data     I have personally reviewed the following data over the past 24 hrs:    INR:  3.35 (H) PTT:  N/A   D-dimer:  N/A Fibrinogen:  N/A       Imaging results reviewed over the past 24 hrs:   No results found for this or any previous visit (from the past 24 hours).

## 2025-07-22 LAB
INR PPP: 3.25 (ref 0.85–1.15)
PROTHROMBIN TIME: 32.5 SECONDS (ref 11.8–14.8)

## 2025-07-22 PROCEDURE — 94640 AIRWAY INHALATION TREATMENT: CPT

## 2025-07-22 PROCEDURE — 85610 PROTHROMBIN TIME: CPT | Performed by: HOSPITALIST

## 2025-07-22 PROCEDURE — 250N000013 HC RX MED GY IP 250 OP 250 PS 637: Performed by: HOSPITALIST

## 2025-07-22 PROCEDURE — 36415 COLL VENOUS BLD VENIPUNCTURE: CPT | Performed by: HOSPITALIST

## 2025-07-22 PROCEDURE — 99232 SBSQ HOSP IP/OBS MODERATE 35: CPT | Performed by: INTERNAL MEDICINE

## 2025-07-22 PROCEDURE — 120N000001 HC R&B MED SURG/OB

## 2025-07-22 PROCEDURE — 250N000013 HC RX MED GY IP 250 OP 250 PS 637: Performed by: INTERNAL MEDICINE

## 2025-07-22 PROCEDURE — 250N000013 HC RX MED GY IP 250 OP 250 PS 637: Performed by: STUDENT IN AN ORGANIZED HEALTH CARE EDUCATION/TRAINING PROGRAM

## 2025-07-22 RX ORDER — WARFARIN SODIUM 5 MG/1
10 TABLET ORAL
Status: COMPLETED | OUTPATIENT
Start: 2025-07-22 | End: 2025-07-22

## 2025-07-22 RX ADMIN — SENNOSIDES AND DOCUSATE SODIUM 1 TABLET: 50; 8.6 TABLET ORAL at 10:10

## 2025-07-22 RX ADMIN — PANTOPRAZOLE SODIUM 40 MG: 40 TABLET, DELAYED RELEASE ORAL at 10:10

## 2025-07-22 RX ADMIN — MICONAZOLE NITRATE: 20 POWDER TOPICAL at 10:13

## 2025-07-22 RX ADMIN — OXYCODONE HYDROCHLORIDE 2.5 MG: 5 TABLET ORAL at 15:00

## 2025-07-22 RX ADMIN — SENNOSIDES AND DOCUSATE SODIUM 1 TABLET: 50; 8.6 TABLET ORAL at 20:34

## 2025-07-22 RX ADMIN — CARBAMAZEPINE 200 MG: 200 TABLET ORAL at 12:40

## 2025-07-22 RX ADMIN — CALCIUM CARBONATE (ANTACID) CHEW TAB 500 MG 1000 MG: 500 CHEW TAB at 16:35

## 2025-07-22 RX ADMIN — CARBAMAZEPINE 400 MG: 200 TABLET ORAL at 10:10

## 2025-07-22 RX ADMIN — LISINOPRIL 20 MG: 20 TABLET ORAL at 10:10

## 2025-07-22 RX ADMIN — MICONAZOLE NITRATE: 20 POWDER TOPICAL at 20:35

## 2025-07-22 RX ADMIN — FLUTICASONE FUROATE AND VILANTEROL TRIFENATATE 1 PUFF: 100; 25 POWDER RESPIRATORY (INHALATION) at 11:59

## 2025-07-22 RX ADMIN — ATORVASTATIN CALCIUM 10 MG: 10 TABLET, FILM COATED ORAL at 20:34

## 2025-07-22 RX ADMIN — FLUTICASONE PROPIONATE 1 SPRAY: 50 SPRAY, METERED NASAL at 10:10

## 2025-07-22 RX ADMIN — CARBAMAZEPINE 400 MG: 200 TABLET ORAL at 20:34

## 2025-07-22 RX ADMIN — SERTRALINE 100 MG: 100 TABLET, FILM COATED ORAL at 10:12

## 2025-07-22 RX ADMIN — WARFARIN SODIUM 10 MG: 5 TABLET ORAL at 17:55

## 2025-07-22 RX ADMIN — ACETAMINOPHEN 650 MG: 325 TABLET ORAL at 12:40

## 2025-07-22 ASSESSMENT — ACTIVITIES OF DAILY LIVING (ADL)
ADLS_ACUITY_SCORE: 60
ADLS_ACUITY_SCORE: 60
ADLS_ACUITY_SCORE: 49
ADLS_ACUITY_SCORE: 60
ADLS_ACUITY_SCORE: 49
ADLS_ACUITY_SCORE: 60
ADLS_ACUITY_SCORE: 49
ADLS_ACUITY_SCORE: 49
ADLS_ACUITY_SCORE: 60
ADLS_ACUITY_SCORE: 49
ADLS_ACUITY_SCORE: 49
ADLS_ACUITY_SCORE: 60
ADLS_ACUITY_SCORE: 49
ADLS_ACUITY_SCORE: 60
ADLS_ACUITY_SCORE: 49
ADLS_ACUITY_SCORE: 49

## 2025-07-22 NOTE — PLAN OF CARE
"Goal Outcome Evaluation:    End of Shift Summary  For vital signs and complete assessments, please see documentation flowsheets.     Pertinent assessments: VSS on RA. Denies pain, nausea or SOB. Slept well.     Treatment Plan: awaiting discharge to [facility]    Problem: Adult Inpatient Plan of Care  Goal: Plan of Care Review  Description: The Plan of Care Review/Shift note should be completed every shift.  The Outcome Evaluation is a brief statement about your assessment that the patient is improving, declining, or no change.  This information will be displayed automatically on your shift  note.  Outcome: Progressing  Goal: Patient-Specific Goal (Individualized)  Description: You can add care plan individualizations to a care plan. Examples of Individualization might be:  \"Parent requests to be called daily at 9am for status\", \"I have a hard time hearing out of my right ear\", or \"Do not touch me to wake me up as it startles  me\".  Outcome: Progressing  Goal: Absence of Hospital-Acquired Illness or Injury  Outcome: Progressing  Intervention: Identify and Manage Fall Risk  Recent Flowsheet Documentation  Taken 7/22/2025 0100 by Mary Ann Westbrook, RN  Safety Promotion/Fall Prevention: safety round/check completed  Intervention: Prevent Skin Injury  Recent Flowsheet Documentation  Taken 7/22/2025 0100 by Mary Ann Westbrook, RN  Body Position: position changed independently  Goal: Optimal Comfort and Wellbeing  Outcome: Progressing  Goal: Readiness for Transition of Care  Outcome: Progressing     Problem: Gas Exchange Impaired  Goal: Optimal Gas Exchange  Outcome: Progressing     Problem: Infection  Goal: Absence of Infection Signs and Symptoms  Outcome: Progressing                             "

## 2025-07-22 NOTE — PLAN OF CARE
"Pertinent assessments: A&Ox4, VSS. On RA. Denies SOB, or nausea. PRN Tylenol & Oxycodone given for lower back & extremities pain. Pure-wick removed. Up in chair. Assist x1 with walker & gait belt. On regualr diet & tolerating well. Mepilex on left inner thigh. Miconazole powder to abdominal folds & under breast. BM x1. PIV is SL    Major Shift Events: Pain control    Treatment Plan: Await for placement     Goal Outcome Evaluation:  Plan of Care Reviewed With: patient  Overall Patient Progress: no changeOverall Patient Progress: no change  Outcome Evaluation: VSS. On RA. Pain management  Problem: Adult Inpatient Plan of Care  Goal: Plan of Care Review  Description: The Plan of Care Review/Shift note should be completed every shift.  The Outcome Evaluation is a brief statement about your assessment that the patient is improving, declining, or no change.  This information will be displayed automatically on your shift  note.  Outcome: Progressing  Flowsheets (Taken 7/22/2025 1516)  Outcome Evaluation: VSS. On RA. Pain management  Plan of Care Reviewed With: patient  Overall Patient Progress: no change  Goal: Patient-Specific Goal (Individualized)  Description: You can add care plan individualizations to a care plan. Examples of Individualization might be:  \"Parent requests to be called daily at 9am for status\", \"I have a hard time hearing out of my right ear\", or \"Do not touch me to wake me up as it startles  me\".  Outcome: Progressing  Goal: Absence of Hospital-Acquired Illness or Injury  Outcome: Progressing  Intervention: Identify and Manage Fall Risk  Recent Flowsheet Documentation  Taken 7/22/2025 1036 by Aury Brewster RN  Safety Promotion/Fall Prevention:   activity supervised   clutter free environment maintained   lighting adjusted   mobility aid in reach   nonskid shoes/slippers when out of bed   patient and family education   room near nurse's station   safety round/check completed  Intervention: Prevent Skin " Injury  Recent Flowsheet Documentation  Taken 7/22/2025 1033 by Aury Brewster RN  Body Position: position changed independently  Intervention: Prevent Infection  Recent Flowsheet Documentation  Taken 7/22/2025 1036 by Aury Brewster RN  Infection Prevention: rest/sleep promoted  Goal: Optimal Comfort and Wellbeing  Outcome: Progressing  Goal: Readiness for Transition of Care  Outcome: Progressing     Problem: Gas Exchange Impaired  Goal: Optimal Gas Exchange  Outcome: Progressing  Intervention: Optimize Oxygenation and Ventilation  Recent Flowsheet Documentation  Taken 7/22/2025 1033 by Aury Brewster RN  Head of Bed (HOB) Positioning: HOB at 20-30 degrees     Problem: Infection  Goal: Absence of Infection Signs and Symptoms  Outcome: Progressing

## 2025-07-22 NOTE — PROGRESS NOTES
[facility]    Medicine Progress Note - Hospitalist Service    Date of Admission:  6/25/2025    Assessment & Plan   60-year-old female with developmental delay, asthma on home oxygen, RICK, seizure disorder, and super morbid obesity was admitted on 6/25/2025 with acute on chronic hypoxic respiratory failure secondary to asthma exacerbation triggered by poor air quality and lack of access to her LABA/ICS. Respiratory status has since stabilized with resolution of wheezing and restoration of baseline oxygen needs. During hospitalization, she developed gross hematuria on 7/7 without significant bleeding or anemia. Treated empirically for UTI with 7 days of antibiotics, now completed. Hematuria resolved; urine cytology was negative and outpatient cystoscopy is planned. She remains hospitalized awaiting placement due to guardianship transition and inability to return to prior living environment. INR is therapeutic; warfarin resumed for history of DVT.    Resolved asthma exacerbation with acute on chronic hypoxic respiratory failure  - Now off continuous oxygen, baseline 2.5 L nocturnal O2  - Wheezing resolved; DuoNeb PRN  - Breo Ellipta resumed  - CPAP ordered; patient reports intolerance due to claustrophobia but has f/u sleep study    Gross hematuria, resolved; possible UTI, treated  - Resolved spontaneous gross hematuria (7/7)  - Treated empirically x7 days; abx discontinued 7/13  - Urine cytology negative  - Urology consulted; outpatient cystoscopy and possible CT urogram needed  - Monitor for recurrence    History of DVT  - Warfarin resumed, goal INR 2-3 (INR 2.99 today)  - Lovenox bridge discontinued  - Warfarin per pharmacy panel    Developmental delay; guardianship transition  - Current guardian (parents) withdrawing  - New court-appointed guardian pending  - Discharge to assisted living facility planned; cannot return to prior home  - Social work involved    Super morbid obesity with  "intertriginous skin breakdown  - WOC following  - Miconazole powder BID (per patient preference)  - Routine wound and hygiene care    Seizure disorder  - Continue Tegretol    Hypertension  - Continue lisinopril    Hyperlipidemia  - Continue statin    GERD  - Continue omeprazole    RICK  - Diagnosed but not on home CPAP  - Follow-up sleep study pending; CPAP trial in hospital ordered          Diet: Combination Diet Regular Diet  Diet    DVT Prophylaxis: Warfarin  Grajeda Catheter: Not present  Lines: None     Cardiac Monitoring: None  Code Status: Full Code      Clinically Significant Risk Factors                   # Hypertension: Noted on problem list            # Morbid Obesity: Estimated body mass index is 60.56 kg/m  as calculated from the following:    Height as of this encounter: 1.702 m (5' 7\").    Weight as of this encounter: 175.4 kg (386 lb 11 oz).      # Asthma: noted on problem list        Social Drivers of Health            Disposition Plan     Medically Ready for Discharge: Ready Now             Liset Wood MD  Hospitalist Service  [facility]  Securely message with [employer] (more info)  Text page via [employer] Paging/Directory   ______________________________________________________________________    Interval History   NAEO. Medically ready for discharge, awaiting confirmation of returning to assisted living facility.     Physical Exam   Vital Signs: Temp: 98  F (36.7  C) Temp src: Oral BP: (!) 115/35 Pulse: 82   Resp: 16 SpO2: 97 % O2 Device: None (Room air)    Weight: 386 lbs 10.99 oz    Constitutional:  alert, cooperative, no apparent distress  Respiratory: No increased work of breathing, good air exchange, no crackles or wheezing.  Cardiovascular: apical impulse,normal S1 and S2  GI: bowel sounds present, soft, non-distended, non-tender    Medical Decision Making       10 MINUTES SPENT BY ME on the date of service doing chart review, history, exam, documentation & further " activities per the note.    No charge for this visit     Data     I have personally reviewed the following data over the past 24 hrs:    INR:  3.25 (H) PTT:  N/A   D-dimer:  N/A Fibrinogen:  N/A       Imaging results reviewed over the past 24 hrs:   No results found for this or any previous visit (from the past 24 hours).

## 2025-07-22 NOTE — PROGRESS NOTES
Care Management Follow Up    Length of Stay (days): 27     Concerns to be Addressed: discharge planning     Patient plan of care discussed at interdisciplinary rounds: Yes    Anticipated Discharge Disposition:  [facility]     Anticipated Discharge Services:  Home Care  Anticipated Discharge DME:  Walker    Patient/family educated on Medicare website which has current facility and service quality ratings:  N/A  Education Provided on the Discharge Plan:  yes  Patient/Family in Agreement with the Plan:  yes    Referrals Placed by CM/SW:  post acute facilities    Discussed  Partnership in Safe Discharge Planning  document with patient/family: No     Handoff Completed: Yes, [facility] PCP: Internal handoff referral completed    Additional Information:  Juliane sent a secure email to the pt's guardian and [facility], requesting an update on the status of the pt's move in date to the new [facility].  Juliane called and left a vm for the pt's guardian Tico, [phone], requesting a call back to discuss the pt's discharge plan.  Juliane attempted to leave a vm for Ana at [facility], but her vm is full.    Next Steps:  Juliane will continue with discharge planning and will be available as needed until discharge.    AISSATOU Macdonald, [facility]  Inpatient Care Coordination  [facility]  [phone]    ADDENDUM 1525:  Juliane received a call from [facility] who said that they are also awaiting a call back or email from [facility].   Juliane received a call from the pt's mom Diomedes to see if Sw has talked with [facility]. Juliane informed her that they have not heard from the [facility] yet. Once Juliane hears back from the [facility], plans can be made to move forward with the discharge. Diomedes said that she talked to Ny at the [facility] earlier today, so she will try to see if she can get them to call Juliane back.

## 2025-07-22 NOTE — PLAN OF CARE
"Assessments:   A&Ox4, vitally stable, on room air. Denies SOB. PRN oxycodone given x1 for back pain. Up with Ax1 belt and walker to bathroom. Miconazole powder applied to skin folds. Wound over left posterior thigh improving, 4x4 foam dressing in place.      Treatment Plan: awaiting discharge to [facility]     Bedside Nurse: Noe Trejo RN    Problem: Adult Inpatient Plan of Care  Goal: Plan of Care Review  Description: The Plan of Care Review/Shift note should be completed every shift.  The Outcome Evaluation is a brief statement about your assessment that the patient is improving, declining, or no change.  This information will be displayed automatically on your shift  note.  Outcome: Not Progressing  Flowsheets (Taken 7/21/2025 2220)  Outcome Evaluation: vitally stable  Plan of Care Reviewed With: patient  Overall Patient Progress: improving  Goal: Patient-Specific Goal (Individualized)  Description: You can add care plan individualizations to a care plan. Examples of Individualization might be:  \"Parent requests to be called daily at 9am for status\", \"I have a hard time hearing out of my right ear\", or \"Do not touch me to wake me up as it startles  me\".  Outcome: Not Progressing  Goal: Absence of Hospital-Acquired Illness or Injury  Outcome: Not Progressing  Intervention: Identify and Manage Fall Risk  Recent Flowsheet Documentation  Taken 7/21/2025 1600 by Noe Trejo RN  Safety Promotion/Fall Prevention:   activity supervised   safety round/check completed   room near nurse's station  Intervention: Prevent Skin Injury  Recent Flowsheet Documentation  Taken 7/21/2025 1600 by Noe Trejo RN  Body Position: position changed independently  Intervention: Prevent Infection  Recent Flowsheet Documentation  Taken 7/21/2025 1600 by Noe Trejo RN  Infection Prevention: rest/sleep promoted  Goal: Optimal Comfort and Wellbeing  Outcome: Not Progressing  Goal: Readiness for Transition of " Care  Outcome: Not Progressing     Problem: Gas Exchange Impaired  Goal: Optimal Gas Exchange  Outcome: Not Progressing  Intervention: Optimize Oxygenation and Ventilation  Recent Flowsheet Documentation  Taken 7/21/2025 1600 by Noe Trejo RN  Head of Bed (HOB) Positioning: HOB at 20-30 degrees     Problem: Infection  Goal: Absence of Infection Signs and Symptoms  Outcome: Not Progressing     Goal Outcome Evaluation:      Plan of Care Reviewed With: patient    Overall Patient Progress: improving    Outcome Evaluation: vitally stable

## 2025-07-23 ENCOUNTER — DOCUMENTATION ONLY (OUTPATIENT)
Dept: OTHER | Facility: CLINIC | Age: 60
End: 2025-07-23
Payer: COMMERCIAL

## 2025-07-23 LAB
HOLD SPECIMEN: NORMAL
HOLD SPECIMEN: NORMAL
INR PPP: 3.09 (ref 0.85–1.15)
PROTHROMBIN TIME: 31.2 SECONDS (ref 11.8–14.8)

## 2025-07-23 PROCEDURE — 250N000013 HC RX MED GY IP 250 OP 250 PS 637: Performed by: INTERNAL MEDICINE

## 2025-07-23 PROCEDURE — 120N000001 HC R&B MED SURG/OB

## 2025-07-23 PROCEDURE — 250N000013 HC RX MED GY IP 250 OP 250 PS 637: Performed by: HOSPITALIST

## 2025-07-23 PROCEDURE — G0463 HOSPITAL OUTPT CLINIC VISIT: HCPCS

## 2025-07-23 PROCEDURE — 85610 PROTHROMBIN TIME: CPT | Performed by: HOSPITALIST

## 2025-07-23 PROCEDURE — 999N000157 HC STATISTIC RCP TIME EA 10 MIN

## 2025-07-23 PROCEDURE — 36415 COLL VENOUS BLD VENIPUNCTURE: CPT | Performed by: HOSPITALIST

## 2025-07-23 PROCEDURE — 99232 SBSQ HOSP IP/OBS MODERATE 35: CPT | Performed by: INTERNAL MEDICINE

## 2025-07-23 PROCEDURE — 250N000013 HC RX MED GY IP 250 OP 250 PS 637: Performed by: STUDENT IN AN ORGANIZED HEALTH CARE EDUCATION/TRAINING PROGRAM

## 2025-07-23 PROCEDURE — 94640 AIRWAY INHALATION TREATMENT: CPT

## 2025-07-23 RX ORDER — WARFARIN SODIUM 5 MG/1
15 TABLET ORAL
Status: COMPLETED | OUTPATIENT
Start: 2025-07-23 | End: 2025-07-23

## 2025-07-23 RX ADMIN — LISINOPRIL 20 MG: 20 TABLET ORAL at 08:04

## 2025-07-23 RX ADMIN — PANTOPRAZOLE SODIUM 40 MG: 40 TABLET, DELAYED RELEASE ORAL at 08:04

## 2025-07-23 RX ADMIN — MICONAZOLE NITRATE: 20 POWDER TOPICAL at 20:55

## 2025-07-23 RX ADMIN — FLUTICASONE PROPIONATE 1 SPRAY: 50 SPRAY, METERED NASAL at 08:04

## 2025-07-23 RX ADMIN — FLUTICASONE FUROATE AND VILANTEROL TRIFENATATE 1 PUFF: 100; 25 POWDER RESPIRATORY (INHALATION) at 07:57

## 2025-07-23 RX ADMIN — OXYCODONE HYDROCHLORIDE 2.5 MG: 5 TABLET ORAL at 14:53

## 2025-07-23 RX ADMIN — POLYETHYLENE GLYCOL 3350 17 G: 17 POWDER, FOR SOLUTION ORAL at 08:05

## 2025-07-23 RX ADMIN — SENNOSIDES AND DOCUSATE SODIUM 1 TABLET: 50; 8.6 TABLET ORAL at 20:55

## 2025-07-23 RX ADMIN — CARBAMAZEPINE 200 MG: 200 TABLET ORAL at 11:03

## 2025-07-23 RX ADMIN — CARBAMAZEPINE 400 MG: 200 TABLET ORAL at 08:04

## 2025-07-23 RX ADMIN — SERTRALINE 100 MG: 100 TABLET, FILM COATED ORAL at 08:04

## 2025-07-23 RX ADMIN — ATORVASTATIN CALCIUM 10 MG: 10 TABLET, FILM COATED ORAL at 20:55

## 2025-07-23 RX ADMIN — MICONAZOLE NITRATE: 20 POWDER TOPICAL at 08:04

## 2025-07-23 RX ADMIN — WARFARIN SODIUM 15 MG: 5 TABLET ORAL at 18:47

## 2025-07-23 RX ADMIN — CARBAMAZEPINE 400 MG: 200 TABLET ORAL at 20:55

## 2025-07-23 RX ADMIN — ACETAMINOPHEN 650 MG: 325 TABLET ORAL at 13:18

## 2025-07-23 RX ADMIN — SENNOSIDES AND DOCUSATE SODIUM 1 TABLET: 50; 8.6 TABLET ORAL at 08:04

## 2025-07-23 ASSESSMENT — ACTIVITIES OF DAILY LIVING (ADL)
ADLS_ACUITY_SCORE: 49
ADLS_ACUITY_SCORE: 50
ADLS_ACUITY_SCORE: 49
ADLS_ACUITY_SCORE: 54
ADLS_ACUITY_SCORE: 50
ADLS_ACUITY_SCORE: 54
ADLS_ACUITY_SCORE: 49
ADLS_ACUITY_SCORE: 54
ADLS_ACUITY_SCORE: 49
ADLS_ACUITY_SCORE: 54
ADLS_ACUITY_SCORE: 49

## 2025-07-23 NOTE — PLAN OF CARE
"To Do:  End of Shift Summary  For vital signs and complete assessments,     Assessments:   A&Ox4, VSS. Denies SOB, on room air. Up with Ax1 belt and walker to bathroom. Miconazole powder applied to skin folds. 4x4 foam dressing in place over left posterior thigh.      Treatment Plan: awaiting discharge to [facility]     Bedside Nurse: Noe Trejo RN      Problem: Adult Inpatient Plan of Care  Goal: Plan of Care Review  Description: The Plan of Care Review/Shift note should be completed every shift.  The Outcome Evaluation is a brief statement about your assessment that the patient is improving, declining, or no change.  This information will be displayed automatically on your shift  note.  Outcome: Not Progressing  Flowsheets (Taken 7/22/2025 2236)  Plan of Care Reviewed With: patient  Goal: Patient-Specific Goal (Individualized)  Description: You can add care plan individualizations to a care plan. Examples of Individualization might be:  \"Parent requests to be called daily at 9am for status\", \"I have a hard time hearing out of my right ear\", or \"Do not touch me to wake me up as it startles  me\".  Outcome: Not Progressing  Goal: Absence of Hospital-Acquired Illness or Injury  Outcome: Not Progressing  Intervention: Identify and Manage Fall Risk  Recent Flowsheet Documentation  Taken 7/22/2025 1700 by Noe Trejo RN  Safety Promotion/Fall Prevention:   safety round/check completed   treat reversible contributory factors  Intervention: Prevent Skin Injury  Recent Flowsheet Documentation  Taken 7/22/2025 1700 by Noe Trejo RN  Body Position: position changed independently  Intervention: Prevent and Manage VTE (Venous Thromboembolism) Risk  Recent Flowsheet Documentation  Taken 7/22/2025 1700 by Noe Trejo RN  VTE Prevention/Management: SCDs off (sequential compression devices)  Intervention: Prevent Infection  Recent Flowsheet Documentation  Taken 7/22/2025 1700 by Noe Trejo" CAIT, RN  Infection Prevention: rest/sleep promoted  Goal: Optimal Comfort and Wellbeing  Outcome: Not Progressing  Goal: Readiness for Transition of Care  Outcome: Not Progressing     Problem: Gas Exchange Impaired  Goal: Optimal Gas Exchange  Outcome: Not Progressing  Intervention: Optimize Oxygenation and Ventilation  Recent Flowsheet Documentation  Taken 7/22/2025 1700 by Noe Trejo, RN  Head of Bed (HOB) Positioning: HOB at 20-30 degrees     Problem: Infection  Goal: Absence of Infection Signs and Symptoms  Outcome: Not Progressing       Goal Outcome Evaluation:      Plan of Care Reviewed With: patient

## 2025-07-23 NOTE — PROGRESS NOTES
[facility] Nurse Inpatient Assessment     Consulted for: Groin    Summary: Folds continue to improve.  Left thigh now healed.        [facility] nurse follow-up plan: signing off    Patient History (according to provider note(s):    Assessment & Plan  Brissa Corado is a 60 year old female admitted on 6/25/2025 with past medical history of asthma on chronic oxygen at night, obstructive sleep apnea, morbid obesity, hypertension, seizure disorder who presents with asthma exacerbation 2/2 poor air quality and not using/having access to her LABA/ICS.     Acute on chronic hypoxic respiratory failure  Asthma exacerbation  RICK -patient improving with treatments (DuoNeb, dexamethasone, magnesium in ED in ED) of clinical asthma exacerbation.  CTPA negative for PE.  Patient states her triggers been poor air quality and she has not been using her controller LABA/ICS.  Has not had a sleep apnea machine at home but reports that she has an appointment coming up in the next 2 weeks, even though she is diagnosed is not tolerating it due to claustrophobic feelings.  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    Pain: denies   Pain interventions prior to dressing change: patient tolerated well and slow and gentle cares   Treatment goal: Heal , Maintain (prevention of deterioration), and  Protection  STATUS: improving  Supplies ordered: at bedside    Treatment Plan:   Interdry:  Location Pannus and breast folds - other folds as needed  Cleanse skin with Vashe and dry well.  Cut Interdry 2 inches larger than skin fold, date dressing.  Apply a single layer -flat, not bunched up- of dressing in skin fold so 2 inches are visible (allow moisture to wick out).   Remove dressing with routine cleansing and reapply, do not use powder/ointment in folds if Interdry in use..   Discard if grossly soiled otherwise 1 piece is good up to 5 days.    Orders: Reviewed    RECOMMEND PRIMARY TEAM ORDER: None, at this time  Education provided: plan of care  Discussed plan of care with: Patient and Nurse  Notify WOC if wound(s) deteriorate.  Nursing to notify the Provider(s) and re-consult the WO Nurse if new skin concern.    DATA:     Current support surface: Standard  Standard gel mattress (Isoflex)  Containment of urine/stool: Continent of bowel, Incontinence Protocol, and Suction based external urinary catheter   BMI: Body mass index is 60.56 kg/m .   Active diet order: Orders Placed This Encounter      Combination Diet Regular Diet      Diet     Output: I/O last 3 completed shifts:  In: 720 [P.O.:720]  Out: -      Labs:   Recent Labs   Lab 07/23/25  0702   INR 3.09*     Pressure injury risk assessment:   Sensory Perception: 3-->slightly limited  Moisture: 3-->occasionally moist  Activity: 3-->walks occasionally  Mobility: 3-->slightly limited  Nutrition: 3-->adequate  Friction and Shear: 2-->potential problem  Randy Score: 17      Bharat Cooper RN CWOCN  Contact [facility] Nurse (Kristy)  Dept. Office Number: [phone]

## 2025-07-23 NOTE — PROGRESS NOTES
[facility] Progress Note    Date: May 10, 2022    Name: Temi Che    MRN: [id number]         : 1953    Ms. Ba Arrived ambulatory and in no distress for cycle 15 day 1 of Abraxane/Gemcitabine regimen. Assessment was completed, no acute issues at this time, no new complaints voiced. Port accessed without difficulty, labs drawn and in process. Chemotherapy Flowsheet 5/10/2022   Cycle C15 D1   Date 5/10/2022   Drug / Regimen Abraxane/Gemcitiabine   Dosage 184 mg/1656 mg   Time Up -   Time Down -   Pre Hydration -   Pre Meds Zofran/Decadron   Notes -       Ms. Ba's vitals were reviewed. Visit Vitals  BP (!) 156/80 (BP 1 Location: Left upper arm, BP Patient Position: Sitting)   Pulse 99   Temp 98.4 °F (36.9 °C)   Resp 18   Ht 5' 4\" (1.626 m)   Wt 83.9 kg (185 lb)   SpO2 97%   Breastfeeding No   BMI 31.76 kg/m²       Lab results were obtained and reviewed. Recent Results (from the past 12 hour(s))   CBC WITH AUTOMATED DIFF    Collection Time: 05/10/22  9:20 AM   Result Value Ref Range    WBC 8.9 3.6 - 11.0 K/uL    RBC 3.77 (L) 3.80 - 5.20 M/uL    HGB 10.7 (L) 11.5 - 16.0 g/dL    HCT 34.1 (L) 35.0 - 47.0 %    MCV 90.5 80.0 - 99.0 FL    MCH 28.4 26.0 - 34.0 PG    MCHC 31.4 30.0 - 36.5 g/dL    RDW 18.5 (H) 11.5 - 14.5 %    PLATELET 623 (H) 477 - 400 K/uL    MPV 9.8 8.9 - 12.9 FL    NRBC 0.0 0  WBC    ABSOLUTE NRBC 0.00 0.00 - 0.01 K/uL    NEUTROPHILS 44 32 - 75 %    LYMPHOCYTES 25 12 - 49 %    MONOCYTES 26 (H) 5 - 13 %    EOSINOPHILS 3 0 - 7 %    BASOPHILS 1 0 - 1 %    IMMATURE GRANULOCYTES 1 (H) 0.0 - 0.5 %    ABS. NEUTROPHILS 3.9 1.8 - 8.0 K/UL    ABS. LYMPHOCYTES 2.2 0.8 - 3.5 K/UL    ABS. MONOCYTES 2.3 (H) 0.0 - 1.0 K/UL    ABS. EOSINOPHILS 0.3 0.0 - 0.4 K/UL    ABS. BASOPHILS 0.1 0.0 - 0.1 K/UL    ABS. IMM.  GRANS. 0.1 (H) 0.00 - 0.04 K/UL    DF AUTOMATED      RBC COMMENTS ANISOCYTOSIS  2+        RBC COMMENTS POIKILOCYTOSIS  2+        RBC COMMENTS SCHISTOCYTES  2+       METABOLIC PANEL, [facility]    Medicine Progress Note - Hospitalist Service    Date of Admission:  6/25/2025    Assessment & Plan   60-year-old female with developmental delay, asthma on home oxygen, RICK, seizure disorder, and super morbid obesity was admitted on 6/25/2025 with acute on chronic hypoxic respiratory failure secondary to asthma exacerbation triggered by poor air quality and lack of access to her LABA/ICS. Respiratory status has since stabilized with resolution of wheezing and restoration of baseline oxygen needs. During hospitalization, she developed gross hematuria on 7/7 without significant bleeding or anemia. Treated empirically for UTI with 7 days of antibiotics, now completed. Hematuria resolved; urine cytology was negative and outpatient cystoscopy is planned. She remains hospitalized awaiting placement due to guardianship transition and inability to return to prior living environment. INR is therapeutic; warfarin resumed for history of DVT.    She is currently just here waiting placement     Resolved asthma exacerbation with acute on chronic hypoxic respiratory failure  - Now off continuous oxygen, baseline 2.5 L nocturnal O2  - Wheezing resolved; DuoNeb PRN  - Breo Ellipta resumed  - CPAP ordered; patient reports intolerance due to claustrophobia but has f/u sleep study    Gross hematuria, resolved; possible UTI, treated  - Resolved spontaneous gross hematuria (7/7)  - Treated empirically x7 days; abx discontinued 7/13  - Urine cytology negative  - Urology consulted; outpatient cystoscopy and possible CT urogram needed  - Monitor for recurrence    History of DVT  - Warfarin resumed, goal INR 2-3 (INR 2.99 today)  - Lovenox bridge discontinued  - Warfarin per pharmacy panel    Developmental delay; guardianship transition  - Current guardian (parents) withdrawing  - New court-appointed guardian pending  - Discharge to assisted living facility planned; cannot return to prior home  - Social  "work involved    Super morbid obesity with intertriginous skin breakdown  - WOC following  - Miconazole powder BID (per patient preference)  - Routine wound and hygiene care    Seizure disorder  - Continue Tegretol    Hypertension  - Continue lisinopril    Hyperlipidemia  - Continue statin    GERD  - Continue omeprazole    RICK  - Diagnosed but not on home CPAP  - Follow-up sleep study pending; CPAP trial in hospital ordered          Diet: Combination Diet Regular Diet  Diet    DVT Prophylaxis: Warfarin  Grajeda Catheter: Not present  Lines: None     Cardiac Monitoring: None  Code Status: Full Code      Clinically Significant Risk Factors                   # Hypertension: Noted on problem list            # Morbid Obesity: Estimated body mass index is 60.56 kg/m  as calculated from the following:    Height as of this encounter: 1.702 m (5' 7\").    Weight as of this encounter: 175.4 kg (386 lb 11 oz).      # Asthma: noted on problem list        Social Drivers of Health            Disposition Plan     Medically Ready for Discharge: Ready Now             Lsiet Wood MD  Hospitalist Service  [facility]  Securely message with [employer] (more info)  Text page via [facility] Paging/Directory   ______________________________________________________________________    Interval History   NAEO. Medically ready for discharge, awaiting confirmation of returning to assisted living facility.     Physical Exam   Vital Signs: Temp: 98.1  F (36.7  C) Temp src: Oral BP: 124/48 Pulse: 79   Resp: 16 SpO2: 96 % O2 Device: None (Room air)    Weight: 386 lbs 10.99 oz    Constitutional:  alert, cooperative, no apparent distress  Respiratory: No increased work of breathing, good air exchange, no crackles or wheezing.  Cardiovascular: apical impulse,normal S1 and S2  GI: bowel sounds present, soft, non-distended, non-tender    Medical Decision Making       10 MINUTES SPENT BY ME on the date of service doing chart review, " COMPREHENSIVE    Collection Time: 05/10/22  9:20 AM   Result Value Ref Range    Sodium 139 136 - 145 mmol/L    Potassium 4.4 3.5 - 5.1 mmol/L    Chloride 104 97 - 108 mmol/L    CO2 28 21 - 32 mmol/L    Anion gap 7 5 - 15 mmol/L    Glucose 270 (H) 65 - 100 mg/dL    BUN 12 6 - 20 MG/DL    Creatinine 0.74 0.55 - 1.02 MG/DL    BUN/Creatinine ratio 16 12 - 20      GFR est AA >60 >60 ml/min/1.73m2    GFR est non-AA >60 >60 ml/min/1.73m2    Calcium 8.6 8.5 - 10.1 MG/DL    Bilirubin, total 0.3 0.2 - 1.0 MG/DL    ALT (SGPT) 21 12 - 78 U/L    AST (SGOT) 20 15 - 37 U/L    Alk. phosphatase 116 45 - 117 U/L    Protein, total 6.2 (L) 6.4 - 8.2 g/dL    Albumin 3.1 (L) 3.5 - 5.0 g/dL    Globulin 3.1 2.0 - 4.0 g/dL    A-G Ratio 1.0 (L) 1.1 - 2.2         Pre-medications  were administered as ordered and chemotherapy was initiated. 10:37 - 12:52 NS mainline  10:40  Zofran 8 mg IVP  10:43  Decadron 10 mg IVP  11:32 - 12:13 Abraxane 184 mg infusion  12:16 - 12:52 Gemcitabine 1,656 mg infusion      Ms. Ba tolerated treatment well and was discharged from [place] [id number] in stable condition at 13:00. She is to return on May 17 at 08:30 for her next appointment.     Jake Padgett RN  May 10, 2022 history, exam, documentation & further activities per the note.    No charge for this visit     Data     I have personally reviewed the following data over the past 24 hrs:    INR:  3.09 (H) PTT:  N/A   D-dimer:  N/A Fibrinogen:  N/A       Imaging results reviewed over the past 24 hrs:   No results found for this or any previous visit (from the past 24 hours).

## 2025-07-23 NOTE — PROGRESS NOTES
Care Management Follow Up    Length of Stay (days): 28    Expected Discharge Date: 07/24/2025 - if [facility] can accept     Concerns to be Addressed: discharge planning     Patient plan of care discussed at interdisciplinary rounds: Yes    Anticipated Discharge Disposition:  [facility]      Anticipated Discharge Services:  None  Anticipated Discharge DME:  [facility] - uses at baseline    Patient/family educated on Medicare website which has current facility and service quality ratings:  yes  Education Provided on the Discharge Plan:  yes  Patient/Family in Agreement with the Plan:  yes    Referrals Placed by CM/SW:  post acute facilities  Private pay costs discussed: Not applicable    Discussed  Partnership in Safe Discharge Planning  document with patient/family: No     Handoff Completed: Yes, [facility] PCP: Internal handoff referral completed    Additional Information:  Juliane spoke with Lilliam at [facility], P: [phone], who said that they are waiting for a letter from the pt's guardian and then they can move forward with the admission. Juliane told them that they will call the pt's guardian and hopeful to move forward with an admission tomorrow. Lilliam said that they can take her on Monday. Juliane informed her that the pt has been ready for discharge for quite some time, so if they get the letter from the guardian today, the plan will be for tomorrow. Juliane informed them that the hospital cannot wait until Monday to discharge the pt.  Juliane left a vm for the pt's guardian Yesypaty P: [phone], requesting a call back to discuss the documentation the [facility] is needing.    Next Steps:   Juliane will continue with discharge planning and will be available as needed until discharge.    AISSATOU Macdonald, [facility]  Inpatient Care Coordination  [facility]  [phone]

## 2025-07-23 NOTE — PLAN OF CARE
"Pt is alert and oriented. Pt denies any pain at this time. Diminished lung sounds and pt on room air.    Pt incontinent of bladder. Ongoing monitoring.    Plan: WOC following. SW following with discharge plan.    /43 (BP Location: Right arm)   Pulse 80   Temp 98.1  F (36.7  C) (Oral)   Resp 20   Ht 1.702 m (5' 7\")   Wt (!) 175.4 kg (386 lb 11 oz)   LMP 08/18/2008   SpO2 93%   BMI 60.56 kg/m       Problem: Adult Inpatient Plan of Care  Goal: Plan of Care Review  Description: The Plan of Care Review/Shift note should be completed every shift.  The Outcome Evaluation is a brief statement about your assessment that the patient is improving, declining, or no change.  This information will be displayed automatically on your shift  note.  Outcome: Progressing  Flowsheets (Taken 7/23/2025 0259)  Outcome Evaluation: Pt denies any pain at this time.  Plan of Care Reviewed With: patient  Goal: Patient-Specific Goal (Individualized)  Description: You can add care plan individualizations to a care plan. Examples of Individualization might be:  \"Parent requests to be called daily at 9am for status\", \"I have a hard time hearing out of my right ear\", or \"Do not touch me to wake me up as it startles  me\".  Outcome: Progressing  Goal: Absence of Hospital-Acquired Illness or Injury  Outcome: Progressing  Intervention: Identify and Manage Fall Risk  Recent Flowsheet Documentation  Taken 7/23/2025 0200 by Zakia Jones RN  Safety Promotion/Fall Prevention: safety round/check completed  Taken 7/23/2025 0057 by Zakia Jones RN  Safety Promotion/Fall Prevention:   safety round/check completed   patient and family education   nonskid shoes/slippers when out of bed   lighting adjusted   clutter free environment maintained  Taken 7/23/2025 0000 by Zakia Jones RN  Safety Promotion/Fall Prevention: safety round/check completed  Taken 7/22/2025 2300 by Zakia Jones, PAULA  Safety Promotion/Fall Prevention: safety " round/check completed  Intervention: Prevent Infection  Recent Flowsheet Documentation  Taken 7/23/2025 0057 by Zakia Jones RN  Infection Prevention:   single patient room provided   rest/sleep promoted   hand hygiene promoted   cohorting utilized  Goal: Optimal Comfort and Wellbeing  Outcome: Progressing  Goal: Readiness for Transition of Care  Outcome: Progressing     Problem: Gas Exchange Impaired  Goal: Optimal Gas Exchange  Outcome: Progressing     Problem: Infection  Goal: Absence of Infection Signs and Symptoms  Outcome: Progressing   Goal Outcome Evaluation:      Plan of Care Reviewed With: patient          Outcome Evaluation: Pt denies any pain at this time.

## 2025-07-24 VITALS
OXYGEN SATURATION: 93 % | DIASTOLIC BLOOD PRESSURE: 47 MMHG | TEMPERATURE: 98.2 F | RESPIRATION RATE: 18 BRPM | HEIGHT: 67 IN | HEART RATE: 74 BPM | SYSTOLIC BLOOD PRESSURE: 112 MMHG | WEIGHT: 293 LBS | BODY MASS INDEX: 45.99 KG/M2

## 2025-07-24 LAB
INR PPP: 3 (ref 0.85–1.15)
PROTHROMBIN TIME: 30.6 SECONDS (ref 11.8–14.8)

## 2025-07-24 PROCEDURE — 99232 SBSQ HOSP IP/OBS MODERATE 35: CPT | Performed by: HOSPITALIST

## 2025-07-24 PROCEDURE — 250N000013 HC RX MED GY IP 250 OP 250 PS 637: Performed by: HOSPITALIST

## 2025-07-24 PROCEDURE — 250N000013 HC RX MED GY IP 250 OP 250 PS 637: Performed by: INTERNAL MEDICINE

## 2025-07-24 PROCEDURE — 36415 COLL VENOUS BLD VENIPUNCTURE: CPT | Performed by: HOSPITALIST

## 2025-07-24 PROCEDURE — 250N000013 HC RX MED GY IP 250 OP 250 PS 637: Performed by: STUDENT IN AN ORGANIZED HEALTH CARE EDUCATION/TRAINING PROGRAM

## 2025-07-24 PROCEDURE — 85610 PROTHROMBIN TIME: CPT | Performed by: HOSPITALIST

## 2025-07-24 PROCEDURE — 120N000001 HC R&B MED SURG/OB

## 2025-07-24 RX ORDER — WARFARIN SODIUM 5 MG/1
10 TABLET ORAL
Status: COMPLETED | OUTPATIENT
Start: 2025-07-24 | End: 2025-07-24

## 2025-07-24 RX ADMIN — ACETAMINOPHEN 650 MG: 325 TABLET ORAL at 13:09

## 2025-07-24 RX ADMIN — WARFARIN SODIUM 10 MG: 5 TABLET ORAL at 17:59

## 2025-07-24 RX ADMIN — SENNOSIDES AND DOCUSATE SODIUM 1 TABLET: 50; 8.6 TABLET ORAL at 08:24

## 2025-07-24 RX ADMIN — MICONAZOLE NITRATE: 20 POWDER TOPICAL at 21:57

## 2025-07-24 RX ADMIN — SERTRALINE 100 MG: 100 TABLET, FILM COATED ORAL at 08:23

## 2025-07-24 RX ADMIN — CALCIUM CARBONATE (ANTACID) CHEW TAB 500 MG 1000 MG: 500 CHEW TAB at 15:03

## 2025-07-24 RX ADMIN — FLUTICASONE PROPIONATE 1 SPRAY: 50 SPRAY, METERED NASAL at 08:24

## 2025-07-24 RX ADMIN — ACETAMINOPHEN 650 MG: 325 TABLET ORAL at 23:51

## 2025-07-24 RX ADMIN — ATORVASTATIN CALCIUM 10 MG: 10 TABLET, FILM COATED ORAL at 21:57

## 2025-07-24 RX ADMIN — CARBAMAZEPINE 400 MG: 200 TABLET ORAL at 08:23

## 2025-07-24 RX ADMIN — CARBAMAZEPINE 400 MG: 200 TABLET ORAL at 21:56

## 2025-07-24 RX ADMIN — LISINOPRIL 20 MG: 20 TABLET ORAL at 08:23

## 2025-07-24 RX ADMIN — CARBAMAZEPINE 200 MG: 200 TABLET ORAL at 12:05

## 2025-07-24 RX ADMIN — FLUTICASONE FUROATE AND VILANTEROL TRIFENATATE 1 PUFF: 100; 25 POWDER RESPIRATORY (INHALATION) at 08:24

## 2025-07-24 RX ADMIN — OXYCODONE HYDROCHLORIDE 2.5 MG: 5 TABLET ORAL at 13:56

## 2025-07-24 RX ADMIN — MICONAZOLE NITRATE: 20 POWDER TOPICAL at 08:26

## 2025-07-24 RX ADMIN — PANTOPRAZOLE SODIUM 40 MG: 40 TABLET, DELAYED RELEASE ORAL at 08:23

## 2025-07-24 ASSESSMENT — ACTIVITIES OF DAILY LIVING (ADL)
ADLS_ACUITY_SCORE: 57
ADLS_ACUITY_SCORE: 54
ADLS_ACUITY_SCORE: 53
ADLS_ACUITY_SCORE: 54
ADLS_ACUITY_SCORE: 57
ADLS_ACUITY_SCORE: 53
ADLS_ACUITY_SCORE: 49
ADLS_ACUITY_SCORE: 57
ADLS_ACUITY_SCORE: 54
ADLS_ACUITY_SCORE: 54
ADLS_ACUITY_SCORE: 53
ADLS_ACUITY_SCORE: 54
ADLS_ACUITY_SCORE: 57
ADLS_ACUITY_SCORE: 57
ADLS_ACUITY_SCORE: 54
ADLS_ACUITY_SCORE: 49
ADLS_ACUITY_SCORE: 57
ADLS_ACUITY_SCORE: 49
ADLS_ACUITY_SCORE: 54
ADLS_ACUITY_SCORE: 54

## 2025-07-24 NOTE — PLAN OF CARE
"Goal Outcome Evaluation:    End of Shift Summary  For vital signs and complete assessments, please see documentation flowsheets.     Pertinent assessments:  Pt A&OX4. Vss and on RA. Ls dim and no sob noted. Tolerated diet and no nausea noted. Had pain to the left knee and thigh oxy and tylenol given. Also requested tums for heart burn after lunch. Incontinence noted during the shift. Ambulated in the room.    Major Shift Events None    Treatment Plan: Symptom management, await placement.  Bedside Nurse: Pamela Perera RN           Plan of Care Reviewed With: patient    Overall Patient Progress: improvingOverall Patient Progress: improving    Outcome Evaluation: Oxy and tylenol given with some relief.        Problem: Adult Inpatient Plan of Care  Goal: Plan of Care Review  Description: The Plan of Care Review/Shift note should be completed every shift.  The Outcome Evaluation is a brief statement about your assessment that the patient is improving, declining, or no change.  This information will be displayed automatically on your shift  note.  7/24/2025 1733 by Pamela Perera RN  Outcome: Progressing  7/24/2025 1732 by Pamela Perera RN  Outcome: Progressing  Flowsheets (Taken 7/24/2025 1732)  Outcome Evaluation: Oxy and tylenol given with some relief.  Plan of Care Reviewed With: patient  Overall Patient Progress: improving  Goal: Patient-Specific Goal (Individualized)  Description: You can add care plan individualizations to a care plan. Examples of Individualization might be:  \"Parent requests to be called daily at 9am for status\", \"I have a hard time hearing out of my right ear\", or \"Do not touch me to wake me up as it startles  me\".  7/24/2025 1733 by Pamela Perera RN  Outcome: Progressing  7/24/2025 1732 by Pamela Perera RN  Outcome: Progressing  Goal: Absence of Hospital-Acquired Illness or Injury  7/24/2025 1733 by Pamela Perera RN  Outcome: Progressing  7/24/2025 1732 by " Pamela Perera RN  Outcome: Progressing  Intervention: Identify and Manage Fall Risk  Recent Flowsheet Documentation  Taken 7/24/2025 0810 by Pamela Perera RN  Safety Promotion/Fall Prevention:   activity supervised   clutter free environment maintained   increased rounding and observation   nonskid shoes/slippers when out of bed   patient and family education  Intervention: Prevent Skin Injury  Recent Flowsheet Documentation  Taken 7/24/2025 0810 by Pamela Perera RN  Body Position: position changed independently  Intervention: Prevent and Manage VTE (Venous Thromboembolism) Risk  Recent Flowsheet Documentation  Taken 7/24/2025 0810 by Pamela Perera RN  VTE Prevention/Management: SCDs off (sequential compression devices)  Intervention: Prevent Infection  Recent Flowsheet Documentation  Taken 7/24/2025 0810 by Pamela Perera RN  Infection Prevention: hand hygiene promoted  Goal: Optimal Comfort and Wellbeing  7/24/2025 1733 by Pamela Perera RN  Outcome: Progressing  7/24/2025 1732 by Pamela Perera RN  Outcome: Progressing  Intervention: Monitor Pain and Promote Comfort  Recent Flowsheet Documentation  Taken 7/24/2025 0731 by Pamela Perera RN  Pain Management Interventions: declines  Goal: Readiness for Transition of Care  7/24/2025 1733 by Pamela Perera RN  Outcome: Progressing  7/24/2025 1732 by Pamela Perera RN  Outcome: Progressing     Problem: Gas Exchange Impaired  Goal: Optimal Gas Exchange  7/24/2025 1733 by Pamela Perera RN  Outcome: Progressing  7/24/2025 1732 by Pamela Perera RN  Outcome: Progressing  Intervention: Optimize Oxygenation and Ventilation  Recent Flowsheet Documentation  Taken 7/24/2025 0810 by Pamela Perera RN  Head of Bed (HOB) Positioning: HOB at 20-30 degrees     Problem: Infection  Goal: Absence of Infection Signs and Symptoms  7/24/2025 1733 by Pamela Perera RN  Outcome: Progressing  7/24/2025 1732 by  Pamela Perera, RN  Outcome: Progressing

## 2025-07-24 NOTE — PLAN OF CARE
"End of Shift Summary  For vital signs and complete assessments,     Assessments:   A&Ox4, VSS. Denies SOB, on room air. Up with Ax1 belt and walker to bathroom. Miconazole powder applied to skin folds. 4x4 foam dressing in place over left posterior thigh. Numerous moisture assoc wounds around folds. Occasional back pain when sitting in chair. All other system assessments unremarkable.     Major shift events: prn tylenol x1, prn oxy x1, shower, up in chair 2 hrs.      Treatment Plan: awaiting discharge to [facility]     Bedside Nurse: con mills RN      Goal Outcome Evaluation:      Problem: Adult Inpatient Plan of Care  Goal: Plan of Care Review  Description: The Plan of Care Review/Shift note should be completed every shift.  The Outcome Evaluation is a brief statement about your assessment that the patient is improving, declining, or no change.  This information will be displayed automatically on your shift  note.  7/23/2025 2330 by Con Mills RN  Outcome: Progressing  Flowsheets (Taken 7/23/2025 2330)  Outcome Evaluation: waiting for placement  Plan of Care Reviewed With: patient  Overall Patient Progress: improving  7/23/2025 1352 by Con Mills RN  Outcome: Progressing  Goal: Patient-Specific Goal (Individualized)  Description: You can add care plan individualizations to a care plan. Examples of Individualization might be:  \"Parent requests to be called daily at 9am for status\", \"I have a hard time hearing out of my right ear\", or \"Do not touch me to wake me up as it startles  me\".  7/23/2025 2330 by Con Mills RN  Outcome: Progressing  7/23/2025 1352 by Con Mills RN  Outcome: Progressing  Goal: Absence of Hospital-Acquired Illness or Injury  7/23/2025 2330 by Con Mills RN  Outcome: Progressing  7/23/2025 1352 by Con Mills RN  Outcome: Progressing  Intervention: Identify and Manage Fall Risk  Recent Flowsheet Documentation  Taken 7/23/2025 1700 by " Gene, Con, RN  Safety Promotion/Fall Prevention:   activity supervised   nonskid shoes/slippers when out of bed  Intervention: Prevent Skin Injury  Recent Flowsheet Documentation  Taken 7/23/2025 1700 by Con Mills RN  Body Position:   position changed independently   turned   left  Intervention: Prevent and Manage VTE (Venous Thromboembolism) Risk  Recent Flowsheet Documentation  Taken 7/23/2025 1700 by Con Mills RN  VTE Prevention/Management: SCDs off (sequential compression devices)  Intervention: Prevent Infection  Recent Flowsheet Documentation  Taken 7/23/2025 1700 by Con Mills RN  Infection Prevention:   cohorting utilized   environmental surveillance performed   equipment surfaces disinfected   hand hygiene promoted  Goal: Optimal Comfort and Wellbeing  7/23/2025 2330 by Con Mills RN  Outcome: Progressing  7/23/2025 1352 by Con Mills RN  Outcome: Progressing  Goal: Readiness for Transition of Care  7/23/2025 2330 by Con Mills RN  Outcome: Progressing  7/23/2025 1352 by Con Mills RN  Outcome: Progressing     Problem: Gas Exchange Impaired  Goal: Optimal Gas Exchange  7/23/2025 2330 by Con Mills RN  Outcome: Progressing  7/23/2025 1352 by Con Mills RN  Outcome: Progressing  Intervention: Optimize Oxygenation and Ventilation  Recent Flowsheet Documentation  Taken 7/23/2025 1700 by Con Mills RN  Head of Bed (HOB) Positioning: HOB at 15 degrees     Problem: Infection  Goal: Absence of Infection Signs and Symptoms  7/23/2025 2330 by Con Mills RN  Outcome: Progressing  7/23/2025 1352 by Con Mills RN  Outcome: Progressing         Plan of Care Reviewed With: patient    Overall Patient Progress: improvingOverall Patient Progress: improving    Outcome Evaluation: waiting for placement

## 2025-07-24 NOTE — PLAN OF CARE
"Assessments: A&Ox4. VSS on room air, afebrile. Denies pain and SOB. Interdry to folds in place. Slept well overnight. Ax1 with GB & walker.    Major shift events: None  Treatment Plan: awaiting discharge to [facility]  Bedside Nurse: NHI GRULLON RN    Goal Outcome Evaluation:      Plan of Care Reviewed With: patient    Overall Patient Progress: improvingOverall Patient Progress: improving      Problem: Adult Inpatient Plan of Care  Goal: Plan of Care Review  Description: The Plan of Care Review/Shift note should be completed every shift.  The Outcome Evaluation is a brief statement about your assessment that the patient is improving, declining, or no change.  This information will be displayed automatically on your shift  note.  Outcome: Progressing  Flowsheets (Taken 7/24/2025 0550)  Plan of Care Reviewed With: patient  Overall Patient Progress: improving  Goal: Patient-Specific Goal (Individualized)  Description: You can add care plan individualizations to a care plan. Examples of Individualization might be:  \"Parent requests to be called daily at 9am for status\", \"I have a hard time hearing out of my right ear\", or \"Do not touch me to wake me up as it startles  me\".  Outcome: Progressing  Goal: Absence of Hospital-Acquired Illness or Injury  Outcome: Progressing  Intervention: Identify and Manage Fall Risk  Recent Flowsheet Documentation  Taken 7/24/2025 0000 by Nhi Grullon RN  Safety Promotion/Fall Prevention:   activity supervised   assistive device/personal items within reach   clutter free environment maintained   increased rounding and observation   safety round/check completed   supervised activity  Intervention: Prevent Skin Injury  Recent Flowsheet Documentation  Taken 7/24/2025 0000 by Nhi Grullon RN  Body Position: position changed independently  Intervention: Prevent and Manage VTE (Venous Thromboembolism) Risk  Recent Flowsheet Documentation  Taken 7/24/2025 0000 by Nhi Grullon RN  VTE " Prevention/Management: SCDs off (sequential compression devices)  Intervention: Prevent Infection  Recent Flowsheet Documentation  Taken 7/24/2025 0000 by Nhi Grullon, RN  Infection Prevention:   cohorting utilized   hand hygiene promoted   rest/sleep promoted   single patient room provided  Goal: Optimal Comfort and Wellbeing  Outcome: Progressing  Goal: Readiness for Transition of Care  Outcome: Progressing     Problem: Gas Exchange Impaired  Goal: Optimal Gas Exchange  Outcome: Progressing  Intervention: Optimize Oxygenation and Ventilation  Recent Flowsheet Documentation  Taken 7/24/2025 0000 by Nhi Grullon, RN  Head of Bed (HOB) Positioning: HOB lowered     Problem: Infection  Goal: Absence of Infection Signs and Symptoms  Outcome: Progressing

## 2025-07-24 NOTE — PROGRESS NOTES
[facility]    Hospitalist Progress Note             Date of Admission:  6/25/2025                   Day of hospitalization: 29    Assessment and Plan:   60-year-old female with developmental delay, asthma on home oxygen, RICK, seizure disorder, and super morbid obesity was admitted on 6/25/2025 with acute on chronic hypoxic respiratory failure secondary to asthma exacerbation triggered by poor air quality and lack of access to her LABA/ICS. Respiratory status has since stabilized with resolution of wheezing and restoration of baseline oxygen needs. During hospitalization, she developed gross hematuria on 7/7 without significant bleeding or anemia. Treated empirically for UTI with 7 days of antibiotics, now completed. Hematuria resolved; urine cytology was negative and outpatient cystoscopy is planned. She remains hospitalized awaiting placement due to guardianship transition and inability to return to prior living environment. INR is therapeutic; warfarin resumed for history of DVT.     She is currently just here waiting placement      Resolved asthma exacerbation with acute on chronic hypoxic respiratory failure  - Now off continuous oxygen, baseline 2.5 L nocturnal O2  - Wheezing resolved; DuoNeb PRN  - Breo Ellipta resumed  - CPAP ordered; patient reports intolerance due to claustrophobia but has f/u sleep study     Gross hematuria, resolved; possible UTI, treated  - Resolved spontaneous gross hematuria (7/7)  - Treated empirically x7 days; abx discontinued 7/13  - Urine cytology negative  - Urology consulted; outpatient cystoscopy and possible CT urogram needed  - Monitor for recurrence     History of DVT  - Warfarin resumed, goal INR 2-3 (INR 2.99 today)  - Lovenox bridge discontinued  - Warfarin per pharmacy panel     Developmental delay; guardianship transition  - Current guardian (parents) withdrawing  - New court-appointed guardian pending  - Discharge to assisted living facility  "planned; cannot return to prior home  - Social work involved     Super morbid obesity with intertriginous skin breakdown  - WOC following  - Miconazole powder BID (per patient preference)  - Routine wound and hygiene care     Seizure disorder  - Continue Tegretol     Hypertension  - Continue lisinopril     Hyperlipidemia  - Continue statin     GERD  - Continue omeprazole     IRCK  - Diagnosed but not on home CPAP  - Follow-up sleep study pending; CPAP trial in hospital ordered        # Code status: Full   # DVT: Coumadin  # IVF:  None          Medically Ready for Discharge: Ready Now                    Aimee Cyr MD    Subjective   Chief Complaint:  Placement  Subjective: No new complaints no chest pain or shortness of breath no abdominal pain          Objective   /57 (BP Location: Right arm)   Pulse 64   Temp 97.8  F (36.6  C) (Oral)   Resp 20   Ht 1.702 m (5' 7\")   Wt (!) 175.4 kg (386 lb 11 oz)   LMP 08/18/2008   SpO2 97%   BMI 60.56 kg/m       Physical Exam  General: Pt in NAD, normal appearance  HEENT: OP clear MMM, no JVD  Lungs: Clear to Auscultation Bilateral, normal breathing  without accessory muscle usage, no wheezing, rhonchi or crackles  Cardiac: +S1, S2, RRR, no MRG, no edema  Abdominal: normal bowel sounds, NT/ND  Skin: warm, dry, normal turgor, no rash  Psyche: A& O x3, appropriate affect           No intake or output data in the 24 hours ending 07/24/25 1357        Labs and Imaging Results:      No results for input(s): \"WBC\", \"HGB\", \"PLT\" in the last 168 hours.   No results for input(s): \"NA\", \"CO2\", \"BUN\", \"CREATININE\" in the last 168 hours.    Invalid input(s): \"K\", \"GLU\"     Recent Labs   Lab 07/24/25  0645 07/23/25  0702   INR 3.00* 3.09*      No results for input(s): \"CKMB\" in the last 168 hours.    Invalid input(s): \"TROPONINT\"   No results for input(s): \"ALBUMIN\", \"AST\", \"ALT\", \"ALKPHOS\", \"BILITOT\" in the last 168 hours.     Micro:     Radio:  CT Abdomen Pelvis w/o Contrast "   Final Result   IMPRESSION:    1.  No urinary tract calculi or obstruction.      2.  Interval cholecystectomy. Hepatosplenomegaly without discrete lesion. No abdominopelvic suspicious adenopathy or free fluid.      3.  IVC filter, unchanged.      4.  Postoperative changes of plate and screw fixation at the symphysis pubis. Screw fixation left SI joint. Mild left convex thoracolumbar curve. Mild degenerative changes involving the spine and joints of the pelvis. Resolved inflammatory changes    involving the pannus seen previously. Small fat-containing ventral umbilical hernia.         CT Chest Pulmonary Embolism w Contrast   Final Result   IMPRESSION:   1.  Subtle areas of bronchial wall thickening with areas of air trapping throughout both lungs, suggestive of small airways disease.   2.  No pulmonary embolus.              Medications:      Scheduled Meds:    Current Facility-Administered Medications   Medication Dose Route Frequency Provider Last Rate Last Admin    atorvastatin (LIPITOR) tablet 10 mg  10 mg Oral At Bedtime David Hull MD   10 mg at 07/23/25 2055    carBAMazepine (TEGretol) tablet 200 mg  200 mg Oral Q24H David Hull MD   200 mg at 07/24/25 1205    carBAMazepine (TEGretol) tablet 400 mg  400 mg Oral BID David Hull MD   400 mg at 07/24/25 0823    fluticasone (FLONASE) 50 MCG/ACT spray 1 spray  1 spray Both Nostrils Daily Neeraj Alvarado MD   1 spray at 07/24/25 0824    fluticasone-vilanterol (BREO ELLIPTA) 100-25 MCG/ACT inhaler 1 puff  1 puff Inhalation Daily David Hull MD   1 puff at 07/24/25 0824    lisinopril (ZESTRIL) tablet 20 mg  20 mg Oral Daily David Hull MD   20 mg at 07/24/25 0823    miconazole (MICATIN) 2 % powder   Topical BID Andreea Mcdonald MD   Given at 07/24/25 0826    pantoprazole (PROTONIX) EC tablet 40 mg  40 mg Oral QAM AC David Hull MD   40 mg at 07/24/25 0823    polyethylene glycol (MIRALAX) Packet 17 g  17 g Oral Daily Klarissa  Aimee Bonilla MD   17 g at 07/23/25 0805    senna-docusate (SENOKOT-S/PERICOLACE) 8.6-50 MG per tablet 1 tablet  1 tablet Oral BID Aimee Cyr MD   1 tablet at 07/24/25 0824    sertraline (ZOLOFT) tablet 100 mg  100 mg Oral Daily Neeraj Alvarado MD   100 mg at 07/24/25 0823    sodium chloride (PF) 0.9% PF flush 3 mL  3 mL Intracatheter Q8H NIC David Hull MD   3 mL at 07/24/25 0824    Warfarin Dose Required Daily - Pharmacist Managed  1 each Oral See Admin Instructions Aimee Cyr MD         Continuous Infusions:    Current Facility-Administered Medications   Medication Dose Route Frequency Provider Last Rate Last Admin    Patient is already receiving anticoagulation with heparin, enoxaparin (LOVENOX), warfarin (COUMADIN)  or other anticoagulant medication   Does not apply Continuous PRN David Hull MD         PRN Meds:    Current Facility-Administered Medications   Medication Dose Route Frequency Provider Last Rate Last Admin    acetaminophen (TYLENOL) tablet 650 mg  650 mg Oral Q4H PRN David Hull MD   650 mg at 07/24/25 1309    Or    acetaminophen (TYLENOL) Suppository 650 mg  650 mg Rectal Q4H PRN David Hull MD        albuterol (PROVENTIL HFA/VENTOLIN HFA) inhaler  2 puff Inhalation Q6H PRN Neeraj Alvarado MD        albuterol (PROVENTIL) neb solution 2.5 mg  2.5 mg Nebulization Q2H PRN David Hull MD   2.5 mg at 06/26/25 0416    bisacodyl (DULCOLAX) suppository 10 mg  10 mg Rectal Daily PRN Aimee Cyr MD        calcium carbonate (TUMS) chewable tablet 1,000 mg  1,000 mg Oral 4x Daily PRN David Hull MD   1,000 mg at 07/22/25 1635    ipratropium - albuterol 0.5 mg/2.5 mg (3mg)/3 mL (DUONEB) neb solution 3 mL  3 mL Nebulization Q4H PRN Andreea Mcdonald MD   3 mL at 07/17/25 0751    lidocaine (LMX4) cream   Topical Q1H PRN David Hull MD        lidocaine 1 % 0.1-1 mL  0.1-1 mL Other Q1H PRN David Hull MD        melatonin tablet  5 mg  5 mg Oral At Bedtime PRN David Hull MD   5 mg at 07/19/25 2127    naloxone (NARCAN) injection 0.2 mg  0.2 mg Intravenous Q2 Min PRN David Hull MD        Or    naloxone (NARCAN) injection 0.4 mg  0.4 mg Intravenous Q2 Min PRN David Hull MD        Or    naloxone (NARCAN) injection 0.2 mg  0.2 mg Intramuscular Q2 Min PRN David Hull MD        Or    naloxone (NARCAN) injection 0.4 mg  0.4 mg Intramuscular Q2 Min PRN David Hull MD        ondansetron (ZOFRAN ODT) ODT tab 4 mg  4 mg Oral Q6H PRN David Hull MD   4 mg at 07/07/25 0930    Or    ondansetron (ZOFRAN) injection 4 mg  4 mg Intravenous Q6H PRN David Hull MD   4 mg at 07/07/25 1455    oxyCODONE IR (ROXICODONE) half-tab 2.5 mg  2.5 mg Oral Q4H PRN Kaia Evans DO   2.5 mg at 07/24/25 1356    Patient is already receiving anticoagulation with heparin, enoxaparin (LOVENOX), warfarin (COUMADIN)  or other anticoagulant medication   Does not apply Continuous PRN David Hull MD        simethicone (MYLICON) chewable tablet 80 mg  80 mg Oral Q6H PRN Neeraj Reed MD   80 mg at 07/10/25 1455    sodium chloride (PF) 0.9% PF flush 3 mL  3 mL Intracatheter q1 min prn David Hull MD

## 2025-07-25 LAB
HOLD SPECIMEN: NORMAL
HOLD SPECIMEN: NORMAL
INR PPP: 2.95 (ref 0.85–1.15)
PROTHROMBIN TIME: 30.2 SECONDS (ref 11.8–14.8)

## 2025-07-25 PROCEDURE — 250N000013 HC RX MED GY IP 250 OP 250 PS 637: Performed by: STUDENT IN AN ORGANIZED HEALTH CARE EDUCATION/TRAINING PROGRAM

## 2025-07-25 PROCEDURE — 99232 SBSQ HOSP IP/OBS MODERATE 35: CPT | Performed by: HOSPITALIST

## 2025-07-25 PROCEDURE — 250N000013 HC RX MED GY IP 250 OP 250 PS 637: Performed by: HOSPITALIST

## 2025-07-25 PROCEDURE — 120N000001 HC R&B MED SURG/OB

## 2025-07-25 PROCEDURE — 36415 COLL VENOUS BLD VENIPUNCTURE: CPT | Performed by: HOSPITALIST

## 2025-07-25 PROCEDURE — 250N000013 HC RX MED GY IP 250 OP 250 PS 637: Performed by: INTERNAL MEDICINE

## 2025-07-25 PROCEDURE — 85610 PROTHROMBIN TIME: CPT | Performed by: HOSPITALIST

## 2025-07-25 RX ORDER — WARFARIN SODIUM 5 MG/1
15 TABLET ORAL
Status: COMPLETED | OUTPATIENT
Start: 2025-07-25 | End: 2025-07-25

## 2025-07-25 RX ADMIN — CARBAMAZEPINE 400 MG: 200 TABLET ORAL at 20:22

## 2025-07-25 RX ADMIN — FLUTICASONE FUROATE AND VILANTEROL TRIFENATATE 1 PUFF: 100; 25 POWDER RESPIRATORY (INHALATION) at 08:31

## 2025-07-25 RX ADMIN — FLUTICASONE PROPIONATE 1 SPRAY: 50 SPRAY, METERED NASAL at 08:31

## 2025-07-25 RX ADMIN — OXYCODONE HYDROCHLORIDE 2.5 MG: 5 TABLET ORAL at 13:29

## 2025-07-25 RX ADMIN — MICONAZOLE NITRATE: 20 POWDER TOPICAL at 20:23

## 2025-07-25 RX ADMIN — CALCIUM CARBONATE (ANTACID) CHEW TAB 500 MG 1000 MG: 500 CHEW TAB at 14:44

## 2025-07-25 RX ADMIN — WARFARIN SODIUM 15 MG: 5 TABLET ORAL at 17:45

## 2025-07-25 RX ADMIN — PANTOPRAZOLE SODIUM 40 MG: 40 TABLET, DELAYED RELEASE ORAL at 08:32

## 2025-07-25 RX ADMIN — MICONAZOLE NITRATE: 20 POWDER TOPICAL at 08:31

## 2025-07-25 RX ADMIN — ATORVASTATIN CALCIUM 10 MG: 10 TABLET, FILM COATED ORAL at 20:22

## 2025-07-25 RX ADMIN — SERTRALINE 100 MG: 100 TABLET, FILM COATED ORAL at 08:31

## 2025-07-25 RX ADMIN — CARBAMAZEPINE 200 MG: 200 TABLET ORAL at 11:27

## 2025-07-25 RX ADMIN — SENNOSIDES AND DOCUSATE SODIUM 1 TABLET: 50; 8.6 TABLET ORAL at 20:22

## 2025-07-25 RX ADMIN — SENNOSIDES AND DOCUSATE SODIUM 1 TABLET: 50; 8.6 TABLET ORAL at 08:31

## 2025-07-25 RX ADMIN — LISINOPRIL 20 MG: 20 TABLET ORAL at 08:31

## 2025-07-25 RX ADMIN — ACETAMINOPHEN 650 MG: 325 TABLET ORAL at 11:27

## 2025-07-25 RX ADMIN — CARBAMAZEPINE 400 MG: 200 TABLET ORAL at 08:31

## 2025-07-25 ASSESSMENT — ACTIVITIES OF DAILY LIVING (ADL)
ADLS_ACUITY_SCORE: 54
ADLS_ACUITY_SCORE: 54
ADLS_ACUITY_SCORE: 58
ADLS_ACUITY_SCORE: 58
ADLS_ACUITY_SCORE: 54
ADLS_ACUITY_SCORE: 63
ADLS_ACUITY_SCORE: 54
ADLS_ACUITY_SCORE: 57
ADLS_ACUITY_SCORE: 58
ADLS_ACUITY_SCORE: 54
ADLS_ACUITY_SCORE: 58
ADLS_ACUITY_SCORE: 54
ADLS_ACUITY_SCORE: 57
ADLS_ACUITY_SCORE: 63

## 2025-07-25 NOTE — PLAN OF CARE
"Pertinent assessments: A&Ox4. VSS on room air, afebrile. PRN tylenol given x1 for leg pain. Redness under folds, miconazole powder applied. Incontinence at times. Up Ax1 with GB & walker.     Major Shift Events None  Treatment Plan: Symptom management, await placement.  Bedside Nurse: NHI GRULLON RN     Goal Outcome Evaluation:      Plan of Care Reviewed With: patient    Overall Patient Progress: improvingOverall Patient Progress: improving      Problem: Adult Inpatient Plan of Care  Goal: Plan of Care Review  Description: The Plan of Care Review/Shift note should be completed every shift.  The Outcome Evaluation is a brief statement about your assessment that the patient is improving, declining, or no change.  This information will be displayed automatically on your shift  note.  Outcome: Progressing  Flowsheets (Taken 7/25/2025 0548)  Plan of Care Reviewed With: patient  Overall Patient Progress: improving  Goal: Patient-Specific Goal (Individualized)  Description: You can add care plan individualizations to a care plan. Examples of Individualization might be:  \"Parent requests to be called daily at 9am for status\", \"I have a hard time hearing out of my right ear\", or \"Do not touch me to wake me up as it startles  me\".  Outcome: Progressing  Goal: Absence of Hospital-Acquired Illness or Injury  Outcome: Progressing  Intervention: Identify and Manage Fall Risk  Recent Flowsheet Documentation  Taken 7/24/2025 2100 by Nhi Grullon, RN  Safety Promotion/Fall Prevention:   activity supervised   assistive device/personal items within reach   clutter free environment maintained   increased rounding and observation   safety round/check completed   supervised activity  Intervention: Prevent Skin Injury  Recent Flowsheet Documentation  Taken 7/24/2025 2100 by Nhi Grullon, RN  Body Position: weight shifting  Intervention: Prevent and Manage VTE (Venous Thromboembolism) Risk  Recent Flowsheet Documentation  Taken " 7/24/2025 2100 by Nhi Grullon, RN  VTE Prevention/Management: SCDs off (sequential compression devices)  Intervention: Prevent Infection  Recent Flowsheet Documentation  Taken 7/24/2025 2100 by Nhi Grullon, RN  Infection Prevention:   cohorting utilized   hand hygiene promoted   rest/sleep promoted   single patient room provided  Goal: Optimal Comfort and Wellbeing  Outcome: Progressing  Goal: Readiness for Transition of Care  Outcome: Progressing     Problem: Gas Exchange Impaired  Goal: Optimal Gas Exchange  Outcome: Progressing  Intervention: Optimize Oxygenation and Ventilation  Recent Flowsheet Documentation  Taken 7/24/2025 2100 by Nhi Grullon, RN  Head of Bed (HOB) Positioning: HOB at 30 degrees     Problem: Infection  Goal: Absence of Infection Signs and Symptoms  Outcome: Progressing

## 2025-07-25 NOTE — PROGRESS NOTES
Care Management Follow Up    Length of Stay (days): 30    Expected Discharge Date: 07/28/2025     Concerns to be Addressed: discharge planning     Patient plan of care discussed at interdisciplinary rounds: Yes    Anticipated Discharge Disposition:  [facility]      Anticipated Discharge Services:  Home Care  Anticipated Discharge DME:  No new equipment needs    Patient/family educated on Medicare website which has current facility and service quality ratings:  N/A  Education Provided on the Discharge Plan:  yes  Patient/Family in Agreement with the Plan:  yes    Referrals Placed by CM/SW:  Home Care    Discussed  Partnership in Safe Discharge Planning  document with patient/family: No     Handoff Completed: Yes, [facility] PCP: Internal handoff referral completed    Additional Information:  Juliane received a message from Lilliam at [facility] in [place], P: [phone], who said that they can do the admission on Monday.  Juliane spoke with the RN at [facility], Kristina P: [phone] F: [phone], to address their questions and concerns. Juliane faxed her the pt's more recent WOC note. She wanted to clarify that any DME the pt will need will be delivered. Juliane confirmed that it is there.   Juliane updated [facility] on the pt's discharge plan for Monday P: [phone] (option 3). Pt will have resumption of HC RN services at discharge. They will get the pt's discharge orders from [facility].  Juliane left a vm for the pt's [facility], Treva P: [phone]. [phone], updating her on the pt's discharge plan for Monday.  Juliane left a vm for the pt's [facility] worker, Edna P: [phone], updating them on the pt's discharge plan.  Juliane spoke with the pt's mom Diomedes, to confirm the plan with her. She said that the DME is all at the [facility]. They are trying to get the pt's walker to the apartment. Juliane told them that they are going to follow up with the pt's guardian to see how to get the walker to [facility].  Juliane left a vm for the pt's guardian,  Tico P: [phone], requesting a call back to discuss DME and transportation for Monday.    Next Steps:   Juliane will continue with discharge planning and will be available as needed until discharge.    AISSATOU Macdonald, [facility]  Inpatient Care Coordination  [facility]  [phone]      ADDENDUM 1343:  Juliane spoke with Tico who requested medical w/c transportation. She is aware of the cost. She has been in contact with [facility] for the planned admission on Monday. She is going to have someone bring the pt's walker to the [facility].   Juliane updated the pt on the discharge plan and addressed her questions.  Juliane spoke with the pt's parents who said that they talked with the pt's guardian and her significant other Sudhir regarding the discharge plan. The pt's parents will pick the pt's belongings up at the pt's apartment on Sunday and taking them to [facility].

## 2025-07-25 NOTE — PROGRESS NOTES
[facility]    Hospitalist Progress Note             Date of Admission:  6/25/2025                   Day of hospitalization: 30    Assessment and Plan:   60-year-old female with developmental delay, asthma on home oxygen, RICK, seizure disorder, and super morbid obesity was admitted on 6/25/2025 with acute on chronic hypoxic respiratory failure secondary to asthma exacerbation triggered by poor air quality and lack of access to her LABA/ICS. Respiratory status has since stabilized with resolution of wheezing and restoration of baseline oxygen needs. During hospitalization, she developed gross hematuria on 7/7 without significant bleeding or anemia. Treated empirically for UTI with 7 days of antibiotics, now completed. Hematuria resolved; urine cytology was negative and outpatient cystoscopy is planned. She remains hospitalized awaiting placement due to guardianship transition and inability to return to prior living environment. INR is therapeutic; warfarin resumed for history of DVT.     She is currently just here waiting placement      Resolved asthma exacerbation with acute on chronic hypoxic respiratory failure  - Now off continuous oxygen, baseline 2.5 L nocturnal O2  - Wheezing resolved; DuoNeb PRN  - Breo Ellipta resumed  - CPAP ordered; patient reports intolerance due to claustrophobia but has f/u sleep study     Gross hematuria, resolved; possible UTI, treated  - Resolved spontaneous gross hematuria (7/7)  - Treated empirically x7 days; abx discontinued 7/13  - Urine cytology negative  - Urology consulted; outpatient cystoscopy and possible CT urogram needed  - Monitor for recurrence     History of DVT  - Warfarin resumed, goal INR 2-3 (INR 2.99 today)  - Lovenox bridge discontinued  - Warfarin per pharmacy panel     Developmental delay; guardianship transition  - Current guardian (parents) withdrawing  - New court-appointed guardian pending  - Discharge to assisted living facility  "planned; cannot return to prior home  - Social work involved     Super morbid obesity with intertriginous skin breakdown  - WOC following  - Miconazole powder BID (per patient preference)  - Routine wound and hygiene care     Seizure disorder  - Continue Tegretol     Hypertension  - Continue lisinopril     Hyperlipidemia  - Continue statin     GERD  - Continue omeprazole     RICK  - Diagnosed but not on home CPAP  - Follow-up sleep study pending; CPAP trial in hospital ordered        # Code status: Full   # DVT: Coumadin  # IVF:  None          Medically Ready for Discharge: Ready Now                    Aimee Cyr MD    Subjective   Chief Complaint:  Placement  Subjective: No new complaints no chest pain or shortness of breath no abdominal pain          Objective   /48 (BP Location: Left arm)   Pulse 64   Temp 97.9  F (36.6  C) (Oral)   Resp 16   Ht 1.702 m (5' 7\")   Wt (!) 175.4 kg (386 lb 11 oz)   LMP 08/18/2008   SpO2 94%   BMI 60.56 kg/m       Physical Exam  General: Pt in NAD, normal appearance  HEENT: OP clear MMM, no JVD  Lungs: Clear to Auscultation Bilateral, normal breathing  without accessory muscle usage, no wheezing, rhonchi or crackles  Cardiac: +S1, S2, RRR, no MRG, no edema  Abdominal: normal bowel sounds, NT/ND  Skin: warm, dry, normal turgor, no rash  Psyche: A& O x3, appropriate affect           No intake or output data in the 24 hours ending 07/24/25 1357        Labs and Imaging Results:      No results for input(s): \"WBC\", \"HGB\", \"PLT\" in the last 168 hours.   No results for input(s): \"NA\", \"CO2\", \"BUN\", \"CREATININE\" in the last 168 hours.    Invalid input(s): \"K\", \"GLU\"     Recent Labs   Lab 07/25/25  0711 07/24/25  0645   INR 2.95* 3.00*      No results for input(s): \"CKMB\" in the last 168 hours.    Invalid input(s): \"TROPONINT\"   No results for input(s): \"ALBUMIN\", \"AST\", \"ALT\", \"ALKPHOS\", \"BILITOT\" in the last 168 hours.     Micro:     Radio:  CT Abdomen Pelvis w/o Contrast "   Final Result   IMPRESSION:    1.  No urinary tract calculi or obstruction.      2.  Interval cholecystectomy. Hepatosplenomegaly without discrete lesion. No abdominopelvic suspicious adenopathy or free fluid.      3.  IVC filter, unchanged.      4.  Postoperative changes of plate and screw fixation at the symphysis pubis. Screw fixation left SI joint. Mild left convex thoracolumbar curve. Mild degenerative changes involving the spine and joints of the pelvis. Resolved inflammatory changes    involving the pannus seen previously. Small fat-containing ventral umbilical hernia.         CT Chest Pulmonary Embolism w Contrast   Final Result   IMPRESSION:   1.  Subtle areas of bronchial wall thickening with areas of air trapping throughout both lungs, suggestive of small airways disease.   2.  No pulmonary embolus.              Medications:      Scheduled Meds:    Current Facility-Administered Medications   Medication Dose Route Frequency Provider Last Rate Last Admin    atorvastatin (LIPITOR) tablet 10 mg  10 mg Oral At Bedtime David Hull MD   10 mg at 07/24/25 2157    carBAMazepine (TEGretol) tablet 200 mg  200 mg Oral Q24H David Hull MD   200 mg at 07/25/25 1127    carBAMazepine (TEGretol) tablet 400 mg  400 mg Oral BID David Hull MD   400 mg at 07/25/25 0831    fluticasone (FLONASE) 50 MCG/ACT spray 1 spray  1 spray Both Nostrils Daily Neeraj Alvarado MD   1 spray at 07/25/25 0831    fluticasone-vilanterol (BREO ELLIPTA) 100-25 MCG/ACT inhaler 1 puff  1 puff Inhalation Daily David Hull MD   1 puff at 07/25/25 0831    lisinopril (ZESTRIL) tablet 20 mg  20 mg Oral Daily David Hull MD   20 mg at 07/25/25 0831    miconazole (MICATIN) 2 % powder   Topical BID Andreea Mcdonald MD   Given at 07/25/25 0831    pantoprazole (PROTONIX) EC tablet 40 mg  40 mg Oral QAM AC David Hull MD   40 mg at 07/25/25 0832    polyethylene glycol (MIRALAX) Packet 17 g  17 g Oral Daily Klarissa  Aimee Bonilla MD   17 g at 07/23/25 0805    senna-docusate (SENOKOT-S/PERICOLACE) 8.6-50 MG per tablet 1 tablet  1 tablet Oral BID Aimee Cyr MD   1 tablet at 07/25/25 0831    sertraline (ZOLOFT) tablet 100 mg  100 mg Oral Daily Neeraj Alvarado MD   100 mg at 07/25/25 0831    sodium chloride (PF) 0.9% PF flush 3 mL  3 mL Intracatheter Q8H NIC David Hull MD   3 mL at 07/25/25 0831    warfarin ANTICOAGULANT (COUMADIN/JANTOVEN) tablet 15 mg  15 mg Oral ONCE at 18:00 Aimee Cyr MD        Warfarin Dose Required Daily - Pharmacist Managed  1 each Oral See Admin Instructions Aimee Cyr MD         Continuous Infusions:    Current Facility-Administered Medications   Medication Dose Route Frequency Provider Last Rate Last Admin    Patient is already receiving anticoagulation with heparin, enoxaparin (LOVENOX), warfarin (COUMADIN)  or other anticoagulant medication   Does not apply Continuous PRN David Hull MD         PRN Meds:    Current Facility-Administered Medications   Medication Dose Route Frequency Provider Last Rate Last Admin    acetaminophen (TYLENOL) tablet 650 mg  650 mg Oral Q4H PRN David Hull MD   650 mg at 07/25/25 1127    Or    acetaminophen (TYLENOL) Suppository 650 mg  650 mg Rectal Q4H PRN David Hull MD        albuterol (PROVENTIL HFA/VENTOLIN HFA) inhaler  2 puff Inhalation Q6H PRN Neeraj Alvarado MD        albuterol (PROVENTIL) neb solution 2.5 mg  2.5 mg Nebulization Q2H PRN David Hull MD   2.5 mg at 06/26/25 0416    bisacodyl (DULCOLAX) suppository 10 mg  10 mg Rectal Daily PRN Aimee Cyr MD        calcium carbonate (TUMS) chewable tablet 1,000 mg  1,000 mg Oral 4x Daily PRN David Hull MD   1,000 mg at 07/24/25 1503    ipratropium - albuterol 0.5 mg/2.5 mg (3mg)/3 mL (DUONEB) neb solution 3 mL  3 mL Nebulization Q4H PRN Andreea Mcdonald MD   3 mL at 07/17/25 0751    lidocaine (LMX4) cream   Topical Q1H PRN Kurtis  David GRANT MD        lidocaine 1 % 0.1-1 mL  0.1-1 mL Other Q1H PRN David Hull MD        melatonin tablet 5 mg  5 mg Oral At Bedtime PRN David Hull MD   5 mg at 07/19/25 2127    naloxone (NARCAN) injection 0.2 mg  0.2 mg Intravenous Q2 Min PRN David Hull MD        Or    naloxone (NARCAN) injection 0.4 mg  0.4 mg Intravenous Q2 Min PRN David Hull MD        Or    naloxone (NARCAN) injection 0.2 mg  0.2 mg Intramuscular Q2 Min PRN David Hull MD        Or    naloxone (NARCAN) injection 0.4 mg  0.4 mg Intramuscular Q2 Min PRN David Hull MD        ondansetron (ZOFRAN ODT) ODT tab 4 mg  4 mg Oral Q6H PRN David Hull MD   4 mg at 07/07/25 0930    Or    ondansetron (ZOFRAN) injection 4 mg  4 mg Intravenous Q6H PRN David Hull MD   4 mg at 07/07/25 1455    oxyCODONE IR (ROXICODONE) half-tab 2.5 mg  2.5 mg Oral Q4H PRN Kaia Evans DO   2.5 mg at 07/24/25 1356    Patient is already receiving anticoagulation with heparin, enoxaparin (LOVENOX), warfarin (COUMADIN)  or other anticoagulant medication   Does not apply Continuous PRN David Hull MD        simethicone (MYLICON) chewable tablet 80 mg  80 mg Oral Q6H PRN Neeraj Reed MD   80 mg at 07/10/25 1455    sodium chloride (PF) 0.9% PF flush 3 mL  3 mL Intracatheter q1 min prn David Hull MD

## 2025-07-25 NOTE — PLAN OF CARE
"End of Shift Summary  For vital signs and complete assessments, please see documentation flowsheets.     Pertinent assessments: Pt A&Ox4. VSS on RA. Denies N/V & SOB. SBA. Up to bathroom once during shift. Tolerating regular diet, denies nausea. Reported pain controlled with PRN oxy. Requested Tums following lunch for heart burn and gas. Several episodes of incontinence during shift, though calls for bed changes. Mepilex applied to abrasion on left upper thigh. Miconazole powder to abdominal folds & between thighs. PIV SL.     Major Shift Events: Uneventful.    Treatment Plan: Pain control, moisture management. Discharge pending AL facility placement.       Bedside Nurse: Odette New, Nursing Student        Goal Outcome Evaluation:    Plan of Care Reviewed With: patient    Overall Patient Progress: no change    Outcome Evaluation: A&Ox4. VSS on RA. Up to bathrom x1. Several episodes of incontinence. PRN oxy given for pain. Tums requested after lunch. IV SL.      Problem: Adult Inpatient Plan of Care  Goal: Plan of Care Review  Description: The Plan of Care Review/Shift note should be completed every shift.  The Outcome Evaluation is a brief statement about your assessment that the patient is improving, declining, or no change.  This information will be displayed automatically on your shift  note.  Outcome: Progressing  Flowsheets (Taken 7/25/2025 5632)  Outcome Evaluation: A&Ox4. VSS on RA. Up to bathrom x1. Several episodes of incontinence. PRN oxy given for pain. Tums requested after lunch. IV SL.  Plan of Care Reviewed With: patient  Overall Patient Progress: no change  Goal: Patient-Specific Goal (Individualized)  Description: You can add care plan individualizations to a care plan. Examples of Individualization might be:  \"Parent requests to be called daily at 9am for status\", \"I have a hard time hearing out of my right ear\", or \"Do not touch me to wake me up as it startles  me\".  Outcome: Progressing  Goal: " Absence of Hospital-Acquired Illness or Injury  Outcome: Progressing  Intervention: Identify and Manage Fall Risk  Recent Flowsheet Documentation  Taken 7/25/2025 0900 by Odette New  Safety Promotion/Fall Prevention:   activity supervised   clutter free environment maintained   increased rounding and observation   increase visualization of patient   nonskid shoes/slippers when out of bed   patient and family education   room near nurse's station   safety round/check completed   supervised activity  Intervention: Prevent Skin Injury  Recent Flowsheet Documentation  Taken 7/25/2025 0900 by Odette New  Body Position: position changed independently  Intervention: Prevent and Manage VTE (Venous Thromboembolism) Risk  Recent Flowsheet Documentation  Taken 7/25/2025 0900 by Odette New  VTE Prevention/Management: SCDs off (sequential compression devices)  Goal: Optimal Comfort and Wellbeing  Outcome: Progressing  Goal: Readiness for Transition of Care  Outcome: Progressing     Problem: Gas Exchange Impaired  Goal: Optimal Gas Exchange  Outcome: Progressing  Intervention: Optimize Oxygenation and Ventilation  Recent Flowsheet Documentation  Taken 7/25/2025 0900 by Odette New  Head of Bed (HOB) Positioning: HOB at 15 degrees     Problem: Infection  Goal: Absence of Infection Signs and Symptoms  Outcome: Progressing

## 2025-07-26 LAB
INR PPP: 2.85 (ref 0.85–1.15)
PROTHROMBIN TIME: 29.4 SECONDS (ref 11.8–14.8)

## 2025-07-26 PROCEDURE — 99232 SBSQ HOSP IP/OBS MODERATE 35: CPT | Performed by: HOSPITALIST

## 2025-07-26 PROCEDURE — 250N000013 HC RX MED GY IP 250 OP 250 PS 637: Performed by: HOSPITALIST

## 2025-07-26 PROCEDURE — 120N000001 HC R&B MED SURG/OB

## 2025-07-26 PROCEDURE — 85610 PROTHROMBIN TIME: CPT | Performed by: HOSPITALIST

## 2025-07-26 PROCEDURE — 94640 AIRWAY INHALATION TREATMENT: CPT

## 2025-07-26 PROCEDURE — 36415 COLL VENOUS BLD VENIPUNCTURE: CPT | Performed by: HOSPITALIST

## 2025-07-26 PROCEDURE — 250N000013 HC RX MED GY IP 250 OP 250 PS 637: Performed by: INTERNAL MEDICINE

## 2025-07-26 PROCEDURE — 999N000157 HC STATISTIC RCP TIME EA 10 MIN

## 2025-07-26 PROCEDURE — 250N000013 HC RX MED GY IP 250 OP 250 PS 637: Performed by: STUDENT IN AN ORGANIZED HEALTH CARE EDUCATION/TRAINING PROGRAM

## 2025-07-26 RX ORDER — WARFARIN SODIUM 5 MG/1
15 TABLET ORAL
Status: COMPLETED | OUTPATIENT
Start: 2025-07-26 | End: 2025-07-26

## 2025-07-26 RX ADMIN — PANTOPRAZOLE SODIUM 40 MG: 40 TABLET, DELAYED RELEASE ORAL at 08:39

## 2025-07-26 RX ADMIN — CARBAMAZEPINE 400 MG: 200 TABLET ORAL at 08:40

## 2025-07-26 RX ADMIN — OXYCODONE HYDROCHLORIDE 2.5 MG: 5 TABLET ORAL at 13:36

## 2025-07-26 RX ADMIN — ACETAMINOPHEN 650 MG: 325 TABLET ORAL at 14:29

## 2025-07-26 RX ADMIN — LISINOPRIL 20 MG: 20 TABLET ORAL at 08:39

## 2025-07-26 RX ADMIN — CARBAMAZEPINE 200 MG: 200 TABLET ORAL at 11:31

## 2025-07-26 RX ADMIN — FLUTICASONE PROPIONATE 1 SPRAY: 50 SPRAY, METERED NASAL at 08:39

## 2025-07-26 RX ADMIN — FLUTICASONE FUROATE AND VILANTEROL TRIFENATATE 1 PUFF: 100; 25 POWDER RESPIRATORY (INHALATION) at 08:12

## 2025-07-26 RX ADMIN — MICONAZOLE NITRATE: 20 POWDER TOPICAL at 20:29

## 2025-07-26 RX ADMIN — CALCIUM CARBONATE (ANTACID) CHEW TAB 500 MG 1000 MG: 500 CHEW TAB at 14:29

## 2025-07-26 RX ADMIN — WARFARIN SODIUM 15 MG: 5 TABLET ORAL at 17:30

## 2025-07-26 RX ADMIN — SERTRALINE 100 MG: 100 TABLET, FILM COATED ORAL at 08:39

## 2025-07-26 RX ADMIN — CARBAMAZEPINE 400 MG: 200 TABLET ORAL at 20:30

## 2025-07-26 RX ADMIN — ATORVASTATIN CALCIUM 10 MG: 10 TABLET, FILM COATED ORAL at 20:30

## 2025-07-26 RX ADMIN — MICONAZOLE NITRATE: 20 POWDER TOPICAL at 08:39

## 2025-07-26 RX ADMIN — SENNOSIDES AND DOCUSATE SODIUM 1 TABLET: 50; 8.6 TABLET ORAL at 20:30

## 2025-07-26 RX ADMIN — SENNOSIDES AND DOCUSATE SODIUM 1 TABLET: 50; 8.6 TABLET ORAL at 08:39

## 2025-07-26 ASSESSMENT — ACTIVITIES OF DAILY LIVING (ADL)
ADLS_ACUITY_SCORE: 56
ADLS_ACUITY_SCORE: 57
ADLS_ACUITY_SCORE: 56
ADLS_ACUITY_SCORE: 57
ADLS_ACUITY_SCORE: 56
ADLS_ACUITY_SCORE: 57
ADLS_ACUITY_SCORE: 56
ADLS_ACUITY_SCORE: 56
ADLS_ACUITY_SCORE: 57
ADLS_ACUITY_SCORE: 57
ADLS_ACUITY_SCORE: 56
ADLS_ACUITY_SCORE: 56
ADLS_ACUITY_SCORE: 57
ADLS_ACUITY_SCORE: 56
ADLS_ACUITY_SCORE: 57
ADLS_ACUITY_SCORE: 57
ADLS_ACUITY_SCORE: 56
ADLS_ACUITY_SCORE: 56

## 2025-07-26 NOTE — PLAN OF CARE
Pertinent assessments: A&Ox4, forgetful. On room air while awake and uses 2L NC at sleep. Tolerating a regular diet. Saline locked PIV. Up with SBA. Pt is incontinent of urine. Areas of redness and excoriation under a myriad of skin folds.     Major Shift Events: none.    Treatment Plan: O2 at sleep, incontinence management, wound care, CM following, and discharge pending.    Bedside Nurse: Randy Coronado RN    Problem: Adult Inpatient Plan of Care  Goal: Plan of Care Review  Description: The Plan of Care Review/Shift note should be completed every shift.  The Outcome Evaluation is a brief statement about your assessment that the patient is improving, declining, or no change.  This information will be displayed automatically on your shift  note.  Recent Flowsheet Documentation  Taken 7/25/2025 2220 by Randy Coronado RN  Outcome Evaluation: Continue plan of care. Discharge pending.  Plan of Care Reviewed With: patient  Overall Patient Progress: no change  Goal: Absence of Hospital-Acquired Illness or Injury  Intervention: Identify and Manage Fall Risk  Recent Flowsheet Documentation  Taken 7/25/2025 1602 by Randy Coronado RN  Safety Promotion/Fall Prevention:   activity supervised   clutter free environment maintained   increase visualization of patient   lighting adjusted   nonskid shoes/slippers when out of bed   patient and family education   room near nurse's station   safety round/check completed   supervised activity   treat reversible contributory factors  Intervention: Prevent Skin Injury  Recent Flowsheet Documentation  Taken 7/25/2025 1602 by Randy Coronado RN  Body Position: position changed independently  Intervention: Prevent and Manage VTE (Venous Thromboembolism) Risk  Recent Flowsheet Documentation  Taken 7/25/2025 1602 by Randy Coronado RN  VTE Prevention/Management: SCDs off (sequential compression devices)  Intervention: Prevent Infection  Recent Flowsheet Documentation  Taken 7/25/2025  1602 by Randy Coronado, RN  Infection Prevention:   cohorting utilized   hand hygiene promoted   rest/sleep promoted   single patient room provided     Goal Outcome Evaluation:      Plan of Care Reviewed With: patient    Overall Patient Progress: no change    Overall Patient Progress: no change    Outcome Evaluation: Continue plan of care. Discharge pending.

## 2025-07-26 NOTE — PLAN OF CARE
"Pt alert and orientedx4, forgetful at times. Denied pain. Incontinent of bladder at times. Uses 2LNC of oxygen at night. Discharge pending.  Problem: Adult Inpatient Plan of Care  Goal: Plan of Care Review  Description: The Plan of Care Review/Shift note should be completed every shift.  The Outcome Evaluation is a brief statement about your assessment that the patient is improving, declining, or no change.  This information will be displayed automatically on your shift  note.  Outcome: Progressing  Flowsheets (Taken 7/26/2025 0702)  Outcome Evaluation: DENIED PAIN.  Plan of Care Reviewed With: patient  Overall Patient Progress: improving  Goal: Patient-Specific Goal (Individualized)  Description: You can add care plan individualizations to a care plan. Examples of Individualization might be:  \"Parent requests to be called daily at 9am for status\", \"I have a hard time hearing out of my right ear\", or \"Do not touch me to wake me up as it startles  me\".  Outcome: Progressing  Goal: Absence of Hospital-Acquired Illness or Injury  Outcome: Progressing  Intervention: Identify and Manage Fall Risk  Recent Flowsheet Documentation  Taken 7/26/2025 0218 by Lola Ellsworth RN  Safety Promotion/Fall Prevention: activity supervised  Intervention: Prevent and Manage VTE (Venous Thromboembolism) Risk  Recent Flowsheet Documentation  Taken 7/26/2025 0218 by Lola Ellsworth RN  VTE Prevention/Management: (on warfarin) SCDs off (sequential compression devices)  Intervention: Prevent Infection  Recent Flowsheet Documentation  Taken 7/26/2025 0218 by Lola Ellsworth RN  Infection Prevention: rest/sleep promoted  Goal: Optimal Comfort and Wellbeing  Outcome: Progressing  Goal: Readiness for Transition of Care  Outcome: Progressing     Problem: Gas Exchange Impaired  Goal: Optimal Gas Exchange  Outcome: Progressing     Problem: Infection  Goal: Absence of Infection Signs and Symptoms  Outcome: Progressing   Goal Outcome Evaluation:      Plan " of Care Reviewed With: patient    Overall Patient Progress: improvingOverall Patient Progress: improving    Outcome Evaluation: DENIED PAIN.

## 2025-07-26 NOTE — PLAN OF CARE
"End of Shift Summary  For vital signs and complete assessments, please see documentation flowsheets.     Pertinent assessments:  Pt A&Ox4. VSS on RA, afebrile. Denies N/V & SOB. SBA. Up to bathroom once during shift, medium formed stool noted. Tolerating regular diet, denies nausea. Reported pain controlled with PRN Tylenol & oxy 2.5mg. Requested Tums following lunch for heart burn and gas. Several episodes of incontinence during shift, though calls for bed changes. Miconazole powder to abdominal folds & between thighs. PIV SL.    Major Shift Events: Uneventful.    Treatment Plan: Pain control, moisture management. Discharge pending AL facility placement.       Bedside Nurse: Odette New, Nursing Student        Goal Outcome Evaluation:    Plan of Care Reviewed With: patient    Overall Patient Progress: no change    Outcome Evaluation: A&Ox4. VSS. PRN Tylenol & Tums given. BM reported. Several episodes of incontinence.      Problem: Adult Inpatient Plan of Care  Goal: Plan of Care Review  Description: The Plan of Care Review/Shift note should be completed every shift.  The Outcome Evaluation is a brief statement about your assessment that the patient is improving, declining, or no change.  This information will be displayed automatically on your shift  note.  Outcome: Progressing  Flowsheets (Taken 7/26/2025 1439)  Outcome Evaluation: A&Ox4. VSS. PRN Tylenol & Tums given. BM reported. Several episodes of incontinence.  Plan of Care Reviewed With: patient  Overall Patient Progress: no change  Goal: Patient-Specific Goal (Individualized)  Description: You can add care plan individualizations to a care plan. Examples of Individualization might be:  \"Parent requests to be called daily at 9am for status\", \"I have a hard time hearing out of my right ear\", or \"Do not touch me to wake me up as it startles  me\".  Outcome: Progressing  Goal: Absence of Hospital-Acquired Illness or Injury  Outcome: Progressing  Intervention: " Identify and Manage Fall Risk  Recent Flowsheet Documentation  Taken 7/26/2025 0900 by Odette New  Safety Promotion/Fall Prevention:   activity supervised   clutter free environment maintained   increased rounding and observation   increase visualization of patient   nonskid shoes/slippers when out of bed   room near nurse's station   safety round/check completed   supervised activity  Intervention: Prevent Skin Injury  Recent Flowsheet Documentation  Taken 7/26/2025 0900 by Odette New  Body Position: position changed independently  Skin Protection: adhesive use limited  Intervention: Prevent and Manage VTE (Venous Thromboembolism) Risk  Recent Flowsheet Documentation  Taken 7/26/2025 0900 by Odette New  VTE Prevention/Management: SCDs off (sequential compression devices)  Goal: Optimal Comfort and Wellbeing  Outcome: Progressing  Goal: Readiness for Transition of Care  Outcome: Progressing     Problem: Gas Exchange Impaired  Goal: Optimal Gas Exchange  Outcome: Progressing  Intervention: Optimize Oxygenation and Ventilation  Recent Flowsheet Documentation  Taken 7/26/2025 0900 by Odette New  Head of Bed (HOB) Positioning: HOB at 20-30 degrees     Problem: Infection  Goal: Absence of Infection Signs and Symptoms  Outcome: Progressing

## 2025-07-26 NOTE — PROGRESS NOTES
[facility]    Hospitalist Progress Note             Date of Admission:  6/25/2025                   Day of hospitalization: 31    Assessment and Plan:   60-year-old female with developmental delay, asthma on home oxygen, RICK, seizure disorder, and super morbid obesity was admitted on 6/25/2025 with acute on chronic hypoxic respiratory failure secondary to asthma exacerbation triggered by poor air quality and lack of access to her LABA/ICS. Respiratory status has since stabilized with resolution of wheezing and restoration of baseline oxygen needs. During hospitalization, she developed gross hematuria on 7/7 without significant bleeding or anemia. Treated empirically for UTI with 7 days of antibiotics, now completed. Hematuria resolved; urine cytology was negative and outpatient cystoscopy is planned. She remains hospitalized awaiting placement due to guardianship transition and inability to return to prior living environment. INR is therapeutic; warfarin resumed for history of DVT.     She is currently just here waiting placement      Resolved asthma exacerbation with acute on chronic hypoxic respiratory failure  - Now off continuous oxygen, baseline 2.5 L nocturnal O2  - Wheezing resolved; DuoNeb PRN  - Breo Ellipta resumed  - CPAP ordered; patient reports intolerance due to claustrophobia but has f/u sleep study     Gross hematuria, resolved; possible UTI, treated  - Resolved spontaneous gross hematuria (7/7)  - Treated empirically x7 days; abx discontinued 7/13  - Urine cytology negative  - Urology consulted; outpatient cystoscopy and possible CT urogram needed  - Monitor for recurrence     History of DVT  - Warfarin resumed, goal INR 2-3 (INR 2.99 today)  - Lovenox bridge discontinued  - Warfarin per pharmacy panel     Developmental delay; guardianship transition  - Current guardian (parents) withdrawing  - New court-appointed guardian pending  - Discharge to assisted living facility  "planned; cannot return to prior home  - Social work involved     Super morbid obesity with intertriginous skin breakdown  - WOC following  - Miconazole powder BID (per patient preference)  - Routine wound and hygiene care     Seizure disorder  - Continue Tegretol     Hypertension  - Continue lisinopril     Hyperlipidemia  - Continue statin     GERD  - Continue omeprazole     RICK  - Diagnosed but not on home CPAP  - Follow-up sleep study pending; CPAP trial in hospital ordered        # Code status: Full   # DVT: Coumadin  # IVF:  None          Medically Ready for Discharge: Ready Now                    Aimee Cyr MD    Subjective   Chief Complaint:  Placement  Subjective: No new complaints no chest pain or shortness of breath no abdominal pain          Objective   /43 (BP Location: Right arm)   Pulse 72   Temp 98.2  F (36.8  C) (Oral)   Resp 16   Ht 1.702 m (5' 7\")   Wt (!) 175.4 kg (386 lb 11 oz)   LMP 08/18/2008   SpO2 94%   BMI 60.56 kg/m       Physical Exam  General: Pt in NAD, normal appearance  HEENT: OP clear MMM, no JVD  Lungs: Clear to Auscultation Bilateral, normal breathing  without accessory muscle usage, no wheezing, rhonchi or crackles  Cardiac: +S1, S2, RRR, no MRG, no edema  Abdominal: normal bowel sounds, NT/ND  Skin: warm, dry, normal turgor, no rash  Psyche: A& O x3, appropriate affect           No intake or output data in the 24 hours ending 07/24/25 1357        Labs and Imaging Results:      No results for input(s): \"WBC\", \"HGB\", \"PLT\" in the last 168 hours.   No results for input(s): \"NA\", \"CO2\", \"BUN\", \"CREATININE\" in the last 168 hours.    Invalid input(s): \"K\", \"GLU\"     Recent Labs   Lab 07/26/25  0655 07/25/25  0711   INR 2.85* 2.95*      No results for input(s): \"CKMB\" in the last 168 hours.    Invalid input(s): \"TROPONINT\"   No results for input(s): \"ALBUMIN\", \"AST\", \"ALT\", \"ALKPHOS\", \"BILITOT\" in the last 168 hours.     Micro:     Radio:  CT Abdomen Pelvis w/o Contrast "   Final Result   IMPRESSION:    1.  No urinary tract calculi or obstruction.      2.  Interval cholecystectomy. Hepatosplenomegaly without discrete lesion. No abdominopelvic suspicious adenopathy or free fluid.      3.  IVC filter, unchanged.      4.  Postoperative changes of plate and screw fixation at the symphysis pubis. Screw fixation left SI joint. Mild left convex thoracolumbar curve. Mild degenerative changes involving the spine and joints of the pelvis. Resolved inflammatory changes    involving the pannus seen previously. Small fat-containing ventral umbilical hernia.         CT Chest Pulmonary Embolism w Contrast   Final Result   IMPRESSION:   1.  Subtle areas of bronchial wall thickening with areas of air trapping throughout both lungs, suggestive of small airways disease.   2.  No pulmonary embolus.              Medications:      Scheduled Meds:    Current Facility-Administered Medications   Medication Dose Route Frequency Provider Last Rate Last Admin    atorvastatin (LIPITOR) tablet 10 mg  10 mg Oral At Bedtime David Hull MD   10 mg at 07/25/25 2022    carBAMazepine (TEGretol) tablet 200 mg  200 mg Oral Q24H David Hull MD   200 mg at 07/26/25 1131    carBAMazepine (TEGretol) tablet 400 mg  400 mg Oral BID David Hull MD   400 mg at 07/26/25 0840    fluticasone (FLONASE) 50 MCG/ACT spray 1 spray  1 spray Both Nostrils Daily Neeraj Alvarado MD   1 spray at 07/26/25 0839    fluticasone-vilanterol (BREO ELLIPTA) 100-25 MCG/ACT inhaler 1 puff  1 puff Inhalation Daily David Hull MD   1 puff at 07/26/25 0812    lisinopril (ZESTRIL) tablet 20 mg  20 mg Oral Daily David Hull MD   20 mg at 07/26/25 0839    miconazole (MICATIN) 2 % powder   Topical BID Andreea Mcdonald MD   Given at 07/26/25 0839    pantoprazole (PROTONIX) EC tablet 40 mg  40 mg Oral QAM AC David Hull MD   40 mg at 07/26/25 0839    polyethylene glycol (MIRALAX) Packet 17 g  17 g Oral Daily Klarissa  Aimee Bonilla MD   17 g at 07/23/25 0805    senna-docusate (SENOKOT-S/PERICOLACE) 8.6-50 MG per tablet 1 tablet  1 tablet Oral BID Aimee Cyr MD   1 tablet at 07/26/25 0839    sertraline (ZOLOFT) tablet 100 mg  100 mg Oral Daily Neeraj Alvarado MD   100 mg at 07/26/25 0839    sodium chloride (PF) 0.9% PF flush 3 mL  3 mL Intracatheter Q8H NIC David Hull MD   3 mL at 07/26/25 0840    Warfarin Dose Required Daily - Pharmacist Managed  1 each Oral See Admin Instructions Aimee Cyr MD         Continuous Infusions:    Current Facility-Administered Medications   Medication Dose Route Frequency Provider Last Rate Last Admin    Patient is already receiving anticoagulation with heparin, enoxaparin (LOVENOX), warfarin (COUMADIN)  or other anticoagulant medication   Does not apply Continuous PRN David Hull MD         PRN Meds:    Current Facility-Administered Medications   Medication Dose Route Frequency Provider Last Rate Last Admin    acetaminophen (TYLENOL) tablet 650 mg  650 mg Oral Q4H PRN David Hull MD   650 mg at 07/25/25 1127    Or    acetaminophen (TYLENOL) Suppository 650 mg  650 mg Rectal Q4H PRN David Hull MD        albuterol (PROVENTIL HFA/VENTOLIN HFA) inhaler  2 puff Inhalation Q6H PRN Neeraj Alvarado MD        albuterol (PROVENTIL) neb solution 2.5 mg  2.5 mg Nebulization Q2H PRN David Hull MD   2.5 mg at 06/26/25 0416    bisacodyl (DULCOLAX) suppository 10 mg  10 mg Rectal Daily PRN Aimee Cyr MD        calcium carbonate (TUMS) chewable tablet 1,000 mg  1,000 mg Oral 4x Daily PRN David Hull MD   1,000 mg at 07/25/25 1444    ipratropium - albuterol 0.5 mg/2.5 mg (3mg)/3 mL (DUONEB) neb solution 3 mL  3 mL Nebulization Q4H PRN Andreea Mcdonald MD   3 mL at 07/17/25 0751    lidocaine (LMX4) cream   Topical Q1H PRN David Hull MD        lidocaine 1 % 0.1-1 mL  0.1-1 mL Other Q1H PRN David Hull MD        melatonin tablet  5 mg  5 mg Oral At Bedtime PRN David Hull MD   5 mg at 07/19/25 2127    naloxone (NARCAN) injection 0.2 mg  0.2 mg Intravenous Q2 Min PRN David Hull MD        Or    naloxone (NARCAN) injection 0.4 mg  0.4 mg Intravenous Q2 Min PRN David Hull MD        Or    naloxone (NARCAN) injection 0.2 mg  0.2 mg Intramuscular Q2 Min PRN David Hull MD        Or    naloxone (NARCAN) injection 0.4 mg  0.4 mg Intramuscular Q2 Min PRN David Hull MD        ondansetron (ZOFRAN ODT) ODT tab 4 mg  4 mg Oral Q6H PRN David Hull MD   4 mg at 07/07/25 0930    Or    ondansetron (ZOFRAN) injection 4 mg  4 mg Intravenous Q6H PRN David Hull MD   4 mg at 07/07/25 1455    oxyCODONE IR (ROXICODONE) half-tab 2.5 mg  2.5 mg Oral Q4H PRN Kaia Evans DO   2.5 mg at 07/25/25 1329    Patient is already receiving anticoagulation with heparin, enoxaparin (LOVENOX), warfarin (COUMADIN)  or other anticoagulant medication   Does not apply Continuous PRN David Hull MD        simethicone (MYLICON) chewable tablet 80 mg  80 mg Oral Q6H PRN Neeraj Reed MD   80 mg at 07/10/25 1455    sodium chloride (PF) 0.9% PF flush 3 mL  3 mL Intracatheter q1 min prn David Hull MD

## 2025-07-27 LAB
INR PPP: 3.09 (ref 0.85–1.15)
PROTHROMBIN TIME: 31.2 SECONDS (ref 11.8–14.8)

## 2025-07-27 PROCEDURE — 94640 AIRWAY INHALATION TREATMENT: CPT

## 2025-07-27 PROCEDURE — 250N000013 HC RX MED GY IP 250 OP 250 PS 637: Performed by: INTERNAL MEDICINE

## 2025-07-27 PROCEDURE — 250N000013 HC RX MED GY IP 250 OP 250 PS 637: Performed by: HOSPITALIST

## 2025-07-27 PROCEDURE — 36415 COLL VENOUS BLD VENIPUNCTURE: CPT | Performed by: HOSPITALIST

## 2025-07-27 PROCEDURE — 999N000157 HC STATISTIC RCP TIME EA 10 MIN

## 2025-07-27 PROCEDURE — 120N000001 HC R&B MED SURG/OB

## 2025-07-27 PROCEDURE — 85610 PROTHROMBIN TIME: CPT | Performed by: HOSPITALIST

## 2025-07-27 PROCEDURE — 99232 SBSQ HOSP IP/OBS MODERATE 35: CPT | Performed by: HOSPITALIST

## 2025-07-27 RX ORDER — WARFARIN SODIUM 4 MG/1
8 TABLET ORAL
Status: COMPLETED | OUTPATIENT
Start: 2025-07-27 | End: 2025-07-27

## 2025-07-27 RX ADMIN — ATORVASTATIN CALCIUM 10 MG: 10 TABLET, FILM COATED ORAL at 21:01

## 2025-07-27 RX ADMIN — CARBAMAZEPINE 200 MG: 200 TABLET ORAL at 12:07

## 2025-07-27 RX ADMIN — CALCIUM CARBONATE (ANTACID) CHEW TAB 500 MG 1000 MG: 500 CHEW TAB at 12:07

## 2025-07-27 RX ADMIN — ACETAMINOPHEN 650 MG: 325 TABLET ORAL at 12:08

## 2025-07-27 RX ADMIN — MICONAZOLE NITRATE: 20 POWDER TOPICAL at 08:29

## 2025-07-27 RX ADMIN — MICONAZOLE NITRATE: 20 POWDER TOPICAL at 21:00

## 2025-07-27 RX ADMIN — CARBAMAZEPINE 400 MG: 200 TABLET ORAL at 08:29

## 2025-07-27 RX ADMIN — FLUTICASONE FUROATE AND VILANTEROL TRIFENATATE 1 PUFF: 100; 25 POWDER RESPIRATORY (INHALATION) at 08:25

## 2025-07-27 RX ADMIN — CARBAMAZEPINE 400 MG: 200 TABLET ORAL at 21:01

## 2025-07-27 RX ADMIN — SENNOSIDES AND DOCUSATE SODIUM 1 TABLET: 50; 8.6 TABLET ORAL at 08:29

## 2025-07-27 RX ADMIN — SENNOSIDES AND DOCUSATE SODIUM 1 TABLET: 50; 8.6 TABLET ORAL at 21:01

## 2025-07-27 RX ADMIN — FLUTICASONE PROPIONATE 1 SPRAY: 50 SPRAY, METERED NASAL at 08:29

## 2025-07-27 RX ADMIN — LISINOPRIL 20 MG: 20 TABLET ORAL at 08:29

## 2025-07-27 RX ADMIN — PANTOPRAZOLE SODIUM 40 MG: 40 TABLET, DELAYED RELEASE ORAL at 08:29

## 2025-07-27 RX ADMIN — WARFARIN SODIUM 8 MG: 4 TABLET ORAL at 17:52

## 2025-07-27 RX ADMIN — SERTRALINE 100 MG: 100 TABLET, FILM COATED ORAL at 08:28

## 2025-07-27 RX ADMIN — ACETAMINOPHEN 650 MG: 325 TABLET ORAL at 18:52

## 2025-07-27 ASSESSMENT — ACTIVITIES OF DAILY LIVING (ADL)
ADLS_ACUITY_SCORE: 60
ADLS_ACUITY_SCORE: 56

## 2025-07-27 NOTE — PROGRESS NOTES
[facility]    Hospitalist Progress Note             Date of Admission:  6/25/2025                   Day of hospitalization: 32    Assessment and Plan:   60-year-old female with developmental delay, asthma on home oxygen, RICK, seizure disorder, and super morbid obesity was admitted on 6/25/2025 with acute on chronic hypoxic respiratory failure secondary to asthma exacerbation triggered by poor air quality and lack of access to her LABA/ICS. Respiratory status has since stabilized with resolution of wheezing and restoration of baseline oxygen needs. During hospitalization, she developed gross hematuria on 7/7 without significant bleeding or anemia. Treated empirically for UTI with 7 days of antibiotics, now completed. Hematuria resolved; urine cytology was negative and outpatient cystoscopy is planned. She remains hospitalized awaiting placement due to guardianship transition and inability to return to prior living environment. INR is therapeutic; warfarin resumed for history of DVT.     She is currently just here waiting placement      Resolved asthma exacerbation with acute on chronic hypoxic respiratory failure  - Now off continuous oxygen, baseline 2.5 L nocturnal O2  - Wheezing resolved; DuoNeb PRN  - Breo Ellipta resumed  - CPAP ordered; patient reports intolerance due to claustrophobia but has f/u sleep study     Gross hematuria, resolved; possible UTI, treated  - Resolved spontaneous gross hematuria (7/7)  - Treated empirically x7 days; abx discontinued 7/13  - Urine cytology negative  - Urology consulted; outpatient cystoscopy and possible CT urogram needed  - Monitor for recurrence     History of DVT  - Warfarin resumed, goal INR 2-3 (INR 2.99 today)  - Lovenox bridge discontinued  - Warfarin per pharmacy panel     Developmental delay; guardianship transition  - Current guardian (parents) withdrawing  - New court-appointed guardian pending  - Discharge to assisted living facility  "planned; cannot return to prior home  - Social work involved     Super morbid obesity with intertriginous skin breakdown  - WOC following  - Miconazole powder BID (per patient preference)  - Routine wound and hygiene care     Seizure disorder  - Continue Tegretol     Hypertension  - Continue lisinopril     Hyperlipidemia  - Continue statin     GERD  - Continue omeprazole     RICK  - Diagnosed but not on home CPAP  - Follow-up sleep study pending; CPAP trial in hospital ordered        # Code status: Full   # DVT: Coumadin  # IVF:  None          Medically Ready for Discharge: Ready Now                    Aimee Cyr MD    Subjective   Chief Complaint:  Placement  Subjective: No new complaints no chest pain or shortness of breath no abdominal pain          Objective   /48 (BP Location: Right arm)   Pulse 69   Temp 98.2  F (36.8  C) (Oral)   Resp 20   Ht 1.702 m (5' 7\")   Wt (!) 175.4 kg (386 lb 11 oz)   LMP 08/18/2008   SpO2 95%   BMI 60.56 kg/m       Physical Exam  General: Pt in NAD, normal appearance  HEENT: OP clear MMM, no JVD  Lungs: Clear to Auscultation Bilateral, normal breathing  without accessory muscle usage, no wheezing, rhonchi or crackles  Cardiac: +S1, S2, RRR, no MRG, no edema  Abdominal: normal bowel sounds, NT/ND  Skin: warm, dry, normal turgor, no rash  Psyche: A& O x3, appropriate affect           No intake or output data in the 24 hours ending 07/24/25 1357        Labs and Imaging Results:      No results for input(s): \"WBC\", \"HGB\", \"PLT\" in the last 168 hours.   No results for input(s): \"NA\", \"CO2\", \"BUN\", \"CREATININE\" in the last 168 hours.    Invalid input(s): \"K\", \"GLU\"     Recent Labs   Lab 07/27/25  0742 07/26/25  0655   INR 3.09* 2.85*      No results for input(s): \"CKMB\" in the last 168 hours.    Invalid input(s): \"TROPONINT\"   No results for input(s): \"ALBUMIN\", \"AST\", \"ALT\", \"ALKPHOS\", \"BILITOT\" in the last 168 hours.     Micro:     Radio:  CT Abdomen Pelvis w/o Contrast "   Final Result   IMPRESSION:    1.  No urinary tract calculi or obstruction.      2.  Interval cholecystectomy. Hepatosplenomegaly without discrete lesion. No abdominopelvic suspicious adenopathy or free fluid.      3.  IVC filter, unchanged.      4.  Postoperative changes of plate and screw fixation at the symphysis pubis. Screw fixation left SI joint. Mild left convex thoracolumbar curve. Mild degenerative changes involving the spine and joints of the pelvis. Resolved inflammatory changes    involving the pannus seen previously. Small fat-containing ventral umbilical hernia.         CT Chest Pulmonary Embolism w Contrast   Final Result   IMPRESSION:   1.  Subtle areas of bronchial wall thickening with areas of air trapping throughout both lungs, suggestive of small airways disease.   2.  No pulmonary embolus.              Medications:      Scheduled Meds:    Current Facility-Administered Medications   Medication Dose Route Frequency Provider Last Rate Last Admin    atorvastatin (LIPITOR) tablet 10 mg  10 mg Oral At Bedtime David Hull MD   10 mg at 07/26/25 2030    carBAMazepine (TEGretol) tablet 200 mg  200 mg Oral Q24H David Hull MD   200 mg at 07/26/25 1131    carBAMazepine (TEGretol) tablet 400 mg  400 mg Oral BID David Hull MD   400 mg at 07/27/25 0829    fluticasone (FLONASE) 50 MCG/ACT spray 1 spray  1 spray Both Nostrils Daily Neeraj Alvarado MD   1 spray at 07/27/25 0829    fluticasone-vilanterol (BREO ELLIPTA) 100-25 MCG/ACT inhaler 1 puff  1 puff Inhalation Daily David Hull MD   1 puff at 07/27/25 0825    lisinopril (ZESTRIL) tablet 20 mg  20 mg Oral Daily David uHll MD   20 mg at 07/27/25 0829    miconazole (MICATIN) 2 % powder   Topical BID Andreea Mcdonald MD   Given at 07/27/25 0829    pantoprazole (PROTONIX) EC tablet 40 mg  40 mg Oral QAM AC David Hull MD   40 mg at 07/27/25 0829    polyethylene glycol (MIRALAX) Packet 17 g  17 g Oral Daily Klarissa  Aimee Bonilla MD   17 g at 07/23/25 0805    senna-docusate (SENOKOT-S/PERICOLACE) 8.6-50 MG per tablet 1 tablet  1 tablet Oral BID Aimee Cyr MD   1 tablet at 07/27/25 0829    sertraline (ZOLOFT) tablet 100 mg  100 mg Oral Daily Neeraj Alvarado MD   100 mg at 07/27/25 0828    sodium chloride (PF) 0.9% PF flush 3 mL  3 mL Intracatheter Q8H NIC David Hull MD   3 mL at 07/27/25 0828    warfarin ANTICOAGULANT (COUMADIN/JANTOVEN) tablet 8 mg  8 mg Oral ONCE at 18:00 David Hull MD        Warfarin Dose Required Daily - Pharmacist Managed  1 each Oral See Admin Instructions Aimee Cyr MD         Continuous Infusions:    Current Facility-Administered Medications   Medication Dose Route Frequency Provider Last Rate Last Admin    Patient is already receiving anticoagulation with heparin, enoxaparin (LOVENOX), warfarin (COUMADIN)  or other anticoagulant medication   Does not apply Continuous PRN David Hull MD         PRN Meds:    Current Facility-Administered Medications   Medication Dose Route Frequency Provider Last Rate Last Admin    acetaminophen (TYLENOL) tablet 650 mg  650 mg Oral Q4H PRN David Hull MD   650 mg at 07/26/25 1429    Or    acetaminophen (TYLENOL) Suppository 650 mg  650 mg Rectal Q4H PRN David Hull MD        albuterol (PROVENTIL HFA/VENTOLIN HFA) inhaler  2 puff Inhalation Q6H PRN Neeraj Alvarado MD        albuterol (PROVENTIL) neb solution 2.5 mg  2.5 mg Nebulization Q2H PRN David Hull MD   2.5 mg at 06/26/25 0416    bisacodyl (DULCOLAX) suppository 10 mg  10 mg Rectal Daily PRN Aimee Cyr MD        calcium carbonate (TUMS) chewable tablet 1,000 mg  1,000 mg Oral 4x Daily PRN David Hull MD   1,000 mg at 07/26/25 1429    ipratropium - albuterol 0.5 mg/2.5 mg (3mg)/3 mL (DUONEB) neb solution 3 mL  3 mL Nebulization Q4H PRN Andreea Mcdonald MD   3 mL at 07/17/25 0751    lidocaine (LMX4) cream   Topical Q1H PRN Kurtis  David GRANT MD        lidocaine 1 % 0.1-1 mL  0.1-1 mL Other Q1H PRN David Hull MD        melatonin tablet 5 mg  5 mg Oral At Bedtime PRN David Hull MD   5 mg at 07/19/25 2127    naloxone (NARCAN) injection 0.2 mg  0.2 mg Intravenous Q2 Min PRN David Hull MD        Or    naloxone (NARCAN) injection 0.4 mg  0.4 mg Intravenous Q2 Min PRN David Hull MD        Or    naloxone (NARCAN) injection 0.2 mg  0.2 mg Intramuscular Q2 Min PRN David Hull MD        Or    naloxone (NARCAN) injection 0.4 mg  0.4 mg Intramuscular Q2 Min PRN David Hull MD        ondansetron (ZOFRAN ODT) ODT tab 4 mg  4 mg Oral Q6H PRN David Hull MD   4 mg at 07/07/25 0930    Or    ondansetron (ZOFRAN) injection 4 mg  4 mg Intravenous Q6H PRN David Hull MD   4 mg at 07/07/25 1455    oxyCODONE IR (ROXICODONE) half-tab 2.5 mg  2.5 mg Oral Q4H PRN Kaia Evans DO   2.5 mg at 07/26/25 1336    Patient is already receiving anticoagulation with heparin, enoxaparin (LOVENOX), warfarin (COUMADIN)  or other anticoagulant medication   Does not apply Continuous PRN David Hull MD        simethicone (MYLICON) chewable tablet 80 mg  80 mg Oral Q6H PRN Neeraj Reed MD   80 mg at 07/10/25 1455    sodium chloride (PF) 0.9% PF flush 3 mL  3 mL Intracatheter q1 min prn David Hull MD

## 2025-07-27 NOTE — PLAN OF CARE
Pertinent assessments: A&Ox4, forgetful. On room air while awake and uses 2L NC at sleep. Tolerating a regular diet. Saline locked PIV. Up with SBA, gait belt, and walker. Pt is incontinent of urine. Areas of redness and excoriation under a myriad of skin folds.      Major Shift Events: none.     Treatment Plan: O2 at sleep, incontinence management, wound care, CM following, and discharge pending.     Bedside Nurse: Randy Coronado RN    Problem: Adult Inpatient Plan of Care  Goal: Plan of Care Review  Description: The Plan of Care Review/Shift note should be completed every shift.  The Outcome Evaluation is a brief statement about your assessment that the patient is improving, declining, or no change.  This information will be displayed automatically on your shift  note.  Recent Flowsheet Documentation  Taken 7/26/2025 2244 by Randy Coronado RN  Outcome Evaluation: Continue the plan of care.  Plan of Care Reviewed With: patient  Overall Patient Progress: no change  Goal: Absence of Hospital-Acquired Illness or Injury  Intervention: Identify and Manage Fall Risk  Recent Flowsheet Documentation  Taken 7/26/2025 1733 by Randy Coronado RN  Safety Promotion/Fall Prevention:   activity supervised   increase visualization of patient   nonskid shoes/slippers when out of bed   patient and family education   room near nurse's station   safety round/check completed   supervised activity   treat reversible contributory factors  Intervention: Prevent Skin Injury  Recent Flowsheet Documentation  Taken 7/26/2025 1733 by Randy Coronado RN  Body Position: position changed independently  Intervention: Prevent and Manage VTE (Venous Thromboembolism) Risk  Recent Flowsheet Documentation  Taken 7/26/2025 1733 by Randy Coronado RN  VTE Prevention/Management: SCDs off (sequential compression devices)  Intervention: Prevent Infection  Recent Flowsheet Documentation  Taken 7/26/2025 1733 by Randy Coronado RN  Infection  Prevention:   cohorting utilized   hand hygiene promoted   rest/sleep promoted   single patient room provided     Goal Outcome Evaluation:      Plan of Care Reviewed With: patient    Overall Patient Progress: no change    Overall Patient Progress: no change    Outcome Evaluation: Continue the plan of care.

## 2025-07-27 NOTE — PLAN OF CARE
"Pt alert and oriented, 2LNC of oxygen at night. Denied pain. possible discharge to Assisted living on Monday.   Problem: Adult Inpatient Plan of Care  Goal: Plan of Care Review  Description: The Plan of Care Review/Shift note should be completed every shift.  The Outcome Evaluation is a brief statement about your assessment that the patient is improving, declining, or no change.  This information will be displayed automatically on your shift  note.  Outcome: Progressing  Flowsheets (Taken 7/27/2025 0506)  Outcome Evaluation: awaiting placement possibly on monday  Plan of Care Reviewed With: patient  Overall Patient Progress: improving  Goal: Patient-Specific Goal (Individualized)  Description: You can add care plan individualizations to a care plan. Examples of Individualization might be:  \"Parent requests to be called daily at 9am for status\", \"I have a hard time hearing out of my right ear\", or \"Do not touch me to wake me up as it startles  me\".  Outcome: Progressing  Goal: Absence of Hospital-Acquired Illness or Injury  Outcome: Progressing  Intervention: Prevent Skin Injury  Recent Flowsheet Documentation  Taken 7/27/2025 0029 by Lola Ellsworth RN  Body Position: position changed independently  Goal: Optimal Comfort and Wellbeing  Outcome: Progressing  Goal: Readiness for Transition of Care  Outcome: Progressing     Problem: Gas Exchange Impaired  Goal: Optimal Gas Exchange  Outcome: Progressing  Intervention: Optimize Oxygenation and Ventilation  Recent Flowsheet Documentation  Taken 7/27/2025 0029 by Lola Ellsworth, RN  Head of Bed (HOB) Positioning: HOB at 20-30 degrees     Problem: Infection  Goal: Absence of Infection Signs and Symptoms  Outcome: Progressing   Goal Outcome Evaluation:      Plan of Care Reviewed With: patient    Overall Patient Progress: improvingOverall Patient Progress: improving    Outcome Evaluation: awaiting placement possibly on monday          "

## 2025-07-27 NOTE — PLAN OF CARE
"End of Shift Summary  For vital signs and complete assessments, please see documentation flowsheets.     Pertinent assessments: Pt A&Ox4. VSS on RA, afebrile. Denies N/V & SOB. SBA. Tolerating regular diet. Reported pain controlled with PRN Tylenol. Requested Tums following lunch for heart burn and gas. Several episodes of incontinence during shift, though calls for bed changes. Miconazole powder to abdominal folds & between thighs. PIV SL.    Major Shift Events: Uneventful.     Treatment Plan: Pain control, moisture management. Plans to discharge tomorrow 7/28 to AL facility.        Bedside Nurse: Odette New, Nursing Student        Goal Outcome Evaluation:    Plan of Care Reviewed With: patient, parent    Overall Patient Progress: no change    Outcome Evaluation: A&Ox4. VSS. PRN Tylenol & Tums given. BM reported. Several episodes of incontinence. Possible discharge tomorrow 7/28 to AL facility.      Problem: Adult Inpatient Plan of Care  Goal: Plan of Care Review  Description: The Plan of Care Review/Shift note should be completed every shift.  The Outcome Evaluation is a brief statement about your assessment that the patient is improving, declining, or no change.  This information will be displayed automatically on your shift  note.  Outcome: Progressing  Flowsheets (Taken 7/27/2025 1430)  Outcome Evaluation: A&Ox4. VSS. PRN Tylenol & Tums given. BM reported. Several episodes of incontinence. Possible discharge tomorrow 7/28 to AL facility.  Plan of Care Reviewed With:   patient   parent  Overall Patient Progress: no change  Goal: Patient-Specific Goal (Individualized)  Description: You can add care plan individualizations to a care plan. Examples of Individualization might be:  \"Parent requests to be called daily at 9am for status\", \"I have a hard time hearing out of my right ear\", or \"Do not touch me to wake me up as it startles  me\".  Outcome: Progressing  Goal: Absence of Hospital-Acquired Illness or " Injury  Outcome: Progressing  Intervention: Identify and Manage Fall Risk  Recent Flowsheet Documentation  Taken 7/27/2025 0830 by Odette New  Safety Promotion/Fall Prevention:   activity supervised   clutter free environment maintained   increased rounding and observation   increase visualization of patient   nonskid shoes/slippers when out of bed   room near nurse's station   safety round/check completed   supervised activity  Intervention: Prevent Skin Injury  Recent Flowsheet Documentation  Taken 7/27/2025 1023 by Odette New  Body Position: position changed independently  Taken 7/27/2025 0830 by Odette New  Body Position: position changed independently  Skin Protection: adhesive use limited  Intervention: Prevent and Manage VTE (Venous Thromboembolism) Risk  Recent Flowsheet Documentation  Taken 7/27/2025 0830 by Odette New  VTE Prevention/Management: SCDs off (sequential compression devices)  Goal: Optimal Comfort and Wellbeing  Outcome: Progressing  Goal: Readiness for Transition of Care  Outcome: Progressing     Problem: Gas Exchange Impaired  Goal: Optimal Gas Exchange  Outcome: Progressing  Intervention: Optimize Oxygenation and Ventilation  Recent Flowsheet Documentation  Taken 7/27/2025 1023 by Odette New  Head of Bed (HOB) Positioning: HOB at 20-30 degrees  Taken 7/27/2025 0830 by Odette New  Head of Bed (HOB) Positioning: HOB at 20-30 degrees     Problem: Infection  Goal: Absence of Infection Signs and Symptoms  Outcome: Progressing

## 2025-07-28 ENCOUNTER — TELEPHONE (OUTPATIENT)
Dept: INTERNAL MEDICINE | Facility: CLINIC | Age: 60
End: 2025-07-28
Payer: MEDICARE

## 2025-07-28 LAB
ERYTHROCYTE [DISTWIDTH] IN BLOOD BY AUTOMATED COUNT: 14.7 % (ref 10–15)
HCT VFR BLD AUTO: 40.5 % (ref 35–47)
HGB BLD-MCNC: 12.6 G/DL (ref 11.7–15.7)
HOLD SPECIMEN: NORMAL
HOLD SPECIMEN: NORMAL
INR PPP: 3.22 (ref 0.85–1.15)
MCH RBC QN AUTO: 27.9 PG (ref 26.5–33)
MCHC RBC AUTO-ENTMCNC: 31.1 G/DL (ref 31.5–36.5)
MCV RBC AUTO: 90 FL (ref 78–100)
PLATELET # BLD AUTO: 200 10E3/UL (ref 150–450)
PROTHROMBIN TIME: 32.2 SECONDS (ref 11.8–14.8)
RBC # BLD AUTO: 4.52 10E6/UL (ref 3.8–5.2)
WBC # BLD AUTO: 5.9 10E3/UL (ref 4–11)

## 2025-07-28 PROCEDURE — 250N000013 HC RX MED GY IP 250 OP 250 PS 637: Performed by: HOSPITALIST

## 2025-07-28 PROCEDURE — 94640 AIRWAY INHALATION TREATMENT: CPT

## 2025-07-28 PROCEDURE — 85027 COMPLETE CBC AUTOMATED: CPT | Performed by: INTERNAL MEDICINE

## 2025-07-28 PROCEDURE — 120N000001 HC R&B MED SURG/OB

## 2025-07-28 PROCEDURE — 36415 COLL VENOUS BLD VENIPUNCTURE: CPT | Performed by: HOSPITALIST

## 2025-07-28 PROCEDURE — 250N000013 HC RX MED GY IP 250 OP 250 PS 637: Performed by: INTERNAL MEDICINE

## 2025-07-28 PROCEDURE — 99233 SBSQ HOSP IP/OBS HIGH 50: CPT | Performed by: INTERNAL MEDICINE

## 2025-07-28 PROCEDURE — 85610 PROTHROMBIN TIME: CPT | Performed by: HOSPITALIST

## 2025-07-28 PROCEDURE — 99231 SBSQ HOSP IP/OBS SF/LOW 25: CPT | Performed by: PHYSICIAN ASSISTANT

## 2025-07-28 RX ADMIN — CARBAMAZEPINE 400 MG: 200 TABLET ORAL at 08:13

## 2025-07-28 RX ADMIN — ATORVASTATIN CALCIUM 10 MG: 10 TABLET, FILM COATED ORAL at 21:18

## 2025-07-28 RX ADMIN — ACETAMINOPHEN 650 MG: 325 TABLET ORAL at 17:47

## 2025-07-28 RX ADMIN — CARBAMAZEPINE 200 MG: 200 TABLET ORAL at 11:42

## 2025-07-28 RX ADMIN — FLUTICASONE FUROATE AND VILANTEROL TRIFENATATE 1 PUFF: 100; 25 POWDER RESPIRATORY (INHALATION) at 08:08

## 2025-07-28 RX ADMIN — CALCIUM CARBONATE (ANTACID) CHEW TAB 500 MG 1000 MG: 500 CHEW TAB at 10:52

## 2025-07-28 RX ADMIN — PANTOPRAZOLE SODIUM 40 MG: 40 TABLET, DELAYED RELEASE ORAL at 08:14

## 2025-07-28 RX ADMIN — ACETAMINOPHEN 650 MG: 325 TABLET ORAL at 10:52

## 2025-07-28 RX ADMIN — CARBAMAZEPINE 400 MG: 200 TABLET ORAL at 21:18

## 2025-07-28 RX ADMIN — MICONAZOLE NITRATE: 20 POWDER TOPICAL at 08:14

## 2025-07-28 RX ADMIN — SERTRALINE 100 MG: 100 TABLET, FILM COATED ORAL at 08:14

## 2025-07-28 RX ADMIN — SENNOSIDES AND DOCUSATE SODIUM 1 TABLET: 50; 8.6 TABLET ORAL at 08:14

## 2025-07-28 RX ADMIN — LISINOPRIL 20 MG: 20 TABLET ORAL at 08:13

## 2025-07-28 RX ADMIN — FLUTICASONE PROPIONATE 1 SPRAY: 50 SPRAY, METERED NASAL at 08:14

## 2025-07-28 RX ADMIN — MICONAZOLE NITRATE: 20 POWDER TOPICAL at 21:18

## 2025-07-28 ASSESSMENT — ACTIVITIES OF DAILY LIVING (ADL)
ADLS_ACUITY_SCORE: 56

## 2025-07-28 NOTE — PLAN OF CARE
Pertinent assessments: A&Ox4, forgetful. On room air while awake and uses 2L NC at sleep. Tolerating a regular diet. Saline locked PIV. Up with SBA, gait belt, and walker. Pt is incontinent of urine. Areas of redness and excoriation under skin folds. PRN PO Tylenol given x1.     Major Shift Events: none.     Treatment Plan: O2 at sleep, incontinence management, wound care, CM following, and discharge pending.     Bedside Nurse: Randy Coronado RN    Problem: Adult Inpatient Plan of Care  Goal: Plan of Care Review  Description: The Plan of Care Review/Shift note should be completed every shift.  The Outcome Evaluation is a brief statement about your assessment that the patient is improving, declining, or no change.  This information will be displayed automatically on your shift  note.  Recent Flowsheet Documentation  Taken 7/27/2025 2218 by Randy Coronado RN  Outcome Evaluation: Continue plan of care.  Plan of Care Reviewed With: patient  Overall Patient Progress: no change  Goal: Absence of Hospital-Acquired Illness or Injury  Intervention: Identify and Manage Fall Risk  Recent Flowsheet Documentation  Taken 7/27/2025 1753 by Randy Coronado RN  Safety Promotion/Fall Prevention:   activity supervised   clutter free environment maintained   increase visualization of patient   lighting adjusted   nonskid shoes/slippers when out of bed   patient and family education   room near nurse's station   safety round/check completed   treat reversible contributory factors  Intervention: Prevent Skin Injury  Recent Flowsheet Documentation  Taken 7/27/2025 1753 by Randy Coronado RN  Body Position: position changed independently  Intervention: Prevent and Manage VTE (Venous Thromboembolism) Risk  Recent Flowsheet Documentation  Taken 7/27/2025 1753 by Randy Coronado RN  VTE Prevention/Management: SCDs off (sequential compression devices)  Intervention: Prevent Infection  Recent Flowsheet Documentation  Taken 7/27/2025 1753  by Randy Coronado, RN  Infection Prevention:   cohorting utilized   hand hygiene promoted   rest/sleep promoted   single patient room provided     Goal Outcome Evaluation:      Plan of Care Reviewed With: patient    Overall Patient Progress: no change    Overall Patient Progress: no change    Outcome Evaluation: Continue plan of care.

## 2025-07-28 NOTE — PROGRESS NOTES
[facility] Urology Progress Note     Consult placed, but seen earlier in hospitalization, so progress.         Assessment and Plan:     Assessment:    Gross hematuria, recurrent    Moderate persistent asthma with exacerbation    Possible urinary tract infection    Recent DVT on warfarin, which is currently being held, history of IVC filter    Developmental delay with guardian    Morbid obesity    Seizure disorder    Hypertension      Plan:   -Bladder scan if having difficulty urinating. If significant retention greater than 350 mL or difficulty with urination, would recommend Grajeda catheter placement and then subsequent irrigation.  - If patient is emptying adequately and is able to void, okay to continue without Grajeda catheter.  - Recommend repeat UA and urine culture.  If positive, will treat with culture specific antibiotics.  - Continue to monitor hemoglobin.  -Would be reasonable to resume warfarin as hematuria clears.  Can be restarted before cystoscopy, as this does not require holding anticoagulation.  - Plan for cystoscopy, as scheduled, on 08/05/2025 in our [place] office with Dr. Zelaya.  - As long as patient is urinating and hemoglobin stable, okay to discharge from urology perspective with outpatient follow-up as scheduled next week.       Renée Bautista PA-C   [facility] Urology  [phone]               Interval History:     Patient had gross hematuria earlier in hospitalization prompting urology consult.  Urine culture was negative, but gross hematuria resolved with holding warfarin and antibiotics for possible UTI.  Patient had recurrent gross hematuria starting last night into today.  She notes incontinence and urgency, but denies dysuria.  Hemoglobin is 12.6, which is the highest it has been in several weeks. Patient denies nausea, vomiting, fever, chills, shortness of breath or lightheadedness.  at bedside.  Urine cytology was negative several weeks ago. INR 3.22.  On  warfarin for DVT.                Review of Systems:     The 5 point Review of Systems is negative other than noted in the HPI             Medications:     Current Facility-Administered Medications   Medication Dose Route Frequency Provider Last Rate Last Admin    acetaminophen (TYLENOL) tablet 650 mg  650 mg Oral Q4H PRN David Hull MD   650 mg at 07/28/25 1052    Or    acetaminophen (TYLENOL) Suppository 650 mg  650 mg Rectal Q4H PRN David Hull MD        albuterol (PROVENTIL HFA/VENTOLIN HFA) inhaler  2 puff Inhalation Q6H PRN Neeraj Alvarado MD        albuterol (PROVENTIL) neb solution 2.5 mg  2.5 mg Nebulization Q2H PRN David Hull MD   2.5 mg at 06/26/25 0416    atorvastatin (LIPITOR) tablet 10 mg  10 mg Oral At Bedtime David Hull MD   10 mg at 07/27/25 2101    bisacodyl (DULCOLAX) suppository 10 mg  10 mg Rectal Daily PRN Aimee Cyr MD        calcium carbonate (TUMS) chewable tablet 1,000 mg  1,000 mg Oral 4x Daily PRN David Hull MD   1,000 mg at 07/28/25 1052    carBAMazepine (TEGretol) tablet 200 mg  200 mg Oral Q24H David Hull MD   200 mg at 07/28/25 1142    carBAMazepine (TEGretol) tablet 400 mg  400 mg Oral BID David Hull MD   400 mg at 07/28/25 0813    fluticasone (FLONASE) 50 MCG/ACT spray 1 spray  1 spray Both Nostrils Daily Neeraj Alvarado MD   1 spray at 07/28/25 0814    fluticasone-vilanterol (BREO ELLIPTA) 100-25 MCG/ACT inhaler 1 puff  1 puff Inhalation Daily David Hull MD   1 puff at 07/28/25 0808    ipratropium - albuterol 0.5 mg/2.5 mg (3mg)/3 mL (DUONEB) neb solution 3 mL  3 mL Nebulization Q4H PRN Andreea Mcdonald MD   3 mL at 07/17/25 0751    lidocaine (LMX4) cream   Topical Q1H PRN David Hull MD        lidocaine 1 % 0.1-1 mL  0.1-1 mL Other Q1H PRN David Hull MD        lisinopril (ZESTRIL) tablet 20 mg  20 mg Oral Daily David Hull MD   20 mg at 07/28/25 0813    melatonin tablet 5 mg  5 mg  Oral At Bedtime PRN David Hull MD   5 mg at 07/19/25 2127    miconazole (MICATIN) 2 % powder   Topical BID Andreea Mcdonald MD   Given at 07/28/25 0814    naloxone (NARCAN) injection 0.2 mg  0.2 mg Intravenous Q2 Min PRN David Hull MD        Or    naloxone (NARCAN) injection 0.4 mg  0.4 mg Intravenous Q2 Min PRN David Hull MD        Or    naloxone (NARCAN) injection 0.2 mg  0.2 mg Intramuscular Q2 Min PRN David Hull MD        Or    naloxone (NARCAN) injection 0.4 mg  0.4 mg Intramuscular Q2 Min PRN David Hull MD        ondansetron (ZOFRAN ODT) ODT tab 4 mg  4 mg Oral Q6H PRN David Hull MD   4 mg at 07/07/25 0930    Or    ondansetron (ZOFRAN) injection 4 mg  4 mg Intravenous Q6H PRN David Hull MD   4 mg at 07/07/25 1455    oxyCODONE IR (ROXICODONE) half-tab 2.5 mg  2.5 mg Oral Q4H PRN Kaia Evans DO   2.5 mg at 07/26/25 1336    pantoprazole (PROTONIX) EC tablet 40 mg  40 mg Oral QAM AC David Hull MD   40 mg at 07/28/25 0814    Patient is already receiving anticoagulation with heparin, enoxaparin (LOVENOX), warfarin (COUMADIN)  or other anticoagulant medication   Does not apply Continuous PRN David Hull MD        polyethylene glycol (MIRALAX) Packet 17 g  17 g Oral Daily Aimee Cyr MD   17 g at 07/23/25 0805    senna-docusate (SENOKOT-S/PERICOLACE) 8.6-50 MG per tablet 1 tablet  1 tablet Oral BID Aimee Cyr MD   1 tablet at 07/28/25 0814    sertraline (ZOLOFT) tablet 100 mg  100 mg Oral Daily Neeraj Alvarado MD   100 mg at 07/28/25 0814    simethicone (MYLICON) chewable tablet 80 mg  80 mg Oral Q6H PRN Neeraj Reed MD   80 mg at 07/10/25 1455    sodium chloride (PF) 0.9% PF flush 3 mL  3 mL Intracatheter Q8H NIC David Hull MD   3 mL at 07/28/25 0814    sodium chloride (PF) 0.9% PF flush 3 mL  3 mL Intracatheter q1 min prn David Hull MD        Warfarin Dose Required Daily - Pharmacist Managed  1 each  Oral See Admin Instructions Aimee Cyr MD        warfarin-No DOSE today  1 each Does not apply no dose today (warfarin) David Hull MD                      Physical Exam:   Vitals were reviewed  Patient Vitals for the past 8 hrs:   BP Temp Temp src Pulse Resp SpO2   07/28/25 1501 (!) 106/36 98.2  F (36.8  C) Oral 78 16 95 %   07/28/25 0808 -- -- -- 63 16 95 %     GEN: NAD, lying in bed, obese  EYES: EOMI  MOUTH: MMM  NECK: Supple  RESP: Unlabored breathing  CARDIAC: LE edema  NEURO: AAO  URO: Urinating on own with gross hematuria           Data:     Lab Results   Component Value Date    NTBNPI <36 04/01/2025    NTBNPI <36 06/24/2024    NTBNPI <36 05/02/2024    NTBNP <36 06/25/2025     Lab Results   Component Value Date    WBC 5.9 07/28/2025    WBC 10.1 06/25/2025    WBC 8.5 05/05/2025    HGB 12.6 07/28/2025    HGB 11.5 (L) 07/16/2025    HGB 12.0 07/15/2025    HCT 40.5 07/28/2025    HCT 42.3 06/25/2025    HCT 40.7 05/05/2025    MCV 90 07/28/2025    MCV 87 07/16/2025    MCV 89 07/15/2025     07/28/2025     07/12/2025     06/25/2025     Lab Results   Component Value Date    INR 3.22 (H) 07/28/2025    INR 3.09 (H) 07/27/2025    INR 2.85 (H) 07/26/2025

## 2025-07-28 NOTE — PROGRESS NOTES
Care Management Follow Up    Length of Stay (days): 33    Expected Discharge Date: 07/29/2025     Concerns to be Addressed: discharge planning     Patient plan of care discussed at interdisciplinary rounds: Yes    Anticipated Discharge Disposition: [facility]      Anticipated Discharge Services:  Home Care  Anticipated Discharge DME:  No new DME needs    Patient/family educated on Medicare website which has current facility and service quality ratings:  N/A  Education Provided on the Discharge Plan:  yes  Patient/Family in Agreement with the Plan:  yes    Referrals Placed by CM/SW:  [facility]  Private pay costs discussed: transportation costs    Discussed  Partnership in Safe Discharge Planning  document with patient/family: No     Handoff Completed: Yes, [facility] PCP: Internal handoff referral completed    Additional Information:  The pt will possibly discharge to [facility] tomorrow via stretcher at 1100. If unable to discharge, Sw will reschedule transport.   Sw updated [facility], the pt's parents and the patient's guardian.    Next Steps:   Sw will continue with discharge planning and will be available as needed until discharge.    AISSATOU Macdonald, SW  Inpatient Care Coordination  [facility]  [phone]

## 2025-07-28 NOTE — TELEPHONE ENCOUNTER
Patient's  calls to request Disability Parking application. He states the one they currently have is expiring.     Looked through patient's chart and it does not appear we have done this for her in the past.  She is currently in patient at the [facility].    Patient has a new legal guardian so this writer will have to find out if this form can be provided to  or if it has to be provided to Tico Becerra, Guardian.

## 2025-07-28 NOTE — PROGRESS NOTES
[facility]    Hospitalist Progress Note             Date of Admission:  6/25/2025                   Day of hospitalization: 33    Assessment and Plan:   60-year-old female with developmental delay, asthma on home oxygen, RICK, seizure disorder, and super morbid obesity was admitted on 6/25/2025 with acute on chronic hypoxic respiratory failure secondary to asthma exacerbation triggered by poor air quality and lack of access to her LABA/ICS. Respiratory status has since stabilized with resolution of wheezing and restoration of baseline oxygen needs. During hospitalization, she developed gross hematuria on 7/7 without significant bleeding or anemia. Treated empirically for UTI with 7 days of antibiotics, now completed. Hematuria resolved; urine cytology was negative and outpatient cystoscopy is planned. She remains hospitalized awaiting placement due to guardianship transition and inability to return to prior living environment. INR is therapeutic; warfarin resumed for history of DVT.     Patient developed hematuria overnight.  Held warfarin. Re-consulted urology     Resolved asthma exacerbation with acute on chronic hypoxic respiratory failure  - Now off continuous oxygen, baseline 2.5 L nocturnal O2  - Wheezing resolved. Will continue DuoNeb PRN  - Continue Breo Ellipta  - CPAP ordered; patient reports intolerance due to claustrophobia but has f/u sleep study     Gross hematuria  - Patient had spontaneous gross hematuria  on 7/7 which initially resolved  - Treated empirically x7 days; abx discontinued 7/13  - Urine cytology negative  - Urology consulted; outpatient cystoscopy and possible CT urogram needed. Patient had gross hematuria last night and this morning.       - Will hold coumadin in the setting of recurrence of gross hematuria      - Will reconsult urology given ongoing hematuria in the setting of anticoagulation  - Monitor for recurrence     History of DVT  - Warfarin was initially  "resumed with goal INR 2-3  - Lovenox bridge discontinued after INR therapeutic.   - Now stared to have gross hematuria.        - Will hold warfarin.  Will re-consult urology for evaluation and recommendations for gross hematuria.     Developmental delay; guardianship transition  - Current guardian (parents) withdrawing  - New court-appointed guardian pending  - Discharge to assisted living facility planned; cannot return to prior home  - Social work involved     Super morbid obesity with intertriginous skin breakdown  - WOC following  - Miconazole powder BID (per patient preference)  - Routine wound and hygiene care     Seizure disorder  - Continue Tegretol     Hypertension  - Continue lisinopril     Hyperlipidemia  - Continue statin     GERD  - Continue omeprazole     RICK  - Diagnosed but not on home CPAP  - Follow-up sleep study pending; CPAP trial in hospital ordered     # Code status: Full   # DVT: Coumadin  # IVF:  None          Medically Ready for Discharge: Ready Now    Georgie Hill MD, MD    Subjective   Patient seen and examined today.  Chart reviewed.  Also discussed with RN.  Patient started having hematuria last night and this morning.  Denies abdominal pain.  No nausea or vomiting.    Objective   /49 (BP Location: Right arm)   Pulse 63   Temp 97.7  F (36.5  C) (Oral)   Resp 16   Ht 1.702 m (5' 7\")   Wt (!) 175.4 kg (386 lb 11 oz)   LMP 08/18/2008   SpO2 95%   BMI 60.56 kg/m       Physical Exam  General: Pt in NAD, normal appearance  HEENT: OP clear MMM, no JVD  Lungs: Clear to Auscultation Bilateral, normal breathing  without accessory muscle usage, no wheezing, rhonchi or crackles  Cardiac: +S1, S2, RRR, no MRG, no edema  Abdominal: normal bowel sounds, NT/ND  Skin: warm, dry, normal turgor, no rash  Psyche: A& O x3, appropriate affect           No intake or output data in the 24 hours ending 07/24/25 1357        Labs and Imaging Results:      Recent Labs   Lab 07/28/25  7388 " "  WBC 5.9   HGB 12.6         No results for input(s): \"NA\", \"CO2\", \"BUN\", \"CREATININE\" in the last 168 hours.    Invalid input(s): \"K\", \"GLU\"     Recent Labs   Lab 07/28/25  0736 07/27/25  0742   INR 3.22* 3.09*      No results for input(s): \"CKMB\" in the last 168 hours.    Invalid input(s): \"TROPONINT\"   No results for input(s): \"ALBUMIN\", \"AST\", \"ALT\", \"ALKPHOS\", \"BILITOT\" in the last 168 hours.     Micro:     Radio:  CT Abdomen Pelvis w/o Contrast   Final Result   IMPRESSION:    1.  No urinary tract calculi or obstruction.      2.  Interval cholecystectomy. Hepatosplenomegaly without discrete lesion. No abdominopelvic suspicious adenopathy or free fluid.      3.  IVC filter, unchanged.      4.  Postoperative changes of plate and screw fixation at the symphysis pubis. Screw fixation left SI joint. Mild left convex thoracolumbar curve. Mild degenerative changes involving the spine and joints of the pelvis. Resolved inflammatory changes    involving the pannus seen previously. Small fat-containing ventral umbilical hernia.         CT Chest Pulmonary Embolism w Contrast   Final Result   IMPRESSION:   1.  Subtle areas of bronchial wall thickening with areas of air trapping throughout both lungs, suggestive of small airways disease.   2.  No pulmonary embolus.              Medications:      Scheduled Meds:    Current Facility-Administered Medications   Medication Dose Route Frequency Provider Last Rate Last Admin    atorvastatin (LIPITOR) tablet 10 mg  10 mg Oral At Bedtime David Hull MD   10 mg at 07/27/25 2101    carBAMazepine (TEGretol) tablet 200 mg  200 mg Oral Q24H David Hull MD   200 mg at 07/28/25 1142    carBAMazepine (TEGretol) tablet 400 mg  400 mg Oral BID David Hull MD   400 mg at 07/28/25 0813    fluticasone (FLONASE) 50 MCG/ACT spray 1 spray  1 spray Both Nostrils Daily Neeraj Alvarado MD   1 spray at 07/28/25 0814    fluticasone-vilanterol (BREO ELLIPTA) 100-25 " MCG/ACT inhaler 1 puff  1 puff Inhalation Daily David Hull MD   1 puff at 07/28/25 0808    lisinopril (ZESTRIL) tablet 20 mg  20 mg Oral Daily David Hull MD   20 mg at 07/28/25 0813    miconazole (MICATIN) 2 % powder   Topical BID Andreea Mcdonald MD   Given at 07/28/25 0814    pantoprazole (PROTONIX) EC tablet 40 mg  40 mg Oral QAM AC David Hull MD   40 mg at 07/28/25 0814    polyethylene glycol (MIRALAX) Packet 17 g  17 g Oral Daily Aimee Cyr MD   17 g at 07/23/25 0805    senna-docusate (SENOKOT-S/PERICOLACE) 8.6-50 MG per tablet 1 tablet  1 tablet Oral BID Aimee Cyr MD   1 tablet at 07/28/25 0814    sertraline (ZOLOFT) tablet 100 mg  100 mg Oral Daily Neeraj Alvarado MD   100 mg at 07/28/25 0814    sodium chloride (PF) 0.9% PF flush 3 mL  3 mL Intracatheter Q8H NIC David Hull MD   3 mL at 07/28/25 0814    Warfarin Dose Required Daily - Pharmacist Managed  1 each Oral See Admin Instructions Aimee Cyr MD        warfarin-No DOSE today  1 each Does not apply no dose today (warfarin) David Hull MD         Continuous Infusions:    Current Facility-Administered Medications   Medication Dose Route Frequency Provider Last Rate Last Admin    Patient is already receiving anticoagulation with heparin, enoxaparin (LOVENOX), warfarin (COUMADIN)  or other anticoagulant medication   Does not apply Continuous PRN David Hull MD         PRN Meds:    Current Facility-Administered Medications   Medication Dose Route Frequency Provider Last Rate Last Admin    acetaminophen (TYLENOL) tablet 650 mg  650 mg Oral Q4H PRN David Hull MD   650 mg at 07/28/25 1052    Or    acetaminophen (TYLENOL) Suppository 650 mg  650 mg Rectal Q4H PRN David Hull MD        albuterol (PROVENTIL HFA/VENTOLIN HFA) inhaler  2 puff Inhalation Q6H PRN Neeraj Alvarado MD        albuterol (PROVENTIL) neb solution 2.5 mg  2.5 mg Nebulization Q2H PRN David Hull  MD JUANITA   2.5 mg at 06/26/25 0416    bisacodyl (DULCOLAX) suppository 10 mg  10 mg Rectal Daily PRN Aimee Cyr MD        calcium carbonate (TUMS) chewable tablet 1,000 mg  1,000 mg Oral 4x Daily PRN David Hull MD   1,000 mg at 07/28/25 1052    ipratropium - albuterol 0.5 mg/2.5 mg (3mg)/3 mL (DUONEB) neb solution 3 mL  3 mL Nebulization Q4H PRN Andreea Mcdonald MD   3 mL at 07/17/25 0751    lidocaine (LMX4) cream   Topical Q1H PRN David Hull MD        lidocaine 1 % 0.1-1 mL  0.1-1 mL Other Q1H PRN David Hull MD        melatonin tablet 5 mg  5 mg Oral At Bedtime PRN David Hull MD   5 mg at 07/19/25 2127    naloxone (NARCAN) injection 0.2 mg  0.2 mg Intravenous Q2 Min PRN David Hull MD        Or    naloxone (NARCAN) injection 0.4 mg  0.4 mg Intravenous Q2 Min PRN David Hull MD        Or    naloxone (NARCAN) injection 0.2 mg  0.2 mg Intramuscular Q2 Min PRN David Hull MD        Or    naloxone (NARCAN) injection 0.4 mg  0.4 mg Intramuscular Q2 Min PRN David Hull MD        ondansetron (ZOFRAN ODT) ODT tab 4 mg  4 mg Oral Q6H PRN David Hull MD   4 mg at 07/07/25 0930    Or    ondansetron (ZOFRAN) injection 4 mg  4 mg Intravenous Q6H PRN David Hull MD   4 mg at 07/07/25 1455    oxyCODONE IR (ROXICODONE) half-tab 2.5 mg  2.5 mg Oral Q4H PRN Kaia Evans DO   2.5 mg at 07/26/25 1336    Patient is already receiving anticoagulation with heparin, enoxaparin (LOVENOX), warfarin (COUMADIN)  or other anticoagulant medication   Does not apply Continuous PRN David Hull MD        simethicone (MYLICON) chewable tablet 80 mg  80 mg Oral Q6H PRN Neeraj Reed MD   80 mg at 07/10/25 1459    sodium chloride (PF) 0.9% PF flush 3 mL  3 mL Intracatheter q1 min prn David Hull MD

## 2025-07-28 NOTE — PLAN OF CARE
End of Shift Summary  For vital signs and complete assessments, please see documentation flowsheets.     Pertinent assessments: Pt A&Ox4. VSS on RA, afebrile. Denies N/V & SOB. SBA. Tolerating regular diet, denies nausea. Reported pain controlled with PRN Tylenol. Requested Tums following lunch for heart burn and gas. Several episodes of incontinence during shift, though calls for bed changes. Miconazole powder to abdominal folds & between thighs. PIV SL. Increased ethel blood noted in mirna area - Urology consulted, though notes no concerns at this time.     Major Shift Events: Urology consult. Discharge delayed.    Treatment Plan: Pain control, moisture management. Plans to discharge tomorrow 7/29 to [facility] facility via stretcher @1100.        Bedside Nurse: Odette New, Nursing Student        Goal Outcome Evaluation:    Plan of Care Reviewed With: patient, parent    Overall Patient Progress: no change    Outcome Evaluation: A&Ox4. VSS. PRN Tylenol & Tums given. Several episodes of incontinence. Urology consulted for increased ethel blood noted in mirna-area - no concerns at this time. Plan to discharge tomorrow 7/29 @1100 to [place] facility.      Problem: Adult Inpatient Plan of Care  Goal: Plan of Care Review  Description: The Plan of Care Review/Shift note should be completed every shift.  The Outcome Evaluation is a brief statement about your assessment that the patient is improving, declining, or no change.  This information will be displayed automatically on your shift  note.  Outcome: Progressing  Flowsheets (Taken 7/28/2025 2302)  Outcome Evaluation: A&Ox4. VSS. PRN Tylenol & Tums given. Several episodes of incontinence. Urology consulted for increased ethel blood noted in mirna-area - no concerns at this time. Plan to discharge tomorrow 7/29 @1100 to [facility] facility.  Plan of Care Reviewed With:   patient   parent  Overall Patient Progress: no change  Goal: Patient-Specific Goal (Individualized)  Description: You can  "add care plan individualizations to a care plan. Examples of Individualization might be:  \"Parent requests to be called daily at 9am for status\", \"I have a hard time hearing out of my right ear\", or \"Do not touch me to wake me up as it startles  me\".  Outcome: Progressing  Goal: Absence of Hospital-Acquired Illness or Injury  Outcome: Progressing  Intervention: Identify and Manage Fall Risk  Recent Flowsheet Documentation  Taken 7/28/2025 0815 by Odette New  Safety Promotion/Fall Prevention:   activity supervised   clutter free environment maintained   increased rounding and observation   increase visualization of patient   nonskid shoes/slippers when out of bed   room near nurse's station   safety round/check completed   supervised activity  Intervention: Prevent Skin Injury  Recent Flowsheet Documentation  Taken 7/28/2025 0815 by Odette New  Body Position: position changed independently  Skin Protection: adhesive use limited  Intervention: Prevent and Manage VTE (Venous Thromboembolism) Risk  Recent Flowsheet Documentation  Taken 7/28/2025 0815 by Odette New  VTE Prevention/Management: SCDs off (sequential compression devices)  Intervention: Prevent Infection  Recent Flowsheet Documentation  Taken 7/28/2025 0815 by Odette New  Infection Prevention:   cohorting utilized   hand hygiene promoted   rest/sleep promoted   single patient room provided  Goal: Optimal Comfort and Wellbeing  Outcome: Progressing  Goal: Readiness for Transition of Care  Outcome: Progressing     Problem: Gas Exchange Impaired  Goal: Optimal Gas Exchange  Outcome: Progressing  Intervention: Optimize Oxygenation and Ventilation  Recent Flowsheet Documentation  Taken 7/28/2025 0815 by Odette New  Head of Bed (HOB) Positioning: HOB at 15 degrees     Problem: Infection  Goal: Absence of Infection Signs and Symptoms  Outcome: Progressing         "

## 2025-07-28 NOTE — PLAN OF CARE
Pt reports ongoing hematuria and noted during incont episode. Incont cares provided. Denies pain. Red skin folds. VSS on RA. Lungs dim. Saline locked. LAZAR. A/O- has legal guardian. Generalized edema noted.     Major Shift Events:   Hematuria noted. Otherwise uneventful night, slept well. No significant change in condition.      Treatment Plan:   1L O2 while sleeping, incontinence management, wound care, CM following, possible discharge to [facility] today                            Problem: Adult Inpatient Plan of Care  Goal: Plan of Care Review  Description: The Plan of Care Review/Shift note should be completed every shift.  The Outcome Evaluation is a brief statement about your assessment that the patient is improving, declining, or no change.  This information will be displayed automatically on your shift  note.  Recent Flowsheet Documentation  Taken 7/28/2025 0557 by Elissa Santillan, RN  Outcome Evaluation: Continues to have hematuria and incont of urine.  Plan of Care Reviewed With: patient  Overall Patient Progress: no change   Goal Outcome Evaluation:      Plan of Care Reviewed With: patient    Overall Patient Progress: no changeOverall Patient Progress: no change    Outcome Evaluation: Continues to have hematuria and incont of urine.

## 2025-07-29 ENCOUNTER — TELEPHONE (OUTPATIENT)
Dept: INTERNAL MEDICINE | Facility: CLINIC | Age: 60
End: 2025-07-29
Payer: MEDICARE

## 2025-07-29 ENCOUNTER — DOCUMENTATION ONLY (OUTPATIENT)
Dept: ANTICOAGULATION | Facility: CLINIC | Age: 60
End: 2025-07-29
Payer: MEDICARE

## 2025-07-29 VITALS
TEMPERATURE: 97.9 F | BODY MASS INDEX: 45.99 KG/M2 | RESPIRATION RATE: 16 BRPM | WEIGHT: 293 LBS | HEART RATE: 60 BPM | HEIGHT: 67 IN | OXYGEN SATURATION: 95 % | DIASTOLIC BLOOD PRESSURE: 51 MMHG | SYSTOLIC BLOOD PRESSURE: 128 MMHG

## 2025-07-29 DIAGNOSIS — I82.442 ACUTE DEEP VEIN THROMBOSIS (DVT) OF TIBIAL VEIN OF LEFT LOWER EXTREMITY (H): Primary | ICD-10-CM

## 2025-07-29 LAB
ALBUMIN UR-MCNC: NEGATIVE MG/DL
ANION GAP SERPL CALCULATED.3IONS-SCNC: 10 MMOL/L (ref 7–15)
APPEARANCE UR: CLEAR
BILIRUB UR QL STRIP: NEGATIVE
BUN SERPL-MCNC: 20 MG/DL (ref 6–20)
CALCIUM SERPL-MCNC: 9 MG/DL (ref 8.8–10.4)
CHLORIDE SERPL-SCNC: 102 MMOL/L (ref 98–107)
COLOR UR AUTO: YELLOW
CREAT SERPL-MCNC: 0.7 MG/DL (ref 0.51–0.95)
EGFRCR SERPLBLD CKD-EPI 2021: >90 ML/MIN/1.73M2
ERYTHROCYTE [DISTWIDTH] IN BLOOD BY AUTOMATED COUNT: 14.6 % (ref 10–15)
GLUCOSE SERPL-MCNC: 112 MG/DL (ref 70–99)
GLUCOSE UR STRIP-MCNC: NEGATIVE MG/DL
HCO3 SERPL-SCNC: 25 MMOL/L (ref 22–29)
HCT VFR BLD AUTO: 39 % (ref 35–47)
HGB BLD-MCNC: 12.3 G/DL (ref 11.7–15.7)
HGB UR QL STRIP: NEGATIVE
INR PPP: 2.09 (ref 0.85–1.15)
KETONES UR STRIP-MCNC: NEGATIVE MG/DL
LEUKOCYTE ESTERASE UR QL STRIP: ABNORMAL
MCH RBC QN AUTO: 27.8 PG (ref 26.5–33)
MCHC RBC AUTO-ENTMCNC: 31.5 G/DL (ref 31.5–36.5)
MCV RBC AUTO: 88 FL (ref 78–100)
MUCOUS THREADS #/AREA URNS LPF: PRESENT /LPF
NITRATE UR QL: NEGATIVE
PH UR STRIP: 5.5 [PH] (ref 5–7)
PLATELET # BLD AUTO: 179 10E3/UL (ref 150–450)
POTASSIUM SERPL-SCNC: 4.6 MMOL/L (ref 3.4–5.3)
PROTHROMBIN TIME: 23.3 SECONDS (ref 11.8–14.8)
RBC # BLD AUTO: 4.43 10E6/UL (ref 3.8–5.2)
RBC URINE: 1 /HPF
SODIUM SERPL-SCNC: 137 MMOL/L (ref 135–145)
SP GR UR STRIP: 1.02 (ref 1–1.03)
SQUAMOUS EPITHELIAL: <1 /HPF
UROBILINOGEN UR STRIP-MCNC: NORMAL MG/DL
WBC # BLD AUTO: 4.9 10E3/UL (ref 4–11)
WBC URINE: 4 /HPF

## 2025-07-29 PROCEDURE — 99239 HOSP IP/OBS DSCHRG MGMT >30: CPT | Performed by: INTERNAL MEDICINE

## 2025-07-29 PROCEDURE — 81001 URINALYSIS AUTO W/SCOPE: CPT | Performed by: INTERNAL MEDICINE

## 2025-07-29 PROCEDURE — 250N000013 HC RX MED GY IP 250 OP 250 PS 637: Performed by: HOSPITALIST

## 2025-07-29 PROCEDURE — 36415 COLL VENOUS BLD VENIPUNCTURE: CPT | Performed by: HOSPITALIST

## 2025-07-29 PROCEDURE — 85018 HEMOGLOBIN: CPT | Performed by: INTERNAL MEDICINE

## 2025-07-29 PROCEDURE — 999N000157 HC STATISTIC RCP TIME EA 10 MIN

## 2025-07-29 PROCEDURE — 94640 AIRWAY INHALATION TREATMENT: CPT

## 2025-07-29 PROCEDURE — 250N000013 HC RX MED GY IP 250 OP 250 PS 637: Performed by: INTERNAL MEDICINE

## 2025-07-29 PROCEDURE — 99232 SBSQ HOSP IP/OBS MODERATE 35: CPT | Performed by: PHYSICIAN ASSISTANT

## 2025-07-29 PROCEDURE — 36415 COLL VENOUS BLD VENIPUNCTURE: CPT | Performed by: INTERNAL MEDICINE

## 2025-07-29 PROCEDURE — 85610 PROTHROMBIN TIME: CPT | Performed by: HOSPITALIST

## 2025-07-29 PROCEDURE — 82565 ASSAY OF CREATININE: CPT | Performed by: INTERNAL MEDICINE

## 2025-07-29 RX ADMIN — LISINOPRIL 20 MG: 20 TABLET ORAL at 09:08

## 2025-07-29 RX ADMIN — FLUTICASONE FUROATE AND VILANTEROL TRIFENATATE 1 PUFF: 100; 25 POWDER RESPIRATORY (INHALATION) at 07:59

## 2025-07-29 RX ADMIN — MICONAZOLE NITRATE: 20 POWDER TOPICAL at 09:11

## 2025-07-29 RX ADMIN — SENNOSIDES AND DOCUSATE SODIUM 1 TABLET: 50; 8.6 TABLET ORAL at 09:08

## 2025-07-29 RX ADMIN — ACETAMINOPHEN 650 MG: 325 TABLET ORAL at 09:07

## 2025-07-29 RX ADMIN — PANTOPRAZOLE SODIUM 40 MG: 40 TABLET, DELAYED RELEASE ORAL at 09:07

## 2025-07-29 RX ADMIN — CALCIUM CARBONATE (ANTACID) CHEW TAB 500 MG 1000 MG: 500 CHEW TAB at 09:08

## 2025-07-29 RX ADMIN — SERTRALINE 100 MG: 100 TABLET, FILM COATED ORAL at 09:07

## 2025-07-29 RX ADMIN — FLUTICASONE PROPIONATE 1 SPRAY: 50 SPRAY, METERED NASAL at 09:11

## 2025-07-29 RX ADMIN — CARBAMAZEPINE 400 MG: 200 TABLET ORAL at 09:08

## 2025-07-29 ASSESSMENT — ACTIVITIES OF DAILY LIVING (ADL)
ADLS_ACUITY_SCORE: 56

## 2025-07-29 NOTE — DISCHARGE SUMMARY
[facility]    Discharge Summary  Hospitalist    Date of Admission:  6/25/2025  Date of Discharge:  7/29/2025  Discharging Provider: Georgie Hill MD, MD  Date of Service (when I saw the patient): 07/29/25    Discharge Diagnoses     Resolved asthma exacerbation with acute on chronic hypoxic respiratory failure     Gross hematuria     History of DVT     Developmental delay; guardianship transition     Super morbid obesity with intertriginous skin breakdown     Seizure disorder     Hypertension     Hyperlipidemia     GERD     RICK    History of Present Illness   Brissa Corado is an 60 year old female who presented with asthma exacerbation. Patient had a prolonged hospital course. Also developed hematuria which resolved. Please see hospital course for details.     Hospital Course   60-year-old female with developmental delay, asthma on home oxygen, RICK, seizure disorder, and super morbid obesity was admitted on 6/25/2025 with acute on chronic hypoxic respiratory failure secondary to asthma exacerbation triggered by poor air quality and lack of access to her LABA/ICS. Respiratory status has since stabilized with resolution of wheezing and restoration of baseline oxygen needs. During hospitalization, she developed gross hematuria on 7/7 without significant bleeding or anemia. Treated empirically for UTI with 7 days of antibiotics, now completed. Hematuria resolved; urine cytology was negative and outpatient cystoscopy is planned. She remains hospitalized awaiting placement due to guardianship transition and inability to return to prior living environment. INR is therapeutic; warfarin resumed for history of DVT.     Patient developed hematuria overnight.  Held warfarin. Re-consulted urology     Resolved asthma exacerbation with acute on chronic hypoxic respiratory failure  - Now off continuous oxygen, baseline 2.5 L nocturnal O2  - Wheezing resolved. Will continue DuoNeb PRN  - Continue  Aydee Alonzo  - CPAP ordered; patient reports intolerance due to claustrophobia but has f/u sleep study     Gross hematuria  - Patient had spontaneous gross hematuria  on 7/7 which initially resolved  - Treated empirically x7 days; abx discontinued 7/13  - Urine cytology negative  - Urology consulted; outpatient cystoscopy and possible CT urogram needed. Patient had gross hematuria last night and this  morning.       - Initially held coumadin and then resumed after discussion with urology.       -Repeat urinalysis was done and unremarkable for infection      -Urology recommended outpatient follow up      History of DVT  - Warfarin was initially resumed with goal INR 2-3  - Lovenox bridge discontinued after INR therapeutic.   - Now stared to have gross hematuria.        - Resumed coumadin after discussion with urology     Developmental delay; guardianship transition  - Current guardian (parents) withdrawing  - New court-appointed guardian pending  - Discharged to assisted living facility     Super morbid obesity with intertriginous skin breakdown  - WOC following  - Miconazole powder BID (per patient preference)  - Routine wound and hygiene care     Seizure disorder  - Continue Tegretol     Hypertension  - Continue lisinopril     Hyperlipidemia  - Continue statin     GERD  - Continue omeprazole     RICK  - Diagnosed but not on home CPAP  - Follow-up sleep study pending; CPAP trial in hospital ordered      Significant Results and Procedures   Results for orders placed or performed during the hospital encounter of 06/25/25   CT Chest Pulmonary Embolism w Contrast    Narrative    EXAM: CT CHEST PULMONARY EMBOLISM W CONTRAST  LOCATION: [facility]  DATE: 6/25/2025    INDICATION: Chest pain, shortness of breath, recent DVT and subtherapeutic INR  COMPARISON: PET/CT 5/5/2025  TECHNIQUE: CT chest pulmonary angiogram during arterial phase injection of IV contrast. Multiplanar reformats and MIP  reconstructions were performed. Dose reduction techniques were used.   CONTRAST: 90mL Isovue 370    FINDINGS:  ANGIOGRAM CHEST: Pulmonary arteries are normal caliber and negative for pulmonary emboli. Thoracic aorta is negative for dissection. No CT evidence of right heart strain.    LUNGS AND PLEURA: There are some subtle areas of bronchial wall thickening and mucous plugging in both lungs. Scattered areas of air trapping without ethel airspace consolidation, pleural effusion or pneumothorax.    MEDIASTINUM/AXILLAE: No suspicious lymphadenopathy. No pericardial effusion.    CORONARY ARTERY CALCIFICATION: None.    UPPER ABDOMEN: Cholecystectomy.    MUSCULOSKELETAL: No acute bony abnormality. Stable T3 compression deformity.      Impression    IMPRESSION:  1.  Subtle areas of bronchial wall thickening with areas of air trapping throughout both lungs, suggestive of small airways disease.  2.  No pulmonary embolus.   CT Abdomen Pelvis w/o Contrast    Narrative    EXAM: CT ABDOMEN PELVIS W/O CONTRAST  LOCATION: [facility]  DATE: 7/7/2025    INDICATION: Hematuria, back low abdominal pain, c f stone.  COMPARISON: CT abdomen and pelvis without IV contrast 7/1/2024.  TECHNIQUE: CT scan of the abdomen and pelvis was performed without IV contrast. Multiplanar reformats were obtained. Dose reduction techniques were used.  CONTRAST: None.    FINDINGS:   LOWER CHEST: A few strands of linear atelectasis in the lower lungs, improved on the right. No pleural effusion on either side. Normal cardiac size. No pericardial effusion.    HEPATOBILIARY: Interval cholecystectomy. Hepatosplenomegaly without discrete lesion, length of the right lobe measures 20 cm (image 60, series 4), unchanged.    PANCREAS: Normal.    SPLEEN: Mildly enlarged without discrete lesion, AP length measures 13.3 cm (image 51, series 3). Small accessory splenule inferiorly.    ADRENAL GLANDS: Normal.    KIDNEYS/BLADDER: No urinary tract  calculi. Both kidneys are negative for hydronephrosis or hydroureter. Normal urinary bladder.    BOWEL: No mechanical bowel obstruction or free gas. Formed stool material within normal caliber colon.    LYMPH NODES: No suspicious abdominopelvic adenopathy.    VASCULATURE: Normal caliber abdominal aorta. Normal caliber IVC containing a filter, unchanged.    PELVIC ORGANS: Anteverted uterus. Left ovarian dominant follicle measures 2.6 x 2.2 cm (image 189, series 3). No adenopathy or free fluid.    MUSCULOSKELETAL: Postoperative changes of plate and screw fixation at the symphysis pubis. Screw fixation left SI joint. Mild left convex thoracolumbar curve. Mild degenerative changes involving the spine and joints of the pelvis. Resolved inflammatory   changes involving the pannus seen previously. Small fat-containing ventral umbilical hernia.      Impression    IMPRESSION:   1.  No urinary tract calculi or obstruction.    2.  Interval cholecystectomy. Hepatosplenomegaly without discrete lesion. No abdominopelvic suspicious adenopathy or free fluid.    3.  IVC filter, unchanged.    4.  Postoperative changes of plate and screw fixation at the symphysis pubis. Screw fixation left SI joint. Mild left convex thoracolumbar curve. Mild degenerative changes involving the spine and joints of the pelvis. Resolved inflammatory changes   involving the pannus seen previously. Small fat-containing ventral umbilical hernia.       *Note: Due to a large number of results and/or encounters for the requested time period, some results have not been displayed. A complete set of results can be found in Results Review.         Pending Results   None     Code Status   Full Code       Primary Care Physician   Aury Vizcaino        Discharge Disposition   Discharged to home  Condition at discharge: Stable    Consultations This Hospital Stay   PHARMACY TO DOSE WARFARIN  CARE MANAGEMENT / SOCIAL WORK IP CONSULT  WOUND OSTOMY CONTINENCE NURSE  IP  CONSULT  CARE MANAGEMENT / SOCIAL WORK IP CONSULT  CONSULT FOR INPATIENT VASCULAR ACCESS CARE  UROLOGY IP CONSULT  PHARMACY TO DOSE WARFARIN  UROLOGY IP CONSULT  NURSING TO CONSULT FOR VIRTUAL CARE IP    Time Spent on this Encounter       Discharge Orders      Primary Care - Care Coordination Referral      Primary Care - Care Coordination Referral      Primary Care - Care Coordination Referral      Home Care Referral      Reason for your hospital stay    Asthma exacerbation     Activity    Your activity upon discharge: activity as tolerated     Oxygen Adult/Peds    Oxygen Documentation  I certify that this patient, Brissa Corado has been under my care (or a nurse practitioner or physican's assistant working with me). This is the face-to-face encounter for oxygen medical necessity.      At the time of this encounter, I have reviewed the qualifying testing and have determined that supplemental oxygen is reasonable and necessary and is expected to improve the patient's condition in a home setting.         Patient has continued oxygen desaturation due to Chronic Respiratory Failure with Hypoxia J96.11.    If portability is ordered, is the patient mobile within the home? no    Was this visit performed as a telehealth visit: No     Diet    Follow this diet upon discharge: Current Diet:Orders Placed This Encounter      Combination Diet Regular Diet     Hospital Follow-up with Existing Primary Care Provider (PCP)          Discharge Medications   Discharge Medication List as of 7/29/2025 10:22 AM        START taking these medications    Details   !! albuterol (PROVENTIL) (2.5 MG/3ML) 0.083% neb solution Take 1 vial (2.5 mg) by nebulization every 4 hours as needed for shortness of breath, wheezing or cough., Disp-90 mL, R-0, E-Prescribe       !! - Potential duplicate medications found. Please discuss with provider.        CONTINUE these medications which have NOT CHANGED    Details   albuterol (PROAIR HFA/PROVENTIL  HFA/VENTOLIN HFA) 108 (90 Base) MCG/ACT inhaler Inhale 2 puffs into the lungs every 6 hours as needed for shortness of breath, wheezing or cough., Disp-18 g, R-0, E-PrescribePharmacy may dispense brand covered by insurance (Proair, or proventil or ventolin or generic albuterol inhaler)      !! albuterol (PROVENTIL) (2.5 MG/3ML) 0.083% neb solution Take 1 vial (2.5 mg) by nebulization every 4 hours as needed for shortness of breath, wheezing or cough., Disp-75 mL, R-0, E-Prescribe      azelastine (ASTELIN) 0.1 % nasal spray Spray 1 spray into both nostrils daily as needed for allergies., Historical      !! carBAMazepine (TEGRETOL) 200 MG tablet Take 400 mg by mouth 2 times daily. AM and HS, Historical      !! carBAMazepine (TEGRETOL) 200 MG tablet Take 200 mg by mouth daily. @1200, Historical      fluticasone (FLONASE) 50 MCG/ACT nasal spray USE ONE SPRAY INTO BOTH NOSTRILS DAILY AS NEEDED FOR RHINITIS OR ALLERGIES, Disp-15 g, R-6, E-Prescribe      Fluticasone Furoate-Vilanterol (BREO ELLIPTA) 50-25 MCG/ACT AEPB Inhale 1 puff into the lungs 2 times daily., Disp-60 each, R-2, E-Prescribe      ipratropium - albuterol 0.5 mg/2.5 mg/3 mL (DUONEB) 0.5-2.5 (3) MG/3ML neb solution Take 1 vial (3 mLs) by nebulization every 4 hours as needed for shortness of breath, wheezing or cough., Disp-90 mL, R-2, GORDON, E-Prescribe      lisinopril (ZESTRIL) 20 MG tablet TAKE ONE TABLET BY MOUTH DAILY, Disp-90 tablet, R-0, E-Prescribe      lovastatin (MEVACOR) 40 MG tablet TAKE ONE TABLET BY MOUTH AT BEDTIME, Disp-90 tablet, R-1, E-Prescribe      miconazole (MICATIN) 2 % external powder Apply topically as needed for itching or other.Historical      Multiple Vitamin (MULTI-VITAMIN PO) Take 1 tablet by mouth daily, Historical      nystatin (MYCOSTATIN) 577518 UNIT/GM external cream Apply topically 3 times daily as needed.Historical      omeprazole (PRILOSEC) 20 MG DR capsule Take 20 mg by mouth daily., Historical      sertraline (ZOLOFT) 100  MG tablet TAKE ONE TABLET BY MOUTH DAILY, Disp-90 tablet, R-1, E-Prescribe      warfarin ANTICOAGULANT (COUMADIN/JANTOVEN) 5 MG tablet 25 mg Mon, Thu, Sat;   20 mg all other days, Historical       !! - Potential duplicate medications found. Please discuss with provider.        STOP taking these medications       albuterol (PROVENTIL) (5 MG/ML) 0.5% neb solution Comments:   Reason for Stopping:             Allergies   Allergies   Allergen Reactions    Penicillins Anaphylaxis     PEN-FAST - Penicillin Allergy Risk Tool completed by [facility] December 20, 2024    Score: 3  Risk: Moderate  Assessment: Patient has a 20 % chance of a positive penicillin allergy test    Iodine      Iodinated Contrast Media  --- Hives      Tetracyclines & Related Unknown    Azithromycin Rash    Hydrochlorothiazide Palpitations     dehydration    Influenza Vac Split [Influenza Virus Vaccine] Rash     Patient states she is allergic to the vaccine.  Got a rash all over body many years ago after receiving injection.     Data

## 2025-07-29 NOTE — PLAN OF CARE
Pt to D/C to snf. Pt provided with d/c instructions, including new medications, when medications were last given, and when to take them again.  Pt also informed to f/u with PCP and urology for a cysto on August 5th. Pt verbalized understanding of all d/c and f/u instructions.  All questions were answered at this time.  Copy of paperwork sent with pt.  Medication albuterol sent with pt. EMS to provide transport.  All personal belongings sent with pt. Straight cath for UA prior to discharged and sent to lab.

## 2025-07-29 NOTE — PROGRESS NOTES
ANTICOAGULATION  MANAGEMENT: Discharge Review    [facility] chart reviewed for anticoagulation continuity of care    Hospital Admission on 6/25/25 -7/29/25 for acute on chronic hypoxic respiratory failure, acute gross hematuria.    Discharge disposition: prison with [facility] home [facility] as noted below from discharge plan    The pt will discharge to [facility] today. Juliane confirmed the discharge with [facility] and faxed them the pt's discharge orders P: [phone].     [facility]'s [facility] and they will resume her RN services tomorrow. They will get the pt's discharge orders from Epic.   Results:    Recent labs: (last 7 days)     07/23/25  0702 07/24/25  0645 07/25/25  0711 07/26/25  0655 07/27/25  0742 07/28/25  0736 07/29/25  0731   INR 3.09* 3.00* 2.95* 2.85* 3.09* 3.22* 2.09*     Anticoagulation inpatient management:     anticoagulation calendar updated with inpatient dosing    Anticoagulation discharge instructions:     Warfarin dosing: hold until hematuria clear   Bridging: No   INR goal change: No      Medication changes affecting anticoagulation: No    Additional factors affecting anticoagulation: Yes: urology note at discharge as below noting cystoscopy scheduled 8/5/25 at [facility] - does not require warfarin hold    Plan:   - If patient is emptying adequately and is able to void, okay to continue without Grajeda catheter.  - Recommend repeat UA and urine culture.  If positive, will treat with culture specific antibiotics.  Should collect this morning.  Was ordered yesterday and is not done yet.  - Continue to monitor hemoglobin.  -Would be reasonable to resume warfarin as hematuria clears.  Can be restarted before cystoscopy, as this does not require holding anticoagulation.  - Plan for cystoscopy, as scheduled, on 08/05/2025 in our [place] office with Dr. Zelaya.        PLAN     Agree with discharge plan for follow up on 7/30/25 with home care    Spoke with  Treva (intake at [facility]) to confirmed that home care is scheduled to see patient tomorrow and also to check INR    No adjustment to Anticoagulation Calendar was required    Toña Holly RN  7/29/2025  Anticoagulation Clinic  [facility] for routing messages: yadira ARITA  [facility] patient phone line: [phone]

## 2025-07-29 NOTE — TELEPHONE ENCOUNTER
Forms/Letter Request    Type of form/letter: [facility]-order# [id number]    Do we have the form/letter: Yes: Dr. Marietta chaves    Who is the form from? Home care    Where did/will the form come from? form was faxed in    How would you like the form/letter returned: Fax : [phone]

## 2025-07-29 NOTE — PHARMACY-ANTICOAGULATION SERVICE
Clinical Pharmacy- Warfarin Discharge Note  Brissa Corado is a 60-year-old female admitted on 6/25/2025 for asthma exacerbation. She is on warfarin for acute DVT of tibial vein of LLE with INR goal of 2.0-3.0. Her last dose inpatient was given on 7/27/2025 and has since been on hold given episodes of hematuria.    Anticoagulation Dose History  More data exists         Latest Ref Rng & Units 7/23/2025 7/24/2025 7/25/2025 7/26/2025 7/27/2025 7/28/2025 7/29/2025   Recent Dosing and Labs   warfarin ANTICOAGULANT (COUMADIN/JANTOVEN) 4 MG tablet - - - - - 8 mg, $Given - -   warfarin ANTICOAGULANT (COUMADIN/JANTOVEN) 5 MG tablet - 15 mg, $Given 10 mg, $Given 15 mg, $Given 15 mg, $Given - - -   INR 0.85 - 1.15 3.09  3.00  2.95  2.85  3.09  3.22  2.09      Vitamin K doses administered during the last 7 days: 0  FFP administered during the last 7 days: 0  Recommend continuing to hold warfarin at this time given patient's current hematuria. Once resolved, consider INR check with current anticoagulation clinic and dose appropriately, as tolerated. Anticipate weekly dose to decrease from home regimen back in June 2025 prior to admission.    Thank you,  Binh Culp, PharmD  Clinical Pharmacist - [facility]

## 2025-07-29 NOTE — PLAN OF CARE
Pt continues to have hematuria- plan for outpt cystoscopy. Denies pain. Up with assist. Held bowel meds per patient request. Lg BM reports 7/28. VSS on RA.     Major Shift Events:   Uneventful night, slept well. No significant change in condition.     Treatment Plan:   Monitor urine fx,  Pain control, moisture management. Encourage activity. Plans to discharge today 7/29 to AL facility via stretcher @1100.                        Problem: Adult Inpatient Plan of Care  Goal: Plan of Care Review  Description: The Plan of Care Review/Shift note should be completed every shift.  The Outcome Evaluation is a brief statement about your assessment that the patient is improving, declining, or no change.  This information will be displayed automatically on your shift  note.  Recent Flowsheet Documentation  Taken 7/29/2025 0614 by Elissa Santillan, RN  Plan of Care Reviewed With: patient  Overall Patient Progress: improving   Goal Outcome Evaluation:      Plan of Care Reviewed With: patient    Overall Patient Progress: improvingOverall Patient Progress: improving

## 2025-07-29 NOTE — PLAN OF CARE
Temp: 98.2  F (36.8  C) Temp src: Oral BP: (!) 106/36 Pulse: 78   Resp: 16 SpO2: 95 % O2 Device: None (Room air)       Orientation:  A&O x4  VS: VSS  Pain:  Tylenol given. Effective.  Activity:  A1 GB W  Resp:  RA  GI:  Denies nausea and vomiting  : Incontinence Puriwick  Skin:  Powder ordered   Lines: PIV SL  Diet: Regular  Plan: Home  Discharge:  Pending    Problem: Adult Inpatient Plan of Care  Goal: Plan of Care Review  Description: The Plan of Care Review/Shift note should be completed every shift.  The Outcome Evaluation is a brief statement about your assessment that the patient is improving, declining, or no change.  This information will be displayed automatically on your shift  note.  Recent Flowsheet Documentation  Taken 7/28/2025 1913 by Latosha Gandhi, RN  Outcome Evaluation: No changes noted  Plan of Care Reviewed With: patient  Overall Patient Progress: no change  Goal: Absence of Hospital-Acquired Illness or Injury  Intervention: Identify and Manage Fall Risk  Recent Flowsheet Documentation  Taken 7/28/2025 1825 by Latosha Gandhi, RN  Safety Promotion/Fall Prevention: safety round/check completed       Goal Outcome Evaluation:      Plan of Care Reviewed With: patient    Overall Patient Progress: no changeOverall Patient Progress: no change    Outcome Evaluation: No changes noted

## 2025-07-29 NOTE — PROGRESS NOTES
Care Management Discharge Note    Discharge Date: 07/29/2025       Discharge Disposition: senior care     Discharge Services:  Home Care    Discharge DME:  No new equipment needs    Discharge Transportation:  [facility] stretcher    Private pay costs discussed: transportation costs  Reviewed potential out of pocket cost for [facility] stretcher transport. Current base rate is $1354.65 and $31.61 per mile to the destination. Pt/family expressed understanding and are agreeable to this.      Patient requires stretcher transportation due to bariatric needs    Stretcher transportation has been arranged for today at 1100. Patient and bedside nurse notified of transportation time.    Ambulance [facility] Transportation form completed electronically in [facility].     Does the patient's insurance plan have a 3 day qualifying hospital stay waiver?  Yes     Which insurance plan 3 day waiver is available? [employer]    Will the waiver be used for post-acute placement? No    PAS Confirmation Code:  N/A  Patient/family educated on Medicare website which has current facility and service quality ratings:  N/A    Education Provided on the Discharge Plan:  yes  Persons Notified of Discharge Plans: Pt, Pt's guardian, [facility]PS, [employer]  Patient/Family in Agreement with the Plan:  yes    Handoff Referral Completed: Yes, [facility] PCP: Internal handoff referral completed    Additional Information:  The pt will discharge to [facility] today. Juliane confirmed the discharge with [facility] and faxed them the pt's discharge orders P: [phone].  Juliane met with the pt and addressed her questions.  The pt's guardian Tico was updated P: [phone].  [facility] was updated on the pt's discharge plan and they will resume her RN services tomorrow. They will get the pt's discharge orders from [facility].  Juliane left a vm for the pt's [employer] P: [phone] ext. [phone], updating her on the pt's discharge plan for today.  Juliane confirmed the  pt's discharge plan with her [facility] worker, Edna P: [phone], and addressed her questions.     Sw will continue to be available as needed until discharge.    AISSATOU Macdonald, [facility]  Inpatient Care Coordination  [facility]  [phone]

## 2025-07-29 NOTE — PROGRESS NOTES
[facility] Urology Progress Note              Assessment and Plan:     Assessment:    Gross hematuria, recurrent    Moderate persistent asthma with exacerbation    Possible urinary tract infection    Recent DVT on warfarin, which is currently being held, history of IVC filter    Developmental delay with guardian    Morbid obesity    Seizure disorder    Hypertension      Plan:   - If patient is emptying adequately and is able to void, okay to continue without Grajeda catheter.  - Recommend repeat UA and urine culture.  If positive, will treat with culture specific antibiotics.  Should collect this morning.  Was ordered yesterday and is not done yet.  - Continue to monitor hemoglobin.  -Would be reasonable to resume warfarin as hematuria clears.  Can be restarted before cystoscopy, as this does not require holding anticoagulation.  - Plan for cystoscopy, as scheduled, on 08/05/2025 in our [place] office with Dr. Zelaya.  -Okay to discharge from a urology perspective.  Will plan on signing off.  Please contact us with any urological concerns.     ADDENDUM  Notified that patient leaked on the way to the bathroom and then missed hat with attempted urine collection.  Would ideally still like a UA/UC before discharge.  Could do cath UA/UC.  Otherwise, can attempt with next void and delay discharge.  Urine collected and NOT consistent with UTI.  Okay to discharge from urology perspective.    Renée Bautista PA-C   [facility]rology  [phone]               Interval History:     Notes that hematuria is improving.  Denies any dysuria.  Denies any nausea, vomiting, fevers, chills.  Afebrile with out tachycardia.  Hemoglobin 12.3 (12.6).  INR 2.09.              Review of Systems:     The 5 point Review of Systems is negative other than noted in the HPI             Medications:     Current Facility-Administered Medications   Medication Dose Route Frequency Provider Last Rate Last Admin    acetaminophen  (TYLENOL) tablet 650 mg  650 mg Oral Q4H PRN David Hull MD   650 mg at 07/29/25 0907    Or    acetaminophen (TYLENOL) Suppository 650 mg  650 mg Rectal Q4H PRN David Hull MD        albuterol (PROVENTIL HFA/VENTOLIN HFA) inhaler  2 puff Inhalation Q6H PRN Neeraj Alvarado MD        albuterol (PROVENTIL) neb solution 2.5 mg  2.5 mg Nebulization Q2H PRN David Hull MD   2.5 mg at 06/26/25 0416    atorvastatin (LIPITOR) tablet 10 mg  10 mg Oral At Bedtime David Hull MD   10 mg at 07/28/25 2118    bisacodyl (DULCOLAX) suppository 10 mg  10 mg Rectal Daily PRN Aimee Cyr MD        calcium carbonate (TUMS) chewable tablet 1,000 mg  1,000 mg Oral 4x Daily PRN David Hull MD   1,000 mg at 07/29/25 0908    carBAMazepine (TEGretol) tablet 200 mg  200 mg Oral Q24H David Hull MD   200 mg at 07/28/25 1142    carBAMazepine (TEGretol) tablet 400 mg  400 mg Oral BID David Hull MD   400 mg at 07/29/25 0908    fluticasone (FLONASE) 50 MCG/ACT spray 1 spray  1 spray Both Nostrils Daily Neeraj Alvarado MD   1 spray at 07/29/25 0911    fluticasone-vilanterol (BREO ELLIPTA) 100-25 MCG/ACT inhaler 1 puff  1 puff Inhalation Daily David Hull MD   1 puff at 07/29/25 0759    ipratropium - albuterol 0.5 mg/2.5 mg (3mg)/3 mL (DUONEB) neb solution 3 mL  3 mL Nebulization Q4H PRN Andreea Mcdonald MD   3 mL at 07/17/25 0751    lidocaine (LMX4) cream   Topical Q1H PRN David Hull MD        lidocaine 1 % 0.1-1 mL  0.1-1 mL Other Q1H PRN David Hull MD        lisinopril (ZESTRIL) tablet 20 mg  20 mg Oral Daily David Hull MD   20 mg at 07/29/25 0908    melatonin tablet 5 mg  5 mg Oral At Bedtime PRN David Hull MD   5 mg at 07/19/25 2127    miconazole (MICATIN) 2 % powder   Topical BID Andreea Mcdonald MD   Given at 07/29/25 0911    naloxone (NARCAN) injection 0.2 mg  0.2 mg Intravenous Q2 Min PRN David Hull MD        Or    naloxone (NARCAN)  injection 0.4 mg  0.4 mg Intravenous Q2 Min PRN David Hull MD        Or    naloxone (NARCAN) injection 0.2 mg  0.2 mg Intramuscular Q2 Min PRN David Hull MD        Or    naloxone (NARCAN) injection 0.4 mg  0.4 mg Intramuscular Q2 Min PRN David Hull MD        ondansetron (ZOFRAN ODT) ODT tab 4 mg  4 mg Oral Q6H PRN David Hull MD   4 mg at 07/07/25 0930    Or    ondansetron (ZOFRAN) injection 4 mg  4 mg Intravenous Q6H PRN David Hull MD   4 mg at 07/07/25 1455    oxyCODONE IR (ROXICODONE) half-tab 2.5 mg  2.5 mg Oral Q4H PRN Kaia Evans DO   2.5 mg at 07/26/25 1336    pantoprazole (PROTONIX) EC tablet 40 mg  40 mg Oral QAM AC David Hull MD   40 mg at 07/29/25 0907    Patient is already receiving anticoagulation with heparin, enoxaparin (LOVENOX), warfarin (COUMADIN)  or other anticoagulant medication   Does not apply Continuous PRN David Hull MD        polyethylene glycol (MIRALAX) Packet 17 g  17 g Oral Daily Aimee Cyr MD   17 g at 07/23/25 0805    senna-docusate (SENOKOT-S/PERICOLACE) 8.6-50 MG per tablet 1 tablet  1 tablet Oral BID Aimee Cry MD   1 tablet at 07/29/25 0908    sertraline (ZOLOFT) tablet 100 mg  100 mg Oral Daily Neeraj Alvarado MD   100 mg at 07/29/25 0907    simethicone (MYLICON) chewable tablet 80 mg  80 mg Oral Q6H PRN Neeraj Reed MD   80 mg at 07/10/25 1455    sodium chloride (PF) 0.9% PF flush 3 mL  3 mL Intracatheter Q8H NIC David Hull MD   3 mL at 07/29/25 0912    sodium chloride (PF) 0.9% PF flush 3 mL  3 mL Intracatheter q1 min prn David Hull MD        Warfarin Dose Required Daily - Pharmacist Managed  1 each Oral See Admin Instructions Aimee Cyr MD                      Physical Exam:   Vitals were reviewed  Patient Vitals for the past 8 hrs:   BP Temp Temp src Pulse Resp SpO2   07/29/25 0908 128/51 -- -- -- -- --   07/29/25 0759 -- -- -- -- -- 95 %   07/29/25 0707 129/53  97.9  F (36.6  C) Oral 60 16 99 %     GEN: NAD, lying in bed, obese  EYES: EOMI  MOUTH: MMM  NECK: Supple  RESP: Unlabored breathing  CARDIAC: LE edema  NEURO: AAO           Data:     Lab Results   Component Value Date    NTBNPI <36 04/01/2025    NTBNPI <36 06/24/2024    NTBNPI <36 05/02/2024    NTBNP <36 06/25/2025     Lab Results   Component Value Date    WBC 4.9 07/29/2025    WBC 5.9 07/28/2025    WBC 10.1 06/25/2025    HGB 12.3 07/29/2025    HGB 12.6 07/28/2025    HGB 11.5 (L) 07/16/2025    HCT 39.0 07/29/2025    HCT 40.5 07/28/2025    HCT 42.3 06/25/2025    MCV 88 07/29/2025    MCV 90 07/28/2025    MCV 87 07/16/2025     07/29/2025     07/28/2025     07/12/2025     Lab Results   Component Value Date    INR 2.09 (H) 07/29/2025    INR 3.22 (H) 07/28/2025    INR 3.09 (H) 07/27/2025      Recent Labs   Lab Test 07/29/25  1056   COLOR Yellow   APPEARANCE Clear   URINEGLC Negative   URINEBILI Negative   URINEKETONE Negative   SG 1.025   UBLD Negative   URINEPH 5.5   PROTEIN Negative   NITRITE Negative   LEUKEST Trace*   RBCU 1   WBCU 4

## 2025-07-30 ENCOUNTER — PATIENT OUTREACH (OUTPATIENT)
Dept: CARE COORDINATION | Facility: CLINIC | Age: 60
End: 2025-07-30
Payer: MEDICARE

## 2025-07-30 NOTE — TELEPHONE ENCOUNTER
This patient calls Tico, guardian, to inform her that Sudhir requested a Handicap Parking Application be filled out by patient's PCP.  Tico states both the patient and Sudhir are a little confused at the current situation and that the patient is currently in a group home/assisted living facility.  Tico states at this time it would not be appropriate for this form to be given to Sudhir or to be needed at all.    Closing encounter with no further action

## 2025-07-30 NOTE — PROGRESS NOTES
"Clinic Care Coordination Contact  Transitions of Care Outreach  Chief Complaint   Patient presents with    Clinic Care Coordination - Post Hospital       Most Recent Admission Date: 6/25/2025   Most Recent Admission Diagnosis: Moderate persistent asthma with exacerbation - J45.41  Hypoxia - R09.02     Most Recent Discharge Date: 7/29/2025   Most Recent Discharge Diagnosis: Moderate persistent asthma with exacerbation - J45.41  Hypoxia - R09.02  Intertrigo - L30.4  Noninfected skin tear of left leg, initial encounter - S81.812A  Other specified counseling - Z71.89  Acute on chronic respiratory failure, unspecified whether with hypoxia or hypercapnia (H) - J96.20  Moderate persistent asthma with acute exacerbation - J45.41  Acute deep vein thrombosis (DVT) of tibial vein of left lower extremity (H) - I82.442  Asthma with acute exacerbation, unspecified asthma severity, unspecified whether persistent - J45.901     Transitions of Care Assessment    Discharge Assessment  How are you doing now that you are home?: Guardian states pt \"seems to be doing well.\"  Do you know how to contact your clinic care team if you have future questions or changes to your health status? : Yes (Establishing PCP with [facility])  Does the patient have their discharge instructions? :  (discharge instructions sent to [facility] at hospital discharge)  Does the patient have questions regarding their discharge instructions? : No  Were you started on any new medications or were there changes to any of your previous medications? : Yes  Does the patient have all of their medications?: Yes  Do you have questions regarding any of your medications? :  (Guardian denied questions)  Do you have all of your needed medical supplies or equipment (DME)?  (i.e. oxygen tank, CPAP, cane, etc.): Yes    LUZ MARIA KUO completed chart review, please see inpatient SW note for additional discharge and contact information.  LUZ MARIA KUO contacted pt's legal guardian. Guardianship " paperwork in pt's chart.  Guardian states she was assigned as pt's guardian yesterday. Pt was discharged from hospital to Assisted Living Facility. Guardian reports pt seems to be doing well. Guardian states pt is working to establish Primary Care through [facility]. LUZ MARIA KUO updated facesheet with this information.  LUZ MARIA KUO discussed follow up appointment with urology on 8/5, guardian was unaware of this appt. LUZ MARIA offered phone number for urology clinic, as guardian is wondering if this appt can be virtual. Guardian unable to answer some post-discharge assessment questions, but stated she visited the pt yesterday, is in contact with the assisted living, and denied any further questions or concerns.       Follow up Plan     Discharge Follow-Up  Discharge follow up appointment scheduled in alignment with recommended follow up timeframe or Transitions of Risk Category? (Low = within 30 days; Moderate= within 14 days; High= within 7 days): Yes  Discharge Follow Up Appointment Date: 08/05/25  Discharge Follow Up Appointment Scheduled with?: Specialty Care Provider (Urology)    Future Appointments   Date Time Provider Department Center   8/5/2025  9:30 AM Johnnie Zelaya MD [facility]   8/26/2025  2:30 PM Terence Andre MD [facility] SLEEP       Outpatient Plan as outlined on AVS reviewed with patient.    For any urgent concerns, please contact our 24 hour nurse triage line: [phone] ([phone])       JERMAINE Chappell   Primary Care Social Work Care Coordinator  [facility] [place]  Phone:[phone]  Email: [email or web address]  *training with [place]

## 2025-08-06 ENCOUNTER — TELEPHONE (OUTPATIENT)
Dept: ANTICOAGULATION | Facility: CLINIC | Age: 60
End: 2025-08-06
Payer: MEDICARE

## 2025-08-06 DIAGNOSIS — I10 ESSENTIAL HYPERTENSION, BENIGN: ICD-10-CM

## 2025-08-06 DIAGNOSIS — I82.442 ACUTE DEEP VEIN THROMBOSIS (DVT) OF TIBIAL VEIN OF LEFT LOWER EXTREMITY (H): Primary | ICD-10-CM

## 2025-08-06 DIAGNOSIS — E78.5 HYPERLIPIDEMIA LDL GOAL <130: ICD-10-CM

## 2025-08-06 RX ORDER — LISINOPRIL 20 MG/1
20 TABLET ORAL DAILY
Qty: 90 TABLET | Refills: 0 | Status: SHIPPED | OUTPATIENT
Start: 2025-08-06

## 2025-08-06 RX ORDER — LOVASTATIN 40 MG/1
40 TABLET ORAL
Qty: 90 TABLET | Refills: 1 | OUTPATIENT
Start: 2025-08-06

## 2025-08-15 ENCOUNTER — TRANSFERRED RECORDS (OUTPATIENT)
Dept: HEALTH INFORMATION MANAGEMENT | Facility: CLINIC | Age: 60
End: 2025-08-15
Payer: MEDICARE

## 2025-08-17 ENCOUNTER — MEDICAL CORRESPONDENCE (OUTPATIENT)
Dept: HEALTH INFORMATION MANAGEMENT | Facility: CLINIC | Age: 60
End: 2025-08-17
Payer: MEDICARE

## 2025-08-18 ENCOUNTER — TRANSCRIBE ORDERS (OUTPATIENT)
Dept: OTHER | Age: 60
End: 2025-08-18

## 2025-08-18 ENCOUNTER — LAB REQUISITION (OUTPATIENT)
Dept: LAB | Facility: CLINIC | Age: 60
End: 2025-08-18
Payer: MEDICARE

## 2025-08-18 DIAGNOSIS — N93.9 NONMENSTRUAL VAGINAL BLEEDING: ICD-10-CM

## 2025-08-18 DIAGNOSIS — N93.9 VAGINAL BLEEDING: Primary | ICD-10-CM

## 2025-08-18 DIAGNOSIS — R79.1 SUPRATHERAPEUTIC INR: ICD-10-CM

## 2025-08-18 DIAGNOSIS — N94.89 ENDOMETRIAL MASS: ICD-10-CM

## 2025-08-19 ENCOUNTER — PATIENT OUTREACH (OUTPATIENT)
Dept: ONCOLOGY | Facility: CLINIC | Age: 60
End: 2025-08-19
Payer: MEDICARE

## 2025-08-19 ENCOUNTER — PRE VISIT (OUTPATIENT)
Dept: ONCOLOGY | Facility: CLINIC | Age: 60
End: 2025-08-19
Payer: MEDICARE

## 2025-08-21 ENCOUNTER — TRANSFERRED RECORDS (OUTPATIENT)
Dept: HEALTH INFORMATION MANAGEMENT | Facility: CLINIC | Age: 60
End: 2025-08-21
Payer: MEDICARE

## 2025-08-26 ENCOUNTER — ONCOLOGY VISIT (OUTPATIENT)
Dept: ONCOLOGY | Facility: CLINIC | Age: 60
End: 2025-08-26
Attending: OBSTETRICS & GYNECOLOGY
Payer: MEDICARE

## 2025-08-26 ENCOUNTER — PATIENT OUTREACH (OUTPATIENT)
Dept: ONCOLOGY | Facility: CLINIC | Age: 60
End: 2025-08-26
Payer: MEDICARE

## 2025-08-26 VITALS
SYSTOLIC BLOOD PRESSURE: 118 MMHG | RESPIRATION RATE: 18 BRPM | OXYGEN SATURATION: 95 % | DIASTOLIC BLOOD PRESSURE: 73 MMHG | HEART RATE: 92 BPM | TEMPERATURE: 98.2 F

## 2025-08-26 DIAGNOSIS — R79.1 SUPRATHERAPEUTIC INR: ICD-10-CM

## 2025-08-26 DIAGNOSIS — N93.9 NONMENSTRUAL VAGINAL BLEEDING: ICD-10-CM

## 2025-08-26 DIAGNOSIS — N93.9 VAGINAL BLEEDING: ICD-10-CM

## 2025-08-26 DIAGNOSIS — N94.89 ENDOMETRIAL MASS: Primary | ICD-10-CM

## 2025-08-26 DIAGNOSIS — N85.8 UTERINE MASS: ICD-10-CM

## 2025-08-26 PROCEDURE — G0463 HOSPITAL OUTPT CLINIC VISIT: HCPCS | Performed by: OBSTETRICS & GYNECOLOGY

## 2025-08-26 PROCEDURE — 99205 OFFICE O/P NEW HI 60 MIN: CPT | Performed by: OBSTETRICS & GYNECOLOGY

## 2025-08-26 RX ORDER — MEGESTROL ACETATE 20 MG/1
20 TABLET ORAL 2 TIMES DAILY
COMMUNITY
Start: 2025-08-18 | End: 2026-08-18

## 2025-08-26 ASSESSMENT — PAIN SCALES - GENERAL: PAINLEVEL_OUTOF10: SEVERE PAIN (8)

## 2025-08-27 ENCOUNTER — TELEPHONE (OUTPATIENT)
Dept: ONCOLOGY | Facility: CLINIC | Age: 60
End: 2025-08-27
Payer: MEDICARE

## 2025-08-29 ENCOUNTER — PRE VISIT (OUTPATIENT)
Dept: SURGERY | Facility: CLINIC | Age: 60
End: 2025-08-29

## (undated) DEVICE — SU VICRYL 0 CT-2 CR 8X18" J727D

## (undated) DEVICE — DECANTER VIAL 2006S

## (undated) DEVICE — PREP CHLORAPREP 26ML TINTED HI-LITE ORANGE 930815

## (undated) DEVICE — SUCTION IRR STRYKERFLOW II W/TIP 250-070-520

## (undated) DEVICE — DRAIN JACKSON PRATT RESERVOIR 100ML SU130-1305

## (undated) DEVICE — ESU PENCIL W/HOLSTER E2350H

## (undated) DEVICE — EVAC SYSTEM CLEAR FLOW SC082500

## (undated) DEVICE — LINEN HALF SHEET 5512

## (undated) DEVICE — CLIP APPLIER ENDO 5MM M/L LIGAMAX EL5ML

## (undated) DEVICE — SU ETHILON 3-0 FS-1 18" 669H

## (undated) DEVICE — LINEN FULL SHEET 5511

## (undated) DEVICE — LINEN TOWEL PACK X5 5464

## (undated) DEVICE — GLOVE BIOGEL PI MICRO SZ 8.0 48580

## (undated) DEVICE — DRSG GAUZE 4X4" 8044

## (undated) DEVICE — ENDO TROCAR OPTICAL ACCESS KII Z-THRD 12X100MM C0R85

## (undated) DEVICE — SOL NACL 0.9% IRRIG 3000ML BAG 2B7477

## (undated) DEVICE — LINEN POUCH DBL 5427

## (undated) DEVICE — ESU ELEC BLADE 2.75" COATED/INSULATED E1455

## (undated) DEVICE — SUCTION CANISTER MEDIVAC LINER 3000ML W/LID 65651-530

## (undated) DEVICE — ENDO TROCAR FIRST ENTRY KII FIOS Z-THRD 05X150MM CTF01

## (undated) DEVICE — BAG CLEAR TRASH 1.3M 39X33" P4040C

## (undated) DEVICE — SU VICRYL 4-0 P-3 18" UND J494G

## (undated) DEVICE — ENDO POUCH UNIV RETRIEVAL SYSTEM INZII 10MM CD001

## (undated) DEVICE — DRAIN JACKSON PRATT 15FR ROUND SIL LF JP-2229

## (undated) DEVICE — GLOVE BIOGEL PI MICRO SZ 7.5 48575

## (undated) DEVICE — Device

## (undated) DEVICE — ESU CORD MONOPOLAR 10'  E0510

## (undated) DEVICE — ESU GROUND PAD ADULT W/CORD E7507

## (undated) DEVICE — SOL NACL 0.9% IRRIG 1000ML BOTTLE 2F7124

## (undated) DEVICE — SURGICEL HEMOSTAT 3X4" NUKNIT 1943

## (undated) RX ORDER — PROPOFOL 10 MG/ML
INJECTION, EMULSION INTRAVENOUS
Status: DISPENSED
Start: 2024-07-01

## (undated) RX ORDER — ALBUTEROL SULFATE 0.83 MG/ML
SOLUTION RESPIRATORY (INHALATION)
Status: DISPENSED
Start: 2024-07-01

## (undated) RX ORDER — FENTANYL CITRATE 50 UG/ML
INJECTION, SOLUTION INTRAMUSCULAR; INTRAVENOUS
Status: DISPENSED
Start: 2024-07-01

## (undated) RX ORDER — ONDANSETRON 2 MG/ML
INJECTION INTRAMUSCULAR; INTRAVENOUS
Status: DISPENSED
Start: 2024-07-01

## (undated) RX ORDER — LIDOCAINE HYDROCHLORIDE 10 MG/ML
INJECTION, SOLUTION EPIDURAL; INFILTRATION; INTRACAUDAL; PERINEURAL
Status: DISPENSED
Start: 2024-07-01

## (undated) RX ORDER — MECLIZINE HYDROCHLORIDE 25 MG/1
TABLET ORAL
Status: DISPENSED
Start: 2024-07-01

## (undated) RX ORDER — BUPIVACAINE HYDROCHLORIDE 5 MG/ML
INJECTION, SOLUTION EPIDURAL; INTRACAUDAL
Status: DISPENSED
Start: 2024-07-01

## (undated) RX ORDER — LABETALOL HYDROCHLORIDE 5 MG/ML
INJECTION, SOLUTION INTRAVENOUS
Status: DISPENSED
Start: 2024-07-01

## (undated) RX ORDER — PROPOFOL 10 MG/ML
INJECTION, EMULSION INTRAVENOUS
Status: DISPENSED
Start: 2021-04-11

## (undated) RX ORDER — INDOCYANINE GREEN AND WATER 25 MG
KIT INJECTION
Status: DISPENSED
Start: 2024-07-01

## (undated) RX ORDER — CEFAZOLIN SODIUM/WATER 3 G/30 ML
SYRINGE (ML) INTRAVENOUS
Status: DISPENSED
Start: 2024-07-01

## (undated) RX ORDER — DEXAMETHASONE SODIUM PHOSPHATE 4 MG/ML
INJECTION, SOLUTION INTRA-ARTICULAR; INTRALESIONAL; INTRAMUSCULAR; INTRAVENOUS; SOFT TISSUE
Status: DISPENSED
Start: 2024-07-01